# Patient Record
Sex: MALE | Race: WHITE | NOT HISPANIC OR LATINO | Employment: OTHER | URBAN - NONMETROPOLITAN AREA
[De-identification: names, ages, dates, MRNs, and addresses within clinical notes are randomized per-mention and may not be internally consistent; named-entity substitution may affect disease eponyms.]

---

## 2024-09-12 ENCOUNTER — APPOINTMENT (OUTPATIENT)
Dept: RADIOLOGY | Facility: HOSPITAL | Age: 74
DRG: 638 | End: 2024-09-12
Payer: MEDICARE

## 2024-09-12 ENCOUNTER — HOSPITAL ENCOUNTER (INPATIENT)
Facility: HOSPITAL | Age: 74
LOS: 1 days | Discharge: ANOTHER HOSPITAL | DRG: 638 | End: 2024-09-14
Attending: EMERGENCY MEDICINE | Admitting: INTERNAL MEDICINE
Payer: MEDICARE

## 2024-09-12 DIAGNOSIS — M86.9 OSTEOMYELITIS OF LEFT FOOT, UNSPECIFIED TYPE (MULTI): Primary | ICD-10-CM

## 2024-09-12 DIAGNOSIS — R60.0 PERIPHERAL EDEMA: ICD-10-CM

## 2024-09-12 DIAGNOSIS — I82.4Y2 ACUTE DEEP VEIN THROMBOSIS (DVT) OF PROXIMAL VEIN OF LEFT LOWER EXTREMITY (MULTI): ICD-10-CM

## 2024-09-12 DIAGNOSIS — L08.9 WOUND INFECTION: ICD-10-CM

## 2024-09-12 DIAGNOSIS — T14.8XXA WOUND INFECTION: ICD-10-CM

## 2024-09-12 DIAGNOSIS — R94.31 ABNORMAL ELECTROCARDIOGRAM (ECG) (EKG): ICD-10-CM

## 2024-09-12 DIAGNOSIS — R60.9 PERIPHERAL EDEMA: ICD-10-CM

## 2024-09-12 DIAGNOSIS — E11.51 TYPE 2 DIABETES MELLITUS WITH DIABETIC PERIPHERAL ANGIOPATHY WITHOUT GANGRENE, WITHOUT LONG-TERM CURRENT USE OF INSULIN (MULTI): ICD-10-CM

## 2024-09-12 LAB
ALBUMIN SERPL BCP-MCNC: 3.3 G/DL (ref 3.4–5)
ALP SERPL-CCNC: 127 U/L (ref 33–136)
ALT SERPL W P-5'-P-CCNC: 18 U/L (ref 10–52)
ANION GAP SERPL CALC-SCNC: 17 MMOL/L (ref 10–20)
APPEARANCE UR: CLEAR
APTT PPP: 27 SECONDS (ref 27–38)
AST SERPL W P-5'-P-CCNC: 26 U/L (ref 9–39)
BASOPHILS # BLD AUTO: 0.04 X10*3/UL (ref 0–0.1)
BASOPHILS NFR BLD AUTO: 0.4 %
BILIRUB SERPL-MCNC: 0.6 MG/DL (ref 0–1.2)
BILIRUB UR STRIP.AUTO-MCNC: NEGATIVE MG/DL
BNP SERPL-MCNC: 25 PG/ML (ref 0–99)
BUN SERPL-MCNC: 22 MG/DL (ref 6–23)
CALCIUM SERPL-MCNC: 8.9 MG/DL (ref 8.6–10.3)
CHLORIDE SERPL-SCNC: 92 MMOL/L (ref 98–107)
CO2 SERPL-SCNC: 25 MMOL/L (ref 21–32)
COLOR UR: YELLOW
CREAT SERPL-MCNC: 0.81 MG/DL (ref 0.5–1.3)
EGFRCR SERPLBLD CKD-EPI 2021: >90 ML/MIN/1.73M*2
EOSINOPHIL # BLD AUTO: 0.12 X10*3/UL (ref 0–0.4)
EOSINOPHIL NFR BLD AUTO: 1.1 %
ERYTHROCYTE [DISTWIDTH] IN BLOOD BY AUTOMATED COUNT: 12.6 % (ref 11.5–14.5)
GLUCOSE SERPL-MCNC: 310 MG/DL (ref 74–99)
GLUCOSE UR STRIP.AUTO-MCNC: ABNORMAL MG/DL
HCT VFR BLD AUTO: 36.9 % (ref 41–52)
HGB BLD-MCNC: 12.2 G/DL (ref 13.5–17.5)
IMM GRANULOCYTES # BLD AUTO: 0.09 X10*3/UL (ref 0–0.5)
IMM GRANULOCYTES NFR BLD AUTO: 0.8 % (ref 0–0.9)
KETONES UR STRIP.AUTO-MCNC: NEGATIVE MG/DL
LACTATE SERPL-SCNC: 1.4 MMOL/L (ref 0.4–2)
LEUKOCYTE ESTERASE UR QL STRIP.AUTO: NEGATIVE
LYMPHOCYTES # BLD AUTO: 1.74 X10*3/UL (ref 0.8–3)
LYMPHOCYTES NFR BLD AUTO: 16.4 %
MCH RBC QN AUTO: 28.5 PG (ref 26–34)
MCHC RBC AUTO-ENTMCNC: 33.1 G/DL (ref 32–36)
MCV RBC AUTO: 86 FL (ref 80–100)
MONOCYTES # BLD AUTO: 1.26 X10*3/UL (ref 0.05–0.8)
MONOCYTES NFR BLD AUTO: 11.9 %
NEUTROPHILS # BLD AUTO: 7.35 X10*3/UL (ref 1.6–5.5)
NEUTROPHILS NFR BLD AUTO: 69.4 %
NITRITE UR QL STRIP.AUTO: NEGATIVE
NRBC BLD-RTO: 0 /100 WBCS (ref 0–0)
PH UR STRIP.AUTO: 6.5 [PH]
PLATELET # BLD AUTO: 231 X10*3/UL (ref 150–450)
POTASSIUM SERPL-SCNC: 4.6 MMOL/L (ref 3.5–5.3)
PROT SERPL-MCNC: 7.6 G/DL (ref 6.4–8.2)
PROT UR STRIP.AUTO-MCNC: NEGATIVE MG/DL
RBC # BLD AUTO: 4.28 X10*6/UL (ref 4.5–5.9)
RBC # UR STRIP.AUTO: NEGATIVE /UL
SODIUM SERPL-SCNC: 129 MMOL/L (ref 136–145)
SP GR UR STRIP.AUTO: 1.02
UFH PPP CHRO-ACNC: 0.8 IU/ML
UROBILINOGEN UR STRIP.AUTO-MCNC: ABNORMAL MG/DL
WBC # BLD AUTO: 10.6 X10*3/UL (ref 4.4–11.3)

## 2024-09-12 PROCEDURE — 96367 TX/PROPH/DG ADDL SEQ IV INF: CPT

## 2024-09-12 PROCEDURE — 99291 CRITICAL CARE FIRST HOUR: CPT | Performed by: EMERGENCY MEDICINE

## 2024-09-12 PROCEDURE — 94760 N-INVAS EAR/PLS OXIMETRY 1: CPT

## 2024-09-12 PROCEDURE — 2500000004 HC RX 250 GENERAL PHARMACY W/ HCPCS (ALT 636 FOR OP/ED): Performed by: EMERGENCY MEDICINE

## 2024-09-12 PROCEDURE — 96375 TX/PRO/DX INJ NEW DRUG ADDON: CPT

## 2024-09-12 PROCEDURE — 85025 COMPLETE CBC W/AUTO DIFF WBC: CPT | Performed by: EMERGENCY MEDICINE

## 2024-09-12 PROCEDURE — 73630 X-RAY EXAM OF FOOT: CPT | Mod: LEFT SIDE | Performed by: RADIOLOGY

## 2024-09-12 PROCEDURE — 36415 COLL VENOUS BLD VENIPUNCTURE: CPT | Performed by: EMERGENCY MEDICINE

## 2024-09-12 PROCEDURE — 83880 ASSAY OF NATRIURETIC PEPTIDE: CPT | Performed by: EMERGENCY MEDICINE

## 2024-09-12 PROCEDURE — 80053 COMPREHEN METABOLIC PANEL: CPT | Performed by: EMERGENCY MEDICINE

## 2024-09-12 PROCEDURE — 87040 BLOOD CULTURE FOR BACTERIA: CPT | Mod: GENLAB | Performed by: EMERGENCY MEDICINE

## 2024-09-12 PROCEDURE — 96366 THER/PROPH/DIAG IV INF ADDON: CPT

## 2024-09-12 PROCEDURE — 2500000002 HC RX 250 W HCPCS SELF ADMINISTERED DRUGS (ALT 637 FOR MEDICARE OP, ALT 636 FOR OP/ED): Performed by: EMERGENCY MEDICINE

## 2024-09-12 PROCEDURE — 96365 THER/PROPH/DIAG IV INF INIT: CPT

## 2024-09-12 PROCEDURE — G0378 HOSPITAL OBSERVATION PER HR: HCPCS

## 2024-09-12 PROCEDURE — 85520 HEPARIN ASSAY: CPT | Performed by: NURSE PRACTITIONER

## 2024-09-12 PROCEDURE — 93971 EXTREMITY STUDY: CPT

## 2024-09-12 PROCEDURE — 85730 THROMBOPLASTIN TIME PARTIAL: CPT | Performed by: EMERGENCY MEDICINE

## 2024-09-12 PROCEDURE — 73630 X-RAY EXAM OF FOOT: CPT | Mod: LT

## 2024-09-12 PROCEDURE — 96374 THER/PROPH/DIAG INJ IV PUSH: CPT | Mod: 59

## 2024-09-12 PROCEDURE — 83605 ASSAY OF LACTIC ACID: CPT | Performed by: EMERGENCY MEDICINE

## 2024-09-12 PROCEDURE — 81003 URINALYSIS AUTO W/O SCOPE: CPT | Performed by: EMERGENCY MEDICINE

## 2024-09-12 PROCEDURE — 93971 EXTREMITY STUDY: CPT | Mod: FOREIGN READ | Performed by: RADIOLOGY

## 2024-09-12 PROCEDURE — 2500000004 HC RX 250 GENERAL PHARMACY W/ HCPCS (ALT 636 FOR OP/ED): Performed by: NURSE PRACTITIONER

## 2024-09-12 RX ORDER — ACETAMINOPHEN 160 MG/5ML
650 SOLUTION ORAL EVERY 4 HOURS PRN
Status: DISCONTINUED | OUTPATIENT
Start: 2024-09-12 | End: 2024-09-13

## 2024-09-12 RX ORDER — GUAIFENESIN/DEXTROMETHORPHAN 100-10MG/5
5 SYRUP ORAL EVERY 4 HOURS PRN
Status: DISCONTINUED | OUTPATIENT
Start: 2024-09-12 | End: 2024-09-14 | Stop reason: HOSPADM

## 2024-09-12 RX ORDER — ACETAMINOPHEN 325 MG/1
650 TABLET ORAL EVERY 4 HOURS PRN
Status: DISCONTINUED | OUTPATIENT
Start: 2024-09-12 | End: 2024-09-14 | Stop reason: HOSPADM

## 2024-09-12 RX ORDER — AMOXICILLIN 250 MG
1 CAPSULE ORAL 2 TIMES DAILY
Status: DISCONTINUED | OUTPATIENT
Start: 2024-09-12 | End: 2024-09-14 | Stop reason: HOSPADM

## 2024-09-12 RX ORDER — CALCIUM CARBONATE 200(500)MG
500 TABLET,CHEWABLE ORAL 4 TIMES DAILY PRN
Status: DISCONTINUED | OUTPATIENT
Start: 2024-09-12 | End: 2024-09-14 | Stop reason: HOSPADM

## 2024-09-12 RX ORDER — ONDANSETRON HYDROCHLORIDE 2 MG/ML
4 INJECTION, SOLUTION INTRAVENOUS EVERY 8 HOURS PRN
Status: DISCONTINUED | OUTPATIENT
Start: 2024-09-12 | End: 2024-09-14 | Stop reason: HOSPADM

## 2024-09-12 RX ORDER — KETOROLAC TROMETHAMINE 15 MG/ML
15 INJECTION, SOLUTION INTRAMUSCULAR; INTRAVENOUS ONCE
Status: COMPLETED | OUTPATIENT
Start: 2024-09-12 | End: 2024-09-12

## 2024-09-12 RX ORDER — HEPARIN SODIUM 10000 [USP'U]/100ML
0-4500 INJECTION, SOLUTION INTRAVENOUS CONTINUOUS
Status: DISCONTINUED | OUTPATIENT
Start: 2024-09-12 | End: 2024-09-12

## 2024-09-12 RX ORDER — PANTOPRAZOLE SODIUM 40 MG/1
40 TABLET, DELAYED RELEASE ORAL
Status: DISCONTINUED | OUTPATIENT
Start: 2024-09-13 | End: 2024-09-14 | Stop reason: HOSPADM

## 2024-09-12 RX ORDER — ACETAMINOPHEN 500 MG
5 TABLET ORAL NIGHTLY PRN
Status: DISCONTINUED | OUTPATIENT
Start: 2024-09-12 | End: 2024-09-14 | Stop reason: HOSPADM

## 2024-09-12 RX ORDER — ONDANSETRON 4 MG/1
4 TABLET, FILM COATED ORAL EVERY 8 HOURS PRN
Status: DISCONTINUED | OUTPATIENT
Start: 2024-09-12 | End: 2024-09-14 | Stop reason: HOSPADM

## 2024-09-12 RX ORDER — ACETAMINOPHEN 650 MG/1
650 SUPPOSITORY RECTAL EVERY 4 HOURS PRN
Status: DISCONTINUED | OUTPATIENT
Start: 2024-09-12 | End: 2024-09-13

## 2024-09-12 RX ORDER — VANCOMYCIN HYDROCHLORIDE 1 G/200ML
1 INJECTION, SOLUTION INTRAVENOUS
Status: DISCONTINUED | OUTPATIENT
Start: 2024-09-12 | End: 2024-09-12 | Stop reason: WASHOUT

## 2024-09-12 RX ORDER — VANCOMYCIN HYDROCHLORIDE 1 G/20ML
INJECTION, POWDER, LYOPHILIZED, FOR SOLUTION INTRAVENOUS DAILY PRN
Status: DISCONTINUED | OUTPATIENT
Start: 2024-09-12 | End: 2024-09-14 | Stop reason: HOSPADM

## 2024-09-12 RX ORDER — PANTOPRAZOLE SODIUM 40 MG/10ML
40 INJECTION, POWDER, LYOPHILIZED, FOR SOLUTION INTRAVENOUS
Status: DISCONTINUED | OUTPATIENT
Start: 2024-09-13 | End: 2024-09-13

## 2024-09-12 RX ORDER — GUAIFENESIN 600 MG/1
600 TABLET, EXTENDED RELEASE ORAL EVERY 12 HOURS PRN
Status: DISCONTINUED | OUTPATIENT
Start: 2024-09-12 | End: 2024-09-14 | Stop reason: HOSPADM

## 2024-09-12 RX ORDER — ENOXAPARIN SODIUM 100 MG/ML
40 INJECTION SUBCUTANEOUS EVERY 24 HOURS
Status: CANCELLED | OUTPATIENT
Start: 2024-09-12

## 2024-09-12 RX ORDER — VANCOMYCIN HYDROCHLORIDE 1 G/200ML
1000 INJECTION, SOLUTION INTRAVENOUS ONCE
Status: DISCONTINUED | OUTPATIENT
Start: 2024-09-12 | End: 2024-09-13

## 2024-09-12 RX ORDER — HEPARIN SODIUM 10000 [USP'U]/100ML
0-4500 INJECTION, SOLUTION INTRAVENOUS CONTINUOUS
Status: DISCONTINUED | OUTPATIENT
Start: 2024-09-12 | End: 2024-09-14 | Stop reason: HOSPADM

## 2024-09-12 RX ADMIN — HEPARIN SODIUM 2000 UNITS/HR: 10000 INJECTION, SOLUTION INTRAVENOUS at 19:33

## 2024-09-12 RX ADMIN — VANCOMYCIN HYDROCHLORIDE 1 G: 1 INJECTION, SOLUTION INTRAVENOUS at 18:20

## 2024-09-12 RX ADMIN — INSULIN HUMAN 5 UNITS: 100 INJECTION, SOLUTION PARENTERAL at 19:31

## 2024-09-12 RX ADMIN — PIPERACILLIN SODIUM AND TAZOBACTAM SODIUM 4.5 G: 4; .5 INJECTION, SOLUTION INTRAVENOUS at 17:16

## 2024-09-12 RX ADMIN — KETOROLAC TROMETHAMINE 15 MG: 15 INJECTION INTRAMUSCULAR; INTRAVENOUS at 19:31

## 2024-09-12 RX ADMIN — SODIUM CHLORIDE, POTASSIUM CHLORIDE, SODIUM LACTATE AND CALCIUM CHLORIDE 1000 ML: 600; 310; 30; 20 INJECTION, SOLUTION INTRAVENOUS at 18:20

## 2024-09-12 SDOH — SOCIAL STABILITY: SOCIAL INSECURITY: DO YOU FEEL UNSAFE GOING BACK TO THE PLACE WHERE YOU ARE LIVING?: NO

## 2024-09-12 SDOH — SOCIAL STABILITY: SOCIAL INSECURITY: HAS ANYONE EVER THREATENED TO HURT YOUR FAMILY OR YOUR PETS?: NO

## 2024-09-12 SDOH — SOCIAL STABILITY: SOCIAL INSECURITY: ABUSE: ADULT

## 2024-09-12 SDOH — SOCIAL STABILITY: SOCIAL INSECURITY: ARE THERE ANY APPARENT SIGNS OF INJURIES/BEHAVIORS THAT COULD BE RELATED TO ABUSE/NEGLECT?: NO

## 2024-09-12 SDOH — SOCIAL STABILITY: SOCIAL INSECURITY: WERE YOU ABLE TO COMPLETE ALL THE BEHAVIORAL HEALTH SCREENINGS?: YES

## 2024-09-12 SDOH — SOCIAL STABILITY: SOCIAL INSECURITY: DO YOU FEEL ANYONE HAS EXPLOITED OR TAKEN ADVANTAGE OF YOU FINANCIALLY OR OF YOUR PERSONAL PROPERTY?: NO

## 2024-09-12 SDOH — SOCIAL STABILITY: SOCIAL INSECURITY: HAVE YOU HAD THOUGHTS OF HARMING ANYONE ELSE?: NO

## 2024-09-12 SDOH — SOCIAL STABILITY: SOCIAL INSECURITY: ARE YOU OR HAVE YOU BEEN THREATENED OR ABUSED PHYSICALLY, EMOTIONALLY, OR SEXUALLY BY ANYONE?: NO

## 2024-09-12 SDOH — SOCIAL STABILITY: SOCIAL INSECURITY: DOES ANYONE TRY TO KEEP YOU FROM HAVING/CONTACTING OTHER FRIENDS OR DOING THINGS OUTSIDE YOUR HOME?: NO

## 2024-09-12 ASSESSMENT — ACTIVITIES OF DAILY LIVING (ADL)
GROOMING: NEEDS ASSISTANCE
ASSISTIVE_DEVICE: CANE;WALKER
FEEDING YOURSELF: INDEPENDENT
PATIENT'S MEMORY ADEQUATE TO SAFELY COMPLETE DAILY ACTIVITIES?: YES
HEARING - LEFT EAR: FUNCTIONAL
DRESSING YOURSELF: INDEPENDENT
LACK_OF_TRANSPORTATION: NO
HEARING - RIGHT EAR: FUNCTIONAL
BATHING: NEEDS ASSISTANCE
ADEQUATE_TO_COMPLETE_ADL: YES
JUDGMENT_ADEQUATE_SAFELY_COMPLETE_DAILY_ACTIVITIES: YES
TOILETING: NEEDS ASSISTANCE
WALKS IN HOME: NEEDS ASSISTANCE

## 2024-09-12 ASSESSMENT — COGNITIVE AND FUNCTIONAL STATUS - GENERAL
STANDING UP FROM CHAIR USING ARMS: A LOT
MOBILITY SCORE: 14
DAILY ACTIVITIY SCORE: 20
TOILETING: A LITTLE
MOVING TO AND FROM BED TO CHAIR: A LOT
MOVING FROM LYING ON BACK TO SITTING ON SIDE OF FLAT BED WITH BEDRAILS: A LITTLE
PATIENT BASELINE BEDBOUND: NO
DRESSING REGULAR UPPER BODY CLOTHING: A LITTLE
DRESSING REGULAR LOWER BODY CLOTHING: A LITTLE
HELP NEEDED FOR BATHING: A LITTLE
WALKING IN HOSPITAL ROOM: A LOT
CLIMB 3 TO 5 STEPS WITH RAILING: A LOT
TURNING FROM BACK TO SIDE WHILE IN FLAT BAD: A LITTLE

## 2024-09-12 ASSESSMENT — LIFESTYLE VARIABLES
AUDIT-C TOTAL SCORE: 0
HOW OFTEN DO YOU HAVE A DRINK CONTAINING ALCOHOL: NEVER
HOW OFTEN DO YOU HAVE 6 OR MORE DRINKS ON ONE OCCASION: NEVER
HOW MANY STANDARD DRINKS CONTAINING ALCOHOL DO YOU HAVE ON A TYPICAL DAY: PATIENT DOES NOT DRINK
AUDIT-C TOTAL SCORE: 0
SKIP TO QUESTIONS 9-10: 1

## 2024-09-12 ASSESSMENT — PAIN SCALES - GENERAL: PAINLEVEL_OUTOF10: 10 - WORST POSSIBLE PAIN

## 2024-09-12 ASSESSMENT — PATIENT HEALTH QUESTIONNAIRE - PHQ9
SUM OF ALL RESPONSES TO PHQ9 QUESTIONS 1 & 2: 0
2. FEELING DOWN, DEPRESSED OR HOPELESS: NOT AT ALL
1. LITTLE INTEREST OR PLEASURE IN DOING THINGS: NOT AT ALL

## 2024-09-12 ASSESSMENT — PAIN DESCRIPTION - LOCATION: LOCATION: FOOT

## 2024-09-12 ASSESSMENT — COLUMBIA-SUICIDE SEVERITY RATING SCALE - C-SSRS
2. HAVE YOU ACTUALLY HAD ANY THOUGHTS OF KILLING YOURSELF?: NO
6. HAVE YOU EVER DONE ANYTHING, STARTED TO DO ANYTHING, OR PREPARED TO DO ANYTHING TO END YOUR LIFE?: NO
1. IN THE PAST MONTH, HAVE YOU WISHED YOU WERE DEAD OR WISHED YOU COULD GO TO SLEEP AND NOT WAKE UP?: NO

## 2024-09-12 ASSESSMENT — PAIN - FUNCTIONAL ASSESSMENT: PAIN_FUNCTIONAL_ASSESSMENT: 0-10

## 2024-09-12 ASSESSMENT — PAIN DESCRIPTION - PAIN TYPE: TYPE: ACUTE PAIN

## 2024-09-12 ASSESSMENT — PAIN DESCRIPTION - ORIENTATION: ORIENTATION: LEFT

## 2024-09-12 ASSESSMENT — PAIN DESCRIPTION - DESCRIPTORS: DESCRIPTORS: SHARP;SHOOTING

## 2024-09-12 ASSESSMENT — PAIN DESCRIPTION - FREQUENCY: FREQUENCY: INTERMITTENT

## 2024-09-12 NOTE — ED TRIAGE NOTES
Left foot wound infection, states it has been like this for several months. Foot is very swollen, red , foul odor and foul smelling discharge

## 2024-09-12 NOTE — ED PROVIDER NOTES
HPI   Chief Complaint   Patient presents with    Wound Infection     Left foot wound infection, states it has been like this for several months. Foot is very swollen, red , foul odor and foul smelling discharge.        HPI        Patient History   Past Medical History:   Diagnosis Date    Personal history of other diseases of the musculoskeletal system and connective tissue 10/10/2014    Personal history of arthritis     Past Surgical History:   Procedure Laterality Date    CARPAL TUNNEL RELEASE  10/10/2014    Neuroplasty Decompression Median Nerve At Carpal Tunnel     No family history on file.  Social History     Tobacco Use    Smoking status: Unknown    Smokeless tobacco: Not on file   Substance Use Topics    Alcohol use: Not on file    Drug use: Not on file       Physical Exam   ED Triage Vitals [09/12/24 1521]   Temperature Heart Rate Respirations BP   36.9 °C (98.5 °F) 96 16 145/76      Pulse Ox Temp Source Heart Rate Source Patient Position   97 % Oral Monitor Lying      BP Location FiO2 (%)     Left arm --       Physical Exam  Vitals and nursing note reviewed.   Constitutional:       General: He is not in acute distress.     Appearance: He is well-developed.   HENT:      Head: Normocephalic and atraumatic.   Eyes:      Conjunctiva/sclera: Conjunctivae normal.   Cardiovascular:      Rate and Rhythm: Normal rate and regular rhythm.      Heart sounds: No murmur heard.  Pulmonary:      Effort: Pulmonary effort is normal. No respiratory distress.      Breath sounds: Normal breath sounds.   Abdominal:      Palpations: Abdomen is soft.      Tenderness: There is no abdominal tenderness.   Musculoskeletal:         General: No swelling.      Cervical back: Neck supple.      Left lower leg: Edema present.   Skin:     General: Skin is warm and dry.      Capillary Refill: Capillary refill takes less than 2 seconds.      Findings: Erythema and lesion present.   Neurological:      Mental Status: He is alert.   Psychiatric:          Mood and Affect: Mood normal.           ED Course & MDM   Diagnoses as of 09/12/24 1916   Osteomyelitis of left foot, unspecified type (Multi)   Acute deep vein thrombosis (DVT) of proximal vein of left lower extremity (Multi)                 No data recorded                                 Medical Decision Making  Very pleasant 74-year-old gentleman presents to the ER with chief complaint of left foot swelling and pain.  Patient is diabetic.  Patient reports that he uses a walker to get around secondary to a stroke.  Patient does not utilize that leg is much as he should.  But has been getting worse and the swelling over the matter of weeks.  Concern for possible clot did perform an ultrasound which was positive.  From the standpoint to start patient on heparin drip and bolus.  In addition to that did order x-ray to rule out any air producing bacteria.  That did come back not showing any air but suggestive of osteomyelitis.  But despite that already started the patient on vancomycin and Zosyn.  Did contact inpatient team they were able to see the patient down here in the ED agreed that the patient can be admitted here to [facility].  Patient again will start on antibiotics started on heparin do believe that his sodium is slightly abnormal secondary to his glucose.  Will give the patient some insulin down here in the ED.  Patient will be admitted to the hospital for further evaluation treatment.        Procedure  Critical Care    Performed by: Ryder Chappell DO  Authorized by: Ryder Chappell DO    Critical care provider statement:     Critical care time (minutes):  30    Critical care was time spent personally by me on the following activities:  Ordering and performing treatments and interventions, re-evaluation of patient's condition, ordering and review of radiographic studies, ordering and review of laboratory studies and evaluation of patient's response to treatment    Care discussed with: admitting  provider         Ryder STYLES [facility], DO  09/12/24 1916

## 2024-09-13 ENCOUNTER — APPOINTMENT (OUTPATIENT)
Dept: RADIOLOGY | Facility: HOSPITAL | Age: 74
DRG: 638 | End: 2024-09-13
Payer: MEDICARE

## 2024-09-13 ENCOUNTER — APPOINTMENT (OUTPATIENT)
Dept: CARDIOLOGY | Facility: HOSPITAL | Age: 74
DRG: 638 | End: 2024-09-13
Payer: MEDICARE

## 2024-09-13 PROBLEM — E11.51 TYPE 2 DIABETES MELLITUS WITH DIABETIC PERIPHERAL ANGIOPATHY WITHOUT GANGRENE, WITHOUT LONG-TERM CURRENT USE OF INSULIN (MULTI): Status: ACTIVE | Noted: 2024-09-13

## 2024-09-13 PROBLEM — M79.2 NEURITIS: Status: ACTIVE | Noted: 2024-09-13

## 2024-09-13 PROBLEM — I15.9 SECONDARY HYPERTENSION: Status: ACTIVE | Noted: 2024-09-13

## 2024-09-13 PROBLEM — R26.2 AMBULATORY DYSFUNCTION: Status: ACTIVE | Noted: 2024-09-13

## 2024-09-13 PROBLEM — M54.42 CHRONIC BILATERAL LOW BACK PAIN WITH LEFT-SIDED SCIATICA: Status: ACTIVE | Noted: 2024-09-13

## 2024-09-13 PROBLEM — I10 HYPERTENSION: Status: ACTIVE | Noted: 2024-09-13

## 2024-09-13 PROBLEM — I73.9 PERIPHERAL ARTERIAL DISEASE (CMS-HCC): Status: ACTIVE | Noted: 2024-09-13

## 2024-09-13 PROBLEM — I82.432 ACUTE DEEP VEIN THROMBOSIS (DVT) OF POPLITEAL VEIN OF LEFT LOWER EXTREMITY (MULTI): Status: ACTIVE | Noted: 2024-09-13

## 2024-09-13 PROBLEM — M48.07 NEURAL FORAMINAL STENOSIS OF LUMBOSACRAL SPINE: Status: ACTIVE | Noted: 2024-09-13

## 2024-09-13 PROBLEM — I25.10 CAD (CORONARY ARTERY DISEASE): Status: ACTIVE | Noted: 2024-09-13

## 2024-09-13 PROBLEM — E11.42 DIABETIC PERIPHERAL NEUROPATHY (MULTI): Status: ACTIVE | Noted: 2023-04-07

## 2024-09-13 PROBLEM — E78.5 HYPERLIPIDEMIA: Status: ACTIVE | Noted: 2024-09-13

## 2024-09-13 PROBLEM — R29.898 LEFT LEG WEAKNESS: Status: ACTIVE | Noted: 2024-09-13

## 2024-09-13 PROBLEM — M86.9 OSTEOMYELITIS OF LEFT FOOT, UNSPECIFIED TYPE (MULTI): Status: ACTIVE | Noted: 2024-09-13

## 2024-09-13 PROBLEM — R60.0 BILATERAL LOWER EXTREMITY EDEMA: Status: ACTIVE | Noted: 2024-09-13

## 2024-09-13 PROBLEM — I21.9 MI (MYOCARDIAL INFARCTION) (MULTI): Status: ACTIVE | Noted: 2024-09-13

## 2024-09-13 PROBLEM — G62.9 NEUROPATHY: Status: ACTIVE | Noted: 2024-09-13

## 2024-09-13 PROBLEM — Z86.73 HISTORY OF STROKE: Status: ACTIVE | Noted: 2024-09-13

## 2024-09-13 PROBLEM — G89.29 CHRONIC BILATERAL LOW BACK PAIN WITH LEFT-SIDED SCIATICA: Status: ACTIVE | Noted: 2024-09-13

## 2024-09-13 LAB
ANION GAP SERPL CALC-SCNC: 12 MMOL/L (ref 10–20)
AORTIC VALVE PEAK VELOCITY: 2.56 M/S
AV PEAK GRADIENT: 26.2 MMHG
AVA (PEAK VEL): 1.86 CM2
BUN SERPL-MCNC: 21 MG/DL (ref 6–23)
CALCIUM SERPL-MCNC: 8.3 MG/DL (ref 8.6–10.3)
CHLORIDE SERPL-SCNC: 96 MMOL/L (ref 98–107)
CO2 SERPL-SCNC: 26 MMOL/L (ref 21–32)
CREAT SERPL-MCNC: 0.77 MG/DL (ref 0.5–1.3)
CRP SERPL-MCNC: 9.51 MG/DL
EGFRCR SERPLBLD CKD-EPI 2021: >90 ML/MIN/1.73M*2
EJECTION FRACTION: 63 %
ERYTHROCYTE [DISTWIDTH] IN BLOOD BY AUTOMATED COUNT: 12.3 % (ref 11.5–14.5)
ERYTHROCYTE [SEDIMENTATION RATE] IN BLOOD BY WESTERGREN METHOD: 24 MM/H (ref 0–20)
EST. AVERAGE GLUCOSE BLD GHB EST-MCNC: 235 MG/DL
GLUCOSE BLD MANUAL STRIP-MCNC: 157 MG/DL (ref 74–99)
GLUCOSE BLD MANUAL STRIP-MCNC: 182 MG/DL (ref 74–99)
GLUCOSE BLD MANUAL STRIP-MCNC: 189 MG/DL (ref 74–99)
GLUCOSE BLD MANUAL STRIP-MCNC: 198 MG/DL (ref 74–99)
GLUCOSE SERPL-MCNC: 200 MG/DL (ref 74–99)
HBA1C MFR BLD: 9.8 %
HCT VFR BLD AUTO: 33.2 % (ref 41–52)
HGB BLD-MCNC: 11 G/DL (ref 13.5–17.5)
HOLD SPECIMEN: NORMAL
LEFT VENTRICULAR OUTFLOW TRACT DIAMETER: 2.2 CM
MCH RBC QN AUTO: 28.2 PG (ref 26–34)
MCHC RBC AUTO-ENTMCNC: 33.1 G/DL (ref 32–36)
MCV RBC AUTO: 85 FL (ref 80–100)
MITRAL VALVE E/A RATIO: 0.7
NRBC BLD-RTO: 0 /100 WBCS (ref 0–0)
PLATELET # BLD AUTO: 235 X10*3/UL (ref 150–450)
POTASSIUM SERPL-SCNC: 3.8 MMOL/L (ref 3.5–5.3)
RBC # BLD AUTO: 3.9 X10*6/UL (ref 4.5–5.9)
RIGHT VENTRICLE FREE WALL PEAK S': 12.5 CM/S
SODIUM SERPL-SCNC: 130 MMOL/L (ref 136–145)
TRICUSPID ANNULAR PLANE SYSTOLIC EXCURSION: 2.1 CM
UFH PPP CHRO-ACNC: 0.5 IU/ML
UFH PPP CHRO-ACNC: 0.7 IU/ML
WBC # BLD AUTO: 11.6 X10*3/UL (ref 4.4–11.3)

## 2024-09-13 PROCEDURE — 75635 CT ANGIO ABDOMINAL ARTERIES: CPT | Mod: IPSPLIT

## 2024-09-13 PROCEDURE — 86140 C-REACTIVE PROTEIN: CPT | Performed by: NURSE PRACTITIONER

## 2024-09-13 PROCEDURE — 36415 COLL VENOUS BLD VENIPUNCTURE: CPT | Performed by: NURSE PRACTITIONER

## 2024-09-13 PROCEDURE — 96365 THER/PROPH/DIAG IV INF INIT: CPT | Mod: IPSPLIT

## 2024-09-13 PROCEDURE — 99223 1ST HOSP IP/OBS HIGH 75: CPT | Performed by: NURSE PRACTITIONER

## 2024-09-13 PROCEDURE — 94760 N-INVAS EAR/PLS OXIMETRY 1: CPT

## 2024-09-13 PROCEDURE — 2500000001 HC RX 250 WO HCPCS SELF ADMINISTERED DRUGS (ALT 637 FOR MEDICARE OP): Mod: IPSPLIT | Performed by: PHYSICIAN ASSISTANT

## 2024-09-13 PROCEDURE — 2500000001 HC RX 250 WO HCPCS SELF ADMINISTERED DRUGS (ALT 637 FOR MEDICARE OP): Performed by: NURSE PRACTITIONER

## 2024-09-13 PROCEDURE — 82947 ASSAY GLUCOSE BLOOD QUANT: CPT

## 2024-09-13 PROCEDURE — 75635 CT ANGIO ABDOMINAL ARTERIES: CPT | Performed by: RADIOLOGY

## 2024-09-13 PROCEDURE — 2500000004 HC RX 250 GENERAL PHARMACY W/ HCPCS (ALT 636 FOR OP/ED): Mod: JZ,IPSPLIT | Performed by: NURSE PRACTITIONER

## 2024-09-13 PROCEDURE — 85652 RBC SED RATE AUTOMATED: CPT | Performed by: NURSE PRACTITIONER

## 2024-09-13 PROCEDURE — 2500000005 HC RX 250 GENERAL PHARMACY W/O HCPCS: Mod: IPSPLIT

## 2024-09-13 PROCEDURE — 1100000001 HC PRIVATE ROOM DAILY: Mod: IPSPLIT

## 2024-09-13 PROCEDURE — 2500000005 HC RX 250 GENERAL PHARMACY W/O HCPCS: Mod: IPSPLIT | Performed by: NURSE PRACTITIONER

## 2024-09-13 PROCEDURE — 87070 CULTURE OTHR SPECIMN AEROBIC: CPT | Mod: GENLAB | Performed by: NURSE PRACTITIONER

## 2024-09-13 PROCEDURE — 2550000001 HC RX 255 CONTRASTS: Mod: IPSPLIT | Performed by: INTERNAL MEDICINE

## 2024-09-13 PROCEDURE — 85027 COMPLETE CBC AUTOMATED: CPT | Performed by: NURSE PRACTITIONER

## 2024-09-13 PROCEDURE — 80048 BASIC METABOLIC PNL TOTAL CA: CPT | Performed by: NURSE PRACTITIONER

## 2024-09-13 PROCEDURE — 85520 HEPARIN ASSAY: CPT | Performed by: NURSE PRACTITIONER

## 2024-09-13 PROCEDURE — 93306 TTE W/DOPPLER COMPLETE: CPT | Mod: IPSPLIT

## 2024-09-13 PROCEDURE — 83036 HEMOGLOBIN GLYCOSYLATED A1C: CPT | Mod: GENLAB | Performed by: NURSE PRACTITIONER

## 2024-09-13 PROCEDURE — 96366 THER/PROPH/DIAG IV INF ADDON: CPT | Mod: IPSPLIT

## 2024-09-13 PROCEDURE — 2500000002 HC RX 250 W HCPCS SELF ADMINISTERED DRUGS (ALT 637 FOR MEDICARE OP, ALT 636 FOR OP/ED): Performed by: NURSE PRACTITIONER

## 2024-09-13 RX ORDER — OXYCODONE HYDROCHLORIDE 5 MG/1
5 TABLET ORAL EVERY 4 HOURS PRN
Status: DISCONTINUED | OUTPATIENT
Start: 2024-09-13 | End: 2024-09-14 | Stop reason: HOSPADM

## 2024-09-13 RX ORDER — SODIUM CHLORIDE 9 MG/ML
10 INJECTION, SOLUTION INTRAVENOUS CONTINUOUS PRN
Status: DISCONTINUED | OUTPATIENT
Start: 2024-09-13 | End: 2024-09-14 | Stop reason: HOSPADM

## 2024-09-13 RX ORDER — SODIUM CHLORIDE 9 MG/ML
INJECTION, SOLUTION INTRAVENOUS
Status: DISPENSED
Start: 2024-09-13 | End: 2024-09-13

## 2024-09-13 RX ORDER — INSULIN LISPRO 100 [IU]/ML
0-15 INJECTION, SOLUTION INTRAVENOUS; SUBCUTANEOUS
Status: DISCONTINUED | OUTPATIENT
Start: 2024-09-13 | End: 2024-09-14 | Stop reason: HOSPADM

## 2024-09-13 RX ORDER — VANCOMYCIN 1.25 G/250ML
1750 INJECTION, SOLUTION INTRAVENOUS EVERY 12 HOURS
Status: DISCONTINUED | OUTPATIENT
Start: 2024-09-13 | End: 2024-09-14 | Stop reason: HOSPADM

## 2024-09-13 RX ADMIN — PIPERACILLIN SODIUM AND TAZOBACTAM SODIUM 3.38 G: 3; .375 INJECTION, SOLUTION INTRAVENOUS at 12:18

## 2024-09-13 RX ADMIN — HYDROMORPHONE HYDROCHLORIDE 0.5 MG: 1 INJECTION, SOLUTION INTRAMUSCULAR; INTRAVENOUS; SUBCUTANEOUS at 14:33

## 2024-09-13 RX ADMIN — IOHEXOL 125 ML: 350 INJECTION, SOLUTION INTRAVENOUS at 12:07

## 2024-09-13 RX ADMIN — Medication 2 L/MIN: at 22:19

## 2024-09-13 RX ADMIN — HEPARIN SODIUM 1800 UNITS/HR: 10000 INJECTION, SOLUTION INTRAVENOUS at 05:15

## 2024-09-13 RX ADMIN — Medication 5 MG: at 01:15

## 2024-09-13 RX ADMIN — OXYCODONE HYDROCHLORIDE 5 MG: 5 TABLET ORAL at 20:16

## 2024-09-13 RX ADMIN — SENNOSIDES AND DOCUSATE SODIUM 1 TABLET: 50; 8.6 TABLET ORAL at 20:16

## 2024-09-13 RX ADMIN — ONDANSETRON HYDROCHLORIDE 4 MG: 4 TABLET, FILM COATED ORAL at 12:26

## 2024-09-13 RX ADMIN — INSULIN LISPRO 3 UNITS: 100 INJECTION, SOLUTION INTRAVENOUS; SUBCUTANEOUS at 08:33

## 2024-09-13 RX ADMIN — PIPERACILLIN SODIUM AND TAZOBACTAM SODIUM 3.38 G: 3; .375 INJECTION, SOLUTION INTRAVENOUS at 06:30

## 2024-09-13 RX ADMIN — ACETAMINOPHEN 650 MG: 325 TABLET ORAL at 12:26

## 2024-09-13 RX ADMIN — VANCOMYCIN 1750 MG: 1.25 INJECTION, SOLUTION INTRAVENOUS at 08:31

## 2024-09-13 RX ADMIN — SENNOSIDES AND DOCUSATE SODIUM 1 TABLET: 50; 8.6 TABLET ORAL at 08:36

## 2024-09-13 RX ADMIN — HEPARIN SODIUM 1800 UNITS/HR: 10000 INJECTION, SOLUTION INTRAVENOUS at 17:52

## 2024-09-13 RX ADMIN — VANCOMYCIN 1750 MG: 1.25 INJECTION, SOLUTION INTRAVENOUS at 20:08

## 2024-09-13 RX ADMIN — PIPERACILLIN SODIUM AND TAZOBACTAM SODIUM 3.38 G: 3; .375 INJECTION, SOLUTION INTRAVENOUS at 17:53

## 2024-09-13 RX ADMIN — PIPERACILLIN SODIUM AND TAZOBACTAM SODIUM 3.38 G: 3; .375 INJECTION, SOLUTION INTRAVENOUS at 00:17

## 2024-09-13 RX ADMIN — SODIUM HYPOCHLORITE: 2.5 SOLUTION TOPICAL at 14:35

## 2024-09-13 RX ADMIN — INSULIN LISPRO 3 UNITS: 100 INJECTION, SOLUTION INTRAVENOUS; SUBCUTANEOUS at 12:17

## 2024-09-13 RX ADMIN — PANTOPRAZOLE SODIUM 40 MG: 40 TABLET, DELAYED RELEASE ORAL at 06:30

## 2024-09-13 RX ADMIN — PERFLUTREN 10 ML OF DILUTION: 6.52 INJECTION, SUSPENSION INTRAVENOUS at 14:08

## 2024-09-13 ASSESSMENT — COGNITIVE AND FUNCTIONAL STATUS - GENERAL
DAILY ACTIVITIY SCORE: 18
DRESSING REGULAR UPPER BODY CLOTHING: A LOT
HELP NEEDED FOR BATHING: A LITTLE
MOVING FROM LYING ON BACK TO SITTING ON SIDE OF FLAT BED WITH BEDRAILS: A LITTLE
DRESSING REGULAR LOWER BODY CLOTHING: A LITTLE
CLIMB 3 TO 5 STEPS WITH RAILING: A LOT
TOILETING: A LITTLE
PERSONAL GROOMING: A LITTLE
WALKING IN HOSPITAL ROOM: A LOT
MOVING FROM LYING ON BACK TO SITTING ON SIDE OF FLAT BED WITH BEDRAILS: A LITTLE
TURNING FROM BACK TO SIDE WHILE IN FLAT BAD: A LOT
TURNING FROM BACK TO SIDE WHILE IN FLAT BAD: A LITTLE
DAILY ACTIVITIY SCORE: 20
HELP NEEDED FOR BATHING: A LITTLE
MOVING TO AND FROM BED TO CHAIR: A LOT
DRESSING REGULAR LOWER BODY CLOTHING: A LITTLE
MOBILITY SCORE: 13
STANDING UP FROM CHAIR USING ARMS: A LOT
MOVING TO AND FROM BED TO CHAIR: A LOT
CLIMB 3 TO 5 STEPS WITH RAILING: A LOT
STANDING UP FROM CHAIR USING ARMS: A LOT
TOILETING: A LITTLE
WALKING IN HOSPITAL ROOM: A LOT
MOBILITY SCORE: 14
DRESSING REGULAR UPPER BODY CLOTHING: A LITTLE

## 2024-09-13 ASSESSMENT — PAIN SCALES - GENERAL
PAINLEVEL_OUTOF10: 7
PAINLEVEL_OUTOF10: 0 - NO PAIN
PAINLEVEL_OUTOF10: 0 - NO PAIN
PAINLEVEL_OUTOF10: 9
PAINLEVEL_OUTOF10: 0 - NO PAIN
PAINLEVEL_OUTOF10: 0 - NO PAIN
PAINLEVEL_OUTOF10: 3
PAINLEVEL_OUTOF10: 0 - NO PAIN
PAINLEVEL_OUTOF10: 0 - NO PAIN

## 2024-09-13 ASSESSMENT — ENCOUNTER SYMPTOMS
BACK PAIN: 1
GASTROINTESTINAL NEGATIVE: 1
RESPIRATORY NEGATIVE: 1
ARTHRALGIAS: 1
SHORTNESS OF BREATH: 0
ALLERGIC/IMMUNOLOGIC NEGATIVE: 1
FATIGUE: 1
PSYCHIATRIC NEGATIVE: 1
EYES NEGATIVE: 1
ENDOCRINE NEGATIVE: 1
HEMATOLOGIC/LYMPHATIC NEGATIVE: 1
CHILLS: 0
ACTIVITY CHANGE: 1
FEVER: 0
COLOR CHANGE: 1
WOUND: 1
MYALGIAS: 1

## 2024-09-13 ASSESSMENT — PAIN - FUNCTIONAL ASSESSMENT
PAIN_FUNCTIONAL_ASSESSMENT: 0-10

## 2024-09-13 ASSESSMENT — PAIN SCALES - PAIN ASSESSMENT IN ADVANCED DEMENTIA (PAINAD): TOTALSCORE: MEDICATION (SEE MAR)

## 2024-09-13 ASSESSMENT — PAIN DESCRIPTION - DESCRIPTORS: DESCRIPTORS: SHARP

## 2024-09-13 ASSESSMENT — PAIN DESCRIPTION - LOCATION
LOCATION: BACK
LOCATION: FOOT

## 2024-09-13 ASSESSMENT — PAIN DESCRIPTION - ORIENTATION: ORIENTATION: LEFT

## 2024-09-13 ASSESSMENT — ACTIVITIES OF DAILY LIVING (ADL): LACK_OF_TRANSPORTATION: NO

## 2024-09-13 NOTE — PROGRESS NOTES
An hour and a half I was able to speak to BERNABE Schumacher and SARAH Pearson-CRP. she a nurse patient came to the hospital. He was admitted his swollen in his leg. He is a noncompliant diabetic with comorbidities. Is not been seen for his foot ulcer. And a patient has Gess necrotic changes near his fifth metatarsal head and x-ray shows high suspicion of osteomyelitis. In addition to patient has severe venous stasis, dermatitis and insufficiency. Patient also has a DVT. at this time, we will order nine of studies and CT Jemal to assess the circulation. He may need to be transferred to a hospital to have vascular surgery to look at him. But at this point, we will treat him conservatively with IV antibiotics and dressing changes and minimal weight-bearing. Have the medical team continue to treat him for his comorbidities. And if this patient is a surgical candidate, it’s quite reasonable due to the destruction of the soft tissue, necrosis and gangrenous changes. He may need a trans metatarsal amputation. I don’t believe an isolated ray amp  would work due to the soft tissue damage. Patient at risk of BKA or AKAor even death from complications of his history. Patient will definitely need placement. Patient has other issues as well.  Patience, go to really jan dermatitis changes on top cirrhosis on top of the foot and ichthyosis.  Patient may need just localized one care, debridement, and suppression of the wound and osteomyelitis..    Once we get the testing will then decide what the next treatment options will be however this patient is in trouble, and I don’t know if conservative. as possible, b options will help him

## 2024-09-13 NOTE — PROGRESS NOTES
Physical Therapy                 Therapy Communication Note    Patient Name: Elliot Partida  MRN: [id number]  Department: [facility]  Room: [address] [place] [address]A  Today's Date: 9/13/2024     Discipline: Physical Therapy    Missed Visit Reason: Missed Visit Reason: Patient placed on medical hold (Hold PT today per VITO)    Missed Time: Attempt 850

## 2024-09-13 NOTE — CONSULTS
"Nutrition Initial Assessment:   Nutrition Assessment    Reason for Assessment: Admission nursing screening (Stage 3 or 4 Pressure Injury or Multiple Non-Healing Wounds)    Patient is a 74 y.o. male presented to ED yesterday with left leg pain that has been ongoing for \"months\". Per H&P, his leg is edematous, erythematous, weeping with a large necrotic ulcer under his 5th toe on the plantar surface. Admitted for wound infection.     PMH of R frontal extradural mass since 2016, arthritis, CAD, diabetes type 2, MI, HTN, thoracic radiculopathy, neuropathy     Nutrition History:  Food and Nutrient History: Attempted to visit the patient several times today, but the patient was sleeping. Per IDT rounds, the patient is likely to refuse nutrition education or supplements. Plan to try Gelatein Plus tomorrow to help with protein intake. Assessment was completed using chart information, IDT rounds, and communication with nursing. Nursing reported that the patient refused dinner this evening. It is unclear what his intake has been at home or for breakfast and lunch today.    Food Allergies/Intolerances:   NKFA  GI Symptoms:  unable to assess  Oral Problems:  unable to assess    Wound Type: multiple Pressure injury perineum, and diabetic ulcer left foot, tibial distal, dorsal left ankle,  (nursing/wound notes provide further details)    Anthropometrics:  Height: 175.3 cm (5' 9\")   Weight: 107 kg (236 lb 1.8 oz)   BMI (Calculated): 34.85  IBW/kg (Dietitian Calculated): 72.7 kg  Percent of IBW: 147 %       Weight History:   Wt Readings from Last 10 Encounters:   09/12/24 107 kg (236 lb 1.8 oz)   01/11/23 109 kg (239 lb 15.9 oz)   11/30/22 109 kg (239 lb 15.9 oz)   07/20/21 103 kg (228 lb)   06/22/21 98.6 kg (217 lb 6 oz)   06/15/21 104 kg (229 lb 15 oz)   05/21/21 98.6 kg (217 lb 6 oz)   04/27/21 105 kg (231 lb 7.7 oz)   03/23/21 106 kg (233 lb 4 oz)   03/02/21 98.6 kg (217 lb 6 oz)     Weight Change %:  Weight History / % Weight " Change: not enough information to assess. Weight from chart: 1/11/23 was 109kg. No signifcant change in that time. Pt does have edmea which could be masking weight loss.    Nutrition Focused Physical Exam Findings:  defer: patient sleeping  Edema:  Edema: +2 mild, +4 severe  Edema Location: +2 RLE, +4 LLE per nursing flowsheet  Physical Findings:  Skin: Positive (see wound note)    Nutrition Significant Labs:  CBC Trend:   Results from last 7 days   Lab Units 09/13/24  0338 09/12/24  1700   WBC AUTO x10*3/uL 11.6* 10.6   RBC AUTO x10*6/uL 3.90* 4.28*   HEMOGLOBIN g/dL 11.0* 12.2*   HEMATOCRIT % 33.2* 36.9*   MCV fL 85 86   PLATELETS AUTO x10*3/uL 235 231    , BMP Trend:   Results from last 7 days   Lab Units 09/13/24  0337 09/12/24  1808   GLUCOSE mg/dL 200* 310*   CALCIUM mg/dL 8.3* 8.9   SODIUM mmol/L 130* 129*   POTASSIUM mmol/L 3.8 4.6   CO2 mmol/L 26 25   CHLORIDE mmol/L 96* 92*   BUN mg/dL 21 22   CREATININE mg/dL 0.77 0.81    , A1C:  Lab Results   Component Value Date    HGBA1C 9.8 (H) 09/13/2024   , BG POCT trend:   Results from last 7 days   Lab Units 09/13/24  1659 09/13/24  1120 09/13/24  0807   POCT GLUCOSE mg/dL 182* 189* 198*     Nutrition Specific Medications:  insulin lispro, 0-15 Units, subcutaneous, TID  pantoprazole, 40 mg, oral, Daily before breakfast  piperacillin-tazobactam, 3.375 g, intravenous, q6h  sennosides-docusate sodium, 1 tablet, oral, BID  vancomycin, 1,750 mg, intravenous, q12h    Continuous medications  heparin, 0-4,500 Units/hr, Last Rate: 1,800 Units/hr (09/13/24 1752)  sodium chloride 0.9%, 10 mL/hr    I/O:   Last BM Date: 09/13/24;      Dietary Orders (From admission, onward)       Start     Ordered    09/13/24 1656  Oral nutritional supplements  Until discontinued        Question Answer Comment   Deliver with Breakfast    Deliver with Dinner    Select supplement: Gelatein Plus        09/13/24 1655    09/12/24 2034  Adult diet Regular  Diet effective now        Question:  Diet  type  Answer:  Regular    09/12/24 2033 09/12/24 2033  May Participate in Room Service  Once        Question:  .  Answer:  Yes    09/12/24 2032                Estimated Needs:   Total Energy Estimated Needs (kCal): 2545 kCal  Method for Estimating Needs: 2181-2545kcal (30-35kcal/kg of IBW (72.7kg))  Total Protein Estimated Needs (g): 145 g  Method for Estimating Needs: 109-145g (1.5-2g/kg of IBW)  Total Fluid Estimated Needs (mL): 2181 mL  Method for Estimating Needs: 1mL/kcal        Nutrition Diagnosis   Malnutrition Diagnosis  Patient has Malnutrition Diagnosis:  (unable to fully assess)    Nutrition Diagnosis  Patient has Nutrition Diagnosis: Yes  Diagnosis Status (1): New  Nutrition Diagnosis 1: Increased nutrient needs  Related to (1): increased metabloic demand  As Evidenced by (1): would infection, PI stage three or greater on foot and perineum       Nutrition Interventions/Recommendations         Nutrition Prescription:  Individualized Nutrition Prescription Provided for : diet, fluids, ONS        Nutrition Interventions:   Interventions: Medical food supplement, Meals and snacks  Meals and Snacks: General healthful diet  Goal: contiune with general healthful diet, encourage high protein foods  Medical Food Supplement: Commercial food  Goal: Gelatein Plus BID (160kcal and 20g protein per 4oz)  Additional Interventions: Will try the Gelatein Plus, if he does not like, will try and offer other ONS    Collaboration and Referral of Nutrition Care: Collaboration by nutrition professional with other providers  Goal: IDT rounds; Nandini MORRIS    Nutrition Education:   N/A at this time    Nutrition Monitoring and Evaluation   Food/Nutrient Related History Monitoring  Monitoring and Evaluation Plan: Amount of food  Amount of Food: Estimated amout of food, Medical food intake  Criteria: Pt will consume >75% of est needs with meals and ONS    Body Composition/Growth/Weight History  Monitoring and Evaluation Plan:  Weight  Weight: Measured weight  Criteria: maintain stable weight, or reduce weight from edema    Biochemical Data, Medical Tests and Procedures  Monitoring and Evaluation Plan: Glucose/endocrine profile  Glucose/Endocrine Profile: Glucose, casual  Criteria: 80-180mg/dL    Nutrition Focused Physical Findings  Monitoring and Evaluation Plan: Skin  Skin: Impaired wound healing  Criteria: wound healing/ skin wnl  Other: Evaluate nutrition intervention as compared to nutrition goal(s) and estimated nutrient need criteria.       Follow Up:     Time Spent (min): 75 minutes  Last Date of Nutrition Visit: 09/13/24  Nutrition Follow-Up Needed?: Dietitian to reassess per policy  Follow up Comment: ONS likes, intake, NFPE

## 2024-09-13 NOTE — PROGRESS NOTES
09/13/24 1005   Discharge Planning   Living Arrangements Family members  (his sister lives with him.)   Support Systems Family members  (his sister and niece help him as needed)   Assistance Needed Independent in ADL's. His sister or niece do the shopping, cooking, and cleaning for him. His sister provides transporation for him. He does not drive. DME: walker, cane, wheelchair, shower chair, grab bars. He states that he has had several falls in the past 6 months. He does not have a PCP, offered list, patient is uninterested. [facility] in [place] is his preferred pharmacy, patient states that he does not take any home medications. He plans to return home with no additional needs on discharge. TCC to follow for any further needs. Patient admitted for wound infection, waiting on ID consult to determine needs.   Type of Residence Private residence  (ranch style single family home with basement.)   Number of Stairs to Enter Residence   (patient enters the home through the basement. He is not sure how many steps there are to navigate, but he denies any issues performing stairs.)   Do you have animals or pets at home? Yes   Type of Animals or Pets 2 cats   Who is requesting discharge planning? Provider   Home or Post Acute Services None   Does the patient need discharge transport arranged? No   Financial Resource Strain   How hard is it for you to pay for the very basics like food, housing, medical care, and heating? Not hard   Housing Stability   In the last 12 months, was there a time when you were not able to pay the mortgage or rent on time? N   In the past 12 months, how many times have you moved where you were living? 0   At any time in the past 12 months, were you homeless or living in a shelter (including now)? N   Transportation Needs   In the past 12 months, has lack of transportation kept you from medical appointments or from getting medications? no   In the past 12 months, has lack of transportation kept  you from meetings, work, or from getting things needed for daily living? No

## 2024-09-13 NOTE — NURSING NOTE
1518: Assumed care of patient. Patient is resting in bed. Denies pain at this time and is refusing MRI.     1525: Agree with previous nurse's assessment     1700: Patient resting comfortably in bed, blood glucose obtained and at 182. Patient states he does not want to eat dinner therefore nursing will hold insulin dose at this time.     1746: The patient's goals for the shift include no pain      The clinical goals for the shift include remain free from pain.    Over the shift, the patient did  make progress toward the following goals. Given IV dilaudid with good effect. Dressing completed to left foot. IV Heparin drip infusing per MAR. Next assay due in morning. Patient refused MRI.

## 2024-09-13 NOTE — NURSING NOTE
Pt states he would like to get up to the bathroom, encouraged patient to use urinal. Pt declined and stated he was going to use the bathroom. Pt walked to bathroom with two assist. On way back from bathroom patient's left foot began to become purple on top. Appearing to be a hematoma. Delia NP at bedside to assess. Pulses doppled, weak. Heparin to remain running per Delia. Emeterio kaye PA aware of situation as well. To proceed with dressing to left foot, patient medicated for pain.

## 2024-09-13 NOTE — CARE PLAN
The patient's goals for the shift include  rest    The clinical goals for the shift include to obtain therapeutic level of heparin assay    Over the shift, the patient did make progress toward the following goals.     Pt admitted to unit at 1947. At 2333, pt's heparin assay returned 0.8 and heparin drip adjusted per order. At 0357, pt's heparin assay returned 0.7 and therefore therapeutic at this time. Pt's wound to left lower leg assessed and wound culture sent. Cleansed with soap and water per order and pictures obtained. Pt reported pain with pressure and numbness to extremity. Declined pain medication at that time. Extremity elevated and pedal pulses assessed bilaterally via doppler. Pt in bed at this time with call light in reach.

## 2024-09-13 NOTE — CONSULTS
Vancomycin Dosing by Pharmacy- INITIAL    Elliot Partida is a 74 y.o. year old male who Pharmacy has been consulted for vancomycin dosing for cellulitis, skin and soft tissue. Based on the patient's indication and renal status this patient will be dosed based on a goal AUC of 400-600.     Renal function is currently stable.    Visit Vitals  /76 (BP Location: Right arm, Patient Position: Lying)   Pulse 89   Temp 36.2 °C (97.2 °F) (Temporal)   Resp 18      Lab Results   Component Value Date    CREATININE 0.77 2024    CREATININE 0.81 2024    CREATININE 0.83 2022    CREATININE 0.89 2020    CREATININE 0.95 2019    CREATININE 0.73 12/10/2019      Patient weight is as follows:   Vitals:    24   Weight: 107 kg (236 lb 1.8 oz)     I/O last 3 completed shifts:  In: 325 (3 mL/kg) [I.V.:75 (0.7 mL/kg); IV Piggyback:250]  Out: 500 (4.7 mL/kg) [Urine:500 (0.1 mL/kg/hr)]  Weight: 107.1 kg   I/O during current shift:  I/O this shift:  In: 50 [IV Piggyback:50]  Out: -     Temp (24hrs), Av.6 °C (97.8 °F), Min:36.1 °C (97 °F), Max:36.9 °C (98.5 °F)     Assessment/Plan  Patient received 1000 mg while in ED.  Will initiate vancomycin maintenance,  1750 mg every 12 hours.    This dosing regimen is predicted by [employer] to result in the following pharmacokinetic parameters:  Loading dose: N/A  Regimen: 1750 mg IV every 12 hours.  Start time: 07:03 on 2024  Exposure target: AUC24 (range)400-600 mg/L.hr   [id number]: 496 mg/L.hr  AUC24,ss: 560 mg/L.hr  Probability of AUC24 > 400: 85 %  Ctrough,ss: 15 mg/L  Probability of Ctrough,ss > 20: 26 %    Follow-up level will be ordered on 9/15/24 at 05:00 unless clinically indicated sooner.  Will continue to monitor renal function daily while on vancomycin and order serum creatinine at least every 48 hours if not already ordered.  Follow for continued vancomycin needs, clinical response, and signs/symptoms of toxicity.     Adwoa Rowe,  PharmD, BCPS  Clinical Pharmacy Coordinator   [facility] (on-site: Mon-Tues)  [facility] (on-site: Wed-Fri)  Available via EPIC Secure Chat

## 2024-09-13 NOTE — H&P
"History Of Present Illness  Elliot Partida is a 74 y.o. male with PMH of R frontal extradural mass since 2016, arthritis, CAD, diabetes type 2, MI, HTN, thoracic radiculopathy, neuropathy who presented to ED yesterday with left leg pain that has been ongoing for \"months\". His leg is edematous, erythematous, weeping with a large necrotic ulcer under his 5th toe on the plantar surface. The skin on his leg is very dry and flaking and the odor is foul. His right leg has dry skin as well and is slightly edematous and pink. He has not seen a provider or taken any medications for about \"2 years\". He lives with his sister who helps take care of him. In the ED his workup showed lab abnormals including glucose 310, Na 129, albumin 3.3. Xray of the foot showed osteomyelitis of the 5th metatarsal head. Ultrasound of the leg showed acute DVT in the left mid-distal femoral and popliteal veins. He was started on IV heparin, IV piptaz and IV vancomycin for the DVT and infection. He then was admitted to Medicine for further evaluation and treatment of his foot infection, DVT and comorbidities.      Past Medical History  Past Medical History:   Diagnosis Date    Arthritis     CAD (coronary artery disease)     Diabetes mellitus (Multi)     Hypertension     Peripheral neuropathy     Stroke (Multi)     Thoracic radiculopathy      Surgical History  Past Surgical History:   Procedure Laterality Date    CARDIAC CATHETERIZATION      CARPAL TUNNEL RELEASE  10/10/2014    EYE SURGERY      FL  ARTHROCENTESIS ASP INJ JOINT.      IRIDOTOMY / IRIDECTOMY Bilateral      Social History  He reports that he has never smoked. He has never been exposed to tobacco smoke. He has never used smokeless tobacco. No history on file for alcohol use and drug use.    Family History  Family History   Problem Relation Name Age of Onset    Heart disease Father      Colon cancer Other      Heart attack Other       Allergies  Patient has no known allergies.    Review " of Systems   Constitutional:  Positive for activity change and fatigue. Negative for chills and fever.   HENT: Negative.     Eyes: Negative.    Respiratory: Negative.     Cardiovascular:  Positive for leg swelling.   Gastrointestinal: Negative.    Endocrine: Negative.    Genitourinary: Negative.    Musculoskeletal:  Positive for back pain and gait problem.   Skin:  Positive for color change, rash and wound.   Allergic/Immunologic: Negative.    Hematological: Negative.    Psychiatric/Behavioral: Negative.          Physical Exam  Constitutional:       Appearance: He is obese. He is ill-appearing.   HENT:      Mouth/Throat:      Mouth: Mucous membranes are moist.   Eyes:      Extraocular Movements: Extraocular movements intact.      Pupils: Pupils are equal, round, and reactive to light.   Cardiovascular:      Rate and Rhythm: Normal rate and regular rhythm.      Pulses: Normal pulses.      Heart sounds: Murmur heard.      No friction rub. No gallop.   Pulmonary:      Effort: Pulmonary effort is normal. No respiratory distress.      Breath sounds: Normal breath sounds. No wheezing or rales.   Abdominal:      General: There is distension.      Tenderness: There is no abdominal tenderness. There is no guarding.   Musculoskeletal:         General: Tenderness present.      Cervical back: Normal range of motion.      Right lower leg: Edema (1+) present.      Left lower leg: Edema (3+) present.   Skin:     General: Skin is warm.      Findings: Erythema present.      Comments: Large excoriated area, weeping, on dorsum of left foot  Quarter size necrotic wound under left 5th toe, plantar surface  Dry flaking skin bilateral legs and feet  Erythema from toes to patellar base on LLE  Mild erythema on right foot, base of leg   Neurological:      General: No focal deficit present.      Mental Status: He is alert and oriented to person, place, and time.   Psychiatric:         Mood and Affect: Mood normal.         Behavior: Behavior  "normal.       Last Recorded Vitals  Blood pressure 137/66, pulse 75, temperature 37.4 °C (99.4 °F), temperature source Temporal, resp. rate 17, height 1.753 m (5' 9\"), weight 107 kg (236 lb 1.8 oz), SpO2 94%.    Scheduled medications  insulin lispro, 0-15 Units, subcutaneous, TID  pantoprazole, 40 mg, oral, Daily before breakfast  piperacillin-tazobactam, 3.375 g, intravenous, q6h  sennosides-docusate sodium, 1 tablet, oral, BID  sodium chloride 0.9%, , ,   vancomycin, 1,750 mg, intravenous, q12h    Continuous medications  heparin, 0-4,500 Units/hr, Last Rate: 1,800 Units/hr (09/13/24 0915)  sodium chloride 0.9%, 10 mL/hr    PRN medications  PRN medications: acetaminophen **OR** [DISCONTINUED] acetaminophen **OR** [DISCONTINUED] acetaminophen, acetaminophen **OR** [DISCONTINUED] acetaminophen **OR** [DISCONTINUED] acetaminophen, benzocaine-menthol, calcium carbonate, dextromethorphan-guaifenesin, guaiFENesin, heparin, melatonin, ondansetron **OR** ondansetron, sodium chloride 0.9%, sodium chloride 0.9%, vancomycin    Relevant Results  Lower extremity venous duplex left  Result Date: 9/12/2024  Evidence for acute occlusive DVT within the left mid-distal femoral and popliteal veins. Compared to prior ultrasound, this represents a new finding. The positive findings on this exam were discussed with and acknowledged by Dr. Ryder Chappell abdomen interpretation, 5:00 PM September 12, 2024 Signed by Jason Tobar MD    XR foot left 3+ views  Result Date: 9/12/2024  Osteomyelitis of the fifth metatarsal head and proximal phalanx of the fifth toe. Signed by Td Francisco MD      Latest Reference Range & Units 09/12/24 18:08 09/13/24 03:37   GLUCOSE 74 - 99 mg/dL 310 (H) 200 (H)   SODIUM 136 - 145 mmol/L 129 (L) 130 (L)   POTASSIUM 3.5 - 5.3 mmol/L 4.6 3.8   CHLORIDE 98 - 107 mmol/L 92 (L) 96 (L)   Bicarbonate 21 - 32 mmol/L 25 26   Anion Gap 10 - 20 mmol/L 17 12   Blood Urea Nitrogen 6 - 23 mg/dL 22 21   Creatinine 0.50 - " 1.30 mg/dL 0.81 0.77   EGFR >60 mL/min/1.73m*2 >90 >90   Calcium 8.6 - 10.3 mg/dL 8.9 8.3 (L)      Latest Reference Range & Units 09/13/24 06:19   C-Reactive Protein <1.00 mg/dL 9.51 (H)      Latest Reference Range & Units 09/12/24 17:00 09/13/24 03:38   WBC 4.4 - 11.3 x10*3/uL 10.6 11.6 (H)   nRBC 0.0 - 0.0 /100 WBCs 0.0 0.0   RBC 4.50 - 5.90 x10*6/uL 4.28 (L) 3.90 (L)   HEMOGLOBIN 13.5 - 17.5 g/dL 12.2 (L) 11.0 (L)   HEMATOCRIT 41.0 - 52.0 % 36.9 (L) 33.2 (L)   MCV 80 - 100 fL 86 85   MCH 26.0 - 34.0 pg 28.5 28.2   MCHC 32.0 - 36.0 g/dL 33.1 33.1   RED CELL DISTRIBUTION WIDTH 11.5 - 14.5 % 12.6 12.3   Platelets 150 - 450 x10*3/uL 231 235      Latest Reference Range & Units 09/13/24 06:19   Sed Rate 0 - 20 mm/h 24 (H)     Assessment/Plan   Assessment & Plan  Osteomyelitis of left foot, unspecified type (Multi)    Acute deep vein thrombosis (DVT) of popliteal vein of left lower extremity (Multi)    Bilateral lower extremity edema    Type 2 diabetes mellitus with diabetic peripheral angiopathy without gangrene, without long-term current use of insulin (Multi)    ASCVD (arteriosclerotic cardiovascular disease)    Hypertension    MI (myocardial infarction) (Multi)    Neuritis    History of stroke    Weakness of extremity    Ambulatory dysfunction    Chronic bilateral low back pain with left-sided sciatica    Diabetic peripheral neuropathy (Multi)    Hyperlipidemia    Peripheral arterial disease (CMS-HCC)    Osteomyelitis of left foot, unspecified type (Multi)  Acute deep vein thrombosis (DVT) of popliteal vein of left lower extremity (Multi)  Bilateral lower extremity edema  Peripheral arterial disease (CMS-HCC)  - WBC 10.6 > 11.6  - lactate 1.4  - CRP 9.51  - Sed rate 24  - Xray foot  - US leg  - blood cultures in process  - PVR pending  - IV zosyn, vancomycin continued from ED  - IV heparin, follow nomogram, transition to OAC when appropriate  - Dr Velazco consult, appreciate recs  - Dr Torres consult,  "appreciate recs  - follow CBC, blood cultures  - elevate LLE  - wound culture pending  - cleanse foot daily  - treat for pain, fever as needed    ASCVD (arteriosclerotic cardiovascular disease)  Hypertension  MI (myocardial infarction) (Multi)  Hyperlipidemia  - last lipid panel 2019: HDL 31, , VLDL 45, trig 224, chol 180  - telemetry  - no meds taken for \"2 years\"  - monitor BP, HR    Type 2 diabetes mellitus with diabetic peripheral angiopathy without gangrene, without long-term current use of insulin (Multi)  Diabetic peripheral neuropathy (Multi)  - last A1c 2021: 6.9  - no meds takes for \"2 years\"  - glucose elevation on serum  - accuchecks with sliding scale  - A1c pending  - diabetes education  - nutrition consult    History of stroke  Weakness of extremity  Ambulatory dysfunction  - old stroke in L cerebellum, right basal ganglia (?when)  - LLE weakness since stroke   - no other residual defects  - benign mass in brain currently,  \"right frontal extradural mass since 2016\"  - PT eval when able    Chronic bilateral low back pain with left-sided sciatica  -treat for pain as needed    GI ppx: PPI  DVT ppx: heparin gtt  Fluids: as needed  Electrolytes: replace as needed  Nutrition: cardiac, diabetic  Adjuncts: PIV  Code Status: full  Pt requires inpatient stay at this time.    Delia Medrano, APRN-CNP    "

## 2024-09-13 NOTE — DISCHARGE INSTR - OTHER ORDERS
Thank you for choosing [facility] for your Health Care needs. As you transition from the hospital back to home, we hope we took your preferences into account on how you manage your health needs so you can manage your health at home.     You may receive a survey in the mail within the next couple weeks. Please take the time to complete it and return it. Your input is ALWAYS important to us. Thank you!  Your Care Transition Team - [facility] - [phone]    For questions about your medications listed on your discharge instructions, please call the Nurses Station at [phone].

## 2024-09-13 NOTE — PROGRESS NOTES
Occupational Therapy  Therapy Communication Note    Patient Name: Elliot Partida  MRN: [id number]  Department: [facility]  Room: [address] [place], [place] [address]  Today's Date: 9/13/2024     Discipline: Occupational Therapy    Missed Visit Reason: Missed Visit Reason: Patient placed on medical hold (VITO deferred OT evaluation today as pt. was admitted with LLE wound with found DVT and started heparin last night. Evaluation will be completed when medically ready)    Missed Time: Attempt 1792

## 2024-09-13 NOTE — CONSULTS
Inpatient consult to Infectious Diseases  Consult performed by: Baljit Velazco MD  Consult ordered by: Delia Medrano, APRN-CNP            Primary MD: No Assigned PCP Generic Provider, MD    Reason For Consult  Infected left foot ulcer    History Of Present Illness  Elliot Partida is a 74 y.o. male presenting with left leg pain.  Onset was a couple of months ago.  He had associated swelling and redness.  He also developed left plantar ulcer under his fifth toe.  He came to the hospital for further evaluation and management.  Workup was markable for DVT.  He is on heparin  He denies any fever or chills.  He is on IV vancomycin and Zosyn       Past Medical History  He has a past medical history of Personal history of other diseases of the musculoskeletal system and connective tissue (10/10/2014).    Surgical History  He has a past surgical history that includes Carpal tunnel release (10/10/2014).     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  Allergies  Patient has no known allergies.       There is no immunization history on file for this patient.  Medications  Home medications:  No medications prior to admission.     Current medications:  Scheduled medications  insulin lispro, 0-15 Units, subcutaneous, TID  pantoprazole, 40 mg, oral, Daily before breakfast  piperacillin-tazobactam, 3.375 g, intravenous, q6h  sennosides-docusate sodium, 1 tablet, oral, BID  sodium chloride 0.9%, , ,   vancomycin, 1,750 mg, intravenous, q12h      Continuous medications  heparin, 0-4,500 Units/hr, Last  "Rate: 1,800 Units/hr (09/13/24 0515)  sodium chloride 0.9%, 10 mL/hr      PRN medications  PRN medications: acetaminophen **OR** [DISCONTINUED] acetaminophen **OR** [DISCONTINUED] acetaminophen, acetaminophen **OR** [DISCONTINUED] acetaminophen **OR** [DISCONTINUED] acetaminophen, benzocaine-menthol, calcium carbonate, dextromethorphan-guaifenesin, guaiFENesin, heparin, melatonin, ondansetron **OR** ondansetron, sodium chloride 0.9%, sodium chloride 0.9%, vancomycin    Review of Systems   Cardiovascular:  Positive for leg swelling.   Skin:  Positive for rash.   All other systems reviewed and are negative.       Objective  Range of Vitals (last 24 hours)  Heart Rate:  []   Temp:  [36.1 °C (97 °F)-37.4 °C (99.4 °F)]   Resp:  [16-18]   BP: (137-146)/(66-79)   Height:  [175.3 cm (5' 9\")]   Weight:  [107 kg (236 lb 1.8 oz)-111 kg (245 lb)]   SpO2:  [93 %-97 %]   Daily Weight  09/12/24 : 107 kg (236 lb 1.8 oz)    Body mass index is 34.87 kg/m².     Physical Exam  Constitutional:       Appearance: Normal appearance.   HENT:      Head: Normocephalic and atraumatic.      Nose: Nose normal.   Eyes:      General: No scleral icterus.     Extraocular Movements: Extraocular movements intact.      Conjunctiva/sclera: Conjunctivae normal.   Cardiovascular:      Rate and Rhythm: Tachycardia present.      Heart sounds: Normal heart sounds.   Pulmonary:      Breath sounds: Decreased breath sounds present.   Abdominal:      General: Bowel sounds are normal.      Palpations: Abdomen is soft.      Tenderness: There is no abdominal tenderness.   Musculoskeletal:      Right lower leg: Edema present.      Left lower leg: Edema present.   Feet:      Comments: Left fifth metatarsal head necrotic ulcer with malodor  Skin:     General: Skin is warm and dry.   Neurological:      Mental Status: He is alert.   Psychiatric:         Behavior: Behavior normal. Behavior is cooperative.          Relevant Results  Outside Hospital " "Results    Labs  Results from last 72 hours   Lab Units 09/13/24  0338 09/12/24  1700   WBC AUTO x10*3/uL 11.6* 10.6   HEMOGLOBIN g/dL 11.0* 12.2*   HEMATOCRIT % 33.2* 36.9*   PLATELETS AUTO x10*3/uL 235 231   NEUTROS PCT AUTO %  --  69.4   LYMPHS PCT AUTO %  --  16.4   MONOS PCT AUTO %  --  11.9   EOS PCT AUTO %  --  1.1     Results from last 72 hours   Lab Units 09/13/24  0337 09/12/24  1808   SODIUM mmol/L 130* 129*   POTASSIUM mmol/L 3.8 4.6   CHLORIDE mmol/L 96* 92*   CO2 mmol/L 26 25   BUN mg/dL 21 22   CREATININE mg/dL 0.77 0.81   GLUCOSE mg/dL 200* 310*   CALCIUM mg/dL 8.3* 8.9   ANION GAP mmol/L 12 17   EGFR mL/min/1.73m*2 >90 >90     Results from last 72 hours   Lab Units 09/12/24  1808   ALK PHOS U/L 127   BILIRUBIN TOTAL mg/dL 0.6   PROTEIN TOTAL g/dL 7.6   ALT U/L 18   AST U/L 26   ALBUMIN g/dL 3.3*     Estimated Creatinine Clearance: 103 mL/min (by C-G formula based on SCr of 0.77 mg/dL).  C-Reactive Protein   Date Value Ref Range Status   09/13/2024 9.51 (H) <1.00 mg/dL Final     CRP   Date Value Ref Range Status   03/21/2022 1.22 (A) mg/dL Final     Comment:     REF VALUE  < 1.00       Sedimentation Rate   Date Value Ref Range Status   09/13/2024 24 (H) 0 - 20 mm/h Final   03/21/2022 21 (H) 0 - 20 mm/h Final     No results found for: \"HIV1X2\", \"HIVCONF\", \"GSLHGT0GL\"  No results found for: \"HEPCABINIT\", \"HEPCAB\", \"HCVPCRQUANT\"  Microbiology  Reviewed-blood and wound cultures.  Imaging  CT angio aorta and bilateral iliofemoral runoff w and or wo IV contrast    Result Date: 9/13/2024  Interpreted By:  Osorio Wong, STUDY: CT ANGIO AORTA AND BILATERAL ILIOFEMORAL RUNOFF W AND OR WO IV CONTRAST;  9/13/2024 12:07 pm   INDICATION: Signs/Symptoms:evaluation for circulation and wound.   COMPARISON: None.   ACCESSION NUMBER(S): [id number]   ORDERING CLINICIAN: ABIEL KENT   TECHNIQUE: CTA of the abdomen and pelvis, with runoff was performed.  Contiguous axial images were obtained at 3 mm slice " thickness through the abdomen and pelvis, with runoff. 3D Coronal and sagittal reconstructions at 3 mm slice thickness were performed. 75 ml of contrast material  Omnipaque 350 were administered intravenously without immediate complication. FINDINGS:   CTA ABDOMEN VESSELS   Celiac axis: No significant stenosis.   SMA: Partially calcified disease at its origin results in moderate to severe stenosis.   RAUL: No significant stenosis.   Right renal vessels: Calcified disease of its proximal segment results in moderate stenosis.   Left renal vessels: No significant stenosis.   Infrarenal aorta: Contains partially calcified disease without significant stenosis or aneurysmal dilation.   CTA  PELVIC VESSELS R. Common iliac artery: No significant stenosis.   R. External iliac artery: No significant stenosis.   R. Internal iliac artery: No significant stenosis.   L. Common iliac artery: No significant stenosis.   L. External iliac artery: No significant stenosis.   L. Internal iliac artery: Partially calcified disease results in mild to moderate stenosis.   CTA RIGHT LOWER EXTREMITY R. Common femoral artery: No significant stenosis.   R. Profunda femoris artery: No significant stenosis.   R. SFA: Partially calcified disease results in mild stenosis.   R. Popliteal artery: Partially calcified disease results in moderate stenosis of the above the knee segment. Predominantly calcified disease of the below-the-knee segment results in mild-to-moderate stenosis.   R. Anterior tibial artery: Occluded at its origin.   R. Tibioperoneal trunk: Partially calcified disease results in mild-to-moderate stenosis.   R. Posterior tibial artery: Occluded at its origin.   R. Peroneal artery: Contains scattered calcific disease of its mid to distal segment resulting in mild-to-moderate stenosis.   R. Dorsalis pedis artery: No significant stenosis.   R. Plantar artery: Not visualized.   CTA LEFT LOWER EXTREMITY L. Common femoral artery: No  significant stenosis.   L. Profunda femoris artery: No significant stenosis.   L. SFA: Contains partially calcified disease predominantly in its mid to distal segment. This results in focal severe stenosis across Gaetano's canal but no significant stenosis upstream.   L. Popliteal artery: Contains partially calcified atherosclerotic disease without significant stenosis.   L. Anterior tibial artery: Occluded shortly beyond its origin.   L. Tibioperoneal trunk: Contains partially calcified disease resulting in moderate to severe stenosis.   L. Posterior tibial artery: Occluded at its origin, there is distal reconstitution across its middle 3rd as a diseased vessel containing scattered calcific disease resulting in mild stenosis. This vessel is seen to cross the ankle joint and supply the plantar artery.   L. Peroneal artery: Occluded at its origin, there is distal reconstitution at its middle 3rd as a small caliber vessel, supplying distal collaterals to the foot.   L. Dorsalis pedis artery: Not visualized   L. Plantar artery: No significant stenosis.   NON-VASCULAR FINDINGS   Visualized portions of the heart: Main pulmonary artery is enlarged, which can be seen in the setting of pulmonary arterial hypertension. The heart is not enlarged. There is coronary artery calcifications present in the distribution of all 3 vessels. There is no pericardial effusion.   Lungs: There is bilateral dependent atelectasis.   Hepatobiliary: Unremarkable liver without biliary dilation evident   Pancreas: Unremarkable   Spleen: Unremarkable   Adrenal Glands: Unremarkable   Kidneys, ureters, and bladder: There are bilateral parapelvic renal cysts and bilateral parenchymal cysts. There are bilateral extrarenal pelvises. There is no urolithiasis or hydroureteronephrosis. The urinary bladder demonstrates circumferential wall thickening which may be related to incomplete distention or cystitis.   GI tract: There is a 2.6 cm diverticulum off  the gastric fundus. No evidence of obstruction. There is colonic diverticulosis without diverticulitis. The appendix is within normal limits.   Peritoneum and retroperitoneum: No free fluid or free air is noted.   Lymph Nodes: There are mildly prominent pelvic lymph nodes, left-greater-than-right. These are likely reactive.   Reproductive Organs: The prostate gland is enlarged.   Visualized musculoskeletal structures: There are degenerative changes of the spine. Spurring of the bilateral sacroiliac joints is noted. There is osteoarthritic changes of the bilateral hips. There is diffuse edema and skin thickening involving the left leg. There are prominent venous varicosities of the left leg. There is osteopenia involving the left foot. Bony destruction at the left 5th metatarsophalangeal joint is noted with soft tissue gas surrounding this region. There appears to be an open wound in the plantar aspect of the foot centered at the 5th MTP joint. Curvilinear hyperdensity within the soft tissues may reflect small bony fragments or foreign bodies. No acute fracture or destructive osseous lesion is identified       1. Diffuse partially calcified atherosclerotic disease, as detailed above. There is a single vessel runoff in the right leg. In the left leg, there is high-grade stenosis of the distal SFA as it crosses Gaetano's canal. Additionally, there is occlusive disease of the proximal trifurcation with reconstitution of the left posterior tibial and peroneal arteries distally.   2. Significant soft tissue swelling of the left leg with prominent venous varicosities. There is also soft tissue swelling of the left foot with an open wound along the plantar aspect of the foot centered at the 5th MTP joint, which demonstrates adjacent bony destruction. Curvilinear hyperdensity in this region may reflect bony fragments or foreign bodies.   3. Coronary artery calcifications.   Signed by: Osorio Wong 9/13/2024 2:09 PM  Dictation workstation:   [id number]    Lower extremity venous duplex left    Result Date: 9/12/2024  STUDY: Left Lower Extremity Venous Doppler Ultrasound; 9/12/2024 4:43 PM INDICATION: Swelling. COMPARISON: US LE Venous 3/21/2022. ACCESSION NUMBER(S): [id number] ORDERING CLINICIAN: KATIE HILTON TECHNIQUE:  Real-time grayscale imaging, color Doppler flow imaging, and spectral Doppler imaging of the left lower extremity veins was performed. FINDINGS: There is acute occlusive DVT demonstrated within the left mid-distal femoral and popliteal veins. The left common femoral and profunda femoral veins demonstrated normal compressibility, normal phasic venous flow and normal response to augmentation.  There is no evidence for echogenic thrombi.  The deep calf veins are not well visualized due to subcutaneous edema. The contralateral common femoral vein is free of thrombosis.    Evidence for acute occlusive DVT within the left mid-distal femoral and popliteal veins. Compared to prior ultrasound, this represents a new finding. The positive findings on this exam were discussed with and acknowledged by Dr. Katie Hilton abdomen interpretation, 5:00 PM September 12, 2024 Signed by Jason Tobar MD    XR foot left 3+ views    Result Date: 9/12/2024  STUDY: Foot Radiographs; 9/12/2024 4:19 PM INDICATION: Left foot pain. COMPARISON: None Available. ACCESSION NUMBER(S): [id number] ORDERING CLINICIAN: KATIE HILTON TECHNIQUE:  Three view(s) of the left foot (four images). FINDINGS:  There is no displaced fracture.  The alignment is anatomic.  Soft tissue swelling and ulceration noted lateral to the fifth MTP joint associated with demineralization of the fifth proximal phalanx and the distal head of the fifth metatarsal.  Bony changes consistent with osteomyelitis.    Osteomyelitis of the fifth metatarsal head and proximal phalanx of the fifth toe. 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

## 2024-09-14 ENCOUNTER — APPOINTMENT (OUTPATIENT)
Dept: RADIOLOGY | Facility: HOSPITAL | Age: 74
DRG: 638 | End: 2024-09-14
Payer: MEDICARE

## 2024-09-14 ENCOUNTER — HOSPITAL ENCOUNTER (INPATIENT)
Facility: HOSPITAL | Age: 74
End: 2024-09-14
Attending: INTERNAL MEDICINE | Admitting: INTERNAL MEDICINE
Payer: MEDICARE

## 2024-09-14 VITALS
HEIGHT: 69 IN | BODY MASS INDEX: 34.97 KG/M2 | HEART RATE: 68 BPM | OXYGEN SATURATION: 95 % | DIASTOLIC BLOOD PRESSURE: 62 MMHG | TEMPERATURE: 99.3 F | WEIGHT: 236.11 LBS | SYSTOLIC BLOOD PRESSURE: 133 MMHG | RESPIRATION RATE: 16 BRPM

## 2024-09-14 DIAGNOSIS — R60.0 BILATERAL LOWER EXTREMITY EDEMA: ICD-10-CM

## 2024-09-14 DIAGNOSIS — M86.9 OSTEOMYELITIS OF LEFT FOOT, UNSPECIFIED TYPE (MULTI): ICD-10-CM

## 2024-09-14 DIAGNOSIS — T81.89XA: ICD-10-CM

## 2024-09-14 DIAGNOSIS — E11.65 TYPE 2 DIABETES MELLITUS WITH HYPERGLYCEMIA, WITH LONG-TERM CURRENT USE OF INSULIN: ICD-10-CM

## 2024-09-14 DIAGNOSIS — Z98.62 STATUS POST PERIPHERAL ARTERY ANGIOPLASTY: ICD-10-CM

## 2024-09-14 DIAGNOSIS — I73.9 PERIPHERAL VASCULAR DISEASE, UNSPECIFIED (CMS-HCC): ICD-10-CM

## 2024-09-14 DIAGNOSIS — I73.9 PERIPHERAL ARTERIAL DISEASE (CMS-HCC): ICD-10-CM

## 2024-09-14 DIAGNOSIS — Z79.4 TYPE 2 DIABETES MELLITUS WITH HYPERGLYCEMIA, WITH LONG-TERM CURRENT USE OF INSULIN: ICD-10-CM

## 2024-09-14 DIAGNOSIS — Z74.09 IMPAIRED MOBILITY AND ADLS: ICD-10-CM

## 2024-09-14 DIAGNOSIS — M86.172 OTHER ACUTE OSTEOMYELITIS OF LEFT FOOT: ICD-10-CM

## 2024-09-14 DIAGNOSIS — I15.9 SECONDARY HYPERTENSION: ICD-10-CM

## 2024-09-14 DIAGNOSIS — T87.89: ICD-10-CM

## 2024-09-14 DIAGNOSIS — M86.9 OSTEOMYELITIS: Primary | ICD-10-CM

## 2024-09-14 DIAGNOSIS — L08.9 WOUND INFECTION: ICD-10-CM

## 2024-09-14 DIAGNOSIS — I70.245 ATHEROSCLEROSIS OF NATIVE ARTERIES OF LEFT LEG WITH ULCERATION OF OTHER PART OF FOOT (MULTI): ICD-10-CM

## 2024-09-14 DIAGNOSIS — I82.432 ACUTE DEEP VEIN THROMBOSIS (DVT) OF POPLITEAL VEIN OF LEFT LOWER EXTREMITY (MULTI): ICD-10-CM

## 2024-09-14 DIAGNOSIS — Z78.9 IMPAIRED MOBILITY AND ADLS: ICD-10-CM

## 2024-09-14 DIAGNOSIS — T14.8XXA WOUND INFECTION: ICD-10-CM

## 2024-09-14 LAB
ANION GAP SERPL CALC-SCNC: 11 MMOL/L (ref 10–20)
BUN SERPL-MCNC: 15 MG/DL (ref 6–23)
CALCIUM SERPL-MCNC: 8.1 MG/DL (ref 8.6–10.3)
CHLORIDE SERPL-SCNC: 96 MMOL/L (ref 98–107)
CO2 SERPL-SCNC: 27 MMOL/L (ref 21–32)
CREAT SERPL-MCNC: 1.1 MG/DL (ref 0.5–1.3)
EGFRCR SERPLBLD CKD-EPI 2021: 71 ML/MIN/1.73M*2
ERYTHROCYTE [DISTWIDTH] IN BLOOD BY AUTOMATED COUNT: 12.5 % (ref 11.5–14.5)
GLUCOSE BLD MANUAL STRIP-MCNC: 128 MG/DL (ref 74–99)
GLUCOSE BLD MANUAL STRIP-MCNC: 135 MG/DL (ref 74–99)
GLUCOSE BLD MANUAL STRIP-MCNC: 202 MG/DL (ref 74–99)
GLUCOSE SERPL-MCNC: 152 MG/DL (ref 74–99)
HCT VFR BLD AUTO: 33.1 % (ref 41–52)
HGB BLD-MCNC: 11.1 G/DL (ref 13.5–17.5)
HOLD SPECIMEN: NORMAL
MCH RBC QN AUTO: 28.8 PG (ref 26–34)
MCHC RBC AUTO-ENTMCNC: 33.5 G/DL (ref 32–36)
MCV RBC AUTO: 86 FL (ref 80–100)
NRBC BLD-RTO: 0 /100 WBCS (ref 0–0)
PLATELET # BLD AUTO: 230 X10*3/UL (ref 150–450)
POTASSIUM SERPL-SCNC: 3.8 MMOL/L (ref 3.5–5.3)
RBC # BLD AUTO: 3.86 X10*6/UL (ref 4.5–5.9)
SODIUM SERPL-SCNC: 130 MMOL/L (ref 136–145)
UFH PPP CHRO-ACNC: 0.4 IU/ML
VANCOMYCIN SERPL-MCNC: 15.8 UG/ML (ref 5–20)
WBC # BLD AUTO: 13.1 X10*3/UL (ref 4.4–11.3)

## 2024-09-14 PROCEDURE — 80048 BASIC METABOLIC PNL TOTAL CA: CPT | Mod: IPSPLIT | Performed by: NURSE PRACTITIONER

## 2024-09-14 PROCEDURE — 85027 COMPLETE CBC AUTOMATED: CPT | Mod: IPSPLIT | Performed by: EMERGENCY MEDICINE

## 2024-09-14 PROCEDURE — 82947 ASSAY GLUCOSE BLOOD QUANT: CPT

## 2024-09-14 PROCEDURE — 85520 HEPARIN ASSAY: CPT | Mod: IPSPLIT | Performed by: INTERNAL MEDICINE

## 2024-09-14 PROCEDURE — 82947 ASSAY GLUCOSE BLOOD QUANT: CPT | Mod: IPSPLIT

## 2024-09-14 PROCEDURE — 2500000001 HC RX 250 WO HCPCS SELF ADMINISTERED DRUGS (ALT 637 FOR MEDICARE OP): Mod: IPSPLIT | Performed by: NURSE PRACTITIONER

## 2024-09-14 PROCEDURE — 2500000002 HC RX 250 W HCPCS SELF ADMINISTERED DRUGS (ALT 637 FOR MEDICARE OP, ALT 636 FOR OP/ED): Mod: IPSPLIT | Performed by: NURSE PRACTITIONER

## 2024-09-14 PROCEDURE — 99239 HOSP IP/OBS DSCHRG MGMT >30: CPT | Performed by: NURSE PRACTITIONER

## 2024-09-14 PROCEDURE — 36415 COLL VENOUS BLD VENIPUNCTURE: CPT | Performed by: INTERNAL MEDICINE

## 2024-09-14 PROCEDURE — 97166 OT EVAL MOD COMPLEX 45 MIN: CPT | Mod: GO,IPSPLIT

## 2024-09-14 PROCEDURE — 2500000004 HC RX 250 GENERAL PHARMACY W/ HCPCS (ALT 636 FOR OP/ED): Mod: IPSPLIT | Performed by: INTERNAL MEDICINE

## 2024-09-14 PROCEDURE — 71275 CT ANGIOGRAPHY CHEST: CPT | Mod: IPSPLIT

## 2024-09-14 PROCEDURE — 80202 ASSAY OF VANCOMYCIN: CPT | Performed by: INTERNAL MEDICINE

## 2024-09-14 PROCEDURE — 2500000004 HC RX 250 GENERAL PHARMACY W/ HCPCS (ALT 636 FOR OP/ED): Performed by: INTERNAL MEDICINE

## 2024-09-14 PROCEDURE — 36415 COLL VENOUS BLD VENIPUNCTURE: CPT | Mod: IPSPLIT | Performed by: EMERGENCY MEDICINE

## 2024-09-14 PROCEDURE — 1200000002 HC GENERAL ROOM WITH TELEMETRY DAILY

## 2024-09-14 PROCEDURE — 2550000001 HC RX 255 CONTRASTS: Mod: IPSPLIT | Performed by: NURSE PRACTITIONER

## 2024-09-14 PROCEDURE — 2500000004 HC RX 250 GENERAL PHARMACY W/ HCPCS (ALT 636 FOR OP/ED): Mod: IPSPLIT | Performed by: NURSE PRACTITIONER

## 2024-09-14 PROCEDURE — 97535 SELF CARE MNGMENT TRAINING: CPT | Mod: GO,IPSPLIT

## 2024-09-14 RX ORDER — VANCOMYCIN HYDROCHLORIDE 1 G/200ML
1000 INJECTION, SOLUTION INTRAVENOUS EVERY 12 HOURS
Status: DISCONTINUED | OUTPATIENT
Start: 2024-09-14 | End: 2024-09-15

## 2024-09-14 RX ORDER — ACETAMINOPHEN 650 MG/1
650 SUPPOSITORY RECTAL EVERY 4 HOURS PRN
Status: ACTIVE | OUTPATIENT
Start: 2024-09-14

## 2024-09-14 RX ORDER — ACETAMINOPHEN 160 MG/5ML
650 SOLUTION ORAL EVERY 4 HOURS PRN
Status: ACTIVE | OUTPATIENT
Start: 2024-09-14

## 2024-09-14 RX ORDER — ENOXAPARIN SODIUM 100 MG/ML
40 INJECTION SUBCUTANEOUS EVERY 24 HOURS
Status: DISCONTINUED | OUTPATIENT
Start: 2024-09-14 | End: 2024-09-15

## 2024-09-14 RX ORDER — LIDOCAINE 40 MG/G
CREAM TOPICAL 4 TIMES DAILY PRN
Status: DISPENSED | OUTPATIENT
Start: 2024-09-14

## 2024-09-14 RX ORDER — VANCOMYCIN HYDROCHLORIDE 1 G/20ML
INJECTION, POWDER, LYOPHILIZED, FOR SOLUTION INTRAVENOUS DAILY PRN
Status: DISCONTINUED | OUTPATIENT
Start: 2024-09-14 | End: 2024-09-16

## 2024-09-14 RX ORDER — ACETAMINOPHEN 325 MG/1
650 TABLET ORAL EVERY 4 HOURS PRN
Status: DISPENSED | OUTPATIENT
Start: 2024-09-14

## 2024-09-14 RX ORDER — SODIUM CHLORIDE 9 MG/ML
75 INJECTION, SOLUTION INTRAVENOUS CONTINUOUS
Status: DISCONTINUED | OUTPATIENT
Start: 2024-09-14 | End: 2024-09-14 | Stop reason: HOSPADM

## 2024-09-14 RX ORDER — POLYETHYLENE GLYCOL 3350 17 G/17G
17 POWDER, FOR SOLUTION ORAL DAILY
Status: DISPENSED | OUTPATIENT
Start: 2024-09-14

## 2024-09-14 RX ADMIN — SENNOSIDES AND DOCUSATE SODIUM 1 TABLET: 50; 8.6 TABLET ORAL at 10:48

## 2024-09-14 RX ADMIN — HYDROMORPHONE HYDROCHLORIDE 0.5 MG: 1 INJECTION, SOLUTION INTRAMUSCULAR; INTRAVENOUS; SUBCUTANEOUS at 02:59

## 2024-09-14 RX ADMIN — SODIUM CHLORIDE 75 ML/HR: 9 INJECTION, SOLUTION INTRAVENOUS at 11:20

## 2024-09-14 RX ADMIN — VANCOMYCIN 1750 MG: 1.25 INJECTION, SOLUTION INTRAVENOUS at 10:48

## 2024-09-14 RX ADMIN — PANTOPRAZOLE SODIUM 40 MG: 40 TABLET, DELAYED RELEASE ORAL at 06:00

## 2024-09-14 RX ADMIN — HEPARIN SODIUM 18 UNITS/HR: 10000 INJECTION, SOLUTION INTRAVENOUS at 10:46

## 2024-09-14 RX ADMIN — PIPERACILLIN SODIUM AND TAZOBACTAM SODIUM 3.38 G: 3; .375 INJECTION, SOLUTION INTRAVENOUS at 00:24

## 2024-09-14 RX ADMIN — ACETAMINOPHEN 650 MG: 325 TABLET ORAL at 00:18

## 2024-09-14 RX ADMIN — PIPERACILLIN SODIUM AND TAZOBACTAM SODIUM 3.38 G: 3; .375 INJECTION, SOLUTION INTRAVENOUS at 12:18

## 2024-09-14 RX ADMIN — INSULIN LISPRO 6 UNITS: 100 INJECTION, SOLUTION INTRAVENOUS; SUBCUTANEOUS at 12:20

## 2024-09-14 RX ADMIN — SODIUM CHLORIDE 75 ML/HR: 9 INJECTION, SOLUTION INTRAVENOUS at 10:47

## 2024-09-14 RX ADMIN — SODIUM CHLORIDE 10 ML/HR: 9 INJECTION, SOLUTION INTRAVENOUS at 00:28

## 2024-09-14 RX ADMIN — PIPERACILLIN SODIUM AND TAZOBACTAM SODIUM 3.38 G: 3; .375 INJECTION, SOLUTION INTRAVENOUS at 06:00

## 2024-09-14 RX ADMIN — IOHEXOL 75 ML: 350 INJECTION, SOLUTION INTRAVENOUS at 09:20

## 2024-09-14 RX ADMIN — OXYCODONE HYDROCHLORIDE 5 MG: 5 TABLET ORAL at 11:06

## 2024-09-14 RX ADMIN — OXYCODONE HYDROCHLORIDE 5 MG: 5 TABLET ORAL at 00:19

## 2024-09-14 ASSESSMENT — COGNITIVE AND FUNCTIONAL STATUS - GENERAL
MOBILITY SCORE: 12
HELP NEEDED FOR BATHING: A LOT
STANDING UP FROM CHAIR USING ARMS: A LOT
DRESSING REGULAR UPPER BODY CLOTHING: A LITTLE
WALKING IN HOSPITAL ROOM: A LOT
DAILY ACTIVITIY SCORE: 17
TOILETING: A LOT
DRESSING REGULAR LOWER BODY CLOTHING: A LOT
PERSONAL GROOMING: A LITTLE
DAILY ACTIVITIY SCORE: 14
CLIMB 3 TO 5 STEPS WITH RAILING: TOTAL
TURNING FROM BACK TO SIDE WHILE IN FLAT BAD: A LOT
HELP NEEDED FOR BATHING: A LOT
TOILETING: TOTAL
MOVING FROM LYING ON BACK TO SITTING ON SIDE OF FLAT BED WITH BEDRAILS: A LITTLE
MOVING TO AND FROM BED TO CHAIR: A LOT
DRESSING REGULAR LOWER BODY CLOTHING: TOTAL
DRESSING REGULAR UPPER BODY CLOTHING: A LITTLE

## 2024-09-14 ASSESSMENT — PAIN DESCRIPTION - LOCATION
LOCATION: FOOT
LOCATION: FOOT
LOCATION: BUTTOCKS
LOCATION: BACK

## 2024-09-14 ASSESSMENT — PAIN SCALES - GENERAL
PAINLEVEL_OUTOF10: 5 - MODERATE PAIN
PAINLEVEL_OUTOF10: 8
PAINLEVEL_OUTOF10: 3
PAINLEVEL_OUTOF10: 10 - WORST POSSIBLE PAIN
PAINLEVEL_OUTOF10: 8
PAINLEVEL_OUTOF10: 3
PAINLEVEL_OUTOF10: 0 - NO PAIN
PAINLEVEL_OUTOF10: 6

## 2024-09-14 ASSESSMENT — PAIN - FUNCTIONAL ASSESSMENT
PAIN_FUNCTIONAL_ASSESSMENT: 0-10

## 2024-09-14 ASSESSMENT — PAIN DESCRIPTION - DESCRIPTORS
DESCRIPTORS: SORE
DESCRIPTORS: SORE

## 2024-09-14 ASSESSMENT — ACTIVITIES OF DAILY LIVING (ADL)
BATHING_ASSISTANCE: MAXIMAL
ADL_ASSISTANCE: INDEPENDENT
HOME_MANAGEMENT_TIME_ENTRY: 29

## 2024-09-14 ASSESSMENT — PAIN DESCRIPTION - ORIENTATION: ORIENTATION: LEFT

## 2024-09-14 NOTE — Clinical Note
Inflation 1: Pressure = 8 chavo; Duration = 180 sec. Inflation 2: Pressure = 8 chavo; Duration = 100 sec. DP/PT

## 2024-09-14 NOTE — DISCHARGE SUMMARY
"Discharge Diagnosis  Osteomyelitis of left foot, unspecified type (Multi)    Discharge Meds     Medication List      You have not been prescribed any medications.       Test Results Pending At Discharge  Pending Labs       Order Current Status    Extra Tubes Collected (09/14/24 0620)    Blood Culture Preliminary result    Blood Culture Preliminary result    Tissue/Wound Culture/Smear Preliminary result            Hospital Course   HPI: Elliot Partida is a 74 y.o. male with PMH of R frontal extradural mass since 2016, arthritis, CAD, diabetes type 2, MI, HTN, thoracic radiculopathy, neuropathy who presented to ED yesterday with left leg pain that has been ongoing for \"months\". His leg is edematous, erythematous, weeping with a large necrotic ulcer under his 5th toe on the plantar surface. The skin on his leg is very dry and flaking and the odor is foul. His right leg has dry skin as well and is slightly edematous and pink. He has not seen a provider or taken any medications for about \"2 years\". He lives with his sister who helps take care of him. In the ED his workup showed lab abnormals including glucose 310, Na 129, albumin 3.3. Xray of the foot showed osteomyelitis of the 5th metatarsal head. Ultrasound of the leg showed acute DVT in the left mid-distal femoral and popliteal veins. He was started on IV heparin, IV piptaz and IV vancomycin for the DVT and infection. He then was admitted to Medicine for further evaluation and treatment of his foot infection, DVT and comorbidities.  Problem List:  Osteomyelitis of left foot, unspecified type (Multi)  Acute deep vein thrombosis (DVT) of popliteal vein of left lower extremity (Multi)  Bilateral lower extremity edema  Peripheral arterial disease (CMS-[facility])  - WBC 10.6 > 11.6 > 13.1  - lactate 1.4  - CRP 9.51  - Sed rate 24  - Xray foot: Osteomyelitis of the fifth metatarsal head and proximal phalanx of the fifth toe.  - US leg: Evidence for acute occlusive DVT within " "the left mid-distal femoral and popliteal veins. Compared to prior ultrasound, this represents a new finding  - blood cultures in process  - IV zosyn, vancomycin continued from, day 3  - IV heparin, follow nomogram, transition to OAC when appropriate  - Dr Velazco consult, appreciate recs  - Dr Torres consult, appreciate recs  - follow CBC, blood cultures NGTD  - elevate LLE  - wound culture in process  - cleanse foot daily  - treat for pain, fever as needed  - CT angio chest to rule out PE: No central PE however peripheral PE can not be excluded based on this exam.    Scattered interstitial opacities including possible mosaic attenuation in the lung bases which could reflect small airways disease and air trapping or atypical infection. Trace bilateral pleural effusions/thickening. Moderate atherosclerotic disease. Possible fatty infiltration of the liver incidentally noted  - attempting transfer for surgical intervention     ASCVD (arteriosclerotic cardiovascular disease)  Hypertension  MI (myocardial infarction) (Multi)  Hyperlipidemia  - last lipid panel 2019: HDL 31, , VLDL 45, trig 224, chol 180  - telemetry  - no meds taken for \"2 years\"  - monitor BP, HR  - Echo: 1. The left ventricular systolic function is normal, with a visually estimated ejection fraction of 60-65%.   2. Spectral Doppler shows an impaired relaxation pattern of left ventricular diastolic filling. 3. There is normal right ventricular global systolic function. 4. Mild to moderate aortic valve stenosis. 5. There is moderate aortic valve cusp calcification. 6. The inferior vena cava appears mildly dilated.     Type 2 diabetes mellitus with diabetic peripheral angiopathy without gangrene, without long-term current use of insulin (Multi)  Diabetic peripheral neuropathy (Multi)  - last A1c 2021: 6.9 > now 9.8  - no meds takes for \"2 years\"  - glucose elevation on serum  - accuchecks with sliding scale  - diabetes education  - nutrition " "consult     History of stroke  Weakness of extremity  Ambulatory dysfunction  - old stroke in L cerebellum, right basal ganglia (?when)  - LLE weakness since stroke   - no other residual defects  - benign mass in brain currently,  \"right frontal extradural mass since 2016\"  - PT eval when able     Chronic bilateral low back pain with left-sided sciatica  -treat for pain as needed  - OOB to chair    Stable for transfer to [facility] for surgical assessment.   Total cumulative time spent in preparation of this discharge including documentation review, coordination of care with the medical team including PT/SW/care coordinators and treating consultants, discussion with patient and pertinent family members and finalization of prescriptions, follow-up appointments, and this discharge summary was approximately 45 minutes.    Outpatient Follow-Up  No future appointments.      SARAH Mishra-CNP  "

## 2024-09-14 NOTE — CARE PLAN
"The patient's goals for the shift include  resting    The clinical goals for the shift include Tolerate IV antibiotics this shift    Over the shift, the patient did not make progress toward the following goals.    Problem: Pain  Goal: Turns in bed with improved pain control throughout the shift  Outcome: Not Progressing  Goal: Walks with improved pain control throughout the shift  Outcome: Not Progressing  Goal: Performs ADL's with improved pain control throughout shift  Outcome: Not Progressing       1900: Assumed care of pt. Pt resting in bed at this time with call light within reach.     2016: Pt reporting \"7\"/10 chronic back pain. Repositioning offered, ineffective. Oxycodone administered per order. Pt requesting to use bathroom. Declined urinal or bedpan. Pt agreeable and assisted to BSC and back to bed. Declined change of clothes at this time. Bed in lowest position with fall precautions in place. Call light within reach.     2210: Pt SPO2 staying consistently at 88% RA while asleep, dropping twice to 83%-85% for brief moments.     2217: 2L O2 NC applied to pt.     2228: SPO2 remaining at 97%-98% thus far.     0000: Pt began to call out in pain and oral temperature of 102.6 taken. Tylenol and oxycodone administered. Cool washcloth applied to forehead.     0143: Pt calling out in pain. Both IV sites loss of access. X3 unsuccessful attempts made for new site.     0251: Supervisor able to successfully insert two new Ivs into RUE. Temperature decreased to 99.3. Dilaudid administered per order and Heparin infusion restarted. Pt agreeable to bed bath at this time.        0630: Pt VSS. Resting in bed at this time with call light within reach and denies any further needs.  "

## 2024-09-14 NOTE — CARE PLAN
The patient's goals for the shift include      The clinical goals for the shift include pain to be controled throughout the day    Goal partially met. Vital signs stable. Heparin drip continues to run on 18 mL/hr. Patient transferring to [facility]. Called to give nurse to nurse report to Johnna. Patient will be transferring by way of [facility]. Patients assigned room at [place] [address]. Patients dressing to left lower extremity is clean, dry and intact.

## 2024-09-14 NOTE — CARE PLAN
The patient's goals for the shift include      The clinical goals for the shift include      Over the shift, the patient did not make progress toward the following goals. Barriers to progression include . Recommendations to address these barriers include   Problem: Pain  Goal: Takes deep breaths with improved pain control throughout the shift  Outcome: Progressing  Goal: Turns in bed with improved pain control throughout the shift  Outcome: Progressing  Goal: Walks with improved pain control throughout the shift  Outcome: Progressing  Goal: Performs ADL's with improved pain control throughout shift  Outcome: Progressing  Goal: Participates in PT with improved pain control throughout the shift  Outcome: Progressing  Goal: Free from opioid side effects throughout the shift  Outcome: Progressing  Goal: Free from acute confusion related to pain meds throughout the shift  Outcome: Progressing   .

## 2024-09-14 NOTE — Clinical Note
Inflation 1: Pressure = 8 chavo; Duration = 120 sec. Inflation 2: Pressure = 8 chavo; Duration = 60 sec. Inflation 3: Pressure = 8 chavo; Duration = 60 sec. Inflation 4: Pressure = 8 chavo; Duration = 60 sec. Posterial tibial

## 2024-09-14 NOTE — PROGRESS NOTES
Occupational Therapy    Evaluation/Treatment    Patient Name: Elliot Partida  MRN: [id number]  Department: [facility]  Room: [address] [place], [place] [address]  Today's Date: 09/14/24  Time Calculation  Start Time: 0742  Stop Time: 0831  Time Calculation (min): 49 min     Assessment:  OT Assessment: Pt. displays deconditioning with a significant decline from his reported prior level of functoning for self care skills & functional transfers secondary to his hospitalization for L foot wound infection,osteomyelitis of 5th metatarsal head & proximal 5th toe, acute DVT in L mid-distal femoral popliteal veins with TTWB to PWB on his L LE.  OT intervention is indicated to address the pt.'s deficit areas with strength, balance, safety, self care skills & functional transfers to restore the pt. to his optimal level of functioning & to reduce his risk for falls.  Prognosis: Good with compliance  Barriers to Discharge: Unknown at this time.  Evaluation/Treatment Tolerance: Patient limited by pain  Medical Staff Made Aware: Yes  End of Session Communication: Bedside nurse  End of Session Patient Position: Up in chair, Alarm on  OT Assessment Results: Decreased ADL status, Decreased safe judgment during ADL, Decreased upper extremity strength, Decreased functional mobility  Prognosis: Good with compliance  Barriers to Discharge: Unknown at this time.  Evaluation/Treatment Tolerance: Patient limited by pain  Medical Staff Made Aware: Yes  Strengths: Ability to acquire knowledge  Barriers to Participation: Comorbidities  Plan:  Treatment Interventions: ADL retraining, Functional transfer training, UE strengthening/ROM, Patient/family training, Equipment evaluation/education, Neuromuscular reeducation, Compensatory technique education  OT Frequency: 3 times per week  OT Discharge Recommendations:Moderate intensity level of continued care.  OT Recommended Transfer Status: Assist of 2  OT - OK to Discharge: Yes Based on completed evaluation and care plan  recommendations, no barriers to discharge to next site of care    Treatment Interventions: ADL retraining, Functional transfer training, UE strengthening/ROM, Patient/family training, Equipment evaluation/education, Neuromuscular reeducation, Compensatory technique education    Subjective   Current Problem:  1. Osteomyelitis of left foot, unspecified type (Multi)  CANCELED: Vascular US PVR without exercise    CANCELED: Vascular US PVR without exercise    CANCELED: Vascular US ankle brachial index (BEN) without exercise    CANCELED: Vascular US ankle brachial index (BEN) without exercise      2. Acute deep vein thrombosis (DVT) of proximal vein of left lower extremity (Multi)        3. Wound infection  CANCELED: Vascular US PVR without exercise    CANCELED: Vascular US PVR without exercise    CANCELED: Vascular US ankle brachial index (BEN) without exercise    CANCELED: Vascular US ankle brachial index (BEN) without exercise      4. Type 2 diabetes mellitus with diabetic peripheral angiopathy without gangrene, without long-term current use of insulin (Multi)  Transthoracic Echo (TTE) Complete    Transthoracic Echo (TTE) Complete    CANCELED: Vascular US PVR without exercise    CANCELED: Vascular US PVR without exercise    CANCELED: Vascular US ankle brachial index (BEN) without exercise    CANCELED: Vascular US ankle brachial index (BEN) without exercise      5. Peripheral edema  Transthoracic Echo (TTE) Complete    Transthoracic Echo (TTE) Complete      6. Abnormal electrocardiogram (ECG) (EKG)  Transthoracic Echo (TTE) Complete    Transthoracic Echo (TTE) Complete        General:   OT Received On: 09/14/24  General  Reason for Referral: Impaired self care skills & functioanl transfers due to hospitalization for L foot wound infection, osteomyelitis of 5th metatarsal head & proximal 5th toe, acute DVT in L mid-distal femoral popliteal veins  Referred By: Delia Medrano, APRN-CNP  Past Medical History Relevant to  Rehab: L LE weakness, arthritis, neuroplasty decompression median nerve at carpal tunnel, venous statis, CAD, DM, MI, neuropathy, thoracic radiculopathy, R frontal extrdural mass since 2016, iridectomy, ASCVD, neuritis, peripheral arterial disease, B LE edema, chronic low back pain with L sided sciatica, CVA, cardiac cath  Family/Caregiver Present: No  Prior to Session Communication: Bedside nurse  Patient Position Received: Bed, 2 rail up, Alarm off, not on at start of session  Preferred Learning Style: verbal, visual  General Comment: Dressing in place L distal LE  Precautions:  LE Weight Bearing Status: Per CNP:TTW to PWB on L LE  Medical Precautions: Cardiac precautions, Fall precautions, Oxygen therapy device and L/min  Precautions Comment: safety precautions, O2 IV            Pain:  Pain Assessment  Pain Assessment: 0/10 while at rset in bed.  While standing & performing stand pivot transfers with a standard walker, the pt. reported L foot pain, however did not specify his pain severity. Pt. reported buttocks pain post session while seated in the bedside chair 5/10.  Pain Interventions: Repositioning.  Elevated the pt.'s L LE with a pillow for increased comfort & for pressure relief.  Also placed a pillow in the pt.'s bedside chair for the pt. to sit on for increased comfort.  Response to Interventions: Pt. reported 5/10 pain in his buttocks post session.  No further c/o L foot pain post session.    Objective   Cognition:  Overall Cognitive Status:  (Appears WFL)  Orientation Level: Oriented X4   Home Living:  Type of Home: House  Lives With: Pt.'s sister lives with him.  Home Adaptive Equipment: cane, rollator, reacher, shower chair, grab bars in the shower  Home Layout: One level  Home Access: Stairs to enter with rails  Entrance Stairs-Number of Steps: Pt. reported that he has approximately 8 steps to eneter his home.  Bathroom Shower/Tub: Tub/shower unit  Bathroom Toilet: Standard  Bathroom Equipment:  shower chair, grab bars in the tub  Home Living Comments: Pt. reported that he had frequent falls.  Prior Function:  Level of [place]: Independent with ADLs and functional transfers  Receives Help From: Pt.'s sister prepares the meals & his niece does the laundry.  ADL Assistance: Independent  Homemaking Assistance: Needs assistance  Driving/Transportation: Pt.'s family provides him with transportation.  Ambulatory Assistance: Modified independent primarily with use of a straight cane, however pt. reported that he also uses a rollator at times.  Vocational: Retired  Leisure: Pt. reported that he watches tv in his free time.  Hand Dominance: Right  Prior Function Comments: Pt. has a history of a CVA with residual L LE weakness.  IADL History:  Current License: No  ADL:  Eating Assistance: Independent  Grooming Assistance: Minimal. Pt. is presently unkempt with matted hair.  Bathing Assistance: Maximal  Bathing Deficit: Verbal cueing, Steadying, Setup, Buttocks, Perineal area, Right lower leg including foot, Left lower leg including foot  UE Dressing Assistance: Minimal  LE Dressing Assistance: Total  Toileting Assistance with Device: Total  Activities of Daily Living:    LE Dressing  LE Dressing: Instructed & demonstrated to the pt. use of a reacher for increased ease with LE dressing recommending pt. don underclothing & pants over his L LE first for increased ease.  Pt. declined to don a pull up brief at this time.    Toileting  Toileting Level of Assistance: Dependent  Where Assessed: Raised BSC  Toileting Comments: OT adjusted the pt.'s BSC for pt.'s increased ease with sit to stand transfers from the BSC.  Positioned the BSC & the bedside chair so the pt. could transfer towards his stronger side(R side) for increased ease, safety & compliance with L LE weight bearing status.  Pt. presently required B UE support with Zainab & cues to maintain static standing balance with TTWB to PWB on his L LE.  Verbal cues  for use of one UE for support to facilitate increased function(ie.to perform perineal hygiene care), however pt. Unable to do so at this time.  Activity Tolerance:  Endurance: Endurance does not limit participation in activity however the pt.'s c/o pain limits his particpation in activity at times.     Bed Mobility/Transfers: Bed Mobility  Bed Mobility: Yes  Bed Mobility 1  Bed Mobility 1: Supine to sitting  Level of Assistance 1: Zainab/modA & cues with bed rail.    Transfers  Transfer: Yes  Transfer 1  Technique 1: Sit to stand, Stand to sit, Stand pivot  Transfer Device 1: gait belt & standard walker  Transfer Level of Assistance 1: modAx1 & cues & Zainab of a second person(elevated surface height)  Trials/Comments 1: Several sit to stand transfers with modAx1 & cues & Zainab of a second person for safety from an elevated surface height.  Mod/max cues for safety with B UE placement as well as B LE positioning with cues for L LE TTWB to PWB status.  Pt. transferred towards his R side for increased ease,safety & compliance with L LE weight bearing status.    Sitting Balance:  Static Sitting Balance  Static Sitting-Level of Assistance: Close S static sitting balance on the side of the bed  Standing Balance:  Static Standing Balance  Static Standing-Level of Assistance: Static standing balance:Zainab & cues with a standard walker, B UE support & cues for TTWB to PWB on his L LE       Vision:Vision - Basic Assessment  Current Vision: Wears glasses only for reading  Sensation:Pt. reported neuropathy in B LE's.     Strength:  Strength Comments: R UE strength is grossly G-/G & L UE strength is grossly G- with the exception of L elbow ext F+  Other Activity:  Other Activity Performed: Provided the pt. with a pillow to sit on in his bedside chair for increased comfort.(Pt. c/o buttocks pain when sitting in the bedside chair)  Postioned the pt.'s L LE on a pillow for increased comfort & for pressure relief.  Education provided on  frequent repositioning for pressure redistribution.    Hand Function:  Hand Function  Gross Grasp: Functional  Coordination: R UE coordination:WFL, L UE coordination:fair-/fair  Extremities:   RUE : Within Functional Limits   LUE: L UE WFL with the exception of should flex to grossly 80    Outcome Measures: [facility] Daily Activity  Putting on and taking off regular lower body clothing: Total  Bathing (including washing, rinsing, drying): A lot  Putting on and taking off regular upper body clothing: A little  Toileting, which includes using toilet, bedpan or urinal: Total  Taking care of personal grooming such as brushing teeth: A little  Eating Meals: None  Daily Activity - Total Score: 14        Education Documentation  Precautions, taught by Hilary Judd OT at 9/14/2024  9:36 AM.  Learner: Patient  Readiness: Acceptance  Method: Explanation, Demonstration  Response: Verbalizes Understanding, Needs Reinforcement, Demonstrated Understanding  Comment: OT POC.  Education on safety with transfers, safety with balance, L LE weight bearing status, compensatory techniques for self care & use of adaptive equipment.    ADL Training, taught by Hilary Judd OT at 9/14/2024  9:36 AM.  Learner: Patient  Readiness: Acceptance  Method: Explanation, Demonstration  Response: Verbalizes Understanding, Needs Reinforcement, Demonstrated Understanding  Comment: OT POC.  Education on safety with transfers, safety with balance, L LE weight bearing status, compensatory techniques for self care & use of adaptive equipment.    Education Comments  No comments found.        OP EDUCATION:       Goals:  Encounter Problems       Encounter Problems (Active)       ADLs       Patient with complete upper body dressing with S set up       Start:  09/14/24    Expected End:  09/28/24            Patient with complete lower body dressing with Zainab with use of adaptive equipment PRN       Start:  09/14/24    Expected End:  09/28/24             Patient will complete daily grooming tasks S set up       Start:  09/14/24    Expected End:  09/28/24            Patient will complete toileting including hygiene clothing management/hygiene with CGA with A/safety devices       Start:  09/14/24    Expected End:  09/28/24            [place] LE bathing with A/safety devices       Start:  09/14/24    Expected End:  09/28/24               BALANCE       CGA standing balance with A device as needed for ADL task performance with compliance with L LE weight bearing status.       Start:  09/14/24    Expected End:  09/28/24               MOBILITY       Patient will perform bed mobility with modified independent as needed to engage in out of bed activity & to perform pressure redistribution.       Start:  09/14/24    Expected End:  09/28/24               TRANSFERS       CGA transfers to the bed, chair & toilet with A/safety devices       Start:  09/14/24    Expected End:  09/28/24

## 2024-09-14 NOTE — Clinical Note
Inflation 1: Pressure = 8 chavo; Duration = 45 sec. Inflation 2: Pressure = 8 chavo; Duration = 20 sec. Inflation 3: Pressure = 8 chavo; Duration = 5 sec. SFA/popliteal

## 2024-09-14 NOTE — PROGRESS NOTES
Elliot Partida is a 74 y.o. male on day 1 of admission presenting with Osteomyelitis of left foot, unspecified type (Multi).    Subjective   He is sitting up in a chair this morning, alert and pleasant. We discussed transfer for surgical intervention and he adamantly refuses to transfer to [facility] due to a bad experience with his brother. I informed transfer center and Dr Torres of this change. His pain is under control, edema decreased. Left foot wrapped with dressing and elevated. All questions answered.      Objective     Physical Exam  Constitutional:       Appearance: He is obese. He is ill-appearing.   HENT:      Mouth/Throat:      Mouth: Mucous membranes are moist.   Eyes:      Extraocular Movements: Extraocular movements intact.      Pupils: Pupils are equal, round, and reactive to light.   Cardiovascular:      Rate and Rhythm: Normal rate and regular rhythm.      Pulses: Normal pulses.      Heart sounds: Murmur heard.      No friction rub. No gallop.   Pulmonary:      Effort: Pulmonary effort is normal. No respiratory distress.      Breath sounds: Normal breath sounds. No wheezing or rales.   Abdominal:      General: There is distension.      Tenderness: There is no abdominal tenderness. There is no guarding.   Musculoskeletal:         General: Tenderness present.      Cervical back: Normal range of motion.      Right lower leg: Edema (1+) present.      Left lower leg: Edema (3+) present.   Skin:     General: Skin is warm.      Findings: Erythema present.      Comments: Large excoriated area, weeping, on dorsum of left foot  Quarter size necrotic wound under left 5th toe, plantar surface  Dry flaking skin bilateral legs and feet  Erythema from toes to patellar base on LLE  Mild erythema on right foot, base of leg   Neurological:      General: No focal deficit present.      Mental Status: He is alert and oriented to person, place, and time.   Psychiatric:         Mood and Affect: Mood normal.    "      Behavior: Behavior normal.     Last Recorded Vitals  Blood pressure 121/57, pulse 70, temperature 36.4 °C (97.5 °F), temperature source Temporal, resp. rate 16, height 1.753 m (5' 9\"), weight 107 kg (236 lb 1.8 oz), SpO2 97%.  Intake/Output last 3 Shifts:  I/O last 3 completed shifts:  In: 1460.1 (13.6 mL/kg) [I.V.:302.1 (2.8 mL/kg); IV Piggyback:1158]  Out: 1300 (12.1 mL/kg) [Urine:1300 (0.3 mL/kg/hr)]  Weight: 107.1 kg     Scheduled medications  insulin lispro, 0-15 Units, subcutaneous, TID  pantoprazole, 40 mg, oral, Daily before breakfast  perflutren protein A microsphere, 0.5 mL, intravenous, Once in imaging  piperacillin-tazobactam, 3.375 g, intravenous, q6h  sennosides-docusate sodium, 1 tablet, oral, BID  sulfur hexafluoride microsphr, 2 mL, intravenous, Once in imaging  vancomycin, 1,750 mg, intravenous, q12h    Continuous medications  heparin, 0-4,500 Units/hr, Last Rate: 1,800 Units/hr (09/14/24 0316)  sodium chloride 0.9%, 10 mL/hr, Last Rate: 10 mL/hr (09/14/24 0028)    PRN medications  PRN medications: acetaminophen **OR** [DISCONTINUED] acetaminophen **OR** [DISCONTINUED] acetaminophen, acetaminophen **OR** [DISCONTINUED] acetaminophen **OR** [DISCONTINUED] acetaminophen, benzocaine-menthol, calcium carbonate, dextromethorphan-guaifenesin, guaiFENesin, heparin, HYDROmorphone, melatonin, ondansetron **OR** ondansetron, oxyCODONE, oxygen, sodium chloride 0.9%, vancomycin    Relevant Results  Transthoracic Echo (TTE) Complete  Result Date: 9/13/2024  CONCLUSIONS:  1. The left ventricular systolic function is normal, with a visually estimated ejection fraction of 60-65%.  2. Spectral Doppler shows an impaired relaxation pattern of left ventricular diastolic filling.  3. There is normal right ventricular global systolic function.  4. Mild to moderate aortic valve stenosis.  5. There is moderate aortic valve cusp calcification.  6. The inferior vena cava appears mildly dilated.     CT angio aorta and " bilateral iliofemoral runoff w and or wo IV contrast  Result Date: 9/13/2024  1. Diffuse partially calcified atherosclerotic disease, as detailed above. There is a single vessel runoff in the right leg. In the left leg, there is high-grade stenosis of the distal SFA as it crosses Gaetano's canal. Additionally, there is occlusive disease of the proximal trifurcation with reconstitution of the left posterior tibial and peroneal arteries distally.   2. Significant soft tissue swelling of the left leg with prominent venous varicosities. There is also soft tissue swelling of the left foot with an open wound along the plantar aspect of the foot centered at the 5th MTP joint, which demonstrates adjacent bony destruction. Curvilinear hyperdensity in this region may reflect bony fragments or foreign bodies.   3. Coronary artery calcifications.   Signed by: Osorio Wong 9/13/2024 2:09 PM Dictation workstation:   [id number]    Lower extremity venous duplex left  Result Date: 9/12/2024  Evidence for acute occlusive DVT within the left mid-distal femoral and popliteal veins. Compared to prior ultrasound, this represents a new finding. The positive findings on this exam were discussed with and acknowledged by Dr. Ryder Chappell abdomen interpretation, 5:00 PM September 12, 2024 Signed by Jason Tobar MD    XR foot left 3+ views  Result Date: 9/12/2024  Osteomyelitis of the fifth metatarsal head and proximal phalanx of the fifth toe. Signed by Td Francisco MD      Latest Reference Range & Units 09/12/24 18:08 09/13/24 03:37 09/14/24 06:20   GLUCOSE 74 - 99 mg/dL 310 (H) 200 (H) 152 (H)   SODIUM 136 - 145 mmol/L 129 (L) 130 (L) 130 (L)   POTASSIUM 3.5 - 5.3 mmol/L 4.6 3.8 3.8   CHLORIDE 98 - 107 mmol/L 92 (L) 96 (L) 96 (L)   Bicarbonate 21 - 32 mmol/L 25 26 27   Anion Gap 10 - 20 mmol/L 17 12 11   Blood Urea Nitrogen 6 - 23 mg/dL 22 21 15   Creatinine 0.50 - 1.30 mg/dL 0.81 0.77 1.10   EGFR >60 mL/min/1.73m*2 >90 >90 71    Calcium 8.6 - 10.3 mg/dL 8.9 8.3 (L) 8.1 (L)      Latest Reference Range & Units 09/13/24 06:19   C-Reactive Protein <1.00 mg/dL 9.51 (H)      Latest Reference Range & Units 09/12/24 17:00 09/13/24 03:38 09/14/24 06:20   WBC 4.4 - 11.3 x10*3/uL 10.6 11.6 (H) 13.1 (H)   nRBC 0.0 - 0.0 /100 WBCs 0.0 0.0 0.0   RBC 4.50 - 5.90 x10*6/uL 4.28 (L) 3.90 (L) 3.86 (L)   HEMOGLOBIN 13.5 - 17.5 g/dL 12.2 (L) 11.0 (L) 11.1 (L)   HEMATOCRIT 41.0 - 52.0 % 36.9 (L) 33.2 (L) 33.1 (L)   MCV 80 - 100 fL 86 85 86   MCH 26.0 - 34.0 pg 28.5 28.2 28.8   MCHC 32.0 - 36.0 g/dL 33.1 33.1 33.5   RED CELL DISTRIBUTION WIDTH 11.5 - 14.5 % 12.6 12.3 12.5   Platelets 150 - 450 x10*3/uL 231 235 230     Assessment/Plan   Assessment & Plan  Osteomyelitis of left foot, unspecified type (Multi)    Acute deep vein thrombosis (DVT) of popliteal vein of left lower extremity (Multi)    Bilateral lower extremity edema    Type 2 diabetes mellitus with diabetic peripheral angiopathy without gangrene, without long-term current use of insulin (Multi)    ASCVD (arteriosclerotic cardiovascular disease)    Hypertension    MI (myocardial infarction) (Multi)    Neuritis    History of stroke    Weakness of extremity    Ambulatory dysfunction    Chronic bilateral low back pain with left-sided sciatica    Diabetic peripheral neuropathy (Multi)    Hyperlipidemia    Peripheral arterial disease (CMS-HCC)    Osteomyelitis of left foot, unspecified type (Multi)  Acute deep vein thrombosis (DVT) of popliteal vein of left lower extremity (Multi)  Bilateral lower extremity edema  Peripheral arterial disease (CMS-HCC)  - WBC 10.6 > 11.6 > 13.1  - lactate 1.4  - CRP 9.51  - Sed rate 24  - Xray foot: Osteomyelitis of the fifth metatarsal head and proximal phalanx of the fifth toe.  - US leg: Evidence for acute occlusive DVT within the left mid-distal femoral and popliteal veins. Compared to prior ultrasound, this represents a new finding  - blood cultures in process  - PVR  "pending  - IV zosyn, vancomycin continued from, day 3  - IV heparin, follow nomogram, transition to OAC when appropriate  - Dr Velazco consult, appreciate recs  - Dr Torres consult, appreciate recs  - follow CBC, blood cultures NGTD  - elevate LLE  - wound culture in process  - cleanse foot daily  - treat for pain, fever as needed  - CT angio to rule out PE: No central PE however peripheral PE can not be excluded based on this exam.    Scattered interstitial opacities including possible mosaic attenuation in the lung bases which could reflect small airways disease and air trapping or atypical infection. Trace bilateral pleural effusions/thickening. Moderate atherosclerotic disease. Possible fatty infiltration of the liver incidentally noted  - attempting transfer for surgical intervention     ASCVD (arteriosclerotic cardiovascular disease)  Hypertension  MI (myocardial infarction) (Multi)  Hyperlipidemia  - last lipid panel 2019: HDL 31, , VLDL 45, trig 224, chol 180  - telemetry  - no meds taken for \"2 years\"  - monitor BP, HR  - Echo: 1. The left ventricular systolic function is normal, with a visually estimated ejection fraction of 60-65%.   2. Spectral Doppler shows an impaired relaxation pattern of left ventricular diastolic filling. 3. There is normal right ventricular global systolic function. 4. Mild to moderate aortic valve stenosis. 5. There is moderate aortic valve cusp calcification. 6. The inferior vena cava appears mildly dilated.     Type 2 diabetes mellitus with diabetic peripheral angiopathy without gangrene, without long-term current use of insulin (Multi)  Diabetic peripheral neuropathy (Multi)  - last A1c 2021: 6.9 > now 9.8  - no meds takes for \"2 years\"  - glucose elevation on serum  - accuchecks with sliding scale  - diabetes education  - nutrition consult     History of stroke  Weakness of extremity  Ambulatory dysfunction  - old stroke in L cerebellum, right basal ganglia " "(?when)  - LLE weakness since stroke   - no other residual defects  - benign mass in brain currently,  \"right frontal extradural mass since 2016\"  - PT eval when able     Chronic bilateral low back pain with left-sided sciatica  -treat for pain as needed  - OOB to chair     GI ppx: PPI  DVT ppx: heparin gtt  Fluids: as needed  Electrolytes: replace as needed  Nutrition: cardiac, diabetic  Adjuncts: PIV  Code Status: full  Pt requires inpatient stay at this time.    Delia Medrano, APRN-CNP      "

## 2024-09-14 NOTE — PROGRESS NOTES
Been trying to make arrangements for the patient to be transferred to [facility] and Dr.Clint Hennessy, was willing to accept the patient and perform interventional vascular procedure to assess his poor circulation. I was left leg. I was advised by the medical team that the patient does not want to go to [facility] for personal reasons. Therefore, it’s my recommendation at the patient be transferred to a hospital that has Jen surgery as I believe he’ll need intervention before anything can be done to his foot. I am on staff at [facility] if that’s a possibility. I would recommend continue dressing changes, IV antibiotics and other medical treatment for his comorbidities. Will await where the patient will be transferred.

## 2024-09-15 LAB
ANION GAP SERPL CALC-SCNC: 12 MMOL/L (ref 10–20)
BACTERIA BLD CULT: NORMAL
BACTERIA BLD CULT: NORMAL
BACTERIA SPEC CULT: ABNORMAL
BUN SERPL-MCNC: 12 MG/DL (ref 6–23)
CALCIUM SERPL-MCNC: 7.8 MG/DL (ref 8.6–10.3)
CHLORIDE SERPL-SCNC: 99 MMOL/L (ref 98–107)
CO2 SERPL-SCNC: 27 MMOL/L (ref 21–32)
CREAT SERPL-MCNC: 0.76 MG/DL (ref 0.5–1.3)
EGFRCR SERPLBLD CKD-EPI 2021: >90 ML/MIN/1.73M*2
ERYTHROCYTE [DISTWIDTH] IN BLOOD BY AUTOMATED COUNT: 12.4 % (ref 11.5–14.5)
GLUCOSE BLD MANUAL STRIP-MCNC: 117 MG/DL (ref 74–99)
GLUCOSE BLD MANUAL STRIP-MCNC: 125 MG/DL (ref 74–99)
GLUCOSE SERPL-MCNC: 124 MG/DL (ref 74–99)
GRAM STN SPEC: ABNORMAL
GRAM STN SPEC: ABNORMAL
HCT VFR BLD AUTO: 31.1 % (ref 41–52)
HGB BLD-MCNC: 10.3 G/DL (ref 13.5–17.5)
MCH RBC QN AUTO: 28.1 PG (ref 26–34)
MCHC RBC AUTO-ENTMCNC: 33.1 G/DL (ref 32–36)
MCV RBC AUTO: 85 FL (ref 80–100)
NRBC BLD-RTO: 0 /100 WBCS (ref 0–0)
PLATELET # BLD AUTO: 234 X10*3/UL (ref 150–450)
POTASSIUM SERPL-SCNC: 3.5 MMOL/L (ref 3.5–5.3)
RBC # BLD AUTO: 3.66 X10*6/UL (ref 4.5–5.9)
SODIUM SERPL-SCNC: 134 MMOL/L (ref 136–145)
VANCOMYCIN SERPL-MCNC: 22.8 UG/ML (ref 5–20)
WBC # BLD AUTO: 8.2 X10*3/UL (ref 4.4–11.3)

## 2024-09-15 PROCEDURE — 85027 COMPLETE CBC AUTOMATED: CPT | Performed by: INTERNAL MEDICINE

## 2024-09-15 PROCEDURE — 80048 BASIC METABOLIC PNL TOTAL CA: CPT | Performed by: INTERNAL MEDICINE

## 2024-09-15 PROCEDURE — 2500000001 HC RX 250 WO HCPCS SELF ADMINISTERED DRUGS (ALT 637 FOR MEDICARE OP): Performed by: INTERNAL MEDICINE

## 2024-09-15 PROCEDURE — 36415 COLL VENOUS BLD VENIPUNCTURE: CPT | Performed by: INTERNAL MEDICINE

## 2024-09-15 PROCEDURE — 2500000004 HC RX 250 GENERAL PHARMACY W/ HCPCS (ALT 636 FOR OP/ED): Performed by: STUDENT IN AN ORGANIZED HEALTH CARE EDUCATION/TRAINING PROGRAM

## 2024-09-15 PROCEDURE — 1200000002 HC GENERAL ROOM WITH TELEMETRY DAILY

## 2024-09-15 PROCEDURE — 80202 ASSAY OF VANCOMYCIN: CPT | Performed by: INTERNAL MEDICINE

## 2024-09-15 PROCEDURE — 82947 ASSAY GLUCOSE BLOOD QUANT: CPT

## 2024-09-15 PROCEDURE — 2500000004 HC RX 250 GENERAL PHARMACY W/ HCPCS (ALT 636 FOR OP/ED): Performed by: INTERNAL MEDICINE

## 2024-09-15 RX ORDER — ENOXAPARIN SODIUM 150 MG/ML
1 INJECTION SUBCUTANEOUS EVERY 12 HOURS
Status: DISCONTINUED | OUTPATIENT
Start: 2024-09-15 | End: 2024-09-18

## 2024-09-15 RX ORDER — DEXTROSE 50 % IN WATER (D50W) INTRAVENOUS SYRINGE
25
Status: ACTIVE | OUTPATIENT
Start: 2024-09-15

## 2024-09-15 RX ORDER — DEXTROSE 50 % IN WATER (D50W) INTRAVENOUS SYRINGE
12.5
Status: ACTIVE | OUTPATIENT
Start: 2024-09-15

## 2024-09-15 RX ORDER — INSULIN LISPRO 100 [IU]/ML
0-10 INJECTION, SOLUTION INTRAVENOUS; SUBCUTANEOUS
Status: DISPENSED | OUTPATIENT
Start: 2024-09-15

## 2024-09-15 RX ORDER — ENOXAPARIN SODIUM 100 MG/ML
80 INJECTION SUBCUTANEOUS 2 TIMES DAILY
Status: DISCONTINUED | OUTPATIENT
Start: 2024-09-15 | End: 2024-09-15

## 2024-09-15 RX ORDER — HEPARIN SODIUM 10000 [USP'U]/100ML
INJECTION, SOLUTION INTRAVENOUS
Status: DISPENSED
Start: 2024-09-15 | End: 2024-09-15

## 2024-09-15 ASSESSMENT — ENCOUNTER SYMPTOMS
FEVER: 0
COLOR CHANGE: 1
CARDIOVASCULAR NEGATIVE: 1
CHILLS: 0
WOUND: 1
CONSTITUTIONAL NEGATIVE: 1
EYES NEGATIVE: 1
NEUROLOGICAL NEGATIVE: 1
MYALGIAS: 1
MUSCULOSKELETAL NEGATIVE: 1
SHORTNESS OF BREATH: 0
GASTROINTESTINAL NEGATIVE: 1
RESPIRATORY NEGATIVE: 1
ARTHRALGIAS: 1

## 2024-09-15 ASSESSMENT — COGNITIVE AND FUNCTIONAL STATUS - GENERAL
CLIMB 3 TO 5 STEPS WITH RAILING: TOTAL
TOILETING: A LOT
MOVING FROM LYING ON BACK TO SITTING ON SIDE OF FLAT BED WITH BEDRAILS: A LITTLE
STANDING UP FROM CHAIR USING ARMS: A LOT
DRESSING REGULAR UPPER BODY CLOTHING: A LITTLE
MOVING TO AND FROM BED TO CHAIR: A LOT
DRESSING REGULAR LOWER BODY CLOTHING: A LOT
MOBILITY SCORE: 12
HELP NEEDED FOR BATHING: A LOT
DAILY ACTIVITIY SCORE: 17
WALKING IN HOSPITAL ROOM: A LOT
TURNING FROM BACK TO SIDE WHILE IN FLAT BAD: A LOT

## 2024-09-15 ASSESSMENT — PAIN - FUNCTIONAL ASSESSMENT: PAIN_FUNCTIONAL_ASSESSMENT: 0-10

## 2024-09-15 ASSESSMENT — PAIN SCALES - GENERAL: PAINLEVEL_OUTOF10: 0 - NO PAIN

## 2024-09-15 NOTE — PROGRESS NOTES
Vancomycin Dosing by Pharmacy- FOLLOW UP    Elliot Partida is a 74 y.o. year old male who Pharmacy has been consulted for vancomycin dosing for osteomyelitis/septic arthritis. Based on the patient's indication and renal status this patient is being dosed based on a goal AUC of 400-600.     Renal function is currently stable.    Current vancomycin dose: 1000 mg given every 12 hours    Estimated vancomycin AUC on current dose: 380 mg/L.hr     Visit Vitals  /73 (BP Location: Left arm, Patient Position: Lying)   Pulse 73   Temp 36.8 °C (98.2 °F) (Temporal)   Resp 19        Lab Results   Component Value Date    CREATININE 0.76 09/15/2024    CREATININE 1.10 2024    CREATININE 0.77 2024    CREATININE 0.81 2024        Patient weight is as follows: There were no vitals filed for this visit.    Cultures:  No results found for the encounter in last 14 days.       I/O last 3 completed shifts:  In: 120 [P.O.:120]  Out: 350 [Urine:350]  I/O during current shift:  I/O this shift:  In: -   Out: 400 [Urine:400]    Temp (24hrs), Av.9 °C (98.5 °F), Min:36.5 °C (97.7 °F), Max:37.4 °C (99.3 °F)      Assessment/Plan    Below goal AUC. Orders placed for new vancomcyin regimen of 1250 mg every 12 hours to begin at 2200.     This dosing regimen is predicted by [employer] to result in the following pharmacokinetic parameters:  SXC97-40: 454 mg/L.hr  AUC24,ss: 460 mg/L.hr  Probability of AUC24 > 400: 77 %  Ctrough,ss: 12.7 mg/L  Probability of Ctrough,ss > 20: 4 %    The next level will be obtained on  at 0500. May be obtained sooner if clinically indicated.   Will continue to monitor renal function daily while on vancomycin and order serum creatinine at least every 48 hours if not already ordered.  Follow for continued vancomycin needs, clinical response, and signs/symptoms of toxicity.       Evelia Grijalva, PharmD

## 2024-09-15 NOTE — CARE PLAN
The patient's goals for the shift include      The clinical goals for the shift include Patient will get intermittent relief from anal pain with turning and repositioning at least every 2 hours throughout shift ending 9/15/24 @ 0700 hrs.    Goal progressing. Patient refused assistance with turning and repositioning in order to relieve rectal pain. Lidocaine cream ordered and administered per patient request. No acute events on this shift.

## 2024-09-15 NOTE — CARE PLAN
The patient's goals for the shift include      The clinical goals for the shift include Pt will have controlled pain throughout shift until 1900    Problem: Pain  Goal: Takes deep breaths with improved pain control throughout the shift  Outcome: Progressing  Goal: Turns in bed with improved pain control throughout the shift  Outcome: Progressing  Goal: Walks with improved pain control throughout the shift  Outcome: Progressing  Goal: Performs ADL's with improved pain control throughout shift  Outcome: Progressing  Goal: Participates in PT with improved pain control throughout the shift  Outcome: Progressing  Goal: Free from opioid side effects throughout the shift  Outcome: Progressing  Goal: Free from acute confusion related to pain meds throughout the shift  Outcome: Progressing

## 2024-09-15 NOTE — CONSULTS
Vancomycin Dosing by Pharmacy- FOLLOW UP    Elliot Partida is a 74 y.o. year old male who Pharmacy has been consulted for vancomycin dosing for osteomyelitis/septic arthritis. Based on the patient's indication and renal status this patient is being dosed based on a goal AUC of 400-600.     Renal function is currently stable.    Current vancomycin dose: 1750 mg given every 12 hours    Most recent random level: 15.8 mcg/mL on  @210    Visit Vitals  /73 (BP Location: Left arm, Patient Position: Lying)   Pulse 82   Temp 37.2 °C (98.9 °F) (Temporal)   Resp 18        Lab Results   Component Value Date    CREATININE 1.10 2024    CREATININE 0.77 2024    CREATININE 0.81 2024    CREATININE 0.83 2022    CREATININE 0.89 2020    CREATININE 0.95 2019    CREATININE 0.73 12/10/2019        Patient weight is as follows: There were no vitals filed for this visit.    Cultures:  No results found for the encounter in last 14 days.       No intake/output data recorded.  I/O during current shift:  I/O this shift:  In: -   Out: 350 [Urine:350]    Temp (24hrs), Av.4 °C (99.4 °F), Min:36.4 °C (97.5 °F), Max:39.2 °C (102.6 °F)      Assessment/Plan    Above goal AUC. Orders placed for new vancomcyin regimen of 1000 every 12 hours to begin at 2200 tonight.    This dosing regimen is predicted by [employer] to result in the following pharmacokinetic parameters:  Loading dose: N/A  Regimen: 1000 mg IV every 12 hours.  Start time: 22:48 on 2024  Exposure target: AUC24 (range)400-600 mg/L.hr   FOS03-55: 409 mg/L.hr  AUC24,ss: 426 mg/L.hr  Probability of AUC24 > 400: 65 %  Ctrough,ss: 12.5 mg/L  Probability of Ctrough,ss > 20: 1 %      The next level will be obtained on 9/15 at 1400. May be obtained sooner if clinically indicated.   Will continue to monitor renal function daily while on vancomycin and order serum creatinine at least every 48 hours if not already ordered.  Follow for continued  vancomycin needs, clinical response, and signs/symptoms of toxicity.       Denise Garza, PharmD

## 2024-09-15 NOTE — CARE PLAN
The patient's goals for the shift include      The clinical goals for the shift include Pt will have controlled pain throughout shift until 1900        Problem: Pain  Goal: Takes deep breaths with improved pain control throughout the shift  9/15/2024 1944 by Kraig Hernandez RN  Outcome: Progressing  9/15/2024 1541 by Kraig Hernandez RN  Outcome: Progressing  Goal: Turns in bed with improved pain control throughout the shift  9/15/2024 1944 by Kraig Hernandez RN  Outcome: Progressing  9/15/2024 1541 by Kraig Hernandez RN  Outcome: Progressing  Goal: Walks with improved pain control throughout the shift  9/15/2024 1944 by Kraig Hernandez RN  Outcome: Progressing  9/15/2024 1541 by Kraig Hernandez RN  Outcome: Progressing  Goal: Performs ADL's with improved pain control throughout shift  9/15/2024 1944 by Kraig Hernandez RN  Outcome: Progressing  9/15/2024 1541 by Kraig Hernandez RN  Outcome: Progressing  Goal: Participates in PT with improved pain control throughout the shift  9/15/2024 1944 by Kraig Hernandez RN  Outcome: Progressing  9/15/2024 1541 by Kraig Hernandez RN  Outcome: Progressing  Goal: Free from opioid side effects throughout the shift  9/15/2024 1944 by Kraig Hernandez RN  Outcome: Progressing  9/15/2024 1541 by Kraig Hernandez RN  Outcome: Progressing  Goal: Free from acute confusion related to pain meds throughout the shift  9/15/2024 1944 by Kraig Hernandez RN  Outcome: Progressing  9/15/2024 1541 by Kraig Hernandez RN  Outcome: Progressing

## 2024-09-15 NOTE — CONSULTS
Consults    Reason For Consult  Left foot ulcer    History Of Present Illness  Elliot Partida is a 74 y.o. male presenting with PMH of R frontal extradural mass since 2016, arthritis, CAD, diabetes type 2, MI, HTN, thoracic radiculopathy, neuropathy who presents to [facility] for left leg pain that has been ongoing for a couple months. Patient reports increased swelling and redness to his left leg that he believes is from the wound on his 5th toe. Patient has been seeing Dr. Torres who believe the patient is need of a vascular consult for possible vascular intervention or evaluation to help induce healing to his left leg wound. Patient's left leg appears edematous, erythematous, weeping with a large necrotic ulcer under his 5th toe on the plantar surface. The skin on his leg is very dry and flaking and the odor is foul.  Patient reports that he has not seen a provider for his diabetes or taken any medications for a couple years.  Patient endorses that he still has good feeling in his lower extremities. He was found to have DVT which is being treated with heparin. Imaging revealed osteomyelitis of the fifth metatarsal head. He is afebrile.  No other pedal complaints.      Past Medical History  He has a past medical history of Arthritis, CAD (coronary artery disease), Diabetes mellitus (Multi), Hypertension, Peripheral neuropathy, Stroke (Multi), and Thoracic radiculopathy.    Surgical History  He has a past surgical history that includes Carpal tunnel release (10/10/2014); Eye surgery; FL arthrocentesis ASP INJ joint.; Cardiac catheterization; and Iridotomy / iridectomy (Bilateral).     Social History  He reports that he has never smoked. He has never been exposed to tobacco smoke. He has never used smokeless tobacco. No history on file for alcohol use and drug use.    Family History  Family History   Problem Relation Name Age of Onset    Heart disease Father      Colon cancer Other      Heart attack Other           Allergies  Patient has no known allergies.    Review of Systems   Constitutional:  Negative for chills and fever.   Respiratory:  Negative for shortness of breath.    Cardiovascular:  Negative for chest pain.   Musculoskeletal:  Positive for arthralgias and myalgias.   Skin:  Positive for rash and wound.         Objective:     Physical Exam    Vasc: DP and PT pulses are nonpalpable bilateral.  CFT is >5 seconds bilateral.  Skin Temperature to from cool to warm distally to proximally on the left lower extremity    Neuro:  Light touch is intact to the foot bilateral.  There is no clonus noted.      Derm: Nails 1-5 bilateral are intact.  Skin is dry, flakey to the bilateral calves. Left calf and shin is erythematous with weeping. Necrotic ulcer plantar surface 5th MTP joint.   Location: Left lateral/plantar  5th MTP joint  Measurements: 2cm x 2cm x  Probes to bone.  Mixed wound base of fibrotic tissue  Mild purulent drainage with fibrotic slough.   Moderate jennyfer-wound maceration.   Moderate jennyfer-wound erythema.   Moderate leg edema.   Mild evidence of ascending cellulitis or lymphangitis.  Minimal palpable fluctuance. Significant malodor. No increased warmth.   Significant probe to bone or deep structures within the wound base.   + tunneling or tracking.   No undermining. Skin edges irregular but intact.     MSK:  Ankle joint, subtalar joint, 1st MPJ and lesser MPJ ROM is limited and painful.  Tenderness to palpation of left foot and calf throughout, extremely tender over plantar surface of foot.           Last Recorded Vitals  Blood pressure 164/70, pulse 91, temperature 36.5 °C (97.7 °F), temperature source Temporal, resp. rate 19, SpO2 93%.    Relevant Results  Results for orders placed or performed during the hospital encounter of 09/14/24 (from the past 24 hour(s))   POCT GLUCOSE   Result Value Ref Range    POCT Glucose 128 (H) 74 - 99 mg/dL   Vancomycin   Result Value Ref Range    Vancomycin 15.8 5.0 -  20.0 ug/mL   CBC   Result Value Ref Range    WBC 8.2 4.4 - 11.3 x10*3/uL    nRBC 0.0 0.0 - 0.0 /100 WBCs    RBC 3.66 (L) 4.50 - 5.90 x10*6/uL    Hemoglobin 10.3 (L) 13.5 - 17.5 g/dL    Hematocrit 31.1 (L) 41.0 - 52.0 %    MCV 85 80 - 100 fL    MCH 28.1 26.0 - 34.0 pg    MCHC 33.1 32.0 - 36.0 g/dL    RDW 12.4 11.5 - 14.5 %    Platelets 234 150 - 450 x10*3/uL   Basic metabolic panel   Result Value Ref Range    Glucose 124 (H) 74 - 99 mg/dL    Sodium 134 (L) 136 - 145 mmol/L    Potassium 3.5 3.5 - 5.3 mmol/L    Chloride 99 98 - 107 mmol/L    Bicarbonate 27 21 - 32 mmol/L    Anion Gap 12 10 - 20 mmol/L    Urea Nitrogen 12 6 - 23 mg/dL    Creatinine 0.76 0.50 - 1.30 mg/dL    eGFR >90 >60 mL/min/1.73m*2    Calcium 7.8 (L) 8.6 - 10.3 mg/dL   POCT GLUCOSE   Result Value Ref Range    POCT Glucose 117 (H) 74 - 99 mg/dL        Lower extremity venous duplex left    Result Date: 9/12/2024  STUDY: Left Lower Extremity Venous Doppler Ultrasound; 9/12/2024 4:43 PM INDICATION: Swelling. COMPARISON: US LE Venous 3/21/2022. ACCESSION NUMBER(S): [id number] ORDERING CLINICIAN: KATIE HILTON TECHNIQUE:  Real-time grayscale imaging, color Doppler flow imaging, and spectral Doppler imaging of the left lower extremity veins was performed. FINDINGS: There is acute occlusive DVT demonstrated within the left mid-distal femoral and popliteal veins. The left common femoral and profunda femoral veins demonstrated normal compressibility, normal phasic venous flow and normal response to augmentation.  There is no evidence for echogenic thrombi.  The deep calf veins are not well visualized due to subcutaneous edema. The contralateral common femoral vein is free of thrombosis.    Evidence for acute occlusive DVT within the left mid-distal femoral and popliteal veins. Compared to prior ultrasound, this represents a new finding. The positive findings on this exam were discussed with and acknowledged by Dr. Katie Hilton abdomen interpretation, 5:00  PM September 12, 2024 Signed by Jason Tobar MD    XR foot left 3+ views    Result Date: 9/12/2024  STUDY: Foot Radiographs; 9/12/2024 4:19 PM INDICATION: Left foot pain. COMPARISON: None Available. ACCESSION NUMBER(S): [id number] ORDERING CLINICIAN: KATIE HILTON TECHNIQUE:  Three view(s) of the left foot (four images). FINDINGS:  There is no displaced fracture.  The alignment is anatomic.  Soft tissue swelling and ulceration noted lateral to the fifth MTP joint associated with demineralization of the fifth proximal phalanx and the distal head of the fifth metatarsal.  Bony changes consistent with osteomyelitis.    Osteomyelitis of the fifth metatarsal head and proximal phalanx of the fifth toe. Signed by Td Francisco MD          Assessment/Plan   ASSESSMENT & PLAN:    #Left foot necrotic ulcer in setting of lower extremity cellulitis  #Osteomyelitis  #DM2      - Patient was seen and evaluated; all findings were discussed and all questions were answered to patient's satisfaction.  - Charts, labs, vitals and imaging all reviewed.   - Imaging: Osteomyelitis of the fifth metatarsal head and proximal phalanx of the  fifth toe. MRI Left foot ordered.  - Labs: WBC 8.2, CRP: 9.51, ESR:24  - PVRs: Ordered / Pending  - Blood culture: Pending    Plan:  - Abx: Per Primary  - Pain Regimen: Per Primary  - Bowel Regimen: Per Primary  - Likely to require surgical intervention with debridement of soft tissue and possibly bone this hospital visit. Discussed this in detail with patient as well as risks and benefits   - Consulted Vascular for input will discuss surgical intervention after seen by the vascular team  - Dressings: Betadine soaked 4x4's, 4x4's, Krelix, ACE wrap.  - Nursing staff is able to change/reinforce dressing if & as necessary until next day’s dressing change. Thank you.  - Podiatry will continue to follow while in house, Discussed with Dr. Savage    DVT ppx: Per Primary  Weightbearing: WBAT B/L  LE      Case to be discussed with attending, A&P above reflects a tentative plan. Please await for the final signature from the attending physician on service.    Frank Connor  PGY-3  Podiatric Medicine & Surgery  Please Vikashku message me with any questions or concerns.            SIGNATURE: Frank Connor DPM PATIENT NAME: Elliot Partida   DATE: September 15, 2024 MRN: [id number]   TIME: 11:56 AM CONTACT: Haiku message

## 2024-09-15 NOTE — H&P
INTERNAL MEDICINE     HISTORY AND PHYSICAL      Chief Complaint:  Left leg osteomyelitis    History Of Present Illness  Elliot Partida is a 74 y.o. male presenting with a several month history of left leg pain and ulceration around his fifth toe.  He states that he has not seen a provider for this.  The pain progressively worsened and he presented to the emergency room at an outside hospital 2 days ago.  Imaging revealed osteomyelitis of the fifth metatarsal head.  Ultrasound of the leg also showed acute DVT in the mid distal femoral and popliteal veins on the left side.  He was started on IV antibiotics, heparin drip, and admitted to this hospital due to concern about some vascular pathology which was felt to require evaluation to optimize healing.  Patient states that he has a history of diabetes, CAD, hypertension but stopped taking all medications many months ago because he did not wish to continue with them.  He was transferred here for further care.     Past Medical History  He has a past medical history of Arthritis, CAD (coronary artery disease), Diabetes mellitus (Multi), Hypertension, Peripheral neuropathy, Stroke (Multi), and Thoracic radiculopathy.    Surgical History  He has a past surgical history that includes Carpal tunnel release (10/10/2014); Eye surgery; FL arthrocentesis ASP INJ joint.; Cardiac catheterization; and Iridotomy / iridectomy (Bilateral).     Social History  He reports that he has never smoked. He has never been exposed to tobacco smoke. He has never used smokeless tobacco. No history on file for alcohol use and drug use.    Family History  Family History   Problem Relation Name Age of Onset    Heart disease Father      Colon cancer Other      Heart attack Other          Allergies  Patient has no known allergies.    Home Medications:  Prior to Admission medications    Not on File        Review of Systems   Constitutional: Negative.    HENT: Negative.     Eyes: Negative.     Respiratory: Negative.     Cardiovascular: Negative.    Gastrointestinal: Negative.    Musculoskeletal: Negative.    Skin:  Positive for color change and wound.   Neurological: Negative.        Last Recorded Vitals  Blood pressure 164/70, pulse 91, temperature 36.5 °C (97.7 °F), temperature source Temporal, resp. rate 19, SpO2 93%.    Physical Exam      Constitutional:       Appearance: Elderly, overweight, in no distress  HENT:      Head: Normocephalic and atraumatic.   Eyes:      Extraocular Movements: Extraocular movements intact.      Pupils: Pupils are equal, round, and reactive to light.   Cardiovascular:      Rate and Rhythm: Normal rate and regular rhythm.      Pulses: Normal pulses.      Heart sounds: Normal heart sounds.   Pulmonary:      Effort: Pulmonary effort is normal.      Breath sounds: Normal breath sounds.   Abdominal:      General: Abdomen is flat. Bowel sounds are normal.      Palpations: Abdomen is soft.   Musculoskeletal:         General: Normal range of motion.      Cervical back: Normal range of motion and neck supple.   Skin:     General: Skin is warm and dry.  Frequent erythema extending to the calf region, ulceration around the fifth toe with some drainage.  Neurological:      General: No focal deficit present.      Mental Status: He is alert and oriented to person, place, and time. Mental status is at baseline.       Relevant Results    Results for orders placed or performed during the hospital encounter of 09/14/24 (from the past 24 hour(s))   POCT GLUCOSE   Result Value Ref Range    POCT Glucose 128 (H) 74 - 99 mg/dL   Vancomycin   Result Value Ref Range    Vancomycin 15.8 5.0 - 20.0 ug/mL   CBC   Result Value Ref Range    WBC 8.2 4.4 - 11.3 x10*3/uL    nRBC 0.0 0.0 - 0.0 /100 WBCs    RBC 3.66 (L) 4.50 - 5.90 x10*6/uL    Hemoglobin 10.3 (L) 13.5 - 17.5 g/dL    Hematocrit 31.1 (L) 41.0 - 52.0 %    MCV 85 80 - 100 fL    MCH 28.1 26.0 - 34.0 pg    MCHC 33.1 32.0 - 36.0 g/dL    RDW 12.4  11.5 - 14.5 %    Platelets 234 150 - 450 x10*3/uL   Basic metabolic panel   Result Value Ref Range    Glucose 124 (H) 74 - 99 mg/dL    Sodium 134 (L) 136 - 145 mmol/L    Potassium 3.5 3.5 - 5.3 mmol/L    Chloride 99 98 - 107 mmol/L    Bicarbonate 27 21 - 32 mmol/L    Anion Gap 12 10 - 20 mmol/L    Urea Nitrogen 12 6 - 23 mg/dL    Creatinine 0.76 0.50 - 1.30 mg/dL    eGFR >90 >60 mL/min/1.73m*2    Calcium 7.8 (L) 8.6 - 10.3 mg/dL   POCT GLUCOSE   Result Value Ref Range    POCT Glucose 117 (H) 74 - 99 mg/dL       Assessment & Plan  Osteomyelitis (Multi)          Problem list:  Principal Problem:    Osteomyelitis (Multi)    ASSESSMENT AND PLAN:    Acute osteomyelitis -continue IV antibiotics, podiatry and ID consulted.  Peripheral vascular disease -vascular consulted.  DVT - Lovenox prescribed  Diabetes type 2 -Accu-Cheks, optimize control.  Noncompliance -counseled on adherence to treatment regimen.  Risk of morbidity and mortality explained in detail.      Jemal Guadarrama MD  09/15/2410:46 AM

## 2024-09-15 NOTE — CONSULTS
''Infectious Disease Consult Note''        Referred by Dr Guadarrama  Reason For Consult: L foot wound with OM       History Of Present Illness:  Patient 74-year-old male with history of diabetes, CAD, hypertension, neuropathy, right frontal extradural mass, transferred from [facility] on 9/14 with left foot wound with osteomyelitis and DVT.  He notes he has been having this wound for couple months and his wife was taking care of it.  He also complains of back pain.  On admission his WBC was 8K and creatinine 0.76.  Blood and wound cultures pending.  ID is consulted antibiotic management.    Current Antibiotic:  Vancomycin  Zosyn    Medications:  Current Facility-Administered Medications on File Prior to Encounter   Medication Dose Route Frequency Provider Last Rate Last Admin    [DISCONTINUED] acetaminophen (Tylenol) tablet 650 mg  650 mg oral q4h PRN Delia L Marcela, APRN-CNP   650 mg at 09/14/24 0018    [DISCONTINUED] acetaminophen (Tylenol) tablet 650 mg  650 mg oral q4h PRN Delia L Marcela, APRN-CNP   650 mg at 09/13/24 1226    [DISCONTINUED] benzocaine-menthol (Cepastat Sore Throat) lozenge 1 lozenge  1 lozenge Mouth/Throat q2h PRN Delia L Marcela, APRN-CNP        [DISCONTINUED] calcium carbonate (Tums) chewable tablet 500 mg  500 mg oral 4x daily PRN Delia L Marcela, APRN-CNP        [DISCONTINUED] dextromethorphan-guaifenesin (Robitussin DM)  mg/5 mL oral liquid 5 mL  5 mL oral q4h PRN Delia L Marcela, APRN-CNP        [DISCONTINUED] guaiFENesin (Mucinex) 12 hr tablet 600 mg  600 mg oral q12h PRN Delia L Marcela, APRN-CNP        [DISCONTINUED] heparin 25,000 Units in dextrose 5% 250 mL (100 Units/mL) infusion (premix)  0-4,500 Units/hr intravenous Continuous Delia L Marcela, APRN-CNP 0.2 mL/hr at 09/14/24 1046 18 Units/hr at 09/14/24 1046    [DISCONTINUED] heparin bolus from  bag 3,000-6,000 Units  3,000-6,000 Units intravenous q4h PRN Delia L Marcela, APRN-CNP        [DISCONTINUED] HYDROmorphone (Dilaudid) injection 0.5 mg  0.5 mg intravenous q4h PRN Delia L Marcela, APRN-CNP   0.5 mg at 09/14/24 0259    [DISCONTINUED] insulin lispro (HumaLOG) injection 0-15 Units  0-15 Units subcutaneous TID Delia L Marcela, APRN-CNP   6 Units at 09/14/24 1220    [DISCONTINUED] melatonin tablet 5 mg  5 mg oral Nightly PRN Delia L Marcela, APRN-CNP   5 mg at 09/13/24 0115    [DISCONTINUED] ondansetron (Zofran) injection 4 mg  4 mg intravenous q8h PRN Delia L Marcela, APRN-CNP        [DISCONTINUED] ondansetron (Zofran) tablet 4 mg  4 mg oral q8h PRN Delia L Marcela, APRN-CNP   4 mg at 09/13/24 1226    [DISCONTINUED] oxyCODONE (Roxicodone) immediate release tablet 5 mg  5 mg oral q4h PRN Delia L Marcela, APRN-CNP   5 mg at 09/14/24 1106    [DISCONTINUED] oxygen (O2) therapy   inhalation Continuous PRN - O2/gases Kirsten Patton, APRN-CNP   2 L/min at 09/13/24 2219    [DISCONTINUED] pantoprazole (ProtoNix) EC tablet 40 mg  40 mg oral Daily before breakfast Delia L Marcela, APRN-CNP   40 mg at 09/14/24 0600    [DISCONTINUED] perflutren protein A microsphere (Optison) injection 0.5 mL  0.5 mL intravenous Once in imaging Delia L Marcela, APRN-CNP        [DISCONTINUED] piperacillin-tazobactam (Zosyn) 3.375 g in dextrose (iso) IV 50 mL  3.375 g intravenous q6h Delia L Marcela, APRN-CNP   Stopped at 09/14/24 1334    [DISCONTINUED] sennosides-docusate sodium (Ann-Colace) 8.6-50 mg per tablet 1 tablet  1 tablet oral BID Delia L Marcela, APRN-CNP   1 tablet at 09/14/24 1048    [DISCONTINUED] sodium chloride 0.9% infusion  10 mL/hr intravenous Continuous PRN Dickson Thakkar MD 10 mL/hr at 09/14/24 1445 10 mL/hr at 09/14/24 1445    [DISCONTINUED] sodium chloride 0.9% infusion  75 mL/hr intravenous Continuous SARAH Mishra-CNP 75 mL/hr at 09/14/24 1445 75 mL/hr at 09/14/24  1445    [DISCONTINUED] sulfur hexafluoride microsphr (Lumason) injection 24.28 mg  2 mL intravenous Once in imaging Delia L Marcela, APRN-CNP        [DISCONTINUED] vancomycin (Vancocin) pharmacy to dose - pharmacy monitoring   miscellaneous Daily PRN Delia L Marcela, APRN-CNP        [DISCONTINUED] vancomycin (Xellia) 1,750 mg in diluent combination  mL  1,750 mg intravenous q12h Delia L Marcela, APRN-CNP   Stopped at 09/14/24 1217     No current outpatient medications on file prior to encounter.     Past Medical History:   Diagnosis Date    Arthritis     CAD (coronary artery disease)     Diabetes mellitus (Multi)     Hypertension     Peripheral neuropathy     Stroke (Multi)     Thoracic radiculopathy      Past Surgical History:   Procedure Laterality Date    CARDIAC CATHETERIZATION      CARPAL TUNNEL RELEASE  10/10/2014    EYE SURGERY      FL  ARTHROCENTESIS ASP INJ JOINT.      IRIDOTOMY / IRIDECTOMY Bilateral      Social History     Socioeconomic History    Marital status: Single     Spouse name: Not on file    Number of children: Not on file    Years of education: Not on file    Highest education level: Not on file   Occupational History    Not on file   Tobacco Use    Smoking status: Never     Passive exposure: Never    Smokeless tobacco: Never   Substance and Sexual Activity    Alcohol use: Not on file    Drug use: Not on file    Sexual activity: Not on file   Other Topics Concern    Not on file   Social History Narrative    Not on file     Social Determinants of Health     Financial Resource Strain: Low Risk  (9/13/2024)    Overall Financial Resource Strain (CARDIA)     Difficulty of Paying Living Expenses: Not hard at all   Food Insecurity: Not on file   Transportation Needs: No Transportation Needs (9/13/2024)    PRAPARE - Transportation     Lack of Transportation (Medical): No     Lack of Transportation (Non-Medical): No   Physical Activity: Not on file   Stress: Not on file   Social  Connections: Not on file   Intimate Partner Violence: Not on file   Housing Stability: Low Risk  (9/13/2024)    Housing Stability Vital Sign     Unable to Pay for Housing in the Last Year: No     Number of Times Moved in the Last Year: 0     Homeless in the Last Year: No     Family History   Problem Relation Name Age of Onset    Heart disease Father      Colon cancer Other      Heart attack Other       No Known Allergies      Review of Systems:   General: no fevers, chills  Skin: L foot wounds  Head: no headache  ENT: no sore throat   Chest: no chest pain   Resp: no cough, or sob  GI: no nausea, vomiting, or diarrhea   : no dysuria, frequency or hematuria   Ext: + edema     Physical Exam:  /70 (BP Location: Left arm, Patient Position: Lying)   Pulse 91   Temp 36.5 °C (97.7 °F) (Temporal)   Resp 19   SpO2 93%   General: NAD, nontoxic appearing  Skin: Left fifth toe necrotic ulcer   eyes: no scleral icterus  ENT: no oral thrush or lesions  Resp: lungs CTA b/l  CV:  normal S1/S2, no murmur   Abd: soft, non-tender  Back: no CVA tenderness   Ext: + b/l LE stasis dermatitis.  Left leg is erythematous with weeping and foul smell      Lab:  Lab Results   Component Value Date    WBC 8.2 09/15/2024    HGB 10.3 (L) 09/15/2024    HCT 31.1 (L) 09/15/2024    MCV 85 09/15/2024     09/15/2024      Results from last 72 hours   Lab Units 09/15/24  0615   SODIUM mmol/L 134*   POTASSIUM mmol/L 3.5   CHLORIDE mmol/L 99   CO2 mmol/L 27   BUN mg/dL 12   CREATININE mg/dL 0.76   GLUCOSE mg/dL 124*   CALCIUM mg/dL 7.8*   ANION GAP mmol/L 12   EGFR mL/min/1.73m*2 >90     Results from last 72 hours   Lab Units 09/12/24  1808   ALK PHOS U/L 127   BILIRUBIN TOTAL mg/dL 0.6   PROTEIN TOTAL g/dL 7.6   ALT U/L 18   AST U/L 26   ALBUMIN g/dL 3.3*     Estimated Creatinine Clearance: 104.3 mL/min (by C-G formula based on SCr of 0.76 mg/dL).  C-Reactive Protein   Date/Time Value Ref Range Status   09/13/2024 06:19 AM 9.51 (H) <1.00  "mg/dL Final     CRP   Date/Time Value Ref Range Status   03/21/2022 05:00 PM 1.22 (A) mg/dL Final     Comment:     REF VALUE  < 1.00       Sedimentation Rate   Date/Time Value Ref Range Status   09/13/2024 06:19 AM 24 (H) 0 - 20 mm/h Final   03/21/2022 04:18 PM 21 (H) 0 - 20 mm/h Final     No results found for: \"HIV1X2\", \"HIVCONF\", \"ANLRDK4JB\"  No results found for: \"HCVPCRQUANT\"      Cultures/Micro:  Susceptibility data from last 14 days.  Collected Specimen Info Organism   09/13/24 Tissue/Biopsy from Wound/Tissue Mixed Gram-Positive and Gram-Negative Bacteria          Imaging: reviewed       Assessment:  Patient 74-year-old male with history of diabetes, CAD, hypertension, neuropathy, right frontal extradural mass, transferred from [facility] on 9/14 with left foot wound.    -LLE cellulitis  -L 5th toe active necrotic wound with osteomyelitis  -LLE DVT  -Diabetes with neuropathy      Plan/Recommendations:  -Continue vancomycin and Zosyn  -Follow-up blood and wound culture  -Follow-up vascular/podiatry plan  -Wound care  -Monitoring for adverse effects of antibiotics including nephrotoxicity, diarrhea or rash      Please call ID with any concerns or questions.   D/w Pt.    Humphrey Deluca MD  [facility] [place]  #[phone]      "

## 2024-09-16 ENCOUNTER — APPOINTMENT (OUTPATIENT)
Dept: VASCULAR MEDICINE | Facility: HOSPITAL | Age: 74
End: 2024-09-16
Payer: MEDICARE

## 2024-09-16 LAB
ANION GAP SERPL CALC-SCNC: 10 MMOL/L (ref 10–20)
B-LACTAMASE ORGANISM ISLT: NEGATIVE
BACTERIA SPEC CULT: ABNORMAL
BACTERIA SPEC CULT: ABNORMAL
BASOPHILS # BLD AUTO: 0.03 X10*3/UL (ref 0–0.1)
BASOPHILS NFR BLD AUTO: 0.4 %
BUN SERPL-MCNC: 10 MG/DL (ref 6–23)
CALCIUM SERPL-MCNC: 7.8 MG/DL (ref 8.6–10.3)
CHLORIDE SERPL-SCNC: 101 MMOL/L (ref 98–107)
CO2 SERPL-SCNC: 28 MMOL/L (ref 21–32)
CREAT SERPL-MCNC: 0.63 MG/DL (ref 0.5–1.3)
EGFRCR SERPLBLD CKD-EPI 2021: >90 ML/MIN/1.73M*2
EOSINOPHIL # BLD AUTO: 0.22 X10*3/UL (ref 0–0.4)
EOSINOPHIL NFR BLD AUTO: 2.8 %
ERYTHROCYTE [DISTWIDTH] IN BLOOD BY AUTOMATED COUNT: 12.4 % (ref 11.5–14.5)
GLUCOSE BLD MANUAL STRIP-MCNC: 116 MG/DL (ref 74–99)
GLUCOSE BLD MANUAL STRIP-MCNC: 146 MG/DL (ref 74–99)
GLUCOSE BLD MANUAL STRIP-MCNC: 164 MG/DL (ref 74–99)
GLUCOSE BLD MANUAL STRIP-MCNC: 226 MG/DL (ref 74–99)
GLUCOSE SERPL-MCNC: 112 MG/DL (ref 74–99)
GRAM STN SPEC: ABNORMAL
GRAM STN SPEC: ABNORMAL
HCT VFR BLD AUTO: 33.6 % (ref 41–52)
HGB BLD-MCNC: 10.7 G/DL (ref 13.5–17.5)
IMM GRANULOCYTES # BLD AUTO: 0.08 X10*3/UL (ref 0–0.5)
IMM GRANULOCYTES NFR BLD AUTO: 1 % (ref 0–0.9)
LYMPHOCYTES # BLD AUTO: 1.85 X10*3/UL (ref 0.8–3)
LYMPHOCYTES NFR BLD AUTO: 23.9 %
MCH RBC QN AUTO: 27.4 PG (ref 26–34)
MCHC RBC AUTO-ENTMCNC: 31.8 G/DL (ref 32–36)
MCV RBC AUTO: 86 FL (ref 80–100)
MONOCYTES # BLD AUTO: 0.84 X10*3/UL (ref 0.05–0.8)
MONOCYTES NFR BLD AUTO: 10.9 %
NEUTROPHILS # BLD AUTO: 4.71 X10*3/UL (ref 1.6–5.5)
NEUTROPHILS NFR BLD AUTO: 61 %
NRBC BLD-RTO: 0 /100 WBCS (ref 0–0)
PLATELET # BLD AUTO: 263 X10*3/UL (ref 150–450)
POTASSIUM SERPL-SCNC: 3.2 MMOL/L (ref 3.5–5.3)
RBC # BLD AUTO: 3.91 X10*6/UL (ref 4.5–5.9)
SODIUM SERPL-SCNC: 136 MMOL/L (ref 136–145)
VANCOMYCIN SERPL-MCNC: 17.8 UG/ML (ref 5–20)
WBC # BLD AUTO: 7.7 X10*3/UL (ref 4.4–11.3)

## 2024-09-16 PROCEDURE — 85025 COMPLETE CBC W/AUTO DIFF WBC: CPT | Performed by: INTERNAL MEDICINE

## 2024-09-16 PROCEDURE — 2500000004 HC RX 250 GENERAL PHARMACY W/ HCPCS (ALT 636 FOR OP/ED): Performed by: INTERNAL MEDICINE

## 2024-09-16 PROCEDURE — 1200000002 HC GENERAL ROOM WITH TELEMETRY DAILY

## 2024-09-16 PROCEDURE — 36415 COLL VENOUS BLD VENIPUNCTURE: CPT | Performed by: INTERNAL MEDICINE

## 2024-09-16 PROCEDURE — 82947 ASSAY GLUCOSE BLOOD QUANT: CPT

## 2024-09-16 PROCEDURE — 2500000002 HC RX 250 W HCPCS SELF ADMINISTERED DRUGS (ALT 637 FOR MEDICARE OP, ALT 636 FOR OP/ED): Performed by: INTERNAL MEDICINE

## 2024-09-16 PROCEDURE — 80202 ASSAY OF VANCOMYCIN: CPT | Performed by: STUDENT IN AN ORGANIZED HEALTH CARE EDUCATION/TRAINING PROGRAM

## 2024-09-16 PROCEDURE — 2500000004 HC RX 250 GENERAL PHARMACY W/ HCPCS (ALT 636 FOR OP/ED): Mod: JZ | Performed by: STUDENT IN AN ORGANIZED HEALTH CARE EDUCATION/TRAINING PROGRAM

## 2024-09-16 PROCEDURE — 93922 UPR/L XTREMITY ART 2 LEVELS: CPT

## 2024-09-16 PROCEDURE — 93922 UPR/L XTREMITY ART 2 LEVELS: CPT | Performed by: SURGERY

## 2024-09-16 PROCEDURE — 2500000001 HC RX 250 WO HCPCS SELF ADMINISTERED DRUGS (ALT 637 FOR MEDICARE OP): Performed by: INTERNAL MEDICINE

## 2024-09-16 PROCEDURE — 80048 BASIC METABOLIC PNL TOTAL CA: CPT | Performed by: INTERNAL MEDICINE

## 2024-09-16 RX ORDER — POTASSIUM CHLORIDE 20 MEQ/1
40 TABLET, EXTENDED RELEASE ORAL ONCE
Status: COMPLETED | OUTPATIENT
Start: 2024-09-16 | End: 2024-09-16

## 2024-09-16 RX ORDER — POTASSIUM CHLORIDE 1.5 G/1.58G
40 POWDER, FOR SOLUTION ORAL ONCE
Status: DISCONTINUED | OUTPATIENT
Start: 2024-09-16 | End: 2024-09-17

## 2024-09-16 ASSESSMENT — COGNITIVE AND FUNCTIONAL STATUS - GENERAL
MOVING TO AND FROM BED TO CHAIR: A LOT
WALKING IN HOSPITAL ROOM: A LOT
DRESSING REGULAR LOWER BODY CLOTHING: A LOT
TOILETING: A LOT
MOVING FROM LYING ON BACK TO SITTING ON SIDE OF FLAT BED WITH BEDRAILS: A LITTLE
DRESSING REGULAR UPPER BODY CLOTHING: A LITTLE
TURNING FROM BACK TO SIDE WHILE IN FLAT BAD: A LOT
DAILY ACTIVITIY SCORE: 17
CLIMB 3 TO 5 STEPS WITH RAILING: TOTAL
HELP NEEDED FOR BATHING: A LOT
STANDING UP FROM CHAIR USING ARMS: A LOT
MOBILITY SCORE: 12

## 2024-09-16 ASSESSMENT — PAIN SCALES - GENERAL
PAINLEVEL_OUTOF10: 2
PAINLEVEL_OUTOF10: 8

## 2024-09-16 ASSESSMENT — PAIN - FUNCTIONAL ASSESSMENT: PAIN_FUNCTIONAL_ASSESSMENT: 0-10

## 2024-09-16 NOTE — PROGRESS NOTES
INFECTIOUS DISEASE DAILY PROGRESS NOTE    SUBJECTIVE:    No overnight events. No new complaints. Fever has resolved. No rash/itching/diarrhea. Leukocytosis remains resolved now as well. Blood cx x2 NGTD.    OBJECTIVE:  VITALS (Last 24 Hours)  /77 (BP Location: Left arm, Patient Position: Lying)   Pulse 72   Temp 37 °C (98.6 °F) (Temporal)   Resp 18   SpO2 93%     PHYSICAL EXAM:  Gen - NAD, laying in bed  Heart - RRR  Lungs - no wheezing  Abd - soft, no ttp  Left Foot - left 5th toe wound noted with necrosis and foul smell, erythema going into the lower shin seems to be improving a little as is swelling in the LLE    ABX: IV Vanc/Zosyn    LABS:  Lab Results   Component Value Date    WBC 7.7 09/16/2024    HGB 10.7 (L) 09/16/2024    HCT 33.6 (L) 09/16/2024    MCV 86 09/16/2024     09/16/2024     Lab Results   Component Value Date    GLUCOSE 124 (H) 09/15/2024    CALCIUM 7.8 (L) 09/15/2024     (L) 09/15/2024    K 3.5 09/15/2024    CO2 27 09/15/2024    CL 99 09/15/2024    BUN 12 09/15/2024    CREATININE 0.76 09/15/2024         Estimated Creatinine Clearance: 104.3 mL/min (by C-G formula based on SCr of 0.76 mg/dL).      ASSESSMENT/PLAN:    Left DM Foot Infection/Cellulitis  Left 5th Toe Necrosis  PAD  DM II with Peripheral Neuropathy - poorly controlled A1C 9.8% 9/2024, increased risk/severity of infections    Vascular workup underway and MRI foot pending.    Culture with mixed bacteria, no MRSA or resistant gram negatives.    Will change abx to IV Unasyn 3g Q6H.    Monitoring for adverse effects of abx such as rash/itching/diarrhea.    Will follow. Thanks!    Pantera Boston MD  [facility]  Office #[phone]

## 2024-09-16 NOTE — PROGRESS NOTES
PODIATRY SERVICE CONSULT PROGRESS NOTE    SERVICE DATE: 9/16/2024   SERVICE TIME:  1245    Subjective   INTERVAL HPI: Elliot Partida is a 74 y.o. male presenting with PMH of R frontal extradural mass since 2016, arthritis, CAD, diabetes type 2, MI, HTN, thoracic radiculopathy, neuropathy who presents to [facility] for left leg pain that has been ongoing for a couple months. Patient reports increased swelling and redness to his left leg that he believes is from the wound on his 5th toe. Patient has been seeing Dr. Torres who believe the patient is need of a vascular consult for possible vascular intervention or evaluation to help induce healing to his left leg wound.   Pt was seen at bedside.  Pain well controlled.  Patient denies any constitutional symptoms.   No other pedal complaints.       Medications:  Scheduled Meds: ampicillin-sulbactam, 3 g, intravenous, q6h  enoxaparin, 1 mg/kg, subcutaneous, q12h  insulin lispro, 0-10 Units, subcutaneous, TID  polyethylene glycol, 17 g, oral, Daily      Continuous Infusions:    PRN Meds: PRN medications: acetaminophen **OR** acetaminophen **OR** acetaminophen, dextrose, dextrose, glucagon, glucagon, lidocaine         Objective   PHYSICAL EXAM:  Physical Exam Performed:  Vitals:    09/16/24 1215   BP: 134/75   Pulse:    Resp: 22   Temp: 36.9 °C (98.5 °F)   SpO2: 94%     There is no height or weight on file to calculate BMI.    Patient is AOx3 and in no acute distress. Patient is alert and cooperative. Sitting comfortably in bed with dressing clean, dry and intact.     Vasc: DP and PT pulses are nonpalpable bilateral.  CFT is >5 seconds bilateral.  Skin Temperature to from cool to warm distally to proximally on the left lower extremity     Neuro:  Light touch is intact to the foot bilateral.  There is no clonus noted.       Derm: Nails 1-5 bilateral are intact.  Skin is dry, flakey to the bilateral calves. Left calf and shin is erythematous with weeping. Necrotic  ulcer plantar surface 5th MTP joint.     MSK:  Ankle joint, subtalar joint, 1st MPJ and lesser MPJ ROM is limited and painful.  Tenderness to palpation of left foot and calf throughout, extremely tender over plantar surface of foot.        Wound  Location: Left lateral/plantar  5th MTP joint  Measurements: 2cm x 2cm x  Probes to bone.  Mixed wound base of fibrotic tissue  Mild purulent drainage with fibrotic slough.   Moderate jennyfer-wound maceration.   Moderate jennyfer-wound erythema.   Moderate leg edema.   Mild evidence of ascending cellulitis or lymphangitis.  Minimal palpable fluctuance. Significant malodor. No increased warmth.   Significant probe to bone or deep structures within the wound base.   + tunneling or tracking.   No undermining. Skin edges irregular but intact.            LABS:   Results for orders placed or performed during the hospital encounter of 09/14/24 (from the past 24 hour(s))   POCT GLUCOSE   Result Value Ref Range    POCT Glucose 125 (H) 74 - 99 mg/dL   CBC and Auto Differential   Result Value Ref Range    WBC 7.7 4.4 - 11.3 x10*3/uL    nRBC 0.0 0.0 - 0.0 /100 WBCs    RBC 3.91 (L) 4.50 - 5.90 x10*6/uL    Hemoglobin 10.7 (L) 13.5 - 17.5 g/dL    Hematocrit 33.6 (L) 41.0 - 52.0 %    MCV 86 80 - 100 fL    MCH 27.4 26.0 - 34.0 pg    MCHC 31.8 (L) 32.0 - 36.0 g/dL    RDW 12.4 11.5 - 14.5 %    Platelets 263 150 - 450 x10*3/uL    Neutrophils % 61.0 40.0 - 80.0 %    Immature Granulocytes %, Automated 1.0 (H) 0.0 - 0.9 %    Lymphocytes % 23.9 13.0 - 44.0 %    Monocytes % 10.9 2.0 - 10.0 %    Eosinophils % 2.8 0.0 - 6.0 %    Basophils % 0.4 0.0 - 2.0 %    Neutrophils Absolute 4.71 1.60 - 5.50 x10*3/uL    Immature Granulocytes Absolute, Automated 0.08 0.00 - 0.50 x10*3/uL    Lymphocytes Absolute 1.85 0.80 - 3.00 x10*3/uL    Monocytes Absolute 0.84 (H) 0.05 - 0.80 x10*3/uL    Eosinophils Absolute 0.22 0.00 - 0.40 x10*3/uL    Basophils Absolute 0.03 0.00 - 0.10 x10*3/uL   Basic Metabolic Panel   Result  Value Ref Range    Glucose 112 (H) 74 - 99 mg/dL    Sodium 136 136 - 145 mmol/L    Potassium 3.2 (L) 3.5 - 5.3 mmol/L    Chloride 101 98 - 107 mmol/L    Bicarbonate 28 21 - 32 mmol/L    Anion Gap 10 10 - 20 mmol/L    Urea Nitrogen 10 6 - 23 mg/dL    Creatinine 0.63 0.50 - 1.30 mg/dL    eGFR >90 >60 mL/min/1.73m*2    Calcium 7.8 (L) 8.6 - 10.3 mg/dL   Vancomycin   Result Value Ref Range    Vancomycin 17.8 5.0 - 20.0 ug/mL   POCT GLUCOSE   Result Value Ref Range    POCT Glucose 116 (H) 74 - 99 mg/dL   POCT GLUCOSE   Result Value Ref Range    POCT Glucose 226 (H) 74 - 99 mg/dL   POCT GLUCOSE   Result Value Ref Range    POCT Glucose 146 (H) 74 - 99 mg/dL      Lab Results   Component Value Date    HGBA1C 9.8 (H) 09/13/2024      Lab Results   Component Value Date    CRP 9.51 (H) 09/13/2024      Lab Results   Component Value Date    SEDRATE 24 (H) 09/13/2024        Results from last 7 days   Lab Units 09/16/24  0503   WBC AUTO x10*3/uL 7.7   RBC AUTO x10*6/uL 3.91*   HEMOGLOBIN g/dL 10.7*   HEMATOCRIT % 33.6*     Results from last 7 days   Lab Units 09/16/24  0503 09/13/24  0337 09/12/24  1808   SODIUM mmol/L 136   < > 129*   POTASSIUM mmol/L 3.2*   < > 4.6   CHLORIDE mmol/L 101   < > 92*   CO2 mmol/L 28   < > 25   BUN mg/dL 10   < > 22   CREATININE mg/dL 0.63   < > 0.81   CALCIUM mg/dL 7.8*   < > 8.9   BILIRUBIN TOTAL mg/dL  --   --  0.6   ALT U/L  --   --  18   AST U/L  --   --  26    < > = values in this interval not displayed.     Results from last 7 days   Lab Units 09/12/24  1749   COLOR U  Yellow   APPEARANCE U  Clear   PH U  6.5   SPEC GRAV UR  1.025   PROTEIN U mg/dL NEGATIVE   BLOOD UR  NEGATIVE   NITRITE U  NEGATIVE       IMAGING REVIEW:  Vascular US Ankle Brachial Index (BEN) Without Exercise    Result Date: 9/16/2024  Preliminary Cardiology Report            [place] [address]   Tel [phone] and Fax [phone]  Preliminary Vascular Lab Report   [facility] PVR  WITHOUT EXERCISE  Patient Name:      SUBHASH SEGAL Ant Physician: [id number] Sven Caicedo MD Study Date:        9/16/2024        Ordering           [id number] ELAINA DE LOS SANTOS                                     Physician: MRN/PID:           [id number]         Technologist:      Tamra Walls T Accession#:        [id number]     Technologist 2:    Gracielaelinor Tobar RVT Date of Birth/Age: 1950         Encounter#:        [id number] Gender:            M Admission Status:  Inpatient        Location           [facility]                                     Performed:  Diagnosis/ICD: Peripheral vascular disease, unspecified-I73.9 Procedure/CPT: 12589 Peripheral artery BEN Only  PRELIMINARY CONCLUSIONS: Right Lower PVR: No evidence of arterial occlusive disease in the right lower extremity at rest. Decreased digital perfusion noted. Multiphasic flow is noted in the right common femoral artery, right posterior tibial artery and right dorsalis pedis artery. Left Lower PVR: No evidence of arterial occlusive disease in the left lower extremity at rest. Decreased digital perfusion noted. Monophasic flow is noted in the left posterior tibial artery and left dorsalis pedis artery. Multiphasic flow is noted in the left common femoral artery. Waveform may be dmapened due to edema.  Additional Findings: Known DVT in the left femoral and popliteal veins.  Imaging & Doppler Findings:  RIGHT Lower PVR                Pressures Ratios Right Posterior Tibial (Ankle) 155 mmHg  0.99 Right Dorsalis Pedis (Ankle)   155 mmHg  0.99 Right Digit (Great Toe)        66 mmHg   0.42   LEFT Lower PVR                Pressures Ratios Left Posterior Tibial (Ankle) 142 mmHg  0.91 Left Dorsalis Pedis (Ankle)   128 mmHg  0.82 Left Digit (Great Toe)        52 mmHg   0.33                      Right     Left Brachial Pressure 156 mmHg 150 mmHg   VASCULAR PRELIMINARY REPORT completed by  Graciela Tobar RVT on 9/16/2024 at 8:15:44 AM  ** Final **     CT angio chest for pulmonary embolism    Result Date: 9/14/2024  Interpreted By:  Enedina Umana, STUDY: CT ANGIO CHEST FOR PULMONARY EMBOLISM;  9/14/2024 9:28 am   INDICATION: Signs/Symptoms:hypoxia, dvt.     COMPARISON: None.   ACCESSION NUMBER(S): [id number]   ORDERING CLINICIAN: ABIEL KENT   TECHNIQUE: CT of the chest was performed. Sagittal and coronal reconstructions were generated. 75 ML Omnipaque 350 intravenous contrast given for the examination.  Multiplanar reconstructions of the pulmonary vessels were created on an independent workstation and provided for review.   FINDINGS: Artifact related to motion and overlying upper extremities limits evaluation.   CHEST WALL AND LOWER NECK: No significant axillary lymphadenopathy.   MEDIASTINUM AND ROLO:  No significant lymphadenopathy.   HEART AND VESSELS:  No central PE however limited assessment for peripheral PE due to technical factors including suboptimal timing of IV contrast bolus and artifact. The heart is normal in size. No significant pericardial effusion. Multifocal atherosclerotic calcifications including the coronary arteries and aortic and mitral valve regions. No thoracic aortic aneurysm.   LUNGS, PLEURA, LARGE AIRWAYS:  Respiratory motion artifact limits parenchymal evaluation. Mild pulmonary heterogeneity. Faint linear densities in both lung fields. Possible mosaic attenuation in both lung bases. Trace bilateral pleural effusions/thickening. The central airways are patent.   UPPER ABDOMEN:  Limited due to artifact related to overlying upper extremities. The included liver is likely mildly hypodense/fatty infiltrated. No definite focal mass lesion in the included upper abdominal viscera.   BONES:  Flowing osteophytes along the thoracic spine consistent with dish.       No central PE however peripheral PE can not be excluded based on this exam.   Scattered interstitial opacities  including possible mosaic attenuation in the lung bases which could reflect small airways disease and air trapping or atypical infection. Trace bilateral pleural effusions/thickening.   Moderate atherosclerotic disease.   Possible fatty infiltration of the liver incidentally noted.   MACRO: None.   Signed by: Enedina Umana 9/14/2024 10:56 AM Dictation workstation:   [id number]    Transthoracic Echo (TTE) Complete    Result Date: 9/13/2024   [facility], [address] [place] [address]              Tel [phone] and Fax [phone] TRANSTHORACIC ECHOCARDIOGRAM REPORT  Patient Name:      SUBHASH SCHMIDT LUZMA     Reading Physician:    [id number] Carson Freeman MD Study Date:        9/13/2024            Ordering Provider:    [id number] ABIEL KENT MRN/PID:           [id number]             Fellow: Accession#:        [id number]         Nurse: Date of Birth/Age: 1950 / 74 years  Sonographer:          Cailin Julian RDCS Gender:            M                    Additional Staff: Height:            175.26 cm            Admit Date:           9/12/2024 Weight:            107.05 kg            Admission Status:     Inpatient -                                                               Routine BSA / BMI:         2.22 m2 / 34.85      Encounter#:           [id number]                    kg/m2 Blood Pressure:    137/66 mmHg          Department Location:  [facility] Study Type:    TRANSTHORACIC ECHO (TTE) COMPLETE Diagnosis/ICD: Abnormal electrocardiogram [ECG] [EKG]-R94.31 Indication:    Abnormal EKG CPT Code:      Echo Complete w Full Doppler-[id number] Patient History: Pertinent History: Edema, DM, abnomal EKG, wound infection, WMA. Study Detail: The following Echo studies were performed: 2D, M-Mode, Doppler and                color flow. Technically challenging study due to body habitus,               patient lying in supine position, prominent lung artifact and poor               acoustic windows. Definity used as a contrast agent for               endocardial border definition. Total contrast used for this               procedure was 3.0 mL via IV push.  PHYSICIAN INTERPRETATION: Left Ventricle: The left ventricular systolic function is normal, with a visually estimated ejection fraction of 60-65%. There are no regional left ventricular wall motion abnormalities. The left ventricular cavity size is normal. Spectral Doppler shows an impaired relaxation pattern of left ventricular diastolic filling. Left Atrium: The left atrium is normal in size. Right Ventricle: The right ventricle is normal in size. There is normal right ventricular global systolic function. Right Atrium: The right atrium is normal in size. Aortic Valve: The aortic valve is trileaflet. There is moderate aortic valve cusp calcification. There is evidence of mild to moderate aortic valve stenosis. There is no evidence of aortic valve regurgitation. The peak instantaneous gradient of the aortic valve is 26.2 mmHg. Mitral Valve: The mitral valve is normal in structure. There is mild mitral valve regurgitation. Tricuspid Valve: The tricuspid valve is structurally normal. There is mild tricuspid regurgitation. Pulmonic Valve: The pulmonic valve is structurally normal. There is physiologic pulmonic valve regurgitation. Pericardium: There is no pericardial effusion noted. Aorta: The aortic root is normal. Pulmonary Artery: The estimated pulmonary artery pressure is normal. Systemic Veins: The inferior vena cava appears mildly dilated.  CONCLUSIONS:  1. The left ventricular systolic function is normal, with a visually estimated ejection fraction of 60-65%.  2. Spectral Doppler shows an impaired relaxation pattern of left ventricular diastolic filling.  3. There is  normal right ventricular global systolic function.  4. Mild to moderate aortic valve stenosis.  5. There is moderate aortic valve cusp calcification.  6. The inferior vena cava appears mildly dilated. QUANTITATIVE DATA SUMMARY:  2D MEASUREMENTS:         Normal Ranges: Ao Root d:       3.70 cm (2.0-3.7cm)  AORTA MEASUREMENTS:         Normal Ranges: Asc Ao, d:          3.90 cm (2.1-3.4cm)  LV SYSTOLIC FUNCTION BY 2D PLANIMETRY (MOD):                      Normal Ranges: EF-Visual:      63 % LV EF Reported: 63 %  LV DIASTOLIC FUNCTION:           Normal Ranges: MV Peak E:             1.05 m/s  (0.7-1.2 m/s) MV Peak A:             1.50 m/s  (0.42-0.7 m/s) E/A Ratio:             0.70      (1.0-2.2) MV e'                  0.073 m/s (>8.0) MV lateral e'          0.08 m/s MV medial e'           0.07 m/s E/e' Ratio:            14.40     (<8.0)  AORTIC VALVE:            Normal Ranges: AoV Vmax:      2.56 m/s  (<=1.7m/s) AoV Peak P.2 mmHg (<20mmHg) LVOT Max Jak:  1.25 m/s  (<=1.1m/s) LVOT VTI:      23.90 cm LVOT Diameter: 2.20 cm   (1.8-2.4cm) AoV Area,Vmax: 1.86 cm2  (2.5-4.5cm2)  RIGHT VENTRICLE: TAPSE: 21.1 mm RV s'  0.12 m/s  TRICUSPID VALVE/RVSP:         Normal Ranges: IVC Diam:             2.33 cm  PULMONIC VALVE:          Normal Ranges: PV Max Jak:     1.2 m/s  (0.6-0.9m/s) PV Max P.2 mmHg  [id number] Carson Freeman MD Electronically signed on 2024 at 7:18:35 PM  ** Final **     CT angio aorta and bilateral iliofemoral runoff w and or wo IV contrast    Result Date: 2024  Interpreted By:  Osorio Wong, STUDY: CT ANGIO AORTA AND BILATERAL ILIOFEMORAL RUNOFF W AND OR WO IV CONTRAST;  2024 12:07 pm   INDICATION: Signs/Symptoms:evaluation for circulation and wound.   COMPARISON: None.   ACCESSION NUMBER(S): [id number]   ORDERING CLINICIAN: ABIEL KENT   TECHNIQUE: CTA of the abdomen and pelvis, with runoff was performed.  Contiguous axial images were obtained at 3 mm slice thickness  through the abdomen and pelvis, with runoff. 3D Coronal and sagittal reconstructions at 3 mm slice thickness were performed. 75 ml of contrast material  Omnipaque 350 were administered intravenously without immediate complication. FINDINGS:   CTA ABDOMEN VESSELS   Celiac axis: No significant stenosis.   SMA: Partially calcified disease at its origin results in moderate to severe stenosis.   RAUL: No significant stenosis.   Right renal vessels: Calcified disease of its proximal segment results in moderate stenosis.   Left renal vessels: No significant stenosis.   Infrarenal aorta: Contains partially calcified disease without significant stenosis or aneurysmal dilation.   CTA  PELVIC VESSELS R. Common iliac artery: No significant stenosis.   R. External iliac artery: No significant stenosis.   R. Internal iliac artery: No significant stenosis.   L. Common iliac artery: No significant stenosis.   L. External iliac artery: No significant stenosis.   L. Internal iliac artery: Partially calcified disease results in mild to moderate stenosis.   CTA RIGHT LOWER EXTREMITY R. Common femoral artery: No significant stenosis.   R. Profunda femoris artery: No significant stenosis.   R. SFA: Partially calcified disease results in mild stenosis.   R. Popliteal artery: Partially calcified disease results in moderate stenosis of the above the knee segment. Predominantly calcified disease of the below-the-knee segment results in mild-to-moderate stenosis.   R. Anterior tibial artery: Occluded at its origin.   R. Tibioperoneal trunk: Partially calcified disease results in mild-to-moderate stenosis.   R. Posterior tibial artery: Occluded at its origin.   R. Peroneal artery: Contains scattered calcific disease of its mid to distal segment resulting in mild-to-moderate stenosis.   R. Dorsalis pedis artery: No significant stenosis.   R. Plantar artery: Not visualized.   CTA LEFT LOWER EXTREMITY L. Common femoral artery: No significant  stenosis.   L. Profunda femoris artery: No significant stenosis.   L. SFA: Contains partially calcified disease predominantly in its mid to distal segment. This results in focal severe stenosis across Gaetano's canal but no significant stenosis upstream.   L. Popliteal artery: Contains partially calcified atherosclerotic disease without significant stenosis.   L. Anterior tibial artery: Occluded shortly beyond its origin.   L. Tibioperoneal trunk: Contains partially calcified disease resulting in moderate to severe stenosis.   L. Posterior tibial artery: Occluded at its origin, there is distal reconstitution across its middle 3rd as a diseased vessel containing scattered calcific disease resulting in mild stenosis. This vessel is seen to cross the ankle joint and supply the plantar artery.   L. Peroneal artery: Occluded at its origin, there is distal reconstitution at its middle 3rd as a small caliber vessel, supplying distal collaterals to the foot.   L. Dorsalis pedis artery: Not visualized   L. Plantar artery: No significant stenosis.   NON-VASCULAR FINDINGS   Visualized portions of the heart: Main pulmonary artery is enlarged, which can be seen in the setting of pulmonary arterial hypertension. The heart is not enlarged. There is coronary artery calcifications present in the distribution of all 3 vessels. There is no pericardial effusion.   Lungs: There is bilateral dependent atelectasis.   Hepatobiliary: Unremarkable liver without biliary dilation evident   Pancreas: Unremarkable   Spleen: Unremarkable   Adrenal Glands: Unremarkable   Kidneys, ureters, and bladder: There are bilateral parapelvic renal cysts and bilateral parenchymal cysts. There are bilateral extrarenal pelvises. There is no urolithiasis or hydroureteronephrosis. The urinary bladder demonstrates circumferential wall thickening which may be related to incomplete distention or cystitis.   GI tract: There is a 2.6 cm diverticulum off the gastric  fundus. No evidence of obstruction. There is colonic diverticulosis without diverticulitis. The appendix is within normal limits.   Peritoneum and retroperitoneum: No free fluid or free air is noted.   Lymph Nodes: There are mildly prominent pelvic lymph nodes, left-greater-than-right. These are likely reactive.   Reproductive Organs: The prostate gland is enlarged.   Visualized musculoskeletal structures: There are degenerative changes of the spine. Spurring of the bilateral sacroiliac joints is noted. There is osteoarthritic changes of the bilateral hips. There is diffuse edema and skin thickening involving the left leg. There are prominent venous varicosities of the left leg. There is osteopenia involving the left foot. Bony destruction at the left 5th metatarsophalangeal joint is noted with soft tissue gas surrounding this region. There appears to be an open wound in the plantar aspect of the foot centered at the 5th MTP joint. Curvilinear hyperdensity within the soft tissues may reflect small bony fragments or foreign bodies. No acute fracture or destructive osseous lesion is identified       1. Diffuse partially calcified atherosclerotic disease, as detailed above. There is a single vessel runoff in the right leg. In the left leg, there is high-grade stenosis of the distal SFA as it crosses Gaetano's canal. Additionally, there is occlusive disease of the proximal trifurcation with reconstitution of the left posterior tibial and peroneal arteries distally.   2. Significant soft tissue swelling of the left leg with prominent venous varicosities. There is also soft tissue swelling of the left foot with an open wound along the plantar aspect of the foot centered at the 5th MTP joint, which demonstrates adjacent bony destruction. Curvilinear hyperdensity in this region may reflect bony fragments or foreign bodies.   3. Coronary artery calcifications.   Signed by: Osorio Wong 9/13/2024 2:09 PM Dictation  workstation:   [id number]    Lower extremity venous duplex left    Result Date: 9/12/2024  STUDY: Left Lower Extremity Venous Doppler Ultrasound; 9/12/2024 4:43 PM INDICATION: Swelling. COMPARISON: US LE Venous 3/21/2022. ACCESSION NUMBER(S): [id number] ORDERING CLINICIAN: KATIE HILTON TECHNIQUE:  Real-time grayscale imaging, color Doppler flow imaging, and spectral Doppler imaging of the left lower extremity veins was performed. FINDINGS: There is acute occlusive DVT demonstrated within the left mid-distal femoral and popliteal veins. The left common femoral and profunda femoral veins demonstrated normal compressibility, normal phasic venous flow and normal response to augmentation.  There is no evidence for echogenic thrombi.  The deep calf veins are not well visualized due to subcutaneous edema. The contralateral common femoral vein is free of thrombosis.    Evidence for acute occlusive DVT within the left mid-distal femoral and popliteal veins. Compared to prior ultrasound, this represents a new finding. The positive findings on this exam were discussed with and acknowledged by Dr. Katie Hilton abdomen interpretation, 5:00 PM September 12, 2024 Signed by Jason Tobar MD    XR foot left 3+ views    Result Date: 9/12/2024  STUDY: Foot Radiographs; 9/12/2024 4:19 PM INDICATION: Left foot pain. COMPARISON: None Available. ACCESSION NUMBER(S): [id number] ORDERING CLINICIAN: KATIE HILTON TECHNIQUE:  Three view(s) of the left foot (four images). FINDINGS:  There is no displaced fracture.  The alignment is anatomic.  Soft tissue swelling and ulceration noted lateral to the fifth MTP joint associated with demineralization of the fifth proximal phalanx and the distal head of the fifth metatarsal.  Bony changes consistent with osteomyelitis.    Osteomyelitis of the fifth metatarsal head and proximal phalanx of the fifth toe. Signed by Td Francisco MD            Assessment/Plan   ASSESSMENT &  PLAN:  Osteomyelitis (Multi)     #Left foot necrotic ulcer in setting of lower extremity cellulitis  #Osteomyelitis  #DM2     - Patient was seen and evaluated; all findings were discussed and all questions were answered to patient's satisfaction.  - Charts, labs, vitals and imaging all reviewed.   - Imaging: Osteomyelitis of the fifth metatarsal head and proximal phalanx of the  fifth toe. MRI Left foot ordered.  - Labs: WBC 7.7, CRP: 9.51, ESR:24  - PVRs: Pending   - Blood culture: No growth 3 days     Plan:  - Abx: Per Primary  - Consulted Vascular for input will discuss surgical intervention after seen by the vascular team  - Dressings: Betadine soaked 4x4's, 4x4's, Krelix,  - Nursing staff is able to change/reinforce dressing if & as necessary until next day’s dressing change. Thank you.  - Podiatry will continue to follow while in house, Discussed with Dr. Obinna Spears     DVT ppx: Per Primary  Weightbearing: WBAT B/L LE    Patient was evaluated with attending Dr. Obinna Spears DPM    Case to be discussed with attending, A&P above reflects a tentative plan. Please await for the final signature from the attending physician on service.    Renny Nicole DPM PGY-1  Podiatric Medicine & Surgery    I saw and evaluated the patient. I personally obtained the key and critical portions of the history and physical exam or was physically present for key and critical portions performed by the resident/fellow. I reviewed the resident/fellow's documentation and discussed the patient with the resident/fellow. I agree with the resident/fellow's medical decision making as documented in the note.    I spent 45 minutes in the professional and overall care of this patient.    Obinna Spears DPM

## 2024-09-16 NOTE — CARE PLAN
Problem: Pain  Goal: Takes deep breaths with improved pain control throughout the shift  Outcome: Progressing  Goal: Turns in bed with improved pain control throughout the shift  Outcome: Progressing  Goal: Walks with improved pain control throughout the shift  Outcome: Progressing  Goal: Performs ADL's with improved pain control throughout shift  Outcome: Progressing  Goal: Participates in PT with improved pain control throughout the shift  Outcome: Progressing  Goal: Free from opioid side effects throughout the shift  Outcome: Progressing  Goal: Free from acute confusion related to pain meds throughout the shift  Outcome: Progressing     Problem: Pain - Adult  Goal: Verbalizes/displays adequate comfort level or baseline comfort level  Outcome: Progressing   The patient's goals for the shift include      The clinical goals for the shift include Maintain pt safety/comfort; monitor labs/vitals; antibiotic therapy

## 2024-09-16 NOTE — PROGRESS NOTES
Vancomycin Dosing by Pharmacy- FOLLOW UP    Elliot Partida is a 74 y.o. year old male who Pharmacy has been consulted for vancomycin dosing for cellulitis, skin and soft tissue. Based on the patient's indication and renal status this patient is being dosed based on a goal AUC of 400-600.     Renal function is currently stable.    Current vancomycin dose: 1250 mg given every 12 hours    Estimated vancomycin AUC on current dose: 412 mg/L.hr     Visit Vitals  BP (!) 186/77 (BP Location: Left arm, Patient Position: Lying)   Pulse 73   Temp 36.8 °C (98.3 °F) (Temporal)   Resp 20        Lab Results   Component Value Date    CREATININE 0.63 2024    CREATININE 0.76 09/15/2024    CREATININE 1.10 2024    CREATININE 0.77 2024        Patient weight is as follows: There were no vitals filed for this visit.    Cultures:  No results found for the encounter in last 14 days.       I/O last 3 completed shifts:  In: 370 [P.O.:120; IV Piggyback:250]  Out: 1200 [Urine:1200]  I/O during current shift:  I/O this shift:  In: 240 [P.O.:240]  Out: 200 [Urine:200]    Temp (24hrs), Av.9 °C (98.5 °F), Min:36.6 °C (97.9 °F), Max:37.4 °C (99.3 °F)      Assessment/Plan    Within goal AUC range. Continue current vancomycin regimen.    This dosing regimen is predicted by [employer] to result in the following pharmacokinetic parameters:  Exposure target: AUC24 (range)400-600 mg/L.hr   [id number]: 416 mg/L.hr  AUC24,ss: 412 mg/L.hr  Probability of AUC24 > 400: 56 %  Ctrough,ss: 10.8 mg/L  Probability of Ctrough,ss > 20: 1 %  The next level will be obtained on  at 0500. May be obtained sooner if clinically indicated.   Will continue to monitor renal function daily while on vancomycin and order serum creatinine at least every 48 hours if not already ordered.  Follow for continued vancomycin needs, clinical response, and signs/symptoms of toxicity.       Tiffany Campo, PharmD

## 2024-09-16 NOTE — PROGRESS NOTES
09/16/24 0714   Discharge Planning   Home or Post Acute Services None     PLAN/BARRIER:  MRI foot, waiting on cultures, vascular and podiatry following  DISP: Home  ADOD; 3-4 days pending vascular plan  Susie Sims RN

## 2024-09-17 ENCOUNTER — APPOINTMENT (OUTPATIENT)
Dept: RADIOLOGY | Facility: HOSPITAL | Age: 74
End: 2024-09-17
Payer: MEDICARE

## 2024-09-17 LAB
ANION GAP SERPL CALC-SCNC: 9 MMOL/L (ref 10–20)
BACTERIA BLD CULT: NORMAL
BACTERIA BLD CULT: NORMAL
BASOPHILS # BLD AUTO: 0.04 X10*3/UL (ref 0–0.1)
BASOPHILS NFR BLD AUTO: 0.6 %
BUN SERPL-MCNC: 6 MG/DL (ref 6–23)
CALCIUM SERPL-MCNC: 7.6 MG/DL (ref 8.6–10.3)
CHLORIDE SERPL-SCNC: 103 MMOL/L (ref 98–107)
CO2 SERPL-SCNC: 28 MMOL/L (ref 21–32)
CREAT SERPL-MCNC: 0.56 MG/DL (ref 0.5–1.3)
EGFRCR SERPLBLD CKD-EPI 2021: >90 ML/MIN/1.73M*2
EOSINOPHIL # BLD AUTO: 0.17 X10*3/UL (ref 0–0.4)
EOSINOPHIL NFR BLD AUTO: 2.5 %
ERYTHROCYTE [DISTWIDTH] IN BLOOD BY AUTOMATED COUNT: 12.6 % (ref 11.5–14.5)
GLUCOSE BLD MANUAL STRIP-MCNC: 149 MG/DL (ref 74–99)
GLUCOSE BLD MANUAL STRIP-MCNC: 151 MG/DL (ref 74–99)
GLUCOSE BLD MANUAL STRIP-MCNC: 155 MG/DL (ref 74–99)
GLUCOSE BLD MANUAL STRIP-MCNC: 159 MG/DL (ref 74–99)
GLUCOSE SERPL-MCNC: 180 MG/DL (ref 74–99)
HCT VFR BLD AUTO: 32.6 % (ref 41–52)
HGB BLD-MCNC: 10.9 G/DL (ref 13.5–17.5)
IMM GRANULOCYTES # BLD AUTO: 0.08 X10*3/UL (ref 0–0.5)
IMM GRANULOCYTES NFR BLD AUTO: 1.2 % (ref 0–0.9)
LYMPHOCYTES # BLD AUTO: 1.84 X10*3/UL (ref 0.8–3)
LYMPHOCYTES NFR BLD AUTO: 26.9 %
MCH RBC QN AUTO: 28.8 PG (ref 26–34)
MCHC RBC AUTO-ENTMCNC: 33.4 G/DL (ref 32–36)
MCV RBC AUTO: 86 FL (ref 80–100)
MONOCYTES # BLD AUTO: 0.71 X10*3/UL (ref 0.05–0.8)
MONOCYTES NFR BLD AUTO: 10.4 %
NEUTROPHILS # BLD AUTO: 4.01 X10*3/UL (ref 1.6–5.5)
NEUTROPHILS NFR BLD AUTO: 58.4 %
NRBC BLD-RTO: 0 /100 WBCS (ref 0–0)
PLATELET # BLD AUTO: 272 X10*3/UL (ref 150–450)
POTASSIUM SERPL-SCNC: 3.3 MMOL/L (ref 3.5–5.3)
RBC # BLD AUTO: 3.79 X10*6/UL (ref 4.5–5.9)
SODIUM SERPL-SCNC: 137 MMOL/L (ref 136–145)
WBC # BLD AUTO: 6.9 X10*3/UL (ref 4.4–11.3)

## 2024-09-17 PROCEDURE — 2500000002 HC RX 250 W HCPCS SELF ADMINISTERED DRUGS (ALT 637 FOR MEDICARE OP, ALT 636 FOR OP/ED): Performed by: INTERNAL MEDICINE

## 2024-09-17 PROCEDURE — 1200000002 HC GENERAL ROOM WITH TELEMETRY DAILY

## 2024-09-17 PROCEDURE — 80048 BASIC METABOLIC PNL TOTAL CA: CPT | Performed by: INTERNAL MEDICINE

## 2024-09-17 PROCEDURE — 99222 1ST HOSP IP/OBS MODERATE 55: CPT

## 2024-09-17 PROCEDURE — 82947 ASSAY GLUCOSE BLOOD QUANT: CPT

## 2024-09-17 PROCEDURE — 36415 COLL VENOUS BLD VENIPUNCTURE: CPT | Performed by: INTERNAL MEDICINE

## 2024-09-17 PROCEDURE — 2500000001 HC RX 250 WO HCPCS SELF ADMINISTERED DRUGS (ALT 637 FOR MEDICARE OP): Performed by: INTERNAL MEDICINE

## 2024-09-17 PROCEDURE — 2500000004 HC RX 250 GENERAL PHARMACY W/ HCPCS (ALT 636 FOR OP/ED): Performed by: INTERNAL MEDICINE

## 2024-09-17 PROCEDURE — 85025 COMPLETE CBC W/AUTO DIFF WBC: CPT | Performed by: INTERNAL MEDICINE

## 2024-09-17 RX ORDER — NAPROXEN SODIUM 220 MG/1
324 TABLET, FILM COATED ORAL ONCE
Status: CANCELLED | OUTPATIENT
Start: 2024-09-17 | End: 2024-09-17

## 2024-09-17 RX ORDER — POTASSIUM CHLORIDE 20 MEQ/1
20 TABLET, EXTENDED RELEASE ORAL DAILY
Status: DISCONTINUED | OUTPATIENT
Start: 2024-09-17 | End: 2024-09-20

## 2024-09-17 RX ORDER — AMMONIUM LACTATE 12 G/100G
LOTION TOPICAL 2 TIMES DAILY
Status: DISPENSED | OUTPATIENT
Start: 2024-09-17

## 2024-09-17 ASSESSMENT — COGNITIVE AND FUNCTIONAL STATUS - GENERAL
TOILETING: A LITTLE
MOVING TO AND FROM BED TO CHAIR: A LOT
TURNING FROM BACK TO SIDE WHILE IN FLAT BAD: A LOT
STANDING UP FROM CHAIR USING ARMS: A LOT
STANDING UP FROM CHAIR USING ARMS: A LOT
MOBILITY SCORE: 14
MOBILITY SCORE: 14
DRESSING REGULAR LOWER BODY CLOTHING: A LOT
DAILY ACTIVITIY SCORE: 18
DRESSING REGULAR UPPER BODY CLOTHING: A LITTLE
CLIMB 3 TO 5 STEPS WITH RAILING: A LOT
CLIMB 3 TO 5 STEPS WITH RAILING: TOTAL
WALKING IN HOSPITAL ROOM: A LITTLE
DAILY ACTIVITIY SCORE: 18
DRESSING REGULAR UPPER BODY CLOTHING: A LITTLE
MOBILITY SCORE: 13
HELP NEEDED FOR BATHING: A LOT
DAILY ACTIVITIY SCORE: 17
HELP NEEDED FOR BATHING: A LOT
MOVING FROM LYING ON BACK TO SITTING ON SIDE OF FLAT BED WITH BEDRAILS: A LITTLE
TOILETING: A LITTLE
STANDING UP FROM CHAIR USING ARMS: A LOT
MOBILITY SCORE: 14
MOVING TO AND FROM BED TO CHAIR: A LOT
HELP NEEDED FOR BATHING: A LOT
MOVING TO AND FROM BED TO CHAIR: A LOT
MOVING TO AND FROM BED TO CHAIR: A LOT
DRESSING REGULAR LOWER BODY CLOTHING: A LOT
TOILETING: A LOT
MOVING FROM LYING ON BACK TO SITTING ON SIDE OF FLAT BED WITH BEDRAILS: A LITTLE
DRESSING REGULAR LOWER BODY CLOTHING: A LOT
CLIMB 3 TO 5 STEPS WITH RAILING: A LOT
TOILETING: A LITTLE
DRESSING REGULAR UPPER BODY CLOTHING: A LITTLE
CLIMB 3 TO 5 STEPS WITH RAILING: A LOT
TURNING FROM BACK TO SIDE WHILE IN FLAT BAD: A LOT
WALKING IN HOSPITAL ROOM: A LITTLE
STANDING UP FROM CHAIR USING ARMS: A LOT
DRESSING REGULAR LOWER BODY CLOTHING: A LOT
WALKING IN HOSPITAL ROOM: A LITTLE
DAILY ACTIVITIY SCORE: 18
TURNING FROM BACK TO SIDE WHILE IN FLAT BAD: A LOT
TURNING FROM BACK TO SIDE WHILE IN FLAT BAD: A LOT
MOVING FROM LYING ON BACK TO SITTING ON SIDE OF FLAT BED WITH BEDRAILS: A LITTLE
WALKING IN HOSPITAL ROOM: A LITTLE
DRESSING REGULAR UPPER BODY CLOTHING: A LITTLE
HELP NEEDED FOR BATHING: A LOT
MOVING FROM LYING ON BACK TO SITTING ON SIDE OF FLAT BED WITH BEDRAILS: A LITTLE

## 2024-09-17 ASSESSMENT — PAIN SCALES - GENERAL
PAINLEVEL_OUTOF10: 2
PAINLEVEL_OUTOF10: 4
PAINLEVEL_OUTOF10: 0 - NO PAIN
PAINLEVEL_OUTOF10: 3
PAINLEVEL_OUTOF10: 1

## 2024-09-17 ASSESSMENT — PAIN - FUNCTIONAL ASSESSMENT
PAIN_FUNCTIONAL_ASSESSMENT: 0-10

## 2024-09-17 NOTE — CARE PLAN
Problem: Pain  Goal: Takes deep breaths with improved pain control throughout the shift  Outcome: Progressing  Goal: Turns in bed with improved pain control throughout the shift  Outcome: Progressing  Goal: Walks with improved pain control throughout the shift  Outcome: Progressing  Goal: Performs ADL's with improved pain control throughout shift  Outcome: Progressing  Goal: Participates in PT with improved pain control throughout the shift  Outcome: Progressing  Goal: Free from opioid side effects throughout the shift  Outcome: Progressing  Goal: Free from acute confusion related to pain meds throughout the shift  Outcome: Progressing     Problem: Pain - Adult  Goal: Verbalizes/displays adequate comfort level or baseline comfort level  Outcome: Progressing     Problem: Safety - Adult  Goal: Free from fall injury  Outcome: Progressing     Problem: Discharge Planning  Goal: Discharge to home or other facility with appropriate resources  Outcome: Progressing     Problem: Chronic Conditions and Co-morbidities  Goal: Patient's chronic conditions and co-morbidity symptoms are monitored and maintained or improved  Outcome: Progressing     Problem: Diabetes  Goal: Achieve decreasing blood glucose levels by end of shift  Outcome: Progressing  Goal: Increase stability of blood glucose readings by end of shift  Outcome: Progressing  Goal: Decrease in ketones present in urine by end of shift  Outcome: Progressing  Goal: Maintain electrolyte levels within acceptable range throughout shift  Outcome: Progressing  Goal: Maintain glucose levels >70mg/dl to <250mg/dl throughout shift  Outcome: Progressing  Goal: No changes in neurological exam by end of shift  Outcome: Progressing  Goal: Learn about and adhere to nutrition recommendations by end of shift  Outcome: Progressing  Goal: Vital signs within normal range for age by end of shift  Outcome: Progressing  Goal: Increase self care and/or family involovement by end of  shift  Outcome: Progressing  Goal: Receive DSME education by end of shift  Outcome: Progressing     Problem: Pain - Adult  Goal: Verbalizes/displays adequate comfort level or baseline comfort level  Outcome: Progressing     Problem: Safety - Adult  Goal: Free from fall injury  Outcome: Progressing     Problem: Chronic Conditions and Co-morbidities  Goal: Patient's chronic conditions and co-morbidity symptoms are monitored and maintained or improved  Outcome: Progressing     Problem: Diabetes  Goal: Achieve decreasing blood glucose levels by end of shift  Outcome: Progressing   The patient's goals for the shift include      The clinical goals for the shift include remain HDS    Over the shift, the patient did not make progress toward the following goals. Barriers to progression include ***. Recommendations to address these barriers include ***.

## 2024-09-17 NOTE — PROGRESS NOTES
INTERNAL MEDICINE PROGRESS NOTE      HPI:    Patient reports no new complaints.  He has had no fever or chills.    Vital signs in last 24 hours:  Temp:  [36.6 °C (97.9 °F)-37.1 °C (98.8 °F)] 36.6 °C (97.9 °F)  Heart Rate:  [63-71] 63  Resp:  [18-20] 18  BP: (149-183)/(63-79) 149/63    Physical Examination:  Physical Exam      Constitutional:       Appearance: Elderly, overweight, in no distress  HENT:      Head: Normocephalic and atraumatic.   Eyes:      Extraocular Movements: Extraocular movements intact.      Pupils: Pupils are equal, round, and reactive to light.   Cardiovascular:      Rate and Rhythm: Normal rate and regular rhythm.      Pulses: Normal pulses.      Heart sounds: Normal heart sounds.   Pulmonary:      Effort: Pulmonary effort is normal.      Breath sounds: Normal breath sounds.   Abdominal:      General: Abdomen is flat. Bowel sounds are normal.      Palpations: Abdomen is soft.   Musculoskeletal:         General: Normal range of motion.      Cervical back: Normal range of motion and neck supple.   Skin:     General: Skin is warm and dry.  Frequent erythema extending to the calf region, ulceration around the fifth toe with some drainage.  Neurological:      General: No focal deficit present.      Mental Status: He is alert and oriented to person, place, and time. Mental status is at baseline.        Medications:    Current Facility-Administered Medications:     acetaminophen (Tylenol) tablet 650 mg, 650 mg, oral, q4h PRN, 650 mg at 09/17/24 0833 **OR** acetaminophen (Tylenol) oral liquid 650 mg, 650 mg, oral, q4h PRN **OR** acetaminophen (Tylenol) suppository 650 mg, 650 mg, rectal, q4h PRN, Jemal Guadarrama MD    ampicillin-sulbactam (Unasyn) in sodium chloride 0.9 % 100 mL 3 g, 3 g, intravenous, q6h, Pantera Boston MD, Stopped at 09/17/24 1220    dextrose 50 % injection 12.5 g, 12.5 g, intravenous, q15 min PRN, Jemal Guadarrama MD    dextrose 50 % injection 25 g, 25 g, intravenous,  q15 min PRN, Jemal Guadarrama MD    enoxaparin (Lovenox) syringe 105 mg, 1 mg/kg, subcutaneous, q12h, Jemal Guadarrama MD, 105 mg at 09/17/24 1224    glucagon (Glucagen) injection 1 mg, 1 mg, intramuscular, q15 min PRN, Jemal Guadarrama MD    glucagon (Glucagen) injection 1 mg, 1 mg, intramuscular, q15 min PRN, Jemal Guadarrama MD    insulin lispro (HumaLOG) injection 0-10 Units, 0-10 Units, subcutaneous, TID, Jemal Guadarrama MD, 2 Units at 09/17/24 1232    lidocaine (LMX) 4 % cream, , Topical, 4x daily PRN, Jemal Guadarrama MD, Given at 09/15/24 0227    polyethylene glycol (Glycolax, Miralax) packet 17 g, 17 g, oral, Daily, Jemal Guadarrama MD, 17 g at 09/15/24 0912    potassium chloride (Klor-Con) packet 40 mEq, 40 mEq, oral, Once, Jemal Guadarrama MD    Laboratory Findings:  Lab Results   Component Value Date    WBC 6.9 09/17/2024    HGB 10.9 (L) 09/17/2024    HCT 32.6 (L) 09/17/2024    MCV 86 09/17/2024     09/17/2024     Lab Results   Component Value Date    INR 1.0 12/09/2019    PROTIME 11.4 12/09/2019     Lab Results   Component Value Date    GLUCOSE 180 (H) 09/17/2024    CALCIUM 7.6 (L) 09/17/2024     09/17/2024    K 3.3 (L) 09/17/2024    CO2 28 09/17/2024     09/17/2024    BUN 6 09/17/2024    CREATININE 0.56 09/17/2024       Assessment and Plan:     Acute osteomyelitis - continue with IV antibiotics, podiatry and ID following.  Peripheral vascular disease -vascular evaluation ongoing, angiogram planned for tomorrow.  DVT -continue with Lovenox as prescribed  Hypokalemia -replacement as ordered.       Jemal Guadarrama MD  09/17/24  1:26 PM

## 2024-09-17 NOTE — CONSULTS
Wound Care Consult     Visit Date: 9/17/2024      Patient Name: Elliot Partida         MRN: [id number]           YOB: 1950     Reason for Consult: patient presents with a left foot necrotic ulcer in setting of lower extremity cellulitis and osteomyelitis.  MASD to buttock with chronic discoloration.      Wound History: Patient is a DM2.  Stated he does not receive podiatric care outpatient and did not know about this ulcer until his leg began to swell and he came to the hospital.         Pertinent Labs:   Albumin   Date Value Ref Range Status   09/12/2024 3.3 (L) 3.4 - 5.0 g/dL Final       Wound Assessment:  Wound 09/13/24 Diabetic Ulcer Foot Dorsal foot;Left (Active)   Wound Image   09/17/24 1205   Site Assessment Necrotic 09/17/24 1205   Ann-Wound Assessment Blanchable erythema;Dry 09/17/24 1205   Non-staged Wound Description Full thickness 09/17/24 1205   Shape circular 09/17/24 1205   Wound Length (cm) 2 cm 09/17/24 1205   Wound Width (cm) 2 cm 09/17/24 1205   Wound Surface Area (cm^2) 4 cm^2 09/17/24 1205   Wound Depth (cm) 0.3 cm 09/17/24 1205   Wound Volume (cm^3) 1.2 cm^3 09/17/24 1205   State of Healing Non-healing 09/17/24 1205   Margins Poorly defined 09/17/24 1205   Drainage Description Yellow;Serosanguineous 09/17/24 1205   Drainage Amount Small 09/17/24 1205   Dressing ABD;Gauze;Kerlix/rolled gauze 09/17/24 1205   Dressing Changed Changed 09/17/24 1205   Dressing Status Clean;Dry 09/17/24 1205       Wound 09/13/24 Tibial Distal;Dorsal;Left (Active)   Wound Image   09/13/24 0044   Wound Length (cm) 8 cm 09/13/24 0044   Wound Width (cm) 7 cm 09/13/24 0044   Wound Surface Area (cm^2) 56 cm^2 09/13/24 0044   State of Healing Hyperkeratosis 09/13/24 0044   Margins Unable to assess 09/16/24 0844   Drainage Description Unable to assess 09/16/24 0844   Drainage Amount Unable to assess 09/16/24 0844   Dressing Kerlix/rolled gauze 09/16/24 2111   Dressing Status Clean;Dry 09/16/24 2111        Wound 09/13/24 Foot Left (Active)   Wound Image   09/17/24 1205   Shape linear 09/17/24 1205   Wound Length (cm) 4 cm 09/17/24 1205   Wound Width (cm) 5 cm 09/17/24 1205   Wound Surface Area (cm^2) 20 cm^2 09/17/24 1205   Wound Depth (cm) 0.3 cm 09/17/24 1205   Wound Volume (cm^3) 6 cm^3 09/17/24 1205   State of Healing Non-healing 09/17/24 1205   Margins Poorly defined 09/17/24 1205   Drainage Description Serosanguineous 09/17/24 1205   Drainage Amount Scant 09/17/24 1205   Dressing ABD;Kerlix/rolled gauze;Gauze 09/17/24 1205   Dressing Changed Changed 09/17/24 1205   Dressing Status Clean;Dry 09/17/24 1205       Wound 09/13/24 Moisture Associated Skin Damage Buttock (Active)   Wound Image   09/13/24 0110   Site Assessment Blanchable erythema;Maceration 09/17/24 1205   Ann-Wound Assessment Blanched;Dry 09/17/24 1205   Non-staged Wound Description Partial thickness 09/17/24 1205   Pressure Injury Stage 2 09/15/24 0745   Wound Length (cm) 10 cm 09/17/24 1205   Wound Width (cm) 8 cm 09/17/24 1205   Wound Surface Area (cm^2) 80 cm^2 09/17/24 1205   Margins Poorly defined 09/17/24 1205   Drainage Description None 09/17/24 1205   Drainage Amount None 09/17/24 1205   Dressing Other (Comment) 09/17/24 1205   Dressing Status Clean;Dry 09/17/24 0800     Wound Team Summary Assessment: assessment complete and recommendations below.  Please defer to Podiatry team orders for left plantar foot necrotic ulceration. 2 sets of dressings and Triad cream dressing at the bedside.      Left lower leg: cellulitis  2x Daily: Bedside RN/LPN to complete wound care.  Cleanse with warm purple bath wipes and pat dry.  Apply LacHydrin cream to dry and flaking skin bilaterally.  Elevate legs with pillows and offload heels.    Buttock: MASD with discoloration  Daily: Bedside RN/LPN to complete wound care.  Cleanse with purple bath wipes and pat dry.  Apply Coloplast Triad Wound Dressing (orange tube) in dime thickness to region.  Strict q2  turns to offload sacrum and buttock.     While in bed patient should only be on one fitted sheet, and one chux. Please, do not use brief while patient is resting in bed. Elevate heels off the bed surface at all times. Turn and reposition at least every 2 hours.    Wound Team Plan: Thank you for this consult. Assessment has been completed and orders have been placed. Wound care to be completed by nursing per orders. Please contact [facility] nurse with any questions and place new consult if there is a change in wound status.     Fifi Guerrero RN, BSN, [facility]  Phone: [phone]  9/17/2024  5:41 PM

## 2024-09-17 NOTE — PROGRESS NOTES
09/17/24 1226   Discharge Planning   Home or Post Acute Services None   Expected Discharge Disposition Home     PLAN/BARRIER: waiting on MRI of foot and surgical plan for possible 5th toe amputation  DISP: Patient want to go home with no homecare  ADOD:  3-5 days  TCC/SW to follow for discharge needs.  Susie Sims RN

## 2024-09-17 NOTE — CARE PLAN
The patient's goals for the shift include      The clinical goals for the shift include remain HDS    Problem: Pain  Goal: Takes deep breaths with improved pain control throughout the shift  Outcome: Progressing  Goal: Turns in bed with improved pain control throughout the shift  Outcome: Progressing  Goal: Walks with improved pain control throughout the shift  Outcome: Progressing  Goal: Performs ADL's with improved pain control throughout shift  Outcome: Progressing  Goal: Participates in PT with improved pain control throughout the shift  Outcome: Progressing  Goal: Free from opioid side effects throughout the shift  Outcome: Progressing  Goal: Free from acute confusion related to pain meds throughout the shift  Outcome: Progressing

## 2024-09-17 NOTE — PROGRESS NOTES
"  INFECTIOUS DISEASE DAILY PROGRESS NOTE    SUBJECTIVE:    No new issues. PVR are OK. No fevers. No rash/itching/diarrhea. Awaiting MRI.    OBJECTIVE:  VITALS (Last 24 Hours)  /79 (BP Location: Left arm, Patient Position: Sitting)   Pulse 71   Temp 36.9 °C (98.5 °F) (Oral) Comment: pt states \"im burning up\" removed blankets from pt. turned thermostat down to 70, opened  pts door  Resp 20   SpO2 94%     PHYSICAL EXAM:  Gen - NAD, laying in bed  Lungs - no wheezing  Abd - soft, no ttp  Left Foot - left 5th toe wound noted with necrosis and foul smell, erythema going into the lower shin is better still    ABX: IV Unasyn    LABS:  Lab Results   Component Value Date    WBC 7.7 09/16/2024    HGB 10.7 (L) 09/16/2024    HCT 33.6 (L) 09/16/2024    MCV 86 09/16/2024     09/16/2024     Lab Results   Component Value Date    GLUCOSE 112 (H) 09/16/2024    CALCIUM 7.8 (L) 09/16/2024     09/16/2024    K 3.2 (L) 09/16/2024    CO2 28 09/16/2024     09/16/2024    BUN 10 09/16/2024    CREATININE 0.63 09/16/2024         Estimated Creatinine Clearance: 125 mL/min (by C-G formula based on SCr of 0.63 mg/dL).      ASSESSMENT/PLAN:    Left DM Foot Infection/Cellulitis due to mixed bacteria  Left 5th Toe Necrosis  PAD - PVR are without major vascular disease  DM II with Peripheral Neuropathy - poorly controlled A1C 9.8% 9/2024, increased risk/severity of infections    Awaiting MRI foot and then surgical planning, at the least needs 5th toe amputation on the left.    IV Unasyn 3g Q6H.    Monitoring for adverse effects of abx such as rash/itching/diarrhea - none.    Will follow. Thanks!    Pantera Boston MD  [facility]  Office #[phone]      "

## 2024-09-17 NOTE — PROGRESS NOTES
INTERNAL MEDICINE PROGRESS NOTE      HPI:    Patient lying in bed in no acute distress.  He has had no fever or chills.    Vital signs in last 24 hours:  Temp:  [36.6 °C (97.9 °F)-37 °C (98.6 °F)] 36.9 °C (98.5 °F)  Heart Rate:  [72-73] 73  Resp:  [18-22] 18  BP: (134-186)/(70-77) 179/74    Physical Examination:  Physical Exam      Constitutional:       Appearance: Elderly, overweight, in no distress  HENT:      Head: Normocephalic and atraumatic.   Eyes:      Extraocular Movements: Extraocular movements intact.      Pupils: Pupils are equal, round, and reactive to light.   Cardiovascular:      Rate and Rhythm: Normal rate and regular rhythm.      Pulses: Normal pulses.      Heart sounds: Normal heart sounds.   Pulmonary:      Effort: Pulmonary effort is normal.      Breath sounds: Normal breath sounds.   Abdominal:      General: Abdomen is flat. Bowel sounds are normal.      Palpations: Abdomen is soft.   Musculoskeletal:         General: Normal range of motion.      Cervical back: Normal range of motion and neck supple.   Skin:     General: Skin is warm and dry.  Frequent erythema extending to the calf region, ulceration around the fifth toe with some drainage.  Neurological:      General: No focal deficit present.      Mental Status: He is alert and oriented to person, place, and time. Mental status is at baseline.        Medications:    Current Facility-Administered Medications:     acetaminophen (Tylenol) tablet 650 mg, 650 mg, oral, q4h PRN, 650 mg at 09/16/24 0930 **OR** acetaminophen (Tylenol) oral liquid 650 mg, 650 mg, oral, q4h PRN **OR** acetaminophen (Tylenol) suppository 650 mg, 650 mg, rectal, q4h PRN, Jemal Guadarrama MD    ampicillin-sulbactam (Unasyn) in sodium chloride 0.9 % 100 mL 3 g, 3 g, intravenous, q6h, Pantera Boston MD, Last Rate: 200 mL/hr at 09/16/24 1818, 3 g at 09/16/24 1818    dextrose 50 % injection 12.5 g, 12.5 g, intravenous, q15 min PRN, Jemal Guadararma MD    dextrose  50 % injection 25 g, 25 g, intravenous, q15 min PRN, Jemal Guadarrama MD    enoxaparin (Lovenox) syringe 105 mg, 1 mg/kg, subcutaneous, q12h, Jemal Guadarrama MD, 105 mg at 09/16/24 1148    glucagon (Glucagen) injection 1 mg, 1 mg, intramuscular, q15 min PRN, Jemal Guadarrama MD    glucagon (Glucagen) injection 1 mg, 1 mg, intramuscular, q15 min PRN, Jemal Guadarrama MD    insulin lispro (HumaLOG) injection 0-10 Units, 0-10 Units, subcutaneous, TID, Jemal Guadarrama MD, 4 Units at 09/16/24 1313    lidocaine (LMX) 4 % cream, , Topical, 4x daily PRN, Jemal Guadarrama MD, Given at 09/15/24 0227    polyethylene glycol (Glycolax, Miralax) packet 17 g, 17 g, oral, Daily, Jemal Guadarrama MD, 17 g at 09/15/24 0912    Laboratory Findings:  Lab Results   Component Value Date    WBC 7.7 09/16/2024    HGB 10.7 (L) 09/16/2024    HCT 33.6 (L) 09/16/2024    MCV 86 09/16/2024     09/16/2024     Lab Results   Component Value Date    INR 1.0 12/09/2019    PROTIME 11.4 12/09/2019     Lab Results   Component Value Date    GLUCOSE 112 (H) 09/16/2024    CALCIUM 7.8 (L) 09/16/2024     09/16/2024    K 3.2 (L) 09/16/2024    CO2 28 09/16/2024     09/16/2024    BUN 10 09/16/2024    CREATININE 0.63 09/16/2024       Assessment and Plan:     Acute osteomyelitis -continue with IV antibiotics as ordered.  Peripheral vascular disease -vascular evaluation ongoing, continue with anticoagulation.  DVT - Lovenox as prescribed  Diabetes type 2 -continue to monitor blood sugars closely       Jemal Guadarrama MD  09/16/24  8:22 PM

## 2024-09-17 NOTE — CONSULTS
"Reason For Consult  PAD, leg wound    History Of Present Illness  Elliot Partida is a 74 y.o. male presenting with left 5th digit wound he said he has been experiencing for \"months\" now. He said he experience occasional pain and paresthesia of both his feet. He said he does not follow vascular surgery service as outpatient. He ambulates with a walker or cane. Vascular surgery consulted for further management.      Past Medical History  He has a past medical history of Arthritis, CAD (coronary artery disease), Diabetes mellitus (Multi), Hypertension, Peripheral neuropathy, Stroke (Multi), and Thoracic radiculopathy.    Surgical History  He has a past surgical history that includes Carpal tunnel release (10/10/2014); Eye surgery; FL arthrocentesis ASP INJ joint.; Cardiac catheterization; and Iridotomy / iridectomy (Bilateral).     Social History  He reports that he has never smoked. He has never been exposed to tobacco smoke. He has never used smokeless tobacco. No history on file for alcohol use and drug use.    Family History  Family History   Problem Relation Name Age of Onset    Heart disease Father      Colon cancer Other      Heart attack Other          Allergies  Patient has no known allergies.    Review of Systems  A full 10 point ROS was completed. Nothing positive other than what was mentioned in the HPI.     Physical Exam  PE:  Constitutional: A&Ox3, calm and cooperative, NAD  Eyes: PERRL, clear sclera   Head/Neck: Neck supple  Cardiovascular: Normal rate and regular rhythm.  Respiratory/Thorax: Good symmetric chest expansion. No labored breathing.  Gastrointestinal: Abdomen slightly distended, soft, appropriately tender  Extremities: BLE: There is a left 5th digit wound, no other wounds appreciated. +1 edema. Scaly appearing skin beginning at his ankle distally. DP/PT signals heard. Brisk cap refill. Motor and sensory function in tact.  Neurological: A&Ox3, No focal deficits  Psychological: Appropriate " mood and behavior  Skin: Warm and dry    Last Recorded Vitals  Blood pressure 149/63, pulse 63, temperature 36.6 °C (97.9 °F), temperature source Temporal, resp. rate 18, SpO2 96%.    Relevant Results  Scheduled medications  ampicillin-sulbactam, 3 g, intravenous, q6h  enoxaparin, 1 mg/kg, subcutaneous, q12h  insulin lispro, 0-10 Units, subcutaneous, TID  polyethylene glycol, 17 g, oral, Daily  potassium chloride, 40 mEq, oral, Once      Continuous medications     PRN medications  PRN medications: acetaminophen **OR** acetaminophen **OR** acetaminophen, dextrose, dextrose, glucagon, glucagon, lidocaine    Results for orders placed or performed during the hospital encounter of 09/14/24 (from the past 24 hour(s))   POCT GLUCOSE   Result Value Ref Range    POCT Glucose 146 (H) 74 - 99 mg/dL   POCT GLUCOSE   Result Value Ref Range    POCT Glucose 164 (H) 74 - 99 mg/dL   POCT GLUCOSE   Result Value Ref Range    POCT Glucose 159 (H) 74 - 99 mg/dL   CBC and Auto Differential   Result Value Ref Range    WBC 6.9 4.4 - 11.3 x10*3/uL    nRBC 0.0 0.0 - 0.0 /100 WBCs    RBC 3.79 (L) 4.50 - 5.90 x10*6/uL    Hemoglobin 10.9 (L) 13.5 - 17.5 g/dL    Hematocrit 32.6 (L) 41.0 - 52.0 %    MCV 86 80 - 100 fL    MCH 28.8 26.0 - 34.0 pg    MCHC 33.4 32.0 - 36.0 g/dL    RDW 12.6 11.5 - 14.5 %    Platelets 272 150 - 450 x10*3/uL    Neutrophils % 58.4 40.0 - 80.0 %    Immature Granulocytes %, Automated 1.2 (H) 0.0 - 0.9 %    Lymphocytes % 26.9 13.0 - 44.0 %    Monocytes % 10.4 2.0 - 10.0 %    Eosinophils % 2.5 0.0 - 6.0 %    Basophils % 0.6 0.0 - 2.0 %    Neutrophils Absolute 4.01 1.60 - 5.50 x10*3/uL    Immature Granulocytes Absolute, Automated 0.08 0.00 - 0.50 x10*3/uL    Lymphocytes Absolute 1.84 0.80 - 3.00 x10*3/uL    Monocytes Absolute 0.71 0.05 - 0.80 x10*3/uL    Eosinophils Absolute 0.17 0.00 - 0.40 x10*3/uL    Basophils Absolute 0.04 0.00 - 0.10 x10*3/uL   Basic Metabolic Panel   Result Value Ref Range    Glucose 180 (H) 74 - 99  mg/dL    Sodium 137 136 - 145 mmol/L    Potassium 3.3 (L) 3.5 - 5.3 mmol/L    Chloride 103 98 - 107 mmol/L    Bicarbonate 28 21 - 32 mmol/L    Anion Gap 9 (L) 10 - 20 mmol/L    Urea Nitrogen 6 6 - 23 mg/dL    Creatinine 0.56 0.50 - 1.30 mg/dL    eGFR >90 >60 mL/min/1.73m*2    Calcium 7.6 (L) 8.6 - 10.3 mg/dL   POCT GLUCOSE   Result Value Ref Range    POCT Glucose 155 (H) 74 - 99 mg/dL       Assessment/Plan     CT angio with runoff and PVRs were performed revealing likely chronic atherosclerotic disease. Labs reviewed.    Plan:  - Added for left lower angiogram with Dr Stoddard tomorrow  - NPO MN, may have small breakfast  - Continue heparin drip  - Dress foot per podiatry    Discussed with Dr Stoddard.    I spent 60 minutes in the professional and overall care of this patient.    Julien Gan PA-C

## 2024-09-17 NOTE — DISCHARGE INSTRUCTIONS
Recommended wound care:    Wound Vac applied to left amp surgical site: Change 3x per week  (2)  Mepitel     (1)  black Granufoam     (2) Adapt barrier ring  (1) Aquacel to outside edge near dorsal foot wound    Left dorsal foot wound: full thickness skin loss  Daily: Bedside RN/LPN to complete wound care.  Cleanse with normal saline and pat dry.  Apply no-sting barrier film to periwound skin.  Apply Xeroform dressing to the wound base.  Top with ABD pads.  Secure with Kerlix, paper tape and ace wrap.    Left lower leg: dry flaking skin  2x Daily: Cleanse with warm purple bath wipes or soap and water and pat dry.  Apply LacHydrin cream to dry and flaking skin bilaterally.  Elevate legs with pillows and offload heels.    Buttock: MASD with discoloration  Daily: Cleanse with purple bath wipes or soap and water and pat dry.  Apply Coloplast Triad Wound Dressing (orange tube) in dime thickness to region (or other barrier cream)  Strict turns to offload sacrum and buttock.   Change pads frequently if soiled.  Use hairdryer on cool setting for difficult to dry areas.               PERIPHERAL ANGIOGRAPHY DISCHARGE INSTRUCTIONS    FOR SUDDEN AND SEVERE CHEST PAIN, SHORTNESS OF BREATH, EXCESSIVE BLEEDING, SIGNS OF STROKE, OR CHANGES IN MENTAL STATUS YOU SHOULD CALL 911 IMMEDIATELY.     If your provider has prescribed aspirin and/or clopidogrel (Plavix), or prasugrel (Effient), or ticagrelor (Brilinta), DO NOT STOP THESE MEDICATIONS for any reason without talking to your cardiologist first. If any of these were prescribed, you must take them every day without missing a single dose. If you are getting low on these medications, contact your provider immediately for a refill.     FOR NEXT 24 HOURS  - Upon discharge, you should return home and rest for the remainder of the day and evening. You do not have to stay on bed rest but should not be very active.  It is recommended a responsible adult be with you for the first 24 hours  after the procedure.    - No driving for 24 hours after procedure. Please arrange for someone to drive you home from the hospital today.     - Do not drive, operate machinery, or use power tools for 24 hours after your procedure.     - Do not make any legal decisions for 24 hours after your procedure.     - Do not drink alcoholic beverages for 24 hours after your procedure.    WOUND CARE   *FOR FEMORAL (LEG) ACCESS*  ·      Avoid heavy lifting (over 10 pounds) for 7 days, squatting or excessive bending for 2 days, and strenuous exercise for 7 days.  ·      No submerged bathing, swimming, or hot tubs for the next 7 days, or until fully healed.  ·      Avoid sexual activity for 3-4 days until any groin discomfort has ceased.     *FOR RADIAL (WRIST) ACCESS*  ·      No lifting more than 5 pounds or excessive use of the wrist for 24 hours - for example, treat your wrist as if it is sprained.  ·      Do not engage in vigorous activities (tennis, golf, bowling, weights) for at least 48 hours after the procedure.  ·      Do not submerge the wrist for 7 days after the procedure.  ·      You should expect mild tingling in your hand and tenderness at the puncture site for up to 3 days.    - The transparent dressing should be removed from the site 24 hours after the procedure.  Wash the site gently with soap and water. Rinse well and pat dry. Keep the area clean and dry. You may apply a Band-Aid to the site. Avoid lotions, ointments, or powders until fully healed.     - You may shower the day after your procedure.      - It is normal to notice a small bruise around the puncture site and/or a small grape sized or smaller lump. Any large bruising or large lump warrants a call to the office.     - If bleeding should occur, lay down and apply pressure to the affected area for 10 minutes.  If the bleeding stops notify your physician.  If there is a large amount of bleeding or spurting of blood CALL 911 immediately.  DO NOT drive  yourself to the hospital.    - You may experience some tenderness, bruising or minimal inflammation.  If you have any concerns, you may contact the Cath Lab or if any of these symptoms become excessive, contact your cardiologist or go to the emergency room.     OTHER INSTRUCTIONS  - You may take acetaminophen (Tylenol) as directed for discomfort.  If pain is not relieved with acetaminophen (Tylenol), contact your doctor.    - It can be normal to have slight swelling in the leg the procedure was performed on (not the puncture site leg) post-procedure. Elevate the leg as much as possible above the level of your heart. Any excessive swelling, warmth or redness to the leg warrants a call to the office.    - If you notice or experience any of the following, you should notify your doctor or seek medical attention  Chest pain or discomfort  Change in mental status or weakness in extremities.  Dizziness, light headedness, or feeling faint.  Change in the site where the procedure was performed, such as bleeding or an increased area of bruising or swelling.  Tingling, numbness, pain, or coolness in the leg/arm beyond the site where the procedure was performed.  Signs of infection (i.e. shaking chills, temperature > 100 degrees Fahrenheit, warmth, redness) in the leg/arm area where the procedure was performed.  Changes in urination   Bloody or black stools  Vomiting blood  Severe nose bleeds

## 2024-09-18 LAB
ACT BLD: 240 SEC (ref 83–199)
ACT BLD: 260 SEC (ref 83–199)
ACT BLD: 273 SEC (ref 83–199)
ACT BLD: 295 SEC (ref 83–199)
ACT BLD: 311 SEC (ref 83–199)
ACT BLD: 353 SEC (ref 83–199)
ANION GAP SERPL CALC-SCNC: 10 MMOL/L (ref 10–20)
BASOPHILS # BLD MANUAL: 0 X10*3/UL (ref 0–0.1)
BASOPHILS NFR BLD MANUAL: 0 %
BUN SERPL-MCNC: 6 MG/DL (ref 6–23)
CALCIUM SERPL-MCNC: 8.2 MG/DL (ref 8.6–10.3)
CHLORIDE SERPL-SCNC: 103 MMOL/L (ref 98–107)
CO2 SERPL-SCNC: 28 MMOL/L (ref 21–32)
CREAT SERPL-MCNC: 0.54 MG/DL (ref 0.5–1.3)
EGFRCR SERPLBLD CKD-EPI 2021: >90 ML/MIN/1.73M*2
EOSINOPHIL # BLD MANUAL: 0.16 X10*3/UL (ref 0–0.4)
EOSINOPHIL NFR BLD MANUAL: 2 %
ERYTHROCYTE [DISTWIDTH] IN BLOOD BY AUTOMATED COUNT: 12.9 % (ref 11.5–14.5)
GLUCOSE BLD MANUAL STRIP-MCNC: 121 MG/DL (ref 74–99)
GLUCOSE BLD MANUAL STRIP-MCNC: 138 MG/DL (ref 74–99)
GLUCOSE BLD MANUAL STRIP-MCNC: 141 MG/DL (ref 74–99)
GLUCOSE BLD MANUAL STRIP-MCNC: 143 MG/DL (ref 74–99)
GLUCOSE SERPL-MCNC: 118 MG/DL (ref 74–99)
HCT VFR BLD AUTO: 35.3 % (ref 41–52)
HGB BLD-MCNC: 11.5 G/DL (ref 13.5–17.5)
IMM GRANULOCYTES # BLD AUTO: 0.11 X10*3/UL (ref 0–0.5)
IMM GRANULOCYTES NFR BLD AUTO: 1.3 % (ref 0–0.9)
LYMPHOCYTES # BLD MANUAL: 1.48 X10*3/UL (ref 0.8–3)
LYMPHOCYTES NFR BLD MANUAL: 18 %
MCH RBC QN AUTO: 28.4 PG (ref 26–34)
MCHC RBC AUTO-ENTMCNC: 32.6 G/DL (ref 32–36)
MCV RBC AUTO: 87 FL (ref 80–100)
MONOCYTES # BLD MANUAL: 0.41 X10*3/UL (ref 0.05–0.8)
MONOCYTES NFR BLD MANUAL: 5 %
NEUTS SEG # BLD MANUAL: 5.9 X10*3/UL (ref 1.6–5)
NEUTS SEG NFR BLD MANUAL: 72 %
NRBC BLD-RTO: 0 /100 WBCS (ref 0–0)
PLATELET # BLD AUTO: 302 X10*3/UL (ref 150–450)
POTASSIUM SERPL-SCNC: 3.4 MMOL/L (ref 3.5–5.3)
RBC # BLD AUTO: 4.05 X10*6/UL (ref 4.5–5.9)
RBC MORPH BLD: ABNORMAL
SODIUM SERPL-SCNC: 138 MMOL/L (ref 136–145)
TOTAL CELLS COUNTED BLD: 97
UFH PPP CHRO-ACNC: 0.3 IU/ML
VARIANT LYMPHS # BLD MANUAL: 0.25 X10*3/UL (ref 0–0.3)
VARIANT LYMPHS NFR BLD: 3 %
WBC # BLD AUTO: 8.2 X10*3/UL (ref 4.4–11.3)

## 2024-09-18 PROCEDURE — 99153 MOD SED SAME PHYS/QHP EA: CPT | Performed by: HOSPITALIST

## 2024-09-18 PROCEDURE — 75774 ARTERY X-RAY EACH VESSEL: CPT | Performed by: HOSPITALIST

## 2024-09-18 PROCEDURE — 2550000001 HC RX 255 CONTRASTS: Performed by: HOSPITALIST

## 2024-09-18 PROCEDURE — 82947 ASSAY GLUCOSE BLOOD QUANT: CPT

## 2024-09-18 PROCEDURE — 2500000004 HC RX 250 GENERAL PHARMACY W/ HCPCS (ALT 636 FOR OP/ED): Performed by: HOSPITALIST

## 2024-09-18 PROCEDURE — C1887 CATHETER, GUIDING: HCPCS | Performed by: HOSPITALIST

## 2024-09-18 PROCEDURE — 2500000004 HC RX 250 GENERAL PHARMACY W/ HCPCS (ALT 636 FOR OP/ED): Performed by: NURSE PRACTITIONER

## 2024-09-18 PROCEDURE — 2500000001 HC RX 250 WO HCPCS SELF ADMINISTERED DRUGS (ALT 637 FOR MEDICARE OP): Performed by: NURSE PRACTITIONER

## 2024-09-18 PROCEDURE — 37228 HC REVASCULARIZE TIBIAL/PERON ARTERY,ANGIOPLASTY INITIAL: CPT | Performed by: HOSPITALIST

## 2024-09-18 PROCEDURE — 37224 PR REVSC OPN/PRG FEM/POP W/ANGIOPLASTY UNI: CPT | Performed by: HOSPITALIST

## 2024-09-18 PROCEDURE — C1894 INTRO/SHEATH, NON-LASER: HCPCS | Performed by: HOSPITALIST

## 2024-09-18 PROCEDURE — 2780000003 HC OR 278 NO HCPCS: Performed by: HOSPITALIST

## 2024-09-18 PROCEDURE — 75710 ARTERY X-RAYS ARM/LEG: CPT | Performed by: HOSPITALIST

## 2024-09-18 PROCEDURE — 2500000002 HC RX 250 W HCPCS SELF ADMINISTERED DRUGS (ALT 637 FOR MEDICARE OP, ALT 636 FOR OP/ED): Performed by: INTERNAL MEDICINE

## 2024-09-18 PROCEDURE — 2500000004 HC RX 250 GENERAL PHARMACY W/ HCPCS (ALT 636 FOR OP/ED): Performed by: INTERNAL MEDICINE

## 2024-09-18 PROCEDURE — 36415 COLL VENOUS BLD VENIPUNCTURE: CPT | Performed by: INTERNAL MEDICINE

## 2024-09-18 PROCEDURE — C1760 CLOSURE DEV, VASC: HCPCS | Performed by: HOSPITALIST

## 2024-09-18 PROCEDURE — 99152 MOD SED SAME PHYS/QHP 5/>YRS: CPT | Performed by: HOSPITALIST

## 2024-09-18 PROCEDURE — C1769 GUIDE WIRE: HCPCS | Performed by: HOSPITALIST

## 2024-09-18 PROCEDURE — 37224 HC REVASCULARIZE FEM/POP ARTERY,ANGIOPLASTY: CPT | Mod: 22 | Performed by: HOSPITALIST

## 2024-09-18 PROCEDURE — 37228 PR REVSC OPN/PRQ TIB/PERO W/ANGIOPLASTY UNI: CPT | Performed by: HOSPITALIST

## 2024-09-18 PROCEDURE — 2500000002 HC RX 250 W HCPCS SELF ADMINISTERED DRUGS (ALT 637 FOR MEDICARE OP, ALT 636 FOR OP/ED): Performed by: NURSE PRACTITIONER

## 2024-09-18 PROCEDURE — C1725 CATH, TRANSLUMIN NON-LASER: HCPCS | Performed by: HOSPITALIST

## 2024-09-18 PROCEDURE — 85520 HEPARIN ASSAY: CPT | Performed by: INTERNAL MEDICINE

## 2024-09-18 PROCEDURE — 85347 COAGULATION TIME ACTIVATED: CPT

## 2024-09-18 PROCEDURE — 85027 COMPLETE CBC AUTOMATED: CPT | Performed by: INTERNAL MEDICINE

## 2024-09-18 PROCEDURE — 80048 BASIC METABOLIC PNL TOTAL CA: CPT | Performed by: INTERNAL MEDICINE

## 2024-09-18 PROCEDURE — 85007 BL SMEAR W/DIFF WBC COUNT: CPT | Performed by: INTERNAL MEDICINE

## 2024-09-18 PROCEDURE — 2720000007 HC OR 272 NO HCPCS: Performed by: HOSPITALIST

## 2024-09-18 PROCEDURE — 7100000001 HC RECOVERY ROOM TIME - INITIAL BASE CHARGE: Performed by: HOSPITALIST

## 2024-09-18 PROCEDURE — 76937 US GUIDE VASCULAR ACCESS: CPT | Performed by: HOSPITALIST

## 2024-09-18 PROCEDURE — 2500000005 HC RX 250 GENERAL PHARMACY W/O HCPCS: Performed by: HOSPITALIST

## 2024-09-18 PROCEDURE — 99223 1ST HOSP IP/OBS HIGH 75: CPT | Performed by: NURSE PRACTITIONER

## 2024-09-18 PROCEDURE — 2500000001 HC RX 250 WO HCPCS SELF ADMINISTERED DRUGS (ALT 637 FOR MEDICARE OP): Performed by: HOSPITALIST

## 2024-09-18 PROCEDURE — 1200000002 HC GENERAL ROOM WITH TELEMETRY DAILY

## 2024-09-18 PROCEDURE — 7100000002 HC RECOVERY ROOM TIME - EACH INCREMENTAL 1 MINUTE: Performed by: HOSPITALIST

## 2024-09-18 PROCEDURE — C2623 CATH, TRANSLUMIN, DRUG-COAT: HCPCS | Performed by: HOSPITALIST

## 2024-09-18 RX ORDER — HYDRALAZINE HYDROCHLORIDE 20 MG/ML
INJECTION INTRAMUSCULAR; INTRAVENOUS AS NEEDED
Status: DISCONTINUED | OUTPATIENT
Start: 2024-09-18 | End: 2024-09-18 | Stop reason: HOSPADM

## 2024-09-18 RX ORDER — CLOPIDOGREL BISULFATE 75 MG/1
75 TABLET ORAL DAILY
Qty: 90 TABLET | Refills: 0 | Status: SHIPPED | OUTPATIENT
Start: 2024-09-19

## 2024-09-18 RX ORDER — NAPROXEN SODIUM 220 MG/1
81 TABLET, FILM COATED ORAL DAILY
Qty: 90 TABLET | Refills: 0 | Status: SHIPPED | OUTPATIENT
Start: 2024-09-19

## 2024-09-18 RX ORDER — CLOPIDOGREL BISULFATE 75 MG/1
600 TABLET ORAL ONCE
Status: DISPENSED | OUTPATIENT
Start: 2024-09-18

## 2024-09-18 RX ORDER — NAPROXEN SODIUM 220 MG/1
81 TABLET, FILM COATED ORAL DAILY
Status: DISPENSED | OUTPATIENT
Start: 2024-09-19

## 2024-09-18 RX ORDER — HEPARIN SODIUM 10000 [USP'U]/100ML
0-4500 INJECTION, SOLUTION INTRAVENOUS CONTINUOUS
Status: DISPENSED | OUTPATIENT
Start: 2024-09-18

## 2024-09-18 RX ORDER — SODIUM CHLORIDE 9 MG/ML
150 INJECTION, SOLUTION INTRAVENOUS CONTINUOUS
Status: ACTIVE | OUTPATIENT
Start: 2024-09-18 | End: 2024-09-18

## 2024-09-18 RX ORDER — FENTANYL CITRATE 50 UG/ML
INJECTION, SOLUTION INTRAMUSCULAR; INTRAVENOUS AS NEEDED
Status: DISCONTINUED | OUTPATIENT
Start: 2024-09-18 | End: 2024-09-18 | Stop reason: HOSPADM

## 2024-09-18 RX ORDER — POTASSIUM CHLORIDE 20 MEQ/1
20 TABLET, EXTENDED RELEASE ORAL ONCE
Status: COMPLETED | OUTPATIENT
Start: 2024-09-18 | End: 2024-09-18

## 2024-09-18 RX ORDER — PROTAMINE SULFATE 10 MG/ML
INJECTION, SOLUTION INTRAVENOUS CONTINUOUS PRN
Status: COMPLETED | OUTPATIENT
Start: 2024-09-18 | End: 2024-09-18

## 2024-09-18 RX ORDER — NAPROXEN SODIUM 220 MG/1
324 TABLET, FILM COATED ORAL ONCE
Status: COMPLETED | OUTPATIENT
Start: 2024-09-18 | End: 2024-09-18

## 2024-09-18 RX ORDER — CLOPIDOGREL BISULFATE 75 MG/1
75 TABLET ORAL DAILY
Status: DISPENSED | OUTPATIENT
Start: 2024-09-19

## 2024-09-18 RX ORDER — CLOPIDOGREL BISULFATE 300 MG/1
TABLET, FILM COATED ORAL AS NEEDED
Status: DISCONTINUED | OUTPATIENT
Start: 2024-09-18 | End: 2024-09-18 | Stop reason: HOSPADM

## 2024-09-18 RX ORDER — LIDOCAINE HYDROCHLORIDE 20 MG/ML
INJECTION, SOLUTION INFILTRATION; PERINEURAL AS NEEDED
Status: DISCONTINUED | OUTPATIENT
Start: 2024-09-18 | End: 2024-09-18 | Stop reason: HOSPADM

## 2024-09-18 RX ORDER — HEPARIN SODIUM 1000 [USP'U]/ML
INJECTION, SOLUTION INTRAVENOUS; SUBCUTANEOUS AS NEEDED
Status: DISCONTINUED | OUTPATIENT
Start: 2024-09-18 | End: 2024-09-18 | Stop reason: HOSPADM

## 2024-09-18 RX ORDER — MIDAZOLAM HYDROCHLORIDE 1 MG/ML
INJECTION, SOLUTION INTRAMUSCULAR; INTRAVENOUS AS NEEDED
Status: DISCONTINUED | OUTPATIENT
Start: 2024-09-18 | End: 2024-09-18 | Stop reason: HOSPADM

## 2024-09-18 RX ORDER — SODIUM CHLORIDE 9 MG/ML
100 INJECTION, SOLUTION INTRAVENOUS CONTINUOUS
Status: DISCONTINUED | OUTPATIENT
Start: 2024-09-18 | End: 2024-09-18

## 2024-09-18 ASSESSMENT — COGNITIVE AND FUNCTIONAL STATUS - GENERAL
STANDING UP FROM CHAIR USING ARMS: A LOT
TURNING FROM BACK TO SIDE WHILE IN FLAT BAD: A LITTLE
STANDING UP FROM CHAIR USING ARMS: A LITTLE
MOVING TO AND FROM BED TO CHAIR: A LITTLE
MOVING TO AND FROM BED TO CHAIR: A LOT
STANDING UP FROM CHAIR USING ARMS: A LITTLE
TOILETING: A LITTLE
DRESSING REGULAR LOWER BODY CLOTHING: A LOT
MOVING FROM LYING ON BACK TO SITTING ON SIDE OF FLAT BED WITH BEDRAILS: A LITTLE
MOBILITY SCORE: 14
DRESSING REGULAR LOWER BODY CLOTHING: A LOT
MOVING FROM LYING ON BACK TO SITTING ON SIDE OF FLAT BED WITH BEDRAILS: A LITTLE
DRESSING REGULAR UPPER BODY CLOTHING: A LITTLE
CLIMB 3 TO 5 STEPS WITH RAILING: A LOT
HELP NEEDED FOR BATHING: A LOT
TURNING FROM BACK TO SIDE WHILE IN FLAT BAD: A LOT
WALKING IN HOSPITAL ROOM: A LITTLE
DRESSING REGULAR LOWER BODY CLOTHING: A LOT
MOVING TO AND FROM BED TO CHAIR: A LOT
MOBILITY SCORE: 17
DAILY ACTIVITIY SCORE: 18
CLIMB 3 TO 5 STEPS WITH RAILING: A LOT
STANDING UP FROM CHAIR USING ARMS: A LOT
DRESSING REGULAR UPPER BODY CLOTHING: A LITTLE
HELP NEEDED FOR BATHING: A LOT
MOVING FROM LYING ON BACK TO SITTING ON SIDE OF FLAT BED WITH BEDRAILS: A LITTLE
DRESSING REGULAR UPPER BODY CLOTHING: A LITTLE
MOBILITY SCORE: 14
TOILETING: A LITTLE
MOVING TO AND FROM BED TO CHAIR: A LOT
CLIMB 3 TO 5 STEPS WITH RAILING: A LOT
CLIMB 3 TO 5 STEPS WITH RAILING: A LOT
HELP NEEDED FOR BATHING: A LITTLE
MOVING FROM LYING ON BACK TO SITTING ON SIDE OF FLAT BED WITH BEDRAILS: A LITTLE
TOILETING: A LITTLE
HELP NEEDED FOR BATHING: A LOT
DAILY ACTIVITIY SCORE: 18
DRESSING REGULAR LOWER BODY CLOTHING: A LITTLE
TURNING FROM BACK TO SIDE WHILE IN FLAT BAD: A LOT
DAILY ACTIVITIY SCORE: 19
MOBILITY SCORE: 15
WALKING IN HOSPITAL ROOM: A LITTLE
TURNING FROM BACK TO SIDE WHILE IN FLAT BAD: A LOT
PERSONAL GROOMING: A LITTLE
WALKING IN HOSPITAL ROOM: A LITTLE
DAILY ACTIVITIY SCORE: 18
TOILETING: A LITTLE
DRESSING REGULAR UPPER BODY CLOTHING: A LITTLE
WALKING IN HOSPITAL ROOM: A LITTLE

## 2024-09-18 ASSESSMENT — PAIN - FUNCTIONAL ASSESSMENT
PAIN_FUNCTIONAL_ASSESSMENT: 0-10

## 2024-09-18 ASSESSMENT — ENCOUNTER SYMPTOMS
CARDIOVASCULAR NEGATIVE: 1
CONSTITUTIONAL NEGATIVE: 1
HEMATOLOGIC/LYMPHATIC NEGATIVE: 1
ALLERGIC/IMMUNOLOGIC NEGATIVE: 1
ENDOCRINE NEGATIVE: 1
PSYCHIATRIC NEGATIVE: 1
EYES NEGATIVE: 1
NEUROLOGICAL NEGATIVE: 1
RESPIRATORY NEGATIVE: 1
GASTROINTESTINAL NEGATIVE: 1

## 2024-09-18 ASSESSMENT — PAIN SCALES - GENERAL
PAINLEVEL_OUTOF10: 0 - NO PAIN
PAINLEVEL_OUTOF10: 4
PAINLEVEL_OUTOF10: 0 - NO PAIN
PAINLEVEL_OUTOF10: 5 - MODERATE PAIN
PAINLEVEL_OUTOF10: 0 - NO PAIN
PAINLEVEL_OUTOF10: 2
PAINLEVEL_OUTOF10: 0 - NO PAIN
PAINLEVEL_OUTOF10: 0 - NO PAIN

## 2024-09-18 ASSESSMENT — PAIN DESCRIPTION - LOCATION: LOCATION: FOOT

## 2024-09-18 ASSESSMENT — PAIN DESCRIPTION - ORIENTATION: ORIENTATION: LEFT

## 2024-09-18 NOTE — POST-PROCEDURE NOTE
Physician Transition of Care Summary  Invasive Cardiovascular Lab    Procedure Date: 9/18/2024  Attending:    Agus Stoddard - Primary  Resident/Fellow/Other Assistant: Surgeons and Role:  * No surgeons found with a matching role *    Indications:   Pre-op Diagnosis      * Peripheral arterial disease (CMS-HCC) [I73.9]    Post-procedure diagnosis:   Post-op Diagnosis     * Peripheral arterial disease (CMS-HCC) [I73.9]    Procedure(s):   Lower Extremity Angiogram  63243 - [facility] ANGIOGRAPHY EXTREMITY UNILATERAL RS&I    Procedure Findings:   LLE CLI status post successful revascularization using PTA-DCB angioplasty to distal left SFA and mid-distal popliteal, and PTA to left TPT-PT with good results.  Left PT with non flow-limiting dissection.    Access of the Procedure:   6 [place]ese right CFA, closed with Vascade and manual pressure.  Sheathless left PT access, closed with manual pressure.    Complications:   None.    Stents/Implants:   None.    Anticoagulation/Antiplatelet Plan:   Heparin boluses to maintain therapeutic ACT throughout the procedure.    Estimated Blood Loss:   10 mL    Anesthesia: Moderate Sedation Anesthesia Staff: No anesthesia staff entered.    Any Specimen(s) Removed:   No specimens collected during this procedure.    Disposition:   -Bedrest for 4 hours.  -High intensity heparin drip for recent DVT, to be started 5 hours postprocedure.  No more Lovenox.  -IV hydration using 150 mL/h for next 4 hours.  -Patient needs aggressive treatment of his feet edema in order to heal his wound.  -Podiatrist, Dr. Spears was updated.  -Will follow-up patient in the clinic in 4 weeks with repeat BEN-TBI.      Electronically signed by: Teofilo Stoddard MD, 9/18/2024 6:52 PM

## 2024-09-18 NOTE — H&P
History Of Present Illness  Elliot Partida is a 74 y.o. male presenting with chronic atherosclerotic disease bilaterally, LLE CLI, left foot necrotic ulcer with cellulitis and osteomyelitis, here for peripheral angiogram +/- intervention. PMH includes CAD, DMII, MI, HTN, R frontal extradural mass since 2016, arthritis, thoracic radiculopathy, neuropathy.    Past Medical History:  Past Medical History:   Diagnosis Date    Arthritis     CAD (coronary artery disease)     Diabetes mellitus (Multi)     Hypertension     Peripheral neuropathy     Stroke (Multi)     Thoracic radiculopathy         Past Surgical History:  Past Surgical History:   Procedure Laterality Date    CARDIAC CATHETERIZATION      CARPAL TUNNEL RELEASE  10/10/2014    EYE SURGERY      FL  ARTHROCENTESIS ASP INJ JOINT.      IRIDOTOMY / IRIDECTOMY Bilateral           Social History:   reports that he has never smoked. He has never been exposed to tobacco smoke. He has never used smokeless tobacco.     Family History:  Family History   Problem Relation Name Age of Onset    Heart disease Father      Colon cancer Other      Heart attack Other          Allergies:  No Known Allergies     Home Medications:  No current outpatient medications    Inpatient Medications:  Scheduled medications   Medication Dose Route Frequency    ammonium lactate   Topical BID    ampicillin-sulbactam  3 g intravenous q6h    enoxaparin  1 mg/kg subcutaneous q12h    insulin lispro  0-10 Units subcutaneous TID    polyethylene glycol  17 g oral Daily    potassium chloride CR  20 mEq oral Daily     PRN medications   Medication    acetaminophen    Or    acetaminophen    Or    acetaminophen    dextrose    dextrose    glucagon    glucagon    lidocaine     Continuous Medications   Medication Dose Last Rate    sodium chloride 0.9%  100 mL/hr 100 mL/hr (09/18/24 1312)         Review of Systems   Constitutional: Negative.    HENT: Negative.     Eyes: Negative.    Respiratory: Negative.    "  Cardiovascular: Negative.    Gastrointestinal: Negative.    Endocrine: Negative.    Genitourinary: Negative.    Musculoskeletal:         +left foot pain   Skin: Negative.    Allergic/Immunologic: Negative.    Neurological: Negative.    Hematological: Negative.    Psychiatric/Behavioral: Negative.            Physical Exam  Constitutional:       General: He is awake. He is not in acute distress.     Appearance: He is not ill-appearing.   Cardiovascular:      Rate and Rhythm: Normal rate and regular rhythm.      Pulses:           Radial pulses are 2+ on the right side and 2+ on the left side.        Dorsalis pedis pulses are detected w/ Doppler on the right side.        Posterior tibial pulses are 0 on the left side.      Heart sounds: Murmur heard.   Pulmonary:      Effort: Pulmonary effort is normal.      Breath sounds: Normal breath sounds and air entry.   Abdominal:      General: Bowel sounds are normal.      Palpations: Abdomen is soft.      Tenderness: There is no abdominal tenderness.   Musculoskeletal:      Right lower le+ Pitting Edema present.      Left lower leg: 3+ Pitting Edema present.   Skin:     General: Skin is warm and dry.   Neurological:      General: No focal deficit present.      Mental Status: He is alert and oriented to person, place, and time.      GCS: GCS eye subscore is 4. GCS verbal subscore is 5. GCS motor subscore is 6.   Psychiatric:         Mood and Affect: Mood normal.         Behavior: Behavior is cooperative.        Sedation Plan    ASA 3     Mallampati class: III.           NPO since 0000 this morning    Last Recorded Vitals  Blood pressure (!) 193/88, pulse 69, temperature 36.9 °C (98.4 °F), temperature source Temporal, resp. rate 16, height 1.753 m (5' 9\"), weight 111 kg (245 lb), SpO2 96%.         Vitals from the Past 24 Hours  Heart Rate:  [69-78]   Temp:  [35.7 °C (96.3 °F)-37 °C (98.6 °F)]   Resp:  [16-23]   BP: (146-193)/(59-88)   Height:  [175.3 cm (5' 9\")]   Weight:  " "[111 kg (245 lb)]   SpO2:  [95 %-97 %]          Relevant Results    Labs    CBC:   Recent Labs     09/18/24  0507 09/17/24  0955 09/16/24  0503 09/15/24  0615 09/14/24  0620 09/13/24  0338   WBC 8.2 6.9 7.7 8.2 13.1* 11.6*   HGB 11.5* 10.9* 10.7* 10.3* 11.1* 11.0*   HCT 35.3* 32.6* 33.6* 31.1* 33.1* 33.2*    272 263 234 230 235   MCV 87 86 86 85 86 85     BMP/CMP:   Recent Labs     09/18/24  0507 09/17/24  0955 09/16/24  0503 09/15/24  0615 09/14/24  0620 09/13/24  0337 09/12/24  1808 09/12/24  1808 03/21/22  1700 12/10/19  0605 12/09/19  1625    137 136 134* 130* 130*   < > 129* 136   < > 132*   K 3.4* 3.3* 3.2* 3.5 3.8 3.8   < > 4.6 4.1   < > 3.7    103 101 99 96* 96*   < > 92* 101   < > 96*   BUN 6 6 10 12 15 21   < > 22 20   < > 21   CREATININE 0.54 0.56 0.63 0.76 1.10 0.77   < > 0.81 0.83   < > 1.05   CO2 28 28 28 27 27 26   < > 25 25   < > 18*   CALCIUM 8.2* 7.6* 7.8* 7.8* 8.1* 8.3*   < > 8.9 9.4   < > 10.1   PROT  --   --   --   --   --   --   --  7.6 7.5  --  7.4   BILITOT  --   --   --   --   --   --   --  0.6 0.6  --  0.9   ALKPHOS  --   --   --   --   --   --   --  127 78  --  98   ALT  --   --   --   --   --   --   --  18 32  --  50   AST  --   --   --   --   --   --   --  26 43*  --  67*   GLUCOSE 118* 180* 112* 124* 152* 200*   < > 310* 205*   < > 443*    < > = values in this interval not displayed.      Lipid Panel:   Recent Labs     12/10/19  0605   CHOL 180   HDL 31.0*   CHHDL 5.8*   VLDL 45*   TRIG 224*   NHDL 149     Cardiac   Results from last 7 days   Lab Units 09/12/24  1700   BNP pg/mL 25      Hemoglobin A1C:   Recent Labs     09/13/24  0619 05/03/22  1408 07/19/21  1330 09/18/20  1408 12/10/19  0605   HGBA1C 9.8* 9.6* 6.9* 7.0* 10.2     TSH/ Free T4:   Recent Labs     12/10/19  0605   TSH 0.79     Iron:   Recent Labs     09/12/24  1700   BNP 25     Coag:     ABO: No results found for: \"ABO\"    Past Cardiology Tests (Last 3 Years):    EKG:  Recent Labs     12/09/19  2321 " 12/09/19  1602 10/10/14  1057   ATRRATE 71 110 77   VENTRATE 71 110 77   PRINT 238 216 224   QRSDUR 112 108 102   QTCFRED 402 428 421   QTCCALCB 412 414 439   No results found for this or any previous visit (from the past 4464 hour(s)).  Echo:  Echocardiogram:   Transthoracic Echo (TTE) Complete With Contrast 09/13/2024    PHYSICIAN INTERPRETATION:  Left Ventricle: The left ventricular systolic function is normal, with a visually estimated ejection fraction of 60-65%. There are no regional left ventricular wall motion abnormalities. The left ventricular cavity size is normal. Spectral Doppler shows an impaired relaxation pattern of left ventricular diastolic filling.  Left Atrium: The left atrium is normal in size.  Right Ventricle: The right ventricle is normal in size. There is normal right ventricular global systolic function.  Right Atrium: The right atrium is normal in size.  Aortic Valve: The aortic valve is trileaflet. There is moderate aortic valve cusp calcification. There is evidence of mild to moderate aortic valve stenosis. There is no evidence of aortic valve regurgitation. The peak instantaneous gradient of the aortic valve is 26.2 mmHg.  Mitral Valve: The mitral valve is normal in structure. There is mild mitral valve regurgitation.  Tricuspid Valve: The tricuspid valve is structurally normal. There is mild tricuspid regurgitation.  Pulmonic Valve: The pulmonic valve is structurally normal. There is physiologic pulmonic valve regurgitation.  Pericardium: There is no pericardial effusion noted.  Aorta: The aortic root is normal.  Pulmonary Artery: The estimated pulmonary artery pressure is normal.  Systemic Veins: The inferior vena cava appears mildly dilated.      CONCLUSIONS:  1. The left ventricular systolic function is normal, with a visually estimated ejection fraction of 60-65%.  2. Spectral Doppler shows an impaired relaxation pattern of left ventricular diastolic filling.  3. There is normal  right ventricular global systolic function.  4. Mild to moderate aortic valve stenosis.  5. There is moderate aortic valve cusp calcification.  6. The inferior vena cava appears mildly dilated.      Ejection Fractions:  LV EF   Date/Time Value Ref Range Status   09/13/2024 02:10 PM 63 %      Cath:  Coronary Angiography:   Adult Cath   Study Date:       1/8/2020       Study: Left Heart Catheterization      Coronary Angiography:  The coronary circulation is right dominant.    Left Main Coronary Artery:  The left main coronary artery is a large caliber vessel. The left main coronary artery showed a normal vessel.    Left Anterior Descending Coronary Artery Distribution:  The left anterior descending coronary artery is a large caliber vessel. The LAD arises normally from the left main coronary artery. The LAD demonstrated no significant disease or stenosis greater than 30% and mild proximal atherosclerotic disease.    Circumflex Coronary Artery Distribution:  The circumflex coronary artery is a large caliber vessel. The circumflex revealed a normal vessel.    Right Coronary Artery Distribution:    The right coronary artery Anterior takeoff required AL1 for cannulation. The right coronary artery is a very large caliber vessel. The RCA arises normally from the right sinus of Valsalva. The RCA showed a normal vessel.    Aorta:  The aortic segments that were visualized all appeared normal in size and structure, without any evidence of dilatation or obstruction.    Coronary Interventions:  Pre-intervention LADAN flow was 3. Post-intervention LADAN flow was 3.      Complications:  Ventricular arrhythmia requiring therapy Developed VF after RCA injection, defibrillated with a single 360 J shock.    Cardiac Cath Transition of Care Summary:  Post Procedure Diagnosis: Mild LAD disease.  Blood Loss:               Estimated blood loss during the procedure was 0 mls.  Specimens Removed:        Number of specimen(s) removed:  none.      Recommendations:  Maximize medical therapy.  Agressive risk factor modification efforts.  Follow-up with cardiology clinic.  Follow-up with primary care physician.  Lipid lowering agent or Statin therapy.  Medical management of coronary artery disease.  Aspirin therapy.  Beta blocker therapy.    ____________________________________________________________________________________  CONCLUSIONS:  1. Mild non-obstructive coronary artery disease.  2. Culprit vessel(s): left anterior descending.  3. Ventricular arrhythmia requiring therapy.      Right Heart Cath: No results found for this or any previous visit from the past 1800 days.    Stress Test:  Nuclear:No results found for this or any previous visit from the past 1800 days.    Metabolic Stress: No results found for this or any previous visit from the past 1800 days.    Cardiac Imaging:  Cardiac Scoring: No results found for this or any previous visit from the past 1800 days.    Cardiac MRI: No results found for this or any previous visit from the past 1800 days.      [facility] US PVR WITHOUT EXERCISE  Study Date:       9/16/2024    CONCLUSIONS:  Right Lower PVR: No evidence of arterial occlusive disease in the right lower extremity at rest. Decreased digital perfusion noted. Multiphasic flow is noted in the right common femoral artery, right posterior tibial artery and right dorsalis pedis artery.  Left Lower PVR: No evidence of arterial occlusive disease in the left lower extremity at rest. Decreased digital perfusion noted. Monophasic flow is noted in the left posterior tibial artery and left dorsalis pedis artery. Multiphasic flow is noted in the left common femoral artery. Waveform may be dmapened due to edema.     Additional Findings:  Known DVT in the left femoral and popliteal veins.        Imaging & Doppler Findings:     RIGHT Lower PVR                Pressures Ratios  Right Posterior Tibial (Ankle) 155 mmHg  0.99  Right Dorsalis Pedis (Ankle)   155  mmHg  0.99  Right Digit (Great Toe)        66 mmHg   0.42           LEFT Lower PVR                Pressures Ratios  Left Posterior Tibial (Ankle) 142 mmHg  0.91  Left Dorsalis Pedis (Ankle)   128 mmHg  0.82  Left Digit (Great Toe)        52 mmHg   0.33                           Right     Left  Brachial Pressure 156 mmHg 150 mmHg      CT ANGIO CHEST FOR PULMONARY EMBOLISM;  9/14/2024 9:28 am      FINDINGS:  Artifact related to motion and overlying upper extremities limits  evaluation.      CHEST WALL AND LOWER NECK: No significant axillary lymphadenopathy.      MEDIASTINUM AND ROLO:  No significant lymphadenopathy.      HEART AND VESSELS:  No central PE however limited assessment for  peripheral PE due to technical factors including suboptimal timing of  IV contrast bolus and artifact. The heart is normal in size. No  significant pericardial effusion. Multifocal atherosclerotic  calcifications including the coronary arteries and aortic and mitral  valve regions. No thoracic aortic aneurysm.      LUNGS, PLEURA, LARGE AIRWAYS:  Respiratory motion artifact limits  parenchymal evaluation. Mild pulmonary heterogeneity. Faint linear  densities in both lung fields. Possible mosaic attenuation in both  lung bases. Trace bilateral pleural effusions/thickening. The central  airways are patent.      UPPER ABDOMEN:  Limited due to artifact related to overlying upper  extremities. The included liver is likely mildly hypodense/fatty  infiltrated. No definite focal mass lesion in the included upper  abdominal viscera.      BONES:  Flowing osteophytes along the thoracic spine consistent with  dish.      IMPRESSION:  No central PE however peripheral PE can not be excluded based on this  exam.      Scattered interstitial opacities including possible mosaic  attenuation in the lung bases which could reflect small airways  disease and air trapping or atypical infection. Trace bilateral  pleural effusions/thickening.      Moderate  atherosclerotic disease.      Possible fatty infiltration of the liver incidentally noted.      CT ANGIO AORTA AND BILATERAL ILIOFEMORAL RUNOFF W AND OR WO IV  CONTRAST;  9/13/2024 12:07 pm      CTA ABDOMEN VESSELS      Celiac axis: No significant stenosis.      SMA: Partially calcified disease at its origin results in moderate to  severe stenosis.      RAUL: No significant stenosis.      Right renal vessels: Calcified disease of its proximal segment  results in moderate stenosis.      Left renal vessels: No significant stenosis.      Infrarenal aorta: Contains partially calcified disease without  significant stenosis or aneurysmal dilation.      CTA  PELVIC VESSELS  R. Common iliac artery: No significant stenosis.      R. External iliac artery: No significant stenosis.      R. Internal iliac artery: No significant stenosis.      L. Common iliac artery: No significant stenosis.      L. External iliac artery: No significant stenosis.      L. Internal iliac artery: Partially calcified disease results in mild  to moderate stenosis.      CTA RIGHT LOWER EXTREMITY  R. Common femoral artery: No significant stenosis.      R. Profunda femoris artery: No significant stenosis.      R. SFA: Partially calcified disease results in mild stenosis.      R. Popliteal artery: Partially calcified disease results in moderate  stenosis of the above the knee segment. Predominantly calcified  disease of the below-the-knee segment results in mild-to-moderate  stenosis.      R. Anterior tibial artery: Occluded at its origin.      R. Tibioperoneal trunk: Partially calcified disease results in  mild-to-moderate stenosis.      R. Posterior tibial artery: Occluded at its origin.      R. Peroneal artery: Contains scattered calcific disease of its mid to  distal segment resulting in mild-to-moderate stenosis.      R. Dorsalis pedis artery: No significant stenosis.      R. Plantar artery: Not visualized.      CTA LEFT LOWER EXTREMITY  L. Common  femoral artery: No significant stenosis.      L. Profunda femoris artery: No significant stenosis.      L. SFA: Contains partially calcified disease predominantly in its mid  to distal segment. This results in focal severe stenosis across  Gaetano's canal but no significant stenosis upstream.      L. Popliteal artery: Contains partially calcified atherosclerotic  disease without significant stenosis.      L. Anterior tibial artery: Occluded shortly beyond its origin.      L. Tibioperoneal trunk: Contains partially calcified disease  resulting in moderate to severe stenosis.      L. Posterior tibial artery: Occluded at its origin, there is distal  reconstitution across its middle 3rd as a diseased vessel containing  scattered calcific disease resulting in mild stenosis. This vessel is  seen to cross the ankle joint and supply the plantar artery.      L. Peroneal artery: Occluded at its origin, there is distal  reconstitution at its middle 3rd as a small caliber vessel, supplying  distal collaterals to the foot.      L. Dorsalis pedis artery: Not visualized      L. Plantar artery: No significant stenosis.      NON-VASCULAR FINDINGS      Visualized portions of the heart: Main pulmonary artery is enlarged,  which can be seen in the setting of pulmonary arterial hypertension.  The heart is not enlarged. There is coronary artery calcifications  present in the distribution of all 3 vessels. There is no pericardial  effusion.      Lungs: There is bilateral dependent atelectasis.      Hepatobiliary: Unremarkable liver without biliary dilation evident      Pancreas: Unremarkable      Spleen: Unremarkable      Adrenal Glands: Unremarkable      Kidneys, ureters, and bladder: There are bilateral parapelvic renal  cysts and bilateral parenchymal cysts. There are bilateral extrarenal  pelvises. There is no urolithiasis or hydroureteronephrosis. The  urinary bladder demonstrates circumferential wall thickening which  may be related  to incomplete distention or cystitis.      GI tract: There is a 2.6 cm diverticulum off the gastric fundus. No  evidence of obstruction. There is colonic diverticulosis without  diverticulitis. The appendix is within normal limits.      Peritoneum and retroperitoneum: No free fluid or free air is noted.      Lymph Nodes: There are mildly prominent pelvic lymph nodes,  left-greater-than-right. These are likely reactive.      Reproductive Organs: The prostate gland is enlarged.      Visualized musculoskeletal structures: There are degenerative changes  of the spine. Spurring of the bilateral sacroiliac joints is noted.  There is osteoarthritic changes of the bilateral hips. There is  diffuse edema and skin thickening involving the left leg. There are  prominent venous varicosities of the left leg. There is osteopenia  involving the left foot. Bony destruction at the left 5th  metatarsophalangeal joint is noted with soft tissue gas surrounding  this region. There appears to be an open wound in the plantar aspect  of the foot centered at the 5th MTP joint. Curvilinear hyperdensity  within the soft tissues may reflect small bony fragments or foreign  bodies. No acute fracture or destructive osseous lesion is identified      IMPRESSION:  1. Diffuse partially calcified atherosclerotic disease, as detailed  above. There is a single vessel runoff in the right leg. In the left  leg, there is high-grade stenosis of the distal SFA as it crosses  Gaetano's canal. Additionally, there is occlusive disease of the  proximal trifurcation with reconstitution of the left posterior  tibial and peroneal arteries distally.      2. Significant soft tissue swelling of the left leg with prominent  venous varicosities. There is also soft tissue swelling of the left  foot with an open wound along the plantar aspect of the foot centered  at the 5th MTP joint, which demonstrates adjacent bony destruction.  Curvilinear hyperdensity in this region  may reflect bony fragments or  foreign bodies.      3. Coronary artery calcifications.          Left Lower Extremity Venous Doppler Ultrasound; 9/12/2024 4:43 PM    FINDINGS:   There is acute occlusive DVT demonstrated within the left mid-distal  femoral and popliteal veins.  The left common femoral and profunda femoral veins demonstrated normal  compressibility, normal phasic venous flow and normal response to  augmentation.  There is no evidence for echogenic thrombi.  The deep  calf veins are not well visualized due to subcutaneous edema.  The contralateral common femoral vein is free of thrombosis.  IMPRESSION:  Evidence for acute occlusive DVT within the left mid-distal femoral  and popliteal veins.      Assessment/Plan  Assessment/Plan   Principal Problem:    Osteomyelitis (Multi)  Active Problems:    Peripheral arterial disease (CMS-HCC)  chronic atherosclerotic disease bilaterally, LLE CLI, left foot necrotic ulcer with cellulitis and osteomyelitis  -peripheral angiogram +/- intervention with Dr. Stoddard on 9/18/24  -asa prior to procedure    Hypokalemia  -potassium 20meq once in addition to potassium 20meq given today 9/18/24       NP discussed with Dr. Stoddard regarding plan of care/ discharge plan      I spent 30 minutes in the professional and overall care of this patient.      Jagdish Loera, APRN-CNP, DNP

## 2024-09-18 NOTE — PROGRESS NOTES
INTERNAL MEDICINE PROGRESS NOTE      HPI:    No significant change.  Awaiting angiogram today.    Vital signs in last 24 hours:  Temp:  [-17.4 °C (0.7 °F)-37 °C (98.6 °F)] -17.4 °C (0.7 °F)  Heart Rate:  [63-78] 77  Resp:  [18-23] 23  BP: (146-177)/(59-82) 169/81    Physical Examination:  Physical Exam    Constitutional:       Appearance: Elderly, overweight, in no distress  HENT:      Head: Normocephalic and atraumatic.   Eyes:      Extraocular Movements: Extraocular movements intact.      Pupils: Pupils are equal, round, and reactive to light.   Cardiovascular:      Rate and Rhythm: Normal rate and regular rhythm.      Pulses: Normal pulses.      Heart sounds: Normal heart sounds.   Pulmonary:      Effort: Pulmonary effort is normal.      Breath sounds: Normal breath sounds.   Abdominal:      General: Abdomen is flat. Bowel sounds are normal.      Palpations: Abdomen is soft.   Musculoskeletal:         General: Normal range of motion.      Cervical back: Normal range of motion and neck supple.   Skin:     General: Skin is warm and dry.  Frequent erythema extending to the calf region, ulceration around the fifth toe with some drainage.  Neurological:      General: No focal deficit present.      Mental Status: He is alert and oriented to person, place, and time. Mental status is at baseline.        Medications:    Current Facility-Administered Medications:     acetaminophen (Tylenol) tablet 650 mg, 650 mg, oral, q4h PRN, 650 mg at 09/18/24 0940 **OR** acetaminophen (Tylenol) oral liquid 650 mg, 650 mg, oral, q4h PRN **OR** acetaminophen (Tylenol) suppository 650 mg, 650 mg, rectal, q4h PRN, Jemal Guadarrama MD    ammonium lactate (Lac-Hydrin) 12 % lotion, , Topical, BID, Jemal Guadarrama MD, Given at 09/18/24 1118    ampicillin-sulbactam (Unasyn) in sodium chloride 0.9 % 100 mL 3 g, 3 g, intravenous, q6h, Pantera Bostno MD, Stopped at 09/18/24 0610    dextrose 50 % injection 12.5 g, 12.5 g, intravenous,  q15 min PRN, Jemal Guadarrama MD    dextrose 50 % injection 25 g, 25 g, intravenous, q15 min PRN, Jemal Guadarrama MD    enoxaparin (Lovenox) syringe 105 mg, 1 mg/kg, subcutaneous, q12h, Jemal Guadarrama MD, 105 mg at 09/17/24 2316    glucagon (Glucagen) injection 1 mg, 1 mg, intramuscular, q15 min PRN, Jemal Guadarrama MD    glucagon (Glucagen) injection 1 mg, 1 mg, intramuscular, q15 min PRN, Jemal Guadarrama MD    insulin lispro (HumaLOG) injection 0-10 Units, 0-10 Units, subcutaneous, TID, Jemal Guadarrama MD, 2 Units at 09/17/24 1819    lidocaine (LMX) 4 % cream, , Topical, 4x daily PRN, Jemal Guadarrama MD, Given at 09/15/24 0227    polyethylene glycol (Glycolax, Miralax) packet 17 g, 17 g, oral, Daily, Jemal Guadarrama MD, 17 g at 09/15/24 0912    potassium chloride CR (Klor-Con M20) ER tablet 20 mEq, 20 mEq, oral, Daily, Jemal Guadarrama MD, 20 mEq at 09/18/24 0940    Laboratory Findings:  Lab Results   Component Value Date    WBC 8.2 09/18/2024    HGB 11.5 (L) 09/18/2024    HCT 35.3 (L) 09/18/2024    MCV 87 09/18/2024     09/18/2024     Lab Results   Component Value Date    INR 1.0 12/09/2019    PROTIME 11.4 12/09/2019     Lab Results   Component Value Date    GLUCOSE 118 (H) 09/18/2024    CALCIUM 8.2 (L) 09/18/2024     09/18/2024    K 3.4 (L) 09/18/2024    CO2 28 09/18/2024     09/18/2024    BUN 6 09/18/2024    CREATININE 0.54 09/18/2024       Assessment and Plan:     Acute osteomyelitis - continue with IV antibiotics, podiatry and ID following, awaiting vascular studies.  Peripheral vascular disease -angiogram today  DVT -continue with Lovenox as prescribed  Hypokalemia -replacement as ordered.       Jemal Guadarrama MD  09/18/24  11:25 AM

## 2024-09-18 NOTE — PROGRESS NOTES
INFECTIOUS DISEASE DAILY PROGRESS NOTE    SUBJECTIVE:    No new complaints. Plans for angiogram today. MRI done and pending read. No fevers.    OBJECTIVE:  VITALS (Last 24 Hours)  /82   Pulse 78   Temp 36.7 °C (98.1 °F)   Resp 18   SpO2 95%     PHYSICAL EXAM:  Gen - NAD, laying in bed  Lungs - no wheezing  Abd - soft, no ttp  Left Foot - dressing present, no signs of ascending infection    ABX: IV Unasyn    LABS:  Lab Results   Component Value Date    WBC 6.9 09/17/2024    HGB 10.9 (L) 09/17/2024    HCT 32.6 (L) 09/17/2024    MCV 86 09/17/2024     09/17/2024     Lab Results   Component Value Date    GLUCOSE 180 (H) 09/17/2024    CALCIUM 7.6 (L) 09/17/2024     09/17/2024    K 3.3 (L) 09/17/2024    CO2 28 09/17/2024     09/17/2024    BUN 6 09/17/2024    CREATININE 0.56 09/17/2024         Estimated Creatinine Clearance: 125 mL/min (by C-G formula based on SCr of 0.56 mg/dL).      ASSESSMENT/PLAN:    Left DM Foot Infection/Cellulitis due to mixed bacteria  Left 5th Toe Necrosis  PAD  DM II with Peripheral Neuropathy - poorly controlled A1C 9.8% 9/2024, increased risk/severity of infections    MRI foot read pending. Angiogram today. Any surgical plans for foot TBD after these.    IV Unasyn 3g Q6H.    Monitoring for adverse effects of abx such as rash/itching/diarrhea - none.    Will follow. Thanks!    Pantera Boston MD  [facility]  Office #[phone]

## 2024-09-18 NOTE — PROGRESS NOTES
PODIATRY SERVICE CONSULT PROGRESS NOTE    SERVICE DATE: 9/17/2024   SERVICE TIME:  1245    Subjective   INTERVAL HPI:  Elliot Partida is a 74 y.o. male presenting with PMH of R frontal extradural mass since 2016, arthritis, CAD, diabetes type 2, MI, HTN, thoracic radiculopathy, neuropathy who presents to [facility] for left leg pain that has been ongoing for a couple months. Patient reports increased swelling and redness to his left leg that he believes is from the wound on his 5th toe. Patient has been seeing Dr. Torres who believe the patient is need of a vascular consult for possible vascular intervention or evaluation to help induce healing to his left leg wound.   Pt was seen at bedside.  Pain well controlled.  Patient denies any constitutional symptoms.   No other pedal complaints.       Medications:  Scheduled Meds: ammonium lactate, , Topical, BID  ampicillin-sulbactam, 3 g, intravenous, q6h  enoxaparin, 1 mg/kg, subcutaneous, q12h  insulin lispro, 0-10 Units, subcutaneous, TID  polyethylene glycol, 17 g, oral, Daily  potassium chloride CR, 20 mEq, oral, Daily      Continuous Infusions:    PRN Meds: PRN medications: acetaminophen **OR** acetaminophen **OR** acetaminophen, dextrose, dextrose, glucagon, glucagon, lidocaine         Objective   PHYSICAL EXAM:  Physical Exam Performed:  Vitals:    09/17/24 1600   BP: 146/59   Pulse:    Resp:    Temp: 36.1 °C (97 °F)   SpO2: 97%     There is no height or weight on file to calculate BMI.    Patient is AOx3 and in no acute distress. Patient is alert and cooperative. Sitting comfortably in bed with dressing clean, dry and intact.   Right foot exam as patient declined left foot exam as dressing was just changed.      Vasc: DP and PT pulses are nonpalpable to right foot.  CFT is >5 seconds bilateral.  Skin Temperature to from cool to warm distally to proximally on the left lower extremity     Neuro:  Light touch is intact to the right foot  There is no clonus  noted.       Derm: Nails 1-5 bilateral are intact.  Skin is dry, flakey to the bilateral calves. Left calf and shin is erythematous with weeping.      MSK:  Ankle joint, subtalar joint, 1st MPJ and lesser MPJ ROM is limited and painful.  Tenderness to palpation of left foot and calf throughout, extremely tender over plantar surface of foot.     Wound:Declined today please refer to past exam      LABS:   Results for orders placed or performed during the hospital encounter of 09/14/24 (from the past 24 hour(s))   POCT GLUCOSE   Result Value Ref Range    POCT Glucose 159 (H) 74 - 99 mg/dL   CBC and Auto Differential   Result Value Ref Range    WBC 6.9 4.4 - 11.3 x10*3/uL    nRBC 0.0 0.0 - 0.0 /100 WBCs    RBC 3.79 (L) 4.50 - 5.90 x10*6/uL    Hemoglobin 10.9 (L) 13.5 - 17.5 g/dL    Hematocrit 32.6 (L) 41.0 - 52.0 %    MCV 86 80 - 100 fL    MCH 28.8 26.0 - 34.0 pg    MCHC 33.4 32.0 - 36.0 g/dL    RDW 12.6 11.5 - 14.5 %    Platelets 272 150 - 450 x10*3/uL    Neutrophils % 58.4 40.0 - 80.0 %    Immature Granulocytes %, Automated 1.2 (H) 0.0 - 0.9 %    Lymphocytes % 26.9 13.0 - 44.0 %    Monocytes % 10.4 2.0 - 10.0 %    Eosinophils % 2.5 0.0 - 6.0 %    Basophils % 0.6 0.0 - 2.0 %    Neutrophils Absolute 4.01 1.60 - 5.50 x10*3/uL    Immature Granulocytes Absolute, Automated 0.08 0.00 - 0.50 x10*3/uL    Lymphocytes Absolute 1.84 0.80 - 3.00 x10*3/uL    Monocytes Absolute 0.71 0.05 - 0.80 x10*3/uL    Eosinophils Absolute 0.17 0.00 - 0.40 x10*3/uL    Basophils Absolute 0.04 0.00 - 0.10 x10*3/uL   Basic Metabolic Panel   Result Value Ref Range    Glucose 180 (H) 74 - 99 mg/dL    Sodium 137 136 - 145 mmol/L    Potassium 3.3 (L) 3.5 - 5.3 mmol/L    Chloride 103 98 - 107 mmol/L    Bicarbonate 28 21 - 32 mmol/L    Anion Gap 9 (L) 10 - 20 mmol/L    Urea Nitrogen 6 6 - 23 mg/dL    Creatinine 0.56 0.50 - 1.30 mg/dL    eGFR >90 >60 mL/min/1.73m*2    Calcium 7.6 (L) 8.6 - 10.3 mg/dL   POCT GLUCOSE   Result Value Ref Range    POCT Glucose  155 (H) 74 - 99 mg/dL   POCT GLUCOSE   Result Value Ref Range    POCT Glucose 151 (H) 74 - 99 mg/dL   POCT GLUCOSE   Result Value Ref Range    POCT Glucose 149 (H) 74 - 99 mg/dL      Lab Results   Component Value Date    HGBA1C 9.8 (H) 09/13/2024      Lab Results   Component Value Date    CRP 9.51 (H) 09/13/2024      Lab Results   Component Value Date    SEDRATE 24 (H) 09/13/2024        Results from last 7 days   Lab Units 09/17/24  0955   WBC AUTO x10*3/uL 6.9   RBC AUTO x10*6/uL 3.79*   HEMOGLOBIN g/dL 10.9*   HEMATOCRIT % 32.6*     Results from last 7 days   Lab Units 09/17/24  0955 09/13/24  0337 09/12/24  1808   SODIUM mmol/L 137   < > 129*   POTASSIUM mmol/L 3.3*   < > 4.6   CHLORIDE mmol/L 103   < > 92*   CO2 mmol/L 28   < > 25   BUN mg/dL 6   < > 22   CREATININE mg/dL 0.56   < > 0.81   CALCIUM mg/dL 7.6*   < > 8.9   BILIRUBIN TOTAL mg/dL  --   --  0.6   ALT U/L  --   --  18   AST U/L  --   --  26    < > = values in this interval not displayed.     Results from last 7 days   Lab Units 09/12/24  1749   COLOR U  Yellow   APPEARANCE U  Clear   PH U  6.5   SPEC GRAV UR  1.025   PROTEIN U mg/dL NEGATIVE   BLOOD UR  NEGATIVE   NITRITE U  NEGATIVE       IMAGING REVIEW:  Vascular US Ankle Brachial Index (BEN) Without Exercise    Result Date: 9/16/2024             [place] [address]   Tel [phone] and Fax [phone]  Vascular Lab Report VASC US PVR WITHOUT EXERCISE  Patient Name:     SUBHASH Cain Physician: [id number] Sven Caicedo MD Study Date:       9/16/2024          Ordering           [id number] ELAINA DE LOS SANTOS                                      Physician: MRN/PID:          [id number]           Technologist:      Tamra Walls RVT Accession#:       [id number]       Technologist 2:    Graciela Tobar RVT Date of           1950 / 74      Encounter#:        [id number] Birth/Age:        years  Gender:           M Admission Status: Inpatient          Location           [facility]                                      Performed:  Diagnosis/ICD: Peripheral vascular disease, unspecified-I73.9 CPT Codes:     41814 Peripheral artery BEN Only  CONCLUSIONS: Right Lower PVR: No evidence of arterial occlusive disease in the right lower extremity at rest. Decreased digital perfusion noted. Multiphasic flow is noted in the right common femoral artery, right posterior tibial artery and right dorsalis pedis artery. Left Lower PVR: No evidence of arterial occlusive disease in the left lower extremity at rest. Decreased digital perfusion noted. Monophasic flow is noted in the left posterior tibial artery and left dorsalis pedis artery. Multiphasic flow is noted in the left common femoral artery. Waveform may be dmapened due to edema.  Additional Findings: Known DVT in the left femoral and popliteal veins.  Imaging & Doppler Findings:  RIGHT Lower PVR                Pressures Ratios Right Posterior Tibial (Ankle) 155 mmHg  0.99 Right Dorsalis Pedis (Ankle)   155 mmHg  0.99 Right Digit (Great Toe)        66 mmHg   0.42   LEFT Lower PVR                Pressures Ratios Left Posterior Tibial (Ankle) 142 mmHg  0.91 Left Dorsalis Pedis (Ankle)   128 mmHg  0.82 Left Digit (Great Toe)        52 mmHg   0.33                     Right     Left Brachial Pressure 156 mmHg 150 mmHg   [id number] Sven Caicedo MD Electronically signed by [id number] Sven Caicedo MD on 9/16/2024 at 7:50:39 PM  ** Final **     CT angio chest for pulmonary embolism    Result Date: 9/14/2024  Interpreted By:  Enedina Umana, STUDY: CT ANGIO CHEST FOR PULMONARY EMBOLISM;  9/14/2024 9:28 am   INDICATION: Signs/Symptoms:hypoxia, dvt.     COMPARISON: None.   ACCESSION NUMBER(S): [id number]   ORDERING CLINICIAN: ABIEL KENT   TECHNIQUE: CT of the chest was performed. Sagittal and coronal reconstructions were generated. 75 ML Omnipaque 350 intravenous  contrast given for the examination.  Multiplanar reconstructions of the pulmonary vessels were created on an independent workstation and provided for review.   FINDINGS: Artifact related to motion and overlying upper extremities limits evaluation.   CHEST WALL AND LOWER NECK: No significant axillary lymphadenopathy.   MEDIASTINUM AND ROLO:  No significant lymphadenopathy.   HEART AND VESSELS:  No central PE however limited assessment for peripheral PE due to technical factors including suboptimal timing of IV contrast bolus and artifact. The heart is normal in size. No significant pericardial effusion. Multifocal atherosclerotic calcifications including the coronary arteries and aortic and mitral valve regions. No thoracic aortic aneurysm.   LUNGS, PLEURA, LARGE AIRWAYS:  Respiratory motion artifact limits parenchymal evaluation. Mild pulmonary heterogeneity. Faint linear densities in both lung fields. Possible mosaic attenuation in both lung bases. Trace bilateral pleural effusions/thickening. The central airways are patent.   UPPER ABDOMEN:  Limited due to artifact related to overlying upper extremities. The included liver is likely mildly hypodense/fatty infiltrated. No definite focal mass lesion in the included upper abdominal viscera.   BONES:  Flowing osteophytes along the thoracic spine consistent with dish.       No central PE however peripheral PE can not be excluded based on this exam.   Scattered interstitial opacities including possible mosaic attenuation in the lung bases which could reflect small airways disease and air trapping or atypical infection. Trace bilateral pleural effusions/thickening.   Moderate atherosclerotic disease.   Possible fatty infiltration of the liver incidentally noted.   MACRO: None.   Signed by: Enedina Umana 9/14/2024 10:56 AM Dictation workstation:   [id number]    Transthoracic Echo (TTE) Complete    Result Date: 9/13/2024   [facility], [address] [place] [address],  [place] [address]              Tel [phone] and Fax [phone] TRANSTHORACIC ECHOCARDIOGRAM REPORT  Patient Name:      SUBHASH SCHMIDT LUZMA     Reading Physician:    [id number] Carson Freeman MD Study Date:        9/13/2024            Ordering Provider:    [id number] ABIEL KENT MRN/PID:           [id number]             Fellow: Accession#:        [id number]         Nurse: Date of Birth/Age: 1950 / 74 years  Sonographer:          Cailin Julian DILIP Gender:            M                    Additional Staff: Height:            175.26 cm            Admit Date:           9/12/2024 Weight:            107.05 kg            Admission Status:     Inpatient -                                                               Routine BSA / BMI:         2.22 m2 / 34.85      Encounter#:           [id number]                    kg/m2 Blood Pressure:    137/66 mmHg          Department Location:  [facility] Study Type:    TRANSTHORACIC ECHO (TTE) COMPLETE Diagnosis/ICD: Abnormal electrocardiogram [ECG] [EKG]-R94.31 Indication:    Abnormal EKG CPT Code:      Echo Complete w Full Doppler-25194 Patient History: Pertinent History: Edema, DM, abnomal EKG, wound infection, WMA. Study Detail: The following Echo studies were performed: 2D, M-Mode, Doppler and               color flow. Technically challenging study due to body habitus,               patient lying in supine position, prominent lung artifact and poor               acoustic windows. Definity used as a contrast agent for               endocardial border definition. Total contrast used for this               procedure was 3.0 mL via IV push.  PHYSICIAN INTERPRETATION: Left Ventricle: The left ventricular systolic function is normal, with a visually estimated ejection fraction of  60-65%. There are no regional left ventricular wall motion abnormalities. The left ventricular cavity size is normal. Spectral Doppler shows an impaired relaxation pattern of left ventricular diastolic filling. Left Atrium: The left atrium is normal in size. Right Ventricle: The right ventricle is normal in size. There is normal right ventricular global systolic function. Right Atrium: The right atrium is normal in size. Aortic Valve: The aortic valve is trileaflet. There is moderate aortic valve cusp calcification. There is evidence of mild to moderate aortic valve stenosis. There is no evidence of aortic valve regurgitation. The peak instantaneous gradient of the aortic valve is 26.2 mmHg. Mitral Valve: The mitral valve is normal in structure. There is mild mitral valve regurgitation. Tricuspid Valve: The tricuspid valve is structurally normal. There is mild tricuspid regurgitation. Pulmonic Valve: The pulmonic valve is structurally normal. There is physiologic pulmonic valve regurgitation. Pericardium: There is no pericardial effusion noted. Aorta: The aortic root is normal. Pulmonary Artery: The estimated pulmonary artery pressure is normal. Systemic Veins: The inferior vena cava appears mildly dilated.  CONCLUSIONS:  1. The left ventricular systolic function is normal, with a visually estimated ejection fraction of 60-65%.  2. Spectral Doppler shows an impaired relaxation pattern of left ventricular diastolic filling.  3. There is normal right ventricular global systolic function.  4. Mild to moderate aortic valve stenosis.  5. There is moderate aortic valve cusp calcification.  6. The inferior vena cava appears mildly dilated. QUANTITATIVE DATA SUMMARY:  2D MEASUREMENTS:         Normal Ranges: Ao Root d:       3.70 cm (2.0-3.7cm)  AORTA MEASUREMENTS:         Normal Ranges: Asc Ao, d:          3.90 cm (2.1-3.4cm)  LV SYSTOLIC FUNCTION BY 2D PLANIMETRY (MOD):                      Normal Ranges: EF-Visual:       63 % LV EF Reported: 63 %  LV DIASTOLIC FUNCTION:           Normal Ranges: MV Peak E:             1.05 m/s  (0.7-1.2 m/s) MV Peak A:             1.50 m/s  (0.42-0.7 m/s) E/A Ratio:             0.70      (1.0-2.2) MV e'                  0.073 m/s (>8.0) MV lateral e'          0.08 m/s MV medial e'           0.07 m/s E/e' Ratio:            14.40     (<8.0)  AORTIC VALVE:            Normal Ranges: AoV Vmax:      2.56 m/s  (<=1.7m/s) AoV Peak P.2 mmHg (<20mmHg) LVOT Max Jak:  1.25 m/s  (<=1.1m/s) LVOT VTI:      23.90 cm LVOT Diameter: 2.20 cm   (1.8-2.4cm) AoV Area,Vmax: 1.86 cm2  (2.5-4.5cm2)  RIGHT VENTRICLE: TAPSE: 21.1 mm RV s'  0.12 m/s  TRICUSPID VALVE/RVSP:         Normal Ranges: IVC Diam:             2.33 cm  PULMONIC VALVE:          Normal Ranges: PV Max Jak:     1.2 m/s  (0.6-0.9m/s) PV Max P.2 mmHg  [id number] Carson Freeman MD Electronically signed on 2024 at 7:18:35 PM  ** Final **     CT angio aorta and bilateral iliofemoral runoff w and or wo IV contrast    Result Date: 2024  Interpreted By:  Osorio Wong, STUDY: CT ANGIO AORTA AND BILATERAL ILIOFEMORAL RUNOFF W AND OR WO IV CONTRAST;  2024 12:07 pm   INDICATION: Signs/Symptoms:evaluation for circulation and wound.   COMPARISON: None.   ACCESSION NUMBER(S): [id number]   ORDERING CLINICIAN: ABIEL KENT   TECHNIQUE: CTA of the abdomen and pelvis, with runoff was performed.  Contiguous axial images were obtained at 3 mm slice thickness through the abdomen and pelvis, with runoff. 3D Coronal and sagittal reconstructions at 3 mm slice thickness were performed. 75 ml of contrast material  Omnipaque 350 were administered intravenously without immediate complication. FINDINGS:   CTA ABDOMEN VESSELS   Celiac axis: No significant stenosis.   SMA: Partially calcified disease at its origin results in moderate to severe stenosis.   RAUL: No significant stenosis.   Right renal vessels: Calcified disease of its proximal segment  results in moderate stenosis.   Left renal vessels: No significant stenosis.   Infrarenal aorta: Contains partially calcified disease without significant stenosis or aneurysmal dilation.   CTA  PELVIC VESSELS R. Common iliac artery: No significant stenosis.   R. External iliac artery: No significant stenosis.   R. Internal iliac artery: No significant stenosis.   L. Common iliac artery: No significant stenosis.   L. External iliac artery: No significant stenosis.   L. Internal iliac artery: Partially calcified disease results in mild to moderate stenosis.   CTA RIGHT LOWER EXTREMITY R. Common femoral artery: No significant stenosis.   R. Profunda femoris artery: No significant stenosis.   R. SFA: Partially calcified disease results in mild stenosis.   R. Popliteal artery: Partially calcified disease results in moderate stenosis of the above the knee segment. Predominantly calcified disease of the below-the-knee segment results in mild-to-moderate stenosis.   R. Anterior tibial artery: Occluded at its origin.   R. Tibioperoneal trunk: Partially calcified disease results in mild-to-moderate stenosis.   R. Posterior tibial artery: Occluded at its origin.   R. Peroneal artery: Contains scattered calcific disease of its mid to distal segment resulting in mild-to-moderate stenosis.   R. Dorsalis pedis artery: No significant stenosis.   R. Plantar artery: Not visualized.   CTA LEFT LOWER EXTREMITY L. Common femoral artery: No significant stenosis.   L. Profunda femoris artery: No significant stenosis.   L. SFA: Contains partially calcified disease predominantly in its mid to distal segment. This results in focal severe stenosis across Agetano's canal but no significant stenosis upstream.   L. Popliteal artery: Contains partially calcified atherosclerotic disease without significant stenosis.   L. Anterior tibial artery: Occluded shortly beyond its origin.   L. Tibioperoneal trunk: Contains partially calcified disease  resulting in moderate to severe stenosis.   L. Posterior tibial artery: Occluded at its origin, there is distal reconstitution across its middle 3rd as a diseased vessel containing scattered calcific disease resulting in mild stenosis. This vessel is seen to cross the ankle joint and supply the plantar artery.   L. Peroneal artery: Occluded at its origin, there is distal reconstitution at its middle 3rd as a small caliber vessel, supplying distal collaterals to the foot.   L. Dorsalis pedis artery: Not visualized   L. Plantar artery: No significant stenosis.   NON-VASCULAR FINDINGS   Visualized portions of the heart: Main pulmonary artery is enlarged, which can be seen in the setting of pulmonary arterial hypertension. The heart is not enlarged. There is coronary artery calcifications present in the distribution of all 3 vessels. There is no pericardial effusion.   Lungs: There is bilateral dependent atelectasis.   Hepatobiliary: Unremarkable liver without biliary dilation evident   Pancreas: Unremarkable   Spleen: Unremarkable   Adrenal Glands: Unremarkable   Kidneys, ureters, and bladder: There are bilateral parapelvic renal cysts and bilateral parenchymal cysts. There are bilateral extrarenal pelvises. There is no urolithiasis or hydroureteronephrosis. The urinary bladder demonstrates circumferential wall thickening which may be related to incomplete distention or cystitis.   GI tract: There is a 2.6 cm diverticulum off the gastric fundus. No evidence of obstruction. There is colonic diverticulosis without diverticulitis. The appendix is within normal limits.   Peritoneum and retroperitoneum: No free fluid or free air is noted.   Lymph Nodes: There are mildly prominent pelvic lymph nodes, left-greater-than-right. These are likely reactive.   Reproductive Organs: The prostate gland is enlarged.   Visualized musculoskeletal structures: There are degenerative changes of the spine. Spurring of the bilateral  sacroiliac joints is noted. There is osteoarthritic changes of the bilateral hips. There is diffuse edema and skin thickening involving the left leg. There are prominent venous varicosities of the left leg. There is osteopenia involving the left foot. Bony destruction at the left 5th metatarsophalangeal joint is noted with soft tissue gas surrounding this region. There appears to be an open wound in the plantar aspect of the foot centered at the 5th MTP joint. Curvilinear hyperdensity within the soft tissues may reflect small bony fragments or foreign bodies. No acute fracture or destructive osseous lesion is identified       1. Diffuse partially calcified atherosclerotic disease, as detailed above. There is a single vessel runoff in the right leg. In the left leg, there is high-grade stenosis of the distal SFA as it crosses Gaetano's canal. Additionally, there is occlusive disease of the proximal trifurcation with reconstitution of the left posterior tibial and peroneal arteries distally.   2. Significant soft tissue swelling of the left leg with prominent venous varicosities. There is also soft tissue swelling of the left foot with an open wound along the plantar aspect of the foot centered at the 5th MTP joint, which demonstrates adjacent bony destruction. Curvilinear hyperdensity in this region may reflect bony fragments or foreign bodies.   3. Coronary artery calcifications.   Signed by: Osorio Wong 9/13/2024 2:09 PM Dictation workstation:   [id number]    Lower extremity venous duplex left    Result Date: 9/12/2024  STUDY: Left Lower Extremity Venous Doppler Ultrasound; 9/12/2024 4:43 PM INDICATION: Swelling. COMPARISON: US LE Venous 3/21/2022. ACCESSION NUMBER(S): [id number] ORDERING CLINICIAN: KATIE HILTON TECHNIQUE:  Real-time grayscale imaging, color Doppler flow imaging, and spectral Doppler imaging of the left lower extremity veins was performed. FINDINGS: There is acute occlusive DVT demonstrated  within the left mid-distal femoral and popliteal veins. The left common femoral and profunda femoral veins demonstrated normal compressibility, normal phasic venous flow and normal response to augmentation.  There is no evidence for echogenic thrombi.  The deep calf veins are not well visualized due to subcutaneous edema. The contralateral common femoral vein is free of thrombosis.    Evidence for acute occlusive DVT within the left mid-distal femoral and popliteal veins. Compared to prior ultrasound, this represents a new finding. The positive findings on this exam were discussed with and acknowledged by Dr. Katie Hilton abdomen interpretation, 5:00 PM September 12, 2024 Signed by Jason Tobar MD    XR foot left 3+ views    Result Date: 9/12/2024  STUDY: Foot Radiographs; 9/12/2024 4:19 PM INDICATION: Left foot pain. COMPARISON: None Available. ACCESSION NUMBER(S): [id number] ORDERING CLINICIAN: KATIE HILTON TECHNIQUE:  Three view(s) of the left foot (four images). FINDINGS:  There is no displaced fracture.  The alignment is anatomic.  Soft tissue swelling and ulceration noted lateral to the fifth MTP joint associated with demineralization of the fifth proximal phalanx and the distal head of the fifth metatarsal.  Bony changes consistent with osteomyelitis.    Osteomyelitis of the fifth metatarsal head and proximal phalanx of the fifth toe. Signed by Td Francisco MD            Assessment/Plan   ASSESSMENT & PLAN:  Osteomyelitis (Multi)     #Left foot necrotic ulcer in setting of lower extremity cellulitis  #Osteomyelitis  #DM2     - Patient was seen and evaluated; all findings were discussed and all questions were answered to patient's satisfaction.  - Charts, labs, vitals and imaging all reviewed.   - Imaging: Osteomyelitis of the fifth metatarsal head and proximal phalanx of the  fifth toe. MRI Left foot ordered.  - Labs: WBC 7.7, CRP: 9.51, ESR:24  - PVRs: Decreased digital perfusion noted. Monophasic  flow is noted in the left posterior tibial artery and left dorsalis pedis artery.  - Lower extremity Duplex- Evidence for acute occlusive DVT within the left mid-distal femoral and popliteal veins.   - Blood culture: No growth 4 days     Plan:  - Abx: Per Primary  - Consulted Vascular for input will discuss surgical intervention after seen by the vascular team  - Dressings: Betadine soaked 4x4's, 4x4's, Krelix,  - Nursing staff is able to change/reinforce dressing if & as necessary until next day’s dressing change. Thank you.  - Podiatry will continue to follow while in house, Discussed with Dr. Obinna Spears     DVT ppx: Per Primary  Weightbearing: WBAT B/L LE     Patient was evaluated with attending Dr. Obinna Spears DPM     Case to be discussed with attending, A&P above reflects a tentative plan. Please await for the final signature from the attending physician on service.     Renny Nicole DPM PGY-1  Podiatric Medicine & Surgery    I saw and evaluated the patient. I personally obtained the key and critical portions of the history and physical exam or was physically present for key and critical portions performed by the resident/fellow. I reviewed the resident/fellow's documentation and discussed the patient with the resident/fellow. I agree with the resident/fellow's medical decision making as documented in the note.    I spent 30 minutes in the professional and overall care of this patient.    Obinna Spears DPM

## 2024-09-18 NOTE — PROGRESS NOTES
09/18/24 0642   Discharge Planning   Expected Discharge Disposition Home     PLAN/BARRIER: Continue Heparin drip, left angiogram today, podiatry plan, waiting on MRI read  DISP:  patient plans to return home  ADOD: 3-5 days  Susie Sims RN

## 2024-09-18 NOTE — CARE PLAN
Problem: Pain  Goal: Takes deep breaths with improved pain control throughout the shift  Outcome: Progressing  Goal: Turns in bed with improved pain control throughout the shift  Outcome: Progressing  Goal: Walks with improved pain control throughout the shift  Outcome: Progressing  Goal: Performs ADL's with improved pain control throughout shift  Outcome: Progressing  Goal: Participates in PT with improved pain control throughout the shift  Outcome: Progressing  Goal: Free from opioid side effects throughout the shift  Outcome: Progressing  Goal: Free from acute confusion related to pain meds throughout the shift  Outcome: Progressing   The patient's goals for the shift include      The clinical goals for the shift include pt safetyb to be maintaned

## 2024-09-19 ENCOUNTER — APPOINTMENT (OUTPATIENT)
Dept: CARDIOLOGY | Facility: HOSPITAL | Age: 74
End: 2024-09-19
Payer: MEDICARE

## 2024-09-19 LAB
ANION GAP SERPL CALC-SCNC: 9 MMOL/L (ref 10–20)
BASOPHILS # BLD MANUAL: 0 X10*3/UL (ref 0–0.1)
BASOPHILS NFR BLD MANUAL: 0 %
BUN SERPL-MCNC: 6 MG/DL (ref 6–23)
CALCIUM SERPL-MCNC: 7.7 MG/DL (ref 8.6–10.3)
CHLORIDE SERPL-SCNC: 103 MMOL/L (ref 98–107)
CO2 SERPL-SCNC: 27 MMOL/L (ref 21–32)
CREAT SERPL-MCNC: 0.5 MG/DL (ref 0.5–1.3)
EGFRCR SERPLBLD CKD-EPI 2021: >90 ML/MIN/1.73M*2
EOSINOPHIL # BLD MANUAL: 0.37 X10*3/UL (ref 0–0.4)
EOSINOPHIL NFR BLD MANUAL: 4 %
ERYTHROCYTE [DISTWIDTH] IN BLOOD BY AUTOMATED COUNT: 12.9 % (ref 11.5–14.5)
ERYTHROCYTE [DISTWIDTH] IN BLOOD BY AUTOMATED COUNT: 13.3 % (ref 11.5–14.5)
GLUCOSE BLD MANUAL STRIP-MCNC: 114 MG/DL (ref 74–99)
GLUCOSE BLD MANUAL STRIP-MCNC: 124 MG/DL (ref 74–99)
GLUCOSE BLD MANUAL STRIP-MCNC: 130 MG/DL (ref 74–99)
GLUCOSE BLD MANUAL STRIP-MCNC: 155 MG/DL (ref 74–99)
GLUCOSE SERPL-MCNC: 136 MG/DL (ref 74–99)
HCT VFR BLD AUTO: 33.7 % (ref 41–52)
HCT VFR BLD AUTO: 34.1 % (ref 41–52)
HGB BLD-MCNC: 10.9 G/DL (ref 13.5–17.5)
HGB BLD-MCNC: 11 G/DL (ref 13.5–17.5)
HOLD SPECIMEN: NORMAL
HOLD SPECIMEN: NORMAL
IMM GRANULOCYTES # BLD AUTO: 0.12 X10*3/UL (ref 0–0.5)
IMM GRANULOCYTES NFR BLD AUTO: 1.3 % (ref 0–0.9)
LYMPHOCYTES # BLD MANUAL: 2.23 X10*3/UL (ref 0.8–3)
LYMPHOCYTES NFR BLD MANUAL: 24 %
MCH RBC QN AUTO: 27.8 PG (ref 26–34)
MCH RBC QN AUTO: 27.9 PG (ref 26–34)
MCHC RBC AUTO-ENTMCNC: 32 G/DL (ref 32–36)
MCHC RBC AUTO-ENTMCNC: 32.6 G/DL (ref 32–36)
MCV RBC AUTO: 85 FL (ref 80–100)
MCV RBC AUTO: 87 FL (ref 80–100)
MONOCYTES # BLD MANUAL: 0.19 X10*3/UL (ref 0.05–0.8)
MONOCYTES NFR BLD MANUAL: 2 %
NEUTS SEG # BLD MANUAL: 6.05 X10*3/UL (ref 1.6–5)
NEUTS SEG NFR BLD MANUAL: 65 %
NRBC BLD-RTO: 0 /100 WBCS (ref 0–0)
NRBC BLD-RTO: 0 /100 WBCS (ref 0–0)
PLATELET # BLD AUTO: 281 X10*3/UL (ref 150–450)
PLATELET # BLD AUTO: 284 X10*3/UL (ref 150–450)
POTASSIUM SERPL-SCNC: 3.4 MMOL/L (ref 3.5–5.3)
RBC # BLD AUTO: 3.9 X10*6/UL (ref 4.5–5.9)
RBC # BLD AUTO: 3.95 X10*6/UL (ref 4.5–5.9)
RBC MORPH BLD: ABNORMAL
SODIUM SERPL-SCNC: 136 MMOL/L (ref 136–145)
TOTAL CELLS COUNTED BLD: 100
UFH PPP CHRO-ACNC: 0.6 IU/ML
UFH PPP CHRO-ACNC: 0.7 IU/ML
UFH PPP CHRO-ACNC: 0.8 IU/ML
UFH PPP CHRO-ACNC: 0.9 IU/ML
VARIANT LYMPHS # BLD MANUAL: 0.47 X10*3/UL (ref 0–0.3)
VARIANT LYMPHS NFR BLD: 5 %
WBC # BLD AUTO: 8.8 X10*3/UL (ref 4.4–11.3)
WBC # BLD AUTO: 9.3 X10*3/UL (ref 4.4–11.3)

## 2024-09-19 PROCEDURE — 85027 COMPLETE CBC AUTOMATED: CPT | Performed by: NURSE PRACTITIONER

## 2024-09-19 PROCEDURE — 36415 COLL VENOUS BLD VENIPUNCTURE: CPT | Performed by: INTERNAL MEDICINE

## 2024-09-19 PROCEDURE — 2500000002 HC RX 250 W HCPCS SELF ADMINISTERED DRUGS (ALT 637 FOR MEDICARE OP, ALT 636 FOR OP/ED): Performed by: INTERNAL MEDICINE

## 2024-09-19 PROCEDURE — 82947 ASSAY GLUCOSE BLOOD QUANT: CPT

## 2024-09-19 PROCEDURE — 93005 ELECTROCARDIOGRAM TRACING: CPT

## 2024-09-19 PROCEDURE — 85520 HEPARIN ASSAY: CPT | Performed by: NURSE PRACTITIONER

## 2024-09-19 PROCEDURE — 2500000001 HC RX 250 WO HCPCS SELF ADMINISTERED DRUGS (ALT 637 FOR MEDICARE OP): Performed by: NURSE PRACTITIONER

## 2024-09-19 PROCEDURE — 85007 BL SMEAR W/DIFF WBC COUNT: CPT | Performed by: NURSE PRACTITIONER

## 2024-09-19 PROCEDURE — 85520 HEPARIN ASSAY: CPT | Performed by: INTERNAL MEDICINE

## 2024-09-19 PROCEDURE — 2500000004 HC RX 250 GENERAL PHARMACY W/ HCPCS (ALT 636 FOR OP/ED): Performed by: NURSE PRACTITIONER

## 2024-09-19 PROCEDURE — 2500000002 HC RX 250 W HCPCS SELF ADMINISTERED DRUGS (ALT 637 FOR MEDICARE OP, ALT 636 FOR OP/ED): Performed by: NURSE PRACTITIONER

## 2024-09-19 PROCEDURE — 80048 BASIC METABOLIC PNL TOTAL CA: CPT | Performed by: NURSE PRACTITIONER

## 2024-09-19 PROCEDURE — 36415 COLL VENOUS BLD VENIPUNCTURE: CPT | Performed by: NURSE PRACTITIONER

## 2024-09-19 PROCEDURE — 1200000002 HC GENERAL ROOM WITH TELEMETRY DAILY

## 2024-09-19 RX ORDER — POTASSIUM CHLORIDE 20 MEQ/1
20 TABLET, EXTENDED RELEASE ORAL ONCE
Status: COMPLETED | OUTPATIENT
Start: 2024-09-19 | End: 2024-09-19

## 2024-09-19 ASSESSMENT — COGNITIVE AND FUNCTIONAL STATUS - GENERAL
MOVING FROM LYING ON BACK TO SITTING ON SIDE OF FLAT BED WITH BEDRAILS: A LITTLE
CLIMB 3 TO 5 STEPS WITH RAILING: A LOT
DAILY ACTIVITIY SCORE: 19
HELP NEEDED FOR BATHING: A LITTLE
MOBILITY SCORE: 18
DAILY ACTIVITIY SCORE: 19
TOILETING: A LITTLE
WALKING IN HOSPITAL ROOM: A LITTLE
TURNING FROM BACK TO SIDE WHILE IN FLAT BAD: A LITTLE
PERSONAL GROOMING: A LITTLE
STANDING UP FROM CHAIR USING ARMS: A LITTLE
HELP NEEDED FOR BATHING: A LITTLE
DRESSING REGULAR LOWER BODY CLOTHING: A LITTLE
MOVING TO AND FROM BED TO CHAIR: A LITTLE
PERSONAL GROOMING: A LITTLE
DAILY ACTIVITIY SCORE: 20
TOILETING: A LITTLE
WALKING IN HOSPITAL ROOM: A LITTLE
DRESSING REGULAR LOWER BODY CLOTHING: A LITTLE
STANDING UP FROM CHAIR USING ARMS: A LITTLE
HELP NEEDED FOR BATHING: A LITTLE
WALKING IN HOSPITAL ROOM: A LITTLE
PERSONAL GROOMING: A LITTLE
CLIMB 3 TO 5 STEPS WITH RAILING: A LOT
MOVING TO AND FROM BED TO CHAIR: A LITTLE
MOBILITY SCORE: 17
DAILY ACTIVITIY SCORE: 19
MOVING FROM LYING ON BACK TO SITTING ON SIDE OF FLAT BED WITH BEDRAILS: A LITTLE
CLIMB 3 TO 5 STEPS WITH RAILING: A LOT
TOILETING: A LITTLE
TURNING FROM BACK TO SIDE WHILE IN FLAT BAD: A LITTLE
CLIMB 3 TO 5 STEPS WITH RAILING: A LOT
MOVING TO AND FROM BED TO CHAIR: A LITTLE
DRESSING REGULAR LOWER BODY CLOTHING: A LITTLE
MOVING TO AND FROM BED TO CHAIR: A LITTLE
MOBILITY SCORE: 17
DRESSING REGULAR UPPER BODY CLOTHING: A LITTLE
STANDING UP FROM CHAIR USING ARMS: A LITTLE
DRESSING REGULAR UPPER BODY CLOTHING: A LITTLE
DRESSING REGULAR UPPER BODY CLOTHING: A LITTLE
TURNING FROM BACK TO SIDE WHILE IN FLAT BAD: A LITTLE
TURNING FROM BACK TO SIDE WHILE IN FLAT BAD: A LITTLE
MOBILITY SCORE: 17
DRESSING REGULAR UPPER BODY CLOTHING: A LITTLE
WALKING IN HOSPITAL ROOM: A LITTLE
DRESSING REGULAR LOWER BODY CLOTHING: A LITTLE
STANDING UP FROM CHAIR USING ARMS: A LITTLE
HELP NEEDED FOR BATHING: A LITTLE
TOILETING: A LITTLE
MOVING FROM LYING ON BACK TO SITTING ON SIDE OF FLAT BED WITH BEDRAILS: A LITTLE

## 2024-09-19 ASSESSMENT — PAIN SCALES - GENERAL
PAINLEVEL_OUTOF10: 0 - NO PAIN
PAINLEVEL_OUTOF10: 0 - NO PAIN

## 2024-09-19 ASSESSMENT — PAIN - FUNCTIONAL ASSESSMENT
PAIN_FUNCTIONAL_ASSESSMENT: 0-10
PAIN_FUNCTIONAL_ASSESSMENT: 0-10

## 2024-09-19 NOTE — NURSING NOTE
Heparin assay result 0.3 at this time. Heparin gtt initiated @ 2000unit/hr or 20cc/hr. Rate verified with second RN.

## 2024-09-19 NOTE — PROGRESS NOTES
"  INFECTIOUS DISEASE DAILY PROGRESS NOTE    SUBJECTIVE:    MRI foot done and pending read. No new issues/symptoms. Denies diarrhea.    OBJECTIVE:  VITALS (Last 24 Hours)  /73 (BP Location: Left arm, Patient Position: Lying)   Pulse 68   Temp 36.7 °C (98.1 °F) (Oral)   Resp 20   Ht 1.753 m (5' 9\")   Wt 102 kg (225 lb 8.5 oz)   SpO2 96%   BMI 33.31 kg/m²     PHYSICAL EXAM:  Gen - NAD, laying in bed  Heart - RRR  Abd - soft, no ttp, BS present  Left Foot - dressing present, no signs of ascending infection    ABX: IV Unasyn    LABS:  Lab Results   Component Value Date    WBC 9.3 09/19/2024    HGB 11.0 (L) 09/19/2024    HCT 33.7 (L) 09/19/2024    MCV 85 09/19/2024     09/19/2024     Lab Results   Component Value Date    GLUCOSE 136 (H) 09/19/2024    CALCIUM 7.7 (L) 09/19/2024     09/19/2024    K 3.4 (L) 09/19/2024    CO2 27 09/19/2024     09/19/2024    BUN 6 09/19/2024    CREATININE 0.50 09/19/2024         Estimated Creatinine Clearance: 125 mL/min (by C-G formula based on SCr of 0.5 mg/dL).      ASSESSMENT/PLAN:    Left DM Foot Infection/Cellulitis due to mixed bacteria  Left 5th Toe Necrosis  PAD - s/p angiogram 9/18, LLE CLI status post successful revascularization using PTA-DCB angioplasty to distal left SFA and mid-distal popliteal, and PTA to left TPT-PT with good results.  Left PT with non flow-limiting dissection.   DM II with Peripheral Neuropathy - poorly controlled A1C 9.8% 9/2024, increased risk/severity of infections    MRI foot read pending. IV Unasyn 3g Q6H.    Monitoring for adverse effects of abx such as rash/itching/diarrhea - none present.    Will follow. Thanks!    Pantera Boston MD  [facility]  Office #[phone]      "

## 2024-09-19 NOTE — CARE PLAN
Problem: Pain  Goal: Takes deep breaths with improved pain control throughout the shift  Outcome: Progressing  Goal: Turns in bed with improved pain control throughout the shift  Outcome: Progressing  Goal: Walks with improved pain control throughout the shift  Outcome: Progressing  Goal: Performs ADL's with improved pain control throughout shift  Outcome: Progressing  Goal: Participates in PT with improved pain control throughout the shift  Outcome: Progressing  Goal: Free from opioid side effects throughout the shift  Outcome: Progressing  Goal: Free from acute confusion related to pain meds throughout the shift  Outcome: Progressing   The patient's goals for the shift include      The clinical goals for the shift include safety will be maintained

## 2024-09-19 NOTE — NURSING NOTE
Received pt back into room 720 at this time. Pt oriented to suroundings. Cardiac monitor applied. VSS, Saline lock x2 in place to right wrist. Right groin drsg intact and dry. Pt denies numbness/tingling to lower extremities. Groin area soft to touch. Right pedal pulse doppled. Left pedal pulse doppled. Drsg to left foot intact.

## 2024-09-19 NOTE — PROGRESS NOTES
INTERNAL MEDICINE PROGRESS NOTE      HPI:    Doing okay, bleeding noted around wound.     Vital signs in last 24 hours:  Temp:  [36 °C (96.8 °F)-36.8 °C (98.2 °F)] 36.8 °C (98.2 °F)  Heart Rate:  [60-74] 68  Resp:  [12-20] 20  BP: (139-187)/(57-96) 183/74    Physical Examination:  Physical Exam    Constitutional:       Appearance: Elderly, overweight, in no distress  HENT:      Head: Normocephalic and atraumatic.   Eyes:      Extraocular Movements: Extraocular movements intact.      Pupils: Pupils are equal, round, and reactive to light.   Cardiovascular:      Rate and Rhythm: Normal rate and regular rhythm.      Pulses: Normal pulses.      Heart sounds: Normal heart sounds.   Pulmonary:      Effort: Pulmonary effort is normal.      Breath sounds: Normal breath sounds.   Abdominal:      General: Abdomen is flat. Bowel sounds are normal.      Palpations: Abdomen is soft.   Musculoskeletal:         General: Normal range of motion.      Cervical back: Normal range of motion and neck supple.   Skin:     General: Skin is warm and dry.  Frequent erythema extending to the calf region, ulceration around the fifth toe with some bloody  drainage.  Neurological:      General: No focal deficit present.      Mental Status: He is alert and oriented to person, place, and time. Mental status is at baseline.        Medications:    Current Facility-Administered Medications:     acetaminophen (Tylenol) tablet 650 mg, 650 mg, oral, q4h PRN, 650 mg at 09/18/24 0940 **OR** acetaminophen (Tylenol) oral liquid 650 mg, 650 mg, oral, q4h PRN **OR** acetaminophen (Tylenol) suppository 650 mg, 650 mg, rectal, q4h PRN, JUDITH Shay DNP    ammonium lactate (Lac-Hydrin) 12 % lotion, , Topical, BID, JUDITH Shay DNP, Given at 09/19/24 0951    ampicillin-sulbactam (Unasyn) in sodium chloride 0.9 % 100 mL 3 g, 3 g, intravenous, q6h, JUDITH Shay DNP, Stopped at 09/19/24 0955    aspirin  chewable tablet 81 mg, 81 mg, oral, Daily, JUDITH Shay DNP, 81 mg at 09/19/24 0950    clopidogrel (Plavix) tablet 600 mg, 600 mg, oral, Once, JUDITH Shay DNP    clopidogrel (Plavix) tablet 75 mg, 75 mg, oral, Daily, JUDITH Shay DNP, 75 mg at 09/19/24 0950    dextrose 50 % injection 12.5 g, 12.5 g, intravenous, q15 min PRN, JUDITH Shay DNP    dextrose 50 % injection 25 g, 25 g, intravenous, q15 min PRN, JUDITH Shay DNP    glucagon (Glucagen) injection 1 mg, 1 mg, intramuscular, q15 min PRN, JUDITH Shay DNP    glucagon (Glucagen) injection 1 mg, 1 mg, intramuscular, q15 min PRN, JUDITH Shay DNP    heparin 25,000 Units in dextrose 5% 250 mL (100 Units/mL) infusion (premix), 0-4,500 Units/hr, intravenous, Continuous, JUDITH Shay DNP, Last Rate: 18 mL/hr at 09/19/24 1203, 1,800 Units/hr at 09/19/24 1203    heparin bolus from bag 3,000-6,000 Units, 3,000-6,000 Units, intravenous, q4h PRN, JUDITH Shay DNP    insulin lispro (HumaLOG) injection 0-10 Units, 0-10 Units, subcutaneous, TID, JUDITH Shay DNP, 2 Units at 09/17/24 1819    lidocaine (LMX) 4 % cream, , Topical, 4x daily PRN, JUDITH Shay DNP, Given at 09/15/24 0227    oxygen (O2) therapy, , inhalation, Continuous - 02/gases, Jagdish A Miktarian, APRN-CNP, DNP    polyethylene glycol (Glycolax, Miralax) packet 17 g, 17 g, oral, Daily, Jagdish A Miktarian, APRN-CNP, DNP, 17 g at 09/15/24 0912    potassium chloride CR (Klor-Con M20) ER tablet 20 mEq, 20 mEq, oral, Daily, Jagdish A Miktarian, APRN-CNP, DNP, 20 mEq at 09/19/24 0950    Laboratory Findings:  Lab Results   Component Value Date    WBC 9.3 09/19/2024    HGB 11.0 (L) 09/19/2024    HCT 33.7 (L) 09/19/2024    MCV 85 09/19/2024     09/19/2024     Lab Results   Component Value Date    INR 1.0 12/09/2019    PROTIME 11.4  12/09/2019     Lab Results   Component Value Date    GLUCOSE 136 (H) 09/19/2024    CALCIUM 7.7 (L) 09/19/2024     09/19/2024    K 3.4 (L) 09/19/2024    CO2 27 09/19/2024     09/19/2024    BUN 6 09/19/2024    CREATININE 0.50 09/19/2024       Assessment and Plan:     Acute osteomyelitis - continue with IV antibiotics, podiatry and ID following, will need amputation per podiatry.   Peripheral vascular disease -S/p angiogram.   DVT -continue with Lovenox as prescribed  Hypokalemia -Continue to monitor lab work.        Jemal Guadarrama MD  09/19/24  2:52 PM

## 2024-09-19 NOTE — CARE PLAN
The patient's goals for the shift include      The clinical goals for the shift include safety will be maintained    Over the shift, the patient did not make progress toward the following goals. Barriers to progression include . Recommendations to address these barriers include .

## 2024-09-20 ENCOUNTER — APPOINTMENT (OUTPATIENT)
Dept: CARDIOLOGY | Facility: HOSPITAL | Age: 74
End: 2024-09-20
Payer: MEDICARE

## 2024-09-20 LAB
ANION GAP SERPL CALC-SCNC: 10 MMOL/L (ref 10–20)
BUN SERPL-MCNC: 7 MG/DL (ref 6–23)
CALCIUM SERPL-MCNC: 8.1 MG/DL (ref 8.6–10.3)
CHLORIDE SERPL-SCNC: 104 MMOL/L (ref 98–107)
CO2 SERPL-SCNC: 27 MMOL/L (ref 21–32)
CREAT SERPL-MCNC: 0.55 MG/DL (ref 0.5–1.3)
EGFRCR SERPLBLD CKD-EPI 2021: >90 ML/MIN/1.73M*2
GLUCOSE BLD MANUAL STRIP-MCNC: 131 MG/DL (ref 74–99)
GLUCOSE BLD MANUAL STRIP-MCNC: 146 MG/DL (ref 74–99)
GLUCOSE BLD MANUAL STRIP-MCNC: 146 MG/DL (ref 74–99)
GLUCOSE SERPL-MCNC: 117 MG/DL (ref 74–99)
POTASSIUM SERPL-SCNC: 3.4 MMOL/L (ref 3.5–5.3)
SODIUM SERPL-SCNC: 138 MMOL/L (ref 136–145)
UFH PPP CHRO-ACNC: 0.7 IU/ML

## 2024-09-20 PROCEDURE — 36415 COLL VENOUS BLD VENIPUNCTURE: CPT | Performed by: INTERNAL MEDICINE

## 2024-09-20 PROCEDURE — 1200000002 HC GENERAL ROOM WITH TELEMETRY DAILY

## 2024-09-20 PROCEDURE — 85520 HEPARIN ASSAY: CPT | Performed by: NURSE PRACTITIONER

## 2024-09-20 PROCEDURE — 80048 BASIC METABOLIC PNL TOTAL CA: CPT | Performed by: INTERNAL MEDICINE

## 2024-09-20 PROCEDURE — 82947 ASSAY GLUCOSE BLOOD QUANT: CPT

## 2024-09-20 PROCEDURE — 2500000004 HC RX 250 GENERAL PHARMACY W/ HCPCS (ALT 636 FOR OP/ED): Performed by: NURSE PRACTITIONER

## 2024-09-20 PROCEDURE — 2500000001 HC RX 250 WO HCPCS SELF ADMINISTERED DRUGS (ALT 637 FOR MEDICARE OP): Performed by: NURSE PRACTITIONER

## 2024-09-20 PROCEDURE — 2500000002 HC RX 250 W HCPCS SELF ADMINISTERED DRUGS (ALT 637 FOR MEDICARE OP, ALT 636 FOR OP/ED): Performed by: NURSE PRACTITIONER

## 2024-09-20 PROCEDURE — 93005 ELECTROCARDIOGRAM TRACING: CPT

## 2024-09-20 RX ORDER — POTASSIUM CHLORIDE 20 MEQ/1
40 TABLET, EXTENDED RELEASE ORAL DAILY
Status: DISPENSED | OUTPATIENT
Start: 2024-09-21

## 2024-09-20 ASSESSMENT — PAIN - FUNCTIONAL ASSESSMENT
PAIN_FUNCTIONAL_ASSESSMENT: 0-10
PAIN_FUNCTIONAL_ASSESSMENT: 0-10

## 2024-09-20 ASSESSMENT — PAIN SCALES - GENERAL
PAINLEVEL_OUTOF10: 0 - NO PAIN
PAINLEVEL_OUTOF10: 0 - NO PAIN

## 2024-09-20 ASSESSMENT — COGNITIVE AND FUNCTIONAL STATUS - GENERAL
MOBILITY SCORE: 17
DAILY ACTIVITIY SCORE: 20
DRESSING REGULAR UPPER BODY CLOTHING: A LITTLE
HELP NEEDED FOR BATHING: A LITTLE
DRESSING REGULAR LOWER BODY CLOTHING: A LITTLE
MOVING FROM LYING ON BACK TO SITTING ON SIDE OF FLAT BED WITH BEDRAILS: A LITTLE
CLIMB 3 TO 5 STEPS WITH RAILING: A LOT
WALKING IN HOSPITAL ROOM: A LITTLE
TOILETING: A LITTLE
TURNING FROM BACK TO SIDE WHILE IN FLAT BAD: A LITTLE
STANDING UP FROM CHAIR USING ARMS: A LITTLE
MOVING TO AND FROM BED TO CHAIR: A LITTLE

## 2024-09-20 NOTE — PROGRESS NOTES
"  INFECTIOUS DISEASE DAILY PROGRESS NOTE    SUBJECTIVE:    I had thought MRI was done but may have been cancelled? He is not sure what happened. Denies any new issues. No very talkative and mostly wants to stay asleep.    OBJECTIVE:  VITALS (Last 24 Hours)  /61 (BP Location: Left arm, Patient Position: Lying)   Pulse 81   Temp 36.9 °C (98.5 °F) (Temporal)   Resp 18   Ht 1.753 m (5' 9\")   Wt 102 kg (225 lb 8.5 oz)   SpO2 95%   BMI 33.31 kg/m²     PHYSICAL EXAM:  Gen - NAD, doesn't want to talk  Heart - RRR  Abd - soft, no ttp, BS present  Left Foot - dressing present, no signs of ascending infection. Overall stable.    ABX: IV Unasyn    LABS:  Lab Results   Component Value Date    WBC 8.8 09/19/2024    HGB 10.9 (L) 09/19/2024    HCT 34.1 (L) 09/19/2024    MCV 87 09/19/2024     09/19/2024     Lab Results   Component Value Date    GLUCOSE 117 (H) 09/20/2024    CALCIUM 8.1 (L) 09/20/2024     09/20/2024    K 3.4 (L) 09/20/2024    CO2 27 09/20/2024     09/20/2024    BUN 7 09/20/2024    CREATININE 0.55 09/20/2024         Estimated Creatinine Clearance: 125 mL/min (by C-G formula based on SCr of 0.55 mg/dL).      ASSESSMENT/PLAN:    Left DM Foot Infection/Cellulitis due to mixed bacteria  Left 5th Toe Necrosis  PAD - s/p angiogram 9/18, LLE CLI status post successful revascularization using PTA-DCB angioplasty to distal left SFA and mid-distal popliteal, and PTA to left TPT-PT with good results.  Left PT with non flow-limiting dissection.   DM II with Peripheral Neuropathy - poorly controlled A1C 9.8% 9/2024, increased risk/severity of infections    IV Unasyn 3g Q6H. Surgical intervention pending.    Monitoring for adverse effects of abx such as rash/itching/diarrhea - none apparent.    Will follow peripherally over the weekend. Please call Dr. Kessler with questions. Thanks!    Pantera Boston MD  [facility]  Office #[phone]      "

## 2024-09-20 NOTE — PROGRESS NOTES
INTERNAL MEDICINE PROGRESS NOTE      HPI:    Lying in bed, c/o overall fatigue. No other complaints noted.     Vital signs in last 24 hours:  Temp:  [36.6 °C (97.9 °F)-37.1 °C (98.7 °F)] 37.1 °C (98.7 °F)  Heart Rate:  [67-81] 81  Resp:  [18] 18  BP: (153-179)/(61-81) 159/68    Physical Examination:  Physical Exam    Constitutional:       Appearance: Elderly, overweight, in no distress  HENT:      Head: Normocephalic and atraumatic.   Eyes:      Extraocular Movements: Extraocular movements intact.      Pupils: Pupils are equal, round, and reactive to light.   Cardiovascular:      Rate and Rhythm: Normal rate and regular rhythm.      Pulses: Normal pulses.      Heart sounds: Normal heart sounds.   Pulmonary:      Effort: Pulmonary effort is normal.      Breath sounds: Normal breath sounds.   Abdominal:      General: Abdomen is flat. Bowel sounds are normal.      Palpations: Abdomen is soft.   Musculoskeletal:         General: Normal range of motion.      Cervical back: Normal range of motion and neck supple.   Skin:     General: Skin is warm and dry.  Frequent erythema extending to the calf region, ulceration around the fifth toe with some bloody  drainage.  Neurological:      General: No focal deficit present.      Mental Status: He is alert and oriented to person, place, and time. Mental status is at baseline.        Medications:    Current Facility-Administered Medications:     acetaminophen (Tylenol) tablet 650 mg, 650 mg, oral, q4h PRN, 650 mg at 09/18/24 0940 **OR** acetaminophen (Tylenol) oral liquid 650 mg, 650 mg, oral, q4h PRN **OR** acetaminophen (Tylenol) suppository 650 mg, 650 mg, rectal, q4h PRN, JUDITH Shay DNP    ammonium lactate (Lac-Hydrin) 12 % lotion, , Topical, BID, JUDITH Shay DNP, Given at 09/20/24 0900    ampicillin-sulbactam (Unasyn) in sodium chloride 0.9 % 100 mL 3 g, 3 g, intravenous, q6h, JUDITH Shay DNP, Last Rate: 200  mL/hr at 09/20/24 1119, 3 g at 09/20/24 1119    aspirin chewable tablet 81 mg, 81 mg, oral, Daily, JUDITH Shay, ANTONIA, 81 mg at 09/20/24 0850    clopidogrel (Plavix) tablet 600 mg, 600 mg, oral, Once, JUDITH Shay, DNP    clopidogrel (Plavix) tablet 75 mg, 75 mg, oral, Daily, JUDITH Shay, DNP, 75 mg at 09/20/24 0850    dextrose 50 % injection 12.5 g, 12.5 g, intravenous, q15 min PRN, JUDITH Shay, ANTONIA    dextrose 50 % injection 25 g, 25 g, intravenous, q15 min PRN, JUDITH Shay, ANTONIA    glucagon (Glucagen) injection 1 mg, 1 mg, intramuscular, q15 min PRN, JUDITH Shay, ANTONIA    glucagon (Glucagen) injection 1 mg, 1 mg, intramuscular, q15 min PRN, JUDITH Shay, DNP    heparin 25,000 Units in dextrose 5% 250 mL (100 Units/mL) infusion (premix), 0-4,500 Units/hr, intravenous, Continuous, JUDITH Shay DNP, Last Rate: 16 mL/hr at 09/20/24 0403, 1,600 Units/hr at 09/20/24 0403    heparin bolus from bag 3,000-6,000 Units, 3,000-6,000 Units, intravenous, q4h PRN, JUDITH Shay, ANTONIA    insulin lispro (HumaLOG) injection 0-10 Units, 0-10 Units, subcutaneous, TID, JUDITH Shay DNP, 2 Units at 09/17/24 1819    lidocaine (LMX) 4 % cream, , Topical, 4x daily PRN, JUDITH Shay DNP, Given at 09/15/24 0227    oxygen (O2) therapy, , inhalation, Continuous - 02/gases, Jagdish Loera, APRN-CNP, DNP    polyethylene glycol (Glycolax, Miralax) packet 17 g, 17 g, oral, Daily, Jagdish Loera, APRN-CNP, DNP, 17 g at 09/15/24 0912    potassium chloride CR (Klor-Con M20) ER tablet 20 mEq, 20 mEq, oral, Daily, Jagdish Loera, APRN-CNP, DNP, 20 mEq at 09/20/24 0850    Laboratory Findings:  Lab Results   Component Value Date    WBC 8.8 09/19/2024    HGB 10.9 (L) 09/19/2024    HCT 34.1 (L) 09/19/2024    MCV 87 09/19/2024     09/19/2024     Lab Results    Component Value Date    INR 1.0 12/09/2019    PROTIME 11.4 12/09/2019     Lab Results   Component Value Date    GLUCOSE 117 (H) 09/20/2024    CALCIUM 8.1 (L) 09/20/2024     09/20/2024    K 3.4 (L) 09/20/2024    CO2 27 09/20/2024     09/20/2024    BUN 7 09/20/2024    CREATININE 0.55 09/20/2024       Assessment and Plan:     Acute osteomyelitis - continue with IV antibiotics, podiatry and ID following, will need amputation per podiatry. Pending sx date, continue with local wound care. No c/o pain.  Podiatry recs state they are awaiting vascular clearance prior to intervention.   Peripheral vascular disease -S/p angiogram.   Acute DVT -continue with Lovenox as prescribed  Hypokalemia -Continue with replacement therapy.   Type 2 DM-Monitor BS closely with the above.        SARAH Darling-CNP  09/20/24  11:31 AM

## 2024-09-20 NOTE — PROGRESS NOTES
.PODIATRY SERVICE CONSULT PROGRESS NOTE    SERVICE DATE: 9/19/2024   SERVICE TIME:  1530    Subjective   INTERVAL HPI:  Elliot Partida is a 74 y.o. male presenting with PMH of R frontal extradural mass since 2016, arthritis, CAD, diabetes type 2, MI, HTN, thoracic radiculopathy, neuropathy who presents to [facility] for left leg pain that has been ongoing for a couple months. Patient reports increased swelling and redness to his left leg that he believes is from the wound on his 5th toe. Patient has been seeing Dr. Torres who believe the patient is need of a vascular consult for possible vascular intervention or evaluation to help induce healing to his left leg wound.   Pt was seen at bedside.  Pain well controlled.  Patient denies any constitutional symptoms.   No other pedal complaints.       Medications:  Scheduled Meds: ammonium lactate, , Topical, BID  ampicillin-sulbactam, 3 g, intravenous, q6h  aspirin, 81 mg, oral, Daily  clopidogrel, 600 mg, oral, Once  clopidogrel, 75 mg, oral, Daily  insulin lispro, 0-10 Units, subcutaneous, TID  oxygen, , inhalation, Continuous - 02/gases  polyethylene glycol, 17 g, oral, Daily  potassium chloride CR, 20 mEq, oral, Daily      Continuous Infusions: heparin, 0-4,500 Units/hr, Last Rate: 1,600 Units/hr (09/19/24 1900)      PRN Meds: PRN medications: acetaminophen **OR** acetaminophen **OR** acetaminophen, dextrose, dextrose, glucagon, glucagon, heparin, lidocaine         Objective   PHYSICAL EXAM:  Physical Exam Performed:  Vitals:    09/19/24 1921   BP: 179/78   Pulse: 67   Resp: 18   Temp: 36.9 °C (98.4 °F)   SpO2: 96%     Body mass index is 33.31 kg/m².    Patient is AOx3 and in no acute distress. Patient is alert and cooperative. Sitting comfortably in bed with dressing clean, dry and intact.   Right foot exam as patient declined left foot exam as dressing was just changed.      Vasc: DP and PT pulses are nonpalpable to right foot.  CFT is >5 seconds  bilateral.  Skin Temperature to from cool to warm distally to proximally on the left lower extremity     Neuro:  Light touch is intact to the right foot  There is no clonus noted.       Derm: Nails 1-5 bilateral are intact.  Skin is dry, flakey to the bilateral calves. Left calf and shin is erythematous with weeping. Blood draining from medial ankle no obvious opening noted.      MSK:  Ankle joint, subtalar joint, 1st MPJ and lesser MPJ ROM is limited and painful.  Tenderness to palpation of left foot and calf throughout, extremely tender over plantar surface of foot.     Wound:   Left lateral/plantar  5th MTP joint   Measurements: 2.0 x 2.0 x probes to bone.   Mixed wound base of necrotic and fibrotic tissue.   Mild purulent drainage with fibrotic slough.   Mild jennyfer-wound maceration.   Moderate jennyfer-wound erythema.   Minimal evidence of ascending cellulitis or lymphangitis.  Minimal palpable fluctuance. Significant malodor. No increased warmth.   Significant probe to bone or deep structures within the wound base.   Moderate tunneling or tracking.   No undermining. Skin edges irregular but intact.       LABS:   Results for orders placed or performed during the hospital encounter of 09/14/24 (from the past 24 hour(s))   POCT GLUCOSE   Result Value Ref Range    POCT Glucose 143 (H) 74 - 99 mg/dL   Heparin Assay   Result Value Ref Range    Heparin Unfractionated 0.3 See Comment Below for Therapeutic Ranges IU/mL   CBC and Auto Differential   Result Value Ref Range    WBC 9.3 4.4 - 11.3 x10*3/uL    nRBC 0.0 0.0 - 0.0 /100 WBCs    RBC 3.95 (L) 4.50 - 5.90 x10*6/uL    Hemoglobin 11.0 (L) 13.5 - 17.5 g/dL    Hematocrit 33.7 (L) 41.0 - 52.0 %    MCV 85 80 - 100 fL    MCH 27.8 26.0 - 34.0 pg    MCHC 32.6 32.0 - 36.0 g/dL    RDW 12.9 11.5 - 14.5 %    Platelets 281 150 - 450 x10*3/uL    Immature Granulocytes %, Automated 1.3 (H) 0.0 - 0.9 %    Immature Granulocytes Absolute, Automated 0.12 0.00 - 0.50 x10*3/uL   Basic  Metabolic Panel   Result Value Ref Range    Glucose 136 (H) 74 - 99 mg/dL    Sodium 136 136 - 145 mmol/L    Potassium 3.4 (L) 3.5 - 5.3 mmol/L    Chloride 103 98 - 107 mmol/L    Bicarbonate 27 21 - 32 mmol/L    Anion Gap 9 (L) 10 - 20 mmol/L    Urea Nitrogen 6 6 - 23 mg/dL    Creatinine 0.50 0.50 - 1.30 mg/dL    eGFR >90 >60 mL/min/1.73m*2    Calcium 7.7 (L) 8.6 - 10.3 mg/dL   Heparin Assay   Result Value Ref Range    Heparin Unfractionated 0.7 See Comment Below for Therapeutic Ranges IU/mL   Manual Differential   Result Value Ref Range    Neutrophils %, Manual 65.0 40.0 - 80.0 %    Lymphocytes %, Manual 24.0 13.0 - 44.0 %    Monocytes %, Manual 2.0 2.0 - 10.0 %    Eosinophils %, Manual 4.0 0.0 - 6.0 %    Basophils %, Manual 0.0 0.0 - 2.0 %    Atypical Lymphocytes %, Manual 5.0 0.0 - 2.0 %    Seg Neutrophils Absolute, Manual 6.05 (H) 1.60 - 5.00 x10*3/uL    Lymphocytes Absolute, Manual 2.23 0.80 - 3.00 x10*3/uL    Monocytes Absolute, Manual 0.19 0.05 - 0.80 x10*3/uL    Eosinophils Absolute, Manual 0.37 0.00 - 0.40 x10*3/uL    Basophils Absolute, Manual 0.00 0.00 - 0.10 x10*3/uL    Atypical Lymphs Absolute, Manual 0.47 (H) 0.00 - 0.30 x10*3/uL    Total Cells Counted 100     RBC Morphology No significant RBC morphology present    Green Top   Result Value Ref Range    Extra Tube Hold for add-ons.    Lavender Top   Result Value Ref Range    Extra Tube Hold for add-ons.    POCT GLUCOSE   Result Value Ref Range    POCT Glucose 114 (H) 74 - 99 mg/dL   Heparin Assay   Result Value Ref Range    Heparin Unfractionated 0.9 See Comment Below for Therapeutic Ranges IU/mL   POCT GLUCOSE   Result Value Ref Range    POCT Glucose 124 (H) 74 - 99 mg/dL   POCT GLUCOSE   Result Value Ref Range    POCT Glucose 155 (H) 74 - 99 mg/dL   Heparin Assay, UFH   Result Value Ref Range    Heparin Unfractionated 0.8 See Comment Below for Therapeutic Ranges IU/mL   POCT GLUCOSE   Result Value Ref Range    POCT Glucose 130 (H) 74 - 99 mg/dL       Lab Results   Component Value Date    HGBA1C 9.8 (H) 09/13/2024      Lab Results   Component Value Date    CRP 9.51 (H) 09/13/2024      Lab Results   Component Value Date    SEDRATE 24 (H) 09/13/2024        Results from last 7 days   Lab Units 09/19/24  0325   WBC AUTO x10*3/uL 9.3   RBC AUTO x10*6/uL 3.95*   HEMOGLOBIN g/dL 11.0*   HEMATOCRIT % 33.7*     Results from last 7 days   Lab Units 09/19/24  0325   SODIUM mmol/L 136   POTASSIUM mmol/L 3.4*   CHLORIDE mmol/L 103   CO2 mmol/L 27   BUN mg/dL 6   CREATININE mg/dL 0.50   CALCIUM mg/dL 7.7*             IMAGING REVIEW:  Vascular US Ankle Brachial Index (BEN) Without Exercise    Result Date: 9/16/2024             [place] [address]   Tel [phone] and Fax [phone]  Vascular Lab Report VASC US PVR WITHOUT EXERCISE  Patient Name:     SUBHASH Cain Physician: [id number] Sven Caicedo MD Study Date:       9/16/2024          Ordering           [id number] ELAINA DE LOS SANTOS                                      Physician: MRN/PID:          [id number]           Technologist:      Tamra Walls RVT Accession#:       [id number]       Technologist 2:    Graciela Tobar RVT Date of           1950 / 74      Encounter#:        [id number] Birth/Age:        years Gender:           M Admission Status: Inpatient          Location           [facility]                                      Performed:  Diagnosis/ICD: Peripheral vascular disease, unspecified-I73.9 CPT Codes:     81442 Peripheral artery BEN Only  CONCLUSIONS: Right Lower PVR: No evidence of arterial occlusive disease in the right lower extremity at rest. Decreased digital perfusion noted. Multiphasic flow is noted in the right common femoral artery, right posterior tibial artery and right dorsalis pedis artery. Left Lower PVR: No evidence of arterial occlusive disease in the left lower extremity  at rest. Decreased digital perfusion noted. Monophasic flow is noted in the left posterior tibial artery and left dorsalis pedis artery. Multiphasic flow is noted in the left common femoral artery. Waveform may be dmapened due to edema.  Additional Findings: Known DVT in the left femoral and popliteal veins.  Imaging & Doppler Findings:  RIGHT Lower PVR                Pressures Ratios Right Posterior Tibial (Ankle) 155 mmHg  0.99 Right Dorsalis Pedis (Ankle)   155 mmHg  0.99 Right Digit (Great Toe)        66 mmHg   0.42   LEFT Lower PVR                Pressures Ratios Left Posterior Tibial (Ankle) 142 mmHg  0.91 Left Dorsalis Pedis (Ankle)   128 mmHg  0.82 Left Digit (Great Toe)        52 mmHg   0.33                     Right     Left Brachial Pressure 156 mmHg 150 mmHg   [id number] Sven Caicedo MD Electronically signed by [id number] Sven Caicedo MD on 9/16/2024 at 7:50:39 PM  ** Final **     CT angio chest for pulmonary embolism    Result Date: 9/14/2024  Interpreted By:  Enedina Umana, STUDY: CT ANGIO CHEST FOR PULMONARY EMBOLISM;  9/14/2024 9:28 am   INDICATION: Signs/Symptoms:hypoxia, dvt.     COMPARISON: None.   ACCESSION NUMBER(S): [id number]   ORDERING CLINICIAN: ABIEL KENT   TECHNIQUE: CT of the chest was performed. Sagittal and coronal reconstructions were generated. 75 ML Omnipaque 350 intravenous contrast given for the examination.  Multiplanar reconstructions of the pulmonary vessels were created on an independent workstation and provided for review.   FINDINGS: Artifact related to motion and overlying upper extremities limits evaluation.   CHEST WALL AND LOWER NECK: No significant axillary lymphadenopathy.   MEDIASTINUM AND ROLO:  No significant lymphadenopathy.   HEART AND VESSELS:  No central PE however limited assessment for peripheral PE due to technical factors including suboptimal timing of IV contrast bolus and artifact. The heart is normal in size. No significant pericardial effusion.  Multifocal atherosclerotic calcifications including the coronary arteries and aortic and mitral valve regions. No thoracic aortic aneurysm.   LUNGS, PLEURA, LARGE AIRWAYS:  Respiratory motion artifact limits parenchymal evaluation. Mild pulmonary heterogeneity. Faint linear densities in both lung fields. Possible mosaic attenuation in both lung bases. Trace bilateral pleural effusions/thickening. The central airways are patent.   UPPER ABDOMEN:  Limited due to artifact related to overlying upper extremities. The included liver is likely mildly hypodense/fatty infiltrated. No definite focal mass lesion in the included upper abdominal viscera.   BONES:  Flowing osteophytes along the thoracic spine consistent with dish.       No central PE however peripheral PE can not be excluded based on this exam.   Scattered interstitial opacities including possible mosaic attenuation in the lung bases which could reflect small airways disease and air trapping or atypical infection. Trace bilateral pleural effusions/thickening.   Moderate atherosclerotic disease.   Possible fatty infiltration of the liver incidentally noted.   MACRO: None.   Signed by: Enedina Umana 9/14/2024 10:56 AM Dictation workstation:   [id number]    Transthoracic Echo (TTE) Complete    Result Date: 9/13/2024   [facility], [address] [place] [address]              Tel [phone] and Fax [phone] TRANSTHORACIC ECHOCARDIOGRAM REPORT  Patient Name:      SUBHASH SEGAL     Reading Physician:    [id number] Carson Freeman MD Study Date:        9/13/2024            Ordering Provider:    [id number] ABIEL KENT MRN/PID:           [id number]             Fellow: Accession#:        [id number]         Nurse: Date of Birth/Age: 1950 / 74 years  Sonographer:          Cailin Julian RDCS Gender:            M                     Additional Staff: Height:            175.26 cm            Admit Date:           9/12/2024 Weight:            107.05 kg            Admission Status:     Inpatient -                                                               Routine BSA / BMI:         2.22 m2 / 34.85      Encounter#:           [id number]                    kg/m2 Blood Pressure:    137/66 mmHg          Department Location:  [facility] Study Type:    TRANSTHORACIC ECHO (TTE) COMPLETE Diagnosis/ICD: Abnormal electrocardiogram [ECG] [EKG]-R94.31 Indication:    Abnormal EKG CPT Code:      Echo Complete w Full Doppler-21393 Patient History: Pertinent History: Edema, DM, abnomal EKG, wound infection, WMA. Study Detail: The following Echo studies were performed: 2D, M-Mode, Doppler and               color flow. Technically challenging study due to body habitus,               patient lying in supine position, prominent lung artifact and poor               acoustic windows. Definity used as a contrast agent for               endocardial border definition. Total contrast used for this               procedure was 3.0 mL via IV push.  PHYSICIAN INTERPRETATION: Left Ventricle: The left ventricular systolic function is normal, with a visually estimated ejection fraction of 60-65%. There are no regional left ventricular wall motion abnormalities. The left ventricular cavity size is normal. Spectral Doppler shows an impaired relaxation pattern of left ventricular diastolic filling. Left Atrium: The left atrium is normal in size. Right Ventricle: The right ventricle is normal in size. There is normal right ventricular global systolic function. Right Atrium: The right atrium is normal in size. Aortic Valve: The aortic valve is trileaflet. There is moderate aortic valve cusp calcification. There is evidence of mild to moderate aortic valve stenosis. There is no evidence of aortic valve  regurgitation. The peak instantaneous gradient of the aortic valve is 26.2 mmHg. Mitral Valve: The mitral valve is normal in structure. There is mild mitral valve regurgitation. Tricuspid Valve: The tricuspid valve is structurally normal. There is mild tricuspid regurgitation. Pulmonic Valve: The pulmonic valve is structurally normal. There is physiologic pulmonic valve regurgitation. Pericardium: There is no pericardial effusion noted. Aorta: The aortic root is normal. Pulmonary Artery: The estimated pulmonary artery pressure is normal. Systemic Veins: The inferior vena cava appears mildly dilated.  CONCLUSIONS:  1. The left ventricular systolic function is normal, with a visually estimated ejection fraction of 60-65%.  2. Spectral Doppler shows an impaired relaxation pattern of left ventricular diastolic filling.  3. There is normal right ventricular global systolic function.  4. Mild to moderate aortic valve stenosis.  5. There is moderate aortic valve cusp calcification.  6. The inferior vena cava appears mildly dilated. QUANTITATIVE DATA SUMMARY:  2D MEASUREMENTS:         Normal Ranges: Ao Root d:       3.70 cm (2.0-3.7cm)  AORTA MEASUREMENTS:         Normal Ranges: Asc Ao, d:          3.90 cm (2.1-3.4cm)  LV SYSTOLIC FUNCTION BY 2D PLANIMETRY (MOD):                      Normal Ranges: EF-Visual:      63 % LV EF Reported: 63 %  LV DIASTOLIC FUNCTION:           Normal Ranges: MV Peak E:             1.05 m/s  (0.7-1.2 m/s) MV Peak A:             1.50 m/s  (0.42-0.7 m/s) E/A Ratio:             0.70      (1.0-2.2) MV e'                  0.073 m/s (>8.0) MV lateral e'          0.08 m/s MV medial e'           0.07 m/s E/e' Ratio:            14.40     (<8.0)  AORTIC VALVE:            Normal Ranges: AoV Vmax:      2.56 m/s  (<=1.7m/s) AoV Peak P.2 mmHg (<20mmHg) LVOT Max Jak:  1.25 m/s  (<=1.1m/s) LVOT VTI:      23.90 cm LVOT Diameter: 2.20 cm   (1.8-2.4cm) AoV Area,Vmax: 1.86 cm2  (2.5-4.5cm2)  RIGHT VENTRICLE:  TAPSE: 21.1 mm RV s'  0.12 m/s  TRICUSPID VALVE/RVSP:         Normal Ranges: IVC Diam:             2.33 cm  PULMONIC VALVE:          Normal Ranges: PV Max Jak:     1.2 m/s  (0.6-0.9m/s) PV Max P.2 mmHg  [id number] Carson Freeman MD Electronically signed on 2024 at 7:18:35 PM  ** Final **     CT angio aorta and bilateral iliofemoral runoff w and or wo IV contrast    Result Date: 2024  Interpreted By:  Osorio Wong, STUDY: CT ANGIO AORTA AND BILATERAL ILIOFEMORAL RUNOFF W AND OR WO IV CONTRAST;  2024 12:07 pm   INDICATION: Signs/Symptoms:evaluation for circulation and wound.   COMPARISON: None.   ACCESSION NUMBER(S): [id number]   ORDERING CLINICIAN: ABIEL KENT   TECHNIQUE: CTA of the abdomen and pelvis, with runoff was performed.  Contiguous axial images were obtained at 3 mm slice thickness through the abdomen and pelvis, with runoff. 3D Coronal and sagittal reconstructions at 3 mm slice thickness were performed. 75 ml of contrast material  Omnipaque 350 were administered intravenously without immediate complication. FINDINGS:   CTA ABDOMEN VESSELS   Celiac axis: No significant stenosis.   SMA: Partially calcified disease at its origin results in moderate to severe stenosis.   RAUL: No significant stenosis.   Right renal vessels: Calcified disease of its proximal segment results in moderate stenosis.   Left renal vessels: No significant stenosis.   Infrarenal aorta: Contains partially calcified disease without significant stenosis or aneurysmal dilation.   CTA  PELVIC VESSELS R. Common iliac artery: No significant stenosis.   R. External iliac artery: No significant stenosis.   R. Internal iliac artery: No significant stenosis.   L. Common iliac artery: No significant stenosis.   L. External iliac artery: No significant stenosis.   L. Internal iliac artery: Partially calcified disease results in mild to moderate stenosis.   CTA RIGHT LOWER EXTREMITY R. Common femoral artery: No  significant stenosis.   R. Profunda femoris artery: No significant stenosis.   R. SFA: Partially calcified disease results in mild stenosis.   R. Popliteal artery: Partially calcified disease results in moderate stenosis of the above the knee segment. Predominantly calcified disease of the below-the-knee segment results in mild-to-moderate stenosis.   R. Anterior tibial artery: Occluded at its origin.   R. Tibioperoneal trunk: Partially calcified disease results in mild-to-moderate stenosis.   R. Posterior tibial artery: Occluded at its origin.   R. Peroneal artery: Contains scattered calcific disease of its mid to distal segment resulting in mild-to-moderate stenosis.   R. Dorsalis pedis artery: No significant stenosis.   R. Plantar artery: Not visualized.   CTA LEFT LOWER EXTREMITY L. Common femoral artery: No significant stenosis.   L. Profunda femoris artery: No significant stenosis.   L. SFA: Contains partially calcified disease predominantly in its mid to distal segment. This results in focal severe stenosis across Gaetano's canal but no significant stenosis upstream.   L. Popliteal artery: Contains partially calcified atherosclerotic disease without significant stenosis.   L. Anterior tibial artery: Occluded shortly beyond its origin.   L. Tibioperoneal trunk: Contains partially calcified disease resulting in moderate to severe stenosis.   L. Posterior tibial artery: Occluded at its origin, there is distal reconstitution across its middle 3rd as a diseased vessel containing scattered calcific disease resulting in mild stenosis. This vessel is seen to cross the ankle joint and supply the plantar artery.   L. Peroneal artery: Occluded at its origin, there is distal reconstitution at its middle 3rd as a small caliber vessel, supplying distal collaterals to the foot.   L. Dorsalis pedis artery: Not visualized   L. Plantar artery: No significant stenosis.   NON-VASCULAR FINDINGS   Visualized portions of the heart:  Main pulmonary artery is enlarged, which can be seen in the setting of pulmonary arterial hypertension. The heart is not enlarged. There is coronary artery calcifications present in the distribution of all 3 vessels. There is no pericardial effusion.   Lungs: There is bilateral dependent atelectasis.   Hepatobiliary: Unremarkable liver without biliary dilation evident   Pancreas: Unremarkable   Spleen: Unremarkable   Adrenal Glands: Unremarkable   Kidneys, ureters, and bladder: There are bilateral parapelvic renal cysts and bilateral parenchymal cysts. There are bilateral extrarenal pelvises. There is no urolithiasis or hydroureteronephrosis. The urinary bladder demonstrates circumferential wall thickening which may be related to incomplete distention or cystitis.   GI tract: There is a 2.6 cm diverticulum off the gastric fundus. No evidence of obstruction. There is colonic diverticulosis without diverticulitis. The appendix is within normal limits.   Peritoneum and retroperitoneum: No free fluid or free air is noted.   Lymph Nodes: There are mildly prominent pelvic lymph nodes, left-greater-than-right. These are likely reactive.   Reproductive Organs: The prostate gland is enlarged.   Visualized musculoskeletal structures: There are degenerative changes of the spine. Spurring of the bilateral sacroiliac joints is noted. There is osteoarthritic changes of the bilateral hips. There is diffuse edema and skin thickening involving the left leg. There are prominent venous varicosities of the left leg. There is osteopenia involving the left foot. Bony destruction at the left 5th metatarsophalangeal joint is noted with soft tissue gas surrounding this region. There appears to be an open wound in the plantar aspect of the foot centered at the 5th MTP joint. Curvilinear hyperdensity within the soft tissues may reflect small bony fragments or foreign bodies. No acute fracture or destructive osseous lesion is identified        1. Diffuse partially calcified atherosclerotic disease, as detailed above. There is a single vessel runoff in the right leg. In the left leg, there is high-grade stenosis of the distal SFA as it crosses Gaetano's canal. Additionally, there is occlusive disease of the proximal trifurcation with reconstitution of the left posterior tibial and peroneal arteries distally.   2. Significant soft tissue swelling of the left leg with prominent venous varicosities. There is also soft tissue swelling of the left foot with an open wound along the plantar aspect of the foot centered at the 5th MTP joint, which demonstrates adjacent bony destruction. Curvilinear hyperdensity in this region may reflect bony fragments or foreign bodies.   3. Coronary artery calcifications.   Signed by: Osorio Wong 9/13/2024 2:09 PM Dictation workstation:   [id number]    Lower extremity venous duplex left    Result Date: 9/12/2024  STUDY: Left Lower Extremity Venous Doppler Ultrasound; 9/12/2024 4:43 PM INDICATION: Swelling. COMPARISON: US LE Venous 3/21/2022. ACCESSION NUMBER(S): [id number] ORDERING CLINICIAN: KATIE HILTON TECHNIQUE:  Real-time grayscale imaging, color Doppler flow imaging, and spectral Doppler imaging of the left lower extremity veins was performed. FINDINGS: There is acute occlusive DVT demonstrated within the left mid-distal femoral and popliteal veins. The left common femoral and profunda femoral veins demonstrated normal compressibility, normal phasic venous flow and normal response to augmentation.  There is no evidence for echogenic thrombi.  The deep calf veins are not well visualized due to subcutaneous edema. The contralateral common femoral vein is free of thrombosis.    Evidence for acute occlusive DVT within the left mid-distal femoral and popliteal veins. Compared to prior ultrasound, this represents a new finding. The positive findings on this exam were discussed with and acknowledged by Dr. Katie Hilton  abdomen interpretation, 5:00 PM September 12, 2024 Signed by Jason Tobar MD    XR foot left 3+ views    Result Date: 9/12/2024  STUDY: Foot Radiographs; 9/12/2024 4:19 PM INDICATION: Left foot pain. COMPARISON: None Available. ACCESSION NUMBER(S): [id number] ORDERING CLINICIAN: KATIE HILTON TECHNIQUE:  Three view(s) of the left foot (four images). FINDINGS:  There is no displaced fracture.  The alignment is anatomic.  Soft tissue swelling and ulceration noted lateral to the fifth MTP joint associated with demineralization of the fifth proximal phalanx and the distal head of the fifth metatarsal.  Bony changes consistent with osteomyelitis.    Osteomyelitis of the fifth metatarsal head and proximal phalanx of the fifth toe. Signed by Td Francisco MD            Assessment/Plan   ASSESSMENT & PLAN:  Osteomyelitis (Multi)     #Left foot necrotic ulcer in setting of lower extremity cellulitis  #Osteomyelitis  #DM2     - Patient was seen and evaluated; all findings were discussed and all questions were answered to patient's satisfaction.  - Charts, labs, vitals and imaging all reviewed.   - Imaging: Osteomyelitis of the fifth metatarsal head and proximal phalanx of the  fifth toe.   - Labs: WBC 9.3, CRP: 9.51, ESR:24  - PVRs: Decreased digital perfusion noted. Monophasic flow is noted in the left posterior tibial artery and left dorsalis pedis artery.  - Lower extremity Duplex- Evidence for acute occlusive DVT within the left mid-distal femoral and popliteal veins.   - Blood culture: No growth 4 days     Plan:  - Abx: Per Primary  - Consulted Vascular for input will discuss surgical intervention vascular team yayo  - Dressings: Betadine soaked 4x4's, 4x4's, Krelix,  - Excess bleeding coming from medial ankle, no obvious wound noted but dressing placed over area as moderate bleeding was noted.   - Nursing staff is able to change/reinforce dressing if & as necessary until next day’s dressing change. Thank  you.  - Podiatry will continue to follow while in house, Discussed with Dr. Obinna Spears     DVT ppx: Per Primary  Weightbearing: WBAT B/L LE     Patient was evaluated with attending Dr. Obinna Spears DPM     Case to be discussed with attending, A&P above reflects a tentative plan. Please await for the final signature from the attending physician on service.     Renny Nicole DPM PGY-1  Podiatric Medicine & Surgery    I saw and evaluated the patient. I personally obtained the key and critical portions of the history and physical exam or was physically present for key and critical portions performed by the resident/fellow. I reviewed the resident/fellow's documentation and discussed the patient with the resident/fellow. I agree with the resident/fellow's medical decision making as documented in the note.    I spent 30 minutes in the professional and overall care of this patient.    Obinna Spears DPM

## 2024-09-20 NOTE — CARE PLAN
The patient's goals for the shift include      The clinical goals for the shift include Pt will remain hemodynamically stable by end of shift    Over the shift, the patient did not make progress toward the following goals. Barriers to progression include . Recommendations to address these barriers include .

## 2024-09-20 NOTE — PROGRESS NOTES
.PODIATRY SERVICE CONSULT PROGRESS NOTE    SERVICE DATE: 9/20/2024   SERVICE TIME:  1630    Subjective   INTERVAL HPI:  Elliot Partida is a 74 y.o. male presenting with PMH of R frontal extradural mass since 2016, arthritis, CAD, diabetes type 2, MI, HTN, thoracic radiculopathy, neuropathy who presents to [facility] for left leg pain that has been ongoing for a couple months. Patient reports increased swelling and redness to his left leg that he believes is from the wound on his 5th toe. Patient has been seeing Dr. Torres who believe the patient is need of a vascular consult for possible vascular intervention or evaluation to help induce healing to his left leg wound.   Pt was seen at bedside.  Pain well controlled.  Patient denies any constitutional symptoms.   No other pedal complaints.       Medications:  Scheduled Meds: ammonium lactate, , Topical, BID  ampicillin-sulbactam, 3 g, intravenous, q6h  aspirin, 81 mg, oral, Daily  clopidogrel, 600 mg, oral, Once  clopidogrel, 75 mg, oral, Daily  insulin lispro, 0-10 Units, subcutaneous, TID  oxygen, , inhalation, Continuous - 02/gases  polyethylene glycol, 17 g, oral, Daily  [START ON 9/21/2024] potassium chloride CR, 40 mEq, oral, Daily      Continuous Infusions: heparin, 0-4,500 Units/hr, Last Rate: 1,600 Units/hr (09/20/24 0403)      PRN Meds: PRN medications: acetaminophen **OR** acetaminophen **OR** acetaminophen, dextrose, dextrose, glucagon, glucagon, heparin, lidocaine         Objective   PHYSICAL EXAM:  Physical Exam Performed:  Vitals:    09/20/24 1543   BP: 166/66   Pulse:    Resp:    Temp: 36.6 °C (97.9 °F)   SpO2: 98%     Body mass index is 33.31 kg/m².    Patient is AOx3 and in no acute distress. Patient is alert and cooperative. Sitting comfortably in bed with dressing clean, dry and intact.   Right foot exam as patient declined left foot exam as dressing was just changed.      Vasc: DP and PT pulses are nonpalpable to right foot.  CFT is >5  seconds bilateral.  Skin Temperature to from cool to warm distally to proximally on the left lower extremity     Neuro:  Light touch is intact to the right foot  There is no clonus noted.       Derm: Nails 1-5 bilateral are intact.  Skin is dry, flakey to the bilateral calves. Left calf and shin is erythematous with weeping. Blood draining from medial ankle no obvious opening noted. No bleeding today.     MSK:  Ankle joint, subtalar joint, 1st MPJ and lesser MPJ ROM is limited and painful.  Tenderness to palpation of left foot and calf throughout, extremely tender over plantar surface of foot.     Wound:   Left lateral/plantar  5th MTP joint   Measurements: 2.0 x 2.0 x probes to bone.   Mixed wound base of necrotic and fibrotic tissue.   Mild purulent drainage with fibrotic slough.   Mild jennyfer-wound maceration.   Moderate jennyfer-wound erythema.   Minimal evidence of ascending cellulitis or lymphangitis.  Minimal palpable fluctuance. Significant malodor. No increased warmth.   Significant probe to bone or deep structures within the wound base.   Moderate tunneling or tracking.   No undermining. Skin edges irregular but intact.       LABS:   Results for orders placed or performed during the hospital encounter of 09/14/24 (from the past 24 hour(s))   POCT GLUCOSE   Result Value Ref Range    POCT Glucose 130 (H) 74 - 99 mg/dL   Heparin Assay, UFH   Result Value Ref Range    Heparin Unfractionated 0.6 See Comment Below for Therapeutic Ranges IU/mL   CBC   Result Value Ref Range    WBC 8.8 4.4 - 11.3 x10*3/uL    nRBC 0.0 0.0 - 0.0 /100 WBCs    RBC 3.90 (L) 4.50 - 5.90 x10*6/uL    Hemoglobin 10.9 (L) 13.5 - 17.5 g/dL    Hematocrit 34.1 (L) 41.0 - 52.0 %    MCV 87 80 - 100 fL    MCH 27.9 26.0 - 34.0 pg    MCHC 32.0 32.0 - 36.0 g/dL    RDW 13.3 11.5 - 14.5 %    Platelets 284 150 - 450 x10*3/uL   Basic Metabolic Panel   Result Value Ref Range    Glucose 117 (H) 74 - 99 mg/dL    Sodium 138 136 - 145 mmol/L    Potassium 3.4 (L)  3.5 - 5.3 mmol/L    Chloride 104 98 - 107 mmol/L    Bicarbonate 27 21 - 32 mmol/L    Anion Gap 10 10 - 20 mmol/L    Urea Nitrogen 7 6 - 23 mg/dL    Creatinine 0.55 0.50 - 1.30 mg/dL    eGFR >90 >60 mL/min/1.73m*2    Calcium 8.1 (L) 8.6 - 10.3 mg/dL   Heparin Assay, UFH   Result Value Ref Range    Heparin Unfractionated 0.7 See Comment Below for Therapeutic Ranges IU/mL   POCT GLUCOSE   Result Value Ref Range    POCT Glucose 146 (H) 74 - 99 mg/dL   POCT GLUCOSE   Result Value Ref Range    POCT Glucose 146 (H) 74 - 99 mg/dL   POCT GLUCOSE   Result Value Ref Range    POCT Glucose 131 (H) 74 - 99 mg/dL      Lab Results   Component Value Date    HGBA1C 9.8 (H) 09/13/2024      Lab Results   Component Value Date    CRP 9.51 (H) 09/13/2024      Lab Results   Component Value Date    SEDRATE 24 (H) 09/13/2024        Results from last 7 days   Lab Units 09/19/24  2319   WBC AUTO x10*3/uL 8.8   RBC AUTO x10*6/uL 3.90*   HEMOGLOBIN g/dL 10.9*   HEMATOCRIT % 34.1*     Results from last 7 days   Lab Units 09/20/24  0356   SODIUM mmol/L 138   POTASSIUM mmol/L 3.4*   CHLORIDE mmol/L 104   CO2 mmol/L 27   BUN mg/dL 7   CREATININE mg/dL 0.55   CALCIUM mg/dL 8.1*             IMAGING REVIEW:  Vascular US Ankle Brachial Index (BEN) Without Exercise    Result Date: 9/16/2024             [place] [address]   Tel [phone] and Fax [phone]  Vascular Lab Report VASC US PVR WITHOUT EXERCISE  Patient Name:     SUBHASH Cain Physician: [id number] Sven Caicedo MD Study Date:       9/16/2024          Ordering           [id number] ELAINA DE LOS SANTOS                                      Physician: MRN/PID:          [id number]           Technologist:      Tamra Walls RVT Accession#:       [id number]       Technologist 2:    Graciela Tobar RVT Date of           1950 / 74      Encounter#:        [id number] Birth/Age:        years  Gender:           M Admission Status: Inpatient          Location           [facility]                                      Performed:  Diagnosis/ICD: Peripheral vascular disease, unspecified-I73.9 CPT Codes:     72809 Peripheral artery BEN Only  CONCLUSIONS: Right Lower PVR: No evidence of arterial occlusive disease in the right lower extremity at rest. Decreased digital perfusion noted. Multiphasic flow is noted in the right common femoral artery, right posterior tibial artery and right dorsalis pedis artery. Left Lower PVR: No evidence of arterial occlusive disease in the left lower extremity at rest. Decreased digital perfusion noted. Monophasic flow is noted in the left posterior tibial artery and left dorsalis pedis artery. Multiphasic flow is noted in the left common femoral artery. Waveform may be dmapened due to edema.  Additional Findings: Known DVT in the left femoral and popliteal veins.  Imaging & Doppler Findings:  RIGHT Lower PVR                Pressures Ratios Right Posterior Tibial (Ankle) 155 mmHg  0.99 Right Dorsalis Pedis (Ankle)   155 mmHg  0.99 Right Digit (Great Toe)        66 mmHg   0.42   LEFT Lower PVR                Pressures Ratios Left Posterior Tibial (Ankle) 142 mmHg  0.91 Left Dorsalis Pedis (Ankle)   128 mmHg  0.82 Left Digit (Great Toe)        52 mmHg   0.33                     Right     Left Brachial Pressure 156 mmHg 150 mmHg   [id number] Sven Caicedo MD Electronically signed by [id number] Sven Caicedo MD on 9/16/2024 at 7:50:39 PM  ** Final **     CT angio chest for pulmonary embolism    Result Date: 9/14/2024  Interpreted By:  Enedina Umana, STUDY: CT ANGIO CHEST FOR PULMONARY EMBOLISM;  9/14/2024 9:28 am   INDICATION: Signs/Symptoms:hypoxia, dvt.     COMPARISON: None.   ACCESSION NUMBER(S): [id number]   ORDERING CLINICIAN: ABIEL KENT   TECHNIQUE: CT of the chest was performed. Sagittal and coronal reconstructions were generated. 75 ML Omnipaque 350 intravenous  contrast given for the examination.  Multiplanar reconstructions of the pulmonary vessels were created on an independent workstation and provided for review.   FINDINGS: Artifact related to motion and overlying upper extremities limits evaluation.   CHEST WALL AND LOWER NECK: No significant axillary lymphadenopathy.   MEDIASTINUM AND ROLO:  No significant lymphadenopathy.   HEART AND VESSELS:  No central PE however limited assessment for peripheral PE due to technical factors including suboptimal timing of IV contrast bolus and artifact. The heart is normal in size. No significant pericardial effusion. Multifocal atherosclerotic calcifications including the coronary arteries and aortic and mitral valve regions. No thoracic aortic aneurysm.   LUNGS, PLEURA, LARGE AIRWAYS:  Respiratory motion artifact limits parenchymal evaluation. Mild pulmonary heterogeneity. Faint linear densities in both lung fields. Possible mosaic attenuation in both lung bases. Trace bilateral pleural effusions/thickening. The central airways are patent.   UPPER ABDOMEN:  Limited due to artifact related to overlying upper extremities. The included liver is likely mildly hypodense/fatty infiltrated. No definite focal mass lesion in the included upper abdominal viscera.   BONES:  Flowing osteophytes along the thoracic spine consistent with dish.       No central PE however peripheral PE can not be excluded based on this exam.   Scattered interstitial opacities including possible mosaic attenuation in the lung bases which could reflect small airways disease and air trapping or atypical infection. Trace bilateral pleural effusions/thickening.   Moderate atherosclerotic disease.   Possible fatty infiltration of the liver incidentally noted.   MACRO: None.   Signed by: Enedina Umana 9/14/2024 10:56 AM Dictation workstation:   [id number]    Transthoracic Echo (TTE) Complete    Result Date: 9/13/2024   [facility], [address] [place] [address],  [place] [address]              Tel [phone] and Fax [phone] TRANSTHORACIC ECHOCARDIOGRAM REPORT  Patient Name:      SUBHASH SCHMIDT LUZMA     Reading Physician:    [id number] Carson Freeman MD Study Date:        9/13/2024            Ordering Provider:    [id number] ABIEL KENT MRN/PID:           [id number]             Fellow: Accession#:        [id number]         Nurse: Date of Birth/Age: 1950 / 74 years  Sonographer:          Cailin Julian DILIP Gender:            M                    Additional Staff: Height:            175.26 cm            Admit Date:           9/12/2024 Weight:            107.05 kg            Admission Status:     Inpatient -                                                               Routine BSA / BMI:         2.22 m2 / 34.85      Encounter#:           [id number]                    kg/m2 Blood Pressure:    137/66 mmHg          Department Location:  [facility] Study Type:    TRANSTHORACIC ECHO (TTE) COMPLETE Diagnosis/ICD: Abnormal electrocardiogram [ECG] [EKG]-R94.31 Indication:    Abnormal EKG CPT Code:      Echo Complete w Full Doppler-23731 Patient History: Pertinent History: Edema, DM, abnomal EKG, wound infection, WMA. Study Detail: The following Echo studies were performed: 2D, M-Mode, Doppler and               color flow. Technically challenging study due to body habitus,               patient lying in supine position, prominent lung artifact and poor               acoustic windows. Definity used as a contrast agent for               endocardial border definition. Total contrast used for this               procedure was 3.0 mL via IV push.  PHYSICIAN INTERPRETATION: Left Ventricle: The left ventricular systolic function is normal, with a visually estimated ejection fraction of  60-65%. There are no regional left ventricular wall motion abnormalities. The left ventricular cavity size is normal. Spectral Doppler shows an impaired relaxation pattern of left ventricular diastolic filling. Left Atrium: The left atrium is normal in size. Right Ventricle: The right ventricle is normal in size. There is normal right ventricular global systolic function. Right Atrium: The right atrium is normal in size. Aortic Valve: The aortic valve is trileaflet. There is moderate aortic valve cusp calcification. There is evidence of mild to moderate aortic valve stenosis. There is no evidence of aortic valve regurgitation. The peak instantaneous gradient of the aortic valve is 26.2 mmHg. Mitral Valve: The mitral valve is normal in structure. There is mild mitral valve regurgitation. Tricuspid Valve: The tricuspid valve is structurally normal. There is mild tricuspid regurgitation. Pulmonic Valve: The pulmonic valve is structurally normal. There is physiologic pulmonic valve regurgitation. Pericardium: There is no pericardial effusion noted. Aorta: The aortic root is normal. Pulmonary Artery: The estimated pulmonary artery pressure is normal. Systemic Veins: The inferior vena cava appears mildly dilated.  CONCLUSIONS:  1. The left ventricular systolic function is normal, with a visually estimated ejection fraction of 60-65%.  2. Spectral Doppler shows an impaired relaxation pattern of left ventricular diastolic filling.  3. There is normal right ventricular global systolic function.  4. Mild to moderate aortic valve stenosis.  5. There is moderate aortic valve cusp calcification.  6. The inferior vena cava appears mildly dilated. QUANTITATIVE DATA SUMMARY:  2D MEASUREMENTS:         Normal Ranges: Ao Root d:       3.70 cm (2.0-3.7cm)  AORTA MEASUREMENTS:         Normal Ranges: Asc Ao, d:          3.90 cm (2.1-3.4cm)  LV SYSTOLIC FUNCTION BY 2D PLANIMETRY (MOD):                      Normal Ranges: EF-Visual:       63 % LV EF Reported: 63 %  LV DIASTOLIC FUNCTION:           Normal Ranges: MV Peak E:             1.05 m/s  (0.7-1.2 m/s) MV Peak A:             1.50 m/s  (0.42-0.7 m/s) E/A Ratio:             0.70      (1.0-2.2) MV e'                  0.073 m/s (>8.0) MV lateral e'          0.08 m/s MV medial e'           0.07 m/s E/e' Ratio:            14.40     (<8.0)  AORTIC VALVE:            Normal Ranges: AoV Vmax:      2.56 m/s  (<=1.7m/s) AoV Peak P.2 mmHg (<20mmHg) LVOT Max Jak:  1.25 m/s  (<=1.1m/s) LVOT VTI:      23.90 cm LVOT Diameter: 2.20 cm   (1.8-2.4cm) AoV Area,Vmax: 1.86 cm2  (2.5-4.5cm2)  RIGHT VENTRICLE: TAPSE: 21.1 mm RV s'  0.12 m/s  TRICUSPID VALVE/RVSP:         Normal Ranges: IVC Diam:             2.33 cm  PULMONIC VALVE:          Normal Ranges: PV Max Jak:     1.2 m/s  (0.6-0.9m/s) PV Max P.2 mmHg  [id number] Carson Freeman MD Electronically signed on 2024 at 7:18:35 PM  ** Final **     CT angio aorta and bilateral iliofemoral runoff w and or wo IV contrast    Result Date: 2024  Interpreted By:  Osorio Wong, STUDY: CT ANGIO AORTA AND BILATERAL ILIOFEMORAL RUNOFF W AND OR WO IV CONTRAST;  2024 12:07 pm   INDICATION: Signs/Symptoms:evaluation for circulation and wound.   COMPARISON: None.   ACCESSION NUMBER(S): [id number]   ORDERING CLINICIAN: ABIEL KENT   TECHNIQUE: CTA of the abdomen and pelvis, with runoff was performed.  Contiguous axial images were obtained at 3 mm slice thickness through the abdomen and pelvis, with runoff. 3D Coronal and sagittal reconstructions at 3 mm slice thickness were performed. 75 ml of contrast material  Omnipaque 350 were administered intravenously without immediate complication. FINDINGS:   CTA ABDOMEN VESSELS   Celiac axis: No significant stenosis.   SMA: Partially calcified disease at its origin results in moderate to severe stenosis.   RAUL: No significant stenosis.   Right renal vessels: Calcified disease of its proximal segment  results in moderate stenosis.   Left renal vessels: No significant stenosis.   Infrarenal aorta: Contains partially calcified disease without significant stenosis or aneurysmal dilation.   CTA  PELVIC VESSELS R. Common iliac artery: No significant stenosis.   R. External iliac artery: No significant stenosis.   R. Internal iliac artery: No significant stenosis.   L. Common iliac artery: No significant stenosis.   L. External iliac artery: No significant stenosis.   L. Internal iliac artery: Partially calcified disease results in mild to moderate stenosis.   CTA RIGHT LOWER EXTREMITY R. Common femoral artery: No significant stenosis.   R. Profunda femoris artery: No significant stenosis.   R. SFA: Partially calcified disease results in mild stenosis.   R. Popliteal artery: Partially calcified disease results in moderate stenosis of the above the knee segment. Predominantly calcified disease of the below-the-knee segment results in mild-to-moderate stenosis.   R. Anterior tibial artery: Occluded at its origin.   R. Tibioperoneal trunk: Partially calcified disease results in mild-to-moderate stenosis.   R. Posterior tibial artery: Occluded at its origin.   R. Peroneal artery: Contains scattered calcific disease of its mid to distal segment resulting in mild-to-moderate stenosis.   R. Dorsalis pedis artery: No significant stenosis.   R. Plantar artery: Not visualized.   CTA LEFT LOWER EXTREMITY L. Common femoral artery: No significant stenosis.   L. Profunda femoris artery: No significant stenosis.   L. SFA: Contains partially calcified disease predominantly in its mid to distal segment. This results in focal severe stenosis across Gaetano's canal but no significant stenosis upstream.   L. Popliteal artery: Contains partially calcified atherosclerotic disease without significant stenosis.   L. Anterior tibial artery: Occluded shortly beyond its origin.   L. Tibioperoneal trunk: Contains partially calcified disease  resulting in moderate to severe stenosis.   L. Posterior tibial artery: Occluded at its origin, there is distal reconstitution across its middle 3rd as a diseased vessel containing scattered calcific disease resulting in mild stenosis. This vessel is seen to cross the ankle joint and supply the plantar artery.   L. Peroneal artery: Occluded at its origin, there is distal reconstitution at its middle 3rd as a small caliber vessel, supplying distal collaterals to the foot.   L. Dorsalis pedis artery: Not visualized   L. Plantar artery: No significant stenosis.   NON-VASCULAR FINDINGS   Visualized portions of the heart: Main pulmonary artery is enlarged, which can be seen in the setting of pulmonary arterial hypertension. The heart is not enlarged. There is coronary artery calcifications present in the distribution of all 3 vessels. There is no pericardial effusion.   Lungs: There is bilateral dependent atelectasis.   Hepatobiliary: Unremarkable liver without biliary dilation evident   Pancreas: Unremarkable   Spleen: Unremarkable   Adrenal Glands: Unremarkable   Kidneys, ureters, and bladder: There are bilateral parapelvic renal cysts and bilateral parenchymal cysts. There are bilateral extrarenal pelvises. There is no urolithiasis or hydroureteronephrosis. The urinary bladder demonstrates circumferential wall thickening which may be related to incomplete distention or cystitis.   GI tract: There is a 2.6 cm diverticulum off the gastric fundus. No evidence of obstruction. There is colonic diverticulosis without diverticulitis. The appendix is within normal limits.   Peritoneum and retroperitoneum: No free fluid or free air is noted.   Lymph Nodes: There are mildly prominent pelvic lymph nodes, left-greater-than-right. These are likely reactive.   Reproductive Organs: The prostate gland is enlarged.   Visualized musculoskeletal structures: There are degenerative changes of the spine. Spurring of the bilateral  sacroiliac joints is noted. There is osteoarthritic changes of the bilateral hips. There is diffuse edema and skin thickening involving the left leg. There are prominent venous varicosities of the left leg. There is osteopenia involving the left foot. Bony destruction at the left 5th metatarsophalangeal joint is noted with soft tissue gas surrounding this region. There appears to be an open wound in the plantar aspect of the foot centered at the 5th MTP joint. Curvilinear hyperdensity within the soft tissues may reflect small bony fragments or foreign bodies. No acute fracture or destructive osseous lesion is identified       1. Diffuse partially calcified atherosclerotic disease, as detailed above. There is a single vessel runoff in the right leg. In the left leg, there is high-grade stenosis of the distal SFA as it crosses Gaetano's canal. Additionally, there is occlusive disease of the proximal trifurcation with reconstitution of the left posterior tibial and peroneal arteries distally.   2. Significant soft tissue swelling of the left leg with prominent venous varicosities. There is also soft tissue swelling of the left foot with an open wound along the plantar aspect of the foot centered at the 5th MTP joint, which demonstrates adjacent bony destruction. Curvilinear hyperdensity in this region may reflect bony fragments or foreign bodies.   3. Coronary artery calcifications.   Signed by: Osorio Wong 9/13/2024 2:09 PM Dictation workstation:   [id number]    Lower extremity venous duplex left    Result Date: 9/12/2024  STUDY: Left Lower Extremity Venous Doppler Ultrasound; 9/12/2024 4:43 PM INDICATION: Swelling. COMPARISON: US LE Venous 3/21/2022. ACCESSION NUMBER(S): [id number] ORDERING CLINICIAN: KATIE HILTON TECHNIQUE:  Real-time grayscale imaging, color Doppler flow imaging, and spectral Doppler imaging of the left lower extremity veins was performed. FINDINGS: There is acute occlusive DVT demonstrated  within the left mid-distal femoral and popliteal veins. The left common femoral and profunda femoral veins demonstrated normal compressibility, normal phasic venous flow and normal response to augmentation.  There is no evidence for echogenic thrombi.  The deep calf veins are not well visualized due to subcutaneous edema. The contralateral common femoral vein is free of thrombosis.    Evidence for acute occlusive DVT within the left mid-distal femoral and popliteal veins. Compared to prior ultrasound, this represents a new finding. The positive findings on this exam were discussed with and acknowledged by Dr. Katie Hilton abdomen interpretation, 5:00 PM September 12, 2024 Signed by Jason Tobar MD    XR foot left 3+ views    Result Date: 9/12/2024  STUDY: Foot Radiographs; 9/12/2024 4:19 PM INDICATION: Left foot pain. COMPARISON: None Available. ACCESSION NUMBER(S): [id number] ORDERING CLINICIAN: KATIE HILTON TECHNIQUE:  Three view(s) of the left foot (four images). FINDINGS:  There is no displaced fracture.  The alignment is anatomic.  Soft tissue swelling and ulceration noted lateral to the fifth MTP joint associated with demineralization of the fifth proximal phalanx and the distal head of the fifth metatarsal.  Bony changes consistent with osteomyelitis.    Osteomyelitis of the fifth metatarsal head and proximal phalanx of the fifth toe. Signed by Td Francisco MD            Assessment/Plan   ASSESSMENT & PLAN:  Osteomyelitis (Multi)     #Left foot necrotic ulcer in setting of lower extremity cellulitis  #Osteomyelitis  #DM2     - Patient was seen and evaluated; all findings were discussed and all questions were answered to patient's satisfaction.  - Charts, labs, vitals and imaging all reviewed.   - Imaging: xray- Osteomyelitis of the fifth metatarsal head and proximal phalanx of the  fifth toe.  - MRI canceled  - Labs: WBC 9.3, CRP: 9.51, ESR:24  - PVRs: Decreased digital perfusion noted. Monophasic  flow is noted in the left posterior tibial artery and left dorsalis pedis artery.  - Lower extremity Duplex- Evidence for acute occlusive DVT within the left mid-distal femoral and popliteal veins.   - Blood culture: No growth 4 days     Plan:  - Abx: Per Primary  - Patient cleared by vascular for podiatric intervention   - Dressings: Betadine soaked 4x4's, 4x4's, Krelix,  - Excess bleeding coming from medial ankle has resolved. Additional dressing placed as precaution.   - Nursing staff is able to change/reinforce dressing if & as necessary until next day’s dressing change. Thank you.  - Waiting on finalized surgical planning from Dr. Obinna Spears DPM  - Podiatry will continue to follow while in house     DVT ppx: Per Primary  Weightbearing: WBAT B/L LE     Patient was evaluated with attending Dr. Obinna Spears DPM     Case to be discussed with attending, A&P above reflects a tentative plan. Please await for the final signature from the attending physician on service.     Renny Nicole DPM PGY-1  Podiatric Medicine & Surgery    I saw and evaluated the patient. I personally obtained the key and critical portions of the history and physical exam or was physically present for key and critical portions performed by the resident/fellow. I reviewed the resident/fellow's documentation and discussed the patient with the resident/fellow. I agree with the resident/fellow's medical decision making as documented in the note.    I spent 30 minutes in the professional and overall care of this patient.    Obinna Spears DPM

## 2024-09-20 NOTE — PROGRESS NOTES
09/20/24 1046   Discharge Planning   Type of Residence Private residence   Who is requesting discharge planning? Provider   Home or Post Acute Services None   Expected Discharge Disposition Home   Does the patient need discharge transport arranged? Yes     Met with patient to discuss discharge plan.  He plans on going home, lives with his sister, with no homecare.  His niece will assist with dressing changes if need be.  He does not have a PCP and not interested in one at this time.  Possible candidate for [facility].  May need PT after surgery prior to discharge.  PLAN/BARRIER: amputation of little toe on left foot(date/time to be determined)  DISP: home  ADOD: 3-4 days  His niece will transport him home when medically cleared.  Susie Sims RN

## 2024-09-21 LAB
ANION GAP SERPL CALC-SCNC: 12 MMOL/L (ref 10–20)
BUN SERPL-MCNC: 10 MG/DL (ref 6–23)
CALCIUM SERPL-MCNC: 8.5 MG/DL (ref 8.6–10.3)
CHLORIDE SERPL-SCNC: 103 MMOL/L (ref 98–107)
CO2 SERPL-SCNC: 27 MMOL/L (ref 21–32)
CREAT SERPL-MCNC: 0.63 MG/DL (ref 0.5–1.3)
EGFRCR SERPLBLD CKD-EPI 2021: >90 ML/MIN/1.73M*2
GLUCOSE BLD MANUAL STRIP-MCNC: 127 MG/DL (ref 74–99)
GLUCOSE BLD MANUAL STRIP-MCNC: 144 MG/DL (ref 74–99)
GLUCOSE BLD MANUAL STRIP-MCNC: 150 MG/DL (ref 74–99)
GLUCOSE BLD MANUAL STRIP-MCNC: 177 MG/DL (ref 74–99)
GLUCOSE SERPL-MCNC: 118 MG/DL (ref 74–99)
POTASSIUM SERPL-SCNC: 3.6 MMOL/L (ref 3.5–5.3)
SODIUM SERPL-SCNC: 138 MMOL/L (ref 136–145)
UFH PPP CHRO-ACNC: 0.3 IU/ML

## 2024-09-21 PROCEDURE — 85520 HEPARIN ASSAY: CPT | Performed by: NURSE PRACTITIONER

## 2024-09-21 PROCEDURE — 2500000002 HC RX 250 W HCPCS SELF ADMINISTERED DRUGS (ALT 637 FOR MEDICARE OP, ALT 636 FOR OP/ED): Performed by: NURSE PRACTITIONER

## 2024-09-21 PROCEDURE — 2500000004 HC RX 250 GENERAL PHARMACY W/ HCPCS (ALT 636 FOR OP/ED): Performed by: NURSE PRACTITIONER

## 2024-09-21 PROCEDURE — 2500000001 HC RX 250 WO HCPCS SELF ADMINISTERED DRUGS (ALT 637 FOR MEDICARE OP): Performed by: NURSE PRACTITIONER

## 2024-09-21 PROCEDURE — 82947 ASSAY GLUCOSE BLOOD QUANT: CPT

## 2024-09-21 PROCEDURE — 1200000002 HC GENERAL ROOM WITH TELEMETRY DAILY

## 2024-09-21 PROCEDURE — 80048 BASIC METABOLIC PNL TOTAL CA: CPT | Performed by: NURSE PRACTITIONER

## 2024-09-21 PROCEDURE — 36415 COLL VENOUS BLD VENIPUNCTURE: CPT | Performed by: NURSE PRACTITIONER

## 2024-09-21 ASSESSMENT — COGNITIVE AND FUNCTIONAL STATUS - GENERAL
DRESSING REGULAR UPPER BODY CLOTHING: A LITTLE
MOBILITY SCORE: 17
TOILETING: A LITTLE
WALKING IN HOSPITAL ROOM: A LITTLE
CLIMB 3 TO 5 STEPS WITH RAILING: A LOT
MOVING FROM LYING ON BACK TO SITTING ON SIDE OF FLAT BED WITH BEDRAILS: A LITTLE
CLIMB 3 TO 5 STEPS WITH RAILING: A LOT
DRESSING REGULAR LOWER BODY CLOTHING: A LITTLE
STANDING UP FROM CHAIR USING ARMS: A LITTLE
MOVING FROM LYING ON BACK TO SITTING ON SIDE OF FLAT BED WITH BEDRAILS: A LITTLE
DAILY ACTIVITIY SCORE: 20
PATIENT BASELINE BEDBOUND: NO
TURNING FROM BACK TO SIDE WHILE IN FLAT BAD: A LITTLE
DRESSING REGULAR UPPER BODY CLOTHING: A LITTLE
DRESSING REGULAR LOWER BODY CLOTHING: A LITTLE
WALKING IN HOSPITAL ROOM: A LITTLE
TOILETING: A LITTLE
MOVING FROM LYING ON BACK TO SITTING ON SIDE OF FLAT BED WITH BEDRAILS: A LITTLE
STANDING UP FROM CHAIR USING ARMS: A LITTLE
TURNING FROM BACK TO SIDE WHILE IN FLAT BAD: A LITTLE
MOBILITY SCORE: 17
WALKING IN HOSPITAL ROOM: A LITTLE
TOILETING: A LITTLE
HELP NEEDED FOR BATHING: A LITTLE
HELP NEEDED FOR BATHING: A LITTLE
CLIMB 3 TO 5 STEPS WITH RAILING: A LOT
MOVING TO AND FROM BED TO CHAIR: A LITTLE
STANDING UP FROM CHAIR USING ARMS: A LITTLE
MOVING TO AND FROM BED TO CHAIR: A LITTLE
DRESSING REGULAR LOWER BODY CLOTHING: A LITTLE
MOVING TO AND FROM BED TO CHAIR: A LITTLE
TURNING FROM BACK TO SIDE WHILE IN FLAT BAD: A LITTLE
DAILY ACTIVITIY SCORE: 20
HELP NEEDED FOR BATHING: A LITTLE
MOBILITY SCORE: 17
DRESSING REGULAR UPPER BODY CLOTHING: A LITTLE
DAILY ACTIVITIY SCORE: 20

## 2024-09-21 ASSESSMENT — PAIN SCALES - GENERAL
PAINLEVEL_OUTOF10: 0 - NO PAIN

## 2024-09-21 ASSESSMENT — PAIN - FUNCTIONAL ASSESSMENT: PAIN_FUNCTIONAL_ASSESSMENT: 0-10

## 2024-09-21 NOTE — CARE PLAN
The patient's goals for the shift include   maintain bleeding precautions     The clinical goals for the shift include Pt will remain hemodynamically stable by end of shift

## 2024-09-21 NOTE — CARE PLAN
The patient's goals for the shift include      The clinical goals for the shift include Pt will remain free from falls    Problem: Pain  Goal: Takes deep breaths with improved pain control throughout the shift  Outcome: Progressing  Goal: Turns in bed with improved pain control throughout the shift  Outcome: Progressing  Goal: Walks with improved pain control throughout the shift  Outcome: Progressing  Goal: Performs ADL's with improved pain control throughout shift  Outcome: Progressing  Goal: Participates in PT with improved pain control throughout the shift  Outcome: Progressing  Goal: Free from opioid side effects throughout the shift  Outcome: Progressing  Goal: Free from acute confusion related to pain meds throughout the shift  Outcome: Progressing

## 2024-09-21 NOTE — PROGRESS NOTES
INTERNAL MEDICINE PROGRESS NOTE      HPI:    Patient reports no pain.  He has had no fever or chills.    Vital signs in last 24 hours:  Temp:  [36.5 °C (97.7 °F)-37 °C (98.6 °F)] 36.5 °C (97.7 °F)  Heart Rate:  [54-71] 66  Resp:  [17-22] 18  BP: (150-168)/(59-76) 150/59    Physical Examination:  Physical Exam    Constitutional:       Appearance: Elderly, overweight, in no distress  HENT:      Head: Normocephalic and atraumatic.   Eyes:      Extraocular Movements: Extraocular movements intact.      Pupils: Pupils are equal, round, and reactive to light.   Cardiovascular:      Rate and Rhythm: Normal rate and regular rhythm.      Pulses: Normal pulses.      Heart sounds: Normal heart sounds.   Pulmonary:      Effort: Pulmonary effort is normal.      Breath sounds: Normal breath sounds.   Abdominal:      General: Abdomen is flat. Bowel sounds are normal.      Palpations: Abdomen is soft.   Musculoskeletal:         General: Normal range of motion.      Cervical back: Normal range of motion and neck supple.   Skin:     General: Erythema decreasing, dressing over toes.  Neurological:      General: No focal deficit present.      Mental Status: He is alert and oriented to person, place, and time. Mental status is at baseline.        Medications:    Current Facility-Administered Medications:     acetaminophen (Tylenol) tablet 650 mg, 650 mg, oral, q4h PRN, 650 mg at 09/18/24 0940 **OR** acetaminophen (Tylenol) oral liquid 650 mg, 650 mg, oral, q4h PRN **OR** acetaminophen (Tylenol) suppository 650 mg, 650 mg, rectal, q4h PRN, JUDITH Shay DNP    ammonium lactate (Lac-Hydrin) 12 % lotion, , Topical, BID, JUDITH Shay DNP, Given at 09/21/24 0953    ampicillin-sulbactam (Unasyn) in sodium chloride 0.9 % 100 mL 3 g, 3 g, intravenous, q6h, JUDITH Shay DNP, Stopped at 09/21/24 1128    aspirin chewable tablet 81 mg, 81 mg, oral, Daily, JUDITH Shay DNP,  81 mg at 09/21/24 0953    clopidogrel (Plavix) tablet 600 mg, 600 mg, oral, Once, JUDITH Shay DNP    clopidogrel (Plavix) tablet 75 mg, 75 mg, oral, Daily, JUDITH Shay DNP, 75 mg at 09/21/24 0953    dextrose 50 % injection 12.5 g, 12.5 g, intravenous, q15 min PRN, JUDITH Shay DNP    dextrose 50 % injection 25 g, 25 g, intravenous, q15 min PRN, JUDITH Shay DNP    glucagon (Glucagen) injection 1 mg, 1 mg, intramuscular, q15 min PRN, JUDITH Shay DNP    glucagon (Glucagen) injection 1 mg, 1 mg, intramuscular, q15 min PRN, JUDITH Shay DNP    heparin 25,000 Units in dextrose 5% 250 mL (100 Units/mL) infusion (premix), 0-4,500 Units/hr, intravenous, Continuous, JUDITH Shay DNP, Last Rate: 16 mL/hr at 09/21/24 1104, 1,600 Units/hr at 09/21/24 1104    heparin bolus from bag 3,000-6,000 Units, 3,000-6,000 Units, intravenous, q4h PRN, JUDITH Shay DNP    insulin lispro (HumaLOG) injection 0-10 Units, 0-10 Units, subcutaneous, TID, JUDITH Shay DNP, 2 Units at 09/17/24 1819    lidocaine (LMX) 4 % cream, , Topical, 4x daily PRN, JUDITH Shay DNP, Given at 09/15/24 0227    oxygen (O2) therapy, , inhalation, Continuous - 02/gases, JUDITH Shay DNP    polyethylene glycol (Glycolax, Miralax) packet 17 g, 17 g, oral, Daily, JUDITH Shay DNP, 17 g at 09/15/24 0912    potassium chloride CR (Klor-Con M20) ER tablet 40 mEq, 40 mEq, oral, Daily, JUDITH Darling, 40 mEq at 09/21/24 0953    Laboratory Findings:  Lab Results   Component Value Date    WBC 8.8 09/19/2024    HGB 10.9 (L) 09/19/2024    HCT 34.1 (L) 09/19/2024    MCV 87 09/19/2024     09/19/2024     Lab Results   Component Value Date    INR 1.0 12/09/2019    PROTIME 11.4 12/09/2019     Lab Results   Component Value Date    GLUCOSE 118 (H) 09/21/2024     CALCIUM 8.5 (L) 09/21/2024     09/21/2024    K 3.6 09/21/2024    CO2 27 09/21/2024     09/21/2024    BUN 10 09/21/2024    CREATININE 0.63 09/21/2024       Assessment and Plan:     Acute osteomyelitis -continue IV antibiotics.  Vascular input appreciated.  Surgery planned for next week.  Peripheral vascular disease -as above, outpatient follow-up.  Acute DVT -continue with Lovenox per orders.  Hypokalemia -Continue with replacement therapy.   Type 2 DM-fair control, same medications.       Jemal Guadarrama MD  09/21/24  11:36 AM

## 2024-09-21 NOTE — PROGRESS NOTES
.PODIATRY SERVICE CONSULT PROGRESS NOTE    SERVICE DATE: 9/21/2024   SERVICE TIME:  1015    Subjective   INTERVAL HPI:  Elliot Partida is a 74 y.o. male presenting with PMH of R frontal extradural mass since 2016, arthritis, CAD, diabetes type 2, MI, HTN, thoracic radiculopathy, neuropathy who presents to [facility] for left leg pain that has been ongoing for a couple months. Patient reports increased swelling and redness to his left leg that he believes is from the wound on his 5th toe. Patient has been seeing Dr. Torres who believe the patient is need of a vascular consult for possible vascular intervention or evaluation to help induce healing to his left leg wound. Patient is wondering when he will be taken to surgery and is asking if he will be sent home right after or if he will stay a day or two after the procedure.   Pt was seen at bedside.  Pain well controlled.  Patient denies any constitutional symptoms.   No other pedal complaints.       Medications:  Scheduled Meds: ammonium lactate, , Topical, BID  ampicillin-sulbactam, 3 g, intravenous, q6h  aspirin, 81 mg, oral, Daily  clopidogrel, 600 mg, oral, Once  clopidogrel, 75 mg, oral, Daily  insulin lispro, 0-10 Units, subcutaneous, TID  oxygen, , inhalation, Continuous - 02/gases  polyethylene glycol, 17 g, oral, Daily  potassium chloride CR, 40 mEq, oral, Daily      Continuous Infusions: heparin, 0-4,500 Units/hr, Last Rate: 1,600 Units/hr (09/20/24 1950)      PRN Meds: PRN medications: acetaminophen **OR** acetaminophen **OR** acetaminophen, dextrose, dextrose, glucagon, glucagon, heparin, lidocaine         Objective   PHYSICAL EXAM:  Physical Exam Performed:  Vitals:    09/21/24 0803   BP: 150/59   Pulse:    Resp: 18   Temp: 36.5 °C (97.7 °F)   SpO2: 94%     Body mass index is 33.31 kg/m².    Patient is AOx3 and in no acute distress. Patient is alert and cooperative. Sitting comfortably in bed with dressing clean, dry and intact.   Right foot  exam as patient declined left foot exam as dressing was just changed.      Vasc: DP and PT pulses are nonpalpable to right foot.  CFT is >5 seconds bilateral.  Skin Temperature to from cool to warm distally to proximally on the left lower extremity     Neuro:  Light touch is intact to the right foot  There is no clonus noted.       Derm: Nails 1-5 bilateral are intact.  Skin is dry, flakey to the bilateral calves. Left calf and shin is erythematous with weeping. No bleeding today.     MSK:  Ankle joint, subtalar joint, 1st MPJ and lesser MPJ ROM is limited and painful.  Tenderness to palpation of left foot and calf throughout, extremely tender over plantar surface of foot.     Wound:   Left lateral/plantar  5th MTP joint   Measurements: 2.0 x 2.0 x probes to bone.   Mixed wound base of necrotic and fibrotic tissue.   Mild purulent drainage with fibrotic slough.   Mild jennyfer-wound maceration.   Moderate jennyfer-wound erythema.   Minimal evidence of ascending cellulitis or lymphangitis.  Minimal palpable fluctuance. Significant malodor. No increased warmth.   Significant probe to bone or deep structures within the wound base.   Moderate tunneling or tracking.   No undermining. Skin edges irregular but intact.       LABS:   Results for orders placed or performed during the hospital encounter of 09/14/24 (from the past 24 hour(s))   POCT GLUCOSE   Result Value Ref Range    POCT Glucose 146 (H) 74 - 99 mg/dL   POCT GLUCOSE   Result Value Ref Range    POCT Glucose 131 (H) 74 - 99 mg/dL   Basic Metabolic Panel   Result Value Ref Range    Glucose 118 (H) 74 - 99 mg/dL    Sodium 138 136 - 145 mmol/L    Potassium 3.6 3.5 - 5.3 mmol/L    Chloride 103 98 - 107 mmol/L    Bicarbonate 27 21 - 32 mmol/L    Anion Gap 12 10 - 20 mmol/L    Urea Nitrogen 10 6 - 23 mg/dL    Creatinine 0.63 0.50 - 1.30 mg/dL    eGFR >90 >60 mL/min/1.73m*2    Calcium 8.5 (L) 8.6 - 10.3 mg/dL   Heparin Assay, UFH   Result Value Ref Range    Heparin  Unfractionated 0.3 See Comment Below for Therapeutic Ranges IU/mL   POCT GLUCOSE   Result Value Ref Range    POCT Glucose 127 (H) 74 - 99 mg/dL      Lab Results   Component Value Date    HGBA1C 9.8 (H) 09/13/2024      Lab Results   Component Value Date    CRP 9.51 (H) 09/13/2024      Lab Results   Component Value Date    SEDRATE 24 (H) 09/13/2024        Results from last 7 days   Lab Units 09/19/24  2319   WBC AUTO x10*3/uL 8.8   RBC AUTO x10*6/uL 3.90*   HEMOGLOBIN g/dL 10.9*   HEMATOCRIT % 34.1*     Results from last 7 days   Lab Units 09/21/24  0651   SODIUM mmol/L 138   POTASSIUM mmol/L 3.6   CHLORIDE mmol/L 103   CO2 mmol/L 27   BUN mg/dL 10   CREATININE mg/dL 0.63   CALCIUM mg/dL 8.5*             IMAGING REVIEW:  Vascular US Ankle Brachial Index (BEN) Without Exercise    Result Date: 9/16/2024             [place] [address]   Tel [phone] and Fax [phone]  Vascular Lab Report VASC US PVR WITHOUT EXERCISE  Patient Name:     SUBHASH Cain Physician: [id number] Sven Caicedo MD Study Date:       9/16/2024          Ordering           [id number] [address]OS                                      Physician: MRN/PID:          [id number]           Technologist:      Tamra Walls RVT Accession#:       [id number]       Technologist 2:    Graciela Tobar RVT Date of           1950 / 74      Encounter#:        [id number] Birth/Age:        years Gender:           M Admission Status: Inpatient          Location           [facility]                                      Performed:  Diagnosis/ICD: Peripheral vascular disease, unspecified-I73.9 CPT Codes:     40452 Peripheral artery BEN Only  CONCLUSIONS: Right Lower PVR: No evidence of arterial occlusive disease in the right lower extremity at rest. Decreased digital perfusion noted. Multiphasic flow is noted in the right common femoral artery,  right posterior tibial artery and right dorsalis pedis artery. Left Lower PVR: No evidence of arterial occlusive disease in the left lower extremity at rest. Decreased digital perfusion noted. Monophasic flow is noted in the left posterior tibial artery and left dorsalis pedis artery. Multiphasic flow is noted in the left common femoral artery. Waveform may be dmapened due to edema.  Additional Findings: Known DVT in the left femoral and popliteal veins.  Imaging & Doppler Findings:  RIGHT Lower PVR                Pressures Ratios Right Posterior Tibial (Ankle) 155 mmHg  0.99 Right Dorsalis Pedis (Ankle)   155 mmHg  0.99 Right Digit (Great Toe)        66 mmHg   0.42   LEFT Lower PVR                Pressures Ratios Left Posterior Tibial (Ankle) 142 mmHg  0.91 Left Dorsalis Pedis (Ankle)   128 mmHg  0.82 Left Digit (Great Toe)        52 mmHg   0.33                     Right     Left Brachial Pressure 156 mmHg 150 mmHg   [id number] Sven Caicedo MD Electronically signed by [id number] Sven Caicedo MD on 9/16/2024 at 7:50:39 PM  ** Final **     CT angio chest for pulmonary embolism    Result Date: 9/14/2024  Interpreted By:  Enedina Umana, STUDY: CT ANGIO CHEST FOR PULMONARY EMBOLISM;  9/14/2024 9:28 am   INDICATION: Signs/Symptoms:hypoxia, dvt.     COMPARISON: None.   ACCESSION NUMBER(S): [id number]   ORDERING CLINICIAN: ABIEL KENT   TECHNIQUE: CT of the chest was performed. Sagittal and coronal reconstructions were generated. 75 ML Omnipaque 350 intravenous contrast given for the examination.  Multiplanar reconstructions of the pulmonary vessels were created on an independent workstation and provided for review.   FINDINGS: Artifact related to motion and overlying upper extremities limits evaluation.   CHEST WALL AND LOWER NECK: No significant axillary lymphadenopathy.   MEDIASTINUM AND ROLO:  No significant lymphadenopathy.   HEART AND VESSELS:  No central PE however limited assessment for peripheral PE due to  technical factors including suboptimal timing of IV contrast bolus and artifact. The heart is normal in size. No significant pericardial effusion. Multifocal atherosclerotic calcifications including the coronary arteries and aortic and mitral valve regions. No thoracic aortic aneurysm.   LUNGS, PLEURA, LARGE AIRWAYS:  Respiratory motion artifact limits parenchymal evaluation. Mild pulmonary heterogeneity. Faint linear densities in both lung fields. Possible mosaic attenuation in both lung bases. Trace bilateral pleural effusions/thickening. The central airways are patent.   UPPER ABDOMEN:  Limited due to artifact related to overlying upper extremities. The included liver is likely mildly hypodense/fatty infiltrated. No definite focal mass lesion in the included upper abdominal viscera.   BONES:  Flowing osteophytes along the thoracic spine consistent with dish.       No central PE however peripheral PE can not be excluded based on this exam.   Scattered interstitial opacities including possible mosaic attenuation in the lung bases which could reflect small airways disease and air trapping or atypical infection. Trace bilateral pleural effusions/thickening.   Moderate atherosclerotic disease.   Possible fatty infiltration of the liver incidentally noted.   MACRO: None.   Signed by: Enedina Umana 9/14/2024 10:56 AM Dictation workstation:   [id number]    Transthoracic Echo (TTE) Complete    Result Date: 9/13/2024   [facility], [address] [place] [address]              Tel [phone] and Fax [phone] TRANSTHORACIC ECHOCARDIOGRAM REPORT  Patient Name:      SUBHASH SEGAL     Reading Physician:    [id number] Carson Freeman MD Study Date:        9/13/2024            Ordering Provider:    [id number] ABIEL KENT MRN/PID:           [id number]             Fellow:  Accession#:        [id number]         Nurse: Date of Birth/Age: 1950 / 74 years  Sonographer:          Cailin Julian RDCS Gender:            M                    Additional Staff: Height:            175.26 cm            Admit Date:           9/12/2024 Weight:            107.05 kg            Admission Status:     Inpatient -                                                               Routine BSA / BMI:         2.22 m2 / 34.85      Encounter#:           [id number]                    kg/m2 Blood Pressure:    137/66 mmHg          Department Location:  [facility] Study Type:    TRANSTHORACIC ECHO (TTE) COMPLETE Diagnosis/ICD: Abnormal electrocardiogram [ECG] [EKG]-R94.31 Indication:    Abnormal EKG CPT Code:      Echo Complete w Full Doppler-22400 Patient History: Pertinent History: Edema, DM, abnomal EKG, wound infection, WMA. Study Detail: The following Echo studies were performed: 2D, M-Mode, Doppler and               color flow. Technically challenging study due to body habitus,               patient lying in supine position, prominent lung artifact and poor               acoustic windows. Definity used as a contrast agent for               endocardial border definition. Total contrast used for this               procedure was 3.0 mL via IV push.  PHYSICIAN INTERPRETATION: Left Ventricle: The left ventricular systolic function is normal, with a visually estimated ejection fraction of 60-65%. There are no regional left ventricular wall motion abnormalities. The left ventricular cavity size is normal. Spectral Doppler shows an impaired relaxation pattern of left ventricular diastolic filling. Left Atrium: The left atrium is normal in size. Right Ventricle: The right ventricle is normal in size. There is normal right ventricular global systolic function. Right Atrium: The right atrium is normal in size. Aortic Valve: The aortic valve is  trileaflet. There is moderate aortic valve cusp calcification. There is evidence of mild to moderate aortic valve stenosis. There is no evidence of aortic valve regurgitation. The peak instantaneous gradient of the aortic valve is 26.2 mmHg. Mitral Valve: The mitral valve is normal in structure. There is mild mitral valve regurgitation. Tricuspid Valve: The tricuspid valve is structurally normal. There is mild tricuspid regurgitation. Pulmonic Valve: The pulmonic valve is structurally normal. There is physiologic pulmonic valve regurgitation. Pericardium: There is no pericardial effusion noted. Aorta: The aortic root is normal. Pulmonary Artery: The estimated pulmonary artery pressure is normal. Systemic Veins: The inferior vena cava appears mildly dilated.  CONCLUSIONS:  1. The left ventricular systolic function is normal, with a visually estimated ejection fraction of 60-65%.  2. Spectral Doppler shows an impaired relaxation pattern of left ventricular diastolic filling.  3. There is normal right ventricular global systolic function.  4. Mild to moderate aortic valve stenosis.  5. There is moderate aortic valve cusp calcification.  6. The inferior vena cava appears mildly dilated. QUANTITATIVE DATA SUMMARY:  2D MEASUREMENTS:         Normal Ranges: Ao Root d:       3.70 cm (2.0-3.7cm)  AORTA MEASUREMENTS:         Normal Ranges: Asc Ao, d:          3.90 cm (2.1-3.4cm)  LV SYSTOLIC FUNCTION BY 2D PLANIMETRY (MOD):                      Normal Ranges: EF-Visual:      63 % LV EF Reported: 63 %  LV DIASTOLIC FUNCTION:           Normal Ranges: MV Peak E:             1.05 m/s  (0.7-1.2 m/s) MV Peak A:             1.50 m/s  (0.42-0.7 m/s) E/A Ratio:             0.70      (1.0-2.2) MV e'                  0.073 m/s (>8.0) MV lateral e'          0.08 m/s MV medial e'           0.07 m/s E/e' Ratio:            14.40     (<8.0)  AORTIC VALVE:            Normal Ranges: AoV Vmax:      2.56 m/s  (<=1.7m/s) AoV Peak P.2 mmHg  (<20mmHg) LVOT Max Jak:  1.25 m/s  (<=1.1m/s) LVOT VTI:      23.90 cm LVOT Diameter: 2.20 cm   (1.8-2.4cm) AoV Area,Vmax: 1.86 cm2  (2.5-4.5cm2)  RIGHT VENTRICLE: TAPSE: 21.1 mm RV s'  0.12 m/s  TRICUSPID VALVE/RVSP:         Normal Ranges: IVC Diam:             2.33 cm  PULMONIC VALVE:          Normal Ranges: PV Max Jak:     1.2 m/s  (0.6-0.9m/s) PV Max P.2 mmHg  [id number] Carson Freeman MD Electronically signed on 2024 at 7:18:35 PM  ** Final **     CT angio aorta and bilateral iliofemoral runoff w and or wo IV contrast    Result Date: 2024  Interpreted By:  Osorio Wong, STUDY: CT ANGIO AORTA AND BILATERAL ILIOFEMORAL RUNOFF W AND OR WO IV CONTRAST;  2024 12:07 pm   INDICATION: Signs/Symptoms:evaluation for circulation and wound.   COMPARISON: None.   ACCESSION NUMBER(S): [id number]   ORDERING CLINICIAN: ABIEL KENT   TECHNIQUE: CTA of the abdomen and pelvis, with runoff was performed.  Contiguous axial images were obtained at 3 mm slice thickness through the abdomen and pelvis, with runoff. 3D Coronal and sagittal reconstructions at 3 mm slice thickness were performed. 75 ml of contrast material  Omnipaque 350 were administered intravenously without immediate complication. FINDINGS:   CTA ABDOMEN VESSELS   Celiac axis: No significant stenosis.   SMA: Partially calcified disease at its origin results in moderate to severe stenosis.   RAUL: No significant stenosis.   Right renal vessels: Calcified disease of its proximal segment results in moderate stenosis.   Left renal vessels: No significant stenosis.   Infrarenal aorta: Contains partially calcified disease without significant stenosis or aneurysmal dilation.   CTA  PELVIC VESSELS R. Common iliac artery: No significant stenosis.   R. External iliac artery: No significant stenosis.   R. Internal iliac artery: No significant stenosis.   L. Common iliac artery: No significant stenosis.   L. External iliac artery: No significant  stenosis.   L. Internal iliac artery: Partially calcified disease results in mild to moderate stenosis.   CTA RIGHT LOWER EXTREMITY R. Common femoral artery: No significant stenosis.   R. Profunda femoris artery: No significant stenosis.   R. SFA: Partially calcified disease results in mild stenosis.   R. Popliteal artery: Partially calcified disease results in moderate stenosis of the above the knee segment. Predominantly calcified disease of the below-the-knee segment results in mild-to-moderate stenosis.   R. Anterior tibial artery: Occluded at its origin.   R. Tibioperoneal trunk: Partially calcified disease results in mild-to-moderate stenosis.   R. Posterior tibial artery: Occluded at its origin.   R. Peroneal artery: Contains scattered calcific disease of its mid to distal segment resulting in mild-to-moderate stenosis.   R. Dorsalis pedis artery: No significant stenosis.   R. Plantar artery: Not visualized.   CTA LEFT LOWER EXTREMITY L. Common femoral artery: No significant stenosis.   L. Profunda femoris artery: No significant stenosis.   L. SFA: Contains partially calcified disease predominantly in its mid to distal segment. This results in focal severe stenosis across Gaetano's canal but no significant stenosis upstream.   L. Popliteal artery: Contains partially calcified atherosclerotic disease without significant stenosis.   L. Anterior tibial artery: Occluded shortly beyond its origin.   L. Tibioperoneal trunk: Contains partially calcified disease resulting in moderate to severe stenosis.   L. Posterior tibial artery: Occluded at its origin, there is distal reconstitution across its middle 3rd as a diseased vessel containing scattered calcific disease resulting in mild stenosis. This vessel is seen to cross the ankle joint and supply the plantar artery.   L. Peroneal artery: Occluded at its origin, there is distal reconstitution at its middle 3rd as a small caliber vessel, supplying distal collaterals  to the foot.   L. Dorsalis pedis artery: Not visualized   L. Plantar artery: No significant stenosis.   NON-VASCULAR FINDINGS   Visualized portions of the heart: Main pulmonary artery is enlarged, which can be seen in the setting of pulmonary arterial hypertension. The heart is not enlarged. There is coronary artery calcifications present in the distribution of all 3 vessels. There is no pericardial effusion.   Lungs: There is bilateral dependent atelectasis.   Hepatobiliary: Unremarkable liver without biliary dilation evident   Pancreas: Unremarkable   Spleen: Unremarkable   Adrenal Glands: Unremarkable   Kidneys, ureters, and bladder: There are bilateral parapelvic renal cysts and bilateral parenchymal cysts. There are bilateral extrarenal pelvises. There is no urolithiasis or hydroureteronephrosis. The urinary bladder demonstrates circumferential wall thickening which may be related to incomplete distention or cystitis.   GI tract: There is a 2.6 cm diverticulum off the gastric fundus. No evidence of obstruction. There is colonic diverticulosis without diverticulitis. The appendix is within normal limits.   Peritoneum and retroperitoneum: No free fluid or free air is noted.   Lymph Nodes: There are mildly prominent pelvic lymph nodes, left-greater-than-right. These are likely reactive.   Reproductive Organs: The prostate gland is enlarged.   Visualized musculoskeletal structures: There are degenerative changes of the spine. Spurring of the bilateral sacroiliac joints is noted. There is osteoarthritic changes of the bilateral hips. There is diffuse edema and skin thickening involving the left leg. There are prominent venous varicosities of the left leg. There is osteopenia involving the left foot. Bony destruction at the left 5th metatarsophalangeal joint is noted with soft tissue gas surrounding this region. There appears to be an open wound in the plantar aspect of the foot centered at the 5th MTP joint.  Curvilinear hyperdensity within the soft tissues may reflect small bony fragments or foreign bodies. No acute fracture or destructive osseous lesion is identified       1. Diffuse partially calcified atherosclerotic disease, as detailed above. There is a single vessel runoff in the right leg. In the left leg, there is high-grade stenosis of the distal SFA as it crosses Gaetano's canal. Additionally, there is occlusive disease of the proximal trifurcation with reconstitution of the left posterior tibial and peroneal arteries distally.   2. Significant soft tissue swelling of the left leg with prominent venous varicosities. There is also soft tissue swelling of the left foot with an open wound along the plantar aspect of the foot centered at the 5th MTP joint, which demonstrates adjacent bony destruction. Curvilinear hyperdensity in this region may reflect bony fragments or foreign bodies.   3. Coronary artery calcifications.   Signed by: Osorio Wong 9/13/2024 2:09 PM Dictation workstation:   [id number]    Lower extremity venous duplex left    Result Date: 9/12/2024  STUDY: Left Lower Extremity Venous Doppler Ultrasound; 9/12/2024 4:43 PM INDICATION: Swelling. COMPARISON: US LE Venous 3/21/2022. ACCESSION NUMBER(S): [id number] ORDERING CLINICIAN: KATIE HITLON TECHNIQUE:  Real-time grayscale imaging, color Doppler flow imaging, and spectral Doppler imaging of the left lower extremity veins was performed. FINDINGS: There is acute occlusive DVT demonstrated within the left mid-distal femoral and popliteal veins. The left common femoral and profunda femoral veins demonstrated normal compressibility, normal phasic venous flow and normal response to augmentation.  There is no evidence for echogenic thrombi.  The deep calf veins are not well visualized due to subcutaneous edema. The contralateral common femoral vein is free of thrombosis.    Evidence for acute occlusive DVT within the left mid-distal femoral and  popliteal veins. Compared to prior ultrasound, this represents a new finding. The positive findings on this exam were discussed with and acknowledged by Dr. Katie Hilton abdomen interpretation, 5:00 PM September 12, 2024 Signed by Jason Tobar MD    XR foot left 3+ views    Result Date: 9/12/2024  STUDY: Foot Radiographs; 9/12/2024 4:19 PM INDICATION: Left foot pain. COMPARISON: None Available. ACCESSION NUMBER(S): [id number] ORDERING CLINICIAN: KATIE HILTON TECHNIQUE:  Three view(s) of the left foot (four images). FINDINGS:  There is no displaced fracture.  The alignment is anatomic.  Soft tissue swelling and ulceration noted lateral to the fifth MTP joint associated with demineralization of the fifth proximal phalanx and the distal head of the fifth metatarsal.  Bony changes consistent with osteomyelitis.    Osteomyelitis of the fifth metatarsal head and proximal phalanx of the fifth toe. Signed by Td Francisco MD            Assessment/Plan   ASSESSMENT & PLAN:  Osteomyelitis (Multi)     #Left foot necrotic ulcer in setting of lower extremity cellulitis  #Osteomyelitis  #DM2     - Patient was seen and evaluated; all findings were discussed and all questions were answered to patient's satisfaction.  - Charts, labs, vitals and imaging all reviewed.   - Imaging: xray- Osteomyelitis of the fifth metatarsal head and proximal phalanx of the  fifth toe.  - MRI canceled  - Labs: WBC 8.8, CRP: 9.51, ESR:24  - PVRs: Decreased digital perfusion noted. Monophasic flow is noted in the left posterior tibial artery and left dorsalis pedis artery.  - Lower extremity Duplex- Evidence for acute occlusive DVT within the left mid-distal femoral and popliteal veins.   - Blood culture: No growth 4 days     Plan:  - Abx: Per Primary  - Patient cleared by vascular for podiatric intervention   - Surgery for left foot planned for Monday 09/23/24. Discussed surgical plan with patient patient was agreeable to surgical plan.  - NPO on  9/23/24 on 0001 for procedure.   - Dressings: Betadine soaked 4x4's, 4x4's, Krelix,  - Excess bleeding coming from medial ankle has resolved. Additional dressing placed as precaution.   - Nursing staff is able to change/reinforce dressing if & as necessary until next day’s dressing change. Thank you.  - Podiatry will continue to follow while in house     DVT ppx: Per Primary  Weightbearing: WBAT B/L LE     Patient was evaluated/discussed with attending Dr. Obinna Spears DPM     Case to be discussed with attending, A&P above reflects a tentative plan. Please await for the final signature from the attending physician on service.     Renny Nicole DPM PGY-1  Podiatric Medicine & Surgery    I saw and evaluated the patient. I personally obtained the key and critical portions of the history and physical exam or was physically present for key and critical portions performed by the resident/fellow. I reviewed the resident/fellow's documentation and discussed the patient with the resident/fellow. I agree with the resident/fellow's medical decision making as documented in the note.    I spent 30 minutes in the professional and overall care of this patient.    Obinna Spears DPM

## 2024-09-22 VITALS
WEIGHT: 225.53 LBS | RESPIRATION RATE: 17 BRPM | DIASTOLIC BLOOD PRESSURE: 72 MMHG | SYSTOLIC BLOOD PRESSURE: 154 MMHG | OXYGEN SATURATION: 96 % | HEIGHT: 69 IN | TEMPERATURE: 97.7 F | BODY MASS INDEX: 33.4 KG/M2 | HEART RATE: 60 BPM

## 2024-09-22 LAB
ANION GAP SERPL CALC-SCNC: 10 MMOL/L (ref 10–20)
BASOPHILS # BLD AUTO: 0.03 X10*3/UL (ref 0–0.1)
BASOPHILS NFR BLD AUTO: 0.4 %
BUN SERPL-MCNC: 9 MG/DL (ref 6–23)
CALCIUM SERPL-MCNC: 8.3 MG/DL (ref 8.6–10.3)
CHLORIDE SERPL-SCNC: 104 MMOL/L (ref 98–107)
CO2 SERPL-SCNC: 28 MMOL/L (ref 21–32)
CREAT SERPL-MCNC: 0.64 MG/DL (ref 0.5–1.3)
EGFRCR SERPLBLD CKD-EPI 2021: >90 ML/MIN/1.73M*2
EOSINOPHIL # BLD AUTO: 0.24 X10*3/UL (ref 0–0.4)
EOSINOPHIL NFR BLD AUTO: 2.9 %
ERYTHROCYTE [DISTWIDTH] IN BLOOD BY AUTOMATED COUNT: 13.6 % (ref 11.5–14.5)
GLUCOSE BLD MANUAL STRIP-MCNC: 137 MG/DL (ref 74–99)
GLUCOSE BLD MANUAL STRIP-MCNC: 137 MG/DL (ref 74–99)
GLUCOSE BLD MANUAL STRIP-MCNC: 140 MG/DL (ref 74–99)
GLUCOSE BLD MANUAL STRIP-MCNC: 179 MG/DL (ref 74–99)
GLUCOSE SERPL-MCNC: 140 MG/DL (ref 74–99)
HCT VFR BLD AUTO: 34.1 % (ref 41–52)
HGB BLD-MCNC: 10.7 G/DL (ref 13.5–17.5)
IMM GRANULOCYTES # BLD AUTO: 0.12 X10*3/UL (ref 0–0.5)
IMM GRANULOCYTES NFR BLD AUTO: 1.5 % (ref 0–0.9)
LYMPHOCYTES # BLD AUTO: 2.62 X10*3/UL (ref 0.8–3)
LYMPHOCYTES NFR BLD AUTO: 31.8 %
MCH RBC QN AUTO: 27.9 PG (ref 26–34)
MCHC RBC AUTO-ENTMCNC: 31.4 G/DL (ref 32–36)
MCV RBC AUTO: 89 FL (ref 80–100)
MONOCYTES # BLD AUTO: 0.65 X10*3/UL (ref 0.05–0.8)
MONOCYTES NFR BLD AUTO: 7.9 %
NEUTROPHILS # BLD AUTO: 4.58 X10*3/UL (ref 1.6–5.5)
NEUTROPHILS NFR BLD AUTO: 55.5 %
NRBC BLD-RTO: 0 /100 WBCS (ref 0–0)
PLATELET # BLD AUTO: 258 X10*3/UL (ref 150–450)
POTASSIUM SERPL-SCNC: 3.7 MMOL/L (ref 3.5–5.3)
RBC # BLD AUTO: 3.83 X10*6/UL (ref 4.5–5.9)
SODIUM SERPL-SCNC: 138 MMOL/L (ref 136–145)
UFH PPP CHRO-ACNC: 0.6 IU/ML
WBC # BLD AUTO: 8.2 X10*3/UL (ref 4.4–11.3)

## 2024-09-22 PROCEDURE — 2500000004 HC RX 250 GENERAL PHARMACY W/ HCPCS (ALT 636 FOR OP/ED): Performed by: NURSE PRACTITIONER

## 2024-09-22 PROCEDURE — 1200000002 HC GENERAL ROOM WITH TELEMETRY DAILY

## 2024-09-22 PROCEDURE — 82947 ASSAY GLUCOSE BLOOD QUANT: CPT

## 2024-09-22 PROCEDURE — 2500000002 HC RX 250 W HCPCS SELF ADMINISTERED DRUGS (ALT 637 FOR MEDICARE OP, ALT 636 FOR OP/ED): Performed by: NURSE PRACTITIONER

## 2024-09-22 PROCEDURE — 2500000001 HC RX 250 WO HCPCS SELF ADMINISTERED DRUGS (ALT 637 FOR MEDICARE OP): Performed by: NURSE PRACTITIONER

## 2024-09-22 PROCEDURE — 85520 HEPARIN ASSAY: CPT | Performed by: INTERNAL MEDICINE

## 2024-09-22 PROCEDURE — 80048 BASIC METABOLIC PNL TOTAL CA: CPT | Performed by: INTERNAL MEDICINE

## 2024-09-22 PROCEDURE — 85025 COMPLETE CBC W/AUTO DIFF WBC: CPT | Performed by: INTERNAL MEDICINE

## 2024-09-22 PROCEDURE — 36415 COLL VENOUS BLD VENIPUNCTURE: CPT | Performed by: INTERNAL MEDICINE

## 2024-09-22 ASSESSMENT — COGNITIVE AND FUNCTIONAL STATUS - GENERAL
CLIMB 3 TO 5 STEPS WITH RAILING: A LOT
STANDING UP FROM CHAIR USING ARMS: A LITTLE
DAILY ACTIVITIY SCORE: 19
MOBILITY SCORE: 17
WALKING IN HOSPITAL ROOM: A LITTLE
HELP NEEDED FOR BATHING: A LITTLE
TURNING FROM BACK TO SIDE WHILE IN FLAT BAD: A LITTLE
PERSONAL GROOMING: A LITTLE
MOVING TO AND FROM BED TO CHAIR: A LITTLE
PERSONAL GROOMING: A LITTLE
MOVING TO AND FROM BED TO CHAIR: A LITTLE
TOILETING: A LITTLE
CLIMB 3 TO 5 STEPS WITH RAILING: A LOT
MOBILITY SCORE: 17
MOVING FROM LYING ON BACK TO SITTING ON SIDE OF FLAT BED WITH BEDRAILS: A LITTLE
TURNING FROM BACK TO SIDE WHILE IN FLAT BAD: A LITTLE
MOVING TO AND FROM BED TO CHAIR: A LITTLE
WALKING IN HOSPITAL ROOM: A LITTLE
TURNING FROM BACK TO SIDE WHILE IN FLAT BAD: A LITTLE
DRESSING REGULAR LOWER BODY CLOTHING: A LITTLE
HELP NEEDED FOR BATHING: A LITTLE
DRESSING REGULAR UPPER BODY CLOTHING: A LITTLE
HELP NEEDED FOR BATHING: A LITTLE
STANDING UP FROM CHAIR USING ARMS: A LITTLE
TOILETING: A LITTLE
MOBILITY SCORE: 17
DAILY ACTIVITIY SCORE: 19
DRESSING REGULAR LOWER BODY CLOTHING: A LITTLE
CLIMB 3 TO 5 STEPS WITH RAILING: A LOT
MOVING FROM LYING ON BACK TO SITTING ON SIDE OF FLAT BED WITH BEDRAILS: A LITTLE
DRESSING REGULAR LOWER BODY CLOTHING: A LITTLE
DAILY ACTIVITIY SCORE: 20
MOVING FROM LYING ON BACK TO SITTING ON SIDE OF FLAT BED WITH BEDRAILS: A LITTLE
WALKING IN HOSPITAL ROOM: A LITTLE
DRESSING REGULAR UPPER BODY CLOTHING: A LITTLE
STANDING UP FROM CHAIR USING ARMS: A LITTLE
DRESSING REGULAR UPPER BODY CLOTHING: A LITTLE
TOILETING: A LITTLE

## 2024-09-22 ASSESSMENT — PAIN SCALES - GENERAL
PAINLEVEL_OUTOF10: 2
PAINLEVEL_OUTOF10: 0 - NO PAIN
PAINLEVEL_OUTOF10: 5 - MODERATE PAIN
PAINLEVEL_OUTOF10: 0 - NO PAIN

## 2024-09-22 ASSESSMENT — PAIN - FUNCTIONAL ASSESSMENT
PAIN_FUNCTIONAL_ASSESSMENT: 0-10

## 2024-09-22 ASSESSMENT — PAIN DESCRIPTION - LOCATION: LOCATION: BACK

## 2024-09-22 NOTE — SIGNIFICANT EVENT
-Patient was not evaluated by Podiatry today. At the time of exam Patient's floor was out of Kerlix needed for dressing changes. Patient also experiences discomfort with changes and lack of supplies made today's dress change not worth level of discomfort for patient.   -Patient was examined yesterday and dressing was changes yesterday. Patient will be have surgical intervention tomorrow with Dr. Spears.     - Patient was seen and all findings were discussed and all questions were answered to patient's satisfaction.  - Charts, labs, vitals and imaging all reviewed.   - Imaging: xray- Osteomyelitis of the fifth metatarsal head and proximal phalanx of the  fifth toe.  - MRI canceled  - Labs: WBC 8.2, CRP: 9.51, ESR:24  - PVRs: Decreased digital perfusion noted. Monophasic flow is noted in the left posterior tibial artery and left dorsalis pedis artery.  - Lower extremity Duplex- Evidence for acute occlusive DVT within the left mid-distal femoral and popliteal veins.   - Blood culture: No growth 4 days     Plan:  - Abx: Per Primary  - Patient cleared by vascular for podiatric intervention   - Surgical intervention is planned for left foot on 09/23/24 with Dr. Spears.   - NPO 09/23/24 at 0001.  - No dressing change at this time as floor was out of dressing supplies during visit and patient will have procedure tomorrow. Current dressing in place down below.  - Dressings: Betadine soaked 4x4's, 4x4's, Kerlix.  - Nursing staff is able to change/reinforce dressing if & as necessary until next day’s dressing change. Thank you.  - Podiatry will continue to follow while in house     DVT ppx: Per Primary  Weightbearing: WBAT B/L LE     Patient was Discussed with attending Dr. Obinna Nicole DPM PGY-1  Podiatric Medicine & Surgery

## 2024-09-22 NOTE — PROGRESS NOTES
INTERNAL MEDICINE PROGRESS NOTE      HPI:    Patient has had no fever or chills.  He reports no foot pain.    Vital signs in last 24 hours:  Temp:  [36.4 °C (97.5 °F)-37.1 °C (98.7 °F)] 36.6 °C (97.9 °F)  Heart Rate:  [61-68] 61  Resp:  [16-20] 16  BP: (154-165)/(57-77) 165/77    Physical Examination:  Physical Exam    Constitutional:       Appearance: Elderly, overweight, in no distress  HENT:      Head: Normocephalic and atraumatic.   Eyes:      Extraocular Movements: Extraocular movements intact.      Pupils: Pupils are equal, round, and reactive to light.   Cardiovascular:      Rate and Rhythm: Normal rate and regular rhythm.      Pulses: Normal pulses.      Heart sounds: Normal heart sounds.   Pulmonary:      Effort: Pulmonary effort is normal.      Breath sounds: Normal breath sounds.   Abdominal:      General: Abdomen is flat. Bowel sounds are normal.      Palpations: Abdomen is soft.   Musculoskeletal:         General: Normal range of motion.      Cervical back: Normal range of motion and neck supple.   Skin:     General: Erythema decreasing, dressing over toes.  Neurological:      General: No focal deficit present.      Mental Status: He is alert and oriented to person, place, and time. Mental status is at baseline.        Medications:    Current Facility-Administered Medications:     acetaminophen (Tylenol) tablet 650 mg, 650 mg, oral, q4h PRN, 650 mg at 09/18/24 0940 **OR** acetaminophen (Tylenol) oral liquid 650 mg, 650 mg, oral, q4h PRN **OR** acetaminophen (Tylenol) suppository 650 mg, 650 mg, rectal, q4h PRN, JUDITH Shay DNP    ammonium lactate (Lac-Hydrin) 12 % lotion, , Topical, BID, JUDITH Shay DNP, Given at 09/22/24 1027    ampicillin-sulbactam (Unasyn) in sodium chloride 0.9 % 100 mL 3 g, 3 g, intravenous, q6h, JUDITH Shay DNP, Stopped at 09/22/24 1118    aspirin chewable tablet 81 mg, 81 mg, oral, Daily, Jagdish Loera  APRN-CNP, ANTONIA, 81 mg at 09/22/24 1027    clopidogrel (Plavix) tablet 600 mg, 600 mg, oral, Once, JUDITH Shay DNP    clopidogrel (Plavix) tablet 75 mg, 75 mg, oral, Daily, JUDITH Shay DNP, 75 mg at 09/22/24 1027    dextrose 50 % injection 12.5 g, 12.5 g, intravenous, q15 min PRN, JUDITH Shay DNP    dextrose 50 % injection 25 g, 25 g, intravenous, q15 min PRN, JUDITH Shay DNP    glucagon (Glucagen) injection 1 mg, 1 mg, intramuscular, q15 min PRN, JUDITH Shay DNP    glucagon (Glucagen) injection 1 mg, 1 mg, intramuscular, q15 min PRN, JUDITH Shay DNP    heparin 25,000 Units in dextrose 5% 250 mL (100 Units/mL) infusion (premix), 0-4,500 Units/hr, intravenous, Continuous, JUDITH Shay DNP, Last Rate: 16 mL/hr at 09/22/24 0700, 1,600 Units/hr at 09/22/24 0700    heparin bolus from bag 3,000-6,000 Units, 3,000-6,000 Units, intravenous, q4h PRN, JUDITH Shay DNP    insulin lispro (HumaLOG) injection 0-10 Units, 0-10 Units, subcutaneous, TID, JUDITH Shay DNP, 2 Units at 09/17/24 1819    lidocaine (LMX) 4 % cream, , Topical, 4x daily PRN, JUDITH Shay DNP, Given at 09/21/24 2107    oxygen (O2) therapy, , inhalation, Continuous - 02/gases, JUDITH Shay DNP    polyethylene glycol (Glycolax, Miralax) packet 17 g, 17 g, oral, Daily, Jagdish Loera APRN-CNP, DNP, 17 g at 09/22/24 1027    potassium chloride CR (Klor-Con M20) ER tablet 40 mEq, 40 mEq, oral, Daily, Sharon Jacques, SARAH-CNP, 40 mEq at 09/22/24 1027    Laboratory Findings:  Lab Results   Component Value Date    WBC 8.2 09/22/2024    HGB 10.7 (L) 09/22/2024    HCT 34.1 (L) 09/22/2024    MCV 89 09/22/2024     09/22/2024     Lab Results   Component Value Date    INR 1.0 12/09/2019    PROTIME 11.4 12/09/2019     Lab Results   Component Value Date    GLUCOSE 140 (H)  09/22/2024    CALCIUM 8.3 (L) 09/22/2024     09/22/2024    K 3.7 09/22/2024    CO2 28 09/22/2024     09/22/2024    BUN 9 09/22/2024    CREATININE 0.64 09/22/2024       Assessment and Plan:     Acute osteomyelitis -continue with IV antibiotics.  Surgery tomorrow.  Peripheral vascular disease -vascular surgery input appreciated.  Acute DVT -continue with Lovenox as per orders.  Diabetes type 2 -good control.       Jemal Guadarrama MD  09/22/24  1:37 PM

## 2024-09-23 ENCOUNTER — APPOINTMENT (OUTPATIENT)
Dept: CARDIOLOGY | Facility: HOSPITAL | Age: 74
End: 2024-09-23
Payer: MEDICARE

## 2024-09-23 ENCOUNTER — ANESTHESIA EVENT (OUTPATIENT)
Dept: OPERATING ROOM | Facility: HOSPITAL | Age: 74
End: 2024-09-23
Payer: MEDICARE

## 2024-09-23 ENCOUNTER — ANESTHESIA (OUTPATIENT)
Dept: OPERATING ROOM | Facility: HOSPITAL | Age: 74
End: 2024-09-23
Payer: MEDICARE

## 2024-09-23 PROBLEM — E66.9 OBESITY: Status: ACTIVE | Noted: 2024-09-23

## 2024-09-23 PROBLEM — I63.9 CVA (CEREBRAL VASCULAR ACCIDENT) (MULTI): Status: ACTIVE | Noted: 2024-09-23

## 2024-09-23 PROBLEM — J44.9 CHRONIC OBSTRUCTIVE PULMONARY DISEASE (MULTI): Status: ACTIVE | Noted: 2024-09-23

## 2024-09-23 PROBLEM — D64.9 ANEMIA: Status: ACTIVE | Noted: 2024-09-23

## 2024-09-23 LAB
ANION GAP SERPL CALC-SCNC: 12 MMOL/L (ref 10–20)
BUN SERPL-MCNC: 9 MG/DL (ref 6–23)
CALCIUM SERPL-MCNC: 8.4 MG/DL (ref 8.6–10.3)
CHLORIDE SERPL-SCNC: 103 MMOL/L (ref 98–107)
CO2 SERPL-SCNC: 27 MMOL/L (ref 21–32)
CREAT SERPL-MCNC: 0.66 MG/DL (ref 0.5–1.3)
EGFRCR SERPLBLD CKD-EPI 2021: >90 ML/MIN/1.73M*2
GLUCOSE BLD MANUAL STRIP-MCNC: 104 MG/DL (ref 74–99)
GLUCOSE BLD MANUAL STRIP-MCNC: 119 MG/DL (ref 74–99)
GLUCOSE BLD MANUAL STRIP-MCNC: 129 MG/DL (ref 74–99)
GLUCOSE BLD MANUAL STRIP-MCNC: 129 MG/DL (ref 74–99)
GLUCOSE BLD MANUAL STRIP-MCNC: 135 MG/DL (ref 74–99)
GLUCOSE BLD MANUAL STRIP-MCNC: 141 MG/DL (ref 74–99)
GLUCOSE BLD MANUAL STRIP-MCNC: 150 MG/DL (ref 74–99)
GLUCOSE SERPL-MCNC: 113 MG/DL (ref 74–99)
HOLD SPECIMEN: NORMAL
HOLD SPECIMEN: NORMAL
POTASSIUM SERPL-SCNC: 3.6 MMOL/L (ref 3.5–5.3)
SODIUM SERPL-SCNC: 138 MMOL/L (ref 136–145)
UFH PPP CHRO-ACNC: 0.7 IU/ML

## 2024-09-23 PROCEDURE — 2500000005 HC RX 250 GENERAL PHARMACY W/O HCPCS: Performed by: ANESTHESIOLOGY

## 2024-09-23 PROCEDURE — 87205 SMEAR GRAM STAIN: CPT | Mod: AHULAB | Performed by: INTERNAL MEDICINE

## 2024-09-23 PROCEDURE — 93005 ELECTROCARDIOGRAM TRACING: CPT

## 2024-09-23 PROCEDURE — A28820 PR AMPUTATION TOE,MT-P JT: Performed by: NURSE ANESTHETIST, CERTIFIED REGISTERED

## 2024-09-23 PROCEDURE — 7100000001 HC RECOVERY ROOM TIME - INITIAL BASE CHARGE: Performed by: PODIATRIST

## 2024-09-23 PROCEDURE — 2500000002 HC RX 250 W HCPCS SELF ADMINISTERED DRUGS (ALT 637 FOR MEDICARE OP, ALT 636 FOR OP/ED)

## 2024-09-23 PROCEDURE — 2720000007 HC OR 272 NO HCPCS: Performed by: PODIATRIST

## 2024-09-23 PROCEDURE — 0753T DGTZ GLS MCRSCP SLD LEVEL IV: CPT | Mod: TC,AHULAB | Performed by: INTERNAL MEDICINE

## 2024-09-23 PROCEDURE — 3600000003 HC OR TIME - INITIAL BASE CHARGE - PROCEDURE LEVEL THREE: Performed by: PODIATRIST

## 2024-09-23 PROCEDURE — 85520 HEPARIN ASSAY: CPT | Performed by: INTERNAL MEDICINE

## 2024-09-23 PROCEDURE — 2500000004 HC RX 250 GENERAL PHARMACY W/ HCPCS (ALT 636 FOR OP/ED): Performed by: ANESTHESIOLOGY

## 2024-09-23 PROCEDURE — 88311 DECALCIFY TISSUE: CPT | Performed by: PATHOLOGY

## 2024-09-23 PROCEDURE — 2500000004 HC RX 250 GENERAL PHARMACY W/ HCPCS (ALT 636 FOR OP/ED): Performed by: NURSE PRACTITIONER

## 2024-09-23 PROCEDURE — 82947 ASSAY GLUCOSE BLOOD QUANT: CPT

## 2024-09-23 PROCEDURE — 88305 TISSUE EXAM BY PATHOLOGIST: CPT | Performed by: PATHOLOGY

## 2024-09-23 PROCEDURE — 3700000002 HC GENERAL ANESTHESIA TIME - EACH INCREMENTAL 1 MINUTE: Performed by: PODIATRIST

## 2024-09-23 PROCEDURE — 36415 COLL VENOUS BLD VENIPUNCTURE: CPT

## 2024-09-23 PROCEDURE — 36415 COLL VENOUS BLD VENIPUNCTURE: CPT | Performed by: INTERNAL MEDICINE

## 2024-09-23 PROCEDURE — 2500000004 HC RX 250 GENERAL PHARMACY W/ HCPCS (ALT 636 FOR OP/ED)

## 2024-09-23 PROCEDURE — 0Y6Y0Z2 DETACHMENT AT LEFT 5TH TOE, MID, OPEN APPROACH: ICD-10-PCS

## 2024-09-23 PROCEDURE — 2500000005 HC RX 250 GENERAL PHARMACY W/O HCPCS: Performed by: NURSE ANESTHETIST, CERTIFIED REGISTERED

## 2024-09-23 PROCEDURE — 2500000004 HC RX 250 GENERAL PHARMACY W/ HCPCS (ALT 636 FOR OP/ED): Performed by: PODIATRIST

## 2024-09-23 PROCEDURE — 80048 BASIC METABOLIC PNL TOTAL CA: CPT | Performed by: INTERNAL MEDICINE

## 2024-09-23 PROCEDURE — 3600000008 HC OR TIME - EACH INCREMENTAL 1 MINUTE - PROCEDURE LEVEL THREE: Performed by: PODIATRIST

## 2024-09-23 PROCEDURE — 1200000002 HC GENERAL ROOM WITH TELEMETRY DAILY

## 2024-09-23 PROCEDURE — 99100 ANES PT EXTEME AGE<1 YR&>70: CPT | Performed by: ANESTHESIOLOGY

## 2024-09-23 PROCEDURE — 7100000002 HC RECOVERY ROOM TIME - EACH INCREMENTAL 1 MINUTE: Performed by: PODIATRIST

## 2024-09-23 PROCEDURE — 2500000004 HC RX 250 GENERAL PHARMACY W/ HCPCS (ALT 636 FOR OP/ED): Performed by: NURSE ANESTHETIST, CERTIFIED REGISTERED

## 2024-09-23 PROCEDURE — 3700000001 HC GENERAL ANESTHESIA TIME - INITIAL BASE CHARGE: Performed by: PODIATRIST

## 2024-09-23 PROCEDURE — A28820 PR AMPUTATION TOE,MT-P JT: Performed by: ANESTHESIOLOGY

## 2024-09-23 PROCEDURE — 87070 CULTURE OTHR SPECIMN AEROBIC: CPT | Mod: AHULAB | Performed by: INTERNAL MEDICINE

## 2024-09-23 PROCEDURE — 2500000005 HC RX 250 GENERAL PHARMACY W/O HCPCS: Performed by: PODIATRIST

## 2024-09-23 RX ORDER — ONDANSETRON HYDROCHLORIDE 2 MG/ML
4 INJECTION, SOLUTION INTRAVENOUS ONCE AS NEEDED
Status: DISCONTINUED | OUTPATIENT
Start: 2024-09-23 | End: 2024-09-23 | Stop reason: HOSPADM

## 2024-09-23 RX ORDER — PROPOFOL 10 MG/ML
INJECTION, EMULSION INTRAVENOUS CONTINUOUS PRN
Status: DISCONTINUED | OUTPATIENT
Start: 2024-09-23 | End: 2024-09-23

## 2024-09-23 RX ORDER — SODIUM CHLORIDE 0.9 G/100ML
IRRIGANT IRRIGATION AS NEEDED
Status: DISCONTINUED | OUTPATIENT
Start: 2024-09-23 | End: 2024-09-23 | Stop reason: HOSPADM

## 2024-09-23 RX ORDER — FENTANYL CITRATE 50 UG/ML
INJECTION, SOLUTION INTRAMUSCULAR; INTRAVENOUS AS NEEDED
Status: DISCONTINUED | OUTPATIENT
Start: 2024-09-23 | End: 2024-09-23

## 2024-09-23 RX ORDER — OXYCODONE HYDROCHLORIDE 5 MG/1
5 TABLET ORAL EVERY 4 HOURS PRN
Status: DISCONTINUED | OUTPATIENT
Start: 2024-09-23 | End: 2024-09-23 | Stop reason: HOSPADM

## 2024-09-23 RX ORDER — MEPERIDINE HYDROCHLORIDE 25 MG/ML
12.5 INJECTION INTRAMUSCULAR; INTRAVENOUS; SUBCUTANEOUS EVERY 10 MIN PRN
Status: DISCONTINUED | OUTPATIENT
Start: 2024-09-23 | End: 2024-09-23 | Stop reason: HOSPADM

## 2024-09-23 RX ORDER — SODIUM CHLORIDE, SODIUM LACTATE, POTASSIUM CHLORIDE, CALCIUM CHLORIDE 600; 310; 30; 20 MG/100ML; MG/100ML; MG/100ML; MG/100ML
INJECTION, SOLUTION INTRAVENOUS CONTINUOUS PRN
Status: DISCONTINUED | OUTPATIENT
Start: 2024-09-23 | End: 2024-09-23

## 2024-09-23 RX ORDER — PHENYLEPHRINE HCL IN 0.9% NACL 1 MG/10 ML
SYRINGE (ML) INTRAVENOUS AS NEEDED
Status: DISCONTINUED | OUTPATIENT
Start: 2024-09-23 | End: 2024-09-23

## 2024-09-23 RX ORDER — LIDOCAINE HYDROCHLORIDE 20 MG/ML
INJECTION, SOLUTION EPIDURAL; INFILTRATION; INTRACAUDAL; PERINEURAL AS NEEDED
Status: DISCONTINUED | OUTPATIENT
Start: 2024-09-23 | End: 2024-09-23

## 2024-09-23 RX ORDER — SODIUM CHLORIDE, SODIUM LACTATE, POTASSIUM CHLORIDE, CALCIUM CHLORIDE 600; 310; 30; 20 MG/100ML; MG/100ML; MG/100ML; MG/100ML
100 INJECTION, SOLUTION INTRAVENOUS CONTINUOUS
Status: DISCONTINUED | OUTPATIENT
Start: 2024-09-23 | End: 2024-09-23 | Stop reason: HOSPADM

## 2024-09-23 RX ORDER — LIDOCAINE HYDROCHLORIDE 10 MG/ML
0.1 INJECTION, SOLUTION EPIDURAL; INFILTRATION; INTRACAUDAL; PERINEURAL ONCE
Status: DISCONTINUED | OUTPATIENT
Start: 2024-09-23 | End: 2024-09-23 | Stop reason: HOSPADM

## 2024-09-23 ASSESSMENT — PAIN DESCRIPTION - ORIENTATION: ORIENTATION: LEFT

## 2024-09-23 ASSESSMENT — COGNITIVE AND FUNCTIONAL STATUS - GENERAL
MOBILITY SCORE: 17
STANDING UP FROM CHAIR USING ARMS: A LITTLE
MOVING TO AND FROM BED TO CHAIR: A LITTLE
TOILETING: A LITTLE
TURNING FROM BACK TO SIDE WHILE IN FLAT BAD: A LITTLE
CLIMB 3 TO 5 STEPS WITH RAILING: A LOT
DAILY ACTIVITIY SCORE: 21
TOILETING: A LITTLE
DRESSING REGULAR LOWER BODY CLOTHING: A LITTLE
WALKING IN HOSPITAL ROOM: A LITTLE
DRESSING REGULAR UPPER BODY CLOTHING: A LITTLE
CLIMB 3 TO 5 STEPS WITH RAILING: A LOT
DRESSING REGULAR UPPER BODY CLOTHING: A LITTLE
WALKING IN HOSPITAL ROOM: A LITTLE
TURNING FROM BACK TO SIDE WHILE IN FLAT BAD: A LITTLE
MOVING TO AND FROM BED TO CHAIR: A LITTLE
HELP NEEDED FOR BATHING: A LITTLE
HELP NEEDED FOR BATHING: A LITTLE
DAILY ACTIVITIY SCORE: 19
DRESSING REGULAR UPPER BODY CLOTHING: A LITTLE
MOVING TO AND FROM BED TO CHAIR: A LITTLE
DRESSING REGULAR LOWER BODY CLOTHING: A LITTLE
CLIMB 3 TO 5 STEPS WITH RAILING: A LOT
DRESSING REGULAR LOWER BODY CLOTHING: A LITTLE
WALKING IN HOSPITAL ROOM: A LITTLE
DAILY ACTIVITIY SCORE: 19
DRESSING REGULAR UPPER BODY CLOTHING: A LITTLE
WALKING IN HOSPITAL ROOM: A LITTLE
HELP NEEDED FOR BATHING: A LITTLE
MOBILITY SCORE: 17
CLIMB 3 TO 5 STEPS WITH RAILING: A LOT
MOBILITY SCORE: 17
DAILY ACTIVITIY SCORE: 19
PERSONAL GROOMING: A LITTLE
STANDING UP FROM CHAIR USING ARMS: A LITTLE
DAILY ACTIVITIY SCORE: 20
MOVING FROM LYING ON BACK TO SITTING ON SIDE OF FLAT BED WITH BEDRAILS: A LITTLE
MOBILITY SCORE: 17
MOVING FROM LYING ON BACK TO SITTING ON SIDE OF FLAT BED WITH BEDRAILS: A LITTLE
PERSONAL GROOMING: A LITTLE
MOVING TO AND FROM BED TO CHAIR: A LITTLE
MOVING TO AND FROM BED TO CHAIR: A LITTLE
PERSONAL GROOMING: A LITTLE
MOVING FROM LYING ON BACK TO SITTING ON SIDE OF FLAT BED WITH BEDRAILS: A LITTLE
WALKING IN HOSPITAL ROOM: A LITTLE
STANDING UP FROM CHAIR USING ARMS: A LITTLE
TURNING FROM BACK TO SIDE WHILE IN FLAT BAD: A LITTLE
STANDING UP FROM CHAIR USING ARMS: A LITTLE
MOBILITY SCORE: 17
HELP NEEDED FOR BATHING: A LITTLE
TURNING FROM BACK TO SIDE WHILE IN FLAT BAD: A LITTLE
HELP NEEDED FOR BATHING: A LITTLE
MOVING FROM LYING ON BACK TO SITTING ON SIDE OF FLAT BED WITH BEDRAILS: A LITTLE
DRESSING REGULAR LOWER BODY CLOTHING: A LITTLE
STANDING UP FROM CHAIR USING ARMS: A LITTLE
MOVING FROM LYING ON BACK TO SITTING ON SIDE OF FLAT BED WITH BEDRAILS: A LITTLE
DRESSING REGULAR LOWER BODY CLOTHING: A LITTLE
TURNING FROM BACK TO SIDE WHILE IN FLAT BAD: A LITTLE
TOILETING: A LITTLE
CLIMB 3 TO 5 STEPS WITH RAILING: A LOT

## 2024-09-23 ASSESSMENT — PAIN - FUNCTIONAL ASSESSMENT
PAIN_FUNCTIONAL_ASSESSMENT: 0-10

## 2024-09-23 ASSESSMENT — PAIN SCALES - GENERAL
PAINLEVEL_OUTOF10: 5 - MODERATE PAIN
PAINLEVEL_OUTOF10: 4
PAINLEVEL_OUTOF10: 0 - NO PAIN
PAIN_LEVEL: 0
PAINLEVEL_OUTOF10: 3
PAINLEVEL_OUTOF10: 1
PAINLEVEL_OUTOF10: 0 - NO PAIN
PAINLEVEL_OUTOF10: 0 - NO PAIN
PAINLEVEL_OUTOF10: 7

## 2024-09-23 ASSESSMENT — PAIN DESCRIPTION - LOCATION: LOCATION: FOOT

## 2024-09-23 NOTE — OP NOTE
PODIATRIC OPERATIVE REPORT    LOG ID: [id number]  SURGERY/PROCEDURE DATE: 9/23/2024  OR LOCATION: [facility] A OR    SURGEON(S)/PROCEDURALIST(S) AND ASSISTANT(S):  Primary: Obinna Spears DPM  Resident: Marcus Kwok DPM, PGY-3    OTHER OR STAFF:  Circulator: Megan Franklin RN; Yasemin Saucedo RN  Scrub Person: Javier Nowak RN    SURGERY/PROCEDURE(S):  Left Amputation Digit Foot  35534 - [place] AMPUTATION TOE METATARSOPHALANGEAL JOINT    Left Debridement Lower Extremity  23908 - [place] DEBRIDEMENT BONE 1ST 20 SQ CM/<    Left Debridement Lower Extremity  93223 - [place] DEBRIDEMENT SUBCUTANEOUS TISSUE 1ST 20 SQ CM/<    Left Debridement Lower Extremity  39203 - [place] DEBRIDEMENT MUSCLE &/FASCIA 1ST 20 SQ CM/<      PRE-OP/PRE-PROCEDURE DIAGNOSIS:   Pre-op Diagnosis      * Peripheral arterial disease (CMS-HCC) [I73.9]     * Wound infection [T14.8XXA, L08.9]     * Osteomyelitis of left foot, unspecified type (Multi) [M86.9]    POST-OP/POST-PROCEDURE DIAGNOSIS: Same as Pre-Op    ANESTHESIA:   Anesthesia: Consult  ASA: III  Anesthesia Staff: Anesthesiologist: Td Hooper MD  CRNA: ASTRID MartinezCRNA; UMER Davis  Intra-op Medications:   Administrations occurring from 1310 to 1515 on 09/23/24:   Medication Name Total Dose   sodium chloride 0.9 % irrigation solution 1,000 mL   BUPivacaine HCl (Marcaine) 0.5 % (5 mg/mL) 20 mL, lidocaine PF (Xylocaine) 10 mg/mL (1 %) 20 mL syringe 10 mL   HYDROmorphone (Dilaudid) injection 0.5 mg 0.5 mg   oxygen (O2) therapy 108 L       ESTIMATED BLOOD LOSS: less than 50 mL    SPECIMENS:   Order Name Source Comment Collection Info Order Time   TISSUE/WOUND CULTURE/SMEAR DIGIT FIFTH, LEFT FOOT Pre-op diagnosis:  Peripheral arterial disease (CMS-HCC) [I73.9]  Wound infection [T14.8XXA, L08.9]  Osteomyelitis of left foot, unspecified type (Multi) [M86.9] Collected By: Obinna Spears DPM 9/23/2024  2:35 PM     Release result to [facility]   Immediate        TISSUE/WOUND CULTURE/SMEAR  DIGIT FIFTH, LEFT FOOT Pre-op diagnosis:  Peripheral arterial disease (CMS-HCC) [I73.9]  Wound infection [T14.8XXA, L08.9]  Osteomyelitis of left foot, unspecified type (Multi) [M86.9] Collected By: Obinna Spears DPM 2024  2:35 PM     Release result to [facility]   Immediate        SURGICAL PATHOLOGY EXAM DIGIT FIFTH, LEFT FOOT Pre-op diagnosis:  Peripheral arterial disease (CMS-HCC) [I73.9]  Wound infection [T14.8XXA, L08.9]  Osteomyelitis of left foot, unspecified type (Multi) [M86.9] Collected By: Obinna Spears DPM 2024  2:35 PM       IMPLANTABLE DEVICES:  Nothing was implanted during the procedure    FINDINGS: Intraoperative findings consistent with clinical and radiographic findings.    DRAINS: None    TOURNIQUET TIMES: * Missing tourniquet times found for documented tourniquets in lo *     COMPLICATIONS: None; patient tolerated the procedure well.       SURGERY/PROCEDURE DETAILS:  INDICATIONS FOR PROCEDURE:   The patient with diagnosis as outlined above presents for podiatric surgical intervention today. The patient has attempted and failed conservative treatment as outlined in preoperative clinic notes and wishes to proceed with surgical intervention at this time. The nature of the deformity, problems anticipated procedures, recovery/convalescence, risks/complications including but not limited to numbness, CRPS, over/under correction, problems healing of soft tissue or bone, postoperative wound infection, wound dehiscence, DVT and/or persistent pain/disability have been explained to the patient in detail. An updated H&P and consent have been completed prior to today’s surgical intervention. The patient states that they have been NPO since midnight. No guarantees were given or implied, but it is expected that the patient will have a favorable outcome.  It is with this understanding that we proceed.     PRE-PROCEDURE INFORMATION:  In pre-op holding area, the correct extremity to be operated on  was clearly marked and the patient verified correct laterality of the marking.  The patient was brought to the operating room and placed on the operating table in the supine position.  A timeout was performed in which identification of the correct patient, procedure, location, and materials was done. Following MAC sedation, local anesthesia was obtained utilizing 10cc of 1 to 1 mixture of 1% Lidocaine Plain and 0.5% Marcaine Plain. The left foot was then scrubbed, prepped and draped in the usual aseptic manner.     DESCRIPTION OF PROCEDURE:   At this time attention was then directed to the left lower extremity where the necrotic ulceration was noted plantarly sub 5th metatarsal head. An ellipse incision was made around the MPJ of the left 5th digit that then circumscribed the entire plantar wound. This incision was deepened to the level of the bone and utilizing sharp dissection, the digit was removed at the 5th metatarsophalangeal joint level. Debridement was performed utilizing excisional debridement and Misonix to remove necrotic and fibrotic tissue at the site of the ulceration down to and including to the level of bone. Bone necrosis was noted of the 5th metatarsal head and 5th MTPJ and mild purulent drainage was expressed. Decision was made to excise the necrotic bone. Using a sagittal saw, the 5th metatarsal was cut at the level of the 5th metatarsal neck. This was passed from the operative field to be prepared as a pathology specimen. The area was then further excisionally debrided with Misonix to remove any further necrotic or fibrotic tissue. The area was inspected and any areas of tracking, especially along the course of tendons, were also drained and irrigated appropriately. After all areas of concern were adequately drained and irrigated and cultures/specimen were sent, antibiotics were administered intravenously to the patient and the wound was irrigated. Wound was packed open with Dakins-soaked  packing strips.    A dressing was placed on the surgical extremity consisting of fluffs, ABD, webril, ACE. To remain clean, dry, and intact unless changed by Podiatry.     POSTOPERATIVE INFORMATION: The patient tolerated the above noted procedure and anesthesia well and was transferred to the PACU with vital signs stable, and vascular status intact with capillary refill intact to the most distal aspect of the operative extremity. Postoperative instructions reviewed in detail with the patient and family with written instructions provided. Patient will return to floor. Should any problems, questions, or concerns arise, primary team to [facility] or [facility] podiatry team.    This is Marcus Kwok DPM dictating the operative note for Dr. Obinna Spears DPM.    Marcus Kwok DPM PGY-3  Podiatric Medicine & Surgery  Please [facility] message with any questions or concerns.    SIGNATURE: Marcus Kwok DPM PATIENT NAME: Elliot Partida   DATE: September 23, 2024 MRN: [id number]   TIME: 8:21 PM         I was present for the entirety of the procedure(s).     Obinna Spears DPM

## 2024-09-23 NOTE — CARE PLAN
The patient's goals for the shift include      The clinical goals for the shift include patient to remain comfortable this shift    Problem: Pain  Goal: Takes deep breaths with improved pain control throughout the shift  Outcome: Progressing     Problem: Safety - Adult  Goal: Free from fall injury  Outcome: Progressing     Problem: Discharge Planning  Goal: Discharge to home or other facility with appropriate resources  Outcome: Progressing     Problem: Chronic Conditions and Co-morbidities  Goal: Patient's chronic conditions and co-morbidity symptoms are monitored and maintained or improved  Outcome: Progressing

## 2024-09-23 NOTE — PROGRESS NOTES
INTERNAL MEDICINE PROGRESS NOTE      HPI:    Scheduled for surgery today.  Reports no pain.    Vital signs in last 24 hours:  Temp:  [36.3 °C (97.4 °F)-36.7 °C (98 °F)] 36.3 °C (97.4 °F)  Heart Rate:  [60-66] 61  Resp:  [16-20] 20  BP: (149-171)/(72-82) 168/79    Physical Examination:  Physical Exam    Constitutional:       Appearance: Elderly, overweight, in no distress  HENT:      Head: Normocephalic and atraumatic.   Eyes:      Extraocular Movements: Extraocular movements intact.      Pupils: Pupils are equal, round, and reactive to light.   Cardiovascular:      Rate and Rhythm: Normal rate and regular rhythm.      Pulses: Normal pulses.      Heart sounds: Normal heart sounds.   Pulmonary:      Effort: Pulmonary effort is normal.      Breath sounds: Normal breath sounds.   Abdominal:      General: Abdomen is flat. Bowel sounds are normal.      Palpations: Abdomen is soft.   Musculoskeletal:         General: Normal range of motion.      Cervical back: Normal range of motion and neck supple.   Skin:     General: Erythema decreasing, dressing over toes.  Neurological:      General: No focal deficit present.      Mental Status: He is alert and oriented to person, place, and time. Mental status is at baseline.        Medications:    Current Facility-Administered Medications:     acetaminophen (Tylenol) tablet 650 mg, 650 mg, oral, q4h PRN, 650 mg at 09/22/24 2220 **OR** acetaminophen (Tylenol) oral liquid 650 mg, 650 mg, oral, q4h PRN **OR** acetaminophen (Tylenol) suppository 650 mg, 650 mg, rectal, q4h PRN, JUDITH Shay DNP    ammonium lactate (Lac-Hydrin) 12 % lotion, , Topical, BID, JUDITH Shay DNP, Given at 09/22/24 2047    ampicillin-sulbactam (Unasyn) in sodium chloride 0.9 % 100 mL 3 g, 3 g, intravenous, q6h, JUDITH Shay DNP, Stopped at 09/23/24 1045    aspirin chewable tablet 81 mg, 81 mg, oral, Daily, JUDITH Shay DNP, 81 mg at  09/22/24 1027    clopidogrel (Plavix) tablet 600 mg, 600 mg, oral, Once, JUDITH Shay DNP    clopidogrel (Plavix) tablet 75 mg, 75 mg, oral, Daily, JUDITH Shay DNP, 75 mg at 09/22/24 1027    dextrose 50 % injection 12.5 g, 12.5 g, intravenous, q15 min PRN, JUDITH Shay DNP    dextrose 50 % injection 25 g, 25 g, intravenous, q15 min PRN, JUDITH Shay DNP    glucagon (Glucagen) injection 1 mg, 1 mg, intramuscular, q15 min PRN, JUDITH Shay DNP    glucagon (Glucagen) injection 1 mg, 1 mg, intramuscular, q15 min PRN, JUDITH Shay DNP    heparin 25,000 Units in dextrose 5% 250 mL (100 Units/mL) infusion (premix), 0-4,500 Units/hr, intravenous, Continuous, JUDITH Shay DNP, Last Rate: 16 mL/hr at 09/22/24 2021, 1,600 Units/hr at 09/22/24 2021    heparin bolus from bag 3,000-6,000 Units, 3,000-6,000 Units, intravenous, q4h PRN, JUDITH Shay DNP    insulin lispro (HumaLOG) injection 0-10 Units, 0-10 Units, subcutaneous, TID, JUDITH Shay DNP, 2 Units at 09/22/24 1652    lidocaine (LMX) 4 % cream, , Topical, 4x daily PRN, JUDITH Shay DNP, Given at 09/21/24 2107    oxygen (O2) therapy, , inhalation, Continuous - 02/gases, JUDITH Shay DNP    polyethylene glycol (Glycolax, Miralax) packet 17 g, 17 g, oral, Daily, JUDITH Shay DNP, 17 g at 09/22/24 1027    potassium chloride CR (Klor-Con M20) ER tablet 40 mEq, 40 mEq, oral, Daily, SARAH Darling-CNP, 40 mEq at 09/22/24 1027    Laboratory Findings:  Lab Results   Component Value Date    WBC 8.2 09/22/2024    HGB 10.7 (L) 09/22/2024    HCT 34.1 (L) 09/22/2024    MCV 89 09/22/2024     09/22/2024     Lab Results   Component Value Date    INR 1.0 12/09/2019    PROTIME 11.4 12/09/2019     Lab Results   Component Value Date    GLUCOSE 113 (H) 09/23/2024    CALCIUM 8.4  (L) 09/23/2024     09/23/2024    K 3.6 09/23/2024    CO2 27 09/23/2024     09/23/2024    BUN 9 09/23/2024    CREATININE 0.66 09/23/2024       Assessment and Plan:     Acute osteomyelitis -continue IV antibiotics, surgery today.  Peripheral vascular disease -outpatient vascular surgery follow-up.  Acute DVT -continue with Lovenox as per orders.  Diabetes type 2 -good control with no medications       Jemal Guadarrama MD  09/23/24  11:23 AM

## 2024-09-23 NOTE — CARE PLAN
Problem: Pain  Goal: Takes deep breaths with improved pain control throughout the shift  Outcome: Progressing  Goal: Turns in bed with improved pain control throughout the shift  Outcome: Progressing  Goal: Walks with improved pain control throughout the shift  Outcome: Progressing  Goal: Performs ADL's with improved pain control throughout shift  Outcome: Progressing  Goal: Participates in PT with improved pain control throughout the shift  Outcome: Progressing  Goal: Free from opioid side effects throughout the shift  Outcome: Progressing  Goal: Free from acute confusion related to pain meds throughout the shift  Outcome: Progressing   The patient's goals for the shift include      The clinical goals for the shift include Pt will remain safe throughout shift

## 2024-09-23 NOTE — PROGRESS NOTES
09/23/24 0708   Discharge Planning   Home or Post Acute Services None   Expected Discharge Disposition Home     PLAN/BARRIER: Patient to have a left 5th toe amputation today, ID and podiatry followin  DISP:Patient plans to return home and his niece will assist with dressing changes if needed  ADOD: 1-2 days  Susie Sims RN

## 2024-09-23 NOTE — ANESTHESIA PREPROCEDURE EVALUATION
Patient: Elliot Partida    Procedure Information       Date/Time: 09/23/24 1310    Procedures:       Left Amputation Digit Foot (Left) - Available at 1:00pm or later on Monday      Left Debridement Lower Extremity (Left)    [facility]: [facility] / [facility]    Surgeons: Obinna Spears DPM            Relevant Problems   Anesthesia (within normal limits)      Cardiac   (+) CAD (coronary artery disease)   (+) Hyperlipidemia   (+) Hypertension   (+) MI (myocardial infarction) (Multi)   (+) Peripheral arterial disease (CMS-HCC)   (+) Type 2 diabetes mellitus with diabetic peripheral angiopathy without gangrene, without long-term current use of insulin (Multi)      Pulmonary   (+) Chronic obstructive pulmonary disease (Multi)      Neuro  R frontal extradural mass   (+) CVA (cerebral vascular accident) (Multi) (L leg weakness)   (+) Diabetic peripheral neuropathy (Multi)   (+) Lumbar back pain with radiculopathy affecting left lower extremity      GI (within normal limits)      /Renal (within normal limits)      Liver (within normal limits)      Endocrine   (+) Diabetic peripheral neuropathy (Multi)   (+) Obesity   (+) Type 2 diabetes mellitus   (+) Type 2 diabetes mellitus with diabetic peripheral angiopathy without gangrene, without long-term current use of insulin (Multi)      Hematology   (+) Acute deep vein thrombosis (DVT) of popliteal vein of left lower extremity (Multi)   (+) Anemia      Musculoskeletal  Amb w cane   (+) Chronic bilateral low back pain with left-sided sciatica      ID   (+) Osteomyelitis   (+) Osteomyelitis of left foot, unspecified type (Multi)   (+) Wound infection       Clinical information reviewed:    Allergies                NPO Detail:  No data recorded     Physical Exam    Airway  Mallampati: IV     Cardiovascular    Dental    Pulmonary    Abdominal            Anesthesia Plan    History of general anesthesia?: yes  History of complications of general anesthesia?: no    ASA 3      MAC     intravenous induction   Anesthetic plan and risks discussed with patient.

## 2024-09-23 NOTE — BRIEF OP NOTE
Date: 2024  OR Location: [facility] OR    Name: Elliot Partida, : 1950, Age: 74 y.o., MRN: [id number], Sex: male    Diagnosis  Pre-op Diagnosis      * Peripheral arterial disease (CMS-HCC) [I73.9]     * Wound infection [T14.8XXA, L08.9]     * Osteomyelitis of left foot, unspecified type (Multi) [M86.9] Post-op Diagnosis     * Peripheral arterial disease (CMS-HCC) [I73.9]     * Wound infection [T14.8XXA, L08.9]     * Osteomyelitis of left foot, unspecified type (Multi) [M86.9]     Procedures  Left Amputation Digit Foot  78387 - TN AMPUTATION TOE METATARSOPHALANGEAL JOINT    Left Debridement Lower Extremity  69250 - TN DEBRIDEMENT BONE 1ST 20 SQ CM/<    Left Debridement Lower Extremity  41122 - TN DEBRIDEMENT SUBCUTANEOUS TISSUE 1ST 20 SQ CM/<    Left Debridement Lower Extremity  51201 - TN DEBRIDEMENT MUSCLE &/FASCIA 1ST 20 SQ CM/<      Surgeons      * Obinna Spears - Primary    Resident/Fellow/Other Assistant:  Marcus Kwok DPM, PGY-3    Procedure Summary  Anesthesia: Monitor Anesthesia Care  ASA: III  Anesthesia Staff: Anesthesiologist: Td Hooper MD  CRNA: ASTRID MartinezCRNA; UMER Davis  Estimated Blood Loss: 20mL  Intra-op Medications:   Administrations occurring from 1310 to 1515 on 24:   Medication Name Total Dose   sodium chloride 0.9 % irrigation solution 1,000 mL   BUPivacaine HCl (Marcaine) 0.5 % (5 mg/mL) 20 mL, lidocaine PF (Xylocaine) 10 mg/mL (1 %) 20 mL syringe 10 mL   oxygen (O2) therapy 168 L              Anesthesia Record               Intraprocedure I/O Totals          Intake    LR infusion 250.00 mL    Total Intake 250 mL       Output    Est. Blood Loss 20 mL    Total Output 20 mL       Net    Net Volume 230 mL          Specimen:   ID Type Source Tests Collected by Time   1 : CLEAR MARGIN BONE LEFT FIFTH TOE Tissue DIGIT FIFTH, LEFT FOOT SURGICAL PATHOLOGY EXAM Obinna Spears DPM 2024 1422   2 : CLEAR MARGIN 5TH METATARSAL Tissue DIGIT  FIFTH, LEFT FOOT SURGICAL PATHOLOGY EXAM Obinna Spears, SREEDHAR 9/23/2024 1431   A : CLEAR MARGIN 5TH METATARSAL Tissue DIGIT FIFTH, LEFT FOOT TISSUE/WOUND CULTURE/SMEAR Obinna Spears, SREEDHAR 9/23/2024 1433   B : DEEP SOFT TISSUE, LEFT FOOT 5TH TOE Swab DIGIT FIFTH, LEFT FOOT TISSUE/WOUND CULTURE/SMEAR Obinna Spears, SREEDHAR 9/23/2024 1435        Staff:   Arlinulator: Yasemin  Circulator: Megan He Person: Javier          Findings: Intraoperative findings consistent with clinical and radiographic findings.     Complications:  None; patient tolerated the procedure well.     Disposition: PACU - hemodynamically stable.  Condition: stable  Specimens Collected:   ID Type Source Tests Collected by Time   1 : CLEAR MARGIN BONE LEFT FIFTH TOE Tissue DIGIT FIFTH, LEFT FOOT SURGICAL PATHOLOGY EXAM Obinna Spears, SREEDHAR 9/23/2024 1422   2 : CLEAR MARGIN 5TH METATARSAL Tissue DIGIT FIFTH, LEFT FOOT SURGICAL PATHOLOGY EXAM Obinna Spears DPM 9/23/2024 1431   A : CLEAR MARGIN 5TH METATARSAL Tissue DIGIT FIFTH, LEFT FOOT TISSUE/WOUND CULTURE/SMEAR Obinna Spears, SREEDHAR 9/23/2024 1433   B : DEEP SOFT TISSUE, LEFT FOOT 5TH TOE Swab DIGIT FIFTH, LEFT FOOT TISSUE/WOUND CULTURE/SMEAR Obinna Spears, SREEDHAR 9/23/2024 1435     Marcus Kwok DPM PGY-3  Podiatric Medicine & Surgery  Please message me with any questions or concerns.

## 2024-09-23 NOTE — PROGRESS NOTES
"  INFECTIOUS DISEASE DAILY PROGRESS NOTE    SUBJECTIVE:    No new events. Surgery planned today.    OBJECTIVE:  VITALS (Last 24 Hours)  /74 (Patient Position: Lying)   Pulse 62   Temp 36.4 °C (97.5 °F) (Temporal)   Resp 16   Ht 1.753 m (5' 9\")   Wt 102 kg (225 lb 8.5 oz)   SpO2 96%   BMI 33.31 kg/m²     PHYSICAL EXAM:  Gen - NAD  Abd - soft, no ttp, BS present  Left Foot - dressing present    ABX: IV Unasyn    LABS:  Lab Results   Component Value Date    WBC 8.2 09/22/2024    HGB 10.7 (L) 09/22/2024    HCT 34.1 (L) 09/22/2024    MCV 89 09/22/2024     09/22/2024     Lab Results   Component Value Date    GLUCOSE 140 (H) 09/22/2024    CALCIUM 8.3 (L) 09/22/2024     09/22/2024    K 3.7 09/22/2024    CO2 28 09/22/2024     09/22/2024    BUN 9 09/22/2024    CREATININE 0.64 09/22/2024         Estimated Creatinine Clearance: 121 mL/min (by C-G formula based on SCr of 0.64 mg/dL).      ASSESSMENT/PLAN:    Left DM Foot Infection/Cellulitis due to mixed bacteria  Left 5th Toe Necrosis  PAD - s/p angiogram 9/18, LLE CLI status post successful revascularization using PTA-DCB angioplasty to distal left SFA and mid-distal popliteal, and PTA to left TPT-PT with good results.  Left PT with non flow-limiting dissection.   DM II with Peripheral Neuropathy - poorly controlled A1C 9.8% 9/2024, increased risk/severity of infections    IV Unasyn 3g Q6H. Surgical intervention pending today.    Monitoring for adverse effects of abx such as rash/itching/diarrhea - none apparent.    Will follow.    Pantera Boston MD  [facility]  Office #[phone]      "

## 2024-09-23 NOTE — ANESTHESIA POSTPROCEDURE EVALUATION
Patient: Elliot Partida    Procedure Summary       Date: 09/23/24 Room / Location: [facility] OR 07 / [facility] OR    Anesthesia Start: 1339 Anesthesia Stop: 1454    Procedures:       Left Amputation Digit Foot (Left)      Left Debridement Lower Extremity (Left) Diagnosis:       Peripheral arterial disease (CMS-HCC)      Wound infection      Osteomyelitis of left foot, unspecified type (Multi)      (Peripheral arterial disease (CMS-HCC) [I73.9])      (Wound infection [T14.8XXA, L08.9])      (Osteomyelitis of left foot, unspecified type (Multi) [M86.9])    Surgeons: Obinna Spears DPM Responsible Provider: Td Hooper MD    Anesthesia Type: MAC ASA Status: 3            Anesthesia Type: MAC    Vitals Value Taken Time   /57 09/23/24 1501   Temp 36.3 °C (97.3 °F) 09/23/24 1447   Pulse 62 09/23/24 1510   Resp 20 09/23/24 1500   SpO2 98 % 09/23/24 1510   Vitals shown include unfiled device data.    Anesthesia Post Evaluation    Patient location during evaluation: bedside  Patient participation: complete - patient cannot participate  Level of consciousness: awake  Pain score: 0  Pain management: adequate  Airway patency: patent  Cardiovascular status: acceptable and hemodynamically stable  Respiratory status: acceptable and nonlabored ventilation  Hydration status: acceptable  Postoperative Nausea and Vomiting: none        No notable events documented.

## 2024-09-24 ENCOUNTER — ANESTHESIA (OUTPATIENT)
Dept: OPERATING ROOM | Facility: HOSPITAL | Age: 74
End: 2024-09-24
Payer: MEDICARE

## 2024-09-24 ENCOUNTER — ANESTHESIA EVENT (OUTPATIENT)
Dept: OPERATING ROOM | Facility: HOSPITAL | Age: 74
End: 2024-09-24
Payer: MEDICARE

## 2024-09-24 PROBLEM — T87.89: Status: ACTIVE | Noted: 2024-09-14

## 2024-09-24 PROBLEM — T81.89XA: Status: ACTIVE | Noted: 2024-09-14

## 2024-09-24 LAB
ABO GROUP (TYPE) IN BLOOD: NORMAL
ANION GAP SERPL CALC-SCNC: 12 MMOL/L (ref 10–20)
ANTIBODY SCREEN: NORMAL
BASOPHILS # BLD AUTO: 0.03 X10*3/UL (ref 0–0.1)
BASOPHILS NFR BLD AUTO: 0.3 %
BUN SERPL-MCNC: 11 MG/DL (ref 6–23)
CALCIUM SERPL-MCNC: 8.3 MG/DL (ref 8.6–10.3)
CHLORIDE SERPL-SCNC: 102 MMOL/L (ref 98–107)
CO2 SERPL-SCNC: 26 MMOL/L (ref 21–32)
CREAT SERPL-MCNC: 0.6 MG/DL (ref 0.5–1.3)
EGFRCR SERPLBLD CKD-EPI 2021: >90 ML/MIN/1.73M*2
EOSINOPHIL # BLD AUTO: 0.18 X10*3/UL (ref 0–0.4)
EOSINOPHIL NFR BLD AUTO: 2 %
ERYTHROCYTE [DISTWIDTH] IN BLOOD BY AUTOMATED COUNT: 13.6 % (ref 11.5–14.5)
GLUCOSE BLD MANUAL STRIP-MCNC: 102 MG/DL (ref 74–99)
GLUCOSE BLD MANUAL STRIP-MCNC: 106 MG/DL (ref 74–99)
GLUCOSE BLD MANUAL STRIP-MCNC: 112 MG/DL (ref 74–99)
GLUCOSE BLD MANUAL STRIP-MCNC: 129 MG/DL (ref 74–99)
GLUCOSE SERPL-MCNC: 93 MG/DL (ref 74–99)
HCT VFR BLD AUTO: 32.6 % (ref 41–52)
HGB BLD-MCNC: 10.6 G/DL (ref 13.5–17.5)
HOLD SPECIMEN: NORMAL
IMM GRANULOCYTES # BLD AUTO: 0.06 X10*3/UL (ref 0–0.5)
IMM GRANULOCYTES NFR BLD AUTO: 0.7 % (ref 0–0.9)
LYMPHOCYTES # BLD AUTO: 2.16 X10*3/UL (ref 0.8–3)
LYMPHOCYTES NFR BLD AUTO: 23.9 %
MCH RBC QN AUTO: 28.7 PG (ref 26–34)
MCHC RBC AUTO-ENTMCNC: 32.5 G/DL (ref 32–36)
MCV RBC AUTO: 88 FL (ref 80–100)
MONOCYTES # BLD AUTO: 0.76 X10*3/UL (ref 0.05–0.8)
MONOCYTES NFR BLD AUTO: 8.4 %
NEUTROPHILS # BLD AUTO: 5.83 X10*3/UL (ref 1.6–5.5)
NEUTROPHILS NFR BLD AUTO: 64.7 %
NRBC BLD-RTO: 0 /100 WBCS (ref 0–0)
PLATELET # BLD AUTO: 265 X10*3/UL (ref 150–450)
POTASSIUM SERPL-SCNC: 3.8 MMOL/L (ref 3.5–5.3)
RBC # BLD AUTO: 3.69 X10*6/UL (ref 4.5–5.9)
RH FACTOR (ANTIGEN D): NORMAL
SODIUM SERPL-SCNC: 136 MMOL/L (ref 136–145)
UFH PPP CHRO-ACNC: 0.2 IU/ML
UFH PPP CHRO-ACNC: 0.6 IU/ML
UFH PPP CHRO-ACNC: 0.8 IU/ML
UFH PPP CHRO-ACNC: <0.1 IU/ML
WBC # BLD AUTO: 9 X10*3/UL (ref 4.4–11.3)

## 2024-09-24 PROCEDURE — 2500000004 HC RX 250 GENERAL PHARMACY W/ HCPCS (ALT 636 FOR OP/ED): Performed by: INTERNAL MEDICINE

## 2024-09-24 PROCEDURE — 36415 COLL VENOUS BLD VENIPUNCTURE: CPT

## 2024-09-24 PROCEDURE — 2500000004 HC RX 250 GENERAL PHARMACY W/ HCPCS (ALT 636 FOR OP/ED)

## 2024-09-24 PROCEDURE — 80048 BASIC METABOLIC PNL TOTAL CA: CPT

## 2024-09-24 PROCEDURE — 2500000001 HC RX 250 WO HCPCS SELF ADMINISTERED DRUGS (ALT 637 FOR MEDICARE OP)

## 2024-09-24 PROCEDURE — 85520 HEPARIN ASSAY: CPT | Performed by: INTERNAL MEDICINE

## 2024-09-24 PROCEDURE — 86901 BLOOD TYPING SEROLOGIC RH(D): CPT | Performed by: INTERNAL MEDICINE

## 2024-09-24 PROCEDURE — 85025 COMPLETE CBC W/AUTO DIFF WBC: CPT

## 2024-09-24 PROCEDURE — 85520 HEPARIN ASSAY: CPT

## 2024-09-24 PROCEDURE — 2500000002 HC RX 250 W HCPCS SELF ADMINISTERED DRUGS (ALT 637 FOR MEDICARE OP, ALT 636 FOR OP/ED)

## 2024-09-24 PROCEDURE — 82947 ASSAY GLUCOSE BLOOD QUANT: CPT

## 2024-09-24 PROCEDURE — 2500000005 HC RX 250 GENERAL PHARMACY W/O HCPCS

## 2024-09-24 PROCEDURE — 1200000002 HC GENERAL ROOM WITH TELEMETRY DAILY

## 2024-09-24 RX ORDER — PROTAMINE SULFATE 10 MG/ML
40 INJECTION, SOLUTION INTRAVENOUS ONCE
Status: COMPLETED | OUTPATIENT
Start: 2024-09-24 | End: 2024-09-24

## 2024-09-24 RX ORDER — LIDOCAINE HYDROCHLORIDE AND EPINEPHRINE 10; 10 MG/ML; UG/ML
20 INJECTION, SOLUTION INFILTRATION; PERINEURAL ONCE
Status: COMPLETED | OUTPATIENT
Start: 2024-09-24 | End: 2024-09-24

## 2024-09-24 RX ORDER — LIDOCAINE HYDROCHLORIDE AND EPINEPHRINE 10; 10 MG/ML; UG/ML
20 INJECTION, SOLUTION INFILTRATION; PERINEURAL ONCE
Status: DISCONTINUED | OUTPATIENT
Start: 2024-09-24 | End: 2024-10-02 | Stop reason: HOSPADM

## 2024-09-24 RX ORDER — HYDROMORPHONE HYDROCHLORIDE 1 MG/ML
1 INJECTION, SOLUTION INTRAMUSCULAR; INTRAVENOUS; SUBCUTANEOUS ONCE
Status: COMPLETED | OUTPATIENT
Start: 2024-09-24 | End: 2024-09-24

## 2024-09-24 RX ORDER — HYDROMORPHONE HYDROCHLORIDE 1 MG/ML
1 INJECTION, SOLUTION INTRAMUSCULAR; INTRAVENOUS; SUBCUTANEOUS EVERY 4 HOURS PRN
Status: DISCONTINUED | OUTPATIENT
Start: 2024-09-24 | End: 2024-10-02 | Stop reason: HOSPADM

## 2024-09-24 RX ORDER — LIDOCAINE HYDROCHLORIDE AND EPINEPHRINE 5; 5 MG/ML; UG/ML
20 INJECTION, SOLUTION INFILTRATION; PERINEURAL ONCE
Status: DISCONTINUED | OUTPATIENT
Start: 2024-09-24 | End: 2024-10-02 | Stop reason: HOSPADM

## 2024-09-24 RX ORDER — MORPHINE SULFATE 2 MG/ML
2 INJECTION, SOLUTION INTRAMUSCULAR; INTRAVENOUS EVERY 4 HOURS PRN
Status: DISCONTINUED | OUTPATIENT
Start: 2024-09-24 | End: 2024-10-02 | Stop reason: HOSPADM

## 2024-09-24 ASSESSMENT — PAIN DESCRIPTION - ORIENTATION: ORIENTATION: LEFT

## 2024-09-24 ASSESSMENT — COGNITIVE AND FUNCTIONAL STATUS - GENERAL
TOILETING: A LITTLE
DRESSING REGULAR LOWER BODY CLOTHING: A LITTLE
MOBILITY SCORE: 16
WALKING IN HOSPITAL ROOM: A LITTLE
DAILY ACTIVITIY SCORE: 19
MOVING FROM LYING ON BACK TO SITTING ON SIDE OF FLAT BED WITH BEDRAILS: A LITTLE
MOVING TO AND FROM BED TO CHAIR: A LITTLE
STANDING UP FROM CHAIR USING ARMS: A LOT
CLIMB 3 TO 5 STEPS WITH RAILING: A LOT
MOBILITY SCORE: 15
HELP NEEDED FOR BATHING: A LITTLE
MOVING FROM LYING ON BACK TO SITTING ON SIDE OF FLAT BED WITH BEDRAILS: A LITTLE
DRESSING REGULAR UPPER BODY CLOTHING: A LITTLE
MOVING TO AND FROM BED TO CHAIR: A LITTLE
DRESSING REGULAR UPPER BODY CLOTHING: A LITTLE
STANDING UP FROM CHAIR USING ARMS: A LOT
DAILY ACTIVITIY SCORE: 20
DRESSING REGULAR LOWER BODY CLOTHING: A LOT
WALKING IN HOSPITAL ROOM: A LOT
TURNING FROM BACK TO SIDE WHILE IN FLAT BAD: A LITTLE
TURNING FROM BACK TO SIDE WHILE IN FLAT BAD: A LITTLE
CLIMB 3 TO 5 STEPS WITH RAILING: A LOT
HELP NEEDED FOR BATHING: A LITTLE
TOILETING: A LITTLE

## 2024-09-24 ASSESSMENT — PAIN - FUNCTIONAL ASSESSMENT
PAIN_FUNCTIONAL_ASSESSMENT: 0-10

## 2024-09-24 ASSESSMENT — PAIN DESCRIPTION - DESCRIPTORS
DESCRIPTORS: BURNING;SHARP
DESCRIPTORS: BURNING;SHARP
DESCRIPTORS: BURNING

## 2024-09-24 ASSESSMENT — PAIN SCALES - WONG BAKER
WONGBAKER_NUMERICALRESPONSE: HURTS WHOLE LOT
WONGBAKER_NUMERICALRESPONSE: HURTS LITTLE BIT
WONGBAKER_NUMERICALRESPONSE: HURTS EVEN MORE

## 2024-09-24 ASSESSMENT — PAIN SCALES - GENERAL
PAINLEVEL_OUTOF10: 10 - WORST POSSIBLE PAIN
PAINLEVEL_OUTOF10: 7
PAINLEVEL_OUTOF10: 7
PAINLEVEL_OUTOF10: 2
PAINLEVEL_OUTOF10: 0 - NO PAIN
PAINLEVEL_OUTOF10: 4
PAINLEVEL_OUTOF10: 7
PAINLEVEL_OUTOF10: 5 - MODERATE PAIN
PAINLEVEL_OUTOF10: 0 - NO PAIN
PAINLEVEL_OUTOF10: 10 - WORST POSSIBLE PAIN
PAINLEVEL_OUTOF10: 7

## 2024-09-24 ASSESSMENT — PAIN DESCRIPTION - LOCATION: LOCATION: FOOT

## 2024-09-24 NOTE — PROGRESS NOTES
INTERNAL MEDICINE PROGRESS NOTE      HPI:    C/o mild foot pain, controlled with pain medications.     Vital signs in last 24 hours:  Temp:  [36.2 °C (97.2 °F)-37.1 °C (98.8 °F)] 37.1 °C (98.8 °F)  Heart Rate:  [58-78] 61  Resp:  [12-20] 18  BP: ()/(49-80) 172/69    Physical Examination:  Physical Exam    Constitutional:       Appearance: Elderly, overweight, in no distress  HENT:      Head: Normocephalic and atraumatic.   Eyes:      Extraocular Movements: Extraocular movements intact.      Pupils: Pupils are equal, round, and reactive to light.   Cardiovascular:      Rate and Rhythm: Normal rate and regular rhythm.      Pulses: Normal pulses.      Heart sounds: Normal heart sounds.   Pulmonary:      Effort: Pulmonary effort is normal.      Breath sounds: Normal breath sounds.   Abdominal:      General: Abdomen is flat. Bowel sounds are normal.      Palpations: Abdomen is soft.   Musculoskeletal:         General: Normal range of motion.      Cervical back: Normal range of motion and neck supple.   Skin:     General: Dressing D/I to foot.   Neurological:      General: No focal deficit present.      Mental Status: He is alert and oriented to person, place, and time. Mental status is at baseline.        Medications:    Current Facility-Administered Medications:     acetaminophen (Tylenol) tablet 650 mg, 650 mg, oral, q4h PRN, 650 mg at 09/23/24 2301 **OR** acetaminophen (Tylenol) oral liquid 650 mg, 650 mg, oral, q4h PRN **OR** acetaminophen (Tylenol) suppository 650 mg, 650 mg, rectal, q4h PRN, Marcus Kwok, DPM    ammonium lactate (Lac-Hydrin) 12 % lotion, , Topical, BID, Marcus Kwok DPM, Given at 09/24/24 0937    ampicillin-sulbactam (Unasyn) in sodium chloride 0.9 % 100 mL 3 g, 3 g, intravenous, q6h, Marcus Kwok DPM, Stopped at 09/24/24 1217    aspirin chewable tablet 81 mg, 81 mg, oral, Daily, Marcus Kwok DPM, 81 mg at 09/24/24 0937    clopidogrel (Plavix)  tablet 600 mg, 600 mg, oral, Once, Marcus Fazal-Issac, DPM    clopidogrel (Plavix) tablet 75 mg, 75 mg, oral, Daily, Marcus Fazal-Issac, DPM, 75 mg at 09/24/24 0936    dextrose 50 % injection 12.5 g, 12.5 g, intravenous, q15 min PRN, Marcus Fazal-Issac, DPM    dextrose 50 % injection 25 g, 25 g, intravenous, q15 min PRN, Marcus Fazal-Issac, DPM    glucagon (Glucagen) injection 1 mg, 1 mg, intramuscular, q15 min PRN, Marcus Fazal-Issac, DPM    glucagon (Glucagen) injection 1 mg, 1 mg, intramuscular, q15 min PRN, Marcus Fazal-Issac, DPM    heparin 25,000 Units in dextrose 5% 250 mL (100 Units/mL) infusion (premix), 0-4,500 Units/hr, intravenous, Continuous, Marcus Diehl-Issac, DPBRAYAN, Last Rate: 0.2 mL/hr at 09/24/24 0904, 16 Units/hr at 09/24/24 0904    heparin bolus from bag 3,000-6,000 Units, 3,000-6,000 Units, intravenous, q4h PRN, Marcus Fazal-Issac, DPM    insulin lispro (HumaLOG) injection 0-10 Units, 0-10 Units, subcutaneous, TID, Marcus Diehl-Issac, DPM, 2 Units at 09/22/24 1652    lidocaine (LMX) 4 % cream, , Topical, 4x daily PRN, Marcus Diehl-Issac, DPM, Given at 09/21/24 2107    morphine injection 2 mg, 2 mg, intravenous, q4h PRN, Jemal Guadarrama MD    oxygen (O2) therapy, , inhalation, Continuous - 02/gases, Marcus Diehl-Issac, DPM    polyethylene glycol (Glycolax, Miralax) packet 17 g, 17 g, oral, Daily, Marcus Fazal-Issac, DPM, 17 g at 09/24/24 0936    potassium chloride CR (Klor-Con M20) ER tablet 40 mEq, 40 mEq, oral, Daily, Marcus Fazal-Issac, DPM, 40 mEq at 09/24/24 0937    Laboratory Findings:  Lab Results   Component Value Date    WBC 9.0 09/24/2024    HGB 10.6 (L) 09/24/2024    HCT 32.6 (L) 09/24/2024    MCV 88 09/24/2024     09/24/2024     Lab Results   Component Value Date    INR 1.0 12/09/2019    PROTIME 11.4 12/09/2019     Lab Results   Component Value Date    GLUCOSE 93 09/24/2024    CALCIUM 8.3 (L) 09/24/2024     09/24/2024    K 3.8 09/24/2024    CO2  26 09/24/2024     09/24/2024    BUN 11 09/24/2024    CREATININE 0.60 09/24/2024       Assessment and Plan:     Acute osteomyelitis -continue IV antibiotics, s/p sx intervention. ID following, atbs per ID recs.   Peripheral vascular disease -outpatient vascular surgery follow-up.  Acute DVT -continue with Lovenox as per orders.  Diabetes type 2 -good control with no medications, monitor.     DC planning for possibly tomorrow when cleared by ID.        Jemal Guadarrama MD  09/24/24  1230pm

## 2024-09-24 NOTE — SIGNIFICANT EVENT
74-year-old male with multiple medical comorbid conditions.  Amputation of the left fifth toe yesterday.  Happened to be on the floor and called to the bedside for some bleeding at his wound site.  Appeared to have active pumper.  The wound was irrigated and then infiltrated with solution 1% lidocaine with epinephrine.  Hemostasis achieved using 2-0 silk stitches placed in interrupted figure-of-eight stitch pattern to achieve hemostasis.  Wound was redressed with Surgicel.  Podiatry team present at the bedside

## 2024-09-24 NOTE — PROGRESS NOTES
09/24/24 1046   Discharge Planning   Expected Discharge Disposition Home     PLAN/BARRIER: IV antibiotics and hoping to switch to oral on discharge, needs wound closure, OR culture results, podiatry clearance  DISP: home  ADOD: 3 days  Susie Sims RN

## 2024-09-24 NOTE — CARE PLAN
The patient's goals for the shift include      The clinical goals for the shift include patient to remain comfortable      Problem: Pain  Goal: Takes deep breaths with improved pain control throughout the shift  Outcome: Progressing     Problem: Pain  Goal: Turns in bed with improved pain control throughout the shift  Outcome: Progressing     Problem: Pain  Goal: Free from opioid side effects throughout the shift  Outcome: Progressing     Problem: Pain  Goal: Free from acute confusion related to pain meds throughout the shift  Outcome: Progressing     Problem: Safety - Adult  Goal: Free from fall injury  Outcome: Progressing     Problem: Chronic Conditions and Co-morbidities  Goal: Patient's chronic conditions and co-morbidity symptoms are monitored and maintained or improved  Outcome: Progressing

## 2024-09-24 NOTE — PROGRESS NOTES
Occupational Therapy                 Therapy Communication Note    Patient Name: Elliot Partida  MRN: [id number]  Department: [place] [address]  Room: [address] [place] [address]  Today's Date: 9/24/2024     Discipline: Occupational Therapy    Missed Visit Reason: Missed Visit Reason: Patient placed on medical hold (Patient in the process of rapid response 2/2 bleeding from surgical L foot in an attempt to ambulate to the bathroom. medical team at bedside. OT to defer at this time.)    Missed Time: Attempt

## 2024-09-24 NOTE — PROGRESS NOTES
"  INFECTIOUS DISEASE DAILY PROGRESS NOTE    SUBJECTIVE:    Surgery yesterday - 5th metatarsal resection, necrotic bone noted. Open and needs secondary closure still. No fevers.     OBJECTIVE:  VITALS (Last 24 Hours)  /71 (Patient Position: Lying)   Pulse 61   Temp 36.8 °C (98.3 °F) (Temporal)   Resp 18   Ht 1.753 m (5' 9\")   Wt 102 kg (225 lb 8.5 oz)   SpO2 96%   BMI 33.31 kg/m²     PHYSICAL EXAM:  Gen - NAD  Abd - soft, no ttp, BS present  Left Foot - surgical dressings    ABX: IV Unasyn    LABS:  Lab Results   Component Value Date    WBC 9.0 09/24/2024    HGB 10.6 (L) 09/24/2024    HCT 32.6 (L) 09/24/2024    MCV 88 09/24/2024     09/24/2024     Lab Results   Component Value Date    GLUCOSE 113 (H) 09/23/2024    CALCIUM 8.4 (L) 09/23/2024     09/23/2024    K 3.6 09/23/2024    CO2 27 09/23/2024     09/23/2024    BUN 9 09/23/2024    CREATININE 0.66 09/23/2024         Estimated Creatinine Clearance: 117.3 mL/min (by C-G formula based on SCr of 0.66 mg/dL).      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  PAD - s/p angiogram 9/18 with re-vasc  DM II with Peripheral Neuropathy - poorly controlled A1C 9.8% 9/2024, increased risk/severity of infections    IV Unasyn 3g Q6H. Will need additional closure. Hoping for PO abx if all of OM has been resected and no concerns for residual bone infection on closure.    Monitoring for adverse effects of abx such as rash/itching/diarrhea - none thus far.    Will follow. Thanks!    Pantera Boston MD  [facility]  Office #[phone]      "

## 2024-09-24 NOTE — PROGRESS NOTES
PODIATRY SERVICE CONSULT PROGRESS NOTE    SERVICE DATE: 9/24/2024   SERVICE TIME:  1200    Subjective   Pt was seen at bedside, POD 1 Left 5th Toe Amputation and Partial 5th Ray Resection.  Post-operative dressing is clean, dry and intact at this time.  Pain not well controlled at this time, Primary has given Dilaudid and Morphine.  Patient denies any constitutional symptoms.   No other pedal complaints.       Medications:  Scheduled Meds: ammonium lactate, , Topical, BID  ampicillin-sulbactam, 3 g, intravenous, q6h  aspirin, 81 mg, oral, Daily  clopidogrel, 600 mg, oral, Once  clopidogrel, 75 mg, oral, Daily  insulin lispro, 0-10 Units, subcutaneous, TID  lidocaine-epinephrine, 20 mL, subcutaneous, Once  lidocaine-epinephrine, 20 mL, injection, Once  oxygen, , inhalation, Continuous - 02/gases  polyethylene glycol, 17 g, oral, Daily  potassium chloride CR, 40 mEq, oral, Daily      Continuous Infusions: heparin, 0-4,500 Units/hr, Last Rate: Stopped (09/24/24 1433)      PRN Meds: PRN medications: acetaminophen **OR** acetaminophen **OR** acetaminophen, dextrose, dextrose, glucagon, glucagon, heparin, HYDROmorphone, lidocaine, morphine         Objective   PHYSICAL EXAM:  Physical Exam Performed:  Vitals:    09/24/24 1531   BP: (!) 125/48   Pulse:    Resp:    Temp:    SpO2:      Body mass index is 33.31 kg/m².    Patient is AOx3 and in mild acute distress secondary to pain. Patient is alert and cooperative. Lying in bed with post-operative dressing clean, dry and intact.     LLE Focused Exam:     Vasc: DP and PT pulses are faintly palpable.  CFT is >5 seconds.  Skin Temperature to warm to warm distally to proximally on the left lower extremity     Neuro:  Light touch is intact.     Derm: Left 5th Digit and Partial 5th Ray Amputation surgical wound noted, with Dakin's soaked packing still intact, minimal to mild bleeding at the time of initial examination at 1200.     MSK:  Ankle joint, subtalar joint, 1st MPJ and  lesser MPJ ROM is limited and painful.  Tenderness to palpation of left foot and calf throughout, extremely tender around surgical site.      LABS:   Results for orders placed or performed during the hospital encounter of 09/14/24 (from the past 24 hour(s))   POCT GLUCOSE   Result Value Ref Range    POCT Glucose 104 (H) 74 - 99 mg/dL   Heparin Assay   Result Value Ref Range    Heparin Unfractionated 0.2 See Comment Below for Therapeutic Ranges IU/mL   CBC and Auto Differential   Result Value Ref Range    WBC 9.0 4.4 - 11.3 x10*3/uL    nRBC 0.0 0.0 - 0.0 /100 WBCs    RBC 3.69 (L) 4.50 - 5.90 x10*6/uL    Hemoglobin 10.6 (L) 13.5 - 17.5 g/dL    Hematocrit 32.6 (L) 41.0 - 52.0 %    MCV 88 80 - 100 fL    MCH 28.7 26.0 - 34.0 pg    MCHC 32.5 32.0 - 36.0 g/dL    RDW 13.6 11.5 - 14.5 %    Platelets 265 150 - 450 x10*3/uL    Neutrophils % 64.7 40.0 - 80.0 %    Immature Granulocytes %, Automated 0.7 0.0 - 0.9 %    Lymphocytes % 23.9 13.0 - 44.0 %    Monocytes % 8.4 2.0 - 10.0 %    Eosinophils % 2.0 0.0 - 6.0 %    Basophils % 0.3 0.0 - 2.0 %    Neutrophils Absolute 5.83 (H) 1.60 - 5.50 x10*3/uL    Immature Granulocytes Absolute, Automated 0.06 0.00 - 0.50 x10*3/uL    Lymphocytes Absolute 2.16 0.80 - 3.00 x10*3/uL    Monocytes Absolute 0.76 0.05 - 0.80 x10*3/uL    Eosinophils Absolute 0.18 0.00 - 0.40 x10*3/uL    Basophils Absolute 0.03 0.00 - 0.10 x10*3/uL   Basic Metabolic Panel   Result Value Ref Range    Glucose 93 74 - 99 mg/dL    Sodium 136 136 - 145 mmol/L    Potassium 3.8 3.5 - 5.3 mmol/L    Chloride 102 98 - 107 mmol/L    Bicarbonate 26 21 - 32 mmol/L    Anion Gap 12 10 - 20 mmol/L    Urea Nitrogen 11 6 - 23 mg/dL    Creatinine 0.60 0.50 - 1.30 mg/dL    eGFR >90 >60 mL/min/1.73m*2    Calcium 8.3 (L) 8.6 - 10.3 mg/dL   POCT GLUCOSE   Result Value Ref Range    POCT Glucose 102 (H) 74 - 99 mg/dL   SST TOP   Result Value Ref Range    Extra Tube Hold for add-ons.    Heparin Assay   Result Value Ref Range    Heparin  Unfractionated 0.8 See Comment Below for Therapeutic Ranges IU/mL   POCT GLUCOSE   Result Value Ref Range    POCT Glucose 106 (H) 74 - 99 mg/dL   POCT GLUCOSE   Result Value Ref Range    POCT Glucose 112 (H) 74 - 99 mg/dL   Lavender Top   Result Value Ref Range    Extra Tube Hold for add-ons.    SST TOP   Result Value Ref Range    Extra Tube Hold for add-ons.    Type And Screen   Result Value Ref Range    ABO TYPE O     Rh TYPE POS     ANTIBODY SCREEN NEG    Heparin Assay   Result Value Ref Range    Heparin Unfractionated 0.6 See Comment Below for Therapeutic Ranges IU/mL   Heparin Assay   Result Value Ref Range    Heparin Unfractionated <0.1 See Comment Below for Therapeutic Ranges IU/mL      Lab Results   Component Value Date    HGBA1C 9.8 (H) 09/13/2024      Lab Results   Component Value Date    CRP 9.51 (H) 09/13/2024      Lab Results   Component Value Date    SEDRATE 24 (H) 09/13/2024        Results from last 7 days   Lab Units 09/24/24  0505   WBC AUTO x10*3/uL 9.0   RBC AUTO x10*6/uL 3.69*   HEMOGLOBIN g/dL 10.6*   HEMATOCRIT % 32.6*     Results from last 7 days   Lab Units 09/24/24  0505   SODIUM mmol/L 136   POTASSIUM mmol/L 3.8   CHLORIDE mmol/L 102   CO2 mmol/L 26   BUN mg/dL 11   CREATININE mg/dL 0.60   CALCIUM mg/dL 8.3*             IMAGING REVIEW:  Vascular US Ankle Brachial Index (BEN) Without Exercise    Result Date: 9/16/2024             [place] [address]   Tel [phone] and Fax [phone]  Vascular Lab Report VASC US PVR WITHOUT EXERCISE  Patient Name:     SUBHASH Cain Physician: [id number] Sven Caicedo MD Study Date:       9/16/2024          Ordering           [id number] ELAINA DE LOS SANTOS                                      Physician: MRN/PID:          [id number]           Technologist:      Tamra VALADEZT Accession#:       [id number]       Technologist 2:    Graciela Tobar RVT  Date of           1950 / 74      Encounter#:        [id number] Birth/Age:        years Gender:           M Admission Status: Inpatient          Location           [facility]                                      Performed:  Diagnosis/ICD: Peripheral vascular disease, unspecified-I73.9 CPT Codes:     56712 Peripheral artery BEN Only  CONCLUSIONS: Right Lower PVR: No evidence of arterial occlusive disease in the right lower extremity at rest. Decreased digital perfusion noted. Multiphasic flow is noted in the right common femoral artery, right posterior tibial artery and right dorsalis pedis artery. Left Lower PVR: No evidence of arterial occlusive disease in the left lower extremity at rest. Decreased digital perfusion noted. Monophasic flow is noted in the left posterior tibial artery and left dorsalis pedis artery. Multiphasic flow is noted in the left common femoral artery. Waveform may be dmapened due to edema.  Additional Findings: Known DVT in the left femoral and popliteal veins.  Imaging & Doppler Findings:  RIGHT Lower PVR                Pressures Ratios Right Posterior Tibial (Ankle) 155 mmHg  0.99 Right Dorsalis Pedis (Ankle)   155 mmHg  0.99 Right Digit (Great Toe)        66 mmHg   0.42   LEFT Lower PVR                Pressures Ratios Left Posterior Tibial (Ankle) 142 mmHg  0.91 Left Dorsalis Pedis (Ankle)   128 mmHg  0.82 Left Digit (Great Toe)        52 mmHg   0.33                     Right     Left Brachial Pressure 156 mmHg 150 mmHg   [id number] Sven Caicedo MD Electronically signed by [id number] Sven Caicedo MD on 9/16/2024 at 7:50:39 PM  ** Final **     CT angio chest for pulmonary embolism    Result Date: 9/14/2024  Interpreted By:  Enedina Umana, STUDY: CT ANGIO CHEST FOR PULMONARY EMBOLISM;  9/14/2024 9:28 am   INDICATION: Signs/Symptoms:hypoxia, dvt.     COMPARISON: None.   ACCESSION NUMBER(S): [id number]   ORDERING CLINICIAN: ABIEL KENT   TECHNIQUE: CT of the chest was  performed. Sagittal and coronal reconstructions were generated. 75 ML Omnipaque 350 intravenous contrast given for the examination.  Multiplanar reconstructions of the pulmonary vessels were created on an independent workstation and provided for review.   FINDINGS: Artifact related to motion and overlying upper extremities limits evaluation.   CHEST WALL AND LOWER NECK: No significant axillary lymphadenopathy.   MEDIASTINUM AND ROLO:  No significant lymphadenopathy.   HEART AND VESSELS:  No central PE however limited assessment for peripheral PE due to technical factors including suboptimal timing of IV contrast bolus and artifact. The heart is normal in size. No significant pericardial effusion. Multifocal atherosclerotic calcifications including the coronary arteries and aortic and mitral valve regions. No thoracic aortic aneurysm.   LUNGS, PLEURA, LARGE AIRWAYS:  Respiratory motion artifact limits parenchymal evaluation. Mild pulmonary heterogeneity. Faint linear densities in both lung fields. Possible mosaic attenuation in both lung bases. Trace bilateral pleural effusions/thickening. The central airways are patent.   UPPER ABDOMEN:  Limited due to artifact related to overlying upper extremities. The included liver is likely mildly hypodense/fatty infiltrated. No definite focal mass lesion in the included upper abdominal viscera.   BONES:  Flowing osteophytes along the thoracic spine consistent with dish.       No central PE however peripheral PE can not be excluded based on this exam.   Scattered interstitial opacities including possible mosaic attenuation in the lung bases which could reflect small airways disease and air trapping or atypical infection. Trace bilateral pleural effusions/thickening.   Moderate atherosclerotic disease.   Possible fatty infiltration of the liver incidentally noted.   MACRO: None.   Signed by: Enedina Umana 9/14/2024 10:56 AM Dictation workstation:   [id number]    Transthoracic  Echo (TTE) Complete    Result Date: 9/13/2024   [facility], [address] [place] [address]              Tel [phone] and Fax [phone] TRANSTHORACIC ECHOCARDIOGRAM REPORT  Patient Name:      SUBHASH Cain Physician:    [id number] Carson Freeman MD Study Date:        9/13/2024            Ordering Provider:    [id number] ABIEL KENT MRN/PID:           [id number]             Fellow: Accession#:        [id number]         Nurse: Date of Birth/Age: 1950 / 74 years  Sonographer:          Cailin Julian RDCS Gender:            M                    Additional Staff: Height:            175.26 cm            Admit Date:           9/12/2024 Weight:            107.05 kg            Admission Status:     Inpatient -                                                               Routine BSA / BMI:         2.22 m2 / 34.85      Encounter#:           [id number]                    kg/m2 Blood Pressure:    137/66 mmHg          Department Location:  [facility] Study Type:    TRANSTHORACIC ECHO (TTE) COMPLETE Diagnosis/ICD: Abnormal electrocardiogram [ECG] [EKG]-R94.31 Indication:    Abnormal EKG CPT Code:      Echo Complete w Full Doppler-37250 Patient History: Pertinent History: Edema, DM, abnomal EKG, wound infection, WMA. Study Detail: The following Echo studies were performed: 2D, M-Mode, Doppler and               color flow. Technically challenging study due to body habitus,               patient lying in supine position, prominent lung artifact and poor               acoustic windows. Definity used as a contrast agent for               endocardial border definition. Total contrast used for this               procedure was 3.0 mL via IV push.  PHYSICIAN INTERPRETATION: Left Ventricle: The left  ventricular systolic function is normal, with a visually estimated ejection fraction of 60-65%. There are no regional left ventricular wall motion abnormalities. The left ventricular cavity size is normal. Spectral Doppler shows an impaired relaxation pattern of left ventricular diastolic filling. Left Atrium: The left atrium is normal in size. Right Ventricle: The right ventricle is normal in size. There is normal right ventricular global systolic function. Right Atrium: The right atrium is normal in size. Aortic Valve: The aortic valve is trileaflet. There is moderate aortic valve cusp calcification. There is evidence of mild to moderate aortic valve stenosis. There is no evidence of aortic valve regurgitation. The peak instantaneous gradient of the aortic valve is 26.2 mmHg. Mitral Valve: The mitral valve is normal in structure. There is mild mitral valve regurgitation. Tricuspid Valve: The tricuspid valve is structurally normal. There is mild tricuspid regurgitation. Pulmonic Valve: The pulmonic valve is structurally normal. There is physiologic pulmonic valve regurgitation. Pericardium: There is no pericardial effusion noted. Aorta: The aortic root is normal. Pulmonary Artery: The estimated pulmonary artery pressure is normal. Systemic Veins: The inferior vena cava appears mildly dilated.  CONCLUSIONS:  1. The left ventricular systolic function is normal, with a visually estimated ejection fraction of 60-65%.  2. Spectral Doppler shows an impaired relaxation pattern of left ventricular diastolic filling.  3. There is normal right ventricular global systolic function.  4. Mild to moderate aortic valve stenosis.  5. There is moderate aortic valve cusp calcification.  6. The inferior vena cava appears mildly dilated. QUANTITATIVE DATA SUMMARY:  2D MEASUREMENTS:         Normal Ranges: Ao Root d:       3.70 cm (2.0-3.7cm)  AORTA MEASUREMENTS:         Normal Ranges: Asc Ao, d:          3.90 cm (2.1-3.4cm)  LV  SYSTOLIC FUNCTION BY 2D PLANIMETRY (MOD):                      Normal Ranges: EF-Visual:      63 % LV EF Reported: 63 %  LV DIASTOLIC FUNCTION:           Normal Ranges: MV Peak E:             1.05 m/s  (0.7-1.2 m/s) MV Peak A:             1.50 m/s  (0.42-0.7 m/s) E/A Ratio:             0.70      (1.0-2.2) MV e'                  0.073 m/s (>8.0) MV lateral e'          0.08 m/s MV medial e'           0.07 m/s E/e' Ratio:            14.40     (<8.0)  AORTIC VALVE:            Normal Ranges: AoV Vmax:      2.56 m/s  (<=1.7m/s) AoV Peak P.2 mmHg (<20mmHg) LVOT Max Jak:  1.25 m/s  (<=1.1m/s) LVOT VTI:      23.90 cm LVOT Diameter: 2.20 cm   (1.8-2.4cm) AoV Area,Vmax: 1.86 cm2  (2.5-4.5cm2)  RIGHT VENTRICLE: TAPSE: 21.1 mm RV s'  0.12 m/s  TRICUSPID VALVE/RVSP:         Normal Ranges: IVC Diam:             2.33 cm  PULMONIC VALVE:          Normal Ranges: PV Max Jak:     1.2 m/s  (0.6-0.9m/s) PV Max P.2 mmHg  [id number] Carson Freeman MD Electronically signed on 2024 at 7:18:35 PM  ** Final **     CT angio aorta and bilateral iliofemoral runoff w and or wo IV contrast    Result Date: 2024  Interpreted By:  Osorio Wong, STUDY: CT ANGIO AORTA AND BILATERAL ILIOFEMORAL RUNOFF W AND OR WO IV CONTRAST;  2024 12:07 pm   INDICATION: Signs/Symptoms:evaluation for circulation and wound.   COMPARISON: None.   ACCESSION NUMBER(S): [id number]   ORDERING CLINICIAN: ABIEL KENT   TECHNIQUE: CTA of the abdomen and pelvis, with runoff was performed.  Contiguous axial images were obtained at 3 mm slice thickness through the abdomen and pelvis, with runoff. 3D Coronal and sagittal reconstructions at 3 mm slice thickness were performed. 75 ml of contrast material  Omnipaque 350 were administered intravenously without immediate complication. FINDINGS:   CTA ABDOMEN VESSELS   Celiac axis: No significant stenosis.   SMA: Partially calcified disease at its origin results in moderate to severe stenosis.    RAUL: No significant stenosis.   Right renal vessels: Calcified disease of its proximal segment results in moderate stenosis.   Left renal vessels: No significant stenosis.   Infrarenal aorta: Contains partially calcified disease without significant stenosis or aneurysmal dilation.   CTA  PELVIC VESSELS R. Common iliac artery: No significant stenosis.   R. External iliac artery: No significant stenosis.   R. Internal iliac artery: No significant stenosis.   L. Common iliac artery: No significant stenosis.   L. External iliac artery: No significant stenosis.   L. Internal iliac artery: Partially calcified disease results in mild to moderate stenosis.   CTA RIGHT LOWER EXTREMITY R. Common femoral artery: No significant stenosis.   R. Profunda femoris artery: No significant stenosis.   R. SFA: Partially calcified disease results in mild stenosis.   R. Popliteal artery: Partially calcified disease results in moderate stenosis of the above the knee segment. Predominantly calcified disease of the below-the-knee segment results in mild-to-moderate stenosis.   R. Anterior tibial artery: Occluded at its origin.   R. Tibioperoneal trunk: Partially calcified disease results in mild-to-moderate stenosis.   R. Posterior tibial artery: Occluded at its origin.   R. Peroneal artery: Contains scattered calcific disease of its mid to distal segment resulting in mild-to-moderate stenosis.   R. Dorsalis pedis artery: No significant stenosis.   R. Plantar artery: Not visualized.   CTA LEFT LOWER EXTREMITY L. Common femoral artery: No significant stenosis.   L. Profunda femoris artery: No significant stenosis.   L. SFA: Contains partially calcified disease predominantly in its mid to distal segment. This results in focal severe stenosis across Gaetano's canal but no significant stenosis upstream.   L. Popliteal artery: Contains partially calcified atherosclerotic disease without significant stenosis.   L. Anterior tibial artery: Occluded  shortly beyond its origin.   L. Tibioperoneal trunk: Contains partially calcified disease resulting in moderate to severe stenosis.   L. Posterior tibial artery: Occluded at its origin, there is distal reconstitution across its middle 3rd as a diseased vessel containing scattered calcific disease resulting in mild stenosis. This vessel is seen to cross the ankle joint and supply the plantar artery.   L. Peroneal artery: Occluded at its origin, there is distal reconstitution at its middle 3rd as a small caliber vessel, supplying distal collaterals to the foot.   L. Dorsalis pedis artery: Not visualized   L. Plantar artery: No significant stenosis.   NON-VASCULAR FINDINGS   Visualized portions of the heart: Main pulmonary artery is enlarged, which can be seen in the setting of pulmonary arterial hypertension. The heart is not enlarged. There is coronary artery calcifications present in the distribution of all 3 vessels. There is no pericardial effusion.   Lungs: There is bilateral dependent atelectasis.   Hepatobiliary: Unremarkable liver without biliary dilation evident   Pancreas: Unremarkable   Spleen: Unremarkable   Adrenal Glands: Unremarkable   Kidneys, ureters, and bladder: There are bilateral parapelvic renal cysts and bilateral parenchymal cysts. There are bilateral extrarenal pelvises. There is no urolithiasis or hydroureteronephrosis. The urinary bladder demonstrates circumferential wall thickening which may be related to incomplete distention or cystitis.   GI tract: There is a 2.6 cm diverticulum off the gastric fundus. No evidence of obstruction. There is colonic diverticulosis without diverticulitis. The appendix is within normal limits.   Peritoneum and retroperitoneum: No free fluid or free air is noted.   Lymph Nodes: There are mildly prominent pelvic lymph nodes, left-greater-than-right. These are likely reactive.   Reproductive Organs: The prostate gland is enlarged.   Visualized musculoskeletal  structures: There are degenerative changes of the spine. Spurring of the bilateral sacroiliac joints is noted. There is osteoarthritic changes of the bilateral hips. There is diffuse edema and skin thickening involving the left leg. There are prominent venous varicosities of the left leg. There is osteopenia involving the left foot. Bony destruction at the left 5th metatarsophalangeal joint is noted with soft tissue gas surrounding this region. There appears to be an open wound in the plantar aspect of the foot centered at the 5th MTP joint. Curvilinear hyperdensity within the soft tissues may reflect small bony fragments or foreign bodies. No acute fracture or destructive osseous lesion is identified       1. Diffuse partially calcified atherosclerotic disease, as detailed above. There is a single vessel runoff in the right leg. In the left leg, there is high-grade stenosis of the distal SFA as it crosses Gaetano's canal. Additionally, there is occlusive disease of the proximal trifurcation with reconstitution of the left posterior tibial and peroneal arteries distally.   2. Significant soft tissue swelling of the left leg with prominent venous varicosities. There is also soft tissue swelling of the left foot with an open wound along the plantar aspect of the foot centered at the 5th MTP joint, which demonstrates adjacent bony destruction. Curvilinear hyperdensity in this region may reflect bony fragments or foreign bodies.   3. Coronary artery calcifications.   Signed by: Osorio Wong 9/13/2024 2:09 PM Dictation workstation:   [id number]    Lower extremity venous duplex left    Result Date: 9/12/2024  STUDY: Left Lower Extremity Venous Doppler Ultrasound; 9/12/2024 4:43 PM INDICATION: Swelling. COMPARISON: US LE Venous 3/21/2022. ACCESSION NUMBER(S): [id number] ORDERING CLINICIAN: KATIE HILTON TECHNIQUE:  Real-time grayscale imaging, color Doppler flow imaging, and spectral Doppler imaging of the left lower  extremity veins was performed. FINDINGS: There is acute occlusive DVT demonstrated within the left mid-distal femoral and popliteal veins. The left common femoral and profunda femoral veins demonstrated normal compressibility, normal phasic venous flow and normal response to augmentation.  There is no evidence for echogenic thrombi.  The deep calf veins are not well visualized due to subcutaneous edema. The contralateral common femoral vein is free of thrombosis.    Evidence for acute occlusive DVT within the left mid-distal femoral and popliteal veins. Compared to prior ultrasound, this represents a new finding. The positive findings on this exam were discussed with and acknowledged by Dr. Katie Hilton abdomen interpretation, 5:00 PM September 12, 2024 Signed by Jason Tobar MD    XR foot left 3+ views    Result Date: 9/12/2024  STUDY: Foot Radiographs; 9/12/2024 4:19 PM INDICATION: Left foot pain. COMPARISON: None Available. ACCESSION NUMBER(S): [id number] ORDERING CLINICIAN: KATIE HILTON TECHNIQUE:  Three view(s) of the left foot (four images). FINDINGS:  There is no displaced fracture.  The alignment is anatomic.  Soft tissue swelling and ulceration noted lateral to the fifth MTP joint associated with demineralization of the fifth proximal phalanx and the distal head of the fifth metatarsal.  Bony changes consistent with osteomyelitis.    Osteomyelitis of the fifth metatarsal head and proximal phalanx of the fifth toe. Signed by Td Francisco MD            Assessment/Plan   ASSESSMENT & PLAN:    #S/P Left 5th Toe Amputation and Partial 5th Ray Resection POD 1   #Left foot necrotic ulcer in setting of lower extremity cellulitis  #Osteomyelitis  #DM2     - Patient was seen and evaluated; all findings were discussed and all questions were answered to patient's satisfaction.  - Charts, labs, vitals and imaging all reviewed.   - Imaging: xray- Osteomyelitis of the fifth metatarsal head and proximal phalanx of  the fifth toe.  - MRI canceled  - Labs: WBC WNL  - PVRs: Decreased digital perfusion noted. Monophasic flow is noted in the left posterior tibial artery and left dorsalis pedis artery.  - Lower extremity Duplex- Evidence for acute occlusive DVT within the left mid-distal femoral and popliteal veins.   - Wound culture: NGTD, Preliminary     Plan:  - Abx: Per ID, IV Unasyn  - Patient had post-operative dressing applied at bedside today which was re-packed with Dakin's soaked packing, covered with Dakin's soaked 4x4's, ABD's, Kerlix and Compressive ACE at 1200.  - At 1500, Podiatry was called back to patient's room for a Rapid Response due to hemorrhagic bleeding from the surgical site. This was believed to be due to initial post-operative dressing change, which could have led to loosening of clot and fluid pressure differences. With the assistance of general surgery team, pressure was applied to the area and was also packed with Surgicel. After initial Surgicel packing, the wound was irrigated and then infiltrated with solution of 1% lidocaine with epinephrine.  Hemostasis was then achieved using 2-0 silk stitches placed in interrupted figure-of-eight stitch pattern. Silver Nitrate was also utilized. The surgical site was then re-packed with Surgicel and re-dressed in similar fashion as above, with the exception of utilizing 2 compressive ACE bandages. Patient was conscious the entire time with vitals stable.   - Will be taking patient back to OR tomorrow as an add on case for a delayed primary closure. NPO after midnight.  - Nursing staff is able to change/reinforce dressing if & as necessary until next day’s dressing change. Thank you.  - Podiatry will continue to follow while in house.     Case to be discussed with attending, A&P above reflects a tentative plan. Please await for the final signature from the attending physician on service.     Marcus Kwok DPM PGY-3  Podiatric Medicine & Surgery

## 2024-09-24 NOTE — PROGRESS NOTES
Physical Therapy                 Therapy Communication Note    Patient Name: Elliot Partida  MRN: [id number]  Department: [facility] [id number]  Room: [address] [place] [address]  Today's Date: 9/24/2024     Discipline: Physical Therapy    Missed Visit Reason: Missed Visit Reason: Patient placed on medical hold (patient in the process of rapid response 2/2 bleeding from surgical L foot in an attempt to ambulate to the bathroom. medical team at bedside. PT to defer. antonina/w JOSEPH GREGORY)    Missed Time: Attempt    Comment:

## 2024-09-24 NOTE — SIGNIFICANT EVENT
Rapid response was called on this patient for bleeding at the wound site.  Patient had amputation of left fifth toe and has profuse bleeding today.  He was on heparin drip, heparin drip was stopped.  Surgical team and podiatry team came at bedside.  Patient was given protamine sulfate to reverse the heparin drip.  Primary team was informed. Per surgical team, hemostasis achieved using 2-0 silk stitches placed in interrupted figure-of-eight stitch pattern to achieve hemostasis.  Wound was redressed with Surgicel.

## 2024-09-24 NOTE — NURSING NOTE
1433- Patient had a rapid response due to bleeding from surgical wound at this time. Patients heparin drip was stopped per MD Amor rapid response MD. Patient was given interventions to reverse heparin drip. Podiatry resident and other providers at bedside to stop bleeding wound. See code documentation for RR.

## 2024-09-24 NOTE — CARE PLAN
Problem: Pain  Goal: Takes deep breaths with improved pain control throughout the shift  Outcome: Progressing  Goal: Turns in bed with improved pain control throughout the shift  Outcome: Progressing  Goal: Walks with improved pain control throughout the shift  Outcome: Progressing  Goal: Performs ADL's with improved pain control throughout shift  Outcome: Progressing  Goal: Participates in PT with improved pain control throughout the shift  Outcome: Progressing  Goal: Free from opioid side effects throughout the shift  Outcome: Progressing  Goal: Free from acute confusion related to pain meds throughout the shift  Outcome: Progressing     Problem: Pain - Adult  Goal: Verbalizes/displays adequate comfort level or baseline comfort level  Outcome: Progressing     Problem: Safety - Adult  Goal: Free from fall injury  Outcome: Progressing   The patient's goals for the shift include      The clinical goals for the shift include Maintain pt safety/comfort; monitor labs/vitals; pain managent, psot-op care

## 2024-09-25 LAB
ERYTHROCYTE [DISTWIDTH] IN BLOOD BY AUTOMATED COUNT: 14.1 % (ref 11.5–14.5)
GLUCOSE BLD MANUAL STRIP-MCNC: 107 MG/DL (ref 74–99)
GLUCOSE BLD MANUAL STRIP-MCNC: 109 MG/DL (ref 74–99)
GLUCOSE BLD MANUAL STRIP-MCNC: 135 MG/DL (ref 74–99)
GLUCOSE BLD MANUAL STRIP-MCNC: 152 MG/DL (ref 74–99)
HCT VFR BLD AUTO: 29.1 % (ref 41–52)
HGB BLD-MCNC: 9.6 G/DL (ref 13.5–17.5)
HOLD SPECIMEN: NORMAL
MCH RBC QN AUTO: 29.3 PG (ref 26–34)
MCHC RBC AUTO-ENTMCNC: 33 G/DL (ref 32–36)
MCV RBC AUTO: 89 FL (ref 80–100)
NRBC BLD-RTO: 0 /100 WBCS (ref 0–0)
PLATELET # BLD AUTO: 281 X10*3/UL (ref 150–450)
RBC # BLD AUTO: 3.28 X10*6/UL (ref 4.5–5.9)
WBC # BLD AUTO: 9.3 X10*3/UL (ref 4.4–11.3)

## 2024-09-25 PROCEDURE — 82947 ASSAY GLUCOSE BLOOD QUANT: CPT

## 2024-09-25 PROCEDURE — 2500000001 HC RX 250 WO HCPCS SELF ADMINISTERED DRUGS (ALT 637 FOR MEDICARE OP)

## 2024-09-25 PROCEDURE — 2500000004 HC RX 250 GENERAL PHARMACY W/ HCPCS (ALT 636 FOR OP/ED)

## 2024-09-25 PROCEDURE — 0JBR0ZZ EXCISION OF LEFT FOOT SUBCUTANEOUS TISSUE AND FASCIA, OPEN APPROACH: ICD-10-PCS | Performed by: PODIATRIST

## 2024-09-25 PROCEDURE — 36415 COLL VENOUS BLD VENIPUNCTURE: CPT | Performed by: INTERNAL MEDICINE

## 2024-09-25 PROCEDURE — 2500000004 HC RX 250 GENERAL PHARMACY W/ HCPCS (ALT 636 FOR OP/ED): Performed by: INTERNAL MEDICINE

## 2024-09-25 PROCEDURE — 1200000002 HC GENERAL ROOM WITH TELEMETRY DAILY

## 2024-09-25 PROCEDURE — 85027 COMPLETE CBC AUTOMATED: CPT | Performed by: INTERNAL MEDICINE

## 2024-09-25 PROCEDURE — 0QUP0KZ SUPPLEMENT LEFT METATARSAL WITH NONAUTOLOGOUS TISSUE SUBSTITUTE, OPEN APPROACH: ICD-10-PCS | Performed by: PODIATRIST

## 2024-09-25 PROCEDURE — 2500000002 HC RX 250 W HCPCS SELF ADMINISTERED DRUGS (ALT 637 FOR MEDICARE OP, ALT 636 FOR OP/ED)

## 2024-09-25 ASSESSMENT — COGNITIVE AND FUNCTIONAL STATUS - GENERAL
CLIMB 3 TO 5 STEPS WITH RAILING: TOTAL
MOVING FROM LYING ON BACK TO SITTING ON SIDE OF FLAT BED WITH BEDRAILS: A LITTLE
DAILY ACTIVITIY SCORE: 20
PERSONAL GROOMING: A LITTLE
MOBILITY SCORE: 14
HELP NEEDED FOR BATHING: A LITTLE
WALKING IN HOSPITAL ROOM: A LOT
STANDING UP FROM CHAIR USING ARMS: A LOT
MOVING TO AND FROM BED TO CHAIR: A LITTLE
DRESSING REGULAR LOWER BODY CLOTHING: A LITTLE
TURNING FROM BACK TO SIDE WHILE IN FLAT BAD: A LITTLE
DRESSING REGULAR UPPER BODY CLOTHING: A LITTLE

## 2024-09-25 ASSESSMENT — PAIN SCALES - GENERAL
PAINLEVEL_OUTOF10: 2
PAINLEVEL_OUTOF10: 0 - NO PAIN
PAINLEVEL_OUTOF10: 7
PAINLEVEL_OUTOF10: 4
PAINLEVEL_OUTOF10: 7
PAINLEVEL_OUTOF10: 4

## 2024-09-25 ASSESSMENT — PAIN - FUNCTIONAL ASSESSMENT
PAIN_FUNCTIONAL_ASSESSMENT: 0-10

## 2024-09-25 ASSESSMENT — PAIN SCALES - WONG BAKER
WONGBAKER_NUMERICALRESPONSE: HURTS LITTLE BIT
WONGBAKER_NUMERICALRESPONSE: HURTS LITTLE BIT

## 2024-09-25 ASSESSMENT — PAIN DESCRIPTION - ORIENTATION: ORIENTATION: RIGHT;LEFT

## 2024-09-25 ASSESSMENT — PAIN DESCRIPTION - LOCATION
LOCATION: FOOT
LOCATION: LEG

## 2024-09-25 NOTE — PROGRESS NOTES
PODIATRY SERVICE CONSULT PROGRESS NOTE    SERVICE DATE: 9/25/2024   SERVICE TIME:  1200    Subjective   Pt was seen at bedside, POD 2 Left 5th Toe Amputation and Partial 5th Ray Resection.  Dressing from yesterday after rapid was called is clean, dry and intact at this time.  Pain not well controlled at this time, Primary has given Dilaudid and Morphine.  Patient denies any constitutional symptoms.   No other pedal complaints.       Medications:  Scheduled Meds: ammonium lactate, , Topical, BID  ampicillin-sulbactam, 3 g, intravenous, q6h  aspirin, 81 mg, oral, Daily  clopidogrel, 600 mg, oral, Once  clopidogrel, 75 mg, oral, Daily  insulin lispro, 0-10 Units, subcutaneous, TID  lidocaine-epinephrine, 20 mL, subcutaneous, Once  lidocaine-epinephrine, 20 mL, injection, Once  oxygen, , inhalation, Continuous - 02/gases  polyethylene glycol, 17 g, oral, Daily  potassium chloride CR, 40 mEq, oral, Daily      Continuous Infusions: heparin, 0-4,500 Units/hr, Last Rate: Stopped (09/24/24 1433)      PRN Meds: PRN medications: acetaminophen **OR** acetaminophen **OR** acetaminophen, dextrose, dextrose, glucagon, glucagon, heparin, HYDROmorphone, lidocaine, morphine         Objective   PHYSICAL EXAM:  Physical Exam Performed:  Vitals:    09/25/24 0812   BP: (!) 128/47   Pulse:    Resp:    Temp: 36.6 °C (97.9 °F)   SpO2: 96%     Body mass index is 33.31 kg/m².    Patient is AOx3 and in mild acute distress secondary to pain. Patient is alert and cooperative. Lying in bed with  dressing clean, dry and intact.     LLE Focused Exam:     Deferred due to surgical dressing left intact. CFT to Left Hallux intact.      LABS:   Results for orders placed or performed during the hospital encounter of 09/14/24 (from the past 24 hour(s))   POCT GLUCOSE   Result Value Ref Range    POCT Glucose 112 (H) 74 - 99 mg/dL   Lavender Top   Result Value Ref Range    Extra Tube Hold for add-ons.    SST TOP   Result Value Ref Range    Extra Tube Hold  for add-ons.    Type And Screen   Result Value Ref Range    ABO TYPE O     Rh TYPE POS     ANTIBODY SCREEN NEG    Heparin Assay   Result Value Ref Range    Heparin Unfractionated 0.6 See Comment Below for Therapeutic Ranges IU/mL   Heparin Assay   Result Value Ref Range    Heparin Unfractionated <0.1 See Comment Below for Therapeutic Ranges IU/mL   POCT GLUCOSE   Result Value Ref Range    POCT Glucose 129 (H) 74 - 99 mg/dL   CBC   Result Value Ref Range    WBC 9.3 4.4 - 11.3 x10*3/uL    nRBC 0.0 0.0 - 0.0 /100 WBCs    RBC 3.28 (L) 4.50 - 5.90 x10*6/uL    Hemoglobin 9.6 (L) 13.5 - 17.5 g/dL    Hematocrit 29.1 (L) 41.0 - 52.0 %    MCV 89 80 - 100 fL    MCH 29.3 26.0 - 34.0 pg    MCHC 33.0 32.0 - 36.0 g/dL    RDW 14.1 11.5 - 14.5 %    Platelets 281 150 - 450 x10*3/uL   SST TOP   Result Value Ref Range    Extra Tube Hold for add-ons.    POCT GLUCOSE   Result Value Ref Range    POCT Glucose 107 (H) 74 - 99 mg/dL      Lab Results   Component Value Date    HGBA1C 9.8 (H) 09/13/2024      Lab Results   Component Value Date    CRP 9.51 (H) 09/13/2024      Lab Results   Component Value Date    SEDRATE 24 (H) 09/13/2024        Results from last 7 days   Lab Units 09/25/24  0502   WBC AUTO x10*3/uL 9.3   RBC AUTO x10*6/uL 3.28*   HEMOGLOBIN g/dL 9.6*   HEMATOCRIT % 29.1*     Results from last 7 days   Lab Units 09/24/24  0505   SODIUM mmol/L 136   POTASSIUM mmol/L 3.8   CHLORIDE mmol/L 102   CO2 mmol/L 26   BUN mg/dL 11   CREATININE mg/dL 0.60   CALCIUM mg/dL 8.3*             IMAGING REVIEW:  Vascular US Ankle Brachial Index (BEN) Without Exercise    Result Date: 9/16/2024             [place] [address]   Tel [phone] and Fax [phone]  Vascular Lab Report VASC US PVR WITHOUT EXERCISE  Patient Name:     SUBHASH Cain Physician: [id number] Sven Caicedo MD Study Date:       9/16/2024          Ordering            [id number] [address]ANTOS                                      Physician: MRN/PID:          [id number]           Technologist:      Tamra Walls RVT Accession#:       [id number]       Technologist 2:    Graciela Ibarraer RVT Date of           1950 / 74      Encounter#:        [id number] Birth/Age:        years Gender:           M Admission Status: Inpatient          Location           [facility]                                      Performed:  Diagnosis/ICD: Peripheral vascular disease, unspecified-I73.9 CPT Codes:     38757 Peripheral artery BEN Only  CONCLUSIONS: Right Lower PVR: No evidence of arterial occlusive disease in the right lower extremity at rest. Decreased digital perfusion noted. Multiphasic flow is noted in the right common femoral artery, right posterior tibial artery and right dorsalis pedis artery. Left Lower PVR: No evidence of arterial occlusive disease in the left lower extremity at rest. Decreased digital perfusion noted. Monophasic flow is noted in the left posterior tibial artery and left dorsalis pedis artery. Multiphasic flow is noted in the left common femoral artery. Waveform may be dmapened due to edema.  Additional Findings: Known DVT in the left femoral and popliteal veins.  Imaging & Doppler Findings:  RIGHT Lower PVR                Pressures Ratios Right Posterior Tibial (Ankle) 155 mmHg  0.99 Right Dorsalis Pedis (Ankle)   155 mmHg  0.99 Right Digit (Great Toe)        66 mmHg   0.42   LEFT Lower PVR                Pressures Ratios Left Posterior Tibial (Ankle) 142 mmHg  0.91 Left Dorsalis Pedis (Ankle)   128 mmHg  0.82 Left Digit (Great Toe)        52 mmHg   0.33                     Right     Left Brachial Pressure 156 mmHg 150 mmHg   [id number] Sven Caicedo MD Electronically signed by [id number] Sven Caicedo MD on 9/16/2024 at 7:50:39 PM  ** Final **     CT angio chest for pulmonary embolism    Result Date: 9/14/2024  Interpreted By:  Enedina Umana, STUDY: CT ANGIO CHEST  FOR PULMONARY EMBOLISM;  9/14/2024 9:28 am   INDICATION: Signs/Symptoms:hypoxia, dvt.     COMPARISON: None.   ACCESSION NUMBER(S): [id number]   ORDERING CLINICIAN: ABIEL KENT   TECHNIQUE: CT of the chest was performed. Sagittal and coronal reconstructions were generated. 75 ML Omnipaque 350 intravenous contrast given for the examination.  Multiplanar reconstructions of the pulmonary vessels were created on an independent workstation and provided for review.   FINDINGS: Artifact related to motion and overlying upper extremities limits evaluation.   CHEST WALL AND LOWER NECK: No significant axillary lymphadenopathy.   MEDIASTINUM AND ROLO:  No significant lymphadenopathy.   HEART AND VESSELS:  No central PE however limited assessment for peripheral PE due to technical factors including suboptimal timing of IV contrast bolus and artifact. The heart is normal in size. No significant pericardial effusion. Multifocal atherosclerotic calcifications including the coronary arteries and aortic and mitral valve regions. No thoracic aortic aneurysm.   LUNGS, PLEURA, LARGE AIRWAYS:  Respiratory motion artifact limits parenchymal evaluation. Mild pulmonary heterogeneity. Faint linear densities in both lung fields. Possible mosaic attenuation in both lung bases. Trace bilateral pleural effusions/thickening. The central airways are patent.   UPPER ABDOMEN:  Limited due to artifact related to overlying upper extremities. The included liver is likely mildly hypodense/fatty infiltrated. No definite focal mass lesion in the included upper abdominal viscera.   BONES:  Flowing osteophytes along the thoracic spine consistent with dish.       No central PE however peripheral PE can not be excluded based on this exam.   Scattered interstitial opacities including possible mosaic attenuation in the lung bases which could reflect small airways disease and air trapping or atypical infection. Trace bilateral pleural effusions/thickening.    Moderate atherosclerotic disease.   Possible fatty infiltration of the liver incidentally noted.   MACRO: None.   Signed by: Enedina Umana 9/14/2024 10:56 AM Dictation workstation:   [id number]    Transthoracic Echo (TTE) Complete    Result Date: 9/13/2024   [facility], [address] [place] [address]              Tel [phone] and Fax [phone] TRANSTHORACIC ECHOCARDIOGRAM REPORT  Patient Name:      SUBHASH SEGAL     Reading Physician:    [id number] Carson Freeman MD Study Date:        9/13/2024            Ordering Provider:    [id number] ABIEL KENT MRN/PID:           [id number]             Fellow: Accession#:        [id number]         Nurse: Date of Birth/Age: 1950 / 74 years  Sonographer:          Cailin Julian RDCS Gender:            M                    Additional Staff: Height:            175.26 cm            Admit Date:           9/12/2024 Weight:            107.05 kg            Admission Status:     Inpatient -                                                               Routine BSA / BMI:         2.22 m2 / 34.85      Encounter#:           [id number]                    kg/m2 Blood Pressure:    137/66 mmHg          Department Location:  [facility] Study Type:    TRANSTHORACIC ECHO (TTE) COMPLETE Diagnosis/ICD: Abnormal electrocardiogram [ECG] [EKG]-R94.31 Indication:    Abnormal EKG CPT Code:      Echo Complete w Full Doppler-[id number] Patient History: Pertinent History: Edema, DM, abnomal EKG, wound infection, WMA. Study Detail: The following Echo studies were performed: 2D, M-Mode, Doppler and               color flow. Technically challenging study due to body habitus,               patient lying in supine position, prominent lung artifact and poor               acoustic  windows. Definity used as a contrast agent for               endocardial border definition. Total contrast used for this               procedure was 3.0 mL via IV push.  PHYSICIAN INTERPRETATION: Left Ventricle: The left ventricular systolic function is normal, with a visually estimated ejection fraction of 60-65%. There are no regional left ventricular wall motion abnormalities. The left ventricular cavity size is normal. Spectral Doppler shows an impaired relaxation pattern of left ventricular diastolic filling. Left Atrium: The left atrium is normal in size. Right Ventricle: The right ventricle is normal in size. There is normal right ventricular global systolic function. Right Atrium: The right atrium is normal in size. Aortic Valve: The aortic valve is trileaflet. There is moderate aortic valve cusp calcification. There is evidence of mild to moderate aortic valve stenosis. There is no evidence of aortic valve regurgitation. The peak instantaneous gradient of the aortic valve is 26.2 mmHg. Mitral Valve: The mitral valve is normal in structure. There is mild mitral valve regurgitation. Tricuspid Valve: The tricuspid valve is structurally normal. There is mild tricuspid regurgitation. Pulmonic Valve: The pulmonic valve is structurally normal. There is physiologic pulmonic valve regurgitation. Pericardium: There is no pericardial effusion noted. Aorta: The aortic root is normal. Pulmonary Artery: The estimated pulmonary artery pressure is normal. Systemic Veins: The inferior vena cava appears mildly dilated.  CONCLUSIONS:  1. The left ventricular systolic function is normal, with a visually estimated ejection fraction of 60-65%.  2. Spectral Doppler shows an impaired relaxation pattern of left ventricular diastolic filling.  3. There is normal right ventricular global systolic function.  4. Mild to moderate aortic valve stenosis.  5. There is moderate aortic valve cusp calcification.  6. The inferior vena cava  appears mildly dilated. QUANTITATIVE DATA SUMMARY:  2D MEASUREMENTS:         Normal Ranges: Ao Root d:       3.70 cm (2.0-3.7cm)  AORTA MEASUREMENTS:         Normal Ranges: Asc Ao, d:          3.90 cm (2.1-3.4cm)  LV SYSTOLIC FUNCTION BY 2D PLANIMETRY (MOD):                      Normal Ranges: EF-Visual:      63 % LV EF Reported: 63 %  LV DIASTOLIC FUNCTION:           Normal Ranges: MV Peak E:             1.05 m/s  (0.7-1.2 m/s) MV Peak A:             1.50 m/s  (0.42-0.7 m/s) E/A Ratio:             0.70      (1.0-2.2) MV e'                  0.073 m/s (>8.0) MV lateral e'          0.08 m/s MV medial e'           0.07 m/s E/e' Ratio:            14.40     (<8.0)  AORTIC VALVE:            Normal Ranges: AoV Vmax:      2.56 m/s  (<=1.7m/s) AoV Peak P.2 mmHg (<20mmHg) LVOT Max Jak:  1.25 m/s  (<=1.1m/s) LVOT VTI:      23.90 cm LVOT Diameter: 2.20 cm   (1.8-2.4cm) AoV Area,Vmax: 1.86 cm2  (2.5-4.5cm2)  RIGHT VENTRICLE: TAPSE: 21.1 mm RV s'  0.12 m/s  TRICUSPID VALVE/RVSP:         Normal Ranges: IVC Diam:             2.33 cm  PULMONIC VALVE:          Normal Ranges: PV Max Jak:     1.2 m/s  (0.6-0.9m/s) PV Max P.2 mmHg  [id number] Carson Freeman MD Electronically signed on 2024 at 7:18:35 PM  ** Final **     CT angio aorta and bilateral iliofemoral runoff w and or wo IV contrast    Result Date: 2024  Interpreted By:  Osorio Wong, STUDY: CT ANGIO AORTA AND BILATERAL ILIOFEMORAL RUNOFF W AND OR WO IV CONTRAST;  2024 12:07 pm   INDICATION: Signs/Symptoms:evaluation for circulation and wound.   COMPARISON: None.   ACCESSION NUMBER(S): [id number]   ORDERING CLINICIAN: ABIEL KENT   TECHNIQUE: CTA of the abdomen and pelvis, with runoff was performed.  Contiguous axial images were obtained at 3 mm slice thickness through the abdomen and pelvis, with runoff. 3D Coronal and sagittal reconstructions at 3 mm slice thickness were performed. 75 ml of contrast material  Omnipaque 350 were  administered intravenously without immediate complication. FINDINGS:   CTA ABDOMEN VESSELS   Celiac axis: No significant stenosis.   SMA: Partially calcified disease at its origin results in moderate to severe stenosis.   RAUL: No significant stenosis.   Right renal vessels: Calcified disease of its proximal segment results in moderate stenosis.   Left renal vessels: No significant stenosis.   Infrarenal aorta: Contains partially calcified disease without significant stenosis or aneurysmal dilation.   CTA  PELVIC VESSELS R. Common iliac artery: No significant stenosis.   R. External iliac artery: No significant stenosis.   R. Internal iliac artery: No significant stenosis.   L. Common iliac artery: No significant stenosis.   L. External iliac artery: No significant stenosis.   L. Internal iliac artery: Partially calcified disease results in mild to moderate stenosis.   CTA RIGHT LOWER EXTREMITY R. Common femoral artery: No significant stenosis.   R. Profunda femoris artery: No significant stenosis.   R. SFA: Partially calcified disease results in mild stenosis.   R. Popliteal artery: Partially calcified disease results in moderate stenosis of the above the knee segment. Predominantly calcified disease of the below-the-knee segment results in mild-to-moderate stenosis.   R. Anterior tibial artery: Occluded at its origin.   R. Tibioperoneal trunk: Partially calcified disease results in mild-to-moderate stenosis.   R. Posterior tibial artery: Occluded at its origin.   R. Peroneal artery: Contains scattered calcific disease of its mid to distal segment resulting in mild-to-moderate stenosis.   R. Dorsalis pedis artery: No significant stenosis.   R. Plantar artery: Not visualized.   CTA LEFT LOWER EXTREMITY L. Common femoral artery: No significant stenosis.   L. Profunda femoris artery: No significant stenosis.   L. SFA: Contains partially calcified disease predominantly in its mid to distal segment. This results in focal  severe stenosis across Gaetano's canal but no significant stenosis upstream.   L. Popliteal artery: Contains partially calcified atherosclerotic disease without significant stenosis.   L. Anterior tibial artery: Occluded shortly beyond its origin.   L. Tibioperoneal trunk: Contains partially calcified disease resulting in moderate to severe stenosis.   L. Posterior tibial artery: Occluded at its origin, there is distal reconstitution across its middle 3rd as a diseased vessel containing scattered calcific disease resulting in mild stenosis. This vessel is seen to cross the ankle joint and supply the plantar artery.   L. Peroneal artery: Occluded at its origin, there is distal reconstitution at its middle 3rd as a small caliber vessel, supplying distal collaterals to the foot.   L. Dorsalis pedis artery: Not visualized   L. Plantar artery: No significant stenosis.   NON-VASCULAR FINDINGS   Visualized portions of the heart: Main pulmonary artery is enlarged, which can be seen in the setting of pulmonary arterial hypertension. The heart is not enlarged. There is coronary artery calcifications present in the distribution of all 3 vessels. There is no pericardial effusion.   Lungs: There is bilateral dependent atelectasis.   Hepatobiliary: Unremarkable liver without biliary dilation evident   Pancreas: Unremarkable   Spleen: Unremarkable   Adrenal Glands: Unremarkable   Kidneys, ureters, and bladder: There are bilateral parapelvic renal cysts and bilateral parenchymal cysts. There are bilateral extrarenal pelvises. There is no urolithiasis or hydroureteronephrosis. The urinary bladder demonstrates circumferential wall thickening which may be related to incomplete distention or cystitis.   GI tract: There is a 2.6 cm diverticulum off the gastric fundus. No evidence of obstruction. There is colonic diverticulosis without diverticulitis. The appendix is within normal limits.   Peritoneum and retroperitoneum: No free fluid or  free air is noted.   Lymph Nodes: There are mildly prominent pelvic lymph nodes, left-greater-than-right. These are likely reactive.   Reproductive Organs: The prostate gland is enlarged.   Visualized musculoskeletal structures: There are degenerative changes of the spine. Spurring of the bilateral sacroiliac joints is noted. There is osteoarthritic changes of the bilateral hips. There is diffuse edema and skin thickening involving the left leg. There are prominent venous varicosities of the left leg. There is osteopenia involving the left foot. Bony destruction at the left 5th metatarsophalangeal joint is noted with soft tissue gas surrounding this region. There appears to be an open wound in the plantar aspect of the foot centered at the 5th MTP joint. Curvilinear hyperdensity within the soft tissues may reflect small bony fragments or foreign bodies. No acute fracture or destructive osseous lesion is identified       1. Diffuse partially calcified atherosclerotic disease, as detailed above. There is a single vessel runoff in the right leg. In the left leg, there is high-grade stenosis of the distal SFA as it crosses Gaetano's canal. Additionally, there is occlusive disease of the proximal trifurcation with reconstitution of the left posterior tibial and peroneal arteries distally.   2. Significant soft tissue swelling of the left leg with prominent venous varicosities. There is also soft tissue swelling of the left foot with an open wound along the plantar aspect of the foot centered at the 5th MTP joint, which demonstrates adjacent bony destruction. Curvilinear hyperdensity in this region may reflect bony fragments or foreign bodies.   3. Coronary artery calcifications.   Signed by: Osorio Wong 9/13/2024 2:09 PM Dictation workstation:   [id number]    Lower extremity venous duplex left    Result Date: 9/12/2024  STUDY: Left Lower Extremity Venous Doppler Ultrasound; 9/12/2024 4:43 PM INDICATION: Swelling.  COMPARISON: US LE Venous 3/21/2022. ACCESSION NUMBER(S): [id number] ORDERING CLINICIAN: AKTIE HILTON TECHNIQUE:  Real-time grayscale imaging, color Doppler flow imaging, and spectral Doppler imaging of the left lower extremity veins was performed. FINDINGS: There is acute occlusive DVT demonstrated within the left mid-distal femoral and popliteal veins. The left common femoral and profunda femoral veins demonstrated normal compressibility, normal phasic venous flow and normal response to augmentation.  There is no evidence for echogenic thrombi.  The deep calf veins are not well visualized due to subcutaneous edema. The contralateral common femoral vein is free of thrombosis.    Evidence for acute occlusive DVT within the left mid-distal femoral and popliteal veins. Compared to prior ultrasound, this represents a new finding. The positive findings on this exam were discussed with and acknowledged by Dr. Katie Hilton abdomen interpretation, 5:00 PM September 12, 2024 Signed by Jason Tobar MD    XR foot left 3+ views    Result Date: 9/12/2024  STUDY: Foot Radiographs; 9/12/2024 4:19 PM INDICATION: Left foot pain. COMPARISON: None Available. ACCESSION NUMBER(S): [id number] ORDERING CLINICIAN: KATIE HILTON TECHNIQUE:  Three view(s) of the left foot (four images). FINDINGS:  There is no displaced fracture.  The alignment is anatomic.  Soft tissue swelling and ulceration noted lateral to the fifth MTP joint associated with demineralization of the fifth proximal phalanx and the distal head of the fifth metatarsal.  Bony changes consistent with osteomyelitis.    Osteomyelitis of the fifth metatarsal head and proximal phalanx of the fifth toe. Signed by Td Francisco MD            Assessment/Plan   ASSESSMENT & PLAN:    #S/P Left 5th Toe Amputation and Partial 5th Ray Resection POD 2   #Left foot necrotic ulcer in setting of lower extremity cellulitis  #Osteomyelitis  #DM2     - Patient was seen and evaluated;  all findings were discussed and all questions were answered to patient's satisfaction.  - Charts, labs, vitals and imaging all reviewed.   - Imaging: xray- Osteomyelitis of the fifth metatarsal head and proximal phalanx of the fifth toe.  - MRI canceled  - Labs: WBC WNL  - PVRs: Decreased digital perfusion noted. Monophasic flow is noted in the left posterior tibial artery and left dorsalis pedis artery.  - Lower extremity Duplex- Evidence for acute occlusive DVT within the left mid-distal femoral and popliteal veins.   - Wound culture: NGTD, Preliminary     Plan:  - Abx: Per ID, IV Unasyn  - Dressing left intact at this time.   - Patient scheduled for surgery today as an add on, however due to OR availability, patient may need to be added on tomorrow. Awaiting confirmation from OR at this time, will wait until 1500, if no availability, will perform Delayed Primary Closure tomorrow.  - Nursing staff is able to change/reinforce dressing if & as necessary until next day’s dressing change. Thank you.  - Podiatry will continue to follow while in house.     Case to be discussed with attending, A&P above reflects a tentative plan. Please await for the final signature from the attending physician on service.     Marcus Kwok DPM PGY-3  Podiatric Medicine & Surgery

## 2024-09-25 NOTE — PROGRESS NOTES
Physical Therapy                 Therapy Communication Note    Patient Name: Elliot Partida  MRN: [id number]  Department: [place] [address]  Room: [address] [place] [address]  Today's Date: 9/25/2024     Discipline: Physical Therapy    Missed Visit Reason: Missed Visit Reason:  (Pt has plans to go to OR for wound closure today. Will defer PT eval at this time. Nurse aware.)    Missed Time: Hold

## 2024-09-25 NOTE — CARE PLAN
Problem: Pain - Adult  Goal: Verbalizes/displays adequate comfort level or baseline comfort level  Outcome: Progressing     Problem: Safety - Adult  Goal: Free from fall injury  Outcome: Progressing     Problem: Chronic Conditions and Co-morbidities  Goal: Patient's chronic conditions and co-morbidity symptoms are monitored and maintained or improved  Outcome: Progressing     Problem: Discharge Planning  Goal: Discharge to home or other facility with appropriate resources  Outcome: Progressing       The clinical goals for the shift include Maintain pt safety/comfort; monitor labs/vitals; monitor for bleeding/post-op care

## 2024-09-25 NOTE — PROGRESS NOTES
"  INFECTIOUS DISEASE DAILY PROGRESS NOTE    SUBJECTIVE:    Had bleeding from surgical site - podiatry achieved hemostasis at bedside. Plans for OR today for relayed closure.    OBJECTIVE:  VITALS (Last 24 Hours)  /78   Pulse 74   Temp 36.9 °C (98.5 °F)   Resp 18   Ht 1.753 m (5' 9\")   Wt 102 kg (225 lb 8.5 oz)   SpO2 95%   BMI 33.31 kg/m²     PHYSICAL EXAM:  Gen - NAD  Abd - soft, no ttp, BS present  Left Foot - surgical dressings    ABX: IV Unasyn    LABS:  Lab Results   Component Value Date    WBC 9.3 09/25/2024    HGB 9.6 (L) 09/25/2024    HCT 29.1 (L) 09/25/2024    MCV 89 09/25/2024     09/25/2024     Lab Results   Component Value Date    GLUCOSE 93 09/24/2024    CALCIUM 8.3 (L) 09/24/2024     09/24/2024    K 3.8 09/24/2024    CO2 26 09/24/2024     09/24/2024    BUN 11 09/24/2024    CREATININE 0.60 09/24/2024         Estimated Creatinine Clearance: 125 mL/min (by C-G formula based on SCr of 0.6 mg/dL).      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  PAD - s/p angiogram 9/18 with re-vasc  DM II with Peripheral Neuropathy - poorly controlled A1C 9.8% 9/2024, increased risk/severity of infections    IV Unasyn 3g Q6H. Closure today. Anticipating PO abx unless there is concern for residual OM still. Will follow up on podiatry findings.    Monitoring for adverse effects of abx such as rash/itching/diarrhea - none thus far.    Will follow. Thanks!    Pantera Boston MD  [facility]  Office #[phone]      "

## 2024-09-25 NOTE — CONSULTS
"Nutrition Assessment Note  Nutrition Assessment      Reason for Assessment  Reason for Assessment: Admission nursing screening  Admitted with osteomyelitis L foot.  MST score of 0, but referred for wounds.  L foot wounds noted diabetic vs. PI.  Visited patient during lunch, he was NPO for surgery.  Noted he has not been hungry or interested in eating.  Is unsure if he will take ONS, but willing to try.  Is at high risk for additional malnutrition if continues with inadequate oral intake.    History:  Food and Nutrient History  Energy Intake: Poor < 50 %  Food and Nutrient History: Patient reports decreased appetite for >3 months. Reports not interest in eating at this time.  9/13 was noted to refuse meals at outside facility.  Not sure he likes ONS.    Diagnosis   Wound infection   Osteomyelitis of left foot, unspecified type (Multi)   Acute deep vein thrombosis (DVT) of popliteal vein of left lower extremity (Multi)   Bilateral lower extremity edema   Type 2 diabetes mellitus with diabetic peripheral angiopathy without gangrene, without long-term current use of insulin (Multi)   CAD (coronary artery disease)   Hypertension   MI (myocardial infarction) (Multi)   Neuritis   History of stroke   Weakness of extremity   Ambulatory dysfunction   Chronic bilateral low back pain with left-sided sciatica   Diabetic peripheral neuropathy (Multi)   Neural foraminal stenosis of lumbosacral spine   Hyperlipidemia   Lumbar back pain with radiculopathy affecting left lower extremity   Peripheral arterial disease (CMS-HCC)   Sprain of right knee   Type 2 diabetes mellitus (Multi)   Osteomyelitis (Multi)   Anemia   CVA (cerebral vascular accident) (Multi)   Obesity   Chronic obstructive pulmonary disease (Multi)   Delayed surgical wound healing of foot amputation stump (Multi)     Anthropometrics:  Height: 175.3 cm (5' 9.02\")  Weight: 102 kg (225 lb 8.5 oz)  BMI (Calculated): 33.29  Weight Change  Weight History / % Weight Change: " No weight history available in chart. Unable to determine if any significant weight loss present.  IBW/kg (Dietitian Calculated): 72.7 kg   Amputation Calculations:  BMI Amputation Adjustment: No    Energy Needs:  Estimated Energy Needs  Method for Estimating Needs: 4943-1317 @ 23-25 kcal/kg    Estimated Protein Needs  Method for Estimating Needs: 102-116 @ 1.4-1.6 gr/kg IBW    Estimated Fluid Needs  Total Fluid Estimated Needs (mL): 1820 mL  Total Fluid Estimated Needs (mL/kg): 25 mL/kg     Dietary Orders (From admission, onward)  Adult diet Regular   Oral nutritional supplements    Select supplement: Gelatein Plus with dinner  Select supplement: Nico BID  Select supplement: Glucerna Shake with lunch    Nutrition Focused Physical Findings:  Subcutaneous Fat Loss  Orbital Fat Pads: Mild-Moderate (slight dark circles and slight hollowing)  Buccal Fat Pads: Defer    Muscle Wasting  Temporalis: Mild-Moderate (slight depression)  Interosseous: Mild-Moderate (slightly depressed area between thumb and forefinger)    Edema  Edema Location: BLE-general charted     Results for 09/14/24 (from the past 24 hour(s))  POCT GLUCOSE  Result Value Ref Range   POCT Glucose 129 (H) 74 - 99 mg/dL  CBC  Result Value Ref Range   WBC 9.3 4.4 - 11.3 x10*3/uL   nRBC 0.0 0.0 - 0.0 /100 WBCs   RBC 3.28 (L) 4.50 - 5.90 x10*6/uL   Hemoglobin 9.6 (L) 13.5 - 17.5 g/dL   Hematocrit 29.1 (L) 41.0 - 52.0 %   MCV 89 80 - 100 fL   MCH 29.3 26.0 - 34.0 pg   MCHC 33.0 32.0 - 36.0 g/dL   RDW 14.1 11.5 - 14.5 %   Platelets 281 150 - 450 x10*3/uL  POCT GLUCOSE  Result Value Ref Range   POCT Glucose 107 (H) 74 - 99 mg/dL  POCT GLUCOSE  Result Value Ref Range   POCT Glucose 109 (H) 74 - 99 mg/dL     Intake/Output Summary (Last 24 hours) at 9/25/2024 1611  Last data filed at 9/25/2024 0649  Gross per 24 hour  Intake 100 ml  Output 300 ml  Net -200 ml      Nutrition Diagnosis   Malnutrition Diagnosis  Diagnosis Status: New  Malnutrition Diagnosis: Moderate  malnutrition related to acute disease or injury  As Evidenced by: intake <75% of estimated needs for > 7days, mild muscle loss    Patient has Nutrition Diagnosis: Yes  Nutrition Diagnosis 1: Increased nutrient needs  Diagnosis Status (1): New  Related to (1): increased metabloic demand  As Evidenced by (1): surgical wound       Nutrition Diagnosis 2: Inadequate protein energy intake  Diagnosis Status (2): New  Related to (2): anorexia  As Evidenced by (2): intake <50% meals for > 7 days.     Nutrition Interventions/Recommendations   Nutrition Prescription  Individualized Nutrition Prescription Provided for : Recommend a consistant CHO, 2-3 gr sodium diet.  Trial Gelatein daily. Glucerna shake daily, if intake remains <50% and like Glucerna, increase to TID.  Nico BID. MVI daily.  MD to manage IVF as needed.    Food and/or Nutrient Delivery Interventions  Meals and Snacks: Carbohydrate-modified diet, Mineral-modified diet  Goal: intake meets >75% estimated nutrient needs.   Medical Food Supplement: Commercial beverage, Commercial food, Purpose of medical food  Goal: meet >75% increased nutrient needs.     Nutrition Monitoring and Evaluation   Food and Nutrient Related History   Amount of Food: Estimated amout of food  Criteria: intake >75% of meals    Anthropometrics: Body Composition/Growth/Weight History  Weight: Measured weight  Criteria: daily  Weight Change: Weight change percentage  Criteria: weekly    Biochemical Data, Medical Tests and Procedures  Electrolyte and Renal Panel: BUN, Calcium, serum, Chloride, Creatinine, Magnesium, Phosphorus, Potassium, Sodium  Criteria: as indicated  Glucose/Endocrine Profile: Glucose, casual, Hemoglobin A1c (HgbA1c)  Criteria: as indicated  Nutritional Anemia Profile: Folate, serum, Hematocrit, Hemoglobin, Iron, serum  Criteria: as indicated  Vitamin Profile: Vitamin C, plasma or serum, Vitamin D, 25 hydroxy, Other (Comment)  Criteria: as indicated    Nutrition Focused  Physical Findings   Digestive System: Constipation, Diarrhea, Early satiety, Nausea, Decrease in appetite, Increased appetite  Criteria: daily  Skin: Impaired wound healing  Other: Evaluate nutrition intervention as compared to nutrition goal(s)    Follow Up  Last Date of Nutrition Visit: 09/25/24  Nutrition Follow-Up Needed?: Dietitian to reassess per policy  Follow up Comment: mod mal-TR

## 2024-09-25 NOTE — PROGRESS NOTES
Occupational Therapy                 Therapy Communication Note    Patient Name: Elliot Partida  MRN: [id number]  Department: [place] [address]  Room: [address] [place] [address]  Today's Date: 9/25/2024     Discipline: Occupational Therapy    Missed Visit Reason: Missed Visit Reason:  (Plans for pt to go to OR for wound closure today. Will hold OT at this time. Nursing aware)    Missed Time: Attempt    Comment: Hold

## 2024-09-25 NOTE — PROGRESS NOTES
INTERNAL MEDICINE PROGRESS NOTE      HPI:    Resting comfortably in bed, no further complaints of bleeding.  Pain well-controlled.    Vital signs in last 24 hours:  Temp:  [36.8 °C (98.2 °F)-37.3 °C (99.1 °F)] 36.9 °C (98.5 °F)  Heart Rate:  [63-88] 74  Resp:  [9-28] 18  BP: (122-178)/(42-78) 148/78    Physical Examination:  Physical Exam    Constitutional:       Appearance: Elderly, overweight, in no distress  HENT:      Head: Normocephalic and atraumatic.   Eyes:      Extraocular Movements: Extraocular movements intact.      Pupils: Pupils are equal, round, and reactive to light.   Cardiovascular:      Rate and Rhythm: Normal rate and regular rhythm.      Pulses: Normal pulses.      Heart sounds: Normal heart sounds.   Pulmonary:      Effort: Pulmonary effort is normal.      Breath sounds: Normal breath sounds.   Abdominal:      General: Abdomen is flat. Bowel sounds are normal.      Palpations: Abdomen is soft.   Musculoskeletal:         General: Normal range of motion.      Cervical back: Normal range of motion and neck supple.   Skin:     General: Dressing D/I to foot.   Neurological:      General: No focal deficit present.      Mental Status: He is alert and oriented to person, place, and time. Mental status is at baseline.        Medications:    Current Facility-Administered Medications:     acetaminophen (Tylenol) tablet 650 mg, 650 mg, oral, q4h PRN, 650 mg at 09/24/24 8845 **OR** acetaminophen (Tylenol) oral liquid 650 mg, 650 mg, oral, q4h PRN **OR** acetaminophen (Tylenol) suppository 650 mg, 650 mg, rectal, q4h PRN, Marcus Kwok, DPM    ammonium lactate (Lac-Hydrin) 12 % lotion, , Topical, BID, Marcus Kwok, DPM, Given at 09/24/24 0937    ampicillin-sulbactam (Unasyn) in sodium chloride 0.9 % 100 mL 3 g, 3 g, intravenous, q6h, Marcus Kwok DPM, Stopped at 09/25/24 0059    aspirin chewable tablet 81 mg, 81 mg, oral, Daily, Marcus Kwok DPBRAYAN, 81 mg at 09/24/24  0937    clopidogrel (Plavix) tablet 600 mg, 600 mg, oral, Once, Marcus Fazal-Issac, DPM    clopidogrel (Plavix) tablet 75 mg, 75 mg, oral, Daily, Marcus Fazal-Issac, DPM, 75 mg at 09/24/24 0936    dextrose 50 % injection 12.5 g, 12.5 g, intravenous, q15 min PRN, Marcus Fazal-Issac, DPM    dextrose 50 % injection 25 g, 25 g, intravenous, q15 min PRN, Marcus Fazal-Issac, DPM    glucagon (Glucagen) injection 1 mg, 1 mg, intramuscular, q15 min PRN, Marcus Fazal-Issac, DPM    glucagon (Glucagen) injection 1 mg, 1 mg, intramuscular, q15 min PRN, Marcus Fazal-Issac, DPM    heparin 25,000 Units in dextrose 5% 250 mL (100 Units/mL) infusion (premix), 0-4,500 Units/hr, intravenous, Continuous, Marcus Fazal-Issac, DPM, Stopped at 09/24/24 1433    heparin bolus from bag 3,000-6,000 Units, 3,000-6,000 Units, intravenous, q4h PRN, Marcus Fazal-Issac, DPM    HYDROmorphone (Dilaudid) injection 1 mg, 1 mg, intravenous, q4h PRN, Jemal Guadarrama MD, 1 mg at 09/24/24 2214    insulin lispro (HumaLOG) injection 0-10 Units, 0-10 Units, subcutaneous, TID, Marcus Fazal-Issac, DPM, 2 Units at 09/22/24 1652    lidocaine (LMX) 4 % cream, , Topical, 4x daily PRN, Marcus Fazal-Issac, DPM, Given at 09/21/24 2107    lidocaine-epinephrine (Xylocaine W/EPI) 0.5 %-1:200,000 injection 20 mL, 20 mL, subcutaneous, Once, Elliot Phelan MD    lidocaine-epinephrine (Xylocaine W/EPI) 1 %-1:100,000 injection 20 mL, 20 mL, injection, Once, Julien Gan PA-C    morphine injection 2 mg, 2 mg, intravenous, q4h PRN, Jemal Guadarrama MD, 2 mg at 09/24/24 1658    oxygen (O2) therapy, , inhalation, Continuous - 02/gases, Marcus Fazal-Issac, DPM    polyethylene glycol (Glycolax, Miralax) packet 17 g, 17 g, oral, Daily, Marcus Fazal-Issac, DPM, 17 g at 09/24/24 0936    potassium chloride CR (Klor-Con M20) ER tablet 40 mEq, 40 mEq, oral, Daily, Marcus Fazal-Issac, DPM, 40 mEq at 09/24/24 0937    Laboratory Findings:  Lab Results    Component Value Date    WBC 9.0 09/24/2024    HGB 10.6 (L) 09/24/2024    HCT 32.6 (L) 09/24/2024    MCV 88 09/24/2024     09/24/2024     Lab Results   Component Value Date    INR 1.0 12/09/2019    PROTIME 11.4 12/09/2019     Lab Results   Component Value Date    GLUCOSE 93 09/24/2024    CALCIUM 8.3 (L) 09/24/2024     09/24/2024    K 3.8 09/24/2024    CO2 26 09/24/2024     09/24/2024    BUN 11 09/24/2024    CREATININE 0.60 09/24/2024       Assessment and Plan:     Acute osteomyelitis -continue IV antibiotics, s/p sx intervention. ID following, atbs per ID recs.   Peripheral vascular disease -now with noted bleeding in the last 24 hours, plan is for back to OR for closure today.  Monitor CBC closely.    Acute DVT -blood thinners on hold due to the above.  Diabetes type 2 -good control with no medications, monitor.            SARAH Darling-CNP  09/25/24  1230pm      Patient seen and examined with Sharon Jacques CNP. Labs were reviewed. Agree with above.     Jemal Guadarrama MD

## 2024-09-25 NOTE — PROGRESS NOTES
09/25/24 1504   Discharge Planning   Assistance Needed Per [facility] request, SW met with pt at bedside to ask if pt would like Care Transitions to find him a PCP. Pt declined stating that his niece would probably do this for him.

## 2024-09-25 NOTE — PROGRESS NOTES
09/25/24 1040   Discharge Planning   Home or Post Acute Services None   Expected Discharge Disposition Home     PLAN/BARRIER: delayed surgical clearance today  DISP: home  ADOD: 1-3 days  Susie Sims RN     9/25/24 @ 1409  Patient's surgery has been postponed until Friday, September 27th due to OR availability.  Susie Sims RN

## 2024-09-25 NOTE — SIGNIFICANT EVENT
Podiatry updated plan:    Plan was for OR today for delayed closure of left foot wound. Unfortunately, was informed at 1:30 today there was no OR time today or tomorrow but that there should be time available for Friday. NPO for today canceled, patient can have diet today, Case request and NPO orders placed for Friday 9/27.     Will see tomorrow.    Courtney Huynh DPM

## 2024-09-25 NOTE — CARE PLAN
The patient's goals for the shift include      The clinical goals for the shift include patient to go to OR today    Over this shift patient did not go to OR, will plan for OR on Friday. Patients pain was well managed and he remained comfortable this shift.

## 2024-09-26 LAB
ANION GAP SERPL CALC-SCNC: 11 MMOL/L (ref 10–20)
BACTERIA SPEC CULT: NORMAL
BACTERIA SPEC CULT: NORMAL
BUN SERPL-MCNC: 19 MG/DL (ref 6–23)
CALCIUM SERPL-MCNC: 8.3 MG/DL (ref 8.6–10.3)
CHLORIDE SERPL-SCNC: 100 MMOL/L (ref 98–107)
CO2 SERPL-SCNC: 28 MMOL/L (ref 21–32)
CREAT SERPL-MCNC: 0.63 MG/DL (ref 0.5–1.3)
EGFRCR SERPLBLD CKD-EPI 2021: >90 ML/MIN/1.73M*2
GLUCOSE BLD MANUAL STRIP-MCNC: 108 MG/DL (ref 74–99)
GLUCOSE BLD MANUAL STRIP-MCNC: 113 MG/DL (ref 74–99)
GLUCOSE BLD MANUAL STRIP-MCNC: 117 MG/DL (ref 74–99)
GLUCOSE BLD MANUAL STRIP-MCNC: 120 MG/DL (ref 74–99)
GLUCOSE SERPL-MCNC: 134 MG/DL (ref 74–99)
GRAM STN SPEC: NORMAL
HOLD SPECIMEN: NORMAL
POTASSIUM SERPL-SCNC: 4.2 MMOL/L (ref 3.5–5.3)
SODIUM SERPL-SCNC: 135 MMOL/L (ref 136–145)

## 2024-09-26 PROCEDURE — 2500000004 HC RX 250 GENERAL PHARMACY W/ HCPCS (ALT 636 FOR OP/ED)

## 2024-09-26 PROCEDURE — 2500000002 HC RX 250 W HCPCS SELF ADMINISTERED DRUGS (ALT 637 FOR MEDICARE OP, ALT 636 FOR OP/ED)

## 2024-09-26 PROCEDURE — 2500000004 HC RX 250 GENERAL PHARMACY W/ HCPCS (ALT 636 FOR OP/ED): Performed by: INTERNAL MEDICINE

## 2024-09-26 PROCEDURE — 2500000001 HC RX 250 WO HCPCS SELF ADMINISTERED DRUGS (ALT 637 FOR MEDICARE OP)

## 2024-09-26 PROCEDURE — 1200000002 HC GENERAL ROOM WITH TELEMETRY DAILY

## 2024-09-26 PROCEDURE — 36415 COLL VENOUS BLD VENIPUNCTURE: CPT | Performed by: NURSE PRACTITIONER

## 2024-09-26 PROCEDURE — 80048 BASIC METABOLIC PNL TOTAL CA: CPT | Performed by: NURSE PRACTITIONER

## 2024-09-26 PROCEDURE — 82947 ASSAY GLUCOSE BLOOD QUANT: CPT

## 2024-09-26 RX ORDER — ONDANSETRON HYDROCHLORIDE 2 MG/ML
4 INJECTION, SOLUTION INTRAVENOUS EVERY 4 HOURS PRN
Status: DISCONTINUED | OUTPATIENT
Start: 2024-09-26 | End: 2024-10-02 | Stop reason: HOSPADM

## 2024-09-26 ASSESSMENT — PAIN SCALES - GENERAL
PAINLEVEL_OUTOF10: 7
PAINLEVEL_OUTOF10: 4
PAINLEVEL_OUTOF10: 7
PAINLEVEL_OUTOF10: 7
PAINLEVEL_OUTOF10: 5 - MODERATE PAIN
PAINLEVEL_OUTOF10: 0 - NO PAIN
PAINLEVEL_OUTOF10: 6
PAINLEVEL_OUTOF10: 8

## 2024-09-26 ASSESSMENT — COGNITIVE AND FUNCTIONAL STATUS - GENERAL
DRESSING REGULAR LOWER BODY CLOTHING: A LOT
STANDING UP FROM CHAIR USING ARMS: A LOT
DRESSING REGULAR UPPER BODY CLOTHING: A LITTLE
DAILY ACTIVITIY SCORE: 19
MOVING FROM LYING ON BACK TO SITTING ON SIDE OF FLAT BED WITH BEDRAILS: A LITTLE
TOILETING: A LITTLE
STANDING UP FROM CHAIR USING ARMS: A LOT
TURNING FROM BACK TO SIDE WHILE IN FLAT BAD: A LOT
TURNING FROM BACK TO SIDE WHILE IN FLAT BAD: A LOT
HELP NEEDED FOR BATHING: A LITTLE
DRESSING REGULAR LOWER BODY CLOTHING: A LOT
TOILETING: A LITTLE
DRESSING REGULAR LOWER BODY CLOTHING: A LOT
DRESSING REGULAR UPPER BODY CLOTHING: A LITTLE
CLIMB 3 TO 5 STEPS WITH RAILING: A LOT
HELP NEEDED FOR BATHING: A LITTLE
WALKING IN HOSPITAL ROOM: A LOT
WALKING IN HOSPITAL ROOM: A LOT
STANDING UP FROM CHAIR USING ARMS: A LOT
MOVING FROM LYING ON BACK TO SITTING ON SIDE OF FLAT BED WITH BEDRAILS: A LITTLE
DAILY ACTIVITIY SCORE: 19
MOVING TO AND FROM BED TO CHAIR: A LOT
WALKING IN HOSPITAL ROOM: A LOT
HELP NEEDED FOR BATHING: A LITTLE
DRESSING REGULAR UPPER BODY CLOTHING: A LITTLE
MOVING FROM LYING ON BACK TO SITTING ON SIDE OF FLAT BED WITH BEDRAILS: A LITTLE
CLIMB 3 TO 5 STEPS WITH RAILING: A LOT
MOBILITY SCORE: 13
MOBILITY SCORE: 13
CLIMB 3 TO 5 STEPS WITH RAILING: A LOT
MOVING TO AND FROM BED TO CHAIR: A LOT
MOVING TO AND FROM BED TO CHAIR: A LOT
MOBILITY SCORE: 13
TURNING FROM BACK TO SIDE WHILE IN FLAT BAD: A LOT
DAILY ACTIVITIY SCORE: 19
TOILETING: A LITTLE

## 2024-09-26 ASSESSMENT — PAIN DESCRIPTION - LOCATION
LOCATION: LEG

## 2024-09-26 ASSESSMENT — PAIN - FUNCTIONAL ASSESSMENT
PAIN_FUNCTIONAL_ASSESSMENT: 0-10

## 2024-09-26 ASSESSMENT — PAIN DESCRIPTION - ORIENTATION
ORIENTATION: LEFT

## 2024-09-26 NOTE — PROGRESS NOTES
INTERNAL MEDICINE PROGRESS NOTE      HPI:    Lying in bed, feels okay.     Vital signs in last 24 hours:  Temp:  [36.1 °C (97 °F)-37.4 °C (99.3 °F)] 36.9 °C (98.5 °F)  Heart Rate:  [75-77] 76  Resp:  [17-18] 18  BP: (137-166)/(48-73) 150/63    Physical Examination:  Physical Exam    Constitutional:       Appearance: Elderly, overweight, in no distress  HENT:      Head: Normocephalic and atraumatic.   Eyes:      Extraocular Movements: Extraocular movements intact.      Pupils: Pupils are equal, round, and reactive to light.   Cardiovascular:      Rate and Rhythm: Normal rate and regular rhythm.      Pulses: Normal pulses.      Heart sounds: Normal heart sounds.   Pulmonary:      Effort: Pulmonary effort is normal.      Breath sounds: Normal breath sounds.   Abdominal:      General: Abdomen is flat. Bowel sounds are normal.      Palpations: Abdomen is soft.   Musculoskeletal:         General: Normal range of motion.      Cervical back: Normal range of motion and neck supple.   Skin:     General: Dressing D/I to foot.   Neurological:      General: No focal deficit present.      Mental Status: He is alert and oriented to person, place, and time. Mental status is at baseline.        Medications:    Current Facility-Administered Medications:     acetaminophen (Tylenol) tablet 650 mg, 650 mg, oral, q4h PRN, 650 mg at 09/24/24 1755 **OR** acetaminophen (Tylenol) oral liquid 650 mg, 650 mg, oral, q4h PRN **OR** acetaminophen (Tylenol) suppository 650 mg, 650 mg, rectal, q4h PRN, Marcus Fazal-Issac, DPM    ammonium lactate (Lac-Hydrin) 12 % lotion, , Topical, BID, Marcus Fazal-Issac, DPM, Given at 09/26/24 1004    ampicillin-sulbactam (Unasyn) in sodium chloride 0.9 % 100 mL 3 g, 3 g, intravenous, q6h, Marcus Fazal-Issac, DPM, Last Rate: 200 mL/hr at 09/26/24 1317, 3 g at 09/26/24 1317    aspirin chewable tablet 81 mg, 81 mg, oral, Daily, Marcus Diehl-Issac, DPM, 81 mg at 09/26/24 0844    clopidogrel  (Plavix) tablet 600 mg, 600 mg, oral, Once, Marcus Fazal-Issac, DPM    clopidogrel (Plavix) tablet 75 mg, 75 mg, oral, Daily, Marcus Fazal-Issac, DPM, 75 mg at 09/26/24 0844    dextrose 50 % injection 12.5 g, 12.5 g, intravenous, q15 min PRN, Marcus Fazal-Issac, DPM    dextrose 50 % injection 25 g, 25 g, intravenous, q15 min PRN, Marcus Fazal-Issac, DPM    glucagon (Glucagen) injection 1 mg, 1 mg, intramuscular, q15 min PRN, Marcus Fazal-Issac, DPM    glucagon (Glucagen) injection 1 mg, 1 mg, intramuscular, q15 min PRN, Marcus Fazal-Issac, DPM    heparin 25,000 Units in dextrose 5% 250 mL (100 Units/mL) infusion (premix), 0-4,500 Units/hr, intravenous, Continuous, Marcus Fazal-Issac, DPM, Stopped at 09/24/24 1433    heparin bolus from bag 3,000-6,000 Units, 3,000-6,000 Units, intravenous, q4h PRN, Marcus Fazal-Issac, DPM    HYDROmorphone (Dilaudid) injection 1 mg, 1 mg, intravenous, q4h PRN, Jemal Guadarrama MD, 1 mg at 09/26/24 1010    insulin lispro (HumaLOG) injection 0-10 Units, 0-10 Units, subcutaneous, TID, Marcus Fazal-Issac, DPM, 2 Units at 09/22/24 1652    lidocaine (LMX) 4 % cream, , Topical, 4x daily PRN, Marcus Fazal-Issac, DPM, Given at 09/21/24 2107    lidocaine-epinephrine (Xylocaine W/EPI) 0.5 %-1:200,000 injection 20 mL, 20 mL, subcutaneous, Once, Elliot Phelan MD    lidocaine-epinephrine (Xylocaine W/EPI) 1 %-1:100,000 injection 20 mL, 20 mL, injection, Once, Julien Gan PA-C    morphine injection 2 mg, 2 mg, intravenous, q4h PRN, Jemal Guadarrama MD, 2 mg at 09/26/24 0844    oxygen (O2) therapy, , inhalation, Continuous - 02/gases, Marcus Diehl-Issac, DPM    polyethylene glycol (Glycolax, Miralax) packet 17 g, 17 g, oral, Daily, Marcus Fazal-Issac, DPM, 17 g at 09/26/24 0844    potassium chloride CR (Klor-Con M20) ER tablet 40 mEq, 40 mEq, oral, Daily, Marcus Fazal-Issac, DPM, 40 mEq at 09/26/24 0844    Laboratory Findings:  Lab Results   Component Value Date     WBC 9.3 09/25/2024    HGB 9.6 (L) 09/25/2024    HCT 29.1 (L) 09/25/2024    MCV 89 09/25/2024     09/25/2024     Lab Results   Component Value Date    INR 1.0 12/09/2019    PROTIME 11.4 12/09/2019     Lab Results   Component Value Date    GLUCOSE 134 (H) 09/26/2024    CALCIUM 8.3 (L) 09/26/2024     (L) 09/26/2024    K 4.2 09/26/2024    CO2 28 09/26/2024     09/26/2024    BUN 19 09/26/2024    CREATININE 0.63 09/26/2024       Assessment and Plan:     Acute osteomyelitis -continue IV antibiotics, s/p sx intervention. ID following, atbs per ID recs. OR cultures pending.   Peripheral vascular disease -Monitor for any further bleeding, podiatry following closely. Encourage participation with therapy.   Acute DVT -Will need to reassess daily on when to resume blood thinners.   Diabetes type 2 -good control with no medications, monitor.            Jemal Guadarrama MD  09/26/24  1230pm

## 2024-09-26 NOTE — CARE PLAN
The patient's goals for the shift include      The clinical goals for the shift include patient to go to OR today    Problem: Pain  Goal: Takes deep breaths with improved pain control throughout the shift  Outcome: Progressing  Goal: Turns in bed with improved pain control throughout the shift  Outcome: Progressing  Goal: Walks with improved pain control throughout the shift  Outcome: Progressing  Goal: Performs ADL's with improved pain control throughout shift  Outcome: Progressing  Goal: Participates in PT with improved pain control throughout the shift  Outcome: Progressing  Goal: Free from opioid side effects throughout the shift  Outcome: Progressing  Goal: Free from acute confusion related to pain meds throughout the shift  Outcome: Progressing     Problem: Pain - Adult  Goal: Verbalizes/displays adequate comfort level or baseline comfort level  Outcome: Progressing     Problem: Safety - Adult  Goal: Free from fall injury  Outcome: Progressing     Problem: Discharge Planning  Goal: Discharge to home or other facility with appropriate resources  Outcome: Progressing     Problem: Chronic Conditions and Co-morbidities  Goal: Patient's chronic conditions and co-morbidity symptoms are monitored and maintained or improved  Outcome: Progressing     Problem: Diabetes  Goal: Achieve decreasing blood glucose levels by end of shift  Outcome: Progressing  Goal: Increase stability of blood glucose readings by end of shift  Outcome: Progressing  Goal: Decrease in ketones present in urine by end of shift  Outcome: Progressing  Goal: Maintain electrolyte levels within acceptable range throughout shift  Outcome: Progressing  Goal: Maintain glucose levels >70mg/dl to <250mg/dl throughout shift  Outcome: Progressing  Goal: No changes in neurological exam by end of shift  Outcome: Progressing  Goal: Learn about and adhere to nutrition recommendations by end of shift  Outcome: Progressing  Goal: Vital signs within normal range for  age by end of shift  Outcome: Progressing  Goal: Increase self care and/or family involovement by end of shift  Outcome: Progressing  Goal: Receive DSME education by end of shift  Outcome: Progressing     Problem: Fall/Injury  Goal: Not fall by end of shift  Outcome: Progressing  Goal: Be free from injury by end of the shift  Outcome: Progressing     Problem: Nutrition  Goal: Promote healing  Outcome: Progressing     Problem: Skin  Goal: Decreased wound size/increased tissue granulation at next dressing change  Outcome: Progressing  Flowsheets (Taken 9/25/2024 1852 by Hilary Schmidt RN)  Decreased wound size/increased tissue granulation at next dressing change:   Utilize specialty bed per algorithm   Protective dressings over bony prominences  Goal: Participates in plan/prevention/treatment measures  Outcome: Progressing  Flowsheets (Taken 9/26/2024 0136)  Participates in plan/prevention/treatment measures:   Discuss with provider PT/OT consult   Elevate heels  Goal: Prevent/manage excess moisture  Outcome: Progressing  Flowsheets (Taken 9/25/2024 1852 by Hilary Schmidt RN)  Prevent/manage excess moisture:   Moisturize dry skin   Cleanse incontinence/protect with barrier cream  Goal: Prevent/minimize sheer/friction injuries  Outcome: Progressing  Flowsheets (Taken 9/25/2024 1852 by Hilary Schmidt RN)  Prevent/minimize sheer/friction injuries:   HOB 30 degrees or less   Turn/reposition every 2 hours/use positioning/transfer devices  Goal: Promote/optimize nutrition  Outcome: Progressing  Flowsheets (Taken 9/25/2024 1852 by Hilary Schmidt, RN)  Promote/optimize nutrition:   Monitor/record intake including meals   Assist with feeding  Goal: Promote skin healing  Outcome: Progressing  Flowsheets (Taken 9/25/2024 1852 by Hilary Schmidt RN)  Promote skin healing:   Protective dressings over bony prominences   Turn/reposition every 2 hours/use positioning/transfer devices

## 2024-09-26 NOTE — PROGRESS NOTES
Physical Therapy                 Therapy Communication Note    Patient Name: Elliot Partida  MRN: [id number]  Department: [facility] [address]  Room: [address] [place] [address]  Today's Date: 9/26/2024     Discipline: Physical Therapy    Missed Visit Reason: Missed Visit Reason: Patient refused (Pt continues to c/o high (7/10) pain levels despite receiving morphine. He is refusing to participate in PT at this time. Nurse in room and aware.)    Missed Time: Attempt

## 2024-09-26 NOTE — PROGRESS NOTES
"  INFECTIOUS DISEASE DAILY PROGRESS NOTE    SUBJECTIVE:    OR moved to Friday. No new issues.    OBJECTIVE:  VITALS (Last 24 Hours)  /72   Pulse 76   Temp 37 °C (98.6 °F)   Resp 18   Ht 1.753 m (5' 9.02\")   Wt 102 kg (225 lb 8.5 oz)   SpO2 95%   BMI 33.29 kg/m²     PHYSICAL EXAM:  Gen - NAD  Abd - soft, no ttp, BS present  Left Foot - surgical dressings    ABX: IV Unasyn    LABS:  Lab Results   Component Value Date    WBC 9.3 09/25/2024    HGB 9.6 (L) 09/25/2024    HCT 29.1 (L) 09/25/2024    MCV 89 09/25/2024     09/25/2024     Lab Results   Component Value Date    GLUCOSE 93 09/24/2024    CALCIUM 8.3 (L) 09/24/2024     09/24/2024    K 3.8 09/24/2024    CO2 26 09/24/2024     09/24/2024    BUN 11 09/24/2024    CREATININE 0.60 09/24/2024         Estimated Creatinine Clearance: 125 mL/min (by C-G formula based on SCr of 0.6 mg/dL).      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  PAD - s/p angiogram 9/18 with re-vasc  DM II with Peripheral Neuropathy - poorly controlled A1C 9.8% 9/2024, increased risk/severity of infections    IV Unasyn 3g Q6H. Closure planned Friday now. Anticipating PO abx unless there is concern for residual OM still. Will follow up on podiatry findings.    Monitoring for adverse effects of abx such as rash/itching/diarrhea - none thus far.    Will follow. Thanks!    Pantera Boston MD  [facility]  Office #[phone]      "

## 2024-09-26 NOTE — PROGRESS NOTES
Occupational Therapy                 Therapy Communication Note    Patient Name: Elliot Partida  MRN: [id number]  Department: [place] [id number]  Room: [address] [place] [address]  Today's Date: 9/26/2024     Discipline: Occupational Therapy    Missed Visit Reason: Missed Visit Reason: Patient refused (Cont to complain of high pain level 7/10. pt refusing OT at this time, nursing aware)    Missed Time: Attempt

## 2024-09-26 NOTE — PROGRESS NOTES
09/26/24 1203   Discharge Planning   Expected Discharge Disposition Home     Patient's chart updated with nieces name and contact information.  Patient stated staff may provide her with information when/if she requests it.  NIECE: Rubén Ruth  [phone]  Susie Sims RN

## 2024-09-26 NOTE — PROGRESS NOTES
PODIATRY SERVICE CONSULT PROGRESS NOTE    SERVICE DATE: 9/26/2024   SERVICE TIME:  1130    Subjective   Pt was seen at bedside, POD 3 Left 5th Toe Amputation and Partial 5th Ray Resection.  Dressing is clean, dry and intact at this time.  Pain well controlled at this time.  Patient denies any constitutional symptoms.   No other pedal complaints.       Medications:  Scheduled Meds: ammonium lactate, , Topical, BID  ampicillin-sulbactam, 3 g, intravenous, q6h  aspirin, 81 mg, oral, Daily  clopidogrel, 600 mg, oral, Once  clopidogrel, 75 mg, oral, Daily  insulin lispro, 0-10 Units, subcutaneous, TID  lidocaine-epinephrine, 20 mL, subcutaneous, Once  lidocaine-epinephrine, 20 mL, injection, Once  oxygen, , inhalation, Continuous - 02/gases  polyethylene glycol, 17 g, oral, Daily  potassium chloride CR, 40 mEq, oral, Daily      Continuous Infusions: heparin, 0-4,500 Units/hr, Last Rate: Stopped (09/24/24 1433)      PRN Meds: PRN medications: acetaminophen **OR** acetaminophen **OR** acetaminophen, dextrose, dextrose, glucagon, glucagon, heparin, HYDROmorphone, lidocaine, morphine         Objective   PHYSICAL EXAM:  Physical Exam Performed:  Vitals:    09/26/24 0700   BP: 150/63   Pulse:    Resp:    Temp: 36.9 °C (98.5 °F)   SpO2: 94%     Body mass index is 33.29 kg/m².    Patient is AOx3 and in mild acute distress secondary to pain. Patient is alert and cooperative. Lying in bed with  dressing clean, dry and intact.     LLE Focused Exam:     Deferred due to surgical dressing left intact. CFT to Left Hallux intact.      LABS:   Results for orders placed or performed during the hospital encounter of 09/14/24 (from the past 24 hour(s))   POCT GLUCOSE   Result Value Ref Range    POCT Glucose 109 (H) 74 - 99 mg/dL   POCT GLUCOSE   Result Value Ref Range    POCT Glucose 135 (H) 74 - 99 mg/dL   POCT GLUCOSE   Result Value Ref Range    POCT Glucose 152 (H) 74 - 99 mg/dL   Basic Metabolic Panel   Result Value Ref Range    Glucose  134 (H) 74 - 99 mg/dL    Sodium 135 (L) 136 - 145 mmol/L    Potassium 4.2 3.5 - 5.3 mmol/L    Chloride 100 98 - 107 mmol/L    Bicarbonate 28 21 - 32 mmol/L    Anion Gap 11 10 - 20 mmol/L    Urea Nitrogen 19 6 - 23 mg/dL    Creatinine 0.63 0.50 - 1.30 mg/dL    eGFR >90 >60 mL/min/1.73m*2    Calcium 8.3 (L) 8.6 - 10.3 mg/dL   Lavender Top   Result Value Ref Range    Extra Tube Hold for add-ons.    POCT GLUCOSE   Result Value Ref Range    POCT Glucose 120 (H) 74 - 99 mg/dL      Lab Results   Component Value Date    HGBA1C 9.8 (H) 09/13/2024      Lab Results   Component Value Date    CRP 9.51 (H) 09/13/2024      Lab Results   Component Value Date    SEDRATE 24 (H) 09/13/2024        Results from last 7 days   Lab Units 09/25/24  0502   WBC AUTO x10*3/uL 9.3   RBC AUTO x10*6/uL 3.28*   HEMOGLOBIN g/dL 9.6*   HEMATOCRIT % 29.1*     Results from last 7 days   Lab Units 09/26/24  0630   SODIUM mmol/L 135*   POTASSIUM mmol/L 4.2   CHLORIDE mmol/L 100   CO2 mmol/L 28   BUN mg/dL 19   CREATININE mg/dL 0.63   CALCIUM mg/dL 8.3*             IMAGING REVIEW:  Vascular US Ankle Brachial Index (BEN) Without Exercise    Result Date: 9/16/2024             [place] [address]   Tel [phone] and Fax [phone]  Vascular Lab Report VAS US PVR WITHOUT EXERCISE  Patient Name:     SUBHASH Cain Physician: [id number] Sven Caicedo MD Study Date:       9/16/2024          Ordering           [id number] ELAINA DE LOS SANTOS                                      Physician: MRN/PID:          [id number]           Technologist:      Tamra Walls RVT Accession#:       [id number]       Technologist 2:    Graciela Tobar RVT Date of           1950 / 74      Encounter#:        [id number] Birth/Age:        years Gender:           M Admission Status: Inpatient          Location           [facility]                                       Performed:  Diagnosis/ICD: Peripheral vascular disease, unspecified-I73.9 CPT Codes:     09058 Peripheral artery BEN Only  CONCLUSIONS: Right Lower PVR: No evidence of arterial occlusive disease in the right lower extremity at rest. Decreased digital perfusion noted. Multiphasic flow is noted in the right common femoral artery, right posterior tibial artery and right dorsalis pedis artery. Left Lower PVR: No evidence of arterial occlusive disease in the left lower extremity at rest. Decreased digital perfusion noted. Monophasic flow is noted in the left posterior tibial artery and left dorsalis pedis artery. Multiphasic flow is noted in the left common femoral artery. Waveform may be dmapened due to edema.  Additional Findings: Known DVT in the left femoral and popliteal veins.  Imaging & Doppler Findings:  RIGHT Lower PVR                Pressures Ratios Right Posterior Tibial (Ankle) 155 mmHg  0.99 Right Dorsalis Pedis (Ankle)   155 mmHg  0.99 Right Digit (Great Toe)        66 mmHg   0.42   LEFT Lower PVR                Pressures Ratios Left Posterior Tibial (Ankle) 142 mmHg  0.91 Left Dorsalis Pedis (Ankle)   128 mmHg  0.82 Left Digit (Great Toe)        52 mmHg   0.33                     Right     Left Brachial Pressure 156 mmHg 150 mmHg   [id number] Sven Caicedo MD Electronically signed by [id number] Sven Caicedo MD on 9/16/2024 at 7:50:39 PM  ** Final **     CT angio chest for pulmonary embolism    Result Date: 9/14/2024  Interpreted By:  Enedina Umana, STUDY: CT ANGIO CHEST FOR PULMONARY EMBOLISM;  9/14/2024 9:28 am   INDICATION: Signs/Symptoms:hypoxia, dvt.     COMPARISON: None.   ACCESSION NUMBER(S): [id number]   ORDERING CLINICIAN: ABIEL KENT   TECHNIQUE: CT of the chest was performed. Sagittal and coronal reconstructions were generated. 75 ML Omnipaque 350 intravenous contrast given for the examination.  Multiplanar reconstructions of the pulmonary vessels were created on an independent workstation  and provided for review.   FINDINGS: Artifact related to motion and overlying upper extremities limits evaluation.   CHEST WALL AND LOWER NECK: No significant axillary lymphadenopathy.   MEDIASTINUM AND ROLO:  No significant lymphadenopathy.   HEART AND VESSELS:  No central PE however limited assessment for peripheral PE due to technical factors including suboptimal timing of IV contrast bolus and artifact. The heart is normal in size. No significant pericardial effusion. Multifocal atherosclerotic calcifications including the coronary arteries and aortic and mitral valve regions. No thoracic aortic aneurysm.   LUNGS, PLEURA, LARGE AIRWAYS:  Respiratory motion artifact limits parenchymal evaluation. Mild pulmonary heterogeneity. Faint linear densities in both lung fields. Possible mosaic attenuation in both lung bases. Trace bilateral pleural effusions/thickening. The central airways are patent.   UPPER ABDOMEN:  Limited due to artifact related to overlying upper extremities. The included liver is likely mildly hypodense/fatty infiltrated. No definite focal mass lesion in the included upper abdominal viscera.   BONES:  Flowing osteophytes along the thoracic spine consistent with dish.       No central PE however peripheral PE can not be excluded based on this exam.   Scattered interstitial opacities including possible mosaic attenuation in the lung bases which could reflect small airways disease and air trapping or atypical infection. Trace bilateral pleural effusions/thickening.   Moderate atherosclerotic disease.   Possible fatty infiltration of the liver incidentally noted.   MACRO: None.   Signed by: Enedina Umana 9/14/2024 10:56 AM Dictation workstation:   [id number]    Transthoracic Echo (TTE) Complete    Result Date: 9/13/2024   [facility], [address] [place] [id number]              Tel [phone] and Fax [phone] TRANSTHORACIC ECHOCARDIOGRAM REPORT  Patient Name:      SUBHASH SCHMIDT  LUZMA Cain Physician:    [id number] Carson Freeman MD Study Date:        9/13/2024            Ordering Provider:    [id number] ABIEL KENT MRN/PID:           [id number]             Fellow: Accession#:        [id number]         Nurse: Date of Birth/Age: 1950 / 74 years  Sonographer:          Cailin Julian RDCS Gender:            M                    Additional Staff: Height:            175.26 cm            Admit Date:           9/12/2024 Weight:            107.05 kg            Admission Status:     Inpatient -                                                               Routine BSA / BMI:         2.22 m2 / 34.85      Encounter#:           [id number]                    kg/m2 Blood Pressure:    137/66 mmHg          Department Location:  [facility] Study Type:    TRANSTHORACIC ECHO (TTE) COMPLETE Diagnosis/ICD: Abnormal electrocardiogram [ECG] [EKG]-R94.31 Indication:    Abnormal EKG CPT Code:      Echo Complete w Full Doppler-88278 Patient History: Pertinent History: Edema, DM, abnomal EKG, wound infection, WMA. Study Detail: The following Echo studies were performed: 2D, M-Mode, Doppler and               color flow. Technically challenging study due to body habitus,               patient lying in supine position, prominent lung artifact and poor               acoustic windows. Definity used as a contrast agent for               endocardial border definition. Total contrast used for this               procedure was 3.0 mL via IV push.  PHYSICIAN INTERPRETATION: Left Ventricle: The left ventricular systolic function is normal, with a visually estimated ejection fraction of 60-65%. There are no regional left ventricular wall motion abnormalities. The left ventricular cavity size is normal. Spectral Doppler  shows an impaired relaxation pattern of left ventricular diastolic filling. Left Atrium: The left atrium is normal in size. Right Ventricle: The right ventricle is normal in size. There is normal right ventricular global systolic function. Right Atrium: The right atrium is normal in size. Aortic Valve: The aortic valve is trileaflet. There is moderate aortic valve cusp calcification. There is evidence of mild to moderate aortic valve stenosis. There is no evidence of aortic valve regurgitation. The peak instantaneous gradient of the aortic valve is 26.2 mmHg. Mitral Valve: The mitral valve is normal in structure. There is mild mitral valve regurgitation. Tricuspid Valve: The tricuspid valve is structurally normal. There is mild tricuspid regurgitation. Pulmonic Valve: The pulmonic valve is structurally normal. There is physiologic pulmonic valve regurgitation. Pericardium: There is no pericardial effusion noted. Aorta: The aortic root is normal. Pulmonary Artery: The estimated pulmonary artery pressure is normal. Systemic Veins: The inferior vena cava appears mildly dilated.  CONCLUSIONS:  1. The left ventricular systolic function is normal, with a visually estimated ejection fraction of 60-65%.  2. Spectral Doppler shows an impaired relaxation pattern of left ventricular diastolic filling.  3. There is normal right ventricular global systolic function.  4. Mild to moderate aortic valve stenosis.  5. There is moderate aortic valve cusp calcification.  6. The inferior vena cava appears mildly dilated. QUANTITATIVE DATA SUMMARY:  2D MEASUREMENTS:         Normal Ranges: Ao Root d:       3.70 cm (2.0-3.7cm)  AORTA MEASUREMENTS:         Normal Ranges: Asc Ao, d:          3.90 cm (2.1-3.4cm)  LV SYSTOLIC FUNCTION BY 2D PLANIMETRY (MOD):                      Normal Ranges: EF-Visual:      63 % LV EF Reported: 63 %  LV DIASTOLIC FUNCTION:           Normal Ranges: MV Peak E:             1.05 m/s  (0.7-1.2 m/s) MV Peak A:              1.50 m/s  (0.42-0.7 m/s) E/A Ratio:             0.70      (1.0-2.2) MV e'                  0.073 m/s (>8.0) MV lateral e'          0.08 m/s MV medial e'           0.07 m/s E/e' Ratio:            14.40     (<8.0)  AORTIC VALVE:            Normal Ranges: AoV Vmax:      2.56 m/s  (<=1.7m/s) AoV Peak P.2 mmHg (<20mmHg) LVOT Max Jak:  1.25 m/s  (<=1.1m/s) LVOT VTI:      23.90 cm LVOT Diameter: 2.20 cm   (1.8-2.4cm) AoV Area,Vmax: 1.86 cm2  (2.5-4.5cm2)  RIGHT VENTRICLE: TAPSE: 21.1 mm RV s'  0.12 m/s  TRICUSPID VALVE/RVSP:         Normal Ranges: IVC Diam:             2.33 cm  PULMONIC VALVE:          Normal Ranges: PV Max Jak:     1.2 m/s  (0.6-0.9m/s) PV Max P.2 mmHg  [id number] Carson Freeman MD Electronically signed on 2024 at 7:18:35 PM  ** Final **     CT angio aorta and bilateral iliofemoral runoff w and or wo IV contrast    Result Date: 2024  Interpreted By:  Osorio Wong, STUDY: CT ANGIO AORTA AND BILATERAL ILIOFEMORAL RUNOFF W AND OR WO IV CONTRAST;  2024 12:07 pm   INDICATION: Signs/Symptoms:evaluation for circulation and wound.   COMPARISON: None.   ACCESSION NUMBER(S): [id number]   ORDERING CLINICIAN: ABIEL KENT   TECHNIQUE: CTA of the abdomen and pelvis, with runoff was performed.  Contiguous axial images were obtained at 3 mm slice thickness through the abdomen and pelvis, with runoff. 3D Coronal and sagittal reconstructions at 3 mm slice thickness were performed. 75 ml of contrast material  Omnipaque 350 were administered intravenously without immediate complication. FINDINGS:   CTA ABDOMEN VESSELS   Celiac axis: No significant stenosis.   SMA: Partially calcified disease at its origin results in moderate to severe stenosis.   RAUL: No significant stenosis.   Right renal vessels: Calcified disease of its proximal segment results in moderate stenosis.   Left renal vessels: No significant stenosis.   Infrarenal aorta: Contains partially calcified disease  without significant stenosis or aneurysmal dilation.   CTA  PELVIC VESSELS R. Common iliac artery: No significant stenosis.   R. External iliac artery: No significant stenosis.   R. Internal iliac artery: No significant stenosis.   L. Common iliac artery: No significant stenosis.   L. External iliac artery: No significant stenosis.   L. Internal iliac artery: Partially calcified disease results in mild to moderate stenosis.   CTA RIGHT LOWER EXTREMITY R. Common femoral artery: No significant stenosis.   R. Profunda femoris artery: No significant stenosis.   R. SFA: Partially calcified disease results in mild stenosis.   R. Popliteal artery: Partially calcified disease results in moderate stenosis of the above the knee segment. Predominantly calcified disease of the below-the-knee segment results in mild-to-moderate stenosis.   R. Anterior tibial artery: Occluded at its origin.   R. Tibioperoneal trunk: Partially calcified disease results in mild-to-moderate stenosis.   R. Posterior tibial artery: Occluded at its origin.   R. Peroneal artery: Contains scattered calcific disease of its mid to distal segment resulting in mild-to-moderate stenosis.   R. Dorsalis pedis artery: No significant stenosis.   R. Plantar artery: Not visualized.   CTA LEFT LOWER EXTREMITY L. Common femoral artery: No significant stenosis.   L. Profunda femoris artery: No significant stenosis.   L. SFA: Contains partially calcified disease predominantly in its mid to distal segment. This results in focal severe stenosis across Gaetano's canal but no significant stenosis upstream.   L. Popliteal artery: Contains partially calcified atherosclerotic disease without significant stenosis.   L. Anterior tibial artery: Occluded shortly beyond its origin.   L. Tibioperoneal trunk: Contains partially calcified disease resulting in moderate to severe stenosis.   L. Posterior tibial artery: Occluded at its origin, there is distal reconstitution across its  middle 3rd as a diseased vessel containing scattered calcific disease resulting in mild stenosis. This vessel is seen to cross the ankle joint and supply the plantar artery.   L. Peroneal artery: Occluded at its origin, there is distal reconstitution at its middle 3rd as a small caliber vessel, supplying distal collaterals to the foot.   L. Dorsalis pedis artery: Not visualized   L. Plantar artery: No significant stenosis.   NON-VASCULAR FINDINGS   Visualized portions of the heart: Main pulmonary artery is enlarged, which can be seen in the setting of pulmonary arterial hypertension. The heart is not enlarged. There is coronary artery calcifications present in the distribution of all 3 vessels. There is no pericardial effusion.   Lungs: There is bilateral dependent atelectasis.   Hepatobiliary: Unremarkable liver without biliary dilation evident   Pancreas: Unremarkable   Spleen: Unremarkable   Adrenal Glands: Unremarkable   Kidneys, ureters, and bladder: There are bilateral parapelvic renal cysts and bilateral parenchymal cysts. There are bilateral extrarenal pelvises. There is no urolithiasis or hydroureteronephrosis. The urinary bladder demonstrates circumferential wall thickening which may be related to incomplete distention or cystitis.   GI tract: There is a 2.6 cm diverticulum off the gastric fundus. No evidence of obstruction. There is colonic diverticulosis without diverticulitis. The appendix is within normal limits.   Peritoneum and retroperitoneum: No free fluid or free air is noted.   Lymph Nodes: There are mildly prominent pelvic lymph nodes, left-greater-than-right. These are likely reactive.   Reproductive Organs: The prostate gland is enlarged.   Visualized musculoskeletal structures: There are degenerative changes of the spine. Spurring of the bilateral sacroiliac joints is noted. There is osteoarthritic changes of the bilateral hips. There is diffuse edema and skin thickening involving the left  leg. There are prominent venous varicosities of the left leg. There is osteopenia involving the left foot. Bony destruction at the left 5th metatarsophalangeal joint is noted with soft tissue gas surrounding this region. There appears to be an open wound in the plantar aspect of the foot centered at the 5th MTP joint. Curvilinear hyperdensity within the soft tissues may reflect small bony fragments or foreign bodies. No acute fracture or destructive osseous lesion is identified       1. Diffuse partially calcified atherosclerotic disease, as detailed above. There is a single vessel runoff in the right leg. In the left leg, there is high-grade stenosis of the distal SFA as it crosses Gaetano's canal. Additionally, there is occlusive disease of the proximal trifurcation with reconstitution of the left posterior tibial and peroneal arteries distally.   2. Significant soft tissue swelling of the left leg with prominent venous varicosities. There is also soft tissue swelling of the left foot with an open wound along the plantar aspect of the foot centered at the 5th MTP joint, which demonstrates adjacent bony destruction. Curvilinear hyperdensity in this region may reflect bony fragments or foreign bodies.   3. Coronary artery calcifications.   Signed by: Osorio Wong 9/13/2024 2:09 PM Dictation workstation:   [id number]    Lower extremity venous duplex left    Result Date: 9/12/2024  STUDY: Left Lower Extremity Venous Doppler Ultrasound; 9/12/2024 4:43 PM INDICATION: Swelling. COMPARISON: US LE Venous 3/21/2022. ACCESSION NUMBER(S): [id number] ORDERING CLINICIAN: KATIE HILTON TECHNIQUE:  Real-time grayscale imaging, color Doppler flow imaging, and spectral Doppler imaging of the left lower extremity veins was performed. FINDINGS: There is acute occlusive DVT demonstrated within the left mid-distal femoral and popliteal veins. The left common femoral and profunda femoral veins demonstrated normal compressibility,  normal phasic venous flow and normal response to augmentation.  There is no evidence for echogenic thrombi.  The deep calf veins are not well visualized due to subcutaneous edema. The contralateral common femoral vein is free of thrombosis.    Evidence for acute occlusive DVT within the left mid-distal femoral and popliteal veins. Compared to prior ultrasound, this represents a new finding. The positive findings on this exam were discussed with and acknowledged by Dr. Katie Hilton abdomen interpretation, 5:00 PM September 12, 2024 Signed by Jason Tobar MD    XR foot left 3+ views    Result Date: 9/12/2024  STUDY: Foot Radiographs; 9/12/2024 4:19 PM INDICATION: Left foot pain. COMPARISON: None Available. ACCESSION NUMBER(S): [id number] ORDERING CLINICIAN: KATIE HILTON TECHNIQUE:  Three view(s) of the left foot (four images). FINDINGS:  There is no displaced fracture.  The alignment is anatomic.  Soft tissue swelling and ulceration noted lateral to the fifth MTP joint associated with demineralization of the fifth proximal phalanx and the distal head of the fifth metatarsal.  Bony changes consistent with osteomyelitis.    Osteomyelitis of the fifth metatarsal head and proximal phalanx of the fifth toe. Signed by Td Francisco MD            Assessment/Plan   ASSESSMENT & PLAN:    #S/P Left 5th Toe Amputation and Partial 5th Ray Resection POD 2   #Left foot necrotic ulcer in setting of lower extremity cellulitis  #Osteomyelitis  #DM2     - Patient was seen and evaluated; all findings were discussed and all questions were answered to patient's satisfaction.  - Charts, labs, vitals and imaging all reviewed.   - Imaging: xray- Osteomyelitis of the fifth metatarsal head and proximal phalanx of the fifth toe.  - MRI canceled  - Labs: WBC WNL  - PVRs: Decreased digital perfusion noted. Monophasic flow is noted in the left posterior tibial artery and left dorsalis pedis artery.  - Lower extremity Duplex- Evidence for  acute occlusive DVT within the left mid-distal femoral and popliteal veins.   - Wound culture: (1+) Rare Mixed Skin Microorganisms , Final.     Plan:  - Abx: Per ID, IV Unasyn  - Dressing left intact at this time.   - Patient scheduled for Delayed primary Closure, tomorrow at 1100. NPO After Midnight.  - Nursing staff is able to change/reinforce dressing if & as necessary until next day’s dressing change. Thank you.  - Podiatry will continue to follow while in house.     Case to be discussed with attending, A&P above reflects a tentative plan. Please await for the final signature from the attending physician on service.     Marcus Kwok DPM PGY-3  Podiatric Medicine & Surgery

## 2024-09-27 ENCOUNTER — APPOINTMENT (OUTPATIENT)
Dept: CARDIOLOGY | Facility: HOSPITAL | Age: 74
End: 2024-09-27
Payer: MEDICARE

## 2024-09-27 LAB
GLUCOSE BLD MANUAL STRIP-MCNC: 102 MG/DL (ref 74–99)
GLUCOSE BLD MANUAL STRIP-MCNC: 116 MG/DL (ref 74–99)
GLUCOSE BLD MANUAL STRIP-MCNC: 89 MG/DL (ref 74–99)
GLUCOSE BLD MANUAL STRIP-MCNC: 90 MG/DL (ref 74–99)

## 2024-09-27 PROCEDURE — 2500000004 HC RX 250 GENERAL PHARMACY W/ HCPCS (ALT 636 FOR OP/ED)

## 2024-09-27 PROCEDURE — 2500000001 HC RX 250 WO HCPCS SELF ADMINISTERED DRUGS (ALT 637 FOR MEDICARE OP)

## 2024-09-27 PROCEDURE — 3700000001 HC GENERAL ANESTHESIA TIME - INITIAL BASE CHARGE: Performed by: PODIATRIST

## 2024-09-27 PROCEDURE — 87205 SMEAR GRAM STAIN: CPT | Mod: AHULAB | Performed by: INTERNAL MEDICINE

## 2024-09-27 PROCEDURE — 7100000002 HC RECOVERY ROOM TIME - EACH INCREMENTAL 1 MINUTE: Performed by: PODIATRIST

## 2024-09-27 PROCEDURE — 3700000002 HC GENERAL ANESTHESIA TIME - EACH INCREMENTAL 1 MINUTE: Performed by: PODIATRIST

## 2024-09-27 PROCEDURE — 2500000004 HC RX 250 GENERAL PHARMACY W/ HCPCS (ALT 636 FOR OP/ED): Performed by: PODIATRIST

## 2024-09-27 PROCEDURE — 2500000005 HC RX 250 GENERAL PHARMACY W/O HCPCS: Performed by: ANESTHESIOLOGY

## 2024-09-27 PROCEDURE — 2500000002 HC RX 250 W HCPCS SELF ADMINISTERED DRUGS (ALT 637 FOR MEDICARE OP, ALT 636 FOR OP/ED)

## 2024-09-27 PROCEDURE — 2500000004 HC RX 250 GENERAL PHARMACY W/ HCPCS (ALT 636 FOR OP/ED): Performed by: ANESTHESIOLOGY

## 2024-09-27 PROCEDURE — 2720000007 HC OR 272 NO HCPCS: Performed by: PODIATRIST

## 2024-09-27 PROCEDURE — 2780000003 HC OR 278 NO HCPCS: Performed by: PODIATRIST

## 2024-09-27 PROCEDURE — 2500000004 HC RX 250 GENERAL PHARMACY W/ HCPCS (ALT 636 FOR OP/ED): Performed by: INTERNAL MEDICINE

## 2024-09-27 PROCEDURE — 7100000001 HC RECOVERY ROOM TIME - INITIAL BASE CHARGE: Performed by: PODIATRIST

## 2024-09-27 PROCEDURE — 93005 ELECTROCARDIOGRAM TRACING: CPT

## 2024-09-27 PROCEDURE — 82947 ASSAY GLUCOSE BLOOD QUANT: CPT

## 2024-09-27 PROCEDURE — 3600000007 HC OR TIME - EACH INCREMENTAL 1 MINUTE - PROCEDURE LEVEL TWO: Performed by: PODIATRIST

## 2024-09-27 PROCEDURE — 87070 CULTURE OTHR SPECIMN AEROBIC: CPT | Mod: AHULAB | Performed by: INTERNAL MEDICINE

## 2024-09-27 PROCEDURE — 2500000005 HC RX 250 GENERAL PHARMACY W/O HCPCS: Performed by: PODIATRIST

## 2024-09-27 PROCEDURE — C1762 CONN TISS, HUMAN(INC FASCIA): HCPCS | Performed by: PODIATRIST

## 2024-09-27 PROCEDURE — 1200000002 HC GENERAL ROOM WITH TELEMETRY DAILY

## 2024-09-27 PROCEDURE — 3600000002 HC OR TIME - INITIAL BASE CHARGE - PROCEDURE LEVEL TWO: Performed by: PODIATRIST

## 2024-09-27 PROCEDURE — 2500000005 HC RX 250 GENERAL PHARMACY W/O HCPCS

## 2024-09-27 DEVICE — IMPLANTABLE DEVICE: Type: IMPLANTABLE DEVICE | Site: FOOT | Status: FUNCTIONAL

## 2024-09-27 RX ORDER — ONDANSETRON HYDROCHLORIDE 2 MG/ML
4 INJECTION, SOLUTION INTRAVENOUS ONCE AS NEEDED
Status: DISCONTINUED | OUTPATIENT
Start: 2024-09-27 | End: 2024-09-27 | Stop reason: HOSPADM

## 2024-09-27 RX ORDER — SODIUM CHLORIDE, SODIUM LACTATE, POTASSIUM CHLORIDE, CALCIUM CHLORIDE 600; 310; 30; 20 MG/100ML; MG/100ML; MG/100ML; MG/100ML
100 INJECTION, SOLUTION INTRAVENOUS CONTINUOUS
Status: DISCONTINUED | OUTPATIENT
Start: 2024-09-27 | End: 2024-09-27 | Stop reason: HOSPADM

## 2024-09-27 RX ORDER — LIDOCAINE HYDROCHLORIDE 20 MG/ML
INJECTION, SOLUTION INFILTRATION; PERINEURAL AS NEEDED
Status: DISCONTINUED | OUTPATIENT
Start: 2024-09-27 | End: 2024-09-27

## 2024-09-27 RX ORDER — LIDOCAINE HYDROCHLORIDE 10 MG/ML
0.1 INJECTION, SOLUTION EPIDURAL; INFILTRATION; INTRACAUDAL; PERINEURAL ONCE
Status: DISCONTINUED | OUTPATIENT
Start: 2024-09-27 | End: 2024-09-27 | Stop reason: HOSPADM

## 2024-09-27 RX ORDER — FENTANYL CITRATE 50 UG/ML
INJECTION, SOLUTION INTRAMUSCULAR; INTRAVENOUS AS NEEDED
Status: DISCONTINUED | OUTPATIENT
Start: 2024-09-27 | End: 2024-09-27

## 2024-09-27 RX ORDER — BUPIVACAINE HYDROCHLORIDE 5 MG/ML
INJECTION, SOLUTION PERINEURAL AS NEEDED
Status: DISCONTINUED | OUTPATIENT
Start: 2024-09-27 | End: 2024-09-27 | Stop reason: HOSPADM

## 2024-09-27 RX ORDER — PHENYLEPHRINE HCL IN 0.9% NACL 1 MG/10 ML
SYRINGE (ML) INTRAVENOUS AS NEEDED
Status: DISCONTINUED | OUTPATIENT
Start: 2024-09-27 | End: 2024-09-27

## 2024-09-27 RX ORDER — MIDAZOLAM HYDROCHLORIDE 1 MG/ML
INJECTION INTRAMUSCULAR; INTRAVENOUS AS NEEDED
Status: DISCONTINUED | OUTPATIENT
Start: 2024-09-27 | End: 2024-09-27

## 2024-09-27 RX ORDER — OXYCODONE HYDROCHLORIDE 5 MG/1
5 TABLET ORAL EVERY 4 HOURS PRN
Status: DISCONTINUED | OUTPATIENT
Start: 2024-09-27 | End: 2024-09-27 | Stop reason: HOSPADM

## 2024-09-27 RX ORDER — PROPOFOL 10 MG/ML
INJECTION, EMULSION INTRAVENOUS CONTINUOUS PRN
Status: DISCONTINUED | OUTPATIENT
Start: 2024-09-27 | End: 2024-09-27

## 2024-09-27 SDOH — HEALTH STABILITY: MENTAL HEALTH: CURRENT SMOKER: 0

## 2024-09-27 ASSESSMENT — PAIN - FUNCTIONAL ASSESSMENT
PAIN_FUNCTIONAL_ASSESSMENT: 0-10
PAIN_FUNCTIONAL_ASSESSMENT: UNABLE TO SELF-REPORT
PAIN_FUNCTIONAL_ASSESSMENT: 0-10
PAIN_FUNCTIONAL_ASSESSMENT: UNABLE TO SELF-REPORT
PAIN_FUNCTIONAL_ASSESSMENT: 0-10

## 2024-09-27 ASSESSMENT — COGNITIVE AND FUNCTIONAL STATUS - GENERAL
DRESSING REGULAR LOWER BODY CLOTHING: A LOT
MOBILITY SCORE: 13
CLIMB 3 TO 5 STEPS WITH RAILING: A LOT
DAILY ACTIVITIY SCORE: 19
DRESSING REGULAR UPPER BODY CLOTHING: A LITTLE
TURNING FROM BACK TO SIDE WHILE IN FLAT BAD: A LOT
STANDING UP FROM CHAIR USING ARMS: A LOT
DAILY ACTIVITIY SCORE: 19
MOVING FROM LYING ON BACK TO SITTING ON SIDE OF FLAT BED WITH BEDRAILS: A LITTLE
MOVING FROM LYING ON BACK TO SITTING ON SIDE OF FLAT BED WITH BEDRAILS: A LITTLE
HELP NEEDED FOR BATHING: A LITTLE
TOILETING: A LITTLE
STANDING UP FROM CHAIR USING ARMS: A LOT
STANDING UP FROM CHAIR USING ARMS: A LOT
MOVING TO AND FROM BED TO CHAIR: A LOT
MOVING TO AND FROM BED TO CHAIR: A LOT
MOVING FROM LYING ON BACK TO SITTING ON SIDE OF FLAT BED WITH BEDRAILS: A LITTLE
DRESSING REGULAR UPPER BODY CLOTHING: A LITTLE
WALKING IN HOSPITAL ROOM: A LOT
STANDING UP FROM CHAIR USING ARMS: A LOT
DRESSING REGULAR LOWER BODY CLOTHING: A LOT
MOVING TO AND FROM BED TO CHAIR: A LOT
TOILETING: A LITTLE
DRESSING REGULAR LOWER BODY CLOTHING: A LOT
DRESSING REGULAR UPPER BODY CLOTHING: A LITTLE
DAILY ACTIVITIY SCORE: 19
WALKING IN HOSPITAL ROOM: A LOT
TOILETING: A LITTLE
DRESSING REGULAR LOWER BODY CLOTHING: A LOT
WALKING IN HOSPITAL ROOM: A LOT
WALKING IN HOSPITAL ROOM: A LOT
TOILETING: A LITTLE
MOVING FROM LYING ON BACK TO SITTING ON SIDE OF FLAT BED WITH BEDRAILS: A LITTLE
MOVING TO AND FROM BED TO CHAIR: A LOT
CLIMB 3 TO 5 STEPS WITH RAILING: A LOT
CLIMB 3 TO 5 STEPS WITH RAILING: A LOT
MOBILITY SCORE: 13
TURNING FROM BACK TO SIDE WHILE IN FLAT BAD: A LOT
MOBILITY SCORE: 13
HELP NEEDED FOR BATHING: A LITTLE
DAILY ACTIVITIY SCORE: 19
HELP NEEDED FOR BATHING: A LITTLE
HELP NEEDED FOR BATHING: A LITTLE
CLIMB 3 TO 5 STEPS WITH RAILING: A LOT
TURNING FROM BACK TO SIDE WHILE IN FLAT BAD: A LOT
DRESSING REGULAR UPPER BODY CLOTHING: A LITTLE
TURNING FROM BACK TO SIDE WHILE IN FLAT BAD: A LOT
MOBILITY SCORE: 13

## 2024-09-27 ASSESSMENT — PAIN SCALES - GENERAL
PAINLEVEL_OUTOF10: 8
PAINLEVEL_OUTOF10: 0 - NO PAIN
PAIN_LEVEL: 4
PAINLEVEL_OUTOF10: 0 - NO PAIN
PAINLEVEL_OUTOF10: 9
PAINLEVEL_OUTOF10: 0 - NO PAIN
PAINLEVEL_OUTOF10: 6
PAINLEVEL_OUTOF10: 0 - NO PAIN
PAINLEVEL_OUTOF10: 7
PAINLEVEL_OUTOF10: 6
PAINLEVEL_OUTOF10: 4
PAINLEVEL_OUTOF10: 4

## 2024-09-27 ASSESSMENT — PAIN DESCRIPTION - DESCRIPTORS: DESCRIPTORS: SORE

## 2024-09-27 ASSESSMENT — PAIN DESCRIPTION - LOCATION
LOCATION: ANKLE
LOCATION: OTHER (COMMENT)
LOCATION: ANKLE

## 2024-09-27 ASSESSMENT — PAIN DESCRIPTION - ORIENTATION
ORIENTATION: LEFT

## 2024-09-27 NOTE — PROGRESS NOTES
Occupational Therapy                 Therapy Communication Note    Patient Name: Elliot Partida  MRN: [id number]  Department: [place] [address]  Room: [address] [place] [address]  Today's Date: 9/27/2024     Discipline: Occupational Therapy    Missed Visit Reason: Missed Visit Reason: Other (Comment) (Pt going to OR today for wound closure. Plese reorder OT post op when appropriate.)    Missed Time: Attempt

## 2024-09-27 NOTE — CARE PLAN
The patient's goals for the shift include get the wound on left leg closed     The clinical goals for the shift include pain control throughout the shift

## 2024-09-27 NOTE — PROGRESS NOTES
"  INFECTIOUS DISEASE DAILY PROGRESS NOTE    SUBJECTIVE:    Planned for OR closure today. No new complaints for me this morning. Some left foot pain like before and asking for some pain meds - I relayed to RN.    OBJECTIVE:  VITALS (Last 24 Hours)  /74   Pulse 80   Temp 37.3 °C (99.2 °F)   Resp 18   Ht 1.753 m (5' 9.02\")   Wt 102 kg (225 lb 8.5 oz)   SpO2 97%   BMI 33.29 kg/m²     PHYSICAL EXAM:  Gen - NAD  Abd - soft, no ttp, BS present  Left Foot - surgical dressings    ABX: IV Unasyn    LABS:  Lab Results   Component Value Date    WBC 9.3 09/25/2024    HGB 9.6 (L) 09/25/2024    HCT 29.1 (L) 09/25/2024    MCV 89 09/25/2024     09/25/2024     Lab Results   Component Value Date    GLUCOSE 134 (H) 09/26/2024    CALCIUM 8.3 (L) 09/26/2024     (L) 09/26/2024    K 4.2 09/26/2024    CO2 28 09/26/2024     09/26/2024    BUN 19 09/26/2024    CREATININE 0.63 09/26/2024         Estimated Creatinine Clearance: 122.9 mL/min (by C-G formula based on SCr of 0.63 mg/dL).      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  PAD - s/p angiogram 9/18 with re-vasc  DM II with Peripheral Neuropathy - poorly controlled A1C 9.8% 9/2024, increased risk/severity of infections    IV Unasyn 3g Q6H. Closure planned today. If there is no concern for residual OM I would suggest PO Augmentin 875mg BID for another 1 week from today.    Monitoring for adverse effects of abx such as rash/itching/diarrhea - none thus far.    Will follow peripherally over the weekend. Please call me with questions. Thanks!    Pantera Boston MD  [facility]  Office #[phone]      "

## 2024-09-27 NOTE — DOCUMENTATION CLARIFICATION NOTE
"    PATIENT:               SUBHASH SEGAL  ACCT #:                  [id number]  MRN:                       [id number]  :                       1950  ADMIT DATE:       2024 4:34 PM  DISCH DATE:  RESPONDING PROVIDER #:        [id number]          PROVIDER RESPONSE TEXT:    I agree with dietician diagnosis of \"Moderate malnutrition related to acute disease or injury\" on 24.    CDI QUERY TEXT:    Clarification    Instruction:    Based on your assessment of the patient and the clinical information, please provide the requested documentation by clicking on the appropriate radio button and enter any additional information if prompted.    Question: Please further clarify this patient nutritional status as    When answering this query, please exercise your independent professional judgment. The fact that a question is being asked, does not imply that any particular answer is desired or expected.    The patient's clinical indicators include:  Clinical Information: 74 year old male, BMI 34.87, presenting with a left foot wound with osteomyelitis and a DVT.    Clinical Indicators:  Per  Nutrition Note: \"Moderate malnutrition related to acute disease or injury  As Evidenced by: intake less than 75 percent of estimated needs for greater than 7days, mild muscle loss...    Subcutaneous Fat Loss  Orbital Fat Pads: Mild-Moderate (slight dark circles and slight hollowing)  Buccal Fat Pads: Defer...    Muscle Wasting  Temporalis: Mild-Moderate (slight depression)  Interosseous: Mild-Moderate (slightly depressed area between thumb and forefinger)...    Weight History / percent Weight Change: No weight history available in chart. Unable to determine if any significant weight loss present...    Food and Nutrient History: Patient reports decreased appetite for greater than 3 months. Reports not interest in eating at this time.   was noted to refuse meals at outside facility.\"    Per Latest Diet Order: \"Adult diet " "Regular\"    Treatment:  Nutrition Consult    Per 9/25 Nutrition Note: \"Individualized Nutrition Prescription Provided for : Recommend a consistant CHO, 2-3 gr sodium diet.  Trial Gelatein daily. Glucerna shake daily, if intake remains less than 50 percent and like Glucerna, increase to TID.  Nico BID. MVI daily.  MD to manage IVF as needed...    Meals and Snacks: Carbohydrate-modified diet, Mineral-modified diet  Goal: intake meets greater than 75 percent estimated nutrient needs.  Medical Food Supplement: Commercial beverage, Commercial food, Purpose of medical food  Goal: meet greater than 75 percent increased nutrient needs.\"    Risk Factors: 74 year old male with a history of DM and medication noncompliance being treated for skin breakdown.  Options provided:  -- I agree with dietician diagnosis of \"Moderate malnutrition related to acute disease or injury\" on 9/25/24.  -- Other - I will add my own diagnosis  -- Refer to Clinical Documentation Reviewer    Query created by: Nandini Naranjo on 9/26/2024 2:02 PM      Electronically signed by:  RICHI SMITH-CNP 9/27/2024 10:52 AM          "

## 2024-09-27 NOTE — OP NOTE
Left Foot Surgical Wound Closure (L) Operative Note     Date: 2024  OR Location: [facility] A OR    Name: Elliot Partida, : 1950, Age: 74 y.o., MRN: [id number], Sex: male    Diagnosis  Pre-op Diagnosis      * Osteomyelitis of left foot, unspecified type (Multi) [M86.9]     * Delayed surgical wound healing of foot amputation stump (Multi) [T87.89, T81.89XA] Post-op Diagnosis     * Osteomyelitis of left foot, unspecified type (Multi) [M86.9]     * Delayed surgical wound healing of foot amputation stump (Multi) [T87.89, T81.89XA]     Procedures  28122 x 1, L  28005 x 1, L  11043 x 1, L  11042 x 1, L  11045 x 9, L      Surgeons      * Courtney Huynh - Primary    Resident/Fellow/Other Assistant:    Adithya Hale PGY-1     Procedure Summary  Anesthesia: Monitor Anesthesia Care  ASA: III  Anesthesia Staff: Anesthesiologist: Júnior Holden MD  C-AA: LISA Espinoza; LISA Christianson  Estimated Blood Loss: 25 mL  Intra-op Medications:   Administrations occurring from 1100 to 1230 on 24:   Medication Name Total Dose   BUPivacaine HCl (Marcaine) 0.5 % (5 mg/mL) 5 mL, lidocaine PF (Xylocaine) 10 mg/mL (1 %) 5 mL syringe 10 mL   ampicillin-sulbactam (Unasyn) in sodium chloride 0.9 % 100 mL 3 g 100 mL   insulin lispro (HumaLOG) injection 0-10 Units Cannot be calculated              Anesthesia Record               Intraprocedure I/O Totals          Intake    LR bolus 500.00 mL    Total Intake 500 mL       Output    Est. Blood Loss 25 mL    Total Output 25 mL       Net    Net Volume 475 mL          Specimen:   ID Type Source Tests Collected by Time   A : LEFT FOOT BONE Swab DIGIT, ACCESSORY LEFT FOOT TISSUE/WOUND CULTURE/SMEAR Courtney Huynh DPM 2024 1217        Staff:   Arielle: Julia He Person: Ellen Montoya Scrub: Claudia Montoya Circulator: Olivia         Drains and/or Catheters: * None in log *    Tourniquet Times: n/a        Implants:  Implants       Type Name Action Serial No.      Graft  HAILEY, 2000MG - [id number] - [id number] Implanted -[id number]              Findings: intra-op findings consistent with clinical and radiographic findings    Indications: Elliot Partida is an 74 y.o. male who is having surgery for Osteomyelitis of left foot, unspecified type (Multi) [M86.9]  Delayed surgical wound healing of foot amputation stump (Multi) [T87.89, T81.89XA].     The patient was seen in the preoperative area. The risks, benefits, complications, treatment options, non-operative alternatives, expected recovery and outcomes were discussed with the patient. The possibilities of reaction to medication, pulmonary aspiration, injury to surrounding structures, bleeding, recurrent infection, the need for additional procedures, failure to diagnose a condition, and creating a complication requiring transfusion or operation were discussed with the patient. The patient concurred with the proposed plan, giving informed consent.  The site of surgery was properly noted/marked if necessary per policy. The patient has been actively warmed in preoperative area. Preoperative antibiotics have been ordered and given within 1 hours of incision. Venous thrombosis prophylaxis have been ordered including unilateral sequential compression device    Procedure Details:     PRE-PROCEDURE INFORMATION:  In pre-op holding area, the left extremity to be operated on was clearly marked and the patient verified correct laterality of the marking.  The patient was brought to the operating room and placed on the operating table in the supine position.  A timeout was performed in which identification of the correct patient, procedure, location, and materials was done. Following MAC sedation, local anesthesia was obtained utilizing 10 cc of 1:1 mixture of 2 % lidocaine plain and 0.5% marcaine plain . The Left foot was then scrubbed, prepped and draped in the usual aseptic manner.     DESCRIPTION OF PROCEDURE:   63596,  70430: Attention was drawn to the anterior aspect of the left leg. A large serosanguinous fluid filled bullae along this area was lysed. After draining the fluid, the bullae was de-roofed. The wound was then excisionally debrided down to the level of subcutaneus tissue using Misonix ultrasound. Post-debridement measurements were obtained: 24.5 cm x 8 cm x 0.1 cm.    76887, 94939, 64004: Attention was then drawn to the lateral aspect of the left foot, to the area of previous partial 5th ray amputation. Necrotic nonviable tissue was noted within the wound. A #15 blade and  were used to removed all non-viable soft tissue down to and including muscle. Final post debridement measurements: 4.2 x 2.8 x 0.8cm.   The 5th metatarsal shaft was then further resected down with the sagittal saw. Misonix was then used to flush the amputation site. Using a sterile bone cutter, a sample of the remaining 5th metatarsal was taken and was sent for bone culture. Prolene was used along the proximal incision to reapproximate the incision but distally the area could not be closed due to large defect from first surgery with Dr Spears      All surgical wounds were irrigated copiously with saline. The left lateral foot wound was then filled with Axiofill graft . Dressing was placed on the surgical extremity consisting of xeroform on anterior leg and dorsal foot, betadine soaked adaptic on the lateral incision, gauze, ABD, webril, kerlix, and ACE without compression.     POSTOPERATIVE INFORMATION: The patient tolerated the above noted procedure and anesthesia well and was transferred to the PACU with vital signs stable, and vascular status intact with capillary refill intact to the most distal aspect of the operative extremity. Postoperative instructions reviewed in detail with the patient and family with written instructions provided. Patient will return to floor. Should any problems, questions, or concerns arise, primary team to page or  [facility] podiatry team.      Complications:  None; patient tolerated the procedure well.    Disposition: PACU - hemodynamically stable.  Condition: stable       Additional Details: The patient will be returned to the medical floor following postoperative monitoring. The patient is to remain nonweightbearing to the surgical extremity. Podiatry will change his dressing this weekend.     Attending Attestation: I was present and scrubbed for the entire procedure.    Courtney Huynh  Phone Number: [phone]        This is Adithya Hale DPM dictating the operative note for Dr. Courtney Huynh DPM.

## 2024-09-27 NOTE — ANESTHESIA PREPROCEDURE EVALUATION
Patient: Elliot Partida    Procedure Information       Anesthesia Start Date/Time: 09/27/24 1127    Procedure: Left Foot Surgical Wound Closure (Left: Foot)    Location: [facility] A OR 05 / [facility] A OR    Surgeons: Courtney Huynh DPM            Relevant Problems   Cardiac   (+) CAD (coronary artery disease)   (+) Hyperlipidemia   (+) Hypertension   (+) MI (myocardial infarction) (Multi)   (+) Peripheral arterial disease (CMS-HCC)   (+) Type 2 diabetes mellitus with diabetic peripheral angiopathy without gangrene, without long-term current use of insulin (Multi)      Pulmonary   (+) Chronic obstructive pulmonary disease (Multi)      Neuro   (+) CVA (cerebral vascular accident) (Multi)   (+) Diabetic peripheral neuropathy (Multi)   (+) Lumbar back pain with radiculopathy affecting left lower extremity      Endocrine   (+) Diabetic peripheral neuropathy (Multi)   (+) Obesity   (+) Type 2 diabetes mellitus   (+) Type 2 diabetes mellitus with diabetic peripheral angiopathy without gangrene, without long-term current use of insulin (Multi)      Hematology   (+) Acute deep vein thrombosis (DVT) of popliteal vein of left lower extremity (Multi)   (+) Anemia      Musculoskeletal   (+) Chronic bilateral low back pain with left-sided sciatica      ID   (+) Osteomyelitis   (+) Osteomyelitis of left foot, unspecified type (Multi)   (+) Wound infection       Clinical information reviewed:    Allergies                NPO Detail:  NPO/Void Status  Date of Last Liquid: 09/27/24  Time of Last Liquid: 0920  Date of Last Solid: 09/27/24  Time of Last Solid: 0000         Physical Exam    Airway  Mallampati: I  TM distance: >3 FB  Neck ROM: full     Cardiovascular - normal exam     Dental - normal exam     Pulmonary - normal exam     Abdominal - normal exam         Anesthesia Plan    History of general anesthesia?: yes  History of complications of general anesthesia?: no    ASA 3     general     The patient is not a current  smoker.  Patient was not previously instructed to abstain from smoking on day of procedure.  Patient did not smoke on day of procedure.    intravenous induction   Postoperative administration of opioids is intended.  Anesthetic plan and risks discussed with patient.  Use of blood products discussed with patient who.    Plan discussed with CRNA.

## 2024-09-27 NOTE — SIGNIFICANT EVENT
PODIATRIC MEDICINE & SURGERY - SIGNIFICANT EVENT NOTE  S/P Left foot wound culture (DOS 09/27/24, Dr. Huynh)    No intra-op complications.  Please resume all medications including antibiotics, anticoagulants, and/or pain medication as per Medicine/ID.  Resume prior appropriate diet.  NWB to surgical extremity.  May elevate surgical extremity.  Please keep dressing clean, dry and intact.  May reinforce dressings with 4x4s, ABDs, Kerlix, and light ACE bandage for strikethrough bleeding.  Podiatry to change dressing daily.    Adithya Hale DPM PGY-1

## 2024-09-27 NOTE — NURSING NOTE
Pt made NPO for OR in am. All fluids removed from bedside at this time. Pt verbally acknowledges nothing to eat/drink.

## 2024-09-27 NOTE — ANESTHESIA POSTPROCEDURE EVALUATION
Patient: Elliot Partida    Procedure Summary       Date: 09/27/24 Room / Location: [facility] OR 05 / [facility] OR    Anesthesia Start: 1127 Anesthesia Stop: 1317    Procedure: Left Foot Surgical Wound Closure (Left: Foot) Diagnosis:       Osteomyelitis of left foot, unspecified type (Multi)      Delayed surgical wound healing of foot amputation stump (Multi)      (Osteomyelitis of left foot, unspecified type (Multi) [M86.9])      (Delayed surgical wound healing of foot amputation stump (Multi) [T87.89, T81.89XA])    Surgeons: Courtney Huynh DPM Responsible Provider: Júnior Holden MD    Anesthesia Type: MAC ASA Status: 3            Anesthesia Type: MAC    Vitals Value Taken Time   /53 09/27/24 1313   Temp 36 °C (96.8 °F) 09/27/24 1313   Pulse 68 09/27/24 1313   Resp 16 09/27/24 1313   SpO2  09/27/24 1324       Anesthesia Post Evaluation    Patient location during evaluation: PACU  Patient participation: complete - patient participated  Level of consciousness: awake and alert  Pain score: 4  Pain management: adequate  Airway patency: patent  Cardiovascular status: acceptable  Respiratory status: acceptable  Hydration status: acceptable  Postoperative Nausea and Vomiting: none      There were no known notable events for this encounter.

## 2024-09-27 NOTE — PROGRESS NOTES
Physical Therapy                 Therapy Communication Note    Patient Name: Elliot Partida  MRN: [id number]  Department: [facility] OR  Room: [facility] OR  Today's Date: 9/27/2024     Completion of this session and documentation performed under the supervision of Glenroy Luo PT, DPT     Discipline: Physical Therapy    Missed Visit Reason: Missed Visit Reason: Patient in a medical procedure (Pt going to OR today for wound closure. Unable to attempt PT eval at this time.)    Missed Time: Attempt

## 2024-09-28 PROBLEM — Z78.9 IMPAIRED MOBILITY AND ADLS: Status: ACTIVE | Noted: 2024-09-28

## 2024-09-28 PROBLEM — Z74.09 IMPAIRED MOBILITY AND ADLS: Status: ACTIVE | Noted: 2024-09-28

## 2024-09-28 LAB
ANION GAP SERPL CALC-SCNC: 16 MMOL/L (ref 10–20)
BASOPHILS # BLD AUTO: 0.04 X10*3/UL (ref 0–0.1)
BASOPHILS NFR BLD AUTO: 0.3 %
BUN SERPL-MCNC: 24 MG/DL (ref 6–23)
CALCIUM SERPL-MCNC: 8.9 MG/DL (ref 8.6–10.3)
CHLORIDE SERPL-SCNC: 100 MMOL/L (ref 98–107)
CO2 SERPL-SCNC: 24 MMOL/L (ref 21–32)
CREAT SERPL-MCNC: 0.64 MG/DL (ref 0.5–1.3)
EGFRCR SERPLBLD CKD-EPI 2021: >90 ML/MIN/1.73M*2
EOSINOPHIL # BLD AUTO: 0.13 X10*3/UL (ref 0–0.4)
EOSINOPHIL NFR BLD AUTO: 0.9 %
ERYTHROCYTE [DISTWIDTH] IN BLOOD BY AUTOMATED COUNT: 14.6 % (ref 11.5–14.5)
GLUCOSE BLD MANUAL STRIP-MCNC: 138 MG/DL (ref 74–99)
GLUCOSE BLD MANUAL STRIP-MCNC: 144 MG/DL (ref 74–99)
GLUCOSE BLD MANUAL STRIP-MCNC: 145 MG/DL (ref 74–99)
GLUCOSE BLD MANUAL STRIP-MCNC: 147 MG/DL (ref 74–99)
GLUCOSE BLD MANUAL STRIP-MCNC: 190 MG/DL (ref 74–99)
GLUCOSE SERPL-MCNC: 143 MG/DL (ref 74–99)
HCT VFR BLD AUTO: 29.7 % (ref 41–52)
HGB BLD-MCNC: 9.5 G/DL (ref 13.5–17.5)
IMM GRANULOCYTES # BLD AUTO: 0.12 X10*3/UL (ref 0–0.5)
IMM GRANULOCYTES NFR BLD AUTO: 0.8 % (ref 0–0.9)
LYMPHOCYTES # BLD AUTO: 3.28 X10*3/UL (ref 0.8–3)
LYMPHOCYTES NFR BLD AUTO: 22.8 %
MCH RBC QN AUTO: 28.5 PG (ref 26–34)
MCHC RBC AUTO-ENTMCNC: 32 G/DL (ref 32–36)
MCV RBC AUTO: 89 FL (ref 80–100)
MONOCYTES # BLD AUTO: 1.38 X10*3/UL (ref 0.05–0.8)
MONOCYTES NFR BLD AUTO: 9.6 %
NEUTROPHILS # BLD AUTO: 9.44 X10*3/UL (ref 1.6–5.5)
NEUTROPHILS NFR BLD AUTO: 65.6 %
NRBC BLD-RTO: 0 /100 WBCS (ref 0–0)
PLATELET # BLD AUTO: 380 X10*3/UL (ref 150–450)
POTASSIUM SERPL-SCNC: 4.4 MMOL/L (ref 3.5–5.3)
RBC # BLD AUTO: 3.33 X10*6/UL (ref 4.5–5.9)
SODIUM SERPL-SCNC: 136 MMOL/L (ref 136–145)
WBC # BLD AUTO: 14.4 X10*3/UL (ref 4.4–11.3)

## 2024-09-28 PROCEDURE — 2500000001 HC RX 250 WO HCPCS SELF ADMINISTERED DRUGS (ALT 637 FOR MEDICARE OP): Performed by: INTERNAL MEDICINE

## 2024-09-28 PROCEDURE — 85025 COMPLETE CBC W/AUTO DIFF WBC: CPT | Performed by: INTERNAL MEDICINE

## 2024-09-28 PROCEDURE — 97163 PT EVAL HIGH COMPLEX 45 MIN: CPT | Mod: GP

## 2024-09-28 PROCEDURE — 2500000001 HC RX 250 WO HCPCS SELF ADMINISTERED DRUGS (ALT 637 FOR MEDICARE OP)

## 2024-09-28 PROCEDURE — 2500000004 HC RX 250 GENERAL PHARMACY W/ HCPCS (ALT 636 FOR OP/ED)

## 2024-09-28 PROCEDURE — 82947 ASSAY GLUCOSE BLOOD QUANT: CPT

## 2024-09-28 PROCEDURE — 97535 SELF CARE MNGMENT TRAINING: CPT | Mod: GO

## 2024-09-28 PROCEDURE — 82374 ASSAY BLOOD CARBON DIOXIDE: CPT | Performed by: INTERNAL MEDICINE

## 2024-09-28 PROCEDURE — 36415 COLL VENOUS BLD VENIPUNCTURE: CPT | Performed by: INTERNAL MEDICINE

## 2024-09-28 PROCEDURE — 2500000002 HC RX 250 W HCPCS SELF ADMINISTERED DRUGS (ALT 637 FOR MEDICARE OP, ALT 636 FOR OP/ED)

## 2024-09-28 PROCEDURE — 1200000002 HC GENERAL ROOM WITH TELEMETRY DAILY

## 2024-09-28 PROCEDURE — 2500000005 HC RX 250 GENERAL PHARMACY W/O HCPCS

## 2024-09-28 PROCEDURE — 97166 OT EVAL MOD COMPLEX 45 MIN: CPT | Mod: GO

## 2024-09-28 ASSESSMENT — ACTIVITIES OF DAILY LIVING (ADL)
ADL_ASSISTANCE: INDEPENDENT
BATHING_ASSISTANCE: MAXIMAL
ADL_ASSISTANCE: INDEPENDENT

## 2024-09-28 ASSESSMENT — PAIN DESCRIPTION - LOCATION
LOCATION: FOOT

## 2024-09-28 ASSESSMENT — PAIN - FUNCTIONAL ASSESSMENT
PAIN_FUNCTIONAL_ASSESSMENT: 0-10
PAIN_FUNCTIONAL_ASSESSMENT: WONG-BAKER FACES

## 2024-09-28 ASSESSMENT — PAIN SCALES - GENERAL
PAINLEVEL_OUTOF10: 0 - NO PAIN
PAINLEVEL_OUTOF10: 10 - WORST POSSIBLE PAIN
PAINLEVEL_OUTOF10: 4
PAINLEVEL_OUTOF10: 10 - WORST POSSIBLE PAIN
PAINLEVEL_OUTOF10: 7
PAINLEVEL_OUTOF10: 0 - NO PAIN
PAINLEVEL_OUTOF10: 7
PAINLEVEL_OUTOF10: 7
PAINLEVEL_OUTOF10: 8
PAINLEVEL_OUTOF10: 3

## 2024-09-28 ASSESSMENT — COGNITIVE AND FUNCTIONAL STATUS - GENERAL
TOILETING: A LOT
CLIMB 3 TO 5 STEPS WITH RAILING: TOTAL
DRESSING REGULAR UPPER BODY CLOTHING: A LITTLE
MOVING FROM LYING ON BACK TO SITTING ON SIDE OF FLAT BED WITH BEDRAILS: A LOT
PERSONAL GROOMING: A LITTLE
MOVING TO AND FROM BED TO CHAIR: A LOT
TURNING FROM BACK TO SIDE WHILE IN FLAT BAD: A LOT
DRESSING REGULAR LOWER BODY CLOTHING: A LOT
STANDING UP FROM CHAIR USING ARMS: A LOT
HELP NEEDED FOR BATHING: A LOT
DAILY ACTIVITIY SCORE: 16
WALKING IN HOSPITAL ROOM: TOTAL
MOBILITY SCORE: 10

## 2024-09-28 ASSESSMENT — PAIN DESCRIPTION - ORIENTATION
ORIENTATION: LEFT

## 2024-09-28 ASSESSMENT — PAIN DESCRIPTION - DESCRIPTORS: DESCRIPTORS: BURNING

## 2024-09-28 NOTE — PROGRESS NOTES
INTERNAL MEDICINE PROGRESS NOTE      HPI:    Patient has not been compliant with nonweightbearing.  He complains of some pain.  He declines skilled nursing facility placement.    Vital signs in last 24 hours:  Temp:  [36 °C (96.8 °F)-36.8 °C (98.3 °F)] 36.5 °C (97.7 °F)  Heart Rate:  [62-68] 64  Resp:  [14-19] 18  BP: (121-170)/(52-76) 128/65    Physical Examination:  Physical Exam    Constitutional:       Appearance: Elderly, overweight, in no distress  HENT:      Head: Normocephalic and atraumatic.   Eyes:      Extraocular Movements: Extraocular movements intact.      Pupils: Pupils are equal, round, and reactive to light.   Cardiovascular:      Rate and Rhythm: Normal rate and regular rhythm.      Pulses: Normal pulses.      Heart sounds: Normal heart sounds.   Pulmonary:      Effort: Pulmonary effort is normal.      Breath sounds: Normal breath sounds.   Abdominal:      General: Abdomen is flat. Bowel sounds are normal.      Palpations: Abdomen is soft.   Musculoskeletal:         General: Normal range of motion.      Cervical back: Normal range of motion and neck supple.   Skin:     General: Dressing D/I to foot.   Neurological:      General: No focal deficit present.      Mental Status: He is alert and oriented to person, place, and time. Mental status is at baseline.        Medications:    Current Facility-Administered Medications:     acetaminophen (Tylenol) tablet 650 mg, 650 mg, oral, q4h PRN, 650 mg at 09/24/24 1755 **OR** acetaminophen (Tylenol) oral liquid 650 mg, 650 mg, oral, q4h PRN **OR** acetaminophen (Tylenol) suppository 650 mg, 650 mg, rectal, q4h PRN, Diaeddine O Geoffrey, DPM    ammonium lactate (Lac-Hydrin) 12 % lotion, , Topical, BID, Diaeddine O Geoffrey, DPM, Given at 09/27/24 2036    ampicillin-sulbactam (Unasyn) in sodium chloride 0.9 % 100 mL 3 g, 3 g, intravenous, q6h, Diaeddine O Geoffrey, DPM, Stopped at 09/28/24 1118    aspirin chewable tablet 81 mg, 81 mg, oral, Daily, Diaeddine O  Geoffrey, DPM, 81 mg at 09/28/24 0840    clopidogrel (Plavix) tablet 600 mg, 600 mg, oral, Once, Diaeddine O Geoffrey, DPM    clopidogrel (Plavix) tablet 75 mg, 75 mg, oral, Daily, Diaeddine O Geoffrey, DPM, 75 mg at 09/28/24 0840    dextrose 50 % injection 12.5 g, 12.5 g, intravenous, q15 min PRN, Diaeddine O Geoffrey, DPM    dextrose 50 % injection 25 g, 25 g, intravenous, q15 min PRN, Diaeddine O Geoffrey, DPM    glucagon (Glucagen) injection 1 mg, 1 mg, intramuscular, q15 min PRN, Diaeddine O Geoffrey, DPM    glucagon (Glucagen) injection 1 mg, 1 mg, intramuscular, q15 min PRN, Diaeddine O Geoffrey, DPM    heparin 25,000 Units in dextrose 5% 250 mL (100 Units/mL) infusion (premix), 0-4,500 Units/hr, intravenous, Continuous, Diaeddine O Geoffrey, DPM, Stopped at 09/24/24 1433    heparin bolus from bag 3,000-6,000 Units, 3,000-6,000 Units, intravenous, q4h PRN, Diaeddine O Geoffrey, DPM    HYDROmorphone (Dilaudid) injection 1 mg, 1 mg, intravenous, q4h PRN, Diaeddine O Geoffrey, DPM, 1 mg at 09/28/24 0315    insulin lispro (HumaLOG) injection 0-10 Units, 0-10 Units, subcutaneous, TID, Diaeddine O Geoffrey, DPM, 2 Units at 09/22/24 1652    lidocaine (LMX) 4 % cream, , Topical, 4x daily PRN, Diaeddine O Geoffrey, DPM, Given at 09/21/24 2107    lidocaine-epinephrine (Xylocaine W/EPI) 0.5 %-1:200,000 injection 20 mL, 20 mL, subcutaneous, Once, Diaeddine O Geoffrey, DPM    lidocaine-epinephrine (Xylocaine W/EPI) 1 %-1:100,000 injection 20 mL, 20 mL, injection, Once, Diaeddine O Geoffrey, DPM    morphine injection 2 mg, 2 mg, intravenous, q4h PRN, Diaeddine O Geoffrey, DPM, 2 mg at 09/28/24 0852    ondansetron (Zofran) injection 4 mg, 4 mg, intravenous, q4h PRN, Diaeddine O Geoffrey, DPM    polyethylene glycol (Glycolax, Miralax) packet 17 g, 17 g, oral, Daily, Diaeddine O Geoffrey, DPM, 17 g at 09/27/24 0913    potassium chloride CR (Klor-Con M20) ER tablet 40 mEq, 40 mEq, oral, Daily, Diaeddine O Geoffrey, DPM, 40 mEq at 09/28/24 0840    Laboratory Findings:  Lab Results   Component Value  Date    WBC 9.3 09/25/2024    HGB 9.6 (L) 09/25/2024    HCT 29.1 (L) 09/25/2024    MCV 89 09/25/2024     09/25/2024     Lab Results   Component Value Date    INR 1.0 12/09/2019    PROTIME 11.4 12/09/2019     Lab Results   Component Value Date    GLUCOSE 134 (H) 09/26/2024    CALCIUM 8.3 (L) 09/26/2024     (L) 09/26/2024    K 4.2 09/26/2024    CO2 28 09/26/2024     09/26/2024    BUN 19 09/26/2024    CREATININE 0.63 09/26/2024       Assessment and Plan:     Acute osteomyelitis -continue IV antibiotics while here, transition to p.o. per ID recommendations upon discharge.  Foot surgery -analgesics as ordered, encouraged to comply with nonweightbearing status.  Wound VAC to be applied before discharge.  Acute DVT -will start on Eliquis, clearance given by surgery.  Diabetes type 2 -good control with no medications, monitor.     Plan to discharge in 48 hours with application of wound VAC.       Jemal Guadarrama MD  09/28/24  1230pm

## 2024-09-28 NOTE — PROGRESS NOTES
Physical Therapy    Physical Therapy Evaluation    Patient Name: Elliot Partida  MRN: [id number]  Department: [facility] [address]  Room: [address] [place] [address]  Today's Date: 9/28/2024   Time Calculation  Start Time: 1014  Stop Time: 1031  Time Calculation (min): 17 min    Assessment/Plan   PT Assessment  PT Assessment Results: Decreased strength, Decreased endurance, Impaired balance, Decreased mobility, Decreased safety awareness, Pain  Rehab Prognosis: Good  Evaluation/Treatment Tolerance: Treatment limited secondary to agitation, Patient limited by pain  Medical Staff Made Aware: Yes  End of Session Communication: Bedside nurse  Assessment Comment: Patient presents with decreased strength, endurance, functional mobility. Patient requires cues for safety and not to put weight on LLE. However, patient gets agitated, impulsive in between the PT session. Patient is non compliant with NWB LLE. Pt requires Moderate assistance for transfers. Pt will benefit from on going PT services to return to [facility].  End of Session Patient Position: Bed, 3 rail up, Alarm on  IP OR SWING BED PT PLAN  Inpatient or Swing Bed: Inpatient  PT Plan  Treatment/Interventions: Bed mobility, Transfer training, Gait training, Stair training, Balance training, Neuromuscular re-education, Strengthening, Therapeutic exercise, Therapeutic activity, Positioning  PT Plan: Ongoing PT  PT Frequency: 4 times per week  PT Discharge Recommendations: Moderate intensity level of continued care  PT Recommended Transfer Status: Assist x1  PT - OK to Discharge: Yes (To establish POC.)      Subjective   General Visit Information:  General  Reason for Referral: Osteomyelitis of L foot, L foot wound infection, peripheral arterial disease.  Referred By: Adithya Hale DPM  Past Medical History Relevant to Rehab:   Past Medical History:   Diagnosis Date    Arthritis     CAD (coronary artery disease)     Diabetes mellitus (Multi)     Hypertension     Peripheral neuropathy     Stroke  (Multi)     Thoracic radiculopathy       Family/Caregiver Present: No  Prior to Session Communication: Bedside nurse  Patient Position Received: Bed, 3 rail up, Alarm on  General Comment: Patient was impulsive but was aggreable to participate in PT session.  Home Living:  Home Living  Type of Home: House  Lives With: Other (Comment) (sister)  Home Adaptive Equipment: Walker rolling or standard, Cane, Wheelchair-manual, Crutches  Home Layout: One level  Home Access: Stairs to enter with rails  Entrance Stairs-Rails: None  Entrance Stairs-Number of Steps:  (Pt unsure about how many stairs.)  Bathroom Shower/Tub: Tub/shower unit  Bathroom Toilet: Standard  Bathroom Equipment: Grab bars in shower, Grab bars around toilet  Prior Level of Function:  Prior Function Per Pt/Caregiver Report  Level of [place]: Independent with ADLs and functional transfers  Receives Help From: Other (Comment) (sister and niece)  ADL Assistance: Independent  Homemaking Assistance: Needs assistance (sister helps)  Ambulatory Assistance: Independent  Precautions:  Precautions  LE Weight Bearing Status: Left Non-Weight Bearing  Medical Precautions: Fall precautions, Cardiac precautions       Objective   Pain:  Pain Assessment  Pain Assessment: 0-10  0-10 (Numeric) Pain Score: 7  Pain Type: Surgical pain  Pain Location: Foot  Pain Orientation: Left  Pain Interventions: Repositioned, Relaxation technique  Cognition:  Cognition  Overall Cognitive Status: Within Functional Limits  Orientation Level: Oriented X4  Impulsive: Moderately    General Assessments:  Activity Tolerance  Endurance: Tolerates 10 - 20 min exercise with multiple rests    Postural Control  Postural Control: Within Functional Limits    Static Sitting Balance  Static Sitting-Balance Support: Bilateral upper extremity supported  Static Sitting-Level of Assistance: Minimum assistance  Dynamic Sitting Balance  Dynamic Sitting-Balance Support: Bilateral upper extremity  supported  Dynamic Sitting-Level of Assistance: Minimum assistance    Static Standing Balance  Static Standing-Balance Support: Bilateral upper extremity supported  Static Standing-Level of Assistance: Moderate assistance  Static Standing-Comment/Number of Minutes:  (Patient is non compliant with non weight bearing on LLE.)  Functional Assessments:  Bed Mobility  Bed Mobility: Yes  Bed Mobility 1  Bed Mobility 1: Supine to sitting, Sitting to supine  Level of Assistance 1: Minimum assistance    Transfers  Transfer: Yes  Transfer 1  Technique 1: Sit to stand, Stand to sit  Transfer Device 1: Gait belt, Walker  Transfer Level of Assistance 1: Moderate assistance  Trials/Comments 1: Patient requires cues for safety, is impulsive and non compliant with Non weight bearing LLE.    Ambulation/Gait Training  Ambulation/Gait Training Performed: No    Stairs  Stairs: No  Extremity/Trunk Assessments:  RLE   RLE : Within Functional Limits  LLE   LLE : Within Functional Limits (L ankle ROM not tested.)  Outcome Measures:  [facility] Basic Mobility  Turning from your back to your side while in a flat bed without using bedrails: A lot  Moving from lying on your back to sitting on the side of a flat bed without using bedrails: A lot  Moving to and from bed to chair (including a wheelchair): A lot  Standing up from a chair using your arms (e.g. wheelchair or bedside chair): A lot  To walk in hospital room: Total  Climbing 3-5 steps with railing: Total  Basic Mobility - Total Score: 10    Encounter Problems       Encounter Problems (Active)       Mobility       STG - Patient will perform hopping 25 feet with RW with Minimum assistance while maintaining NWB LLE.       Start:  09/28/24    Expected End:  10/12/24               PT Transfers       LTG - Patient will transfer from one surface to another with contact guard assistance while maintaining NWB LLE.       Start:  09/28/24    Expected End:  10/12/24            STG - Patient will  perform bed mobility with close supervision.       Start:  09/28/24    Expected End:  10/12/24            STG - Patient will transfer sit to and from stand with contact guard assistance with RW while maintaining NWB LLE.       Start:  09/28/24    Expected End:  10/12/24               Pain - Adult          Safety       LTG - Patient will adhere to weight bearing precautions during ADL's and transfers       Start:  09/28/24    Expected End:  10/12/24                   Education Documentation  Precautions, taught by Jodi Sheffield PT at 9/28/2024 12:11 PM.  Learner: Patient  Readiness: Acceptance  Method: Explanation, Demonstration  Response: Verbalizes Understanding, Needs Reinforcement    ADL Training, taught by Jodi Sheffield PT at 9/28/2024 12:11 PM.  Learner: Patient  Readiness: Acceptance  Method: Explanation, Demonstration  Response: Verbalizes Understanding, Needs Reinforcement    Education Comments  No comments found.

## 2024-09-28 NOTE — PROGRESS NOTES
9/28/2024 3:14 PM I spoke with patient's sister. Patient lives with his sister. I discussed that patient refused SNF per doctor's note.. Sister wants to know if patient can get a hospital bed for the house. Roxanne RUSSELL

## 2024-09-28 NOTE — PROGRESS NOTES
PROGRESS NOTE - PODIATRIC MEDICINE & SURGERY    HPI    Patient is a 74 y.o. male who is admitted since 9/13 for left foot pain/wound.   Underwent left partial 5th ray resection on 9/23/24 with Dr. Regan DPM.   Taken back to OR yesterday, 9/27, for delayed closure with graft application with Dr. Amina DPM.   Today, he reports feeling over all well.  He has no new pedal concerns.     ROS: Negative except as above.      Past Medical History:   Diagnosis Date    Arthritis     CAD (coronary artery disease)     Diabetes mellitus (Multi)     Hypertension     Peripheral neuropathy     Stroke (Multi)     Thoracic radiculopathy          PHYSICAL EXAM    Vitals:    09/28/24 0820   BP: 133/67   Pulse: 62   Resp: 19   Temp: 36.7 °C (98 °F)   SpO2: 95%       General:   NAD. Cooperative. Pleasant. Siting up in bed comfortably.    Derm:  Left lower extremity clean, dry, intact. No strikethough.   Left digits are exposed and warm to touch.   Left toenails are dystrophic, thickened, discolored.     Vascular:   Left digits are warm to touch.    MSK:  Moving all extremities spontaneously.     Neuro:   Alert. Conversing and answering questions.      RESULTS    CBC  Lab Results   Component Value Date    WBC 9.3 09/25/2024    HGB 9.6 (L) 09/25/2024    HCT 29.1 (L) 09/25/2024    MCV 89 09/25/2024     09/25/2024       ESR  Lab Results   Component Value Date    SEDRATE 24 (H) 09/13/2024       CRP  Lab Results   Component Value Date    CRP 9.51 (H) 09/13/2024       HgbA1c  Lab Results   Component Value Date    HGBA1C 9.8 (H) 09/13/2024       Cultures  Susceptibility data from last 90 days.  Collected Specimen Info Organism   09/23/24 Swab from DIGIT FIFTH, LEFT FOOT Mixed Skin Microorganisms    09/13/24 Tissue/Biopsy from Wound/Tissue Mixed Anaerobic Bacteria     Mixed Gram-Positive and Gram-Negative Bacteria       9/27/24 Intra-op wound culture - no growth to date (preliminary)        XRAY LEFT FOOT 9/12/24    FINDINGS:    There is  no displaced fracture.  The alignment is anatomic.  Soft  tissue swelling and ulceration noted lateral to the fifth MTP joint  associated with demineralization of the fifth proximal phalanx and the  distal head of the fifth metatarsal.  Bony changes consistent with osteomyelitis.    IMPRESSION:  Osteomyelitis of the fifth metatarsal head and proximal phalanx of the fifth toe.      VENOUS DUPLEX LEFT LOWER EXTREMITY 9/12/24    IMPRESSION:    Evidence for acute occlusive DVT within the left mid-distal femoral  and popliteal veins.    Compared to prior ultrasound, this represents a new finding.        CTA AORTA AND ILIOFEMORAL RUNOFF BL W/ W/O CONTRAST 9/13/24    IMPRESSION:    1. Diffuse partially calcified atherosclerotic disease, as detailed  above. There is a single vessel runoff in the right leg. In the left  leg, there is high-grade stenosis of the distal SFA as it crosses  Gaetano's canal. Additionally, there is occlusive disease of the  proximal trifurcation with reconstitution of the left posterior  tibial and peroneal arteries distally.      2. Significant soft tissue swelling of the left leg with prominent  venous varicosities. There is also soft tissue swelling of the left  foot with an open wound along the plantar aspect of the foot centered  at the 5th MTP joint, which demonstrates adjacent bony destruction.  Curvilinear hyperdensity in this region may reflect bony fragments or  foreign bodies.      3. Coronary artery calcifications.        VASCULAR US BEN W/O EXERCISE 9/16/24    CONCLUSIONS:    Right Lower PVR: No evidence of arterial occlusive disease in the right lower extremity at rest. Decreased digital perfusion noted. Multiphasic flow is noted in the right common femoral artery, right posterior tibial artery and right dorsalis pedis artery.    Left Lower PVR: No evidence of arterial occlusive disease in the left lower extremity at rest. Decreased digital perfusion noted. Monophasic flow is noted in  the left posterior tibial artery and left dorsalis pedis artery. Multiphasic flow is noted in the left common femoral artery. Waveform may be dmapened due to edema.     Additional Findings: Known DVT in the left femoral and popliteal veins.        Imaging & Doppler Findings:     RIGHT Lower PVR                   Pressures Ratios  Right Posterior Tibial (Ankle)  155 mmHg  0.99  Right Dorsalis Pedis (Ankle)    155 mmHg  0.99  Right Digit (Great Toe)             66 mmHg   0.42         LEFT Lower PVR                     Pressures Ratios  Left Posterior Tibial (Ankle)   142 mmHg  0.91  Left Dorsalis Pedis (Ankle)     128 mmHg  0.82  Left Digit (Great Toe)              52 mmHg   0.33                                             Right            Left  Brachial Pressure         156 mmHg   150 mmHg        ASSESSMENT AND PLAN  S/P DPC and graft application, left foot (9/27, Dr. Huynh)  S/P 5th partial ray resection, left foot (9/23/24, Dr. Spears)  Cellulitis, left foot  Osteomyelitis, left foot  Diabetic ulcer of left foot  DMT2, poorly controlled  Diabetic peripheral neuropathy  Peripheral vascular disease  Acute occlusive DVT, LLE  Venous insufficiency    - Interval notes and results reviewed. Podiatric exam done.   - Dressing to LLE left intact to to allow incorporation of graft that was applied yesterday.   - Consulted wound care nurse for wound VAC application.   - Per ID, continuing IV Unasyn inpatient. Switch to Augmentin 875mg BID for 1 week at discharge. ID has signed off.  - NWB left foot.  - Findings and plan discussed with patient. All questions answered.   - Podiatry will continue following.      This patient was evaluated with the attending physician, Dr. Trent Oden DPM.    Yane Branch DPM PGY2  Podiatric Medicine and Surgery

## 2024-09-28 NOTE — PROGRESS NOTES
"  INFECTIOUS DISEASE DAILY PROGRESS NOTE    SUBJECTIVE:    OR went well yesterday - some additional bone was removed. No fevers overnight. No new complaints.    OBJECTIVE:  VITALS (Last 24 Hours)  /58 (BP Location: Right arm, Patient Position: Lying)   Pulse 65   Temp 36.7 °C (98 °F) (Temporal)   Resp 16   Ht 1.753 m (5' 9.02\")   Wt 102 kg (225 lb 8.5 oz)   SpO2 95%   BMI 33.29 kg/m²     PHYSICAL EXAM:  Gen - NAD  Abd - soft, no ttp, BS present  Left Foot - surgical dressings present    ABX: IV Unasyn    LABS:  Lab Results   Component Value Date    WBC 9.3 09/25/2024    HGB 9.6 (L) 09/25/2024    HCT 29.1 (L) 09/25/2024    MCV 89 09/25/2024     09/25/2024     Lab Results   Component Value Date    GLUCOSE 134 (H) 09/26/2024    CALCIUM 8.3 (L) 09/26/2024     (L) 09/26/2024    K 4.2 09/26/2024    CO2 28 09/26/2024     09/26/2024    BUN 19 09/26/2024    CREATININE 0.63 09/26/2024         Estimated Creatinine Clearance: 122.9 mL/min (by C-G formula based on SCr of 0.63 mg/dL).      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  PAD - s/p angiogram 9/18 with re-vasc  DM II with Peripheral Neuropathy - poorly controlled A1C 9.8% 9/2024, increased risk/severity of infections    IV Unasyn 3g Q6H.    OK to go home from ID perspective. PO Augmentin 875mg BID for 1 week.    Monitoring for adverse effects of abx such as rash/itching/diarrhea - none thus far.    Will sign off. Please call back with questions. Thanks!    Pantera Boston MD  [facility]  Office #[phone]      "

## 2024-09-28 NOTE — PROGRESS NOTES
Occupational Therapy    Evaluation/Treatment    Patient Name: Elliot Partida  MRN: [id number]  Department: [place] [address]  Room: [address] [place] [address]  Today's Date: 09/28/24  Time Calculation  Start Time: 1610  Stop Time: 1642  Time Calculation (min): 32 min       Assessment:  OT Assessment: Patient is deconditioned and requires assistance to safely perform ADL and functional mobility.  Patient would benefit from skilled OT services to maximize functional potential.  Prognosis: Fair  Barriers to Discharge:  (none known)  Evaluation/Treatment Tolerance: Patient limited by pain  Medical Staff Made Aware: Yes  End of Session Communication: Bedside nurse  End of Session Patient Position: Bed, 3 rail up, Alarm on (All needs within reach)  OT Assessment Results: Decreased ADL status, Decreased functional mobility  Strengths: Rehab experience  Barriers to Participation: Insight into problems, Attitude of self, Comorbidities    Plan:  Treatment Interventions: ADL retraining, Functional transfer training, UE strengthening/ROM, Patient/family training  OT Frequency: 3 times per week  OT Discharge Recommendations: Moderate intensity level of continued care  OT Recommended Transfer Status: Maximum assist  OT - OK to Discharge: Yes (OT POC established this date)  Treatment Interventions: ADL retraining, Functional transfer training, UE strengthening/ROM, Patient/family training    Subjective   General:   OT Received On: 09/28/24  General  Reason for Referral: Left foot surgical wound delayed closure 9/27  Past Medical History Relevant to Rehab: Presented to ED due to c/o left foot pain and wound; s/p partial left 5th ray resection and LLE debridement 9/23; PMH: CAD, DM, MI, HTN, R frontal extradural mass since 2016, arthritis, thoracic radiculopathy, neuropathy  Family/Caregiver Present: No  Prior to Session Communication: Bedside nurse  Patient Position Received: Bed, 3 rail up, Alarm on  General Comment: Agreeable to eval; generally not  receptive to education & instructions    Precautions:  LE Weight Bearing Status: Left Non-Weight Bearing  Medical Precautions: Fall precautions    Pain:  Pain Assessment  Pain Assessment: 0-10  0-10 (Numeric) Pain Score: 8  Pain Type: Surgical pain  Pain Location:  (left foot)  Pain Interventions: Repositioned  Multiple Pain Sites: Two  Pain 2  Pain Score 2: 4  Pain Location 2:  (right heel)  Pain Interventions 2: Repositioned (applied protective dressing and elevated heel from bed to relieve pressure; communicated with RN)    Objective   Cognition:  Overall Cognitive Status: Within Functional Limits  Safety/Judgement: Exceptions to WFL  Insight: Mild  Flexibility of Thought: Reduced flexibility        Home Living:  Type of Home: House  Lives With:  (Sister & niece)  Home Adaptive Equipment: Cane, Walker rolling or standard, Wheelchair-manual, Crutches  Home Layout: One level  Home Access:  (reports multiple stairs to enter)  Bathroom Shower/Tub: Tub/shower unit  Bathroom Toilet: Standard  Bathroom Equipment: Shower chair with back (Uses outdoor chair in tub)    Prior Function:  Level of [place]: Independent with ADLs and functional transfers, Needs assistance with homemaking  Receives Help From: Family  ADL Assistance: Independent  Homemaking Assistance:  (sister & niece complete IADL)  Ambulatory Assistance: Independent (Mod I with walker)  Hand Dominance: Right    ADL:  Eating Assistance: Independent  Grooming Assistance: Stand by  Grooming Deficit: Verbal cueing, Supervision/safety, Setup  Bathing Assistance: Maximal  Bathing Deficit: Verbal cueing, Supervision/safety, Right lower leg including foot, Left lower leg including foot, Perineal area  LE Dressing Assistance: Maximal  LE Dressing Deficit: Setup, Verbal cueing, Supervision/safety, Increased time to complete, Don/doff L sock, Thread LLE into pants, Thread LLE into underwear, Pull up over hips, Don/doff L shoe  Toileting Assistance with Device:   (able to use urinal; anticipate mod-max assist for BM)    Activities of Daily Living:    LE Dressing  LE Dressing: Yes  Sock Level of Assistance: Moderate assistance, Moderate verbal cues  LE Dressing Where Assessed: Chair  LE Dressing Comments: With increased time and effort, and cuing for encouragement to complete task, patient able to doff right sock.  Assist req'd to don.  Left foot with bandages in place.       Activity Tolerance:  Endurance: Endurance does not limit participation in activity  Activity Tolerance Comments: Limited by LLE pain     Bed Mobility/Transfers: Bed Mobility  Bed Mobility: Yes  Bed Mobility 1  Bed Mobility 1: Supine to sitting  Level of Assistance 1: Close supervision, Minimal verbal cues, Set up  Bed Mobility Comments 1: hob slightly elevated  Bed Mobility 2  Bed Mobility  2: Sitting to supine  Level of Assistance 2: Minimum assistance, Minimal verbal cues  Bed Mobility Comments 2: assist req'd to lift LLE onto bed    Transfers  Transfer: No  Transfer 1  Trials/Comments 1: Patient declined attempting to stand due to c/o LLE pain.  Attempted education on safe technique to maintain LLE NWB during transfers, but patient not receptive.      Functional Mobility:  Functional Mobility  Functional Mobility Performed: No    Sitting Balance:  Static Sitting Balance  Static Sitting-Level of Assistance: Independent  Dynamic Sitting Balance  Dynamic Sitting-Level of Assistance: Close supervision       Therapy/Activity: Therapeutic Activity  Therapeutic Activity Performed: Yes  Therapeutic Activity 1: Instructed on safe technique to perform bed mobility.  Attempted education on safe transfer technique, but patient declined to attempt.  Therapeutic Activity 2: Encouraged prolonged sitting eob and facilitated safe participation in ADL.  Therapeutic Activity 3: Cleansed and applied protective covering to right heel.  Educated on importance of repositioning for skin protection and repositioned patient's  BLE to minimize pressure and discomfort upon return to bed.     Vision:Vision - Basic Assessment  Current Vision: No visual deficits       Strength:  Strength Comments: BUE WFL; LUE slightly weaker, but patient denies any strength deficits    Coordination:  Movements are Fluid and Coordinated: Yes     Hand Function:  Hand Function  Gross Grasp: Functional  Coordination: Functional    Extremities: RUE   RUE : Within Functional Limits and LUE   LUE: Within Functional Limits (cuing req'd to actively attempt left shoulder AROM)    Outcome Measures: [facility] Daily Activity  Putting on and taking off regular lower body clothing: A lot  Bathing (including washing, rinsing, drying): A lot  Putting on and taking off regular upper body clothing: A little  Toileting, which includes using toilet, bedpan or urinal: A lot  Taking care of personal grooming such as brushing teeth: A little  Eating Meals: None  Daily Activity - Total Score: 16    Education Documentation  Precautions, taught by Jill Cook OT at 9/28/2024  6:28 PM.  Learner: Patient  Readiness: Acceptance  Method: Explanation  Response: Verbalizes Understanding, Needs Reinforcement    ADL Training, taught by Jill Cook OT at 9/28/2024  6:28 PM.  Learner: Patient  Readiness: Acceptance  Method: Explanation  Response: Verbalizes Understanding, Needs Reinforcement    Education Comments  No comments found.      Goals:  Encounter Problems       Encounter Problems (Active)       Bathing       STG - Patient will bathe body Mod I       Start:  09/28/24    Expected End:  10/12/24               Dressings Lower Extremities       STG - Patient will complete lower body dressing Mod I       Start:  09/28/24    Expected End:  10/12/24               OT Transfers       STG - Patient will perform toilet transfer Mod I       Start:  09/28/24    Expected End:  10/12/24               Safety       STG - Patient will adhere to LLE WB restrictions during ADL and transfers        Start:  09/28/24    Expected End:  10/12/24               Toileting       STG - Patient will complete toileting tasks with Mod I       Start:  09/28/24    Expected End:  10/12/24

## 2024-09-28 NOTE — CARE PLAN
The patient's goals for the shift include      The clinical goals for the shift include pt to report a decrease in pain      Problem: Pain  Goal: Takes deep breaths with improved pain control throughout the shift  Outcome: Progressing  Goal: Turns in bed with improved pain control throughout the shift  Outcome: Progressing  Goal: Walks with improved pain control throughout the shift  Outcome: Progressing  Goal: Performs ADL's with improved pain control throughout shift  Outcome: Progressing  Goal: Participates in PT with improved pain control throughout the shift  Outcome: Progressing  Goal: Free from opioid side effects throughout the shift  Outcome: Progressing  Goal: Free from acute confusion related to pain meds throughout the shift  Outcome: Progressing     Problem: Pain - Adult  Goal: Verbalizes/displays adequate comfort level or baseline comfort level  Outcome: Progressing     Problem: Safety - Adult  Goal: Free from fall injury  Outcome: Progressing     Problem: Discharge Planning  Goal: Discharge to home or other facility with appropriate resources  Outcome: Progressing     Problem: Chronic Conditions and Co-morbidities  Goal: Patient's chronic conditions and co-morbidity symptoms are monitored and maintained or improved  Outcome: Progressing     Problem: Diabetes  Goal: Achieve decreasing blood glucose levels by end of shift  Outcome: Progressing  Goal: Increase stability of blood glucose readings by end of shift  Outcome: Progressing  Goal: Decrease in ketones present in urine by end of shift  Outcome: Progressing  Goal: Maintain electrolyte levels within acceptable range throughout shift  Outcome: Progressing  Goal: Maintain glucose levels >70mg/dl to <250mg/dl throughout shift  Outcome: Progressing  Goal: No changes in neurological exam by end of shift  Outcome: Progressing  Goal: Learn about and adhere to nutrition recommendations by end of shift  Outcome: Progressing  Goal: Vital signs within normal  range for age by end of shift  Outcome: Progressing  Goal: Increase self care and/or family involovement by end of shift  Outcome: Progressing  Goal: Receive DSME education by end of shift  Outcome: Progressing     Problem: Fall/Injury  Goal: Not fall by end of shift  Outcome: Progressing  Goal: Be free from injury by end of the shift  Outcome: Progressing     Problem: Nutrition  Goal: Promote healing  Outcome: Progressing     Problem: Skin  Goal: Decreased wound size/increased tissue granulation at next dressing change  Outcome: Progressing  Flowsheets (Taken 9/27/2024 2131)  Decreased wound size/increased tissue granulation at next dressing change: Promote sleep for wound healing  Goal: Participates in plan/prevention/treatment measures  Outcome: Progressing  Flowsheets (Taken 9/27/2024 2131)  Participates in plan/prevention/treatment measures:   Discuss with provider PT/OT consult   Elevate heels  Goal: Prevent/manage excess moisture  Outcome: Progressing  Flowsheets (Taken 9/27/2024 2131)  Prevent/manage excess moisture:   Monitor for/manage infection if present   Cleanse incontinence/protect with barrier cream  Goal: Prevent/minimize sheer/friction injuries  Outcome: Progressing  Flowsheets (Taken 9/27/2024 2131)  Prevent/minimize sheer/friction injuries:   HOB 30 degrees or less   Increase activity/out of bed for meals  Goal: Promote/optimize nutrition  Outcome: Progressing  Flowsheets (Taken 9/27/2024 2131)  Promote/optimize nutrition: Offer water/supplements/favorite foods  Goal: Promote skin healing  Outcome: Progressing  Flowsheets (Taken 9/27/2024 2131)  Promote skin healing: Assess skin/pad under line(s)/device(s)

## 2024-09-29 LAB
ANION GAP SERPL CALC-SCNC: 14 MMOL/L (ref 10–20)
BACTERIA SPEC CULT: NORMAL
BASOPHILS # BLD AUTO: 0.04 X10*3/UL (ref 0–0.1)
BASOPHILS NFR BLD AUTO: 0.5 %
BUN SERPL-MCNC: 17 MG/DL (ref 6–23)
CALCIUM SERPL-MCNC: 7.9 MG/DL (ref 8.6–10.3)
CHLORIDE SERPL-SCNC: 102 MMOL/L (ref 98–107)
CO2 SERPL-SCNC: 25 MMOL/L (ref 21–32)
CREAT SERPL-MCNC: 0.52 MG/DL (ref 0.5–1.3)
EGFRCR SERPLBLD CKD-EPI 2021: >90 ML/MIN/1.73M*2
EOSINOPHIL # BLD AUTO: 0.16 X10*3/UL (ref 0–0.4)
EOSINOPHIL NFR BLD AUTO: 1.9 %
ERYTHROCYTE [DISTWIDTH] IN BLOOD BY AUTOMATED COUNT: 14.6 % (ref 11.5–14.5)
GLUCOSE BLD MANUAL STRIP-MCNC: 106 MG/DL (ref 74–99)
GLUCOSE BLD MANUAL STRIP-MCNC: 111 MG/DL (ref 74–99)
GLUCOSE BLD MANUAL STRIP-MCNC: 115 MG/DL (ref 74–99)
GLUCOSE BLD MANUAL STRIP-MCNC: 135 MG/DL (ref 74–99)
GLUCOSE SERPL-MCNC: 112 MG/DL (ref 74–99)
GRAM STN SPEC: NORMAL
GRAM STN SPEC: NORMAL
HCT VFR BLD AUTO: 26.5 % (ref 41–52)
HGB BLD-MCNC: 8.4 G/DL (ref 13.5–17.5)
IMM GRANULOCYTES # BLD AUTO: 0.07 X10*3/UL (ref 0–0.5)
IMM GRANULOCYTES NFR BLD AUTO: 0.8 % (ref 0–0.9)
LYMPHOCYTES # BLD AUTO: 1.68 X10*3/UL (ref 0.8–3)
LYMPHOCYTES NFR BLD AUTO: 19.6 %
MCH RBC QN AUTO: 28.1 PG (ref 26–34)
MCHC RBC AUTO-ENTMCNC: 31.7 G/DL (ref 32–36)
MCV RBC AUTO: 89 FL (ref 80–100)
MONOCYTES # BLD AUTO: 0.73 X10*3/UL (ref 0.05–0.8)
MONOCYTES NFR BLD AUTO: 8.5 %
NEUTROPHILS # BLD AUTO: 5.87 X10*3/UL (ref 1.6–5.5)
NEUTROPHILS NFR BLD AUTO: 68.7 %
NRBC BLD-RTO: 0 /100 WBCS (ref 0–0)
PLATELET # BLD AUTO: 281 X10*3/UL (ref 150–450)
POTASSIUM SERPL-SCNC: 4 MMOL/L (ref 3.5–5.3)
RBC # BLD AUTO: 2.99 X10*6/UL (ref 4.5–5.9)
SODIUM SERPL-SCNC: 137 MMOL/L (ref 136–145)
WBC # BLD AUTO: 8.6 X10*3/UL (ref 4.4–11.3)

## 2024-09-29 PROCEDURE — 2500000001 HC RX 250 WO HCPCS SELF ADMINISTERED DRUGS (ALT 637 FOR MEDICARE OP)

## 2024-09-29 PROCEDURE — 2500000001 HC RX 250 WO HCPCS SELF ADMINISTERED DRUGS (ALT 637 FOR MEDICARE OP): Performed by: INTERNAL MEDICINE

## 2024-09-29 PROCEDURE — 2500000002 HC RX 250 W HCPCS SELF ADMINISTERED DRUGS (ALT 637 FOR MEDICARE OP, ALT 636 FOR OP/ED)

## 2024-09-29 PROCEDURE — 1200000002 HC GENERAL ROOM WITH TELEMETRY DAILY

## 2024-09-29 PROCEDURE — 2500000004 HC RX 250 GENERAL PHARMACY W/ HCPCS (ALT 636 FOR OP/ED)

## 2024-09-29 PROCEDURE — 85025 COMPLETE CBC W/AUTO DIFF WBC: CPT | Performed by: INTERNAL MEDICINE

## 2024-09-29 PROCEDURE — 80048 BASIC METABOLIC PNL TOTAL CA: CPT | Performed by: INTERNAL MEDICINE

## 2024-09-29 PROCEDURE — 82947 ASSAY GLUCOSE BLOOD QUANT: CPT

## 2024-09-29 PROCEDURE — 36415 COLL VENOUS BLD VENIPUNCTURE: CPT | Performed by: INTERNAL MEDICINE

## 2024-09-29 ASSESSMENT — PAIN SCALES - GENERAL
PAINLEVEL_OUTOF10: 3
PAINLEVEL_OUTOF10: 7
PAINLEVEL_OUTOF10: 0 - NO PAIN
PAINLEVEL_OUTOF10: 1
PAINLEVEL_OUTOF10: 0 - NO PAIN
PAINLEVEL_OUTOF10: 0 - NO PAIN

## 2024-09-29 ASSESSMENT — PAIN - FUNCTIONAL ASSESSMENT
PAIN_FUNCTIONAL_ASSESSMENT: 0-10

## 2024-09-29 ASSESSMENT — PAIN DESCRIPTION - ORIENTATION
ORIENTATION: LEFT

## 2024-09-29 ASSESSMENT — PAIN DESCRIPTION - LOCATION
LOCATION: FOOT

## 2024-09-29 NOTE — PROGRESS NOTES
INTERNAL MEDICINE PROGRESS NOTE      HPI:    Reports fair pain control.  Continues to decline skilled nursing facility placement.    Vital signs in last 24 hours:  Temp:  [36.4 °C (97.6 °F)-36.9 °C (98.5 °F)] 36.9 °C (98.4 °F)  Heart Rate:  [60-63] 60  Resp:  [15-18] 17  BP: (127-161)/(54-71) 156/69    Physical Examination:  Physical Exam    Constitutional:       Appearance: Elderly, overweight, in no distress  HENT:      Head: Normocephalic and atraumatic.   Eyes:      Extraocular Movements: Extraocular movements intact.      Pupils: Pupils are equal, round, and reactive to light.   Cardiovascular:      Rate and Rhythm: Normal rate and regular rhythm.      Pulses: Normal pulses.      Heart sounds: Normal heart sounds.   Pulmonary:      Effort: Pulmonary effort is normal.      Breath sounds: Normal breath sounds.   Abdominal:      General: Abdomen is flat. Bowel sounds are normal.      Palpations: Abdomen is soft.   Musculoskeletal:         General: Normal range of motion.      Cervical back: Normal range of motion and neck supple.   Skin:     General: Dressing in place over foot  Neurological:      General: No focal deficit present.      Mental Status: He is alert and oriented to person, place, and time. Mental status is at baseline.        Medications:    Current Facility-Administered Medications:     acetaminophen (Tylenol) tablet 650 mg, 650 mg, oral, q4h PRN, 650 mg at 09/28/24 2244 **OR** acetaminophen (Tylenol) oral liquid 650 mg, 650 mg, oral, q4h PRN **OR** acetaminophen (Tylenol) suppository 650 mg, 650 mg, rectal, q4h PRN, Diaeddine O Geoffrey, DPM    ammonium lactate (Lac-Hydrin) 12 % lotion, , Topical, BID, Diaeddine O Geoffrey, DPM, Given at 09/28/24 2155    ampicillin-sulbactam (Unasyn) in sodium chloride 0.9 % 100 mL 3 g, 3 g, intravenous, q6h, Diaeddine O Geoffrey, DPM, Stopped at 09/29/24 1114    apixaban (Eliquis) tablet 10 mg, 10 mg, oral, BID, 10 mg at 09/29/24 0844 **FOLLOWED BY** [START ON  10/5/2024] apixaban (Eliquis) tablet 5 mg, 5 mg, oral, BID, Jemal Guadarrama MD    aspirin chewable tablet 81 mg, 81 mg, oral, Daily, Diaeddine O Geoffrey, DPM, 81 mg at 09/29/24 0844    clopidogrel (Plavix) tablet 600 mg, 600 mg, oral, Once, Diaeddine O Geoffrey, DPM    clopidogrel (Plavix) tablet 75 mg, 75 mg, oral, Daily, Diaeddine O Geoffrey, DPM, 75 mg at 09/29/24 0844    dextrose 50 % injection 12.5 g, 12.5 g, intravenous, q15 min PRN, Diaeddine O Geoffrey, DPM    dextrose 50 % injection 25 g, 25 g, intravenous, q15 min PRN, Diaeddine O Geoffrey, DPM    glucagon (Glucagen) injection 1 mg, 1 mg, intramuscular, q15 min PRN, Diaeddine O Geoffrey, DPM    glucagon (Glucagen) injection 1 mg, 1 mg, intramuscular, q15 min PRN, Diaeddine O Geoffrey, DPM    HYDROmorphone (Dilaudid) injection 1 mg, 1 mg, intravenous, q4h PRN, Diaeddine O Geoffrey, DPM, 1 mg at 09/28/24 1651    insulin lispro (HumaLOG) injection 0-10 Units, 0-10 Units, subcutaneous, TID, Diaeddine O Geoffrey, DPM, 2 Units at 09/22/24 1652    lidocaine (LMX) 4 % cream, , Topical, 4x daily PRN, Diaeddine O Geoffrey, DPM, Given at 09/21/24 2107    lidocaine-epinephrine (Xylocaine W/EPI) 0.5 %-1:200,000 injection 20 mL, 20 mL, subcutaneous, Once, Diaeddine O Geoffrey, DPM    lidocaine-epinephrine (Xylocaine W/EPI) 1 %-1:100,000 injection 20 mL, 20 mL, injection, Once, Diaeddine O Geoffrey, DPM    morphine injection 2 mg, 2 mg, intravenous, q4h PRN, Diaeddine O Geoffrey, DPM, 2 mg at 09/29/24 1103    ondansetron (Zofran) injection 4 mg, 4 mg, intravenous, q4h PRN, Diaeddine O Geoffrey, DPM    polyethylene glycol (Glycolax, Miralax) packet 17 g, 17 g, oral, Daily, Diaeddine O Geoffrey, DPM, 17 g at 09/27/24 0913    potassium chloride CR (Klor-Con M20) ER tablet 40 mEq, 40 mEq, oral, Daily, Diaeddine O Geoffrey, DPM, 40 mEq at 09/29/24 0844    Laboratory Findings:  Lab Results   Component Value Date    WBC 8.6 09/29/2024    HGB 8.4 (L) 09/29/2024    HCT 26.5 (L) 09/29/2024    MCV 89 09/29/2024     09/29/2024     Lab  Results   Component Value Date    INR 1.0 12/09/2019    PROTIME 11.4 12/09/2019     Lab Results   Component Value Date    GLUCOSE 112 (H) 09/29/2024    CALCIUM 7.9 (L) 09/29/2024     09/29/2024    K 4.0 09/29/2024    CO2 25 09/29/2024     09/29/2024    BUN 17 09/29/2024    CREATININE 0.52 09/29/2024       Assessment and Plan:     Acute osteomyelitis -underwent surgery, wound VAC to be applied.  Home with oral antibiotics thereafter.  Foot pain -continue with analgesics as ordered.  Acute DVT -started on Eliquis yesterday, monitor for bleeding  Ambulatory difficulty -continue to work with therapy.    Discharge possibly tomorrow after wound VAC application.       Jemal Guadarrama MD  09/29/24  1230pm

## 2024-09-29 NOTE — PROGRESS NOTES
Transitional Care Coordination Progress Note:  Plan per Medical/Surgical team: treatment of POD #6 of toe amputation & POD #2 of closure with graft with IV ATB, ID & podiatry consults  Status: Inpatient day 15  Payor source: medicare A/B  Discharge disposition: Home with sister, refuses SNF  Potential Barriers: awaiting podiatry clearance for DC, wants hospital bed ordered for home  ID recs PO Augmentin 875mg BID for 1 week @ DC  ADOD: 9/30/2024   SHIKHA Michael RN, BSN Transitional Care Coordinator ED# [phone]      09/29/24 1001   Discharge Planning   Assistance Needed sister wants hospital bed @ home, podiatry surgery following

## 2024-09-30 LAB
ANION GAP SERPL CALC-SCNC: 14 MMOL/L (ref 10–20)
BACTERIA SPEC CULT: NORMAL
BASOPHILS # BLD AUTO: 0.04 X10*3/UL (ref 0–0.1)
BASOPHILS NFR BLD AUTO: 0.5 %
BUN SERPL-MCNC: 16 MG/DL (ref 6–23)
CALCIUM SERPL-MCNC: 8.3 MG/DL (ref 8.6–10.3)
CHLORIDE SERPL-SCNC: 101 MMOL/L (ref 98–107)
CO2 SERPL-SCNC: 26 MMOL/L (ref 21–32)
CREAT SERPL-MCNC: 0.55 MG/DL (ref 0.5–1.3)
EGFRCR SERPLBLD CKD-EPI 2021: >90 ML/MIN/1.73M*2
EOSINOPHIL # BLD AUTO: 0.13 X10*3/UL (ref 0–0.4)
EOSINOPHIL NFR BLD AUTO: 1.7 %
ERYTHROCYTE [DISTWIDTH] IN BLOOD BY AUTOMATED COUNT: 14.6 % (ref 11.5–14.5)
GLUCOSE BLD MANUAL STRIP-MCNC: 101 MG/DL (ref 74–99)
GLUCOSE BLD MANUAL STRIP-MCNC: 112 MG/DL (ref 74–99)
GLUCOSE BLD MANUAL STRIP-MCNC: 164 MG/DL (ref 74–99)
GLUCOSE BLD MANUAL STRIP-MCNC: 188 MG/DL (ref 74–99)
GLUCOSE SERPL-MCNC: 110 MG/DL (ref 74–99)
GRAM STN SPEC: NORMAL
GRAM STN SPEC: NORMAL
HCT VFR BLD AUTO: 27.5 % (ref 41–52)
HGB BLD-MCNC: 8.7 G/DL (ref 13.5–17.5)
IMM GRANULOCYTES # BLD AUTO: 0.06 X10*3/UL (ref 0–0.5)
IMM GRANULOCYTES NFR BLD AUTO: 0.8 % (ref 0–0.9)
LYMPHOCYTES # BLD AUTO: 2.2 X10*3/UL (ref 0.8–3)
LYMPHOCYTES NFR BLD AUTO: 28.7 %
MCH RBC QN AUTO: 28 PG (ref 26–34)
MCHC RBC AUTO-ENTMCNC: 31.6 G/DL (ref 32–36)
MCV RBC AUTO: 88 FL (ref 80–100)
MONOCYTES # BLD AUTO: 0.72 X10*3/UL (ref 0.05–0.8)
MONOCYTES NFR BLD AUTO: 9.4 %
NEUTROPHILS # BLD AUTO: 4.52 X10*3/UL (ref 1.6–5.5)
NEUTROPHILS NFR BLD AUTO: 58.9 %
NRBC BLD-RTO: 0 /100 WBCS (ref 0–0)
PLATELET # BLD AUTO: 320 X10*3/UL (ref 150–450)
POTASSIUM SERPL-SCNC: 3.7 MMOL/L (ref 3.5–5.3)
RBC # BLD AUTO: 3.11 X10*6/UL (ref 4.5–5.9)
SODIUM SERPL-SCNC: 137 MMOL/L (ref 136–145)
WBC # BLD AUTO: 7.7 X10*3/UL (ref 4.4–11.3)

## 2024-09-30 PROCEDURE — 80048 BASIC METABOLIC PNL TOTAL CA: CPT | Performed by: INTERNAL MEDICINE

## 2024-09-30 PROCEDURE — 2500000004 HC RX 250 GENERAL PHARMACY W/ HCPCS (ALT 636 FOR OP/ED)

## 2024-09-30 PROCEDURE — 2500000001 HC RX 250 WO HCPCS SELF ADMINISTERED DRUGS (ALT 637 FOR MEDICARE OP)

## 2024-09-30 PROCEDURE — 82947 ASSAY GLUCOSE BLOOD QUANT: CPT

## 2024-09-30 PROCEDURE — 97530 THERAPEUTIC ACTIVITIES: CPT | Mod: GP,CQ

## 2024-09-30 PROCEDURE — 97116 GAIT TRAINING THERAPY: CPT | Mod: GP,CQ

## 2024-09-30 PROCEDURE — 1200000002 HC GENERAL ROOM WITH TELEMETRY DAILY

## 2024-09-30 PROCEDURE — 2500000001 HC RX 250 WO HCPCS SELF ADMINISTERED DRUGS (ALT 637 FOR MEDICARE OP): Performed by: INTERNAL MEDICINE

## 2024-09-30 PROCEDURE — 2500000002 HC RX 250 W HCPCS SELF ADMINISTERED DRUGS (ALT 637 FOR MEDICARE OP, ALT 636 FOR OP/ED)

## 2024-09-30 PROCEDURE — 36415 COLL VENOUS BLD VENIPUNCTURE: CPT | Performed by: INTERNAL MEDICINE

## 2024-09-30 PROCEDURE — 85025 COMPLETE CBC W/AUTO DIFF WBC: CPT | Performed by: INTERNAL MEDICINE

## 2024-09-30 RX ORDER — HYDRALAZINE HYDROCHLORIDE 25 MG/1
25 TABLET, FILM COATED ORAL 3 TIMES DAILY
Status: DISCONTINUED | OUTPATIENT
Start: 2024-09-30 | End: 2024-10-02 | Stop reason: HOSPADM

## 2024-09-30 RX ORDER — SIMETHICONE 80 MG
80 TABLET,CHEWABLE ORAL EVERY 4 HOURS PRN
Status: DISCONTINUED | OUTPATIENT
Start: 2024-09-30 | End: 2024-10-02 | Stop reason: HOSPADM

## 2024-09-30 ASSESSMENT — PAIN SCALES - PAIN ASSESSMENT IN ADVANCED DEMENTIA (PAINAD): TOTALSCORE: MEDICATION (SEE MAR)

## 2024-09-30 ASSESSMENT — COGNITIVE AND FUNCTIONAL STATUS - GENERAL
CLIMB 3 TO 5 STEPS WITH RAILING: TOTAL
STANDING UP FROM CHAIR USING ARMS: A LOT
WALKING IN HOSPITAL ROOM: A LITTLE
TURNING FROM BACK TO SIDE WHILE IN FLAT BAD: A LITTLE
MOVING FROM LYING ON BACK TO SITTING ON SIDE OF FLAT BED WITH BEDRAILS: A LITTLE
MOVING TO AND FROM BED TO CHAIR: A LITTLE
MOBILITY SCORE: 15

## 2024-09-30 ASSESSMENT — PAIN DESCRIPTION - ORIENTATION
ORIENTATION: LEFT

## 2024-09-30 ASSESSMENT — PAIN SCALES - GENERAL
PAINLEVEL_OUTOF10: 2
PAINLEVEL_OUTOF10: 7
PAINLEVEL_OUTOF10: 7
PAINLEVEL_OUTOF10: 2
PAINLEVEL_OUTOF10: 7
PAINLEVEL_OUTOF10: 2

## 2024-09-30 ASSESSMENT — PAIN SCALES - WONG BAKER: WONGBAKER_NUMERICALRESPONSE: HURTS LITTLE MORE

## 2024-09-30 ASSESSMENT — PAIN DESCRIPTION - LOCATION
LOCATION: FOOT

## 2024-09-30 ASSESSMENT — PAIN - FUNCTIONAL ASSESSMENT
PAIN_FUNCTIONAL_ASSESSMENT: 0-10

## 2024-09-30 NOTE — PROGRESS NOTES
PROGRESS NOTE - PODIATRIC MEDICINE & SURGERY    HPI    Patient is a 74 y.o. male who is admitted since 9/13 for left foot pain/wound.   Underwent left partial 5th ray resection on 9/23/24 by Dr. Regan DPM.   Taken back to OR 9/27 for delayed closure with graft application by Dr. Amina DPM.   Today, he reports feeling overall well. He has no new pedal concerns.  He plans on returning home after discharge.    ROS: Negative except as above.      Past Medical History:   Diagnosis Date    Arthritis     CAD (coronary artery disease)     Diabetes mellitus (Multi)     Hypertension     Peripheral neuropathy     Stroke (Multi)     Thoracic radiculopathy          PHYSICAL EXAM    Vitals:    09/30/24 1541   BP: (!) 181/79   Pulse: 78   Resp: 22   Temp: 36.9 °C (98.4 °F)   SpO2: 100%       General:   NAD. Cooperative. Pleasant. Siting up in bed comfortably.    Derm:  Left lower extremity clean, dry, intact. No strikethough.   Left digits are exposed and warm to touch.   Left toenails are dystrophic, thickened, discolored.     Vascular:   Left digits are warm to touch.    MSK:  Moving all extremities spontaneously.     Neuro:   Alert. Conversing and answering questions.      RESULTS    CBC  Lab Results   Component Value Date    WBC 7.7 09/30/2024    HGB 8.7 (L) 09/30/2024    HCT 27.5 (L) 09/30/2024    MCV 88 09/30/2024     09/30/2024       ESR  Lab Results   Component Value Date    SEDRATE 24 (H) 09/13/2024       CRP  Lab Results   Component Value Date    CRP 9.51 (H) 09/13/2024       HgbA1c  Lab Results   Component Value Date    HGBA1C 9.8 (H) 09/13/2024       Cultures  Susceptibility data from last 90 days.  Collected Specimen Info Organism   09/27/24 Swab from DIGIT, ACCESSORY LEFT FOOT Mixed Skin Microorganisms    09/23/24 Swab from DIGIT FIFTH, LEFT FOOT Mixed Skin Microorganisms    09/13/24 Tissue/Biopsy from Wound/Tissue Mixed Anaerobic Bacteria     Mixed Gram-Positive and Gram-Negative Bacteria       9/27/24  Intra-op wound culture - no growth to date (preliminary)        XRAY LEFT FOOT 9/12/24    FINDINGS:    There is no displaced fracture.  The alignment is anatomic.  Soft  tissue swelling and ulceration noted lateral to the fifth MTP joint  associated with demineralization of the fifth proximal phalanx and the  distal head of the fifth metatarsal.  Bony changes consistent with osteomyelitis.    IMPRESSION:  Osteomyelitis of the fifth metatarsal head and proximal phalanx of the fifth toe.      VENOUS DUPLEX LEFT LOWER EXTREMITY 9/12/24    IMPRESSION:    Evidence for acute occlusive DVT within the left mid-distal femoral  and popliteal veins.    Compared to prior ultrasound, this represents a new finding.        CTA AORTA AND ILIOFEMORAL RUNOFF BL W/ W/O CONTRAST 9/13/24    IMPRESSION:    1. Diffuse partially calcified atherosclerotic disease, as detailed  above. There is a single vessel runoff in the right leg. In the left  leg, there is high-grade stenosis of the distal SFA as it crosses  Gaetano's canal. Additionally, there is occlusive disease of the  proximal trifurcation with reconstitution of the left posterior  tibial and peroneal arteries distally.      2. Significant soft tissue swelling of the left leg with prominent  venous varicosities. There is also soft tissue swelling of the left  foot with an open wound along the plantar aspect of the foot centered  at the 5th MTP joint, which demonstrates adjacent bony destruction.  Curvilinear hyperdensity in this region may reflect bony fragments or  foreign bodies.      3. Coronary artery calcifications.        VASCULAR US BEN W/O EXERCISE 9/16/24    CONCLUSIONS:    Right Lower PVR: No evidence of arterial occlusive disease in the right lower extremity at rest. Decreased digital perfusion noted. Multiphasic flow is noted in the right common femoral artery, right posterior tibial artery and right dorsalis pedis artery.    Left Lower PVR: No evidence of arterial  occlusive disease in the left lower extremity at rest. Decreased digital perfusion noted. Monophasic flow is noted in the left posterior tibial artery and left dorsalis pedis artery. Multiphasic flow is noted in the left common femoral artery. Waveform may be dmapened due to edema.     Additional Findings: Known DVT in the left femoral and popliteal veins.        Imaging & Doppler Findings:     RIGHT Lower PVR                   Pressures Ratios  Right Posterior Tibial (Ankle)  155 mmHg  0.99  Right Dorsalis Pedis (Ankle)    155 mmHg  0.99  Right Digit (Great Toe)             66 mmHg   0.42         LEFT Lower PVR                     Pressures Ratios  Left Posterior Tibial (Ankle)   142 mmHg  0.91  Left Dorsalis Pedis (Ankle)     128 mmHg  0.82  Left Digit (Great Toe)              52 mmHg   0.33                                             Right            Left  Brachial Pressure         156 mmHg   150 mmHg        ASSESSMENT AND PLAN  S/P DPC and graft application, left foot (9/27, Dr. Huynh)  S/P 5th partial ray resection, left foot (9/23/24, Dr. Spears)  Cellulitis, left foot  Osteomyelitis, left foot  Diabetic ulcer of left foot  DMT2, poorly controlled  Diabetic peripheral neuropathy  Peripheral vascular disease  Acute occlusive DVT, LLE  Venous insufficiency    - Interval notes and results reviewed.   - Wound care nurse consulted for wound VAC application.   - Per ID, continuing IV Unasyn inpatient. Switch to Augmentin 875mg BID for 1 week at discharge. ID has signed off.  - NWB left foot.  - Findings and plan discussed with patient. All questions answered.   - Okay for discharge from Podiatry standpoint. Will need HHC vs SNF for wound VAC changes 3x a week.   - Podiatry will continue following. Discussed follow up in 2 weeks at [facility]. Info in Discharge tab.      This patient was evaluated with the attending physician, Dr. Courtney Huynh DPM.    Yane Branch DPM PGY2  Podiatric Medicine and  Surgery

## 2024-09-30 NOTE — CONSULTS
Wound Care Consult     Visit Date: 9/30/2024      Patient Name: Elliot Partida         MRN: [id number]           YOB: 1950     Reason for Consult: applied NPWT dressing to left amputation site.         Pertinent Labs:   Albumin   Date Value Ref Range Status   09/12/2024 3.3 (L) 3.4 - 5.0 g/dL Final       Wound Assessment:  Wound 09/13/24 Diabetic Ulcer Foot Dorsal foot;Left (Active)   Wound Image   09/17/24 1205   Site Assessment Unable to assess 09/23/24 1945   Ann-Wound Assessment Unable to assess 09/23/24 1945   Non-staged Wound Description Full thickness 09/17/24 1205   Shape circular 09/17/24 1205   Wound Length (cm) 2 cm 09/17/24 1205   Wound Width (cm) 2 cm 09/17/24 1205   Wound Surface Area (cm^2) 4 cm^2 09/17/24 1205   Wound Depth (cm) 0.3 cm 09/17/24 1205   Wound Volume (cm^3) 1.2 cm^3 09/17/24 1205   State of Healing Non-healing 09/17/24 1205   Margins Poorly defined 09/17/24 1205   Drainage Description None 09/25/24 0900   Drainage Amount None 09/25/24 0900   Dressing Dry dressing;Pressure dressing 09/25/24 0900   Dressing Changed New 09/25/24 0900   Dressing Status Clean;Dry 09/25/24 0900       Wound 09/13/24 Tibial Distal;Dorsal;Left (Active)   Wound Image   09/13/24 0044   Wound Length (cm) 8 cm 09/13/24 0044   Wound Width (cm) 7 cm 09/13/24 0044   Wound Surface Area (cm^2) 56 cm^2 09/13/24 0044   State of Healing Hyperkeratosis 09/13/24 0044   Margins Unable to assess 09/25/24 2126   Drainage Description None 09/25/24 2126   Drainage Amount None 09/25/24 2126   Dressing Dry dressing 09/27/24 2100   Dressing Changed New 09/25/24 2126   Dressing Status Clean;Dry 09/25/24 2126       Wound 09/13/24 Foot Left (Active)   Wound Image   09/30/24 1410   Shape linear 09/17/24 1205   Wound Length (cm) 23 cm 09/30/24 1410   Wound Width (cm) 8 cm 09/30/24 1410   Wound Surface Area (cm^2) 184 cm^2 09/30/24 1410   Wound Depth (cm) 0.2 cm 09/30/24 1410   Wound Volume (cm^3) 36.8 cm^3 09/30/24 1410    Wound Healing % -513 09/30/24 1410   State of Healing Non-healing 09/17/24 1205   Margins Poorly defined 09/17/24 1205   Drainage Description None 09/24/24 0900   Drainage Amount None 09/25/24 0900   Dressing Dry dressing 09/25/24 0900   Dressing Changed New 09/25/24 0900   Dressing Status Clean;Dry 09/25/24 0900       Wound 09/13/24 Moisture Associated Skin Damage Buttock (Active)   Wound Image   09/13/24 0110   Site Assessment Blanchable erythema 09/25/24 1659   Ann-Wound Assessment Clean;Pink 09/25/24 1659   Non-staged Wound Description Partial thickness 09/25/24 1659   Pressure Injury Stage 2 09/25/24 1659   Wound Length (cm) 10 cm 09/18/24 1200   Wound Width (cm) 8 cm 09/18/24 1200   Wound Surface Area (cm^2) 80 cm^2 09/18/24 1200   State of Healing Healing ridge 09/25/24 1659   Margins Poorly defined 09/18/24 1200   Drainage Description None 09/25/24 1659   Drainage Amount None 09/25/24 1659   Dressing Protective barrier 09/25/24 1659   Dressing Changed New 09/25/24 1659   Dressing Status Clean;Dry 09/25/24 0900       Wound 09/23/24 Incision Dorsal foot;Left (Active)   Site Assessment Unable to assess 09/24/24 2100   Ann-Wound Assessment Unable to assess 09/24/24 2100   Drainage Description None 09/25/24 2126   Drainage Amount None 09/25/24 2126   Dressing Dry dressing 09/25/24 2126   Dressing Changed New 09/25/24 2126   Dressing Status Clean;Dry 09/25/24 2126       Wound 09/25/24 Pressure Injury Heel Right (Active)       Wound 09/27/24 Incision Foot Dorsal foot;Left;Lateral (Active)   Wound Image   09/30/24 1358   Site Assessment Red;Maceration 09/30/24 1358   Ann-Wound Assessment Macerated 09/30/24 1358   Shape surgical site 09/30/24 1358   Wound Length (cm) 10 cm 09/30/24 1358   Wound Width (cm) 2.8 cm 09/30/24 1358   Wound Surface Area (cm^2) 28 cm^2 09/30/24 1358   Wound Depth (cm) 0.6 cm 09/30/24 1358   Wound Volume (cm^3) 16.8 cm^3 09/30/24 1358   Margins Poorly defined 09/30/24 1358   Closure  Sutures 09/30/24 1358   Treatments Other (Comment) 09/30/24 1358   Sutures/Staple Line Approximated 09/30/24 1358   Drainage Description Serosanguineous 09/30/24 1358   Drainage Amount Moderate 09/30/24 1358   Dressing Vacuum dressing 09/30/24 1358   Dressing Changed New 09/30/24 1358   Dressing Status Clean;Dry 09/30/24 1358     Left dorsal foot wound: full thickness skin loss  Daily: Bedside RN/LPN to complete wound care.  Cleanse with normal saline and pat dry.  Apply no-sting barrier film to periwound skin.  Apply Xeroform dressing to the wound base.  Top with ABD pads.  Secure with Kerlix, paper tape and ace wrap.    Wound Vac applied to left amp surgical site:    (2)  Mepitel     (1)  black Granufoam     (2) Adapt barrier ring  (1) Aquacel to outside edge near dorsal foot wound    Pressure: 125 mmHg    Plan:  Change NPWT dressing on Thursday if patient is still admitted.    **discharge dressing at the bedside    Consider medicating the patient 30 - 60 minutes prior to dressing change. If there is leighton blood in the tubing (active bleeding), stop the suction and call physician. If the suction is off for greater than 2 hours, remove foam dressing and replace with moist saline dressing. Change canister every week or when full. Remove foam dressing and replace with saline dressing for transfer or discharge.    If patient is discharged prior to next dressing change, please disconnect VAC machine, remove foam dressing apply moist saline dressing. Then place machine in the front soiled utility room on the unit.    Wound Team Plan: will see patient Thursday for NPWT dressing change if still admitted.      Fifi Guerrero, RN, BSN, [facility]  Phone: [phone]  9/30/2024  3:05 PM

## 2024-09-30 NOTE — PROGRESS NOTES
Physical Therapy    Physical Therapy Treatment    Patient Name: Elliot Partida  MRN: [id number]  Department: [facility] [address]  Room: [address] [place] [address]  Today's Date: 9/30/2024  Time Calculation  Start Time: 0914  Stop Time: 0937  Time Calculation (min): 23 min         Assessment/Plan   PT Assessment  End of Session Communication: Bedside nurse  Assessment Comment: Pt completed ambulation, transfer training, and gait training at today's visit.  End of Session Patient Position: Bed, 3 rail up, Alarm on  PT Plan  Inpatient/Swing Bed or Outpatient: Inpatient  PT Plan  Treatment/Interventions: Bed mobility, Transfer training, Gait training  PT Plan: Ongoing PT  PT Frequency: 4 times per week  PT Discharge Recommendations: Moderate intensity level of continued care  PT Recommended Transfer Status: Assist x1  PT - OK to Discharge: Yes (per POC)      General Visit Information:   PT  Visit  PT Received On: 09/30/24  General  Reason for Referral: Left foot surgical wound delayed closure 9/27  Referred By: Adithya Hale DPM  Past Medical History Relevant to Rehab: Presented to ED due to c/o left foot pain and wound; s/p partial left 5th ray resection and LLE debridement 9/23; PMH: CAD, DM, MI, HTN, R frontal extradural mass since 2016, arthritis, thoracic radiculopathy, neuropathy  Family/Caregiver Present: Yes  Prior to Session Communication: Bedside nurse  Patient Position Received: Bed, 3 rail up, Alarm on  Preferred Learning Style: verbal, visual  General Comment: Pt in bed upon arrival, required encouragement. Performed all mobility slowly, During tx pt stated that he could not maintain his precautions due to being too weak on the other leg.    Subjective   Precautions:  Precautions  LE Weight Bearing Status: Left Non-Weight Bearing  Medical Precautions: Fall precautions            Objective   Pain:  Pain Assessment  Pain Assessment: 0-10  0-10 (Numeric) Pain Score: 2  Pain Type: Surgical pain  Pain Location: Foot  Pain Orientation:  Left  Cognition:  Cognition  Overall Cognitive Status: Within Functional Limits  Orientation Level: Oriented X4  Coordination:     Postural Control:  Static Sitting Balance  Static Sitting-Balance Support: Bilateral upper extremity supported  Static Sitting-Level of Assistance: Contact guard  Static Sitting-Comment/Number of Minutes: 5  Extremity/Trunk Assessments:  Activity Tolerance:     Treatments:       Bed Mobility 1  Bed Mobility 1: Supine to sitting, Sitting to supine  Level of Assistance 1: Minimum assistance  Bed Mobility Comments 1: Performed with HOB elevated and min A to guide BLEs in and out of bed.    Ambulation/Gait Training  Ambulation/Gait Training Performed: Yes  Ambulation/Gait Training 1  Surface 1: Level tile  Device 1: Rolling walker  Gait Support Devices: Gait belt  Assistance 1: Contact guard, Minimum assistance  Quality of Gait 1: Decreased step length, Forward flexed posture  Comments/Distance (ft) 1: Pt ambulated 6 ft with cues given for upright posture and forward gaze. Limited 2/2 pt reported dizziness. Pt not adhering to LLE NWB despite max vc  Transfers  Transfer: Yes  Transfer 1  Technique 1: Sit to stand, Stand to sit  Transfer Device 1: Gait belt, Walker  Transfer Level of Assistance 1: Moderate assistance  Trials/Comments 1: Pt performed with cues given for hand placement before standing, and mod A given for successful stand.    Outcome Measures:  [facility] Basic Mobility  Turning from your back to your side while in a flat bed without using bedrails: A little  Moving from lying on your back to sitting on the side of a flat bed without using bedrails: A little  Moving to and from bed to chair (including a wheelchair): A little  Standing up from a chair using your arms (e.g. wheelchair or bedside chair): A lot  To walk in hospital room: A little  Climbing 3-5 steps with railing: Total  Basic Mobility - Total Score: 15    Education Documentation  No documentation found.  Education  Comments  No comments found.        OP EDUCATION:  Outpatient Education  Education Comment: educated on importance OOB activity    Encounter Problems       Encounter Problems (Active)       Mobility       STG - Patient will perform hopping 25 feet with RW with Minimum assistance while maintaining NWB LLE. (Progressing)       Start:  09/28/24    Expected End:  10/12/24               PT Transfers       LTG - Patient will transfer from one surface to another with contact guard assistance while maintaining NWB LLE. (Progressing)       Start:  09/28/24    Expected End:  10/12/24            STG - Patient will perform bed mobility with close supervision. (Progressing)       Start:  09/28/24    Expected End:  10/12/24            STG - Patient will transfer sit to and from stand with contact guard assistance with RW while maintaining NWB LLE. (Progressing)       Start:  09/28/24    Expected End:  10/12/24               Pain - Adult          Safety       LTG - Patient will adhere to weight bearing precautions during ADL's and transfers (Progressing)       Start:  09/28/24    Expected End:  10/12/24

## 2024-09-30 NOTE — PROGRESS NOTES
09/30/24 0917   Discharge Planning   Home or Post Acute Services In home services   Type of Home Care Services Home OT;Home PT;Home nursing visits   Expected Discharge Disposition Home H  ([facility], wound vac)     Met with patient to discuss discharge plan.  He plans to return home and if possible, would like a hospital bed.  Referral placed into careport for DME through [facility].  Form in bedside chart for MD to sign and request of medical necessity note.  Wound team following and will get vac ordered for home.  Therapy recommending moderate intensity.  Patient is refusing SNF and wants to go home.  PLAN/BARRIER: wound vac and podiatry clearance  DISP: Home with [facility] as he does not have a PCP, but states his niece is working on that.  ADOD: tomorrow  Susie Sims RN     9/30/24 @ 1050  Received message from patient's sister.  Returned call but no answer.  Message left on her voicemail.  Susie Sims RN     9/30/24 @ 1313  [facility] confirmed via careport that they received the necessary  documentation for a hospital bed.  They will be reaching out to the family.  Susie Sims RN     9/30/24 @ 1417  I spoke with patient's sister, Eryn.  The plan was for this patient to go home with [facility] as he does not have a PCP. Eryn informed me that the niece that normally takes care of him had a stroke today. The sister is going to call him at 245pm to let him know. He cannot return home safely as the sister cannot help him as she has a hard enough time taking care of herself (per her). She asked if I could be in the room when she calls as he may not take the news of his niece well.   SW or a TCC will need to talk to patient about SNF as there is no one at home that can help him right now. His great-niece, Fernando, should be in [facility] [employer] as she is driving in from [place]   Susie Sims RN     9/30/24 @ 7445  Patient would like a referral placed to [facility].  Requested [facility] send  referral.  If [facility] denies, he will need choiced for SNF.  Susie Sims RN

## 2024-09-30 NOTE — PROGRESS NOTES
Occupational Therapy                 Therapy Communication Note    Patient Name: Elliot Partida  MRN: [id number]  Department: [facility] [address]  Room: [address] [place] [address]  Today's Date: 9/30/2024     Discipline: Occupational Therapy    Missed Visit Reason: Missed Visit Reason: Patient in a medical procedure (Patient currently with wound nurse for wound vac. Spoke with TCC who reported pts primary caregiver had a medical event and cannot care for him and they need to tell him today for placement. Will reschedule OT as able.)    Missed Time: Attempt

## 2024-09-30 NOTE — PROGRESS NOTES
INTERNAL MEDICINE PROGRESS NOTE      HPI:    Patient states he is dizzy with analgesics.  He is awaiting wound VAC.    Vital signs in last 24 hours:  Temp:  [35.8 °C (96.4 °F)-36.9 °C (98.5 °F)] 36.5 °C (97.7 °F)  Heart Rate:  [60-73] 73  Resp:  [16-19] 16  BP: (128-173)/(57-70) 150/63    Physical Examination:  Physical Exam    Constitutional:       Appearance: Elderly, overweight, in no distress  HENT:      Head: Normocephalic and atraumatic.   Eyes:      Extraocular Movements: Extraocular movements intact.      Pupils: Pupils are equal, round, and reactive to light.   Cardiovascular:      Rate and Rhythm: Normal rate and regular rhythm.      Pulses: Normal pulses.      Heart sounds: Normal heart sounds.   Pulmonary:      Effort: Pulmonary effort is normal.      Breath sounds: Normal breath sounds.   Abdominal:      General: Abdomen is flat. Bowel sounds are normal.      Palpations: Abdomen is soft.   Musculoskeletal:         General: Normal range of motion.      Cervical back: Normal range of motion and neck supple.   Skin:     General: Dressing in place over foot  Neurological:      General: No focal deficit present.      Mental Status: He is alert and oriented to person, place, and time. Mental status is at baseline.        Medications:    Current Facility-Administered Medications:     acetaminophen (Tylenol) tablet 650 mg, 650 mg, oral, q4h PRN, 650 mg at 09/29/24 2138 **OR** acetaminophen (Tylenol) oral liquid 650 mg, 650 mg, oral, q4h PRN **OR** acetaminophen (Tylenol) suppository 650 mg, 650 mg, rectal, q4h PRN, Diaeddine O Geoffrey, DPM    ammonium lactate (Lac-Hydrin) 12 % lotion, , Topical, BID, Diaeddine O Geoffrey, DPM, Given at 09/30/24 0921    ampicillin-sulbactam (Unasyn) in sodium chloride 0.9 % 100 mL 3 g, 3 g, intravenous, q6h, Diaeddine O Geoffrey, DPM, Stopped at 09/30/24 1053    apixaban (Eliquis) tablet 10 mg, 10 mg, oral, BID, 10 mg at 09/30/24 0918 **FOLLOWED BY** [START ON 10/5/2024] apixaban  (Eliquis) tablet 5 mg, 5 mg, oral, BID, Jemal Guadarrama MD    aspirin chewable tablet 81 mg, 81 mg, oral, Daily, Diaeddine O Geoffrey, DPM, 81 mg at 09/30/24 0917    clopidogrel (Plavix) tablet 600 mg, 600 mg, oral, Once, Diaeddine O Geoffrey, DPM    clopidogrel (Plavix) tablet 75 mg, 75 mg, oral, Daily, Diaeddine O Geoffrey, DPM, 75 mg at 09/30/24 0917    dextrose 50 % injection 12.5 g, 12.5 g, intravenous, q15 min PRN, Diaeddine O Geoffrey, DPM    dextrose 50 % injection 25 g, 25 g, intravenous, q15 min PRN, Diaeddine O Geoffrey, DPM    glucagon (Glucagen) injection 1 mg, 1 mg, intramuscular, q15 min PRN, Diaeddine O Geoffrey, DPM    glucagon (Glucagen) injection 1 mg, 1 mg, intramuscular, q15 min PRN, Diaeddine O Geoffrey, DPM    HYDROmorphone (Dilaudid) injection 1 mg, 1 mg, intravenous, q4h PRN, Diaeddine O Geoffrey, DPM, 1 mg at 09/29/24 1510    insulin lispro (HumaLOG) injection 0-10 Units, 0-10 Units, subcutaneous, TID, Diaeddine O Geoffrey, DPM, 2 Units at 09/22/24 1652    lidocaine (LMX) 4 % cream, , Topical, 4x daily PRN, Diaeddine O Geoffrey, DPM, Given at 09/30/24 0029    lidocaine-epinephrine (Xylocaine W/EPI) 0.5 %-1:200,000 injection 20 mL, 20 mL, subcutaneous, Once, Diaeddine O Geoffrey, DPM    lidocaine-epinephrine (Xylocaine W/EPI) 1 %-1:100,000 injection 20 mL, 20 mL, injection, Once, Diaeddine O Geoffrey, DPM    morphine injection 2 mg, 2 mg, intravenous, q4h PRN, Diaeddine O Geoffrey, DPM, 2 mg at 09/30/24 0920    ondansetron (Zofran) injection 4 mg, 4 mg, intravenous, q4h PRN, Diaeddine O Goeffrey, DPM    polyethylene glycol (Glycolax, Miralax) packet 17 g, 17 g, oral, Daily, Diaeddine O Geoffrey, DPM, 17 g at 09/30/24 0918    potassium chloride CR (Klor-Con M20) ER tablet 40 mEq, 40 mEq, oral, Daily, Diaeddine O Geoffrey, DPM, 40 mEq at 09/30/24 0918    Laboratory Findings:  Lab Results   Component Value Date    WBC 7.7 09/30/2024    HGB 8.7 (L) 09/30/2024    HCT 27.5 (L) 09/30/2024    MCV 88 09/30/2024     09/30/2024     Lab Results   Component  Value Date    INR 1.0 12/09/2019    PROTIME 11.4 12/09/2019     Lab Results   Component Value Date    GLUCOSE 110 (H) 09/30/2024    CALCIUM 8.3 (L) 09/30/2024     09/30/2024    K 3.7 09/30/2024    CO2 26 09/30/2024     09/30/2024    BUN 16 09/30/2024    CREATININE 0.55 09/30/2024       Assessment and Plan:     Acute osteomyelitis -underwent surgery, continue with antibiotics.  Foot pain -changed to oral analgesics  Acute DVT -continue with Eliquis  Ambulatory difficulty -continue to work with therapy.    Discharge possibly today when arrangements are in place.    Patient is nonweightbearing due to extreme weakness and recent surgery for osteomyelitis.  He has osteomyelitis of the left foot, and is a noncompliant diabetic with peripheral artery disease and a BMI of 33.  He is not able to reposition himself therefore is a safety risk.  He needs frequent repositioning and modification of height of his bed which can only be accomplished with a hospital bed.       Jemal Guadarrama MD  09/30/24  10 AM

## 2024-10-01 LAB
GLUCOSE BLD MANUAL STRIP-MCNC: 120 MG/DL (ref 74–99)
GLUCOSE BLD MANUAL STRIP-MCNC: 126 MG/DL (ref 74–99)
GLUCOSE BLD MANUAL STRIP-MCNC: 132 MG/DL (ref 74–99)
GLUCOSE BLD MANUAL STRIP-MCNC: 166 MG/DL (ref 74–99)

## 2024-10-01 PROCEDURE — 2500000002 HC RX 250 W HCPCS SELF ADMINISTERED DRUGS (ALT 637 FOR MEDICARE OP, ALT 636 FOR OP/ED)

## 2024-10-01 PROCEDURE — 2500000001 HC RX 250 WO HCPCS SELF ADMINISTERED DRUGS (ALT 637 FOR MEDICARE OP)

## 2024-10-01 PROCEDURE — 2500000001 HC RX 250 WO HCPCS SELF ADMINISTERED DRUGS (ALT 637 FOR MEDICARE OP): Performed by: INTERNAL MEDICINE

## 2024-10-01 PROCEDURE — 1200000002 HC GENERAL ROOM WITH TELEMETRY DAILY

## 2024-10-01 PROCEDURE — 82947 ASSAY GLUCOSE BLOOD QUANT: CPT

## 2024-10-01 PROCEDURE — 2500000004 HC RX 250 GENERAL PHARMACY W/ HCPCS (ALT 636 FOR OP/ED)

## 2024-10-01 RX ORDER — AMOXICILLIN AND CLAVULANATE POTASSIUM 875; 125 MG/1; MG/1
1 TABLET, FILM COATED ORAL 2 TIMES DAILY
Start: 2024-10-01 | End: 2024-10-08

## 2024-10-01 RX ORDER — HYDRALAZINE HYDROCHLORIDE 25 MG/1
25 TABLET, FILM COATED ORAL 3 TIMES DAILY
Start: 2024-10-01

## 2024-10-01 RX ORDER — POTASSIUM CHLORIDE 20 MEQ/1
40 TABLET, EXTENDED RELEASE ORAL DAILY
Start: 2024-10-02

## 2024-10-01 RX ORDER — OXYCODONE HYDROCHLORIDE 5 MG/1
5 TABLET ORAL EVERY 6 HOURS PRN
Start: 2024-10-01 | End: 2024-10-02

## 2024-10-01 RX ORDER — POLYETHYLENE GLYCOL 3350 17 G/17G
17 POWDER, FOR SOLUTION ORAL DAILY
Start: 2024-10-02

## 2024-10-01 RX ORDER — INSULIN LISPRO 100 [IU]/ML
0-10 INJECTION, SOLUTION INTRAVENOUS; SUBCUTANEOUS
Start: 2024-10-01

## 2024-10-01 ASSESSMENT — COGNITIVE AND FUNCTIONAL STATUS - GENERAL
MOVING TO AND FROM BED TO CHAIR: A LOT
DRESSING REGULAR LOWER BODY CLOTHING: A LOT
DAILY ACTIVITIY SCORE: 18
PERSONAL GROOMING: A LITTLE
HELP NEEDED FOR BATHING: A LITTLE
MOVING TO AND FROM BED TO CHAIR: A LOT
MOBILITY SCORE: 13
HELP NEEDED FOR BATHING: A LITTLE
TURNING FROM BACK TO SIDE WHILE IN FLAT BAD: A LOT
TOILETING: A LITTLE
DAILY ACTIVITIY SCORE: 18
STANDING UP FROM CHAIR USING ARMS: A LOT
DRESSING REGULAR UPPER BODY CLOTHING: A LITTLE
WALKING IN HOSPITAL ROOM: A LOT
STANDING UP FROM CHAIR USING ARMS: A LOT
PERSONAL GROOMING: A LITTLE
DRESSING REGULAR LOWER BODY CLOTHING: A LOT
CLIMB 3 TO 5 STEPS WITH RAILING: A LOT
WALKING IN HOSPITAL ROOM: A LOT
CLIMB 3 TO 5 STEPS WITH RAILING: A LOT
DRESSING REGULAR UPPER BODY CLOTHING: A LITTLE
TOILETING: A LITTLE
MOVING FROM LYING ON BACK TO SITTING ON SIDE OF FLAT BED WITH BEDRAILS: A LITTLE
TURNING FROM BACK TO SIDE WHILE IN FLAT BAD: A LOT
MOVING FROM LYING ON BACK TO SITTING ON SIDE OF FLAT BED WITH BEDRAILS: A LITTLE
MOBILITY SCORE: 13

## 2024-10-01 ASSESSMENT — PAIN SCALES - GENERAL
PAINLEVEL_OUTOF10: 4
PAINLEVEL_OUTOF10: 3
PAINLEVEL_OUTOF10: 8
PAINLEVEL_OUTOF10: 2
PAINLEVEL_OUTOF10: 3
PAINLEVEL_OUTOF10: 1
PAINLEVEL_OUTOF10: 8

## 2024-10-01 ASSESSMENT — PAIN - FUNCTIONAL ASSESSMENT
PAIN_FUNCTIONAL_ASSESSMENT: 0-10

## 2024-10-01 ASSESSMENT — PAIN DESCRIPTION - LOCATION
LOCATION: FOOT
LOCATION: FOOT

## 2024-10-01 ASSESSMENT — PAIN DESCRIPTION - DESCRIPTORS
DESCRIPTORS: ACHING

## 2024-10-01 ASSESSMENT — PAIN DESCRIPTION - ORIENTATION
ORIENTATION: LEFT
ORIENTATION: LEFT

## 2024-10-01 NOTE — PROGRESS NOTES
Occupational Therapy                 Therapy Communication Note    Patient Name: Elliot Partida  MRN: [id number]  Department: [facility] [address]  Room: [address] [place] [address]  Today's Date: 10/1/2024     Discipline: Occupational Therapy    Missed Visit Reason: Missed Visit Reason: Patient refused (Pt refused OT tx this date despite education and encouragement to participate in OOB/EOB activities.)    Missed Time: Attempt

## 2024-10-01 NOTE — NURSING NOTE
Four eye skin check completed with [facility], podiatry cares for left leg wound, protective mepilex placed on coccyx

## 2024-10-01 NOTE — NURSING NOTE
Pt with elevated BP reading; pt's BP's reviewed for the day; consistent HTN noted today. Secure chat sent to MD including all of the BP's recorded today. New order received for Hydralazine 25mg PO TID. First dose given this evening. Pt informed of both the hypertension and the new orders obtained. Pt verbalized understanding; pt had no questions at this time.

## 2024-10-01 NOTE — CARE PLAN
Problem: Safety - Adult  Goal: Free from fall injury  Outcome: Progressing     Problem: Discharge Planning  Goal: Discharge to home or other facility with appropriate resources  Outcome: Progressing     Problem: Chronic Conditions and Co-morbidities  Goal: Patient's chronic conditions and co-morbidity symptoms are monitored and maintained or improved  Outcome: Progressing   The patient's goals for the shift include      The clinical goals for the shift include Maintain pt safety/comfort; monitor labs/vitals, pain management, woud care

## 2024-10-01 NOTE — PROGRESS NOTES
10/01/24 1241   Discharge Planning   Home or Post Acute Services Post acute facilities (Rehab/SNF/etc)   Type of Post Acute Facility Services Skilled nursing   Expected Discharge Disposition SNF  ([facility])   Does the patient need discharge transport arranged? Yes   RoundTrip coordination needed? Yes   What day is the transport expected? 10/02/24     [facility] has accepted patient.  They will need to order a wound vac.  Secure chat to attending to see if med ready.  Susie Sims RN     10/1/24 @ 1302  Patient is medically ready for discharge to SNF.  SNF is ordering the wound vac, once the date is confirmed of them receiving it, patient will be discharged.  Susie Sims RN     10/1/24 @ 1334  Patient to be discharged to [facility]  Report to be called to [phone]  Transport to be arranged once provider certification is completed.  Susie Sims RN

## 2024-10-01 NOTE — CARE PLAN
The patient's goals for the shift include      The clinical goals for the shift include Maintain pt safety/comfort; monitor labs/vitals, pain management, woud care    Over the shift, the patient did not make progress toward the following goals. Barriers to progression include . Recommendations to address these barriers include   Problem: Pain  Goal: Takes deep breaths with improved pain control throughout the shift  Outcome: Progressing  Goal: Turns in bed with improved pain control throughout the shift  Outcome: Progressing  Goal: Walks with improved pain control throughout the shift  Outcome: Progressing  Goal: Performs ADL's with improved pain control throughout shift  Outcome: Progressing  Goal: Participates in PT with improved pain control throughout the shift  Outcome: Progressing  Goal: Free from opioid side effects throughout the shift  Outcome: Progressing  Goal: Free from acute confusion related to pain meds throughout the shift  Outcome: Progressing     Problem: Pain - Adult  Goal: Verbalizes/displays adequate comfort level or baseline comfort level  Outcome: Progressing     Problem: Safety - Adult  Goal: Free from fall injury  Outcome: Progressing     Problem: Discharge Planning  Goal: Discharge to home or other facility with appropriate resources  Outcome: Progressing     Problem: Chronic Conditions and Co-morbidities  Goal: Patient's chronic conditions and co-morbidity symptoms are monitored and maintained or improved  Outcome: Progressing     Problem: Diabetes  Goal: Achieve decreasing blood glucose levels by end of shift  Outcome: Progressing  Goal: Increase stability of blood glucose readings by end of shift  Outcome: Progressing  Goal: Decrease in ketones present in urine by end of shift  Outcome: Progressing  Goal: Maintain electrolyte levels within acceptable range throughout shift  Outcome: Progressing  Goal: Maintain glucose levels >70mg/dl to <250mg/dl throughout shift  Outcome:  Progressing  Goal: No changes in neurological exam by end of shift  Outcome: Progressing  Goal: Learn about and adhere to nutrition recommendations by end of shift  Outcome: Progressing  Goal: Vital signs within normal range for age by end of shift  Outcome: Progressing  Goal: Increase self care and/or family involovement by end of shift  Outcome: Progressing  Goal: Receive DSME education by end of shift  Outcome: Progressing     Problem: Fall/Injury  Goal: Not fall by end of shift  Outcome: Progressing  Goal: Be free from injury by end of the shift  Outcome: Progressing     Problem: Nutrition  Goal: Promote healing  Outcome: Progressing     Problem: Skin  Goal: Decreased wound size/increased tissue granulation at next dressing change  Outcome: Progressing  Goal: Participates in plan/prevention/treatment measures  Outcome: Progressing  Goal: Prevent/manage excess moisture  Outcome: Progressing  Goal: Prevent/minimize sheer/friction injuries  Outcome: Progressing  Goal: Promote/optimize nutrition  Outcome: Progressing  Goal: Promote skin healing  Outcome: Progressing     Problem: Bathing  Goal: STG - Patient will bathe body Mod I  Outcome: Progressing     Problem: Dressings Lower Extremities  Goal: STG - Patient will complete lower body dressing Mod I  Outcome: Progressing     Problem: Toileting  Goal: STG - Patient will complete toileting tasks with Mod I  Outcome: Progressing   .

## 2024-10-01 NOTE — PROGRESS NOTES
INTERNAL MEDICINE PROGRESS NOTE      HPI:    Wound VAC applied.  Family member with whom he was going to stay and get assistance with care had a stroke yesterday.  Trying to make alternative arrangements versus skilled nursing facility placement.  Reports good pain control.    Vital signs in last 24 hours:  Temp:  [36.6 °C (97.8 °F)-37.1 °C (98.8 °F)] 36.6 °C (97.8 °F)  Heart Rate:  [69-78] 74  Resp:  [18-22] 18  BP: (137-181)/(60-79) 137/60    Physical Examination:  Physical Exam    Constitutional:       Appearance: Elderly, overweight, in no distress  HENT:      Head: Normocephalic and atraumatic.   Eyes:      Extraocular Movements: Extraocular movements intact.      Pupils: Pupils are equal, round, and reactive to light.   Cardiovascular:      Rate and Rhythm: Normal rate and regular rhythm.      Pulses: Normal pulses.      Heart sounds: Normal heart sounds.   Pulmonary:      Effort: Pulmonary effort is normal.      Breath sounds: Normal breath sounds.   Abdominal:      General: Abdomen is flat. Bowel sounds are normal.      Palpations: Abdomen is soft.   Musculoskeletal:         General: Normal range of motion.      Cervical back: Normal range of motion and neck supple.   Skin:     General: Dressing in place over foot  Neurological:      General: No focal deficit present.      Mental Status: He is alert and oriented to person, place, and time. Mental status is at baseline.        Medications:    Current Facility-Administered Medications:     acetaminophen (Tylenol) tablet 650 mg, 650 mg, oral, q4h PRN, 650 mg at 09/29/24 2138 **OR** acetaminophen (Tylenol) oral liquid 650 mg, 650 mg, oral, q4h PRN **OR** acetaminophen (Tylenol) suppository 650 mg, 650 mg, rectal, q4h PRN, Diaeddine O Geoffrey, DPM    ammonium lactate (Lac-Hydrin) 12 % lotion, , Topical, BID, Diaeddine O Geoffrey, DPM, Given at 09/30/24 2320    ampicillin-sulbactam (Unasyn) in sodium chloride 0.9 % 100 mL 3 g, 3 g, intravenous, q6h, Diaeddine O  Geoffrey, DPM, Last Rate: 200 mL/hr at 10/01/24 1138, 3 g at 10/01/24 1138    apixaban (Eliquis) tablet 10 mg, 10 mg, oral, BID, 10 mg at 10/01/24 0904 **FOLLOWED BY** [START ON 10/5/2024] apixaban (Eliquis) tablet 5 mg, 5 mg, oral, BID, Jemal Guadarrama MD    aspirin chewable tablet 81 mg, 81 mg, oral, Daily, Diaeddine O Geoffrey, DPM, 81 mg at 10/01/24 0904    clopidogrel (Plavix) tablet 600 mg, 600 mg, oral, Once, Diaeddine O Geoffrey, DPM    clopidogrel (Plavix) tablet 75 mg, 75 mg, oral, Daily, Diaeddine O Geoffrey, DPM, 75 mg at 10/01/24 0904    dextrose 50 % injection 12.5 g, 12.5 g, intravenous, q15 min PRN, Diaeddine O Geoffrey, DPM    dextrose 50 % injection 25 g, 25 g, intravenous, q15 min PRN, Diaeddine O Geoffrey, DPM    glucagon (Glucagen) injection 1 mg, 1 mg, intramuscular, q15 min PRN, Diaeddine O Geoffrey, DPM    glucagon (Glucagen) injection 1 mg, 1 mg, intramuscular, q15 min PRN, Diaeddine O Geoffrey, DPM    hydrALAZINE (Apresoline) tablet 25 mg, 25 mg, oral, TID, Jemal Guadarrama MD, 25 mg at 10/01/24 0904    HYDROmorphone (Dilaudid) injection 1 mg, 1 mg, intravenous, q4h PRN, Diaeddine O Geoffrey, DPM, 1 mg at 09/30/24 2126    insulin lispro (HumaLOG) injection 0-10 Units, 0-10 Units, subcutaneous, TID, Diaeddine O Geoffrey, DPM, 2 Units at 09/30/24 1206    lidocaine (LMX) 4 % cream, , Topical, 4x daily PRN, Diaeddine O Geoffrey, DPM, Given at 09/30/24 2129    lidocaine-epinephrine (Xylocaine W/EPI) 0.5 %-1:200,000 injection 20 mL, 20 mL, subcutaneous, Once, Diaeddine O Geoffrey, DPM    lidocaine-epinephrine (Xylocaine W/EPI) 1 %-1:100,000 injection 20 mL, 20 mL, injection, Once, Diaeddine O Geoffrey, DPM    morphine injection 2 mg, 2 mg, intravenous, q4h PRN, Diaeddine O Geoffrey, DPM, 2 mg at 10/01/24 1142    ondansetron (Zofran) injection 4 mg, 4 mg, intravenous, q4h PRN, Diaeddine O Geoffrey, DPM    polyethylene glycol (Glycolax, Miralax) packet 17 g, 17 g, oral, Daily, Diaeddine O Geoffrey, DPM, 17 g at 09/30/24 0918    potassium chloride CR  (Klor-Con M20) ER tablet 40 mEq, 40 mEq, oral, Daily, Diaeddine O Geoffrey, DPM, 40 mEq at 10/01/24 0904    simethicone (Mylicon) chewable tablet 80 mg, 80 mg, oral, q4h PRN, Jemal Guadarrama MD, 80 mg at 09/30/24 2127    Laboratory Findings:  Lab Results   Component Value Date    WBC 7.7 09/30/2024    HGB 8.7 (L) 09/30/2024    HCT 27.5 (L) 09/30/2024    MCV 88 09/30/2024     09/30/2024     Lab Results   Component Value Date    INR 1.0 12/09/2019    PROTIME 11.4 12/09/2019     Lab Results   Component Value Date    GLUCOSE 110 (H) 09/30/2024    CALCIUM 8.3 (L) 09/30/2024     09/30/2024    K 3.7 09/30/2024    CO2 26 09/30/2024     09/30/2024    BUN 16 09/30/2024    CREATININE 0.55 09/30/2024       Assessment and Plan:     Acute osteomyelitis -continue with antibiotics as per orders.  Foot pain -good response to oral analgesics  Acute DVT -continue with Eliquis, no bleeding.  Ambulatory difficulty -continue with therapy.    Discharge arrangements ongoing.      Jemal Guadarrama MD  10/01/24  10 AM

## 2024-10-01 NOTE — PROGRESS NOTES
10/01/24 0721   Discharge Planning   Home or Post Acute Services Post acute facilities (Rehab/SNF/etc)   Type of Post Acute Facility Services Skilled nursing   Expected Discharge Disposition SNF   Does the patient need discharge transport arranged? Yes   RoundTrip coordination needed? Yes     PLAN/BARRIER: waiting to hear if [facility] can accept patient; otherwise, he will need SNF choices  DISP: Swing bed or SNF  ADOD: today or tomorrow  Susie Sims RN     10/1/24 @ 1012  Met with patient at the bedside to discuss discharge plan.  Patient provided TCC with SNF choices and requested referrals be sent to:  1. [facility] ([facility])  2. Matias Sims RN

## 2024-10-01 NOTE — PROGRESS NOTES
10/1/2024 4:06 PM Se[facility] will get wound vac tomorrow. Patient and sister notified of discharge for tomorrow. Roxanne NANCE

## 2024-10-01 NOTE — PROGRESS NOTES
Physical Therapy                 Therapy Communication Note    Patient Name: Elliot Partida  MRN: [id number]  Department: [place] [address]  Room: [address] [place] [address]  Today's Date: 10/1/2024     Discipline: Physical Therapy    Missed Visit Reason: Missed Visit Reason:  (per OT- Pt refused OT tx this date despite education and encouragement to participate in OOB/EOB activities.)    Missed Time: Attempt    Comment:

## 2024-10-02 VITALS
HEART RATE: 67 BPM | TEMPERATURE: 98 F | SYSTOLIC BLOOD PRESSURE: 145 MMHG | BODY MASS INDEX: 33.4 KG/M2 | HEIGHT: 69 IN | RESPIRATION RATE: 18 BRPM | DIASTOLIC BLOOD PRESSURE: 75 MMHG | WEIGHT: 225.53 LBS | OXYGEN SATURATION: 96 %

## 2024-10-02 LAB
ANION GAP SERPL CALC-SCNC: 12 MMOL/L (ref 10–20)
BASOPHILS # BLD AUTO: 0.04 X10*3/UL (ref 0–0.1)
BASOPHILS NFR BLD AUTO: 0.5 %
BUN SERPL-MCNC: 11 MG/DL (ref 6–23)
CALCIUM SERPL-MCNC: 8.4 MG/DL (ref 8.6–10.3)
CHLORIDE SERPL-SCNC: 105 MMOL/L (ref 98–107)
CO2 SERPL-SCNC: 27 MMOL/L (ref 21–32)
CREAT SERPL-MCNC: 0.58 MG/DL (ref 0.5–1.3)
EGFRCR SERPLBLD CKD-EPI 2021: >90 ML/MIN/1.73M*2
EOSINOPHIL # BLD AUTO: 0.19 X10*3/UL (ref 0–0.4)
EOSINOPHIL NFR BLD AUTO: 2.6 %
ERYTHROCYTE [DISTWIDTH] IN BLOOD BY AUTOMATED COUNT: 14.8 % (ref 11.5–14.5)
GLUCOSE BLD MANUAL STRIP-MCNC: 132 MG/DL (ref 74–99)
GLUCOSE SERPL-MCNC: 109 MG/DL (ref 74–99)
HCT VFR BLD AUTO: 29.2 % (ref 41–52)
HGB BLD-MCNC: 9.2 G/DL (ref 13.5–17.5)
IMM GRANULOCYTES # BLD AUTO: 0.07 X10*3/UL (ref 0–0.5)
IMM GRANULOCYTES NFR BLD AUTO: 1 % (ref 0–0.9)
LYMPHOCYTES # BLD AUTO: 2.33 X10*3/UL (ref 0.8–3)
LYMPHOCYTES NFR BLD AUTO: 31.9 %
MCH RBC QN AUTO: 28.5 PG (ref 26–34)
MCHC RBC AUTO-ENTMCNC: 31.5 G/DL (ref 32–36)
MCV RBC AUTO: 90 FL (ref 80–100)
MONOCYTES # BLD AUTO: 0.59 X10*3/UL (ref 0.05–0.8)
MONOCYTES NFR BLD AUTO: 8.1 %
NEUTROPHILS # BLD AUTO: 4.09 X10*3/UL (ref 1.6–5.5)
NEUTROPHILS NFR BLD AUTO: 55.9 %
NRBC BLD-RTO: 0 /100 WBCS (ref 0–0)
PLATELET # BLD AUTO: 330 X10*3/UL (ref 150–450)
POTASSIUM SERPL-SCNC: 4.1 MMOL/L (ref 3.5–5.3)
RBC # BLD AUTO: 3.23 X10*6/UL (ref 4.5–5.9)
SODIUM SERPL-SCNC: 140 MMOL/L (ref 136–145)
WBC # BLD AUTO: 7.3 X10*3/UL (ref 4.4–11.3)

## 2024-10-02 PROCEDURE — 2500000002 HC RX 250 W HCPCS SELF ADMINISTERED DRUGS (ALT 637 FOR MEDICARE OP, ALT 636 FOR OP/ED)

## 2024-10-02 PROCEDURE — 85025 COMPLETE CBC W/AUTO DIFF WBC: CPT | Performed by: INTERNAL MEDICINE

## 2024-10-02 PROCEDURE — 36415 COLL VENOUS BLD VENIPUNCTURE: CPT | Performed by: INTERNAL MEDICINE

## 2024-10-02 PROCEDURE — 2500000004 HC RX 250 GENERAL PHARMACY W/ HCPCS (ALT 636 FOR OP/ED)

## 2024-10-02 PROCEDURE — 82947 ASSAY GLUCOSE BLOOD QUANT: CPT

## 2024-10-02 PROCEDURE — 2500000001 HC RX 250 WO HCPCS SELF ADMINISTERED DRUGS (ALT 637 FOR MEDICARE OP)

## 2024-10-02 PROCEDURE — 80048 BASIC METABOLIC PNL TOTAL CA: CPT | Performed by: INTERNAL MEDICINE

## 2024-10-02 PROCEDURE — 2500000001 HC RX 250 WO HCPCS SELF ADMINISTERED DRUGS (ALT 637 FOR MEDICARE OP): Performed by: INTERNAL MEDICINE

## 2024-10-02 RX ORDER — OXYCODONE HYDROCHLORIDE 5 MG/1
5 TABLET ORAL EVERY 6 HOURS PRN
Qty: 15 TABLET | Refills: 0 | Status: SHIPPED | OUTPATIENT
Start: 2024-10-02 | End: 2024-10-03 | Stop reason: SDUPTHER

## 2024-10-02 ASSESSMENT — COGNITIVE AND FUNCTIONAL STATUS - GENERAL
DAILY ACTIVITIY SCORE: 18
MOVING FROM LYING ON BACK TO SITTING ON SIDE OF FLAT BED WITH BEDRAILS: A LITTLE
CLIMB 3 TO 5 STEPS WITH RAILING: A LOT
TURNING FROM BACK TO SIDE WHILE IN FLAT BAD: A LOT
WALKING IN HOSPITAL ROOM: A LOT
HELP NEEDED FOR BATHING: A LITTLE
STANDING UP FROM CHAIR USING ARMS: A LOT
DRESSING REGULAR UPPER BODY CLOTHING: A LITTLE
MOBILITY SCORE: 13
DRESSING REGULAR LOWER BODY CLOTHING: A LOT
PERSONAL GROOMING: A LITTLE
MOVING TO AND FROM BED TO CHAIR: A LOT
TOILETING: A LITTLE

## 2024-10-02 ASSESSMENT — PAIN SCALES - GENERAL
PAINLEVEL_OUTOF10: 7
PAINLEVEL_OUTOF10: 0 - NO PAIN

## 2024-10-02 ASSESSMENT — PAIN DESCRIPTION - ORIENTATION: ORIENTATION: LEFT

## 2024-10-02 ASSESSMENT — PAIN DESCRIPTION - LOCATION: LOCATION: FOOT

## 2024-10-02 ASSESSMENT — PAIN - FUNCTIONAL ASSESSMENT: PAIN_FUNCTIONAL_ASSESSMENT: 0-10

## 2024-10-02 NOTE — CARE PLAN
The patient's goals for the shift include pain control    The clinical goals for the shift include maintain safety    Over the shift, the patient did not make progress toward the following goals. Barriers to progression include post surgical. Recommendations to address these barriers include maintain safety/pain management.

## 2024-10-02 NOTE — PROGRESS NOTES
10/02/24 0723   Discharge Planning   Home or Post Acute Services Post acute facilities (Rehab/SNF/etc)   Type of Post Acute Facility Services Skilled nursing   Expected Discharge Disposition SNF  ([facility])   Does the patient need discharge transport arranged? Yes   RoundTrip coordination needed? Yes   Has discharge transport been arranged? Yes   What day is the transport expected? 10/02/24   What time is the transport expected? 1030     Patient is medically cleared for discharge.  Report to be called to [phone].  Susie Sims RN     10/2/24 @9890  Secure chat to attending to request a written script for oxycodone for the SNF.  If transport arrives prior to this being completed, the script will be faxed to [phone]  Susie Sims RN

## 2024-10-02 NOTE — CARE PLAN
Problem: Pain - Adult  Goal: Verbalizes/displays adequate comfort level or baseline comfort level  Outcome: Progressing     Problem: Safety - Adult  Goal: Free from fall injury  Outcome: Progressing     Problem: Chronic Conditions and Co-morbidities  Goal: Patient's chronic conditions and co-morbidity symptoms are monitored and maintained or improved  Outcome: Progressing     Problem: Discharge Planning  Goal: Discharge to home or other facility with appropriate resources  Outcome: Progressing   The patient's goals for the shift include      The clinical goals for the shift include Safety, comfort, pain management, wound care

## 2024-10-03 ENCOUNTER — NURSING HOME VISIT (OUTPATIENT)
Dept: POST ACUTE CARE | Facility: EXTERNAL LOCATION | Age: 74
End: 2024-10-03
Payer: MEDICARE

## 2024-10-03 ENCOUNTER — NURSING HOME VISIT (OUTPATIENT)
Dept: POST ACUTE CARE | Facility: EXTERNAL LOCATION | Age: 74
End: 2024-10-03

## 2024-10-03 DIAGNOSIS — I25.10 CORONARY ARTERY DISEASE, UNSPECIFIED VESSEL OR LESION TYPE, UNSPECIFIED WHETHER ANGINA PRESENT, UNSPECIFIED WHETHER NATIVE OR TRANSPLANTED HEART: ICD-10-CM

## 2024-10-03 DIAGNOSIS — M19.90 OSTEOARTHRITIS, UNSPECIFIED OSTEOARTHRITIS TYPE, UNSPECIFIED SITE: ICD-10-CM

## 2024-10-03 DIAGNOSIS — L03.116 CELLULITIS OF FOOT, LEFT: ICD-10-CM

## 2024-10-03 DIAGNOSIS — E11.621 DIABETIC ULCER OF LEFT FOOT ASSOCIATED WITH TYPE 2 DIABETES MELLITUS, LIMITED TO BREAKDOWN OF SKIN, UNSPECIFIED PART OF FOOT: ICD-10-CM

## 2024-10-03 DIAGNOSIS — M86.9 OSTEOMYELITIS OF LEFT FOOT, UNSPECIFIED TYPE (MULTI): Primary | ICD-10-CM

## 2024-10-03 DIAGNOSIS — M54.16 LUMBAR BACK PAIN WITH RADICULOPATHY AFFECTING LEFT LOWER EXTREMITY: ICD-10-CM

## 2024-10-03 DIAGNOSIS — R53.1 WEAKNESS: ICD-10-CM

## 2024-10-03 DIAGNOSIS — I10 HYPERTENSION, UNSPECIFIED TYPE: ICD-10-CM

## 2024-10-03 DIAGNOSIS — R29.898 WEAKNESS OF LEFT LOWER EXTREMITY: ICD-10-CM

## 2024-10-03 DIAGNOSIS — D32.9 MENINGIOMA (MULTI): ICD-10-CM

## 2024-10-03 DIAGNOSIS — I82.432 ACUTE DEEP VEIN THROMBOSIS (DVT) OF POPLITEAL VEIN OF LEFT LOWER EXTREMITY (MULTI): ICD-10-CM

## 2024-10-03 DIAGNOSIS — M86.9 OSTEOMYELITIS, UNSPECIFIED SITE, UNSPECIFIED TYPE (MULTI): Primary | ICD-10-CM

## 2024-10-03 DIAGNOSIS — G61.9 INFLAMMATORY NEUROPATHY (MULTI): ICD-10-CM

## 2024-10-03 DIAGNOSIS — I73.9 PAD (PERIPHERAL ARTERY DISEASE) (CMS-HCC): ICD-10-CM

## 2024-10-03 DIAGNOSIS — M86.9 OSTEOMYELITIS: ICD-10-CM

## 2024-10-03 DIAGNOSIS — I82.412 ACUTE DEEP VEIN THROMBOSIS (DVT) OF FEMORAL VEIN OF LEFT LOWER EXTREMITY (MULTI): ICD-10-CM

## 2024-10-03 DIAGNOSIS — Z91.81 AT RISK FOR FALLING: ICD-10-CM

## 2024-10-03 DIAGNOSIS — G62.9 NEUROPATHY: ICD-10-CM

## 2024-10-03 DIAGNOSIS — E11.9 TYPE 2 DIABETES MELLITUS WITHOUT COMPLICATION, UNSPECIFIED WHETHER LONG TERM INSULIN USE (MULTI): ICD-10-CM

## 2024-10-03 DIAGNOSIS — D32.9 BENIGN NEOPLASM OF MENINGES: ICD-10-CM

## 2024-10-03 DIAGNOSIS — L97.521 DIABETIC ULCER OF LEFT FOOT ASSOCIATED WITH TYPE 2 DIABETES MELLITUS, LIMITED TO BREAKDOWN OF SKIN, UNSPECIFIED PART OF FOOT: ICD-10-CM

## 2024-10-03 DIAGNOSIS — I51.89 DIASTOLIC DYSFUNCTION: ICD-10-CM

## 2024-10-03 DIAGNOSIS — M54.10 RADICULOPATHY, UNSPECIFIED SPINAL REGION: ICD-10-CM

## 2024-10-03 DIAGNOSIS — D64.9 ANEMIA, UNSPECIFIED TYPE: ICD-10-CM

## 2024-10-03 PROCEDURE — 99310 SBSQ NF CARE HIGH MDM 45: CPT | Performed by: NURSE PRACTITIONER

## 2024-10-03 PROCEDURE — 99306 1ST NF CARE HIGH MDM 50: CPT | Performed by: INTERNAL MEDICINE

## 2024-10-03 RX ORDER — OXYCODONE HYDROCHLORIDE 5 MG/1
5 TABLET ORAL EVERY 6 HOURS PRN
Qty: 28 TABLET | Refills: 0 | Status: SHIPPED | OUTPATIENT
Start: 2024-10-06 | End: 2024-10-13

## 2024-10-03 NOTE — LETTER
Patient: Elliot Partida  : 1950    Encounter Date: 10/03/2024    PLACE OF SERVICE:  [facility] [place]    This is new/initial history and physical.    Subjective  Patient ID: Elliot Partida is a 74 y.o. male who presents for new/initial history  and Annual Exam.    Mr. Elliot Partida is a 74-year-old male with history of cellulitis and chronic osteomyelitis to his left foot.  He suffers from arterial disease.  He is a diabetic and unable to care for himself.  He requires  supportive care.    Review of Systems   Constitutional:  Negative for chills and fever.   Cardiovascular:  Negative for chest pain.   All other systems reviewed and are negative.    Objective  /84   Pulse 78   Temp 36.7 °C (98 °F)   Resp 16     Physical Exam  Vitals reviewed.   Constitutional:       Comments: This is a well-developed, well-nourished male, lying in bed, appearing weak.   HENT:      Right Ear: Tympanic membrane, ear canal and external ear normal.      Left Ear: Tympanic membrane, ear canal and external ear normal.   Eyes:      General: No scleral icterus.     Pupils: Pupils are equal, round, and reactive to light.   Neck:      Vascular: No carotid bruit.   Cardiovascular:      Heart sounds: Normal heart sounds, S1 normal and S2 normal. No murmur heard.     No friction rub.   Pulmonary:      Effort: Pulmonary effort is normal.      Breath sounds: Normal breath sounds and air entry.   Abdominal:      Palpations: There is no hepatomegaly, splenomegaly or mass.   Musculoskeletal:         General: No swelling or deformity. Normal range of motion.      Cervical back: Neck supple.      Right lower leg: No edema.      Left lower leg: No edema.      Comments: The patient's left foot and ankle are currently dressed.   Lymphadenopathy:      Cervical: No cervical adenopathy.      Upper Body:      Right upper body: No axillary adenopathy.      Left upper body: No axillary adenopathy.       Lower Body: No right inguinal adenopathy. No left inguinal adenopathy.   Neurological:      Mental Status: He is oriented to person, place, and time. He is lethargic.      Cranial Nerves: Cranial nerves 2-12 are intact. No cranial nerve deficit.      Sensory: No sensory deficit.      Motor: Motor function is intact. No weakness.      Gait: Gait is intact.      Deep Tendon Reflexes: Reflexes normal.   Psychiatric:         Mood and Affect: Mood normal. Mood is not anxious or depressed. Affect is not angry.         Behavior: Behavior is not agitated.         Thought Content: Thought content normal.         Judgment: Judgment normal.     LAB WORK:  Laboratory studies were reviewed.    Assessment/Plan  Problem List Items Addressed This Visit             ICD-10-CM       Cardiac and Vasculature    CAD (coronary artery disease) I25.10    Hypertension I10       Endocrine/Metabolic    Type 2 diabetes mellitus E11.9       Infectious Diseases    Osteomyelitis - Primary M86.9     Other Visit Diagnoses         Codes    PAD (peripheral artery disease) (CMS-HCC)     I73.9    Weakness     R53.1    At risk for falling     Z91.81    Osteoarthritis, unspecified osteoarthritis type, unspecified site     M19.90    Radiculopathy, unspecified spinal region     M54.10    Neuropathy     G62.9          1. Osteomyelitis with cellulitis to the left foot.  Continue wound care, on antibiotic.  Follow with Podiatry.  2. PAD, on medication.  3. Diabetes, on insulin.  4. Hypertension, medically controlled.  5. Coronary artery disease, on aspirin.  6. Weakness, on PT/OT.  7. Fall risk, on fall precautions.  8. Osteoarthritis, on Tylenol.  9. Radiculopathy, on pain control.  10. Neuropathy, on gabapentin.    Scribe Attestation  By signing my name below, IHanna Scribe attest that this documentation has been prepared under the direction and in the presence of Marium Singh MD.     All medical record entries made by the scribe were personally  dictated by me I have reviewed the chart and agree the record accurately reflects my personal performance of his history physical examination and management      Electronically Signed By: Marium Singh MD   10/8/24 12:20 AM

## 2024-10-03 NOTE — LETTER
"Patient: Elliot Partida  : 1950    Encounter Date: 10/03/2024    Chief Complaint:   SNF F/U  -Left foot osteomyelitis  -Left foot cellulitis  -Left foot diabetic ulcer  -PVD  -Venous insufficiency  -DVT of LLE  -Physical deconditioning/weakness    HPI:   74 year-old male presenting to [facility] ER on 24 with \"months\" of LLE pain with edema, redness, and warmth. He was noted to have a large necrotic ulcer under his 5th toe. He reported that he had not seen a provider or taken any medications for about \"2 years\". Work-up in ER: Glu 310, Na+ 129, Alb 3.3, XR of L foot showed osteomyelitis of the 5th metatarsal head, US of LLE showed acute DVT in the left mid-distal femoral and popliteal veins. He was started on IV Heparin, IV Zosyn, and IV Vanco. He was admitted to the hospital for further evaluation and treatment. Hospital course:    Left foot osteomyelitis/cellulitis/DVT of LLE/PAD-IV ATB, IV heparin (transitioned to Eliquis), ID consult, podiatry consult, elevate LLE, local wound care, analgesics prn, patient transferred to [facility] for vascular consult on , underwent LLE revascularization with Dr. Stoddard on , recommending repeat BEN in 4 weeks with OP F/U, underwent L partial 5th ray resection on  with Dr. Spears, taken back to OR on  for delayed closure with graft application by Dr. 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 " "was HDS and discharged to [facility] on 10/2/24. Today, patient denies dizziness, HA, SOB, cough, chest pain or palpitations, abdominal pain, N/V/D/C, or dysuria. He reports appetite at baseline. He reports that his LLE pain is \"okay\". He is c/o insomnia, which is chronic. He denies fevers or chills. Staff report no clinical concerns.     ROS:    As above in HPI. Otherwise, all other systems have been reviewed and are negative for complaint.    Medications reviewed and verified in NH chart.     Patient Active Problem List   Diagnosis   • Wound infection   • Osteomyelitis of left foot, unspecified type (Multi)   • Acute deep vein thrombosis (DVT) of popliteal vein of left lower extremity (Multi)   • Bilateral lower extremity edema   • CAD (coronary artery disease)   • Hypertension   • Inflammatory neuropathy (Multi)   • History of stroke   • Weakness of left lower extremity   • Ambulatory dysfunction   • Chronic bilateral low back pain with left-sided sciatica   • Diabetic peripheral neuropathy (Multi)   • Neural foraminal stenosis of lumbosacral spine   • Hyperlipidemia   • Lumbar back pain with radiculopathy affecting left lower extremity   • Peripheral arterial disease (CMS-[facility])   • Type 2 diabetes mellitus, without long-term current use of insulin (Multi)   • Anemia   • Obesity   • Delayed surgical wound healing of foot amputation stump (Multi)   • Impaired mobility and ADLs   • Weakness   • Thoracic radiculopathy   • Osteoarthritis   • Cellulitis of foot, left   • Meningioma (Multi)   • Tremor of left hand   • Diabetic ulcer of left foot associated with type 2 diabetes mellitus, limited to breakdown of skin   • Acute deep vein thrombosis (DVT) of femoral vein of left lower extremity (Multi)   • Nonrheumatic aortic valve stenosis   • Diastolic dysfunction        Past Medical History:   Diagnosis Date   • Myocardial infarction (Multi)    • Neuritis    • Sprain of right knee 06/25/2015   • Stroke (Multi)    • " 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[facility]HS-HCC)    • TIA (transient ischemic attack)    • Tinea pedis of both feet        Past Surgical History:   Procedure Laterality Date   • CARDIAC CATHETERIZATION     • CARPAL TUNNEL RELEASE  10/10/2014   • EYE SURGERY     • FL  ARTHROCENTESIS ASP INJ JOINT.     • FOOT RAY RESECTION Left 09/23/2024    L partial 5th ray resection (Dr. Spears, [facility]BRAYAN)   • FOOT SURGERY Left 09/27/2024    Delayed closure w/ graft application (Dr. Huynh, [facility])   • INVASIVE VASCULAR PROCEDURE Bilateral 09/18/2024    Procedure: Lower Extremity Angiogram;  Surgeon: Teofilo Stoddard MD;  Location: [facility];  Service: Cardiovascular;  Laterality: Bilateral;   • IRIDOTOMY / IRIDECTOMY Bilateral        Family History   Problem Relation Name Age of Onset   • Heart disease Father     • Colon cancer Other     • Heart attack Other     • Diabetes Other     • Hypertension Other         Social History     Tobacco Use   Smoking Status Former   • Types: Cigarettes   • Passive exposure: Never   Smokeless Tobacco Never       Social History     Substance and Sexual Activity   Alcohol Use Never       Social History     Substance and Sexual Activity   Drug Use Never       No Known Allergies     Vital Signs:   154/87-84-18-97.9-98% on RA    Physical Exam:  General: Sitting up in WC in NAD, alert   Head/Face: NCAT, symmetrical  Eyes: PERRLA, no injection, no discharge  ENT: Hearing not impaired, ears without scars or lesions, nasal mucosa and turbinates pink, septum midline, lips pink and moist  Neck: Supple, symmetrical  Respiratory: CTA but diminished without adventitious sounds, respirations even and nonlabored without use of accessory muscles, good air exchange  Cardio: RRR without murmur or gallops, normal S1S2, NP edema to BLE (L > R), pedal pulses 2+/4 bilaterally  Chest/Breast: Symmetrical  GI: BS x 4, normoactive, non-distended, abd round and soft, no masses or tenderness  : No suprapubic tenderness or distention  MSK: Gait not  assessed, joints with full ROM without pain or contractures, + generalized weakness  Skin: Skin warm and dry, no induration, surgical wounds to L 5th digit and L lateral foot, wound vac intact, vascular ulcer of L dorsal foot, SDTI to L heel, unstageable pressure ulcer of R heel, MASD of L buttock, skin tags to R buttocks (chronic per patient)   Neurologic: Cranial nerves II through XII intact, decreased sensation to BLE  Psychiatric: Alert to person, place, and time, calm and cooperative     Results/Data:   10/3/24: Glu 111, Cr 0.6, HgbA1C 7.0, Hgb 9.0, Hct 28.8    Assessment/Plan:  Left foot osteomyelitis/cellulitis/PAD/venous insufficiency-s/p LLE revascularization with Dr. Stoddard on 9/18, s/p L partial 5th ray resection on 9/23 with Dr. Spears, s/p delayed closure with graft application by Dr. Huynh on 9/27, c/w local wound care/wound vac placement, c/w Vit C, MVI, and LPS Critical Care BID to promote wound healing, c/w Augmentin (end date 10/9), c/w ASA and plavix, Tylenol prn for mild pain, Oxycodone prn for mod-severe pain, wound care team following, F/U with podiatry (10/14) and vascular   DVT of LLE-c/w eliquis, elevate LLE, monitor H&H  Physical deconditioning/weakness-c/w PT/OT  Insomnia-increase melatonin to 6 mg Q PM  Anemia-c/w MVI, monitor CBC  CAD/HTN/Hx MI/HLD/diastolic dysfunction/AVS-FAVIAN diet, c/w ASA and hydralazine, add lisinopril 2.5 mg daily for BP, monitor BP, monitor lipid panel, needs established with cardiology  DM2 with peripheral neuropathy-LCS diet, accuchecks, c/w lispro ISS with meals, BP and lipid control, yearly eye exam, diabetic foot care, diabetic diet education (RD to follow), monitor HgbA1C  Hypokalemia-c/w KCl, monitor K+ level  Right frontal lobe meningioma-followed by Dr. Sin ([facility]), seen on 8/4/23 and advised to F/U with neuro-oncology [facility] in 2 years with MRI [facility] for surveillance  Hx stroke/LLE weakness/L hand tremor-followed by neurology   Constipation-c/w miralax,  monitor BMs  OA/lumbar radiculopathy/thoracic radiculopathy-Tylenol prn, F/U with specialists after discharge    Orders:  Increase melatonin to 6 mg PO Q PM   Lisinopril 2.5 mg PO daily     Code Status:   Full Code    Time spent reviewing patient's chart on the unit (PMH, PSH, FH, Social Hx AND progress and/or consult notes, labs, and radiology results): 35 minutes  Time spent interviewing and/or examining the patient: 10 minutes  Time spent writing note on the unit: 5 minutes  Time spent reviewing POC w/ patient, family, and/or staff: 5 minutes  Total visit time: 55 minutes. Greater than 50% of time was spent on counseling and/or coordination of care of the patient. Start time: 11:21 PM, End time: 12:16 PM.          Electronically Signed By: JUDITH Jerome   11/7/24 10:20 AM

## 2024-10-04 NOTE — DISCHARGE SUMMARY
Discharge Diagnosis  Osteomyelitis    Issues Requiring Follow-Up      Discharge Meds     Medication List      START taking these medications     amoxicillin-pot clavulanate 875-125 mg tablet; Commonly known as:   Augmentin; Take 1 tablet by mouth 2 times a day for 7 days.   apixaban 5 mg tablet; Commonly known as: Eliquis; Take 2 tablets (10 mg)   by mouth 2 times a day for 4 days, THEN 1 tablet (5 mg) 2 times a day.;   Start taking on: October 1, 2024   aspirin 81 mg chewable tablet; Chew 1 tablet (81 mg) once daily. Do not   fill before September 19, 2024.   clopidogrel 75 mg tablet; Commonly known as: Plavix; Take 1 tablet (75   mg) by mouth once daily. Do not fill before September 19, 2024.   hydrALAZINE 25 mg tablet; Commonly known as: Apresoline; Take 1 tablet   (25 mg) by mouth 3 times a day.   insulin lispro 100 unit/mL injection; Commonly known as: HumaLOG; Inject   0-10 Units under the skin 3 times daily (morning, midday, late afternoon).   Take as directed per insulin instructions.   polyethylene glycol 17 gram packet; Commonly known as: Glycolax,   Miralax; Take 17 g by mouth once daily.   potassium chloride CR 20 mEq ER tablet; Commonly known as: Klor-Con M20;   Take 2 tablets (40 mEq) by mouth once daily. Do not crush or chew.       Test Results Pending At Discharge  Pending Labs       Order Current Status    Surgical Pathology Exam In process            Hospital Course   Elliot Partida is a 74 y.o. male presenting with a several month history of left leg pain and ulceration around his fifth toe.  He states that he has not seen a provider for this.  The pain progressively worsened and he presented to the emergency room at an outside hospital 2 days ago.  Imaging revealed osteomyelitis of the fifth metatarsal head.  Ultrasound of the leg also showed acute DVT in the mid distal femoral and popliteal veins on the left side.  He was started on IV antibiotics, heparin drip, and admitted to this hospital  due to concern about some vascular pathology which was felt to require evaluation to optimize healing.  Patient states that he has a history of diabetes, CAD, hypertension but stopped taking all medications many months ago because he did not wish to continue with them.  He was transferred here for further care.     The patient was treated for acute OM, given atbs, given eliquis for an acute DVT. He was seen by PT/OT, stable for dc to SNF for further care.     Pertinent Physical Exam At Time of Discharge      Outpatient Follow-Up  No future appointments.    Time and care for discharge management > 30 minutes.     Sharon Jacques, APRN-CNP

## 2024-10-07 ENCOUNTER — NURSING HOME VISIT (OUTPATIENT)
Dept: POST ACUTE CARE | Facility: EXTERNAL LOCATION | Age: 74
End: 2024-10-07
Payer: MEDICARE

## 2024-10-07 VITALS
SYSTOLIC BLOOD PRESSURE: 130 MMHG | HEART RATE: 78 BPM | RESPIRATION RATE: 16 BRPM | DIASTOLIC BLOOD PRESSURE: 84 MMHG | TEMPERATURE: 98 F

## 2024-10-07 DIAGNOSIS — I73.9 PAD (PERIPHERAL ARTERY DISEASE) (CMS-HCC): ICD-10-CM

## 2024-10-07 DIAGNOSIS — G62.9 NEUROPATHY: ICD-10-CM

## 2024-10-07 DIAGNOSIS — M19.90 OSTEOARTHRITIS, UNSPECIFIED OSTEOARTHRITIS TYPE, UNSPECIFIED SITE: ICD-10-CM

## 2024-10-07 DIAGNOSIS — I10 HYPERTENSION, UNSPECIFIED TYPE: ICD-10-CM

## 2024-10-07 DIAGNOSIS — M86.9 OSTEOMYELITIS, UNSPECIFIED SITE, UNSPECIFIED TYPE (MULTI): ICD-10-CM

## 2024-10-07 DIAGNOSIS — R53.1 WEAKNESS: ICD-10-CM

## 2024-10-07 DIAGNOSIS — M54.10 RADICULOPATHY, UNSPECIFIED SPINAL REGION: ICD-10-CM

## 2024-10-07 DIAGNOSIS — E11.9 TYPE 2 DIABETES MELLITUS WITHOUT COMPLICATION, UNSPECIFIED WHETHER LONG TERM INSULIN USE (MULTI): Primary | ICD-10-CM

## 2024-10-07 DIAGNOSIS — Z91.81 AT RISK FOR FALLING: ICD-10-CM

## 2024-10-07 DIAGNOSIS — I25.10 CORONARY ARTERY DISEASE, UNSPECIFIED VESSEL OR LESION TYPE, UNSPECIFIED WHETHER ANGINA PRESENT, UNSPECIFIED WHETHER NATIVE OR TRANSPLANTED HEART: ICD-10-CM

## 2024-10-07 PROCEDURE — 99309 SBSQ NF CARE MODERATE MDM 30: CPT | Performed by: INTERNAL MEDICINE

## 2024-10-07 ASSESSMENT — ENCOUNTER SYMPTOMS
CHILLS: 0
FEVER: 0

## 2024-10-07 NOTE — PROGRESS NOTES
PLACE OF SERVICE:  [facility] [place]    This is new/initial history and physical.    Subjective   Patient ID: Elliot Partida is a 74 y.o. male who presents for new/initial history  and Annual Exam.    Mr. Elliot Partida is a 74-year-old male with history of cellulitis and chronic osteomyelitis to his left foot.  He suffers from arterial disease.  He is a diabetic and unable to care for himself.  He requires  supportive care.    Review of Systems   Constitutional:  Negative for chills and fever.   Cardiovascular:  Negative for chest pain.   All other systems reviewed and are negative.    Objective   /84   Pulse 78   Temp 36.7 °C (98 °F)   Resp 16     Physical Exam  Vitals reviewed.   Constitutional:       Comments: This is a well-developed, well-nourished male, lying in bed, appearing weak.   HENT:      Right Ear: Tympanic membrane, ear canal and external ear normal.      Left Ear: Tympanic membrane, ear canal and external ear normal.   Eyes:      General: No scleral icterus.     Pupils: Pupils are equal, round, and reactive to light.   Neck:      Vascular: No carotid bruit.   Cardiovascular:      Heart sounds: Normal heart sounds, S1 normal and S2 normal. No murmur heard.     No friction rub.   Pulmonary:      Effort: Pulmonary effort is normal.      Breath sounds: Normal breath sounds and air entry.   Abdominal:      Palpations: There is no hepatomegaly, splenomegaly or mass.   Musculoskeletal:         General: No swelling or deformity. Normal range of motion.      Cervical back: Neck supple.      Right lower leg: No edema.      Left lower leg: No edema.      Comments: The patient's left foot and ankle are currently dressed.   Lymphadenopathy:      Cervical: No cervical adenopathy.      Upper Body:      Right upper body: No axillary adenopathy.      Left upper body: No axillary adenopathy.      Lower Body: No right inguinal adenopathy. No left inguinal adenopathy.    Neurological:      Mental Status: He is oriented to person, place, and time. He is lethargic.      Cranial Nerves: Cranial nerves 2-12 are intact. No cranial nerve deficit.      Sensory: No sensory deficit.      Motor: Motor function is intact. No weakness.      Gait: Gait is intact.      Deep Tendon Reflexes: Reflexes normal.   Psychiatric:         Mood and Affect: Mood normal. Mood is not anxious or depressed. Affect is not angry.         Behavior: Behavior is not agitated.         Thought Content: Thought content normal.         Judgment: Judgment normal.     LAB WORK:  Laboratory studies were reviewed.    Assessment/Plan   Problem List Items Addressed This Visit             ICD-10-CM       Cardiac and Vasculature    CAD (coronary artery disease) I25.10    Hypertension I10       Endocrine/Metabolic    Type 2 diabetes mellitus E11.9       Infectious Diseases    Osteomyelitis - Primary M86.9     Other Visit Diagnoses         Codes    PAD (peripheral artery disease) (CMS-[facility])     I73.9    Weakness     R53.1    At risk for falling     Z91.81    Osteoarthritis, unspecified osteoarthritis type, unspecified site     M19.90    Radiculopathy, unspecified spinal region     M54.10    Neuropathy     G62.9          1. Osteomyelitis with cellulitis to the left foot.  Continue wound care, on antibiotic.  Follow with Podiatry.  2. PAD, on medication.  3. Diabetes, on insulin.  4. Hypertension, medically controlled.  5. Coronary artery disease, on aspirin.  6. Weakness, on PT/OT.  7. Fall risk, on fall precautions.  8. Osteoarthritis, on Tylenol.  9. Radiculopathy, on pain control.  10. Neuropathy, on gabapentin.    Scribe Attestation  By signing my name below, IHanna Scribe attest that this documentation has been prepared under the direction and in the presence of Marium Singh MD.     All medical record entries made by the scribe were personally dictated by me I have reviewed the chart and agree the record  accurately reflects my personal performance of his history physical examination and management

## 2024-10-07 NOTE — LETTER
Patient: Elliot Partida  : 1950    Encounter Date: 10/07/2024    PLACE OF SERVICE:  [facility] [place].    This is a subsequent visit.    Subjective  Patient ID: Elloit Partida is a 74 y.o. male who presents for Follow-up.    Mr. Elliot Partida is a 74-year-old diabetic male with history of osteomyelitis and cellulitis to his left foot and ankle.  He does undergo wound care and is followed by Podiatry.    Review of Systems   Constitutional:  Negative for chills and fever.   Cardiovascular:  Negative for chest pain.   All other systems reviewed and are negative.    Objective  /80   Pulse 82   Temp 36.7 °C (98.1 °F)   Resp 16     Physical Exam  Vitals reviewed.   Constitutional:       General: He is not in acute distress.     Comments: This is a well-developed, well-nourished male, sitting in a chair.   HENT:      Right Ear: Tympanic membrane, ear canal and external ear normal.      Left Ear: Tympanic membrane, ear canal and external ear normal.   Eyes:      General: No scleral icterus.     Pupils: Pupils are equal, round, and reactive to light.   Neck:      Vascular: No carotid bruit.   Cardiovascular:      Heart sounds: Normal heart sounds, S1 normal and S2 normal. No murmur heard.     No friction rub.   Pulmonary:      Effort: Pulmonary effort is normal.      Breath sounds: Normal breath sounds and air entry.   Abdominal:      Palpations: There is no hepatomegaly, splenomegaly or mass.   Musculoskeletal:         General: No swelling or deformity. Normal range of motion.      Cervical back: Neck supple.      Right lower leg: No edema.      Left lower leg: No edema.   Lymphadenopathy:      Cervical: No cervical adenopathy.      Upper Body:      Right upper body: No axillary adenopathy.      Left upper body: No axillary adenopathy.      Lower Body: No right inguinal adenopathy. No left inguinal adenopathy.   Skin:     Comments: The patient's left foot and ankle are  currently dressed.  There is no foul odor.  There is no color drainage.   Neurological:      Mental Status: He is oriented to person, place, and time.      Cranial Nerves: Cranial nerves 2-12 are intact. No cranial nerve deficit.      Sensory: No sensory deficit.      Motor: Motor function is intact. No weakness.      Gait: Gait is intact.      Deep Tendon Reflexes: Reflexes normal.   Psychiatric:         Mood and Affect: Mood normal. Mood is not anxious or depressed. Affect is not angry.         Behavior: Behavior is not agitated.         Thought Content: Thought content normal.         Judgment: Judgment normal.     LAB WORK: Laboratory studies reviewed.    Assessment/Plan  Problem List Items Addressed This Visit             ICD-10-CM       Cardiac and Vasculature    CAD (coronary artery disease) I25.10    Hypertension I10       Endocrine/Metabolic    Type 2 diabetes mellitus - Primary E11.9       Infectious Diseases    Osteomyelitis M86.9     Other Visit Diagnoses         Codes    PAD (peripheral artery disease) (CMS-[facility])     I73.9    Radiculopathy, unspecified spinal region     M54.10    Neuropathy     G62.9    Osteoarthritis, unspecified osteoarthritis type, unspecified site     M19.90    Weakness     R53.1    At risk for falling     Z91.81        1. Diabetes, on insulin.  2. Osteomyelitis with cellulitis to the left foot and ankle.  Continue wound care.  Follow with Podiatry.  3. Hypertension, med controlled.  4. Coronary artery disease, on aspirin.  5. PAD, on medication.  6. Radiculopathy, on pain control.  7. Neuropathy, on gabapentin.  8. Osteoarthritis, on Tylenol.  9. Weakness, on PT/OT.  10. Fall risk, fall precautions.    Scribe Attestation  By signing my name below, IJohnna Scribe attest that this documentation has been prepared under the direction and in the presence of Marium Singh MD.     All medical record entries made by the scribcecilia were personally dictated by me I have reviewed the  chart and agree the record accurately reflects my personal performance of his history physical examination and management      Electronically Signed By: Marium Singh MD   10/15/24 12:15 AM

## 2024-10-08 ENCOUNTER — NURSING HOME VISIT (OUTPATIENT)
Dept: POST ACUTE CARE | Facility: EXTERNAL LOCATION | Age: 74
End: 2024-10-08
Payer: MEDICARE

## 2024-10-08 DIAGNOSIS — I25.10 CORONARY ARTERY DISEASE INVOLVING NATIVE CORONARY ARTERY OF NATIVE HEART WITHOUT ANGINA PECTORIS: ICD-10-CM

## 2024-10-08 DIAGNOSIS — E11.42 DIABETIC PERIPHERAL NEUROPATHY (MULTI): ICD-10-CM

## 2024-10-08 DIAGNOSIS — R53.81 PHYSICAL DECONDITIONING: ICD-10-CM

## 2024-10-08 DIAGNOSIS — E87.6 HYPOKALEMIA: ICD-10-CM

## 2024-10-08 DIAGNOSIS — F32.A DEPRESSION, UNSPECIFIED DEPRESSION TYPE: ICD-10-CM

## 2024-10-08 DIAGNOSIS — M79.671 PAIN OF RIGHT HEEL: ICD-10-CM

## 2024-10-08 DIAGNOSIS — I82.432 ACUTE DEEP VEIN THROMBOSIS (DVT) OF POPLITEAL VEIN OF LEFT LOWER EXTREMITY (MULTI): ICD-10-CM

## 2024-10-08 DIAGNOSIS — M86.9 OSTEOMYELITIS OF LEFT FOOT, UNSPECIFIED TYPE (MULTI): Primary | ICD-10-CM

## 2024-10-08 DIAGNOSIS — E11.9 TYPE 2 DIABETES MELLITUS WITHOUT COMPLICATION, UNSPECIFIED WHETHER LONG TERM INSULIN USE (MULTI): ICD-10-CM

## 2024-10-08 DIAGNOSIS — I82.412 ACUTE DEEP VEIN THROMBOSIS (DVT) OF FEMORAL VEIN OF LEFT LOWER EXTREMITY (MULTI): ICD-10-CM

## 2024-10-08 DIAGNOSIS — R53.1 WEAKNESS: ICD-10-CM

## 2024-10-08 DIAGNOSIS — G47.00 INSOMNIA, UNSPECIFIED TYPE: ICD-10-CM

## 2024-10-08 PROBLEM — D32.9 BENIGN NEOPLASM OF MENINGES: Status: ACTIVE | Noted: 2024-10-08

## 2024-10-08 PROBLEM — G61.9 INFLAMMATORY NEUROPATHY (MULTI): Status: ACTIVE | Noted: 2024-09-13

## 2024-10-08 PROCEDURE — 99309 SBSQ NF CARE MODERATE MDM 30: CPT | Performed by: NURSE PRACTITIONER

## 2024-10-08 NOTE — LETTER
"Patient: Elliot Partida  : 1950    Encounter Date: 10/08/2024    Chief Complaint:   SNF F/U  -Left foot osteomyelitis  -Left foot cellulitis  -Left foot diabetic ulcer  -PVD  -Venous insufficiency  -DVT of LLE  -Physical deconditioning/weakness    HPI:   74 year-old male presenting to [facility] ER on 24 with \"months\" of LLE pain with edema, redness, and warmth. He was noted to have a large necrotic ulcer under his 5th toe. He reported that he had not seen a provider or taken any medications for about \"2 years\". Work-up in ER: Glu 310, Na+ 129, Alb 3.3, XR of L foot showed osteomyelitis of the 5th metatarsal head, US of LLE showed acute DVT in the left mid-distal femoral and popliteal veins. He was started on IV Heparin, IV Zosyn, and IV Vanco. He was admitted to the hospital for further evaluation and treatment. Hospital course:    Left foot osteomyelitis/cellulitis/DVT of LLE/PAD-IV ATB, IV heparin (transitioned to Eliquis), ID consult, podiatry consult, elevate LLE, local wound care, analgesics prn, patient transferred to [facility] for vascular consult on , underwent LLE revascularization with Dr. Stoddard on , recommending repeat BEN in 4 weeks with OP F/U, underwent L partial 5th ray resection on  with Dr. Spears, taken back to OR on  for delayed closure with graft application by Dr. 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 " was HDS and discharged to [facility] on 10/2/24. Today, patient denies dizziness, HA, SOB, cough, chest pain or palpitations, abdominal pain, N/V/D/C, or dysuria. He reports appetite at baseline. He continues to c/o insomnia, states that melatonin has been ineffective. He is now c/o R heel pain, states that Oxycodone has been ineffective for pain. He reports shooting/burning pain to his R heel. Staff report c/f depression, stating that patient does not want to get out of bed the last several days. No other clinical concerns noted at this time.     ROS:    As above in HPI. Otherwise, all other systems have been reviewed and are negative for complaint.    Medications reviewed and verified in NH chart.     Patient Active Problem List   Diagnosis   • Wound infection   • Osteomyelitis of left foot, unspecified type (Multi)   • Acute deep vein thrombosis (DVT) of popliteal vein of left lower extremity (Multi)   • Bilateral lower extremity edema   • CAD (coronary artery disease)   • Hypertension   • Inflammatory neuropathy (Multi)   • History of stroke   • Weakness of left lower extremity   • Ambulatory dysfunction   • Chronic bilateral low back pain with left-sided sciatica   • Diabetic peripheral neuropathy (Multi)   • Neural foraminal stenosis of lumbosacral spine   • Hyperlipidemia   • Lumbar back pain with radiculopathy affecting left lower extremity   • Peripheral arterial disease (CMS-HCC)   • Type 2 diabetes mellitus, without long-term current use of insulin (Multi)   • Anemia   • Obesity   • Delayed surgical wound healing of foot amputation stump (Multi)   • Impaired mobility and ADLs   • Weakness   • Thoracic radiculopathy   • Osteoarthritis   • Cellulitis of foot, left   • Meningioma (Multi)   • Tremor of left hand   • Diabetic ulcer of left foot associated with type 2 diabetes mellitus, limited to breakdown of skin   • Acute deep vein thrombosis (DVT) of femoral vein of left lower extremity (Multi)   •  Nonrheumatic aortic valve stenosis   • Diastolic dysfunction        Past Medical History:   Diagnosis Date   • Myocardial infarction (Multi)    • Neuritis    • Sprain of right knee 06/25/2015   • Stroke (Multi)    • 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[facility]-HCC)    • TIA (transient ischemic attack)    • Tinea pedis of both feet        Past Surgical History:   Procedure Laterality Date   • CARDIAC CATHETERIZATION     • CARPAL TUNNEL RELEASE  10/10/2014   • EYE SURGERY     • FL  ARTHROCENTESIS ASP INJ JOINT.     • FOOT RAY RESECTION Left 09/23/2024    L partial 5th ray resection (Dr. Spears, DPM)   • FOOT SURGERY Left 09/27/2024    Delayed closure w/ graft application (Dr. Huynh, DPM)   • INVASIVE VASCULAR PROCEDURE Bilateral 09/18/2024    Procedure: Lower Extremity Angiogram;  Surgeon: Teofilo Stoddard MD;  Location: [facility] Cardiac Cath Lab;  Service: Cardiovascular;  Laterality: Bilateral;   • IRIDOTOMY / IRIDECTOMY Bilateral        Family History   Problem Relation Name Age of Onset   • Heart disease Father     • Colon cancer Other     • Heart attack Other     • Diabetes Other     • Hypertension Other         Social History     Tobacco Use   Smoking Status Former   • Types: Cigarettes   • Passive exposure: Never   Smokeless Tobacco Never       Social History     Substance and Sexual Activity   Alcohol Use Never       Social History     Substance and Sexual Activity   Drug Use Never       No Known Allergies     Vital Signs:   140/66-76-16-97.9-97% on RA    Physical Exam:  General: Sitting up in bed in NAD, alert   Head/Face: NCAT, symmetrical  Eyes: PERRLA, no injection, no discharge  ENT: Hearing not impaired, ears without scars or lesions, nasal mucosa and turbinates pink, septum midline, lips pink and moist  Neck: Supple, symmetrical  Respiratory: CTA but diminished without adventitious sounds, respirations even and nonlabored without use of accessory muscles, good air exchange  Cardio: RRR without murmur or gallops, normal S1S2, NP edema  to BLE (L > R), pedal pulses 2+/4 bilaterally  Chest/Breast: Symmetrical  GI: BS x 4, normoactive, non-distended, abd round and soft, no masses or tenderness  : No suprapubic tenderness or distention  MSK: Gait not assessed, joints with full ROM without pain or contractures, + generalized weakness  Skin: Skin warm and dry, no induration, surgical wounds to L 5th digit and L lateral foot, wound vac intact, vascular ulcer of L dorsal foot, SDTI to L heel, unstageable pressure ulcer of R heel, MASD of L buttock, skin tags to R buttocks (chronic per patient)   Neurologic: Cranial nerves II through XII intact, decreased sensation to BLE  Psychiatric: Alert to person, place, and time, calm and cooperative, expresses depression with current situation     Results/Data:   10/3/24: Glu 111, Cr 0.6, HgbA1C 7.0, Hgb 9.0, Hct 28.8    Assessment/Plan:  Left foot osteomyelitis/cellulitis/PAD/venous insufficiency-s/p LLE revascularization with Dr. Stoddard on 9/18, s/p L partial 5th ray resection on 9/23 with Dr. pSears, s/p delayed closure with graft application by Dr. Huynh on 9/27, c/w local wound care/wound vac placement, c/w Vit C, MVI, and LPS Critical Care BID to promote wound healing, c/w Augmentin (end date 10/9), c/w ASA and plavix, Tylenol prn for mild pain, Oxycodone prn for mod-severe pain, add gabapentin 100 mg BID for neuropathy, wound care team following, F/U with podiatry (10/14) and vascular   DVT of LLE-c/w eliquis, elevate LLE, monitor H&H  Physical deconditioning/weakness-c/w PT/OT  Insomnia/depression-D/C melatonin, start trazodone 25 mg Q HS, supportive care, monitor behaviors, consult Psych   Anemia-c/w MVI, monitor CBC  CAD/HTN/Hx MI/HLD/diastolic dysfunction/AVS-FAVIAN diet, c/w ASA, hydralazine, and lisinopril, monitor BP, monitor lipid panel, needs established with cardiology  DM2 with peripheral neuropathy-LCS diet, accuchecks, c/w lispro ISS with meals, BP and lipid control, yearly eye exam, diabetic foot  care, diabetic diet education (RD to follow), monitor HgbA1C  Hypokalemia-c/w KCl, monitor K+ level  Right frontal lobe meningioma-followed by Dr. Sin ([facility]), seen on 8/4/23 and advised to F/U with neuro-oncology VITO in 2 years with MRI WWO for surveillance  Hx stroke/LLE weakness/L hand tremor-followed by neurology   Constipation-c/w miralax, monitor BMs  OA/lumbar radiculopathy/thoracic radiculopathy-Tylenol prn, F/U with specialists after discharge    Orders:  D/C melatonin   Trazodone 25 mg PO Q HS for insomnia   Gabapentin 100 mg PO BID for neuropathy     Code Status:   Full Code        Electronically Signed By: SARAH Jerome-CNP   11/7/24 10:26 AM

## 2024-10-09 LAB
LABORATORY COMMENT REPORT: NORMAL
PATH REPORT.FINAL DX SPEC: NORMAL
PATH REPORT.GROSS SPEC: NORMAL
PATH REPORT.RELEVANT HX SPEC: NORMAL
PATH REPORT.TOTAL CANCER: NORMAL

## 2024-10-10 VITALS
RESPIRATION RATE: 16 BRPM | HEART RATE: 82 BPM | DIASTOLIC BLOOD PRESSURE: 80 MMHG | TEMPERATURE: 98.1 F | SYSTOLIC BLOOD PRESSURE: 130 MMHG

## 2024-10-10 ASSESSMENT — ENCOUNTER SYMPTOMS
CHILLS: 0
FEVER: 0

## 2024-10-10 NOTE — PROGRESS NOTES
PLACE OF SERVICE:  \[facility] [place].    This is a subsequent visit.    Subjective   Patient ID: Elliot Partida is a 74 y.o. male who presents for Follow-up.    Mr. Elliot Partida is a 74-year-old diabetic male with history of osteomyelitis and cellulitis to his left foot and ankle.  He does undergo wound care and is followed by Podiatry.    Review of Systems   Constitutional:  Negative for chills and fever.   Cardiovascular:  Negative for chest pain.   All other systems reviewed and are negative.    Objective   /80   Pulse 82   Temp 36.7 °C (98.1 °F)   Resp 16     Physical Exam  Vitals reviewed.   Constitutional:       General: He is not in acute distress.     Comments: This is a well-developed, well-nourished male, sitting in a chair.   HENT:      Right Ear: Tympanic membrane, ear canal and external ear normal.      Left Ear: Tympanic membrane, ear canal and external ear normal.   Eyes:      General: No scleral icterus.     Pupils: Pupils are equal, round, and reactive to light.   Neck:      Vascular: No carotid bruit.   Cardiovascular:      Heart sounds: Normal heart sounds, S1 normal and S2 normal. No murmur heard.     No friction rub.   Pulmonary:      Effort: Pulmonary effort is normal.      Breath sounds: Normal breath sounds and air entry.   Abdominal:      Palpations: There is no hepatomegaly, splenomegaly or mass.   Musculoskeletal:         General: No swelling or deformity. Normal range of motion.      Cervical back: Neck supple.      Right lower leg: No edema.      Left lower leg: No edema.   Lymphadenopathy:      Cervical: No cervical adenopathy.      Upper Body:      Right upper body: No axillary adenopathy.      Left upper body: No axillary adenopathy.      Lower Body: No right inguinal adenopathy. No left inguinal adenopathy.   Skin:     Comments: The patient's left foot and ankle are currently dressed.  There is no foul odor.  There is no color  drainage.   Neurological:      Mental Status: He is oriented to person, place, and time.      Cranial Nerves: Cranial nerves 2-12 are intact. No cranial nerve deficit.      Sensory: No sensory deficit.      Motor: Motor function is intact. No weakness.      Gait: Gait is intact.      Deep Tendon Reflexes: Reflexes normal.   Psychiatric:         Mood and Affect: Mood normal. Mood is not anxious or depressed. Affect is not angry.         Behavior: Behavior is not agitated.         Thought Content: Thought content normal.         Judgment: Judgment normal.     LAB WORK: Laboratory studies reviewed.    Assessment/Plan   Problem List Items Addressed This Visit             ICD-10-CM       Cardiac and Vasculature    CAD (coronary artery disease) I25.10    Hypertension I10       Endocrine/Metabolic    Type 2 diabetes mellitus - Primary E11.9       Infectious Diseases    Osteomyelitis M86.9     Other Visit Diagnoses         Codes    PAD (peripheral artery disease) (CMS-[facility])     I73.9    Radiculopathy, unspecified spinal region     M54.10    Neuropathy     G62.9    Osteoarthritis, unspecified osteoarthritis type, unspecified site     M19.90    Weakness     R53.1    At risk for falling     Z91.81        1. Diabetes, on insulin.  2. Osteomyelitis with cellulitis to the left foot and ankle.  Continue wound care.  Follow with Podiatry.  3. Hypertension, med controlled.  4. Coronary artery disease, on aspirin.  5. PAD, on medication.  6. Radiculopathy, on pain control.  7. Neuropathy, on gabapentin.  8. Osteoarthritis, on Tylenol.  9. Weakness, on PT/OT.  10. Fall risk, fall precautions.    Scribe Attestation  By signing my name below, I, Etienne West attest that this documentation has been prepared under the direction and in the presence of Marium Singh MD.     All medical record entries made by the scribcecilia were personally dictated by me I have reviewed the chart and agree the record accurately reflects my personal  performance of his history physical examination and management

## 2024-10-13 ENCOUNTER — NURSING HOME VISIT (OUTPATIENT)
Dept: POST ACUTE CARE | Facility: EXTERNAL LOCATION | Age: 74
End: 2024-10-13
Payer: MEDICARE

## 2024-10-13 DIAGNOSIS — I10 HYPERTENSION, UNSPECIFIED TYPE: ICD-10-CM

## 2024-10-13 DIAGNOSIS — I21.9 ACUTE MYOCARDIAL INFARCTION, UNSPECIFIED MI TYPE, UNSPECIFIED ARTERY (MULTI): ICD-10-CM

## 2024-10-13 DIAGNOSIS — G62.9 NEUROPATHY: ICD-10-CM

## 2024-10-13 DIAGNOSIS — M86.9 OSTEOMYELITIS, UNSPECIFIED SITE, UNSPECIFIED TYPE (MULTI): ICD-10-CM

## 2024-10-13 DIAGNOSIS — Z91.81 AT RISK FOR FALLING: ICD-10-CM

## 2024-10-13 DIAGNOSIS — R53.1 WEAKNESS: ICD-10-CM

## 2024-10-13 DIAGNOSIS — M19.90 OSTEOARTHRITIS, UNSPECIFIED OSTEOARTHRITIS TYPE, UNSPECIFIED SITE: ICD-10-CM

## 2024-10-13 DIAGNOSIS — E11.9 TYPE 2 DIABETES MELLITUS WITHOUT COMPLICATION, UNSPECIFIED WHETHER LONG TERM INSULIN USE (MULTI): Primary | ICD-10-CM

## 2024-10-13 DIAGNOSIS — I73.9 PVD (PERIPHERAL VASCULAR DISEASE) (CMS-HCC): ICD-10-CM

## 2024-10-13 DIAGNOSIS — G45.9 TIA (TRANSIENT ISCHEMIC ATTACK): ICD-10-CM

## 2024-10-13 DIAGNOSIS — O22.30 DVT (DEEP VEIN THROMBOSIS) IN PREGNANCY (HHS-HCC): ICD-10-CM

## 2024-10-13 NOTE — LETTER
Patient: Elliot Partida  : 1950    Encounter Date: 10/13/2024    PLACE OF SERVICE:  [facility] [place]    This is a subsequent visit.    Subjective  Patient ID: Elliot Partida is a 74 y.o. male who presents for Follow-up.    Mr. Elliot Partida is a 74-year-old male with history of diabetes with osteomyelitis to his left foot and ankle.  He if followed by Podiatry and unable to care for himself.  He requires supportive care.    Review of Systems   Constitutional:  Negative for chills and fever.   Cardiovascular:  Negative for chest pain.   All other systems reviewed and are negative.      Objective  /84   Pulse 82   Temp 36.6 °C (97.8 °F)   Resp 18     Physical Exam  Vitals reviewed.   Constitutional:       General: He is not in acute distress.     Comments: This is a well-developed, well-nourished male, sitting in a chair   HENT:      Right Ear: Tympanic membrane, ear canal and external ear normal.      Left Ear: Tympanic membrane, ear canal and external ear normal.   Eyes:      General: No scleral icterus.     Pupils: Pupils are equal, round, and reactive to light.   Neck:      Vascular: No carotid bruit.   Cardiovascular:      Heart sounds: Normal heart sounds, S1 normal and S2 normal. No murmur heard.     No friction rub.   Pulmonary:      Effort: Pulmonary effort is normal.      Breath sounds: Normal breath sounds and air entry.   Abdominal:      Palpations: There is no hepatomegaly, splenomegaly or mass.   Musculoskeletal:         General: No swelling or deformity. Normal range of motion.      Cervical back: Neck supple.      Right lower leg: No edema.      Left lower leg: No edema.      Comments: The patient's left foot is currently dressed.  There is no foul odor.  There is no color drainage.   Lymphadenopathy:      Cervical: No cervical adenopathy.      Upper Body:      Right upper body: No axillary adenopathy.      Left upper body: No axillary  adenopathy.      Lower Body: No right inguinal adenopathy. No left inguinal adenopathy.   Neurological:      Mental Status: He is oriented to person, place, and time.      Cranial Nerves: Cranial nerves 2-12 are intact. No cranial nerve deficit.      Sensory: No sensory deficit.      Motor: Motor function is intact. No weakness.      Gait: Gait is intact.      Deep Tendon Reflexes: Reflexes normal.   Psychiatric:         Mood and Affect: Mood normal. Mood is not anxious or depressed. Affect is not angry.         Behavior: Behavior is not agitated.         Thought Content: Thought content normal.         Judgment: Judgment normal.     LAB WORK: Laboratory studies reviewed.    Assessment/Plan  Problem List Items Addressed This Visit             ICD-10-CM       Cardiac and Vasculature    Hypertension I10       Endocrine/Metabolic    Type 2 diabetes mellitus - Primary E11.9       Infectious Diseases    Osteomyelitis M86.9     Other Visit Diagnoses         Codes    PVD (peripheral vascular disease) (CMS-HCC)     I73.9    DVT (deep vein thrombosis) in pregnancy ([facility])     O22.30    Neuropathy     G62.9    TIA (transient ischemic attack)     G45.9    Osteoarthritis, unspecified osteoarthritis type, unspecified site     M19.90    Acute myocardial infarction, unspecified MI type, unspecified artery (Multi)     I21.9    Weakness     R53.1    At risk for falling     Z91.81        1. Diabetes, on insulin.  2. Osteomyelitis to left foot and ankle, continue wound care.  Follow with Podiatry.  3. PVD, on medication.  4. DVT, anticoagulated.  5. Hypertension, med controlled.  6. Neuropathy, on gabapentin.  7. TIA, on aspirin.  8. Osteoarthritis, on Tylenol.  9. History of AMI, on aspirin.  10. Weakness, on PT/OT.  11. Fall risk, fall precautions.    Scribe Attestation  By signing my name below, IHanna Scribe attest that this documentation has been prepared under the direction and in the presence of Marium Singh MD.      All medical record entries made by the scribe were personally dictated by me I have reviewed the chart and agree the record accurately reflects my personal performance of his history physical examination and management      Electronically Signed By: Marium Singh MD   10/22/24  8:56 PM

## 2024-10-15 ENCOUNTER — NURSING HOME VISIT (OUTPATIENT)
Dept: POST ACUTE CARE | Facility: EXTERNAL LOCATION | Age: 74
End: 2024-10-15
Payer: MEDICARE

## 2024-10-15 DIAGNOSIS — I73.9 PAD (PERIPHERAL ARTERY DISEASE) (CMS-HCC): ICD-10-CM

## 2024-10-15 DIAGNOSIS — F32.A DEPRESSION, UNSPECIFIED DEPRESSION TYPE: ICD-10-CM

## 2024-10-15 DIAGNOSIS — D64.9 ANEMIA, UNSPECIFIED TYPE: ICD-10-CM

## 2024-10-15 DIAGNOSIS — D32.9 MENINGIOMA (MULTI): ICD-10-CM

## 2024-10-15 DIAGNOSIS — R53.81 PHYSICAL DECONDITIONING: ICD-10-CM

## 2024-10-15 DIAGNOSIS — I73.9 PVD (PERIPHERAL VASCULAR DISEASE) (CMS-HCC): ICD-10-CM

## 2024-10-15 DIAGNOSIS — G47.00 INSOMNIA, UNSPECIFIED TYPE: ICD-10-CM

## 2024-10-15 DIAGNOSIS — I82.412 ACUTE DEEP VEIN THROMBOSIS (DVT) OF FEMORAL VEIN OF LEFT LOWER EXTREMITY (MULTI): ICD-10-CM

## 2024-10-15 DIAGNOSIS — E11.9 TYPE 2 DIABETES MELLITUS WITHOUT COMPLICATION, UNSPECIFIED WHETHER LONG TERM INSULIN USE (MULTI): ICD-10-CM

## 2024-10-15 DIAGNOSIS — M86.9 OSTEOMYELITIS OF LEFT FOOT, UNSPECIFIED TYPE (MULTI): Primary | ICD-10-CM

## 2024-10-15 DIAGNOSIS — I82.432 ACUTE DEEP VEIN THROMBOSIS (DVT) OF POPLITEAL VEIN OF LEFT LOWER EXTREMITY (MULTI): ICD-10-CM

## 2024-10-15 DIAGNOSIS — G62.9 NEUROPATHY: ICD-10-CM

## 2024-10-15 PROCEDURE — 99309 SBSQ NF CARE MODERATE MDM 30: CPT | Performed by: NURSE PRACTITIONER

## 2024-10-15 NOTE — LETTER
"Patient: Elliot Partida  : 1950    Encounter Date: 10/15/2024    Chief Complaint:   SNF F/U  -Left foot osteomyelitis  -Left foot cellulitis  -Left foot diabetic ulcer  -PVD  -Venous insufficiency  -DVT of LLE  -Physical deconditioning/weakness    HPI:   74 year-old male presenting to [facility] ER on 24 with \"months\" of LLE pain with edema, redness, and warmth. He was noted to have a large necrotic ulcer under his 5th toe. He reported that he had not seen a provider or taken any medications for about \"2 years\". Work-up in ER: Glu 310, Na+ 129, Alb 3.3, XR of L foot showed osteomyelitis of the 5th metatarsal head, US of LLE showed acute DVT in the left mid-distal femoral and popliteal veins. He was started on IV Heparin, IV Zosyn, and IV Vanco. He was admitted to the hospital for further evaluation and treatment. Hospital course:    Left foot osteomyelitis/cellulitis/DVT of LLE/PAD-IV ATB, IV heparin (transitioned to Eliquis), ID consult, podiatry consult, elevate LLE, local wound care, analgesics prn, patient transferred to [facility] for vascular consult on , underwent LLE revascularization with Dr. Stoddard on , recommending repeat BEN in 4 weeks with OP F/U, underwent L partial 5th ray resection on  with Dr. Spears, taken back to OR on  for delayed closure with graft application by Dr. 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 " was HDS and discharged to [facility] on 10/2/24. Today, patient denies dizziness, HA, SOB, cough, chest pain or palpitations, abdominal pain, N/V/D/C, or dysuria. He reports appetite at baseline. He continues to c/o insomnia and foot pain. He was seen by DPM on 10/14 and wound vac was removed. Pt. is now WBAT to LLE in surgical shoe. Staff report no other clinical concerns at this time.     ROS:    As above in HPI. Otherwise, all other systems have been reviewed and are negative for complaint.    Medications reviewed and verified in NH chart.     Patient Active Problem List   Diagnosis   • Wound infection   • Osteomyelitis of left foot, unspecified type (Multi)   • Acute deep vein thrombosis (DVT) of popliteal vein of left lower extremity (Multi)   • Bilateral lower extremity edema   • CAD (coronary artery disease)   • Hypertension   • Inflammatory neuropathy (Multi)   • History of stroke   • Weakness of left lower extremity   • Ambulatory dysfunction   • Chronic bilateral low back pain with left-sided sciatica   • Diabetic peripheral neuropathy (Multi)   • Neural foraminal stenosis of lumbosacral spine   • Hyperlipidemia   • Lumbar back pain with radiculopathy affecting left lower extremity   • Peripheral arterial disease (CMS-HCC)   • Type 2 diabetes mellitus, without long-term current use of insulin (Multi)   • Anemia   • Obesity   • Delayed surgical wound healing of foot amputation stump (Multi)   • Impaired mobility and ADLs   • Weakness   • Thoracic radiculopathy   • Osteoarthritis   • Cellulitis of foot, left   • Meningioma (Multi)   • Tremor of left hand   • Diabetic ulcer of left foot associated with type 2 diabetes mellitus, limited to breakdown of skin   • Acute deep vein thrombosis (DVT) of femoral vein of left lower extremity (Multi)   • Nonrheumatic aortic valve stenosis   • Diastolic dysfunction        Past Medical History:   Diagnosis Date   • Myocardial infarction (Multi)    • Neuritis    •  Sprain of right knee 06/25/2015   • Stroke (Multi)    • 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[facility]HS-HCC)    • TIA (transient ischemic attack)    • Tinea pedis of both feet        Past Surgical History:   Procedure Laterality Date   • CARDIAC CATHETERIZATION     • CARPAL TUNNEL RELEASE  10/10/2014   • EYE SURGERY     • FL  ARTHROCENTESIS ASP INJ JOINT.     • FOOT RAY RESECTION Left 09/23/2024    L partial 5th ray resection (Dr. Spears, DPBRAYAN)   • FOOT SURGERY Left 09/27/2024    Delayed closure w/ graft application (Dr. Huynh, DPBRAYAN)   • INVASIVE VASCULAR PROCEDURE Bilateral 09/18/2024    Procedure: Lower Extremity Angiogram;  Surgeon: Teofilo Stoddard MD;  Location: [facility] Cardiac Cath Lab;  Service: Cardiovascular;  Laterality: Bilateral;   • IRIDOTOMY / IRIDECTOMY Bilateral        Family History   Problem Relation Name Age of Onset   • Heart disease Father     • Colon cancer Other     • Heart attack Other     • Diabetes Other     • Hypertension Other         Social History     Tobacco Use   Smoking Status Former   • Types: Cigarettes   • Passive exposure: Never   Smokeless Tobacco Never       Social History     Substance and Sexual Activity   Alcohol Use Never       Social History     Substance and Sexual Activity   Drug Use Never       No Known Allergies     Vital Signs:   142/76-72-18-96.9-97% on RA    Physical Exam:  General: Sitting up in WC in NAD, alert   Head/Face: NCAT, symmetrical  Eyes: PERRLA, no injection, no discharge  ENT: Hearing not impaired, ears without scars or lesions, nasal mucosa and turbinates pink, septum midline, lips pink and moist  Neck: Supple, symmetrical  Respiratory: CTA but diminished without adventitious sounds, respirations even and nonlabored without use of accessory muscles, good air exchange  Cardio: RRR without murmur or gallops, normal S1S2, NP edema to BLE (L > R), pedal pulses 2+/4 bilaterally  Chest/Breast: Symmetrical  GI: BS x 4, normoactive, non-distended, abd round and soft, no masses or tenderness  :  No suprapubic tenderness or distention  MSK: Gait not assessed, joints with full ROM without pain or contractures, + generalized weakness  Skin: Skin warm and dry, no induration, surgical wounds to L 5th digit and L lateral foot, DSG D&I, vascular ulcer of L dorsal foot, SDTI to L heel, unstageable pressure ulcer of R heel, skin tags to R buttocks (chronic per patient)   Neurologic: Cranial nerves II through XII intact, decreased sensation to BLE  Psychiatric: Alert to person, place, and time, calm and cooperative, expresses depression with current situation     Results/Data:   10/3/24: Glu 111, Cr 0.6, HgbA1C 7.0, Hgb 9.0, Hct 28.8    Assessment/Plan:  Left foot osteomyelitis/cellulitis/PAD/venous insufficiency-s/p LLE revascularization with Dr. Stoddard on 9/18, s/p L partial 5th ray resection on 9/23 with Dr. Spears, s/p delayed closure with graft application by Dr. Huynh on 9/27, c/w local wound care, c/w Vit C, MVI, and LPS Critical Care BID to promote wound healing, s/p Augmentin (end date 10/9), c/w ASA and plavix, Tylenol prn for mild pain, Oxycodone prn for mod-severe pain, increase gabapentin to 200 mg BID for neuropathy, wound care team following, seen by vascular on 10/14, WBAT to LLE in surgical shoe, F/U with podiatry as needed, F/U with vascular   DVT of LLE-c/w eliquis, elevate LLE, monitor H&H  Physical deconditioning/weakness-c/w PT/OT  Insomnia/depression-increase trazodone to 50 mg Q PM, supportive care, monitor behaviors, consult Psych   Anemia-c/w MVI, monitor CBC  CAD/HTN/Hx MI/HLD/diastolic dysfunction/AVS-FAVIAN diet, c/w ASA, hydralazine, and lisinopril, monitor BP, monitor lipid panel, needs established with cardiology  DM2 with peripheral neuropathy-LCS diet, accuchecks, c/w lispro ISS with meals, BP and lipid control, yearly eye exam, diabetic foot care, diabetic diet education (RD to follow), monitor HgbA1C  Hypokalemia-c/w KCl, monitor K+ level  Right frontal lobe meningioma-followed by   [facility] ([facility]), seen on 8/4/23 and advised to F/U with neuro-oncology [facility] in 2 years with MRI WWO for surveillance  Hx stroke/LLE weakness/L hand tremor-followed by neurology   Constipation-c/w miralax, monitor BMs  OA/lumbar radiculopathy/thoracic radiculopathy-Tylenol prn, F/U with specialists after discharge    Orders:  Increase trazodone to 50 mg PO Q PM   Increase gabapentin to 200 mg PO BID   CBC w/ diff and BMP on 10/16     Code Status:   Full Code        Electronically Signed By: SARAH Jerome-CNP   11/7/24 10:53 AM

## 2024-10-16 VITALS
DIASTOLIC BLOOD PRESSURE: 84 MMHG | HEART RATE: 82 BPM | TEMPERATURE: 97.8 F | RESPIRATION RATE: 18 BRPM | SYSTOLIC BLOOD PRESSURE: 130 MMHG

## 2024-10-16 ASSESSMENT — ENCOUNTER SYMPTOMS
FEVER: 0
CHILLS: 0

## 2024-10-17 NOTE — PROGRESS NOTES
PLACE OF SERVICE:  [facility] [place]    This is a subsequent visit.    Subjective   Patient ID: Elliot Partida is a 74 y.o. male who presents for Follow-up.    Mr. Elliot Partida is a 74-year-old male with history of diabetes with osteomyelitis to his left foot and ankle.  He if followed by Podiatry and unable to care for himself.  He requires supportive care.    Review of Systems   Constitutional:  Negative for chills and fever.   Cardiovascular:  Negative for chest pain.   All other systems reviewed and are negative.      Objective   /84   Pulse 82   Temp 36.6 °C (97.8 °F)   Resp 18     Physical Exam  Vitals reviewed.   Constitutional:       General: He is not in acute distress.     Comments: This is a well-developed, well-nourished male, sitting in a chair   HENT:      Right Ear: Tympanic membrane, ear canal and external ear normal.      Left Ear: Tympanic membrane, ear canal and external ear normal.   Eyes:      General: No scleral icterus.     Pupils: Pupils are equal, round, and reactive to light.   Neck:      Vascular: No carotid bruit.   Cardiovascular:      Heart sounds: Normal heart sounds, S1 normal and S2 normal. No murmur heard.     No friction rub.   Pulmonary:      Effort: Pulmonary effort is normal.      Breath sounds: Normal breath sounds and air entry.   Abdominal:      Palpations: There is no hepatomegaly, splenomegaly or mass.   Musculoskeletal:         General: No swelling or deformity. Normal range of motion.      Cervical back: Neck supple.      Right lower leg: No edema.      Left lower leg: No edema.      Comments: The patient's left foot is currently dressed.  There is no foul odor.  There is no color drainage.   Lymphadenopathy:      Cervical: No cervical adenopathy.      Upper Body:      Right upper body: No axillary adenopathy.      Left upper body: No axillary adenopathy.      Lower Body: No right inguinal adenopathy. No left inguinal  adenopathy.   Neurological:      Mental Status: He is oriented to person, place, and time.      Cranial Nerves: Cranial nerves 2-12 are intact. No cranial nerve deficit.      Sensory: No sensory deficit.      Motor: Motor function is intact. No weakness.      Gait: Gait is intact.      Deep Tendon Reflexes: Reflexes normal.   Psychiatric:         Mood and Affect: Mood normal. Mood is not anxious or depressed. Affect is not angry.         Behavior: Behavior is not agitated.         Thought Content: Thought content normal.         Judgment: Judgment normal.     LAB WORK: Laboratory studies reviewed.    Assessment/Plan   Problem List Items Addressed This Visit             ICD-10-CM       Cardiac and Vasculature    Hypertension I10       Endocrine/Metabolic    Type 2 diabetes mellitus - Primary E11.9       Infectious Diseases    Osteomyelitis M86.9     Other Visit Diagnoses         Codes    PVD (peripheral vascular disease) (CMS-HCC)     I73.9    DVT (deep vein thrombosis) in pregnancy ([facility])     O22.30    Neuropathy     G62.9    TIA (transient ischemic attack)     G45.9    Osteoarthritis, unspecified osteoarthritis type, unspecified site     M19.90    Acute myocardial infarction, unspecified MI type, unspecified artery (Multi)     I21.9    Weakness     R53.1    At risk for falling     Z91.81        1. Diabetes, on insulin.  2. Osteomyelitis to left foot and ankle, continue wound care.  Follow with Podiatry.  3. PVD, on medication.  4. DVT, anticoagulated.  5. Hypertension, med controlled.  6. Neuropathy, on gabapentin.  7. TIA, on aspirin.  8. Osteoarthritis, on Tylenol.  9. History of AMI, on aspirin.  10. Weakness, on PT/OT.  11. Fall risk, fall precautions.    Scribe Attestation  By signing my name below, IHanna Scribe attest that this documentation has been prepared under the direction and in the presence of Marium Singh MD.     All medical record entries made by the scribe were personally dictated by  me I have reviewed the chart and agree the record accurately reflects my personal performance of his history physical examination and management

## 2024-10-20 ENCOUNTER — NURSING HOME VISIT (OUTPATIENT)
Dept: POST ACUTE CARE | Facility: EXTERNAL LOCATION | Age: 74
End: 2024-10-20
Payer: MEDICARE

## 2024-10-20 DIAGNOSIS — R53.1 WEAKNESS: ICD-10-CM

## 2024-10-20 DIAGNOSIS — G45.9 TIA (TRANSIENT ISCHEMIC ATTACK): ICD-10-CM

## 2024-10-20 DIAGNOSIS — M19.90 OSTEOARTHRITIS, UNSPECIFIED OSTEOARTHRITIS TYPE, UNSPECIFIED SITE: ICD-10-CM

## 2024-10-20 DIAGNOSIS — I10 HYPERTENSION, UNSPECIFIED TYPE: ICD-10-CM

## 2024-10-20 DIAGNOSIS — I21.9 ACUTE MYOCARDIAL INFARCTION, UNSPECIFIED MI TYPE, UNSPECIFIED ARTERY (MULTI): ICD-10-CM

## 2024-10-20 DIAGNOSIS — M86.9 OSTEOMYELITIS, UNSPECIFIED SITE, UNSPECIFIED TYPE (MULTI): Primary | ICD-10-CM

## 2024-10-20 DIAGNOSIS — G62.9 NEUROPATHY: ICD-10-CM

## 2024-10-20 DIAGNOSIS — I82.499 DEEP VEIN THROMBOSIS (DVT) OF OTHER VEIN OF LOWER EXTREMITY, UNSPECIFIED CHRONICITY, UNSPECIFIED LATERALITY (MULTI): ICD-10-CM

## 2024-10-20 DIAGNOSIS — Z91.81 AT RISK FOR FALLING: ICD-10-CM

## 2024-10-20 DIAGNOSIS — I73.9 PVD (PERIPHERAL VASCULAR DISEASE) (CMS-HCC): ICD-10-CM

## 2024-10-20 DIAGNOSIS — E11.9 TYPE 2 DIABETES MELLITUS WITHOUT COMPLICATION, UNSPECIFIED WHETHER LONG TERM INSULIN USE (MULTI): ICD-10-CM

## 2024-10-20 NOTE — LETTER
Patient: Elliot Partida  : 1950    Encounter Date: 10/20/2024    PLACE OF SERVICE:  [facility] [place]    This is a subsequent visit.    Subjective  Patient ID: Elliot Partida is a 74 y.o. male who presents for Follow-up.    Mr. Elliot Partida is a 74-year-old male with history of diabetes with PVD with osteomyelitis to his left foot and ankle.  He is unable to care for himself and requires supportive care.    Review of Systems   Constitutional:  Negative for chills and fever.   Cardiovascular:  Negative for chest pain.   All other systems reviewed and are negative.    Objective  /80   Pulse 78   Temp 36.6 °C (97.8 °F)   Resp 16     Physical Exam  Vitals reviewed.   Constitutional:       General: He is not in acute distress.     Comments: This is a well-developed and well-nourished male, sitting in a chair   HENT:      Right Ear: Tympanic membrane, ear canal and external ear normal.      Left Ear: Tympanic membrane, ear canal and external ear normal.   Eyes:      General: No scleral icterus.     Pupils: Pupils are equal, round, and reactive to light.   Neck:      Vascular: No carotid bruit.   Cardiovascular:      Heart sounds: Normal heart sounds, S1 normal and S2 normal. No murmur heard.     No friction rub.   Pulmonary:      Effort: Pulmonary effort is normal.      Breath sounds: Normal breath sounds and air entry.   Abdominal:      Palpations: There is no hepatomegaly, splenomegaly or mass.   Musculoskeletal:         General: No swelling or deformity. Normal range of motion.      Cervical back: Neck supple.      Right lower leg: No edema.      Left lower leg: No edema.      Comments: The patient's left foot and ankle is currently dressed.   Lymphadenopathy:      Cervical: No cervical adenopathy.      Upper Body:      Right upper body: No axillary adenopathy.      Left upper body: No axillary adenopathy.      Lower Body: No right inguinal adenopathy. No left  inguinal adenopathy.   Neurological:      Mental Status: He is oriented to person, place, and time.      Cranial Nerves: Cranial nerves 2-12 are intact. No cranial nerve deficit.      Sensory: No sensory deficit.      Motor: Motor function is intact. No weakness.      Gait: Gait is intact.      Deep Tendon Reflexes: Reflexes normal.   Psychiatric:         Mood and Affect: Mood normal. Mood is not anxious or depressed. Affect is not angry.         Behavior: Behavior is not agitated.         Thought Content: Thought content normal.         Judgment: Judgment normal.     LAB WORK: Laboratory studies were reviewed.    Assessment/Plan  Problem List Items Addressed This Visit             ICD-10-CM       Cardiac and Vasculature    Hypertension I10       Endocrine/Metabolic    Type 2 diabetes mellitus E11.9       Infectious Diseases    Osteomyelitis - Primary M86.9     Other Visit Diagnoses         Codes    DVT (deep vein thrombosis) in pregnancy ([facility])     O22.30    PVD (peripheral vascular disease) (CMS-HCC)     I73.9    Acute myocardial infarction, unspecified MI type, unspecified artery (Multi)     I21.9    Osteoarthritis, unspecified osteoarthritis type, unspecified site     M19.90    TIA (transient ischemic attack)     G45.9    Neuropathy     G62.9    Weakness     R53.1    At risk for falling     Z91.81        1. Osteomyelitis to left foot and ankle, continue wound care.  Follow with Podiatry.  2. Diabetes, on insulin.  3. DVT, anticoagulated.  4. PVD, on medication.  5. Hypertension, med controlled.  6. History of AMI, on aspirin.  7. Osteoarthritis, on Tylenol.  8. TIA, on aspirin.  9. Neuropathy, on gabapentin.  10. Weakness, on PT/OT.  11. Fall risk, fall precautions.    Scribe Attestation  By signing my name below, IHanna Scribe attest that this documentation has been prepared under the direction and in the presence of Marium Singh MD.     All medical record entries made by the scribe were  personally dictated by me I have reviewed the chart and agree the record accurately reflects my personal performance of his history physical examination and management      Electronically Signed By: Marium Singh MD   10/24/24 11:02 PM

## 2024-10-21 VITALS
SYSTOLIC BLOOD PRESSURE: 126 MMHG | DIASTOLIC BLOOD PRESSURE: 80 MMHG | RESPIRATION RATE: 16 BRPM | TEMPERATURE: 97.8 F | HEART RATE: 78 BPM

## 2024-10-21 ASSESSMENT — ENCOUNTER SYMPTOMS
CHILLS: 0
FEVER: 0

## 2024-10-21 NOTE — PROGRESS NOTES
PLACE OF SERVICE:  [facility] [place]    This is a subsequent visit.    Subjective   Patient ID: Elliot Partida is a 74 y.o. male who presents for Follow-up.    Mr. Elliot Partida is a 74-year-old male with history of diabetes with PVD with osteomyelitis to his left foot and ankle.  He is unable to care for himself and requires supportive care.    Review of Systems   Constitutional:  Negative for chills and fever.   Cardiovascular:  Negative for chest pain.   All other systems reviewed and are negative.    Objective   /80   Pulse 78   Temp 36.6 °C (97.8 °F)   Resp 16     Physical Exam  Vitals reviewed.   Constitutional:       General: He is not in acute distress.     Comments: This is a well-developed and well-nourished male, sitting in a chair   HENT:      Right Ear: Tympanic membrane, ear canal and external ear normal.      Left Ear: Tympanic membrane, ear canal and external ear normal.   Eyes:      General: No scleral icterus.     Pupils: Pupils are equal, round, and reactive to light.   Neck:      Vascular: No carotid bruit.   Cardiovascular:      Heart sounds: Normal heart sounds, S1 normal and S2 normal. No murmur heard.     No friction rub.   Pulmonary:      Effort: Pulmonary effort is normal.      Breath sounds: Normal breath sounds and air entry.   Abdominal:      Palpations: There is no hepatomegaly, splenomegaly or mass.   Musculoskeletal:         General: No swelling or deformity. Normal range of motion.      Cervical back: Neck supple.      Right lower leg: No edema.      Left lower leg: No edema.      Comments: The patient's left foot and ankle is currently dressed.   Lymphadenopathy:      Cervical: No cervical adenopathy.      Upper Body:      Right upper body: No axillary adenopathy.      Left upper body: No axillary adenopathy.      Lower Body: No right inguinal adenopathy. No left inguinal adenopathy.   Neurological:      Mental Status: He is oriented  to person, place, and time.      Cranial Nerves: Cranial nerves 2-12 are intact. No cranial nerve deficit.      Sensory: No sensory deficit.      Motor: Motor function is intact. No weakness.      Gait: Gait is intact.      Deep Tendon Reflexes: Reflexes normal.   Psychiatric:         Mood and Affect: Mood normal. Mood is not anxious or depressed. Affect is not angry.         Behavior: Behavior is not agitated.         Thought Content: Thought content normal.         Judgment: Judgment normal.     LAB WORK: Laboratory studies were reviewed.    Assessment/Plan   Problem List Items Addressed This Visit             ICD-10-CM       Cardiac and Vasculature    Hypertension I10       Endocrine/Metabolic    Type 2 diabetes mellitus E11.9       Infectious Diseases    Osteomyelitis - Primary M86.9     Other Visit Diagnoses         Codes    DVT (deep vein thrombosis) in pregnancy ([facility])     O22.30    PVD (peripheral vascular disease) (CMS-HCC)     I73.9    Acute myocardial infarction, unspecified MI type, unspecified artery (Multi)     I21.9    Osteoarthritis, unspecified osteoarthritis type, unspecified site     M19.90    TIA (transient ischemic attack)     G45.9    Neuropathy     G62.9    Weakness     R53.1    At risk for falling     Z91.81        1. Osteomyelitis to left foot and ankle, continue wound care.  Follow with Podiatry.  2. Diabetes, on insulin.  3. DVT, anticoagulated.  4. PVD, on medication.  5. Hypertension, med controlled.  6. History of AMI, on aspirin.  7. Osteoarthritis, on Tylenol.  8. TIA, on aspirin.  9. Neuropathy, on gabapentin.  10. Weakness, on PT/OT.  11. Fall risk, fall precautions.    Scribe Attestation  By signing my name below, IHanna Scribe attest that this documentation has been prepared under the direction and in the presence of Marium Singh MD.     All medical record entries made by the scribcecilia were personally dictated by me I have reviewed the chart and agree the record  accurately reflects my personal performance of his history physical examination and management

## 2024-10-22 ENCOUNTER — NURSING HOME VISIT (OUTPATIENT)
Dept: POST ACUTE CARE | Facility: EXTERNAL LOCATION | Age: 74
End: 2024-10-22
Payer: MEDICARE

## 2024-10-22 DIAGNOSIS — D64.9 ANEMIA, UNSPECIFIED TYPE: ICD-10-CM

## 2024-10-22 DIAGNOSIS — I82.412 ACUTE DEEP VEIN THROMBOSIS (DVT) OF FEMORAL VEIN OF LEFT LOWER EXTREMITY (MULTI): ICD-10-CM

## 2024-10-22 DIAGNOSIS — I82.432 ACUTE DEEP VEIN THROMBOSIS (DVT) OF POPLITEAL VEIN OF LEFT LOWER EXTREMITY (MULTI): ICD-10-CM

## 2024-10-22 DIAGNOSIS — R29.898 WEAKNESS OF LEFT LOWER EXTREMITY: ICD-10-CM

## 2024-10-22 DIAGNOSIS — F32.A DEPRESSION, UNSPECIFIED DEPRESSION TYPE: ICD-10-CM

## 2024-10-22 DIAGNOSIS — M86.9 OSTEOMYELITIS OF LEFT FOOT, UNSPECIFIED TYPE (MULTI): Primary | ICD-10-CM

## 2024-10-22 DIAGNOSIS — E11.9 TYPE 2 DIABETES MELLITUS WITHOUT COMPLICATION, UNSPECIFIED WHETHER LONG TERM INSULIN USE (MULTI): ICD-10-CM

## 2024-10-22 DIAGNOSIS — L03.116 CELLULITIS OF FOOT, LEFT: ICD-10-CM

## 2024-10-22 DIAGNOSIS — G47.00 INSOMNIA, UNSPECIFIED TYPE: ICD-10-CM

## 2024-10-22 DIAGNOSIS — R53.81 PHYSICAL DECONDITIONING: ICD-10-CM

## 2024-10-22 PROCEDURE — 99309 SBSQ NF CARE MODERATE MDM 30: CPT | Performed by: NURSE PRACTITIONER

## 2024-10-22 NOTE — LETTER
"Patient: Elliot Partida  : 1950    Encounter Date: 10/22/2024    Chief Complaint:   SNF F/U  -Left foot osteomyelitis  -Left foot cellulitis  -Left foot diabetic ulcer  -PVD  -Venous insufficiency  -DVT of LLE  -Physical deconditioning/weakness    HPI:   74 year-old male presenting to [facility] ER on 24 with \"months\" of LLE pain with edema, redness, and warmth. He was noted to have a large necrotic ulcer under his 5th toe. He reported that he had not seen a provider or taken any medications for about \"2 years\". Work-up in ER: Glu 310, Na+ 129, Alb 3.3, XR of L foot showed osteomyelitis of the 5th metatarsal head, US of LLE showed acute DVT in the left mid-distal femoral and popliteal veins. He was started on IV Heparin, IV Zosyn, and IV Vanco. He was admitted to the hospital for further evaluation and treatment. Hospital course:    Left foot osteomyelitis/cellulitis/DVT of LLE/PAD-IV ATB, IV heparin (transitioned to Eliquis), ID consult, podiatry consult, elevate LLE, local wound care, analgesics prn, patient transferred to [facility] for vascular consult on , underwent LLE revascularization with Dr. Stoddard on , recommending repeat BEN in 4 weeks with OP F/U, underwent L partial 5th ray resection on  with Dr. Spears, taken back to OR on  for delayed closure with graft application by Dr. 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 " was HDS and discharged to [facility] on 10/2/24. Today, patient denies dizziness, HA, SOB, cough, chest pain or palpitations, abdominal pain, N/V/D/C, or dysuria. He reports appetite at baseline. He continues to c/o insomnia, despite medication adjustments. He was seen by DPM on 10/14 and wound vac was removed. Pt. is now WBAT to LLE in surgical shoe. Pt. is c/o increased LLE weakness, stating that he is unable to lift it. He denies any worsening pain or edema. Staff report no other clinical concerns.     ROS:    As above in HPI. Otherwise, all other systems have been reviewed and are negative for complaint.    Medications reviewed and verified in NH chart.     Patient Active Problem List   Diagnosis   • Wound infection   • Osteomyelitis of left foot, unspecified type (Multi)   • Acute deep vein thrombosis (DVT) of popliteal vein of left lower extremity (Multi)   • Bilateral lower extremity edema   • CAD (coronary artery disease)   • Hypertension   • Inflammatory neuropathy (Multi)   • History of stroke   • Weakness of left lower extremity   • Ambulatory dysfunction   • Chronic bilateral low back pain with left-sided sciatica   • Diabetic peripheral neuropathy (Multi)   • Neural foraminal stenosis of lumbosacral spine   • Hyperlipidemia   • Lumbar back pain with radiculopathy affecting left lower extremity   • Peripheral arterial disease (CMS-HCC)   • Type 2 diabetes mellitus, without long-term current use of insulin (Multi)   • Anemia   • Obesity   • Delayed surgical wound healing of foot amputation stump (Multi)   • Impaired mobility and ADLs   • Weakness   • Thoracic radiculopathy   • Osteoarthritis   • Cellulitis of foot, left   • Meningioma (Multi)   • Tremor of left hand   • Diabetic ulcer of left foot associated with type 2 diabetes mellitus, limited to breakdown of skin   • Acute deep vein thrombosis (DVT) of femoral vein of left lower extremity (Multi)   • Nonrheumatic aortic valve stenosis   •  Diastolic dysfunction        Past Medical History:   Diagnosis Date   • Myocardial infarction (Multi)    • Neuritis    • Sprain of right knee 06/25/2015   • Stroke (Multi)    • 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HHS-HCC)    • TIA (transient ischemic attack)    • Tinea pedis of both feet        Past Surgical History:   Procedure Laterality Date   • CARDIAC CATHETERIZATION     • CARPAL TUNNEL RELEASE  10/10/2014   • EYE SURGERY     • FL  ARTHROCENTESIS ASP INJ JOINT.     • FOOT RAY RESECTION Left 09/23/2024    L partial 5th ray resection (Dr. Spears, DPM)   • FOOT SURGERY Left 09/27/2024    Delayed closure w/ graft application (Dr. Huynh, DPM)   • INVASIVE VASCULAR PROCEDURE Bilateral 09/18/2024    Procedure: Lower Extremity Angiogram;  Surgeon: Teofilo Stoddard MD;  Location: [facility] Cardiac Cath Lab;  Service: Cardiovascular;  Laterality: Bilateral;   • IRIDOTOMY / IRIDECTOMY Bilateral        Family History   Problem Relation Name Age of Onset   • Heart disease Father     • Colon cancer Other     • Heart attack Other     • Diabetes Other     • Hypertension Other         Social History     Tobacco Use   Smoking Status Former   • Types: Cigarettes   • Passive exposure: Never   Smokeless Tobacco Never       Social History     Substance and Sexual Activity   Alcohol Use Never       Social History     Substance and Sexual Activity   Drug Use Never       No Known Allergies     Vital Signs:   125/74-74-18-97.2-99% on RA    Physical Exam:  General: Sitting up in bed in NAD, alert   Head/Face: NCAT, symmetrical  Eyes: PERRLA, no injection, no discharge  ENT: Hearing not impaired, ears without scars or lesions, nasal mucosa and turbinates pink, septum midline, lips pink and moist  Neck: Supple, symmetrical  Respiratory: CTA but diminished without adventitious sounds, respirations even and nonlabored without use of accessory muscles, good air exchange  Cardio: RRR without murmur or gallops, normal S1S2, NP edema to BLE (L > R), pedal pulses 2+/4  bilaterally  Chest/Breast: Symmetrical  GI: BS x 4, normoactive, non-distended, abd round and soft, no masses or tenderness  : No suprapubic tenderness or distention  MSK: Gait not assessed, joints with full ROM without pain or contractures, + generalized weakness  Skin: Skin warm and dry, no induration, surgical wounds to L 5th digit and L lateral foot, DSG D&I, vascular ulcer of L dorsal foot, SDTI to L heel, unstageable pressure ulcer of R heel, skin tags to R buttocks (chronic per patient)   Neurologic: Cranial nerves II through XII intact, decreased sensation to BLE  Psychiatric: Alert to person, place, and time, calm and cooperative, expresses depression with current situation     Results/Data:   10/16/24: Glu 135, Cr 0.6, Hgb 8.5, Hct 26.7  10/3/24: Glu 111, Cr 0.6, HgbA1C 7.0, Hgb 9.0, Hct 28.8    Assessment/Plan:  Left foot osteomyelitis/cellulitis/PAD/venous insufficiency-s/p LLE revascularization with Dr. Stoddard on 9/18, s/p L partial 5th ray resection on 9/23 with Dr. Spears, s/p delayed closure with graft application by Dr. Huynh on 9/27, c/w local wound care, c/w Vit C, MVI, and LPS Critical Care BID to promote wound healing, s/p Augmentin (end date 10/9), c/w ASA and plavix, increase Tylenol to 975 mg TID, c/w Oxycodone prn for mod-severe pain, increase gabapentin to 200 mg TID for neuropathy, wound care team following, seen by vascular on 10/14, WBAT to LLE in surgical shoe, F/U with podiatry as needed, F/U with vascular   DVT of LLE-c/w eliquis, elevate LLE, monitor H&H  Physical deconditioning/weakness-c/w PT/OT  Insomnia/depression-c/w trazodone 50 mg Q HS, add melatonin 6 mg Q HS, supportive care, monitor behaviors, Psych consulted  Anemia-c/w MVI, monitor CBC, check iron panel, folate, and Vit B12 level on 10/23  CAD/HTN/Hx MI/HLD/diastolic dysfunction/AVS-FAVIAN diet, c/w ASA, hydralazine, and lisinopril, monitor BP, monitor lipid panel, needs established with cardiology  DM2 with peripheral  neuropathy-LCS diet, accuchecks, c/w lispro ISS with meals, BP and lipid control, yearly eye exam, diabetic foot care, diabetic diet education (RD to follow), monitor HgbA1C  Hypokalemia-c/w KCl, monitor K+ level  Right frontal lobe meningioma-followed by Dr. Sin ([facility]), seen on 8/4/23 and advised to F/U with neuro-oncology [facility]O in 2 years with MRI WWO for surveillance  Hx stroke/LLE weakness/L hand tremor-followed by neurology   Constipation-c/w miralax, monitor BMs  OA/lumbar radiculopathy/thoracic radiculopathy-Tylenol prn, F/U with specialists after discharge    Orders:  Melatonin 6 mg PO Q HS for insomnia   Increase Tylenol to 975 mg PO TID   Increase gabapentin to 200 mg PO TID     Code Status:   Full Code        Electronically Signed By: SARAH Jerome-CNP   11/7/24 10:48 PM

## 2024-10-29 ENCOUNTER — NURSING HOME VISIT (OUTPATIENT)
Dept: POST ACUTE CARE | Facility: EXTERNAL LOCATION | Age: 74
End: 2024-10-29
Payer: MEDICARE

## 2024-10-29 DIAGNOSIS — F32.A DEPRESSION, UNSPECIFIED DEPRESSION TYPE: ICD-10-CM

## 2024-10-29 DIAGNOSIS — E11.9 TYPE 2 DIABETES MELLITUS WITHOUT COMPLICATION, UNSPECIFIED WHETHER LONG TERM INSULIN USE (MULTI): ICD-10-CM

## 2024-10-29 DIAGNOSIS — G62.9 NEUROPATHY: ICD-10-CM

## 2024-10-29 DIAGNOSIS — R53.81 PHYSICAL DECONDITIONING: ICD-10-CM

## 2024-10-29 DIAGNOSIS — L03.116 CELLULITIS OF FOOT, LEFT: ICD-10-CM

## 2024-10-29 DIAGNOSIS — G47.00 INSOMNIA, UNSPECIFIED TYPE: ICD-10-CM

## 2024-10-29 DIAGNOSIS — R29.898 WEAKNESS OF LEFT LOWER EXTREMITY: ICD-10-CM

## 2024-10-29 DIAGNOSIS — I82.432 ACUTE DEEP VEIN THROMBOSIS (DVT) OF POPLITEAL VEIN OF LEFT LOWER EXTREMITY (MULTI): ICD-10-CM

## 2024-10-29 DIAGNOSIS — I73.9 PVD (PERIPHERAL VASCULAR DISEASE) (CMS-HCC): ICD-10-CM

## 2024-10-29 DIAGNOSIS — I82.412 ACUTE DEEP VEIN THROMBOSIS (DVT) OF FEMORAL VEIN OF LEFT LOWER EXTREMITY (MULTI): ICD-10-CM

## 2024-10-29 DIAGNOSIS — M86.9 OSTEOMYELITIS OF LEFT FOOT, UNSPECIFIED TYPE (MULTI): Primary | ICD-10-CM

## 2024-10-29 DIAGNOSIS — D64.9 ANEMIA, UNSPECIFIED TYPE: ICD-10-CM

## 2024-10-29 PROCEDURE — 99309 SBSQ NF CARE MODERATE MDM 30: CPT | Performed by: NURSE PRACTITIONER

## 2024-10-29 NOTE — LETTER
"Patient: Elliot Partida  : 1950    Encounter Date: 10/29/2024    Chief Complaint:   SNF F/U  -Left foot osteomyelitis  -Left foot cellulitis  -Left foot diabetic ulcer  -PVD  -Venous insufficiency  -DVT of LLE  -Physical deconditioning/weakness    HPI:   74 year-old male presenting to [facility] ER on 24 with \"months\" of LLE pain with edema, redness, and warmth. He was noted to have a large necrotic ulcer under his 5th toe. He reported that he had not seen a provider or taken any medications for about \"2 years\". Work-up in ER: Glu 310, Na+ 129, Alb 3.3, XR of L foot showed osteomyelitis of the 5th metatarsal head, US of LLE showed acute DVT in the left mid-distal femoral and popliteal veins. He was started on IV Heparin, IV Zosyn, and IV Vanco. He was admitted to the hospital for further evaluation and treatment. Hospital course:    Left foot osteomyelitis/cellulitis/DVT of LLE/PAD-IV ATB, IV heparin (transitioned to Eliquis), ID consult, podiatry consult, elevate LLE, local wound care, analgesics prn, patient transferred to [facility] for vascular consult on , underwent LLE revascularization with Dr. Stoddard on , recommending repeat BEN in 4 weeks with OP F/U, underwent L partial 5th ray resection on  with Dr. Spears, taken back to OR on  for delayed closure with graft application by Dr. 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 " was HDS and discharged to [facility] on 10/2/24. Today, patient denies dizziness, HA, SOB, cough, chest pain or palpitations, abdominal pain, N/V/D/C, or dysuria. He reports appetite at baseline. Pt. was started on Tizanidine prn for pain, which patient reports is helping, but makes him drowsy. XR of b/l calcaneus was ordered by wound care team on 10/28 and was negative for acute findings. Staff report concerns that wounds are looking worse/not improving. Pt. denies any worsening pain. He denies fevers or chills. Staff report no clinical concerns.     ROS:    As above in HPI. Otherwise, all other systems have been reviewed and are negative for complaint.    Medications reviewed and verified in NH chart.     Patient Active Problem List   Diagnosis   • Wound infection   • Osteomyelitis of left foot, unspecified type (Multi)   • Acute deep vein thrombosis (DVT) of popliteal vein of left lower extremity (Multi)   • Bilateral lower extremity edema   • CAD (coronary artery disease)   • Hypertension   • Inflammatory neuropathy (Multi)   • History of stroke   • Weakness of left lower extremity   • Ambulatory dysfunction   • Chronic bilateral low back pain with left-sided sciatica   • Diabetic peripheral neuropathy (Multi)   • Neural foraminal stenosis of lumbosacral spine   • Hyperlipidemia   • Lumbar back pain with radiculopathy affecting left lower extremity   • Peripheral arterial disease (CMS-HCC)   • Type 2 diabetes mellitus, without long-term current use of insulin (Multi)   • Anemia   • Obesity   • Delayed surgical wound healing of foot amputation stump (Multi)   • Impaired mobility and ADLs   • Weakness   • Thoracic radiculopathy   • Osteoarthritis   • Cellulitis of foot, left   • Meningioma (Multi)   • Tremor of left hand   • Diabetic ulcer of left foot associated with type 2 diabetes mellitus, limited to breakdown of skin   • Acute deep vein thrombosis (DVT) of femoral vein of left lower extremity (Multi)   •  Nonrheumatic aortic valve stenosis   • Diastolic dysfunction        Past Medical History:   Diagnosis Date   • Myocardial infarction (Multi)    • Neuritis    • Sprain of right knee 06/25/2015   • Stroke (Multi)    • 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HHS-HCC)    • TIA (transient ischemic attack)    • Tinea pedis of both feet        Past Surgical History:   Procedure Laterality Date   • CARDIAC CATHETERIZATION     • CARPAL TUNNEL RELEASE  10/10/2014   • EYE SURGERY     • FL  ARTHROCENTESIS ASP INJ JOINT.     • FOOT RAY RESECTION Left 09/23/2024    L partial 5th ray resection (Dr. Spears, DPM)   • FOOT SURGERY Left 09/27/2024    Delayed closure w/ graft application (Dr. Huynh, DPM)   • INVASIVE VASCULAR PROCEDURE Bilateral 09/18/2024    Procedure: Lower Extremity Angiogram;  Surgeon: Teofilo Stoddard MD;  Location: [facility] Cardiac Cath Lab;  Service: Cardiovascular;  Laterality: Bilateral;   • IRIDOTOMY / IRIDECTOMY Bilateral        Family History   Problem Relation Name Age of Onset   • Heart disease Father     • Colon cancer Other     • Heart attack Other     • Diabetes Other     • Hypertension Other         Social History     Tobacco Use   Smoking Status Former   • Types: Cigarettes   • Passive exposure: Never   Smokeless Tobacco Never       Social History     Substance and Sexual Activity   Alcohol Use Never       Social History     Substance and Sexual Activity   Drug Use Never       No Known Allergies     Vital Signs:   117/66-74-18-97.4-98% on RA    Physical Exam:  General: Sitting up in bed in NAD, alert   Head/Face: NCAT, symmetrical  Eyes: PERRLA, no injection, no discharge  ENT: Hearing not impaired, ears without scars or lesions, nasal mucosa and turbinates pink, septum midline, lips pink and moist  Neck: Supple, symmetrical  Respiratory: CTA but diminished without adventitious sounds, respirations even and nonlabored without use of accessory muscles, good air exchange  Cardio: RRR without murmur or gallops, normal S1S2, NP edema  to BLE (L > R), pedal pulses 2+/4 bilaterally  Chest/Breast: Symmetrical  GI: BS x 4, normoactive, non-distended, abd round and soft, no masses or tenderness  : No suprapubic tenderness or distention  MSK: Gait not assessed, joints with full ROM without pain or contractures, + generalized weakness  Skin: Skin warm and dry, no induration, DTI of R heel, stage III pressure ulcer of L heel, vascular ulcer of L anterior lower leg, surgical wound of L lateral foot, skin tags to R buttocks (chronic per patient)   Neurologic: Cranial nerves II through XII intact, decreased sensation to BLE  Psychiatric: Alert to person, place, and time, calm and cooperative, expresses depression with current situation     Results/Data:   10/23/24: [id number], Cr 0.6, Hgb 9.0, Hct 29.1, Iron 44, TIBC 261, Ferritin 21.1, Transferrin 193, Folate 4.4, Vit B12 358  10/16/24: Glu 135, Cr 0.6, Hgb 8.5, Hct 26.7  10/3/24: Glu 111, Cr 0.6, HgbA1C 7.0, Hgb 9.0, Hct 28.8    Assessment/Plan:  Left foot osteomyelitis/cellulitis/PAD/venous insufficiency-s/p LLE revascularization with Dr. Stoddard on 9/18, s/p L partial 5th ray resection on 9/23 with Dr. Spears, s/p delayed closure with graft application by Dr. Huynh on 9/27, c/w local wound care, c/w Vit C, MVI, and LPS Critical Care BID to promote wound healing, s/p Augmentin (end date 10/9), c/w ASA and plavix, c/w Tylenol 975 mg TID and gabapentin 200 mg TID, c/w Oxycodone prn for mod-severe pain, tizanidine prn for muscle spasms, wound care team following, seen by vascular on 10/14, WBAT to LLE in surgical shoe, F/U with podiatry as needed, F/U with vascular, c/f worsening wound appearance, XR negative for acute findings, check CRP and Sed Rate on 10/30, consult Dr. Velazco (ID) for evaluation   DVT of LLE-c/w eliquis, elevate LLE, monitor H&H  Physical deconditioning/weakness-c/w PT/OT  Insomnia/depression-c/w trazodone 50 mg Q HS and melatonin 6 mg Q HS, supportive care, monitor behaviors, Psych  consulted  Anemia-c/w MVI, iron, and Vit B12, monitor CBC, iron panel, and Vit B12 level   CAD/HTN/Hx MI/HLD/diastolic dysfunction/AVS-FAVIAN diet, c/w ASA, hydralazine, and lisinopril, monitor BP, monitor lipid panel, needs established with cardiology after discharge  DM2 with peripheral neuropathy-LCS diet, accuchecks, c/w lispro ISS with meals, BP and lipid control, yearly eye exam, diabetic foot care, diabetic diet education (RD following), monitor HgbA1C  Hypokalemia-c/w KCl, monitor K+ level  Right frontal lobe meningioma-followed by Dr. Sin ([facility]), seen on 8/4/23 and advised to F/U with neuro-oncology [facility] in 2 years with MRI WWO for surveillance  Hx stroke/LLE weakness/L hand tremor-followed by neurology   Constipation-c/w miralax, monitor BMs  OA/lumbar radiculopathy/thoracic radiculopathy-Tylenol prn, F/U with specialists after discharge    Orders:  Consult Dr. Velazco for foot wounds   CRP and Sed Rate on 10/30     Code Status:   Full Code        Electronically Signed By: SARAH Jerome-CNP   11/8/24 11:25 PM

## 2024-11-03 ENCOUNTER — NURSING HOME VISIT (OUTPATIENT)
Dept: POST ACUTE CARE | Facility: EXTERNAL LOCATION | Age: 74
End: 2024-11-03
Payer: MEDICARE

## 2024-11-03 DIAGNOSIS — Z91.81 AT RISK FOR FALLING: ICD-10-CM

## 2024-11-03 DIAGNOSIS — I21.9 ACUTE MYOCARDIAL INFARCTION, UNSPECIFIED MI TYPE, UNSPECIFIED ARTERY (MULTI): ICD-10-CM

## 2024-11-03 DIAGNOSIS — E11.9 TYPE 2 DIABETES MELLITUS WITHOUT COMPLICATION, UNSPECIFIED WHETHER LONG TERM INSULIN USE (MULTI): Primary | ICD-10-CM

## 2024-11-03 DIAGNOSIS — R53.1 WEAKNESS: ICD-10-CM

## 2024-11-03 DIAGNOSIS — I82.499 DEEP VEIN THROMBOSIS (DVT) OF OTHER VEIN OF LOWER EXTREMITY, UNSPECIFIED CHRONICITY, UNSPECIFIED LATERALITY (MULTI): ICD-10-CM

## 2024-11-03 DIAGNOSIS — M19.90 OSTEOARTHRITIS, UNSPECIFIED OSTEOARTHRITIS TYPE, UNSPECIFIED SITE: ICD-10-CM

## 2024-11-03 DIAGNOSIS — G62.9 NEUROPATHY: ICD-10-CM

## 2024-11-03 DIAGNOSIS — I73.9 PVD (PERIPHERAL VASCULAR DISEASE) (CMS-HCC): ICD-10-CM

## 2024-11-03 DIAGNOSIS — M86.9 OSTEOMYELITIS, UNSPECIFIED SITE, UNSPECIFIED TYPE (MULTI): ICD-10-CM

## 2024-11-03 DIAGNOSIS — I10 HYPERTENSION, UNSPECIFIED TYPE: ICD-10-CM

## 2024-11-03 PROCEDURE — 99309 SBSQ NF CARE MODERATE MDM 30: CPT | Performed by: INTERNAL MEDICINE

## 2024-11-03 NOTE — LETTER
Patient: Elliot Partida  : 1950    Encounter Date: 2024    PLACE OF SERVICE:  [facility] [place]    This is a subsequent visit.    Subjective  Patient ID: Elliot Partida is a 74 y.o. male who presents for Follow-up.    Mr. Elliot Partida is a 74-year-old male with history of diabetes with osteomyelitis to his left foot and ankle.  He suffers from DVT as well as PVD.  He is unable to care for himself and requires supportive care.    Review of Systems   Constitutional:  Negative for chills and fever.   Cardiovascular:  Negative for chest pain.   All other systems reviewed and are negative.    Objective  /80   Pulse 80   Temp 36.6 °C (97.9 °F)   Resp 16     Physical Exam  Vitals reviewed.   Constitutional:       General: He is not in acute distress.     Comments: This is a well-developed and well-nourished male, sitting in a chair.   HENT:      Right Ear: Tympanic membrane, ear canal and external ear normal.      Left Ear: Tympanic membrane, ear canal and external ear normal.   Eyes:      General: No scleral icterus.     Pupils: Pupils are equal, round, and reactive to light.   Neck:      Vascular: No carotid bruit.   Cardiovascular:      Heart sounds: Normal heart sounds, S1 normal and S2 normal. No murmur heard.     No friction rub.   Pulmonary:      Effort: Pulmonary effort is normal.      Breath sounds: Normal breath sounds and air entry.   Abdominal:      Palpations: There is no hepatomegaly, splenomegaly or mass.   Musculoskeletal:         General: No swelling or deformity. Normal range of motion.      Cervical back: Neck supple.      Right lower leg: No edema.      Left lower leg: No edema.      Comments: There is dressing applied to his left foot and ankle.   Lymphadenopathy:      Cervical: No cervical adenopathy.      Upper Body:      Right upper body: No axillary adenopathy.      Left upper body: No axillary adenopathy.      Lower Body: No right  inguinal adenopathy. No left inguinal adenopathy.   Neurological:      Mental Status: He is oriented to person, place, and time.      Cranial Nerves: Cranial nerves 2-12 are intact. No cranial nerve deficit.      Sensory: No sensory deficit.      Motor: Motor function is intact. No weakness.      Gait: Gait is intact.      Deep Tendon Reflexes: Reflexes normal.   Psychiatric:         Mood and Affect: Mood normal. Mood is not anxious or depressed. Affect is not angry.         Behavior: Behavior is not agitated.         Thought Content: Thought content normal.         Judgment: Judgment normal.     LAB WORK: Laboratory studies were reviewed.    Assessment/Plan  Problem List Items Addressed This Visit             ICD-10-CM       Cardiac and Vasculature    Hypertension I10       Musculoskeletal and Injuries    Osteoarthritis M19.90       Symptoms and Signs    Weakness R53.1     Other Visit Diagnoses         Codes    Type 2 diabetes mellitus without complication, unspecified whether long term insulin use (Multi)    -  Primary E11.9    Deep vein thrombosis (DVT) of other vein of lower extremity, unspecified chronicity, unspecified laterality (Multi)     I82.499    Osteomyelitis, unspecified site, unspecified type (Multi)     M86.9    PVD (peripheral vascular disease) (CMS-[facility])     I73.9    Acute myocardial infarction, unspecified MI type, unspecified artery (Multi)     I21.9    Neuropathy     G62.9    At risk for falling     Z91.81        1. Diabetes, on insulin.   2. DVT, anticoagulated.  3. Osteomyelitis, continue wound care, follow with Podiatry.  4. Hypertension, med controlled.  5. PVD, on medication.  6. Osteoarthritis, on Tylenol.  7. History of AMI, on aspirin.  8. Neuropathy, on gabapentin.  9. Weakness, on PT/OT.  10. Fall risk, on fall precaution.    Scribe Attestation  By signing my name below, IHanna, Etienne attest that this documentation has been prepared under the direction and in the presence of  Marium Singh MD.     All medical record entries made by the scribe were personally dictated by me I have reviewed the chart and agree the record accurately reflects my personal performance of his history physical examination and management      Electronically Signed By: Marium Singh MD   11/13/24 11:34 PM

## 2024-11-05 ENCOUNTER — HOSPITAL ENCOUNTER (EMERGENCY)
Facility: HOSPITAL | Age: 74
Discharge: HOME | End: 2024-11-06
Attending: EMERGENCY MEDICINE
Payer: MEDICARE

## 2024-11-05 ENCOUNTER — NURSING HOME VISIT (OUTPATIENT)
Dept: POST ACUTE CARE | Facility: EXTERNAL LOCATION | Age: 74
End: 2024-11-05

## 2024-11-05 ENCOUNTER — APPOINTMENT (OUTPATIENT)
Dept: RADIOLOGY | Facility: HOSPITAL | Age: 74
End: 2024-11-05
Payer: MEDICARE

## 2024-11-05 VITALS
HEART RATE: 82 BPM | OXYGEN SATURATION: 97 % | WEIGHT: 206 LBS | DIASTOLIC BLOOD PRESSURE: 78 MMHG | BODY MASS INDEX: 30.51 KG/M2 | RESPIRATION RATE: 16 BRPM | TEMPERATURE: 98.3 F | HEIGHT: 69 IN | SYSTOLIC BLOOD PRESSURE: 155 MMHG

## 2024-11-05 DIAGNOSIS — G50.0 TRIGEMINAL NEURALGIA: Primary | ICD-10-CM

## 2024-11-05 DIAGNOSIS — G47.00 INSOMNIA, UNSPECIFIED TYPE: ICD-10-CM

## 2024-11-05 DIAGNOSIS — H53.8 BLURRED VISION: ICD-10-CM

## 2024-11-05 DIAGNOSIS — I10 HYPERTENSION, UNSPECIFIED TYPE: ICD-10-CM

## 2024-11-05 DIAGNOSIS — I73.9 PVD (PERIPHERAL VASCULAR DISEASE) (CMS-HCC): ICD-10-CM

## 2024-11-05 DIAGNOSIS — M86.9 OSTEOMYELITIS OF LEFT FOOT, UNSPECIFIED TYPE (MULTI): ICD-10-CM

## 2024-11-05 DIAGNOSIS — D32.9 MENINGIOMA (MULTI): ICD-10-CM

## 2024-11-05 DIAGNOSIS — H57.11 ACUTE RIGHT EYE PAIN: Primary | ICD-10-CM

## 2024-11-05 DIAGNOSIS — M19.90 OSTEOARTHRITIS, UNSPECIFIED OSTEOARTHRITIS TYPE, UNSPECIFIED SITE: ICD-10-CM

## 2024-11-05 DIAGNOSIS — E11.9 TYPE 2 DIABETES MELLITUS WITHOUT COMPLICATION, UNSPECIFIED WHETHER LONG TERM INSULIN USE (MULTI): ICD-10-CM

## 2024-11-05 DIAGNOSIS — L03.116 CELLULITIS OF FOOT, LEFT: ICD-10-CM

## 2024-11-05 LAB
ALBUMIN SERPL BCP-MCNC: 3.5 G/DL (ref 3.4–5)
ALP SERPL-CCNC: 77 U/L (ref 33–136)
ALT SERPL W P-5'-P-CCNC: 10 U/L (ref 10–52)
ANION GAP SERPL CALC-SCNC: 11 MMOL/L (ref 10–20)
AST SERPL W P-5'-P-CCNC: 16 U/L (ref 9–39)
BASOPHILS # BLD AUTO: 0.03 X10*3/UL (ref 0–0.1)
BASOPHILS NFR BLD AUTO: 0.3 %
BILIRUB SERPL-MCNC: 0.3 MG/DL (ref 0–1.2)
BUN SERPL-MCNC: 15 MG/DL (ref 6–23)
CALCIUM SERPL-MCNC: 9 MG/DL (ref 8.6–10.3)
CHLORIDE SERPL-SCNC: 106 MMOL/L (ref 98–107)
CO2 SERPL-SCNC: 27 MMOL/L (ref 21–32)
CREAT SERPL-MCNC: 0.7 MG/DL (ref 0.5–1.3)
CRP SERPL-MCNC: 1.34 MG/DL
EGFRCR SERPLBLD CKD-EPI 2021: >90 ML/MIN/1.73M*2
EOSINOPHIL # BLD AUTO: 0.16 X10*3/UL (ref 0–0.4)
EOSINOPHIL NFR BLD AUTO: 1.6 %
ERYTHROCYTE [DISTWIDTH] IN BLOOD BY AUTOMATED COUNT: 14.6 % (ref 11.5–14.5)
ERYTHROCYTE [SEDIMENTATION RATE] IN BLOOD BY WESTERGREN METHOD: 38 MM/H (ref 0–20)
GLUCOSE SERPL-MCNC: 115 MG/DL (ref 74–99)
HCT VFR BLD AUTO: 32.7 % (ref 41–52)
HGB BLD-MCNC: 10.3 G/DL (ref 13.5–17.5)
IMM GRANULOCYTES # BLD AUTO: 0.05 X10*3/UL (ref 0–0.5)
IMM GRANULOCYTES NFR BLD AUTO: 0.5 % (ref 0–0.9)
INR PPP: 1.5 (ref 0.9–1.1)
LYMPHOCYTES # BLD AUTO: 2.41 X10*3/UL (ref 0.8–3)
LYMPHOCYTES NFR BLD AUTO: 24.3 %
MAGNESIUM SERPL-MCNC: 2.03 MG/DL (ref 1.6–2.4)
MCH RBC QN AUTO: 27.6 PG (ref 26–34)
MCHC RBC AUTO-ENTMCNC: 31.5 G/DL (ref 32–36)
MCV RBC AUTO: 88 FL (ref 80–100)
MONOCYTES # BLD AUTO: 0.83 X10*3/UL (ref 0.05–0.8)
MONOCYTES NFR BLD AUTO: 8.4 %
NEUTROPHILS # BLD AUTO: 6.42 X10*3/UL (ref 1.6–5.5)
NEUTROPHILS NFR BLD AUTO: 64.9 %
NRBC BLD-RTO: 0 /100 WBCS (ref 0–0)
PLATELET # BLD AUTO: 298 X10*3/UL (ref 150–450)
POTASSIUM SERPL-SCNC: 3.8 MMOL/L (ref 3.5–5.3)
PROT SERPL-MCNC: 6.7 G/DL (ref 6.4–8.2)
PROTHROMBIN TIME: 17.3 SECONDS (ref 9.8–12.8)
RBC # BLD AUTO: 3.73 X10*6/UL (ref 4.5–5.9)
SODIUM SERPL-SCNC: 140 MMOL/L (ref 136–145)
WBC # BLD AUTO: 9.9 X10*3/UL (ref 4.4–11.3)

## 2024-11-05 PROCEDURE — 70498 CT ANGIOGRAPHY NECK: CPT

## 2024-11-05 PROCEDURE — 80053 COMPREHEN METABOLIC PANEL: CPT | Performed by: HEALTH CARE PROVIDER

## 2024-11-05 PROCEDURE — 99284 EMERGENCY DEPT VISIT MOD MDM: CPT | Mod: 25

## 2024-11-05 PROCEDURE — 99309 SBSQ NF CARE MODERATE MDM 30: CPT | Performed by: NURSE PRACTITIONER

## 2024-11-05 PROCEDURE — 70496 CT ANGIOGRAPHY HEAD: CPT | Performed by: STUDENT IN AN ORGANIZED HEALTH CARE EDUCATION/TRAINING PROGRAM

## 2024-11-05 PROCEDURE — 85610 PROTHROMBIN TIME: CPT | Performed by: HEALTH CARE PROVIDER

## 2024-11-05 PROCEDURE — 36415 COLL VENOUS BLD VENIPUNCTURE: CPT | Performed by: HEALTH CARE PROVIDER

## 2024-11-05 PROCEDURE — 2550000001 HC RX 255 CONTRASTS: Performed by: EMERGENCY MEDICINE

## 2024-11-05 PROCEDURE — 70498 CT ANGIOGRAPHY NECK: CPT | Performed by: STUDENT IN AN ORGANIZED HEALTH CARE EDUCATION/TRAINING PROGRAM

## 2024-11-05 PROCEDURE — 86140 C-REACTIVE PROTEIN: CPT | Performed by: HEALTH CARE PROVIDER

## 2024-11-05 PROCEDURE — 85025 COMPLETE CBC W/AUTO DIFF WBC: CPT | Performed by: HEALTH CARE PROVIDER

## 2024-11-05 PROCEDURE — 83735 ASSAY OF MAGNESIUM: CPT | Performed by: HEALTH CARE PROVIDER

## 2024-11-05 PROCEDURE — 2500000005 HC RX 250 GENERAL PHARMACY W/O HCPCS: Performed by: EMERGENCY MEDICINE

## 2024-11-05 PROCEDURE — 85652 RBC SED RATE AUTOMATED: CPT | Performed by: HEALTH CARE PROVIDER

## 2024-11-05 RX ORDER — TETRACAINE HYDROCHLORIDE 5 MG/ML
1 SOLUTION OPHTHALMIC ONCE
Status: COMPLETED | OUTPATIENT
Start: 2024-11-05 | End: 2024-11-05

## 2024-11-05 RX ORDER — TETRACAINE HYDROCHLORIDE 5 MG/ML
SOLUTION OPHTHALMIC
Status: COMPLETED
Start: 2024-11-05 | End: 2024-11-05

## 2024-11-05 RX ADMIN — TETRACAINE HYDROCHLORIDE: 5 SOLUTION OPHTHALMIC at 21:00

## 2024-11-05 RX ADMIN — IOHEXOL 75 ML: 350 INJECTION, SOLUTION INTRAVENOUS at 22:51

## 2024-11-05 ASSESSMENT — COLUMBIA-SUICIDE SEVERITY RATING SCALE - C-SSRS
2. HAVE YOU ACTUALLY HAD ANY THOUGHTS OF KILLING YOURSELF?: NO
1. IN THE PAST MONTH, HAVE YOU WISHED YOU WERE DEAD OR WISHED YOU COULD GO TO SLEEP AND NOT WAKE UP?: NO
6. HAVE YOU EVER DONE ANYTHING, STARTED TO DO ANYTHING, OR PREPARED TO DO ANYTHING TO END YOUR LIFE?: NO

## 2024-11-05 ASSESSMENT — TONOMETRY
OD_IOP_MMHG: 15
IOP_HANDHELD: 1

## 2024-11-05 ASSESSMENT — PAIN SCALES - GENERAL: PAINLEVEL_OUTOF10: 9

## 2024-11-05 ASSESSMENT — PAIN DESCRIPTION - ORIENTATION: ORIENTATION: RIGHT

## 2024-11-05 ASSESSMENT — PAIN DESCRIPTION - DESCRIPTORS: DESCRIPTORS: ACHING;NAGGING

## 2024-11-05 ASSESSMENT — PAIN DESCRIPTION - LOCATION: LOCATION: EYE

## 2024-11-05 ASSESSMENT — PAIN - FUNCTIONAL ASSESSMENT: PAIN_FUNCTIONAL_ASSESSMENT: 0-10

## 2024-11-05 ASSESSMENT — PAIN DESCRIPTION - PAIN TYPE: TYPE: ACUTE PAIN

## 2024-11-05 NOTE — LETTER
"Patient: Elliot Partida  : 1950    Encounter Date: 2024    Chief Complaint:   SNF F/U  -Left foot osteomyelitis  -Left foot cellulitis  -Left foot diabetic ulcer  -PVD  -Venous insufficiency  -DVT of LLE  -Physical deconditioning/weakness    HPI:   74 year-old male presenting to [facility] ER on 24 with \"months\" of LLE pain with edema, redness, and warmth. He was noted to have a large necrotic ulcer under his 5th toe. He reported that he had not seen a provider or taken any medications for about \"2 years\". Work-up in ER: Glu 310, Na+ 129, Alb 3.3, XR of L foot showed osteomyelitis of the 5th metatarsal head, US of LLE showed acute DVT in the left mid-distal femoral and popliteal veins. He was started on IV Heparin, IV Zosyn, and IV Vanco. He was admitted to the hospital for further evaluation and treatment. Hospital course:    Left foot osteomyelitis/cellulitis/DVT of LLE/PAD-IV ATB, IV heparin (transitioned to Eliquis), ID consult, podiatry consult, elevate LLE, local wound care, analgesics prn, patient transferred to [facility] for vascular consult on , underwent LLE revascularization with Dr. Stoddard on , recommending repeat BEN in 4 weeks with OP F/U, underwent L partial 5th ray resection on  with Dr. Spears, taken back to OR on  for delayed closure with graft application by Dr. 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 " was HDS and discharged to [facility] on 10/2/24. Today, pt. is c/o R eye pain, blurred vision, and R ear pain. He denies any numbness or tingling. He reports pain is worse with bright lights. He denies Hx of migraines. He denies any dizziness or HA. He denies any fevers, chills, SOB, cough, or chest pain. Staff report no clinical concerns.     ROS:    As above in HPI. Otherwise, all other systems have been reviewed and are negative for complaint.    Medications reviewed and verified in NH chart.     Patient Active Problem List   Diagnosis   • Wound infection   • Osteomyelitis of left foot, unspecified type (Multi)   • Acute deep vein thrombosis (DVT) of popliteal vein of left lower extremity (Multi)   • Bilateral lower extremity edema   • CAD (coronary artery disease)   • Hypertension   • Inflammatory neuropathy (Multi)   • History of stroke   • Weakness of left lower extremity   • Ambulatory dysfunction   • Chronic bilateral low back pain with left-sided sciatica   • Diabetic peripheral neuropathy (Multi)   • Neural foraminal stenosis of lumbosacral spine   • Hyperlipidemia   • Lumbar back pain with radiculopathy affecting left lower extremity   • Peripheral arterial disease (CMS-[facility])   • Type 2 diabetes mellitus, without long-term current use of insulin (Multi)   • Anemia   • Obesity   • Delayed surgical wound healing of foot amputation stump (Multi)   • Impaired mobility and ADLs   • Weakness   • Thoracic radiculopathy   • Osteoarthritis   • Cellulitis of foot, left   • Meningioma (Multi)   • Tremor of left hand   • Diabetic ulcer of left foot associated with type 2 diabetes mellitus, limited to breakdown of skin   • Acute deep vein thrombosis (DVT) of femoral vein of left lower extremity (Multi)   • Nonrheumatic aortic valve stenosis   • Diastolic dysfunction        Past Medical History:   Diagnosis Date   • Acute osteomyelitis of ankle and foot, left (Multi)    • AMI (acute myocardial infarction) (Multi)     • ASHD (arteriosclerotic heart disease)    • At risk for falls    • Cellulitis     left foot and ankle   • Diabetes mellitus (Multi)    • DVT (deep vein thrombosis) in pregnancy ([facility])    • Fall risk care plan declined    • Hypertension    • Myocardial infarction (Multi)    • Neuritis    • Neuropathy    • Osteoarthritis    • Osteomyelitis    • PVD (peripheral vascular disease) (CMS-HCC)    • Sprain of right knee 06/25/2015   • Stroke (Multi)    • 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     • FOOT RAY RESECTION Left 09/23/2024    L partial 5th ray resection (Dr. Spears, DPBRAYAN)   • FOOT SURGERY Left 09/27/2024    Delayed closure w/ graft application (Dr. Huynh, SREEDHAR)   • INVASIVE VASCULAR PROCEDURE Bilateral 09/18/2024    Procedure: Lower Extremity Angiogram;  Surgeon: Teofilo Stoddard MD;  Location: [facility] Cardiac Cath Lab;  Service: Cardiovascular;  Laterality: Bilateral;   • IRIDOTOMY / IRIDECTOMY Bilateral        Family History   Problem Relation Name Age of Onset   • Heart disease Father     • Colon cancer Other     • Heart attack Other     • Diabetes Other     • Hypertension Other         Social History     Tobacco Use   Smoking Status Never   • Passive exposure: Never   Smokeless Tobacco Never       Social History     Substance and Sexual Activity   Alcohol Use Never       Social History     Substance and Sexual Activity   Drug Use Never       No Known Allergies     Vital Signs:   115/71-72-18-97.4-99% on RA    Physical Exam:  General: Sitting up in bed in NAD, alert   Head/Face: NCAT, symmetrical  Eyes: PERRLA, no injection, no discharge  ENT: Hearing not impaired, ears without scars or lesions, nasal mucosa and turbinates pink, septum midline,  lips pink and moist  Neck: Supple, symmetrical  Respiratory: CTA but diminished without adventitious sounds, respirations even and nonlabored without use of accessory muscles, good air exchange  Cardio: RRR without murmur or gallops, normal S1S2, NP edema to BLE (L > R), pedal pulses 2+/4 bilaterally  Chest/Breast: Symmetrical  GI: BS x 4, normoactive, non-distended, abd round and soft, no masses or tenderness  : No suprapubic tenderness or distention  MSK: Gait not assessed, joints with full ROM without pain or contractures, + generalized weakness  Skin: Skin warm and dry, no induration, DTI of R heel, stage III pressure ulcer of L heel, vascular ulcer of L anterior lower leg, surgical wound of L lateral foot, skin tags to R buttocks (chronic per patient)   Neurologic: Cranial nerves II through XII intact, decreased sensation to BLE  Psychiatric: Alert to person, place, and time, calm and cooperative, expresses depression with current situation     Results/Data:   10/30/24: Glu 139, Cr 0.6, Hgb 9.1, Hct 29.4, Sed Rate 46, CRP 2.3  10/23/24: Tke029, Cr 0.6, Hgb 9.0, Hct 29.1, Iron 44, TIBC 261, Ferritin 21.1, Transferrin 193, Folate 4.4, Vit B12 358  10/16/24: Glu 135, Cr 0.6, Hgb 8.5, Hct 26.7  10/3/24: Glu 111, Cr 0.6, HgbA1C 7.0, Hgb 9.0, Hct 28.8    Assessment/Plan:  Left foot osteomyelitis/cellulitis/PAD/venous insufficiency-s/p LLE revascularization with Dr. Stoddard on 9/18, s/p L partial 5th ray resection on 9/23 with Dr. Spears, s/p delayed closure with graft application by Dr. Huynh on 9/27, c/w local wound care, c/w Vit C, MVI, and LPS Critical Care BID to promote wound healing, s/p Augmentin (end date 10/9), c/w ASA and plavix, c/w Tylenol and Gabapentin, c/w Oxycodone prn for mod-severe pain, tizanidine prn for muscle spasms, wound care team following, seen by vascular on 10/14, NWB of LLE, F/U with podiatry as needed, F/U with vascular  DVT of LLE-c/w eliquis, elevate LLE, monitor H&H  Physical  deconditioning/weakness-c/w PT/OT  Insomnia/depression-c/w trazodone and melatonin, supportive care, monitor behaviors, Psych consulted  Anemia-c/w MVI, iron, and Vit B12, monitor CBC, iron panel, and Vit B12 level   CAD/HTN/Hx MI/HLD/diastolic dysfunction/AVS-FAVIAN diet, c/w ASA, hydralazine, and lisinopril, monitor BP, monitor lipid panel, needs established with cardiology after discharge  DM2 with peripheral neuropathy-LCS diet, accuchecks, c/w lispro ISS with meals, BP and lipid control, yearly eye exam, diabetic foot care, diabetic diet education (RD following), monitor HgbA1C  Hypokalemia-c/w KCl, monitor K+ level  Right frontal lobe meningioma-followed by Dr. Sin ([facility]), seen on 8/4/23 and advised to F/U with neuro-oncology [facility] in 2 years with MRI WWO for surveillance, office called this afternoon to see if patient's symptoms could be related to meningioma, neuro-onc does not feel that these symptoms are related to the meningioma   Hx stroke/LLE weakness/L hand tremor-followed by neurology   Constipation-c/w miralax, monitor BMs  OA/lumbar radiculopathy/thoracic radiculopathy-Tylenol prn, F/U with specialists after discharge  R ear pain/R eye pain/vision changes-symptoms possibly 2/2 migraine headache, give Excedrin 2 tabs PO x 1 now and monitor for improvement, discussed ER evaluation due to sudden onset of symptoms and no prior history of such, patient wants to see if Excedrin will help prior to being evaluated in the ER     Orders:  Excedrin 2 tabs PO x 1 now     Code Status:   Full Code        Electronically Signed By: SARAH Jerome-CNP   12/27/24  8:03 PM

## 2024-11-06 PROCEDURE — 2500000001 HC RX 250 WO HCPCS SELF ADMINISTERED DRUGS (ALT 637 FOR MEDICARE OP): Performed by: EMERGENCY MEDICINE

## 2024-11-06 RX ORDER — CARBAMAZEPINE 200 MG/1
100 TABLET ORAL ONCE
Status: COMPLETED | OUTPATIENT
Start: 2024-11-06 | End: 2024-11-06

## 2024-11-06 RX ORDER — CARBAMAZEPINE 100 MG/1
100 CAPSULE, EXTENDED RELEASE ORAL 2 TIMES DAILY
Qty: 14 CAPSULE | Refills: 0 | Status: SHIPPED | OUTPATIENT
Start: 2024-11-06 | End: 2024-11-13

## 2024-11-06 RX ADMIN — CARBAMAZEPINE 100 MG: 200 TABLET ORAL at 00:42

## 2024-11-06 NOTE — PROGRESS NOTES
Emergency Medicine Transition of Care Note.    I received Elliot Partida in signout from BERNABE Georges.  Please see the previous ED provider note for all HPI, PE and MDM up to the time of signout at 2200. This is in addition to the primary record.    In brief Elliot Partida is an 74 y.o. male presenting for intermittent, sharp stabbing episodes of pain behind his right eye extending to his face  Chief Complaint   Patient presents with    Eye Pain     Pt here with complaints of right eye pain and ringing in his right ear x3 hours. Took Excedrin with no relief. Bright lights make the pain worse.     Tinnitus     At the time of signout we were awaiting: imaging results, and further disposition    Diagnoses as of 11/06/24 0028   Trigeminal neuralgia       Medical Decision Making  Patient was seen and evaluated for intermittent's of sharp, stabbing pain extending from behind his right eye to his right preauricular/submandibular regions.  Patient states that the episodes are intermittent, lasting several seconds before self resolving.  Eye pressures were equal and normal bilaterally.  Workup was initiated by PA and patient was signed out to myself pending results of his imaging.    Given not significantly elevated CRP and ESR, giant cell arteritis is unlikely.  CT angio head and neck w and wo IV contrast   Final Result   1. Moderate to severe stenosis is present in the ophthalmic/clinoid   segment of the left internal carotid artery, with preserved   opacification of the left carotid terminal and its distal vessels. No   large vessel occlusion is present in the proximal intracranial   circulation.   2. Absence of the expected opacification in the region of the   proximal extracranial left vertebral artery, with reconstitution of   the vessel in the more distal V3 segments, likely representing time   indeterminate proximal occlusion with collateralized supply to the   vessel more distally. Additional areas of  mild-to-moderate stenosis   are present in the distal most V3 segments of the left vertebral   artery, although there is opacification of the left intracranial   vertebral artery which continues its course distally predominantly as   the left posteroinferior cerebellar artery. The vertebral artery is   supplied by the dominant right vertebral artery.   3. A 2 mm inferiorly oriented saccular outpouching arises from the   vertebrobasilar junction, possibly representing a small vessel   infundibulum versus saccular aneurysms, incompletely characterized on   current exam. Neurosurgical follow-up is recommended for further   management.   4. Focus of diminished attenuation in the right putamen represents a   time indeterminate lacunar infarct. Patchy attenuation changes in the   periventricular and subcortical white matter of bilateral cerebral   hemispheres likely represent sequela of microvascular disease.   5. A 1.5 cm dural-based extra-axial mass with mild associated   postcontrast enhancement overlies the anterior right frontal lobe,   likely representing a meningioma. There is mild mass effect on the   adjacent brain parenchyma, without evidence of associated low-density   edema.   6. No significant stenosis or occlusion is present in the   extracranial carotid arteries or right vertebral artery in the neck.   7. Several large dental cavities are present in the maxillary and   mandibular teeth. Correlate with dental exam.        MACRO:   None        Signed by: Delphine Felton 11/5/2024 11:54 PM   Dictation workstation:   [id number]        I discussed patient's chronic CT angio findings, of which he is aware.  He notes that his meningioma is being followed and he is scheduled for repeat imaging this summer.  Patient's presentation is consistent with a trigeminal neuralgia.  He will therefore be started on carbamazepine at this time.  Patient was informed of their lab and imaging results, and all questions and  concerns were answered. Discharge planning with close outpatient follow-up was discussed at this time, to which the patient was agreeable.  Patient is prescribed carbamazepine for continued management of his symptoms.  Strict return precautions were given, and patient was discharged back to his facility in stable condition.      Final diagnoses:   [G50.0] Trigeminal neuralgia       Procedure  Procedures    Radha Smith MD

## 2024-11-06 NOTE — ED TRIAGE NOTES
Pt here with complaints of right eye pain and ringing in his right ear x3 hours. Took Excedrin with no relief. Bright lights make the pain worse.

## 2024-11-06 NOTE — ED PROVIDER NOTES
HPI   Chief Complaint   Patient presents with    Eye Pain     Pt here with complaints of right eye pain and ringing in his right ear x3 hours. Took Excedrin with no relief. Bright lights make the pain worse.     Tinnitus       CC: Acute right eye pain, blurriness  HPI:   74-year-old male with history of insulin-dependent type 2 diabetes, hypertension hyperlipidemia, neuropathy, coronary artery disease, osteomyelitis presents to ED from skilled nursing facility for acute onset of right thigh pain while he was watching TV.  Patient states pain comes and goes only last for a few seconds and then dissipates, patient states the pain is sharp, shocklike, and radiates around to the right preauricular region and then down to the right submandibular region and right cervical.  Patient reports history of cataract surgery in 2016 denies any foreign body sensation, he denies any floaters, he does report also acute onset of ringing sensation in the ears bilaterally.  Denies any recent or remote trauma, he denies noticing any recent illnesses, patient denies any other focal deficits.    Additional Limitations to History:   External Records Reviewed: I reviewed recent and relevant outside records including   History Obtained From:     Past Medical History: Per HPI  Medications: Reviewed in EMR and with patient  Allergies:  Reviewed in EMR  Past Surgical History:   Social History:     ------------------------------------------------------------------------------------------------------  Physical Exam:  --Vital signs reviewed in nursing triage note, EMR flow sheets, and at patient's bedside  GEN:  A&Ox3, no acute distress, appears comfortable.  Conversational and appropriate.  No confusion or gross mental status changes.  EYES: EOMI, non-injected sclera.  ENT: Moist mucous membranes, no apparent injuries or lesions.   CARDIO: Normal rate and regular rhythm. No murmurs, rubs, or gallops.  2+ equal pulses of the distal extremities.    PULM: Clear to auscultation bilaterally. No rales, rhonchi, or wheezes. Good symmetric chest expansion.  GI: Soft, non-tender, non-distended. No rebound tenderness or guarding.  SKIN: Warm and dry, no rashes or lesions.  MSK: ROM intact the extremities without contractures.   EXT: No peripheral edema, contusions, or wounds.   NEURO: Cranial nerves II-XII grossly intact. Sensation to light touch intact and equal bilaterally in upper and lower extremities.  Symmetric 5/5 strength in upper and lower extremities.  PSYCH: Appropriate mood and behavior, converses and responds appropriately during exam.  -------------------------------------------------------------------------------------------------------        Differential Diagnoses Considered:   Chronic Medical Conditions Significantly Affecting Care:   Diagnostic testing considered: [PERC, D-Dimer, PECARN, etc.]      - I independently interpreted: [CXR, CT, POCUS, etc. including your interpretation]  - Labs notable for     Escalation of Care: Appropriate for   Social Determinants of Health Significantly Affecting Care: [Homelessness, lacking transportation, uninsured, unable to afford medications]  Prescription Drug Consideration: [Antibiotics, antivirals, pain medications, etc.]  Discussion of Management with Other Providers:  I discussed the patient/results with: [admitting team, consultant, radiologist, social work, EPAT, case management, PT/OT, RT, PCP, etc.]      Mitchel Meyer PA-C              Patient History   Past Medical History:   Diagnosis Date    Arthritis     ASHD (arteriosclerotic heart disease)     At risk for falling     CAD (coronary artery disease)     Cellulitis     Diabetes mellitus (Multi)     DVT (deep vein thrombosis) in pregnancy ([facility])     Foot ulcer (Multi)     Hypertension     Myocardial infarction (Multi)     Neuropathy     Osteoarthritis     Osteomyelitis     PAD (peripheral artery disease) (CMS-HCC)     Peripheral neuropathy     PVD  (peripheral vascular disease) (CMS-HCC)     Radiculopathy     Stroke (Multi)     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      INVASIVE VASCULAR PROCEDURE Bilateral 9/18/2024    Procedure: Lower Extremity Angiogram;  Surgeon: Teofilo Stoddard MD;  Location: [facility] Cardiac Cath Lab;  Service: Cardiovascular;  Laterality: Bilateral;    IRIDOTOMY / IRIDECTOMY Bilateral      Family History   Problem Relation Name Age of Onset    Heart disease Father      Colon cancer Other      Heart attack Other      Diabetes Other      Hypertension Other       Social History     Tobacco Use    Smoking status: Former     Types: Cigarettes     Passive exposure: Never    Smokeless tobacco: Never   Substance Use Topics    Alcohol use: Never    Drug use: Not on file       Physical Exam   ED Triage Vitals [11/05/24 1806]   Temperature Heart Rate Respirations BP   36.8 °C (98.3 °F) 82 16 155/78      Pulse Ox Temp Source Heart Rate Source Patient Position   97 % Oral Monitor Sitting      BP Location FiO2 (%)     Left arm --       Physical Exam  HENT:      Head:      Jaw: No trismus or pain on movement.      Salivary Glands: Right salivary gland is not diffusely enlarged.      Right Ear: Tympanic membrane and external ear normal. Tenderness present. No mastoid tenderness.   Eyes:      General: Lids are normal.         Right eye: No foreign body, discharge or hordeolum.      Intraocular pressure: Right eye pressure is 15 mmHg. Measurements were taken using a handheld tonometer.     Extraocular Movements: Extraocular movements intact.      Right eye: Normal extraocular motion and no nystagmus.      Conjunctiva/sclera:      Right eye: Right conjunctiva is not injected. No chemosis, exudate or hemorrhage.     Pupils:  Pupils are equal, round, and reactive to light.      Slit lamp exam:     Right eye: Photophobia present.      Visual Fields: Right eye visual fields normal.           ED Course & MDM   Diagnoses as of 11/06/24 1330   Trigeminal neuralgia                 No data recorded     [place] Coma Scale Score: 15 (11/05/24 1908 : Jennie Espitia LPN)       NIH Stroke Scale: 0 (11/05/24 1908 : Jennie Espitia LPN)                   Medical Decision Making  74-year-old male with sudden onset of bilateral tinnitus, intermittent transient right eye pain radiating to the right temporal and preauricular region high suspicion for trigeminal neuralgia, there is low suspicion for acute angle closure glaucoma, iritis, optic neuritis, keratitis, orbital cellulitis, discussed findings with the oncoming ED attending and turned over for final disposition pending CT angio of the head and neck.        Procedure  Procedures     Mitchel Meyer PA-C  11/05/24 2140       Mitchel Meyer PA-C  11/05/24 2141       Mitchel Meyer PA-C  11/06/24 1338

## 2024-11-07 PROBLEM — J44.9 CHRONIC OBSTRUCTIVE PULMONARY DISEASE (MULTI): Status: RESOLVED | Noted: 2024-09-23 | Resolved: 2024-11-07

## 2024-11-07 PROBLEM — I82.412 ACUTE DEEP VEIN THROMBOSIS (DVT) OF FEMORAL VEIN OF LEFT LOWER EXTREMITY (MULTI): Status: ACTIVE | Noted: 2024-11-07

## 2024-11-07 PROBLEM — D32.9 MENINGIOMA (MULTI): Status: ACTIVE | Noted: 2024-11-07

## 2024-11-07 PROBLEM — I35.0 NONRHEUMATIC AORTIC VALVE STENOSIS: Status: ACTIVE | Noted: 2024-11-07

## 2024-11-07 PROBLEM — R25.1 TREMOR OF LEFT HAND: Status: ACTIVE | Noted: 2024-11-07

## 2024-11-07 PROBLEM — L97.521 DIABETIC ULCER OF LEFT FOOT ASSOCIATED WITH TYPE 2 DIABETES MELLITUS, LIMITED TO BREAKDOWN OF SKIN: Status: ACTIVE | Noted: 2024-11-07

## 2024-11-07 PROBLEM — I51.89 DIASTOLIC DYSFUNCTION: Status: ACTIVE | Noted: 2024-11-07

## 2024-11-07 PROBLEM — E11.621 DIABETIC ULCER OF LEFT FOOT ASSOCIATED WITH TYPE 2 DIABETES MELLITUS, LIMITED TO BREAKDOWN OF SKIN: Status: ACTIVE | Noted: 2024-11-07

## 2024-11-07 NOTE — PROGRESS NOTES
"Chief Complaint:   SNF F/U  -Left foot osteomyelitis  -Left foot cellulitis  -Left foot diabetic ulcer  -PVD  -Venous insufficiency  -DVT of LLE  -Physical deconditioning/weakness    HPI:   74 year-old male presenting to [facility] ER on 9/12/24 with \"months\" of LLE pain with edema, redness, and warmth. He was noted to have a large necrotic ulcer under his 5th toe. He reported that he had not seen a provider or taken any medications for about \"2 years\". Work-up in ER: Glu 310, Na+ 129, Alb 3.3, XR of L foot showed osteomyelitis of the 5th metatarsal head, US of LLE showed acute DVT in the left mid-distal femoral and popliteal veins. He was started on IV Heparin, IV Zosyn, and IV Vanco. He was admitted to the hospital for further evaluation and treatment. Hospital course:    Left foot osteomyelitis/cellulitis/DVT of LLE/PAD-IV ATB, IV heparin (transitioned to Eliquis), ID consult, podiatry consult, elevate LLE, local wound care, analgesics prn, patient transferred to [facility] for vascular consult on 9/14, underwent LLE revascularization with Dr. Stoddard on 9/18, recommending repeat BEN in 4 weeks with OP F/U, underwent L partial 5th ray resection on 9/23 with Dr. Spears, taken back to OR on 9/27 for delayed closure with graft application by Dr. 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 was HDS and discharged to [facility] on 10/2/24. Today, patient " denies dizziness, HA, SOB, cough, chest pain or palpitations, abdominal pain, N/V/D/C, or dysuria. He reports appetite at baseline. He continues to c/o insomnia and foot pain. He was seen by DPM on 10/14 and wound vac was removed. Pt. is now WBAT to LLE in surgical shoe. Staff report no other clinical concerns at this time.     ROS:    As above in HPI. Otherwise, all other systems have been reviewed and are negative for complaint.    Medications reviewed and verified in NH chart.     Patient Active Problem List   Diagnosis    Wound infection    Osteomyelitis of left foot, unspecified type (Multi)    Acute deep vein thrombosis (DVT) of popliteal vein of left lower extremity (Multi)    Bilateral lower extremity edema    CAD (coronary artery disease)    Hypertension    Inflammatory neuropathy (Multi)    History of stroke    Weakness of left lower extremity    Ambulatory dysfunction    Chronic bilateral low back pain with left-sided sciatica    Diabetic peripheral neuropathy (Multi)    Neural foraminal stenosis of lumbosacral spine    Hyperlipidemia    Lumbar back pain with radiculopathy affecting left lower extremity    Peripheral arterial disease ([facility])    Type 2 diabetes mellitus, without long-term current use of insulin (Multi)    Anemia    Obesity    Delayed surgical wound healing of foot amputation stump (Multi)    Impaired mobility and ADLs    Weakness    Thoracic radiculopathy    Osteoarthritis    Cellulitis of foot, left    Meningioma (Multi)    Tremor of left hand    Diabetic ulcer of left foot associated with type 2 diabetes mellitus, limited to breakdown of skin    Acute deep vein thrombosis (DVT) of femoral vein of left lower extremity (Multi)    Nonrheumatic aortic valve stenosis    Diastolic dysfunction        Past Medical History:   Diagnosis Date    Myocardial infarction (Multi)     Neuritis     Sprain of right knee 06/25/2015    Stroke (Multi)     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      FOOT RAY RESECTION Left 09/23/2024    L partial 5th ray resection (Dr. Spears, DPM)    FOOT SURGERY Left 09/27/2024    Delayed closure w/ graft application (Dr. Huynh, [facility])    INVASIVE VASCULAR PROCEDURE Bilateral 09/18/2024    Procedure: Lower Extremity Angiogram;  Surgeon: Teofilo Stoddard MD;  Location: [facility] Cardiac Cath Lab;  Service: Cardiovascular;  Laterality: Bilateral;    IRIDOTOMY / IRIDECTOMY Bilateral        Family History   Problem Relation Name Age of Onset    Heart disease Father      Colon cancer Other      Heart attack Other      Diabetes Other      Hypertension Other         Social History     Tobacco Use   Smoking Status Former    Types: Cigarettes    Passive exposure: Never   Smokeless Tobacco Never       Social History     Substance and Sexual Activity   Alcohol Use Never       Social History     Substance and Sexual Activity   Drug Use Never       No Known Allergies     Vital Signs:   142/76-72-18-96.9-97% on RA    Physical Exam:  General: Sitting up in WC in NAD, alert   Head/Face: NCAT, symmetrical  Eyes: PERRLA, no injection, no discharge  ENT: Hearing not impaired, ears without scars or lesions, nasal mucosa and turbinates pink, septum midline, lips pink and moist  Neck: Supple, symmetrical  Respiratory: CTA but diminished without adventitious sounds, respirations even and nonlabored without use of accessory muscles, good air exchange  Cardio: RRR without murmur or gallops, normal S1S2, NP edema to BLE (L > R), pedal pulses 2+/4 bilaterally  Chest/Breast: Symmetrical  GI: BS x 4, normoactive, non-distended, abd round and soft, no masses or tenderness  : No suprapubic tenderness or distention  MSK: Gait not assessed, joints with full ROM without pain or contractures, +  generalized weakness  Skin: Skin warm and dry, no induration, surgical wounds to L 5th digit and L lateral foot, DSG D&I, vascular ulcer of L dorsal foot, SDTI to L heel, unstageable pressure ulcer of R heel, skin tags to R buttocks (chronic per patient)   Neurologic: Cranial nerves II through XII intact, decreased sensation to BLE  Psychiatric: Alert to person, place, and time, calm and cooperative, expresses depression with current situation     Results/Data:   10/3/24: Glu 111, Cr 0.6, HgbA1C 7.0, Hgb 9.0, Hct 28.8    Assessment/Plan:  Left foot osteomyelitis/cellulitis/PAD/venous insufficiency-s/p LLE revascularization with Dr. Stoddard on 9/18, s/p L partial 5th ray resection on 9/23 with Dr. Spears, s/p delayed closure with graft application by Dr. Huynh on 9/27, c/w local wound care, c/w Vit C, MVI, and LPS Critical Care BID to promote wound healing, s/p Augmentin (end date 10/9), c/w ASA and plavix, Tylenol prn for mild pain, Oxycodone prn for mod-severe pain, increase gabapentin to 200 mg BID for neuropathy, wound care team following, seen by vascular on 10/14, WBAT to LLE in surgical shoe, F/U with podiatry as needed, F/U with vascular   DVT of LLE-c/w eliquis, elevate LLE, monitor H&H  Physical deconditioning/weakness-c/w PT/OT  Insomnia/depression-increase trazodone to 50 mg Q PM, supportive care, monitor behaviors, consult Psych   Anemia-c/w MVI, monitor CBC  CAD/HTN/Hx MI/HLD/diastolic dysfunction/AVS-FAVIAN diet, c/w ASA, hydralazine, and lisinopril, monitor BP, monitor lipid panel, needs established with cardiology  DM2 with peripheral neuropathy-LCS diet, accuchecks, c/w lispro ISS with meals, BP and lipid control, yearly eye exam, diabetic foot care, diabetic diet education (RD to follow), monitor HgbA1C  Hypokalemia-c/w KCl, monitor K+ level  Right frontal lobe meningioma-followed by Dr. Sin ([facility]), seen on 8/4/23 and advised to F/U with neuro-oncology [facility] in 2 years with MRI [facility] for  surveillance  Hx stroke/LLE weakness/L hand tremor-followed by neurology   Constipation-c/w miralax, monitor BMs  OA/lumbar radiculopathy/thoracic radiculopathy-Tylenol prn, F/U with specialists after discharge    Orders:  Increase trazodone to 50 mg PO Q PM   Increase gabapentin to 200 mg PO BID   CBC w/ diff and BMP on 10/16     Code Status:   Full Code

## 2024-11-07 NOTE — PROGRESS NOTES
"Chief Complaint:   SNF F/U  -Left foot osteomyelitis  -Left foot cellulitis  -Left foot diabetic ulcer  -PVD  -Venous insufficiency  -DVT of LLE  -Physical deconditioning/weakness    HPI:   74 year-old male presenting to [facility] ER on 9/12/24 with \"months\" of LLE pain with edema, redness, and warmth. He was noted to have a large necrotic ulcer under his 5th toe. He reported that he had not seen a provider or taken any medications for about \"2 years\". Work-up in ER: Glu 310, Na+ 129, Alb 3.3, XR of L foot showed osteomyelitis of the 5th metatarsal head, US of LLE showed acute DVT in the left mid-distal femoral and popliteal veins. He was started on IV Heparin, IV Zosyn, and IV Vanco. He was admitted to the hospital for further evaluation and treatment. Hospital course:    Left foot osteomyelitis/cellulitis/DVT of LLE/PAD-IV ATB, IV heparin (transitioned to Eliquis), ID consult, podiatry consult, elevate LLE, local wound care, analgesics prn, patient transferred to [facility] for vascular consult on 9/14, underwent LLE revascularization with Dr. Stoddard on 9/18, recommending repeat BEN in 4 weeks with OP F/U, underwent L partial 5th ray resection on 9/23 with Dr. Spears, taken back to OR on 9/27 for delayed closure with graft application by Dr. 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 was HDS and discharged to [facility] on 10/2/24. Today, patient " denies dizziness, HA, SOB, cough, chest pain or palpitations, abdominal pain, N/V/D/C, or dysuria. He reports appetite at baseline. He continues to c/o insomnia, states that melatonin has been ineffective. He is now c/o R heel pain, states that Oxycodone has been ineffective for pain. He reports shooting/burning pain to his R heel. Staff report c/f depression, stating that patient does not want to get out of bed the last several days. No other clinical concerns noted at this time.     ROS:    As above in HPI. Otherwise, all other systems have been reviewed and are negative for complaint.    Medications reviewed and verified in NH chart.     Patient Active Problem List   Diagnosis    Wound infection    Osteomyelitis of left foot, unspecified type (Multi)    Acute deep vein thrombosis (DVT) of popliteal vein of left lower extremity (Multi)    Bilateral lower extremity edema    CAD (coronary artery disease)    Hypertension    Inflammatory neuropathy (Multi)    History of stroke    Weakness of left lower extremity    Ambulatory dysfunction    Chronic bilateral low back pain with left-sided sciatica    Diabetic peripheral neuropathy (Multi)    Neural foraminal stenosis of lumbosacral spine    Hyperlipidemia    Lumbar back pain with radiculopathy affecting left lower extremity    Peripheral arterial disease (CMS[facility])    Type 2 diabetes mellitus, without long-term current use of insulin (Multi)    Anemia    Obesity    Delayed surgical wound healing of foot amputation stump (Multi)    Impaired mobility and ADLs    Weakness    Thoracic radiculopathy    Osteoarthritis    Cellulitis of foot, left    Meningioma (Multi)    Tremor of left hand    Diabetic ulcer of left foot associated with type 2 diabetes mellitus, limited to breakdown of skin    Acute deep vein thrombosis (DVT) of femoral vein of left lower extremity (Multi)    Nonrheumatic aortic valve stenosis    Diastolic dysfunction        Past Medical History:   Diagnosis  Date    Myocardial infarction (Multi)     Neuritis     Sprain of right knee 06/25/2015    Stroke (Multi)     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      FOOT RAY RESECTION Left 09/23/2024    L partial 5th ray resection (Dr. Spears DPBRAYAN)    FOOT SURGERY Left 09/27/2024    Delayed closure w/ graft application (Dr. Huynh DPBRAYAN)    INVASIVE VASCULAR PROCEDURE Bilateral 09/18/2024    Procedure: Lower Extremity Angiogram;  Surgeon: Teofilo Stoddard MD;  Location: [facility] Cardiac Cath Lab;  Service: Cardiovascular;  Laterality: Bilateral;    IRIDOTOMY / IRIDECTOMY Bilateral        Family History   Problem Relation Name Age of Onset    Heart disease Father      Colon cancer Other      Heart attack Other      Diabetes Other      Hypertension Other         Social History     Tobacco Use   Smoking Status Former    Types: Cigarettes    Passive exposure: Never   Smokeless Tobacco Never       Social History     Substance and Sexual Activity   Alcohol Use Never       Social History     Substance and Sexual Activity   Drug Use Never       No Known Allergies     Vital Signs:   140/66-76-16-97.9-97% on RA    Physical Exam:  General: Sitting up in bed in NAD, alert   Head/Face: NCAT, symmetrical  Eyes: PERRLA, no injection, no discharge  ENT: Hearing not impaired, ears without scars or lesions, nasal mucosa and turbinates pink, septum midline, lips pink and moist  Neck: Supple, symmetrical  Respiratory: CTA but diminished without adventitious sounds, respirations even and nonlabored without use of accessory muscles, good air exchange  Cardio: RRR without murmur or gallops, normal S1S2, NP edema to BLE (L > R), pedal pulses 2+/4 bilaterally  Chest/Breast: Symmetrical  GI: BS x 4, normoactive, non-distended, abd round  and soft, no masses or tenderness  : No suprapubic tenderness or distention  MSK: Gait not assessed, joints with full ROM without pain or contractures, + generalized weakness  Skin: Skin warm and dry, no induration, surgical wounds to L 5th digit and L lateral foot, wound vac intact, vascular ulcer of L dorsal foot, SDTI to L heel, unstageable pressure ulcer of R heel, MASD of L buttock, skin tags to R buttocks (chronic per patient)   Neurologic: Cranial nerves II through XII intact, decreased sensation to BLE  Psychiatric: Alert to person, place, and time, calm and cooperative, expresses depression with current situation     Results/Data:   10/3/24: Glu 111, Cr 0.6, HgbA1C 7.0, Hgb 9.0, Hct 28.8    Assessment/Plan:  Left foot osteomyelitis/cellulitis/PAD/venous insufficiency-s/p LLE revascularization with Dr. Stoddard on 9/18, s/p L partial 5th ray resection on 9/23 with Dr. Spears, s/p delayed closure with graft application by Dr. Huynh on 9/27, c/w local wound care/wound vac placement, c/w Vit C, MVI, and LPS Critical Care BID to promote wound healing, c/w Augmentin (end date 10/9), c/w ASA and plavix, Tylenol prn for mild pain, Oxycodone prn for mod-severe pain, add gabapentin 100 mg BID for neuropathy, wound care team following, F/U with podiatry (10/14) and vascular   DVT of LLE-c/w eliquis, elevate LLE, monitor H&H  Physical deconditioning/weakness-c/w PT/OT  Insomnia/depression-D/C melatonin, start trazodone 25 mg Q HS, supportive care, monitor behaviors, consult Psych   Anemia-c/w MVI, monitor CBC  CAD/HTN/Hx MI/HLD/diastolic dysfunction/AVS-FAVIAN diet, c/w ASA, hydralazine, and lisinopril, monitor BP, monitor lipid panel, needs established with cardiology  DM2 with peripheral neuropathy-LCS diet, accuchecks, c/w lispro ISS with meals, BP and lipid control, yearly eye exam, diabetic foot care, diabetic diet education (RD to follow), monitor HgbA1C  Hypokalemia-c/w KCl, monitor K+ level  Right frontal lobe  meningioma-followed by Dr. Sin ([facility]), seen on 8/4/23 and advised to F/U with neuro-oncology [facility] in 2 years with MRI [facility] for surveillance  Hx stroke/LLE weakness/L hand tremor-followed by neurology   Constipation-c/w miralax, monitor BMs  OA/lumbar radiculopathy/thoracic radiculopathy-Tylenol prn, F/U with specialists after discharge    Orders:  D/C melatonin   Trazodone 25 mg PO Q HS for insomnia   Gabapentin 100 mg PO BID for neuropathy     Code Status:   Full Code

## 2024-11-07 NOTE — PROGRESS NOTES
"Chief Complaint:   SNF F/U  -Left foot osteomyelitis  -Left foot cellulitis  -Left foot diabetic ulcer  -PVD  -Venous insufficiency  -DVT of LLE  -Physical deconditioning/weakness    HPI:   74 year-old male presenting to [facility] ER on 9/12/24 with \"months\" of LLE pain with edema, redness, and warmth. He was noted to have a large necrotic ulcer under his 5th toe. He reported that he had not seen a provider or taken any medications for about \"2 years\". Work-up in ER: Glu 310, Na+ 129, Alb 3.3, XR of L foot showed osteomyelitis of the 5th metatarsal head, US of LLE showed acute DVT in the left mid-distal femoral and popliteal veins. He was started on IV Heparin, IV Zosyn, and IV Vanco. He was admitted to the hospital for further evaluation and treatment. Hospital course:    Left foot osteomyelitis/cellulitis/DVT of LLE/PAD-IV ATB, IV heparin (transitioned to Eliquis), ID consult, podiatry consult, elevate LLE, local wound care, analgesics prn, patient transferred to [facility] for vascular consult on 9/14, underwent LLE revascularization with Dr. Stoddard on 9/18, recommending repeat BEN in 4 weeks with OP F/U, underwent L partial 5th ray resection on 9/23 with Dr. Spears, taken back to OR on 9/27 for delayed closure with graft application by Dr. 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 was HDS and discharged to [facility] on 10/2/24. Today, patient " "denies dizziness, HA, SOB, cough, chest pain or palpitations, abdominal pain, N/V/D/C, or dysuria. He reports appetite at baseline. He reports that his LLE pain is \"okay\". He is c/o insomnia, which is chronic. He denies fevers or chills. Staff report no clinical concerns.     ROS:    As above in HPI. Otherwise, all other systems have been reviewed and are negative for complaint.    Medications reviewed and verified in NH chart.     Patient Active Problem List   Diagnosis    Wound infection    Osteomyelitis of left foot, unspecified type (Multi)    Acute deep vein thrombosis (DVT) of popliteal vein of left lower extremity (Multi)    Bilateral lower extremity edema    CAD (coronary artery disease)    Hypertension    Inflammatory neuropathy (Multi)    History of stroke    Weakness of left lower extremity    Ambulatory dysfunction    Chronic bilateral low back pain with left-sided sciatica    Diabetic peripheral neuropathy (Multi)    Neural foraminal stenosis of lumbosacral spine    Hyperlipidemia    Lumbar back pain with radiculopathy affecting left lower extremity    Peripheral arterial disease (CMS-[facility])    Type 2 diabetes mellitus, without long-term current use of insulin (Multi)    Anemia    Obesity    Delayed surgical wound healing of foot amputation stump (Multi)    Impaired mobility and ADLs    Weakness    Thoracic radiculopathy    Osteoarthritis    Cellulitis of foot, left    Meningioma (Multi)    Tremor of left hand    Diabetic ulcer of left foot associated with type 2 diabetes mellitus, limited to breakdown of skin    Acute deep vein thrombosis (DVT) of femoral vein of left lower extremity (Multi)    Nonrheumatic aortic valve stenosis    Diastolic dysfunction        Past Medical History:   Diagnosis Date    Myocardial infarction (Multi)     Neuritis     Sprain of right knee 06/25/2015    Stroke (Multi)     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)     TIA (transient ischemic attack)     Tinea pedis of both feet        Past " Surgical History:   Procedure Laterality Date    CARDIAC CATHETERIZATION      CARPAL TUNNEL RELEASE  10/10/2014    EYE SURGERY      [place]  ARTHROCENTESIS ASP INJ JOINT.      FOOT RAY RESECTION Left 09/23/2024    L partial 5th ray resection (Dr. Regan DPM)    FOOT SURGERY Left 09/27/2024    Delayed closure w/ graft application (Dr. Amina DPM)    INVASIVE VASCULAR PROCEDURE Bilateral 09/18/2024    Procedure: Lower Extremity Angiogram;  Surgeon: Teofilo Stoddard MD;  Location: [facility] Cardiac Cath Lab;  Service: Cardiovascular;  Laterality: Bilateral;    IRIDOTOMY / IRIDECTOMY Bilateral        Family History   Problem Relation Name Age of Onset    Heart disease Father      Colon cancer Other      Heart attack Other      Diabetes Other      Hypertension Other         Social History     Tobacco Use   Smoking Status Former    Types: Cigarettes    Passive exposure: Never   Smokeless Tobacco Never       Social History     Substance and Sexual Activity   Alcohol Use Never       Social History     Substance and Sexual Activity   Drug Use Never       No Known Allergies     Vital Signs:   154/87-84-18-97.9-98% on RA    Physical Exam:  General: Sitting up in WC in NAD, alert   Head/Face: NCAT, symmetrical  Eyes: PERRLA, no injection, no discharge  ENT: Hearing not impaired, ears without scars or lesions, nasal mucosa and turbinates pink, septum midline, lips pink and moist  Neck: Supple, symmetrical  Respiratory: CTA but diminished without adventitious sounds, respirations even and nonlabored without use of accessory muscles, good air exchange  Cardio: RRR without murmur or gallops, normal S1S2, NP edema to BLE (L > R), pedal pulses 2+/4 bilaterally  Chest/Breast: Symmetrical  GI: BS x 4, normoactive, non-distended, abd round and soft, no masses or tenderness  : No suprapubic tenderness or distention  MSK: Gait not assessed, joints with full ROM without pain or contractures, + generalized weakness  Skin: Skin warm and dry, no  induration, surgical wounds to L 5th digit and L lateral foot, wound vac intact, vascular ulcer of L dorsal foot, SDTI to L heel, unstageable pressure ulcer of R heel, MASD of L buttock, skin tags to R buttocks (chronic per patient)   Neurologic: Cranial nerves II through XII intact, decreased sensation to BLE  Psychiatric: Alert to person, place, and time, calm and cooperative     Results/Data:   10/3/24: Glu 111, Cr 0.6, HgbA1C 7.0, Hgb 9.0, Hct 28.8    Assessment/Plan:  Left foot osteomyelitis/cellulitis/PAD/venous insufficiency-s/p LLE revascularization with Dr. Stoddard on 9/18, s/p L partial 5th ray resection on 9/23 with Dr. Spears, s/p delayed closure with graft application by Dr. Huynh on 9/27, c/w local wound care/wound vac placement, c/w Vit C, MVI, and LPS Critical Care BID to promote wound healing, c/w Augmentin (end date 10/9), c/w ASA and plavix, Tylenol prn for mild pain, Oxycodone prn for mod-severe pain, wound care team following, F/U with podiatry (10/14) and vascular   DVT of LLE-c/w eliquis, elevate LLE, monitor H&H  Physical deconditioning/weakness-c/w PT/OT  Insomnia-increase melatonin to 6 mg Q PM  Anemia-c/w MVI, monitor CBC  CAD/HTN/Hx MI/HLD/diastolic dysfunction/AVS-FAVIAN diet, c/w ASA and hydralazine, add lisinopril 2.5 mg daily for BP, monitor BP, monitor lipid panel, needs established with cardiology  DM2 with peripheral neuropathy-LCS diet, accuchecks, c/w lispro ISS with meals, BP and lipid control, yearly eye exam, diabetic foot care, diabetic diet education (RD to follow), monitor HgbA1C  Hypokalemia-c/w KCl, monitor K+ level  Right frontal lobe meningioma-followed by Dr. Sin ([facility]), seen on 8/4/23 and advised to F/U with neuro-oncology [facility] in 2 years with MRI WWO for surveillance  Hx stroke/LLE weakness/L hand tremor-followed by neurology   Constipation-c/w miralax, monitor BMs  OA/lumbar radiculopathy/thoracic radiculopathy-Tylenol prn, F/U with specialists after  discharge    Orders:  Increase melatonin to 6 mg PO Q PM   Lisinopril 2.5 mg PO daily     Code Status:   Full Code    Time spent reviewing patient's chart on the unit (PMH, PSH, FH, Social Hx AND progress and/or consult notes, labs, and radiology results): 35 minutes  Time spent interviewing and/or examining the patient: 10 minutes  Time spent writing note on the unit: 5 minutes  Time spent reviewing POC w/ patient, family, and/or staff: 5 minutes  Total visit time: 55 minutes. Greater than 50% of time was spent on counseling and/or coordination of care of the patient. Start time: 11:21 PM, End time: 12:16 PM.

## 2024-11-08 DIAGNOSIS — L97.521 DIABETIC ULCER OF LEFT FOOT ASSOCIATED WITH TYPE 2 DIABETES MELLITUS, LIMITED TO BREAKDOWN OF SKIN, UNSPECIFIED PART OF FOOT: Primary | ICD-10-CM

## 2024-11-08 DIAGNOSIS — E11.621 DIABETIC ULCER OF LEFT FOOT ASSOCIATED WITH TYPE 2 DIABETES MELLITUS, LIMITED TO BREAKDOWN OF SKIN, UNSPECIFIED PART OF FOOT: Primary | ICD-10-CM

## 2024-11-08 RX ORDER — OXYCODONE HYDROCHLORIDE 5 MG/1
5 TABLET ORAL EVERY 6 HOURS PRN
Qty: 15 TABLET | Refills: 0 | Status: SHIPPED | OUTPATIENT
Start: 2024-11-08 | End: 2024-11-15

## 2024-11-08 NOTE — PROGRESS NOTES
"Chief Complaint:   SNF F/U  -Left foot osteomyelitis  -Left foot cellulitis  -Left foot diabetic ulcer  -PVD  -Venous insufficiency  -DVT of LLE  -Physical deconditioning/weakness    HPI:   74 year-old male presenting to [facility] ER on 9/12/24 with \"months\" of LLE pain with edema, redness, and warmth. He was noted to have a large necrotic ulcer under his 5th toe. He reported that he had not seen a provider or taken any medications for about \"2 years\". Work-up in ER: Glu 310, Na+ 129, Alb 3.3, XR of L foot showed osteomyelitis of the 5th metatarsal head, US of LLE showed acute DVT in the left mid-distal femoral and popliteal veins. He was started on IV Heparin, IV Zosyn, and IV Vanco. He was admitted to the hospital for further evaluation and treatment. Hospital course:    Left foot osteomyelitis/cellulitis/DVT of LLE/PAD-IV ATB, IV heparin (transitioned to Eliquis), ID consult, podiatry consult, elevate LLE, local wound care, analgesics prn, patient transferred to [facility] for vascular consult on 9/14, underwent LLE revascularization with Dr. Stoddard on 9/18, recommending repeat BEN in 4 weeks with OP F/U, underwent L partial 5th ray resection on 9/23 with Dr. Spears, taken back to OR on 9/27 for delayed closure with graft application by Dr. 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 was HDS and discharged to [facility] on 10/2/24. Today, patient " denies dizziness, HA, SOB, cough, chest pain or palpitations, abdominal pain, N/V/D/C, or dysuria. He reports appetite at baseline. He continues to c/o insomnia, despite medication adjustments. He was seen by DPM on 10/14 and wound vac was removed. Pt. is now WBAT to LLE in surgical shoe. Pt. is c/o increased LLE weakness, stating that he is unable to lift it. He denies any worsening pain or edema. Staff report no other clinical concerns.     ROS:    As above in HPI. Otherwise, all other systems have been reviewed and are negative for complaint.    Medications reviewed and verified in NH chart.     Patient Active Problem List   Diagnosis    Wound infection    Osteomyelitis of left foot, unspecified type (Multi)    Acute deep vein thrombosis (DVT) of popliteal vein of left lower extremity (Multi)    Bilateral lower extremity edema    CAD (coronary artery disease)    Hypertension    Inflammatory neuropathy (Multi)    History of stroke    Weakness of left lower extremity    Ambulatory dysfunction    Chronic bilateral low back pain with left-sided sciatica    Diabetic peripheral neuropathy (Multi)    Neural foraminal stenosis of lumbosacral spine    Hyperlipidemia    Lumbar back pain with radiculopathy affecting left lower extremity    Peripheral arterial disease (CMS-[facility])    Type 2 diabetes mellitus, without long-term current use of insulin (Multi)    Anemia    Obesity    Delayed surgical wound healing of foot amputation stump (Multi)    Impaired mobility and ADLs    Weakness    Thoracic radiculopathy    Osteoarthritis    Cellulitis of foot, left    Meningioma (Multi)    Tremor of left hand    Diabetic ulcer of left foot associated with type 2 diabetes mellitus, limited to breakdown of skin    Acute deep vein thrombosis (DVT) of femoral vein of left lower extremity (Multi)    Nonrheumatic aortic valve stenosis    Diastolic dysfunction        Past Medical History:   Diagnosis Date    Myocardial infarction (Multi)      Neuritis     Sprain of right knee 06/25/2015    Stroke (Multi)     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      FOOT RAY RESECTION Left 09/23/2024    L partial 5th ray resection (Dr. Spears, DPM)    FOOT SURGERY Left 09/27/2024    Delayed closure w/ graft application (Dr. Huynh, DPM)    INVASIVE VASCULAR PROCEDURE Bilateral 09/18/2024    Procedure: Lower Extremity Angiogram;  Surgeon: Teofilo Stoddard MD;  Location: [facility] Cardiac Cath Lab;  Service: Cardiovascular;  Laterality: Bilateral;    IRIDOTOMY / IRIDECTOMY Bilateral        Family History   Problem Relation Name Age of Onset    Heart disease Father      Colon cancer Other      Heart attack Other      Diabetes Other      Hypertension Other         Social History     Tobacco Use   Smoking Status Former    Types: Cigarettes    Passive exposure: Never   Smokeless Tobacco Never       Social History     Substance and Sexual Activity   Alcohol Use Never       Social History     Substance and Sexual Activity   Drug Use Never       No Known Allergies     Vital Signs:   125/74-74-18-97.2-99% on RA    Physical Exam:  General: Sitting up in bed in NAD, alert   Head/Face: NCAT, symmetrical  Eyes: PERRLA, no injection, no discharge  ENT: Hearing not impaired, ears without scars or lesions, nasal mucosa and turbinates pink, septum midline, lips pink and moist  Neck: Supple, symmetrical  Respiratory: CTA but diminished without adventitious sounds, respirations even and nonlabored without use of accessory muscles, good air exchange  Cardio: RRR without murmur or gallops, normal S1S2, NP edema to BLE (L > R), pedal pulses 2+/4 bilaterally  Chest/Breast: Symmetrical  GI: BS x 4, normoactive, non-distended, abd round and soft, no masses or tenderness  : No  suprapubic tenderness or distention  MSK: Gait not assessed, joints with full ROM without pain or contractures, + generalized weakness  Skin: Skin warm and dry, no induration, surgical wounds to L 5th digit and L lateral foot, DSG D&I, vascular ulcer of L dorsal foot, SDTI to L heel, unstageable pressure ulcer of R heel, skin tags to R buttocks (chronic per patient)   Neurologic: Cranial nerves II through XII intact, decreased sensation to BLE  Psychiatric: Alert to person, place, and time, calm and cooperative, expresses depression with current situation     Results/Data:   10/16/24: Glu 135, Cr 0.6, Hgb 8.5, Hct 26.7  10/3/24: Glu 111, Cr 0.6, HgbA1C 7.0, Hgb 9.0, Hct 28.8    Assessment/Plan:  Left foot osteomyelitis/cellulitis/PAD/venous insufficiency-s/p LLE revascularization with Dr. Stoddard on 9/18, s/p L partial 5th ray resection on 9/23 with Dr. Spears, s/p delayed closure with graft application by Dr. Huynh on 9/27, c/w local wound care, c/w Vit C, MVI, and LPS Critical Care BID to promote wound healing, s/p Augmentin (end date 10/9), c/w ASA and plavix, increase Tylenol to 975 mg TID, c/w Oxycodone prn for mod-severe pain, increase gabapentin to 200 mg TID for neuropathy, wound care team following, seen by vascular on 10/14, WBAT to LLE in surgical shoe, F/U with podiatry as needed, F/U with vascular   DVT of LLE-c/w eliquis, elevate LLE, monitor H&H  Physical deconditioning/weakness-c/w PT/OT  Insomnia/depression-c/w trazodone 50 mg Q HS, add melatonin 6 mg Q HS, supportive care, monitor behaviors, Psych consulted  Anemia-c/w MVI, monitor CBC, check iron panel, folate, and Vit B12 level on 10/23  CAD/HTN/Hx MI/HLD/diastolic dysfunction/AVS-FAVIAN diet, c/w ASA, hydralazine, and lisinopril, monitor BP, monitor lipid panel, needs established with cardiology  DM2 with peripheral neuropathy-LCS diet, accuchecks, c/w lispro ISS with meals, BP and lipid control, yearly eye exam, diabetic foot care, diabetic diet  education (RD to follow), monitor HgbA1C  Hypokalemia-c/w KCl, monitor K+ level  Right frontal lobe meningioma-followed by Dr. Sin ([facility]), seen on 8/4/23 and advised to F/U with neuro-oncology [facility] in 2 years with MRI WWO for surveillance  Hx stroke/LLE weakness/L hand tremor-followed by neurology   Constipation-c/w miralax, monitor BMs  OA/lumbar radiculopathy/thoracic radiculopathy-Tylenol prn, F/U with specialists after discharge    Orders:  Melatonin 6 mg PO Q HS for insomnia   Increase Tylenol to 975 mg PO TID   Increase gabapentin to 200 mg PO TID     Code Status:   Full Code

## 2024-11-09 NOTE — PROGRESS NOTES
"Chief Complaint:   SNF F/U  -Left foot osteomyelitis  -Left foot cellulitis  -Left foot diabetic ulcer  -PVD  -Venous insufficiency  -DVT of LLE  -Physical deconditioning/weakness    HPI:   74 year-old male presenting to [facility] ER on 9/12/24 with \"months\" of LLE pain with edema, redness, and warmth. He was noted to have a large necrotic ulcer under his 5th toe. He reported that he had not seen a provider or taken any medications for about \"2 years\". Work-up in ER: Glu 310, Na+ 129, Alb 3.3, XR of L foot showed osteomyelitis of the 5th metatarsal head, US of LLE showed acute DVT in the left mid-distal femoral and popliteal veins. He was started on IV Heparin, IV Zosyn, and IV Vanco. He was admitted to the hospital for further evaluation and treatment. Hospital course:    Left foot osteomyelitis/cellulitis/DVT of LLE/PAD-IV ATB, IV heparin (transitioned to Eliquis), ID consult, podiatry consult, elevate LLE, local wound care, analgesics prn, patient transferred to [facility] for vascular consult on 9/14, underwent LLE revascularization with Dr. Stoddard on 9/18, recommending repeat BEN in 4 weeks with OP F/U, underwent L partial 5th ray resection on 9/23 with Dr. Spears, taken back to OR on 9/27 for delayed closure with graft application by Dr. 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 was HDS and discharged to [facility] on 10/2/24. Today, patient " denies dizziness, HA, SOB, cough, chest pain or palpitations, abdominal pain, N/V/D/C, or dysuria. He reports appetite at baseline. Pt. was started on Tizanidine prn for pain, which patient reports is helping, but makes him drowsy. XR of b/l calcaneus was ordered by wound care team on 10/28 and was negative for acute findings. Staff report concerns that wounds are looking worse/not improving. Pt. denies any worsening pain. He denies fevers or chills. Staff report no clinical concerns.     ROS:    As above in HPI. Otherwise, all other systems have been reviewed and are negative for complaint.    Medications reviewed and verified in NH chart.     Patient Active Problem List   Diagnosis    Wound infection    Osteomyelitis of left foot, unspecified type (Multi)    Acute deep vein thrombosis (DVT) of popliteal vein of left lower extremity (Multi)    Bilateral lower extremity edema    CAD (coronary artery disease)    Hypertension    Inflammatory neuropathy (Multi)    History of stroke    Weakness of left lower extremity    Ambulatory dysfunction    Chronic bilateral low back pain with left-sided sciatica    Diabetic peripheral neuropathy (Multi)    Neural foraminal stenosis of lumbosacral spine    Hyperlipidemia    Lumbar back pain with radiculopathy affecting left lower extremity    Peripheral arterial disease (CMS-HCC)    Type 2 diabetes mellitus, without long-term current use of insulin (Multi)    Anemia    Obesity    Delayed surgical wound healing of foot amputation stump (Multi)    Impaired mobility and ADLs    Weakness    Thoracic radiculopathy    Osteoarthritis    Cellulitis of foot, left    Meningioma (Multi)    Tremor of left hand    Diabetic ulcer of left foot associated with type 2 diabetes mellitus, limited to breakdown of skin    Acute deep vein thrombosis (DVT) of femoral vein of left lower extremity (Multi)    Nonrheumatic aortic valve stenosis    Diastolic dysfunction        Past Medical History:    Diagnosis Date    Myocardial infarction (Multi)     Neuritis     Sprain of right knee 06/25/2015    Stroke (Multi)     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      FOOT RAY RESECTION Left 09/23/2024    L partial 5th ray resection (Dr. Spears DPM)    FOOT SURGERY Left 09/27/2024    Delayed closure w/ graft application (Dr. Huynh, DPBRAYAN)    INVASIVE VASCULAR PROCEDURE Bilateral 09/18/2024    Procedure: Lower Extremity Angiogram;  Surgeon: Teofilo Stoddard MD;  Location: [facility] Cardiac Cath Lab;  Service: Cardiovascular;  Laterality: Bilateral;    IRIDOTOMY / IRIDECTOMY Bilateral        Family History   Problem Relation Name Age of Onset    Heart disease Father      Colon cancer Other      Heart attack Other      Diabetes Other      Hypertension Other         Social History     Tobacco Use   Smoking Status Former    Types: Cigarettes    Passive exposure: Never   Smokeless Tobacco Never       Social History     Substance and Sexual Activity   Alcohol Use Never       Social History     Substance and Sexual Activity   Drug Use Never       No Known Allergies     Vital Signs:   117/66-74-18-97.4-98% on RA    Physical Exam:  General: Sitting up in bed in NAD, alert   Head/Face: NCAT, symmetrical  Eyes: PERRLA, no injection, no discharge  ENT: Hearing not impaired, ears without scars or lesions, nasal mucosa and turbinates pink, septum midline, lips pink and moist  Neck: Supple, symmetrical  Respiratory: CTA but diminished without adventitious sounds, respirations even and nonlabored without use of accessory muscles, good air exchange  Cardio: RRR without murmur or gallops, normal S1S2, NP edema to BLE (L > R), pedal pulses 2+/4 bilaterally  Chest/Breast: Symmetrical  GI: BS x 4, normoactive, non-distended,  abd round and soft, no masses or tenderness  : No suprapubic tenderness or distention  MSK: Gait not assessed, joints with full ROM without pain or contractures, + generalized weakness  Skin: Skin warm and dry, no induration, DTI of R heel, stage III pressure ulcer of L heel, vascular ulcer of L anterior lower leg, surgical wound of L lateral foot, skin tags to R buttocks (chronic per patient)   Neurologic: Cranial nerves II through XII intact, decreased sensation to BLE  Psychiatric: Alert to person, place, and time, calm and cooperative, expresses depression with current situation     Results/Data:   10/23/24: Yle291, Cr 0.6, Hgb 9.0, Hct 29.1, Iron 44, TIBC 261, Ferritin 21.1, Transferrin 193, Folate 4.4, Vit B12 358  10/16/24: Glu 135, Cr 0.6, Hgb 8.5, Hct 26.7  10/3/24: Glu 111, Cr 0.6, HgbA1C 7.0, Hgb 9.0, Hct 28.8    Assessment/Plan:  Left foot osteomyelitis/cellulitis/PAD/venous insufficiency-s/p LLE revascularization with Dr. Stoddard on 9/18, s/p L partial 5th ray resection on 9/23 with Dr. Spears, s/p delayed closure with graft application by Dr. Huynh on 9/27, c/w local wound care, c/w Vit C, MVI, and LPS Critical Care BID to promote wound healing, s/p Augmentin (end date 10/9), c/w ASA and plavix, c/w Tylenol 975 mg TID and gabapentin 200 mg TID, c/w Oxycodone prn for mod-severe pain, tizanidine prn for muscle spasms, wound care team following, seen by vascular on 10/14, WBAT to LLE in surgical shoe, F/U with podiatry as needed, F/U with vascular, c/f worsening wound appearance, XR negative for acute findings, check CRP and Sed Rate on 10/30, consult Dr. Velazco (ID) for evaluation   DVT of LLE-c/w eliquis, elevate LLE, monitor H&H  Physical deconditioning/weakness-c/w PT/OT  Insomnia/depression-c/w trazodone 50 mg Q HS and melatonin 6 mg Q HS, supportive care, monitor behaviors, Psych consulted  Anemia-c/w MVI, iron, and Vit B12, monitor CBC, iron panel, and Vit B12 level   CAD/HTN/Hx MI/HLD/diastolic  dysfunction/AVS-FAVIAN diet, c/w ASA, hydralazine, and lisinopril, monitor BP, monitor lipid panel, needs established with cardiology after discharge  DM2 with peripheral neuropathy-LCS diet, accuchecks, c/w lispro ISS with meals, BP and lipid control, yearly eye exam, diabetic foot care, diabetic diet education (RD following), monitor HgbA1C  Hypokalemia-c/w KCl, monitor K+ level  Right frontal lobe meningioma-followed by Dr. Sin (CC), seen on 8/4/23 and advised to F/U with neuro-oncology VITO in 2 years with MRI WWO for surveillance  Hx stroke/LLE weakness/L hand tremor-followed by neurology   Constipation-c/w miralax, monitor BMs  OA/lumbar radiculopathy/thoracic radiculopathy-Tylenol prn, F/U with specialists after discharge    Orders:  Consult Dr. Velazco for foot wounds   CRP and Sed Rate on 10/30     Code Status:   Full Code

## 2024-11-10 ENCOUNTER — NURSING HOME VISIT (OUTPATIENT)
Dept: POST ACUTE CARE | Facility: EXTERNAL LOCATION | Age: 74
End: 2024-11-10
Payer: MEDICARE

## 2024-11-10 DIAGNOSIS — R53.1 WEAKNESS: ICD-10-CM

## 2024-11-10 DIAGNOSIS — I21.9 ACUTE MYOCARDIAL INFARCTION, UNSPECIFIED MI TYPE, UNSPECIFIED ARTERY (MULTI): ICD-10-CM

## 2024-11-10 DIAGNOSIS — I73.9 PVD (PERIPHERAL VASCULAR DISEASE) (CMS-HCC): ICD-10-CM

## 2024-11-10 DIAGNOSIS — I10 HYPERTENSION, UNSPECIFIED TYPE: ICD-10-CM

## 2024-11-10 DIAGNOSIS — M86.9 OSTEOMYELITIS, UNSPECIFIED SITE, UNSPECIFIED TYPE (MULTI): ICD-10-CM

## 2024-11-10 DIAGNOSIS — I82.499 DEEP VEIN THROMBOSIS (DVT) OF OTHER VEIN OF LOWER EXTREMITY, UNSPECIFIED CHRONICITY, UNSPECIFIED LATERALITY (MULTI): ICD-10-CM

## 2024-11-10 DIAGNOSIS — E11.9 TYPE 2 DIABETES MELLITUS WITHOUT COMPLICATION, UNSPECIFIED WHETHER LONG TERM INSULIN USE (MULTI): Primary | ICD-10-CM

## 2024-11-10 DIAGNOSIS — M19.90 OSTEOARTHRITIS, UNSPECIFIED OSTEOARTHRITIS TYPE, UNSPECIFIED SITE: ICD-10-CM

## 2024-11-10 DIAGNOSIS — G62.9 NEUROPATHY: ICD-10-CM

## 2024-11-10 DIAGNOSIS — Z91.81 AT RISK FOR FALLING: ICD-10-CM

## 2024-11-10 DIAGNOSIS — G50.0 TRIGEMINAL NEURALGIA: ICD-10-CM

## 2024-11-10 PROCEDURE — 99309 SBSQ NF CARE MODERATE MDM 30: CPT | Performed by: INTERNAL MEDICINE

## 2024-11-10 NOTE — LETTER
Patient: Elliot Partida  : 1950    Encounter Date: 11/10/2024    PLACE OF SERVICE: [facility] [place]    This is a subsequent visit.    Subjective  Patient ID: Elliot Partida is a 74 y.o. male who presents for Follow-up.    Mr. Elliot Partida is a 74-year-old male with history of diabetes with osteomyelitis to his left foot and ankle.  He was recently diagnosed with trigeminal neuralgia.  He is unable to care for himself and requires supportive care.    Review of Systems   Constitutional:  Negative for chills and fever.   Cardiovascular:  Negative for chest pain.   All other systems reviewed and are negative.    Objective  /80   Pulse 76   Temp 36.6 °C (97.9 °F)   Resp 16     Physical Exam  Vitals reviewed.   Constitutional:       General: He is not in acute distress.     Comments: This is a well-developed and well-nourished male, sitting in a chair   HENT:      Right Ear: Tympanic membrane, ear canal and external ear normal.      Left Ear: Tympanic membrane, ear canal and external ear normal.   Eyes:      General: No scleral icterus.     Pupils: Pupils are equal, round, and reactive to light.   Neck:      Vascular: No carotid bruit.   Cardiovascular:      Heart sounds: Normal heart sounds, S1 normal and S2 normal. No murmur heard.     No friction rub.   Pulmonary:      Effort: Pulmonary effort is normal.      Breath sounds: Normal breath sounds and air entry.   Abdominal:      Palpations: There is no hepatomegaly, splenomegaly or mass.   Musculoskeletal:         General: No swelling or deformity. Normal range of motion.      Cervical back: Neck supple.      Right lower leg: No edema.      Left lower leg: No edema.   Lymphadenopathy:      Cervical: No cervical adenopathy.      Upper Body:      Right upper body: No axillary adenopathy.      Left upper body: No axillary adenopathy.      Lower Body: No right inguinal adenopathy. No left inguinal adenopathy.    Neurological:      Mental Status: He is oriented to person, place, and time.      Cranial Nerves: Cranial nerves 2-12 are intact. No cranial nerve deficit.      Sensory: No sensory deficit.      Motor: Motor function is intact. No weakness.      Gait: Gait is intact.      Deep Tendon Reflexes: Reflexes normal.   Psychiatric:         Mood and Affect: Mood normal. Mood is not anxious or depressed. Affect is not angry.         Behavior: Behavior is not agitated.         Thought Content: Thought content normal.         Judgment: Judgment normal.     LAB WORK: Laboratory studies were reviewed.    Assessment/Plan  Problem List Items Addressed This Visit             ICD-10-CM       Cardiac and Vasculature    Hypertension I10       Musculoskeletal and Injuries    Osteoarthritis M19.90       Symptoms and Signs    Weakness R53.1     Other Visit Diagnoses         Codes    Type 2 diabetes mellitus without complication, unspecified whether long term insulin use (Multi)    -  Primary E11.9    Osteomyelitis, unspecified site, unspecified type (Multi)     M86.9    Trigeminal neuralgia     G50.0    Deep vein thrombosis (DVT) of other vein of lower extremity, unspecified chronicity, unspecified laterality (Multi)     I82.499    Acute myocardial infarction, unspecified MI type, unspecified artery (Multi)     I21.9    Neuropathy     G62.9    PVD (peripheral vascular disease) (CMS-[facility])     I73.9    At risk for falling     Z91.81        1. Diabetes, on insulin.  2. Osteomyelitis, on wound care.  Follow up with Podiatry.  3. Trigeminal neuralgia, on medication.  4. Hypertension, med controlled.  5. DVT, anticoagulated.  6. AMI, on aspirin.  7. Neuropathy, on gabapentin.  8. PVD, on medication.  9. Weakness, on PT/OT.  10. Fall risk, fall precaution.  11. Osteoarthritis, on Tylenol.    Scribe Attestation  By signing my name below, IHanna, Scribcecilia attest that this documentation has been prepared under the direction and in the  presence of Marium Singh MD.     All medical record entries made by the scribe were personally dictated by me I have reviewed the chart and agree the record accurately reflects my personal performance of his history physical examination and management      Electronically Signed By: Marium Singh MD   11/14/24  4:22 PM

## 2024-11-11 VITALS
HEART RATE: 76 BPM | RESPIRATION RATE: 16 BRPM | TEMPERATURE: 97.9 F | DIASTOLIC BLOOD PRESSURE: 80 MMHG | SYSTOLIC BLOOD PRESSURE: 124 MMHG

## 2024-11-11 ASSESSMENT — ENCOUNTER SYMPTOMS
CHILLS: 0
FEVER: 0

## 2024-11-11 NOTE — PROGRESS NOTES
PLACE OF SERVICE: [facility] [place]    This is a subsequent visit.    Subjective   Patient ID: Elliot Partida is a 74 y.o. male who presents for Follow-up.    Mr. Elliot Partida is a 74-year-old male with history of diabetes with osteomyelitis to his left foot and ankle.  He was recently diagnosed with trigeminal neuralgia.  He is unable to care for himself and requires supportive care.    Review of Systems   Constitutional:  Negative for chills and fever.   Cardiovascular:  Negative for chest pain.   All other systems reviewed and are negative.    Objective   /80   Pulse 76   Temp 36.6 °C (97.9 °F)   Resp 16     Physical Exam  Vitals reviewed.   Constitutional:       General: He is not in acute distress.     Comments: This is a well-developed and well-nourished male, sitting in a chair   HENT:      Right Ear: Tympanic membrane, ear canal and external ear normal.      Left Ear: Tympanic membrane, ear canal and external ear normal.   Eyes:      General: No scleral icterus.     Pupils: Pupils are equal, round, and reactive to light.   Neck:      Vascular: No carotid bruit.   Cardiovascular:      Heart sounds: Normal heart sounds, S1 normal and S2 normal. No murmur heard.     No friction rub.   Pulmonary:      Effort: Pulmonary effort is normal.      Breath sounds: Normal breath sounds and air entry.   Abdominal:      Palpations: There is no hepatomegaly, splenomegaly or mass.   Musculoskeletal:         General: No swelling or deformity. Normal range of motion.      Cervical back: Neck supple.      Right lower leg: No edema.      Left lower leg: No edema.   Lymphadenopathy:      Cervical: No cervical adenopathy.      Upper Body:      Right upper body: No axillary adenopathy.      Left upper body: No axillary adenopathy.      Lower Body: No right inguinal adenopathy. No left inguinal adenopathy.   Neurological:      Mental Status: He is oriented to person, place, and time.       Cranial Nerves: Cranial nerves 2-12 are intact. No cranial nerve deficit.      Sensory: No sensory deficit.      Motor: Motor function is intact. No weakness.      Gait: Gait is intact.      Deep Tendon Reflexes: Reflexes normal.   Psychiatric:         Mood and Affect: Mood normal. Mood is not anxious or depressed. Affect is not angry.         Behavior: Behavior is not agitated.         Thought Content: Thought content normal.         Judgment: Judgment normal.     LAB WORK: Laboratory studies were reviewed.    Assessment/Plan   Problem List Items Addressed This Visit             ICD-10-CM       Cardiac and Vasculature    Hypertension I10       Musculoskeletal and Injuries    Osteoarthritis M19.90       Symptoms and Signs    Weakness R53.1     Other Visit Diagnoses         Codes    Type 2 diabetes mellitus without complication, unspecified whether long term insulin use (Multi)    -  Primary E11.9    Osteomyelitis, unspecified site, unspecified type (Multi)     M86.9    Trigeminal neuralgia     G50.0    Deep vein thrombosis (DVT) of other vein of lower extremity, unspecified chronicity, unspecified laterality (Multi)     I82.499    Acute myocardial infarction, unspecified MI type, unspecified artery (Multi)     I21.9    Neuropathy     G62.9    PVD (peripheral vascular disease) (CMS-[facility])     I73.9    At risk for falling     Z91.81        1. Diabetes, on insulin.  2. Osteomyelitis, on wound care.  Follow up with Podiatry.  3. Trigeminal neuralgia, on medication.  4. Hypertension, med controlled.  5. DVT, anticoagulated.  6. AMI, on aspirin.  7. Neuropathy, on gabapentin.  8. PVD, on medication.  9. Weakness, on PT/OT.  10. Fall risk, fall precaution.  11. Osteoarthritis, on Tylenol.    Scribe Attestation  By signing my name below, IHanna Scribe attest that this documentation has been prepared under the direction and in the presence of Marium Singh MD.     All medical record entries made by the ricarda  were personally dictated by me I have reviewed the chart and agree the record accurately reflects my personal performance of his history physical examination and management

## 2024-11-12 ENCOUNTER — NURSING HOME VISIT (OUTPATIENT)
Dept: POST ACUTE CARE | Facility: EXTERNAL LOCATION | Age: 74
End: 2024-11-12
Payer: MEDICARE

## 2024-11-12 VITALS
TEMPERATURE: 97.9 F | RESPIRATION RATE: 16 BRPM | DIASTOLIC BLOOD PRESSURE: 80 MMHG | SYSTOLIC BLOOD PRESSURE: 124 MMHG | HEART RATE: 80 BPM

## 2024-11-12 DIAGNOSIS — R53.1 WEAKNESS: ICD-10-CM

## 2024-11-12 DIAGNOSIS — E11.9 TYPE 2 DIABETES MELLITUS WITHOUT COMPLICATION, UNSPECIFIED WHETHER LONG TERM INSULIN USE (MULTI): ICD-10-CM

## 2024-11-12 DIAGNOSIS — I25.10 CORONARY ARTERY DISEASE INVOLVING NATIVE CORONARY ARTERY OF NATIVE HEART WITHOUT ANGINA PECTORIS: ICD-10-CM

## 2024-11-12 DIAGNOSIS — R53.81 PHYSICAL DECONDITIONING: ICD-10-CM

## 2024-11-12 DIAGNOSIS — I73.9 PVD (PERIPHERAL VASCULAR DISEASE) (CMS-HCC): ICD-10-CM

## 2024-11-12 DIAGNOSIS — K59.00 CONSTIPATION, UNSPECIFIED CONSTIPATION TYPE: ICD-10-CM

## 2024-11-12 DIAGNOSIS — G50.0 TRIGEMINAL NEURALGIA: Primary | ICD-10-CM

## 2024-11-12 DIAGNOSIS — L03.116 CELLULITIS OF FOOT, LEFT: ICD-10-CM

## 2024-11-12 DIAGNOSIS — I73.9 PERIPHERAL ARTERIAL DISEASE (CMS-HCC): Primary | ICD-10-CM

## 2024-11-12 DIAGNOSIS — M86.9 OSTEOMYELITIS OF LEFT FOOT, UNSPECIFIED TYPE (MULTI): ICD-10-CM

## 2024-11-12 DIAGNOSIS — G47.00 INSOMNIA, UNSPECIFIED TYPE: ICD-10-CM

## 2024-11-12 PROCEDURE — 99309 SBSQ NF CARE MODERATE MDM 30: CPT | Performed by: NURSE PRACTITIONER

## 2024-11-12 ASSESSMENT — ENCOUNTER SYMPTOMS
FEVER: 0
CHILLS: 0

## 2024-11-12 NOTE — PROGRESS NOTES
PLACE OF SERVICE:  [facility] [place]    This is a subsequent visit.    Subjective   Patient ID: Elliot Partida is a 74 y.o. male who presents for Follow-up.    Mr. Elliot Partida is a 74-year-old male with history of diabetes with osteomyelitis to his left foot and ankle.  He suffers from DVT as well as PVD.  He is unable to care for himself and requires supportive care.    Review of Systems   Constitutional:  Negative for chills and fever.   Cardiovascular:  Negative for chest pain.   All other systems reviewed and are negative.    Objective   /80   Pulse 80   Temp 36.6 °C (97.9 °F)   Resp 16     Physical Exam  Vitals reviewed.   Constitutional:       General: He is not in acute distress.     Comments: This is a well-developed and well-nourished male, sitting in a chair.   HENT:      Right Ear: Tympanic membrane, ear canal and external ear normal.      Left Ear: Tympanic membrane, ear canal and external ear normal.   Eyes:      General: No scleral icterus.     Pupils: Pupils are equal, round, and reactive to light.   Neck:      Vascular: No carotid bruit.   Cardiovascular:      Heart sounds: Normal heart sounds, S1 normal and S2 normal. No murmur heard.     No friction rub.   Pulmonary:      Effort: Pulmonary effort is normal.      Breath sounds: Normal breath sounds and air entry.   Abdominal:      Palpations: There is no hepatomegaly, splenomegaly or mass.   Musculoskeletal:         General: No swelling or deformity. Normal range of motion.      Cervical back: Neck supple.      Right lower leg: No edema.      Left lower leg: No edema.      Comments: There is dressing applied to his left foot and ankle.   Lymphadenopathy:      Cervical: No cervical adenopathy.      Upper Body:      Right upper body: No axillary adenopathy.      Left upper body: No axillary adenopathy.      Lower Body: No right inguinal adenopathy. No left inguinal adenopathy.   Neurological:       Mental Status: He is oriented to person, place, and time.      Cranial Nerves: Cranial nerves 2-12 are intact. No cranial nerve deficit.      Sensory: No sensory deficit.      Motor: Motor function is intact. No weakness.      Gait: Gait is intact.      Deep Tendon Reflexes: Reflexes normal.   Psychiatric:         Mood and Affect: Mood normal. Mood is not anxious or depressed. Affect is not angry.         Behavior: Behavior is not agitated.         Thought Content: Thought content normal.         Judgment: Judgment normal.     LAB WORK: Laboratory studies were reviewed.    Assessment/Plan   Problem List Items Addressed This Visit             ICD-10-CM       Cardiac and Vasculature    Hypertension I10       Musculoskeletal and Injuries    Osteoarthritis M19.90       Symptoms and Signs    Weakness R53.1     Other Visit Diagnoses         Codes    Type 2 diabetes mellitus without complication, unspecified whether long term insulin use (Multi)    -  Primary E11.9    Deep vein thrombosis (DVT) of other vein of lower extremity, unspecified chronicity, unspecified laterality (Multi)     I82.499    Osteomyelitis, unspecified site, unspecified type (Multi)     M86.9    PVD (peripheral vascular disease) (CMS-[facility])     I73.9    Acute myocardial infarction, unspecified MI type, unspecified artery (Multi)     I21.9    Neuropathy     G62.9    At risk for falling     Z91.81        1. Diabetes, on insulin.   2. DVT, anticoagulated.  3. Osteomyelitis, continue wound care, follow with Podiatry.  4. Hypertension, med controlled.  5. PVD, on medication.  6. Osteoarthritis, on Tylenol.  7. History of AMI, on aspirin.  8. Neuropathy, on gabapentin.  9. Weakness, on PT/OT.  10. Fall risk, on fall precaution.    Scribe Attestation  By signing my name below, IHanna Scribe attest that this documentation has been prepared under the direction and in the presence of Marium Singh MD.     All medical record entries made by the ricarda  were personally dictated by me I have reviewed the chart and agree the record accurately reflects my personal performance of his history physical examination and management

## 2024-11-16 ENCOUNTER — NURSING HOME VISIT (OUTPATIENT)
Dept: POST ACUTE CARE | Facility: EXTERNAL LOCATION | Age: 74
End: 2024-11-16
Payer: MEDICARE

## 2024-11-16 DIAGNOSIS — Z91.81 AT RISK FOR FALLING: ICD-10-CM

## 2024-11-16 DIAGNOSIS — I21.9 ACUTE MYOCARDIAL INFARCTION, UNSPECIFIED MI TYPE, UNSPECIFIED ARTERY (MULTI): ICD-10-CM

## 2024-11-16 DIAGNOSIS — G62.9 NEUROPATHY: ICD-10-CM

## 2024-11-16 DIAGNOSIS — M19.90 OSTEOARTHRITIS, UNSPECIFIED OSTEOARTHRITIS TYPE, UNSPECIFIED SITE: ICD-10-CM

## 2024-11-16 DIAGNOSIS — M86.9 OSTEOMYELITIS, UNSPECIFIED SITE, UNSPECIFIED TYPE (MULTI): Primary | ICD-10-CM

## 2024-11-16 DIAGNOSIS — E11.9 TYPE 2 DIABETES MELLITUS WITHOUT COMPLICATION, UNSPECIFIED WHETHER LONG TERM INSULIN USE (MULTI): ICD-10-CM

## 2024-11-16 DIAGNOSIS — G50.0 TRIGEMINAL NEURALGIA: ICD-10-CM

## 2024-11-16 DIAGNOSIS — I10 HYPERTENSION, UNSPECIFIED TYPE: ICD-10-CM

## 2024-11-16 DIAGNOSIS — R53.1 WEAKNESS: ICD-10-CM

## 2024-11-16 DIAGNOSIS — I73.9 PVD (PERIPHERAL VASCULAR DISEASE) (CMS-HCC): ICD-10-CM

## 2024-11-16 DIAGNOSIS — I82.499 DEEP VEIN THROMBOSIS (DVT) OF OTHER VEIN OF LOWER EXTREMITY, UNSPECIFIED CHRONICITY, UNSPECIFIED LATERALITY (MULTI): ICD-10-CM

## 2024-11-16 PROCEDURE — 99309 SBSQ NF CARE MODERATE MDM 30: CPT | Performed by: INTERNAL MEDICINE

## 2024-11-16 NOTE — LETTER
Patient: Elliot Partida  : 1950    Encounter Date: 2024    PLACE OF SERVICE:  [facility] [place]    This is a subsequent visit.    Subjective  Patient ID: Elliot Partida is a 74 y.o. male who presents for Follow-up.    Mr. Elliot Partida is a 74-year-old male with history of diabetes with osteomyelitis to his left foot and ankle.  He continues to undergo wound care and is followed by a Podiatry.    Review of Systems   Constitutional:  Negative for chills and fever.   Cardiovascular:  Negative for chest pain.   All other systems reviewed and are negative.    Objective  /76   Pulse 76   Temp 36.7 °C (98 °F)   Resp 16     Physical Exam  Vitals reviewed.   Constitutional:       General: He is not in acute distress.     Comments: This is a well-developed and well-nourished male, sitting in a chair   HENT:      Right Ear: Tympanic membrane, ear canal and external ear normal.      Left Ear: Tympanic membrane, ear canal and external ear normal.   Eyes:      General: No scleral icterus.     Pupils: Pupils are equal, round, and reactive to light.   Neck:      Vascular: No carotid bruit.   Cardiovascular:      Heart sounds: Normal heart sounds, S1 normal and S2 normal. No murmur heard.     No friction rub.   Pulmonary:      Effort: Pulmonary effort is normal.      Breath sounds: Normal breath sounds and air entry.   Abdominal:      Palpations: There is no hepatomegaly, splenomegaly or mass.   Musculoskeletal:         General: No swelling or deformity. Normal range of motion.      Cervical back: Neck supple.      Right lower leg: No edema.      Left lower leg: No edema.      Comments: There is dressing over the patient's left foot and ankle.   Lymphadenopathy:      Cervical: No cervical adenopathy.      Upper Body:      Right upper body: No axillary adenopathy.      Left upper body: No axillary adenopathy.      Lower Body: No right inguinal adenopathy. No left inguinal  adenopathy.   Neurological:      Mental Status: He is oriented to person, place, and time.      Cranial Nerves: Cranial nerves 2-12 are intact. No cranial nerve deficit.      Sensory: No sensory deficit.      Motor: Motor function is intact. No weakness.      Gait: Gait is intact.      Deep Tendon Reflexes: Reflexes normal.   Psychiatric:         Mood and Affect: Mood normal. Mood is not anxious or depressed. Affect is not angry.         Behavior: Behavior is not agitated.         Thought Content: Thought content normal.         Judgment: Judgment normal.     LAB WORK:  Laboratory studies were reviewed.    Assessment/Plan  Problem List Items Addressed This Visit             ICD-10-CM       Cardiac and Vasculature    Hypertension I10       Musculoskeletal and Injuries    Osteoarthritis M19.90       Symptoms and Signs    Weakness R53.1     Other Visit Diagnoses         Codes    Osteomyelitis, unspecified site, unspecified type (Multi)    -  Primary M86.9    Type 2 diabetes mellitus without complication, unspecified whether long term insulin use (Multi)     E11.9    Deep vein thrombosis (DVT) of other vein of lower extremity, unspecified chronicity, unspecified laterality (Multi)     I82.499    Neuropathy     G62.9    Trigeminal neuralgia     G50.0    Acute myocardial infarction, unspecified MI type, unspecified artery (Multi)     I21.9    At risk for falling     Z91.81    PVD (peripheral vascular disease) (CMS-[facility])     I73.9        1. Osteomyelitis.  Continue wound care.  Follow with Podiatry.  2. Diabetes, on insulin.  3. DVT, anticoagulated.  4. Neuropathy, on gabapentin.  5. Trigeminal neuralgia, on pain control.  6. Hypertension, medically controlled.  7. PVD, on medication.  8. AMI, on aspirin.  9. Osteoarthritis, on Tylenol.  10. Weakness, on PT/OT.  11. Fall risk, on fall precautions.    Scribe Attestation  By signing my name below, Hanna OLIVER, Scribe attest that this documentation has been prepared under the  direction and in the presence of Marium Singh MD.       All medical record entries made by the scribe were personally dictated by me I have reviewed the chart and agree the record accurately reflects my personal performance of his history physical examination and management      Electronically Signed By: Marium Singh MD   11/19/24  8:48 PM

## 2024-11-18 VITALS
DIASTOLIC BLOOD PRESSURE: 76 MMHG | TEMPERATURE: 98 F | RESPIRATION RATE: 16 BRPM | HEART RATE: 76 BPM | SYSTOLIC BLOOD PRESSURE: 124 MMHG

## 2024-11-18 ASSESSMENT — ENCOUNTER SYMPTOMS
CHILLS: 0
FEVER: 0

## 2024-11-18 NOTE — PROGRESS NOTES
PLACE OF SERVICE:  [facility] [place]    This is a subsequent visit.    Subjective   Patient ID: Elliot Partida is a 74 y.o. male who presents for Follow-up.    Mr. Elliot Partida is a 74-year-old male with history of diabetes with osteomyelitis to his left foot and ankle.  He continues to undergo wound care and is followed by a Podiatry.    Review of Systems   Constitutional:  Negative for chills and fever.   Cardiovascular:  Negative for chest pain.   All other systems reviewed and are negative.    Objective   /76   Pulse 76   Temp 36.7 °C (98 °F)   Resp 16     Physical Exam  Vitals reviewed.   Constitutional:       General: He is not in acute distress.     Comments: This is a well-developed and well-nourished male, sitting in a chair   HENT:      Right Ear: Tympanic membrane, ear canal and external ear normal.      Left Ear: Tympanic membrane, ear canal and external ear normal.   Eyes:      General: No scleral icterus.     Pupils: Pupils are equal, round, and reactive to light.   Neck:      Vascular: No carotid bruit.   Cardiovascular:      Heart sounds: Normal heart sounds, S1 normal and S2 normal. No murmur heard.     No friction rub.   Pulmonary:      Effort: Pulmonary effort is normal.      Breath sounds: Normal breath sounds and air entry.   Abdominal:      Palpations: There is no hepatomegaly, splenomegaly or mass.   Musculoskeletal:         General: No swelling or deformity. Normal range of motion.      Cervical back: Neck supple.      Right lower leg: No edema.      Left lower leg: No edema.      Comments: There is dressing over the patient's left foot and ankle.   Lymphadenopathy:      Cervical: No cervical adenopathy.      Upper Body:      Right upper body: No axillary adenopathy.      Left upper body: No axillary adenopathy.      Lower Body: No right inguinal adenopathy. No left inguinal adenopathy.   Neurological:      Mental Status: He is oriented to  person, place, and time.      Cranial Nerves: Cranial nerves 2-12 are intact. No cranial nerve deficit.      Sensory: No sensory deficit.      Motor: Motor function is intact. No weakness.      Gait: Gait is intact.      Deep Tendon Reflexes: Reflexes normal.   Psychiatric:         Mood and Affect: Mood normal. Mood is not anxious or depressed. Affect is not angry.         Behavior: Behavior is not agitated.         Thought Content: Thought content normal.         Judgment: Judgment normal.     LAB WORK:  Laboratory studies were reviewed.    Assessment/Plan   Problem List Items Addressed This Visit             ICD-10-CM       Cardiac and Vasculature    Hypertension I10       Musculoskeletal and Injuries    Osteoarthritis M19.90       Symptoms and Signs    Weakness R53.1     Other Visit Diagnoses         Codes    Osteomyelitis, unspecified site, unspecified type (Multi)    -  Primary M86.9    Type 2 diabetes mellitus without complication, unspecified whether long term insulin use (Multi)     E11.9    Deep vein thrombosis (DVT) of other vein of lower extremity, unspecified chronicity, unspecified laterality (Multi)     I82.499    Neuropathy     G62.9    Trigeminal neuralgia     G50.0    Acute myocardial infarction, unspecified MI type, unspecified artery (Multi)     I21.9    At risk for falling     Z91.81    PVD (peripheral vascular disease) (CMS-[facility])     I73.9        1. Osteomyelitis.  Continue wound care.  Follow with Podiatry.  2. Diabetes, on insulin.  3. DVT, anticoagulated.  4. Neuropathy, on gabapentin.  5. Trigeminal neuralgia, on pain control.  6. Hypertension, medically controlled.  7. PVD, on medication.  8. AMI, on aspirin.  9. Osteoarthritis, on Tylenol.  10. Weakness, on PT/OT.  11. Fall risk, on fall precautions.    Scribe Attestation  By signing my name below, Hanna OLIVER Scribe attest that this documentation has been prepared under the direction and in the presence of Marium Singh MD.       All  medical record entries made by the scribe were personally dictated by me I have reviewed the chart and agree the record accurately reflects my personal performance of his history physical examination and management

## 2024-11-19 ENCOUNTER — NURSING HOME VISIT (OUTPATIENT)
Dept: POST ACUTE CARE | Facility: EXTERNAL LOCATION | Age: 74
End: 2024-11-19
Payer: MEDICARE

## 2024-11-19 DIAGNOSIS — G50.0 TRIGEMINAL NEURALGIA: ICD-10-CM

## 2024-11-19 DIAGNOSIS — I82.412 ACUTE DEEP VEIN THROMBOSIS (DVT) OF FEMORAL VEIN OF LEFT LOWER EXTREMITY (MULTI): ICD-10-CM

## 2024-11-19 DIAGNOSIS — M86.9 OSTEOMYELITIS OF LEFT FOOT, UNSPECIFIED TYPE (MULTI): ICD-10-CM

## 2024-11-19 DIAGNOSIS — M86.9 OSTEOMYELITIS, UNSPECIFIED SITE, UNSPECIFIED TYPE (MULTI): ICD-10-CM

## 2024-11-19 DIAGNOSIS — G62.9 NEUROPATHY: ICD-10-CM

## 2024-11-19 DIAGNOSIS — E11.9 TYPE 2 DIABETES MELLITUS WITHOUT COMPLICATION, UNSPECIFIED WHETHER LONG TERM INSULIN USE (MULTI): ICD-10-CM

## 2024-11-19 DIAGNOSIS — G47.00 INSOMNIA, UNSPECIFIED TYPE: ICD-10-CM

## 2024-11-19 DIAGNOSIS — I82.432 ACUTE DEEP VEIN THROMBOSIS (DVT) OF POPLITEAL VEIN OF LEFT LOWER EXTREMITY (MULTI): Primary | ICD-10-CM

## 2024-11-19 DIAGNOSIS — K59.00 CONSTIPATION, UNSPECIFIED CONSTIPATION TYPE: ICD-10-CM

## 2024-11-19 DIAGNOSIS — R53.81 PHYSICAL DECONDITIONING: ICD-10-CM

## 2024-11-19 PROCEDURE — 99309 SBSQ NF CARE MODERATE MDM 30: CPT | Performed by: NURSE PRACTITIONER

## 2024-11-19 NOTE — LETTER
"Patient: Elliot Partida  : 1950    Encounter Date: 2024    Chief Complaint:   SNF F/U  -Left foot osteomyelitis  -Left foot cellulitis  -Left foot diabetic ulcer  -PVD  -Venous insufficiency  -DVT of LLE  -Physical deconditioning/weakness  -Trigeminal neuralgia     HPI:   74 year-old male presenting to [facility] ER on 24 with \"months\" of LLE pain with edema, redness, and warmth. He was noted to have a large necrotic ulcer under his 5th toe. He reported that he had not seen a provider or taken any medications for about \"2 years\". Work-up in ER: Glu 310, Na+ 129, Alb 3.3, XR of L foot showed osteomyelitis of the 5th metatarsal head, US of LLE showed acute DVT in the left mid-distal femoral and popliteal veins. He was started on IV Heparin, IV Zosyn, and IV Vanco. He was admitted to the hospital for further evaluation and treatment. Hospital course:    Left foot osteomyelitis/cellulitis/DVT of LLE/PAD-IV ATB, IV heparin (transitioned to Eliquis), ID consult, podiatry consult, elevate LLE, local wound care, analgesics prn, patient transferred to [facility] for vascular consult on , underwent LLE revascularization with Dr. Stoddard on , recommending repeat BEN in 4 weeks with OP F/U, underwent L partial 5th ray resection on  with Dr. Spears, taken back to OR on  for delayed closure with graft application by Dr. 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 was HDS and discharged to [facility] on 10/2/24. On 11/5, patient was sent to [facility] ER for evaluation of R eye pain and ringing in R ear. He was treated with Excedrin in-house, which was not effective. In the ER, he was Dx with trigeminal neuralgia and started on tegretol. Today, patient reports resolution of eye pain and ringing in ear. He denies dizziness, HA, SOB, cough, or chest pain. He continues to c/o constipation, denies abdominal pain, nausea, or appetite changes. He had a small BM this AM. He continues to c/o intermittent L foot pain. Staff report no clinical concerns.    ROS:    As above in HPI. Otherwise, all other systems have been reviewed and are negative for complaint.    Medications reviewed and verified in NH chart.     Patient Active Problem List   Diagnosis   • Wound infection   • Osteomyelitis of left foot, unspecified type (Multi)   • Acute deep vein thrombosis (DVT) of popliteal vein of left lower extremity (Multi)   • Bilateral lower extremity edema   • CAD (coronary artery disease)   • Hypertension   • Inflammatory neuropathy (Multi)   • History of stroke   • Weakness of left lower extremity   • Ambulatory dysfunction   • Chronic bilateral low back pain with left-sided sciatica   • Diabetic peripheral neuropathy (Multi)   • Neural foraminal stenosis of lumbosacral spine   • Hyperlipidemia   • Lumbar back pain with radiculopathy affecting left lower extremity   • Peripheral arterial disease (CMS-HCC)   • Type 2 diabetes mellitus, without long-term current use of insulin (Multi)   • Anemia   • Obesity   • Delayed surgical wound healing of foot amputation stump (Multi)   • Impaired mobility and ADLs   • Weakness   • Thoracic radiculopathy   • Osteoarthritis   • Cellulitis of foot, left   • Meningioma (Multi)   • Tremor of left hand   • Diabetic ulcer of left foot associated with type 2 diabetes mellitus, limited to breakdown of skin   • Acute deep vein  thrombosis (DVT) of femoral vein of left lower extremity (Multi)   • Nonrheumatic aortic valve stenosis   • Diastolic dysfunction        Past Medical History:   Diagnosis Date   • Acute osteomyelitis of ankle and foot, left (Multi)    • AMI (acute myocardial infarction) (Multi)    • ASHD (arteriosclerotic heart disease)    • At risk for falls    • Cellulitis     left foot and ankle   • Diabetes mellitus (Multi)    • DVT (deep vein thrombosis) in pregnancy ([facility])    • Fall risk care plan declined    • Hypertension    • Myocardial infarction (Multi)    • Neuritis    • Neuropathy    • Osteoarthritis    • Osteomyelitis    • PVD (peripheral vascular disease) (CMS-HCC)    • Sprain of right knee 06/25/2015   • Stroke (Multi)    • 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     • FOOT RAY RESECTION Left 09/23/2024    L partial 5th ray resection (Dr. Spears, DPM)   • FOOT SURGERY Left 09/27/2024    Delayed closure w/ graft application (Dr. Huynh, DPM)   • INVASIVE VASCULAR PROCEDURE Bilateral 09/18/2024    Procedure: Lower Extremity Angiogram;  Surgeon: Teofilo Stoddard MD;  Location: [facility] Cardiac Cath Lab;  Service: Cardiovascular;  Laterality: Bilateral;   • IRIDOTOMY / IRIDECTOMY Bilateral        Family History   Problem Relation Name Age of Onset   • Heart disease Father     • Colon cancer Other     • Heart attack Other     • Diabetes Other     • Hypertension Other         Social History     Tobacco Use   Smoking Status Never   • Passive exposure: Never   Smokeless Tobacco Never       Social History     Substance and Sexual Activity   Alcohol Use Never       Social History     Substance and Sexual Activity   Drug Use Never       No Known Allergies     Vital  Signs:   125/74-88-18-98.5-96% on RA    Physical Exam:  General: Sitting up in bed in NAD, alert   Head/Face: NCAT, symmetrical  Eyes: PERRLA, no injection, no discharge  ENT: Hearing not impaired, ears without scars or lesions, nasal mucosa and turbinates pink, septum midline, lips pink and moist  Neck: Supple, symmetrical  Respiratory: CTA but diminished without adventitious sounds, respirations even and nonlabored without use of accessory muscles, good air exchange  Cardio: RRR without murmur or gallops, normal S1S2, NP edema to BLE (L > R), pedal pulses 2+/4 bilaterally  Chest/Breast: Symmetrical  GI: BS x 4, normoactive, non-distended, abd round and soft, no masses or tenderness  : No suprapubic tenderness or distention  MSK: Gait not assessed, joints with full ROM without pain or contractures  Skin: Skin warm and dry, no induration, DTI of R heel, stage III pressure ulcer of L heel, vascular ulcer of L anterior lower leg, surgical wound of L lateral foot, skin tags to R buttocks (chronic per patient)   Neurologic: Cranial nerves II through XII intact, decreased sensation to BLE  Psychiatric: Alert to person, place, and time, calm and cooperative, expresses depression with current situation     Results/Data:   11/5/24: CMP WNL, Mag 2.03, CRP 1.34, Sed Rate 38, Hgb 10.3, Hct 32.7  10/30/24: Glu 139, Cr 0.6, Hgb 9.1, Hct 29.4, Sed Rate 46, CRP 2.3  10/23/24: Llk119, Cr 0.6, Hgb 9.0, Hct 29.1, Iron 44, TIBC 261, Ferritin 21.1, Transferrin 193, Folate 4.4, Vit B12 358  10/16/24: Glu 135, Cr 0.6, Hgb 8.5, Hct 26.7  10/3/24: Glu 111, Cr 0.6, HgbA1C 7.0, Hgb 9.0, Hct 28.8    Assessment/Plan:  Left foot osteomyelitis/cellulitis/PAD/venous insufficiency-s/p LLE revascularization with Dr. Stoddard on 9/18, s/p L partial 5th ray resection on 9/23 with Dr. Spears, s/p delayed closure with graft application by Dr. Huynh on 9/27, c/w local wound care, c/w Vit C, MVI, and LPS Critical Care BID to promote wound healing, s/p  Augmentin (end date 10/9), c/w ASA and plavix, c/w Tylenol and Gabapentin, c/w Oxycodone prn for mod-severe pain, tizanidine prn for muscle spasms, wound care team following, seen by podiatry on 10/14, NWB of LLE, F/U with podiatry as needed, F/U with vascular  DVT of LLE-c/w eliquis, elevate LLE, monitor H&H  Physical deconditioning/weakness-c/w PT/OT  Insomnia/depression-c/w trazodone and melatonin, supportive care, monitor behaviors, patient declines Psych services at this time   Anemia-c/w MVI, iron, and Vit B12, monitor CBC, iron panel, and Vit B12 level   CAD/HTN/Hx MI/HLD/diastolic dysfunction/AVS-FAVIAN diet, c/w ASA, hydralazine, and lisinopril, monitor BP, monitor lipid panel, needs established with cardiology after discharge  DM2 with peripheral neuropathy-LCS diet, accuchecks, c/w lispro ISS with meals, BP and lipid control, yearly eye exam, diabetic foot care, diabetic diet education (RD following), monitor HgbA1C  Hypokalemia-c/w KCl, monitor K+ level  Right frontal lobe meningioma-followed by Dr. Sin ([facility]), seen on 8/4/23 and advised to F/U with neuro-oncology [facility]O in 2 years with MRI WWO for surveillance  Hx stroke/LLE weakness/L hand tremor-followed by neurology   Constipation-c/w miralax, monitor BMs  OA/lumbar radiculopathy/thoracic radiculopathy-Tylenol prn, F/U with specialists after discharge  Trigeminal neuralgia-c/w tegretol  Constipation-encourage fluids and fiber, c/w senna plus, increase miralax to BID, monitor closely     Orders:  Increase miralax to BID    Code Status:   Full Code        Electronically Signed By: SARAH Jerome-CNP   1/6/25 12:02 PM

## 2024-11-23 ENCOUNTER — NURSING HOME VISIT (OUTPATIENT)
Dept: POST ACUTE CARE | Facility: EXTERNAL LOCATION | Age: 74
End: 2024-11-23
Payer: MEDICARE

## 2024-11-23 DIAGNOSIS — I73.9 PVD (PERIPHERAL VASCULAR DISEASE) (CMS-HCC): ICD-10-CM

## 2024-11-23 DIAGNOSIS — R53.1 WEAKNESS: ICD-10-CM

## 2024-11-23 DIAGNOSIS — M19.90 OSTEOARTHRITIS, UNSPECIFIED OSTEOARTHRITIS TYPE, UNSPECIFIED SITE: ICD-10-CM

## 2024-11-23 DIAGNOSIS — G50.0 TRIGEMINAL NEURALGIA: ICD-10-CM

## 2024-11-23 DIAGNOSIS — G62.9 NEUROPATHY: ICD-10-CM

## 2024-11-23 DIAGNOSIS — Z91.81 AT RISK FOR FALLING: ICD-10-CM

## 2024-11-23 DIAGNOSIS — I10 HYPERTENSION, UNSPECIFIED TYPE: ICD-10-CM

## 2024-11-23 DIAGNOSIS — M86.9 OSTEOMYELITIS, UNSPECIFIED SITE, UNSPECIFIED TYPE (MULTI): Primary | ICD-10-CM

## 2024-11-23 DIAGNOSIS — I82.499 DEEP VEIN THROMBOSIS (DVT) OF OTHER VEIN OF LOWER EXTREMITY, UNSPECIFIED CHRONICITY, UNSPECIFIED LATERALITY (MULTI): ICD-10-CM

## 2024-11-23 DIAGNOSIS — I21.9 ACUTE MYOCARDIAL INFARCTION, UNSPECIFIED MI TYPE, UNSPECIFIED ARTERY (MULTI): ICD-10-CM

## 2024-11-23 DIAGNOSIS — E11.9 TYPE 2 DIABETES MELLITUS WITHOUT COMPLICATION, UNSPECIFIED WHETHER LONG TERM INSULIN USE (MULTI): ICD-10-CM

## 2024-11-23 PROCEDURE — 99309 SBSQ NF CARE MODERATE MDM 30: CPT | Performed by: INTERNAL MEDICINE

## 2024-11-23 NOTE — LETTER
Patient: Elliot Partida  : 1950    Encounter Date: 2024    PLACE OF SERVICE:  [facility] [place]    This is a subsequent visit.    Subjective  Patient ID: Elliot Partida is a 74 y.o. male who presents for Follow-up.    Mr. Elliot Partida is a 74-year-old diabetic male with diabetic foot.  He is followed by Podiatry and unable to care for himself.  He requires supportive care.    Review of Systems   Constitutional:  Negative for chills and fever.   Cardiovascular:  Negative for chest pain.   All other systems reviewed and are negative.    Objective  /78   Pulse 76   Temp 36.7 °C (98 °F)   Resp 16     Physical Exam  Vitals reviewed.   Constitutional:       General: He is not in acute distress.     Comments: This is a well-developed and well-nourished male, sitting in a chair.   HENT:      Right Ear: Tympanic membrane, ear canal and external ear normal.      Left Ear: Tympanic membrane, ear canal and external ear normal.   Eyes:      General: No scleral icterus.     Pupils: Pupils are equal, round, and reactive to light.   Neck:      Vascular: No carotid bruit.   Cardiovascular:      Heart sounds: Normal heart sounds, S1 normal and S2 normal. No murmur heard.     No friction rub.   Pulmonary:      Effort: Pulmonary effort is normal.      Breath sounds: Normal breath sounds and air entry.   Abdominal:      Palpations: There is no hepatomegaly, splenomegaly or mass.   Musculoskeletal:         General: No swelling or deformity. Normal range of motion.      Cervical back: Neck supple.      Right lower leg: No edema.      Left lower leg: No edema.      Comments: The patient's left foot is currently dressed.   Lymphadenopathy:      Cervical: No cervical adenopathy.      Upper Body:      Right upper body: No axillary adenopathy.      Left upper body: No axillary adenopathy.      Lower Body: No right inguinal adenopathy. No left inguinal adenopathy.   Neurological:       Mental Status: He is oriented to person, place, and time.      Cranial Nerves: Cranial nerves 2-12 are intact. No cranial nerve deficit.      Sensory: No sensory deficit.      Motor: Motor function is intact. No weakness.      Gait: Gait is intact.      Deep Tendon Reflexes: Reflexes normal.   Psychiatric:         Mood and Affect: Mood normal. Mood is not anxious or depressed. Affect is not angry.         Behavior: Behavior is not agitated.         Thought Content: Thought content normal.         Judgment: Judgment normal.     LAB WORK: Laboratory studies reviewed.    Assessment/Plan  Problem List Items Addressed This Visit             ICD-10-CM       Cardiac and Vasculature    Hypertension I10       Musculoskeletal and Injuries    Osteoarthritis M19.90       Symptoms and Signs    Weakness R53.1     Other Visit Diagnoses         Codes    Osteomyelitis, unspecified site, unspecified type (Multi)    -  Primary M86.9    left foot and ankle     Deep vein thrombosis (DVT) of other vein of lower extremity, unspecified chronicity, unspecified laterality (Multi)     I82.499    Neuropathy     G62.9    Trigeminal neuralgia     G50.0    Type 2 diabetes mellitus without complication, unspecified whether long term insulin use (Multi)     E11.9    Acute myocardial infarction, unspecified MI type, unspecified artery (Multi)     I21.9    PVD (peripheral vascular disease) (CMS-[facility])     I73.9    At risk for falling     Z91.81        1. Osteomyelitis, left foot and ankle.  Continue wound care, follow with Podiatry.  2. DVT, anticoagulated.  3. Neuropathy, on gabapentin.  4. Trigeminal neuralgia, on pain control.  5. Diabetes, on insulin.  6. AMI, on aspirin.  7. PVD, on medication.  8. Hypertension, med controlled.  9. Osteoarthritis, on Tylenol.  10. Weakness, on PT/OT.  11. Fall risk, on fall precautions.    Scribe Attestation  By signing my name below, IHanna, Scribcecilia attest that this documentation has been prepared under  the direction and in the presence of Marium Singh MD.     All medical record entries made by the scribe were personally dictated by me I have reviewed the chart and agree the record accurately reflects my personal performance of his history physical examination and management      Electronically Signed By: Marium Singh MD   11/25/24 10:02 PM

## 2024-11-25 VITALS
SYSTOLIC BLOOD PRESSURE: 128 MMHG | TEMPERATURE: 98 F | DIASTOLIC BLOOD PRESSURE: 78 MMHG | HEART RATE: 76 BPM | RESPIRATION RATE: 16 BRPM

## 2024-11-25 ASSESSMENT — ENCOUNTER SYMPTOMS
CHILLS: 0
FEVER: 0

## 2024-11-25 NOTE — PROGRESS NOTES
PLACE OF SERVICE:  [facility] [place]    This is a subsequent visit.    Subjective   Patient ID: Elliot Partida is a 74 y.o. male who presents for Follow-up.    Mr. Elliot Partida is a 74-year-old diabetic male with diabetic foot.  He is followed by Podiatry and unable to care for himself.  He requires supportive care.    Review of Systems   Constitutional:  Negative for chills and fever.   Cardiovascular:  Negative for chest pain.   All other systems reviewed and are negative.    Objective   /78   Pulse 76   Temp 36.7 °C (98 °F)   Resp 16     Physical Exam  Vitals reviewed.   Constitutional:       General: He is not in acute distress.     Comments: This is a well-developed and well-nourished male, sitting in a chair.   HENT:      Right Ear: Tympanic membrane, ear canal and external ear normal.      Left Ear: Tympanic membrane, ear canal and external ear normal.   Eyes:      General: No scleral icterus.     Pupils: Pupils are equal, round, and reactive to light.   Neck:      Vascular: No carotid bruit.   Cardiovascular:      Heart sounds: Normal heart sounds, S1 normal and S2 normal. No murmur heard.     No friction rub.   Pulmonary:      Effort: Pulmonary effort is normal.      Breath sounds: Normal breath sounds and air entry.   Abdominal:      Palpations: There is no hepatomegaly, splenomegaly or mass.   Musculoskeletal:         General: No swelling or deformity. Normal range of motion.      Cervical back: Neck supple.      Right lower leg: No edema.      Left lower leg: No edema.      Comments: The patient's left foot is currently dressed.   Lymphadenopathy:      Cervical: No cervical adenopathy.      Upper Body:      Right upper body: No axillary adenopathy.      Left upper body: No axillary adenopathy.      Lower Body: No right inguinal adenopathy. No left inguinal adenopathy.   Neurological:      Mental Status: He is oriented to person, place, and time.       Cranial Nerves: Cranial nerves 2-12 are intact. No cranial nerve deficit.      Sensory: No sensory deficit.      Motor: Motor function is intact. No weakness.      Gait: Gait is intact.      Deep Tendon Reflexes: Reflexes normal.   Psychiatric:         Mood and Affect: Mood normal. Mood is not anxious or depressed. Affect is not angry.         Behavior: Behavior is not agitated.         Thought Content: Thought content normal.         Judgment: Judgment normal.     LAB WORK: Laboratory studies reviewed.    Assessment/Plan   Problem List Items Addressed This Visit             ICD-10-CM       Cardiac and Vasculature    Hypertension I10       Musculoskeletal and Injuries    Osteoarthritis M19.90       Symptoms and Signs    Weakness R53.1     Other Visit Diagnoses         Codes    Osteomyelitis, unspecified site, unspecified type (Multi)    -  Primary M86.9    left foot and ankle     Deep vein thrombosis (DVT) of other vein of lower extremity, unspecified chronicity, unspecified laterality (Multi)     I82.499    Neuropathy     G62.9    Trigeminal neuralgia     G50.0    Type 2 diabetes mellitus without complication, unspecified whether long term insulin use (Multi)     E11.9    Acute myocardial infarction, unspecified MI type, unspecified artery (Multi)     I21.9    PVD (peripheral vascular disease) (CMS-[facility])     I73.9    At risk for falling     Z91.81        1. Osteomyelitis, left foot and ankle.  Continue wound care, follow with Podiatry.  2. DVT, anticoagulated.  3. Neuropathy, on gabapentin.  4. Trigeminal neuralgia, on pain control.  5. Diabetes, on insulin.  6. AMI, on aspirin.  7. PVD, on medication.  8. Hypertension, med controlled.  9. Osteoarthritis, on Tylenol.  10. Weakness, on PT/OT.  11. Fall risk, on fall precautions.    Scribe Attestation  By signing my name below, Hanna OLIVER Scribe attest that this documentation has been prepared under the direction and in the presence of Marium Singh MD.     All  medical record entries made by the scribe were personally dictated by me I have reviewed the chart and agree the record accurately reflects my personal performance of his history physical examination and management

## 2024-11-26 ENCOUNTER — NURSING HOME VISIT (OUTPATIENT)
Dept: POST ACUTE CARE | Facility: EXTERNAL LOCATION | Age: 74
End: 2024-11-26
Payer: MEDICARE

## 2024-11-26 DIAGNOSIS — D64.9 ANEMIA, UNSPECIFIED TYPE: ICD-10-CM

## 2024-11-26 DIAGNOSIS — I25.10 CORONARY ARTERY DISEASE INVOLVING NATIVE CORONARY ARTERY OF NATIVE HEART WITHOUT ANGINA PECTORIS: ICD-10-CM

## 2024-11-26 DIAGNOSIS — G50.0 TRIGEMINAL NEURALGIA: ICD-10-CM

## 2024-11-26 DIAGNOSIS — R53.81 PHYSICAL DECONDITIONING: ICD-10-CM

## 2024-11-26 DIAGNOSIS — L03.116 CELLULITIS OF FOOT, LEFT: ICD-10-CM

## 2024-11-26 DIAGNOSIS — M86.9 OSTEOMYELITIS OF LEFT FOOT, UNSPECIFIED TYPE (MULTI): ICD-10-CM

## 2024-11-26 DIAGNOSIS — F32.A DEPRESSION, UNSPECIFIED DEPRESSION TYPE: ICD-10-CM

## 2024-11-26 DIAGNOSIS — D32.9 MENINGIOMA (MULTI): ICD-10-CM

## 2024-11-26 DIAGNOSIS — K59.00 CONSTIPATION, UNSPECIFIED CONSTIPATION TYPE: ICD-10-CM

## 2024-11-26 DIAGNOSIS — I82.412 ACUTE DEEP VEIN THROMBOSIS (DVT) OF FEMORAL VEIN OF LEFT LOWER EXTREMITY (MULTI): Primary | ICD-10-CM

## 2024-11-26 PROCEDURE — 99309 SBSQ NF CARE MODERATE MDM 30: CPT | Performed by: NURSE PRACTITIONER

## 2024-11-26 NOTE — LETTER
"Patient: Elliot Partida  : 1950    Encounter Date: 2024    Chief Complaint:   SNF F/U  -Left foot osteomyelitis  -Left foot cellulitis  -Left foot diabetic ulcer  -PVD  -Venous insufficiency  -DVT of LLE  -Physical deconditioning/weakness  -Trigeminal neuralgia     HPI:   74 year-old male presenting to [facility] ER on 24 with \"months\" of LLE pain with edema, redness, and warmth. He was noted to have a large necrotic ulcer under his 5th toe. He reported that he had not seen a provider or taken any medications for about \"2 years\". Work-up in ER: Glu 310, Na+ 129, Alb 3.3, XR of L foot showed osteomyelitis of the 5th metatarsal head, US of LLE showed acute DVT in the left mid-distal femoral and popliteal veins. He was started on IV Heparin, IV Zosyn, and IV Vanco. He was admitted to the hospital for further evaluation and treatment. Hospital course:    Left foot osteomyelitis/cellulitis/DVT of LLE/PAD-IV ATB, IV heparin (transitioned to Eliquis), ID consult, podiatry consult, elevate LLE, local wound care, analgesics prn, patient transferred to [facility] for vascular consult on , underwent LLE revascularization with Dr. Stoddard on , recommending repeat BEN in 4 weeks with OP F/U, underwent L partial 5th ray resection on  with Dr. Spears, taken back to OR on  for delayed closure with graft application by Dr. 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 was HDS and discharged to [facility] on 10/2/24. On 11/5, patient was sent to [facility] ER for evaluation of R eye pain and ringing in R ear. He was treated with Excedrin in-house, which was not effective. In the ER, he was Dx with trigeminal neuralgia and started on tegretol. Today, patient reports resolution of eye pain and ringing in ear. He denies dizziness, HA, SOB, cough, or chest pain. He reports resolution of constipation. He continues to c/o intermittent L foot pain. He is wanting WB status. Staff report wounds are improving. No other clinical concerns noted at this time.     ROS:    As above in HPI. Otherwise, all other systems have been reviewed and are negative for complaint.    Medications reviewed and verified in NH chart.     Patient Active Problem List   Diagnosis   • Wound infection   • Osteomyelitis of left foot, unspecified type (Multi)   • Acute deep vein thrombosis (DVT) of popliteal vein of left lower extremity (Multi)   • Bilateral lower extremity edema   • CAD (coronary artery disease)   • Hypertension   • Inflammatory neuropathy (Multi)   • History of stroke   • Weakness of left lower extremity   • Ambulatory dysfunction   • Chronic bilateral low back pain with left-sided sciatica   • Diabetic peripheral neuropathy (Multi)   • Neural foraminal stenosis of lumbosacral spine   • Hyperlipidemia   • Lumbar back pain with radiculopathy affecting left lower extremity   • Peripheral arterial disease (CMS-[facility])   • Type 2 diabetes mellitus, without long-term current use of insulin (Multi)   • Anemia   • Obesity   • Delayed surgical wound healing of foot amputation stump (Multi)   • Impaired mobility and ADLs   • Weakness   • Thoracic radiculopathy   • Osteoarthritis   • Cellulitis of foot, left   • Meningioma (Multi)   • Tremor of left hand   • Diabetic ulcer of left foot associated with type 2 diabetes mellitus, limited to breakdown of skin   • Acute deep vein thrombosis  (DVT) of femoral vein of left lower extremity (Multi)   • Nonrheumatic aortic valve stenosis   • Diastolic dysfunction        Past Medical History:   Diagnosis Date   • Acute osteomyelitis of ankle and foot, left (Multi)    • AMI (acute myocardial infarction) (Multi)    • ASHD (arteriosclerotic heart disease)    • At risk for falls    • Cellulitis     left foot and ankle   • Diabetes mellitus (Multi)    • DVT (deep vein thrombosis) in pregnancy ([facility])    • Fall risk care plan declined    • Hypertension    • Myocardial infarction (Multi)    • Neuritis    • Neuropathy    • Osteoarthritis    • Osteomyelitis    • PVD (peripheral vascular disease) (CMS-H[facility])    • Sprain of right knee 06/25/2015   • Stroke (Multi)    • 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     • FOOT RAY RESECTION Left 09/23/2024    L partial 5th ray resection (Dr. Spears, DPM)   • FOOT SURGERY Left 09/27/2024    Delayed closure w/ graft application (Dr. Huynh, DPM)   • INVASIVE VASCULAR PROCEDURE Bilateral 09/18/2024    Procedure: Lower Extremity Angiogram;  Surgeon: Teofilo Stoddard MD;  Location: [facility] Cardiac Cath Lab;  Service: Cardiovascular;  Laterality: Bilateral;   • IRIDOTOMY / IRIDECTOMY Bilateral        Family History   Problem Relation Name Age of Onset   • Heart disease Father     • Colon cancer Other     • Heart attack Other     • Diabetes Other     • Hypertension Other         Social History     Tobacco Use   Smoking Status Never   • Passive exposure: Never   Smokeless Tobacco Never       Social History     Substance and Sexual Activity   Alcohol Use Never       Social History     Substance and Sexual Activity   Drug Use Never       No Known Allergies     Vital Signs:    155/75-72-18-98.6-96% on RA    Physical Exam:  General: Sitting up in bed in NAD, alert   Head/Face: NCAT, symmetrical  Eyes: PERRLA, no injection, no discharge  ENT: Hearing not impaired, ears without scars or lesions, nasal mucosa and turbinates pink, septum midline, lips pink and moist  Neck: Supple, symmetrical  Respiratory: CTA but diminished without adventitious sounds, respirations even and nonlabored without use of accessory muscles, good air exchange  Cardio: RRR without murmur or gallops, normal S1S2, NP edema to BLE (L > R), pedal pulses 2+/4 bilaterally  Chest/Breast: Symmetrical  GI: BS x 4, normoactive, non-distended, abd round and soft, no masses or tenderness  : No suprapubic tenderness or distention  MSK: Gait not assessed, joints with full ROM without pain or contractures  Skin: Skin warm and dry, no induration, DTI of R heel, stage III pressure ulcer of L heel, vascular ulcer of L anterior lower leg, surgical wound of L lateral foot, skin tags to R buttocks (chronic per patient)   Neurologic: Cranial nerves II through XII intact, decreased sensation to BLE  Psychiatric: Alert to person, place, and time, calm and cooperative, expresses depression with current situation     Results/Data:   11/5/24: CMP WNL, Mag 2.03, CRP 1.34, Sed Rate 38, Hgb 10.3, Hct 32.7  10/30/24: Glu 139, Cr 0.6, Hgb 9.1, Hct 29.4, Sed Rate 46, CRP 2.3  10/23/24: Kco500, Cr 0.6, Hgb 9.0, Hct 29.1, Iron 44, TIBC 261, Ferritin 21.1, Transferrin 193, Folate 4.4, Vit B12 358  10/16/24: Glu 135, Cr 0.6, Hgb 8.5, Hct 26.7  10/3/24: Glu 111, Cr 0.6, HgbA1C 7.0, Hgb 9.0, Hct 28.8    Assessment/Plan:  Left foot osteomyelitis/cellulitis/PAD/venous insufficiency-s/p LLE revascularization with Dr. Stoddard on 9/18, s/p L partial 5th ray resection on 9/23 with Dr. Spears, s/p delayed closure with graft application by Dr. Huynh on 9/27, c/w local wound care, c/w Vit C, MVI, and LPS Critical Care BID to promote wound healing, s/p Augmentin  (end date 10/9), c/w ASA and plavix, c/w Tylenol and Gabapentin, c/w Oxycodone prn for mod-severe pain, tizanidine prn for muscle spasms, wound care team following, seen by podiatry on 10/14, NWB of LLE (if wounds continue to improve will discuss WB status with wound care NP), F/U with podiatry as needed, F/U with vascular  DVT of LLE-c/w eliquis, elevate LLE, monitor H&H  Physical deconditioning/weakness-c/w PT/OT  Insomnia/depression-c/w trazodone and melatonin, supportive care, monitor behaviors, patient declines Psych services at this time   Anemia-c/w MVI, iron, and Vit B12, monitor CBC, iron panel, and Vit B12 level   CAD/HTN/Hx MI/HLD/diastolic dysfunction/AVS-FAVIAN diet, c/w ASA, hydralazine, and lisinopril, monitor BP, monitor lipid panel, needs established with cardiology after discharge  DM2 with peripheral neuropathy-LCS diet, accuchecks, c/w lispro ISS with meals, BP and lipid control, yearly eye exam, diabetic foot care, diabetic diet education (RD following), monitor HgbA1C  Hypokalemia-c/w KCl, monitor K+ level  Right frontal lobe meningioma-followed by Dr. Sin ([facility]), seen on 8/4/23 and advised to F/U with neuro-oncology [facility]O in 2 years with MRI WWO for surveillance  Hx stroke/LLE weakness/L hand tremor-followed by neurology   Constipation-c/w miralax, monitor BMs  OA/lumbar radiculopathy/thoracic radiculopathy-Tylenol prn, F/U with specialists after discharge  Trigeminal neuralgia-c/w tegretol  Constipation-encourage fluids and fiber, c/w senna plus and miralax, monitor for BMs    Orders:  NNO    Code Status:   Full Code        Electronically Signed By: SARAH Jerome-CNP   1/6/25 12:06 PM

## 2024-11-30 ENCOUNTER — NURSING HOME VISIT (OUTPATIENT)
Dept: POST ACUTE CARE | Facility: EXTERNAL LOCATION | Age: 74
End: 2024-11-30
Payer: MEDICARE

## 2024-11-30 DIAGNOSIS — Z91.81 AT RISK FOR FALLING: ICD-10-CM

## 2024-11-30 DIAGNOSIS — R53.1 WEAKNESS: ICD-10-CM

## 2024-11-30 DIAGNOSIS — M19.90 OSTEOARTHRITIS, UNSPECIFIED OSTEOARTHRITIS TYPE, UNSPECIFIED SITE: ICD-10-CM

## 2024-11-30 DIAGNOSIS — I82.499 DEEP VEIN THROMBOSIS (DVT) OF OTHER VEIN OF LOWER EXTREMITY, UNSPECIFIED CHRONICITY, UNSPECIFIED LATERALITY (MULTI): ICD-10-CM

## 2024-11-30 DIAGNOSIS — M86.9 OSTEOMYELITIS, UNSPECIFIED SITE, UNSPECIFIED TYPE (MULTI): ICD-10-CM

## 2024-11-30 DIAGNOSIS — G50.0 TRIGEMINAL NEURALGIA: ICD-10-CM

## 2024-11-30 DIAGNOSIS — I10 HYPERTENSION, UNSPECIFIED TYPE: ICD-10-CM

## 2024-11-30 DIAGNOSIS — I73.9 PVD (PERIPHERAL VASCULAR DISEASE) (CMS-HCC): ICD-10-CM

## 2024-11-30 DIAGNOSIS — I21.9 ACUTE MYOCARDIAL INFARCTION, UNSPECIFIED MI TYPE, UNSPECIFIED ARTERY (MULTI): ICD-10-CM

## 2024-11-30 DIAGNOSIS — Z09 NEUROLOGY FOLLOW-UP ENCOUNTER: ICD-10-CM

## 2024-11-30 DIAGNOSIS — E11.9 TYPE 2 DIABETES MELLITUS WITHOUT COMPLICATION, UNSPECIFIED WHETHER LONG TERM INSULIN USE (MULTI): Primary | ICD-10-CM

## 2024-11-30 PROCEDURE — 99309 SBSQ NF CARE MODERATE MDM 30: CPT | Performed by: INTERNAL MEDICINE

## 2024-11-30 NOTE — LETTER
Patient: Elliot Partida  : 1950    Encounter Date: 2024    PLACE OF SERVICE:  [facility] [place].    This is a subsequent visit.    Subjective  Patient ID: Elliot Partida is a 74 y.o. male who presents for Follow-up.    Mr. Elliot Partida is a 74-year-old diabetic male with history of osteomyelitis to his left foot and ankle.  He has had recent DVT.  He is unable to care for himself.  He requires supportive care.    Review of Systems   Constitutional:  Negative for chills and fever.   Cardiovascular:  Negative for chest pain.   All other systems reviewed and are negative.    Objective  /82   Pulse 80   Temp 36.6 °C (97.9 °F)   Resp 16     Physical Exam  Vitals reviewed.   Constitutional:       General: He is not in acute distress.     Comments: This is a well-developed and well-nourished male, sitting in a chair.   HENT:      Right Ear: Tympanic membrane, ear canal and external ear normal.      Left Ear: Tympanic membrane, ear canal and external ear normal.   Eyes:      General: No scleral icterus.     Pupils: Pupils are equal, round, and reactive to light.   Neck:      Vascular: No carotid bruit.   Cardiovascular:      Heart sounds: Normal heart sounds, S1 normal and S2 normal. No murmur heard.     No friction rub.   Pulmonary:      Effort: Pulmonary effort is normal.      Breath sounds: Normal breath sounds and air entry.   Abdominal:      Palpations: There is no hepatomegaly, splenomegaly or mass.   Musculoskeletal:         General: No swelling or deformity. Normal range of motion.      Cervical back: Neck supple.      Right lower leg: No edema.      Left lower leg: No edema.   Lymphadenopathy:      Cervical: No cervical adenopathy.      Upper Body:      Right upper body: No axillary adenopathy.      Left upper body: No axillary adenopathy.      Lower Body: No right inguinal adenopathy. No left inguinal adenopathy.   Skin:     Comments: The patient's  left foot and ankle is currently dressed.  There is no foul odor.  There is no color drainage.   Neurological:      Mental Status: He is oriented to person, place, and time.      Cranial Nerves: Cranial nerves 2-12 are intact. No cranial nerve deficit.      Sensory: No sensory deficit.      Motor: Motor function is intact. No weakness.      Gait: Gait is intact.      Deep Tendon Reflexes: Reflexes normal.   Psychiatric:         Mood and Affect: Mood normal. Mood is not anxious or depressed. Affect is not angry.         Behavior: Behavior is not agitated.         Thought Content: Thought content normal.         Judgment: Judgment normal.     LAB WORK:  Laboratory studies were reviewed.    Assessment/Plan  Problem List Items Addressed This Visit             ICD-10-CM       Cardiac and Vasculature    Hypertension I10       Musculoskeletal and Injuries    Osteoarthritis M19.90       Symptoms and Signs    Weakness R53.1     Other Visit Diagnoses         Codes    Type 2 diabetes mellitus without complication, unspecified whether long term insulin use (Multi)    -  Primary E11.9    Deep vein thrombosis (DVT) of other vein of lower extremity, unspecified chronicity, unspecified laterality (Multi)     I82.499    Osteomyelitis, unspecified site, unspecified type (Multi)     M86.9    Trigeminal neuralgia     G50.0    Neurology follow-up encounter     Z09    PVD (peripheral vascular disease) (CMS-[facility])     I73.9    Acute myocardial infarction, unspecified MI type, unspecified artery (Multi)     I21.9    At risk for falling     Z91.81        1. Diabetes, on insulin.  2. DVT, anticoagulated.  3. Osteomyelitis.  Continue wound care.  Follow with Podiatry.  4. Trigeminal neuralgia, on pain control.  5. Neurology, on gabapentin.  6. Hypertension, medically controlled.  7. PVD, on medication.  8. AMI, on aspirin.  9. Osteoarthritis, on Tylenol.  10. Weakness, on PT/OT.  11. Fall risk, on fall precautions.    Scribe Attestation  By  signing my name below, I, Etienne West attest that this documentation has been prepared under the direction and in the presence of Marium Singh MD.     All medical record entries made by the alyshaibe were personally dictated by me I have reviewed the chart and agree the record accurately reflects my personal performance of his history physical examination and management      Electronically Signed By: Marium Singh MD   12/11/24 11:24 PM

## 2024-12-02 VITALS
SYSTOLIC BLOOD PRESSURE: 128 MMHG | RESPIRATION RATE: 16 BRPM | DIASTOLIC BLOOD PRESSURE: 82 MMHG | HEART RATE: 80 BPM | TEMPERATURE: 97.9 F

## 2024-12-02 ASSESSMENT — ENCOUNTER SYMPTOMS
CHILLS: 0
FEVER: 0

## 2024-12-02 NOTE — PROGRESS NOTES
PLACE OF SERVICE:  \[facility]"" [facility] [place].    This is a subsequent visit.    Subjective   Patient ID: Elliot Partida is a 74 y.o. male who presents for Follow-up.    Mr. Elliot Partida is a 74-year-old diabetic male with history of osteomyelitis to his left foot and ankle.  He has had recent DVT.  He is unable to care for himself.  He requires supportive care.    Review of Systems   Constitutional:  Negative for chills and fever.   Cardiovascular:  Negative for chest pain.   All other systems reviewed and are negative.    Objective   /82   Pulse 80   Temp 36.6 °C (97.9 °F)   Resp 16     Physical Exam  Vitals reviewed.   Constitutional:       General: He is not in acute distress.     Comments: This is a well-developed and well-nourished male, sitting in a chair.   HENT:      Right Ear: Tympanic membrane, ear canal and external ear normal.      Left Ear: Tympanic membrane, ear canal and external ear normal.   Eyes:      General: No scleral icterus.     Pupils: Pupils are equal, round, and reactive to light.   Neck:      Vascular: No carotid bruit.   Cardiovascular:      Heart sounds: Normal heart sounds, S1 normal and S2 normal. No murmur heard.     No friction rub.   Pulmonary:      Effort: Pulmonary effort is normal.      Breath sounds: Normal breath sounds and air entry.   Abdominal:      Palpations: There is no hepatomegaly, splenomegaly or mass.   Musculoskeletal:         General: No swelling or deformity. Normal range of motion.      Cervical back: Neck supple.      Right lower leg: No edema.      Left lower leg: No edema.   Lymphadenopathy:      Cervical: No cervical adenopathy.      Upper Body:      Right upper body: No axillary adenopathy.      Left upper body: No axillary adenopathy.      Lower Body: No right inguinal adenopathy. No left inguinal adenopathy.   Skin:     Comments: The patient's left foot and ankle is currently dressed.  There is no foul odor.  There  is no color drainage.   Neurological:      Mental Status: He is oriented to person, place, and time.      Cranial Nerves: Cranial nerves 2-12 are intact. No cranial nerve deficit.      Sensory: No sensory deficit.      Motor: Motor function is intact. No weakness.      Gait: Gait is intact.      Deep Tendon Reflexes: Reflexes normal.   Psychiatric:         Mood and Affect: Mood normal. Mood is not anxious or depressed. Affect is not angry.         Behavior: Behavior is not agitated.         Thought Content: Thought content normal.         Judgment: Judgment normal.     LAB WORK:  Laboratory studies were reviewed.    Assessment/Plan   Problem List Items Addressed This Visit             ICD-10-CM       Cardiac and Vasculature    Hypertension I10       Musculoskeletal and Injuries    Osteoarthritis M19.90       Symptoms and Signs    Weakness R53.1     Other Visit Diagnoses         Codes    Type 2 diabetes mellitus without complication, unspecified whether long term insulin use (Multi)    -  Primary E11.9    Deep vein thrombosis (DVT) of other vein of lower extremity, unspecified chronicity, unspecified laterality (Multi)     I82.499    Osteomyelitis, unspecified site, unspecified type (Multi)     M86.9    Trigeminal neuralgia     G50.0    Neurology follow-up encounter     Z09    PVD (peripheral vascular disease) (CMS-[facility])     I73.9    Acute myocardial infarction, unspecified MI type, unspecified artery (Multi)     I21.9    At risk for falling     Z91.81        1. Diabetes, on insulin.  2. DVT, anticoagulated.  3. Osteomyelitis.  Continue wound care.  Follow with Podiatry.  4. Trigeminal neuralgia, on pain control.  5. Neurology, on gabapentin.  6. Hypertension, medically controlled.  7. PVD, on medication.  8. AMI, on aspirin.  9. Osteoarthritis, on Tylenol.  10. Weakness, on PT/OT.  11. Fall risk, on fall precautions.    Scribe Attestation  By signing my name below, I, Johnna Frederick, Scribe attest that this documentation  has been prepared under the direction and in the presence of Marium Singh MD.     All medical record entries made by the scribe were personally dictated by me I have reviewed the chart and agree the record accurately reflects my personal performance of his history physical examination and management

## 2024-12-04 ENCOUNTER — TELEPHONE (OUTPATIENT)
Dept: CARDIOLOGY | Facility: CLINIC | Age: 74
End: 2024-12-04
Payer: MEDICARE

## 2024-12-05 ENCOUNTER — NURSING HOME VISIT (OUTPATIENT)
Dept: POST ACUTE CARE | Facility: EXTERNAL LOCATION | Age: 74
End: 2024-12-05
Payer: MEDICARE

## 2024-12-05 ENCOUNTER — APPOINTMENT (OUTPATIENT)
Dept: CARDIOLOGY | Facility: HOSPITAL | Age: 74
End: 2024-12-05
Payer: MEDICARE

## 2024-12-05 ENCOUNTER — APPOINTMENT (OUTPATIENT)
Dept: VASCULAR MEDICINE | Facility: HOSPITAL | Age: 74
End: 2024-12-05
Payer: MEDICARE

## 2024-12-05 DIAGNOSIS — M86.9 OSTEOMYELITIS OF LEFT FOOT, UNSPECIFIED TYPE (MULTI): Primary | ICD-10-CM

## 2024-12-05 DIAGNOSIS — R53.81 PHYSICAL DECONDITIONING: ICD-10-CM

## 2024-12-05 DIAGNOSIS — D32.9 MENINGIOMA (MULTI): ICD-10-CM

## 2024-12-05 DIAGNOSIS — R53.1 WEAKNESS: ICD-10-CM

## 2024-12-05 DIAGNOSIS — G50.0 TRIGEMINAL NEURALGIA: ICD-10-CM

## 2024-12-05 DIAGNOSIS — I82.432 ACUTE DEEP VEIN THROMBOSIS (DVT) OF POPLITEAL VEIN OF LEFT LOWER EXTREMITY (MULTI): ICD-10-CM

## 2024-12-05 DIAGNOSIS — G62.9 NEUROPATHY: ICD-10-CM

## 2024-12-05 DIAGNOSIS — I82.412 ACUTE DEEP VEIN THROMBOSIS (DVT) OF FEMORAL VEIN OF LEFT LOWER EXTREMITY (MULTI): ICD-10-CM

## 2024-12-05 DIAGNOSIS — K59.00 CONSTIPATION, UNSPECIFIED CONSTIPATION TYPE: ICD-10-CM

## 2024-12-05 DIAGNOSIS — I73.9 PVD (PERIPHERAL VASCULAR DISEASE) (CMS-HCC): ICD-10-CM

## 2024-12-05 PROCEDURE — 99309 SBSQ NF CARE MODERATE MDM 30: CPT | Performed by: NURSE PRACTITIONER

## 2024-12-05 NOTE — LETTER
"Patient: Elliot Partida  : 1950    Encounter Date: 2024    Chief Complaint:   SNF F/U  -Left foot osteomyelitis  -Left foot cellulitis  -Left foot diabetic ulcer  -PVD  -Venous insufficiency  -DVT of LLE  -Physical deconditioning/weakness  -Trigeminal neuralgia     HPI:   74 year-old male presenting to [facility] ER on 24 with \"months\" of LLE pain with edema, redness, and warmth. He was noted to have a large necrotic ulcer under his 5th toe. He reported that he had not seen a provider or taken any medications for about \"2 years\". Work-up in ER: Glu 310, Na+ 129, Alb 3.3, XR of L foot showed osteomyelitis of the 5th metatarsal head, US of LLE showed acute DVT in the left mid-distal femoral and popliteal veins. He was started on IV Heparin, IV Zosyn, and IV Vanco. He was admitted to the hospital for further evaluation and treatment. Hospital course:    Left foot osteomyelitis/cellulitis/DVT of LLE/PAD-IV ATB, IV heparin (transitioned to Eliquis), ID consult, podiatry consult, elevate LLE, local wound care, analgesics prn, patient transferred to [facility] for vascular consult on , underwent LLE revascularization with Dr. Stoddard on , recommending repeat BEN in 4 weeks with OP F/U, underwent L partial 5th ray resection on  with Dr. Spears, taken back to OR on  for delayed closure with graft application by Dr. 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 was HDS and discharged to [facility] on 10/2/24. On 11/5, patient was sent to [facility] ER for evaluation of R eye pain and ringing in R ear. He was treated with Excedrin in-house, which was not effective. In the ER, he was Dx with trigeminal neuralgia and started on tegretol. Today, patient reports resolution of eye pain and ringing in ear. He denies dizziness, HA, SOB, cough, or chest pain. He reports resolution of constipation. He continues to c/o intermittent L foot pain. He is wanting WB status. Staff report wounds are improving. No other clinical concerns noted at this time.     ROS:    As above in HPI. Otherwise, all other systems have been reviewed and are negative for complaint.    Medications reviewed and verified in NH chart.     Patient Active Problem List   Diagnosis   • Wound infection   • Osteomyelitis of left foot, unspecified type (Multi)   • Acute deep vein thrombosis (DVT) of popliteal vein of left lower extremity (Multi)   • Bilateral lower extremity edema   • CAD (coronary artery disease)   • Hypertension   • Inflammatory neuropathy (Multi)   • History of stroke   • Weakness of left lower extremity   • Ambulatory dysfunction   • Chronic bilateral low back pain with left-sided sciatica   • Diabetic peripheral neuropathy (Multi)   • Neural foraminal stenosis of lumbosacral spine   • Hyperlipidemia   • Lumbar back pain with radiculopathy affecting left lower extremity   • Peripheral arterial disease (CMS-[facility])   • Type 2 diabetes mellitus, without long-term current use of insulin (Multi)   • Anemia   • Obesity   • Delayed surgical wound healing of foot amputation stump (Multi)   • Impaired mobility and ADLs   • Weakness   • Thoracic radiculopathy   • Osteoarthritis   • Cellulitis of foot, left   • Meningioma (Multi)   • Tremor of left hand   • Diabetic ulcer of left foot associated with type 2 diabetes mellitus, limited to breakdown of skin   • Acute deep vein thrombosis  (DVT) of femoral vein of left lower extremity (Multi)   • Nonrheumatic aortic valve stenosis   • Diastolic dysfunction        Past Medical History:   Diagnosis Date   • Acute osteomyelitis of ankle and foot, left (Multi)    • AMI (acute myocardial infarction) (Multi)    • ASHD (arteriosclerotic heart disease)    • At risk for falls    • Cellulitis     left foot and ankle   • Diabetes mellitus (Multi)    • DVT (deep vein thrombosis) in pregnancy ([facility])    • Fall risk care plan declined    • Hypertension    • Myocardial infarction (Multi)    • Neuritis    • Neuropathy    • Osteoarthritis    • Osteomyelitis    • PVD (peripheral vascular disease) (CMS-HCC)    • Sprain of right knee 06/25/2015   • Stroke (Multi)    • 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     • FOOT RAY RESECTION Left 09/23/2024    L partial 5th ray resection (Dr. Spears, DPM)   • FOOT SURGERY Left 09/27/2024    Delayed closure w/ graft application (Dr. Huynh, DPM)   • INVASIVE VASCULAR PROCEDURE Bilateral 09/18/2024    Procedure: Lower Extremity Angiogram;  Surgeon: Teofilo Stoddard MD;  Location: [facility] Cardiac Cath Lab;  Service: Cardiovascular;  Laterality: Bilateral;   • IRIDOTOMY / IRIDECTOMY Bilateral        Family History   Problem Relation Name Age of Onset   • Heart disease Father     • Colon cancer Other     • Heart attack Other     • Diabetes Other     • Hypertension Other         Social History     Tobacco Use   Smoking Status Never   • Passive exposure: Never   Smokeless Tobacco Never       Social History     Substance and Sexual Activity   Alcohol Use Never       Social History     Substance and Sexual Activity   Drug Use Never       No Known Allergies     Vital Signs:    142/63-66-18-97.7-93% on RA    Physical Exam:  General: Sitting up in bed in NAD, alert   Head/Face: NCAT, symmetrical  Eyes: PERRLA, no injection, no discharge  ENT: Hearing not impaired, ears without scars or lesions, nasal mucosa and turbinates pink, septum midline, lips pink and moist  Neck: Supple, symmetrical  Respiratory: CTA but diminished without adventitious sounds, respirations even and nonlabored without use of accessory muscles, good air exchange  Cardio: RRR without murmur or gallops, normal S1S2, NP edema to BLE (L > R), pedal pulses 2+/4 bilaterally  Chest/Breast: Symmetrical  GI: BS x 4, normoactive, non-distended, abd round and soft, no masses or tenderness  : No suprapubic tenderness or distention  MSK: Gait not assessed, joints with full ROM without pain or contractures  Skin: Skin warm and dry, no induration, DTI of R heel, stage III pressure ulcer of L heel, vascular ulcer of L anterior lower leg, surgical wound of L lateral foot, skin tags to R buttocks (chronic per patient)   Neurologic: Cranial nerves II through XII intact, decreased sensation to BLE  Psychiatric: Alert to person, place, and time, calm and cooperative, expresses depression with current situation     Results/Data:   11/5/24: CMP WNL, Mag 2.03, CRP 1.34, Sed Rate 38, Hgb 10.3, Hct 32.7  10/30/24: Glu 139, Cr 0.6, Hgb 9.1, Hct 29.4, Sed Rate 46, CRP 2.3  10/23/24: [id number], Cr 0.6, Hgb 9.0, Hct 29.1, Iron 44, TIBC 261, Ferritin 21.1, Transferrin 193, Folate 4.4, Vit B12 358  10/16/24: Glu 135, Cr 0.6, Hgb 8.5, Hct 26.7  10/3/24: Glu 111, Cr 0.6, HgbA1C 7.0, Hgb 9.0, Hct 28.8    Assessment/Plan:  Left foot osteomyelitis/cellulitis/PAD/venous insufficiency-s/p LLE revascularization with Dr. Stoddard on 9/18, s/p L partial 5th ray resection on 9/23 with Dr. Spears, s/p delayed closure with graft application by Dr. Huynh on 9/27, c/w local wound care, c/w Vit C, MVI, and LPS Critical Care BID to promote wound healing, s/p Augmentin  (end date 10/9), c/w ASA and plavix, c/w Tylenol and Gabapentin, c/w Oxycodone prn for mod-severe pain, tizanidine prn for muscle spasms, wound care team following, seen by podiatry on 10/14, NWB of LLE (if wounds continue to improve will discuss WB status with wound care NP), F/U with podiatry as needed, F/U with vascular  DVT of LLE-c/w eliquis, elevate LLE, monitor H&H  Physical deconditioning/weakness-c/w PT/OT  Insomnia/depression-c/w trazodone and melatonin, supportive care, monitor behaviors, patient declines Psych services at this time   Anemia-c/w MVI, iron, and Vit B12, monitor CBC, iron panel, and Vit B12 level   CAD/HTN/Hx MI/HLD/diastolic dysfunction/AVS-FAVIAN diet, c/w ASA, hydralazine, and lisinopril, monitor BP, monitor lipid panel, needs established with cardiology after discharge  DM2 with peripheral neuropathy-LCS diet, accuchecks, c/w lispro ISS with meals, BP and lipid control, yearly eye exam, diabetic foot care, diabetic diet education (RD following), monitor HgbA1C  Hypokalemia-c/w KCl, monitor K+ level  Right frontal lobe meningioma-followed by Dr. Sin ([facility]), seen on 8/4/23 and advised to F/U with neuro-oncology [facility] in 2 years with MRI WWO for surveillance  Hx stroke/LLE weakness/L hand tremor-followed by neurology   Constipation-c/w miralax, monitor BMs  OA/lumbar radiculopathy/thoracic radiculopathy-Tylenol prn, F/U with specialists after discharge  Trigeminal neuralgia-c/w tegretol  Constipation-encourage fluids and fiber, c/w senna plus and miralax, monitor for BMs    Orders:  NNO    Code Status:   Full Code        Electronically Signed By: SARAH Jerome-CNP   1/6/25 12:22 PM

## 2024-12-09 ENCOUNTER — NURSING HOME VISIT (OUTPATIENT)
Dept: POST ACUTE CARE | Facility: EXTERNAL LOCATION | Age: 74
End: 2024-12-09
Payer: MEDICARE

## 2024-12-09 DIAGNOSIS — I73.9 PVD (PERIPHERAL VASCULAR DISEASE) (CMS-HCC): ICD-10-CM

## 2024-12-09 DIAGNOSIS — G62.9 NEUROPATHY: ICD-10-CM

## 2024-12-09 DIAGNOSIS — E11.9 TYPE 2 DIABETES MELLITUS WITHOUT COMPLICATION, UNSPECIFIED WHETHER LONG TERM INSULIN USE (MULTI): ICD-10-CM

## 2024-12-09 DIAGNOSIS — M86.9 OSTEOMYELITIS, UNSPECIFIED SITE, UNSPECIFIED TYPE (MULTI): Primary | ICD-10-CM

## 2024-12-09 DIAGNOSIS — M19.90 OSTEOARTHRITIS, UNSPECIFIED OSTEOARTHRITIS TYPE, UNSPECIFIED SITE: ICD-10-CM

## 2024-12-09 DIAGNOSIS — Z91.81 AT RISK FOR FALLING: ICD-10-CM

## 2024-12-09 DIAGNOSIS — I10 HYPERTENSION, UNSPECIFIED TYPE: ICD-10-CM

## 2024-12-09 DIAGNOSIS — G50.0 TRIGEMINAL NEURALGIA: ICD-10-CM

## 2024-12-09 DIAGNOSIS — I21.9 ACUTE MYOCARDIAL INFARCTION, UNSPECIFIED MI TYPE, UNSPECIFIED ARTERY (MULTI): ICD-10-CM

## 2024-12-09 DIAGNOSIS — I82.499 DEEP VEIN THROMBOSIS (DVT) OF OTHER VEIN OF LOWER EXTREMITY, UNSPECIFIED CHRONICITY, UNSPECIFIED LATERALITY (MULTI): ICD-10-CM

## 2024-12-09 DIAGNOSIS — R53.1 WEAKNESS: ICD-10-CM

## 2024-12-09 PROCEDURE — 99309 SBSQ NF CARE MODERATE MDM 30: CPT | Performed by: INTERNAL MEDICINE

## 2024-12-09 NOTE — LETTER
Patient: Elliot Partida  : 1950    Encounter Date: 2024    PLACE OF SERVICE:  [facility]    This is a subsequent visit.    Subjective  Patient ID: Elliot Partida is a 74 y.o. male who presents for Follow-up.    Mr. Elliot Partida is a 74-year-old diabetic male with history of chronic osteomyelitis to his left foot and ankle.  He is followed by Podiatry.  He requires wound care.    Review of Systems   Constitutional:  Negative for chills and fever.   Cardiovascular:  Negative for chest pain.   All other systems reviewed and are negative.    Objective  /82   Pulse 82   Temp 36.7 °C (98 °F)   Resp 16     Physical Exam  Vitals reviewed.   Constitutional:       General: He is not in acute distress.     Comments: This is a well-developed and well-nourished male, sitting in a chair.   HENT:      Right Ear: Tympanic membrane, ear canal and external ear normal.      Left Ear: Tympanic membrane, ear canal and external ear normal.   Eyes:      General: No scleral icterus.     Pupils: Pupils are equal, round, and reactive to light.   Neck:      Vascular: No carotid bruit.   Cardiovascular:      Heart sounds: Normal heart sounds, S1 normal and S2 normal. No murmur heard.     No friction rub.   Pulmonary:      Effort: Pulmonary effort is normal.      Breath sounds: Normal breath sounds and air entry.   Abdominal:      Palpations: There is no hepatomegaly, splenomegaly or mass.   Musculoskeletal:         General: No swelling or deformity. Normal range of motion.      Cervical back: Neck supple.      Right lower leg: No edema.      Left lower leg: No edema.      Comments: The patient's left foot and ankle is currently dressed.  There is no foul odor.  There is no color drainage.   Lymphadenopathy:      Cervical: No cervical adenopathy.      Upper Body:      Right upper body: No axillary adenopathy.      Left upper body: No axillary adenopathy.      Lower Body: No  right inguinal adenopathy. No left inguinal adenopathy.   Neurological:      Mental Status: He is oriented to person, place, and time.      Cranial Nerves: Cranial nerves 2-12 are intact. No cranial nerve deficit.      Sensory: No sensory deficit.      Motor: Motor function is intact. No weakness.      Gait: Gait is intact.      Deep Tendon Reflexes: Reflexes normal.   Psychiatric:         Mood and Affect: Mood normal. Mood is not anxious or depressed. Affect is not angry.         Behavior: Behavior is not agitated.         Thought Content: Thought content normal.         Judgment: Judgment normal.     LAB WORK: Laboratory studies reviewed.    Assessment/Plan  Problem List Items Addressed This Visit             ICD-10-CM       Cardiac and Vasculature    Hypertension I10       Musculoskeletal and Injuries    Osteoarthritis M19.90       Symptoms and Signs    Weakness R53.1     Other Visit Diagnoses         Codes    Osteomyelitis, unspecified site, unspecified type (Multi)    -  Primary M86.9    Deep vein thrombosis (DVT) of other vein of lower extremity, unspecified chronicity, unspecified laterality (Multi)     I82.499    Type 2 diabetes mellitus without complication, unspecified whether long term insulin use (Multi)     E11.9    Neuropathy     G62.9    Trigeminal neuralgia     G50.0    Acute myocardial infarction, unspecified MI type, unspecified artery (Multi)     I21.9    PVD (peripheral vascular disease) (CMS-[facility])     I73.9    At risk for falling     Z91.81        1. Osteomyelitis to left foot and ankle, continue wound care.  Follow with Podiatry.  2. DVT, anticoagulated.  3. Diabetes, on insulin.  4. Neuropathy, on gabapentin.  6. Hypertension, medically controlled.  6. Trigeminal neuralgia, on pain control.  7. AMI, on aspirin.  8. Osteoarthritis, on Tylenol.  9. PVD, on medication.  10. Weakness, on PT/OT.  11. Fall risk, on fall precautions.    Scribe Attestation  By signing my name below, IHanna,  Scribe attest that this documentation has been prepared under the direction and in the presence of Marium Singh MD.     All medical record entries made by the scribe were personally dictated by me I have reviewed the chart and agree the record accurately reflects my personal performance of his history physical examination and management      Electronically Signed By: Marium Singh MD   12/13/24 12:07 AM

## 2024-12-10 ENCOUNTER — NURSING HOME VISIT (OUTPATIENT)
Dept: POST ACUTE CARE | Facility: EXTERNAL LOCATION | Age: 74
End: 2024-12-10
Payer: MEDICARE

## 2024-12-10 DIAGNOSIS — I82.432 ACUTE DEEP VEIN THROMBOSIS (DVT) OF POPLITEAL VEIN OF LEFT LOWER EXTREMITY (MULTI): ICD-10-CM

## 2024-12-10 DIAGNOSIS — G50.0 TRIGEMINAL NEURALGIA: ICD-10-CM

## 2024-12-10 DIAGNOSIS — I73.9 PVD (PERIPHERAL VASCULAR DISEASE) (CMS-HCC): ICD-10-CM

## 2024-12-10 DIAGNOSIS — G62.9 NEUROPATHY: ICD-10-CM

## 2024-12-10 DIAGNOSIS — E11.9 TYPE 2 DIABETES MELLITUS WITHOUT COMPLICATION, UNSPECIFIED WHETHER LONG TERM INSULIN USE (MULTI): ICD-10-CM

## 2024-12-10 DIAGNOSIS — I10 HYPERTENSION, UNSPECIFIED TYPE: ICD-10-CM

## 2024-12-10 DIAGNOSIS — F32.A DEPRESSION, UNSPECIFIED DEPRESSION TYPE: ICD-10-CM

## 2024-12-10 DIAGNOSIS — M86.9 OSTEOMYELITIS OF LEFT FOOT, UNSPECIFIED TYPE (MULTI): Primary | ICD-10-CM

## 2024-12-10 DIAGNOSIS — I82.412 ACUTE DEEP VEIN THROMBOSIS (DVT) OF FEMORAL VEIN OF LEFT LOWER EXTREMITY (MULTI): ICD-10-CM

## 2024-12-10 DIAGNOSIS — I25.10 CORONARY ARTERY DISEASE INVOLVING NATIVE CORONARY ARTERY OF NATIVE HEART WITHOUT ANGINA PECTORIS: ICD-10-CM

## 2024-12-10 PROCEDURE — 99309 SBSQ NF CARE MODERATE MDM 30: CPT | Performed by: NURSE PRACTITIONER

## 2024-12-10 NOTE — LETTER
"Patient: Elliot Partida  : 1950    Encounter Date: 12/10/2024    Chief Complaint:   SNF F/U  -Left foot osteomyelitis  -Left foot cellulitis  -Left foot diabetic ulcer  -PVD  -Venous insufficiency  -DVT of LLE  -Physical deconditioning/weakness  -Trigeminal neuralgia     HPI:   74 year-old male presenting to [facility] ER on 24 with \"months\" of LLE pain with edema, redness, and warmth. He was noted to have a large necrotic ulcer under his 5th toe. He reported that he had not seen a provider or taken any medications for about \"2 years\". Work-up in ER: Glu 310, Na+ 129, Alb 3.3, XR of L foot showed osteomyelitis of the 5th metatarsal head, US of LLE showed acute DVT in the left mid-distal femoral and popliteal veins. He was started on IV Heparin, IV Zosyn, and IV Vanco. He was admitted to the hospital for further evaluation and treatment. Hospital course:    Left foot osteomyelitis/cellulitis/DVT of LLE/PAD-IV ATB, IV heparin (transitioned to Eliquis), ID consult, podiatry consult, elevate LLE, local wound care, analgesics prn, patient transferred to [facility] for vascular consult on , underwent LLE revascularization with Dr. Stoddard on , recommending repeat BEN in 4 weeks with OP F/U, underwent L partial 5th ray resection on  with Dr. Spears, taken back to OR on  for delayed closure with graft application by Dr. 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 was HDS and discharged to [facility] on 10/2/24. On 11/5, patient was sent to [facility] ER for evaluation of R eye pain and ringing in R ear. He was treated with Excedrin in-house, which was not effective. In the ER, he was Dx with trigeminal neuralgia and started on tegretol. Today, patient reports resolution of eye pain and ringing in ear. He denies dizziness, HA, SOB, cough, or chest pain. He reports resolution of constipation. He continues to c/o intermittent L foot pain. Staff report wounds are improving. No other clinical concerns noted at this time.     ROS:    As above in HPI. Otherwise, all other systems have been reviewed and are negative for complaint.    Medications reviewed and verified in NH chart.     Patient Active Problem List   Diagnosis   • Wound infection   • Osteomyelitis of left foot, unspecified type (Multi)   • Acute deep vein thrombosis (DVT) of popliteal vein of left lower extremity (Multi)   • Bilateral lower extremity edema   • CAD (coronary artery disease)   • Hypertension   • Inflammatory neuropathy (Multi)   • History of stroke   • Weakness of left lower extremity   • Ambulatory dysfunction   • Chronic bilateral low back pain with left-sided sciatica   • Diabetic peripheral neuropathy (Multi)   • Neural foraminal stenosis of lumbosacral spine   • Hyperlipidemia   • Lumbar back pain with radiculopathy affecting left lower extremity   • Peripheral arterial disease (CMS-HCC)   • Type 2 diabetes mellitus, without long-term current use of insulin (Multi)   • Anemia   • Obesity   • Delayed surgical wound healing of foot amputation stump (Multi)   • Impaired mobility and ADLs   • Weakness   • Thoracic radiculopathy   • Osteoarthritis   • Cellulitis of foot, left   • Meningioma (Multi)   • Tremor of left hand   • Diabetic ulcer of left foot associated with type 2 diabetes mellitus, limited to breakdown of skin   • Acute deep vein thrombosis (DVT) of femoral vein of  left lower extremity (Multi)   • Nonrheumatic aortic valve stenosis   • Diastolic dysfunction        Past Medical History:   Diagnosis Date   • Acute osteomyelitis of ankle and foot, left (Multi)    • AMI (acute myocardial infarction) (Multi)    • ASHD (arteriosclerotic heart disease)    • At risk for falls    • Cellulitis     left foot and ankle   • Diabetes mellitus (Multi)    • DVT (deep vein thrombosis) in pregnancy ([facility])    • Fall risk care plan declined    • Hypertension    • Myocardial infarction (Multi)    • Neuritis    • Neuropathy    • Osteoarthritis    • Osteomyelitis    • PVD (peripheral vascular disease) (CMS-HCC)    • Sprain of right knee 06/25/2015   • Stroke (Multi)    • 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     • FOOT RAY RESECTION Left 09/23/2024    L partial 5th ray resection (Dr. Spears, DPM)   • FOOT SURGERY Left 09/27/2024    Delayed closure w/ graft application (Dr. Huynh, DPM)   • INVASIVE VASCULAR PROCEDURE Bilateral 09/18/2024    Procedure: Lower Extremity Angiogram;  Surgeon: Teofilo Stoddard MD;  Location: [facility] Cardiac Cath Lab;  Service: Cardiovascular;  Laterality: Bilateral;   • IRIDOTOMY / IRIDECTOMY Bilateral        Family History   Problem Relation Name Age of Onset   • Heart disease Father     • Colon cancer Other     • Heart attack Other     • Diabetes Other     • Hypertension Other         Social History     Tobacco Use   Smoking Status Never   • Passive exposure: Never   Smokeless Tobacco Never       Social History     Substance and Sexual Activity   Alcohol Use Never       Social History     Substance and Sexual Activity   Drug Use Never       No Known Allergies     Vital Signs:   142/63-66-18-97.7-93% on  RA    Physical Exam:  General: Sitting up in WC in NAD, alert   Head/Face: NCAT, symmetrical  Eyes: PERRLA, no injection, no discharge  ENT: Hearing not impaired, ears without scars or lesions, nasal mucosa and turbinates pink, septum midline, lips pink and moist  Neck: Supple, symmetrical  Respiratory: CTA but diminished without adventitious sounds, respirations even and nonlabored without use of accessory muscles, good air exchange  Cardio: RRR without murmur or gallops, normal S1S2, NP edema to BLE (L > R), pedal pulses 2+/4 bilaterally  Chest/Breast: Symmetrical  GI: BS x 4, normoactive, non-distended, abd round and soft, no masses or tenderness  : No suprapubic tenderness or distention  MSK: Gait not assessed, joints with full ROM without pain or contractures  Skin: Skin warm and dry, no induration, DTI of R heel, vascular ulcer of L anterior lower leg, surgical wound of L lateral foot, skin tags to R buttocks (chronic per patient)   Neurologic: Cranial nerves II through XII intact, decreased sensation to BLE  Psychiatric: Alert to person, place, and time, calm and cooperative, expresses depression with current situation     Results/Data:   11/5/24: CMP WNL, Mag 2.03, CRP 1.34, Sed Rate 38, Hgb 10.3, Hct 32.7  10/30/24: Glu 139, Cr 0.6, Hgb 9.1, Hct 29.4, Sed Rate 46, CRP 2.3  10/23/24: [id number], Cr 0.6, Hgb 9.0, Hct 29.1, Iron 44, TIBC 261, Ferritin 21.1, Transferrin 193, Folate 4.4, Vit B12 358  10/16/24: Glu 135, Cr 0.6, Hgb 8.5, Hct 26.7  10/3/24: Glu 111, Cr 0.6, HgbA1C 7.0, Hgb 9.0, Hct 28.8    Assessment/Plan:  Left foot osteomyelitis/cellulitis/PAD/venous insufficiency-s/p LLE revascularization with Dr. Stoddard on 9/18, s/p L partial 5th ray resection on 9/23 with Dr. Spears, s/p delayed closure with graft application by Dr. Huynh on 9/27, c/w local wound care, c/w Vit C, MVI, and LPS Critical Care BID to promote wound healing, s/p Augmentin (end date 10/9), c/w ASA and plavix, c/w Tylenol and Gabapentin,  c/w Oxycodone prn for mod-severe pain, tizanidine prn for muscle spasms, wound care team following, seen by podiatry on 10/14, WBAT to LLE, F/U with podiatry as needed, F/U with vascular  DVT of LLE-c/w eliquis, elevate LLE, monitor H&H  Physical deconditioning/weakness-c/w PT/OT  Insomnia/depression-c/w trazodone and melatonin, supportive care, monitor behaviors, patient declines Psych services at this time   Anemia-c/w MVI, iron, and Vit B12, monitor CBC, iron panel, and Vit B12 level   CAD/HTN/Hx MI/HLD/diastolic dysfunction/AVS-FAVIAN diet, c/w ASA, hydralazine, and lisinopril, monitor BP, monitor lipid panel, needs established with cardiology after discharge  DM2 with peripheral neuropathy-LCS diet, accuchecks, c/w lispro ISS with meals, BP and lipid control, yearly eye exam, diabetic foot care, diabetic diet education (RD following), monitor HgbA1C  Hypokalemia-c/w KCl, monitor K+ level  Right frontal lobe meningioma-followed by Dr. Sin ([facility]), seen on 8/4/23 and advised to F/U with neuro-oncology [facility] in 2 years with MRI [facility] for surveillance  Hx stroke/LLE weakness/L hand tremor-followed by neurology   Constipation-c/w miralax, monitor BMs  OA/lumbar radiculopathy/thoracic radiculopathy-Tylenol prn, F/U with specialists after discharge  Trigeminal neuralgia-c/w tegretol  Constipation-encourage fluids and fiber, c/w senna plus and miralax, monitor for BMs    Orders:  WBAT to LLE    Code Status:   Full Code        Electronically Signed By: SARAH Jerome-CNP   1/6/25  1:06 PM

## 2024-12-11 VITALS
TEMPERATURE: 98 F | RESPIRATION RATE: 16 BRPM | HEART RATE: 82 BPM | DIASTOLIC BLOOD PRESSURE: 82 MMHG | SYSTOLIC BLOOD PRESSURE: 126 MMHG

## 2024-12-11 ASSESSMENT — ENCOUNTER SYMPTOMS
CHILLS: 0
FEVER: 0

## 2024-12-11 NOTE — PROGRESS NOTES
PLACE OF SERVICE:  [facility] [place]    This is a subsequent visit.    Subjective   Patient ID: Elliot Partida is a 74 y.o. male who presents for Follow-up.    Mr. Elliot Partida is a 74-year-old diabetic male with history of chronic osteomyelitis to his left foot and ankle.  He is followed by Podiatry.  He requires wound care.    Review of Systems   Constitutional:  Negative for chills and fever.   Cardiovascular:  Negative for chest pain.   All other systems reviewed and are negative.    Objective   /82   Pulse 82   Temp 36.7 °C (98 °F)   Resp 16     Physical Exam  Vitals reviewed.   Constitutional:       General: He is not in acute distress.     Comments: This is a well-developed and well-nourished male, sitting in a chair.   HENT:      Right Ear: Tympanic membrane, ear canal and external ear normal.      Left Ear: Tympanic membrane, ear canal and external ear normal.   Eyes:      General: No scleral icterus.     Pupils: Pupils are equal, round, and reactive to light.   Neck:      Vascular: No carotid bruit.   Cardiovascular:      Heart sounds: Normal heart sounds, S1 normal and S2 normal. No murmur heard.     No friction rub.   Pulmonary:      Effort: Pulmonary effort is normal.      Breath sounds: Normal breath sounds and air entry.   Abdominal:      Palpations: There is no hepatomegaly, splenomegaly or mass.   Musculoskeletal:         General: No swelling or deformity. Normal range of motion.      Cervical back: Neck supple.      Right lower leg: No edema.      Left lower leg: No edema.      Comments: The patient's left foot and ankle is currently dressed.  There is no foul odor.  There is no color drainage.   Lymphadenopathy:      Cervical: No cervical adenopathy.      Upper Body:      Right upper body: No axillary adenopathy.      Left upper body: No axillary adenopathy.      Lower Body: No right inguinal adenopathy. No left inguinal adenopathy.   Neurological:       Mental Status: He is oriented to person, place, and time.      Cranial Nerves: Cranial nerves 2-12 are intact. No cranial nerve deficit.      Sensory: No sensory deficit.      Motor: Motor function is intact. No weakness.      Gait: Gait is intact.      Deep Tendon Reflexes: Reflexes normal.   Psychiatric:         Mood and Affect: Mood normal. Mood is not anxious or depressed. Affect is not angry.         Behavior: Behavior is not agitated.         Thought Content: Thought content normal.         Judgment: Judgment normal.     LAB WORK: Laboratory studies reviewed.    Assessment/Plan   Problem List Items Addressed This Visit             ICD-10-CM       Cardiac and Vasculature    Hypertension I10       Musculoskeletal and Injuries    Osteoarthritis M19.90       Symptoms and Signs    Weakness R53.1     Other Visit Diagnoses         Codes    Osteomyelitis, unspecified site, unspecified type (Multi)    -  Primary M86.9    Deep vein thrombosis (DVT) of other vein of lower extremity, unspecified chronicity, unspecified laterality (Multi)     I82.499    Type 2 diabetes mellitus without complication, unspecified whether long term insulin use (Multi)     E11.9    Neuropathy     G62.9    Trigeminal neuralgia     G50.0    Acute myocardial infarction, unspecified MI type, unspecified artery (Multi)     I21.9    PVD (peripheral vascular disease) (CMS-[facility])     I73.9    At risk for falling     Z91.81        1. Osteomyelitis to left foot and ankle, continue wound care.  Follow with Podiatry.  2. DVT, anticoagulated.  3. Diabetes, on insulin.  4. Neuropathy, on gabapentin.  6. Hypertension, medically controlled.  6. Trigeminal neuralgia, on pain control.  7. AMI, on aspirin.  8. Osteoarthritis, on Tylenol.  9. PVD, on medication.  10. Weakness, on PT/OT.  11. Fall risk, on fall precautions.    Scribe Attestation  By signing my name below, Hanna OLIVER, Etienne attest that this documentation has been prepared under the direction  and in the presence of Marium Singh MD.     All medical record entries made by the scribe were personally dictated by me I have reviewed the chart and agree the record accurately reflects my personal performance of his history physical examination and management

## 2024-12-15 ENCOUNTER — NURSING HOME VISIT (OUTPATIENT)
Dept: POST ACUTE CARE | Facility: EXTERNAL LOCATION | Age: 74
End: 2024-12-15
Payer: MEDICARE

## 2024-12-15 DIAGNOSIS — E11.9 TYPE 2 DIABETES MELLITUS WITHOUT COMPLICATION, UNSPECIFIED WHETHER LONG TERM INSULIN USE (MULTI): Primary | ICD-10-CM

## 2024-12-15 DIAGNOSIS — R53.1 WEAKNESS: ICD-10-CM

## 2024-12-15 DIAGNOSIS — I73.9 PVD (PERIPHERAL VASCULAR DISEASE) (CMS-HCC): ICD-10-CM

## 2024-12-15 DIAGNOSIS — L03.116 CELLULITIS OF FOOT, LEFT: ICD-10-CM

## 2024-12-15 DIAGNOSIS — I10 HYPERTENSION, UNSPECIFIED TYPE: ICD-10-CM

## 2024-12-15 DIAGNOSIS — I82.499 DEEP VEIN THROMBOSIS (DVT) OF OTHER VEIN OF LOWER EXTREMITY, UNSPECIFIED CHRONICITY, UNSPECIFIED LATERALITY (MULTI): ICD-10-CM

## 2024-12-15 DIAGNOSIS — M19.90 OSTEOARTHRITIS, UNSPECIFIED OSTEOARTHRITIS TYPE, UNSPECIFIED SITE: ICD-10-CM

## 2024-12-15 DIAGNOSIS — I21.9 ACUTE MYOCARDIAL INFARCTION, UNSPECIFIED MI TYPE, UNSPECIFIED ARTERY (MULTI): ICD-10-CM

## 2024-12-15 DIAGNOSIS — Z91.81 AT RISK FOR FALLING: ICD-10-CM

## 2024-12-15 DIAGNOSIS — M86.9 OSTEOMYELITIS, UNSPECIFIED SITE, UNSPECIFIED TYPE (MULTI): ICD-10-CM

## 2024-12-15 PROCEDURE — 99308 SBSQ NF CARE LOW MDM 20: CPT | Performed by: INTERNAL MEDICINE

## 2024-12-15 NOTE — LETTER
Patient: Elliot Partida  : 1950    Encounter Date: 12/15/2024    PLACE OF SERVICE:  [facility] [place]    This is a subsequent visit.    Subjective  Patient ID: Elliot Partida is a 74 y.o. male who presents for Follow-up.    Mr. Elliot Partida is a 74-year-old male with history of diabetes.  He has history of osteomyelitis to his left foot and ankle with surrounding cellulitis.  He is unable to care for himself and requires supportive care.    Review of Systems   Constitutional:  Negative for chills and fever.   Cardiovascular:  Negative for chest pain.   All other systems reviewed and are negative.    Objective  /78   Pulse 78   Temp 36.6 °C (97.9 °F)   Resp 16     Physical Exam  Vitals reviewed.   Constitutional:       General: He is not in acute distress.     Comments: This is a well-developed and well-nourished male, sitting in a chair.   HENT:      Right Ear: Tympanic membrane, ear canal and external ear normal.      Left Ear: Tympanic membrane, ear canal and external ear normal.   Eyes:      General: No scleral icterus.     Pupils: Pupils are equal, round, and reactive to light.   Neck:      Vascular: No carotid bruit.   Cardiovascular:      Heart sounds: Normal heart sounds, S1 normal and S2 normal. No murmur heard.     No friction rub.   Pulmonary:      Effort: Pulmonary effort is normal.      Breath sounds: Normal breath sounds and air entry.   Abdominal:      Palpations: There is no hepatomegaly, splenomegaly or mass.   Musculoskeletal:         General: No swelling or deformity. Normal range of motion.      Cervical back: Neck supple.      Right lower leg: No edema.      Left lower leg: No edema.      Comments: There is dressing to the patient's left foot and ankle.  There is no foul odor.  There is no color drainage.   Lymphadenopathy:      Cervical: No cervical adenopathy.      Upper Body:      Right upper body: No axillary adenopathy.      Left upper  body: No axillary adenopathy.      Lower Body: No right inguinal adenopathy. No left inguinal adenopathy.   Neurological:      Mental Status: He is oriented to person, place, and time.      Cranial Nerves: Cranial nerves 2-12 are intact. No cranial nerve deficit.      Sensory: No sensory deficit.      Motor: Motor function is intact. No weakness.      Gait: Gait is intact.      Deep Tendon Reflexes: Reflexes normal.   Psychiatric:         Mood and Affect: Mood normal. Mood is not anxious or depressed. Affect is not angry.         Behavior: Behavior is not agitated.         Thought Content: Thought content normal.         Judgment: Judgment normal.     LAB WORK: Laboratory studies reviewed.    Assessment/Plan  Problem List Items Addressed This Visit             ICD-10-CM       Cardiac and Vasculature    Hypertension I10       Infectious Diseases    Cellulitis of foot, left L03.116       Musculoskeletal and Injuries    Osteoarthritis M19.90       Symptoms and Signs    Weakness R53.1     Other Visit Diagnoses         Codes    Type 2 diabetes mellitus without complication, unspecified whether long term insulin use (Multi)    -  Primary E11.9    Osteomyelitis, unspecified site, unspecified type (Multi)     M86.9    Deep vein thrombosis (DVT) of other vein of lower extremity, unspecified chronicity, unspecified laterality (Multi)     I82.499    PVD (peripheral vascular disease) (CMS-[facility])     I73.9    Acute myocardial infarction, unspecified MI type, unspecified artery (Multi)     I21.9    At risk for falling     Z91.81        1. Diabetes, on insulin.  2. Osteomyelitis, continue wound care.  Follow with Podiatry.  3. Cellulitis, improving.  4. DVT, anticoagulated.  5. PVD, on medication.  6. History of AMI, on aspirin.  7. Osteoarthritis, on Tylenol.  8. Hypertension, med controlled.  9. Weakness, on PT/OT.  10. Fall risk, on fall precautions.    Scribe Attestation  By signing my name below, Hanna OLIVER, Jaibcecilia attest  that this documentation has been prepared under the direction and in the presence of Marium Singh MD.     All medical record entries made by the scribe were personally dictated by me I have reviewed the chart and agree the record accurately reflects my personal performance of his history physical examination and management      Electronically Signed By: Marium Singh MD   12/19/24 11:42 PM

## 2024-12-17 ENCOUNTER — NURSING HOME VISIT (OUTPATIENT)
Dept: POST ACUTE CARE | Facility: EXTERNAL LOCATION | Age: 74
End: 2024-12-17
Payer: MEDICARE

## 2024-12-17 DIAGNOSIS — R53.81 PHYSICAL DECONDITIONING: ICD-10-CM

## 2024-12-17 DIAGNOSIS — G50.0 TRIGEMINAL NEURALGIA: ICD-10-CM

## 2024-12-17 DIAGNOSIS — K59.00 CONSTIPATION, UNSPECIFIED CONSTIPATION TYPE: ICD-10-CM

## 2024-12-17 DIAGNOSIS — E11.9 TYPE 2 DIABETES MELLITUS WITHOUT COMPLICATION, UNSPECIFIED WHETHER LONG TERM INSULIN USE (MULTI): ICD-10-CM

## 2024-12-17 DIAGNOSIS — F32.A DEPRESSION, UNSPECIFIED DEPRESSION TYPE: ICD-10-CM

## 2024-12-17 DIAGNOSIS — G62.9 NEUROPATHY: Primary | ICD-10-CM

## 2024-12-17 DIAGNOSIS — I82.412 ACUTE DEEP VEIN THROMBOSIS (DVT) OF FEMORAL VEIN OF LEFT LOWER EXTREMITY (MULTI): ICD-10-CM

## 2024-12-17 DIAGNOSIS — I82.432 ACUTE DEEP VEIN THROMBOSIS (DVT) OF POPLITEAL VEIN OF LEFT LOWER EXTREMITY (MULTI): ICD-10-CM

## 2024-12-17 PROCEDURE — 99309 SBSQ NF CARE MODERATE MDM 30: CPT | Performed by: NURSE PRACTITIONER

## 2024-12-17 NOTE — LETTER
"Patient: Elliot Partida  : 1950    Encounter Date: 2024    Chief Complaint:   SNF F/U  -Left foot osteomyelitis  -Left foot cellulitis  -Left foot diabetic ulcer  -PVD  -Venous insufficiency  -DVT of LLE  -Physical deconditioning/weakness  -Trigeminal neuralgia     HPI:   74 year-old male presenting to [facility] ER on 24 with \"months\" of LLE pain with edema, redness, and warmth. He was noted to have a large necrotic ulcer under his 5th toe. He reported that he had not seen a provider or taken any medications for about \"2 years\". Work-up in ER: Glu 310, Na+ 129, Alb 3.3, XR of L foot showed osteomyelitis of the 5th metatarsal head, US of LLE showed acute DVT in the left mid-distal femoral and popliteal veins. He was started on IV Heparin, IV Zosyn, and IV Vanco. He was admitted to the hospital for further evaluation and treatment. Hospital course:    Left foot osteomyelitis/cellulitis/DVT of LLE/PAD-IV ATB, IV heparin (transitioned to Eliquis), ID consult, podiatry consult, elevate LLE, local wound care, analgesics prn, patient transferred to [facility] for vascular consult on , underwent LLE revascularization with Dr. Stoddard on , recommending repeat BEN in 4 weeks with OP F/U, underwent L partial 5th ray resection on  with Dr. Spears, taken back to OR on  for delayed closure with graft application by Dr. 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 was HDS and discharged to [facility] on 10/2/24. On 11/5, patient was sent to [facility] ER for evaluation of R eye pain and ringing in R ear. He was treated with Excedrin in-house, which was not effective. In the ER, he was Dx with trigeminal neuralgia and started on tegretol. Today, patient reports resolution of eye pain and ringing in ear. He denies dizziness, HA, SOB, cough, or chest pain. He reports resolution of constipation. He continues to c/o intermittent L foot pain. Gabapentin dosage was increased on 12/16. No other clinical concerns noted at this time.     ROS:    As above in HPI. Otherwise, all other systems have been reviewed and are negative for complaint.    Medications reviewed and verified in NH chart.     Patient Active Problem List   Diagnosis   • Wound infection   • Osteomyelitis of left foot, unspecified type (Multi)   • Acute deep vein thrombosis (DVT) of popliteal vein of left lower extremity (Multi)   • Bilateral lower extremity edema   • CAD (coronary artery disease)   • Hypertension   • Inflammatory neuropathy (Multi)   • History of stroke   • Weakness of left lower extremity   • Ambulatory dysfunction   • Chronic bilateral low back pain with left-sided sciatica   • Diabetic peripheral neuropathy (Multi)   • Neural foraminal stenosis of lumbosacral spine   • Hyperlipidemia   • Lumbar back pain with radiculopathy affecting left lower extremity   • Peripheral arterial disease (CMS-HCC)   • Type 2 diabetes mellitus, without long-term current use of insulin (Multi)   • Anemia   • Obesity   • Delayed surgical wound healing of foot amputation stump (Multi)   • Impaired mobility and ADLs   • Weakness   • Thoracic radiculopathy   • Osteoarthritis   • Cellulitis of foot, left   • Meningioma (Multi)   • Tremor of left hand   • Diabetic ulcer of left foot associated with type 2 diabetes mellitus, limited to breakdown of skin   • Acute deep vein thrombosis (DVT) of femoral  vein of left lower extremity (Multi)   • Nonrheumatic aortic valve stenosis   • Diastolic dysfunction        Past Medical History:   Diagnosis Date   • Acute osteomyelitis of ankle and foot, left (Multi)    • AMI (acute myocardial infarction) (Multi)    • ASHD (arteriosclerotic heart disease)    • At risk for falls    • Cellulitis     left foot and ankle   • Diabetes mellitus (Multi)    • DVT (deep vein thrombosis) in pregnancy ([facility])    • Fall risk care plan declined    • Hypertension    • Myocardial infarction (Multi)    • Neuritis    • Neuropathy    • Osteoarthritis    • Osteomyelitis    • PVD (peripheral vascular disease) (CMS-HCC)    • Sprain of right knee 06/25/2015   • Stroke (Multi)    • 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     • FOOT RAY RESECTION Left 09/23/2024    L partial 5th ray resection (Dr. Spears, DPM)   • FOOT SURGERY Left 09/27/2024    Delayed closure w/ graft application (Dr. Huynh, DPM)   • INVASIVE VASCULAR PROCEDURE Bilateral 09/18/2024    Procedure: Lower Extremity Angiogram;  Surgeon: Teofilo Stoddard MD;  Location: [facility];  Service: Cardiovascular;  Laterality: Bilateral;   • IRIDOTOMY / IRIDECTOMY Bilateral        Family History   Problem Relation Name Age of Onset   • Heart disease Father     • Colon cancer Other     • Heart attack Other     • Diabetes Other     • Hypertension Other         Social History     Tobacco Use   Smoking Status Never   • Passive exposure: Never   Smokeless Tobacco Never       Social History     Substance and Sexual Activity   Alcohol Use Never       Social History     Substance and Sexual Activity   Drug Use Never       No Known Allergies     Vital Signs:    170/80-71-18-97.8-95% on RA    Physical Exam:  General: Sitting up in WC in NAD, alert   Head/Face: NCAT, symmetrical  Eyes: PERRLA, no injection, no discharge  ENT: Hearing not impaired, ears without scars or lesions, nasal mucosa and turbinates pink, septum midline, lips pink and moist  Neck: Supple, symmetrical  Respiratory: CTA but diminished without adventitious sounds, respirations even and nonlabored without use of accessory muscles, good air exchange  Cardio: RRR without murmur or gallops, normal S1S2, NP edema to BLE (L > R), pedal pulses 2+/4 bilaterally  Chest/Breast: Symmetrical  GI: BS x 4, normoactive, non-distended, abd round and soft, no masses or tenderness  : No suprapubic tenderness or distention  MSK: Gait not assessed, joints with full ROM without pain or contractures  Skin: Skin warm and dry, no induration, DTI of R heel, vascular ulcer of L anterior lower leg, surgical wound of L lateral foot, skin tags to R buttocks (chronic per patient)   Neurologic: Cranial nerves II through XII intact, decreased sensation to BLE  Psychiatric: Alert to person, place, and time, calm and cooperative, expresses depression with current situation     Results/Data:   11/5/24: CMP WNL, Mag 2.03, CRP 1.34, Sed Rate 38, Hgb 10.3, Hct 32.7  10/30/24: Glu 139, Cr 0.6, Hgb 9.1, Hct 29.4, Sed Rate 46, CRP 2.3  10/23/24: [id number], Cr 0.6, Hgb 9.0, Hct 29.1, Iron 44, TIBC 261, Ferritin 21.1, Transferrin 193, Folate 4.4, Vit B12 358  10/16/24: Glu 135, Cr 0.6, Hgb 8.5, Hct 26.7  10/3/24: Glu 111, Cr 0.6, HgbA1C 7.0, Hgb 9.0, Hct 28.8    Assessment/Plan:  Left foot osteomyelitis/cellulitis/PAD/venous insufficiency-s/p LLE revascularization with Dr. Stoddard on 9/18, s/p L partial 5th ray resection on 9/23 with Dr. Spears, s/p delayed closure with graft application by Dr. Huynh on 9/27, c/w local wound care, c/w Vit C, MVI, and LPS Critical Care BID to promote wound healing, s/p Augmentin (end date 10/9), c/w ASA and plavix,  c/w Tylenol and Gabapentin, c/w Oxycodone prn for mod-severe pain, tizanidine prn for muscle spasms, wound care team following, seen by podiatry on 10/14, WBAT to LLE, F/U with podiatry as needed, F/U with vascular  DVT of LLE-c/w eliquis, elevate LLE, monitor H&H  Physical deconditioning/weakness-c/w PT/OT  Insomnia/depression-c/w trazodone and melatonin, supportive care, monitor behaviors, patient declines Psych services at this time   Anemia-c/w MVI, iron, and Vit B12, monitor CBC, iron panel, and Vit B12 level   CAD/HTN/Hx MI/HLD/diastolic dysfunction/AVS-FAVIAN diet, c/w ASA, hydralazine, and lisinopril, monitor BP, monitor lipid panel, needs established with cardiology after discharge  DM2 with peripheral neuropathy-LCS diet, accuchecks, c/w lispro ISS with meals, BP and lipid control, yearly eye exam, diabetic foot care, diabetic diet education (RD following), monitor HgbA1C  Hypokalemia-c/w KCl, monitor K+ level  Right frontal lobe meningioma-followed by Dr. Sin ([facility]), seen on 8/4/23 and advised to F/U with neuro-oncology [facility] in 2 years with MRI for surveillance  Hx stroke/LLE weakness/L hand tremor-followed by neurology   Constipation-c/w miralax, monitor BMs  OA/lumbar radiculopathy/thoracic radiculopathy-Tylenol prn, F/U with specialists after discharge  Trigeminal neuralgia-c/w tegretol  Constipation-encourage fluids and fiber, c/w senna plus and miralax, monitor for BMs    Orders:  CBC w/ diff and BMP on 12/18    Code Status:   Full Code        Electronically Signed By: SARAH Jerome-CNP   1/6/25  1:16 PM

## 2024-12-18 VITALS
TEMPERATURE: 97.9 F | SYSTOLIC BLOOD PRESSURE: 126 MMHG | RESPIRATION RATE: 16 BRPM | HEART RATE: 78 BPM | DIASTOLIC BLOOD PRESSURE: 78 MMHG

## 2024-12-18 ASSESSMENT — ENCOUNTER SYMPTOMS
CHILLS: 0
FEVER: 0

## 2024-12-18 NOTE — PROGRESS NOTES
PLACE OF SERVICE:  \[facility] [place]    This is a subsequent visit.    Subjective   Patient ID: Elliot Partida is a 74 y.o. male who presents for Follow-up.    Mr. Elliot Partida is a 74-year-old male with history of diabetes.  He has history of osteomyelitis to his left foot and ankle with surrounding cellulitis.  He is unable to care for himself and requires supportive care.    Review of Systems   Constitutional:  Negative for chills and fever.   Cardiovascular:  Negative for chest pain.   All other systems reviewed and are negative.    Objective   /78   Pulse 78   Temp 36.6 °C (97.9 °F)   Resp 16     Physical Exam  Vitals reviewed.   Constitutional:       General: He is not in acute distress.     Comments: This is a well-developed and well-nourished male, sitting in a chair.   HENT:      Right Ear: Tympanic membrane, ear canal and external ear normal.      Left Ear: Tympanic membrane, ear canal and external ear normal.   Eyes:      General: No scleral icterus.     Pupils: Pupils are equal, round, and reactive to light.   Neck:      Vascular: No carotid bruit.   Cardiovascular:      Heart sounds: Normal heart sounds, S1 normal and S2 normal. No murmur heard.     No friction rub.   Pulmonary:      Effort: Pulmonary effort is normal.      Breath sounds: Normal breath sounds and air entry.   Abdominal:      Palpations: There is no hepatomegaly, splenomegaly or mass.   Musculoskeletal:         General: No swelling or deformity. Normal range of motion.      Cervical back: Neck supple.      Right lower leg: No edema.      Left lower leg: No edema.      Comments: There is dressing to the patient's left foot and ankle.  There is no foul odor.  There is no color drainage.   Lymphadenopathy:      Cervical: No cervical adenopathy.      Upper Body:      Right upper body: No axillary adenopathy.      Left upper body: No axillary adenopathy.      Lower Body: No right inguinal  adenopathy. No left inguinal adenopathy.   Neurological:      Mental Status: He is oriented to person, place, and time.      Cranial Nerves: Cranial nerves 2-12 are intact. No cranial nerve deficit.      Sensory: No sensory deficit.      Motor: Motor function is intact. No weakness.      Gait: Gait is intact.      Deep Tendon Reflexes: Reflexes normal.   Psychiatric:         Mood and Affect: Mood normal. Mood is not anxious or depressed. Affect is not angry.         Behavior: Behavior is not agitated.         Thought Content: Thought content normal.         Judgment: Judgment normal.     LAB WORK: Laboratory studies reviewed.    Assessment/Plan   Problem List Items Addressed This Visit             ICD-10-CM       Cardiac and Vasculature    Hypertension I10       Infectious Diseases    Cellulitis of foot, left L03.116       Musculoskeletal and Injuries    Osteoarthritis M19.90       Symptoms and Signs    Weakness R53.1     Other Visit Diagnoses         Codes    Type 2 diabetes mellitus without complication, unspecified whether long term insulin use (Multi)    -  Primary E11.9    Osteomyelitis, unspecified site, unspecified type (Multi)     M86.9    Deep vein thrombosis (DVT) of other vein of lower extremity, unspecified chronicity, unspecified laterality (Multi)     I82.499    PVD (peripheral vascular disease) (CMS-HCC)     I73.9    Acute myocardial infarction, unspecified MI type, unspecified artery (Multi)     I21.9    At risk for falling     Z91.81        1. Diabetes, on insulin.  2. Osteomyelitis, continue wound care.  Follow with Podiatry.  3. Cellulitis, improving.  4. DVT, anticoagulated.  5. PVD, on medication.  6. History of AMI, on aspirin.  7. Osteoarthritis, on Tylenol.  8. Hypertension, med controlled.  9. Weakness, on PT/OT.  10. Fall risk, on fall precautions.    Scribe Attestation  By signing my name below, Hanna OLIVER, Scralfred attest that this documentation has been prepared under the direction and  in the presence of Marium Singh MD.     All medical record entries made by the scribe were personally dictated by me I have reviewed the chart and agree the record accurately reflects my personal performance of his history physical examination and management

## 2024-12-21 ENCOUNTER — NURSING HOME VISIT (OUTPATIENT)
Dept: POST ACUTE CARE | Facility: EXTERNAL LOCATION | Age: 74
End: 2024-12-21
Payer: MEDICARE

## 2024-12-21 VITALS
DIASTOLIC BLOOD PRESSURE: 76 MMHG | HEART RATE: 76 BPM | TEMPERATURE: 97.7 F | RESPIRATION RATE: 16 BRPM | SYSTOLIC BLOOD PRESSURE: 124 MMHG

## 2024-12-21 DIAGNOSIS — Z91.81 AT RISK FOR FALLING: ICD-10-CM

## 2024-12-21 DIAGNOSIS — G50.0 TRIGEMINAL NEURALGIA: ICD-10-CM

## 2024-12-21 DIAGNOSIS — I10 HYPERTENSION, UNSPECIFIED TYPE: ICD-10-CM

## 2024-12-21 DIAGNOSIS — E11.9 TYPE 2 DIABETES MELLITUS WITHOUT COMPLICATION, UNSPECIFIED WHETHER LONG TERM INSULIN USE (MULTI): ICD-10-CM

## 2024-12-21 DIAGNOSIS — M86.9 OSTEOMYELITIS, UNSPECIFIED SITE, UNSPECIFIED TYPE (MULTI): Primary | ICD-10-CM

## 2024-12-21 DIAGNOSIS — I82.499 DEEP VEIN THROMBOSIS (DVT) OF OTHER VEIN OF LOWER EXTREMITY, UNSPECIFIED CHRONICITY, UNSPECIFIED LATERALITY (MULTI): ICD-10-CM

## 2024-12-21 DIAGNOSIS — R53.1 WEAKNESS: ICD-10-CM

## 2024-12-21 DIAGNOSIS — G06.2: ICD-10-CM

## 2024-12-21 DIAGNOSIS — G62.9 NEUROPATHY: ICD-10-CM

## 2024-12-21 DIAGNOSIS — M19.90 OSTEOARTHRITIS, UNSPECIFIED OSTEOARTHRITIS TYPE, UNSPECIFIED SITE: ICD-10-CM

## 2024-12-21 DIAGNOSIS — I21.9 ACUTE MYOCARDIAL INFARCTION, UNSPECIFIED MI TYPE, UNSPECIFIED ARTERY (MULTI): ICD-10-CM

## 2024-12-21 PROCEDURE — 99309 SBSQ NF CARE MODERATE MDM 30: CPT | Performed by: INTERNAL MEDICINE

## 2024-12-21 ASSESSMENT — ENCOUNTER SYMPTOMS
CHILLS: 0
FEVER: 0

## 2024-12-21 NOTE — PROGRESS NOTES
PLACE OF SERVICE:  [facility] [place]    This is a subsequent visit.    Subjective   Patient ID: Elliot Partida is a 74 y.o. male who presents for Follow-up.    Mr. Elliot Partida is a 74-year-old diabetic male with history of osteomyelitis to his left foot and ankle with surrounding cellulitis.  He is unable to care for himself and requires supportive care.    Review of Systems   Constitutional:  Negative for chills and fever.   Cardiovascular:  Negative for chest pain.   All other systems reviewed and are negative.    Objective   /76   Pulse 76   Temp 36.5 °C (97.7 °F)   Resp 16     Physical Exam  Vitals reviewed.   Constitutional:       General: He is not in acute distress.     Comments: This is a well-developed and well-nourished male, sitting in a chair   HENT:      Right Ear: Tympanic membrane, ear canal and external ear normal.      Left Ear: Tympanic membrane, ear canal and external ear normal.   Eyes:      General: No scleral icterus.     Pupils: Pupils are equal, round, and reactive to light.   Neck:      Vascular: No carotid bruit.   Cardiovascular:      Heart sounds: Normal heart sounds, S1 normal and S2 normal. No murmur heard.     No friction rub.   Pulmonary:      Effort: Pulmonary effort is normal.      Breath sounds: Normal breath sounds and air entry.   Abdominal:      Palpations: There is no hepatomegaly, splenomegaly or mass.   Musculoskeletal:         General: No swelling or deformity. Normal range of motion.      Cervical back: Neck supple.      Right lower leg: No edema.      Left lower leg: No edema.      Comments: The patient's left foot and ankle is currently dressed.  There is no foul odor.  There is no color drainage.   Lymphadenopathy:      Cervical: No cervical adenopathy.      Upper Body:      Right upper body: No axillary adenopathy.      Left upper body: No axillary adenopathy.      Lower Body: No right inguinal adenopathy. No left  inguinal adenopathy.   Neurological:      Mental Status: He is oriented to person, place, and time.      Cranial Nerves: Cranial nerves 2-12 are intact. No cranial nerve deficit.      Sensory: No sensory deficit.      Motor: Motor function is intact. No weakness.      Gait: Gait is intact.      Deep Tendon Reflexes: Reflexes normal.   Psychiatric:         Mood and Affect: Mood normal. Mood is not anxious or depressed. Affect is not angry.         Behavior: Behavior is not agitated.         Thought Content: Thought content normal.         Judgment: Judgment normal.     LAB WORK:  Laboratory studies were reviewed.    Assessment/Plan   Problem List Items Addressed This Visit             ICD-10-CM       Cardiac and Vasculature    Hypertension I10       Musculoskeletal and Injuries    Osteoarthritis M19.90       Symptoms and Signs    Weakness R53.1     Other Visit Diagnoses         Codes    Osteomyelitis, unspecified site, unspecified type (Multi)    -  Primary M86.9    Type 2 diabetes mellitus without complication, unspecified whether long term insulin use (Multi)     E11.9    Neuropathy     G62.9    Deep vein thrombosis (DVT) of other vein of lower extremity, unspecified chronicity, unspecified laterality (Multi)     I82.499    Trigeminal neuralgia     G50.0    Acute myocardial infarction, unspecified MI type, unspecified artery (Multi)     I21.9    At risk for falling     Z91.81    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[facility])     G06.2        1. Osteomyelitis to the left foot and ankle.  Continue wound care.  Follow with Podiatry.  2. Diabetes, on insulin.  3. Neuropathy, on gabapentin.  4. DVT, anticoagulated.  5. Trigeminal neuralgia, on pain control.  6. ASHD, on aspirin.  7. Hypertension, medically controlled.  8. Osteoarthritis, on Tylenol.  9. History of subdural abscess.  Continue to monitor.  10. Weakness, on PT/OT.  11. Fall risk, on fall precautions.    Scribe Attestation  By signing my name below, Hanna OLIVER, Scribe attest  that this documentation has been prepared under the direction and in the presence of Marium Singh MD.     All medical record entries made by the scribe were personally dictated by me I have reviewed the chart and agree the record accurately reflects my personal performance of his history physical examination and management

## 2024-12-21 NOTE — LETTER
Patient: Elliot Partida  : 1950    Encounter Date: 2024    PLACE OF SERVICE:  [facility] [place]    This is a subsequent visit.    Subjective  Patient ID: Elliot Partida is a 74 y.o. male who presents for Follow-up.    Mr. Elliot Partida is a 74-year-old diabetic male with history of osteomyelitis to his left foot and ankle with surrounding cellulitis.  He is unable to care for himself and requires supportive care.    Review of Systems   Constitutional:  Negative for chills and fever.   Cardiovascular:  Negative for chest pain.   All other systems reviewed and are negative.    Objective  /76   Pulse 76   Temp 36.5 °C (97.7 °F)   Resp 16     Physical Exam  Vitals reviewed.   Constitutional:       General: He is not in acute distress.     Comments: This is a well-developed and well-nourished male, sitting in a chair   HENT:      Right Ear: Tympanic membrane, ear canal and external ear normal.      Left Ear: Tympanic membrane, ear canal and external ear normal.   Eyes:      General: No scleral icterus.     Pupils: Pupils are equal, round, and reactive to light.   Neck:      Vascular: No carotid bruit.   Cardiovascular:      Heart sounds: Normal heart sounds, S1 normal and S2 normal. No murmur heard.     No friction rub.   Pulmonary:      Effort: Pulmonary effort is normal.      Breath sounds: Normal breath sounds and air entry.   Abdominal:      Palpations: There is no hepatomegaly, splenomegaly or mass.   Musculoskeletal:         General: No swelling or deformity. Normal range of motion.      Cervical back: Neck supple.      Right lower leg: No edema.      Left lower leg: No edema.      Comments: The patient's left foot and ankle is currently dressed.  There is no foul odor.  There is no color drainage.   Lymphadenopathy:      Cervical: No cervical adenopathy.      Upper Body:      Right upper body: No axillary adenopathy.      Left upper body: No axillary  adenopathy.      Lower Body: No right inguinal adenopathy. No left inguinal adenopathy.   Neurological:      Mental Status: He is oriented to person, place, and time.      Cranial Nerves: Cranial nerves 2-12 are intact. No cranial nerve deficit.      Sensory: No sensory deficit.      Motor: Motor function is intact. No weakness.      Gait: Gait is intact.      Deep Tendon Reflexes: Reflexes normal.   Psychiatric:         Mood and Affect: Mood normal. Mood is not anxious or depressed. Affect is not angry.         Behavior: Behavior is not agitated.         Thought Content: Thought content normal.         Judgment: Judgment normal.     LAB WORK:  Laboratory studies were reviewed.    Assessment/Plan  Problem List Items Addressed This Visit             ICD-10-CM       Cardiac and Vasculature    Hypertension I10       Musculoskeletal and Injuries    Osteoarthritis M19.90       Symptoms and Signs    Weakness R53.1     Other Visit Diagnoses         Codes    Osteomyelitis, unspecified site, unspecified type (Multi)    -  Primary M86.9    Type 2 diabetes mellitus without complication, unspecified whether long term insulin use (Multi)     E11.9    Neuropathy     G62.9    Deep vein thrombosis (DVT) of other vein of lower extremity, unspecified chronicity, unspecified laterality (Multi)     I82.499    Trigeminal neuralgia     G50.0    Acute myocardial infarction, unspecified MI type, unspecified artery (Multi)     I21.9    At risk for falling     Z91.81    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[facility])     G06.2        1. Osteomyelitis to the left foot and ankle.  Continue wound care.  Follow with Podiatry.  2. Diabetes, on insulin.  3. Neuropathy, on gabapentin.  4. DVT, anticoagulated.  5. Trigeminal neuralgia, on pain control.  6. ASHD, on aspirin.  7. Hypertension, medically controlled.  8. Osteoarthritis, on Tylenol.  9. History of subdural abscess.  Continue to monitor.  10. Weakness, on PT/OT.  11. Fall risk, on fall precautions.    Scribe  Attestation  By signing my name below, I, Etienne Cortes attest that this documentation has been prepared under the direction and in the presence of Marium Singh MD.     All medical record entries made by the scribe were personally dictated by me I have reviewed the chart and agree the record accurately reflects my personal performance of his history physical examination and management      Electronically Signed By: Marium Singh MD   12/27/24  9:36 AM

## 2024-12-23 ENCOUNTER — APPOINTMENT (OUTPATIENT)
Dept: CARDIOLOGY | Facility: HOSPITAL | Age: 74
End: 2024-12-23
Payer: MEDICARE

## 2024-12-23 ENCOUNTER — APPOINTMENT (OUTPATIENT)
Dept: RADIOLOGY | Facility: HOSPITAL | Age: 74
End: 2024-12-23
Payer: MEDICARE

## 2024-12-23 ENCOUNTER — HOSPITAL ENCOUNTER (EMERGENCY)
Facility: HOSPITAL | Age: 74
Discharge: HOME | End: 2024-12-23
Attending: EMERGENCY MEDICINE
Payer: MEDICARE

## 2024-12-23 ENCOUNTER — NURSING HOME VISIT (OUTPATIENT)
Dept: POST ACUTE CARE | Facility: EXTERNAL LOCATION | Age: 74
End: 2024-12-23

## 2024-12-23 VITALS
HEART RATE: 76 BPM | SYSTOLIC BLOOD PRESSURE: 159 MMHG | DIASTOLIC BLOOD PRESSURE: 77 MMHG | WEIGHT: 201.06 LBS | TEMPERATURE: 97.3 F | BODY MASS INDEX: 29.78 KG/M2 | OXYGEN SATURATION: 98 % | RESPIRATION RATE: 16 BRPM | HEIGHT: 69 IN

## 2024-12-23 DIAGNOSIS — R53.81 PHYSICAL DECONDITIONING: ICD-10-CM

## 2024-12-23 DIAGNOSIS — G62.9 NEUROPATHY: ICD-10-CM

## 2024-12-23 DIAGNOSIS — H57.13 PAIN OF BOTH EYES: ICD-10-CM

## 2024-12-23 DIAGNOSIS — R51.9 LEFT FACIAL PAIN: Primary | ICD-10-CM

## 2024-12-23 DIAGNOSIS — R51.9 TEMPORAL PAIN: ICD-10-CM

## 2024-12-23 DIAGNOSIS — E11.9 TYPE 2 DIABETES MELLITUS WITHOUT COMPLICATION, UNSPECIFIED WHETHER LONG TERM INSULIN USE (MULTI): ICD-10-CM

## 2024-12-23 DIAGNOSIS — H53.8 BLURRED VISION: Primary | ICD-10-CM

## 2024-12-23 DIAGNOSIS — R51.9 NONINTRACTABLE HEADACHE, UNSPECIFIED CHRONICITY PATTERN, UNSPECIFIED HEADACHE TYPE: ICD-10-CM

## 2024-12-23 DIAGNOSIS — R53.1 WEAKNESS: ICD-10-CM

## 2024-12-23 DIAGNOSIS — G50.0 TRIGEMINAL NEURALGIA: ICD-10-CM

## 2024-12-23 DIAGNOSIS — D32.9 MENINGIOMA (MULTI): ICD-10-CM

## 2024-12-23 DIAGNOSIS — I73.9 PVD (PERIPHERAL VASCULAR DISEASE) (CMS-HCC): ICD-10-CM

## 2024-12-23 LAB
ALBUMIN SERPL BCP-MCNC: 3.9 G/DL (ref 3.4–5)
ALP SERPL-CCNC: 102 U/L (ref 33–136)
ALT SERPL W P-5'-P-CCNC: 11 U/L (ref 10–52)
ANION GAP SERPL CALC-SCNC: 11 MMOL/L (ref 10–20)
APTT PPP: 37 SECONDS (ref 27–38)
AST SERPL W P-5'-P-CCNC: 28 U/L (ref 9–39)
BASOPHILS # BLD AUTO: 0.03 X10*3/UL (ref 0–0.1)
BASOPHILS NFR BLD AUTO: 0.3 %
BILIRUB SERPL-MCNC: 0.4 MG/DL (ref 0–1.2)
BUN SERPL-MCNC: 20 MG/DL (ref 6–23)
CALCIUM SERPL-MCNC: 10 MG/DL (ref 8.6–10.3)
CARDIAC TROPONIN I PNL SERPL HS: 4 NG/L (ref 0–20)
CHLORIDE SERPL-SCNC: 105 MMOL/L (ref 98–107)
CO2 SERPL-SCNC: 30 MMOL/L (ref 21–32)
CREAT SERPL-MCNC: 0.78 MG/DL (ref 0.5–1.3)
EGFRCR SERPLBLD CKD-EPI 2021: >90 ML/MIN/1.73M*2
EOSINOPHIL # BLD AUTO: 0.08 X10*3/UL (ref 0–0.4)
EOSINOPHIL NFR BLD AUTO: 0.7 %
ERYTHROCYTE [DISTWIDTH] IN BLOOD BY AUTOMATED COUNT: 14.7 % (ref 11.5–14.5)
ERYTHROCYTE [SEDIMENTATION RATE] IN BLOOD BY WESTERGREN METHOD: 14 MM/H (ref 0–20)
GLUCOSE SERPL-MCNC: 116 MG/DL (ref 74–99)
HCT VFR BLD AUTO: 34.6 % (ref 41–52)
HGB BLD-MCNC: 10.9 G/DL (ref 13.5–17.5)
IMM GRANULOCYTES # BLD AUTO: 0.04 X10*3/UL (ref 0–0.5)
IMM GRANULOCYTES NFR BLD AUTO: 0.3 % (ref 0–0.9)
INR PPP: 1.3 (ref 0.9–1.1)
LYMPHOCYTES # BLD AUTO: 0.91 X10*3/UL (ref 0.8–3)
LYMPHOCYTES NFR BLD AUTO: 7.7 %
MCH RBC QN AUTO: 28.2 PG (ref 26–34)
MCHC RBC AUTO-ENTMCNC: 31.5 G/DL (ref 32–36)
MCV RBC AUTO: 89 FL (ref 80–100)
MONOCYTES # BLD AUTO: 0.4 X10*3/UL (ref 0.05–0.8)
MONOCYTES NFR BLD AUTO: 3.4 %
NEUTROPHILS # BLD AUTO: 10.33 X10*3/UL (ref 1.6–5.5)
NEUTROPHILS NFR BLD AUTO: 87.6 %
NRBC BLD-RTO: 0 /100 WBCS (ref 0–0)
PLATELET # BLD AUTO: 292 X10*3/UL (ref 150–450)
POTASSIUM SERPL-SCNC: 5.4 MMOL/L (ref 3.5–5.3)
PROT SERPL-MCNC: 7.7 G/DL (ref 6.4–8.2)
PROTHROMBIN TIME: 15.2 SECONDS (ref 9.8–12.8)
RBC # BLD AUTO: 3.87 X10*6/UL (ref 4.5–5.9)
SODIUM SERPL-SCNC: 141 MMOL/L (ref 136–145)
WBC # BLD AUTO: 11.8 X10*3/UL (ref 4.4–11.3)

## 2024-12-23 PROCEDURE — 80053 COMPREHEN METABOLIC PANEL: CPT | Performed by: EMERGENCY MEDICINE

## 2024-12-23 PROCEDURE — 36415 COLL VENOUS BLD VENIPUNCTURE: CPT | Performed by: EMERGENCY MEDICINE

## 2024-12-23 PROCEDURE — 85730 THROMBOPLASTIN TIME PARTIAL: CPT | Performed by: EMERGENCY MEDICINE

## 2024-12-23 PROCEDURE — 99309 SBSQ NF CARE MODERATE MDM 30: CPT | Performed by: NURSE PRACTITIONER

## 2024-12-23 PROCEDURE — 70450 CT HEAD/BRAIN W/O DYE: CPT | Performed by: RADIOLOGY

## 2024-12-23 PROCEDURE — 85652 RBC SED RATE AUTOMATED: CPT | Performed by: EMERGENCY MEDICINE

## 2024-12-23 PROCEDURE — 84484 ASSAY OF TROPONIN QUANT: CPT | Performed by: EMERGENCY MEDICINE

## 2024-12-23 PROCEDURE — 85025 COMPLETE CBC W/AUTO DIFF WBC: CPT | Performed by: EMERGENCY MEDICINE

## 2024-12-23 PROCEDURE — 99285 EMERGENCY DEPT VISIT HI MDM: CPT | Mod: 25 | Performed by: EMERGENCY MEDICINE

## 2024-12-23 PROCEDURE — 70450 CT HEAD/BRAIN W/O DYE: CPT

## 2024-12-23 PROCEDURE — 85610 PROTHROMBIN TIME: CPT | Performed by: EMERGENCY MEDICINE

## 2024-12-23 PROCEDURE — 93005 ELECTROCARDIOGRAM TRACING: CPT

## 2024-12-23 RX ORDER — METOCLOPRAMIDE HYDROCHLORIDE 5 MG/ML
10 INJECTION INTRAMUSCULAR; INTRAVENOUS ONCE
Status: DISCONTINUED | OUTPATIENT
Start: 2024-12-23 | End: 2024-12-23

## 2024-12-23 RX ORDER — DEXAMETHASONE SODIUM PHOSPHATE 10 MG/ML
10 INJECTION INTRAMUSCULAR; INTRAVENOUS ONCE
Status: DISCONTINUED | OUTPATIENT
Start: 2024-12-23 | End: 2024-12-23

## 2024-12-23 ASSESSMENT — PAIN DESCRIPTION - PAIN TYPE: TYPE: ACUTE PAIN

## 2024-12-23 ASSESSMENT — PAIN SCALES - GENERAL: PAINLEVEL_OUTOF10: 0 - NO PAIN

## 2024-12-23 ASSESSMENT — PAIN - FUNCTIONAL ASSESSMENT: PAIN_FUNCTIONAL_ASSESSMENT: 0-10

## 2024-12-23 ASSESSMENT — PAIN DESCRIPTION - LOCATION: LOCATION: FACE

## 2024-12-23 NOTE — LETTER
"Patient: Elliot Partida  : 1950    Encounter Date: 2024    Chief Complaint:   SNF F/U  -Left foot osteomyelitis  -Left foot cellulitis  -Left foot diabetic ulcer  -PVD  -Venous insufficiency  -DVT of LLE  -Physical deconditioning/weakness  -Trigeminal neuralgia     HPI:   74 year-old male presenting to [facility] ER on 24 with \"months\" of LLE pain with edema, redness, and warmth. He was noted to have a large necrotic ulcer under his 5th toe. He reported that he had not seen a provider or taken any medications for about \"2 years\". Work-up in ER: Glu 310, Na+ 129, Alb 3.3, XR of L foot showed osteomyelitis of the 5th metatarsal head, US of LLE showed acute DVT in the left mid-distal femoral and popliteal veins. He was started on IV Heparin, IV Zosyn, and IV Vanco. He was admitted to the hospital for further evaluation and treatment. Hospital course:    Left foot osteomyelitis/cellulitis/DVT of LLE/PAD-IV ATB, IV heparin (transitioned to Eliquis), ID consult, podiatry consult, elevate LLE, local wound care, analgesics prn, patient transferred to [facility] for vascular consult on , underwent LLE revascularization with Dr. Stoddard on , recommending repeat BEN in 4 weeks with OP F/U, underwent L partial 5th ray resection on  with Dr. Spears, taken back to OR on  for delayed closure with graft application by Dr. 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 was HDS and discharged to [facility] on 10/2/24. On 11/5, patient was sent to [facility] ER for evaluation of R eye pain and ringing in R ear. He was treated with Excedrin in-house, which was not effective. In the ER, he was Dx with trigeminal neuralgia and started on tegretol. He reported resolution of symptoms with tegretol. Today, patient is c/o a sharp pain to his L temporal area, blurred vision, and watering of his L eye. He reported increased \"head and eye pressure\" when in a sitting position. He was given Prednisone 40 mg PO x 1 and Ibuprofen 600 mg PO x 1 with no relief in symptoms. He subsequently c/o worsening dizziness, blurred vision, and bilateral temporal pain. He requested to go to [facility] ER to be evaluated.     ROS:    As above in HPI. Otherwise, all other systems have been reviewed and are negative for complaint.    Medications reviewed and verified in NH chart.     Patient Active Problem List   Diagnosis   • Wound infection   • Osteomyelitis of left foot, unspecified type (Multi)   • Acute deep vein thrombosis (DVT) of popliteal vein of left lower extremity (Multi)   • Bilateral lower extremity edema   • CAD (coronary artery disease)   • Hypertension   • Inflammatory neuropathy (Multi)   • History of stroke   • Weakness of left lower extremity   • Ambulatory dysfunction   • Chronic bilateral low back pain with left-sided sciatica   • Diabetic peripheral neuropathy (Multi)   • Neural foraminal stenosis of lumbosacral spine   • Hyperlipidemia   • Lumbar back pain with radiculopathy affecting left lower extremity   • Peripheral arterial disease (CMS-[facility])   • Type 2 diabetes mellitus, without long-term current use of insulin (Multi)   • Anemia   • Obesity   • Delayed surgical wound healing of foot amputation stump (Multi)   • Impaired mobility and ADLs   • Weakness   • Thoracic radiculopathy   • Osteoarthritis   • Cellulitis of foot, left   • Meningioma (Multi)   • Tremor " of left hand   • Diabetic ulcer of left foot associated with type 2 diabetes mellitus, limited to breakdown of skin   • Acute deep vein thrombosis (DVT) of femoral vein of left lower extremity (Multi)   • Nonrheumatic aortic valve stenosis   • Diastolic dysfunction   • Non-pressure chronic ulcer of other part of left foot with necrosis of bone   • Hemiplegia and hemiparesis following cerebral infarction affecting left non-dominant side (Multi)        Past Medical History:   Diagnosis Date   • Acute osteomyelitis of ankle and foot, left (Multi)    • AMI (acute myocardial infarction) (Multi)    • ASHD (arteriosclerotic heart disease)    • At risk for falls    • Cellulitis     left foot and ankle   • Diabetes mellitus (Multi)    • DVT (deep vein thrombosis) in pregnancy ([facility])    • Fall risk care plan declined    • Hypertension    • Meningioma (Multi)    • Myocardial infarction (Multi)    • Neuritis    • Neuropathy    • Osteoarthritis    • Osteomyelitis    • PVD (peripheral vascular disease) (CMS-HCC)    • Sprain of right knee 06/25/2015   • Stroke (Multi)    • 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[facility])    • TIA (transient ischemic attack)    • Tinea pedis of both feet    • Trigeminal neuralgia    • Weakness        Past Surgical History:   Procedure Laterality Date   • CARDIAC CATHETERIZATION     • CARPAL TUNNEL RELEASE  10/10/2014   • EYE SURGERY     • FL  ARTHROCENTESIS ASP INJ JOINT.     • FOOT RAY RESECTION Left 09/23/2024    L partial 5th ray resection (Dr. Spears, DPM)   • FOOT SURGERY Left 09/27/2024    Delayed closure w/ graft application (Dr. Huynh, DPM)   • INVASIVE VASCULAR PROCEDURE Bilateral 09/18/2024    Procedure: Lower Extremity Angiogram;  Surgeon: Teofilo Stoddard MD;  Location: [facility];  Service: Cardiovascular;  Laterality: Bilateral;   • IRIDOTOMY / IRIDECTOMY Bilateral        Family History   Problem Relation Name Age of Onset   • Heart disease Father     • Colon cancer Other     • Heart attack Other      • Diabetes Other     • Hypertension Other         Social History     Tobacco Use   Smoking Status Never   • Passive exposure: Never   Smokeless Tobacco Never       Social History     Substance and Sexual Activity   Alcohol Use Never       Social History     Substance and Sexual Activity   Drug Use Never       No Known Allergies     Vital Signs:   165/88-77-20-98.1-97% on RA    Physical Exam:  General: Sitting up in bed, alert   Head/Face: NCAT, symmetrical  Eyes: PERRLA, no injection, clear discharge to L eye  ENT: Hearing not impaired, ears without scars or lesions, nasal mucosa and turbinates pink, septum midline, lips pink and moist  Neck: Supple, symmetrical  Respiratory: CTA but diminished without adventitious sounds, respirations even and nonlabored without use of accessory muscles, good air exchange  Cardio: RRR without murmur or gallops, normal S1S2, NP edema to BLE (L > R), pedal pulses 2+/4 bilaterally  Chest/Breast: Symmetrical  GI: BS x 4, normoactive, non-distended, abd round and soft, no masses or tenderness  : No suprapubic tenderness or distention  MSK: Gait not assessed, joints with full ROM without pain or contractures  Skin: Skin warm and dry, no induration, DTI of R heel, vascular ulcer of L anterior lower leg, surgical wound of L lateral foot, skin tags to R buttocks (chronic per patient)   Neurologic: Cranial nerves II through XII intact, decreased sensation to BLE  Psychiatric: Alert to person, place, and time, calm and cooperative, expresses depression with current situation     Results/Data:   12/18/24: Glu 116, BUN 27, Hgb 9.8, Hct 30.1  11/5/24: CMP WNL, Mag 2.03, CRP 1.34, Sed Rate 38, Hgb 10.3, Hct 32.7  10/30/24: Glu 139, Cr 0.6, Hgb 9.1, Hct 29.4, Sed Rate 46, CRP 2.3  10/23/24: Kbn219, Cr 0.6, Hgb 9.0, Hct 29.1, Iron 44, TIBC 261, Ferritin 21.1, Transferrin 193, Folate 4.4, Vit B12 358  10/16/24: Glu 135, Cr 0.6, Hgb 8.5, Hct 26.7  10/3/24: Glu 111, Cr 0.6, HgbA1C 7.0, Hgb 9.0,  Hct 28.8    Assessment/Plan:  Bilateral temporal pain/blurred vision/head and eye pressure-s/p Prednisone 40 mg PO x 1 and Ibuprofen 600 mg PO x 1 without relief of symptoms, send to [facility] ER for evaluation, per patient's request   Left foot osteomyelitis/cellulitis/PAD/venous insufficiency-s/p LLE revascularization with Dr. Stoddard on 9/18, s/p L partial 5th ray resection on 9/23 with Dr. Spears, s/p delayed closure with graft application by Dr. Huynh on 9/27, c/w local wound care, c/w Vit C, MVI, and LPS Critical Care BID to promote wound healing, s/p Augmentin (end date 10/9), c/w ASA and plavix, c/w Tylenol and Gabapentin, c/w Oxycodone prn for mod-severe pain, tizanidine prn for muscle spasms, wound care team following, seen by podiatry on 10/14, WBAT to LLE, F/U with podiatry as needed, F/U with vascular  DVT of LLE-c/w eliquis, elevate LLE, monitor H&H  Physical deconditioning/weakness-c/w PT/OT  Insomnia/depression-c/w trazodone and melatonin, supportive care, monitor behaviors, patient declines Psych services at this time   Anemia-c/w MVI, iron, and Vit B12, monitor CBC, iron panel, and Vit B12 level   CAD/HTN/Hx MI/HLD/diastolic dysfunction/AVS-FAVIAN diet, c/w ASA, hydralazine, and lisinopril, monitor BP, monitor lipid panel, needs established with cardiology after discharge  DM2 with peripheral neuropathy-LCS diet, accuchecks, c/w lispro ISS with meals, BP and lipid control, yearly eye exam, diabetic foot care, diabetic diet education (RD following), monitor HgbA1C  Hypokalemia-c/w KCl, monitor K+ level  Right frontal lobe meningioma-followed by Dr. Sin ([facility]), seen on 8/4/23 and advised to F/U with neuro-oncology [facility] in 2 years with MRI for surveillance  Hx stroke/LLE weakness/L hand tremor-followed by neurology   Constipation-c/w miralax, monitor BMs  OA/lumbar radiculopathy/thoracic radiculopathy-Tylenol prn, F/U with specialists after discharge  Trigeminal neuralgia-c/w  tegretol  Constipation-encourage fluids and fiber, c/w senna plus and miralax, monitor for BMs    Orders:  Prednisone 40 mg PO x 1   Ibuprofen 600 mg PO x 1   Send to [facility] ER for evaluation     Code Status:   Full Code        Electronically Signed By: JUDITH Jerome   2/5/25  9:22 AM

## 2024-12-23 NOTE — ED PROVIDER NOTES
HPI   Chief Complaint   Patient presents with    Facial Pain     Left  sided facial pain causing his eyes to tear       74-year-old male presents for evaluation of headache and left facial pain.  Patient states 2 hours prior to presentation around noon he developed sharp stabbing left-sided headache in the left temporal area.  Subsequently, he developed pain from his left ear to his left nose.  He has had watering of the left eye as well.  No unilateral weakness or sensory changes.  No difficulty with speech.  Chronic left leg weakness unchanged from baseline.      History provided by:  Patient and medical records          Patient History   Past Medical History:   Diagnosis Date    Acute osteomyelitis of ankle and foot, left (Multi)     AMI (acute myocardial infarction) (Multi)     ASHD (arteriosclerotic heart disease)     At risk for falls     Cellulitis     left foot and ankle    Diabetes mellitus (Multi)     DVT (deep vein thrombosis) in pregnancy ([facility])     Fall risk care plan declined     Hypertension     Myocardial infarction (Multi)     Neuritis     Neuropathy     Osteoarthritis     Osteomyelitis     PVD (peripheral vascular disease) (CMS-HCC)     Sprain of right knee 06/25/2015    Stroke (Multi)     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      FOOT RAY RESECTION Left 09/23/2024    L partial 5th ray resection (Dr. Regan DPM)    FOOT SURGERY Left 09/27/2024    Delayed closure w/ graft application (Dr. Huynh, DPBRAYAN)    INVASIVE VASCULAR PROCEDURE Bilateral 09/18/2024    Procedure: Lower Extremity Angiogram;  Surgeon: Teofilo Stoddard MD;  Location: [facility] Cardiac Cath Lab;  Service: Cardiovascular;  Laterality: Bilateral;    IRIDOTOMY / IRIDECTOMY  Orders being requested: symptomatic covid PCR  Reason service is needed/diagnosis: per spouse, when she spoke to Dr jaime she wastold by pcp that he would also put in an order for patient to get tested for covid-19. There isno order placed. Didpcp want patient tested as well?  Please advise.   When are orders needed by: as soon as possible. Spouse insisted on scheduling patients covid appointment with hers for tomorrow at 1:30pm and 1:40 pm at [facility].  Where to send Orders: epic  Okay to leave detailed message?  Yes       Bilateral      Family History   Problem Relation Name Age of Onset    Heart disease Father      Colon cancer Other      Heart attack Other      Diabetes Other      Hypertension Other       Social History     Tobacco Use    Smoking status: Never     Passive exposure: Never    Smokeless tobacco: Never   Substance Use Topics    Alcohol use: Never    Drug use: Never       Physical Exam   ED Triage Vitals [12/23/24 1420]   Temperature Heart Rate Respirations BP   36.3 °C (97.3 °F) 80 18 164/76      Pulse Ox Temp Source Heart Rate Source Patient Position   99 % Temporal -- Lying      BP Location FiO2 (%)     Left arm --       Physical Exam  Vitals and nursing note reviewed.   Constitutional:       General: He is not in acute distress.     Appearance: Normal appearance. He is well-developed.   HENT:      Head: Normocephalic and atraumatic.      Comments: No temporal artery tenderness bilaterally  Eyes:      Conjunctiva/sclera: Conjunctivae normal.   Cardiovascular:      Rate and Rhythm: Normal rate and regular rhythm.      Heart sounds: No murmur heard.  Pulmonary:      Effort: Pulmonary effort is normal. No respiratory distress.      Breath sounds: Normal breath sounds.   Abdominal:      Palpations: Abdomen is soft.      Tenderness: There is no abdominal tenderness.   Musculoskeletal:         General: No swelling, tenderness or deformity.      Cervical back: Normal range of motion and neck supple. No rigidity.   Skin:     General: Skin is warm and dry.      Capillary Refill: Capillary refill takes less than 2 seconds.   Neurological:      Mental Status: He is alert.      Motor: Weakness (Chronic unchanged baseline weakness of right leg) present.      Coordination: Coordination normal. Finger-Nose-Finger Test normal.      Comments: Normal clear speech.  Equal .  Moves both arms without difficulty.  Normal finger-nose.  Chronic left leg weakness unchanged.  Otherwise, no weakness to either leg or both arms.    Psychiatric:         Mood and Affect: Mood normal.           ED Course & MDM   ED Course as of 12/23/24 1508   Mon Dec 23, 2024   1445 74-year-old male presents with left facial pain and headache.  On arrival there was concern for left facial droop.  Stroke alert was called initially.  I evaluated the patient after he returned from noncontrast head CT.  NIH stroke scale = 0.  Suspect migraine headache.  Will discuss the case with neurology.  Will treat with Reglan and Decadron. [BT]   1448 Spoke with stroke neurology Dr. Lily Freeman.  Low clinical concern for stroke.  Continue treatment for headache.  Can follow up with neurology in the office if needed. [BT]   1449 Care turned over to oncoming physician at shift change.  Laboratory studies and response to treatment are pending. [BT]   1507 ECG 12 lead  EKG interpreted by me shows sinus rhythm first-degree AV block.  Rate = 78.  Left anterior fascicular block.  No acute injury pattern. [BT]      ED Course User Index  [BT] Sven Jalloh, DO         Diagnoses as of 12/23/24 1508   Left facial pain   Nonintractable headache, unspecified chronicity pattern, unspecified headache type   Meningioma (Multi)     CT brain attack head wo IV contrast   Final Result   Atrophy and chronic microvascular ischemic disease with an old   lacunar infarct in the right basal ganglia noted.        The probable meningioma overlying the right frontal lobe seen on   prior study is difficult to appreciate due to lack of contrast   administration but has not increased in size. This measures 1.2 cm in   transverse dimension.        Secure chat was acknowledged by the ordering provider at 2:31 p.m..        MACRO:   Debbie Joseph discussed the significance and urgency of this critical   finding by secure chat with  SVEN JALLOH on 12/23/2024 at 2:29   pm.  (**-RCF-**) Findings:  See findings.                  Signed by: Debbie Joseph 12/23/2024 2:32 PM   Dictation workstation:    [id number]                      No data recorded     [place] Coma Scale Score: 15 (12/23/24 1427 : Renae Vazquez RN)                           Medical Decision Making      Procedure  Procedures     Sven Rodriguez, DO  12/23/24 1450       Sven Rodriguez, DO  12/23/24 1505

## 2024-12-24 ENCOUNTER — APPOINTMENT (OUTPATIENT)
Dept: VASCULAR MEDICINE | Facility: HOSPITAL | Age: 74
End: 2024-12-24
Payer: MEDICARE

## 2024-12-24 ENCOUNTER — APPOINTMENT (OUTPATIENT)
Dept: CARDIOLOGY | Facility: HOSPITAL | Age: 74
End: 2024-12-24
Payer: MEDICARE

## 2024-12-27 LAB
ATRIAL RATE: 78 BPM
P OFFSET: 188 MS
P ONSET: 102 MS
PR INTERVAL: 242 MS
Q ONSET: 223 MS
QRS COUNT: 13 BEATS
QRS DURATION: 120 MS
QT INTERVAL: 386 MS
QTC CALCULATION(BAZETT): 440 MS
QTC FREDERICIA: 421 MS
R AXIS: -51 DEGREES
T AXIS: 61 DEGREES
T OFFSET: 416 MS
VENTRICULAR RATE: 78 BPM

## 2024-12-28 NOTE — PROGRESS NOTES
"Chief Complaint:   SNF F/U  -Left foot osteomyelitis  -Left foot cellulitis  -Left foot diabetic ulcer  -PVD  -Venous insufficiency  -DVT of LLE  -Physical deconditioning/weakness    HPI:   74 year-old male presenting to [facility] ER on 9/12/24 with \"months\" of LLE pain with edema, redness, and warmth. He was noted to have a large necrotic ulcer under his 5th toe. He reported that he had not seen a provider or taken any medications for about \"2 years\". Work-up in ER: Glu 310, Na+ 129, Alb 3.3, XR of L foot showed osteomyelitis of the 5th metatarsal head, US of LLE showed acute DVT in the left mid-distal femoral and popliteal veins. He was started on IV Heparin, IV Zosyn, and IV Vanco. He was admitted to the hospital for further evaluation and treatment. Hospital course:    Left foot osteomyelitis/cellulitis/DVT of LLE/PAD-IV ATB, IV heparin (transitioned to Eliquis), ID consult, podiatry consult, elevate LLE, local wound care, analgesics prn, patient transferred to [facility] for vascular consult on 9/14, underwent LLE revascularization with Dr. Stoddard on 9/18, recommending repeat BEN in 4 weeks with OP F/U, underwent L partial 5th ray resection on 9/23 with Dr. Spears, taken back to OR on 9/27 for delayed closure with graft application by Dr. 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 was HDS and discharged to [facility] on 10/2/24. Today, pt. is c/o " R eye pain, blurred vision, and R ear pain. He denies any numbness or tingling. He reports pain is worse with bright lights. He denies Hx of migraines. He denies any dizziness or HA. He denies any fevers, chills, SOB, cough, or chest pain. Staff report no clinical concerns.     ROS:    As above in HPI. Otherwise, all other systems have been reviewed and are negative for complaint.    Medications reviewed and verified in NH chart.     Patient Active Problem List   Diagnosis    Wound infection    Osteomyelitis of left foot, unspecified type (Multi)    Acute deep vein thrombosis (DVT) of popliteal vein of left lower extremity (Multi)    Bilateral lower extremity edema    CAD (coronary artery disease)    Hypertension    Inflammatory neuropathy (Multi)    History of stroke    Weakness of left lower extremity    Ambulatory dysfunction    Chronic bilateral low back pain with left-sided sciatica    Diabetic peripheral neuropathy (Multi)    Neural foraminal stenosis of lumbosacral spine    Hyperlipidemia    Lumbar back pain with radiculopathy affecting left lower extremity    Peripheral arterial disease (CMS-formerly [facility])    Type 2 diabetes mellitus, without long-term current use of insulin (Multi)    Anemia    Obesity    Delayed surgical wound healing of foot amputation stump (Multi)    Impaired mobility and ADLs    Weakness    Thoracic radiculopathy    Osteoarthritis    Cellulitis of foot, left    Meningioma (Multi)    Tremor of left hand    Diabetic ulcer of left foot associated with type 2 diabetes mellitus, limited to breakdown of skin    Acute deep vein thrombosis (DVT) of femoral vein of left lower extremity (Multi)    Nonrheumatic aortic valve stenosis    Diastolic dysfunction        Past Medical History:   Diagnosis Date    Acute osteomyelitis of ankle and foot, left (Multi)     AMI (acute myocardial infarction) (Multi)     ASHD (arteriosclerotic heart disease)     At risk for falls     Cellulitis     left foot and ankle     Diabetes mellitus (Multi)     DVT (deep vein thrombosis) in pregnancy ([facility])     Fall risk care plan declined     Hypertension     Myocardial infarction (Multi)     Neuritis     Neuropathy     Osteoarthritis     Osteomyelitis     PVD (peripheral vascular disease) (CMS-HCC)     Sprain of right knee 06/25/2015    Stroke (Multi)     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      FOOT RAY RESECTION Left 09/23/2024    L partial 5th ray resection (Dr. Spears, DPM)    FOOT SURGERY Left 09/27/2024    Delayed closure w/ graft application (Dr. Huynh, DPM)    INVASIVE VASCULAR PROCEDURE Bilateral 09/18/2024    Procedure: Lower Extremity Angiogram;  Surgeon: Teofilo Stoddard MD;  Location: [facility] Cardiac Cath Lab;  Service: Cardiovascular;  Laterality: Bilateral;    IRIDOTOMY / IRIDECTOMY Bilateral        Family History   Problem Relation Name Age of Onset    Heart disease Father      Colon cancer Other      Heart attack Other      Diabetes Other      Hypertension Other         Social History     Tobacco Use   Smoking Status Never    Passive exposure: Never   Smokeless Tobacco Never       Social History     Substance and Sexual Activity   Alcohol Use Never       Social History     Substance and Sexual Activity   Drug Use Never       No Known Allergies     Vital Signs:   115/71-72-18-97.4-99% on RA    Physical Exam:  General: Sitting up in bed in NAD, alert   Head/Face: NCAT, symmetrical  Eyes: PERRLA, no injection, no discharge  ENT: Hearing not impaired, ears without scars or lesions, nasal mucosa and turbinates pink, septum midline, lips pink and moist  Neck: Supple, symmetrical  Respiratory: CTA but diminished without adventitious sounds, respirations even and  nonlabored without use of accessory muscles, good air exchange  Cardio: RRR without murmur or gallops, normal S1S2, NP edema to BLE (L > R), pedal pulses 2+/4 bilaterally  Chest/Breast: Symmetrical  GI: BS x 4, normoactive, non-distended, abd round and soft, no masses or tenderness  : No suprapubic tenderness or distention  MSK: Gait not assessed, joints with full ROM without pain or contractures, + generalized weakness  Skin: Skin warm and dry, no induration, DTI of R heel, stage III pressure ulcer of L heel, vascular ulcer of L anterior lower leg, surgical wound of L lateral foot, skin tags to R buttocks (chronic per patient)   Neurologic: Cranial nerves II through XII intact, decreased sensation to BLE  Psychiatric: Alert to person, place, and time, calm and cooperative, expresses depression with current situation     Results/Data:   10/30/24: Glu 139, Cr 0.6, Hgb 9.1, Hct 29.4, Sed Rate 46, CRP 2.3  10/23/24: [id number], Cr 0.6, Hgb 9.0, Hct 29.1, Iron 44, TIBC 261, Ferritin 21.1, Transferrin 193, Folate 4.4, Vit B12 358  10/16/24: Glu 135, Cr 0.6, Hgb 8.5, Hct 26.7  10/3/24: Glu 111, Cr 0.6, HgbA1C 7.0, Hgb 9.0, Hct 28.8    Assessment/Plan:  Left foot osteomyelitis/cellulitis/PAD/venous insufficiency-s/p LLE revascularization with Dr. Stoddard on 9/18, s/p L partial 5th ray resection on 9/23 with Dr. Spears, s/p delayed closure with graft application by Dr. Huynh on 9/27, c/w local wound care, c/w Vit C, MVI, and LPS Critical Care BID to promote wound healing, s/p Augmentin (end date 10/9), c/w ASA and plavix, c/w Tylenol and Gabapentin, c/w Oxycodone prn for mod-severe pain, tizanidine prn for muscle spasms, wound care team following, seen by vascular on 10/14, NWB of LLE, F/U with podiatry as needed, F/U with vascular  DVT of LLE-c/w eliquis, elevate LLE, monitor H&H  Physical deconditioning/weakness-c/w PT/OT  Insomnia/depression-c/w trazodone and melatonin, supportive care, monitor behaviors, Psych  consulted  Anemia-c/w MVI, iron, and Vit B12, monitor CBC, iron panel, and Vit B12 level   CAD/HTN/Hx MI/HLD/diastolic dysfunction/AVS-FAVIAN diet, c/w ASA, hydralazine, and lisinopril, monitor BP, monitor lipid panel, needs established with cardiology after discharge  DM2 with peripheral neuropathy-LCS diet, accuchecks, c/w lispro ISS with meals, BP and lipid control, yearly eye exam, diabetic foot care, diabetic diet education (RD following), monitor HgbA1C  Hypokalemia-c/w KCl, monitor K+ level  Right frontal lobe meningioma-followed by Dr. Sin ([facility]), seen on 8/4/23 and advised to F/U with neuro-oncology [facility] in 2 years with MRI WWO for surveillance, office called this afternoon to see if patient's symptoms could be related to meningioma, neuro-onc does not feel that these symptoms are related to the meningioma   Hx stroke/LLE weakness/L hand tremor-followed by neurology   Constipation-c/w miralax, monitor BMs  OA/lumbar radiculopathy/thoracic radiculopathy-Tylenol prn, F/U with specialists after discharge  R ear pain/R eye pain/vision changes-symptoms possibly 2/2 migraine headache, give Excedrin 2 tabs PO x 1 now and monitor for improvement, discussed ER evaluation due to sudden onset of symptoms and no prior history of such, patient wants to see if Excedrin will help prior to being evaluated in the ER     Orders:  Excedrin 2 tabs PO x 1 now     Code Status:   Full Code

## 2025-01-02 ENCOUNTER — NURSING HOME VISIT (OUTPATIENT)
Dept: POST ACUTE CARE | Facility: EXTERNAL LOCATION | Age: 75
End: 2025-01-02
Payer: MEDICARE

## 2025-01-02 DIAGNOSIS — I73.9 PVD (PERIPHERAL VASCULAR DISEASE) (CMS-HCC): ICD-10-CM

## 2025-01-02 DIAGNOSIS — I82.412 ACUTE DEEP VEIN THROMBOSIS (DVT) OF FEMORAL VEIN OF LEFT LOWER EXTREMITY (MULTI): ICD-10-CM

## 2025-01-02 DIAGNOSIS — R53.1 WEAKNESS: ICD-10-CM

## 2025-01-02 DIAGNOSIS — L08.9 WOUND INFECTION: ICD-10-CM

## 2025-01-02 DIAGNOSIS — I82.432 ACUTE DEEP VEIN THROMBOSIS (DVT) OF POPLITEAL VEIN OF LEFT LOWER EXTREMITY (MULTI): ICD-10-CM

## 2025-01-02 DIAGNOSIS — T14.8XXA WOUND INFECTION: ICD-10-CM

## 2025-01-02 DIAGNOSIS — D32.9 MENINGIOMA (MULTI): ICD-10-CM

## 2025-01-02 DIAGNOSIS — K59.00 CONSTIPATION, UNSPECIFIED CONSTIPATION TYPE: ICD-10-CM

## 2025-01-02 DIAGNOSIS — G50.0 TRIGEMINAL NEURALGIA: Primary | ICD-10-CM

## 2025-01-02 DIAGNOSIS — R53.81 PHYSICAL DECONDITIONING: ICD-10-CM

## 2025-01-02 DIAGNOSIS — G43.809 OTHER MIGRAINE WITHOUT STATUS MIGRAINOSUS, NOT INTRACTABLE: ICD-10-CM

## 2025-01-02 PROCEDURE — 99309 SBSQ NF CARE MODERATE MDM 30: CPT | Performed by: NURSE PRACTITIONER

## 2025-01-02 NOTE — LETTER
"Patient: Elliot Partida  : 1950    Encounter Date: 2025    Chief Complaint:   SNF F/U  -Left foot osteomyelitis  -Left foot cellulitis  -Left foot diabetic ulcer  -PVD  -Venous insufficiency  -DVT of LLE  -Physical deconditioning/weakness  -Trigeminal neuralgia     HPI:   74 year-old male presenting to [facility] ER on 24 with \"months\" of LLE pain with edema, redness, and warmth. He was noted to have a large necrotic ulcer under his 5th toe. He reported that he had not seen a provider or taken any medications for about \"2 years\". Work-up in ER: Glu 310, Na+ 129, Alb 3.3, XR of L foot showed osteomyelitis of the 5th metatarsal head, US of LLE showed acute DVT in the left mid-distal femoral and popliteal veins. He was started on IV Heparin, IV Zosyn, and IV Vanco. He was admitted to the hospital for further evaluation and treatment. Hospital course:    Left foot osteomyelitis/cellulitis/DVT of LLE/PAD-IV ATB, IV heparin (transitioned to Eliquis), ID consult, podiatry consult, elevate LLE, local wound care, analgesics prn, patient transferred to [facility] for vascular consult on , underwent LLE revascularization with Dr. Stoddard on , recommending repeat BEN in 4 weeks with OP F/U, underwent L partial 5th ray resection on  with Dr. Spears, taken back to OR on  for delayed closure with graft application by Dr. 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 was HDS and discharged to [facility] on 10/2/24. On 11/5, patient was sent to [facility] ER for evaluation of R eye pain and ringing in R ear. He was treated with Excedrin in-house, which was not effective. In the ER, he was Dx with trigeminal neuralgia and started on tegretol. Pt. was sent to the ER on 12/23 with c/o HA, left-sided facial pain, dizziness, and watering of his left eye. He was given Prednisone and Ibuprofen in-house with concern for acute flare of trigeminal neuralgia vs migraine HA. Pt. had no improvement in symptoms and requested to go to the ER for evaluation. Work-up in ER: CT of head negative for acute findings, showed atrophy and chronic microvascular ischemic disease with an old lacunar infarct in the R basal ganglia noted, stable meningioma. Neuro was consulted and had low concern for stroke. Migraine HA was suspected and patient was treated with Reglan, Decadron, and D/C'ed back to NH.     Pt. noted to have increased edema to LLE on 12/31 and was ordered arterial and venous US. Today, patient continues to report watery of eyes and facial pain. He reports, \"It changes sides\". He reports that it has not improvement since ER visit. He is agreeable to adjustment of tegretol. He denies dizziness, HA, SOB, cough, or chest pain. He denies any new pain of his LLE. He denies fevers or chills. Staff report no clinical concerns.     ROS:    As above in HPI. Otherwise, all other systems have been reviewed and are negative for complaint.    Medications reviewed and verified in NH chart.     Patient Active Problem List   Diagnosis   • Wound infection   • Osteomyelitis of left foot, unspecified type (Multi)   • Acute deep vein thrombosis (DVT) of popliteal vein of left lower extremity (Multi)   • Bilateral lower extremity edema   • CAD (coronary artery disease)   • Hypertension   • Inflammatory neuropathy (Multi)   • History of stroke   • Weakness of left lower extremity   • " Ambulatory dysfunction   • Chronic bilateral low back pain with left-sided sciatica   • Diabetic peripheral neuropathy (Multi)   • Neural foraminal stenosis of lumbosacral spine   • Hyperlipidemia   • Lumbar back pain with radiculopathy affecting left lower extremity   • Peripheral arterial disease (CMS-HCC)   • Type 2 diabetes mellitus, without long-term current use of insulin (Multi)   • Anemia   • Obesity   • Delayed surgical wound healing of foot amputation stump (Multi)   • Impaired mobility and ADLs   • Weakness   • Thoracic radiculopathy   • Osteoarthritis   • Cellulitis of foot, left   • Meningioma (Multi)   • Tremor of left hand   • Diabetic ulcer of left foot associated with type 2 diabetes mellitus, limited to breakdown of skin   • Acute deep vein thrombosis (DVT) of femoral vein of left lower extremity (Multi)   • Nonrheumatic aortic valve stenosis   • Diastolic dysfunction        Past Medical History:   Diagnosis Date   • Acute osteomyelitis of ankle and foot, left (Multi)    • AMI (acute myocardial infarction) (Multi)    • ASHD (arteriosclerotic heart disease)    • At risk for falls    • Cellulitis     left foot and ankle   • Diabetes mellitus (Multi)    • DVT (deep vein thrombosis) in pregnancy ([facility])    • Fall risk care plan declined    • Hypertension    • Myocardial infarction (Multi)    • Neuritis    • Neuropathy    • Osteoarthritis    • Osteomyelitis    • PVD (peripheral vascular disease) (CMS-HCC)    • Sprain of right knee 06/25/2015   • Stroke (Multi)    • 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     • FOOT RAY RESECTION Left 09/23/2024    L partial 5th ray resection (Dr. Spears, DPM)   • FOOT SURGERY Left 09/27/2024     Delayed closure w/ graft application (Dr. Huynh, DPM)   • INVASIVE VASCULAR PROCEDURE Bilateral 09/18/2024    Procedure: Lower Extremity Angiogram;  Surgeon: Teofilo Stoddard MD;  Location: [facility] Cardiac Cath Lab;  Service: Cardiovascular;  Laterality: Bilateral;   • IRIDOTOMY / IRIDECTOMY Bilateral        Family History   Problem Relation Name Age of Onset   • Heart disease Father     • Colon cancer Other     • Heart attack Other     • Diabetes Other     • Hypertension Other         Social History     Tobacco Use   Smoking Status Never   • Passive exposure: Never   Smokeless Tobacco Never       Social History     Substance and Sexual Activity   Alcohol Use Never       Social History     Substance and Sexual Activity   Drug Use Never       No Known Allergies     Vital Signs:   128/68-78-18-98.0-98% on RA    Physical Exam:  General: Sitting up in WC in NAD, alert   Head/Face: NCAT, symmetrical  Eyes: PERRLA, no injection, no discharge  ENT: Hearing not impaired, ears without scars or lesions, nasal mucosa and turbinates pink, septum midline, lips pink and moist  Neck: Supple, symmetrical  Respiratory: CTA but diminished without adventitious sounds, respirations even and nonlabored without use of accessory muscles, good air exchange  Cardio: RRR without murmur or gallops, normal S1S2, NP edema to RLE, 2-3+ P edema to LLE, pedal pulses 1+/4 bilaterally  Chest/Breast: Symmetrical  GI: BS x 4, normoactive, non-distended, abd round and soft, no masses or tenderness  : No suprapubic tenderness or distention  MSK: Gait not assessed, joints with full ROM without pain or contractures  Skin: Skin warm and dry, no induration, DTI of R heel, vascular ulcer of L anterior lower leg, surgical wound of L lateral foot, skin tags to R buttocks (chronic per patient)   Neurologic: Cranial nerves II through XII intact, decreased sensation to BLE  Psychiatric: Alert to person, place, and time, calm and cooperative, angry and agitated that US  tech has not been in yet to complete US     Results/Data:   12/23/24: Glu 116, K+ 5.4, LFTs WNL, WBC 11.8, Hgb 10.9, Hct 34.6  11/5/24: CMP WNL, Mag 2.03, CRP 1.34, Sed Rate 38, Hgb 10.3, Hct 32.7  10/30/24: Glu 139, Cr 0.6, Hgb 9.1, Hct 29.4, Sed Rate 46, CRP 2.3  10/23/24: Vcv760, Cr 0.6, Hgb 9.0, Hct 29.1, Iron 44, TIBC 261, Ferritin 21.1, Transferrin 193, Folate 4.4, Vit B12 358  10/16/24: Glu 135, Cr 0.6, Hgb 8.5, Hct 26.7  10/3/24: Glu 111, Cr 0.6, HgbA1C 7.0, Hgb 9.0, Hct 28.8    Assessment/Plan:  Left foot osteomyelitis/cellulitis/PAD/venous insufficiency-s/p LLE revascularization with Dr. Stoddard on 9/18, s/p L partial 5th ray resection on 9/23 with Dr. Spears, s/p delayed closure with graft application by Dr. Huynh on 9/27, c/w local wound care, c/w Vit C, MVI, and LPS Critical Care BID to promote wound healing, s/p Augmentin (end date 10/9), c/w ASA and plavix, c/w Tylenol and Gabapentin, c/w Oxycodone prn for mod-severe pain, tizanidine prn for muscle spasms, wound care team following, seen by podiatry on 10/14, WBAT to LLE, F/U with podiatry as needed, F/U with vascular, arterial and venous US of LLE pending, start on doxycycline 100 mg PO BID x 10 days for possible wound infection (wound is odorous)  DVT of LLE-c/w eliquis, elevate LLE, monitor H&H  Physical deconditioning/weakness-c/w PT/OT  Insomnia/depression-c/w trazodone and melatonin, supportive care, monitor behaviors, patient declines Psych services at this time   Anemia-c/w MVI, iron, and Vit B12, monitor CBC, iron panel, and Vit B12 level   CAD/HTN/Hx MI/HLD/diastolic dysfunction/AVS-FAVIAN diet, c/w ASA, hydralazine, and lisinopril, monitor BP, monitor lipid panel, needs established with cardiology after discharge  DM2 with peripheral neuropathy-LCS diet, accuchecks, c/w lispro ISS with meals, BP and lipid control, yearly eye exam, diabetic foot care, diabetic diet education (RD following), monitor HgbA1C  Hypokalemia-c/w KCl, monitor K+  level  Right frontal lobe meningioma-followed by Dr. Sin ([facility]), seen on 8/4/23 and advised to F/U with neuro-oncology VITO in 2 years with MRI for surveillance  Hx stroke/LLE weakness/L hand tremor-followed by neurology   Constipation-c/w miralax, monitor BMs  OA/lumbar radiculopathy/thoracic radiculopathy-Tylenol prn, F/U with specialists after discharge  Trigeminal neuralgia-c/w tegretol (increase to 200 mg BID)  Constipation-encourage fluids and fiber, c/w senna plus and miralax, monitor for BMs  Migraine HA-s/p reglan and decadron, continue to monitor     Orders:  Increase carbamazepine to 200 mg PO BID for trigeminal neuralgia   Doxycycline 100 mg PO BID x 10 days for L foot wound infection     Code Status:   Full Code    DME:  Patient will require a hospital bed for discharge home. Pt. requires the HOB to be elevated more than 30 degrees due to his Dx of diastolic heart failure. He requires frequent changes in body position.    Patient will require a walker for discharge home 2/2 generalized weakness and chronic wound of LLE.    Patient will require a bedside commode for discharge home. A bedside commode will provide safe toileting.         Electronically Signed By: SARAH Jerome-CNP   1/6/25  1:33 PM

## 2025-01-02 NOTE — PROGRESS NOTES
"Chief Complaint:   SNF F/U  -Left foot osteomyelitis  -Left foot cellulitis  -Left foot diabetic ulcer  -PVD  -Venous insufficiency  -DVT of LLE  -Physical deconditioning/weakness  -Trigeminal neuralgia     HPI:   74 year-old male presenting to [facility] ER on 9/12/24 with \"months\" of LLE pain with edema, redness, and warmth. He was noted to have a large necrotic ulcer under his 5th toe. He reported that he had not seen a provider or taken any medications for about \"2 years\". Work-up in ER: Glu 310, Na+ 129, Alb 3.3, XR of L foot showed osteomyelitis of the 5th metatarsal head, US of LLE showed acute DVT in the left mid-distal femoral and popliteal veins. He was started on IV Heparin, IV Zosyn, and IV Vanco. He was admitted to the hospital for further evaluation and treatment. Hospital course:    Left foot osteomyelitis/cellulitis/DVT of LLE/PAD-IV ATB, IV heparin (transitioned to Eliquis), ID consult, podiatry consult, elevate LLE, local wound care, analgesics prn, patient transferred to [facility] for vascular consult on 9/14, underwent LLE revascularization with Dr. Stoddard on 9/18, recommending repeat BEN in 4 weeks with OP F/U, underwent L partial 5th ray resection on 9/23 with Dr. Spears, taken back to OR on 9/27 for delayed closure with graft application by Dr. 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 was HDS and discharged to [facility] on " 10/2/24. On 11/5, patient was sent to [facility] ER for evaluation of R eye pain and ringing in R ear. He was treated with Excedrin in-house, which was not effective. In the ER, he was Dx with trigeminal neuralgia and started on tegretol. Today, patient reports resolution of eye pain and ringing in ear. He denies dizziness, HA, SOB, cough, or chest pain. He is c/o constipation, denies abdominal pain, nausea, or appetite changes. He continues to c/o intermittent L foot pain. Staff report no clinical concerns.    ROS:    As above in HPI. Otherwise, all other systems have been reviewed and are negative for complaint.    Medications reviewed and verified in NH chart.     Patient Active Problem List   Diagnosis    Wound infection    Osteomyelitis of left foot, unspecified type (Multi)    Acute deep vein thrombosis (DVT) of popliteal vein of left lower extremity (Multi)    Bilateral lower extremity edema    CAD (coronary artery disease)    Hypertension    Inflammatory neuropathy (Multi)    History of stroke    Weakness of left lower extremity    Ambulatory dysfunction    Chronic bilateral low back pain with left-sided sciatica    Diabetic peripheral neuropathy (Multi)    Neural foraminal stenosis of lumbosacral spine    Hyperlipidemia    Lumbar back pain with radiculopathy affecting left lower extremity    Peripheral arterial disease (CMS-HCC)    Type 2 diabetes mellitus, without long-term current use of insulin (Multi)    Anemia    Obesity    Delayed surgical wound healing of foot amputation stump (Multi)    Impaired mobility and ADLs    Weakness    Thoracic radiculopathy    Osteoarthritis    Cellulitis of foot, left    Meningioma (Multi)    Tremor of left hand    Diabetic ulcer of left foot associated with type 2 diabetes mellitus, limited to breakdown of skin    Acute deep vein thrombosis (DVT) of femoral vein of left lower extremity (Multi)    Nonrheumatic aortic valve stenosis    Diastolic dysfunction        Past  Medical History:   Diagnosis Date    Acute osteomyelitis of ankle and foot, left (Multi)     AMI (acute myocardial infarction) (Multi)     ASHD (arteriosclerotic heart disease)     At risk for falls     Cellulitis     left foot and ankle    Diabetes mellitus (Multi)     DVT (deep vein thrombosis) in pregnancy ([facility])     Fall risk care plan declined     Hypertension     Myocardial infarction (Multi)     Neuritis     Neuropathy     Osteoarthritis     Osteomyelitis     PVD (peripheral vascular disease) (CMS-HCC)     Sprain of right knee 06/25/2015    Stroke (Multi)     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      FOOT RAY RESECTION Left 09/23/2024    L partial 5th ray resection (Dr. Spears, DPBRAYAN)    FOOT SURGERY Left 09/27/2024    Delayed closure w/ graft application (Dr. Huynh, DPM)    INVASIVE VASCULAR PROCEDURE Bilateral 09/18/2024    Procedure: Lower Extremity Angiogram;  Surgeon: Teofilo Stoddard MD;  Location: [facility] Cardiac Cath Lab;  Service: Cardiovascular;  Laterality: Bilateral;    IRIDOTOMY / IRIDECTOMY Bilateral        Family History   Problem Relation Name Age of Onset    Heart disease Father      Colon cancer Other      Heart attack Other      Diabetes Other      Hypertension Other         Social History     Tobacco Use   Smoking Status Never    Passive exposure: Never   Smokeless Tobacco Never       Social History     Substance and Sexual Activity   Alcohol Use Never       Social History     Substance and Sexual Activity   Drug Use Never       No Known Allergies     Vital Signs:   141/80-74-18-98.0-98% on RA    Physical Exam:  General: Sitting up in bed in NAD, alert   Head/Face: NCAT, symmetrical  Eyes: PERRLA, no injection, no discharge  ENT:  Hearing not impaired, ears without scars or lesions, nasal mucosa and turbinates pink, septum midline, lips pink and moist  Neck: Supple, symmetrical  Respiratory: CTA but diminished without adventitious sounds, respirations even and nonlabored without use of accessory muscles, good air exchange  Cardio: RRR without murmur or gallops, normal S1S2, NP edema to BLE (L > R), pedal pulses 2+/4 bilaterally  Chest/Breast: Symmetrical  GI: BS x 4, normoactive, non-distended, abd round and soft, no masses or tenderness  : No suprapubic tenderness or distention  MSK: Gait not assessed, joints with full ROM without pain or contractures, + generalized weakness  Skin: Skin warm and dry, no induration, DTI of R heel, stage III pressure ulcer of L heel, vascular ulcer of L anterior lower leg, surgical wound of L lateral foot, skin tags to R buttocks (chronic per patient)   Neurologic: Cranial nerves II through XII intact, decreased sensation to BLE  Psychiatric: Alert to person, place, and time, calm and cooperative, expresses depression with current situation     Results/Data:   11/5/24: CMP WNL, Mag 2.03, CRP 1.34, Sed Rate 38, Hgb 10.3, Hct 32.7  10/30/24: Glu 139, Cr 0.6, Hgb 9.1, Hct 29.4, Sed Rate 46, CRP 2.3  10/23/24: Gct715, Cr 0.6, Hgb 9.0, Hct 29.1, Iron 44, TIBC 261, Ferritin 21.1, Transferrin 193, Folate 4.4, Vit B12 358  10/16/24: Glu 135, Cr 0.6, Hgb 8.5, Hct 26.7  10/3/24: Glu 111, Cr 0.6, HgbA1C 7.0, Hgb 9.0, Hct 28.8    Assessment/Plan:  Left foot osteomyelitis/cellulitis/PAD/venous insufficiency-s/p LLE revascularization with Dr. Stoddard on 9/18, s/p L partial 5th ray resection on 9/23 with Dr. Spears, s/p delayed closure with graft application by Dr. Huynh on 9/27, c/w local wound care, c/w Vit C, MVI, and LPS Critical Care BID to promote wound healing, s/p Augmentin (end date 10/9), c/w ASA and plavix, c/w Tylenol and Gabapentin, c/w Oxycodone prn for mod-severe pain, tizanidine prn for muscle spasms, wound  care team following, seen by podiatry on 10/14, NWB of LLE, F/U with podiatry as needed, F/U with vascular  DVT of LLE-c/w eliquis, elevate LLE, monitor H&H  Physical deconditioning/weakness-c/w PT/OT  Insomnia/depression-c/w trazodone and melatonin, supportive care, monitor behaviors, patient declines Psych services at this time   Anemia-c/w MVI, iron, and Vit B12, monitor CBC, iron panel, and Vit B12 level   CAD/HTN/Hx MI/HLD/diastolic dysfunction/AVS-FAVIAN diet, c/w ASA, hydralazine, and lisinopril, monitor BP, monitor lipid panel, needs established with cardiology after discharge  DM2 with peripheral neuropathy-LCS diet, accuchecks, c/w lispro ISS with meals, BP and lipid control, yearly eye exam, diabetic foot care, diabetic diet education (RD following), monitor HgbA1C  Hypokalemia-c/w KCl, monitor K+ level  Right frontal lobe meningioma-followed by Dr. Sin ([facility]), seen on 8/4/23 and advised to F/U with neuro-oncology [facility] in 2 years with MRI [facility] for surveillance  Hx stroke/LLE weakness/L hand tremor-followed by neurology   Constipation-c/w miralax, monitor BMs  OA/lumbar radiculopathy/thoracic radiculopathy-Tylenol prn, F/U with specialists after discharge  Trigeminal neuralgia-c/w tegretol  Constipation-encourage fluids and fiber, start senna plus 2 tabs daily, monitor closely     Orders:  Senna plus 2 tabs PO daily    Code Status:   Full Code

## 2025-01-05 ENCOUNTER — NURSING HOME VISIT (OUTPATIENT)
Dept: POST ACUTE CARE | Facility: EXTERNAL LOCATION | Age: 75
End: 2025-01-05
Payer: MEDICARE

## 2025-01-05 DIAGNOSIS — M86.9 OSTEOMYELITIS, UNSPECIFIED SITE, UNSPECIFIED TYPE (MULTI): ICD-10-CM

## 2025-01-05 DIAGNOSIS — I69.354 HEMIPLEGIA AND HEMIPARESIS FOLLOWING CEREBRAL INFARCTION AFFECTING LEFT NON-DOMINANT SIDE (MULTI): ICD-10-CM

## 2025-01-05 DIAGNOSIS — D32.9 MENINGIOMA (MULTI): ICD-10-CM

## 2025-01-05 DIAGNOSIS — G50.0 TRIGEMINAL NEURALGIA: ICD-10-CM

## 2025-01-05 DIAGNOSIS — Z79.4 TYPE 2 DIABETES MELLITUS WITH OTHER SPECIFIED COMPLICATION, WITH LONG-TERM CURRENT USE OF INSULIN: ICD-10-CM

## 2025-01-05 DIAGNOSIS — T81.89XA: ICD-10-CM

## 2025-01-05 DIAGNOSIS — T87.89: ICD-10-CM

## 2025-01-05 DIAGNOSIS — I25.10 ASHD (ARTERIOSCLEROTIC HEART DISEASE): ICD-10-CM

## 2025-01-05 DIAGNOSIS — M19.90 OSTEOARTHRITIS, UNSPECIFIED OSTEOARTHRITIS TYPE, UNSPECIFIED SITE: ICD-10-CM

## 2025-01-05 DIAGNOSIS — L97.524 NON-PRESSURE CHRONIC ULCER OF OTHER PART OF LEFT FOOT WITH NECROSIS OF BONE: ICD-10-CM

## 2025-01-05 DIAGNOSIS — I10 HYPERTENSION, UNSPECIFIED TYPE: ICD-10-CM

## 2025-01-05 DIAGNOSIS — I82.499 DEEP VEIN THROMBOSIS (DVT) OF OTHER VEIN OF LOWER EXTREMITY, UNSPECIFIED CHRONICITY, UNSPECIFIED LATERALITY (MULTI): ICD-10-CM

## 2025-01-05 DIAGNOSIS — R53.1 WEAKNESS: ICD-10-CM

## 2025-01-05 DIAGNOSIS — E11.69 TYPE 2 DIABETES MELLITUS WITH OTHER SPECIFIED COMPLICATION, WITH LONG-TERM CURRENT USE OF INSULIN: ICD-10-CM

## 2025-01-05 DIAGNOSIS — E11.9 TYPE 2 DIABETES MELLITUS WITHOUT COMPLICATION, UNSPECIFIED WHETHER LONG TERM INSULIN USE (MULTI): Primary | ICD-10-CM

## 2025-01-05 DIAGNOSIS — G62.9 NEUROPATHY: ICD-10-CM

## 2025-01-05 DIAGNOSIS — Z91.81 AT RISK FOR FALLING: ICD-10-CM

## 2025-01-05 PROCEDURE — 99309 SBSQ NF CARE MODERATE MDM 30: CPT | Performed by: INTERNAL MEDICINE

## 2025-01-05 NOTE — Clinical Note
Patient: lEliot Partida  : 1950    Encounter Date: 2025    PLACE OF SERVICE:  [facility]    This is a subsequent visit.    Subjective  Patient ID: Elliot Partida is a 74 y.o. male who presents for Follow-up.    Mr. Elliot Partida is a 74-year-old male with history of diabetes with osteomyelitis to his left foot and ankle.  He has had recent headache and does have a history of trigeminal neuralgia.  He was found to have meningioma.  He is unable to care for himself and requires supportive care.    Review of Systems   Constitutional:  Negative for chills and fever.   Cardiovascular:  Negative for chest pain.   All other systems reviewed and are negative.    Objective  /78   Pulse 78   Temp 36.6 °C (97.8 °F)   Resp 16     Physical Exam  Vitals reviewed.   Constitutional:       General: He is not in acute distress.     Comments: This is a well-developed and well-nourished male, sitting in a chair   HENT:      Right Ear: Tympanic membrane, ear canal and external ear normal.      Left Ear: Tympanic membrane, ear canal and external ear normal.   Eyes:      General: No scleral icterus.     Pupils: Pupils are equal, round, and reactive to light.   Neck:      Vascular: No carotid bruit.   Cardiovascular:      Heart sounds: Normal heart sounds, S1 normal and S2 normal. No murmur heard.     No friction rub.   Pulmonary:      Effort: Pulmonary effort is normal.      Breath sounds: Normal breath sounds and air entry.   Abdominal:      Palpations: There is no hepatomegaly, splenomegaly or mass.   Musculoskeletal:         General: No swelling or deformity. Normal range of motion.      Cervical back: Neck supple.      Right lower leg: No edema.      Left lower leg: No edema.      Comments: There is dressing applied to the patient's left foot and ankle.   Lymphadenopathy:      Cervical: No cervical adenopathy.      Upper Body:      Right upper body: No axillary  adenopathy.      Left upper body: No axillary adenopathy.      Lower Body: No right inguinal adenopathy. No left inguinal adenopathy.   Neurological:      Mental Status: He is oriented to person, place, and time.      Cranial Nerves: Cranial nerves 2-12 are intact. No cranial nerve deficit.      Sensory: No sensory deficit.      Motor: Motor function is intact. No weakness.      Gait: Gait is intact.      Deep Tendon Reflexes: Reflexes normal.   Psychiatric:         Mood and Affect: Mood normal. Mood is not anxious or depressed. Affect is not angry.         Behavior: Behavior is not agitated.         Thought Content: Thought content normal.         Judgment: Judgment normal.     LAB WORK:  Laboratory studies were reviewed.    Assessment/Plan  Problem List Items Addressed This Visit             ICD-10-CM       Cardiac and Vasculature    Hypertension I10       Hematology and Neoplasia    Meningioma (Multi) D32.9       Musculoskeletal and Injuries    Osteoarthritis M19.90       Symptoms and Signs    Weakness R53.1     Other Visit Diagnoses         Codes    Type 2 diabetes mellitus without complication, unspecified whether long term insulin use (Multi)    -  Primary E11.9    Osteomyelitis, unspecified site, unspecified type (Multi)     M86.9    Trigeminal neuralgia     G50.0    Deep vein thrombosis (DVT) of other vein of lower extremity, unspecified chronicity, unspecified laterality (Multi)     I82.499    Neuropathy     G62.9    ASHD (arteriosclerotic heart disease)     I25.10    At risk for falling     Z91.81        1. Diabetes, on insulin.  2. Osteomyelitis.  Continue wound care.  Follow with Podiatry.  3. Meningioma.  Follow with Neurosurgery.  4. Trigeminal neuralgia, on pain control.  5. DVT, anticoagulated.  6. Neuropathy, on gabapentin.  7. Hypertension, medically controlled.  8. Osteoarthritis, on Tylenol.  9. ASHD, on aspirin.  10. Weakness, on PT/OT.  11. Fall risk, on fall precautions.    Scribe  Attestation  By signing my name below, I, Etienne Cortes attest that this documentation has been prepared under the direction and in the presence of Marium Singh MD.       Electronically Signed By: Marium Singh MD   1/10/25  4:59 PM

## 2025-01-06 NOTE — PROGRESS NOTES
"Chief Complaint:   SNF F/U  -Left foot osteomyelitis  -Left foot cellulitis  -Left foot diabetic ulcer  -PVD  -Venous insufficiency  -DVT of LLE  -Physical deconditioning/weakness  -Trigeminal neuralgia     HPI:   74 year-old male presenting to [facility] ER on 9/12/24 with \"months\" of LLE pain with edema, redness, and warmth. He was noted to have a large necrotic ulcer under his 5th toe. He reported that he had not seen a provider or taken any medications for about \"2 years\". Work-up in ER: Glu 310, Na+ 129, Alb 3.3, XR of L foot showed osteomyelitis of the 5th metatarsal head, US of LLE showed acute DVT in the left mid-distal femoral and popliteal veins. He was started on IV Heparin, IV Zosyn, and IV Vanco. He was admitted to the hospital for further evaluation and treatment. Hospital course:    Left foot osteomyelitis/cellulitis/DVT of LLE/PAD-IV ATB, IV heparin (transitioned to Eliquis), ID consult, podiatry consult, elevate LLE, local wound care, analgesics prn, patient transferred to [facility] for vascular consult on 9/14, underwent LLE revascularization with Dr. Stoddard on 9/18, recommending repeat BEN in 4 weeks with OP F/U, underwent L partial 5th ray resection on 9/23 with Dr. Spears, taken back to OR on 9/27 for delayed closure with graft application by Dr. 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 was HDS and discharged to [facility] on " 10/2/24. On 11/5, patient was sent to [facility] ER for evaluation of R eye pain and ringing in R ear. He was treated with Excedrin in-house, which was not effective. In the ER, he was Dx with trigeminal neuralgia and started on tegretol. Today, patient reports resolution of eye pain and ringing in ear. He denies dizziness, HA, SOB, cough, or chest pain. He continues to c/o constipation, denies abdominal pain, nausea, or appetite changes. He had a small BM this AM. He continues to c/o intermittent L foot pain. Staff report no clinical concerns.    ROS:    As above in HPI. Otherwise, all other systems have been reviewed and are negative for complaint.    Medications reviewed and verified in NH chart.     Patient Active Problem List   Diagnosis    Wound infection    Osteomyelitis of left foot, unspecified type (Multi)    Acute deep vein thrombosis (DVT) of popliteal vein of left lower extremity (Multi)    Bilateral lower extremity edema    CAD (coronary artery disease)    Hypertension    Inflammatory neuropathy (Multi)    History of stroke    Weakness of left lower extremity    Ambulatory dysfunction    Chronic bilateral low back pain with left-sided sciatica    Diabetic peripheral neuropathy (Multi)    Neural foraminal stenosis of lumbosacral spine    Hyperlipidemia    Lumbar back pain with radiculopathy affecting left lower extremity    Peripheral arterial disease (CMS-HCC)    Type 2 diabetes mellitus, without long-term current use of insulin (Multi)    Anemia    Obesity    Delayed surgical wound healing of foot amputation stump (Multi)    Impaired mobility and ADLs    Weakness    Thoracic radiculopathy    Osteoarthritis    Cellulitis of foot, left    Meningioma (Multi)    Tremor of left hand    Diabetic ulcer of left foot associated with type 2 diabetes mellitus, limited to breakdown of skin    Acute deep vein thrombosis (DVT) of femoral vein of left lower extremity (Multi)    Nonrheumatic aortic valve stenosis     Diastolic dysfunction        Past Medical History:   Diagnosis Date    Acute osteomyelitis of ankle and foot, left (Multi)     AMI (acute myocardial infarction) (Multi)     ASHD (arteriosclerotic heart disease)     At risk for falls     Cellulitis     left foot and ankle    Diabetes mellitus (Multi)     DVT (deep vein thrombosis) in pregnancy ([facility])     Fall risk care plan declined     Hypertension     Myocardial infarction (Multi)     Neuritis     Neuropathy     Osteoarthritis     Osteomyelitis     PVD (peripheral vascular disease) (CMS-HCC)     Sprain of right knee 06/25/2015    Stroke (Multi)     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      FOOT RAY RESECTION Left 09/23/2024    L partial 5th ray resection (Dr. Spears, DPM)    FOOT SURGERY Left 09/27/2024    Delayed closure w/ graft application (Dr. Huynh, DPM)    INVASIVE VASCULAR PROCEDURE Bilateral 09/18/2024    Procedure: Lower Extremity Angiogram;  Surgeon: Teofilo Stoddard MD;  Location: [facility] Cardiac Cath Lab;  Service: Cardiovascular;  Laterality: Bilateral;    IRIDOTOMY / IRIDECTOMY Bilateral        Family History   Problem Relation Name Age of Onset    Heart disease Father      Colon cancer Other      Heart attack Other      Diabetes Other      Hypertension Other         Social History     Tobacco Use   Smoking Status Never    Passive exposure: Never   Smokeless Tobacco Never       Social History     Substance and Sexual Activity   Alcohol Use Never       Social History     Substance and Sexual Activity   Drug Use Never       No Known Allergies     Vital Signs:   125/74-88-18-98.5-96% on RA    Physical Exam:  General: Sitting up in bed in NAD, alert   Head/Face: NCAT, symmetrical  Eyes: PERRLA,  no injection, no discharge  ENT: Hearing not impaired, ears without scars or lesions, nasal mucosa and turbinates pink, septum midline, lips pink and moist  Neck: Supple, symmetrical  Respiratory: CTA but diminished without adventitious sounds, respirations even and nonlabored without use of accessory muscles, good air exchange  Cardio: RRR without murmur or gallops, normal S1S2, NP edema to BLE (L > R), pedal pulses 2+/4 bilaterally  Chest/Breast: Symmetrical  GI: BS x 4, normoactive, non-distended, abd round and soft, no masses or tenderness  : No suprapubic tenderness or distention  MSK: Gait not assessed, joints with full ROM without pain or contractures  Skin: Skin warm and dry, no induration, DTI of R heel, stage III pressure ulcer of L heel, vascular ulcer of L anterior lower leg, surgical wound of L lateral foot, skin tags to R buttocks (chronic per patient)   Neurologic: Cranial nerves II through XII intact, decreased sensation to BLE  Psychiatric: Alert to person, place, and time, calm and cooperative, expresses depression with current situation     Results/Data:   11/5/24: CMP WNL, Mag 2.03, CRP 1.34, Sed Rate 38, Hgb 10.3, Hct 32.7  10/30/24: Glu 139, Cr 0.6, Hgb 9.1, Hct 29.4, Sed Rate 46, CRP 2.3  10/23/24: Ldg107, Cr 0.6, Hgb 9.0, Hct 29.1, Iron 44, TIBC 261, Ferritin 21.1, Transferrin 193, Folate 4.4, Vit B12 358  10/16/24: Glu 135, Cr 0.6, Hgb 8.5, Hct 26.7  10/3/24: Glu 111, Cr 0.6, HgbA1C 7.0, Hgb 9.0, Hct 28.8    Assessment/Plan:  Left foot osteomyelitis/cellulitis/PAD/venous insufficiency-s/p LLE revascularization with Dr. Stoddard on 9/18, s/p L partial 5th ray resection on 9/23 with Dr. Spears, s/p delayed closure with graft application by Dr. Huynh on 9/27, c/w local wound care, c/w Vit C, MVI, and LPS Critical Care BID to promote wound healing, s/p Augmentin (end date 10/9), c/w ASA and plavix, c/w Tylenol and Gabapentin, c/w Oxycodone prn for mod-severe pain, tizanidine prn for muscle spasms,  wound care team following, seen by podiatry on 10/14, NWB of LLE, F/U with podiatry as needed, F/U with vascular  DVT of LLE-c/w eliquis, elevate LLE, monitor H&H  Physical deconditioning/weakness-c/w PT/OT  Insomnia/depression-c/w trazodone and melatonin, supportive care, monitor behaviors, patient declines Psych services at this time   Anemia-c/w MVI, iron, and Vit B12, monitor CBC, iron panel, and Vit B12 level   CAD/HTN/Hx MI/HLD/diastolic dysfunction/AVS-FAVIAN diet, c/w ASA, hydralazine, and lisinopril, monitor BP, monitor lipid panel, needs established with cardiology after discharge  DM2 with peripheral neuropathy-LCS diet, accuchecks, c/w lispro ISS with meals, BP and lipid control, yearly eye exam, diabetic foot care, diabetic diet education (RD following), monitor HgbA1C  Hypokalemia-c/w KCl, monitor K+ level  Right frontal lobe meningioma-followed by Dr. Sin ([facility]), seen on 8/4/23 and advised to F/U with neuro-oncology [facility] in 2 years with MRI [facility] for surveillance  Hx stroke/LLE weakness/L hand tremor-followed by neurology   Constipation-c/w miralax, monitor BMs  OA/lumbar radiculopathy/thoracic radiculopathy-Tylenol prn, F/U with specialists after discharge  Trigeminal neuralgia-c/w tegretol  Constipation-encourage fluids and fiber, c/w senna plus, increase miralax to BID, monitor closely     Orders:  Increase miralax to BID    Code Status:   Full Code

## 2025-01-06 NOTE — PROGRESS NOTES
"Chief Complaint:   SNF F/U  -Left foot osteomyelitis  -Left foot cellulitis  -Left foot diabetic ulcer  -PVD  -Venous insufficiency  -DVT of LLE  -Physical deconditioning/weakness  -Trigeminal neuralgia     HPI:   74 year-old male presenting to [facility] ER on 9/12/24 with \"months\" of LLE pain with edema, redness, and warmth. He was noted to have a large necrotic ulcer under his 5th toe. He reported that he had not seen a provider or taken any medications for about \"2 years\". Work-up in ER: Glu 310, Na+ 129, Alb 3.3, XR of L foot showed osteomyelitis of the 5th metatarsal head, US of LLE showed acute DVT in the left mid-distal femoral and popliteal veins. He was started on IV Heparin, IV Zosyn, and IV Vanco. He was admitted to the hospital for further evaluation and treatment. Hospital course:    Left foot osteomyelitis/cellulitis/DVT of LLE/PAD-IV ATB, IV heparin (transitioned to Eliquis), ID consult, podiatry consult, elevate LLE, local wound care, analgesics prn, patient transferred to [facility] for vascular consult on 9/14, underwent LLE revascularization with Dr. Stoddard on 9/18, recommending repeat BEN in 4 weeks with OP F/U, underwent L partial 5th ray resection on 9/23 with Dr. Spears, taken back to OR on 9/27 for delayed closure with graft application by Dr. 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 was HDS and discharged to [facility] on " 10/2/24. On 11/5, patient was sent to [facility] ER for evaluation of R eye pain and ringing in R ear. He was treated with Excedrin in-house, which was not effective. In the ER, he was Dx with trigeminal neuralgia and started on tegretol. Today, patient reports resolution of eye pain and ringing in ear. He denies dizziness, HA, SOB, cough, or chest pain. He reports resolution of constipation. He continues to c/o intermittent L foot pain. He is wanting WB status. Staff report wounds are improving. No other clinical concerns noted at this time.     ROS:    As above in HPI. Otherwise, all other systems have been reviewed and are negative for complaint.    Medications reviewed and verified in NH chart.     Patient Active Problem List   Diagnosis    Wound infection    Osteomyelitis of left foot, unspecified type (Multi)    Acute deep vein thrombosis (DVT) of popliteal vein of left lower extremity (Multi)    Bilateral lower extremity edema    CAD (coronary artery disease)    Hypertension    Inflammatory neuropathy (Multi)    History of stroke    Weakness of left lower extremity    Ambulatory dysfunction    Chronic bilateral low back pain with left-sided sciatica    Diabetic peripheral neuropathy (Multi)    Neural foraminal stenosis of lumbosacral spine    Hyperlipidemia    Lumbar back pain with radiculopathy affecting left lower extremity    Peripheral arterial disease (CMS-HCC)    Type 2 diabetes mellitus, without long-term current use of insulin (Multi)    Anemia    Obesity    Delayed surgical wound healing of foot amputation stump (Multi)    Impaired mobility and ADLs    Weakness    Thoracic radiculopathy    Osteoarthritis    Cellulitis of foot, left    Meningioma (Multi)    Tremor of left hand    Diabetic ulcer of left foot associated with type 2 diabetes mellitus, limited to breakdown of skin    Acute deep vein thrombosis (DVT) of femoral vein of left lower extremity (Multi)    Nonrheumatic aortic valve stenosis     Diastolic dysfunction        Past Medical History:   Diagnosis Date    Acute osteomyelitis of ankle and foot, left (Multi)     AMI (acute myocardial infarction) (Multi)     ASHD (arteriosclerotic heart disease)     At risk for falls     Cellulitis     left foot and ankle    Diabetes mellitus (Multi)     DVT (deep vein thrombosis) in pregnancy ([facility])     Fall risk care plan declined     Hypertension     Myocardial infarction (Multi)     Neuritis     Neuropathy     Osteoarthritis     Osteomyelitis     PVD (peripheral vascular disease) (CMS-HCC)     Sprain of right knee 06/25/2015    Stroke (Multi)     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      FOOT RAY RESECTION Left 09/23/2024    L partial 5th ray resection (Dr. Spears, DPM)    FOOT SURGERY Left 09/27/2024    Delayed closure w/ graft application (Dr. Huynh, DPM)    INVASIVE VASCULAR PROCEDURE Bilateral 09/18/2024    Procedure: Lower Extremity Angiogram;  Surgeon: Teofilo Stoddard MD;  Location: [facility] Cardiac Cath Lab;  Service: Cardiovascular;  Laterality: Bilateral;    IRIDOTOMY / IRIDECTOMY Bilateral        Family History   Problem Relation Name Age of Onset    Heart disease Father      Colon cancer Other      Heart attack Other      Diabetes Other      Hypertension Other         Social History     Tobacco Use   Smoking Status Never    Passive exposure: Never   Smokeless Tobacco Never       Social History     Substance and Sexual Activity   Alcohol Use Never       Social History     Substance and Sexual Activity   Drug Use Never       No Known Allergies     Vital Signs:   142/63-66-18-97.7-93% on RA    Physical Exam:  General: Sitting up in bed in NAD, alert   Head/Face: NCAT, symmetrical  Eyes: PERRLA,  no injection, no discharge  ENT: Hearing not impaired, ears without scars or lesions, nasal mucosa and turbinates pink, septum midline, lips pink and moist  Neck: Supple, symmetrical  Respiratory: CTA but diminished without adventitious sounds, respirations even and nonlabored without use of accessory muscles, good air exchange  Cardio: RRR without murmur or gallops, normal S1S2, NP edema to BLE (L > R), pedal pulses 2+/4 bilaterally  Chest/Breast: Symmetrical  GI: BS x 4, normoactive, non-distended, abd round and soft, no masses or tenderness  : No suprapubic tenderness or distention  MSK: Gait not assessed, joints with full ROM without pain or contractures  Skin: Skin warm and dry, no induration, DTI of R heel, stage III pressure ulcer of L heel, vascular ulcer of L anterior lower leg, surgical wound of L lateral foot, skin tags to R buttocks (chronic per patient)   Neurologic: Cranial nerves II through XII intact, decreased sensation to BLE  Psychiatric: Alert to person, place, and time, calm and cooperative, expresses depression with current situation     Results/Data:   11/5/24: CMP WNL, Mag 2.03, CRP 1.34, Sed Rate 38, Hgb 10.3, Hct 32.7  10/30/24: Glu 139, Cr 0.6, Hgb 9.1, Hct 29.4, Sed Rate 46, CRP 2.3  10/23/24: Ykr630, Cr 0.6, Hgb 9.0, Hct 29.1, Iron 44, TIBC 261, Ferritin 21.1, Transferrin 193, Folate 4.4, Vit B12 358  10/16/24: Glu 135, Cr 0.6, Hgb 8.5, Hct 26.7  10/3/24: Glu 111, Cr 0.6, HgbA1C 7.0, Hgb 9.0, Hct 28.8    Assessment/Plan:  Left foot osteomyelitis/cellulitis/PAD/venous insufficiency-s/p LLE revascularization with Dr. Stoddard on 9/18, s/p L partial 5th ray resection on 9/23 with Dr. Spears, s/p delayed closure with graft application by Dr. Huynh on 9/27, c/w local wound care, c/w Vit C, MVI, and LPS Critical Care BID to promote wound healing, s/p Augmentin (end date 10/9), c/w ASA and plavix, c/w Tylenol and Gabapentin, c/w Oxycodone prn for mod-severe pain, tizanidine prn for muscle spasms,  wound care team following, seen by podiatry on 10/14, NWB of LLE (if wounds continue to improve will discuss WB status with wound care NP), F/U with podiatry as needed, F/U with vascular  DVT of LLE-c/w eliquis, elevate LLE, monitor H&H  Physical deconditioning/weakness-c/w PT/OT  Insomnia/depression-c/w trazodone and melatonin, supportive care, monitor behaviors, patient declines Psych services at this time   Anemia-c/w MVI, iron, and Vit B12, monitor CBC, iron panel, and Vit B12 level   CAD/HTN/Hx MI/HLD/diastolic dysfunction/AVS-FAVIAN diet, c/w ASA, hydralazine, and lisinopril, monitor BP, monitor lipid panel, needs established with cardiology after discharge  DM2 with peripheral neuropathy-LCS diet, accuchecks, c/w lispro ISS with meals, BP and lipid control, yearly eye exam, diabetic foot care, diabetic diet education (RD following), monitor HgbA1C  Hypokalemia-c/w KCl, monitor K+ level  Right frontal lobe meningioma-followed by Dr. Sin ([facility]), seen on 8/4/23 and advised to F/U with neuro-oncology [facility] in 2 years with MRI [facility] for surveillance  Hx stroke/LLE weakness/L hand tremor-followed by neurology   Constipation-c/w miralax, monitor BMs  OA/lumbar radiculopathy/thoracic radiculopathy-Tylenol prn, F/U with specialists after discharge  Trigeminal neuralgia-c/w tegretol  Constipation-encourage fluids and fiber, c/w senna plus and miralax, monitor for BMs    Orders:  NNO    Code Status:   Full Code

## 2025-01-06 NOTE — PROGRESS NOTES
"Chief Complaint:   SNF F/U  -Left foot osteomyelitis  -Left foot cellulitis  -Left foot diabetic ulcer  -PVD  -Venous insufficiency  -DVT of LLE  -Physical deconditioning/weakness  -Trigeminal neuralgia     HPI:   74 year-old male presenting to [facility] ER on 9/12/24 with \"months\" of LLE pain with edema, redness, and warmth. He was noted to have a large necrotic ulcer under his 5th toe. He reported that he had not seen a provider or taken any medications for about \"2 years\". Work-up in ER: Glu 310, Na+ 129, Alb 3.3, XR of L foot showed osteomyelitis of the 5th metatarsal head, US of LLE showed acute DVT in the left mid-distal femoral and popliteal veins. He was started on IV Heparin, IV Zosyn, and IV Vanco. He was admitted to the hospital for further evaluation and treatment. Hospital course:    Left foot osteomyelitis/cellulitis/DVT of LLE/PAD-IV ATB, IV heparin (transitioned to Eliquis), ID consult, podiatry consult, elevate LLE, local wound care, analgesics prn, patient transferred to [facility] for vascular consult on 9/14, underwent LLE revascularization with Dr. Stoddard on 9/18, recommending repeat BEN in 4 weeks with OP F/U, underwent L partial 5th ray resection on 9/23 with Dr. Spears, taken back to OR on 9/27 for delayed closure with graft application by Dr. 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 was HDS and discharged to [facility] on " 10/2/24. On 11/5, patient was sent to [facility] ER for evaluation of R eye pain and ringing in R ear. He was treated with Excedrin in-house, which was not effective. In the ER, he was Dx with trigeminal neuralgia and started on tegretol. Today, patient reports resolution of eye pain and ringing in ear. He denies dizziness, HA, SOB, cough, or chest pain. He reports resolution of constipation. He continues to c/o intermittent L foot pain. Gabapentin dosage was increased on 12/16. No other clinical concerns noted at this time.     ROS:    As above in HPI. Otherwise, all other systems have been reviewed and are negative for complaint.    Medications reviewed and verified in NH chart.     Patient Active Problem List   Diagnosis    Wound infection    Osteomyelitis of left foot, unspecified type (Multi)    Acute deep vein thrombosis (DVT) of popliteal vein of left lower extremity (Multi)    Bilateral lower extremity edema    CAD (coronary artery disease)    Hypertension    Inflammatory neuropathy (Multi)    History of stroke    Weakness of left lower extremity    Ambulatory dysfunction    Chronic bilateral low back pain with left-sided sciatica    Diabetic peripheral neuropathy (Multi)    Neural foraminal stenosis of lumbosacral spine    Hyperlipidemia    Lumbar back pain with radiculopathy affecting left lower extremity    Peripheral arterial disease (CMS-HCC)    Type 2 diabetes mellitus, without long-term current use of insulin (Multi)    Anemia    Obesity    Delayed surgical wound healing of foot amputation stump (Multi)    Impaired mobility and ADLs    Weakness    Thoracic radiculopathy    Osteoarthritis    Cellulitis of foot, left    Meningioma (Multi)    Tremor of left hand    Diabetic ulcer of left foot associated with type 2 diabetes mellitus, limited to breakdown of skin    Acute deep vein thrombosis (DVT) of femoral vein of left lower extremity (Multi)    Nonrheumatic aortic valve stenosis    Diastolic  dysfunction        Past Medical History:   Diagnosis Date    Acute osteomyelitis of ankle and foot, left (Multi)     AMI (acute myocardial infarction) (Multi)     ASHD (arteriosclerotic heart disease)     At risk for falls     Cellulitis     left foot and ankle    Diabetes mellitus (Multi)     DVT (deep vein thrombosis) in pregnancy ([facility])     Fall risk care plan declined     Hypertension     Myocardial infarction (Multi)     Neuritis     Neuropathy     Osteoarthritis     Osteomyelitis     PVD (peripheral vascular disease) (CMS-HCC)     Sprain of right knee 06/25/2015    Stroke (Multi)     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      FOOT RAY RESECTION Left 09/23/2024    L partial 5th ray resection (Dr. Spears, DPM)    FOOT SURGERY Left 09/27/2024    Delayed closure w/ graft application (Dr. Huynh, DPM)    INVASIVE VASCULAR PROCEDURE Bilateral 09/18/2024    Procedure: Lower Extremity Angiogram;  Surgeon: Teofilo Stoddard MD;  Location: [facility];  Service: Cardiovascular;  Laterality: Bilateral;    IRIDOTOMY / IRIDECTOMY Bilateral        Family History   Problem Relation Name Age of Onset    Heart disease Father      Colon cancer Other      Heart attack Other      Diabetes Other      Hypertension Other         Social History     Tobacco Use   Smoking Status Never    Passive exposure: Never   Smokeless Tobacco Never       Social History     Substance and Sexual Activity   Alcohol Use Never       Social History     Substance and Sexual Activity   Drug Use Never       No Known Allergies     Vital Signs:   170/80-71-18-97.8-95% on RA    Physical Exam:  General: Sitting up in WC in NAD, alert   Head/Face: NCAT, symmetrical  Eyes: PERRLA, no  injection, no discharge  ENT: Hearing not impaired, ears without scars or lesions, nasal mucosa and turbinates pink, septum midline, lips pink and moist  Neck: Supple, symmetrical  Respiratory: CTA but diminished without adventitious sounds, respirations even and nonlabored without use of accessory muscles, good air exchange  Cardio: RRR without murmur or gallops, normal S1S2, NP edema to BLE (L > R), pedal pulses 2+/4 bilaterally  Chest/Breast: Symmetrical  GI: BS x 4, normoactive, non-distended, abd round and soft, no masses or tenderness  : No suprapubic tenderness or distention  MSK: Gait not assessed, joints with full ROM without pain or contractures  Skin: Skin warm and dry, no induration, DTI of R heel, vascular ulcer of L anterior lower leg, surgical wound of L lateral foot, skin tags to R buttocks (chronic per patient)   Neurologic: Cranial nerves II through XII intact, decreased sensation to BLE  Psychiatric: Alert to person, place, and time, calm and cooperative, expresses depression with current situation     Results/Data:   11/5/24: CMP WNL, Mag 2.03, CRP 1.34, Sed Rate 38, Hgb 10.3, Hct 32.7  10/30/24: Glu 139, Cr 0.6, Hgb 9.1, Hct 29.4, Sed Rate 46, CRP 2.3  10/23/24: [id number], Cr 0.6, Hgb 9.0, Hct 29.1, Iron 44, TIBC 261, Ferritin 21.1, Transferrin 193, Folate 4.4, Vit B12 358  10/16/24: Glu 135, Cr 0.6, Hgb 8.5, Hct 26.7  10/3/24: Glu 111, Cr 0.6, HgbA1C 7.0, Hgb 9.0, Hct 28.8    Assessment/Plan:  Left foot osteomyelitis/cellulitis/PAD/venous insufficiency-s/p LLE revascularization with Dr. Stoddard on 9/18, s/p L partial 5th ray resection on 9/23 with Dr. Spears, s/p delayed closure with graft application by Dr. Huynh on 9/27, c/w local wound care, c/w Vit C, MVI, and LPS Critical Care BID to promote wound healing, s/p Augmentin (end date 10/9), c/w ASA and plavix, c/w Tylenol and Gabapentin, c/w Oxycodone prn for mod-severe pain, tizanidine prn for muscle spasms, wound care team following, seen by  podiatry on 10/14, WBAT to LLE, F/U with podiatry as needed, F/U with vascular  DVT of LLE-c/w eliquis, elevate LLE, monitor H&H  Physical deconditioning/weakness-c/w PT/OT  Insomnia/depression-c/w trazodone and melatonin, supportive care, monitor behaviors, patient declines Psych services at this time   Anemia-c/w MVI, iron, and Vit B12, monitor CBC, iron panel, and Vit B12 level   CAD/HTN/Hx MI/HLD/diastolic dysfunction/AVS-FAVIAN diet, c/w ASA, hydralazine, and lisinopril, monitor BP, monitor lipid panel, needs established with cardiology after discharge  DM2 with peripheral neuropathy-LCS diet, accuchecks, c/w lispro ISS with meals, BP and lipid control, yearly eye exam, diabetic foot care, diabetic diet education (RD following), monitor HgbA1C  Hypokalemia-c/w KCl, monitor K+ level  Right frontal lobe meningioma-followed by Dr. Sin ([facility]), seen on 8/4/23 and advised to F/U with neuro-oncology [facility] in 2 years with MRI for surveillance  Hx stroke/LLE weakness/L hand tremor-followed by neurology   Constipation-c/w miralax, monitor BMs  OA/lumbar radiculopathy/thoracic radiculopathy-Tylenol prn, F/U with specialists after discharge  Trigeminal neuralgia-c/w tegretol  Constipation-encourage fluids and fiber, c/w senna plus and miralax, monitor for BMs    Orders:  CBC w/ diff and BMP on 12/18    Code Status:   Full Code

## 2025-01-06 NOTE — PROGRESS NOTES
"Chief Complaint:   SNF F/U  -Left foot osteomyelitis  -Left foot cellulitis  -Left foot diabetic ulcer  -PVD  -Venous insufficiency  -DVT of LLE  -Physical deconditioning/weakness  -Trigeminal neuralgia     HPI:   74 year-old male presenting to [facility] ER on 9/12/24 with \"months\" of LLE pain with edema, redness, and warmth. He was noted to have a large necrotic ulcer under his 5th toe. He reported that he had not seen a provider or taken any medications for about \"2 years\". Work-up in ER: Glu 310, Na+ 129, Alb 3.3, XR of L foot showed osteomyelitis of the 5th metatarsal head, US of LLE showed acute DVT in the left mid-distal femoral and popliteal veins. He was started on IV Heparin, IV Zosyn, and IV Vanco. He was admitted to the hospital for further evaluation and treatment. Hospital course:    Left foot osteomyelitis/cellulitis/DVT of LLE/PAD-IV ATB, IV heparin (transitioned to Eliquis), ID consult, podiatry consult, elevate LLE, local wound care, analgesics prn, patient transferred to [facility] for vascular consult on 9/14, underwent LLE revascularization with Dr. Stoddard on 9/18, recommending repeat BEN in 4 weeks with OP F/U, underwent L partial 5th ray resection on 9/23 with Dr. Spears, taken back to OR on 9/27 for delayed closure with graft application by Dr. uHynh, wound vac placed with changes 3x/weak, NWB L foot, IV Unasyn changed to Augmentin x 1 week at discharge per ID, F/U w/ podiatry after discharge  CAD/HTN/Hx MI/HLD-telemetry monitoring, BP and HR monitored, ECHO showed EF 60-65%, impaired relaxation pattern of L ventricular diastolic filling, mild to mod AVS  DM2 with peripheral neuropathy-HgbA1C 9.8, accuchecks, ISS, diabetic education, RD consult  Hx Stroke/LLE weakness/ambulatory dysfunction-old stroke in L cerebellum, R basal ganglia, LLE since stroke, no other residual defects, followed by neurology as OP, PT/OT evaluations, recommending SNF     Pt. was HDS and discharged to [facility] on " "10/2/24. On 11/5, patient was sent to [facility] ER for evaluation of R eye pain and ringing in R ear. He was treated with Excedrin in-house, which was not effective. In the ER, he was Dx with trigeminal neuralgia and started on tegretol. Pt. was sent to the ER on 12/23 with c/o HA, left-sided facial pain, dizziness, and watering of his left eye. He was given Prednisone and Ibuprofen in-house with concern for acute flare of trigeminal neuralgia vs migraine HA. Pt. had no improvement in symptoms and requested to go to the ER for evaluation. Work-up in ER: CT of head negative for acute findings, showed atrophy and chronic microvascular ischemic disease with an old lacunar infarct in the R basal ganglia noted, stable meningioma. Neuro was consulted and had low concern for stroke. Migraine HA was suspected and patient was treated with Reglan, Decadron, and D/C'ed back to NH.     Pt. noted to have increased edema to LLE on 12/31 and was ordered arterial and venous US. Today, patient continues to report watery of eyes and facial pain. He reports, \"It changes sides\". He reports that it has not improvement since ER visit. He is agreeable to adjustment of tegretol. He denies dizziness, HA, SOB, cough, or chest pain. He denies any new pain of his LLE. He denies fevers or chills. Staff report no clinical concerns.     ROS:    As above in HPI. Otherwise, all other systems have been reviewed and are negative for complaint.    Medications reviewed and verified in NH chart.     Patient Active Problem List   Diagnosis    Wound infection    Osteomyelitis of left foot, unspecified type (Multi)    Acute deep vein thrombosis (DVT) of popliteal vein of left lower extremity (Multi)    Bilateral lower extremity edema    CAD (coronary artery disease)    Hypertension    Inflammatory neuropathy (Multi)    History of stroke    Weakness of left lower extremity    Ambulatory dysfunction    Chronic bilateral low back pain with left-sided sciatica "    Diabetic peripheral neuropathy (Multi)    Neural foraminal stenosis of lumbosacral spine    Hyperlipidemia    Lumbar back pain with radiculopathy affecting left lower extremity    Peripheral arterial disease (CMS-HCC)    Type 2 diabetes mellitus, without long-term current use of insulin (Multi)    Anemia    Obesity    Delayed surgical wound healing of foot amputation stump (Multi)    Impaired mobility and ADLs    Weakness    Thoracic radiculopathy    Osteoarthritis    Cellulitis of foot, left    Meningioma (Multi)    Tremor of left hand    Diabetic ulcer of left foot associated with type 2 diabetes mellitus, limited to breakdown of skin    Acute deep vein thrombosis (DVT) of femoral vein of left lower extremity (Multi)    Nonrheumatic aortic valve stenosis    Diastolic dysfunction        Past Medical History:   Diagnosis Date    Acute osteomyelitis of ankle and foot, left (Multi)     AMI (acute myocardial infarction) (Multi)     ASHD (arteriosclerotic heart disease)     At risk for falls     Cellulitis     left foot and ankle    Diabetes mellitus (Multi)     DVT (deep vein thrombosis) in pregnancy ([facility])     Fall risk care plan declined     Hypertension     Myocardial infarction (Multi)     Neuritis     Neuropathy     Osteoarthritis     Osteomyelitis     PVD (peripheral vascular disease) (CMS-HCC)     Sprain of right knee 06/25/2015    Stroke (Multi)     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      FOOT RAY RESECTION Left 09/23/2024    L partial 5th ray resection (Dr. Spears, DPM)    FOOT SURGERY Left 09/27/2024    Delayed closure w/ graft application (Dr. Huynh, DPBRAYAN)    INVASIVE VASCULAR PROCEDURE Bilateral 09/18/2024    Procedure: Lower Extremity  Angiogram;  Surgeon: Teofilo Stoddard MD;  Location: [facility] Cardiac Cath Lab;  Service: Cardiovascular;  Laterality: Bilateral;    IRIDOTOMY / IRIDECTOMY Bilateral        Family History   Problem Relation Name Age of Onset    Heart disease Father      Colon cancer Other      Heart attack Other      Diabetes Other      Hypertension Other         Social History     Tobacco Use   Smoking Status Never    Passive exposure: Never   Smokeless Tobacco Never       Social History     Substance and Sexual Activity   Alcohol Use Never       Social History     Substance and Sexual Activity   Drug Use Never       No Known Allergies     Vital Signs:   128/68-78-18-98.0-98% on RA    Physical Exam:  General: Sitting up in WC in NAD, alert   Head/Face: NCAT, symmetrical  Eyes: PERRLA, no injection, no discharge  ENT: Hearing not impaired, ears without scars or lesions, nasal mucosa and turbinates pink, septum midline, lips pink and moist  Neck: Supple, symmetrical  Respiratory: CTA but diminished without adventitious sounds, respirations even and nonlabored without use of accessory muscles, good air exchange  Cardio: RRR without murmur or gallops, normal S1S2, NP edema to RLE, 2-3+ P edema to LLE, pedal pulses 1+/4 bilaterally  Chest/Breast: Symmetrical  GI: BS x 4, normoactive, non-distended, abd round and soft, no masses or tenderness  : No suprapubic tenderness or distention  MSK: Gait not assessed, joints with full ROM without pain or contractures  Skin: Skin warm and dry, no induration, DTI of R heel, vascular ulcer of L anterior lower leg, surgical wound of L lateral foot, skin tags to R buttocks (chronic per patient)   Neurologic: Cranial nerves II through XII intact, decreased sensation to BLE  Psychiatric: Alert to person, place, and time, calm and cooperative, angry and agitated that [employer] has not been in yet to complete US     Results/Data:   12/23/24: Glu 116, K+ 5.4, LFTs WNL, WBC 11.8, Hgb 10.9, Hct 34.6  11/5/24: CMP  WNL, Mag 2.03, CRP 1.34, Sed Rate 38, Hgb 10.3, Hct 32.7  10/30/24: Glu 139, Cr 0.6, Hgb 9.1, Hct 29.4, Sed Rate 46, CRP 2.3  10/23/24: [id number], Cr 0.6, Hgb 9.0, Hct 29.1, Iron 44, TIBC 261, Ferritin 21.1, Transferrin 193, Folate 4.4, Vit B12 358  10/16/24: Glu 135, Cr 0.6, Hgb 8.5, Hct 26.7  10/3/24: Glu 111, Cr 0.6, HgbA1C 7.0, Hgb 9.0, Hct 28.8    Assessment/Plan:  Left foot osteomyelitis/cellulitis/PAD/venous insufficiency-s/p LLE revascularization with Dr. Stoddard on 9/18, s/p L partial 5th ray resection on 9/23 with Dr. Spears, s/p delayed closure with graft application by Dr. Huynh on 9/27, c/w local wound care, c/w Vit C, MVI, and LPS Critical Care BID to promote wound healing, s/p Augmentin (end date 10/9), c/w ASA and plavix, c/w Tylenol and Gabapentin, c/w Oxycodone prn for mod-severe pain, tizanidine prn for muscle spasms, wound care team following, seen by podiatry on 10/14, WBAT to LLE, F/U with podiatry as needed, F/U with vascular, arterial and venous US of LLE pending, start on doxycycline 100 mg PO BID x 10 days for possible wound infection (wound is odorous)  DVT of LLE-c/w eliquis, elevate LLE, monitor H&H  Physical deconditioning/weakness-c/w PT/OT  Insomnia/depression-c/w trazodone and melatonin, supportive care, monitor behaviors, patient declines Psych services at this time   Anemia-c/w MVI, iron, and Vit B12, monitor CBC, iron panel, and Vit B12 level   CAD/HTN/Hx MI/HLD/diastolic dysfunction/AVS-FAVIAN diet, c/w ASA, hydralazine, and lisinopril, monitor BP, monitor lipid panel, needs established with cardiology after discharge  DM2 with peripheral neuropathy-LCS diet, accuchecks, c/w lispro ISS with meals, BP and lipid control, yearly eye exam, diabetic foot care, diabetic diet education (RD following), monitor HgbA1C  Hypokalemia-c/w KCl, monitor K+ level  Right frontal lobe meningioma-followed by Dr. Sin ([facility]), seen on 8/4/23 and advised to F/U with neuro-oncology [facility] in 2 years with MRI  for surveillance  Hx stroke/LLE weakness/L hand tremor-followed by neurology   Constipation-c/w miralax, monitor BMs  OA/lumbar radiculopathy/thoracic radiculopathy-Tylenol prn, F/U with specialists after discharge  Trigeminal neuralgia-c/w tegretol (increase to 200 mg BID)  Constipation-encourage fluids and fiber, c/w senna plus and miralax, monitor for BMs  Migraine HA-s/p reglan and decadron, continue to monitor     Orders:  Increase carbamazepine to 200 mg PO BID for trigeminal neuralgia   Doxycycline 100 mg PO BID x 10 days for L foot wound infection     Code Status:   Full Code    DME:  Patient will require a hospital bed for discharge home. Pt. requires the HOB to be elevated more than 30 degrees due to his Dx of diastolic heart failure. He requires frequent changes in body position.    Patient will require a walker for discharge home 2/2 generalized weakness and chronic wound of LLE.    Patient will require a bedside commode for discharge home. A bedside commode will provide safe toileting.

## 2025-01-06 NOTE — PROGRESS NOTES
"Chief Complaint:   SNF F/U  -Left foot osteomyelitis  -Left foot cellulitis  -Left foot diabetic ulcer  -PVD  -Venous insufficiency  -DVT of LLE  -Physical deconditioning/weakness  -Trigeminal neuralgia     HPI:   74 year-old male presenting to [facility] ER on 9/12/24 with \"months\" of LLE pain with edema, redness, and warmth. He was noted to have a large necrotic ulcer under his 5th toe. He reported that he had not seen a provider or taken any medications for about \"2 years\". Work-up in ER: Glu 310, Na+ 129, Alb 3.3, XR of L foot showed osteomyelitis of the 5th metatarsal head, US of LLE showed acute DVT in the left mid-distal femoral and popliteal veins. He was started on IV Heparin, IV Zosyn, and IV Vanco. He was admitted to the hospital for further evaluation and treatment. Hospital course:    Left foot osteomyelitis/cellulitis/DVT of LLE/PAD-IV ATB, IV heparin (transitioned to Eliquis), ID consult, podiatry consult, elevate LLE, local wound care, analgesics prn, patient transferred to [facility] for vascular consult on 9/14, underwent LLE revascularization with Dr. Stoddard on 9/18, recommending repeat BEN in 4 weeks with OP F/U, underwent L partial 5th ray resection on 9/23 with Dr. Spears, taken back to OR on 9/27 for delayed closure with graft application by Dr. 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 was HDS and discharged to [facility] on " 10/2/24. On 11/5, patient was sent to [facility] ER for evaluation of R eye pain and ringing in R ear. He was treated with Excedrin in-house, which was not effective. In the ER, he was Dx with trigeminal neuralgia and started on tegretol. Today, patient reports resolution of eye pain and ringing in ear. He denies dizziness, HA, SOB, cough, or chest pain. He reports resolution of constipation. He continues to c/o intermittent L foot pain. Staff report wounds are improving. No other clinical concerns noted at this time.     ROS:    As above in HPI. Otherwise, all other systems have been reviewed and are negative for complaint.    Medications reviewed and verified in NH chart.     Patient Active Problem List   Diagnosis    Wound infection    Osteomyelitis of left foot, unspecified type (Multi)    Acute deep vein thrombosis (DVT) of popliteal vein of left lower extremity (Multi)    Bilateral lower extremity edema    CAD (coronary artery disease)    Hypertension    Inflammatory neuropathy (Multi)    History of stroke    Weakness of left lower extremity    Ambulatory dysfunction    Chronic bilateral low back pain with left-sided sciatica    Diabetic peripheral neuropathy (Multi)    Neural foraminal stenosis of lumbosacral spine    Hyperlipidemia    Lumbar back pain with radiculopathy affecting left lower extremity    Peripheral arterial disease (CMS-HCC)    Type 2 diabetes mellitus, without long-term current use of insulin (Multi)    Anemia    Obesity    Delayed surgical wound healing of foot amputation stump (Multi)    Impaired mobility and ADLs    Weakness    Thoracic radiculopathy    Osteoarthritis    Cellulitis of foot, left    Meningioma (Multi)    Tremor of left hand    Diabetic ulcer of left foot associated with type 2 diabetes mellitus, limited to breakdown of skin    Acute deep vein thrombosis (DVT) of femoral vein of left lower extremity (Multi)    Nonrheumatic aortic valve stenosis    Diastolic dysfunction         Past Medical History:   Diagnosis Date    Acute osteomyelitis of ankle and foot, left (Multi)     AMI (acute myocardial infarction) (Multi)     ASHD (arteriosclerotic heart disease)     At risk for falls     Cellulitis     left foot and ankle    Diabetes mellitus (Multi)     DVT (deep vein thrombosis) in pregnancy ([facility])     Fall risk care plan declined     Hypertension     Myocardial infarction (Multi)     Neuritis     Neuropathy     Osteoarthritis     Osteomyelitis     PVD (peripheral vascular disease) (CMS-HCC)     Sprain of right knee 06/25/2015    Stroke (Multi)     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      FOOT RAY RESECTION Left 09/23/2024    L partial 5th ray resection (Dr. Spears, DPM)    FOOT SURGERY Left 09/27/2024    Delayed closure w/ graft application (Dr. Huynh, DPM)    INVASIVE VASCULAR PROCEDURE Bilateral 09/18/2024    Procedure: Lower Extremity Angiogram;  Surgeon: Teofilo Stoddard MD;  Location: [facility] Cardiac Cath Lab;  Service: Cardiovascular;  Laterality: Bilateral;    IRIDOTOMY / IRIDECTOMY Bilateral        Family History   Problem Relation Name Age of Onset    Heart disease Father      Colon cancer Other      Heart attack Other      Diabetes Other      Hypertension Other         Social History     Tobacco Use   Smoking Status Never    Passive exposure: Never   Smokeless Tobacco Never       Social History     Substance and Sexual Activity   Alcohol Use Never       Social History     Substance and Sexual Activity   Drug Use Never       No Known Allergies     Vital Signs:   142/63-66-18-97.7-93% on RA    Physical Exam:  General: Sitting up in WC in NAD, alert   Head/Face: NCAT, symmetrical  Eyes: PERRLA, no injection, no  discharge  ENT: Hearing not impaired, ears without scars or lesions, nasal mucosa and turbinates pink, septum midline, lips pink and moist  Neck: Supple, symmetrical  Respiratory: CTA but diminished without adventitious sounds, respirations even and nonlabored without use of accessory muscles, good air exchange  Cardio: RRR without murmur or gallops, normal S1S2, NP edema to BLE (L > R), pedal pulses 2+/4 bilaterally  Chest/Breast: Symmetrical  GI: BS x 4, normoactive, non-distended, abd round and soft, no masses or tenderness  : No suprapubic tenderness or distention  MSK: Gait not assessed, joints with full ROM without pain or contractures  Skin: Skin warm and dry, no induration, DTI of R heel, vascular ulcer of L anterior lower leg, surgical wound of L lateral foot, skin tags to R buttocks (chronic per patient)   Neurologic: Cranial nerves II through XII intact, decreased sensation to BLE  Psychiatric: Alert to person, place, and time, calm and cooperative, expresses depression with current situation     Results/Data:   11/5/24: CMP WNL, Mag 2.03, CRP 1.34, Sed Rate 38, Hgb 10.3, Hct 32.7  10/30/24: Glu 139, Cr 0.6, Hgb 9.1, Hct 29.4, Sed Rate 46, CRP 2.3  10/23/24: Lhv128, Cr 0.6, Hgb 9.0, Hct 29.1, Iron 44, TIBC 261, Ferritin 21.1, Transferrin 193, Folate 4.4, Vit B12 358  10/16/24: Glu 135, Cr 0.6, Hgb 8.5, Hct 26.7  10/3/24: Glu 111, Cr 0.6, HgbA1C 7.0, Hgb 9.0, Hct 28.8    Assessment/Plan:  Left foot osteomyelitis/cellulitis/PAD/venous insufficiency-s/p LLE revascularization with Dr. Stoddard on 9/18, s/p L partial 5th ray resection on 9/23 with Dr. Spears, s/p delayed closure with graft application by Dr. Huynh on 9/27, c/w local wound care, c/w Vit C, MVI, and LPS Critical Care BID to promote wound healing, s/p Augmentin (end date 10/9), c/w ASA and plavix, c/w Tylenol and Gabapentin, c/w Oxycodone prn for mod-severe pain, tizanidine prn for muscle spasms, wound care team following, seen by podiatry on  10/14, WBAT to LLE, F/U with podiatry as needed, F/U with vascular  DVT of LLE-c/w eliquis, elevate LLE, monitor H&H  Physical deconditioning/weakness-c/w PT/OT  Insomnia/depression-c/w trazodone and melatonin, supportive care, monitor behaviors, patient declines Psych services at this time   Anemia-c/w MVI, iron, and Vit B12, monitor CBC, iron panel, and Vit B12 level   CAD/HTN/Hx MI/HLD/diastolic dysfunction/AVS-FAVIAN diet, c/w ASA, hydralazine, and lisinopril, monitor BP, monitor lipid panel, needs established with cardiology after discharge  DM2 with peripheral neuropathy-LCS diet, accuchecks, c/w lispro ISS with meals, BP and lipid control, yearly eye exam, diabetic foot care, diabetic diet education (RD following), monitor HgbA1C  Hypokalemia-c/w KCl, monitor K+ level  Right frontal lobe meningioma-followed by Dr. Sin ([facility]), seen on 8/4/23 and advised to F/U with neuro-oncology [facility] in 2 years with MRI [facility] for surveillance  Hx stroke/LLE weakness/L hand tremor-followed by neurology   Constipation-c/w miralax, monitor BMs  OA/lumbar radiculopathy/thoracic radiculopathy-Tylenol prn, F/U with specialists after discharge  Trigeminal neuralgia-c/w tegretol  Constipation-encourage fluids and fiber, c/w senna plus and miralax, monitor for BMs    Orders:  WBAT to LLE    Code Status:   Full Code

## 2025-01-06 NOTE — PROGRESS NOTES
"Chief Complaint:   SNF F/U  -Left foot osteomyelitis  -Left foot cellulitis  -Left foot diabetic ulcer  -PVD  -Venous insufficiency  -DVT of LLE  -Physical deconditioning/weakness  -Trigeminal neuralgia     HPI:   74 year-old male presenting to [facility] ER on 9/12/24 with \"months\" of LLE pain with edema, redness, and warmth. He was noted to have a large necrotic ulcer under his 5th toe. He reported that he had not seen a provider or taken any medications for about \"2 years\". Work-up in ER: Glu 310, Na+ 129, Alb 3.3, XR of L foot showed osteomyelitis of the 5th metatarsal head, US of LLE showed acute DVT in the left mid-distal femoral and popliteal veins. He was started on IV Heparin, IV Zosyn, and IV Vanco. He was admitted to the hospital for further evaluation and treatment. Hospital course:    Left foot osteomyelitis/cellulitis/DVT of LLE/PAD-IV ATB, IV heparin (transitioned to Eliquis), ID consult, podiatry consult, elevate LLE, local wound care, analgesics prn, patient transferred to [facility] for vascular consult on 9/14, underwent LLE revascularization with Dr. Stoddard on 9/18, recommending repeat BEN in 4 weeks with OP F/U, underwent L partial 5th ray resection on 9/23 with Dr. Spears, taken back to OR on 9/27 for delayed closure with graft application by Dr. 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 was HDS and discharged to [facility] on " 10/2/24. On 11/5, patient was sent to [facility] ER for evaluation of R eye pain and ringing in R ear. He was treated with Excedrin in-house, which was not effective. In the ER, he was Dx with trigeminal neuralgia and started on tegretol. Today, patient reports resolution of eye pain and ringing in ear. He denies dizziness, HA, SOB, cough, or chest pain. He reports resolution of constipation. He continues to c/o intermittent L foot pain. He is wanting WB status. Staff report wounds are improving. No other clinical concerns noted at this time.     ROS:    As above in HPI. Otherwise, all other systems have been reviewed and are negative for complaint.    Medications reviewed and verified in NH chart.     Patient Active Problem List   Diagnosis    Wound infection    Osteomyelitis of left foot, unspecified type (Multi)    Acute deep vein thrombosis (DVT) of popliteal vein of left lower extremity (Multi)    Bilateral lower extremity edema    CAD (coronary artery disease)    Hypertension    Inflammatory neuropathy (Multi)    History of stroke    Weakness of left lower extremity    Ambulatory dysfunction    Chronic bilateral low back pain with left-sided sciatica    Diabetic peripheral neuropathy (Multi)    Neural foraminal stenosis of lumbosacral spine    Hyperlipidemia    Lumbar back pain with radiculopathy affecting left lower extremity    Peripheral arterial disease (CMS-HCC)    Type 2 diabetes mellitus, without long-term current use of insulin (Multi)    Anemia    Obesity    Delayed surgical wound healing of foot amputation stump (Multi)    Impaired mobility and ADLs    Weakness    Thoracic radiculopathy    Osteoarthritis    Cellulitis of foot, left    Meningioma (Multi)    Tremor of left hand    Diabetic ulcer of left foot associated with type 2 diabetes mellitus, limited to breakdown of skin    Acute deep vein thrombosis (DVT) of femoral vein of left lower extremity (Multi)    Nonrheumatic aortic valve stenosis     Diastolic dysfunction        Past Medical History:   Diagnosis Date    Acute osteomyelitis of ankle and foot, left (Multi)     AMI (acute myocardial infarction) (Multi)     ASHD (arteriosclerotic heart disease)     At risk for falls     Cellulitis     left foot and ankle    Diabetes mellitus (Multi)     DVT (deep vein thrombosis) in pregnancy ([facility])     Fall risk care plan declined     Hypertension     Myocardial infarction (Multi)     Neuritis     Neuropathy     Osteoarthritis     Osteomyelitis     PVD (peripheral vascular disease) (CMS-HCC)     Sprain of right knee 06/25/2015    Stroke (Multi)     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      FOOT RAY RESECTION Left 09/23/2024    L partial 5th ray resection (Dr. Spears, DPM)    FOOT SURGERY Left 09/27/2024    Delayed closure w/ graft application (Dr. Huynh, DPM)    INVASIVE VASCULAR PROCEDURE Bilateral 09/18/2024    Procedure: Lower Extremity Angiogram;  Surgeon: Teofilo Stoddard MD;  Location: [facility] Cardiac Cath Lab;  Service: Cardiovascular;  Laterality: Bilateral;    IRIDOTOMY / IRIDECTOMY Bilateral        Family History   Problem Relation Name Age of Onset    Heart disease Father      Colon cancer Other      Heart attack Other      Diabetes Other      Hypertension Other         Social History     Tobacco Use   Smoking Status Never    Passive exposure: Never   Smokeless Tobacco Never       Social History     Substance and Sexual Activity   Alcohol Use Never       Social History     Substance and Sexual Activity   Drug Use Never       No Known Allergies     Vital Signs:   155/75-72-18-98.6-96% on RA    Physical Exam:  General: Sitting up in bed in NAD, alert   Head/Face: NCAT, symmetrical  Eyes: PERRLA,  no injection, no discharge  ENT: Hearing not impaired, ears without scars or lesions, nasal mucosa and turbinates pink, septum midline, lips pink and moist  Neck: Supple, symmetrical  Respiratory: CTA but diminished without adventitious sounds, respirations even and nonlabored without use of accessory muscles, good air exchange  Cardio: RRR without murmur or gallops, normal S1S2, NP edema to BLE (L > R), pedal pulses 2+/4 bilaterally  Chest/Breast: Symmetrical  GI: BS x 4, normoactive, non-distended, abd round and soft, no masses or tenderness  : No suprapubic tenderness or distention  MSK: Gait not assessed, joints with full ROM without pain or contractures  Skin: Skin warm and dry, no induration, DTI of R heel, stage III pressure ulcer of L heel, vascular ulcer of L anterior lower leg, surgical wound of L lateral foot, skin tags to R buttocks (chronic per patient)   Neurologic: Cranial nerves II through XII intact, decreased sensation to BLE  Psychiatric: Alert to person, place, and time, calm and cooperative, expresses depression with current situation     Results/Data:   11/5/24: CMP WNL, Mag 2.03, CRP 1.34, Sed Rate 38, Hgb 10.3, Hct 32.7  10/30/24: Glu 139, Cr 0.6, Hgb 9.1, Hct 29.4, Sed Rate 46, CRP 2.3  10/23/24: Itn930, Cr 0.6, Hgb 9.0, Hct 29.1, Iron 44, TIBC 261, Ferritin 21.1, Transferrin 193, Folate 4.4, Vit B12 358  10/16/24: Glu 135, Cr 0.6, Hgb 8.5, Hct 26.7  10/3/24: Glu 111, Cr 0.6, HgbA1C 7.0, Hgb 9.0, Hct 28.8    Assessment/Plan:  Left foot osteomyelitis/cellulitis/PAD/venous insufficiency-s/p LLE revascularization with Dr. Stoddard on 9/18, s/p L partial 5th ray resection on 9/23 with Dr. Spears, s/p delayed closure with graft application by Dr. Huynh on 9/27, c/w local wound care, c/w Vit C, MVI, and LPS Critical Care BID to promote wound healing, s/p Augmentin (end date 10/9), c/w ASA and plavix, c/w Tylenol and Gabapentin, c/w Oxycodone prn for mod-severe pain, tizanidine prn for muscle spasms,  wound care team following, seen by podiatry on 10/14, NWB of LLE (if wounds continue to improve will discuss WB status with wound care NP), F/U with podiatry as needed, F/U with vascular  DVT of LLE-c/w eliquis, elevate LLE, monitor H&H  Physical deconditioning/weakness-c/w PT/OT  Insomnia/depression-c/w trazodone and melatonin, supportive care, monitor behaviors, patient declines Psych services at this time   Anemia-c/w MVI, iron, and Vit B12, monitor CBC, iron panel, and Vit B12 level   CAD/HTN/Hx MI/HLD/diastolic dysfunction/AVS-FAVIAN diet, c/w ASA, hydralazine, and lisinopril, monitor BP, monitor lipid panel, needs established with cardiology after discharge  DM2 with peripheral neuropathy-LCS diet, accuchecks, c/w lispro ISS with meals, BP and lipid control, yearly eye exam, diabetic foot care, diabetic diet education (RD following), monitor HgbA1C  Hypokalemia-c/w KCl, monitor K+ level  Right frontal lobe meningioma-followed by Dr. Sin ([facility]), seen on 8/4/23 and advised to F/U with neuro-oncology [facility] in 2 years with MRI [facility] for surveillance  Hx stroke/LLE weakness/L hand tremor-followed by neurology   Constipation-c/w miralax, monitor BMs  OA/lumbar radiculopathy/thoracic radiculopathy-Tylenol prn, F/U with specialists after discharge  Trigeminal neuralgia-c/w tegretol  Constipation-encourage fluids and fiber, c/w senna plus and miralax, monitor for BMs    Orders:  NNO    Code Status:   Full Code

## 2025-01-07 ENCOUNTER — HOSPITAL ENCOUNTER (OUTPATIENT)
Dept: VASCULAR MEDICINE | Facility: HOSPITAL | Age: 75
Discharge: HOME | End: 2025-01-07
Payer: MEDICARE

## 2025-01-07 DIAGNOSIS — I73.9 PERIPHERAL ARTERIAL DISEASE (CMS-HCC): ICD-10-CM

## 2025-01-07 PROCEDURE — 93922 UPR/L XTREMITY ART 2 LEVELS: CPT | Performed by: SURGERY

## 2025-01-07 PROCEDURE — 93922 UPR/L XTREMITY ART 2 LEVELS: CPT

## 2025-01-08 VITALS
TEMPERATURE: 97.8 F | RESPIRATION RATE: 16 BRPM | DIASTOLIC BLOOD PRESSURE: 78 MMHG | SYSTOLIC BLOOD PRESSURE: 128 MMHG | HEART RATE: 78 BPM

## 2025-01-08 DIAGNOSIS — I70.245 ATHEROSCLEROSIS OF NATIVE ARTERY OF LEFT LOWER EXTREMITY WITH ULCERATION OF OTHER PART OF FOOT: Primary | ICD-10-CM

## 2025-01-08 ASSESSMENT — ENCOUNTER SYMPTOMS
CHILLS: 0
FEVER: 0

## 2025-01-08 NOTE — PROGRESS NOTES
Pt. scheduled to D/C home from SNF on 1/10/25. Referral placed to be followed at wound clinic at [facility] for LLE wounds.

## 2025-01-08 NOTE — PROGRESS NOTES
PLACE OF SERVICE:  [facility]    This is a subsequent visit.    Subjective   Patient ID: Elliot Partida is a 74 y.o. male who presents for Follow-up.    Mr. Elliot Partida is a 74-year-old male with history of diabetes with osteomyelitis to his left foot and ankle.  He has had recent headache and does have a history of trigeminal neuralgia.  He was found to have meningioma.  He is unable to care for himself and requires supportive care.    Review of Systems   Constitutional:  Negative for chills and fever.   Cardiovascular:  Negative for chest pain.   All other systems reviewed and are negative.    Objective   /78   Pulse 78   Temp 36.6 °C (97.8 °F)   Resp 16     Physical Exam  Vitals reviewed.   Constitutional:       General: He is not in acute distress.     Comments: This is a well-developed and well-nourished male, sitting in a chair   HENT:      Right Ear: Tympanic membrane, ear canal and external ear normal.      Left Ear: Tympanic membrane, ear canal and external ear normal.   Eyes:      General: No scleral icterus.     Pupils: Pupils are equal, round, and reactive to light.   Neck:      Vascular: No carotid bruit.   Cardiovascular:      Heart sounds: Normal heart sounds, S1 normal and S2 normal. No murmur heard.     No friction rub.   Pulmonary:      Effort: Pulmonary effort is normal.      Breath sounds: Normal breath sounds and air entry.   Abdominal:      Palpations: There is no hepatomegaly, splenomegaly or mass.   Musculoskeletal:         General: No swelling or deformity. Normal range of motion.      Cervical back: Neck supple.      Right lower leg: No edema.      Left lower leg: No edema.      Comments: There is dressing applied to the patient's left foot and ankle.   Lymphadenopathy:      Cervical: No cervical adenopathy.      Upper Body:      Right upper body: No axillary adenopathy.      Left upper body: No axillary adenopathy.      Lower Body:  No right inguinal adenopathy. No left inguinal adenopathy.   Neurological:      Mental Status: He is oriented to person, place, and time.      Cranial Nerves: Cranial nerves 2-12 are intact. No cranial nerve deficit.      Sensory: No sensory deficit.      Motor: Motor function is intact. No weakness.      Gait: Gait is intact.      Deep Tendon Reflexes: Reflexes normal.   Psychiatric:         Mood and Affect: Mood normal. Mood is not anxious or depressed. Affect is not angry.         Behavior: Behavior is not agitated.         Thought Content: Thought content normal.         Judgment: Judgment normal.     LAB WORK:  Laboratory studies were reviewed.    Assessment/Plan   Problem List Items Addressed This Visit             ICD-10-CM       Cardiac and Vasculature    Hypertension I10       Hematology and Neoplasia    Meningioma (Multi) D32.9       Musculoskeletal and Injuries    Osteoarthritis M19.90       Symptoms and Signs    Weakness R53.1     Other Visit Diagnoses         Codes    Type 2 diabetes mellitus without complication, unspecified whether long term insulin use (Multi)    -  Primary E11.9    Osteomyelitis, unspecified site, unspecified type (Multi)     M86.9    Trigeminal neuralgia     G50.0    Deep vein thrombosis (DVT) of other vein of lower extremity, unspecified chronicity, unspecified laterality (Multi)     I82.499    Neuropathy     G62.9    ASHD (arteriosclerotic heart disease)     I25.10    At risk for falling     Z91.81        1. Diabetes, on insulin.  2. Osteomyelitis.  Continue wound care.  Follow with Podiatry.  3. Meningioma.  Follow with Neurosurgery.  4. Trigeminal neuralgia, on pain control.  5. DVT, anticoagulated.  6. Neuropathy, on gabapentin.  7. Hypertension, medically controlled.  8. Osteoarthritis, on Tylenol.  9. ASHD, on aspirin.  10. Weakness, on PT/OT.  11. Fall risk, on fall precautions.    Scribe Attestation  By signing my name below, IHanna, Scribe attest that this  documentation has been prepared under the direction and in the presence of Marium Singh MD.     All medical record entries made by the scribe were personally dictated by me I have reviewed the chart and agree the record accurately reflects my personal performance of his history physical examination and management

## 2025-01-09 ENCOUNTER — NURSING HOME VISIT (OUTPATIENT)
Dept: POST ACUTE CARE | Facility: EXTERNAL LOCATION | Age: 75
End: 2025-01-09
Payer: MEDICARE

## 2025-01-09 DIAGNOSIS — R51.9 TEMPORAL PAIN: ICD-10-CM

## 2025-01-09 DIAGNOSIS — G62.9 NEUROPATHY: ICD-10-CM

## 2025-01-09 DIAGNOSIS — L03.116 CELLULITIS OF FOOT, LEFT: ICD-10-CM

## 2025-01-09 DIAGNOSIS — R11.2 NAUSEA AND VOMITING, UNSPECIFIED VOMITING TYPE: ICD-10-CM

## 2025-01-09 DIAGNOSIS — R19.7 DIARRHEA, UNSPECIFIED TYPE: ICD-10-CM

## 2025-01-09 DIAGNOSIS — M86.9 OSTEOMYELITIS OF LEFT FOOT, UNSPECIFIED TYPE (MULTI): Primary | ICD-10-CM

## 2025-01-09 DIAGNOSIS — E11.9 TYPE 2 DIABETES MELLITUS WITHOUT COMPLICATION, UNSPECIFIED WHETHER LONG TERM INSULIN USE (MULTI): ICD-10-CM

## 2025-01-09 DIAGNOSIS — I10 HYPERTENSION, UNSPECIFIED TYPE: ICD-10-CM

## 2025-01-09 DIAGNOSIS — G50.0 TRIGEMINAL NEURALGIA: ICD-10-CM

## 2025-01-09 DIAGNOSIS — D32.9 MENINGIOMA (MULTI): ICD-10-CM

## 2025-01-09 PROCEDURE — 99316 NF DSCHRG MGMT 30 MIN+: CPT | Performed by: NURSE PRACTITIONER

## 2025-01-09 NOTE — LETTER
"Patient: Elliot Partida  : 1950    Encounter Date: 2025    Chief Complaint:   SNF F/U  -Left foot osteomyelitis  -Left foot cellulitis  -Left foot diabetic ulcer  -PVD  -Venous insufficiency  -DVT of LLE  -Physical deconditioning/weakness  -Trigeminal neuralgia     HPI:   74 year-old male presenting to [facility] ER on 24 with \"months\" of LLE pain with edema, redness, and warmth. He was noted to have a large necrotic ulcer under his 5th toe. He reported that he had not seen a provider or taken any medications for about \"2 years\". Work-up in ER: Glu 310, Na+ 129, Alb 3.3, XR of L foot showed osteomyelitis of the 5th metatarsal head, US of LLE showed acute DVT in the left mid-distal femoral and popliteal veins. He was started on IV Heparin, IV Zosyn, and IV Vanco. He was admitted to the hospital for further evaluation and treatment. Hospital course:    Left foot osteomyelitis/cellulitis/DVT of LLE/PAD-IV ATB, IV heparin (transitioned to Eliquis), ID consult, podiatry consult, elevate LLE, local wound care, analgesics prn, patient transferred to [facility] for vascular consult on , underwent LLE revascularization with Dr. Stoddard on , recommending repeat BEN in 4 weeks with OP F/U, underwent L partial 5th ray resection on  with Dr. Spears, taken back to OR on  for delayed closure with graft application by Dr. 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 was HDS and discharged to [facility] on 10/2/24. On 11/5, patient was sent to [facility] ER for evaluation of R eye pain and ringing in R ear. He was treated with Excedrin in-house, which was not effective. In the ER, he was Dx with trigeminal neuralgia and started on tegretol. Pt. was sent to the ER on 12/23 with c/o HA, left-sided facial pain, dizziness, and watering of his left eye. He was given Prednisone and Ibuprofen in-house with concern for acute flare of trigeminal neuralgia vs migraine HA. Pt. had no improvement in symptoms and requested to go to the ER for evaluation. Work-up in ER: CT of head negative for acute findings, showed atrophy and chronic microvascular ischemic disease with an old lacunar infarct in the R basal ganglia noted, stable meningioma. Neuro was consulted and had low concern for stroke. Migraine HA was suspected and patient was treated with Reglan, Decadron, and D/C'ed back to NH.     Pt. noted to have increased edema to E on 12/31 and was ordered arterial and venous US, which were negative for acute findings. Today, patient is c/o N/V/D, abdominal discomfort, and headache. He denies dizziness, SOB, cough, chest pain, or dysuria. He reports poor PO intake. Staff report no clinical concerns.     ROS:    As above in HPI. Otherwise, all other systems have been reviewed and are negative for complaint.    Medications reviewed and verified in NH chart.     Patient Active Problem List   Diagnosis   • Wound infection   • Osteomyelitis of left foot, unspecified type (Multi)   • Acute deep vein thrombosis (DVT) of popliteal vein of left lower extremity (Multi)   • Bilateral lower extremity edema   • CAD (coronary artery disease)   • Benign essential HTN   • Inflammatory neuropathy (Multi)   • History of stroke   • Weakness of left lower extremity   • Ambulatory dysfunction   • Chronic bilateral low back pain with left-sided sciatica   • DM type 2 with diabetic  peripheral neuropathy   • Neural foraminal stenosis of lumbosacral spine   • Hyperlipidemia   • Lumbar back pain with radiculopathy affecting left lower extremity   • Peripheral arterial disease (CMS-HCC)   • Uncontrolled type 2 diabetes mellitus with hyperglycemia   • Anemia   • Obesity   • Delayed surgical wound healing of foot amputation stump (Multi)   • Impaired mobility and ADLs   • Weakness   • Thoracic radiculopathy   • Osteoarthritis   • Cellulitis of foot, left   • Meningioma (Multi)   • Tremor of left hand   • Diabetic ulcer of left foot associated with type 2 diabetes mellitus, limited to breakdown of skin   • Acute deep vein thrombosis (DVT) of femoral vein of left lower extremity (Multi)   • Nonrheumatic aortic valve stenosis   • Diastolic dysfunction   • Non-pressure chronic ulcer of other part of left foot with necrosis of bone   • Hemiplegia and hemiparesis following cerebral infarction affecting left non-dominant side (Multi)   • Wound eschar of foot   • 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 sinus (Multi)   • Trigeminal neuralgia   • History of DVT (deep vein thrombosis)   • Insomnia   • Generalized weakness   • Normocytic anemia        Past Medical History:   Diagnosis Date   • Acute osteomyelitis of ankle and foot, left (Multi)    • AMI (acute myocardial infarction) (Multi)    • ASHD (arteriosclerotic heart disease)    • At risk for falls    • Cellulitis     left foot and ankle   • Diabetes mellitus (Multi)    • DVT (deep vein thrombosis) in pregnancy ([facility])    • Fall risk care plan declined    • Hypertension    • Meningioma (Multi)    • Myocardial infarction (Multi)    • Neuritis    • Neuropathy    • Osteoarthritis    • Osteomyelitis    • PVD (peripheral vascular disease) (CMS-HCC)    • Sprain of right knee 06/25/2015   • Stroke (Multi)    • 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     • FOOT RAY RESECTION Left 09/23/2024    L partial 5th ray resection (Dr. Regan DPM)   • FOOT SURGERY Left 09/27/2024    Delayed closure w/ graft application (Dr. Amina DPM)   • INVASIVE VASCULAR PROCEDURE Bilateral 09/18/2024    Procedure: Lower Extremity Angiogram;  Surgeon: Teofilo Stoddard MD;  Location: [facility] Cardiac Cath Lab;  Service: Cardiovascular;  Laterality: Bilateral;   • IRIDOTOMY / IRIDECTOMY Bilateral        Family History   Problem Relation Name Age of Onset   • Heart disease Father     • Colon cancer Other     • Heart attack Other     • Diabetes Other     • Hypertension Other         Social History     Tobacco Use   Smoking Status Never   • Passive exposure: Never   Smokeless Tobacco Never       Social History     Substance and Sexual Activity   Alcohol Use Not Currently    Comment: former       Social History     Substance and Sexual Activity   Drug Use Never       No Known Allergies     Vital Signs:   115/68-74-16-98.0-98% on RA    Physical Exam:  General: Sitting up in bed in NAD, alert   Head/Face: NCAT, symmetrical  Eyes: PERRLA, no injection, no discharge  ENT: Hearing not impaired, ears without scars or lesions, nasal mucosa and turbinates pink, septum midline, lips pink and moist  Neck: Supple, symmetrical  Respiratory: CTA but diminished without adventitious sounds, respirations even and nonlabored without use of accessory muscles, good air exchange  Cardio: RRR without murmur or gallops, normal S1S2, NP edema to RLE, 2-3+ P edema to LLE, pedal pulses 1+/4 bilaterally  Chest/Breast: Symmetrical  GI: BS x 4, normoactive, non-distended, abd round and soft, no masses or tenderness  : No suprapubic tenderness or distention  MSK: Gait not assessed, joints with full ROM without pain or contractures  Skin: Skin warm  and dry, no induration, vascular ulcer of L anterior lower leg, surgical wound of L lateral foot, skin tags to R buttocks (chronic per patient)   Neurologic: Cranial nerves II through XII intact, decreased sensation to BLE  Psychiatric: Alert to person, place, and time, calm and cooperative    Results/Data:   1/6/25: HgbA1C 5.8  12/23/24: Glu 116, K+ 5.4, LFTs WNL, WBC 11.8, Hgb 10.9, Hct 34.6  11/5/24: CMP WNL, Mag 2.03, CRP 1.34, Sed Rate 38, Hgb 10.3, Hct 32.7  10/30/24: Glu 139, Cr 0.6, Hgb 9.1, Hct 29.4, Sed Rate 46, CRP 2.3  10/23/24: Xxl216, Cr 0.6, Hgb 9.0, Hct 29.1, Iron 44, TIBC 261, Ferritin 21.1, Transferrin 193, Folate 4.4, Vit B12 358  10/16/24: Glu 135, Cr 0.6, Hgb 8.5, Hct 26.7  10/3/24: Glu 111, Cr 0.6, HgbA1C 7.0, Hgb 9.0, Hct 28.8    Assessment/Plan:  Left foot osteomyelitis/cellulitis/PAD/venous insufficiency-s/p LLE revascularization with Dr. Stoddard on 9/18, s/p L partial 5th ray resection on 9/23 with Dr. Spears, s/p delayed closure with graft application by Dr. Huynh on 9/27, c/w local wound care, c/w Vit C, MVI, and LPS Critical Care BID to promote wound healing, s/p Augmentin (end date 10/9), c/w ASA and plavix, c/w Tylenol and Gabapentin, c/w Oxycodone prn for mod-severe pain, tizanidine prn for muscle spasms, c/w Doxycycline (end date 1/12), wound care team following, seen by podiatry on 10/14, WBAT to LLE, F/U with podiatry as needed, F/U with vascular  DVT of LLE-c/w eliquis, elevate LLE, monitor H&H  Physical deconditioning/weakness-c/w PT/OT  Insomnia/depression-c/w trazodone and melatonin, supportive care, monitor behaviors, patient declines Psych services at this time   Anemia-c/w MVI, iron, and Vit B12, monitor CBC, iron panel, and Vit B12 level   CAD/HTN/Hx MI/HLD/diastolic dysfunction/AVS-FAVIAN diet, c/w ASA, hydralazine, and lisinopril, monitor BP, monitor lipid panel, needs established with cardiology after discharge  DM2 with peripheral neuropathy-LCS diet, accuchecks, BP and lipid  control, yearly eye exam, diabetic foot care, diabetic diet education (RD following), monitor HgbA1C  Hypokalemia-c/w KCl, monitor K+ level  Right frontal lobe meningioma-followed by Dr. Sin ([facility]), seen on 8/4/23 and advised to F/U with neuro-oncology [facility] in 2 years with MRI for surveillance  Hx stroke/LLE weakness/L hand tremor-followed by neurology   Constipation-c/w miralax, monitor BMs  OA/lumbar radiculopathy/thoracic radiculopathy-Tylenol prn, F/U with specialists after discharge  Trigeminal neuralgia-c/w tegretol (increased to 200 mg BID)  Migraine HA-s/p reglan and decadron, continue to monitor   Abdominal discomfort/N/V/D-hold stool softeners and laxatives x 3 days, Imodium prn for diarrhea, Zofran prn for N/V, encourage fluid intake to prevent dehydration, monitor closely    Orders:  Imodium 2 mg PO Q 6 hours prn for diarrhea   Hold stool softeners/laxatives x 3 days   Zofran 4 mg PO Q 6 hours prn for N/V     Code Status:   Full Code    Pt. may discharge home with [facility]. Total cumulative time spent in preparation of this discharge, including documentation review, coordination of care with the medical team including PT/OT/SW/treating consultants, discussion with patient and/or pertinent family members, finalization of prescriptions, F/U appointments, and this discharge summary was approximately 40 minutes.      Electronically Signed By: SARAH Jerome-CNP   2/20/25  9:56 AM

## 2025-01-15 PROBLEM — L97.524 NON-PRESSURE CHRONIC ULCER OF OTHER PART OF LEFT FOOT WITH NECROSIS OF BONE: Status: ACTIVE | Noted: 2025-01-15

## 2025-01-15 PROBLEM — I69.354 HEMIPLEGIA AND HEMIPARESIS FOLLOWING CEREBRAL INFARCTION AFFECTING LEFT NON-DOMINANT SIDE (MULTI): Status: ACTIVE | Noted: 2025-01-15

## 2025-01-17 ENCOUNTER — HOSPITAL ENCOUNTER (EMERGENCY)
Facility: HOSPITAL | Age: 75
Discharge: HOME | End: 2025-01-17
Attending: STUDENT IN AN ORGANIZED HEALTH CARE EDUCATION/TRAINING PROGRAM
Payer: MEDICARE

## 2025-01-17 VITALS
HEIGHT: 69 IN | DIASTOLIC BLOOD PRESSURE: 61 MMHG | OXYGEN SATURATION: 98 % | WEIGHT: 198 LBS | HEART RATE: 69 BPM | BODY MASS INDEX: 29.33 KG/M2 | TEMPERATURE: 98 F | RESPIRATION RATE: 16 BRPM | SYSTOLIC BLOOD PRESSURE: 142 MMHG

## 2025-01-17 DIAGNOSIS — T14.8XXA BLEEDING FROM WOUND: Primary | ICD-10-CM

## 2025-01-17 LAB
ALBUMIN SERPL BCP-MCNC: 3.5 G/DL (ref 3.4–5)
ALP SERPL-CCNC: 115 U/L (ref 33–136)
ALT SERPL W P-5'-P-CCNC: 23 U/L (ref 10–52)
ANION GAP SERPL CALC-SCNC: 14 MMOL/L (ref 10–20)
AST SERPL W P-5'-P-CCNC: 28 U/L (ref 9–39)
BASOPHILS # BLD AUTO: 0.03 X10*3/UL (ref 0–0.1)
BASOPHILS NFR BLD AUTO: 0.2 %
BILIRUB SERPL-MCNC: 0.3 MG/DL (ref 0–1.2)
BUN SERPL-MCNC: 26 MG/DL (ref 6–23)
CALCIUM SERPL-MCNC: 9 MG/DL (ref 8.6–10.3)
CHLORIDE SERPL-SCNC: 105 MMOL/L (ref 98–107)
CO2 SERPL-SCNC: 21 MMOL/L (ref 21–32)
CREAT SERPL-MCNC: 0.63 MG/DL (ref 0.5–1.3)
EGFRCR SERPLBLD CKD-EPI 2021: >90 ML/MIN/1.73M*2
EOSINOPHIL # BLD AUTO: 0.27 X10*3/UL (ref 0–0.4)
EOSINOPHIL NFR BLD AUTO: 2.2 %
ERYTHROCYTE [DISTWIDTH] IN BLOOD BY AUTOMATED COUNT: 14 % (ref 11.5–14.5)
GLUCOSE SERPL-MCNC: 179 MG/DL (ref 74–99)
HCT VFR BLD AUTO: 33 % (ref 41–52)
HGB BLD-MCNC: 10.1 G/DL (ref 13.5–17.5)
IMM GRANULOCYTES # BLD AUTO: 0.2 X10*3/UL (ref 0–0.5)
IMM GRANULOCYTES NFR BLD AUTO: 1.6 % (ref 0–0.9)
INR PPP: 1.2 (ref 0.9–1.1)
LYMPHOCYTES # BLD AUTO: 1.63 X10*3/UL (ref 0.8–3)
LYMPHOCYTES NFR BLD AUTO: 13.3 %
MCH RBC QN AUTO: 28.2 PG (ref 26–34)
MCHC RBC AUTO-ENTMCNC: 30.6 G/DL (ref 32–36)
MCV RBC AUTO: 92 FL (ref 80–100)
MONOCYTES # BLD AUTO: 0.9 X10*3/UL (ref 0.05–0.8)
MONOCYTES NFR BLD AUTO: 7.4 %
NEUTROPHILS # BLD AUTO: 9.2 X10*3/UL (ref 1.6–5.5)
NEUTROPHILS NFR BLD AUTO: 75.3 %
NRBC BLD-RTO: 0 /100 WBCS (ref 0–0)
PLATELET # BLD AUTO: 396 X10*3/UL (ref 150–450)
POTASSIUM SERPL-SCNC: 4 MMOL/L (ref 3.5–5.3)
PROT SERPL-MCNC: 6.8 G/DL (ref 6.4–8.2)
PROTHROMBIN TIME: 13 SECONDS (ref 9.8–12.8)
RBC # BLD AUTO: 3.58 X10*6/UL (ref 4.5–5.9)
SODIUM SERPL-SCNC: 136 MMOL/L (ref 136–145)
WBC # BLD AUTO: 12.2 X10*3/UL (ref 4.4–11.3)

## 2025-01-17 PROCEDURE — 36415 COLL VENOUS BLD VENIPUNCTURE: CPT | Performed by: STUDENT IN AN ORGANIZED HEALTH CARE EDUCATION/TRAINING PROGRAM

## 2025-01-17 PROCEDURE — 80053 COMPREHEN METABOLIC PANEL: CPT | Performed by: STUDENT IN AN ORGANIZED HEALTH CARE EDUCATION/TRAINING PROGRAM

## 2025-01-17 PROCEDURE — 85025 COMPLETE CBC W/AUTO DIFF WBC: CPT | Performed by: STUDENT IN AN ORGANIZED HEALTH CARE EDUCATION/TRAINING PROGRAM

## 2025-01-17 PROCEDURE — 85610 PROTHROMBIN TIME: CPT | Performed by: STUDENT IN AN ORGANIZED HEALTH CARE EDUCATION/TRAINING PROGRAM

## 2025-01-17 PROCEDURE — 99283 EMERGENCY DEPT VISIT LOW MDM: CPT | Performed by: STUDENT IN AN ORGANIZED HEALTH CARE EDUCATION/TRAINING PROGRAM

## 2025-01-17 ASSESSMENT — PAIN SCALES - GENERAL
PAINLEVEL_OUTOF10: 0 - NO PAIN
PAINLEVEL_OUTOF10: 0 - NO PAIN

## 2025-01-17 ASSESSMENT — PAIN - FUNCTIONAL ASSESSMENT: PAIN_FUNCTIONAL_ASSESSMENT: 0-10

## 2025-01-17 NOTE — ED TRIAGE NOTES
BIBA with a c/o BLE ulcers. LLE wound bleeding. Hx of TIA, MI, and DVTs. On Eliquis. Denies any injuries. Wound began bleeding while walking to restroom and he was unable to control the bleeding. Pt a/o4. VSS.

## 2025-01-17 NOTE — ED PROVIDER NOTES
Chief Complaint: Bleeding from wound  HPI:  this is a 74-year-old male, presenting to the emergency department by EMS for evaluation of a bleeding wound.  Patient has a chronic wound to the top of the left foot, and when he went to the bathroom, the wound opened up, and he started bleeding.  He was unable to control the bleeding at home, and so presents to the emergency department.  He denies any direct injury or trauma to the foot.  He states he has chronic infections and wounds in the foot as well.    Past Medical History:   Diagnosis Date    Acute osteomyelitis of ankle and foot, left (Multi)     AMI (acute myocardial infarction) (Multi)     ASHD (arteriosclerotic heart disease)     At risk for falls     Cellulitis     left foot and ankle    Diabetes mellitus (Multi)     DVT (deep vein thrombosis) in pregnancy ([facility])     Fall risk care plan declined     Hypertension     Meningioma (Multi)     Myocardial infarction (Multi)     Neuritis     Neuropathy     Osteoarthritis     Osteomyelitis     PVD (peripheral vascular disease) (CMS-HCC)     Sprain of right knee 06/25/2015    Stroke (Multi)     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      FOOT RAY RESECTION Left 09/23/2024    L partial 5th ray resection (Dr. Regan DPM)    FOOT SURGERY Left 09/27/2024    Delayed closure w/ graft application (Dr. Amina DPM)    INVASIVE VASCULAR PROCEDURE Bilateral 09/18/2024    Procedure: Lower Extremity Angiogram;  Surgeon: Teofilo Stoddard MD;  Location: [facility] Cardiac Cath Lab;  Service: Cardiovascular;  Laterality: Bilateral;    IRIDOTOMY / IRIDECTOMY Bilateral        Physical Exam  Vitals and nursing note reviewed.   Constitutional:       Appearance: Normal appearance.    HENT:      Head: Normocephalic and atraumatic.      Mouth/Throat:      Mouth: Mucous membranes are moist.   Eyes:      Extraocular Movements: Extraocular movements intact.   Pulmonary:      Effort: Pulmonary effort is normal.   Abdominal:      General: Abdomen is flat.      Palpations: Abdomen is soft.   Musculoskeletal:      Comments: Status post amputation of the left pinky toe   Skin:     General: Skin is warm.      Comments: Chronic appearing wound to the top of the left foot with large area of eschar covering the top of the left foot.  There is some oozing blood around the wound that is now well-controlled.   Neurological:      General: No focal deficit present.      Mental Status: He is alert.   Psychiatric:         Mood and Affect: Mood normal.        ED Course/[facility]  Diagnoses as of 01/17/25 0854   Bleeding from wound       This is a 74 y.o. male presenting to the ED for evaluation of bleeding wound which occurred this morning.  Patient has a chronic wound to the top of his left foot which started bleeding this morning.  He was unable to control the bleeding at home and so called EMS.  On physical exam, the patient is no longer bleeding.  The wound was dressed and he was observed in the ED with no further  bleeding.  Lab work was grossly unremarkable.  I do feel that the patient is safe and stable for discharge home. He was discharged in stable condition.     Final Impression  1. Bleeding wound  Disposition/Plan: discharge home  Condition at disposition: Stable.     Jennie Alcala DO  Emergency Medicine Physician     Jennie Alcala,   01/17/25 0920

## 2025-01-23 ENCOUNTER — APPOINTMENT (OUTPATIENT)
Dept: WOUND CARE | Facility: HOSPITAL | Age: 75
End: 2025-01-23
Payer: MEDICARE

## 2025-01-30 ENCOUNTER — APPOINTMENT (OUTPATIENT)
Dept: WOUND CARE | Facility: HOSPITAL | Age: 75
End: 2025-01-30
Payer: MEDICARE

## 2025-02-04 ENCOUNTER — APPOINTMENT (OUTPATIENT)
Dept: CARDIOLOGY | Facility: CLINIC | Age: 75
End: 2025-02-04
Payer: MEDICARE

## 2025-02-05 NOTE — PROGRESS NOTES
"Chief Complaint:   SNF F/U  -Left foot osteomyelitis  -Left foot cellulitis  -Left foot diabetic ulcer  -PVD  -Venous insufficiency  -DVT of LLE  -Physical deconditioning/weakness  -Trigeminal neuralgia     HPI:   74 year-old male presenting to [facility] ER on 9/12/24 with \"months\" of LLE pain with edema, redness, and warmth. He was noted to have a large necrotic ulcer under his 5th toe. He reported that he had not seen a provider or taken any medications for about \"2 years\". Work-up in ER: Glu 310, Na+ 129, Alb 3.3, XR of L foot showed osteomyelitis of the 5th metatarsal head, US of LLE showed acute DVT in the left mid-distal femoral and popliteal veins. He was started on IV Heparin, IV Zosyn, and IV Vanco. He was admitted to the hospital for further evaluation and treatment. Hospital course:    Left foot osteomyelitis/cellulitis/DVT of LLE/PAD-IV ATB, IV heparin (transitioned to Eliquis), ID consult, podiatry consult, elevate LLE, local wound care, analgesics prn, patient transferred to [facility] for vascular consult on 9/14, underwent LLE revascularization with Dr. Stoddard on 9/18, recommending repeat BEN in 4 weeks with OP F/U, underwent L partial 5th ray resection on 9/23 with Dr. Spears, taken back to OR on 9/27 for delayed closure with graft application by Dr. 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 was HDS and discharged to [facility] on " "10/2/24. On 11/5, patient was sent to [facility] ER for evaluation of R eye pain and ringing in R ear. He was treated with Excedrin in-house, which was not effective. In the ER, he was Dx with trigeminal neuralgia and started on tegretol. He reported resolution of symptoms with tegretol. Today, patient is c/o a sharp pain to his L temporal area, blurred vision, and watering of his L eye. He reported increased \"head and eye pressure\" when in a sitting position. He was given Prednisone 40 mg PO x 1 and Ibuprofen 600 mg PO x 1 with no relief in symptoms. He subsequently c/o worsening dizziness, blurred vision, and bilateral temporal pain. He requested to go to [facility] ER to be evaluated.     ROS:    As above in HPI. Otherwise, all other systems have been reviewed and are negative for complaint.    Medications reviewed and verified in NH chart.     Patient Active Problem List   Diagnosis    Wound infection    Osteomyelitis of left foot, unspecified type (Multi)    Acute deep vein thrombosis (DVT) of popliteal vein of left lower extremity (Multi)    Bilateral lower extremity edema    CAD (coronary artery disease)    Hypertension    Inflammatory neuropathy (Multi)    History of stroke    Weakness of left lower extremity    Ambulatory dysfunction    Chronic bilateral low back pain with left-sided sciatica    Diabetic peripheral neuropathy (Multi)    Neural foraminal stenosis of lumbosacral spine    Hyperlipidemia    Lumbar back pain with radiculopathy affecting left lower extremity    Peripheral arterial disease (CMS-HCC)    Type 2 diabetes mellitus, without long-term current use of insulin (Multi)    Anemia    Obesity    Delayed surgical wound healing of foot amputation stump (Multi)    Impaired mobility and ADLs    Weakness    Thoracic radiculopathy    Osteoarthritis    Cellulitis of foot, left    Meningioma (Multi)    Tremor of left hand    Diabetic ulcer of left foot associated with type 2 diabetes mellitus, limited " to breakdown of skin    Acute deep vein thrombosis (DVT) of femoral vein of left lower extremity (Multi)    Nonrheumatic aortic valve stenosis    Diastolic dysfunction    Non-pressure chronic ulcer of other part of left foot with necrosis of bone    Hemiplegia and hemiparesis following cerebral infarction affecting left non-dominant side (Multi)        Past Medical History:   Diagnosis Date    Acute osteomyelitis of ankle and foot, left (Multi)     AMI (acute myocardial infarction) (Multi)     ASHD (arteriosclerotic heart disease)     At risk for falls     Cellulitis     left foot and ankle    Diabetes mellitus (Multi)     DVT (deep vein thrombosis) in pregnancy ([facility])     Fall risk care plan declined     Hypertension     Meningioma (Multi)     Myocardial infarction (Multi)     Neuritis     Neuropathy     Osteoarthritis     Osteomyelitis     PVD (peripheral vascular disease) (CMS-HCC)     Sprain of right knee 06/25/2015    Stroke (Multi)     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      FOOT RAY RESECTION Left 09/23/2024    L partial 5th ray resection (Dr. Spears, DPM)    FOOT SURGERY Left 09/27/2024    Delayed closure w/ graft application (Dr. Huynh, DPM)    INVASIVE VASCULAR PROCEDURE Bilateral 09/18/2024    Procedure: Lower Extremity Angiogram;  Surgeon: Teofilo Stoddard MD;  Location: [facility] Cardiac Cath Lab;  Service: Cardiovascular;  Laterality: Bilateral;    IRIDOTOMY / IRIDECTOMY Bilateral        Family History   Problem Relation Name Age of Onset    Heart disease Father      Colon cancer Other      Heart attack Other      Diabetes Other      Hypertension Other         Social History     Tobacco Use   Smoking Status Never    Passive exposure: Never    Smokeless Tobacco Never       Social History     Substance and Sexual Activity   Alcohol Use Never       Social History     Substance and Sexual Activity   Drug Use Never       No Known Allergies     Vital Signs:   165/88-77-20-98.1-97% on RA    Physical Exam:  General: Sitting up in bed, alert   Head/Face: NCAT, symmetrical  Eyes: PERRLA, no injection, clear discharge to L eye  ENT: Hearing not impaired, ears without scars or lesions, nasal mucosa and turbinates pink, septum midline, lips pink and moist  Neck: Supple, symmetrical  Respiratory: CTA but diminished without adventitious sounds, respirations even and nonlabored without use of accessory muscles, good air exchange  Cardio: RRR without murmur or gallops, normal S1S2, NP edema to BLE (L > R), pedal pulses 2+/4 bilaterally  Chest/Breast: Symmetrical  GI: BS x 4, normoactive, non-distended, abd round and soft, no masses or tenderness  : No suprapubic tenderness or distention  MSK: Gait not assessed, joints with full ROM without pain or contractures  Skin: Skin warm and dry, no induration, DTI of R heel, vascular ulcer of L anterior lower leg, surgical wound of L lateral foot, skin tags to R buttocks (chronic per patient)   Neurologic: Cranial nerves II through XII intact, decreased sensation to BLE  Psychiatric: Alert to person, place, and time, calm and cooperative, expresses depression with current situation     Results/Data:   12/18/24: Glu 116, BUN 27, Hgb 9.8, Hct 30.1  11/5/24: CMP WNL, Mag 2.03, CRP 1.34, Sed Rate 38, Hgb 10.3, Hct 32.7  10/30/24: Glu 139, Cr 0.6, Hgb 9.1, Hct 29.4, Sed Rate 46, CRP 2.3  10/23/24: Vur770, Cr 0.6, Hgb 9.0, Hct 29.1, Iron 44, TIBC 261, Ferritin 21.1, Transferrin 193, Folate 4.4, Vit B12 358  10/16/24: Glu 135, Cr 0.6, Hgb 8.5, Hct 26.7  10/3/24: Glu 111, Cr 0.6, HgbA1C 7.0, Hgb 9.0, Hct 28.8    Assessment/Plan:  Bilateral temporal pain/blurred vision/head and eye pressure-s/p Prednisone 40 mg PO x 1 and Ibuprofen 600  mg PO x 1 without relief of symptoms, send to [facility] ER for evaluation, per patient's request   Left foot osteomyelitis/cellulitis/PAD/venous insufficiency-s/p LLE revascularization with Dr. Stoddard on 9/18, s/p L partial 5th ray resection on 9/23 with Dr. Spears, s/p delayed closure with graft application by Dr. Huynh on 9/27, c/w local wound care, c/w Vit C, MVI, and LPS Critical Care BID to promote wound healing, s/p Augmentin (end date 10/9), c/w ASA and plavix, c/w Tylenol and Gabapentin, c/w Oxycodone prn for mod-severe pain, tizanidine prn for muscle spasms, wound care team following, seen by podiatry on 10/14, WBAT to LLE, F/U with podiatry as needed, F/U with vascular  DVT of LLE-c/w eliquis, elevate LLE, monitor H&H  Physical deconditioning/weakness-c/w PT/OT  Insomnia/depression-c/w trazodone and melatonin, supportive care, monitor behaviors, patient declines Psych services at this time   Anemia-c/w MVI, iron, and Vit B12, monitor CBC, iron panel, and Vit B12 level   CAD/HTN/Hx MI/HLD/diastolic dysfunction/AVS-FAVIAN diet, c/w ASA, hydralazine, and lisinopril, monitor BP, monitor lipid panel, needs established with cardiology after discharge  DM2 with peripheral neuropathy-LCS diet, accuchecks, c/w lispro ISS with meals, BP and lipid control, yearly eye exam, diabetic foot care, diabetic diet education (RD following), monitor HgbA1C  Hypokalemia-c/w KCl, monitor K+ level  Right frontal lobe meningioma-followed by Dr. Sin ([facility]), seen on 8/4/23 and advised to F/U with neuro-oncology [facility] in 2 years with MRI for surveillance  Hx stroke/LLE weakness/L hand tremor-followed by neurology   Constipation-c/w miralax, monitor BMs  OA/lumbar radiculopathy/thoracic radiculopathy-Tylenol prn, F/U with specialists after discharge  Trigeminal neuralgia-c/w tegretol  Constipation-encourage fluids and fiber, c/w senna plus and miralax, monitor for BMs    Orders:  Prednisone 40 mg PO x 1   Ibuprofen 600 mg PO x 1   Send  to  [facility] for evaluation     Code Status:   Full Code

## 2025-02-11 ENCOUNTER — APPOINTMENT (OUTPATIENT)
Dept: RADIOLOGY | Facility: HOSPITAL | Age: 75
End: 2025-02-11
Payer: MEDICARE

## 2025-02-11 ENCOUNTER — HOSPITAL ENCOUNTER (INPATIENT)
Facility: HOSPITAL | Age: 75
End: 2025-02-11
Attending: INTERNAL MEDICINE | Admitting: INTERNAL MEDICINE
Payer: MEDICARE

## 2025-02-11 DIAGNOSIS — I82.412 ACUTE DEEP VEIN THROMBOSIS (DVT) OF FEMORAL VEIN OF LEFT LOWER EXTREMITY (MULTI): Primary | ICD-10-CM

## 2025-02-11 DIAGNOSIS — Z78.9 IMPAIRED MOBILITY AND ADLS: ICD-10-CM

## 2025-02-11 DIAGNOSIS — M86.9 OSTEOMYELITIS OF LEFT FOOT, UNSPECIFIED TYPE (MULTI): ICD-10-CM

## 2025-02-11 DIAGNOSIS — R53.1 WEAKNESS: ICD-10-CM

## 2025-02-11 DIAGNOSIS — S91.309A WOUND OF FOOT: ICD-10-CM

## 2025-02-11 DIAGNOSIS — E11.42 DM TYPE 2 WITH DIABETIC PERIPHERAL NEUROPATHY: ICD-10-CM

## 2025-02-11 DIAGNOSIS — M79.89 SWELLING OF ARM: ICD-10-CM

## 2025-02-11 DIAGNOSIS — Z74.09 IMPAIRED MOBILITY AND ADLS: ICD-10-CM

## 2025-02-11 DIAGNOSIS — D50.9 IRON DEFICIENCY ANEMIA, UNSPECIFIED IRON DEFICIENCY ANEMIA TYPE: ICD-10-CM

## 2025-02-11 DIAGNOSIS — M86.472 CHRONIC OSTEOMYELITIS OF LEFT FOOT WITH DRAINING SINUS (MULTI): ICD-10-CM

## 2025-02-11 DIAGNOSIS — I82.432 ACUTE DEEP VEIN THROMBOSIS (DVT) OF POPLITEAL VEIN OF LEFT LOWER EXTREMITY (MULTI): ICD-10-CM

## 2025-02-11 DIAGNOSIS — M86.9 OSTEOMYELITIS OF LEFT TIBIA, UNSPECIFIED TYPE (MULTI): ICD-10-CM

## 2025-02-11 DIAGNOSIS — I35.0 NONRHEUMATIC AORTIC VALVE STENOSIS: ICD-10-CM

## 2025-02-11 LAB
ALBUMIN SERPL BCP-MCNC: 3.8 G/DL (ref 3.4–5)
ALP SERPL-CCNC: 141 U/L (ref 33–136)
ALT SERPL W P-5'-P-CCNC: 16 U/L (ref 10–52)
ANION GAP SERPL CALC-SCNC: 12 MMOL/L (ref 10–20)
APTT PPP: 30 SECONDS (ref 27–38)
AST SERPL W P-5'-P-CCNC: 45 U/L (ref 9–39)
BASOPHILS # BLD AUTO: 0.03 X10*3/UL (ref 0–0.1)
BASOPHILS NFR BLD AUTO: 0.3 %
BILIRUB SERPL-MCNC: 0.5 MG/DL (ref 0–1.2)
BUN SERPL-MCNC: 14 MG/DL (ref 6–23)
CALCIUM SERPL-MCNC: 9.3 MG/DL (ref 8.6–10.3)
CHLORIDE SERPL-SCNC: 101 MMOL/L (ref 98–107)
CO2 SERPL-SCNC: 28 MMOL/L (ref 21–32)
CREAT SERPL-MCNC: 0.68 MG/DL (ref 0.5–1.3)
CRP SERPL-MCNC: 2.68 MG/DL
EGFRCR SERPLBLD CKD-EPI 2021: >90 ML/MIN/1.73M*2
EOSINOPHIL # BLD AUTO: 0.22 X10*3/UL (ref 0–0.4)
EOSINOPHIL NFR BLD AUTO: 2.3 %
ERYTHROCYTE [DISTWIDTH] IN BLOOD BY AUTOMATED COUNT: 13.5 % (ref 11.5–14.5)
ERYTHROCYTE [SEDIMENTATION RATE] IN BLOOD BY WESTERGREN METHOD: 51 MM/H (ref 0–20)
GLUCOSE SERPL-MCNC: 121 MG/DL (ref 74–99)
HCT VFR BLD AUTO: 34.8 % (ref 41–52)
HGB BLD-MCNC: 10.9 G/DL (ref 13.5–17.5)
IMM GRANULOCYTES # BLD AUTO: 0.07 X10*3/UL (ref 0–0.5)
IMM GRANULOCYTES NFR BLD AUTO: 0.7 % (ref 0–0.9)
LACTATE SERPL-SCNC: 2 MMOL/L (ref 0.4–2)
LYMPHOCYTES # BLD AUTO: 1.74 X10*3/UL (ref 0.8–3)
LYMPHOCYTES NFR BLD AUTO: 18.1 %
MAGNESIUM SERPL-MCNC: 1.91 MG/DL (ref 1.6–2.4)
MAGNESIUM SERPL-MCNC: 2.26 MG/DL (ref 1.6–2.4)
MCH RBC QN AUTO: 26.2 PG (ref 26–34)
MCHC RBC AUTO-ENTMCNC: 31.3 G/DL (ref 32–36)
MCV RBC AUTO: 84 FL (ref 80–100)
MONOCYTES # BLD AUTO: 0.69 X10*3/UL (ref 0.05–0.8)
MONOCYTES NFR BLD AUTO: 7.2 %
NEUTROPHILS # BLD AUTO: 6.88 X10*3/UL (ref 1.6–5.5)
NEUTROPHILS NFR BLD AUTO: 71.4 %
NRBC BLD-RTO: 0 /100 WBCS (ref 0–0)
PLATELET # BLD AUTO: 213 X10*3/UL (ref 150–450)
POTASSIUM SERPL-SCNC: 3.6 MMOL/L (ref 3.5–5.3)
POTASSIUM SERPL-SCNC: 6.1 MMOL/L (ref 3.5–5.3)
PROT SERPL-MCNC: 7.5 G/DL (ref 6.4–8.2)
RBC # BLD AUTO: 4.16 X10*6/UL (ref 4.5–5.9)
SODIUM SERPL-SCNC: 135 MMOL/L (ref 136–145)
WBC # BLD AUTO: 9.6 X10*3/UL (ref 4.4–11.3)

## 2025-02-11 PROCEDURE — 96367 TX/PROPH/DG ADDL SEQ IV INF: CPT

## 2025-02-11 PROCEDURE — 93971 EXTREMITY STUDY: CPT

## 2025-02-11 PROCEDURE — 84132 ASSAY OF SERUM POTASSIUM: CPT | Mod: 59

## 2025-02-11 PROCEDURE — 73630 X-RAY EXAM OF FOOT: CPT | Mod: 50

## 2025-02-11 PROCEDURE — 36415 COLL VENOUS BLD VENIPUNCTURE: CPT

## 2025-02-11 PROCEDURE — 83605 ASSAY OF LACTIC ACID: CPT

## 2025-02-11 PROCEDURE — 96365 THER/PROPH/DIAG IV INF INIT: CPT

## 2025-02-11 PROCEDURE — 85652 RBC SED RATE AUTOMATED: CPT

## 2025-02-11 PROCEDURE — 80053 COMPREHEN METABOLIC PANEL: CPT

## 2025-02-11 PROCEDURE — 85025 COMPLETE CBC W/AUTO DIFF WBC: CPT

## 2025-02-11 PROCEDURE — 1200000002 HC GENERAL ROOM WITH TELEMETRY DAILY: Mod: IPSPLIT

## 2025-02-11 PROCEDURE — 96366 THER/PROPH/DIAG IV INF ADDON: CPT

## 2025-02-11 PROCEDURE — 87040 BLOOD CULTURE FOR BACTERIA: CPT | Mod: GENLAB

## 2025-02-11 PROCEDURE — 85520 HEPARIN ASSAY: CPT | Mod: IPSPLIT | Performed by: INTERNAL MEDICINE

## 2025-02-11 PROCEDURE — 83735 ASSAY OF MAGNESIUM: CPT

## 2025-02-11 PROCEDURE — 99285 EMERGENCY DEPT VISIT HI MDM: CPT | Mod: 25

## 2025-02-11 PROCEDURE — 2500000001 HC RX 250 WO HCPCS SELF ADMINISTERED DRUGS (ALT 637 FOR MEDICARE OP): Mod: IPSPLIT | Performed by: HOSPITALIST

## 2025-02-11 PROCEDURE — 93971 EXTREMITY STUDY: CPT | Performed by: RADIOLOGY

## 2025-02-11 PROCEDURE — 85730 THROMBOPLASTIN TIME PARTIAL: CPT

## 2025-02-11 PROCEDURE — 73630 X-RAY EXAM OF FOOT: CPT | Mod: BILATERAL PROCEDURE | Performed by: RADIOLOGY

## 2025-02-11 PROCEDURE — 2500000001 HC RX 250 WO HCPCS SELF ADMINISTERED DRUGS (ALT 637 FOR MEDICARE OP): Mod: IPSPLIT | Performed by: NURSE PRACTITIONER

## 2025-02-11 PROCEDURE — 87205 SMEAR GRAM STAIN: CPT | Mod: GENLAB

## 2025-02-11 PROCEDURE — 2500000004 HC RX 250 GENERAL PHARMACY W/ HCPCS (ALT 636 FOR OP/ED)

## 2025-02-11 PROCEDURE — 86140 C-REACTIVE PROTEIN: CPT

## 2025-02-11 PROCEDURE — 96374 THER/PROPH/DIAG INJ IV PUSH: CPT | Mod: 59

## 2025-02-11 RX ORDER — GABAPENTIN 300 MG/1
300 CAPSULE ORAL 3 TIMES DAILY
COMMUNITY

## 2025-02-11 RX ORDER — HYDRALAZINE HYDROCHLORIDE 25 MG/1
25 TABLET, FILM COATED ORAL 3 TIMES DAILY
Status: DISCONTINUED | OUTPATIENT
Start: 2025-02-11 | End: 2025-02-19 | Stop reason: HOSPADM

## 2025-02-11 RX ORDER — ACETAMINOPHEN 500 MG
5 TABLET ORAL NIGHTLY PRN
Status: DISCONTINUED | OUTPATIENT
Start: 2025-02-11 | End: 2025-02-19 | Stop reason: HOSPADM

## 2025-02-11 RX ORDER — PANTOPRAZOLE SODIUM 40 MG/10ML
40 INJECTION, POWDER, LYOPHILIZED, FOR SOLUTION INTRAVENOUS
Status: DISCONTINUED | OUTPATIENT
Start: 2025-02-12 | End: 2025-02-12

## 2025-02-11 RX ORDER — ACETAMINOPHEN 325 MG/1
650 TABLET ORAL EVERY 4 HOURS PRN
Status: DISCONTINUED | OUTPATIENT
Start: 2025-02-11 | End: 2025-02-19 | Stop reason: HOSPADM

## 2025-02-11 RX ORDER — GABAPENTIN 300 MG/1
300 CAPSULE ORAL 3 TIMES DAILY
Status: DISCONTINUED | OUTPATIENT
Start: 2025-02-11 | End: 2025-02-19 | Stop reason: HOSPADM

## 2025-02-11 RX ORDER — POLYETHYLENE GLYCOL 3350 17 G/17G
17 POWDER, FOR SOLUTION ORAL DAILY
Status: DISCONTINUED | OUTPATIENT
Start: 2025-02-12 | End: 2025-02-19 | Stop reason: HOSPADM

## 2025-02-11 RX ORDER — VANCOMYCIN HYDROCHLORIDE 1 G/20ML
INJECTION, POWDER, LYOPHILIZED, FOR SOLUTION INTRAVENOUS DAILY PRN
Status: DISCONTINUED | OUTPATIENT
Start: 2025-02-11 | End: 2025-02-17

## 2025-02-11 RX ORDER — TRAZODONE HYDROCHLORIDE 50 MG/1
50 TABLET ORAL NIGHTLY
Status: DISCONTINUED | OUTPATIENT
Start: 2025-02-11 | End: 2025-02-19 | Stop reason: HOSPADM

## 2025-02-11 RX ORDER — INSULIN LISPRO 100 [IU]/ML
0-10 INJECTION, SOLUTION INTRAVENOUS; SUBCUTANEOUS
Status: DISCONTINUED | OUTPATIENT
Start: 2025-02-12 | End: 2025-02-19 | Stop reason: HOSPADM

## 2025-02-11 RX ORDER — ONDANSETRON 4 MG/1
4 TABLET, FILM COATED ORAL EVERY 8 HOURS PRN
Status: DISCONTINUED | OUTPATIENT
Start: 2025-02-11 | End: 2025-02-19 | Stop reason: HOSPADM

## 2025-02-11 RX ORDER — TRAZODONE HYDROCHLORIDE 50 MG/1
50 TABLET ORAL NIGHTLY
COMMUNITY

## 2025-02-11 RX ORDER — CARBAMAZEPINE 100 MG/1
100 TABLET, EXTENDED RELEASE ORAL 2 TIMES DAILY
Status: DISCONTINUED | OUTPATIENT
Start: 2025-02-11 | End: 2025-02-19 | Stop reason: HOSPADM

## 2025-02-11 RX ORDER — HEPARIN SODIUM 10000 [USP'U]/100ML
0-4500 INJECTION, SOLUTION INTRAVENOUS CONTINUOUS
Status: DISCONTINUED | OUTPATIENT
Start: 2025-02-11 | End: 2025-02-15

## 2025-02-11 RX ORDER — AMOXICILLIN 250 MG
1 CAPSULE ORAL NIGHTLY PRN
Status: DISCONTINUED | OUTPATIENT
Start: 2025-02-11 | End: 2025-02-19 | Stop reason: HOSPADM

## 2025-02-11 RX ORDER — CALCIUM CARBONATE 200(500)MG
500 TABLET,CHEWABLE ORAL 4 TIMES DAILY PRN
Status: DISCONTINUED | OUTPATIENT
Start: 2025-02-11 | End: 2025-02-19 | Stop reason: HOSPADM

## 2025-02-11 RX ORDER — VANCOMYCIN HYDROCHLORIDE 1 G/20ML
INJECTION, POWDER, LYOPHILIZED, FOR SOLUTION INTRAVENOUS DAILY PRN
Status: DISCONTINUED | OUTPATIENT
Start: 2025-02-11 | End: 2025-02-12

## 2025-02-11 RX ORDER — PANTOPRAZOLE SODIUM 40 MG/1
40 TABLET, DELAYED RELEASE ORAL
Status: DISCONTINUED | OUTPATIENT
Start: 2025-02-12 | End: 2025-02-19 | Stop reason: HOSPADM

## 2025-02-11 RX ORDER — ONDANSETRON HYDROCHLORIDE 2 MG/ML
4 INJECTION, SOLUTION INTRAVENOUS EVERY 8 HOURS PRN
Status: DISCONTINUED | OUTPATIENT
Start: 2025-02-11 | End: 2025-02-19 | Stop reason: HOSPADM

## 2025-02-11 RX ADMIN — TRAZODONE HYDROCHLORIDE 50 MG: 50 TABLET ORAL at 22:54

## 2025-02-11 RX ADMIN — ACETAMINOPHEN 650 MG: 325 TABLET, FILM COATED ORAL at 23:10

## 2025-02-11 RX ADMIN — HYDRALAZINE HYDROCHLORIDE 25 MG: 25 TABLET ORAL at 22:54

## 2025-02-11 RX ADMIN — CARBAMAZEPINE 100 MG: 100 TABLET, EXTENDED RELEASE ORAL at 22:54

## 2025-02-11 RX ADMIN — PIPERACILLIN SODIUM AND TAZOBACTAM SODIUM 3.38 G: 3; .375 INJECTION, SOLUTION INTRAVENOUS at 17:50

## 2025-02-11 RX ADMIN — DEXTROSE MONOHYDRATE 1500 MG: 5 INJECTION, SOLUTION INTRAVENOUS at 18:39

## 2025-02-11 RX ADMIN — GABAPENTIN 300 MG: 300 CAPSULE ORAL at 22:54

## 2025-02-11 RX ADMIN — HEPARIN SODIUM 1600 UNITS/HR: 10000 INJECTION, SOLUTION INTRAVENOUS at 19:43

## 2025-02-11 SDOH — ECONOMIC STABILITY: FOOD INSECURITY: WITHIN THE PAST 12 MONTHS, YOU WORRIED THAT YOUR FOOD WOULD RUN OUT BEFORE YOU GOT THE MONEY TO BUY MORE.: NEVER TRUE

## 2025-02-11 SDOH — SOCIAL STABILITY: SOCIAL INSECURITY: WITHIN THE LAST YEAR, HAVE YOU BEEN HUMILIATED OR EMOTIONALLY ABUSED IN OTHER WAYS BY YOUR PARTNER OR EX-PARTNER?: NO

## 2025-02-11 SDOH — ECONOMIC STABILITY: FOOD INSECURITY: WITHIN THE PAST 12 MONTHS, THE FOOD YOU BOUGHT JUST DIDN'T LAST AND YOU DIDN'T HAVE MONEY TO GET MORE.: NEVER TRUE

## 2025-02-11 SDOH — SOCIAL STABILITY: SOCIAL INSECURITY: ARE THERE ANY APPARENT SIGNS OF INJURIES/BEHAVIORS THAT COULD BE RELATED TO ABUSE/NEGLECT?: NO

## 2025-02-11 SDOH — SOCIAL STABILITY: SOCIAL INSECURITY
WITHIN THE LAST YEAR, HAVE YOU BEEN RAPED OR FORCED TO HAVE ANY KIND OF SEXUAL ACTIVITY BY YOUR PARTNER OR EX-PARTNER?: NO

## 2025-02-11 SDOH — SOCIAL STABILITY: SOCIAL INSECURITY
WITHIN THE LAST YEAR, HAVE YOU BEEN KICKED, HIT, SLAPPED, OR OTHERWISE PHYSICALLY HURT BY YOUR PARTNER OR EX-PARTNER?: NO

## 2025-02-11 SDOH — SOCIAL STABILITY: SOCIAL INSECURITY: HAS ANYONE EVER THREATENED TO HURT YOUR FAMILY OR YOUR PETS?: NO

## 2025-02-11 SDOH — SOCIAL STABILITY: SOCIAL INSECURITY: WITHIN THE LAST YEAR, HAVE YOU BEEN AFRAID OF YOUR PARTNER OR EX-PARTNER?: NO

## 2025-02-11 SDOH — ECONOMIC STABILITY: INCOME INSECURITY: IN THE PAST 12 MONTHS HAS THE ELECTRIC, GAS, OIL, OR WATER COMPANY THREATENED TO SHUT OFF SERVICES IN YOUR HOME?: NO

## 2025-02-11 SDOH — SOCIAL STABILITY: SOCIAL INSECURITY: ABUSE: ADULT

## 2025-02-11 SDOH — SOCIAL STABILITY: SOCIAL INSECURITY: HAVE YOU HAD ANY THOUGHTS OF HARMING ANYONE ELSE?: NO

## 2025-02-11 SDOH — SOCIAL STABILITY: SOCIAL INSECURITY: HAVE YOU HAD THOUGHTS OF HARMING ANYONE ELSE?: NO

## 2025-02-11 SDOH — SOCIAL STABILITY: SOCIAL INSECURITY: DO YOU FEEL UNSAFE GOING BACK TO THE PLACE WHERE YOU ARE LIVING?: NO

## 2025-02-11 SDOH — SOCIAL STABILITY: SOCIAL INSECURITY: DO YOU FEEL ANYONE HAS EXPLOITED OR TAKEN ADVANTAGE OF YOU FINANCIALLY OR OF YOUR PERSONAL PROPERTY?: NO

## 2025-02-11 SDOH — SOCIAL STABILITY: SOCIAL INSECURITY: ARE YOU OR HAVE YOU BEEN THREATENED OR ABUSED PHYSICALLY, EMOTIONALLY, OR SEXUALLY BY ANYONE?: NO

## 2025-02-11 SDOH — SOCIAL STABILITY: SOCIAL INSECURITY: DOES ANYONE TRY TO KEEP YOU FROM HAVING/CONTACTING OTHER FRIENDS OR DOING THINGS OUTSIDE YOUR HOME?: NO

## 2025-02-11 ASSESSMENT — ACTIVITIES OF DAILY LIVING (ADL)
HEARING - RIGHT EAR: FUNCTIONAL
DRESSING YOURSELF: INDEPENDENT
WALKS IN HOME: INDEPENDENT
ADEQUATE_TO_COMPLETE_ADL: YES
ADEQUATE_TO_COMPLETE_ADL: YES
TOILETING: INDEPENDENT
TOILETING: INDEPENDENT
BATHING: INDEPENDENT
PATIENT'S MEMORY ADEQUATE TO SAFELY COMPLETE DAILY ACTIVITIES?: YES
GROOMING: INDEPENDENT
HEARING - RIGHT EAR: FUNCTIONAL
FEEDING YOURSELF: INDEPENDENT
HEARING - LEFT EAR: FUNCTIONAL
GROOMING: INDEPENDENT
DRESSING YOURSELF: INDEPENDENT
HEARING - LEFT EAR: FUNCTIONAL
FEEDING YOURSELF: INDEPENDENT
ASSISTIVE_DEVICE: WALKER
ASSISTIVE_DEVICE: WALKER
JUDGMENT_ADEQUATE_SAFELY_COMPLETE_DAILY_ACTIVITIES: YES
BATHING: INDEPENDENT
LACK_OF_TRANSPORTATION: NO
PATIENT'S MEMORY ADEQUATE TO SAFELY COMPLETE DAILY ACTIVITIES?: YES
JUDGMENT_ADEQUATE_SAFELY_COMPLETE_DAILY_ACTIVITIES: YES
WALKS IN HOME: INDEPENDENT
LACK_OF_TRANSPORTATION: NO

## 2025-02-11 ASSESSMENT — PAIN SCALES - GENERAL
PAINLEVEL_OUTOF10: 3
PAINLEVEL_OUTOF10: 4
PAINLEVEL_OUTOF10: 10 - WORST POSSIBLE PAIN

## 2025-02-11 ASSESSMENT — COGNITIVE AND FUNCTIONAL STATUS - GENERAL
MOBILITY SCORE: 21
TURNING FROM BACK TO SIDE WHILE IN FLAT BAD: A LITTLE
MOVING TO AND FROM BED TO CHAIR: A LITTLE
DAILY ACTIVITIY SCORE: 24
PATIENT BASELINE BEDBOUND: NO
MOVING FROM LYING ON BACK TO SITTING ON SIDE OF FLAT BED WITH BEDRAILS: A LITTLE

## 2025-02-11 ASSESSMENT — LIFESTYLE VARIABLES
HOW OFTEN DO YOU HAVE 6 OR MORE DRINKS ON ONE OCCASION: NEVER
AUDIT-C TOTAL SCORE: 0
HOW MANY STANDARD DRINKS CONTAINING ALCOHOL DO YOU HAVE ON A TYPICAL DAY: PATIENT DOES NOT DRINK
SKIP TO QUESTIONS 9-10: 1
AUDIT-C TOTAL SCORE: 0
HOW OFTEN DO YOU HAVE A DRINK CONTAINING ALCOHOL: NEVER

## 2025-02-11 ASSESSMENT — PAIN DESCRIPTION - LOCATION: LOCATION: HEAD

## 2025-02-11 ASSESSMENT — PAIN DESCRIPTION - DESCRIPTORS: DESCRIPTORS: ACHING

## 2025-02-11 ASSESSMENT — PATIENT HEALTH QUESTIONNAIRE - PHQ9
2. FEELING DOWN, DEPRESSED OR HOPELESS: NOT AT ALL
1. LITTLE INTEREST OR PLEASURE IN DOING THINGS: NOT AT ALL
SUM OF ALL RESPONSES TO PHQ9 QUESTIONS 1 & 2: 0

## 2025-02-11 ASSESSMENT — COLUMBIA-SUICIDE SEVERITY RATING SCALE - C-SSRS
6. HAVE YOU EVER DONE ANYTHING, STARTED TO DO ANYTHING, OR PREPARED TO DO ANYTHING TO END YOUR LIFE?: NO
1. IN THE PAST MONTH, HAVE YOU WISHED YOU WERE DEAD OR WISHED YOU COULD GO TO SLEEP AND NOT WAKE UP?: NO
2. HAVE YOU ACTUALLY HAD ANY THOUGHTS OF KILLING YOURSELF?: NO

## 2025-02-11 ASSESSMENT — PAIN - FUNCTIONAL ASSESSMENT
PAIN_FUNCTIONAL_ASSESSMENT: 0-10
PAIN_FUNCTIONAL_ASSESSMENT: 0-10

## 2025-02-11 NOTE — ED PROVIDER NOTES
HPI   Chief Complaint   Patient presents with    Wound Infection    Foot Pain       Patient is a 74-year-old male with significant PMH of osteomyelitis, MIs, diabetes, history of DVT hypertension peripheral vascular disease TIA presents to the ED for worsening wound on the dorsum of the left foot.  Patient denies any injuries or falls.  Patient states he uses a walker for ambulation.  Patient states he is had progressively worsening pain at the site.  States he needed his fifth digit amputated in the past, diabetic ulcer.  Patient reports increased edema in his left lower extremity.  Patient denies any worsening numbness or tingling.  Patient states couple nights ago he did have a fever states that has resolved.  Patient denies any recent antibiotics.  Patient denies any chest pain shortness of breath congestion cough nausea vomiting abdominal pain diarrhea.  Patient denies any tobacco alcohol or street drug abuse.              Patient History   Past Medical History:   Diagnosis Date    Acute osteomyelitis of ankle and foot, left (Multi)     AMI (acute myocardial infarction) (Multi)     ASHD (arteriosclerotic heart disease)     At risk for falls     Cellulitis     left foot and ankle    Diabetes mellitus (Multi)     DVT (deep vein thrombosis) in pregnancy ([facility])     Fall risk care plan declined     Hypertension     Meningioma (Multi)     Myocardial infarction (Multi)     Neuritis     Neuropathy     Osteoarthritis     Osteomyelitis     PVD (peripheral vascular disease) (CMS-HCC)     Sprain of right knee 06/25/2015    Stroke (Multi)     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      FOOT RAY RESECTION Left 09/23/2024    L partial 5th ray resection (  SREEDHAR Spears)    FOOT SURGERY Left 09/27/2024    Delayed closure w/ graft application (Dr. Amina DPM)    INVASIVE VASCULAR PROCEDURE Bilateral 09/18/2024    Procedure: Lower Extremity Angiogram;  Surgeon: Teofilo Stoddard MD;  Location: [facility] Cardiac Cath Lab;  Service: Cardiovascular;  Laterality: Bilateral;    IRIDOTOMY / IRIDECTOMY Bilateral      Family History   Problem Relation Name Age of Onset    Heart disease Father      Colon cancer Other      Heart attack Other      Diabetes Other      Hypertension Other       Social History     Tobacco Use    Smoking status: Never     Passive exposure: Never    Smokeless tobacco: Never   Substance Use Topics    Alcohol use: Not Currently     Comment: former    Drug use: Never       Physical Exam   ED Triage Vitals [02/11/25 1507]   Temperature Heart Rate Respirations BP   36.9 °C (98.5 °F) 75 16 127/61      Pulse Ox Temp src Heart Rate Source Patient Position   100 % -- -- --      BP Location FiO2 (%)     -- --       Physical Exam  HENT:      Head: Normocephalic.   Cardiovascular:      Pulses: Normal pulses.   Pulmonary:      Effort: Pulmonary effort is normal.   Musculoskeletal:         General: Tenderness present.   Skin:     Findings: Erythema present.   Neurological:      General: No focal deficit present.      Mental Status: He is alert and oriented to person, place, and time. Mental status is at baseline.           ED Course & MDM   Diagnoses as of 02/11/25 1805   Acute deep vein thrombosis (DVT) of femoral vein of left lower extremity (Multi)   Wound of foot                 No data recorded     [place] Coma Scale Score: 15 (02/11/25 1517 : Eunice Mancilla, RN)                           Medical Decision Making  Medical Decision Making:  Patient presented as described in HPI. Patient case including ROS, PE, and treatment and plan discussed with ED attending if attached as cosigner. Due to patients presentation orders completed include as documented.  Patient presents to the  "ED for worsening movement on the left foot.  Wound culture was taken of the wound.  Patient started on IV antibiotics.  Reached out to podiatry Dr. Christianson   \" Yeah I can see him. I'll monitor the labs and everything and either see him tonight or tomorrow\".  Pending labs and imaging.  Patient will need admitted.  Recommended MRI MRI was ordered.  Potassium 6.1 hemolyzed will repeat.  CRP and sed rate are elevated.  Ultrasound duplex shows DVT of the left superficial femoral vein.  X-ray of the foot shows some swelling. \"Ok sounds good. His labs aren't showing anything crazy infection wise. The ESR could very well just be from the DVT. I'll see him tomorrow around noon and try to do a bedside clean up on it.\"  Hospitalist accepts patient.  Patient remained stable was given IV heparin here in the ER.  Pending admission.    Patient care discussed with: N/A  Social Determinants affecting care: N/A    Final diagnosis and disposition as below.  See CI    Hospitalize. I discussed the differential; results and admit plan with the patient and/or family/friend/caregiver if present.  I emphasized the importance of hospitalization need for re-evaluation/continued monitoring/interventions.. They agreed that if they feel their condition is worsening or if they have any other concern they should alert staff immediately for further assistance. I gave the patient an opportunity to ask all questions they had and answered all of them accordingly. The patient and/or family/friend/caregiver expressed understanding verbally and that they would comply.        Disposition:  admit    Hospitalize. Discussed findings and treatment done here in ED with admitting physician. It would be a risk to discharge the patient in their condition due to possibility of deterioration in their condition and the need for urgent interventions.    This note has been transcribed using voice recognition and may contain grammatical errors, misplaced words, incorrect " words, incorrect phrases or other errors.        Labs Reviewed   CBC WITH AUTO DIFFERENTIAL - Abnormal       Result Value    WBC 9.6      nRBC 0.0      RBC 4.16 (*)     Hemoglobin 10.9 (*)     Hematocrit 34.8 (*)     MCV 84      MCH 26.2      MCHC 31.3 (*)     RDW 13.5      Platelets 213      Neutrophils % 71.4      Immature Granulocytes %, Automated 0.7      Lymphocytes % 18.1      Monocytes % 7.2      Eosinophils % 2.3      Basophils % 0.3      Neutrophils Absolute 6.88 (*)     Immature Granulocytes Absolute, Automated 0.07      Lymphocytes Absolute 1.74      Monocytes Absolute 0.69      Eosinophils Absolute 0.22      Basophils Absolute 0.03     COMPREHENSIVE METABOLIC PANEL - Abnormal    Glucose 121 (*)     Sodium 135 (*)     Potassium 6.1 (*)     Chloride 101      Bicarbonate 28      Anion Gap 12      Urea Nitrogen 14      Creatinine 0.68      eGFR >90      Calcium 9.3      Albumin 3.8      Alkaline Phosphatase 141 (*)     Total Protein 7.5      AST 45 (*)     Bilirubin, Total 0.5      ALT 16     SEDIMENTATION RATE, AUTOMATED - Abnormal    Sedimentation Rate 51 (*)    C-REACTIVE PROTEIN - Abnormal    C-Reactive Protein 2.68 (*)    MAGNESIUM - Normal    Magnesium 2.26     LACTATE - Normal    Lactate 2.0      Narrative:     Venipuncture immediately after or during the administration of Metamizole may lead to falsely low results. Testing should be performed immediately prior to Metamizole dosing.   TISSUE/WOUND CULTURE/SMEAR   BLOOD CULTURE   BLOOD CULTURE   POTASSIUM   MAGNESIUM      Lower extremity venous duplex left   Final Result   Deep venous thrombosis of the left superficial femoral vein.        MACRO:   None        Signed by: Maynor Kitchen 2/11/2025 5:35 PM   Dictation workstation:   [id number]      XR foot 3+ views bilateral   Final Result        Interval amputation of the 5th digit to the level of the 5th   metatarsal shaft. Surgical margins are sharp.        Marked dorsal soft tissue swelling with  likely soft tissue wound at   the level of the tarsometatarsal articulations on the left.        Mild soft tissue swelling right foot.        Signed by: Júnior Sena 2/11/2025 4:19 PM   Dictation workstation:   [id number]      MR foot left wo IV contrast    (Results Pending)        Procedure  Procedures     Wendy Jennings PA-C  02/11/25 180

## 2025-02-12 ENCOUNTER — APPOINTMENT (OUTPATIENT)
Dept: RADIOLOGY | Facility: HOSPITAL | Age: 75
End: 2025-02-12
Payer: MEDICARE

## 2025-02-12 ENCOUNTER — ANESTHESIA EVENT (OUTPATIENT)
Dept: OPERATING ROOM | Facility: HOSPITAL | Age: 75
End: 2025-02-12
Payer: MEDICARE

## 2025-02-12 PROBLEM — G50.0 TRIGEMINAL NEURALGIA: Status: ACTIVE | Noted: 2025-02-12

## 2025-02-12 PROBLEM — Z86.718 HISTORY OF DVT (DEEP VEIN THROMBOSIS): Status: ACTIVE | Noted: 2025-02-12

## 2025-02-12 PROBLEM — R23.4 WOUND ESCHAR OF FOOT: Status: ACTIVE | Noted: 2025-02-12

## 2025-02-12 PROBLEM — M86.472 CHRONIC OSTEOMYELITIS OF LEFT FOOT WITH DRAINING SINUS (MULTI): Status: ACTIVE | Noted: 2025-02-12

## 2025-02-12 LAB
ANION GAP SERPL CALC-SCNC: 10 MMOL/L (ref 10–20)
BUN SERPL-MCNC: 13 MG/DL (ref 6–23)
CALCIUM SERPL-MCNC: 8.3 MG/DL (ref 8.6–10.3)
CHLORIDE SERPL-SCNC: 106 MMOL/L (ref 98–107)
CO2 SERPL-SCNC: 28 MMOL/L (ref 21–32)
CREAT SERPL-MCNC: 0.81 MG/DL (ref 0.5–1.3)
EGFRCR SERPLBLD CKD-EPI 2021: >90 ML/MIN/1.73M*2
ERYTHROCYTE [DISTWIDTH] IN BLOOD BY AUTOMATED COUNT: 13.4 % (ref 11.5–14.5)
GLUCOSE BLD MANUAL STRIP-MCNC: 123 MG/DL (ref 74–99)
GLUCOSE BLD MANUAL STRIP-MCNC: 131 MG/DL (ref 74–99)
GLUCOSE BLD MANUAL STRIP-MCNC: 135 MG/DL (ref 74–99)
GLUCOSE BLD MANUAL STRIP-MCNC: 162 MG/DL (ref 74–99)
GLUCOSE SERPL-MCNC: 133 MG/DL (ref 74–99)
HCT VFR BLD AUTO: 24.7 % (ref 41–52)
HGB BLD-MCNC: 7.6 G/DL (ref 13.5–17.5)
HOLD SPECIMEN: NORMAL
MCH RBC QN AUTO: 25.9 PG (ref 26–34)
MCHC RBC AUTO-ENTMCNC: 30.8 G/DL (ref 32–36)
MCV RBC AUTO: 84 FL (ref 80–100)
NRBC BLD-RTO: 0 /100 WBCS (ref 0–0)
PLATELET # BLD AUTO: 326 X10*3/UL (ref 150–450)
POTASSIUM SERPL-SCNC: 3.7 MMOL/L (ref 3.5–5.3)
RBC # BLD AUTO: 2.94 X10*6/UL (ref 4.5–5.9)
SODIUM SERPL-SCNC: 140 MMOL/L (ref 136–145)
UFH PPP CHRO-ACNC: 0.7 IU/ML
UFH PPP CHRO-ACNC: 0.7 IU/ML
WBC # BLD AUTO: 10 X10*3/UL (ref 4.4–11.3)

## 2025-02-12 PROCEDURE — 2500000004 HC RX 250 GENERAL PHARMACY W/ HCPCS (ALT 636 FOR OP/ED): Mod: IPSPLIT | Performed by: NURSE PRACTITIONER

## 2025-02-12 PROCEDURE — 87070 CULTURE OTHR SPECIMN AEROBIC: CPT | Mod: GENLAB

## 2025-02-12 PROCEDURE — 73718 MRI LOWER EXTREMITY W/O DYE: CPT | Mod: LT,IPSPLIT

## 2025-02-12 PROCEDURE — 0QBH0ZZ EXCISION OF LEFT TIBIA, OPEN APPROACH: ICD-10-PCS

## 2025-02-12 PROCEDURE — 99223 1ST HOSP IP/OBS HIGH 75: CPT | Performed by: NURSE PRACTITIONER

## 2025-02-12 PROCEDURE — 2500000004 HC RX 250 GENERAL PHARMACY W/ HCPCS (ALT 636 FOR OP/ED): Mod: IPSPLIT

## 2025-02-12 PROCEDURE — 94760 N-INVAS EAR/PLS OXIMETRY 1: CPT | Mod: IPSPLIT

## 2025-02-12 PROCEDURE — 85027 COMPLETE CBC AUTOMATED: CPT | Mod: IPSPLIT | Performed by: NURSE PRACTITIONER

## 2025-02-12 PROCEDURE — 2500000001 HC RX 250 WO HCPCS SELF ADMINISTERED DRUGS (ALT 637 FOR MEDICARE OP): Mod: IPSPLIT | Performed by: HOSPITALIST

## 2025-02-12 PROCEDURE — 85520 HEPARIN ASSAY: CPT | Mod: IPSPLIT | Performed by: INTERNAL MEDICINE

## 2025-02-12 PROCEDURE — 73718 MRI LOWER EXTREMITY W/O DYE: CPT | Mod: LEFT SIDE | Performed by: RADIOLOGY

## 2025-02-12 PROCEDURE — 2500000001 HC RX 250 WO HCPCS SELF ADMINISTERED DRUGS (ALT 637 FOR MEDICARE OP): Mod: IPSPLIT | Performed by: NURSE PRACTITIONER

## 2025-02-12 PROCEDURE — 36415 COLL VENOUS BLD VENIPUNCTURE: CPT | Mod: IPSPLIT | Performed by: NURSE PRACTITIONER

## 2025-02-12 PROCEDURE — 82947 ASSAY GLUCOSE BLOOD QUANT: CPT | Mod: IPSPLIT

## 2025-02-12 PROCEDURE — 80048 BASIC METABOLIC PNL TOTAL CA: CPT | Mod: IPSPLIT | Performed by: NURSE PRACTITIONER

## 2025-02-12 PROCEDURE — 87075 CULTR BACTERIA EXCEPT BLOOD: CPT | Mod: GENLAB

## 2025-02-12 PROCEDURE — 1200000002 HC GENERAL ROOM WITH TELEMETRY DAILY: Mod: IPSPLIT

## 2025-02-12 PROCEDURE — 2500000005 HC RX 250 GENERAL PHARMACY W/O HCPCS: Mod: IPSPLIT

## 2025-02-12 RX ORDER — VANCOMYCIN HYDROCHLORIDE 1 G/200ML
1000 INJECTION, SOLUTION INTRAVENOUS EVERY 12 HOURS
Status: DISCONTINUED | OUTPATIENT
Start: 2025-02-12 | End: 2025-02-17

## 2025-02-12 RX ORDER — SODIUM CHLORIDE 0.9 G/100ML
INJECTION, SOLUTION IRRIGATION
Status: COMPLETED
Start: 2025-02-12 | End: 2025-02-12

## 2025-02-12 RX ORDER — OXYCODONE HYDROCHLORIDE 5 MG/1
5 TABLET ORAL EVERY 6 HOURS PRN
Status: DISCONTINUED | OUTPATIENT
Start: 2025-02-12 | End: 2025-02-19 | Stop reason: HOSPADM

## 2025-02-12 RX ORDER — VANCOMYCIN HYDROCHLORIDE 1.25 G/25ML
INJECTION, POWDER, LYOPHILIZED, FOR SOLUTION INTRAVENOUS
Status: COMPLETED
Start: 2025-02-12 | End: 2025-02-12

## 2025-02-12 RX ORDER — SODIUM CHLORIDE 9 MG/ML
INJECTION, SOLUTION INTRAVENOUS
Status: COMPLETED
Start: 2025-02-12 | End: 2025-02-12

## 2025-02-12 RX ADMIN — CARBAMAZEPINE 100 MG: 100 TABLET, EXTENDED RELEASE ORAL at 09:38

## 2025-02-12 RX ADMIN — PIPERACILLIN SODIUM AND TAZOBACTAM SODIUM 3.38 G: 3; .375 INJECTION, SOLUTION INTRAVENOUS at 18:15

## 2025-02-12 RX ADMIN — CARBAMAZEPINE 100 MG: 100 TABLET, EXTENDED RELEASE ORAL at 20:20

## 2025-02-12 RX ADMIN — VANCOMYCIN HYDROCHLORIDE 1000 MG: 1 INJECTION, SOLUTION INTRAVENOUS at 20:20

## 2025-02-12 RX ADMIN — GABAPENTIN 300 MG: 300 CAPSULE ORAL at 20:20

## 2025-02-12 RX ADMIN — TRAZODONE HYDROCHLORIDE 50 MG: 50 TABLET ORAL at 20:20

## 2025-02-12 RX ADMIN — PIPERACILLIN SODIUM AND TAZOBACTAM SODIUM 3.38 G: 3; .375 INJECTION, SOLUTION INTRAVENOUS at 12:59

## 2025-02-12 RX ADMIN — HYDRALAZINE HYDROCHLORIDE 25 MG: 25 TABLET ORAL at 20:20

## 2025-02-12 RX ADMIN — PIPERACILLIN SODIUM AND TAZOBACTAM SODIUM 3.38 G: 3; .375 INJECTION, SOLUTION INTRAVENOUS at 06:11

## 2025-02-12 RX ADMIN — PANTOPRAZOLE SODIUM 40 MG: 40 TABLET, DELAYED RELEASE ORAL at 06:16

## 2025-02-12 RX ADMIN — ACETAMINOPHEN 650 MG: 325 TABLET, FILM COATED ORAL at 09:41

## 2025-02-12 RX ADMIN — HYDRALAZINE HYDROCHLORIDE 25 MG: 25 TABLET ORAL at 09:35

## 2025-02-12 RX ADMIN — SODIUM CHLORIDE 250 ML: 9 INJECTION, SOLUTION INTRAVENOUS at 12:58

## 2025-02-12 RX ADMIN — HYDRALAZINE HYDROCHLORIDE 25 MG: 25 TABLET ORAL at 16:37

## 2025-02-12 RX ADMIN — OXYCODONE 5 MG: 5 TABLET ORAL at 01:48

## 2025-02-12 RX ADMIN — SODIUM CHLORIDE: 900 IRRIGANT IRRIGATION at 15:49

## 2025-02-12 RX ADMIN — HEPARIN SODIUM 1600 UNITS/HR: 10000 INJECTION, SOLUTION INTRAVENOUS at 21:54

## 2025-02-12 RX ADMIN — GABAPENTIN 300 MG: 300 CAPSULE ORAL at 16:37

## 2025-02-12 RX ADMIN — VANCOMYCIN HYDROCHLORIDE 1.25 G: 1.25 INJECTION, POWDER, LYOPHILIZED, FOR SOLUTION INTRAVENOUS at 06:48

## 2025-02-12 RX ADMIN — GABAPENTIN 300 MG: 300 CAPSULE ORAL at 09:38

## 2025-02-12 RX ADMIN — HEPARIN SODIUM 1600 UNITS/HR: 10000 INJECTION, SOLUTION INTRAVENOUS at 06:20

## 2025-02-12 RX ADMIN — PIPERACILLIN SODIUM AND TAZOBACTAM SODIUM 3.38 G: 3; .375 INJECTION, SOLUTION INTRAVENOUS at 00:02

## 2025-02-12 ASSESSMENT — ENCOUNTER SYMPTOMS
FEVER: 1
NEUROLOGICAL NEGATIVE: 1
GASTROINTESTINAL NEGATIVE: 1
PSYCHIATRIC NEGATIVE: 1
RESPIRATORY NEGATIVE: 1
MUSCULOSKELETAL NEGATIVE: 1
DIAPHORESIS: 0
WOUND: 1
CHILLS: 1
APPETITE CHANGE: 0
FATIGUE: 0
ACTIVITY CHANGE: 1
EYES NEGATIVE: 1

## 2025-02-12 ASSESSMENT — COGNITIVE AND FUNCTIONAL STATUS - GENERAL
HELP NEEDED FOR BATHING: A LITTLE
TURNING FROM BACK TO SIDE WHILE IN FLAT BAD: A LITTLE
PERSONAL GROOMING: A LITTLE
MOBILITY SCORE: 10
DRESSING REGULAR UPPER BODY CLOTHING: A LITTLE
MOVING TO AND FROM BED TO CHAIR: TOTAL
DRESSING REGULAR LOWER BODY CLOTHING: A LITTLE
STANDING UP FROM CHAIR USING ARMS: TOTAL
WALKING IN HOSPITAL ROOM: TOTAL
DAILY ACTIVITIY SCORE: 18
CLIMB 3 TO 5 STEPS WITH RAILING: TOTAL
MOVING FROM LYING ON BACK TO SITTING ON SIDE OF FLAT BED WITH BEDRAILS: A LITTLE
TOILETING: A LOT

## 2025-02-12 ASSESSMENT — PAIN SCALES - GENERAL
PAINLEVEL_OUTOF10: 0 - NO PAIN
PAINLEVEL_OUTOF10: 5 - MODERATE PAIN
PAINLEVEL_OUTOF10: 0 - NO PAIN
PAINLEVEL_OUTOF10: 7
PAINLEVEL_OUTOF10: 1

## 2025-02-12 ASSESSMENT — PAIN DESCRIPTION - LOCATION: LOCATION: HEAD

## 2025-02-12 ASSESSMENT — PAIN - FUNCTIONAL ASSESSMENT
PAIN_FUNCTIONAL_ASSESSMENT: 0-10
PAIN_FUNCTIONAL_ASSESSMENT: 0-10

## 2025-02-12 ASSESSMENT — ACTIVITIES OF DAILY LIVING (ADL): LACK_OF_TRANSPORTATION: NO

## 2025-02-12 NOTE — PROGRESS NOTES
Occupational Therapy                 Therapy Communication Note    Patient Name: Elliot Partida  MRN: [id number]  Department: [facility]  Room: 207/207-A  Today's Date: 2/12/2025     Discipline: Occupational Therapy    OT Missed Visit: Yes     Missed Visit Reason: Attempted to see the pt. for OT evaluation.  The pt. was unavailable at this time due to being out of his room for a test or a procedure.    Missed Time: Attempt at 12:08PM

## 2025-02-12 NOTE — PROGRESS NOTES
Physical Therapy                 Therapy Communication Note    Patient Name: Elliot Partida  MRN: [id number]  Department: [facility]  Room: 207/207-A  Today's Date: 2/12/2025     Discipline: Physical Therapy          Missed Visit Reason: Missed Visit Reason:  (pt. off floor for MRI)    Missed Time: Attempt  1126    DISPLAY PLAN FREE TEXT DISPLAY PLAN FREE TEXT DISPLAY PLAN FREE TEXT DISPLAY PLAN FREE TEXT DISPLAY PLAN FREE TEXT DISPLAY PLAN FREE TEXT DISPLAY PLAN FREE TEXT

## 2025-02-12 NOTE — DISCHARGE INSTR - OTHER ORDERS
Thank you for choosing [facility] for your Health Care needs.  Also, thank you for allowing us to take you and your families preferences into account when determining your discharge plan.  Stay well!     You may receive a survey in the mail within the next couple weeks. Please take the time to complete it and return it. Your input is ALWAYS important to us. Thank you!  Your Care Transition Team - Dilcia Montanez  & Caprice      For questions about your medications listed on your discharge instructions, please call the Nurses Station at [phone].

## 2025-02-12 NOTE — CONSULTS
Vancomycin Dosing by Pharmacy- INITIAL    Elliot Partida is a 74 y.o. year old male who Pharmacy has been consulted for vancomycin dosing for other diabetic foot infection . Based on the patient's indication and renal status this patient will be dosed based on a goal AUC of 400-600.     Renal function is currently stable.    Visit Vitals  BP (!) 104/46 (BP Location: Left arm, Patient Position: Lying)   Pulse 57   Temp 36.6 °C (97.9 °F) (Temporal)   Resp 16        Lab Results   Component Value Date    CREATININE 0.81 2025    CREATININE 0.68 2025    CREATININE 0.63 2025    CREATININE 0.78 2024        Patient weight is as follows:   Vitals:    25   Weight: 89.1 kg (196 lb 6.9 oz)       Cultures:  No results found for the encounter in last 14 days.        I/O last 3 completed shifts:  In: 616 (6.9 mL/kg) [P.O.:320; I.V.:146 (1.6 mL/kg); IV Piggyback:150]  Out: 350 (3.9 mL/kg) [Urine:350 (0.1 mL/kg/hr)]  Weight: 89.1 kg   I/O during current shift:  No intake/output data recorded.    Temp (24hrs), Av.8 °C (98.3 °F), Min:36.6 °C (97.9 °F), Max:37.1 °C (98.8 °F)         Assessment/Plan     Patient has already been given a loading dose of 1500 mg.  Will initiate vancomycin maintenance,  1000 mg every 12 hours.    This dosing regimen is predicted by [employer] to result in the following pharmacokinetic parameters:  Regimen: 1000 mg IV every 12 hours.  Start time: 18:52 on 2025  Exposure target: AUC24 (range)400-600 mg/L.hr   SFO83-46: 422 mg/L.hr  AUC24,ss: 492 mg/L.hr  Probability of AUC24 > 400: 72 %  Ctrough,ss: 15.8 mg/L  Probability of Ctrough,ss > 20: 30 %      Follow-up level will be ordered on  at 0500 unless clinically indicated sooner.  Will continue to monitor renal function daily while on vancomycin and order serum creatinine at least every 48 hours if not already ordered.  Follow for continued vancomycin needs, clinical response, and signs/symptoms of toxicity.        Jonathan Min, PharmD

## 2025-02-12 NOTE — CARE PLAN
The patient's goals for the shift include      The clinical goals for the shift include no s/s bleeding    Pt on heparin drip. No s/s active bleeding noted

## 2025-02-12 NOTE — CONSULTS
"Nutrition Initial Assessment:   Nutrition Assessment    Reason for Assessment: Admission nursing screening (MST=Stage 3 or 4 Pressure Injury or Multiple Non-Healing Wounds)    Patient is a 74 y.o. male presenting with Acute deep vein thrombosis (DVT) of femoral vein of left lower extremity (Multi).    PMH:  Acute osteomyelitis of the left ankle and foot, acute myocardial infarction (AMI), arteriosclerotic heart disease (ASHD), diabetes mellitus (DM), peripheral vascular disease (PVD), stroke, transient ischemic attack (TIA), subdural abscess, neuropathy, and neuritis.    Nutrition History:  Energy Intake: Good > 75 %  Food and Nutrient History: Assessment conducted remotely. Information was obtained through medical chart review and IDT rounds. 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 He was admitted with worsening left foot wound and diagnosed with a deep vein thrombosis (DVT) of the left femoral vein. He is currently receiving IV antibiotics, including piperacillin-tazobactam and vancomycin, as well as anticoagulation with heparin.  The patient has multiple wounds, including an open wound on the dorsal left foot and pressure injuries on both heels. He has notable lower extremity edema, with +4 edema in the left leg and +2 in the right. Blood glucose levels remain mildly elevated, with recent readings between 123-135 mg/dL.  According to the nursing flowsheet, he consumed 100% of both breakfast and dinner, though lunch was not charted, possibly due to an MRI procedure. He relies on his niece for shopping, transportation, and wound care, and his sister provides meals.       Anthropometrics:  Height: 175.3 cm (5' 9\")   Weight: 89.1 kg (196 lb 6.9 oz)   BMI (Calculated): 28.99  IBW/kg (Dietitian Calculated): 73 kg  Percent of IBW: " 123 %       Weight History:   Wt Readings from Last 6 Encounters:   02/11/25 89.1 kg (196 lb 6.9 oz)   01/17/25 89.8 kg (198 lb)   12/23/24 91.2 kg (201 lb 1 oz)   11/05/24 93.4 kg (206 lb)   09/25/24 102 kg (225 lb 8.5 oz)   09/12/24 107 kg (236 lb 1.8 oz)       Weight Change %:  Weight History / % Weight Change: 02/11/25 (89.1 kg) to 01/17/25 (89.8 kg), 0.8% loss in ~1 month. 02/11/25 (89.1 kg) to 11/05/24 (93.4 kg), 4.6% loss in ~3 months. 02/11/25 (89.1 kg) to 09/12/24 (107 kg), 16.7% loss in ~5 months.    Nutrition Focused Physical Exam Findings:    Subcutaneous Fat Loss:   Defer Subcutaneous Fat Loss Assessment: Defer all  Defer All Reason: assessment done remotely  Muscle Wasting:  Defer Muscle Wasting Assessment: Defer all  Defer All Reason: assessment done remotely  Edema:  Edema: +2 mild, +4 severe  Edema Location: LLE +4, RLE +2  Physical Findings:  Skin: Positive (diabetic ulcer on the dorsal aspect of the left foot, a pressure injury on the left heel, a pressure injury on the right heel, a history of a left partial fifth-ray amputation due to a diabetic ulcer, and a DVT of the left femoral vein.)    Nutrition Significant Labs:  CBC Trend:   Results from last 7 days   Lab Units 02/12/25  0530 02/11/25  1731   WBC AUTO x10*3/uL 10.0 9.6   RBC AUTO x10*6/uL 2.94* 4.16*   HEMOGLOBIN g/dL 7.6* 10.9*   HEMATOCRIT % 24.7* 34.8*   MCV fL 84 84   PLATELETS AUTO x10*3/uL 326 213    , BMP Trend:   Results from last 7 days   Lab Units 02/12/25  0530 02/11/25  1855 02/11/25  1731   GLUCOSE mg/dL 133*  --  121*   CALCIUM mg/dL 8.3*  --  9.3   SODIUM mmol/L 140  --  135*   POTASSIUM mmol/L 3.7   < > 6.1*   CO2 mmol/L 28  --  28   CHLORIDE mmol/L 106  --  101   BUN mg/dL 13  --  14   CREATININE mg/dL 0.81  --  0.68    < > = values in this interval not displayed.    , A1C:  Lab Results   Component Value Date    HGBA1C 5.8 (H) 01/06/2025   , BG POCT trend:   Results from last 7 days   Lab Units 02/12/25  7472  02/12/25  1116 02/12/25  0633   POCT GLUCOSE mg/dL 135* 123* 131*        Nutrition Specific Medications:  Scheduled medications  carBAMazepine XR, 100 mg, oral, BID  gabapentin, 300 mg, oral, TID  hydrALAZINE, 25 mg, oral, TID  insulin lispro, 0-10 Units, subcutaneous, TID AC  pantoprazole, 40 mg, oral, Daily before breakfast  piperacillin-tazobactam, 3.375 g, intravenous, q6h  polyethylene glycol, 17 g, oral, Daily  traZODone, 50 mg, oral, Nightly  vancomycin, 1,000 mg, intravenous, q12h      Continuous medications  heparin, 0-4,500 Units/hr, Last Rate: 1,600 Units/hr (02/12/25 0620)      I/O:    ;      Dietary Orders (From admission, onward)       Start     Ordered    02/11/25 2211  May Participate in Room Service  ( ROOM SERVICE MAY PARTICIPATE)  Once        Question:  .  Answer:  Yes    02/11/25 2210 02/11/25 2112  Adult diet Consistent Carb; CCD 60 gm/meal  Diet effective now        Question Answer Comment   Diet type Consistent Carb    Carb diet selection: CCD 60 gm/meal        02/11/25 2111                     Estimated Needs:   Total Energy Estimated Needs in 24 hours (kCal):  (4582-4011 kcal/day)  Method for Estimating Needs: 25-30 kcal/kg of actual BW  Total Protein Estimated Needs in 24 Hours (g):  (107-134 g/day)  Method for Estimating 24 Hour Protein Needs: 1.2-1.5 g/kg actual body weight (ABW)  Total Fluid Estimated Needs in 24 Hours (mL):  (6763-0152 mL/day)  Method for Estimating 24 Hour Fluid Needs: 1 mL/kcal or per medical team. ( monitoring fluid balance closely due to edema and history of DVT.)  Patient on Order Fluid Restriction: No        Nutrition Diagnosis        Nutrition Diagnosis  Patient has Nutrition Diagnosis: Yes  Diagnosis Status (1): New  Nutrition Diagnosis 1: Increased nutrient needs  Related to (1): increased metablic demand  As Evidenced by (1): multiple non-healing wounds, diabetic ulcer, and pressure injuries       Nutrition Interventions/Recommendations   Nutrition  prescription for oral nutrition    Nutrition Recommendations:  Individualized Nutrition Prescription Provided for : CCD 60g/meal, Nico BID and ProStat BID    Nutrition Interventions/Goals:   Interventions: Medical food supplement, Meals and snacks  Meals and Snacks: Carbohydrate-modified diet  Medical Food Supplement: Commercial beverage medical food supplement therapy  Goal: ProStat BID (100kcal and 15g protein per 30mL) and Nico BID (80-90kcal and 2.5g protein + arginine and glutamine per 1 pkt)  Coordination of Care with Providers:  (IDT rounds)      Education Documentation  Unable to talk with patient. PI nutrition information provided in discharge paper work. Will attempt to see patient before discharge.         Nutrition Monitoring and Evaluation   Food/Nutrient Related History Monitoring  Monitoring and Evaluation Plan: Intake / amount of food  Intake / Amount of food: Consumes at least 75% or more of meals/snacks/supplements, Consumes > or equal to 70% of supplement    Anthropometric Measurements  Monitoring and Evaluation Plan: Body weight  Body Weight: Body weight - Weight reduction from fluids, as needed         Physical Exam Findings  Monitoring and Evaluation Plan: Skin  Skin Finding: Impaired wound healing - Improved wound healing    Goal Status: New goal(s) identified    Time Spent (min): 60 minutes

## 2025-02-12 NOTE — ANESTHESIA PREPROCEDURE EVALUATION
Patient: Elliot Partida    Procedure Information       Date/Time: 02/13/25 1400    Procedures:       DEBRIDEMENT, FOOT (Left)      BIOPSY, BONE, FOOT (Left)      APPLICATION, WOUND VAC, LOWER EXTREMITY (Left)    Location: [facility] / [facility]    Surgeons: Ronald Christianson DPM            Relevant Problems   Cardiac   (+) Benign essential HTN   (+) CAD (coronary artery disease)   (+) Hyperlipidemia   (+) Nonrheumatic aortic valve stenosis   (+) Peripheral arterial disease (CMS-HCC)      Neuro   (+) DM type 2 with diabetic peripheral neuropathy   (+) Inflammatory neuropathy (Multi)   (+) Lumbar back pain with radiculopathy affecting left lower extremity   (+) Thoracic radiculopathy   (+) Trigeminal neuralgia      Endocrine   (+) DM type 2 with diabetic peripheral neuropathy   (+) Obesity   (+) Uncontrolled type 2 diabetes mellitus with hyperglycemia      Hematology   (+) Acute deep vein thrombosis (DVT) of femoral vein of left lower extremity (Multi)   (+) Acute deep vein thrombosis (DVT) of popliteal vein of left lower extremity (Multi)   (+) Anemia      Musculoskeletal   (+) Chronic bilateral low back pain with left-sided sciatica   (+) Osteoarthritis      ID   (+) 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 sinus (Multi)   (+) Osteomyelitis of left foot, unspecified type (Multi)   (+) Wound infection     Vitals:    02/12/25 1407   BP: 122/52   Pulse: 79   Resp: 16   Temp: 36.6 °C (97.9 °F)   SpO2: 95%       Past Surgical History:   Procedure Laterality Date    CARDIAC CATHETERIZATION      CARPAL TUNNEL RELEASE  10/10/2014    EYE SURGERY      FL  ARTHROCENTESIS ASP INJ JOINT.      FOOT RAY RESECTION Left 09/23/2024    L partial 5th ray resection (Dr. Regan DPM)    FOOT SURGERY Left 09/27/2024    Delayed closure w/ graft application (Dr. Amina DPM)    INVASIVE VASCULAR PROCEDURE Bilateral 09/18/2024    Procedure: Lower Extremity Angiogram;  Surgeon: Teofilo Stoddard MD;  Location: [facility];   Service: Cardiovascular;  Laterality: Bilateral;    IRIDOTOMY / IRIDECTOMY Bilateral      Past Medical History:   Diagnosis Date    Acute osteomyelitis of ankle and foot, left (Multi)     AMI (acute myocardial infarction) (Multi)     ASHD (arteriosclerotic heart disease)     At risk for falls     Cellulitis     left foot and ankle    Diabetes mellitus (Multi)     DVT (deep vein thrombosis) in pregnancy ([facility])     Fall risk care plan declined     Hypertension     Meningioma (Multi)     Myocardial infarction (Multi)     Neuritis     Neuropathy     Osteoarthritis     Osteomyelitis     PVD (peripheral vascular disease) (CMS-HCC)     Sprain of right knee 06/25/2015    Stroke (Multi)     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[facility])     TIA (transient ischemic attack)     Tinea pedis of both feet     Trigeminal neuralgia     Weakness        Current Facility-Administered Medications:     acetaminophen (Tylenol) tablet 650 mg, 650 mg, oral, q4h PRN, Delia Gutierresasia, APRN-CNP, 650 mg at 02/11/25 2310    acetaminophen (Tylenol) tablet 650 mg, 650 mg, oral, q4h PRN, Delia Medrano, APRN-CNP, 650 mg at 02/12/25 0941    calcium carbonate (Tums) chewable tablet 500 mg, 500 mg, oral, 4x daily PRN, Delia Gutierresasia, APRN-CNP    carBAMazepine XR (TEGretol XR) 12 hr tablet 100 mg, 100 mg, oral, BID, Chuy Muniz DO, 100 mg at 02/12/25 0938    gabapentin (Neurontin) capsule 300 mg, 300 mg, oral, TID, Chuy Muniz DO, 300 mg at 02/12/25 0938    heparin 25,000 Units in dextrose 5% 250 mL (100 Units/mL) infusion (premix), 0-4,500 Units/hr, intravenous, Continuous, Delia Medrano APRN-CNP, Last Rate: 16 mL/hr at 02/12/25 0620, 1,600 Units/hr at 02/12/25 0620    heparin bolus from bag 3,000-6,000 Units, 3,000-6,000 Units, intravenous, q4h PRN, Delia Gutierresasia, APRN-CNP    hydrALAZINE (Apresoline) tablet 25 mg, 25 mg, oral, TID, Chuy Muniz DO, 25 mg at 02/12/25 0935    insulin lispro injection 0-10 Units, 0-10 Units,  subcutaneous, TID AC, Delia Medrano, APRN-CNP    melatonin tablet 5 mg, 5 mg, oral, Nightly PRN, Delia Medrano, APRN-CNP    ondansetron (Zofran) tablet 4 mg, 4 mg, oral, q8h PRN **OR** ondansetron (Zofran) injection 4 mg, 4 mg, intravenous, q8h PRN, Delia Medrano, APRN-CNP    oxyCODONE (Roxicodone) immediate release tablet 5 mg, 5 mg, oral, q6h PRN, Chuy Muniz DO, 5 mg at 02/12/25 0148    pantoprazole (ProtoNix) EC tablet 40 mg, 40 mg, oral, Daily before breakfast, 40 mg at 02/12/25 0616 **OR** [DISCONTINUED] pantoprazole (ProtoNix) injection 40 mg, 40 mg, intravenous, Daily before breakfast, Delia Medrano, APRN-CNP    piperacillin-tazobactam (Zosyn) 3.375 g in dextrose (iso) IV 50 mL, 3.375 g, intravenous, q6h, Delia Medrano, APRN-CNP, Stopped at 02/12/25 1347    polyethylene glycol (Glycolax, Miralax) packet 17 g, 17 g, oral, Daily, Chuy Muniz DO    sennosides-docusate sodium (Ann-Colace) 8.6-50 mg per tablet 1 tablet, 1 tablet, oral, Nightly PRN, Delia Medrano, APRN-CNP    traZODone (Desyrel) tablet 50 mg, 50 mg, oral, Nightly, Chuy Muniz DO, 50 mg at 02/11/25 9154    vancomycin (Vancocin) 1,000 mg in dextrose 5%  mL, 1,000 mg, intravenous, q12h, Delia Medrano APRN-CNP    vancomycin (Vancocin) pharmacy to dose - pharmacy monitoring, , miscellaneous, Daily PRN, Delia Medrano, APRN-CNP  Prior to Admission medications    Medication Sig Start Date End Date Taking? Authorizing Provider   carBAMazepine (Carbatrol) 100 mg 12 hr capsule Take 1 capsule (100 mg) by mouth 2 times a day for 7 days. Do not crush or chew. 11/6/24 2/11/25 Yes Radha LUNA MD   gabapentin (Neurontin) 300 mg capsule Take 1 capsule (300 mg) by mouth 3 times a day.   Yes Historical Provider, MD   hydrALAZINE (Apresoline) 25 mg tablet Take 1 tablet (25 mg) by mouth 3 times a day. 10/1/24  Yes Jemal Guadarrama MD   potassium chloride CR (Klor-Con M20) 20 mEq ER tablet Take 2 tablets (40  mEq) by mouth once daily. Do not crush or chew. 10/2/24  Yes Jemal Guadarrama MD   traZODone (Desyrel) 50 mg tablet Take 1 tablet (50 mg) by mouth once daily at bedtime.   Yes Historical Provider, MD   apixaban (Eliquis) 5 mg tablet Take 1 tablet (5 mg) by mouth 2 times a day.  2/11/25 Yes Historical Provider, MD   apixaban (Eliquis) 5 mg tablet Take 2 tablets (10 mg) by mouth 2 times a day for 4 days, THEN 1 tablet (5 mg) 2 times a day. 10/1/24 1/3/25  Jemal Guadarrama MD   aspirin 81 mg chewable tablet Chew 1 tablet (81 mg) once daily. Do not fill before September 19, 2024.  Patient not taking: Reported on 2/11/2025 9/19/24   JUDITH Shay DNP   clopidogrel (Plavix) 75 mg tablet Take 1 tablet (75 mg) by mouth once daily. Do not fill before September 19, 2024.  Patient not taking: Reported on 2/11/2025 9/19/24   JUDITH Shay DNP   insulin lispro (HumaLOG) 100 unit/mL injection Inject 0-10 Units under the skin 3 times daily (morning, midday, late afternoon). Take as directed per insulin instructions.  Patient not taking: Reported on 2/11/2025 10/1/24   Jemal Guadarrama MD   polyethylene glycol (Glycolax, Miralax) 17 gram packet Take 17 g by mouth once daily. 10/2/24   Jemal Guadarrama MD     No Known Allergies  Social History     Tobacco Use    Smoking status: Never     Passive exposure: Never    Smokeless tobacco: Never   Substance Use Topics    Alcohol use: Not Currently     Comment: former         Chemistry    Lab Results   Component Value Date/Time     02/12/2025 0530    K 3.7 02/12/2025 0530     02/12/2025 0530    CO2 28 02/12/2025 0530    BUN 13 02/12/2025 0530    CREATININE 0.81 02/12/2025 0530    Lab Results   Component Value Date/Time    CALCIUM 8.3 (L) 02/12/2025 0530    ALKPHOS 141 (H) 02/11/2025 1731    AST 45 (H) 02/11/2025 1731    ALT 16 02/11/2025 1731    BILITOT 0.5 02/11/2025 1731          Lab Results   Component Value Date/Time    WBC 10.0 02/12/2025  0530    HGB 7.6 (L) 02/12/2025 0530    HCT 24.7 (L) 02/12/2025 0530     02/12/2025 0530     Lab Results   Component Value Date/Time    PROTIME 13.0 (H) 01/17/2025 0818    INR 1.2 (H) 01/17/2025 0818     Encounter Date: 12/23/24   ECG 12 lead   Result Value    Ventricular Rate 78    Atrial Rate 78    MT Interval 242    QRS Duration 120    QT Interval 386    QTC Calculation(Bazett) 440    R Axis -51    T Axis 61    QRS Count 13    Q Onset 223    P Onset 102    P Offset 188    T Offset 416    QTC Fredericia 421    Narrative    Sinus rhythm with 1st degree AV block  Left anterior fascicular block  Minimal voltage criteria for LVH, may be normal variant ( Alpine product )  Septal infarct , age undetermined  Abnormal ECG  When compared with ECG of 09-DEC-2019 23:21,  Septal infarct is now Present  ST elevation now present in Anterior leads  T wave inversion no longer evident in Lateral leads  See ED provider note for full interpretation and clinical correlation  Confirmed by Megan Head ([id number]) on 12/27/2024 10:55:58 AM     No results found for this or any previous visit from the past 1095 days.    Clinical information reviewed:   Tobacco  Allergies  Meds   Med Hx  Surg Hx   Fam Hx  Soc Hx      Cardiac clearance requested.    Echo 9/13/24-  CONCLUSIONS:   1. The left ventricular systolic function is normal, with a visually estimated ejection fraction of 60-65%.   2. Spectral Doppler shows an impaired relaxation pattern of left ventricular diastolic filling.   3. There is normal right ventricular global systolic function.   4. Mild to moderate aortic valve stenosis.   5. There is moderate aortic valve cusp calcification.   6. The inferior vena cava appears mildly dilated.    Heart cath 2020-  CONCLUSIONS:   1. Mild non-obstructive coronary artery disease.   2. Culprit vessel(s): left anterior descending.   3. Ventricular arrhythmia requiring therapy.    NPO Detail:  No data recorded     Physical  Exam    Airway  Mallampati: II     Cardiovascular - normal exam     Dental    Pulmonary - normal exam     Abdominal - normal exam  (+) obese  Abdomen: soft             Anesthesia Plan    History of general anesthesia?: yes  History of complications of general anesthesia?: no    ASA 4     MAC   (Light sedation)  The patient is not a current smoker.    intravenous induction   Anesthetic plan and risks discussed with patient.

## 2025-02-12 NOTE — H&P
History Of Present Illness  Elliot Partida is a 74 y.o. male presenting with a complaint of wound infection and foot pain. He has a wound on the dorsum of his left foot that has been getting worse. He has a hx of osteomyelitis and DM2. He has had progressive swelling and pain in the the left leg. He did have a fever. He denies N/V/D/C. SOB.    In the ED:  VS: T 36.9; HR 75; R 16; /61; SpO2 100% on RA  Lab: Glu 121; Na 135; K 6.1; BuN 14; Cr 0.68; Alk phos 141; AST 45; CRP 2.68; WBC 9.6; Hb 10.9; Hcrt 34.8;   Radiology: left foot X-ray Interval amputation of the 5th digit to the level of the 5th metatarsal shaft. Surgical margins are sharp. Marked dorsal soft tissue swelling with likely soft tissue wound at the level of the tarsometatarsal articulations on the left. Mild soft tissue swelling right foot. US LLE Deep venous thrombosis of the left superficial femoral vein.   Medication: zosyn, heparin drip, Vancomycin   Disposition: admitted to med/surg     Past Medical History  Past Medical History:   Diagnosis Date    Acute osteomyelitis of ankle and foot, left (Multi)     AMI (acute myocardial infarction) (Multi)     ASHD (arteriosclerotic heart disease)     At risk for falls     Cellulitis     left foot and ankle    Diabetes mellitus (Multi)     DVT (deep vein thrombosis) in pregnancy ([facility])     Fall risk care plan declined     Hypertension     Meningioma (Multi)     Myocardial infarction (Multi)     Neuritis     Neuropathy     Osteoarthritis     Osteomyelitis     PVD (peripheral vascular disease) (CMS-HCC)     Sprain of right knee 06/25/2015    Stroke (Multi)     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[facility])     TIA (transient ischemic attack)     Tinea pedis of both feet     Trigeminal neuralgia     Weakness        Surgical History  Past Surgical History:   Procedure Laterality Date    CARDIAC CATHETERIZATION      CARPAL TUNNEL RELEASE  10/10/2014    EYE SURGERY      FL  ARTHROCENTESIS ASP INJ JOINT.      FOOT RAY  RESECTION Left 09/23/2024    L partial 5th ray resection (Dr. Regan DPM)    FOOT SURGERY Left 09/27/2024    Delayed closure w/ graft application (Dr. Amina DPM)    INVASIVE VASCULAR PROCEDURE Bilateral 09/18/2024    Procedure: Lower Extremity Angiogram;  Surgeon: Teofilo Stoddard MD;  Location: [facility] Cardiac Cath Lab;  Service: Cardiovascular;  Laterality: Bilateral;    IRIDOTOMY / IRIDECTOMY Bilateral         Social History  He reports that he has never smoked. He has never been exposed to tobacco smoke. He has never used smokeless tobacco. He reports that he does not currently use alcohol. He reports that he does not use drugs.    Family History  Family History   Problem Relation Name Age of Onset    Heart disease Father      Colon cancer Other      Heart attack Other      Diabetes Other      Hypertension Other          Allergies  Patient has no known allergies.    Review of Systems   Constitutional:  Positive for activity change, chills and fever. Negative for appetite change, diaphoresis and fatigue.   HENT: Negative.     Eyes: Negative.    Respiratory: Negative.     Cardiovascular:  Positive for leg swelling.   Gastrointestinal: Negative.    Genitourinary: Negative.    Musculoskeletal: Negative.    Skin:  Positive for wound.   Neurological: Negative.    Psychiatric/Behavioral: Negative.          Physical Exam  Vitals reviewed.   Constitutional:       Appearance: Normal appearance. He is normal weight.   HENT:      Head: Normocephalic and atraumatic.      Right Ear: External ear normal.      Left Ear: External ear normal.      Nose: Nose normal.      Mouth/Throat:      Mouth: Mucous membranes are moist.      Pharynx: Oropharynx is clear.   Eyes:      Conjunctiva/sclera: Conjunctivae normal.      Pupils: Pupils are equal, round, and reactive to light.   Cardiovascular:      Rate and Rhythm: Normal rate and regular rhythm.      Pulses: Normal pulses.      Heart sounds: Murmur heard.   Pulmonary:      Effort: Pulmonary  "effort is normal.      Breath sounds: Normal breath sounds.   Abdominal:      General: Bowel sounds are normal.      Palpations: Abdomen is soft.   Musculoskeletal:         General: Normal range of motion.      Cervical back: Normal range of motion and neck supple.   Skin:     General: Skin is warm and dry.      Comments: Left dorsal ankle non pressure wound with necrotic skin, dressing dry and intact.   Neurological:      General: No focal deficit present.      Mental Status: He is alert and oriented to person, place, and time.   Psychiatric:         Mood and Affect: Mood normal.         Behavior: Behavior normal.          Last Recorded Vitals  Blood pressure 122/52, pulse 79, temperature 36.6 °C (97.9 °F), temperature source Temporal, resp. rate 16, height 1.753 m (5' 9\"), weight 89.1 kg (196 lb 6.9 oz), SpO2 95%.    Relevant Results    Scheduled medications  carBAMazepine XR, 100 mg, oral, BID  gabapentin, 300 mg, oral, TID  hydrALAZINE, 25 mg, oral, TID  insulin lispro, 0-10 Units, subcutaneous, TID AC  pantoprazole, 40 mg, oral, Daily before breakfast  piperacillin-tazobactam, 3.375 g, intravenous, q6h  polyethylene glycol, 17 g, oral, Daily  traZODone, 50 mg, oral, Nightly  vancomycin, 1,000 mg, intravenous, q12h      Continuous medications  heparin, 0-4,500 Units/hr, Last Rate: 1,600 Units/hr (02/12/25 0620)      PRN medications  PRN medications: acetaminophen, acetaminophen, calcium carbonate, heparin, melatonin, ondansetron **OR** ondansetron, oxyCODONE, sennosides-docusate sodium, vancomycin    Results for orders placed or performed during the hospital encounter of 02/11/25 (from the past 24 hours)   Potassium   Result Value Ref Range    Potassium 3.6 3.5 - 5.3 mmol/L   Magnesium   Result Value Ref Range    Magnesium 1.91 1.60 - 2.40 mg/dL   aPTT - baseline   Result Value Ref Range    aPTT 30 27 - 38 seconds   Heparin Assay   Result Value Ref Range    Heparin Unfractionated 0.7 See Comment Below for " Therapeutic Ranges IU/mL   Basic metabolic panel   Result Value Ref Range    Glucose 133 (H) 74 - 99 mg/dL    Sodium 140 136 - 145 mmol/L    Potassium 3.7 3.5 - 5.3 mmol/L    Chloride 106 98 - 107 mmol/L    Bicarbonate 28 21 - 32 mmol/L    Anion Gap 10 10 - 20 mmol/L    Urea Nitrogen 13 6 - 23 mg/dL    Creatinine 0.81 0.50 - 1.30 mg/dL    eGFR >90 >60 mL/min/1.73m*2    Calcium 8.3 (L) 8.6 - 10.3 mg/dL   CBC   Result Value Ref Range    WBC 10.0 4.4 - 11.3 x10*3/uL    nRBC 0.0 0.0 - 0.0 /100 WBCs    RBC 2.94 (L) 4.50 - 5.90 x10*6/uL    Hemoglobin 7.6 (L) 13.5 - 17.5 g/dL    Hematocrit 24.7 (L) 41.0 - 52.0 %    MCV 84 80 - 100 fL    MCH 25.9 (L) 26.0 - 34.0 pg    MCHC 30.8 (L) 32.0 - 36.0 g/dL    RDW 13.4 11.5 - 14.5 %    Platelets 326 150 - 450 x10*3/uL   Heparin Assay   Result Value Ref Range    Heparin Unfractionated 0.7 See Comment Below for Therapeutic Ranges IU/mL   SST TOP   Result Value Ref Range    Extra Tube Hold for add-ons.    POCT GLUCOSE   Result Value Ref Range    POCT Glucose 131 (H) 74 - 99 mg/dL   POCT GLUCOSE   Result Value Ref Range    POCT Glucose 123 (H) 74 - 99 mg/dL   POCT GLUCOSE   Result Value Ref Range    POCT Glucose 135 (H) 74 - 99 mg/dL             Assessment/Plan   Assessment & Plan  Acute deep vein thrombosis (DVT) of femoral vein of left lower extremity (Multi)    Wound infection    DM type 2 with diabetic peripheral neuropathy    Uncontrolled type 2 diabetes mellitus with hyperglycemia    Wound eschar of foot    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   - Stopped taking apixaban  - given heparin bolus in the ED  - started on Heparin drip    Left dorsal foot wound  Acute  on chronic osteomyelitis of left foot  Hx of osteomyelitis  - present on admission; pictures  in chart  - started zosyn 3.375 g q6h and vancomycin1,000 mL BID; day 2  - Podiatry consult  - Wound care per podiatry  - to OR tomorrow  - Cardiology consult for cardiac clearance    Essential HTN  - continue hydralazine 25 mg TID  - monitor BP    DM Type 2 with peripheral neuropathy  - SSI Lispro 0-10 scale  - hypoglycemic protocol  - monitor blood sugar  - continue gabapentin 300 mg TID    Trigeminal neuralgia  - continue gabpentin 300 mg TID; carbamazepine 100 mg BID    Insomnia  - continue trazodone 50 mg hs    PUD ppx  - started pantoprazole 40 mg daily    Code status: Full    Disposition: Patient requires more than 2 inpatient days.    Seen and discussed with MD Eryn Whipple, APRN-CNP  Attending Attestation:    Patient was seen and examined face to face, history and physical was taken personally at bedside the APRN-CNP, was present for the whole duration of the exam who participated in the documentation of this note. I performed the medical decision-making components (assessment and plan of care). I have reviewed the documentation and verified the findings in the note as written with additions or exceptions as stated in the body of this note.   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  Ultrasound of the lower extremity was positive for DVT he was started on heparin drip, IV antibiotics with vancomycin and Zosyn, he was seen this morning and his wound pictures was evaluated patient feet are dry feet are dry, with poor hygiene, could not appreciate any palpable pulses but the feet are not cold no pain with passive range of motion of the lower extremities.  We eventually  needs to switch patient to oral anticoagulation, I am not sure that this DVT is chronic or new 1 given that patient has been taking his anticoagulation, will continue with IV antibiotics, wound care, will get MRI of the foot rule out osteo, and podiatry consult, will get PVR.  Dr. Dickson Thakkar MD  Internal Medicine

## 2025-02-12 NOTE — PROGRESS NOTES
02/12/25 1224   Discharge Planning   Living Arrangements Family members  (Sister Eryn)   Support Systems Family members;Home care staff  (sister and niece)   Assistance Needed Patient alert and oriented;  Patient says his sister and he lives in a 2 story house with basement.  He utilizes the first floor.  He doesn't drive.  His niece assists with shopping, transportation and wound care and his sister cooks.  He says he uses a walker with ambulation.  DME:  Walker, cane, crutches, wheelchair,  chair, grab bars and no home O2;  He sleeps in a recliner.  He is active with [facility] (confirmed)-Nursing 1x/week and his PCP is Candi Padgett NP with [facility] (home visits)   Number of Stairs to Enter Residence 10  (Unsure how many stairs- he enters thru the basement door in the back of the house)   Number of Stairs Within Residence 12   Do you have animals or pets at home? Yes   Type of Animals or Pets 2 cats   Home or Post Acute Services In home services  ([facility] and [facility])   Type of Home Care Services Home nursing visits   Expected Discharge Disposition Home Health   Does the patient need discharge transport arranged? No   Financial Resource Strain   How hard is it for you to pay for the very basics like food, housing, medical care, and heating? Not hard   Housing Stability   In the last 12 months, was there a time when you were not able to pay the mortgage or rent on time? N   In the past 12 months, how many times have you moved where you were living? 0   At any time in the past 12 months, were you homeless or living in a shelter (including now)? N   Transportation Needs   In the past 12 months, has lack of transportation kept you from medical appointments or from getting medications? no   In the past 12 months, has lack of transportation kept you from meetings, work, or from getting things needed for daily living? No   Patient Choice   Patient / Family choosing to utilize agency  / facility established prior to hospitalization Yes   Stroke Family Assessment   Stroke Family Assessment Needed No   Intensity of Service   Intensity of Service >30 min     12:50 pm  Referral sent thru [facility] to [facility].  Confirmed address and pharmacy with patient.  PCP is Candi Padgett NP with [facility] home visits.  [facility] following.

## 2025-02-13 ENCOUNTER — APPOINTMENT (OUTPATIENT)
Dept: WOUND CARE | Facility: HOSPITAL | Age: 75
End: 2025-02-13
Payer: MEDICARE

## 2025-02-13 ENCOUNTER — APPOINTMENT (OUTPATIENT)
Dept: CARDIOLOGY | Facility: HOSPITAL | Age: 75
End: 2025-02-13
Payer: MEDICARE

## 2025-02-13 ENCOUNTER — ANESTHESIA (OUTPATIENT)
Dept: OPERATING ROOM | Facility: HOSPITAL | Age: 75
End: 2025-02-13
Payer: MEDICARE

## 2025-02-13 LAB
ANION GAP SERPL CALC-SCNC: 11 MMOL/L (ref 10–20)
AORTIC VALVE MEAN GRADIENT: 28 MMHG
AORTIC VALVE PEAK VELOCITY: 3.46 M/S
ATRIAL RATE: 59 BPM
AV PEAK GRADIENT: 48 MMHG
AVA (PEAK VEL): 1.56 CM2
AVA (VTI): 1.51 CM2
BUN SERPL-MCNC: 15 MG/DL (ref 6–23)
CALCIUM SERPL-MCNC: 8.4 MG/DL (ref 8.6–10.3)
CHLORIDE SERPL-SCNC: 103 MMOL/L (ref 98–107)
CO2 SERPL-SCNC: 28 MMOL/L (ref 21–32)
CREAT SERPL-MCNC: 0.83 MG/DL (ref 0.5–1.3)
EGFRCR SERPLBLD CKD-EPI 2021: >90 ML/MIN/1.73M*2
EJECTION FRACTION: 58 %
ERYTHROCYTE [DISTWIDTH] IN BLOOD BY AUTOMATED COUNT: 13.5 % (ref 11.5–14.5)
GLUCOSE BLD MANUAL STRIP-MCNC: 126 MG/DL (ref 74–99)
GLUCOSE BLD MANUAL STRIP-MCNC: 132 MG/DL (ref 74–99)
GLUCOSE BLD MANUAL STRIP-MCNC: 171 MG/DL (ref 74–99)
GLUCOSE SERPL-MCNC: 120 MG/DL (ref 74–99)
HCT VFR BLD AUTO: 25 % (ref 41–52)
HGB BLD-MCNC: 7.7 G/DL (ref 13.5–17.5)
HOLD SPECIMEN: NORMAL
IRON SATN MFR SERPL: 5 % (ref 25–45)
IRON SERPL-MCNC: 12 UG/DL (ref 35–150)
LEFT VENTRICLE INTERNAL DIMENSION DIASTOLE: 4.6 CM (ref 3.5–6)
LEFT VENTRICULAR OUTFLOW TRACT DIAMETER: 2.5 CM
MCH RBC QN AUTO: 25.8 PG (ref 26–34)
MCHC RBC AUTO-ENTMCNC: 30.8 G/DL (ref 32–36)
MCV RBC AUTO: 84 FL (ref 80–100)
NRBC BLD-RTO: 0 /100 WBCS (ref 0–0)
P AXIS: 37 DEGREES
P OFFSET: 131 MS
P ONSET: 76 MS
PLATELET # BLD AUTO: 322 X10*3/UL (ref 150–450)
POTASSIUM SERPL-SCNC: 3.7 MMOL/L (ref 3.5–5.3)
PR INTERVAL: 292 MS
Q ONSET: 222 MS
QRS COUNT: 10 BEATS
QRS DURATION: 122 MS
QT INTERVAL: 432 MS
QTC CALCULATION(BAZETT): 427 MS
QTC FREDERICIA: 429 MS
R AXIS: -57 DEGREES
RBC # BLD AUTO: 2.99 X10*6/UL (ref 4.5–5.9)
SODIUM SERPL-SCNC: 138 MMOL/L (ref 136–145)
T AXIS: 54 DEGREES
T OFFSET: 438 MS
TIBC SERPL-MCNC: 248 UG/DL (ref 240–445)
UFH PPP CHRO-ACNC: 0.5 IU/ML
UFH PPP CHRO-ACNC: 0.6 IU/ML
UFH PPP CHRO-ACNC: 0.8 IU/ML
UFH PPP CHRO-ACNC: 0.8 IU/ML
UIBC SERPL-MCNC: 236 UG/DL (ref 110–370)
VANCOMYCIN SERPL-MCNC: 12.7 UG/ML (ref 5–20)
VENTRICULAR RATE: 59 BPM
WBC # BLD AUTO: 10.2 X10*3/UL (ref 4.4–11.3)

## 2025-02-13 PROCEDURE — 7100000002 HC RECOVERY ROOM TIME - EACH INCREMENTAL 1 MINUTE: Mod: IPSPLIT

## 2025-02-13 PROCEDURE — 85520 HEPARIN ASSAY: CPT | Mod: IPSPLIT | Performed by: INTERNAL MEDICINE

## 2025-02-13 PROCEDURE — 85027 COMPLETE CBC AUTOMATED: CPT | Mod: IPSPLIT | Performed by: NURSE PRACTITIONER

## 2025-02-13 PROCEDURE — 0QBH0ZX EXCISION OF LEFT TIBIA, OPEN APPROACH, DIAGNOSTIC: ICD-10-PCS

## 2025-02-13 PROCEDURE — 93005 ELECTROCARDIOGRAM TRACING: CPT | Mod: IPSPLIT

## 2025-02-13 PROCEDURE — 83540 ASSAY OF IRON: CPT | Mod: IPSPLIT | Performed by: NURSE PRACTITIONER

## 2025-02-13 PROCEDURE — 2500000004 HC RX 250 GENERAL PHARMACY W/ HCPCS (ALT 636 FOR OP/ED): Mod: IPSPLIT

## 2025-02-13 PROCEDURE — 2500000005 HC RX 250 GENERAL PHARMACY W/O HCPCS: Mod: IPSPLIT | Performed by: NURSE ANESTHETIST, CERTIFIED REGISTERED

## 2025-02-13 PROCEDURE — 85520 HEPARIN ASSAY: CPT | Mod: IPSPLIT

## 2025-02-13 PROCEDURE — 85520 HEPARIN ASSAY: CPT | Mod: IPSPLIT | Performed by: NURSE PRACTITIONER

## 2025-02-13 PROCEDURE — 87077 CULTURE AEROBIC IDENTIFY: CPT | Mod: GENLAB

## 2025-02-13 PROCEDURE — 83550 IRON BINDING TEST: CPT | Mod: IPSPLIT | Performed by: NURSE PRACTITIONER

## 2025-02-13 PROCEDURE — 88305 TISSUE EXAM BY PATHOLOGIST: CPT | Performed by: PATHOLOGY

## 2025-02-13 PROCEDURE — 2720000007 HC OR 272 NO HCPCS: Mod: IPSPLIT

## 2025-02-13 PROCEDURE — 3700000002 HC GENERAL ANESTHESIA TIME - EACH INCREMENTAL 1 MINUTE: Mod: IPSPLIT

## 2025-02-13 PROCEDURE — 99232 SBSQ HOSP IP/OBS MODERATE 35: CPT | Performed by: NURSE PRACTITIONER

## 2025-02-13 PROCEDURE — 88305 TISSUE EXAM BY PATHOLOGIST: CPT | Mod: TC,GENLAB,IPSPLIT

## 2025-02-13 PROCEDURE — 82947 ASSAY GLUCOSE BLOOD QUANT: CPT | Mod: IPSPLIT

## 2025-02-13 PROCEDURE — 2500000001 HC RX 250 WO HCPCS SELF ADMINISTERED DRUGS (ALT 637 FOR MEDICARE OP): Mod: IPSPLIT | Performed by: NURSE PRACTITIONER

## 2025-02-13 PROCEDURE — 36415 COLL VENOUS BLD VENIPUNCTURE: CPT | Mod: IPSPLIT | Performed by: NURSE PRACTITIONER

## 2025-02-13 PROCEDURE — 1200000002 HC GENERAL ROOM WITH TELEMETRY DAILY: Mod: IPSPLIT

## 2025-02-13 PROCEDURE — 2500000004 HC RX 250 GENERAL PHARMACY W/ HCPCS (ALT 636 FOR OP/ED): Mod: IPSPLIT | Performed by: NURSE ANESTHETIST, CERTIFIED REGISTERED

## 2025-02-13 PROCEDURE — 93325 DOPPLER ECHO COLOR FLOW MAPG: CPT | Mod: IPSPLIT

## 2025-02-13 PROCEDURE — 3700000001 HC GENERAL ANESTHESIA TIME - INITIAL BASE CHARGE: Mod: IPSPLIT

## 2025-02-13 PROCEDURE — 2500000004 HC RX 250 GENERAL PHARMACY W/ HCPCS (ALT 636 FOR OP/ED): Mod: IPSPLIT | Performed by: NURSE PRACTITIONER

## 2025-02-13 PROCEDURE — 7100000001 HC RECOVERY ROOM TIME - INITIAL BASE CHARGE: Mod: IPSPLIT

## 2025-02-13 PROCEDURE — 2500000005 HC RX 250 GENERAL PHARMACY W/O HCPCS: Mod: IPSPLIT

## 2025-02-13 PROCEDURE — 3600000008 HC OR TIME - EACH INCREMENTAL 1 MINUTE - PROCEDURE LEVEL THREE: Mod: IPSPLIT

## 2025-02-13 PROCEDURE — 80048 BASIC METABOLIC PNL TOTAL CA: CPT | Mod: IPSPLIT | Performed by: NURSE PRACTITIONER

## 2025-02-13 PROCEDURE — 87205 SMEAR GRAM STAIN: CPT | Mod: GENLAB

## 2025-02-13 PROCEDURE — 88311 DECALCIFY TISSUE: CPT | Mod: TC,GENLAB,IPSPLIT

## 2025-02-13 PROCEDURE — 3600000003 HC OR TIME - INITIAL BASE CHARGE - PROCEDURE LEVEL THREE: Mod: IPSPLIT

## 2025-02-13 PROCEDURE — 36415 COLL VENOUS BLD VENIPUNCTURE: CPT | Mod: IPSPLIT

## 2025-02-13 PROCEDURE — 82374 ASSAY BLOOD CARBON DIOXIDE: CPT | Mod: IPSPLIT | Performed by: NURSE PRACTITIONER

## 2025-02-13 PROCEDURE — 80202 ASSAY OF VANCOMYCIN: CPT | Mod: IPSPLIT | Performed by: NURSE PRACTITIONER

## 2025-02-13 PROCEDURE — 88311 DECALCIFY TISSUE: CPT | Performed by: PATHOLOGY

## 2025-02-13 PROCEDURE — 2500000001 HC RX 250 WO HCPCS SELF ADMINISTERED DRUGS (ALT 637 FOR MEDICARE OP): Mod: IPSPLIT | Performed by: HOSPITALIST

## 2025-02-13 RX ORDER — LIDOCAINE HYDROCHLORIDE 20 MG/ML
INJECTION, SOLUTION EPIDURAL; INFILTRATION; INTRACAUDAL; PERINEURAL AS NEEDED
Status: DISCONTINUED | OUTPATIENT
Start: 2025-02-13 | End: 2025-02-13

## 2025-02-13 RX ORDER — NORETHINDRONE AND ETHINYL ESTRADIOL 0.5-0.035
KIT ORAL AS NEEDED
Status: DISCONTINUED | OUTPATIENT
Start: 2025-02-13 | End: 2025-02-13

## 2025-02-13 RX ORDER — SODIUM CHLORIDE 0.9 G/100ML
INJECTION, SOLUTION IRRIGATION AS NEEDED
Status: DISCONTINUED | OUTPATIENT
Start: 2025-02-13 | End: 2025-02-13 | Stop reason: HOSPADM

## 2025-02-13 RX ORDER — PROPOFOL 10 MG/ML
INJECTION, EMULSION INTRAVENOUS AS NEEDED
Status: DISCONTINUED | OUTPATIENT
Start: 2025-02-13 | End: 2025-02-13

## 2025-02-13 RX ADMIN — LIDOCAINE HYDROCHLORIDE 40 MG: 20 INJECTION, SOLUTION EPIDURAL; INFILTRATION; INTRACAUDAL; PERINEURAL at 12:42

## 2025-02-13 RX ADMIN — VANCOMYCIN HYDROCHLORIDE 1000 MG: 1 INJECTION, SOLUTION INTRAVENOUS at 21:07

## 2025-02-13 RX ADMIN — VANCOMYCIN HYDROCHLORIDE 1000 MG: 1 INJECTION, SOLUTION INTRAVENOUS at 09:46

## 2025-02-13 RX ADMIN — EPHEDRINE SULFATE 10 MG: 50 INJECTION, SOLUTION INTRAVENOUS at 13:06

## 2025-02-13 RX ADMIN — PROPOFOL 80 MCG/KG/MIN: 10 INJECTION, EMULSION INTRAVENOUS at 12:44

## 2025-02-13 RX ADMIN — GABAPENTIN 300 MG: 300 CAPSULE ORAL at 21:07

## 2025-02-13 RX ADMIN — PIPERACILLIN SODIUM AND TAZOBACTAM SODIUM 3.38 G: 3; .375 INJECTION, SOLUTION INTRAVENOUS at 18:06

## 2025-02-13 RX ADMIN — PIPERACILLIN SODIUM AND TAZOBACTAM SODIUM 3.38 G: 3; .375 INJECTION, SOLUTION INTRAVENOUS at 00:40

## 2025-02-13 RX ADMIN — GABAPENTIN 300 MG: 300 CAPSULE ORAL at 09:45

## 2025-02-13 RX ADMIN — CARBAMAZEPINE 100 MG: 100 TABLET, EXTENDED RELEASE ORAL at 21:07

## 2025-02-13 RX ADMIN — PIPERACILLIN SODIUM AND TAZOBACTAM SODIUM 3.38 G: 3; .375 INJECTION, SOLUTION INTRAVENOUS at 11:56

## 2025-02-13 RX ADMIN — CARBAMAZEPINE 100 MG: 100 TABLET, EXTENDED RELEASE ORAL at 09:45

## 2025-02-13 RX ADMIN — TRAZODONE HYDROCHLORIDE 50 MG: 50 TABLET ORAL at 21:07

## 2025-02-13 RX ADMIN — HEPARIN SODIUM 1200 UNITS/HR: 10000 INJECTION, SOLUTION INTRAVENOUS at 15:51

## 2025-02-13 RX ADMIN — PROPOFOL 50 MG: 10 INJECTION, EMULSION INTRAVENOUS at 12:42

## 2025-02-13 RX ADMIN — HYDRALAZINE HYDROCHLORIDE 25 MG: 25 TABLET ORAL at 09:45

## 2025-02-13 RX ADMIN — HYDRALAZINE HYDROCHLORIDE 25 MG: 25 TABLET ORAL at 21:07

## 2025-02-13 RX ADMIN — EPHEDRINE SULFATE 5 MG: 50 INJECTION, SOLUTION INTRAVENOUS at 13:08

## 2025-02-13 RX ADMIN — PIPERACILLIN SODIUM AND TAZOBACTAM SODIUM 3.38 G: 3; .375 INJECTION, SOLUTION INTRAVENOUS at 06:27

## 2025-02-13 RX ADMIN — GABAPENTIN 300 MG: 300 CAPSULE ORAL at 15:53

## 2025-02-13 RX ADMIN — PANTOPRAZOLE SODIUM 40 MG: 40 TABLET, DELAYED RELEASE ORAL at 06:27

## 2025-02-13 RX ADMIN — PIPERACILLIN SODIUM AND TAZOBACTAM SODIUM 3.38 G: 3; .375 INJECTION, SOLUTION INTRAVENOUS at 23:49

## 2025-02-13 RX ADMIN — HYDRALAZINE HYDROCHLORIDE 25 MG: 25 TABLET ORAL at 15:53

## 2025-02-13 SDOH — HEALTH STABILITY: MENTAL HEALTH: CURRENT SMOKER: 0

## 2025-02-13 ASSESSMENT — PAIN SCALES - GENERAL
PAIN_LEVEL: 0
PAINLEVEL_OUTOF10: 0 - NO PAIN

## 2025-02-13 ASSESSMENT — ENCOUNTER SYMPTOMS
FEVER: 1
WEAKNESS: 0
ABDOMINAL PAIN: 0
SHORTNESS OF BREATH: 0
COUGH: 0
PALPITATIONS: 0
ABDOMINAL DISTENTION: 0
DIZZINESS: 0
WOUND: 1
CHILLS: 0

## 2025-02-13 NOTE — CARE PLAN
Problem: Pain - Adult  Goal: Verbalizes/displays adequate comfort level or baseline comfort level  Outcome: Progressing   The patient's goals for the shift include      The clinical goals for the shift include Patient will tolerate IV antibiotics during nursing shift    Over the shift, the patient did make progress toward the following goals. Pt tolerated IV atb and heparin gtt infusing.

## 2025-02-13 NOTE — NURSING NOTE
Assumed care of pt, pt sleeping in bed, NPO for procedure, rate verify on heparin gtt done, call light with in reach, no needs or changes.     0700- end of shift pt in bed, bed alarm on, NPO for procedure, heparin gtt adjusted per protocol.

## 2025-02-13 NOTE — CARE PLAN
The clinical goals for the shift include Pt will tolerate IV antibiotics today    Pt was NPO for surgery this am. Surgery went well--has wound vac in place. Heels elevated off bed. Denies pain and has not needed and pain meds. Appetite good. No nausea. Heparin drip infusing well--it is at 1200 units/hr now--will recheck at 9pm. Echo and EKG done pre-op. Wound vac has some bloody drainage noted. Was up to [facility] with 2 assist.

## 2025-02-13 NOTE — DISCHARGE INSTR - DIET
Dietitian recommendations for malnutrition/wound healing:   Recommend patient consume high protein supplement 2 times daily to aid in weight gain/prevent weight loss.   Patient also to consume Nico twice daily to aid in wound healing. (See the [facility] located at [address] To purchase Nico at a discounted rate)    Pressure Injury Nutrition Therapy   Good nutrition is important for wound healing. To heal, the body needs extra calories and protein. A registered dietitian nutritionist (RDN) will help you plan your diet to achieve your nutrition goals.  Tips  Eat more calories. Eating more calories gives you more energy, helps you gain weight, and promotes healing.   Eat more protein. Ask your RDN how many protein servings you need to eat per day. Dairy products, meat, chicken, fish, eggs, and beans are good sources of protein.    One serving counts as:  1 ounce meat, including beef, pork, poultry, seafood or wild game  1 ounce cheese  ¼ cup cottage cheese  1 ounce nuts  2 tablespoons peanut butter  1 egg  ½ cup beans  1 cup milk  6 ounces regular yogurt  3 ounces Greek yogurt     When you're not feeling well, here are some strategies that may help you achieve your nutrition goals:  Add extra protein by sprinkling nuts into your cereal, stir-contreras or salad  Drink milk with meals and snacks  Keep snacks nearby such as cottage cheese, yogurt, pudding, almonds and peanut butter, and crackers  Add peanut butter or cheese to toast.   Try nutritional supplements.  Your RDN may have a specific recommendation for you.  Eat a well-balanced diet. A well-balanced diet helps you get the vitamins and minerals that your body needs.  Include grains, fruit and vegetables, dairy and protein servings at every meal.    Good sources of vitamin C include strawberries, cantaloupe, guava, papaya, koffi, broccoli, kohlrabi, spinach, phyllis greens, bell peppers, tomatoes, and tomato juice.  Good sources of zinc include  eggs, liver, meat, milk, seafood, tofu, wheat germ, and foods made with whole grains.  Your health care provider may prescribe a multivitamin/mineral supplement if nutritional deficiencies are suspected.  If so, make sure you take them as prescribed.     Drink Plenty of Fluids. Drinking plenty of fluids is especially important if you have large draining wounds. The only time you shouldn't drink lots of fluids is if your health care provider or RDN has told you to limit your fluid intake.  Ask your RDN how much fluid you need every day.  If you are having trouble meeting your fluid needs, keep a drink nearby and sip on it often.        Manage your glucose level:  High blood glucose levels can prevent wound healing.  If you have diabetes, speak with your RDN for strategies to control your blood glucose levels.     Foods Recommended  Food Group Foods Recommended   Grains Whole grains such as whole wheat, quinoa, millet and bulgur  Breads, rolls, and pasta made from whole grains  Brown or wild rice  Hot or cold cereals made from whole grains   Protein Foods Beef, veal, pork, lamb, poultry  Fish and seafood  Eggs  Tofu  Beans and peas  Nuts and nut butters   Dairy Milk  Buttermilk  Cheese  Yogurt   Vegetables All vegetables, especially dark, green, red, or orange ones.   Bell peppers  Dark leafy greens,  Broccoli  Tomatoes and tomato juice  Peas,   Fruit All fruits especially dark, green, red, or orange ones     Kiwi, cantaloupe, oranges, grapefruit, strawberries, papayas, guava, and koffi   Fats and Oils Olive, peanut, and canola oil  Margarine and butter  Mayonnaise  Cream cheese  Salad dressings   Pressure Injury Sample 1-Day Menu View Nutrient Info  Breakfast 2 scrambled eggs made with:  ¼ cup shredded cheese  2 slices whole wheat toast  2 tablespoons peanut butter  1 cup 2% milk  ¾ cup orange juice  1 cup coffee   Lunch 3 ounces roast beef  2 tablespoons gravy  ½ cup rice  ½ cup peas  1 biscuit  2 tablespoons  butter  1 cup tossed salad  2 tablespoons salad dressing  ½ cup ice cream  1 cup tea   Evening Meal 3 ounces baked chicken with skin  2 tablespoons gravy  ½ cup mashed potatoes  ½ cup spinach  1 slice whole wheat bread  2 tablespoons butter  1 orange  1 cup 2% milk  1 cup water   Evening Snack 2 chocolate sandwich cookies  1 cup 2% milk   From Nutrition Care Manual

## 2025-02-13 NOTE — H&P (VIEW-ONLY)
PODIATRY CONSULT NOTE    SERVICE DATE: 2/12/2025   SERVICE TIME:  1300    REASON FOR CONSULT: Left leg wound  REQUESTING PHYSICIAN: Dickson Thakkar MD  PRIMARY CARE PHYSICIAN: No Assigned PCP Generic Provider, MD    Subjective   HPI:  Elliot Partida is a 74 y.o. male presenting with a complaint of wound infection and foot pain. He has a wound on the dorsum of his left foot that has been getting worse. He has a hx of osteomyelitis and DM2. He has had progressive swelling and pain in the the left leg. He did have a fever. He denies N/V/D/C. SOB.     Podiatry was consulted for left lower extremity wound management. Patient states that he has had the wound for the last several months. He relates a recent history of left 5th digit bone infection and subsequent amputation. Patient states that he recently fell and broke the wound back open that lead to uncontrollable bleeding. Relates pus and foul odor emanating from the wound. Denies any other pedal complaints.    ROS: 10-point review of systems was performed and is otherwise negative except as noted in HPI.  PMH: Reviewed/documented below.  PSH:  Noncontributory except per HPI   FH: Reviewed and noncontributory   SOCIAL:  Reviewed/documented below.  ALLERGIES: Reviewed/documented below.  MEDS: Reviewed/documented below.  VS: Reviewed/documented below.    Past Medical History:   Diagnosis Date    Acute osteomyelitis of ankle and foot, left (Multi)     AMI (acute myocardial infarction) (Multi)     ASHD (arteriosclerotic heart disease)     At risk for falls     Cellulitis     left foot and ankle    Diabetes mellitus (Multi)     DVT (deep vein thrombosis) in pregnancy ([facility])     Fall risk care plan declined     Hypertension     Meningioma (Multi)     Myocardial infarction (Multi)     Neuritis     Neuropathy     Osteoarthritis     Osteomyelitis     PVD (peripheral vascular disease) (CMS-HCC)     Sprain of right knee 06/25/2015    Stroke (Multi)     Subdural abscess  ([facility])     TIA (transient ischemic attack)     Tinea pedis of both feet     Trigeminal neuralgia     Weakness      Past Surgical History:   Procedure Laterality Date    CARDIAC CATHETERIZATION      CARPAL TUNNEL RELEASE  10/10/2014    EYE SURGERY      FL  ARTHROCENTESIS ASP INJ JOINT.      FOOT RAY RESECTION Left 09/23/2024    L partial 5th ray resection (Dr. Spears DPBRAYAN)    FOOT SURGERY Left 09/27/2024    Delayed closure w/ graft application (Dr. Amina DPM)    INVASIVE VASCULAR PROCEDURE Bilateral 09/18/2024    Procedure: Lower Extremity Angiogram;  Surgeon: Teofilo Stoddard MD;  Location: [facility] Cardiac Cath Lab;  Service: Cardiovascular;  Laterality: Bilateral;    IRIDOTOMY / IRIDECTOMY Bilateral      Family History   Problem Relation Name Age of Onset    Heart disease Father      Colon cancer Other      Heart attack Other      Diabetes Other      Hypertension Other       Social History     Tobacco Use    Smoking status: Never     Passive exposure: Never    Smokeless tobacco: Never   Substance Use Topics    Alcohol use: Not Currently     Comment: former    Drug use: Never      Medications Prior to Admission   Medication Sig Dispense Refill Last Dose/Taking    carBAMazepine (Carbatrol) 100 mg 12 hr capsule Take 1 capsule (100 mg) by mouth 2 times a day for 7 days. Do not crush or chew. 14 capsule 0 2/10/2025    gabapentin (Neurontin) 300 mg capsule Take 1 capsule (300 mg) by mouth 3 times a day.   2/10/2025    hydrALAZINE (Apresoline) 25 mg tablet Take 1 tablet (25 mg) by mouth 3 times a day.   2/10/2025    potassium chloride CR (Klor-Con M20) 20 mEq ER tablet Take 2 tablets (40 mEq) by mouth once daily. Do not crush or chew.   2/10/2025    traZODone (Desyrel) 50 mg tablet Take 1 tablet (50 mg) by mouth once daily at bedtime.   2/10/2025    apixaban (Eliquis) 5 mg tablet Take 2 tablets (10 mg) by mouth 2 times a day for 4 days, THEN 1 tablet (5 mg) 2 times a day.       aspirin 81 mg chewable tablet Chew 1 tablet (81  mg) once daily. Do not fill before September 19, 2024. (Patient not taking: Reported on 2/11/2025) 90 tablet 0 Not Taking    clopidogrel (Plavix) 75 mg tablet Take 1 tablet (75 mg) by mouth once daily. Do not fill before September 19, 2024. (Patient not taking: Reported on 2/11/2025) 90 tablet 0 Not Taking    insulin lispro (HumaLOG) 100 unit/mL injection Inject 0-10 Units under the skin 3 times daily (morning, midday, late afternoon). Take as directed per insulin instructions. (Patient not taking: Reported on 2/11/2025)   Not Taking    polyethylene glycol (Glycolax, Miralax) 17 gram packet Take 17 g by mouth once daily.   Unknown        Medications:  Scheduled Meds: carBAMazepine XR, 100 mg, oral, BID  gabapentin, 300 mg, oral, TID  hydrALAZINE, 25 mg, oral, TID  insulin lispro, 0-10 Units, subcutaneous, TID AC  pantoprazole, 40 mg, oral, Daily before breakfast  piperacillin-tazobactam, 3.375 g, intravenous, q6h  polyethylene glycol, 17 g, oral, Daily  traZODone, 50 mg, oral, Nightly  vancomycin, 1,000 mg, intravenous, q12h      Continuous Infusions: heparin, 0-4,500 Units/hr, Last Rate: 1,600 Units/hr (02/12/25 0620)      PRN Meds: PRN medications: acetaminophen, acetaminophen, calcium carbonate, heparin, melatonin, ondansetron **OR** ondansetron, oxyCODONE, sennosides-docusate sodium, vancomycin    Allergies as of 02/11/2025    (No Known Allergies)            Objective   PHYSICAL EXAM:  Physical Exam Performed:  Vitals:    02/12/25 1831   BP: 178/77   Pulse: 79   Resp: 16   Temp: 37.4 °C (99.3 °F)   SpO2: 96%     Body mass index is 29.01 kg/m².    Patient is AOx3 and in no acute distress. Patient is alert and cooperative. Sitting comfortably in bed with dressing clean, dry and intact. Heel off-loading boots in place B/L.    Vascular: Non-palpable DP/PT pulses B/L. Moderate pitting edema noted B/L. Hair growth absent B/L. CFT<5 to B/L hallux. Temperature is warm to cool from tibial tuberosity to distal digits B/L.  No lymphatic streaking noted B/L.    Musculoskeletal: Gross active and passive ROM diminished to age and activity level. Moves all extremities spontaneously. No pain to palpation at feet B/L.    Neurological: Intact light touch sensation B/L. Pain stimuli absent B/L. Denies any numbness, burning or tingling.    Dermatologic: Nails 1-5 are thickened, elongated, discolored with subungual debris B/L. Skin appears diffusely xerotic B/L. Web spaces 1-4 B/L are clean, dry and intact. No rashes or nodules noted B/L. No hyperkeratotic tissue noted B/L.     Wound:  Measurements: 7.5 cm x 4.0 cm x 1.0 cm  Mixed wound base of fibronecrotic tissue.   Moderate serosanguinous drainage with fibrotic slough.   Moderate jennyfer-wound maceration.   Moderate jennyfer-wound erythema.   Moderate evidence of ascending cellulitis or lymphangitis.  Moderate palpable fluctuance. Significant malodor. Significant increased warmth.   Positive  probe to bone or deep structures within the wound base.   Positive  tunneling or tracking.   Positive  undermining. Skin edges irregular but intact.    LABS:   Results for orders placed or performed during the hospital encounter of 02/11/25 (from the past 24 hours)   Heparin Assay   Result Value Ref Range    Heparin Unfractionated 0.7 See Comment Below for Therapeutic Ranges IU/mL   Basic metabolic panel   Result Value Ref Range    Glucose 133 (H) 74 - 99 mg/dL    Sodium 140 136 - 145 mmol/L    Potassium 3.7 3.5 - 5.3 mmol/L    Chloride 106 98 - 107 mmol/L    Bicarbonate 28 21 - 32 mmol/L    Anion Gap 10 10 - 20 mmol/L    Urea Nitrogen 13 6 - 23 mg/dL    Creatinine 0.81 0.50 - 1.30 mg/dL    eGFR >90 >60 mL/min/1.73m*2    Calcium 8.3 (L) 8.6 - 10.3 mg/dL   CBC   Result Value Ref Range    WBC 10.0 4.4 - 11.3 x10*3/uL    nRBC 0.0 0.0 - 0.0 /100 WBCs    RBC 2.94 (L) 4.50 - 5.90 x10*6/uL    Hemoglobin 7.6 (L) 13.5 - 17.5 g/dL    Hematocrit 24.7 (L) 41.0 - 52.0 %    MCV 84 80 - 100 fL    MCH 25.9 (L) 26.0 - 34.0 pg     MCHC 30.8 (L) 32.0 - 36.0 g/dL    RDW 13.4 11.5 - 14.5 %    Platelets 326 150 - 450 x10*3/uL   Heparin Assay   Result Value Ref Range    Heparin Unfractionated 0.7 See Comment Below for Therapeutic Ranges IU/mL   SST TOP   Result Value Ref Range    Extra Tube Hold for add-ons.    POCT GLUCOSE   Result Value Ref Range    POCT Glucose 131 (H) 74 - 99 mg/dL   POCT GLUCOSE   Result Value Ref Range    POCT Glucose 123 (H) 74 - 99 mg/dL   POCT GLUCOSE   Result Value Ref Range    POCT Glucose 135 (H) 74 - 99 mg/dL   POCT GLUCOSE   Result Value Ref Range    POCT Glucose 162 (H) 74 - 99 mg/dL      Lab Results   Component Value Date    HGBA1C 5.8 (H) 01/06/2025      Lab Results   Component Value Date    CRP 2.68 (H) 02/11/2025      Lab Results   Component Value Date    SEDRATE 51 (H) 02/11/2025        Results from last 7 days   Lab Units 02/12/25  0530   WBC AUTO x10*3/uL 10.0   RBC AUTO x10*6/uL 2.94*   HEMOGLOBIN g/dL 7.6*   HEMATOCRIT % 24.7*     Results from last 7 days   Lab Units 02/12/25  0530 02/11/25  1855 02/11/25  1731   SODIUM mmol/L 140  --  135*   POTASSIUM mmol/L 3.7 3.6 6.1*   CHLORIDE mmol/L 106  --  101   CO2 mmol/L 28  --  28   BUN mg/dL 13  --  14   CREATININE mg/dL 0.81  --  0.68   CALCIUM mg/dL 8.3*  --  9.3   MAGNESIUM mg/dL  --  1.91 2.26   BILIRUBIN TOTAL mg/dL  --   --  0.5   ALT U/L  --   --  16   AST U/L  --   --  45*           IMAGING REVIEW:  MR foot left wo IV contrast    Result Date: 2/12/2025  Interpreted By:  Júnior Sena, STUDY: MR FOOT LEFT WO IV CONTRAST;   INDICATION: Signs/Symptoms:r/o osteo.   COMPARISON: Plain film radiographs February 11   ACCESSION NUMBER(S): [id number]   ORDERING CLINICIAN: ABIEL KENT   TECHNIQUE: Multiplanar multisequential MRI left foot without contrast.   FINDINGS: Postsurgical changes amputation of the left 5th digit to the level of the mid metatarsal shaft.   There is a large amount of subcutaneous soft tissue swelling with a dorsal skin wound at the  medial side of the ankle joint roughly at the level of the ankle proper. There is no evidence of adjacent fluid collection. There is what looks to be some partial tearing of the tibialis anterior where the skin wound is seen at the level of the distal tibial metaphysis.   No definite abscess seen.   There is a significant amount of edema of the medial cuneiform. This involves nearly the entire bone although there is no definite erosive change and this is not directly in line with the wound. This is however, suspicious for a focus of osteomyelitis.   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 There is mild ankle arthrosis.   Small bone infarct of the anterior distal tibia. No other marrow edema seen.   No other fracture identified.   No evidence of acute tendinous or ligamentous tear.   No evidence of effusion.         Anteromedial skin wound at the level of the ankle joint with extensive and diffuse subcutaneous edema about the ankle and foot.   There is a large amount of edema isolated to the medial cuneiform. Although this is somewhat remote from the wound the possibility of osteomyelitis is a consideration.   Partial tearing tibialis anteriorly at the level of the distal ankle.   Small bone island distal tibia.   Other degenerative changes.   Signed by: Júnior Sena 2/12/2025 1:10 PM Dictation workstation:   [id number]    Lower extremity venous duplex left    Result Date: 2/11/2025  Interpreted By:  Maynor Kitchen, STUDY: [facility] LOWER EXTREMITY VENOUS DUPLEX LEFT  2/11/2025 5:14 pm   INDICATION: 75 y/o   M with  Signs/Symptoms:lle edema pain. LMP:  Unknown.       COMPARISON: None.   ACCESSION NUMBER(S): [id number]   ORDERING CLINICIAN: JUANITO HAIDER   TECHNIQUE: Routine ultrasound of the  right lower extremity was performed with duplex Doppler (color and spectral) evaluation.   Static images were obtained for remote interpretation.   FINDINGS:  Deep venous thrombosis of the left superficial femoral veins throughout its course.       Deep venous thrombosis of the left superficial femoral vein.   MACRO: None   Signed by: Maynor Kitchen 2/11/2025 5:35 PM Dictation workstation:   [id number]    XR foot 3+ views bilateral    Result Date: 2/11/2025  Interpreted By:  Júnior Sena, STUDY: XR FOOT 3+ VIEWS BILATERAL   INDICATION: Signs/Symptoms:l foot ulcer top of foot fould odor,.   COMPARISON: September 12, 2024     ACCESSION NUMBER(S): [id number]   ORDERING CLINICIAN: JUANITO HAIDER   FINDINGS: Interval amputation of the 5th digit to the level of the 5th metatarsal shaft. Surgical margins are sharp.   Marked dorsal soft tissue swelling with likely soft tissue wound at the level of the tarsometatarsal articulations on the left.   The right foot demonstrates advanced degenerative changes with some mild dorsal soft tissue swelling with no acute osseous abnormality..         Interval amputation of the 5th digit to the level of the 5th metatarsal shaft. Surgical margins are sharp.   Marked dorsal soft tissue swelling with likely soft tissue wound at the level of the tarsometatarsal articulations on the left.   Mild soft tissue swelling right foot.   Signed by: Júnior Sena 2/11/2025 4:19 PM Dictation workstation:   [id number]            Assessment/Plan   ASSESSMENT & PLAN:  #Non-pressure ulceration, left lower extremity with necrosis of bone [L97.524]  #Cellulitis, left lower extremity [L03.116]  #Left leg pain [M79.605]  #Diabetes Mellitus, Type II with polyneuropathy [E11.42]  #Generalized Edema [R60.1]  #Deep Venous Thrombosis, left femoral vein [I82.412]  - Patient was seen and evaluated; all findings were discussed and all questions were answered to patient's satisfaction.  - Charts, labs, vitals and imaging all reviewed.   - Imaging: MRI revealed possible osteomyelitis of the medial cuneiform  - Labs: Glucose 133, WBC 10, ESR 51, CRP 2.68  - Duplex US:  + for DVT of femoral vein of the left lower extremity.  - Wound culture: mixed gram +/- bacteria. Plan for extensive wound and bone culture tomorrow during procedure.  - Blood culture: pending    Plan:  - Abx: IV Vanc/Zosyn  - Performed bedside excisional debridement down to and including the level of muscle/tendon/fascia utilizing a #15 blade without incident. Upon completion of debridement today, fibronecrotic wound base could be visualized with exposed necrotic anterior tibialis tendon.  - Plan for extensive excisional debridement in OR tomorrow with bone biopsy and application of wound vac to left lower extremity wound.  - Surgery scheduled for tomorrow, 02/13/2025 as an add-on. Case likely to go after noon. NPO after midnight. NPO order has been placed.  - Dressings: betadine wet-to-dry, 4x4, Kerlix, tape.  - Nursing staff is able to change/reinforce dressing if & as necessary until next day’s dressing change. Thank you.  - Podiatry will continue to follow while in house.    DVT ppx: Heparin  Weightbearing: NWB to the LLE  Discharge: Pt to follow up 1 week after discharge with Dr. Ronald Christianson DPM   Podiatric Medicine & Surgery  Please Haikmaribel message me with any questions or concerns.            SIGNATURE: Ronald Christianson DPM PATIENT NAME: Elliot Partida   DATE: February 12, 2025 MRN: [id number]   TIME: 8:54 PM CONTACT: Haiku message

## 2025-02-13 NOTE — PROGRESS NOTES
02/13/25 1138   Discharge Planning   Living Arrangements Family members   Support Systems Family members;Home care staff   Assistance Needed Patient alert and oriented; Patient says his sister and he lives in a 2 story house with basement. He utilizes the first floor. He doesn't drive. His niece assists with shopping, transportation and wound care and his sister cooks. He says he uses a walker with ambulation. DME: Walker, cane, crutches, wheelchair,  chair, grab bars and no home O2; He sleeps in a recliner. He is active with [facility] (confirmed)-Nursing 1x/week and his PCP is Candi Padgett NP with [facility] (home visits)   Expected Discharge Disposition Home Health   Intensity of Service   Intensity of Service 0-30 min     Patient will be going to surgery with Podiatry this afternoon.  Updates sent to [facility].  TCC following.

## 2025-02-13 NOTE — NURSING NOTE
Assumed care of patient. Patient has no complaints. Call light is within reach. Nurse will continue to medicate and monitor per MD Order.  1125 Nurse reached out to podiatry and pharmacy secondary to heparin drip and or procedure scheduled for tomorrow

## 2025-02-13 NOTE — PROGRESS NOTES
Occupational Therapy                    Therapy Communication Note    Patient Name: Elliot Partida  MRN: [id number]  Department: [facility] 2  Room: 207/207-A  Today's Date: 2/13/2025     Discipline: Occupational Therapy    OT Missed Visit: Yes     Missed Visit Reason: Missed Visit Reason: Patient in a medical procedure (Pt to OR today, hold therapy evals this date. Will follow up per pt status and as schedule allows.)    Missed Time: Attempt 815

## 2025-02-13 NOTE — ANESTHESIA POSTPROCEDURE EVALUATION
Patient: Elliot Partida    Procedure Summary       Date: 02/13/25 Room / Location: GEN OR 01 / Virtual [facility] OR    Anesthesia Start: 1217 Anesthesia Stop: 1349    Procedures:       DEBRIDEMENT, FOOT (Left: Foot)      BIOPSY, BONE, FOOT (Left: Foot)      APPLICATION, WOUND VAC, LOWER EXTREMITY (Left: Foot) Diagnosis:     Surgeons: Ronald Christianson DPM Responsible Provider: UMER Quezada    Anesthesia Type: MAC ASA Status: 4            Anesthesia Type: MAC    Vitals Value Taken Time   /59 02/13/25 1343   Temp 36.3 °C (97.3 °F) 02/13/25 1343   Pulse 83 02/13/25 1343   Resp 16 02/13/25 1343   SpO2 94 % 02/13/25 1343       Anesthesia Post Evaluation    Patient location during evaluation: PACU  Patient participation: complete - patient participated  Level of consciousness: awake and alert  Pain score: 0  Pain management: adequate  Airway patency: patent  Cardiovascular status: acceptable  Respiratory status: acceptable  Hydration status: acceptable  Postoperative Nausea and Vomiting: none        There were no known notable events for this encounter.

## 2025-02-13 NOTE — PROGRESS NOTES
Elliot Partida is a 74 y.o. male on day 2 of admission presenting with Acute deep vein thrombosis (DVT) of femoral vein of left lower extremity (Multi).      Subjective   Patient assessed at bedside; lying in bed. He is waiting to go to surgery. He denies pain, fever, chills, N/V/D/C.       Objective     Last Recorded Vitals  /51 (BP Location: Left arm, Patient Position: Lying)   Pulse 71   Temp 36.5 °C (97.7 °F) (Temporal)   Resp 17   Wt 89.1 kg (196 lb 6.9 oz)   SpO2 94%   Intake/Output last 3 Shifts:    Intake/Output Summary (Last 24 hours) at 2/13/2025 1612  Last data filed at 2/13/2025 1449  Gross per 24 hour   Intake 1271.65 ml   Output 1350 ml   Net -78.35 ml       Admission Weight  Weight: 89.8 kg (198 lb) (02/11/25 1507)    Daily Weight  02/11/25 : 89.1 kg (196 lb 6.9 oz)    Image Results  ECG 12 lead  Sinus bradycardia with 1st degree AV block  Left anterior fascicular block  Minimal voltage criteria for LVH, may be normal variant ( Mukul product )  Abnormal ECG  When compared with ECG of 23-DEC-2024 14:41,  ST less elevated in Anterior leads  T wave inversion now evident in Anterior leads      Physical Exam  Vitals reviewed.   Constitutional:       Appearance: Normal appearance. He is normal weight.   HENT:      Head: Normocephalic and atraumatic.      Right Ear: External ear normal.      Left Ear: External ear normal.      Nose: Nose normal.      Mouth/Throat:      Mouth: Mucous membranes are moist.      Pharynx: Oropharynx is clear.   Eyes:      Conjunctiva/sclera: Conjunctivae normal.      Pupils: Pupils are equal, round, and reactive to light.   Cardiovascular:      Rate and Rhythm: Normal rate and regular rhythm.      Pulses: Normal pulses.      Heart sounds: Murmur heard.   Pulmonary:      Effort: Pulmonary effort is normal.      Breath sounds: Normal breath sounds.   Abdominal:      General: Bowel sounds are normal.      Palpations: Abdomen is soft.   Musculoskeletal:          General: Normal range of motion.      Cervical back: Normal range of motion and neck supple.   Skin:     General: Skin is warm and dry.      Comments: Left dorsal ankle non pressure wound with necrotic skin, dressing dry and intact.   Neurological:      General: No focal deficit present.      Mental Status: He is alert and oriented to person, place, and time.   Psychiatric:         Mood and Affect: Mood normal.         Behavior: Behavior normal.       Relevant Results    Scheduled medications  carBAMazepine XR, 100 mg, oral, BID  gabapentin, 300 mg, oral, TID  hydrALAZINE, 25 mg, oral, TID  insulin lispro, 0-10 Units, subcutaneous, TID AC  pantoprazole, 40 mg, oral, Daily before breakfast  piperacillin-tazobactam, 3.375 g, intravenous, q6h  polyethylene glycol, 17 g, oral, Daily  traZODone, 50 mg, oral, Nightly  vancomycin, 1,000 mg, intravenous, q12h      Continuous medications  heparin, 0-4,500 Units/hr, Last Rate: 1,200 Units/hr (02/13/25 1551)      PRN medications  PRN medications: acetaminophen, acetaminophen, calcium carbonate, heparin, melatonin, ondansetron **OR** ondansetron, oxyCODONE, sennosides-docusate sodium, vancomycin    Seen and discussed with Dr. 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   - Stopped taking apixaban  - given heparin bolus in the ED  - continue on Heparin drip will change to xarelto tomorrow     Left dorsal foot wound  Acute  on chronic osteomyelitis of left  foot  Hx of osteomyelitis  - present on admission; pictures in chart  - conitnue zosyn 3.375 g q6h and vancomycin1,000 mL BID; day 3  - Podiatry consult  - Wound care per podiatry  - to OR today  - Cardiology consult for cardiac clearance  - wound culture with mixed gram negative and gram positive organism     Essential HTN  - continue hydralazine 25 mg TID  - monitor BP     DM Type 2 with peripheral neuropathy  - SSI Lispro 0-10 scale  - hypoglycemic protocol  - monitor blood sugar  - continue gabapentin 300 mg TID     Trigeminal neuralgia  - continue gabpentin 300 mg TID; carbamazepine 100 mg BID     Insomnia  - continue trazodone 50 mg hs     PUD ppx  - continue pantoprazole 40 mg daily     Code status: Full     Disposition: Patient requires more than 2 inpatient days.         SARAH Malik-CNP

## 2025-02-13 NOTE — CONSULTS
Inpatient consult to Cardiology  Consult performed by: JUDITH Carmona  Consult ordered by: JUDITH Malik  Reason for consult: pre operative clearance          History Of Present Illness  Elliot Partida is a 74 y.o. male presenting with a left foot infection who was sent in by his home health nurse. He did have a fever a few days PTA but none since. Denies any chest pain, shortness of breath, dizziness or palpitations.     Lab work in the ER showed glucose 121, sodium 135, potassium 3.6, BUN/creatinine 14/0.68, magnesium 1.9, CRP 2.68, WBCs 9.6, H&H 10.9/34.8, ultrasound of the lower extremity showed a DVT in the left superficial femoral vein.  X-ray of the left foot showed amputation of the fifth digit, marked dorsal soft tissue swelling with likely soft tissue wound at the level of the tarsometatarsal articulation on the left, mild soft tissue swelling, right foot.  MRI of the left foot showed a large amount of edema isolated to the medial cuneiform, possible osteomyelitis.    No EKG completed in the ER.     He was started on antibiotics and a heparin gtt. Podiatry saw the pt and planning for surgery today.         Past Medical History  Past Medical History:   Diagnosis Date    Acute osteomyelitis of ankle and foot, left (Multi)     AMI (acute myocardial infarction) (Multi)     ASHD (arteriosclerotic heart disease)     At risk for falls     Cellulitis     left foot and ankle    Diabetes mellitus (Multi)     DVT (deep vein thrombosis) in pregnancy ([facility])     Fall risk care plan declined     Hypertension     Meningioma (Multi)     Myocardial infarction (Multi)     Neuritis     Neuropathy     Osteoarthritis     Osteomyelitis     PVD (peripheral vascular disease) (CMS-HCC)     Sprain of right knee 06/25/2015    Stroke (Multi)     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      FOOT RAY RESECTION Left 09/23/2024    L partial 5th ray resection (Dr. Regan DPM)    FOOT SURGERY Left 09/27/2024    Delayed closure w/ graft application (Dr. Amina DPM)    INVASIVE VASCULAR PROCEDURE Bilateral 09/18/2024    Procedure: Lower Extremity Angiogram;  Surgeon: Teofilo Stoddard MD;  Location: [facility];  Service: Cardiovascular;  Laterality: Bilateral;    IRIDOTOMY / IRIDECTOMY Bilateral         Social History  He reports that he has never smoked. He has never been exposed to tobacco smoke. He has never used smokeless tobacco. He reports that he does not currently use alcohol. He reports that he does not use drugs.    Family History  Family History   Problem Relation Name Age of Onset    Heart disease Father      Colon cancer Other      Heart attack Other      Diabetes Other      Hypertension Other          Allergies  Patient has no known allergies.    Review of Systems  Review of Systems   Constitutional:  Positive for fever. Negative for chills.   Respiratory:  Negative for cough and shortness of breath.    Cardiovascular:  Positive for leg swelling. Negative for chest pain and palpitations.   Gastrointestinal:  Negative for abdominal distention and abdominal pain.   Musculoskeletal:  Positive for gait problem.   Skin:  Positive for wound.   Neurological:  Negative for dizziness and weakness.          Physical Exam  Constitutional: Well developed, awake/alert/oriented x3, no distress, alert and cooperative  Eyes: PERRL, EOMI, clear sclera  ENMT: mucous membranes moist, no apparent injury, no lesions seen  Head/Neck: Neck supple, no apparent injury, thyroid without mass or tenderness, No JVD, trachea midline, no bruits  Respiratory/Thorax: Patent airways, CTAB, normal breath sounds with good chest expansion, thorax  "symmetric  Cardiovascular: Regular, rate and rhythm, 3/6 systolic murmur, 2+ equal pulses of the extremities, normal S 1and S 2  Gastrointestinal: Nondistended, soft, non-tender, no rebound tenderness or guarding, no masses palpable, no organomegaly, +BS, no bruits  Musculoskeletal: ROM intact, no joint swelling, normal strength  Extremities: slight swelling to BLE, dressing to left foot  Neurological: alert and oriented x3, intact senses, motor, response and reflexes, normal strength  Lymphatic: No significant lymphadenopathy  Psychological: Appropriate mood and behavior  Skin: Warm and dry, dressing to left foot with drainage noted       Last Recorded Vitals  Blood pressure 127/53, pulse 61, temperature 36.9 °C (98.4 °F), temperature source Temporal, resp. rate 17, height 1.753 m (5' 9\"), weight 89.1 kg (196 lb 6.9 oz), SpO2 95%.    Medications  Scheduled medications  carBAMazepine XR, 100 mg, oral, BID  gabapentin, 300 mg, oral, TID  hydrALAZINE, 25 mg, oral, TID  insulin lispro, 0-10 Units, subcutaneous, TID AC  pantoprazole, 40 mg, oral, Daily before breakfast  piperacillin-tazobactam, 3.375 g, intravenous, q6h  polyethylene glycol, 17 g, oral, Daily  traZODone, 50 mg, oral, Nightly  vancomycin, 1,000 mg, intravenous, q12h      Continuous medications  heparin, 0-4,500 Units/hr, Last Rate: 1,400 Units/hr (02/13/25 0722)      PRN medications  PRN medications: acetaminophen, acetaminophen, calcium carbonate, heparin, melatonin, ondansetron **OR** ondansetron, oxyCODONE, sennosides-docusate sodium, vancomycin    Relevant Results  Results for orders placed or performed during the hospital encounter of 02/11/25 (from the past 24 hours)   Tissue/Wound Culture/Smear    Specimen: Wound/Tissue; Tissue/Biopsy   Result Value Ref Range    Gram Stain No polymorphonuclear leukocytes seen (A)     Gram Stain (A)      (4+) Abundant Mixed Gram positive and Gram negative bacteria   POCT GLUCOSE   Result Value Ref Range    POCT " Glucose 135 (H) 74 - 99 mg/dL   POCT GLUCOSE   Result Value Ref Range    POCT Glucose 162 (H) 74 - 99 mg/dL   CBC   Result Value Ref Range    WBC 10.2 4.4 - 11.3 x10*3/uL    nRBC 0.0 0.0 - 0.0 /100 WBCs    RBC 2.99 (L) 4.50 - 5.90 x10*6/uL    Hemoglobin 7.7 (L) 13.5 - 17.5 g/dL    Hematocrit 25.0 (L) 41.0 - 52.0 %    MCV 84 80 - 100 fL    MCH 25.8 (L) 26.0 - 34.0 pg    MCHC 30.8 (L) 32.0 - 36.0 g/dL    RDW 13.5 11.5 - 14.5 %    Platelets 322 150 - 450 x10*3/uL   Basic metabolic panel   Result Value Ref Range    Glucose 120 (H) 74 - 99 mg/dL    Sodium 138 136 - 145 mmol/L    Potassium 3.7 3.5 - 5.3 mmol/L    Chloride 103 98 - 107 mmol/L    Bicarbonate 28 21 - 32 mmol/L    Anion Gap 11 10 - 20 mmol/L    Urea Nitrogen 15 6 - 23 mg/dL    Creatinine 0.83 0.50 - 1.30 mg/dL    eGFR >90 >60 mL/min/1.73m*2    Calcium 8.4 (L) 8.6 - 10.3 mg/dL   Heparin Assay, UFH   Result Value Ref Range    Heparin Unfractionated 0.8 See Comment Below for Therapeutic Ranges IU/mL   Vancomycin   Result Value Ref Range    Vancomycin 12.7 5.0 - 20.0 ug/mL   SST TOP   Result Value Ref Range    Extra Tube Hold for add-ons.    ECG 12 lead   Result Value Ref Range    Ventricular Rate 59 BPM    Atrial Rate 59 BPM    CA Interval 292 ms    QRS Duration 122 ms    QT Interval 432 ms    QTC Calculation(Bazett) 427 ms    P Axis 37 degrees    R Axis -57 degrees    T Axis 54 degrees    QRS Count 10 beats    Q Onset 222 ms    P Onset 76 ms    P Offset 131 ms    T Offset 438 ms    QTC Fredericia 429 ms   POCT GLUCOSE   Result Value Ref Range    POCT Glucose 126 (H) 74 - 99 mg/dL   Heparin Assay, UFH   Result Value Ref Range    Heparin Unfractionated 0.8 See Comment Below for Therapeutic Ranges IU/mL       MR foot left wo IV contrast   Final Result   Anteromedial skin wound at the level of the ankle joint with   extensive and diffuse subcutaneous edema about the ankle and foot.        There is a large amount of edema isolated to the medial cuneiform.   Although  this is somewhat remote from the wound the possibility of   osteomyelitis is a consideration.        Partial tearing tibialis anteriorly at the level of the distal ankle.        Small bone island distal tibia.        Other degenerative changes.        Signed by: Júnior Sena 2/12/2025 1:10 PM   Dictation workstation:   [id number]      Lower extremity venous duplex left   Final Result   Deep venous thrombosis of the left superficial femoral vein.        MACRO:   None        Signed by: Maynor Kitchen 2/11/2025 5:35 PM   Dictation workstation:   [id number]      XR foot 3+ views bilateral   Final Result        Interval amputation of the 5th digit to the level of the 5th   metatarsal shaft. Surgical margins are sharp.        Marked dorsal soft tissue swelling with likely soft tissue wound at   the level of the tarsometatarsal articulations on the left.        Mild soft tissue swelling right foot.        Signed by: Júnior Sena 2/11/2025 4:19 PM   Dictation workstation:   [id number]      Transthoracic Echo Limited    (Results Pending)       Transthoracic Echo (TTE) Complete    Result Date: 9/13/2024   [facility], [address] [place] [address]              Tel [phone] and Fax [phone] TRANSTHORACIC ECHOCARDIOGRAM REPORT  Patient Name:      SUBHASH SEGAL     Reading Physician:    [id number] Carson Freeman MD Study Date:        9/13/2024            Ordering Provider:    [id number] ABIEL KENT MRN/PID:           [id number]             Fellow: Accession#:        [id number]         Nurse: Date of Birth/Age: 1950 / 74 years  Sonographer:          Cailin Julian RDCS Gender:            M                    Additional Staff: Height:            175.26 cm            Admit Date:           9/12/2024 Weight:            107.05 kg            Admission Status:     Inpatient  -                                                               Routine BSA / BMI:         2.22 m2 / 34.85      Encounter#:           [id number]                    kg/m2 Blood Pressure:    137/66 mmHg          Department Location:  [facility] Study Type:    TRANSTHORACIC ECHO (TTE) COMPLETE Diagnosis/ICD: Abnormal electrocardiogram [ECG] [EKG]-R94.31 Indication:    Abnormal EKG CPT Code:      Echo Complete w Full Doppler-40296 Patient History: Pertinent History: Edema, DM, abnomal EKG, wound infection, WMA. Study Detail: The following Echo studies were performed: 2D, M-Mode, Doppler and               color flow. Technically challenging study due to body habitus,               patient lying in supine position, prominent lung artifact and poor               acoustic windows. Definity used as a contrast agent for               endocardial border definition. Total contrast used for this               procedure was 3.0 mL via IV push.  PHYSICIAN INTERPRETATION: Left Ventricle: The left ventricular systolic function is normal, with a visually estimated ejection fraction of 60-65%. There are no regional left ventricular wall motion abnormalities. The left ventricular cavity size is normal. Spectral Doppler shows an impaired relaxation pattern of left ventricular diastolic filling. Left Atrium: The left atrium is normal in size. Right Ventricle: The right ventricle is normal in size. There is normal right ventricular global systolic function. Right Atrium: The right atrium is normal in size. Aortic Valve: The aortic valve is trileaflet. There is moderate aortic valve cusp calcification. There is evidence of mild to moderate aortic valve stenosis. There is no evidence of aortic valve regurgitation. The peak instantaneous gradient of the aortic valve is 26.2 mmHg. Mitral Valve: The mitral valve is normal in structure. There is mild mitral valve  regurgitation. Tricuspid Valve: The tricuspid valve is structurally normal. There is mild tricuspid regurgitation. Pulmonic Valve: The pulmonic valve is structurally normal. There is physiologic pulmonic valve regurgitation. Pericardium: There is no pericardial effusion noted. Aorta: The aortic root is normal. Pulmonary Artery: The estimated pulmonary artery pressure is normal. Systemic Veins: The inferior vena cava appears mildly dilated.  CONCLUSIONS:  1. The left ventricular systolic function is normal, with a visually estimated ejection fraction of 60-65%.  2. Spectral Doppler shows an impaired relaxation pattern of left ventricular diastolic filling.  3. There is normal right ventricular global systolic function.  4. Mild to moderate aortic valve stenosis.  5. There is moderate aortic valve cusp calcification.  6. The inferior vena cava appears mildly dilated. QUANTITATIVE DATA SUMMARY:  2D MEASUREMENTS:         Normal Ranges: Ao Root d:       3.70 cm (2.0-3.7cm)  AORTA MEASUREMENTS:         Normal Ranges: Asc Ao, d:          3.90 cm (2.1-3.4cm)  LV SYSTOLIC FUNCTION BY 2D PLANIMETRY (MOD):                      Normal Ranges: EF-Visual:      63 % LV EF Reported: 63 %  LV DIASTOLIC FUNCTION:           Normal Ranges: MV Peak E:             1.05 m/s  (0.7-1.2 m/s) MV Peak A:             1.50 m/s  (0.42-0.7 m/s) E/A Ratio:             0.70      (1.0-2.2) MV e'                  0.073 m/s (>8.0) MV lateral e'          0.08 m/s MV medial e'           0.07 m/s E/e' Ratio:            14.40     (<8.0)  AORTIC VALVE:            Normal Ranges: AoV Vmax:      2.56 m/s  (<=1.7m/s) AoV Peak P.2 mmHg (<20mmHg) LVOT Max Jak:  1.25 m/s  (<=1.1m/s) LVOT VTI:      23.90 cm LVOT Diameter: 2.20 cm   (1.8-2.4cm) AoV Area,Vmax: 1.86 cm2  (2.5-4.5cm2)  RIGHT VENTRICLE: TAPSE: 21.1 mm RV s'  0.12 m/s  TRICUSPID VALVE/RVSP:         Normal Ranges: IVC Diam:             2.33 cm  PULMONIC VALVE:          Normal Ranges: PV Max Jak:      1.2 m/s  (0.6-0.9m/s) PV Max P.2 mmHg  [id number] Carson Freeman MD Electronically signed on 2024 at 7:18:35 PM  ** Final **         Assessment/Plan   LHC in  showed mild nonobstructive CAD    Echo in 2024 showed normal LV systolic function with mild to mod AS    Left foot infection, osteomyelitis  -radiology reports reviewed  -Antibiotics  -Podiatry following, planning to OR today  -blood cultures pending    -Denies any ACS symptoms  -EKG ordered and reviewed, SR, no acute ischemic changes  -Murmur noted->previous echo showing mild to moderate AS  -Recheck echo to assess AS  -LHC as above  -He is cleared for surgery at an average cardiac risk  -EKG and troponin with any ACS symptoms  -Monitor on tele    2. Acute DVT  -Has a hx of DVT, was on eliquis and stopped taking  -US showed DVT in left superficial femoral vein   -Heparin gtt  -transition to NOAC after all procedures completed    3. Anemia  -hgb 7.7 today  -Previous iron level slightly low at 44  -Recheck iron studies  -Monitor on AC    4. Diabetes mellitus type 2  -ISS  -Accuchecks  -Hgb A1C 2025-5.8%    5. Hypertension  -stable  -2gm na diet  -cont meds  -monitor      SARAH Carmona-CNP

## 2025-02-13 NOTE — PROGRESS NOTES
Physical Therapy                 Therapy Communication Note    Patient Name: Elliot Partida  MRN: [id number]  Department: [place] [address]  Room: 207/207-A  Today's Date: 2/13/2025     Discipline: Physical Therapy          Missed Visit Reason: Missed Visit Reason: Pt. Order received; chart reviewed. Patient scheduled for OR today. Hold therapy until after sx. Discussed with RN.  Will cont. To follow.

## 2025-02-13 NOTE — OP NOTE
PODIATRIC OPERATIVE REPORT    LOG ID: [id number]  SURGERY/PROCEDURE DATE: 2/13/2025  OR LOCATION: [facility] OR    SURGEON(S)/PROCEDURALIST(S) AND ASSISTANT(S):  Primary: Ronald Christianson DPM    OTHER OR STAFF:  Circulator: Anila Gregg RN; Meeta Gurrola, ALEXANDRA; Hailey Elmore, RN  Scrub Person: Julien Plasencia RN    SURGERY/PROCEDURE(S):  DEBRIDEMENT, FOOT  01351 - [place] DEBRIDEMENT BONE 1ST 20 SQ CM/<    BIOPSY, BONE, FOOT  20240 - [place] BIOPSY BONE OPEN SUPERFICIAL    APPLICATION, WOUND VAC, LOWER EXTREMITY  18013 - [place] NEGATIVE PRESSURE WOUND THERAPY DME >50 SQ CM      PRE-OP/PRE-PROCEDURE DIAGNOSIS:   Chronic osteomyelitis, left tibia/fibula [M86.662]  Non-pressure ulceration with necrosis of bone, left leg [L97.824]    POST-OP/POST-PROCEDURE DIAGNOSIS: Same as Pre-Op    ANESTHESIA:   Anesthesia: General  ASA: IV  Anesthesia Staff: CRNA: UMER Quezada  Intra-op Medications:   Administrations occurring from 1400 to 1710 on 02/13/25:   Medication Name Total Dose   gabapentin (Neurontin) capsule 300 mg Cannot be calculated   hydrALAZINE (Apresoline) tablet 25 mg Cannot be calculated   insulin lispro injection 0-10 Units Cannot be calculated       ESTIMATED BLOOD LOSS: less than 50 mL    SPECIMENS:   Order Name Source Comment Collection Info Order Time   TISSUE/WOUND CULTURE/SMEAR BONE RESECTION  Collected By: Ronald Christianson DPM 2/13/2025  1:20 PM     Release result to [facility]   Immediate        SURGICAL PATHOLOGY EXAM BONE RESECTION  Collected By: Ronald Christianson DPM 2/13/2025  1:16 PM       IMPLANTABLE DEVICES:  Nothing was implanted during the procedure    FINDINGS: Intraoperative findings consistent with clinical and radiographic findings.    DRAINS: Negative pressure wound vac    TOURNIQUET TIMES:       COMPLICATIONS: None; patient tolerated the procedure well.       SURGERY/PROCEDURE DETAILS:  INDICATIONS FOR PROCEDURE:   The patient with diagnosis as outlined above presents for podiatric surgical  intervention today. The patient has attempted and failed conservative treatment as outlined in preoperative clinic notes and wishes to proceed with surgical intervention at this time. The nature of the deformity, problems anticipated procedures, recovery/convalescence, risks/complications including but not limited to numbness, CRPS, over/under correction, problems healing of soft tissue or bone, postoperative wound infection, wound dehiscence, DVT and/or persistent pain/disability have been explained to the patient in detail. An updated H&P and consent have been completed prior to today’s surgical intervention. The patient states that they have been NPO since midnight. No guarantees were given or implied, but it is expected that the patient will have a favorable outcome.  It is with this understanding that we proceed.     PRE-PROCEDURE INFORMATION:  In pre-op holding area, the left extremity to be operated on was clearly marked and the patient verified correct laterality of the marking.  The patient was brought to the operating room and placed on the operating table in the supine position.  A timeout was performed in which identification of the correct patient, procedure, location, and materials was done. Following MAC sedation, local anesthesia was obtained utilizing 15 cc of 1:1 mixture of 1% Lidocaine plain and 0.5% Marcaine plain. The left lower extremity was then scrubbed, prepped and draped in the usual aseptic manner.    DESCRIPTION OF PROCEDURE:     PROCEDURE #1: Excisional Debridement Down to Bone  Attention was then directed towards the left lower extremity wound. Using a #15 blade, nonviable, necrotic tissue was excisionally debrided down to and including the level of bone. Liquefactive necrosis could be visualized to much over the soft tissue overlying the tibia of the left lower extremity. Then, using Versajet hydrosurgery high pressure device, the wound base was debrided of all remaining necrotic  tissue that was too adhered to the wound base with a knife.  All nonviable tissue was removed in its entirety. Anterior tibial tendon could be visualized overlying the wound, which was left intact. Bleeding, granular tissue free from necrosis could be appreciated to the wound base. Post-debridement measurements: 8.8 cm x 5.7 cm x 1.1 cm     PROCEDURE #2: Bone Biopsy, Tibia  Attention was then directed towards the medial aspect of the wound base. Utilizing a bone curette, bone biopsy was taken from the distal tibia. Curette was used in a scooping fashion in order to culture enough of the tibia to be sent to microbiology and pathology for evaluation of potential osteomyelitis.    PROCEDURE #3: Application of Negative Pressure Wound Vac  Black foam sponge was cut to the dimensions of the left lower extremity wound (8.8 cm x 5.7 cm x 1.1 cm OR > 50 sqcm). The foam was then applied to the wound area and secured with adhesive draping. A hole was cut in the middle of the foam in order to apply the wound vac hose. Woudn vac suction was then tested to ensure that there were no leaks in the draping prior to setting the vacuum pressure to 125 mmHg. The left lower extremity was then dressed with Abd pads, Kerlix and ACE.     POSTOPERATIVE INFORMATION: The patient tolerated the above noted procedure and anesthesia well and was transferred to the PACU with vital signs stable, and vascular status intact with capillary refill intact to the most distal aspect of the operative extremity. Postoperative instructions reviewed in detail with the patient and family with written instructions provided. Patient will return to floor. Should any problems, questions, or concerns arise, primary team to [facility] or [facility] podiatry team.    The surgical assistant assisted with positioning, preparation of the surgical site, tissue retraction, suctioning, due to the nature of the case and the difficulty of the case.  The surgical assistant performed a  primary closure and dressing application.        Attending Attestation: I was present and scrubbed for the entire procedure.      SIGNATURE: Ronald Christianson DPM PATIENT NAME: Elliot Partida   DATE: February 13, 2025 MRN: [id number]   TIME: 1:50 PM

## 2025-02-13 NOTE — CONSULTS
PODIATRY CONSULT NOTE    SERVICE DATE: 2/12/2025   SERVICE TIME:  1300    REASON FOR CONSULT: Left leg wound  REQUESTING PHYSICIAN: Dickson Thakkar MD  PRIMARY CARE PHYSICIAN: No Assigned PCP Generic Provider, MD    Subjective   HPI:  Elliot Partida is a 74 y.o. male presenting with a complaint of wound infection and foot pain. He has a wound on the dorsum of his left foot that has been getting worse. He has a hx of osteomyelitis and DM2. He has had progressive swelling and pain in the the left leg. He did have a fever. He denies N/V/D/C. SOB.     Podiatry was consulted for left lower extremity wound management. Patient states that he has had the wound for the last several months. He relates a recent history of left 5th digit bone infection and subsequent amputation. Patient states that he recently fell and broke the wound back open that lead to uncontrollable bleeding. Relates pus and foul odor emanating from the wound. Denies any other pedal complaints.    ROS: 10-point review of systems was performed and is otherwise negative except as noted in HPI.  PMH: Reviewed/documented below.  PSH:  Noncontributory except per HPI   FH: Reviewed and noncontributory   SOCIAL:  Reviewed/documented below.  ALLERGIES: Reviewed/documented below.  MEDS: Reviewed/documented below.  VS: Reviewed/documented below.    Past Medical History:   Diagnosis Date    Acute osteomyelitis of ankle and foot, left (Multi)     AMI (acute myocardial infarction) (Multi)     ASHD (arteriosclerotic heart disease)     At risk for falls     Cellulitis     left foot and ankle    Diabetes mellitus (Multi)     DVT (deep vein thrombosis) in pregnancy ([facility])     Fall risk care plan declined     Hypertension     Meningioma (Multi)     Myocardial infarction (Multi)     Neuritis     Neuropathy     Osteoarthritis     Osteomyelitis     PVD (peripheral vascular disease) (CMS-HCC)     Sprain of right knee 06/25/2015    Stroke (Multi)     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      FOOT RAY RESECTION Left 09/23/2024    L partial 5th ray resection (Dr. Spears DPBRAYAN)    FOOT SURGERY Left 09/27/2024    Delayed closure w/ graft application (Dr. Amina DPM)    INVASIVE VASCULAR PROCEDURE Bilateral 09/18/2024    Procedure: Lower Extremity Angiogram;  Surgeon: Teofilo Stoddard MD;  Location: [facility] Cardiac Cath Lab;  Service: Cardiovascular;  Laterality: Bilateral;    IRIDOTOMY / IRIDECTOMY Bilateral      Family History   Problem Relation Name Age of Onset    Heart disease Father      Colon cancer Other      Heart attack Other      Diabetes Other      Hypertension Other       Social History     Tobacco Use    Smoking status: Never     Passive exposure: Never    Smokeless tobacco: Never   Substance Use Topics    Alcohol use: Not Currently     Comment: former    Drug use: Never      Medications Prior to Admission   Medication Sig Dispense Refill Last Dose/Taking    carBAMazepine (Carbatrol) 100 mg 12 hr capsule Take 1 capsule (100 mg) by mouth 2 times a day for 7 days. Do not crush or chew. 14 capsule 0 2/10/2025    gabapentin (Neurontin) 300 mg capsule Take 1 capsule (300 mg) by mouth 3 times a day.   2/10/2025    hydrALAZINE (Apresoline) 25 mg tablet Take 1 tablet (25 mg) by mouth 3 times a day.   2/10/2025    potassium chloride CR (Klor-Con M20) 20 mEq ER tablet Take 2 tablets (40 mEq) by mouth once daily. Do not crush or chew.   2/10/2025    traZODone (Desyrel) 50 mg tablet Take 1 tablet (50 mg) by mouth once daily at bedtime.   2/10/2025    apixaban (Eliquis) 5 mg tablet Take 2 tablets (10 mg) by mouth 2 times a day for 4 days, THEN 1 tablet (5 mg) 2 times a day.       aspirin 81 mg chewable tablet Chew 1 tablet (81  mg) once daily. Do not fill before September 19, 2024. (Patient not taking: Reported on 2/11/2025) 90 tablet 0 Not Taking    clopidogrel (Plavix) 75 mg tablet Take 1 tablet (75 mg) by mouth once daily. Do not fill before September 19, 2024. (Patient not taking: Reported on 2/11/2025) 90 tablet 0 Not Taking    insulin lispro (HumaLOG) 100 unit/mL injection Inject 0-10 Units under the skin 3 times daily (morning, midday, late afternoon). Take as directed per insulin instructions. (Patient not taking: Reported on 2/11/2025)   Not Taking    polyethylene glycol (Glycolax, Miralax) 17 gram packet Take 17 g by mouth once daily.   Unknown        Medications:  Scheduled Meds: carBAMazepine XR, 100 mg, oral, BID  gabapentin, 300 mg, oral, TID  hydrALAZINE, 25 mg, oral, TID  insulin lispro, 0-10 Units, subcutaneous, TID AC  pantoprazole, 40 mg, oral, Daily before breakfast  piperacillin-tazobactam, 3.375 g, intravenous, q6h  polyethylene glycol, 17 g, oral, Daily  traZODone, 50 mg, oral, Nightly  vancomycin, 1,000 mg, intravenous, q12h      Continuous Infusions: heparin, 0-4,500 Units/hr, Last Rate: 1,600 Units/hr (02/12/25 0620)      PRN Meds: PRN medications: acetaminophen, acetaminophen, calcium carbonate, heparin, melatonin, ondansetron **OR** ondansetron, oxyCODONE, sennosides-docusate sodium, vancomycin    Allergies as of 02/11/2025    (No Known Allergies)            Objective   PHYSICAL EXAM:  Physical Exam Performed:  Vitals:    02/12/25 1831   BP: 178/77   Pulse: 79   Resp: 16   Temp: 37.4 °C (99.3 °F)   SpO2: 96%     Body mass index is 29.01 kg/m².    Patient is AOx3 and in no acute distress. Patient is alert and cooperative. Sitting comfortably in bed with dressing clean, dry and intact. Heel off-loading boots in place B/L.    Vascular: Non-palpable DP/PT pulses B/L. Moderate pitting edema noted B/L. Hair growth absent B/L. CFT<5 to B/L hallux. Temperature is warm to cool from tibial tuberosity to distal digits B/L.  No lymphatic streaking noted B/L.    Musculoskeletal: Gross active and passive ROM diminished to age and activity level. Moves all extremities spontaneously. No pain to palpation at feet B/L.    Neurological: Intact light touch sensation B/L. Pain stimuli absent B/L. Denies any numbness, burning or tingling.    Dermatologic: Nails 1-5 are thickened, elongated, discolored with subungual debris B/L. Skin appears diffusely xerotic B/L. Web spaces 1-4 B/L are clean, dry and intact. No rashes or nodules noted B/L. No hyperkeratotic tissue noted B/L.     Wound:  Measurements: 7.5 cm x 4.0 cm x 1.0 cm  Mixed wound base of fibronecrotic tissue.   Moderate serosanguinous drainage with fibrotic slough.   Moderate jennyfer-wound maceration.   Moderate jennyfer-wound erythema.   Moderate evidence of ascending cellulitis or lymphangitis.  Moderate palpable fluctuance. Significant malodor. Significant increased warmth.   Positive  probe to bone or deep structures within the wound base.   Positive  tunneling or tracking.   Positive  undermining. Skin edges irregular but intact.    LABS:   Results for orders placed or performed during the hospital encounter of 02/11/25 (from the past 24 hours)   Heparin Assay   Result Value Ref Range    Heparin Unfractionated 0.7 See Comment Below for Therapeutic Ranges IU/mL   Basic metabolic panel   Result Value Ref Range    Glucose 133 (H) 74 - 99 mg/dL    Sodium 140 136 - 145 mmol/L    Potassium 3.7 3.5 - 5.3 mmol/L    Chloride 106 98 - 107 mmol/L    Bicarbonate 28 21 - 32 mmol/L    Anion Gap 10 10 - 20 mmol/L    Urea Nitrogen 13 6 - 23 mg/dL    Creatinine 0.81 0.50 - 1.30 mg/dL    eGFR >90 >60 mL/min/1.73m*2    Calcium 8.3 (L) 8.6 - 10.3 mg/dL   CBC   Result Value Ref Range    WBC 10.0 4.4 - 11.3 x10*3/uL    nRBC 0.0 0.0 - 0.0 /100 WBCs    RBC 2.94 (L) 4.50 - 5.90 x10*6/uL    Hemoglobin 7.6 (L) 13.5 - 17.5 g/dL    Hematocrit 24.7 (L) 41.0 - 52.0 %    MCV 84 80 - 100 fL    MCH 25.9 (L) 26.0 - 34.0 pg     MCHC 30.8 (L) 32.0 - 36.0 g/dL    RDW 13.4 11.5 - 14.5 %    Platelets 326 150 - 450 x10*3/uL   Heparin Assay   Result Value Ref Range    Heparin Unfractionated 0.7 See Comment Below for Therapeutic Ranges IU/mL   SST TOP   Result Value Ref Range    Extra Tube Hold for add-ons.    POCT GLUCOSE   Result Value Ref Range    POCT Glucose 131 (H) 74 - 99 mg/dL   POCT GLUCOSE   Result Value Ref Range    POCT Glucose 123 (H) 74 - 99 mg/dL   POCT GLUCOSE   Result Value Ref Range    POCT Glucose 135 (H) 74 - 99 mg/dL   POCT GLUCOSE   Result Value Ref Range    POCT Glucose 162 (H) 74 - 99 mg/dL      Lab Results   Component Value Date    HGBA1C 5.8 (H) 01/06/2025      Lab Results   Component Value Date    CRP 2.68 (H) 02/11/2025      Lab Results   Component Value Date    SEDRATE 51 (H) 02/11/2025        Results from last 7 days   Lab Units 02/12/25  0530   WBC AUTO x10*3/uL 10.0   RBC AUTO x10*6/uL 2.94*   HEMOGLOBIN g/dL 7.6*   HEMATOCRIT % 24.7*     Results from last 7 days   Lab Units 02/12/25  0530 02/11/25  1855 02/11/25  1731   SODIUM mmol/L 140  --  135*   POTASSIUM mmol/L 3.7 3.6 6.1*   CHLORIDE mmol/L 106  --  101   CO2 mmol/L 28  --  28   BUN mg/dL 13  --  14   CREATININE mg/dL 0.81  --  0.68   CALCIUM mg/dL 8.3*  --  9.3   MAGNESIUM mg/dL  --  1.91 2.26   BILIRUBIN TOTAL mg/dL  --   --  0.5   ALT U/L  --   --  16   AST U/L  --   --  45*           IMAGING REVIEW:  MR foot left wo IV contrast    Result Date: 2/12/2025  Interpreted By:  Júnior Sena, STUDY: MR FOOT LEFT WO IV CONTRAST;   INDICATION: Signs/Symptoms:r/o osteo.   COMPARISON: Plain film radiographs February 11   ACCESSION NUMBER(S): [id number]   ORDERING CLINICIAN: ABIEL KENT   TECHNIQUE: Multiplanar multisequential MRI left foot without contrast.   FINDINGS: Postsurgical changes amputation of the left 5th digit to the level of the mid metatarsal shaft.   There is a large amount of subcutaneous soft tissue swelling with a dorsal skin wound at the  medial side of the ankle joint roughly at the level of the ankle proper. There is no evidence of adjacent fluid collection. There is what looks to be some partial tearing of the tibialis anterior where the skin wound is seen at the level of the distal tibial metaphysis.   No definite abscess seen.   There is a significant amount of edema of the medial cuneiform. This involves nearly the entire bone although there is no definite erosive change and this is not directly in line with the wound. This is however, suspicious for a focus of osteomyelitis.   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 There is mild ankle arthrosis.   Small bone infarct of the anterior distal tibia. No other marrow edema seen.   No other fracture identified.   No evidence of acute tendinous or ligamentous tear.   No evidence of effusion.         Anteromedial skin wound at the level of the ankle joint with extensive and diffuse subcutaneous edema about the ankle and foot.   There is a large amount of edema isolated to the medial cuneiform. Although this is somewhat remote from the wound the possibility of osteomyelitis is a consideration.   Partial tearing tibialis anteriorly at the level of the distal ankle.   Small bone island distal tibia.   Other degenerative changes.   Signed by: Júnior Sena 2/12/2025 1:10 PM Dictation workstation:   [id number]    Lower extremity venous duplex left    Result Date: 2/11/2025  Interpreted By:  Maynor Kitchen, STUDY: [facility] LOWER EXTREMITY VENOUS DUPLEX LEFT  2/11/2025 5:14 pm   INDICATION: 73 y/o   M with  Signs/Symptoms:lle edema pain. LMP:  Unknown.       COMPARISON: None.   ACCESSION NUMBER(S): [id number]   ORDERING CLINICIAN: JUANITO HAIDER   TECHNIQUE: Routine ultrasound of the  right lower extremity was performed with duplex Doppler (color and spectral) evaluation.   Static images were obtained for remote interpretation.   FINDINGS:  Deep venous thrombosis of the left superficial femoral veins throughout its course.       Deep venous thrombosis of the left superficial femoral vein.   MACRO: None   Signed by: Maynor Kitchen 2/11/2025 5:35 PM Dictation workstation:   [id number]    XR foot 3+ views bilateral    Result Date: 2/11/2025  Interpreted By:  Júnior Sena, STUDY: XR FOOT 3+ VIEWS BILATERAL   INDICATION: Signs/Symptoms:l foot ulcer top of foot fould odor,.   COMPARISON: September 12, 2024     ACCESSION NUMBER(S): [id number]   ORDERING CLINICIAN: JUANITO HAIDER   FINDINGS: Interval amputation of the 5th digit to the level of the 5th metatarsal shaft. Surgical margins are sharp.   Marked dorsal soft tissue swelling with likely soft tissue wound at the level of the tarsometatarsal articulations on the left.   The right foot demonstrates advanced degenerative changes with some mild dorsal soft tissue swelling with no acute osseous abnormality..         Interval amputation of the 5th digit to the level of the 5th metatarsal shaft. Surgical margins are sharp.   Marked dorsal soft tissue swelling with likely soft tissue wound at the level of the tarsometatarsal articulations on the left.   Mild soft tissue swelling right foot.   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  - Charts, labs, vitals and imaging all reviewed.   - Imaging: MRI revealed possible osteomyelitis of the medial cuneiform  - Labs: Glucose 133, WBC 10, ESR 51, CRP 2.68  - Duplex US:  + for DVT of femoral vein of the left lower extremity.  - Wound culture: mixed gram +/- bacteria. Plan for extensive wound and bone culture tomorrow during procedure.  - Blood culture: pending    Plan:  - Abx: IV Vanc/Zosyn  - Performed bedside excisional debridement down to and including the level of muscle/tendon/fascia utilizing a #15 blade without incident. Upon completion of debridement today, fibronecrotic wound base could be visualized with exposed necrotic anterior tibialis tendon. Post-debridement measurements: 7.9 cm x 4.6 cm x 1.0 cm  - Plan for extensive excisional debridement in OR tomorrow with bone biopsy and application of wound vac to left lower extremity wound.  - Surgery scheduled for tomorrow, 02/13/2025 as an add-on. Case likely to go after noon. NPO after midnight. NPO order has been placed.  - Dressings: betadine wet-to-dry, 4x4, Kerlix, tape.  - Nursing staff is able to change/reinforce dressing if & as necessary until next day’s dressing change. Thank you.  - Podiatry will continue to follow while in house.    DVT ppx: Heparin  Weightbearing: NWB to the LLE  Discharge: Pt to follow up 1 week after discharge with Dr. Ronald Christianson DPM   Podiatric Medicine & Surgery  Please Toño message me with any questions or concerns.            SIGNATURE: Ronald Christianson DPM PATIENT NAME: Elliot Partida   DATE: February 12, 2025 MRN: [id number]   TIME: 8:54 PM CONTACT: Haikmaribel childers

## 2025-02-13 NOTE — PROGRESS NOTES
Vancomycin Dosing by Pharmacy- FOLLOW UP    Elliot Partida is a 74 y.o. year old male who Pharmacy has been consulted for vancomycin dosing for other diabetic foot inf. . Based on the patient's indication and renal status this patient is being dosed based on a goal AUC of 400-600.     Renal function is currently stable.    Current vancomycin dose: 1000 mg given every 12 hours    Estimated vancomycin AUC on current dose: 523 mg/L.hr     Visit Vitals  /53 (BP Location: Left arm, Patient Position: Lying)   Pulse 61   Temp 36.9 °C (98.4 °F) (Temporal)   Resp 17        Lab Results   Component Value Date    CREATININE 0.83 2025    CREATININE 0.81 2025    CREATININE 0.68 2025    CREATININE 0.63 2025        Patient weight is as follows:   Vitals:    25 2202   Weight: 89.1 kg (196 lb 6.9 oz)       Cultures:  No results found for the encounter in last 14 days.       I/O last 3 completed shifts:  In: 1627.7 (18.3 mL/kg) [P.O.:800; I.V.:527.7 (5.9 mL/kg); IV Piggyback:300]  Out: 1350 (15.2 mL/kg) [Urine:1350 (0.4 mL/kg/hr)]  Weight: 89.1 kg   I/O during current shift:  No intake/output data recorded.    Temp (24hrs), Av.1 °C (98.7 °F), Min:36.6 °C (97.9 °F), Max:37.4 °C (99.3 °F)      Assessment/Plan    Within goal AUC range. Continue current vancomycin regimen.    This dosing regimen is predicted by [employer] to result in the following pharmacokinetic parameters:  Regimen: 1000 mg IV every 12 hours.  Start time: 08:20 on 2025  Exposure target: AUC24 (range)400-600 mg/L.hr   [id number]: 523 mg/L.hr  AUC24,ss: 585 mg/L.hr  Probability of AUC24 > 400: 99 %  Ctrough,ss: 19.1 mg/L  Probability of Ctrough,ss > 20: 42 %      The next level will be obtained on  at 0500. May be obtained sooner if clinically indicated.   Will continue to monitor renal function daily while on vancomycin and order serum creatinine at least every 48 hours if not already ordered.  Follow for continued  vancomycin needs, clinical response, and signs/symptoms of toxicity.       Jonathan Min, PharmD

## 2025-02-14 LAB
ANION GAP SERPL CALC-SCNC: 10 MMOL/L (ref 10–20)
B-LACTAMASE ORGANISM ISLT: NEGATIVE
BACTERIA SPEC CULT: ABNORMAL
BUN SERPL-MCNC: 15 MG/DL (ref 6–23)
CALCIUM SERPL-MCNC: 8.2 MG/DL (ref 8.6–10.3)
CHLORIDE SERPL-SCNC: 104 MMOL/L (ref 98–107)
CO2 SERPL-SCNC: 28 MMOL/L (ref 21–32)
CREAT SERPL-MCNC: 0.75 MG/DL (ref 0.5–1.3)
EGFRCR SERPLBLD CKD-EPI 2021: >90 ML/MIN/1.73M*2
ERYTHROCYTE [DISTWIDTH] IN BLOOD BY AUTOMATED COUNT: 13.5 % (ref 11.5–14.5)
GLUCOSE BLD MANUAL STRIP-MCNC: 116 MG/DL (ref 74–99)
GLUCOSE BLD MANUAL STRIP-MCNC: 126 MG/DL (ref 74–99)
GLUCOSE BLD MANUAL STRIP-MCNC: 131 MG/DL (ref 74–99)
GLUCOSE BLD MANUAL STRIP-MCNC: 170 MG/DL (ref 74–99)
GLUCOSE SERPL-MCNC: 130 MG/DL (ref 74–99)
GRAM STN SPEC: ABNORMAL
GRAM STN SPEC: ABNORMAL
HCT VFR BLD AUTO: 24.4 % (ref 41–52)
HGB BLD-MCNC: 7.5 G/DL (ref 13.5–17.5)
MCH RBC QN AUTO: 25.9 PG (ref 26–34)
MCHC RBC AUTO-ENTMCNC: 30.7 G/DL (ref 32–36)
MCV RBC AUTO: 84 FL (ref 80–100)
NRBC BLD-RTO: 0 /100 WBCS (ref 0–0)
PLATELET # BLD AUTO: 311 X10*3/UL (ref 150–450)
POTASSIUM SERPL-SCNC: 3.8 MMOL/L (ref 3.5–5.3)
RBC # BLD AUTO: 2.9 X10*6/UL (ref 4.5–5.9)
SODIUM SERPL-SCNC: 138 MMOL/L (ref 136–145)
UFH PPP CHRO-ACNC: 0.4 IU/ML
WBC # BLD AUTO: 9.4 X10*3/UL (ref 4.4–11.3)

## 2025-02-14 PROCEDURE — 85520 HEPARIN ASSAY: CPT | Mod: IPSPLIT | Performed by: INTERNAL MEDICINE

## 2025-02-14 PROCEDURE — 97165 OT EVAL LOW COMPLEX 30 MIN: CPT | Mod: GO,IPSPLIT

## 2025-02-14 PROCEDURE — 80048 BASIC METABOLIC PNL TOTAL CA: CPT | Mod: IPSPLIT | Performed by: NURSE PRACTITIONER

## 2025-02-14 PROCEDURE — 2500000001 HC RX 250 WO HCPCS SELF ADMINISTERED DRUGS (ALT 637 FOR MEDICARE OP): Mod: IPSPLIT | Performed by: NURSE PRACTITIONER

## 2025-02-14 PROCEDURE — 2500000004 HC RX 250 GENERAL PHARMACY W/ HCPCS (ALT 636 FOR OP/ED): Mod: IPSPLIT | Performed by: NURSE PRACTITIONER

## 2025-02-14 PROCEDURE — 36415 COLL VENOUS BLD VENIPUNCTURE: CPT | Mod: IPSPLIT | Performed by: NURSE PRACTITIONER

## 2025-02-14 PROCEDURE — 97161 PT EVAL LOW COMPLEX 20 MIN: CPT | Mod: GP,IPSPLIT | Performed by: PHYSICAL THERAPIST

## 2025-02-14 PROCEDURE — 1200000002 HC GENERAL ROOM WITH TELEMETRY DAILY: Mod: IPSPLIT

## 2025-02-14 PROCEDURE — 85027 COMPLETE CBC AUTOMATED: CPT | Mod: IPSPLIT | Performed by: NURSE PRACTITIONER

## 2025-02-14 PROCEDURE — 94760 N-INVAS EAR/PLS OXIMETRY 1: CPT | Mod: IPSPLIT

## 2025-02-14 PROCEDURE — 2500000002 HC RX 250 W HCPCS SELF ADMINISTERED DRUGS (ALT 637 FOR MEDICARE OP, ALT 636 FOR OP/ED): Mod: IPSPLIT | Performed by: NURSE PRACTITIONER

## 2025-02-14 PROCEDURE — 82947 ASSAY GLUCOSE BLOOD QUANT: CPT | Mod: IPSPLIT

## 2025-02-14 PROCEDURE — 99232 SBSQ HOSP IP/OBS MODERATE 35: CPT | Performed by: NURSE PRACTITIONER

## 2025-02-14 RX ADMIN — GABAPENTIN 300 MG: 300 CAPSULE ORAL at 14:34

## 2025-02-14 RX ADMIN — VANCOMYCIN HYDROCHLORIDE 1000 MG: 1 INJECTION, SOLUTION INTRAVENOUS at 10:17

## 2025-02-14 RX ADMIN — PIPERACILLIN SODIUM AND TAZOBACTAM SODIUM 3.38 G: 3; .375 INJECTION, SOLUTION INTRAVENOUS at 12:21

## 2025-02-14 RX ADMIN — CARBAMAZEPINE 100 MG: 100 TABLET, EXTENDED RELEASE ORAL at 10:17

## 2025-02-14 RX ADMIN — RIVAROXABAN 15 MG: 15 TABLET, FILM COATED ORAL at 17:30

## 2025-02-14 RX ADMIN — HYDRALAZINE HYDROCHLORIDE 25 MG: 25 TABLET ORAL at 10:17

## 2025-02-14 RX ADMIN — PIPERACILLIN SODIUM AND TAZOBACTAM SODIUM 3.38 G: 3; .375 INJECTION, SOLUTION INTRAVENOUS at 17:30

## 2025-02-14 RX ADMIN — VANCOMYCIN HYDROCHLORIDE 1000 MG: 1 INJECTION, SOLUTION INTRAVENOUS at 20:26

## 2025-02-14 RX ADMIN — PIPERACILLIN SODIUM AND TAZOBACTAM SODIUM 3.38 G: 3; .375 INJECTION, SOLUTION INTRAVENOUS at 06:36

## 2025-02-14 RX ADMIN — INSULIN LISPRO 2 UNITS: 100 INJECTION, SOLUTION INTRAVENOUS; SUBCUTANEOUS at 12:23

## 2025-02-14 RX ADMIN — ACETAMINOPHEN 650 MG: 325 TABLET, FILM COATED ORAL at 20:26

## 2025-02-14 RX ADMIN — TRAZODONE HYDROCHLORIDE 50 MG: 50 TABLET ORAL at 20:26

## 2025-02-14 RX ADMIN — HEPARIN SODIUM 1200 UNITS/HR: 10000 INJECTION, SOLUTION INTRAVENOUS at 12:21

## 2025-02-14 RX ADMIN — GABAPENTIN 300 MG: 300 CAPSULE ORAL at 20:26

## 2025-02-14 RX ADMIN — CARBAMAZEPINE 100 MG: 100 TABLET, EXTENDED RELEASE ORAL at 20:26

## 2025-02-14 RX ADMIN — GABAPENTIN 300 MG: 300 CAPSULE ORAL at 10:17

## 2025-02-14 ASSESSMENT — COGNITIVE AND FUNCTIONAL STATUS - GENERAL
DRESSING REGULAR UPPER BODY CLOTHING: A LITTLE
MOVING FROM LYING ON BACK TO SITTING ON SIDE OF FLAT BED WITH BEDRAILS: A LITTLE
PERSONAL GROOMING: A LITTLE
MOBILITY SCORE: 14
TOILETING: A LOT
TURNING FROM BACK TO SIDE WHILE IN FLAT BAD: A LITTLE
HELP NEEDED FOR BATHING: A LITTLE
TURNING FROM BACK TO SIDE WHILE IN FLAT BAD: A LITTLE
STANDING UP FROM CHAIR USING ARMS: A LOT
DRESSING REGULAR UPPER BODY CLOTHING: A LITTLE
PERSONAL GROOMING: A LITTLE
HELP NEEDED FOR BATHING: A LOT
DRESSING REGULAR LOWER BODY CLOTHING: A LITTLE
DAILY ACTIVITIY SCORE: 16
CLIMB 3 TO 5 STEPS WITH RAILING: TOTAL
MOVING TO AND FROM BED TO CHAIR: A LOT
STANDING UP FROM CHAIR USING ARMS: A LOT
WALKING IN HOSPITAL ROOM: TOTAL
DAILY ACTIVITIY SCORE: 18
TOILETING: A LOT
MOVING FROM LYING ON BACK TO SITTING ON SIDE OF FLAT BED WITH BEDRAILS: A LITTLE
MOVING TO AND FROM BED TO CHAIR: A LOT
WALKING IN HOSPITAL ROOM: A LOT
MOBILITY SCORE: 12
DRESSING REGULAR LOWER BODY CLOTHING: A LOT
CLIMB 3 TO 5 STEPS WITH RAILING: A LOT

## 2025-02-14 ASSESSMENT — PAIN - FUNCTIONAL ASSESSMENT
PAIN_FUNCTIONAL_ASSESSMENT: 0-10

## 2025-02-14 ASSESSMENT — PAIN DESCRIPTION - LOCATION: LOCATION: HEAD

## 2025-02-14 ASSESSMENT — ACTIVITIES OF DAILY LIVING (ADL)
ADL_ASSISTANCE: INDEPENDENT
BATHING_ASSISTANCE: MODERATE

## 2025-02-14 ASSESSMENT — PAIN SCALES - GENERAL
PAINLEVEL_OUTOF10: 0 - NO PAIN
PAINLEVEL_OUTOF10: 0 - NO PAIN
PAINLEVEL_OUTOF10: 3
PAINLEVEL_OUTOF10: 3

## 2025-02-14 NOTE — PROGRESS NOTES
"Elliot Partida is a 74 y.o. male on day 3 of admission presenting with Acute deep vein thrombosis (DVT) of femoral vein of left lower extremity (Multi).    Subjective   POD#1 left ankle wound debridement with wound vac. 120-130 ml output. Nursing reports no canister change and no output charted. Patient has no complaints of pain. Good appetite.        Objective     Physical Exam  General: Well developed, awake/alert/oriented x3, no distress, alert and cooperative.    Skin: Warm and dry    Eyes: EOMI    Head/neck: No apparent injury    Cardiac: Brisk capillary refill to the toes    Respiratory: Normal rise and fall of chest    GI: No obvious abdominal distension    Extremities: Left ankle dressing intact.  Not saturated.  Wound VAC with 120s to 130s to cc of serosanguineous drainage.    Neuro: Sensation intact but decreased to  left toes distal to the wound.  Motor function intact.    Psych: Appropriate mood    Musculoskeletal: Tenderness to palpation of the mid calf    Last Recorded Vitals  Blood pressure 123/59, pulse 73, temperature 36.7 °C (98.1 °F), temperature source Temporal, resp. rate 16, height 1.753 m (5' 9\"), weight 89.1 kg (196 lb 6.9 oz), SpO2 97%.  Intake/Output last 3 Shifts:  I/O last 3 completed shifts:  In: 1878.6 (21.1 mL/kg) [P.O.:600; I.V.:378.6 (4.2 mL/kg); IV Piggyback:900]  Out: 2350 (26.4 mL/kg) [Urine:2350 (0.7 mL/kg/hr)]  Weight: 89.1 kg     Relevant Results  Results for orders placed or performed during the hospital encounter of 02/11/25 (from the past 24 hours)   Tissue/Wound Culture/Smear    Specimen: BONE RESECTION; Tissue   Result Value Ref Range    Gram Stain No polymorphonuclear leukocytes seen     Gram Stain No organisms seen    POCT GLUCOSE   Result Value Ref Range    POCT Glucose 132 (H) 74 - 99 mg/dL   Heparin Assay, UFH   Result Value Ref Range    Heparin Unfractionated 0.6 See Comment Below for Therapeutic Ranges IU/mL   Heparin Assay, UFH   Result Value Ref Range    Heparin " Unfractionated 0.5 See Comment Below for Therapeutic Ranges IU/mL   POCT GLUCOSE   Result Value Ref Range    POCT Glucose 171 (H) 74 - 99 mg/dL   POCT GLUCOSE   Result Value Ref Range    POCT Glucose 131 (H) 74 - 99 mg/dL   Basic metabolic panel   Result Value Ref Range    Glucose 130 (H) 74 - 99 mg/dL    Sodium 138 136 - 145 mmol/L    Potassium 3.8 3.5 - 5.3 mmol/L    Chloride 104 98 - 107 mmol/L    Bicarbonate 28 21 - 32 mmol/L    Anion Gap 10 10 - 20 mmol/L    Urea Nitrogen 15 6 - 23 mg/dL    Creatinine 0.75 0.50 - 1.30 mg/dL    eGFR >90 >60 mL/min/1.73m*2    Calcium 8.2 (L) 8.6 - 10.3 mg/dL   Heparin Assay, UFH   Result Value Ref Range    Heparin Unfractionated 0.4 See Comment Below for Therapeutic Ranges IU/mL   CBC   Result Value Ref Range    WBC 9.4 4.4 - 11.3 x10*3/uL    nRBC 0.0 0.0 - 0.0 /100 WBCs    RBC 2.90 (L) 4.50 - 5.90 x10*6/uL    Hemoglobin 7.5 (L) 13.5 - 17.5 g/dL    Hematocrit 24.4 (L) 41.0 - 52.0 %    MCV 84 80 - 100 fL    MCH 25.9 (L) 26.0 - 34.0 pg    MCHC 30.7 (L) 32.0 - 36.0 g/dL    RDW 13.5 11.5 - 14.5 %    Platelets 311 150 - 450 x10*3/uL   POCT GLUCOSE   Result Value Ref Range    POCT Glucose 170 (H) 74 - 99 mg/dL                      MR foot left wo IV contrast    Result Date: 2/12/2025  Interpreted By:  Júnior Sena, STUDY: MR FOOT LEFT WO IV CONTRAST;   INDICATION: Signs/Symptoms:r/o osteo.   COMPARISON: Plain film radiographs February 11   ACCESSION NUMBER(S): [id number]   ORDERING CLINICIAN: ABIEL KENT   TECHNIQUE: Multiplanar multisequential MRI left foot without contrast.   FINDINGS: Postsurgical changes amputation of the left 5th digit to the level of the mid metatarsal shaft.   There is a large amount of subcutaneous soft tissue swelling with a dorsal skin wound at the medial side of the ankle joint roughly at the level of the ankle proper. There is no evidence of adjacent fluid collection. There is what looks to be some partial tearing of the tibialis anterior where the  skin wound is seen at the level of the distal tibial metaphysis.   No definite abscess seen.   There is a significant amount of edema of the medial cuneiform. This involves nearly the entire bone although there is no definite erosive change and this is not directly in line with the wound. This is however, suspicious for a focus of osteomyelitis.   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 There is mild ankle arthrosis.   Small bone infarct of the anterior distal tibia. No other marrow edema seen.   No other fracture identified.   No evidence of acute tendinous or ligamentous tear.   No evidence of effusion.         Anteromedial skin wound at the level of the ankle joint with extensive and diffuse subcutaneous edema about the ankle and foot.   There is a large amount of edema isolated to the medial cuneiform. Although this is somewhat remote from the wound the possibility of osteomyelitis is a consideration.   Partial tearing tibialis anteriorly at the level of the distal ankle.   Small bone island distal tibia.   Other degenerative changes.   Signed by: Júnior Sena 2/12/2025 1:10 PM Dictation workstation:   [id number]    Lower extremity venous duplex left    Result Date: 2/11/2025  Interpreted By:  Maynor Kitchen, STUDY: [facility] LOWER EXTREMITY VENOUS DUPLEX LEFT  2/11/2025 5:14 pm   INDICATION: 73 y/o   M with  Signs/Symptoms:lle edema pain. LMP:  Unknown.       COMPARISON: None.   ACCESSION NUMBER(S): [id number]   ORDERING CLINICIAN: JUANITO HAIDER   TECHNIQUE: Routine ultrasound of the  right lower extremity was performed with duplex Doppler (color and spectral) evaluation.   Static images were obtained for remote interpretation.   FINDINGS: Deep venous thrombosis of the left superficial femoral veins throughout its course.       Deep venous thrombosis of the left superficial femoral vein.   MACRO: None   Signed by: Maynor Kitchen 2/11/2025 5:35 PM  Dictation workstation:   [id number]    XR foot 3+ views bilateral    Result Date: 2/11/2025  Interpreted By:  Júnior Sena, STUDY: XR FOOT 3+ VIEWS BILATERAL   INDICATION: Signs/Symptoms:l foot ulcer top of foot fould odor,.   COMPARISON: September 12, 2024     ACCESSION NUMBER(S): [id number]   ORDERING CLINICIAN: JUANITO HAIDER   FINDINGS: Interval amputation of the 5th digit to the level of the 5th metatarsal shaft. Surgical margins are sharp.   Marked dorsal soft tissue swelling with likely soft tissue wound at the level of the tarsometatarsal articulations on the left.   The right foot demonstrates advanced degenerative changes with some mild dorsal soft tissue swelling with no acute osseous abnormality..         Interval amputation of the 5th digit to the level of the 5th metatarsal shaft. Surgical margins are sharp.   Marked dorsal soft tissue swelling with likely soft tissue wound at the level of the tarsometatarsal articulations on the left.   Mild soft tissue swelling right foot.   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  - Charts, labs, vitals and imaging all reviewed.   - Imaging: MRI revealed possible osteomyelitis of the medial cuneiform  - Labs: Glucose 133, WBC 10, ESR 51, CRP 2.68  - Duplex US: + for DVT of femoral vein of the left lower extremity.  - Wound culture: mixed gram +/- bacteria. Plan for extensive wound and bone culture tomorrow during procedure.  - Blood culture:  pending     Plan:  - Abx: IV Vanc/Zosyn  - Performed bedside excisional debridement down to and including the level of muscle/tendon/fascia utilizing a #15 blade without incident. Upon completion of debridement today, fibronecrotic wound base could be visualized with exposed necrotic anterior tibialis tendon. Post-debridement measurements: 7.9 cm x 4.6 cm x 1.0 cm  - extensive excisional debridement in OR 2/13, patient is POD#1- doing well overall, wound vac output minimal at 130 ml   - Leave dressing intact  - Nursing staff is able to change/reinforce dressing if & as necessary   - Podiatry will continue to follow while in house.     DVT ppx: Heparin  Weightbearing: NWB to the LLE  Discharge: Pt to follow up 1 week after discharge with Dr. Ronald Christianson              I spent 20 minutes in the professional and overall care of this patient.      Emeterio Hwang PA-C

## 2025-02-14 NOTE — NURSING NOTE
Assumed care of pt, pt resting in bed, wound vac on, heparin gtt infusing, bed alarm on and call light with in reach.     0040- Pt tolerated IV atb and heparin gtt has had 2 therapeutic essay, next draw in AM. Wound vac on left surgical leg, call light with in reach.

## 2025-02-14 NOTE — PROGRESS NOTES
02/14/25 1435   Discharge Planning   Living Arrangements Family members   Support Systems Family members;Home care staff   Assistance Needed PT/OT recommend MOD intensity.  Spoke with patient and he said he would go to [facility]-he's been there before.  Sent referral.  ADOD 2/15-16.  Patient has Medicare and has met the 3 midnight guideline.   Home or Post Acute Services Post acute facilities (Rehab/SNF/etc)   Type of Post Acute Facility Services Skilled nursing   Expected Discharge Disposition SNF   Intensity of Service   Intensity of Service >30 min     Updates also sent to [facility].  TCC following.     4:30 PM  Patient unable to go to SNF.  He has used up all his Medicare days.  He was at [facility] 10/2/24 - 1/11/25.  He would have to private pay for a SNF.  Discussed with patient at bedside and he declined going to SNF.  He can't afford it.  Patient agrees to home care.  Patient was active with [facility] prior to admission and chooses it on discharge.  Updates were sent earlier and [facility] interested but hasn't committed yet. DC plan not secure.

## 2025-02-14 NOTE — CARE PLAN
The patient's goals for the shift include  rest    The clinical goals for the shift include Pt continue on heparin gtt and monitor lab work.    Over the shift, the patient remained safe and stable. Free from injuries.    Problem: Pain - Adult  Goal: Verbalizes/displays adequate comfort level or baseline comfort level  Outcome: Progressing     Problem: Safety - Adult  Goal: Free from fall injury  Outcome: Progressing     Problem: Discharge Planning  Goal: Discharge to home or other facility with appropriate resources  Outcome: Progressing     Problem: Chronic Conditions and Co-morbidities  Goal: Patient's chronic conditions and co-morbidity symptoms are monitored and maintained or improved  Outcome: Progressing     Problem: Nutrition  Goal: Nutrient intake appropriate for maintaining nutritional needs  Outcome: Progressing     Problem: Diabetes  Goal: Achieve decreasing blood glucose levels by end of shift  Outcome: Progressing  Goal: Increase stability of blood glucose readings by end of shift  Outcome: Progressing  Goal: Decrease in ketones present in urine by end of shift  Outcome: Progressing  Goal: Maintain electrolyte levels within acceptable range throughout shift  Outcome: Progressing  Goal: Maintain glucose levels >70mg/dl to <250mg/dl throughout shift  Outcome: Progressing  Goal: No changes in neurological exam by end of shift  Outcome: Progressing  Goal: Learn about and adhere to nutrition recommendations by end of shift  Outcome: Progressing  Goal: Vital signs within normal range for age by end of shift  Outcome: Progressing  Goal: Increase self care and/or family involovement by end of shift  Outcome: Progressing  Goal: Receive DSME education by end of shift  Outcome: Progressing     Problem: Pain  Goal: Takes deep breaths with improved pain control throughout the shift  Outcome: Progressing  Goal: Turns in bed with improved pain control throughout the shift  Outcome: Progressing  Goal: Walks with  improved pain control throughout the shift  Outcome: Progressing  Goal: Performs ADL's with improved pain control throughout shift  Outcome: Progressing  Goal: Participates in PT with improved pain control throughout the shift  Outcome: Progressing  Goal: Free from opioid side effects throughout the shift  Outcome: Progressing  Goal: Free from acute confusion related to pain meds throughout the shift  Outcome: Progressing     Problem: Skin  Goal: Decreased wound size/increased tissue granulation at next dressing change  Outcome: Progressing  Goal: Participates in plan/prevention/treatment measures  Outcome: Progressing  Goal: Prevent/manage excess moisture  Outcome: Progressing  Goal: Prevent/minimize sheer/friction injuries  Outcome: Progressing  Goal: Promote/optimize nutrition  Outcome: Progressing  Goal: Promote skin healing  Outcome: Progressing

## 2025-02-14 NOTE — PROGRESS NOTES
Occupational Therapy    Evaluation    Patient Name: Elliot Partida  MRN: [id number]  Department: [facility]  Room: [facility]  Today's Date: 2/14/2025  Time Calculation  Start Time: 0850  Stop Time: 0909  Time Calculation (min): 19 min    Assessment  IP OT Assessment  OT Assessment: Pt is a 75 yo M referred to occupational therapy for impaired self-care and functional mobility 2/2 hospitalization for acute DVT of femoral vein of LLE. Pt s/p debridement/bone biopsy of L foot 2/13. Pt demonstrates impaired self-care, functional mobility/transfers, and endurance this date. Pt required min A for bed mobility and mod A x2 for STS. Pt agitated and frustrated at times throughout session. Pt would benefit from continued OT services at the MOD intensity level to increase functional independence and help w/ return to PLOF.  Prognosis: Good  Barriers to Discharge Home: Caregiver assistance, Physical needs  Caregiver Assistance: Caregiver assistance needed per identified barriers - however, level of patient's required assistance exceeds assistance available at home  Physical Needs: Stair navigation into home limited by function/safety, Ambulating household distances limited by function/safety, Intermittent mobility assistance needed, Intermittent ADL assistance needed, High falls risk due to function or environment  Evaluation/Treatment Tolerance: Patient tolerated treatment well, Treatment limited secondary to agitation  Medical Staff Made Aware: Yes  End of Session Communication: Bedside nurse  End of Session Patient Position: Bed, 2 rail up, Alarm on  Plan:  Treatment Interventions: ADL retraining, Functional transfer training, UE strengthening/ROM, Endurance training, Patient/family training, Equipment evaluation/education, Fine motor coordination activities, Compensatory technique education  OT Frequency: 3 times per week  OT Discharge Recommendations: Moderate intensity level of continued care  OT Recommended Transfer Status:  Moderate assist, Assist of 2  OT - OK to Discharge: Yes (Based on completed evaluation and care plan recommendations, no barriers to discharge to next site of care)    Subjective   Current Problem:  1. Acute deep vein thrombosis (DVT) of femoral vein of left lower extremity (Multi)        2. Wound of foot        3. Nonrheumatic aortic valve stenosis  Transthoracic Echo Limited    Transthoracic Echo Limited      4. Osteomyelitis of left tibia, unspecified type (Multi)  Surgical Pathology Exam    Surgical Pathology Exam    Tissue/Wound Culture/Smear    Tissue/Wound Culture/Smear    CANCELED: Tissue/Wound Culture/Smear    CANCELED: Tissue/Wound Culture/Smear        General:  General  Reason for Referral: Pt is a 75 yo M referred to occupational therapy for impaired self-care and functional mobility 2/2 hospitalization for acute DVT of femoral vein of LLE. Pt s/p debridement/bone biopsy of L foot 2/13  Referred By: SARAH Malik-CNP  Past Medical History Relevant to Rehab: HTN, type 2 diabetes, DVT, osteomyelitis, BLE edema, stroke, hyperlipidemia, anemia, obesity, osteoarthritis, ulcer, CAD, PAD,  Family/Caregiver Present: No  Co-Treatment: PT  Co-Treatment Reason: To maximize pt safety and outcomes  Prior to Session Communication: Bedside nurse  Patient Position Received: Bed, 2 rail up, Alarm off, not on at start of session  Preferred Learning Style: verbal, visual  General Comment: Pt agreeable to therapy evaluation. Pt frustrated during session and required encouragement to participate. Pt cleared by RN prior to session.  Precautions:  LE Weight Bearing Status: Left Non-Weight Bearing  Medical Precautions: Fall precautions  Precautions Comment: luisa Carballo would vac L foot, IV     Date/Time Vitals Session Patient Position Pulse Resp SpO2 BP MAP (mmHg)    02/14/25 0851 --  --  --  --  --  123/59  --     02/14/25 0927 --  --  --  --  97 %  --  --           Pain:  Pain Assessment  Pain Assessment:  0-10  0-10 (Numeric) Pain Score: 0 - No pain (3/10 pain w/ movement)  Pain Interventions: Repositioned, Ambulation/increased activity  Response to Interventions: Content/relaxed    Objective   Cognition:  Overall Cognitive Status: Within Functional Limits  Arousal/Alertness: Appropriate responses to stimuli  Orientation Level: Oriented X4    Home Living:  Type of Home: House  Lives With: Siblings (Sister)  Home Adaptive Equipment: Walker rolling or standard  Home Layout: Two level, Able to live on main level with bedroom/bathroom  Home Access: Stairs to enter with rails  Entrance Stairs-Rails: Both  Entrance Stairs-Number of Steps: 5  Bathroom Shower/Tub: Tub/shower unit  Bathroom Toilet: Standard  Bathroom Equipment: Grab bars in shower, Shower chair with back   Prior Function:  Level of [place]: Independent with ADLs and functional transfers, Needs assistance with homemaking  Receives Help From: Family  ADL Assistance: Independent  Homemaking Assistance: Needs assistance (Sister completes IADL tasks)  Ambulatory Assistance: Independent (w/ FWW)  IADL History:  Current License: No  ADL:  Eating Assistance: Independent  Grooming Assistance: Stand by  Bathing Assistance: Moderate  UE Dressing Assistance: Minimal  UE Dressing Deficit: Pull around back, Fasteners  LE Dressing Assistance: Maximal  Toileting Assistance with Device: Maximal  Functional Assistance: Moderate  ADL Comments: Pt completed bed mobility w/ min A and STS w/ mod A x2 and poor adhearance to WB precautions. Pt required min A for UE dressing and tying back of gown. Other ADLs anticipated.  Activity Tolerance:  Endurance: Tolerates less than 10 min exercise, no significant change in vital signs  Bed Mobility/Transfers:   Bed Mobility  Bed Mobility: Yes  Bed Mobility 1  Bed Mobility 1: Supine to sitting  Level of Assistance 1: Minimum assistance  Bed Mobility Comments 1: assist for trunk  Bed Mobility 2  Bed Mobility  2: Sitting to supine  Level  of Assistance 2: Moderate assistance  Bed Mobility Comments 2: assist for BLEs    Transfers  Transfer: Yes  Transfer 1  Transfer From 1: Bed to  Transfer to 1: Stand  Technique 1: Sit to stand, Stand to sit  Transfer Device 1: Walker  Transfer Level of Assistance 1: Moderate assistance, +2  Trials/Comments 1: Poor adhearance to NWB precautions for L foot, mod VCs for proper positioning    Sitting Balance:  Static Sitting Balance  Static Sitting-Balance Support: Feet supported  Static Sitting-Level of Assistance: Distant supervision  Dynamic Sitting Balance  Dynamic Sitting-Balance Support: Feet supported  Dynamic Sitting-Level of Assistance: Distant supervision  Standing Balance:  Static Standing Balance  Static Standing-Balance Support: Bilateral upper extremity supported  Static Standing-Level of Assistance: Minimum assistance  Dynamic Standing Balance  Dynamic Standing-Balance Support: Bilateral upper extremity supported  Dynamic Standing-Level of Assistance: Moderate assistance    IADL's:   Current License: No  Sensation:  Light Touch: No apparent deficits  Strength:  Strength Comments: BUEs grossly 4-/5 during functional activities  Coordination:  Movements are Fluid and Coordinated: Yes   Hand Function:  Hand Function  Gross Grasp: Functional  Coordination: Functional  Extremities:   RUE : Within Functional Limits  LUE: Within Functional Limits    Outcome Measures:   [facility] Daily [facility]  Putting on and taking off regular lower body clothing: A lot  Bathing (including washing, rinsing, drying): A lot  Putting on and taking off regular upper body clothing: A little  Toileting, which includes using toilet, bedpan or urinal: A lot  Taking care of personal grooming such as brushing teeth: A little  Eating Meals: None  Daily Activity - Total Score: 16    Education Documentation  Precautions, taught by Fay Harris OT at 2/14/2025 10:29 AM.  Learner: Patient  Readiness: Acceptance  Method: Explanation  Response:  Needs Reinforcement  Comment: Pt education of POC, DC recs, safety and precautions when completing ADLs and transfers    ADL Training, taught by Fay Harris OT at 2/14/2025 10:29 AM.  Learner: Patient  Readiness: Acceptance  Method: Explanation  Response: Needs Reinforcement  Comment: Pt education of POC, DC recs, safety and precautions when completing ADLs and transfers    Goals:   Encounter Problems       Encounter Problems (Active)       ADLs       Patient will perform UB and LB bathing with minimal assist  level of assistance and PRN bathroom equipment.       Start:  02/14/25    Expected End:  02/28/25            Patient with complete upper body dressing with set-up level of assistance donning and doffing all UE clothes with PRN adaptive equipment while edge of bed  and standing       Start:  02/14/25    Expected End:  02/28/25            Patient with complete lower body dressing with minimal assist  level of assistance donning and doffing all LE clothes  with PRN adaptive equipment while edge of bed  and standing       Start:  02/14/25    Expected End:  02/28/25            Patient will complete toileting including hygiene clothing management/hygiene with minimal assist  level of assistance and Prn bathroom equipment.       Start:  02/14/25    Expected End:  02/28/25               BALANCE       Pt will maintain static standing balance during ADL task with minimal assist  level of assistance in order to demonstrate decreased risk of falling and improved postural control while following weight bearing precautions       Start:  02/14/25    Expected End:  02/28/25            Patient will recall and adhere to weight bearing and /or ROM restrictions with all ADL and functional mobility in order to promote healing and safety with functional tasks       Start:  02/14/25    Expected End:  02/28/25               TRANSFERS       Patient will complete functional transfer to chair/toilet with least restrictive device  with moderate assist level of assistance.       Start:  02/14/25    Expected End:  02/28/25

## 2025-02-14 NOTE — PROGRESS NOTES
Elliot Partida is a 74 y.o. male on day 3 of admission presenting with Acute deep vein thrombosis (DVT) of femoral vein of left lower extremity (Multi).      Subjective   Patient assessed at bedside; lying in bed. He complained of pain in his right foot. He admits he can't ambulate or take care of himself at home. He has agreed to SNF. He denies SOB, fever, chills       Objective     Last Recorded Vitals  BP (!) 104/48 (BP Location: Left arm, Patient Position: Lying) Comment: nurse notified  Pulse 70   Temp 36.6 °C (97.9 °F) (Temporal)   Resp 20   Wt 89.1 kg (196 lb 6.9 oz)   SpO2 96%   Intake/Output last 3 Shifts:    Intake/Output Summary (Last 24 hours) at 2/14/2025 1507  Last data filed at 2/14/2025 1345  Gross per 24 hour   Intake 1009.73 ml   Output 1475 ml   Net -465.27 ml       Admission Weight  Weight: 89.8 kg (198 lb) (02/11/25 1507)    Daily Weight  02/11/25 : 89.1 kg (196 lb 6.9 oz)    Image Results      Physical Exam  Vitals reviewed.   Constitutional:       Appearance: Normal appearance. He is normal weight.   HENT:      Head: Normocephalic and atraumatic.      Right Ear: External ear normal.      Left Ear: External ear normal.      Nose: Nose normal.      Mouth/Throat:      Mouth: Mucous membranes are moist.      Pharynx: Oropharynx is clear.   Eyes:      Conjunctiva/sclera: Conjunctivae normal.      Pupils: Pupils are equal, round, and reactive to light.   Cardiovascular:      Rate and Rhythm: Normal rate and regular rhythm.      Pulses: Normal pulses.      Heart sounds: Murmur heard.   Pulmonary:      Effort: Pulmonary effort is normal.      Breath sounds: Normal breath sounds.   Abdominal:      General: Bowel sounds are normal.      Palpations: Abdomen is soft.   Musculoskeletal:         General: Normal range of motion.      Cervical back: Normal range of motion and neck supple.   Skin:     General: Skin is warm and dry.      Comments: Left dorsal ankle non pressure wound with wound vac in  place with bloody drainage  Neurological:      General: No focal deficit present.      Mental Status: He is alert and oriented to person, place, and time.   Psychiatric:         Mood and Affect: Mood normal.         Behavior: Behavior normal.      Relevant Results    Scheduled medications  carBAMazepine XR, 100 mg, oral, BID  gabapentin, 300 mg, oral, TID  hydrALAZINE, 25 mg, oral, TID  insulin lispro, 0-10 Units, subcutaneous, TID AC  pantoprazole, 40 mg, oral, Daily before breakfast  piperacillin-tazobactam, 3.375 g, intravenous, q6h  polyethylene glycol, 17 g, oral, Daily  traZODone, 50 mg, oral, Nightly  vancomycin, 1,000 mg, intravenous, q12h      Continuous medications  heparin, 0-4,500 Units/hr, Last Rate: 1,200 Units/hr (02/14/25 1221)      PRN medications  PRN medications: acetaminophen, acetaminophen, calcium carbonate, heparin, melatonin, ondansetron **OR** ondansetron, oxyCODONE, sennosides-docusate sodium, vancomycin    Results for orders placed or performed during the hospital encounter of 02/11/25 (from the past 24 hours)   POCT GLUCOSE   Result Value Ref Range    POCT Glucose 132 (H) 74 - 99 mg/dL   Heparin Assay, [facility]   Result Value Ref Range    Heparin Unfractionated 0.6 See Comment Below for Therapeutic Ranges IU/mL   Heparin Assay, [facility]   Result Value Ref Range    Heparin Unfractionated 0.5 See Comment Below for Therapeutic Ranges IU/mL   POCT GLUCOSE   Result Value Ref Range    POCT Glucose 171 (H) 74 - 99 mg/dL   POCT GLUCOSE   Result Value Ref Range    POCT Glucose 131 (H) 74 - 99 mg/dL   Basic metabolic panel   Result Value Ref Range    Glucose 130 (H) 74 - 99 mg/dL    Sodium 138 136 - 145 mmol/L    Potassium 3.8 3.5 - 5.3 mmol/L    Chloride 104 98 - 107 mmol/L    Bicarbonate 28 21 - 32 mmol/L    Anion Gap 10 10 - 20 mmol/L    Urea Nitrogen 15 6 - 23 mg/dL    Creatinine 0.75 0.50 - 1.30 mg/dL    eGFR >90 >60 mL/min/1.73m*2    Calcium 8.2 (L) 8.6 - 10.3 mg/dL   Heparin Assay, [facility]   Result Value  Ref Range    Heparin Unfractionated 0.4 See Comment Below for Therapeutic Ranges IU/mL   CBC   Result Value Ref Range    WBC 9.4 4.4 - 11.3 x10*3/uL    nRBC 0.0 0.0 - 0.0 /100 WBCs    RBC 2.90 (L) 4.50 - 5.90 x10*6/uL    Hemoglobin 7.5 (L) 13.5 - 17.5 g/dL    Hematocrit 24.4 (L) 41.0 - 52.0 %    MCV 84 80 - 100 fL    MCH 25.9 (L) 26.0 - 34.0 pg    MCHC 30.7 (L) 32.0 - 36.0 g/dL    RDW 13.5 11.5 - 14.5 %    Platelets 311 150 - 450 x10*3/uL   POCT GLUCOSE   Result Value Ref Range    POCT Glucose 170 (H) 74 - 99 mg/dL                   Assessment/Plan      Assessment & Plan  Acute deep vein thrombosis (DVT) of femoral vein of left lower extremity (Multi)    Wound infection    DM type 2 with diabetic peripheral neuropathy    Uncontrolled type 2 diabetes mellitus with hyperglycemia    Wound eschar of foot    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   - Stopped taking apixaban  - given heparin bolus in the ED  - discontinue on Heparin drip  - started rivaroxaban 15 mg BID x 21 days then 20 mg daily     Left dorsal foot wound  Acute  on chronic osteomyelitis of left foot  Hx of osteomyelitis  - present on admission; pictures in chart  - conitnue zosyn 3.375 g q6h and vancomycin1,000 mL BID; day 3  - Podiatry consult  - Wound care per podiatry  - s/p I&D with bone biopsy on 2/13/25  - Cardiology consult for cardiac clearance  - wound culture with mixed gram negative and gram positive organism  - repeat wound culture pending  - Wound Vac per podiatry    Generalized Weakness  - PT/OT evaluation; recommending moderate intensity therapy     Essential HTN  - continue hydralazine 25 mg TID  - monitor BP     DM Type 2 with peripheral neuropathy  - SSI Lispro 0-10 scale  - hypoglycemic protocol  - monitor blood  sugar  - continue gabapentin 300 mg TID     Trigeminal neuralgia  - continue gabpentin 300 mg TID; carbamazepine 100 mg BID     Insomnia  - continue trazodone 50 mg hs     PUD ppx  - continue pantoprazole 40 mg daily     Code status: Full     Disposition: Patient requires more than 2 inpatient days.              Eryn Colon, APRN-CNP

## 2025-02-14 NOTE — PROGRESS NOTES
Physical Therapy    Physical Therapy Evaluation    Patient Name: Elliot Partida  MRN: [id number]  Department: [facility]  Room: [address] [place] [address]  Today's Date: 2/14/2025   Time Calculation  Start Time: 0851  Stop Time: 0909  Time Calculation (min): 18 min    Assessment/Plan   PT Assessment  PT Assessment Results: Decreased strength, Impaired balance, Decreased mobility, Decreased skin integrity, Pain, Orthopedic restrictions  Rehab Prognosis: Fair  Barriers to Discharge Home: Caregiver assistance, Physical needs  Caregiver Assistance: Caregiver assistance needed per identified barriers - however, level of patient's required assistance exceeds assistance available at home  Physical Needs: Stair navigation into home limited by function/safety, Ambulating household distances limited by function/safety, 24hr mobility assistance needed  Evaluation/Treatment Tolerance: Treatment limited secondary to agitation  Medical Staff Made Aware: Yes  Strengths: Ability to acquire knowledge  Barriers to Participation: Comorbidities, Coping skills  End of Session Communication: Bedside nurse  Assessment Comment: 74 y.o presents with weakness and impaired mobility. Pt. lives with sister and is normally JOLENE with WW. Pt. currently requires modA x 2 for transfers and would benefit from additional PT to address above noted limitations and prevent further decline.  End of Session Patient Position: Bed, 2 rail up, Alarm on  IP OR SWING BED PT PLAN  Inpatient or Swing Bed: Inpatient  PT Plan  Treatment/Interventions: Transfer training, Bed mobility, Gait training, Stair training, Balance training, Strengthening, Endurance training, Therapeutic exercise, Therapeutic activity, Home exercise program  PT Plan: Ongoing PT  PT Frequency: 3 times per week  PT Discharge Recommendations: Moderate intensity level of continued care  Equipment Recommended upon Discharge: Standard walker, Wheelchair  PT Recommended Transfer Status: Assist x2  PT - OK to Discharge:  Yes Based on completed evaluation and care plan recommendations, no barriers to discharge to next site of care      Subjective   General Visit Information:  General  Reason for Referral: impaired mobility, acute DVT of LLE  Referred By: SARAH Malik-CNP  Past Medical History Relevant to Rehab: HTN, type 2 diabetes, DVT, osteomyelitis, BLE edema, stroke, hyperlipidemia, anemia, obesity, osteoarthritis, ulcer, CAD, PAD,  Family/Caregiver Present: No  Co-Treatment: OT  Co-Treatment Reason: pt. complexity  Prior to Session Communication: Bedside nurse  Patient Position Received: Bed, 2 rail up, Alarm off, not on at start of session  General Comment: masimo, tele, wound vac, agitated at times  Home Living:  Home Living  Type of Home: House  Lives With:  (sister)  Home Adaptive Equipment: Walker rolling or standard  Home Layout: Able to live on main level with bedroom/bathroom  Home Access: Stairs to enter with rails  Entrance Stairs-Rails: Both  Entrance Stairs-Number of Steps: 5  Bathroom Shower/Tub: Tub/shower unit  Bathroom Toilet: Standard  Bathroom Equipment: Grab bars in shower (shower chair)  Home Living Comments: sleeps in recliner  Prior Level of Function:  Prior Function Per Pt/Caregiver Report  Level of [place]: Independent with ADLs and functional transfers, Needs assistance with homemaking  Receives Help From:  (sister provides the cooking/cleaning)  Ambulatory Assistance: Independent (with WW)  Precautions:  Precautions  LE Weight Bearing Status: Left Non-Weight Bearing  Medical Precautions: Fall precautions     Objective   Pain:  Pain Assessment  Pain Assessment: 0-10  0-10 (Numeric) Pain Score: 0 - No pain (3/10 in sitting in LLE)  Cognition:  Cognition  Overall Cognitive Status: Within Functional Limits    General Assessments:     Activity Tolerance  Endurance: Decreased tolerance for upright activites    Sensation  Sensation Comment: denies deficits at this time    Strength  Strength  Comments: BLE: grossly 4-/5  Strength  Strength Comments: BLE: grossly 4-/5    Coordination  Movements are Fluid and Coordinated: Yes      Static Standing Balance  Static Standing-Comment/Number of Minutes: fair; difficulty maintaining NWB  Dynamic Standing Balance  Dynamic Standing-Comments: unable to maintain NWB and pivot on RLE at this time (appears to be self-limiting and reluctant to try)  Functional Assessments:  Bed Mobility  Bed Mobility: Yes  Bed Mobility 1  Bed Mobility 1: Supine to sitting  Level of Assistance 1: Minimum assistance  Bed Mobility 2  Bed Mobility  2: Sitting to supine  Level of Assistance 2: Moderate assistance    Transfers  Transfer: Yes  Transfer 1  Technique 1: Sit to stand, Stand to sit  Transfer Device 1: Walker  Transfer Level of Assistance 1: Moderate assistance, +2  Trials/Comments 1: Difficulty maintaining NWB status despite max cues. Pt. agitated during stand    Ambulation/Gait Training  Ambulation/Gait Training Performed: No (pt. declined pivoting to head of bed)  Extremity/Trunk Assessments:  Defer to OT for UE detail   RLE   RLE : Within Functional Limits  LLE   LLE : Within Functional Limits  Outcome Measures:  [facility] Basic Mobility  Turning from your back to your side while in a flat bed without using bedrails: A little  Moving from lying on your back to sitting on the side of a flat bed without using bedrails: A little  Moving to and from bed to chair (including a wheelchair): A lot  Standing up from a chair using your arms (e.g. wheelchair or bedside chair): A lot  To walk in hospital room: Total  Climbing 3-5 steps with railing: Total  Basic Mobility - Total Score: 12    Encounter Problems       Encounter Problems (Active)       Balance       STG - Maintains dynamic standing balance with upper extremity support with >=good- balance        Start:  02/14/25    Expected End:  02/28/25               Mobility       LTG - Patient will propel wheelchair household/community  distances IND       Start:  02/14/25    Expected End:  02/28/25            LTG - Patient will navigate 4-6 steps with rails/device with SBA while maintaining NWB        Start:  02/14/25    Expected End:  02/28/25            Goal 1       Start:  02/14/25    Expected End:  02/28/25               PT Transfers       STG - Transfer from bed to chair JOLENE with SW       Start:  02/14/25    Expected End:  02/28/25            STG - Patient will transfer sit to and from stand JOLENE with SW while maintaining NWB        Start:  02/14/25    Expected End:  02/28/25               Pain - Adult              Education Documentation  Precautions, taught by Francine Taylor, PT at 2/14/2025 10:32 AM.  Learner: Patient  Readiness: Nonacceptance  Method: Explanation, Demonstration  Response: Needs Reinforcement, Refused Teaching  Comment: Educated pt. on NWB status    Education Comments  No comments found.

## 2025-02-15 PROBLEM — D64.9 NORMOCYTIC ANEMIA: Status: ACTIVE | Noted: 2025-02-15

## 2025-02-15 PROBLEM — R53.1 GENERALIZED WEAKNESS: Status: ACTIVE | Noted: 2025-02-15

## 2025-02-15 PROBLEM — G47.00 INSOMNIA: Status: ACTIVE | Noted: 2025-02-15

## 2025-02-15 LAB
ANION GAP SERPL CALC-SCNC: 10 MMOL/L (ref 10–20)
B-LACTAMASE ORGANISM ISLT: NEGATIVE
BACTERIA SPEC CULT: ABNORMAL
BUN SERPL-MCNC: 14 MG/DL (ref 6–23)
CALCIUM SERPL-MCNC: 8.4 MG/DL (ref 8.6–10.3)
CHLORIDE SERPL-SCNC: 105 MMOL/L (ref 98–107)
CO2 SERPL-SCNC: 29 MMOL/L (ref 21–32)
CREAT SERPL-MCNC: 0.74 MG/DL (ref 0.5–1.3)
EGFRCR SERPLBLD CKD-EPI 2021: >90 ML/MIN/1.73M*2
ERYTHROCYTE [DISTWIDTH] IN BLOOD BY AUTOMATED COUNT: 13.7 % (ref 11.5–14.5)
GLUCOSE BLD MANUAL STRIP-MCNC: 137 MG/DL (ref 74–99)
GLUCOSE BLD MANUAL STRIP-MCNC: 144 MG/DL (ref 74–99)
GLUCOSE BLD MANUAL STRIP-MCNC: 151 MG/DL (ref 74–99)
GLUCOSE BLD MANUAL STRIP-MCNC: 179 MG/DL (ref 74–99)
GLUCOSE SERPL-MCNC: 148 MG/DL (ref 74–99)
GRAM STN SPEC: ABNORMAL
HCT VFR BLD AUTO: 25.4 % (ref 41–52)
HGB BLD-MCNC: 7.7 G/DL (ref 13.5–17.5)
MCH RBC QN AUTO: 25.8 PG (ref 26–34)
MCHC RBC AUTO-ENTMCNC: 30.3 G/DL (ref 32–36)
MCV RBC AUTO: 85 FL (ref 80–100)
NRBC BLD-RTO: ABNORMAL /100{WBCS}
PLATELET # BLD AUTO: 301 X10*3/UL (ref 150–450)
POTASSIUM SERPL-SCNC: 4.1 MMOL/L (ref 3.5–5.3)
RBC # BLD AUTO: 2.98 X10*6/UL (ref 4.5–5.9)
SODIUM SERPL-SCNC: 140 MMOL/L (ref 136–145)
WBC # BLD AUTO: 6.8 X10*3/UL (ref 4.4–11.3)

## 2025-02-15 PROCEDURE — 2500000001 HC RX 250 WO HCPCS SELF ADMINISTERED DRUGS (ALT 637 FOR MEDICARE OP): Mod: IPSPLIT | Performed by: NURSE PRACTITIONER

## 2025-02-15 PROCEDURE — 85027 COMPLETE CBC AUTOMATED: CPT | Mod: IPSPLIT | Performed by: NURSE PRACTITIONER

## 2025-02-15 PROCEDURE — 2500000004 HC RX 250 GENERAL PHARMACY W/ HCPCS (ALT 636 FOR OP/ED): Mod: IPSPLIT | Performed by: NURSE PRACTITIONER

## 2025-02-15 PROCEDURE — 2500000002 HC RX 250 W HCPCS SELF ADMINISTERED DRUGS (ALT 637 FOR MEDICARE OP, ALT 636 FOR OP/ED): Mod: IPSPLIT | Performed by: NURSE PRACTITIONER

## 2025-02-15 PROCEDURE — 99233 SBSQ HOSP IP/OBS HIGH 50: CPT

## 2025-02-15 PROCEDURE — 82374 ASSAY BLOOD CARBON DIOXIDE: CPT | Mod: IPSPLIT | Performed by: NURSE PRACTITIONER

## 2025-02-15 PROCEDURE — 36415 COLL VENOUS BLD VENIPUNCTURE: CPT | Mod: IPSPLIT | Performed by: NURSE PRACTITIONER

## 2025-02-15 PROCEDURE — 82947 ASSAY GLUCOSE BLOOD QUANT: CPT | Mod: IPSPLIT

## 2025-02-15 PROCEDURE — 94760 N-INVAS EAR/PLS OXIMETRY 1: CPT | Mod: IPSPLIT

## 2025-02-15 PROCEDURE — 1200000002 HC GENERAL ROOM WITH TELEMETRY DAILY: Mod: IPSPLIT

## 2025-02-15 PROCEDURE — 80048 BASIC METABOLIC PNL TOTAL CA: CPT | Mod: IPSPLIT | Performed by: NURSE PRACTITIONER

## 2025-02-15 RX ORDER — FERROUS SULFATE 325(65) MG
65 TABLET ORAL
Status: DISCONTINUED | OUTPATIENT
Start: 2025-02-16 | End: 2025-02-19 | Stop reason: HOSPADM

## 2025-02-15 RX ADMIN — PIPERACILLIN SODIUM AND TAZOBACTAM SODIUM 3.38 G: 3; .375 INJECTION, SOLUTION INTRAVENOUS at 23:56

## 2025-02-15 RX ADMIN — CARBAMAZEPINE 100 MG: 100 TABLET, EXTENDED RELEASE ORAL at 08:16

## 2025-02-15 RX ADMIN — ACETAMINOPHEN 650 MG: 325 TABLET, FILM COATED ORAL at 14:17

## 2025-02-15 RX ADMIN — PIPERACILLIN SODIUM AND TAZOBACTAM SODIUM 3.38 G: 3; .375 INJECTION, SOLUTION INTRAVENOUS at 00:22

## 2025-02-15 RX ADMIN — CARBAMAZEPINE 100 MG: 100 TABLET, EXTENDED RELEASE ORAL at 20:29

## 2025-02-15 RX ADMIN — TRAZODONE HYDROCHLORIDE 50 MG: 50 TABLET ORAL at 20:30

## 2025-02-15 RX ADMIN — GABAPENTIN 300 MG: 300 CAPSULE ORAL at 14:17

## 2025-02-15 RX ADMIN — VANCOMYCIN HYDROCHLORIDE 1000 MG: 1 INJECTION, SOLUTION INTRAVENOUS at 08:15

## 2025-02-15 RX ADMIN — GABAPENTIN 300 MG: 300 CAPSULE ORAL at 08:16

## 2025-02-15 RX ADMIN — Medication 5 MG: at 20:30

## 2025-02-15 RX ADMIN — POLYETHYLENE GLYCOL 3350 17 G: 17 POWDER, FOR SOLUTION ORAL at 08:15

## 2025-02-15 RX ADMIN — HYDRALAZINE HYDROCHLORIDE 25 MG: 25 TABLET ORAL at 20:31

## 2025-02-15 RX ADMIN — PIPERACILLIN SODIUM AND TAZOBACTAM SODIUM 3.38 G: 3; .375 INJECTION, SOLUTION INTRAVENOUS at 06:04

## 2025-02-15 RX ADMIN — PIPERACILLIN SODIUM AND TAZOBACTAM SODIUM 3.38 G: 3; .375 INJECTION, SOLUTION INTRAVENOUS at 18:42

## 2025-02-15 RX ADMIN — INSULIN LISPRO 2 UNITS: 100 INJECTION, SOLUTION INTRAVENOUS; SUBCUTANEOUS at 17:10

## 2025-02-15 RX ADMIN — GABAPENTIN 300 MG: 300 CAPSULE ORAL at 20:30

## 2025-02-15 RX ADMIN — VANCOMYCIN HYDROCHLORIDE 1000 MG: 1 INJECTION, SOLUTION INTRAVENOUS at 20:32

## 2025-02-15 RX ADMIN — HYDRALAZINE HYDROCHLORIDE 25 MG: 25 TABLET ORAL at 14:16

## 2025-02-15 RX ADMIN — PIPERACILLIN SODIUM AND TAZOBACTAM SODIUM 3.38 G: 3; .375 INJECTION, SOLUTION INTRAVENOUS at 12:59

## 2025-02-15 RX ADMIN — HYDRALAZINE HYDROCHLORIDE 25 MG: 25 TABLET ORAL at 08:16

## 2025-02-15 RX ADMIN — RIVAROXABAN 15 MG: 15 TABLET, FILM COATED ORAL at 08:16

## 2025-02-15 RX ADMIN — RIVAROXABAN 15 MG: 15 TABLET, FILM COATED ORAL at 17:11

## 2025-02-15 RX ADMIN — OXYCODONE 5 MG: 5 TABLET ORAL at 01:05

## 2025-02-15 ASSESSMENT — COGNITIVE AND FUNCTIONAL STATUS - GENERAL
DRESSING REGULAR UPPER BODY CLOTHING: A LITTLE
MOVING FROM LYING ON BACK TO SITTING ON SIDE OF FLAT BED WITH BEDRAILS: A LITTLE
MOVING TO AND FROM BED TO CHAIR: A LOT
DAILY ACTIVITIY SCORE: 18
MOBILITY SCORE: 13
DRESSING REGULAR LOWER BODY CLOTHING: A LITTLE
TOILETING: A LOT
PERSONAL GROOMING: A LITTLE
CLIMB 3 TO 5 STEPS WITH RAILING: TOTAL
TURNING FROM BACK TO SIDE WHILE IN FLAT BAD: A LITTLE
WALKING IN HOSPITAL ROOM: A LOT
HELP NEEDED FOR BATHING: A LITTLE
STANDING UP FROM CHAIR USING ARMS: A LOT

## 2025-02-15 ASSESSMENT — PAIN SCALES - GENERAL
PAINLEVEL_OUTOF10: 7
PAINLEVEL_OUTOF10: 3
PAINLEVEL_OUTOF10: 0 - NO PAIN
PAINLEVEL_OUTOF10: 7
PAINLEVEL_OUTOF10: 4

## 2025-02-15 ASSESSMENT — PAIN DESCRIPTION - LOCATION
LOCATION: LEG
LOCATION: HEAD

## 2025-02-15 ASSESSMENT — PAIN DESCRIPTION - ORIENTATION: ORIENTATION: RIGHT

## 2025-02-15 ASSESSMENT — PAIN - FUNCTIONAL ASSESSMENT
PAIN_FUNCTIONAL_ASSESSMENT: 0-10

## 2025-02-15 NOTE — PROGRESS NOTES
"Occupational Therapy                 Therapy Communication Note    Patient Name: Elliot Partida  MRN: [id number]  Department: [facility]  Room: 207/207A  Today's Date: 2/15/2025     Discipline: Occupational Therapy    OT Missed Visit: Yes     Missed Visit Reason:Attempted to see the pt. for OT .  Pt. declined to participate despite encouragement provided.  Pt. said, No.  I want to sleep.\"    Missed Time: Attempt at 10:25AM      "

## 2025-02-15 NOTE — PROGRESS NOTES
PODIATRY SERVICE CONSULT PROGRESS NOTE    SERVICE DATE: 2/15/2025   SERVICE TIME:  0830    Subjective   INTERVAL HPI:   Pt was seen at bedside.  Pain well controlled.  Patient denies any constitutional symptoms.   No other pedal complaints.   Patient is s/p excisional debridement of wound down to bone, bone biopsy of the tibia and application of wound vac to the left lower extremity (DOS: 02/13/25). Wound vac is in place to the left lower extremity today with   180 mL of drainage in cannister.    Medications:  Scheduled Meds: carBAMazepine XR, 100 mg, oral, BID  gabapentin, 300 mg, oral, TID  hydrALAZINE, 25 mg, oral, TID  insulin lispro, 0-10 Units, subcutaneous, TID AC  pantoprazole, 40 mg, oral, Daily before breakfast  piperacillin-tazobactam, 3.375 g, intravenous, q6h  polyethylene glycol, 17 g, oral, Daily  rivaroxaban, 15 mg, oral, BID   Followed by  [START ON 3/8/2025] rivaroxaban, 20 mg, oral, Daily with breakfast  traZODone, 50 mg, oral, Nightly  vancomycin, 1,000 mg, intravenous, q12h      Continuous Infusions:    PRN Meds: PRN medications: acetaminophen, acetaminophen, calcium carbonate, melatonin, ondansetron **OR** ondansetron, oxyCODONE, sennosides-docusate sodium, vancomycin         Objective   PHYSICAL EXAM:  Physical Exam Performed:  Vitals:    02/15/25 0641   BP: 121/55   Pulse: 57   Resp: 18   Temp: 36.5 °C (97.7 °F)   SpO2: 98%     Body mass index is 29.01 kg/m².    Patient is AOx3 and in no acute distress. Patient is alert and cooperative. Sitting comfortably in bed with dressing clean, dry and intact.    Vascular: Non-palpable DP/PT pulses B/L. Moderate pitting edema noted B/L. Hair growth absent B/L. CFT<5 to B/L hallux. Temperature is warm to cool from tibial tuberosity to distal digits B/L. No lymphatic streaking noted B/L.     Musculoskeletal: Gross active and passive ROM diminished to age and activity level. Moves all extremities spontaneously. No pain to palpation at feet B/L.      Neurological: Intact light touch sensation B/L. Pain stimuli absent B/L. Denies any numbness, burning or tingling.     Dermatologic: Nails 1-5 are thickened, elongated, discolored with subungual debris B/L. Skin appears diffusely xerotic B/L. Web spaces 1-4 B/L are clean, dry and intact. No rashes or nodules noted B/L. No hyperkeratotic tissue noted B/L.      Wound:  Measurements: 8.8 cm x 5.7 cm x 1.1 cm   Mixed wound base of granular with visible anterior tibialis tendon  Moderate serosanguinous drainage found in wound vac with 180 mL of drainage in cannister.  Minimal jennyfer-wound maceration.   Minimal jennyfer-wound erythema.   No evidence of ascending cellulitis or lymphangitis.  Minimal palpable fluctuance. Minimal malodor.   Positive probe to bone or deep structures within the wound base.   No tunneling or tracking.   No undermining. Skin edges irregular but intact.      LABS:   Results for orders placed or performed during the hospital encounter of 02/11/25 (from the past 24 hours)   POCT GLUCOSE   Result Value Ref Range    POCT Glucose 170 (H) 74 - 99 mg/dL   POCT GLUCOSE   Result Value Ref Range    POCT Glucose 126 (H) 74 - 99 mg/dL   POCT GLUCOSE   Result Value Ref Range    POCT Glucose 116 (H) 74 - 99 mg/dL   POCT GLUCOSE   Result Value Ref Range    POCT Glucose 137 (H) 74 - 99 mg/dL      Lab Results   Component Value Date    HGBA1C 5.8 (H) 01/06/2025      Lab Results   Component Value Date    CRP 2.68 (H) 02/11/2025      Lab Results   Component Value Date    SEDRATE 51 (H) 02/11/2025        Results from last 7 days   Lab Units 02/14/25  0714   WBC AUTO x10*3/uL 9.4   RBC AUTO x10*6/uL 2.90*   HEMOGLOBIN g/dL 7.5*   HEMATOCRIT % 24.4*     Results from last 7 days   Lab Units 02/14/25  0713 02/12/25  0530 02/11/25  1855 02/11/25  1731   SODIUM mmol/L 138   < >  --  135*   POTASSIUM mmol/L 3.8   < > 3.6 6.1*   CHLORIDE mmol/L 104   < >  --  101   CO2 mmol/L 28   < >  --  28   BUN mg/dL 15   < >  --  14    CREATININE mg/dL 0.75   < >  --  0.68   CALCIUM mg/dL 8.2*   < >  --  9.3   MAGNESIUM mg/dL  --   --  1.91 2.26   BILIRUBIN TOTAL mg/dL  --   --   --  0.5   ALT U/L  --   --   --  16   AST U/L  --   --   --  45*    < > = values in this interval not displayed.           IMAGING REVIEW:  Transthoracic Echo Limited    Result Date: 2/13/2025   [facility], [address] [place] [address]              Tel [phone] and Fax [phone] TRANSTHORACIC ECHOCARDIOGRAM REPORT  Patient Name:       SHEABRAYAN SEGAL    Reading Physician:    [id number] Carson Freeman MD Study Date:         2/13/2025           Ordering Provider:    [id number] JUAN F NORRIS MRN/PID:            [id number]            Fellow: Accession#:         [id number]        Nurse: Date of Birth/Age:  1950 / 74 years Sonographer:          Trinh Sung RDCS Gender assigned at                     Additional Staff: Birth: Height:             175.26 cm           Admit Date: Weight:             88.91 kg            Admission Status:     Inpatient -                                                               Routine BSA / BMI:          2.05 m2 / 28.94     Encounter#:           [id number]                     kg/m2 Blood Pressure:     127/53 mmHg         Department Location:  [facility] Study Type:    TRANSTHORACIC ECHO (TTE) LIMITED Diagnosis/ICD: Nonrheumatic aortic (valve) stenosis-I35.0 Indication:    Aortic Stenosis CPT Code:      Echo Limited-41733; Doppler Limited-60284; Color Doppler-76396 Patient History: Valve Disorders:   Aortic Stenosis. Diabetes:          Yes Pertinent History: Murmur, CAD, PVD and Hyperlipidemia. Study Detail: The following Echo studies were performed: 2D, M-Mode, Doppler and               color flow. Technically  challenging study due to body habitus and               low pain tolerance. The patient was awake.  PHYSICIAN INTERPRETATION: Left Ventricle: The left ventricular systolic function is normal, with a visually estimated ejection fraction of 55-60%. There are no regional left ventricular wall motion abnormalities. The left ventricular cavity size is normal. There is mildly increased septal and mildly increased posterior left ventricular wall thickness. There is left ventricular concentric remodeling. Spectral Doppler shows a Grade I (impaired relaxation pattern) of left ventricular diastolic filling with normal left atrial filling pressure. Left Atrium: The left atrial size is upper limits of normal. Right Ventricle: The right ventricle is normal in size. There is normal right ventricular global systolic function. Right Atrium: The right atrium is normal in size. Aortic Valve: The aortic valve is probably trileaflet. There is moderate aortic valve cusp calcification. There is evidence of moderate aortic valve stenosis. The aortic valve dimensionless index is 0.31. There is no evidence of aortic valve regurgitation. The peak instantaneous gradient of the aortic valve is 48 mmHg. The mean gradient of the aortic valve is 28 mmHg. Mitral Valve: The mitral valve is normal in structure. There is moderate to severe mitral annular calcification. There is mild mitral valve regurgitation. Tricuspid Valve: The tricuspid valve is structurally normal. There is mild tricuspid regurgitation. Pulmonic Valve: The pulmonic valve is structurally normal. There is physiologic pulmonic valve regurgitation. Pericardium: There is no pericardial effusion noted. Aorta: The aortic root is normal. Systemic Veins: The inferior vena cava appears normal in size.  CONCLUSIONS:  1. The left ventricular systolic function is normal, with a visually estimated ejection fraction of 55-60%.  2. Spectral Doppler shows a Grade I (impaired relaxation pattern)  of left ventricular diastolic filling with normal left atrial filling pressure.  3. There is normal right ventricular global systolic function.  4. There is moderate to severe mitral annular calcification.  5. Moderate aortic valve stenosis. Dimensionless index 0.31 : moderate stenosis. Peak and mean gradients around 48 and 28 mm Hg respectively. Estimated aortic valve area around 1 cm2.  6. There is moderate aortic valve cusp calcification. QUANTITATIVE DATA SUMMARY:  2D MEASUREMENTS:         Normal Ranges: IVSd:            1.05 cm (0.6-1.1cm) LVPWd:           1.09 cm (0.6-1.1cm) LVIDd:           4.60 cm (3.9-5.9cm) LVIDs:           2.96 cm LV Mass Index:   85 g/m2 LV % FS          35.7 %  LV SYSTOLIC FUNCTION:                      Normal Ranges: EF-Visual:      58 % LV EF Reported: 58 %  AORTIC VALVE:                      Normal Ranges: AoV Vmax:                3.46 m/s  (<=1.7m/s) AoV Peak P.9 mmHg (<20mmHg) AoV Mean P.0 mmHg (1.7-11.5mmHg) LVOT Max Jak:            1.10 m/s  (<=1.1m/s) AoV VTI:                 76.60 cm  (18-25cm) LVOT VTI:                23.60 cm LVOT Diameter:           2.50 cm   (1.8-2.4cm) AoV Area, VTI:           1.51 cm2  (2.5-5.5cm2) AoV Area,Vmax:           1.56 cm2  (2.5-4.5cm2) AoV Dimensionless Index: 0.31  TRICUSPID VALVE/RVSP:         Normal Ranges: IVC Diam:             2.10 cm  [id number] Carson Freeman MD Electronically signed on 2025 at 6:58:36 PM  ** Final **     ECG 12 lead    Result Date: 2025  Sinus bradycardia with 1st degree AV block Left anterior fascicular block Minimal voltage criteria for LVH, may be normal variant ( Mukul product ) Abnormal ECG When compared with ECG of 23-DEC-2024 14:41, ST less elevated in Anterior leads T wave inversion now evident in Anterior leads    MR foot left wo IV contrast    Result Date: 2025  Interpreted By:  Júnior Sena, STUDY: MR FOOT LEFT WO IV CONTRAST;   INDICATION:  Signs/Symptoms:r/o osteo.   COMPARISON: Plain film radiographs February 11   ACCESSION NUMBER(S): [id number]   ORDERING CLINICIAN: ABIEL KENT   TECHNIQUE: Multiplanar multisequential MRI left foot without contrast.   FINDINGS: Postsurgical changes amputation of the left 5th digit to the level of the mid metatarsal shaft.   There is a large amount of subcutaneous soft tissue swelling with a dorsal skin wound at the medial side of the ankle joint roughly at the level of the ankle proper. There is no evidence of adjacent fluid collection. There is what looks to be some partial tearing of the tibialis anterior where the skin wound is seen at the level of the distal tibial metaphysis.   No definite abscess seen.   There is a significant amount of edema of the medial cuneiform. This involves nearly the entire bone although there is no definite erosive change and this is not directly in line with the wound. This is however, suspicious for a focus of osteomyelitis.   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 There is mild ankle arthrosis.   Small bone infarct of the anterior distal tibia. No other marrow edema seen.   No other fracture identified.   No evidence of acute tendinous or ligamentous tear.   No evidence of effusion.         Anteromedial skin wound at the level of the ankle joint with extensive and diffuse subcutaneous edema about the ankle and foot.   There is a large amount of edema isolated to the medial cuneiform. Although this is somewhat remote from the wound the possibility of osteomyelitis is a consideration.   Partial tearing tibialis anteriorly at the level of the distal ankle.   Small bone island distal tibia.   Other degenerative changes.   Signed by: Júnior Sena 2/12/2025 1:10 PM Dictation workstation:   [id number]    Lower extremity venous duplex left    Result Date: 2/11/2025  Interpreted By:  Maynor Kitchen, [facility]: [facility]  EXTREM[facility]UP[facility] LEFT  2/11/2025 5:14 pm   INDICATION: 73 y/o   M with  Signs/Symptoms:lle edema pain. LMP:  Unknown.       COMPARISON: None.   ACCESSION NUMBER(S): [id number]   ORDERING CLINICIAN: JUANITO HAIDER   TECHNIQUE: Routine ultrasound of the  right lower extremity was performed with duplex Doppler (color and spectral) evaluation.   Static images were obtained for remote interpretation.   FINDINGS: Deep venous thrombosis of the left superficial femoral veins throughout its course.       Deep venous thrombosis of the left superficial femoral vein.   MACRO: None   Signed by: Maynor Kitchen 2/11/2025 5:35 PM Dictation workstation:   [id number]    XR foot 3+ views bilateral    Result Date: 2/11/2025  Interpreted By:  Júnior Sena, STUDY: XR FOOT 3+ VIEWS BILATERAL   INDICATION: Signs/Symptoms:l foot ulcer top of foot fould odor,.   COMPARISON: September 12, 2024     ACCESSION NUMBER(S): [id number]   ORDERING CLINICIAN: JUANITO HAIDER   FINDINGS: Interval amputation of the 5th digit to the level of the 5th metatarsal shaft. Surgical margins are sharp.   Marked dorsal soft tissue swelling with likely soft tissue wound at the level of the tarsometatarsal articulations on the left.   The right foot demonstrates advanced degenerative changes with some mild dorsal soft tissue swelling with no acute osseous abnormality..         Interval amputation of the 5th digit to the level of the 5th metatarsal shaft. Surgical margins are sharp.   Marked dorsal soft tissue swelling with likely soft tissue wound at the level of the tarsometatarsal articulations on the left.   Mild soft tissue swelling right foot.   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  - Charts, labs, vitals and imaging all reviewed.   - Labs: Glucose 130, WBC 6.8  - Duplex US: + for DVT of femoral vein of the left lower extremity.  - Intra-op bone culture: pending     Plan:  - Abx: IV Vanc/Zosyn  - Patient is s/p excisional debridement of wound down to bone, bone biopsy of the tibia and application of wound vac to the left lower extremity (DOS: 02/13/25). Patient is doing well with no complaints.  - Dressings: wound vac with 4x4, Abd pad, Kerlix, tape. Plan for wound vac change on Monday, 02/17/25.  - Awaiting bone culture results for further treatment plan.  - Nursing staff is able to change/reinforce dressing if & as necessary until next day’s dressing change. Thank you.  - Podiatry will continue to follow while in house.     DVT ppx: Xarelto  Weightbearing: NWB to the LLE  Discharge: Pt to follow up 1 week after discharge with Dr. Ronald Christianson DPM   Podiatric Medicine & Surgery  Please Toño message me with any questions or concerns.            SIGNATURE: Ronald Christianson DPM PATIENT NAME: Elliot Partida   DATE: February 15, 2025 MRN: [id number]   TIME: 8:42 AM CONTACT: Haiku Message

## 2025-02-15 NOTE — PROGRESS NOTES
Elliot Partida is a 74 y.o. male on day 4 of admission presenting with Acute deep vein thrombosis (DVT) of femoral vein of left lower extremity (Multi).      Subjective     Complaining of foot pain, denies chest pain or shortness of breath.  Discussed echocardiogram results, questions answered.    Review of systems:  Constitutional: negative for fever, chills, or malaise  Neuro: negative for dizziness, headache, numbness, tingling  ENT: Negative for nasal congestion or sore throat  Resp: negative for shortness of breath, cough, or wheezing  CV: negative for chest pain, palpitations  GI: negative for abd pain, nausea, vomiting or diarrhea  : negative for dysuria, frequency, or urgency  Skin: Positive for lesions, wounds, or rash  Musculoskeletal: Positive for weakness, myalgia, or arthralgia  Endocrine: Negative for polyuria or polydipsia         Objective   Constitutional: Well developed, awake/alert/oriented x3, no distress, alert and cooperative  Eyes: PERRL, EOMI, clear sclera  ENMT: mucous membranes moist, no apparent injury, no lesions seen  Head/Neck: Neck supple, no apparent injury, thyroid without mass or tenderness, No JVD, trachea midline, no bruits  Respiratory/Thorax: Patent airways, CTAB, normal breath sounds with good chest expansion, thorax symmetric  Cardiovascular: Regular, rate and rhythm, no murmurs, 2+ equal pulses of the extremities, normal S 1and S 2  Gastrointestinal: Nondistended, soft, non-tender, no rebound tenderness or guarding, no masses palpable, no organomegaly, +BS, no bruits  Musculoskeletal: ROM intact, no joint swelling, normal strength  Extremities: normal extremities, no cyanosis edema, Dressing to left lower extremity, wound vac in place  Neurological: alert and oriented x3, intact senses, motor, response and reflexes, normal strength  Lymphatic: No significant lymphadenopathy  Psychological: Appropriate mood and behavior  Skin: Dressing to left lower extremity with wound  "vac      Last Recorded Vitals  /55 (BP Location: Left arm, Patient Position: Lying)   Pulse 57   Temp 36.5 °C (97.7 °F) (Temporal)   Resp 18   Ht 1.753 m (5' 9\")   Wt 89.1 kg (196 lb 6.9 oz)   SpO2 98%   BMI 29.01 kg/m²     Intake/Output last 3 Shifts:  I/O last 3 completed shifts:  In: 320 (3.6 mL/kg) [P.O.:120; IV Piggyback:200]  Out: 1125 (12.6 mL/kg) [Urine:1125 (0.4 mL/kg/hr)]  Weight: 89.1 kg   No intake/output data recorded.    Relevant Results  Scheduled medications  carBAMazepine XR, 100 mg, oral, BID  gabapentin, 300 mg, oral, TID  hydrALAZINE, 25 mg, oral, TID  insulin lispro, 0-10 Units, subcutaneous, TID AC  pantoprazole, 40 mg, oral, Daily before breakfast  piperacillin-tazobactam, 3.375 g, intravenous, q6h  polyethylene glycol, 17 g, oral, Daily  rivaroxaban, 15 mg, oral, BID   Followed by  [START ON 3/8/2025] rivaroxaban, 20 mg, oral, Daily with breakfast  traZODone, 50 mg, oral, Nightly  vancomycin, 1,000 mg, intravenous, q12h      Continuous medications     PRN medications  PRN medications: acetaminophen, acetaminophen, calcium carbonate, melatonin, ondansetron **OR** ondansetron, oxyCODONE, sennosides-docusate sodium, vancomycin    Results for orders placed or performed during the hospital encounter of 02/11/25 (from the past 24 hours)   POCT GLUCOSE   Result Value Ref Range    POCT Glucose 170 (H) 74 - 99 mg/dL   POCT GLUCOSE   Result Value Ref Range    POCT Glucose 126 (H) 74 - 99 mg/dL   POCT GLUCOSE   Result Value Ref Range    POCT Glucose 116 (H) 74 - 99 mg/dL   POCT GLUCOSE   Result Value Ref Range    POCT Glucose 137 (H) 74 - 99 mg/dL       Transthoracic Echo Limited   Final Result      MR foot left wo IV contrast   Final Result   Anteromedial skin wound at the level of the ankle joint with   extensive and diffuse subcutaneous edema about the ankle and foot.        There is a large amount of edema isolated to the medial cuneiform.   Although this is somewhat remote from the " wound the possibility of   osteomyelitis is a consideration.        Partial tearing tibialis anteriorly at the level of the distal ankle.        Small bone island distal tibia.        Other degenerative changes.        Signed by: Júnior Sena 2/12/2025 1:10 PM   Dictation workstation:   [id number]      Lower extremity venous duplex left   Final Result   Deep venous thrombosis of the left superficial femoral vein.        MACRO:   None        Signed by: Maynor Kitchen 2/11/2025 5:35 PM   Dictation workstation:   [id number]      XR foot 3+ views bilateral   Final Result        Interval amputation of the 5th digit to the level of the 5th   metatarsal shaft. Surgical margins are sharp.        Marked dorsal soft tissue swelling with likely soft tissue wound at   the level of the tarsometatarsal articulations on the left.        Mild soft tissue swelling right foot.        Signed by: Júnior Sena 2/11/2025 4:19 PM   Dictation workstation:   [id number]          Transthoracic Echo Limited    Result Date: 2/13/2025   [facility], [address] [place] [address]              Tel [phone] and Fax [phone] TRANSTHORACIC ECHOCARDIOGRAM REPORT  Patient Name:       SUBHASH SEGAL    Reading Physician:    [id number] Carson Freeman MD Study Date:         2/13/2025           Ordering Provider:    [id number] JUAN F NORRIS MRN/PID:            [id number]            Fellow: Accession#:         [id number]        Nurse: Date of Birth/Age:  1950 / 74 years Sonographer:          Trinh Sung RDCS Gender assigned at  M                   Additional Staff: Birth: Height:             175.26 cm           Admit Date: Weight:             88.91 kg            Admission Status:     Inpatient -                                                               Routine BSA / BMI:          2.05 m2 /  28.94     Encounter#:           [id number]                     kg/m2 Blood Pressure:     127/53 mmHg         Department Location:  [facility] Study Type:    TRANSTHORACIC ECHO (TTE) LIMITED Diagnosis/ICD: Nonrheumatic aortic (valve) stenosis-I35.0 Indication:    Aortic Stenosis CPT Code:      Echo Limited-78605; Doppler Limited-21486; Color Doppler-53039 Patient History: Valve Disorders:   Aortic Stenosis. Diabetes:          Yes Pertinent History: Murmur, CAD, PVD and Hyperlipidemia. Study Detail: The following Echo studies were performed: 2D, M-Mode, Doppler and               color flow. Technically challenging study due to body habitus and               low pain tolerance. The patient was awake.  PHYSICIAN INTERPRETATION: Left Ventricle: The left ventricular systolic function is normal, with a visually estimated ejection fraction of 55-60%. There are no regional left ventricular wall motion abnormalities. The left ventricular cavity size is normal. There is mildly increased septal and mildly increased posterior left ventricular wall thickness. There is left ventricular concentric remodeling. Spectral Doppler shows a Grade I (impaired relaxation pattern) of left ventricular diastolic filling with normal left atrial filling pressure. Left Atrium: The left atrial size is upper limits of normal. Right Ventricle: The right ventricle is normal in size. There is normal right ventricular global systolic function. Right Atrium: The right atrium is normal in size. Aortic Valve: The aortic valve is probably trileaflet. There is moderate aortic valve cusp calcification. There is evidence of moderate aortic valve stenosis. The aortic valve dimensionless index is 0.31. There is no evidence of aortic valve regurgitation. The peak instantaneous gradient of the aortic valve is 48 mmHg. The mean gradient of the aortic valve is 28 mmHg. Mitral Valve: The mitral  valve is normal in structure. There is moderate to severe mitral annular calcification. There is mild mitral valve regurgitation. Tricuspid Valve: The tricuspid valve is structurally normal. There is mild tricuspid regurgitation. Pulmonic Valve: The pulmonic valve is structurally normal. There is physiologic pulmonic valve regurgitation. Pericardium: There is no pericardial effusion noted. Aorta: The aortic root is normal. Systemic Veins: The inferior vena cava appears normal in size.  CONCLUSIONS:  1. The left ventricular systolic function is normal, with a visually estimated ejection fraction of 55-60%.  2. Spectral Doppler shows a Grade I (impaired relaxation pattern) of left ventricular diastolic filling with normal left atrial filling pressure.  3. There is normal right ventricular global systolic function.  4. There is moderate to severe mitral annular calcification.  5. Moderate aortic valve stenosis. Dimensionless index 0.31 : moderate stenosis. Peak and mean gradients around 48 and 28 mm Hg respectively. Estimated aortic valve area around 1 cm2.  6. There is moderate aortic valve cusp calcification. QUANTITATIVE DATA SUMMARY:  2D MEASUREMENTS:         Normal Ranges: IVSd:            1.05 cm (0.6-1.1cm) LVPWd:           1.09 cm (0.6-1.1cm) LVIDd:           4.60 cm (3.9-5.9cm) LVIDs:           2.96 cm LV Mass Index:   85 g/m2 LV % FS          35.7 %  LV SYSTOLIC FUNCTION:                      Normal Ranges: EF-Visual:      58 % LV EF Reported: 58 %  AORTIC VALVE:                      Normal Ranges: AoV Vmax:                3.46 m/s  (<=1.7m/s) AoV Peak P.9 mmHg (<20mmHg) AoV Mean P.0 mmHg (1.7-11.5mmHg) LVOT Max Jak:            1.10 m/s  (<=1.1m/s) AoV VTI:                 76.60 cm  (18-25cm) LVOT VTI:                23.60 cm LVOT Diameter:           2.50 cm   (1.8-2.4cm) AoV Area, VTI:           1.51 cm2  (2.5-5.5cm2) AoV Area,Vmax:           1.56 cm2  (2.5-4.5cm2) AoV  Dimensionless Index: 0.31  TRICUSPID VALVE/RVSP:         Normal Ranges: IVC Diam:             2.10 cm  [id number] Carson Freeman MD Electronically signed on 2/13/2025 at 6:58:36 PM  ** Final **     Transthoracic Echo (TTE) Complete    Result Date: 9/13/2024   [facility], [address] [place] [address]              Tel [phone] and Fax [phone] TRANSTHORACIC ECHOCARDIOGRAM REPORT  Patient Name:      SUBHASH SEGAL     Reading Physician:    [id number] Carson Freeman MD Study Date:        9/13/2024            Ordering Provider:    [id number] ABIEL KENT MRN/PID:           [id number]             Fellow: Accession#:        [id number]         Nurse: Date of Birth/Age: 1950 / 74 years  Sonographer:          Cailin Julian RDCS Gender:            M                    Additional Staff: Height:            175.26 cm            Admit Date:           9/12/2024 Weight:            107.05 kg            Admission Status:     Inpatient -                                                               Routine BSA / BMI:         2.22 m2 / 34.85      Encounter#:           [id number]                    kg/m2 Blood Pressure:    137/66 mmHg          Department Location:  [facility] Study Type:    TRANSTHORACIC ECHO (TTE) COMPLETE Diagnosis/ICD: Abnormal electrocardiogram [ECG] [EKG]-R94.31 Indication:    Abnormal EKG CPT Code:      Echo Complete w Full Doppler-47364 Patient History: Pertinent History: Edema, DM, abnomal EKG, wound infection, WMA. Study Detail: The following Echo studies were performed: 2D, M-Mode, Doppler and               color flow. Technically challenging study due to body habitus,               patient lying in supine position, prominent lung artifact and poor               acoustic  windows. Definity used as a contrast agent for               endocardial border definition. Total contrast used for this               procedure was 3.0 mL via IV push.  PHYSICIAN INTERPRETATION: Left Ventricle: The left ventricular systolic function is normal, with a visually estimated ejection fraction of 60-65%. There are no regional left ventricular wall motion abnormalities. The left ventricular cavity size is normal. Spectral Doppler shows an impaired relaxation pattern of left ventricular diastolic filling. Left Atrium: The left atrium is normal in size. Right Ventricle: The right ventricle is normal in size. There is normal right ventricular global systolic function. Right Atrium: The right atrium is normal in size. Aortic Valve: The aortic valve is trileaflet. There is moderate aortic valve cusp calcification. There is evidence of mild to moderate aortic valve stenosis. There is no evidence of aortic valve regurgitation. The peak instantaneous gradient of the aortic valve is 26.2 mmHg. Mitral Valve: The mitral valve is normal in structure. There is mild mitral valve regurgitation. Tricuspid Valve: The tricuspid valve is structurally normal. There is mild tricuspid regurgitation. Pulmonic Valve: The pulmonic valve is structurally normal. There is physiologic pulmonic valve regurgitation. Pericardium: There is no pericardial effusion noted. Aorta: The aortic root is normal. Pulmonary Artery: The estimated pulmonary artery pressure is normal. Systemic Veins: The inferior vena cava appears mildly dilated.  CONCLUSIONS:  1. The left ventricular systolic function is normal, with a visually estimated ejection fraction of 60-65%.  2. Spectral Doppler shows an impaired relaxation pattern of left ventricular diastolic filling.  3. There is normal right ventricular global systolic function.  4. Mild to moderate aortic valve stenosis.  5. There is moderate aortic valve cusp calcification.  6. The inferior vena cava  appears mildly dilated. QUANTITATIVE DATA SUMMARY:  2D MEASUREMENTS:         Normal Ranges: Ao Root d:       3.70 cm (2.0-3.7cm)  AORTA MEASUREMENTS:         Normal Ranges: Asc Ao, d:          3.90 cm (2.1-3.4cm)  LV SYSTOLIC FUNCTION BY 2D PLANIMETRY (MOD):                      Normal Ranges: EF-Visual:      63 % LV EF Reported: 63 %  LV DIASTOLIC FUNCTION:           Normal Ranges: MV Peak E:             1.05 m/s  (0.7-1.2 m/s) MV Peak A:             1.50 m/s  (0.42-0.7 m/s) E/A Ratio:             0.70      (1.0-2.2) MV e'                  0.073 m/s (>8.0) MV lateral e'          0.08 m/s MV medial e'           0.07 m/s E/e' Ratio:            14.40     (<8.0)  AORTIC VALVE:            Normal Ranges: AoV Vmax:      2.56 m/s  (<=1.7m/s) AoV Peak P.2 mmHg (<20mmHg) LVOT Max Jak:  1.25 m/s  (<=1.1m/s) LVOT VTI:      23.90 cm LVOT Diameter: 2.20 cm   (1.8-2.4cm) AoV Area,Vmax: 1.86 cm2  (2.5-4.5cm2)  RIGHT VENTRICLE: TAPSE: 21.1 mm RV s'  0.12 m/s  TRICUSPID VALVE/RVSP:         Normal Ranges: IVC Diam:             2.33 cm  PULMONIC VALVE:          Normal Ranges: PV Max Jak:     1.2 m/s  (0.6-0.9m/s) PV Max P.2 mmHg  [id number] Carson Freeman MD Electronically signed on 2024 at 7:18:35 PM  ** Final **           Assessment/Plan   Assessment & Plan  Acute deep vein thrombosis (DVT) of femoral vein of left lower extremity (Multi)    Wound infection    Benign essential HTN    DM type 2 with diabetic peripheral neuropathy    Uncontrolled type 2 diabetes mellitus with hyperglycemia    Wound eschar of foot    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      Left  foot infection, osteomyelitis  -radiology reports reviewed  -Antibiotics  -Podiatry following, planning to OR today  -blood cultures pending     -Denies any ACS symptoms  -EKG ordered and reviewed, SR, no acute ischemic changes  -Murmur noted->previous echo showing mild to moderate AS  -Echocardiogram reviewed as above, moderate aortic stenosis, continue to monitor outpatient with annual echo  -[facility] as above  -He is cleared for surgery at an average cardiac risk  -EKG and troponin with any ACS symptoms  -Monitor on tele     2. Acute DVT  -Has a hx of DVT, was on eliquis and stopped taking  -US showed DVT in left superficial femoral vein   -Heparin gtt stopped  -Continue Xarelto     3. Anemia  -hgb 7.7 today  -Previous iron level slightly low at 44  -Recheck iron studies  -Monitor on AC     4. Diabetes mellitus type 2  -ISS  -Accuchecks  -Hgb A1C Jan 2025-5.8%     5. Hypertension  -stable  -2gm na diet  -cont meds  -monitor     6. Valvular heart disease  - moderate aortic valve stenosis  - repeat echocardiogram annually    SARAH Lim-CNP

## 2025-02-15 NOTE — PROGRESS NOTES
"Elliot Partida is a 74 y.o. male on day 4 of admission presenting with Acute deep vein thrombosis (DVT) of femoral vein of left lower extremity (Multi).    Subjective     No overnight events reported.     Patient is resting in bed on room air this morning. He denies any pain or shortness of breath and has a good appetite. Wound vac remains in place, podiatry is following the patient. Plan of care discussed with patient, all questions answered.         Objective     Physical Exam  Constitutional:       General: He is not in acute distress.     Appearance: He is not toxic-appearing.   HENT:      Head: Normocephalic and atraumatic.      Mouth/Throat:      Mouth: Mucous membranes are moist.   Eyes:      Conjunctiva/sclera: Conjunctivae normal.   Cardiovascular:      Rate and Rhythm: Normal rate and regular rhythm.   Pulmonary:      Effort: No respiratory distress.      Breath sounds: Normal breath sounds.   Abdominal:      General: There is no distension.      Palpations: Abdomen is soft.      Tenderness: There is no abdominal tenderness.   Musculoskeletal:         General: No swelling.   Skin:     General: Skin is warm and dry.      Comments: LLE wrapped with clean/dry dressing, wound vac in place with bloody drainage    Neurological:      Mental Status: He is alert and oriented to person, place, and time.   Psychiatric:         Mood and Affect: Mood normal.         Behavior: Behavior normal.         Last Recorded Vitals  Blood pressure 139/60, pulse 70, temperature 36.6 °C (97.9 °F), temperature source Temporal, resp. rate 18, height 1.753 m (5' 9\"), weight 89.1 kg (196 lb 6.9 oz), SpO2 96%.  Intake/Output last 3 Shifts:  I/O last 3 completed shifts:  In: 320 (3.6 mL/kg) [P.O.:120; IV Piggyback:200]  Out: 1125 (12.6 mL/kg) [Urine:1125 (0.4 mL/kg/hr)]  Weight: 89.1 kg     Relevant Results          Scheduled medications  carBAMazepine XR, 100 mg, oral, BID  gabapentin, 300 mg, oral, TID  hydrALAZINE, 25 mg, oral, " TID  insulin lispro, 0-10 Units, subcutaneous, TID AC  pantoprazole, 40 mg, oral, Daily before breakfast  piperacillin-tazobactam, 3.375 g, intravenous, q6h  polyethylene glycol, 17 g, oral, Daily  rivaroxaban, 15 mg, oral, BID   Followed by  [START ON 3/8/2025] rivaroxaban, 20 mg, oral, Daily with breakfast  traZODone, 50 mg, oral, Nightly  vancomycin, 1,000 mg, intravenous, q12h      Continuous medications     PRN medications  PRN medications: acetaminophen, acetaminophen, calcium carbonate, melatonin, ondansetron **OR** ondansetron, oxyCODONE, sennosides-docusate sodium, vancomycin    Results for orders placed or performed during the hospital encounter of 02/11/25 (from the past 24 hours)   POCT GLUCOSE   Result Value Ref Range    POCT Glucose 126 (H) 74 - 99 mg/dL   POCT GLUCOSE   Result Value Ref Range    POCT Glucose 116 (H) 74 - 99 mg/dL   POCT GLUCOSE   Result Value Ref Range    POCT Glucose 137 (H) 74 - 99 mg/dL   Basic metabolic panel   Result Value Ref Range    Glucose 148 (H) 74 - 99 mg/dL    Sodium 140 136 - 145 mmol/L    Potassium 4.1 3.5 - 5.3 mmol/L    Chloride 105 98 - 107 mmol/L    Bicarbonate 29 21 - 32 mmol/L    Anion Gap 10 10 - 20 mmol/L    Urea Nitrogen 14 6 - 23 mg/dL    Creatinine 0.74 0.50 - 1.30 mg/dL    eGFR >90 >60 mL/min/1.73m*2    Calcium 8.4 (L) 8.6 - 10.3 mg/dL   CBC   Result Value Ref Range    WBC 6.8 4.4 - 11.3 x10*3/uL    nRBC      RBC 2.98 (L) 4.50 - 5.90 x10*6/uL    Hemoglobin 7.7 (L) 13.5 - 17.5 g/dL    Hematocrit 25.4 (L) 41.0 - 52.0 %    MCV 85 80 - 100 fL    MCH 25.8 (L) 26.0 - 34.0 pg    MCHC 30.3 (L) 32.0 - 36.0 g/dL    RDW 13.7 11.5 - 14.5 %    Platelets 301 150 - 450 x10*3/uL   POCT GLUCOSE   Result Value Ref Range    POCT Glucose 151 (H) 74 - 99 mg/dL     Transthoracic Echo Limited    Result Date: 2/13/2025   [facility], [address] [address]              Tel [phone] and Fax [phone] TRANSTHORACIC ECHOCARDIOGRAM REPORT  Patient  Name:       SUBHASH Cain Physician:    [id number] Carson Freeman MD Study Date:         2/13/2025           Ordering Provider:    [id number] JUAN F NORRIS MRN/PID:            [id number]            Fellow: Accession#:         [id number]        Nurse: Date of Birth/Age:  1950 / 74 years Sonographer:          Trinh Sung RDCS Gender assigned at  M                   Additional Staff: Birth: Height:             175.26 cm           Admit Date: Weight:             88.91 kg            Admission Status:     Inpatient -                                                               Routine BSA / BMI:          2.05 m2 / 28.94     Encounter#:           [id number]                     kg/m2 Blood Pressure:     127/53 mmHg         Department Location:  [facility] Study Type:    TRANSTHORACIC ECHO (TTE) LIMITED Diagnosis/ICD: Nonrheumatic aortic (valve) stenosis-I35.0 Indication:    Aortic Stenosis CPT Code:      Echo Limited-45352; Doppler Limited-87652; Color Doppler-37566 Patient History: Valve Disorders:   Aortic Stenosis. Diabetes:          Yes Pertinent History: Murmur, CAD, PVD and Hyperlipidemia. Study Detail: The following Echo studies were performed: 2D, M-Mode, Doppler and               color flow. Technically challenging study due to body habitus and               low pain tolerance. The patient was awake.  PHYSICIAN INTERPRETATION: Left Ventricle: The left ventricular systolic function is normal, with a visually estimated ejection fraction of 55-60%. There are no regional left ventricular wall motion abnormalities. The left ventricular cavity size is normal. There is mildly increased septal and mildly increased posterior left ventricular wall thickness. There is left ventricular concentric remodeling.  Spectral Doppler shows a Grade I (impaired relaxation pattern) of left ventricular diastolic filling with normal left atrial filling pressure. Left Atrium: The left atrial size is upper limits of normal. Right Ventricle: The right ventricle is normal in size. There is normal right ventricular global systolic function. Right Atrium: The right atrium is normal in size. Aortic Valve: The aortic valve is probably trileaflet. There is moderate aortic valve cusp calcification. There is evidence of moderate aortic valve stenosis. The aortic valve dimensionless index is 0.31. There is no evidence of aortic valve regurgitation. The peak instantaneous gradient of the aortic valve is 48 mmHg. The mean gradient of the aortic valve is 28 mmHg. Mitral Valve: The mitral valve is normal in structure. There is moderate to severe mitral annular calcification. There is mild mitral valve regurgitation. Tricuspid Valve: The tricuspid valve is structurally normal. There is mild tricuspid regurgitation. Pulmonic Valve: The pulmonic valve is structurally normal. There is physiologic pulmonic valve regurgitation. Pericardium: There is no pericardial effusion noted. Aorta: The aortic root is normal. Systemic Veins: The inferior vena cava appears normal in size.  CONCLUSIONS:  1. The left ventricular systolic function is normal, with a visually estimated ejection fraction of 55-60%.  2. Spectral Doppler shows a Grade I (impaired relaxation pattern) of left ventricular diastolic filling with normal left atrial filling pressure.  3. There is normal right ventricular global systolic function.  4. There is moderate to severe mitral annular calcification.  5. Moderate aortic valve stenosis. Dimensionless index 0.31 : moderate stenosis. Peak and mean gradients around 48 and 28 mm Hg respectively. Estimated aortic valve area around 1 cm2.  6. There is moderate aortic valve cusp calcification. QUANTITATIVE DATA SUMMARY:  2D MEASUREMENTS:          Normal Ranges: IVSd:            1.05 cm (0.6-1.1cm) LVPWd:           1.09 cm (0.6-1.1cm) LVIDd:           4.60 cm (3.9-5.9cm) LVIDs:           2.96 cm LV Mass Index:   85 g/m2 LV % FS          35.7 %  LV SYSTOLIC FUNCTION:                      Normal Ranges: EF-Visual:      58 % LV EF Reported: 58 %  AORTIC VALVE:                      Normal Ranges: AoV Vmax:                3.46 m/s  (<=1.7m/s) AoV Peak P.9 mmHg (<20mmHg) AoV Mean P.0 mmHg (1.7-11.5mmHg) LVOT Max Jak:            1.10 m/s  (<=1.1m/s) AoV VTI:                 76.60 cm  (18-25cm) LVOT VTI:                23.60 cm LVOT Diameter:           2.50 cm   (1.8-2.4cm) AoV Area, VTI:           1.51 cm2  (2.5-5.5cm2) AoV Area,Vmax:           1.56 cm2  (2.5-4.5cm2) AoV Dimensionless Index: 0.31  TRICUSPID VALVE/RVSP:         Normal Ranges: IVC Diam:             2.10 cm  [id number] Carson Freeman MD Electronically signed on 2025 at 6:58:36 PM  ** Final **     ECG 12 lead    Result Date: 2025  Sinus bradycardia with 1st degree AV block Left anterior fascicular block Minimal voltage criteria for LVH, may be normal variant ( Mukul product ) Abnormal ECG When compared with ECG of 23-DEC-2024 14:41, ST less elevated in Anterior leads T wave inversion now evident in Anterior leads    MR foot left wo IV contrast    Result Date: 2025  Interpreted By:  Júnior Sena, STUDY: MR FOOT LEFT WO IV CONTRAST;   INDICATION: Signs/Symptoms:r/o osteo.   COMPARISON: Plain film radiographs    ACCESSION NUMBER(S): [id number]   ORDERING CLINICIAN: ABIEL KENT   TECHNIQUE: Multiplanar multisequential MRI left foot without contrast.   FINDINGS: Postsurgical changes amputation of the left 5th digit to the level of the mid metatarsal shaft.   There is a large amount of subcutaneous soft tissue swelling with a dorsal skin wound at the medial side of the ankle joint roughly at the level of the ankle proper. There is no  evidence of adjacent fluid collection. There is what looks to be some partial tearing of the tibialis anterior where the skin wound is seen at the level of the distal tibial metaphysis.   No definite abscess seen.   There is a significant amount of edema of the medial cuneiform. This involves nearly the entire bone although there is no definite erosive change and this is not directly in line with the wound. This is however, suspicious for a focus of osteomyelitis.   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 There is mild ankle arthrosis.   Small bone infarct of the anterior distal tibia. No other marrow edema seen.   No other fracture identified.   No evidence of acute tendinous or ligamentous tear.   No evidence of effusion.         Anteromedial skin wound at the level of the ankle joint with extensive and diffuse subcutaneous edema about the ankle and foot.   There is a large amount of edema isolated to the medial cuneiform. Although this is somewhat remote from the wound the possibility of osteomyelitis is a consideration.   Partial tearing tibialis anteriorly at the level of the distal ankle.   Small bone island distal tibia.   Other degenerative changes.   Signed by: Júnior Sena 2/12/2025 1:10 PM Dictation workstation:   [id number]    Lower extremity venous duplex left    Result Date: 2/11/2025  Interpreted By:  Maynor Kitchen, STUDY: [facility] LOWER EXTREMITY VENOUS DUPLEX LEFT  2/11/2025 5:14 pm   INDICATION: 73 y/o   M with  Signs/Symptoms:lle edema pain. LMP:  Unknown.       COMPARISON: None.   ACCESSION NUMBER(S): [id number]   ORDERING CLINICIAN: JUANITO HAIDER   TECHNIQUE: Routine ultrasound of the  right lower extremity was performed with duplex Doppler (color and spectral) evaluation.   Static images were obtained for remote interpretation.   FINDINGS: Deep venous thrombosis of the left superficial femoral veins throughout its course.        Deep venous thrombosis of the left superficial femoral vein.   MACRO: None   Signed by: Maynor Kitchen 2/11/2025 5:35 PM Dictation workstation:   [id number]    XR foot 3+ views bilateral    Result Date: 2/11/2025  Interpreted By:  Júnior Sena, STUDY: XR FOOT 3+ VIEWS BILATERAL   INDICATION: Signs/Symptoms:l foot ulcer top of foot fould odor,.   COMPARISON: September 12, 2024     ACCESSION NUMBER(S): [id number]   ORDERING CLINICIAN: JUANITO HAIDER   FINDINGS: Interval amputation of the 5th digit to the level of the 5th metatarsal shaft. Surgical margins are sharp.   Marked dorsal soft tissue swelling with likely soft tissue wound at the level of the tarsometatarsal articulations on the left.   The right foot demonstrates advanced degenerative changes with some mild dorsal soft tissue swelling with no acute osseous abnormality..         Interval amputation of the 5th digit to the level of the 5th metatarsal shaft. Surgical margins are sharp.   Marked dorsal soft tissue swelling with likely soft tissue wound at the level of the tarsometatarsal articulations on the left.   Mild soft tissue swelling right foot.   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   - Stopped taking apixaban prior to admission   - given heparin bolus in the ED and started on heparin drip; now discontinued  - continue rivaroxaban 15 mg BID x 21 days then 20 mg daily  - daily CBC      Left dorsal foot wound  Acute on chronic osteomyelitis of left foot  - present on admission; pictures in chart  - s/p I&D with bone biopsy on 2/13/25  - BCx negative x 3 days   - wound culture collected 2/11 grew mixed gram negative and gram positive organism  - wound culture from bone collected 2/13 growing enterococcus faecalis   - conitnue zosyn 3.375 g q6h and vancomycin 1,000 mL BID; day 4  - Wound Vac/dressing changes per podiatry recs  - Oxycodone 5 mg q6h PRN for severe pain   - PT/OT evals; keep NWB to LLE      Anemia, normocytic  - H/H 7.5/24.4 > 7.7/25.4  - MCV 84 > 85  - Iron studies: iron 12, 5% saturation  - Start ferrous sulfate 325 mg every day  - Daily CBC   - Transfuse pRBCs for Hgb < 7.0     Generalized Weakness  - PT/OT evaluation; recommending moderate intensity therapy     Essential HTN  - continue hydralazine 25 mg TID  - telemetry monitoring  - monitor BP     DM Type 2 with peripheral neuropathy  - A1c 5.8 last month   - SSI Lispro 0-10 scale  - continue gabapentin 300 mg TID  - monitor blood sugar ac/hs/prn  - diabetic diet      Trigeminal neuralgia  - continue carbamazepine 100 mg BID     Insomnia  - continue trazodone 50 mg at bedtime      PUD ppx  - continue pantoprazole 40 mg daily     Code status: Full     Disposition: Patient requires more than 2 inpatient days.           Ann-Marie Givens PA-C

## 2025-02-15 NOTE — CARE PLAN
The patient's goals for the shift include  Unsure    The clinical goals for the shift include Pt will tolerate Heparin drip and remain therapeutic via bloodwork    Over the shift, the patient remained safe and stable.      Problem: Pain - Adult  Goal: Verbalizes/displays adequate comfort level or baseline comfort level  Outcome: Progressing     Problem: Discharge Planning  Goal: Discharge to home or other facility with appropriate resources  Outcome: Progressing     Problem: Chronic Conditions and Co-morbidities  Goal: Patient's chronic conditions and co-morbidity symptoms are monitored and maintained or improved  Outcome: Progressing     Problem: Nutrition  Goal: Nutrient intake appropriate for maintaining nutritional needs  Outcome: Progressing     Problem: Diabetes  Goal: Achieve decreasing blood glucose levels by end of shift  Outcome: Progressing  Goal: Increase stability of blood glucose readings by end of shift  Outcome: Progressing  Goal: Decrease in ketones present in urine by end of shift  Outcome: Progressing  Goal: Maintain electrolyte levels within acceptable range throughout shift  Outcome: Progressing  Goal: Learn about and adhere to nutrition recommendations by end of shift  Outcome: Progressing  Goal: Vital signs within normal range for age by end of shift  Outcome: Progressing  Goal: Increase self care and/or family involovement by end of shift  Outcome: Progressing  Goal: Receive DSME education by end of shift  Outcome: Progressing     Problem: Pain  Goal: Takes deep breaths with improved pain control throughout the shift  Outcome: Progressing  Goal: Turns in bed with improved pain control throughout the shift  Outcome: Progressing  Goal: Walks with improved pain control throughout the shift  Outcome: Progressing  Goal: Performs ADL's with improved pain control throughout shift  Outcome: Progressing  Goal: Participates in PT with improved pain control throughout the shift  Outcome:  Progressing  Goal: Free from opioid side effects throughout the shift  Outcome: Progressing  Goal: Free from acute confusion related to pain meds throughout the shift  Outcome: Progressing     Problem: Skin  Goal: Decreased wound size/increased tissue granulation at next dressing change  Outcome: Progressing  Goal: Participates in plan/prevention/treatment measures  2/15/2025 0522 by Aniceto Graves RN  Outcome: Progressing  2/15/2025 0049 by Aniceto Graves RN  Flowsheets (Taken 2/15/2025 0049)  Participates in plan/prevention/treatment measures:   Increase activity/out of bed for meals   Elevate heels  Goal: Prevent/manage excess moisture  Outcome: Progressing  Goal: Prevent/minimize sheer/friction injuries  Outcome: Progressing  Goal: Promote/optimize nutrition  2/15/2025 0522 by Aniceto Graves RN  Outcome: Progressing  2/15/2025 0049 by Aniceto Graves RN  Flowsheets (Taken 2/15/2025 0049)  Promote/optimize nutrition:   Consume > 50% meals/supplements   Monitor/record intake including meals  Goal: Promote skin healing  Outcome: Progressing

## 2025-02-16 VITALS
SYSTOLIC BLOOD PRESSURE: 115 MMHG | OXYGEN SATURATION: 95 % | HEIGHT: 69 IN | HEART RATE: 63 BPM | DIASTOLIC BLOOD PRESSURE: 48 MMHG | TEMPERATURE: 98.4 F | BODY MASS INDEX: 29.09 KG/M2 | WEIGHT: 196.43 LBS | RESPIRATION RATE: 17 BRPM

## 2025-02-16 LAB
ANION GAP SERPL CALC-SCNC: 10 MMOL/L (ref 10–20)
BACTERIA BLD CULT: NORMAL
BACTERIA BLD CULT: NORMAL
BUN SERPL-MCNC: 14 MG/DL (ref 6–23)
CALCIUM SERPL-MCNC: 8.8 MG/DL (ref 8.6–10.3)
CHLORIDE SERPL-SCNC: 104 MMOL/L (ref 98–107)
CO2 SERPL-SCNC: 28 MMOL/L (ref 21–32)
CREAT SERPL-MCNC: 0.8 MG/DL (ref 0.5–1.3)
EGFRCR SERPLBLD CKD-EPI 2021: >90 ML/MIN/1.73M*2
ERYTHROCYTE [DISTWIDTH] IN BLOOD BY AUTOMATED COUNT: 13.6 % (ref 11.5–14.5)
GLUCOSE BLD MANUAL STRIP-MCNC: 107 MG/DL (ref 74–99)
GLUCOSE BLD MANUAL STRIP-MCNC: 118 MG/DL (ref 74–99)
GLUCOSE BLD MANUAL STRIP-MCNC: 132 MG/DL (ref 74–99)
GLUCOSE BLD MANUAL STRIP-MCNC: 140 MG/DL (ref 74–99)
GLUCOSE SERPL-MCNC: 114 MG/DL (ref 74–99)
HCT VFR BLD AUTO: 26.2 % (ref 41–52)
HGB BLD-MCNC: 7.8 G/DL (ref 13.5–17.5)
MCH RBC QN AUTO: 25.4 PG (ref 26–34)
MCHC RBC AUTO-ENTMCNC: 29.8 G/DL (ref 32–36)
MCV RBC AUTO: 85 FL (ref 80–100)
NRBC BLD-RTO: 0 /100 WBCS (ref 0–0)
PLATELET # BLD AUTO: 326 X10*3/UL (ref 150–450)
POTASSIUM SERPL-SCNC: 4.2 MMOL/L (ref 3.5–5.3)
RBC # BLD AUTO: 3.07 X10*6/UL (ref 4.5–5.9)
SODIUM SERPL-SCNC: 138 MMOL/L (ref 136–145)
VANCOMYCIN SERPL-MCNC: 16.2 UG/ML (ref 5–20)
WBC # BLD AUTO: 8.2 X10*3/UL (ref 4.4–11.3)

## 2025-02-16 PROCEDURE — 2500000001 HC RX 250 WO HCPCS SELF ADMINISTERED DRUGS (ALT 637 FOR MEDICARE OP): Mod: IPSPLIT | Performed by: NURSE PRACTITIONER

## 2025-02-16 PROCEDURE — 2500000002 HC RX 250 W HCPCS SELF ADMINISTERED DRUGS (ALT 637 FOR MEDICARE OP, ALT 636 FOR OP/ED): Mod: IPSPLIT | Performed by: NURSE PRACTITIONER

## 2025-02-16 PROCEDURE — 85027 COMPLETE CBC AUTOMATED: CPT | Mod: IPSPLIT

## 2025-02-16 PROCEDURE — 2500000001 HC RX 250 WO HCPCS SELF ADMINISTERED DRUGS (ALT 637 FOR MEDICARE OP): Mod: IPSPLIT

## 2025-02-16 PROCEDURE — 94760 N-INVAS EAR/PLS OXIMETRY 1: CPT | Mod: IPSPLIT

## 2025-02-16 PROCEDURE — 2500000004 HC RX 250 GENERAL PHARMACY W/ HCPCS (ALT 636 FOR OP/ED): Mod: IPSPLIT | Performed by: NURSE PRACTITIONER

## 2025-02-16 PROCEDURE — 99233 SBSQ HOSP IP/OBS HIGH 50: CPT

## 2025-02-16 PROCEDURE — 80202 ASSAY OF VANCOMYCIN: CPT | Mod: IPSPLIT | Performed by: NURSE PRACTITIONER

## 2025-02-16 PROCEDURE — 80048 BASIC METABOLIC PNL TOTAL CA: CPT | Mod: IPSPLIT

## 2025-02-16 PROCEDURE — 1200000002 HC GENERAL ROOM WITH TELEMETRY DAILY: Mod: IPSPLIT

## 2025-02-16 PROCEDURE — 82947 ASSAY GLUCOSE BLOOD QUANT: CPT | Mod: IPSPLIT

## 2025-02-16 PROCEDURE — 36415 COLL VENOUS BLD VENIPUNCTURE: CPT | Mod: IPSPLIT

## 2025-02-16 RX ADMIN — GABAPENTIN 300 MG: 300 CAPSULE ORAL at 20:56

## 2025-02-16 RX ADMIN — RIVAROXABAN 15 MG: 15 TABLET, FILM COATED ORAL at 17:04

## 2025-02-16 RX ADMIN — HYDRALAZINE HYDROCHLORIDE 25 MG: 25 TABLET ORAL at 20:56

## 2025-02-16 RX ADMIN — HYDRALAZINE HYDROCHLORIDE 25 MG: 25 TABLET ORAL at 17:04

## 2025-02-16 RX ADMIN — PIPERACILLIN SODIUM AND TAZOBACTAM SODIUM 3.38 G: 3; .375 INJECTION, SOLUTION INTRAVENOUS at 06:27

## 2025-02-16 RX ADMIN — PIPERACILLIN SODIUM AND TAZOBACTAM SODIUM 3.38 G: 3; .375 INJECTION, SOLUTION INTRAVENOUS at 12:11

## 2025-02-16 RX ADMIN — TRAZODONE HYDROCHLORIDE 50 MG: 50 TABLET ORAL at 20:56

## 2025-02-16 RX ADMIN — RIVAROXABAN 15 MG: 15 TABLET, FILM COATED ORAL at 09:47

## 2025-02-16 RX ADMIN — CARBAMAZEPINE 100 MG: 100 TABLET, EXTENDED RELEASE ORAL at 09:47

## 2025-02-16 RX ADMIN — VANCOMYCIN HYDROCHLORIDE 1000 MG: 1 INJECTION, SOLUTION INTRAVENOUS at 20:57

## 2025-02-16 RX ADMIN — FERROUS SULFATE TAB 325 MG (65 MG ELEMENTAL FE) 325 MG: 325 (65 FE) TAB at 09:47

## 2025-02-16 RX ADMIN — PIPERACILLIN SODIUM AND TAZOBACTAM SODIUM 3.38 G: 3; .375 INJECTION, SOLUTION INTRAVENOUS at 17:05

## 2025-02-16 RX ADMIN — CARBAMAZEPINE 100 MG: 100 TABLET, EXTENDED RELEASE ORAL at 20:56

## 2025-02-16 RX ADMIN — PANTOPRAZOLE SODIUM 40 MG: 40 TABLET, DELAYED RELEASE ORAL at 06:27

## 2025-02-16 RX ADMIN — GABAPENTIN 300 MG: 300 CAPSULE ORAL at 17:04

## 2025-02-16 RX ADMIN — GABAPENTIN 300 MG: 300 CAPSULE ORAL at 09:47

## 2025-02-16 RX ADMIN — VANCOMYCIN HYDROCHLORIDE 1000 MG: 1 INJECTION, SOLUTION INTRAVENOUS at 09:46

## 2025-02-16 ASSESSMENT — COGNITIVE AND FUNCTIONAL STATUS - GENERAL
MOVING FROM LYING ON BACK TO SITTING ON SIDE OF FLAT BED WITH BEDRAILS: A LITTLE
TURNING FROM BACK TO SIDE WHILE IN FLAT BAD: A LITTLE
MOVING TO AND FROM BED TO CHAIR: A LOT
EATING MEALS: A LITTLE
DRESSING REGULAR UPPER BODY CLOTHING: A LITTLE
WALKING IN HOSPITAL ROOM: A LOT
MOVING TO AND FROM BED TO CHAIR: A LOT
TOILETING: A LOT
PERSONAL GROOMING: A LITTLE
DAILY ACTIVITIY SCORE: 17
WALKING IN HOSPITAL ROOM: A LOT
STANDING UP FROM CHAIR USING ARMS: A LOT
TURNING FROM BACK TO SIDE WHILE IN FLAT BAD: A LOT
HELP NEEDED FOR BATHING: A LITTLE
CLIMB 3 TO 5 STEPS WITH RAILING: A LOT
CLIMB 3 TO 5 STEPS WITH RAILING: TOTAL
TOILETING: A LITTLE
MOBILITY SCORE: 13
MOVING FROM LYING ON BACK TO SITTING ON SIDE OF FLAT BED WITH BEDRAILS: A LITTLE
HELP NEEDED FOR BATHING: A LITTLE
STANDING UP FROM CHAIR USING ARMS: A LOT
DRESSING REGULAR LOWER BODY CLOTHING: A LOT
PERSONAL GROOMING: A LITTLE
DAILY ACTIVITIY SCORE: 17
DRESSING REGULAR LOWER BODY CLOTHING: A LOT
DRESSING REGULAR UPPER BODY CLOTHING: A LITTLE
MOBILITY SCORE: 13

## 2025-02-16 ASSESSMENT — PAIN SCALES - GENERAL
PAINLEVEL_OUTOF10: 2
PAINLEVEL_OUTOF10: 2

## 2025-02-16 ASSESSMENT — PAIN - FUNCTIONAL ASSESSMENT: PAIN_FUNCTIONAL_ASSESSMENT: 0-10

## 2025-02-16 NOTE — PROGRESS NOTES
Subjective Data:  No chest pain or dyspnea, has weakness    Overnight Events:    None     Objective Data:  Last Recorded Vitals:  Vitals:    02/15/25 2150 02/16/25 0005 02/16/25 0734 02/16/25 0827   BP:  118/52     BP Location:  Left arm     Patient Position:  Lying     Pulse:  63  57   Resp:  18 18    Temp:  36.6 °C (97.9 °F)     TempSrc:  Temporal Temporal    SpO2: 97% 96%  94%   Weight:       Height:           Last Labs:  CBC - 2/16/2025:  6:43 AM  8.2 7.8 326    26.2      CMP - 2/16/2025:  6:43 AM  8.8 7.5 45 --- 0.5   _ 3.8 16 141      PTT - 2/11/2025:  7:07 PM  1.2   13.0 30     TROPHS   Date/Time Value Ref Range Status   12/23/2024 02:33 PM 4 0 - 20 ng/L Final     BNP   Date/Time Value Ref Range Status   09/12/2024 05:00 PM 25 0 - 99 pg/mL Final     HGBA1C   Date/Time Value Ref Range Status   01/06/2025 04:35 AM 5.8 4.3 - 5.6 % Final   10/03/2024 04:40 AM 7.0 4.3 - 5.6 % Final   09/13/2024 06:19 AM 9.8 see below % Final     VLDL   Date/Time Value Ref Range Status   12/10/2019 06:05 AM 45 0 - 40 mg/dL Final      Last I/O:  I/O last 3 completed shifts:  In: 1540 (17.3 mL/kg) [P.O.:240; IV Piggyback:1300]  Out: 950 (10.7 mL/kg) [Urine:950 (0.3 mL/kg/hr)]  Weight: 89.1 kg     Ejection Fractions:  EF   Date/Time Value Ref Range Status   02/13/2025 12:15 PM 58 %    09/13/2024 02:10 PM 63 %        Inpatient Medications:  Scheduled medications   Medication Dose Route Frequency    carBAMazepine XR  100 mg oral BID    ferrous sulfate (325 mg ferrous sulfate)  65 mg of iron oral Daily with breakfast    gabapentin  300 mg oral TID    hydrALAZINE  25 mg oral TID    insulin lispro  0-10 Units subcutaneous TID AC    pantoprazole  40 mg oral Daily before breakfast    piperacillin-tazobactam  3.375 g intravenous q6h    polyethylene glycol  17 g oral Daily    rivaroxaban  15 mg oral BID    Followed by    [START ON 3/8/2025] rivaroxaban  20 mg oral Daily with breakfast    traZODone  50 mg oral Nightly    vancomycin  1,000 mg  intravenous q12h     PRN medications   Medication    acetaminophen    acetaminophen    calcium carbonate    melatonin    ondansetron    Or    ondansetron    oxyCODONE    sennosides-docusate sodium    vancomycin     Continuous Medications   Medication Dose Last Rate       Physical Exam:  Constitutional: Well developed, awake/alert/oriented x3, no distress, alert and cooperative  Eyes: PERRL, EOMI, clear sclera  ENMT: mucous membranes moist, no apparent injury, no lesions seen  Head/Neck: Neck supple, no apparent injury, thyroid without mass or tenderness, No JVD, trachea midline, no bruits  Respiratory/Thorax: Patent airways, CTAB, normal breath sounds with good chest expansion, thorax symmetric  Cardiovascular: Regular, rate and rhythm, no murmurs, 2+ equal pulses of the extremities, normal S 1and S 2  Gastrointestinal: Nondistended, soft, non-tender, no rebound tenderness or guarding, no masses palpable, no organomegaly, +BS, no bruits  Musculoskeletal: ROM intact, no joint swelling, normal strength  Extremities: normal extremities, no cyanosis edema, Dressing to left lower extremity, wound vac in place  Neurological: alert and oriented x3, intact senses, motor, response and reflexes, normal strength  Lymphatic: No significant lymphadenopathy  Psychological: Appropriate mood and behavior  Skin: Dressing to left lower extremity with wound vac     Assessment/Plan   Acute deep vein thrombosis (DVT) of femoral vein of left lower extremity (Multi)     Wound infection     Benign essential HTN     DM type 2 with diabetic peripheral neuropathy     Uncontrolled type 2 diabetes mellitus with hyperglycemia     Wound eschar of foot     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 sinus (Multi)        Trigeminal neuralgia     History of DVT (deep vein thrombosis)        LHC in 2020 showed mild nonobstructive CAD     Echo in Sept 2024 showed normal LV systolic function with mild to mod aortic stenosis     Echo Feb 13 shows  EF 55-60%, Grade I LVDF, moderate to severe mitral annular calcification, moderate aortic valve stenosis.      Left foot infection, osteomyelitis  -radiology reports reviewed  -Antibiotics  -Podiatry following, planning to OR today  -blood cultures pending  -Denies any ACS symptoms  -EKG ordered and reviewed, SR, no acute ischemic changes  -Murmur noted->previous echo showing mild to moderate AS  -Echocardiogram reviewed as above, moderate aortic stenosis, continue to monitor outpatient with annual echo  -EKG and troponin with any ACS symptoms  -Monitor on tele     2. Acute DVT  -Has a hx of DVT, was on eliquis and stopped taking  -US showed DVT in left femoral vein   -Heparin gtt stopped  -Continue Xarelto     3. Anemia  -hgb 7.8 today  -Previous iron level slightly low at 44  -Recheck iron studies  -Monitor on AC     4. Diabetes mellitus type 2  -ISS  -Accuchecks  -Hgb A1C Jan 2025-5.8%     5. Hypertension  -stable  -2gm na diet  -cont meds  -monitor     6. Valvular heart disease  - moderate aortic valve stenosis  - repeat echocardiogram annually  Peripheral IV 02/11/25 20 G Right;Anterior Forearm (Active)   Site Assessment Clean;Dry;Intact 02/16/25 0950   Dressing Type Transparent 02/16/25 0950   Line Status Flushed 02/16/25 0950   Dressing Status Clean;Dry;Occlusive 02/16/25 0950   Number of days: 5       Peripheral IV 02/11/25 20 G Left;Anterior Forearm (Active)   Site Assessment Clean;Dry;Intact 02/16/25 0950   Dressing Type Transparent 02/16/25 0950   Line Status Flushed 02/16/25 0950   Dressing Status Clean;Dry;Occlusive 02/16/25 0950   Number of days: 5       Code Status:  Full Code    I spent 15 minutes in the professional and overall care of this patient.        Carson Freeman MD

## 2025-02-16 NOTE — CARE PLAN
The patient's goals for the shift include      The clinical goals for the shift include pt will remain free from fall/injury throughout shift, dressing will remain clean dry and intact to left foot, wound vac will remain patent and functioning tovacuum drainage from wound,    Pt requested PRN medication for sleep at  , medication was effective as evidenced by pt resting in bed with eyes closed through night. Pt 's wound vac remained oatent with very minimal drainage accumulated. Dressing remains dry and intact. MD to change dressing at next visit. Pt tolerated iv infusions and po medications without complications.

## 2025-02-16 NOTE — PROGRESS NOTES
"Elliot Partida is a 74 y.o. male on day 5 of admission presenting with Acute deep vein thrombosis (DVT) of femoral vein of left lower extremity (Multi).    Subjective     No overnight events reported.     Patient is resting in bed on room air this morning. He tells the provider to go away because he wants to sleep. Per RN, this has been typical for the patient most mornings since admission. Provider was later able to assess the patient; he denied any pain or shortness of breath. Wound vac remains in place, podiatry is following.         Objective     Physical Exam  Constitutional:       General: He is not in acute distress.     Appearance: He is not toxic-appearing.   HENT:      Head: Normocephalic and atraumatic.      Mouth/Throat:      Mouth: Mucous membranes are moist.   Eyes:      Conjunctiva/sclera: Conjunctivae normal.   Cardiovascular:      Rate and Rhythm: Normal rate and regular rhythm.   Pulmonary:      Effort: No respiratory distress.      Breath sounds: Normal breath sounds.      Comments: On room air   Abdominal:      General: There is no distension.      Palpations: Abdomen is soft.      Tenderness: There is no abdominal tenderness.   Skin:     General: Skin is warm and dry.      Comments: LLE wrapped with clean/dry dressing. Wound vac in place with bloody drainage   Neurological:      Mental Status: He is alert and oriented to person, place, and time.   Psychiatric:      Comments: Irritable          Last Recorded Vitals  Blood pressure 118/52, pulse 57, temperature 36.6 °C (97.9 °F), temperature source Temporal, resp. rate 18, height 1.753 m (5' 9\"), weight 89.1 kg (196 lb 6.9 oz), SpO2 94%.  Intake/Output last 3 Shifts:  I/O last 3 completed shifts:  In: 1540 (17.3 mL/kg) [P.O.:240; IV Piggyback:1300]  Out: 950 (10.7 mL/kg) [Urine:950 (0.3 mL/kg/hr)]  Weight: 89.1 kg     Relevant Results          Scheduled medications  carBAMazepine XR, 100 mg, oral, BID  ferrous sulfate (325 mg ferrous sulfate), " 65 mg of iron, oral, Daily with breakfast  gabapentin, 300 mg, oral, TID  hydrALAZINE, 25 mg, oral, TID  insulin lispro, 0-10 Units, subcutaneous, TID AC  pantoprazole, 40 mg, oral, Daily before breakfast  piperacillin-tazobactam, 3.375 g, intravenous, q6h  polyethylene glycol, 17 g, oral, Daily  rivaroxaban, 15 mg, oral, BID   Followed by  [START ON 3/8/2025] rivaroxaban, 20 mg, oral, Daily with breakfast  traZODone, 50 mg, oral, Nightly  vancomycin, 1,000 mg, intravenous, q12h      Continuous medications     PRN medications  PRN medications: acetaminophen, acetaminophen, calcium carbonate, melatonin, ondansetron **OR** ondansetron, oxyCODONE, sennosides-docusate sodium, vancomycin    Results for orders placed or performed during the hospital encounter of 02/11/25 (from the past 24 hours)   POCT GLUCOSE   Result Value Ref Range    POCT Glucose 151 (H) 74 - 99 mg/dL   POCT GLUCOSE   Result Value Ref Range    POCT Glucose 179 (H) 74 - 99 mg/dL   POCT GLUCOSE   Result Value Ref Range    POCT Glucose 144 (H) 74 - 99 mg/dL   Vancomycin   Result Value Ref Range    Vancomycin 16.2 5.0 - 20.0 ug/mL   Basic metabolic panel   Result Value Ref Range    Glucose 114 (H) 74 - 99 mg/dL    Sodium 138 136 - 145 mmol/L    Potassium 4.2 3.5 - 5.3 mmol/L    Chloride 104 98 - 107 mmol/L    Bicarbonate 28 21 - 32 mmol/L    Anion Gap 10 10 - 20 mmol/L    Urea Nitrogen 14 6 - 23 mg/dL    Creatinine 0.80 0.50 - 1.30 mg/dL    eGFR >90 >60 mL/min/1.73m*2    Calcium 8.8 8.6 - 10.3 mg/dL   CBC   Result Value Ref Range    WBC 8.2 4.4 - 11.3 x10*3/uL    nRBC 0.0 0.0 - 0.0 /100 WBCs    RBC 3.07 (L) 4.50 - 5.90 x10*6/uL    Hemoglobin 7.8 (L) 13.5 - 17.5 g/dL    Hematocrit 26.2 (L) 41.0 - 52.0 %    MCV 85 80 - 100 fL    MCH 25.4 (L) 26.0 - 34.0 pg    MCHC 29.8 (L) 32.0 - 36.0 g/dL    RDW 13.6 11.5 - 14.5 %    Platelets 326 150 - 450 x10*3/uL   POCT GLUCOSE   Result Value Ref Range    POCT Glucose 118 (H) 74 - 99 mg/dL     Transthoracic Echo  Limited    Result Date: 2/13/2025   [facility], [address] [address]              Tel [phone] and Fax [phone] TRANSTHORACIC ECHOCARDIOGRAM REPORT  Patient Name:       SUBHASH SCHMIDT LUZMA    Reading Physician:    [id number] Carson Freeman MD Study Date:         2/13/2025           Ordering Provider:    [id number] JUAN FROBLES DELUCAON MRN/PID:            [id number]            Fellow: Accession#:         [id number]        Nurse: Date of Birth/Age:  1950 / 74 years Sonographer:          Trinh Sung RDCS Gender assigned at                     Additional Staff: Birth: Height:             175.26 cm           Admit Date: Weight:             88.91 kg            Admission Status:     Inpatient -                                                               Routine BSA / BMI:          2.05 m2 / 28.94     Encounter#:           [id number]                     kg/m2 Blood Pressure:     127/53 mmHg         Department Location:  [facility] Study Type:    TRANSTHORACIC ECHO (TTE) LIMITED Diagnosis/ICD: Nonrheumatic aortic (valve) stenosis-I35.0 Indication:    Aortic Stenosis CPT Code:      Echo Limited-12678; Doppler Limited-40620; Color Doppler-59308 Patient History: Valve Disorders:   Aortic Stenosis. Diabetes:          Yes Pertinent History: Murmur, CAD, PVD and Hyperlipidemia. Study Detail: The following Echo studies were performed: 2D, M-Mode, Doppler and               color flow. Technically challenging study due to body habitus and               low pain tolerance. The patient was awake.  PHYSICIAN INTERPRETATION: Left Ventricle: The left ventricular systolic function is normal, with a visually estimated ejection fraction of 55-60%. There are no regional left ventricular wall motion abnormalities.  The left ventricular cavity size is normal. There is mildly increased septal and mildly increased posterior left ventricular wall thickness. There is left ventricular concentric remodeling. Spectral Doppler shows a Grade I (impaired relaxation pattern) of left ventricular diastolic filling with normal left atrial filling pressure. Left Atrium: The left atrial size is upper limits of normal. Right Ventricle: The right ventricle is normal in size. There is normal right ventricular global systolic function. Right Atrium: The right atrium is normal in size. Aortic Valve: The aortic valve is probably trileaflet. There is moderate aortic valve cusp calcification. There is evidence of moderate aortic valve stenosis. The aortic valve dimensionless index is 0.31. There is no evidence of aortic valve regurgitation. The peak instantaneous gradient of the aortic valve is 48 mmHg. The mean gradient of the aortic valve is 28 mmHg. Mitral Valve: The mitral valve is normal in structure. There is moderate to severe mitral annular calcification. There is mild mitral valve regurgitation. Tricuspid Valve: The tricuspid valve is structurally normal. There is mild tricuspid regurgitation. Pulmonic Valve: The pulmonic valve is structurally normal. There is physiologic pulmonic valve regurgitation. Pericardium: There is no pericardial effusion noted. Aorta: The aortic root is normal. Systemic Veins: The inferior vena cava appears normal in size.  CONCLUSIONS:  1. The left ventricular systolic function is normal, with a visually estimated ejection fraction of 55-60%.  2. Spectral Doppler shows a Grade I (impaired relaxation pattern) of left ventricular diastolic filling with normal left atrial filling pressure.  3. There is normal right ventricular global systolic function.  4. There is moderate to severe mitral annular calcification.  5. Moderate aortic valve stenosis. Dimensionless index 0.31 : moderate stenosis. Peak and mean gradients  around 48 and 28 mm Hg respectively. Estimated aortic valve area around 1 cm2.  6. There is moderate aortic valve cusp calcification. QUANTITATIVE DATA SUMMARY:  2D MEASUREMENTS:         Normal Ranges: IVSd:            1.05 cm (0.6-1.1cm) LVPWd:           1.09 cm (0.6-1.1cm) LVIDd:           4.60 cm (3.9-5.9cm) LVIDs:           2.96 cm LV Mass Index:   85 g/m2 LV % FS          35.7 %  LV SYSTOLIC FUNCTION:                      Normal Ranges: EF-Visual:      58 % LV EF Reported: 58 %  AORTIC VALVE:                      Normal Ranges: AoV Vmax:                3.46 m/s  (<=1.7m/s) AoV Peak P.9 mmHg (<20mmHg) AoV Mean P.0 mmHg (1.7-11.5mmHg) LVOT Max Jak:            1.10 m/s  (<=1.1m/s) AoV VTI:                 76.60 cm  (18-25cm) LVOT VTI:                23.60 cm LVOT Diameter:           2.50 cm   (1.8-2.4cm) AoV Area, VTI:           1.51 cm2  (2.5-5.5cm2) AoV Area,Vmax:           1.56 cm2  (2.5-4.5cm2) AoV Dimensionless Index: 0.31  TRICUSPID VALVE/RVSP:         Normal Ranges: IVC Diam:             2.10 cm  [id number] Carson Freeman MD Electronically signed on 2025 at 6:58:36 PM  ** Final **     ECG 12 lead    Result Date: 2025  Sinus bradycardia with 1st degree AV block Left anterior fascicular block Minimal voltage criteria for LVH, may be normal variant ( Mukul product ) Abnormal ECG When compared with ECG of 23-DEC-2024 14:41, ST less elevated in Anterior leads T wave inversion now evident in Anterior leads    MR foot left wo IV contrast    Result Date: 2025  Interpreted By:  Júnior Sena, STUDY: MR FOOT LEFT WO IV CONTRAST;   INDICATION: Signs/Symptoms:r/o osteo.   COMPARISON: Plain film radiographs    ACCESSION NUMBER(S): [id number]   ORDERING CLINICIAN: ABIEL KENT   TECHNIQUE: Multiplanar multisequential MRI left foot without contrast.   FINDINGS: Postsurgical changes amputation of the left 5th digit to the level of the mid metatarsal  shaft.   There is a large amount of subcutaneous soft tissue swelling with a dorsal skin wound at the medial side of the ankle joint roughly at the level of the ankle proper. There is no evidence of adjacent fluid collection. There is what looks to be some partial tearing of the tibialis anterior where the skin wound is seen at the level of the distal tibial metaphysis.   No definite abscess seen.   There is a significant amount of edema of the medial cuneiform. This involves nearly the entire bone although there is no definite erosive change and this is not directly in line with the wound. This is however, suspicious for a focus of osteomyelitis.   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 There is mild ankle arthrosis.   Small bone infarct of the anterior distal tibia. No other marrow edema seen.   No other fracture identified.   No evidence of acute tendinous or ligamentous tear.   No evidence of effusion.         Anteromedial skin wound at the level of the ankle joint with extensive and diffuse subcutaneous edema about the ankle and foot.   There is a large amount of edema isolated to the medial cuneiform. Although this is somewhat remote from the wound the possibility of osteomyelitis is a consideration.   Partial tearing tibialis anteriorly at the level of the distal ankle.   Small bone island distal tibia.   Other degenerative changes.   Signed by: Júnior Sena 2/12/2025 1:10 PM Dictation workstation:   [id number]    Lower extremity venous duplex left    Result Date: 2/11/2025  Interpreted By:  Maynor Kitchen, STUDY: [facility] LOWER EXTREMITY VENOUS DUPLEX LEFT  2/11/2025 5:14 pm   INDICATION: 73 y/o   M with  Signs/Symptoms:lle edema pain. LMP:  Unknown.       COMPARISON: None.   ACCESSION NUMBER(S): [id number]   ORDERING CLINICIAN: JUANITO HAIDER   TECHNIQUE: Routine ultrasound of the  right lower extremity was performed with duplex Doppler  (color and spectral) evaluation.   Static images were obtained for remote interpretation.   FINDINGS: Deep venous thrombosis of the left superficial femoral veins throughout its course.       Deep venous thrombosis of the left superficial femoral vein.   MACRO: None   Signed by: Maynor Kitchen 2/11/2025 5:35 PM Dictation workstation:   [id number]    XR foot 3+ views bilateral    Result Date: 2/11/2025  Interpreted By:  Júnior Sena, STUDY: XR FOOT 3+ VIEWS BILATERAL   INDICATION: Signs/Symptoms:l foot ulcer top of foot fould odor,.   COMPARISON: September 12, 2024     ACCESSION NUMBER(S): [id number]   ORDERING CLINICIAN: JUANITO HAIDER   FINDINGS: Interval amputation of the 5th digit to the level of the 5th metatarsal shaft. Surgical margins are sharp.   Marked dorsal soft tissue swelling with likely soft tissue wound at the level of the tarsometatarsal articulations on the left.   The right foot demonstrates advanced degenerative changes with some mild dorsal soft tissue swelling with no acute osseous abnormality..         Interval amputation of the 5th digit to the level of the 5th metatarsal shaft. Surgical margins are sharp.   Marked dorsal soft tissue swelling with likely soft tissue wound at the level of the tarsometatarsal articulations on the left.   Mild soft tissue swelling right foot.   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   - Stopped  taking apixaban prior to admission   - given heparin bolus in the ED and started on heparin drip; now discontinued  - continue rivaroxaban 15 mg BID x 21 days then 20 mg daily  - daily CBC      Left dorsal foot wound  Acute on chronic osteomyelitis of left foot  - present on admission; pictures in chart  - s/p I&D with bone biopsy on 2/13/25  - BCx negative x 4 days   - wound cultures collected 2/11, 2/12 grew mixed gram negative and gram positive organism  - wound culture from bone collected 2/13 growing rare enterococcus faecalis   - conitnue zosyn 3.375 g q6h and vancomycin 1,000 mL BID; day 5  - Wound Vac/dressing changes per podiatry recs  - Oxycodone 5 mg q6h PRN for severe pain   - PT/OT evals; keep NWB to LLE   - ID consulted, appreciate recs      Anemia, iron deficiency   - H/H 7.7/25.4 > 7.8/26.2   - MCV 85   - Iron studies: iron 12, 5% saturation  - Ferrous sulfate 325 mg every day added this admission   - Daily CBC   - Transfuse pRBCs for Hgb < 7.0      Generalized Weakness  - PT/OT evaluation; recommending moderate intensity therapy     Essential HTN  - continue hydralazine 25 mg TID  - telemetry monitoring  - monitor BP     DM Type 2 with peripheral neuropathy  - A1c 5.8 last month   - SSI Lispro 0-10 scale  - continue gabapentin 300 mg TID  - monitor blood sugar ac/hs/prn  - diabetic diet      Trigeminal neuralgia  - continue carbamazepine 100 mg BID     Insomnia  - continue trazodone 50 mg at bedtime   - melatonin 5 mg PRN at bedtime for insomnia      PUD ppx  - continue pantoprazole 40 mg daily     Code status: Full     Disposition: Patient requires inpatient management.          Ann-Marie Givens PA-C

## 2025-02-16 NOTE — PROGRESS NOTES
Vancomycin Dosing by Pharmacy- FOLLOW UP    Elliot Partida is a 74 y.o. year old male who Pharmacy has been consulted for vancomycin dosing for diabetic foot infection. Based on the patient's indication and renal status this patient is being dosed based on a goal AUC of 400-600.     Renal function is currently stable.    Current vancomycin dose: 1000 mg given every 12 hours    Estimated vancomycin AUC on current dose: 463 mg/L.hr     Visit Vitals  /52 (BP Location: Left arm, Patient Position: Lying)   Pulse 63   Temp 36.6 °C (97.9 °F) (Temporal)   Resp 18        Lab Results   Component Value Date    CREATININE 0.80 2025    CREATININE 0.74 02/15/2025    CREATININE 0.75 2025    CREATININE 0.83 2025        Patient weight is as follows:   Vitals:    25 2202   Weight: 89.1 kg (196 lb 6.9 oz)       Cultures:  Susceptibility data for the encounter in last 14 days.  Collected Specimen Info Organism   25 Tissue from BONE RESECTION Enterococcus faecalis   25 Tissue/Biopsy from Wound/Tissue Mixed Anaerobic Bacteria     Mixed Gram-Positive and Gram-Negative Bacteria   25 Tissue/Biopsy from Diabetic Foot Ulcer Mixed Aerobic and Anaerobic Bacteria         I/O last 3 completed shifts:  In: 1540 (17.3 mL/kg) [P.O.:240; IV Piggyback:1300]  Out: 950 (10.7 mL/kg) [Urine:950 (0.3 mL/kg/hr)]  Weight: 89.1 kg   I/O during current shift:  I/O this shift:  In: -   Out: 275 [Urine:275]    Temp (24hrs), Av.7 °C (98 °F), Min:36.6 °C (97.9 °F), Max:36.8 °C (98.2 °F)      Assessment/Plan    Within goal AUC range. Continue current vancomycin regimen.    This dosing regimen is predicted by [employer] to result in the following pharmacokinetic parameters:  Loading dose: N/A  Regimen: 1000 mg IV every 12 hours.  Start time: 08:32 on 2025  Exposure target: AUC24 (range)400-600 mg/L.hr   [id number]: 459 mg/L.hr  AUC24,ss: 463 mg/L.hr  Probability of AUC24 > 400: 90 %  Ctrough,ss: 15  mg/L  Probability of Ctrough,ss > 20: 3 %    The next level will be obtained on 2/20 at 0500. May be obtained sooner if clinically indicated.   Will continue to monitor renal function daily while on vancomycin and order serum creatinine at least every 48 hours if not already ordered.  Follow for continued vancomycin needs, clinical response, and signs/symptoms of toxicity.       Alexia Rodriguez, PharmD

## 2025-02-17 ENCOUNTER — APPOINTMENT (OUTPATIENT)
Dept: RADIOLOGY | Facility: HOSPITAL | Age: 75
End: 2025-02-17
Payer: MEDICARE

## 2025-02-17 LAB
ANION GAP SERPL CALC-SCNC: 9 MMOL/L (ref 10–20)
BACTERIA SPEC CULT: ABNORMAL
BUN SERPL-MCNC: 14 MG/DL (ref 6–23)
CALCIUM SERPL-MCNC: 8.7 MG/DL (ref 8.6–10.3)
CHLORIDE SERPL-SCNC: 104 MMOL/L (ref 98–107)
CO2 SERPL-SCNC: 30 MMOL/L (ref 21–32)
CREAT SERPL-MCNC: 0.74 MG/DL (ref 0.5–1.3)
EGFRCR SERPLBLD CKD-EPI 2021: >90 ML/MIN/1.73M*2
ERYTHROCYTE [DISTWIDTH] IN BLOOD BY AUTOMATED COUNT: 13.8 % (ref 11.5–14.5)
GLUCOSE BLD MANUAL STRIP-MCNC: 108 MG/DL (ref 74–99)
GLUCOSE BLD MANUAL STRIP-MCNC: 136 MG/DL (ref 74–99)
GLUCOSE BLD MANUAL STRIP-MCNC: 142 MG/DL (ref 74–99)
GLUCOSE BLD MANUAL STRIP-MCNC: 152 MG/DL (ref 74–99)
GLUCOSE SERPL-MCNC: 109 MG/DL (ref 74–99)
GRAM STN SPEC: ABNORMAL
GRAM STN SPEC: ABNORMAL
HCT VFR BLD AUTO: 26.6 % (ref 41–52)
HGB BLD-MCNC: 8.2 G/DL (ref 13.5–17.5)
MCH RBC QN AUTO: 26.2 PG (ref 26–34)
MCHC RBC AUTO-ENTMCNC: 30.8 G/DL (ref 32–36)
MCV RBC AUTO: 85 FL (ref 80–100)
NRBC BLD-RTO: 0 /100 WBCS (ref 0–0)
PLATELET # BLD AUTO: 363 X10*3/UL (ref 150–450)
POTASSIUM SERPL-SCNC: 3.9 MMOL/L (ref 3.5–5.3)
RBC # BLD AUTO: 3.13 X10*6/UL (ref 4.5–5.9)
SODIUM SERPL-SCNC: 139 MMOL/L (ref 136–145)
WBC # BLD AUTO: 8.7 X10*3/UL (ref 4.4–11.3)

## 2025-02-17 PROCEDURE — 2500000002 HC RX 250 W HCPCS SELF ADMINISTERED DRUGS (ALT 637 FOR MEDICARE OP, ALT 636 FOR OP/ED): Mod: IPSPLIT | Performed by: NURSE PRACTITIONER

## 2025-02-17 PROCEDURE — 2500000001 HC RX 250 WO HCPCS SELF ADMINISTERED DRUGS (ALT 637 FOR MEDICARE OP): Mod: IPSPLIT | Performed by: NURSE PRACTITIONER

## 2025-02-17 PROCEDURE — 82374 ASSAY BLOOD CARBON DIOXIDE: CPT | Mod: IPSPLIT

## 2025-02-17 PROCEDURE — 2500000004 HC RX 250 GENERAL PHARMACY W/ HCPCS (ALT 636 FOR OP/ED): Mod: IPSPLIT | Performed by: NURSE PRACTITIONER

## 2025-02-17 PROCEDURE — 85027 COMPLETE CBC AUTOMATED: CPT | Mod: IPSPLIT

## 2025-02-17 PROCEDURE — 82947 ASSAY GLUCOSE BLOOD QUANT: CPT | Mod: IPSPLIT

## 2025-02-17 PROCEDURE — 71045 X-RAY EXAM CHEST 1 VIEW: CPT | Mod: IPSPLIT

## 2025-02-17 PROCEDURE — 1200000002 HC GENERAL ROOM WITH TELEMETRY DAILY: Mod: IPSPLIT

## 2025-02-17 PROCEDURE — 36415 COLL VENOUS BLD VENIPUNCTURE: CPT | Mod: IPSPLIT

## 2025-02-17 PROCEDURE — 71045 X-RAY EXAM CHEST 1 VIEW: CPT | Performed by: RADIOLOGY

## 2025-02-17 PROCEDURE — 2500000001 HC RX 250 WO HCPCS SELF ADMINISTERED DRUGS (ALT 637 FOR MEDICARE OP): Mod: IPSPLIT

## 2025-02-17 PROCEDURE — 99233 SBSQ HOSP IP/OBS HIGH 50: CPT

## 2025-02-17 RX ORDER — LIDOCAINE HYDROCHLORIDE 10 MG/ML
5 INJECTION, SOLUTION INFILTRATION; PERINEURAL ONCE
Status: DISCONTINUED | OUTPATIENT
Start: 2025-02-17 | End: 2025-02-19 | Stop reason: HOSPADM

## 2025-02-17 RX ADMIN — INSULIN LISPRO 2 UNITS: 100 INJECTION, SOLUTION INTRAVENOUS; SUBCUTANEOUS at 11:05

## 2025-02-17 RX ADMIN — GABAPENTIN 300 MG: 300 CAPSULE ORAL at 14:19

## 2025-02-17 RX ADMIN — RIVAROXABAN 15 MG: 15 TABLET, FILM COATED ORAL at 08:06

## 2025-02-17 RX ADMIN — ACETAMINOPHEN 650 MG: 325 TABLET, FILM COATED ORAL at 21:55

## 2025-02-17 RX ADMIN — PIPERACILLIN SODIUM AND TAZOBACTAM SODIUM 3.38 G: 3; .375 INJECTION, SOLUTION INTRAVENOUS at 06:23

## 2025-02-17 RX ADMIN — AMPICILLIN AND SULBACTAM 3 G: 2; 1 INJECTION, POWDER, FOR SOLUTION INTRAVENOUS at 21:42

## 2025-02-17 RX ADMIN — GABAPENTIN 300 MG: 300 CAPSULE ORAL at 08:06

## 2025-02-17 RX ADMIN — GABAPENTIN 300 MG: 300 CAPSULE ORAL at 21:42

## 2025-02-17 RX ADMIN — FERROUS SULFATE TAB 325 MG (65 MG ELEMENTAL FE) 325 MG: 325 (65 FE) TAB at 08:06

## 2025-02-17 RX ADMIN — PANTOPRAZOLE SODIUM 40 MG: 40 TABLET, DELAYED RELEASE ORAL at 06:23

## 2025-02-17 RX ADMIN — HYDRALAZINE HYDROCHLORIDE 25 MG: 25 TABLET ORAL at 08:06

## 2025-02-17 RX ADMIN — PIPERACILLIN SODIUM AND TAZOBACTAM SODIUM 3.38 G: 3; .375 INJECTION, SOLUTION INTRAVENOUS at 00:24

## 2025-02-17 RX ADMIN — AMPICILLIN AND SULBACTAM 3 G: 2; 1 INJECTION, POWDER, FOR SOLUTION INTRAVENOUS at 15:14

## 2025-02-17 RX ADMIN — RIVAROXABAN 15 MG: 15 TABLET, FILM COATED ORAL at 16:04

## 2025-02-17 RX ADMIN — POLYETHYLENE GLYCOL 3350 17 G: 17 POWDER, FOR SOLUTION ORAL at 08:06

## 2025-02-17 RX ADMIN — HYDRALAZINE HYDROCHLORIDE 25 MG: 25 TABLET ORAL at 14:19

## 2025-02-17 RX ADMIN — PIPERACILLIN SODIUM AND TAZOBACTAM SODIUM 3.38 G: 3; .375 INJECTION, SOLUTION INTRAVENOUS at 11:05

## 2025-02-17 RX ADMIN — CARBAMAZEPINE 100 MG: 100 TABLET, EXTENDED RELEASE ORAL at 08:06

## 2025-02-17 RX ADMIN — VANCOMYCIN HYDROCHLORIDE 1000 MG: 1 INJECTION, SOLUTION INTRAVENOUS at 08:06

## 2025-02-17 RX ADMIN — HYDRALAZINE HYDROCHLORIDE 25 MG: 25 TABLET ORAL at 21:42

## 2025-02-17 RX ADMIN — TRAZODONE HYDROCHLORIDE 50 MG: 50 TABLET ORAL at 21:42

## 2025-02-17 RX ADMIN — CARBAMAZEPINE 100 MG: 100 TABLET, EXTENDED RELEASE ORAL at 21:42

## 2025-02-17 ASSESSMENT — PAIN - FUNCTIONAL ASSESSMENT
PAIN_FUNCTIONAL_ASSESSMENT: 0-10
PAIN_FUNCTIONAL_ASSESSMENT: 0-10

## 2025-02-17 ASSESSMENT — ENCOUNTER SYMPTOMS
CONSTITUTIONAL NEGATIVE: 1
WOUND: 1
CARDIOVASCULAR NEGATIVE: 1
PSYCHIATRIC NEGATIVE: 1
RESPIRATORY NEGATIVE: 1
MUSCULOSKELETAL NEGATIVE: 1
GASTROINTESTINAL NEGATIVE: 1
ENDOCRINE NEGATIVE: 1
NEUROLOGICAL NEGATIVE: 1
EYES NEGATIVE: 1

## 2025-02-17 ASSESSMENT — COGNITIVE AND FUNCTIONAL STATUS - GENERAL
DRESSING REGULAR UPPER BODY CLOTHING: A LOT
CLIMB 3 TO 5 STEPS WITH RAILING: A LOT
MOBILITY SCORE: 12
MOVING TO AND FROM BED TO CHAIR: A LOT
DRESSING REGULAR LOWER BODY CLOTHING: A LOT
DAILY ACTIVITIY SCORE: 12
TOILETING: A LOT
PERSONAL GROOMING: A LOT
EATING MEALS: A LOT
WALKING IN HOSPITAL ROOM: A LOT
TURNING FROM BACK TO SIDE WHILE IN FLAT BAD: A LOT
STANDING UP FROM CHAIR USING ARMS: A LOT
HELP NEEDED FOR BATHING: A LOT
MOVING FROM LYING ON BACK TO SITTING ON SIDE OF FLAT BED WITH BEDRAILS: A LOT

## 2025-02-17 ASSESSMENT — PAIN DESCRIPTION - LOCATION: LOCATION: HEAD

## 2025-02-17 ASSESSMENT — PAIN SCALES - GENERAL
PAINLEVEL_OUTOF10: 4
PAINLEVEL_OUTOF10: 0 - NO PAIN
PAINLEVEL_OUTOF10: 0 - NO PAIN

## 2025-02-17 NOTE — PROGRESS NOTES
Elliot Partida is a 74 y.o. male on day 6 of admission presenting with Acute deep vein thrombosis (DVT) of femoral vein of left lower extremity (Multi).      Subjective   He is resting in the bed, states his left foot feels ok, not too much pain. Wound vac to left foot with dressing      Review of systems:  Constitutional: negative for fever, chills, or malaise  Neuro: negative for dizziness, headache, numbness, tingling  ENT: Negative for nasal congestion or sore throat  Resp: negative for shortness of breath, cough, or wheezing  CV: negative for chest pain, palpitations  GI: negative for abd pain, nausea, vomiting or diarrhea  : negative for dysuria, frequency, or urgency  Musculoskeletal: Negative for weakness, myalgia, or arthralgia  Endocrine: Negative for polyuria or polydipsia         Objective   Constitutional: Well developed, awake/alert/oriented x3, no distress, alert and cooperative  Eyes: PERRL, EOMI, clear sclera  ENMT: mucous membranes moist, no apparent injury, no lesions seen  Head/Neck: Neck supple, no apparent injury, thyroid without mass or tenderness, No JVD, trachea midline, no bruits  Respiratory/Thorax: Patent airways, CTAB, normal breath sounds with good chest expansion, thorax symmetric  Cardiovascular: Regular, rate and rhythm, 3/6 systolic murmur, 2+ equal pulses of the extremities, normal S 1and S 2  Gastrointestinal: Nondistended, soft, non-tender, no rebound tenderness or guarding, no masses palpable, no organomegaly, +BS, no bruits  Musculoskeletal: ROM intact, no joint swelling, normal strength  Extremities: dressing to left foot with wound vac  Neurological: alert and oriented x3, intact senses, motor, response and reflexes, normal strength  Lymphatic: No significant lymphadenopathy  Psychological: Appropriate mood and behavior  Skin: Warm and dry, no lesions, no rashes      Last Recorded Vitals  /57 (BP Location: Left arm)   Pulse 69   Temp 36.9 °C (98.4 °F) (Temporal)   " Resp 17   Ht 1.753 m (5' 9\")   Wt 89.1 kg (196 lb 6.9 oz)   SpO2 96%   BMI 29.01 kg/m²     Intake/Output last 3 Shifts:  I/O last 3 completed shifts:  In: 1900 (21.3 mL/kg) [IV Piggyback:1900]  Out: 1825 (20.5 mL/kg) [Urine:1825 (0.6 mL/kg/hr)]  Weight: 89.1 kg   I/O this shift:  In: 480 [P.O.:480]  Out: 400 [Urine:400]    Relevant Results  Scheduled medications  ampicillin-sulbactam, 3 g, intravenous, q6h  carBAMazepine XR, 100 mg, oral, BID  ferrous sulfate (325 mg ferrous sulfate), 65 mg of iron, oral, Daily with breakfast  gabapentin, 300 mg, oral, TID  hydrALAZINE, 25 mg, oral, TID  insulin lispro, 0-10 Units, subcutaneous, TID AC  lidocaine, 5 mL, infiltration, Once  pantoprazole, 40 mg, oral, Daily before breakfast  polyethylene glycol, 17 g, oral, Daily  rivaroxaban, 15 mg, oral, BID   Followed by  [START ON 3/8/2025] rivaroxaban, 20 mg, oral, Daily with breakfast  traZODone, 50 mg, oral, Nightly      Continuous medications     PRN medications  PRN medications: acetaminophen, acetaminophen, alteplase, calcium carbonate, melatonin, ondansetron **OR** ondansetron, oxyCODONE, sennosides-docusate sodium    Results for orders placed or performed during the hospital encounter of 02/11/25 (from the past 24 hours)   POCT GLUCOSE   Result Value Ref Range    POCT Glucose 107 (H) 74 - 99 mg/dL   POCT GLUCOSE   Result Value Ref Range    POCT Glucose 140 (H) 74 - 99 mg/dL   POCT GLUCOSE   Result Value Ref Range    POCT Glucose 108 (H) 74 - 99 mg/dL   Basic metabolic panel   Result Value Ref Range    Glucose 109 (H) 74 - 99 mg/dL    Sodium 139 136 - 145 mmol/L    Potassium 3.9 3.5 - 5.3 mmol/L    Chloride 104 98 - 107 mmol/L    Bicarbonate 30 21 - 32 mmol/L    Anion Gap 9 (L) 10 - 20 mmol/L    Urea Nitrogen 14 6 - 23 mg/dL    Creatinine 0.74 0.50 - 1.30 mg/dL    eGFR >90 >60 mL/min/1.73m*2    Calcium 8.7 8.6 - 10.3 mg/dL   CBC   Result Value Ref Range    WBC 8.7 4.4 - 11.3 x10*3/uL    nRBC 0.0 0.0 - 0.0 /100 WBCs    " RBC 3.13 (L) 4.50 - 5.90 x10*6/uL    Hemoglobin 8.2 (L) 13.5 - 17.5 g/dL    Hematocrit 26.6 (L) 41.0 - 52.0 %    MCV 85 80 - 100 fL    MCH 26.2 26.0 - 34.0 pg    MCHC 30.8 (L) 32.0 - 36.0 g/dL    RDW 13.8 11.5 - 14.5 %    Platelets 363 150 - 450 x10*3/uL   POCT GLUCOSE   Result Value Ref Range    POCT Glucose 152 (H) 74 - 99 mg/dL       Transthoracic Echo Limited   Final Result      MR foot left wo IV contrast   Final Result   Anteromedial skin wound at the level of the ankle joint with   extensive and diffuse subcutaneous edema about the ankle and foot.        There is a large amount of edema isolated to the medial cuneiform.   Although this is somewhat remote from the wound the possibility of   osteomyelitis is a consideration.        Partial tearing tibialis anteriorly at the level of the distal ankle.        Small bone island distal tibia.        Other degenerative changes.        Signed by: Júnior Sena 2/12/2025 1:10 PM   Dictation workstation:   [id number]      Lower extremity venous duplex left   Final Result   Deep venous thrombosis of the left superficial femoral vein.        MACRO:   None        Signed by: Maynor Kitchen 2/11/2025 5:35 PM   Dictation workstation:   [id number]      XR foot 3+ views bilateral   Final Result        Interval amputation of the 5th digit to the level of the 5th   metatarsal shaft. Surgical margins are sharp.        Marked dorsal soft tissue swelling with likely soft tissue wound at   the level of the tarsometatarsal articulations on the left.        Mild soft tissue swelling right foot.        Signed by: Júnior Sena 2/11/2025 4:19 PM   Dictation workstation:   [id number]      Bedside PICC Imaging    (Results Pending)       Transthoracic Echo Limited    Result Date: 2/13/2025   [facility], [address] [facility], [place] [address]              Tel [phone] and Fax [phone] TRANSTHORACIC ECHOCARDIOGRAM REPORT  Patient Name:       SUBHASH Cain  Physician:    [id number] Carson Freeman MD Study Date:         2/13/2025           Ordering Provider:    [id number] JUAN F NORRIS MRN/PID:            [id number]            Fellow: Accession#:         [id number]        Nurse: Date of Birth/Age:  1950 / 74 years Sonographer:          Trinh Sung RDCS Gender assigned at  M                   Additional Staff: Birth: Height:             175.26 cm           Admit Date: Weight:             88.91 kg            Admission Status:     Inpatient -                                                               Routine BSA / BMI:          2.05 m2 / 28.94     Encounter#:           [id number]                     kg/m2 Blood Pressure:     127/53 mmHg         Department Location:  [facility] Study Type:    TRANSTHORACIC ECHO (TTE) LIMITED Diagnosis/ICD: Nonrheumatic aortic (valve) stenosis-I35.0 Indication:    Aortic Stenosis CPT Code:      Echo Limited-03461; Doppler Limited-87265; Color Doppler-02715 Patient History: Valve Disorders:   Aortic Stenosis. Diabetes:          Yes Pertinent History: Murmur, CAD, PVD and Hyperlipidemia. Study Detail: The following Echo studies were performed: 2D, M-Mode, Doppler and               color flow. Technically challenging study due to body habitus and               low pain tolerance. The patient was awake.  PHYSICIAN INTERPRETATION: Left Ventricle: The left ventricular systolic function is normal, with a visually estimated ejection fraction of 55-60%. There are no regional left ventricular wall motion abnormalities. The left ventricular cavity size is normal. There is mildly increased septal and mildly increased posterior left ventricular wall thickness. There is left ventricular concentric remodeling. Spectral Doppler shows a Grade I (impaired  relaxation pattern) of left ventricular diastolic filling with normal left atrial filling pressure. Left Atrium: The left atrial size is upper limits of normal. Right Ventricle: The right ventricle is normal in size. There is normal right ventricular global systolic function. Right Atrium: The right atrium is normal in size. Aortic Valve: The aortic valve is probably trileaflet. There is moderate aortic valve cusp calcification. There is evidence of moderate aortic valve stenosis. The aortic valve dimensionless index is 0.31. There is no evidence of aortic valve regurgitation. The peak instantaneous gradient of the aortic valve is 48 mmHg. The mean gradient of the aortic valve is 28 mmHg. Mitral Valve: The mitral valve is normal in structure. There is moderate to severe mitral annular calcification. There is mild mitral valve regurgitation. Tricuspid Valve: The tricuspid valve is structurally normal. There is mild tricuspid regurgitation. Pulmonic Valve: The pulmonic valve is structurally normal. There is physiologic pulmonic valve regurgitation. Pericardium: There is no pericardial effusion noted. Aorta: The aortic root is normal. Systemic Veins: The inferior vena cava appears normal in size.  CONCLUSIONS:  1. The left ventricular systolic function is normal, with a visually estimated ejection fraction of 55-60%.  2. Spectral Doppler shows a Grade I (impaired relaxation pattern) of left ventricular diastolic filling with normal left atrial filling pressure.  3. There is normal right ventricular global systolic function.  4. There is moderate to severe mitral annular calcification.  5. Moderate aortic valve stenosis. Dimensionless index 0.31 : moderate stenosis. Peak and mean gradients around 48 and 28 mm Hg respectively. Estimated aortic valve area around 1 cm2.  6. There is moderate aortic valve cusp calcification. QUANTITATIVE DATA SUMMARY:  2D MEASUREMENTS:         Normal Ranges: IVSd:            1.05 cm  (0.6-1.1cm) LVPWd:           1.09 cm (0.6-1.1cm) LVIDd:           4.60 cm (3.9-5.9cm) LVIDs:           2.96 cm LV Mass Index:   85 g/m2 LV % FS          35.7 %  LV SYSTOLIC FUNCTION:                      Normal Ranges: EF-Visual:      58 % LV EF Reported: 58 %  AORTIC VALVE:                      Normal Ranges: AoV Vmax:                3.46 m/s  (<=1.7m/s) AoV Peak P.9 mmHg (<20mmHg) AoV Mean P.0 mmHg (1.7-11.5mmHg) LVOT Max Jak:            1.10 m/s  (<=1.1m/s) AoV VTI:                 76.60 cm  (18-25cm) LVOT VTI:                23.60 cm LVOT Diameter:           2.50 cm   (1.8-2.4cm) AoV Area, VTI:           1.51 cm2  (2.5-5.5cm2) AoV Area,Vmax:           1.56 cm2  (2.5-4.5cm2) AoV Dimensionless Index: 0.31  TRICUSPID VALVE/RVSP:         Normal Ranges: IVC Diam:             2.10 cm  [id number] Carson Freeman MD Electronically signed on 2025 at 6:58:36 PM  ** Final **     Transthoracic Echo (TTE) Complete    Result Date: 2024   [facility], [address] [place] [address]              Tel [phone] and Fax [phone] TRANSTHORACIC ECHOCARDIOGRAM REPORT  Patient Name:      SUBHASH Cian Physician:    [id number] Carson Freeman MD Study Date:        2024            Ordering Provider:    [id number] ABIEL KENT MRN/PID:           [id number]             Fellow: Accession#:        [id number]         Nurse: Date of Birth/Age: 1950 / 74 years  Sonographer:          Cailin Julian RDCS Gender:            M                    Additional Staff: Height:            175.26 cm            Admit Date:           2024 Weight:            107.05 kg            Admission Status:     Inpatient -                                                               Routine BSA / BMI:         2.22 m2 / 34.85      Encounter#:            [id number]                    kg/m2 Blood Pressure:    137/66 mmHg          Department Location:  [facility] Study Type:    TRANSTHORACIC ECHO (TTE) COMPLETE Diagnosis/ICD: Abnormal electrocardiogram [ECG] [EKG]-R94.31 Indication:    Abnormal EKG CPT Code:      Echo Complete w Full Doppler-96194 Patient History: Pertinent History: Edema, DM, abnomal EKG, wound infection, WMA. Study Detail: The following Echo studies were performed: 2D, M-Mode, Doppler and               color flow. Technically challenging study due to body habitus,               patient lying in supine position, prominent lung artifact and poor               acoustic windows. Definity used as a contrast agent for               endocardial border definition. Total contrast used for this               procedure was 3.0 mL via IV push.  PHYSICIAN INTERPRETATION: Left Ventricle: The left ventricular systolic function is normal, with a visually estimated ejection fraction of 60-65%. There are no regional left ventricular wall motion abnormalities. The left ventricular cavity size is normal. Spectral Doppler shows an impaired relaxation pattern of left ventricular diastolic filling. Left Atrium: The left atrium is normal in size. Right Ventricle: The right ventricle is normal in size. There is normal right ventricular global systolic function. Right Atrium: The right atrium is normal in size. Aortic Valve: The aortic valve is trileaflet. There is moderate aortic valve cusp calcification. There is evidence of mild to moderate aortic valve stenosis. There is no evidence of aortic valve regurgitation. The peak instantaneous gradient of the aortic valve is 26.2 mmHg. Mitral Valve: The mitral valve is normal in structure. There is mild mitral valve regurgitation. Tricuspid Valve: The tricuspid valve is structurally normal. There is mild tricuspid regurgitation. Pulmonic Valve: The  pulmonic valve is structurally normal. There is physiologic pulmonic valve regurgitation. Pericardium: There is no pericardial effusion noted. Aorta: The aortic root is normal. Pulmonary Artery: The estimated pulmonary artery pressure is normal. Systemic Veins: The inferior vena cava appears mildly dilated.  CONCLUSIONS:  1. The left ventricular systolic function is normal, with a visually estimated ejection fraction of 60-65%.  2. Spectral Doppler shows an impaired relaxation pattern of left ventricular diastolic filling.  3. There is normal right ventricular global systolic function.  4. Mild to moderate aortic valve stenosis.  5. There is moderate aortic valve cusp calcification.  6. The inferior vena cava appears mildly dilated. QUANTITATIVE DATA SUMMARY:  2D MEASUREMENTS:         Normal Ranges: Ao Root d:       3.70 cm (2.0-3.7cm)  AORTA MEASUREMENTS:         Normal Ranges: Asc Ao, d:          3.90 cm (2.1-3.4cm)  LV SYSTOLIC FUNCTION BY 2D PLANIMETRY (MOD):                      Normal Ranges: EF-Visual:      63 % LV EF Reported: 63 %  LV DIASTOLIC FUNCTION:           Normal Ranges: MV Peak E:             1.05 m/s  (0.7-1.2 m/s) MV Peak A:             1.50 m/s  (0.42-0.7 m/s) E/A Ratio:             0.70      (1.0-2.2) MV e'                  0.073 m/s (>8.0) MV lateral e'          0.08 m/s MV medial e'           0.07 m/s E/e' Ratio:            14.40     (<8.0)  AORTIC VALVE:            Normal Ranges: AoV Vmax:      2.56 m/s  (<=1.7m/s) AoV Peak P.2 mmHg (<20mmHg) LVOT Max Jak:  1.25 m/s  (<=1.1m/s) LVOT VTI:      23.90 cm LVOT Diameter: 2.20 cm   (1.8-2.4cm) AoV Area,Vmax: 1.86 cm2  (2.5-4.5cm2)  RIGHT VENTRICLE: TAPSE: 21.1 mm RV s'  0.12 m/s  TRICUSPID VALVE/RVSP:         Normal Ranges: IVC Diam:             2.33 cm  PULMONIC VALVE:          Normal Ranges: PV Max Jak:     1.2 m/s  (0.6-0.9m/s) PV Max P.2 mmHg  [id number] Carson Freeman MD Electronically signed on 2024 at 7:18:35 PM  **  Final **           Assessment/Plan   Assessment & Plan  Acute deep vein thrombosis (DVT) of femoral vein of left lower extremity (Multi)    Wound infection    Benign essential HTN    DM type 2 with diabetic peripheral neuropathy    Uncontrolled type 2 diabetes mellitus with hyperglycemia    Wound eschar of foot    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      Left foot infection, osteomyelitis  -radiology reports reviewed  -Antibiotics  -Podiatry following, s/p OR 2/13  -blood cultures pending  -Denies any ACS symptoms  -EKG ordered and reviewed, SR, no acute ischemic changes  -Echocardiogram reviewed as above, moderate aortic stenosis, continue to monitor outpatient with annual echo  -EKG and troponin with any ACS symptoms  -Monitor on tele     2. Acute DVT  -Has a hx of DVT, was on eliquis and stopped taking  -US showed DVT in left femoral vein   -Continue Xarelto     3. Anemia  -hgb 7.8->8.2 today  -Previous iron level slightly low at 44  -Repeat iron studies reviewed  -Start ferrous sulfate  -Monitor on AC     4. Diabetes mellitus type 2  -ISS  -Accuchecks  -Hgb A1C Jan 2025-5.8%     5. Hypertension  -stable  -2gm na diet  -cont meds  -monitor     6. Valvular heart disease  - moderate aortic valve stenosis  - repeat echocardiogram annually      SARAH Carmona-CNP

## 2025-02-17 NOTE — PROGRESS NOTES
02/17/25 1445   Discharge Planning   Living Arrangements Family members  (Sister)   Support Systems Family members;Home care staff   Assistance Needed Patient is out of Medicare days and cannot pay out of pocket for SNF;  Updates sent to [facility] and requesting confirmation of acceptance.  Patient will need a total of 6 weeks of IV Unasyn (per ID) thru 3/27/25. Patient will also need wound vacc care/dressing changes.  Patient will need PICC line placed.  ADOD 2/19-20.   Type of Residence Private residence   Home or Post Acute Services In home services   Type of Home Care Services Home nursing visits   Expected Discharge Disposition Home H   Intensity of Service   Intensity of Service >30 min     3:00 pm  Called Rosanna friend (patient's home visit provider) [phone] Tamra and tam/virgie to call [facility].      3:30 pm  [facility] has declined 2/2 staffing.  Spoke with the [facility] rep and said Jatin Graf,[facility] and [facility] is used often.  Sent Referrals to other [facility].  Also, spoke with nirajeev Montana and she confirmed that both her and her daughter will help patient as well as her mother/patient's sister.  Patient has help at home.  [facility] following.      4:09 pm   Sent authorization form to [facility] to get the home going wound vacc for patient.        4:30 pm  Sent referral to [facility], an [facility] [employer] [place], [place]   [phone] for the antibiotic.  [facility] following.

## 2025-02-17 NOTE — PROGRESS NOTES
Physical Therapy                 Therapy Communication Note    Patient Name: Elliot Partida  MRN: [id number]  Department: [facility]  Room: 207/207-A  Today's Date: 2/17/2025     Discipline: Physical Therapy    PT Missed Visit: Yes     Missed Visit Reason: Missed Visit Reason: Patient refused    Missed Time: Attempt    Comment: Pt refused PT and unwilling to participate in EOB sitting, functional transfers out of bed or even supine bed TE even with education and max encouragement.

## 2025-02-17 NOTE — PROGRESS NOTES
PODIATRY SERVICE CONSULT PROGRESS NOTE    SERVICE DATE: 2/17/2025   SERVICE TIME:  1500    Subjective   INTERVAL HPI:   Pt was seen at bedside.  Pain well controlled.  Patient denies any constitutional symptoms.   No other pedal complaints.       Medications:  Scheduled Meds: ampicillin-sulbactam, 3 g, intravenous, q6h  carBAMazepine XR, 100 mg, oral, BID  ferrous sulfate (325 mg ferrous sulfate), 65 mg of iron, oral, Daily with breakfast  gabapentin, 300 mg, oral, TID  hydrALAZINE, 25 mg, oral, TID  insulin lispro, 0-10 Units, subcutaneous, TID AC  lidocaine, 5 mL, infiltration, Once  pantoprazole, 40 mg, oral, Daily before breakfast  polyethylene glycol, 17 g, oral, Daily  rivaroxaban, 15 mg, oral, BID   Followed by  [START ON 3/8/2025] rivaroxaban, 20 mg, oral, Daily with breakfast  traZODone, 50 mg, oral, Nightly      Continuous Infusions:    PRN Meds: PRN medications: acetaminophen, acetaminophen, alteplase, calcium carbonate, melatonin, ondansetron **OR** ondansetron, oxyCODONE, sennosides-docusate sodium         Objective   PHYSICAL EXAM:  Physical Exam Performed:  Vitals:    02/17/25 1244   BP: 134/57   Pulse: 69   Resp: 17   Temp: 36.9 °C (98.4 °F)   SpO2: 96%     Body mass index is 29.01 kg/m².    Patient is AOx3 and in no acute distress. Patient is alert and cooperative. Sitting comfortably in bed with dressing clean, dry and intact.     Vascular: Non-palpable DP/PT pulses B/L. Moderate pitting edema noted B/L. Hair growth absent B/L. CFT<5 to B/L hallux. Temperature is warm to cool from tibial tuberosity to distal digits B/L. No lymphatic streaking noted B/L.     Musculoskeletal: Gross active and passive ROM diminished to age and activity level. Moves all extremities spontaneously. No pain to palpation at feet B/L.     Neurological: Intact light touch sensation B/L. Pain stimuli absent B/L. Denies any numbness, burning or tingling.     Dermatologic: Nails 1-5 are thickened, elongated, discolored with  subungual debris B/L. Skin appears diffusely xerotic B/L. Web spaces 1-4 B/L are clean, dry and intact. No rashes or nodules noted B/L. No hyperkeratotic tissue noted B/L.      Wound:  Measurements: 8.8 cm x 5.7 cm x 1.1 cm   Mixed wound base of granular with visible anterior tibialis tendon  Moderate serosanguinous drainage found in wound vac with 180 mL of drainage in cannister.  Minimal jennyfer-wound maceration.   Minimal jennyfer-wound erythema.   No evidence of ascending cellulitis or lymphangitis.  Minimal palpable fluctuance. Minimal malodor.   Positive probe to bone or deep structures within the wound base.   No tunneling or tracking.   No undermining. Skin edges irregular but intact.      LABS:   Results for orders placed or performed during the hospital encounter of 02/11/25 (from the past 24 hours)   POCT GLUCOSE   Result Value Ref Range    POCT Glucose 107 (H) 74 - 99 mg/dL   POCT GLUCOSE   Result Value Ref Range    POCT Glucose 140 (H) 74 - 99 mg/dL   POCT GLUCOSE   Result Value Ref Range    POCT Glucose 108 (H) 74 - 99 mg/dL   Basic metabolic panel   Result Value Ref Range    Glucose 109 (H) 74 - 99 mg/dL    Sodium 139 136 - 145 mmol/L    Potassium 3.9 3.5 - 5.3 mmol/L    Chloride 104 98 - 107 mmol/L    Bicarbonate 30 21 - 32 mmol/L    Anion Gap 9 (L) 10 - 20 mmol/L    Urea Nitrogen 14 6 - 23 mg/dL    Creatinine 0.74 0.50 - 1.30 mg/dL    eGFR >90 >60 mL/min/1.73m*2    Calcium 8.7 8.6 - 10.3 mg/dL   CBC   Result Value Ref Range    WBC 8.7 4.4 - 11.3 x10*3/uL    nRBC 0.0 0.0 - 0.0 /100 WBCs    RBC 3.13 (L) 4.50 - 5.90 x10*6/uL    Hemoglobin 8.2 (L) 13.5 - 17.5 g/dL    Hematocrit 26.6 (L) 41.0 - 52.0 %    MCV 85 80 - 100 fL    MCH 26.2 26.0 - 34.0 pg    MCHC 30.8 (L) 32.0 - 36.0 g/dL    RDW 13.8 11.5 - 14.5 %    Platelets 363 150 - 450 x10*3/uL   POCT GLUCOSE   Result Value Ref Range    POCT Glucose 152 (H) 74 - 99 mg/dL      Lab Results   Component Value Date    HGBA1C 5.8 (H) 01/06/2025      Lab Results    Component Value Date    CRP 2.68 (H) 02/11/2025      Lab Results   Component Value Date    SEDRATE 51 (H) 02/11/2025        Results from last 7 days   Lab Units 02/17/25  0739   WBC AUTO x10*3/uL 8.7   RBC AUTO x10*6/uL 3.13*   HEMOGLOBIN g/dL 8.2*   HEMATOCRIT % 26.6*     Results from last 7 days   Lab Units 02/17/25  0738 02/12/25  0530 02/11/25  1855 02/11/25  1731   SODIUM mmol/L 139   < >  --  135*   POTASSIUM mmol/L 3.9   < > 3.6 6.1*   CHLORIDE mmol/L 104   < >  --  101   CO2 mmol/L 30   < >  --  28   BUN mg/dL 14   < >  --  14   CREATININE mg/dL 0.74   < >  --  0.68   CALCIUM mg/dL 8.7   < >  --  9.3   MAGNESIUM mg/dL  --   --  1.91 2.26   BILIRUBIN TOTAL mg/dL  --   --   --  0.5   ALT U/L  --   --   --  16   AST U/L  --   --   --  45*    < > = values in this interval not displayed.           IMAGING REVIEW:  Transthoracic Echo Limited    Result Date: 2/13/2025   [facility], [address] [place] [address]              Tel [phone] and Fax [phone] TRANSTHORACIC ECHOCARDIOGRAM REPORT  Patient Name:       SUBHASH Cain Physician:    [id number] Carson Freeman MD Study Date:         2/13/2025           Ordering Provider:    [id number] JUAN F NORRIS MRN/PID:            [id number]            Fellow: Accession#:         [id number]        Nurse: Date of Birth/Age:  1950 / 74 years Sonographer:          Trinh Sung RDCS Gender assigned at  M                   Additional Staff: Birth: Height:             175.26 cm           Admit Date: Weight:             88.91 kg            Admission Status:     Inpatient -                                                               Routine BSA / BMI:          2.05 m2 / 28.94     Encounter#:           [id number]                     kg/m2 Blood Pressure:     127/53 mmHg         Department Location:  [facility]                                                               Floor Study Type:    TRANSTHORACIC ECHO (TTE) LIMITED Diagnosis/ICD: Nonrheumatic aortic (valve) stenosis-I35.0 Indication:    Aortic Stenosis CPT Code:      Echo Limited-69092; Doppler Limited-45280; Color Doppler-27933 Patient History: Valve Disorders:   Aortic Stenosis. Diabetes:          Yes Pertinent History: Murmur, CAD, PVD and Hyperlipidemia. Study Detail: The following Echo studies were performed: 2D, M-Mode, Doppler and               color flow. Technically challenging study due to body habitus and               low pain tolerance. The patient was awake.  PHYSICIAN INTERPRETATION: Left Ventricle: The left ventricular systolic function is normal, with a visually estimated ejection fraction of 55-60%. There are no regional left ventricular wall motion abnormalities. The left ventricular cavity size is normal. There is mildly increased septal and mildly increased posterior left ventricular wall thickness. There is left ventricular concentric remodeling. Spectral Doppler shows a Grade I (impaired relaxation pattern) of left ventricular diastolic filling with normal left atrial filling pressure. Left Atrium: The left atrial size is upper limits of normal. Right Ventricle: The right ventricle is normal in size. There is normal right ventricular global systolic function. Right Atrium: The right atrium is normal in size. Aortic Valve: The aortic valve is probably trileaflet. There is moderate aortic valve cusp calcification. There is evidence of moderate aortic valve stenosis. The aortic valve dimensionless index is 0.31. There is no evidence of aortic valve regurgitation. The peak instantaneous gradient of the aortic valve is 48 mmHg. The mean gradient of the aortic valve is 28 mmHg. Mitral Valve: The mitral valve is normal in structure. There is moderate to severe mitral annular calcification. There is mild mitral valve regurgitation.  Tricuspid Valve: The tricuspid valve is structurally normal. There is mild tricuspid regurgitation. Pulmonic Valve: The pulmonic valve is structurally normal. There is physiologic pulmonic valve regurgitation. Pericardium: There is no pericardial effusion noted. Aorta: The aortic root is normal. Systemic Veins: The inferior vena cava appears normal in size.  CONCLUSIONS:  1. The left ventricular systolic function is normal, with a visually estimated ejection fraction of 55-60%.  2. Spectral Doppler shows a Grade I (impaired relaxation pattern) of left ventricular diastolic filling with normal left atrial filling pressure.  3. There is normal right ventricular global systolic function.  4. There is moderate to severe mitral annular calcification.  5. Moderate aortic valve stenosis. Dimensionless index 0.31 : moderate stenosis. Peak and mean gradients around 48 and 28 mm Hg respectively. Estimated aortic valve area around 1 cm2.  6. There is moderate aortic valve cusp calcification. QUANTITATIVE DATA SUMMARY:  2D MEASUREMENTS:         Normal Ranges: IVSd:            1.05 cm (0.6-1.1cm) LVPWd:           1.09 cm (0.6-1.1cm) LVIDd:           4.60 cm (3.9-5.9cm) LVIDs:           2.96 cm LV Mass Index:   85 g/m2 LV % FS          35.7 %  LV SYSTOLIC FUNCTION:                      Normal Ranges: EF-Visual:      58 % LV EF Reported: 58 %  AORTIC VALVE:                      Normal Ranges: AoV Vmax:                3.46 m/s  (<=1.7m/s) AoV Peak P.9 mmHg (<20mmHg) AoV Mean P.0 mmHg (1.7-11.5mmHg) LVOT Max Jak:            1.10 m/s  (<=1.1m/s) AoV VTI:                 76.60 cm  (18-25cm) LVOT VTI:                23.60 cm LVOT Diameter:           2.50 cm   (1.8-2.4cm) AoV Area, VTI:           1.51 cm2  (2.5-5.5cm2) AoV Area,Vmax:           1.56 cm2  (2.5-4.5cm2) AoV Dimensionless Index: 0.31  TRICUSPID VALVE/RVSP:         Normal Ranges: IVC Diam:             2.10 cm  [id number] Carson Freeman MD  Electronically signed on 2/13/2025 at 6:58:36 PM  ** Final **     ECG 12 lead    Result Date: 2/13/2025  Sinus bradycardia with 1st degree AV block Left anterior fascicular block Minimal voltage criteria for LVH, may be normal variant ( Mukul product ) Abnormal ECG When compared with ECG of 23-DEC-2024 14:41, ST less elevated in Anterior leads T wave inversion now evident in Anterior leads    MR foot left wo IV contrast    Result Date: 2/12/2025  Interpreted By:  Júnior Sena, STUDY: MR FOOT LEFT WO IV CONTRAST;   INDICATION: Signs/Symptoms:r/o osteo.   COMPARISON: Plain film radiographs February 11   ACCESSION NUMBER(S): [id number]   ORDERING CLINICIAN: ABIEL KENT   TECHNIQUE: Multiplanar multisequential MRI left foot without contrast.   FINDINGS: Postsurgical changes amputation of the left 5th digit to the level of the mid metatarsal shaft.   There is a large amount of subcutaneous soft tissue swelling with a dorsal skin wound at the medial side of the ankle joint roughly at the level of the ankle proper. There is no evidence of adjacent fluid collection. There is what looks to be some partial tearing of the tibialis anterior where the skin wound is seen at the level of the distal tibial metaphysis.   No definite abscess seen.   There is a significant amount of edema of the medial cuneiform. This involves nearly the entire bone although there is no definite erosive change and this is not directly in line with the wound. This is however, suspicious for a focus of osteomyelitis.   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 There is mild ankle arthrosis.   Small bone infarct of the anterior distal tibia. No other marrow edema seen.   No other fracture identified.   No evidence of acute tendinous or ligamentous tear.   No evidence of effusion.         Anteromedial skin wound at the level of the ankle joint with extensive and diffuse subcutaneous edema  about the ankle and foot.   There is a large amount of edema isolated to the medial cuneiform. Although this is somewhat remote from the wound the possibility of osteomyelitis is a consideration.   Partial tearing tibialis anteriorly at the level of the distal ankle.   Small bone island distal tibia.   Other degenerative changes.   Signed by: Júnior Sena 2/12/2025 1:10 PM Dictation workstation:   [id number]    Lower extremity venous duplex left    Result Date: 2/11/2025  Interpreted By:  Maynor Kitchen, STUDY: [facility] LOWER EXTREMITY VENOUS DUPLEX LEFT  2/11/2025 5:14 pm   INDICATION: 73 y/o   M with  Signs/Symptoms:lle edema pain. LMP:  Unknown.       COMPARISON: None.   ACCESSION NUMBER(S): [id number]   ORDERING CLINICIAN: JUANITO HAIDER   TECHNIQUE: Routine ultrasound of the  right lower extremity was performed with duplex Doppler (color and spectral) evaluation.   Static images were obtained for remote interpretation.   FINDINGS: Deep venous thrombosis of the left superficial femoral veins throughout its course.       Deep venous thrombosis of the left superficial femoral vein.   MACRO: None   Signed by: Maynor Kitchen 2/11/2025 5:35 PM Dictation workstation:   [id number]    XR foot 3+ views bilateral    Result Date: 2/11/2025  Interpreted By:  Júnior Sena, STUDY: XR FOOT 3+ VIEWS BILATERAL   INDICATION: Signs/Symptoms:l foot ulcer top of foot fould odor,.   COMPARISON: September 12, 2024     ACCESSION NUMBER(S): [id number]   ORDERING CLINICIAN: JUANITO HAIDER   FINDINGS: Interval amputation of the 5th digit to the level of the 5th metatarsal shaft. Surgical margins are sharp.   Marked dorsal soft tissue swelling with likely soft tissue wound at the level of the tarsometatarsal articulations on the left.   The right foot demonstrates advanced degenerative changes with some mild dorsal soft tissue swelling with no acute osseous abnormality..         Interval amputation of the 5th digit to the  level of the 5th metatarsal shaft. Surgical margins are sharp.   Marked dorsal soft tissue swelling with likely soft tissue wound at the level of the tarsometatarsal articulations on the left.   Mild soft tissue swelling right foot.   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  - Charts, labs, vitals and imaging all reviewed.   - Labs: Glucose 109 WBC 8.7  - Duplex US: + for DVT of femoral vein of the left lower extremity.  - Intra-op bone culture: pending     Plan:  - Abx: IV Vanc/Zosyn; plan or 6 weeks of IV abx via PICC line  - Patient is s/p excisional debridement of wound down to bone, bone biopsy of the tibia and application of wound vac to the left lower extremity (DOS: 02/13/25). Patient is doing well with no complaints.  - Dressings: wound vac with 4x4, Abd pad, Kerlix, tape. New wound vac sponge and draping applied today. Plan for additional change on Wednesday/Thursday. Will plan to coordinate with discharge.  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 Thank you.  - Podiatry will continue to follow while in house.     DVT ppx: Xarelto  Weightbearing: NWB to the LLE  Discharge: Pt to follow up 1 week after discharge with Dr. Ronald Christianson DPM   Podiatric Medicine & Surgery  Please Haiku message me with any questions or concerns.            SIGNATURE: Ronald Christianson DPM  PATIENT NAME: Elliot Partida   DATE: February 17, 2025 MRN: [id number]   TIME: 3:41 PM CONTACT: Haiku Message

## 2025-02-17 NOTE — PROGRESS NOTES
Vancomycin Dosing by Pharmacy- Cessation of Therapy    Consult to pharmacy for vancomycin dosing has been discontinued by the prescriber, pharmacy will sign off at this time.    Please call pharmacy if there are further questions or re-enter a consult if vancomycin is resumed.     Jonathan Min, MarielleD

## 2025-02-17 NOTE — CONSULTS
Inpatient consult to Infectious Diseases  Consult performed by: Sonam Zhu, APRN-CNP  Consult ordered by: Ann-Marie Givens PA-C  Reason for consult: Diabetic foot wound, positve bone biopsy Enterococcus          Primary MD: No Assigned PCP Generic Provider, MD      History Of Present Illness  Elliot Partida is a 74 y.o. male with past medical history of diabetes mellitus history of osteomyelitis.  He presented with worsening left foot wound.  He reported progressive left foot swelling and pain.  He reported associated fever.  Left foot MRI showed concern for osteomyelitis.   He was evaluated by podiatry, he underwent excisional debridement to bone with bone biopsy performed and wound vac application.  We were consulted due to positive bone biopsy-culture growing Enterococcus faecalis-susceptible to ampicillin, vancomycin.   Wound culture grew anaerobic bacteria, abundant mixed gram-positive and gram-negative bacteria-final.  Blood cultures negative.  Patient seen ans examined.  He denies current fever, chills.  He denies any left foot pain.  He denies nausea vomiting or diarrhea.  Labs with no leukocytosis.   He is on IV zosyn and vancomycin.       Past Medical History  He has a past medical history of Acute osteomyelitis of ankle and foot, left (Multi), AMI (acute myocardial infarction) (Multi), ASHD (arteriosclerotic heart disease), At risk for falls, Cellulitis, Diabetes mellitus (Multi), DVT (deep vein thrombosis) in pregnancy ([facility]), Fall risk care plan declined, Hypertension, Meningioma (Multi), Myocardial infarction (Multi), Neuritis, Neuropathy, Osteoarthritis, Osteomyelitis, PVD (peripheral vascular disease) (CMS-HCC), Sprain of right knee (06/25/2015), Stroke (Multi), 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[facility]), TIA (transient ischemic attack), Tinea pedis of both feet, Trigeminal neuralgia, and Weakness.    Surgical History  He has a past surgical history that includes Carpal tunnel release (10/10/2014); Eye  surgery; FL arthrocentesis ASP INJ joint.; Cardiac catheterization; Iridotomy / iridectomy (Bilateral); Invasive Vascular Procedure (Bilateral, 09/18/2024); Foot ray resection (Left, 09/23/2024); and Foot surgery (Left, 09/27/2024).     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     Immunization History   Administered Date(s) Administered    Moderna SARS-CoV-2 Vaccination 04/14/2021, 05/13/2021     Medications  Home medications:  Medications Prior to Admission   Medication Sig Dispense Refill Last Dose/Taking    carBAMazepine (Carbatrol) 100 mg 12 hr capsule Take 1 capsule (100 mg) by mouth 2 times a day for 7 days. Do not crush or chew. 14 capsule 0 2/10/2025    gabapentin (Neurontin) 300 mg capsule Take 1 capsule (300 mg) by mouth 3 times a day.   2/10/2025    hydrALAZINE (Apresoline) 25 mg tablet Take 1 tablet (25 mg) by mouth 3 times a day.   2/10/2025    potassium chloride CR (Klor-Con M20) 20 mEq ER tablet Take 2 tablets (40 mEq) by mouth once daily. Do not crush or chew.   2/10/2025    traZODone (Desyrel) 50 mg tablet Take 1 tablet (50 mg) by mouth once daily at bedtime.   2/10/2025    apixaban (Eliquis) 5 mg tablet Take 2 tablets (10 mg) by mouth 2 times a day for 4 days, THEN 1 tablet (5 mg) 2 times a day.       aspirin 81 mg chewable tablet Chew 1 tablet (81 mg) once daily. Do not fill  before September 19, 2024. (Patient not taking: Reported on 2/11/2025) 90 tablet 0 Not Taking    clopidogrel (Plavix) 75 mg tablet Take 1 tablet (75 mg) by mouth once daily. Do not fill before September 19, 2024. (Patient not taking: Reported on 2/11/2025) 90 tablet 0 Not Taking    insulin lispro (HumaLOG) 100 unit/mL injection Inject 0-10 Units under the skin 3 times daily (morning, midday, late afternoon). Take as directed per insulin instructions. (Patient not taking: Reported on 2/11/2025)   Not Taking    polyethylene glycol (Glycolax, Miralax) 17 gram packet Take 17 g by mouth once daily.   Unknown     Current medications:  Scheduled medications  carBAMazepine XR, 100 mg, oral, BID  ferrous sulfate (325 mg ferrous sulfate), 65 mg of iron, oral, Daily with breakfast  gabapentin, 300 mg, oral, TID  hydrALAZINE, 25 mg, oral, TID  insulin lispro, 0-10 Units, subcutaneous, TID AC  pantoprazole, 40 mg, oral, Daily before breakfast  piperacillin-tazobactam, 3.375 g, intravenous, q6h  polyethylene glycol, 17 g, oral, Daily  rivaroxaban, 15 mg, oral, BID   Followed by  [START ON 3/8/2025] rivaroxaban, 20 mg, oral, Daily with breakfast  traZODone, 50 mg, oral, Nightly  vancomycin, 1,000 mg, intravenous, q12h      Continuous medications     PRN medications  PRN medications: acetaminophen, acetaminophen, calcium carbonate, melatonin, ondansetron **OR** ondansetron, oxyCODONE, sennosides-docusate sodium, vancomycin    Review of Systems   Constitutional: Negative.    HENT: Negative.     Eyes: Negative.    Respiratory: Negative.     Cardiovascular: Negative.    Gastrointestinal: Negative.    Endocrine: Negative.    Genitourinary: Negative.    Musculoskeletal: Negative.    Skin:  Positive for wound.   Neurological: Negative.    Psychiatric/Behavioral: Negative.          Objective  Range of Vitals (last 24 hours)  Heart Rate:  [57-69]   Temp:  [36.9 °C (98.4 °F)]   Resp:  [17-18]   BP: (115-134)/(48-62)   SpO2:  [94 %-98 %]    Daily Weight  02/11/25 : 89.1 kg (196 lb 6.9 oz)    Body mass index is 29.01 kg/m².     Physical Exam  Constitutional:       Appearance: Normal appearance.   HENT:      Head: Normocephalic and atraumatic.      Nose: Nose normal.      Mouth/Throat:      Mouth: Mucous membranes are moist.      Pharynx: Oropharynx is clear.   Eyes:      General: No scleral icterus.  Cardiovascular:      Rate and Rhythm: Normal rate and regular rhythm.   Pulmonary:      Effort: Pulmonary effort is normal.      Breath sounds: Normal breath sounds.   Abdominal:      General: Bowel sounds are normal.      Palpations: Abdomen is soft.   Musculoskeletal:         General: Normal range of motion.      Cervical back: Normal range of motion and neck supple.   Skin:     General: Skin is warm and dry.      Comments: Left foot dressing    Neurological:      General: No focal deficit present.      Mental Status: He is alert and oriented to person, place, and time. Mental status is at baseline.   Psychiatric:         Mood and Affect: Mood normal.         Behavior: Behavior normal.          Relevant Results  Outside Hospital Results  No  Labs  Results from last 72 hours   Lab Units 02/17/25  0739 02/16/25  0643 02/15/25  0705   WBC AUTO x10*3/uL 8.7 8.2 6.8   HEMOGLOBIN g/dL 8.2* 7.8* 7.7*   HEMATOCRIT % 26.6* 26.2* 25.4*   PLATELETS AUTO x10*3/uL 363 326 301     Results from last 72 hours   Lab Units 02/17/25  0738 02/16/25  0643 02/15/25  0705   SODIUM mmol/L 139 138 140   POTASSIUM mmol/L 3.9 4.2 4.1   CHLORIDE mmol/L 104 104 105   CO2 mmol/L 30 28 29   BUN mg/dL 14 14 14   CREATININE mg/dL 0.74 0.80 0.74   GLUCOSE mg/dL 109* 114* 148*   CALCIUM mg/dL 8.7 8.8 8.4*   ANION GAP mmol/L 9* 10 10   EGFR mL/min/1.73m*2 >90 >90 >90         Estimated Creatinine Clearance: 98.2 mL/min (by C-G formula based on SCr of 0.74 mg/dL).  C-Reactive Protein   Date Value Ref Range Status   02/11/2025 2.68 (H) <1.00 mg/dL Final   11/05/2024 1.34 (H) <1.00 mg/dL  "Final   09/13/2024 9.51 (H) <1.00 mg/dL Final     Sedimentation Rate   Date Value Ref Range Status   02/11/2025 51 (H) 0 - 20 mm/h Final   12/23/2024 14 0 - 20 mm/h Final   11/05/2024 38 (H) 0 - 20 mm/h Final     No results found for: \"HIV1X2\", \"HIVCONF\", \"LDSVQU5VO\"  No results found for: \"HEPCABINIT\", \"HEPCAB\", \"HCVPCRQUANT\"  Microbiology  Susceptibility data from last 90 days.  Collected Specimen Info Organism Ampicillin Gentamicin Synergy Penicillin Trimethoprim/Sulfamethoxazole Vancomycin   02/13/25 Tissue from BONE RESECTION Enterococcus faecalis  S  S  S  R  S   02/12/25 Tissue/Biopsy from Wound/Tissue Mixed Anaerobic Bacteria          Mixed Gram-Positive and Gram-Negative Bacteria        02/11/25 Tissue/Biopsy from Diabetic Foot Ulcer Mixed Aerobic and Anaerobic Bacteria               Imaging  Transthoracic Echo Limited    Result Date: 2/13/2025   [facility], [address] [place] [address]              Tel [phone] and Fax [phone] TRANSTHORACIC ECHOCARDIOGRAM REPORT  Patient Name:       SUBHASH SEGAL    [place] Physician:    [id number] Carson Freeman MD Study Date:         2/13/2025           Ordering Provider:    [id number] JUAN F NORRIS MRN/PID:            [id number]            Fellow: Accession#:         [id number]        Nurse: Date of Birth/Age:  1950 / 74 years Sonographer:          Trinh Sung RDCS Gender assigned at                     Additional Staff: Birth: Height:             175.26 cm           Admit Date: Weight:             88.91 kg            Admission Status:     Inpatient -                                                               Routine BSA / BMI:          2.05 m2 / 28.94     Encounter#:           [id number]                     kg/m2 Blood Pressure:     127/53 mmHg         Department Location:  [facility]"                                                Floor Study Type:    TRANSTHORACIC ECHO (TTE) LIMITED Diagnosis/ICD: Nonrheumatic aortic (valve) stenosis-I35.0 Indication:    Aortic Stenosis CPT Code:      Echo Limited-11862; Doppler Limited-10179; Color Doppler-02307 Patient History: Valve Disorders:   Aortic Stenosis. Diabetes:          Yes Pertinent History: Murmur, CAD, PVD and Hyperlipidemia. Study Detail: The following Echo studies were performed: 2D, M-Mode, Doppler and               color flow. Technically challenging study due to body habitus and               low pain tolerance. The patient was awake.  PHYSICIAN INTERPRETATION: Left Ventricle: The left ventricular systolic function is normal, with a visually estimated ejection fraction of 55-60%. There are no regional left ventricular wall motion abnormalities. The left ventricular cavity size is normal. There is mildly increased septal and mildly increased posterior left ventricular wall thickness. There is left ventricular concentric remodeling. Spectral Doppler shows a Grade I (impaired relaxation pattern) of left ventricular diastolic filling with normal left atrial filling pressure. Left Atrium: The left atrial size is upper limits of normal. Right Ventricle: The right ventricle is normal in size. There is normal right ventricular global systolic function. Right Atrium: The right atrium is normal in size. Aortic Valve: The aortic valve is probably trileaflet. There is moderate aortic valve cusp calcification. There is evidence of moderate aortic valve stenosis. The aortic valve dimensionless index is 0.31. There is no evidence of aortic valve regurgitation. The peak instantaneous gradient of the aortic valve is 48 mmHg. The mean gradient of the aortic valve is 28 mmHg. Mitral Valve: The mitral valve is normal in structure. There is moderate to severe mitral annular calcification. There is mild mitral valve regurgitation. Tricuspid Valve: The tricuspid  valve is structurally normal. There is mild tricuspid regurgitation. Pulmonic Valve: The pulmonic valve is structurally normal. There is physiologic pulmonic valve regurgitation. Pericardium: There is no pericardial effusion noted. Aorta: The aortic root is normal. Systemic Veins: The inferior vena cava appears normal in size.  CONCLUSIONS:  1. The left ventricular systolic function is normal, with a visually estimated ejection fraction of 55-60%.  2. Spectral Doppler shows a Grade I (impaired relaxation pattern) of left ventricular diastolic filling with normal left atrial filling pressure.  3. There is normal right ventricular global systolic function.  4. There is moderate to severe mitral annular calcification.  5. Moderate aortic valve stenosis. Dimensionless index 0.31 : moderate stenosis. Peak and mean gradients around 48 and 28 mm Hg respectively. Estimated aortic valve area around 1 cm2.  6. There is moderate aortic valve cusp calcification. QUANTITATIVE DATA SUMMARY:  2D MEASUREMENTS:         Normal Ranges: IVSd:            1.05 cm (0.6-1.1cm) LVPWd:           1.09 cm (0.6-1.1cm) LVIDd:           4.60 cm (3.9-5.9cm) LVIDs:           2.96 cm LV Mass Index:   85 g/m2 LV % FS          35.7 %  LV SYSTOLIC FUNCTION:                      Normal Ranges: EF-Visual:      58 % LV EF Reported: 58 %  AORTIC VALVE:                      Normal Ranges: AoV Vmax:                3.46 m/s  (<=1.7m/s) AoV Peak P.9 mmHg (<20mmHg) AoV Mean P.0 mmHg (1.7-11.5mmHg) LVOT Max Jak:            1.10 m/s  (<=1.1m/s) AoV VTI:                 76.60 cm  (18-25cm) LVOT VTI:                23.60 cm LVOT Diameter:           2.50 cm   (1.8-2.4cm) AoV Area, VTI:           1.51 cm2  (2.5-5.5cm2) AoV Area,Vmax:           1.56 cm2  (2.5-4.5cm2) AoV Dimensionless Index: 0.31  TRICUSPID VALVE/RVSP:         Normal Ranges: IVC Diam:             2.10 cm  [id number] Carson Freeman MD Electronically signed on  2/13/2025 at 6:58:36 PM  ** Final **     ECG 12 lead    Result Date: 2/13/2025  Sinus bradycardia with 1st degree AV block Left anterior fascicular block Minimal voltage criteria for LVH, may be normal variant ( Mukul product ) Abnormal ECG When compared with ECG of 23-DEC-2024 14:41, ST less elevated in Anterior leads T wave inversion now evident in Anterior leads    MR foot left wo IV contrast    Result Date: 2/12/2025  Interpreted By:  Júnior Sena, STUDY: MR FOOT LEFT WO IV CONTRAST;   INDICATION: Signs/Symptoms:r/o osteo.   COMPARISON: Plain film radiographs February 11   ACCESSION NUMBER(S): [id number]   ORDERING CLINICIAN: ABIEL KENT   TECHNIQUE: Multiplanar multisequential MRI left foot without contrast.   FINDINGS: Postsurgical changes amputation of the left 5th digit to the level of the mid metatarsal shaft.   There is a large amount of subcutaneous soft tissue swelling with a dorsal skin wound at the medial side of the ankle joint roughly at the level of the ankle proper. There is no evidence of adjacent fluid collection. There is what looks to be some partial tearing of the tibialis anterior where the skin wound is seen at the level of the distal tibial metaphysis.   No definite abscess seen.   There is a significant amount of edema of the medial cuneiform. This involves nearly the entire bone although there is no definite erosive change and this is not directly in line with the wound. This is however, suspicious for a focus of osteomyelitis.   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 There is mild ankle arthrosis.   Small bone infarct of the anterior distal tibia. No other marrow edema seen.   No other fracture identified.   No evidence of acute tendinous or ligamentous tear.   No evidence of effusion.         Anteromedial skin wound at the level of the ankle joint with extensive and diffuse subcutaneous edema about the ankle and foot.    There is a large amount of edema isolated to the medial cuneiform. Although this is somewhat remote from the wound the possibility of osteomyelitis is a consideration.   Partial tearing tibialis anteriorly at the level of the distal ankle.   Small bone island distal tibia.   Other degenerative changes.   Signed by: Júnior Sena 2/12/2025 1:10 PM Dictation workstation:   [id number]    Lower extremity venous duplex left    Result Date: 2/11/2025  Interpreted By:  Maynor Kitchen, [facility]UD[facility]REM[facility]UP[facility] LEFT  2/11/2025 5:14 pm   INDICATION: 73 y/o   M with  Signs/Symptoms:lle edema pain. LMP:  Unknown.       COMPARISON: None.   ACCESSION NUMBER(S): [id number]   ORDERING CLINICIAN: JUANITO HAIDER   TECHNIQUE: Routine ultrasound of the  right lower extremity was performed with duplex Doppler (color and spectral) evaluation.   Static images were obtained for remote interpretation.   FINDINGS: Deep venous thrombosis of the left superficial femoral veins throughout its course.       Deep venous thrombosis of the left superficial femoral vein.   MACRO: None   Signed by: Maynor Kitchen 2/11/2025 5:35 PM Dictation workstation:   [id number]    XR foot 3+ views bilateral    Result Date: 2/11/2025  Interpreted By:  Júnior Sena, STUDY: XR FOOT 3+ VIEWS BILATERAL   INDICATION: Signs/Symptoms:l foot ulcer top of foot fould odor,.   COMPARISON: September 12, 2024     ACCESSION NUMBER(S): [id number]   ORDERING CLINICIAN: JUANITO HAIDER   FINDINGS: Interval amputation of the 5th digit to the level of the 5th metatarsal shaft. Surgical margins are sharp.   Marked dorsal soft tissue swelling with likely soft tissue wound at the level of the tarsometatarsal articulations on the left.   The right foot demonstrates advanced degenerative changes with some mild dorsal soft tissue swelling with no acute osseous abnormality..         Interval amputation of the 5th digit to the level of the 5th metatarsal  shaft. Surgical margins are sharp.   Marked dorsal soft tissue swelling with likely soft tissue wound at the level of the tarsometatarsal articulations on the left.   Mild soft tissue swelling right foot.   Signed by: Júnior Sena 2/11/2025 4:19 PM Dictation workstation:   [id number]     Assessment/Plan   Left dorsal foot ulcer-s/p excisional debridement to bone 2/13/2025-culture grew grew anaerobic bacteria, abundant mixed gram-positive and gram-negative bacteria-final.  Left lower extremity cellulitis   Left foot osteomyelitis -bone biopsy grew Enterococcus faecalis -final  Type 2 Diabetes Mellitus with polyneuropathy     Discontinue IV zosyn  Discontinue  IV vancomycin  Start IV Unasyn   Local care  Podiatry follow up  PICC line placement    Discussed with Dr. Velazco      Long term plan IV Unasyn for total 6 weeks till 3/27/2025  Weekly CBC with diff, CMP    Total time spent caring for the patient today was 40 minutes. This includes time spent before the visit reviewing the chart, time spent during the visit, and time spent after the visit on documentation.   Sonam Zhu, SARAH-CNP

## 2025-02-17 NOTE — CARE PLAN
The patient's goals for the shift include      The clinical goals for the shift include pt will remain free from fall/injury, will tolerate iv infusions without complications, pts wound vac will remain draining wound as needed, left leg dressing will remain clean dry and intact    Pt rested in bed throughout the shift. No fall or injury was sustained. Pt tolerated iv antibiotic infusions without complications, pts wound vac remained draining minimal amounts of drainage. Dressing remains dry andintact.

## 2025-02-17 NOTE — PROGRESS NOTES
Occupational Therapy  Therapy Communication Note    Patient Name: Elliot Partida  MRN: [id number]  Department: [facility]  Room: 207/207-A  Today's Date: 2/17/2025     Discipline: Occupational Therapy    OT Missed Visit: Yes     Missed Visit Reason: Missed Visit Reason: Patient refused (Edu on participation with therapy and increasing out of bed activity for practice with transfers and maintaining precautions. Pt. adamantly declined therapy despite multiple activities provided as options. RN present and aware.)    Missed Time: Attempt 1961

## 2025-02-17 NOTE — PROGRESS NOTES
"Elliot Partida is a 74 y.o. male on day 6 of admission presenting with Acute deep vein thrombosis (DVT) of femoral vein of left lower extremity (Multi).    Subjective     No overnight events reported.     Patient is resting comfortably in bed this morning, denies any pain or shortness of breath. Antibiotics were changed to Unasyn today by ID based on bone biopsy results, patient will need a PICC line placed for 6 weeks of IV antibiotics. TCC is following for discharge planning.          Objective     Physical Exam  Constitutional:       General: He is not in acute distress.     Appearance: He is not toxic-appearing.   HENT:      Head: Normocephalic and atraumatic.      Mouth/Throat:      Mouth: Mucous membranes are moist.   Eyes:      Conjunctiva/sclera: Conjunctivae normal.   Cardiovascular:      Rate and Rhythm: Normal rate and regular rhythm.   Pulmonary:      Effort: No respiratory distress.      Breath sounds: Normal breath sounds.   Abdominal:      General: There is no distension.      Palpations: Abdomen is soft.      Tenderness: There is no abdominal tenderness.   Musculoskeletal:         General: No swelling.   Skin:     General: Skin is warm and dry.      Comments: LLE wrapped with clean/dry dressing, wound vac in place with bloody drainage   Neurological:      Mental Status: He is alert and oriented to person, place, and time.   Psychiatric:         Mood and Affect: Mood normal.         Behavior: Behavior normal.         Last Recorded Vitals  Blood pressure 134/57, pulse 69, temperature 36.9 °C (98.4 °F), temperature source Temporal, resp. rate 17, height 1.753 m (5' 9\"), weight 89.1 kg (196 lb 6.9 oz), SpO2 96%.  Intake/Output last 3 Shifts:  I/O last 3 completed shifts:  In: 1900 (21.3 mL/kg) [IV Piggyback:1900]  Out: 1825 (20.5 mL/kg) [Urine:1825 (0.6 mL/kg/hr)]  Weight: 89.1 kg     Relevant Results          Scheduled medications  ampicillin-sulbactam, 3 g, intravenous, q6h  carBAMazepine XR, 100 mg, " oral, BID  ferrous sulfate (325 mg ferrous sulfate), 65 mg of iron, oral, Daily with breakfast  gabapentin, 300 mg, oral, TID  hydrALAZINE, 25 mg, oral, TID  insulin lispro, 0-10 Units, subcutaneous, TID AC  lidocaine, 5 mL, infiltration, Once  pantoprazole, 40 mg, oral, Daily before breakfast  polyethylene glycol, 17 g, oral, Daily  rivaroxaban, 15 mg, oral, BID   Followed by  [START ON 3/8/2025] rivaroxaban, 20 mg, oral, Daily with breakfast  traZODone, 50 mg, oral, Nightly      Continuous medications     PRN medications  PRN medications: acetaminophen, acetaminophen, alteplase, calcium carbonate, melatonin, ondansetron **OR** ondansetron, oxyCODONE, sennosides-docusate sodium    Results for orders placed or performed during the hospital encounter of 02/11/25 (from the past 24 hours)   POCT GLUCOSE   Result Value Ref Range    POCT Glucose 107 (H) 74 - 99 mg/dL   POCT GLUCOSE   Result Value Ref Range    POCT Glucose 140 (H) 74 - 99 mg/dL   POCT GLUCOSE   Result Value Ref Range    POCT Glucose 108 (H) 74 - 99 mg/dL   Basic metabolic panel   Result Value Ref Range    Glucose 109 (H) 74 - 99 mg/dL    Sodium 139 136 - 145 mmol/L    Potassium 3.9 3.5 - 5.3 mmol/L    Chloride 104 98 - 107 mmol/L    Bicarbonate 30 21 - 32 mmol/L    Anion Gap 9 (L) 10 - 20 mmol/L    Urea Nitrogen 14 6 - 23 mg/dL    Creatinine 0.74 0.50 - 1.30 mg/dL    eGFR >90 >60 mL/min/1.73m*2    Calcium 8.7 8.6 - 10.3 mg/dL   CBC   Result Value Ref Range    WBC 8.7 4.4 - 11.3 x10*3/uL    nRBC 0.0 0.0 - 0.0 /100 WBCs    RBC 3.13 (L) 4.50 - 5.90 x10*6/uL    Hemoglobin 8.2 (L) 13.5 - 17.5 g/dL    Hematocrit 26.6 (L) 41.0 - 52.0 %    MCV 85 80 - 100 fL    MCH 26.2 26.0 - 34.0 pg    MCHC 30.8 (L) 32.0 - 36.0 g/dL    RDW 13.8 11.5 - 14.5 %    Platelets 363 150 - 450 x10*3/uL   POCT GLUCOSE   Result Value Ref Range    POCT Glucose 152 (H) 74 - 99 mg/dL     Transthoracic Echo Limited    Result Date: 2/13/2025   [facility], [address] [facility],  [place] [address]              Tel [phone] and Fax [phone] TRANSTHORACIC ECHOCARDIOGRAM REPORT  Patient Name:       SUBHASH SCHMIDT LUZMA    Reading Physician:    [id number] Carson Freeman MD Study Date:         2/13/2025           Ordering Provider:    [id number] JUAN FROBLES NORRIS MRN/PID:            [id number]            Fellow: Accession#:         [id number]        Nurse: Date of Birth/Age:  1950 / 74 years Sonographer:          Trinh Sung RDCS Gender assigned at                     Additional Staff: Birth: Height:             175.26 cm           Admit Date: Weight:             88.91 kg            Admission Status:     Inpatient -                                                               Routine BSA / BMI:          2.05 m2 / 28.94     Encounter#:           [id number]                     kg/m2 Blood Pressure:     127/53 mmHg         Department Location:  [facility] Study Type:    TRANSTHORACIC ECHO (TTE) LIMITED Diagnosis/ICD: Nonrheumatic aortic (valve) stenosis-I35.0 Indication:    Aortic Stenosis CPT Code:      Echo Limited-31934; Doppler Limited-49685; Color Doppler-90302 Patient History: Valve Disorders:   Aortic Stenosis. Diabetes:          Yes Pertinent History: Murmur, CAD, PVD and Hyperlipidemia. Study Detail: The following Echo studies were performed: 2D, M-Mode, Doppler and               color flow. Technically challenging study due to body habitus and               low pain tolerance. The patient was awake.  PHYSICIAN INTERPRETATION: Left Ventricle: The left ventricular systolic function is normal, with a visually estimated ejection fraction of 55-60%. There are no regional left ventricular wall motion abnormalities. The left ventricular cavity size is normal. There is mildly increased septal and  mildly increased posterior left ventricular wall thickness. There is left ventricular concentric remodeling. Spectral Doppler shows a Grade I (impaired relaxation pattern) of left ventricular diastolic filling with normal left atrial filling pressure. Left Atrium: The left atrial size is upper limits of normal. Right Ventricle: The right ventricle is normal in size. There is normal right ventricular global systolic function. Right Atrium: The right atrium is normal in size. Aortic Valve: The aortic valve is probably trileaflet. There is moderate aortic valve cusp calcification. There is evidence of moderate aortic valve stenosis. The aortic valve dimensionless index is 0.31. There is no evidence of aortic valve regurgitation. The peak instantaneous gradient of the aortic valve is 48 mmHg. The mean gradient of the aortic valve is 28 mmHg. Mitral Valve: The mitral valve is normal in structure. There is moderate to severe mitral annular calcification. There is mild mitral valve regurgitation. Tricuspid Valve: The tricuspid valve is structurally normal. There is mild tricuspid regurgitation. Pulmonic Valve: The pulmonic valve is structurally normal. There is physiologic pulmonic valve regurgitation. Pericardium: There is no pericardial effusion noted. Aorta: The aortic root is normal. Systemic Veins: The inferior vena cava appears normal in size.  CONCLUSIONS:  1. The left ventricular systolic function is normal, with a visually estimated ejection fraction of 55-60%.  2. Spectral Doppler shows a Grade I (impaired relaxation pattern) of left ventricular diastolic filling with normal left atrial filling pressure.  3. There is normal right ventricular global systolic function.  4. There is moderate to severe mitral annular calcification.  5. Moderate aortic valve stenosis. Dimensionless index 0.31 : moderate stenosis. Peak and mean gradients around 48 and 28 mm Hg respectively. Estimated aortic valve area around 1 cm2.  6.  There is moderate aortic valve cusp calcification. QUANTITATIVE DATA SUMMARY:  2D MEASUREMENTS:         Normal Ranges: IVSd:            1.05 cm (0.6-1.1cm) LVPWd:           1.09 cm (0.6-1.1cm) LVIDd:           4.60 cm (3.9-5.9cm) LVIDs:           2.96 cm LV Mass Index:   85 g/m2 LV % FS          35.7 %  LV SYSTOLIC FUNCTION:                      Normal Ranges: EF-Visual:      58 % LV EF Reported: 58 %  AORTIC VALVE:                      Normal Ranges: AoV Vmax:                3.46 m/s  (<=1.7m/s) AoV Peak P.9 mmHg (<20mmHg) AoV Mean P.0 mmHg (1.7-11.5mmHg) LVOT Max Jak:            1.10 m/s  (<=1.1m/s) AoV VTI:                 76.60 cm  (18-25cm) LVOT VTI:                23.60 cm LVOT Diameter:           2.50 cm   (1.8-2.4cm) AoV Area, VTI:           1.51 cm2  (2.5-5.5cm2) AoV Area,Vmax:           1.56 cm2  (2.5-4.5cm2) AoV Dimensionless Index: 0.31  TRICUSPID VALVE/RVSP:         Normal Ranges: IVC Diam:             2.10 cm  [id number] Carson Freeman MD Electronically signed on 2025 at 6:58:36 PM  ** Final **     ECG 12 lead    Result Date: 2025  Sinus bradycardia with 1st degree AV block Left anterior fascicular block Minimal voltage criteria for LVH, may be normal variant ( Mukul product ) Abnormal ECG When compared with ECG of 23-DEC-2024 14:41, ST less elevated in Anterior leads T wave inversion now evident in Anterior leads    MR foot left wo IV contrast    Result Date: 2025  Interpreted By:  Júnior Sena, STUDY: MR FOOT LEFT WO IV CONTRAST;   INDICATION: Signs/Symptoms:r/o osteo.   COMPARISON: Plain film radiographs    ACCESSION NUMBER(S): [id number]   ORDERING CLINICIAN: ABIEL KENT   TECHNIQUE: Multiplanar multisequential MRI left foot without contrast.   FINDINGS: Postsurgical changes amputation of the left 5th digit to the level of the mid metatarsal shaft.   There is a large amount of subcutaneous soft tissue swelling with a dorsal skin  wound at the medial side of the ankle joint roughly at the level of the ankle proper. There is no evidence of adjacent fluid collection. There is what looks to be some partial tearing of the tibialis anterior where the skin wound is seen at the level of the distal tibial metaphysis.   No definite abscess seen.   There is a significant amount of edema of the medial cuneiform. This involves nearly the entire bone although there is no definite erosive change and this is not directly in line with the wound. This is however, suspicious for a focus of osteomyelitis.   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 There is mild ankle arthrosis.   Small bone infarct of the anterior distal tibia. No other marrow edema seen.   No other fracture identified.   No evidence of acute tendinous or ligamentous tear.   No evidence of effusion.         Anteromedial skin wound at the level of the ankle joint with extensive and diffuse subcutaneous edema about the ankle and foot.   There is a large amount of edema isolated to the medial cuneiform. Although this is somewhat remote from the wound the possibility of osteomyelitis is a consideration.   Partial tearing tibialis anteriorly at the level of the distal ankle.   Small bone island distal tibia.   Other degenerative changes.   Signed by: Júnior Sena 2/12/2025 1:10 PM Dictation workstation:   [id number]    Lower extremity venous duplex left    Result Date: 2/11/2025  Interpreted By:  Maynor Kitchen, STUDY: [facility] LOWER EXTREMITY VENOUS DUPLEX LEFT  2/11/2025 5:14 pm   INDICATION: 73 y/o   M with  Signs/Symptoms:lle edema pain. LMP:  Unknown.       COMPARISON: None.   ACCESSION NUMBER(S): [id number]   ORDERING CLINICIAN: JUANITO HAIDER   TECHNIQUE: Routine ultrasound of the  right lower extremity was performed with duplex Doppler (color and spectral) evaluation.   Static images were obtained for remote interpretation.    FINDINGS: Deep venous thrombosis of the left superficial femoral veins throughout its course.       Deep venous thrombosis of the left superficial femoral vein.   MACRO: None   Signed by: Maynor Kitchen 2/11/2025 5:35 PM Dictation workstation:   [id number]    XR foot 3+ views bilateral    Result Date: 2/11/2025  Interpreted By:  Júnior Sean, STUDY: XR FOOT 3+ VIEWS BILATERAL   INDICATION: Signs/Symptoms:l foot ulcer top of foot fould odor,.   COMPARISON: September 12, 2024     ACCESSION NUMBER(S): [id number]   ORDERING CLINICIAN: JUANITO HAIDER   FINDINGS: Interval amputation of the 5th digit to the level of the 5th metatarsal shaft. Surgical margins are sharp.   Marked dorsal soft tissue swelling with likely soft tissue wound at the level of the tarsometatarsal articulations on the left.   The right foot demonstrates advanced degenerative changes with some mild dorsal soft tissue swelling with no acute osseous abnormality..         Interval amputation of the 5th digit to the level of the 5th metatarsal shaft. Surgical margins are sharp.   Marked dorsal soft tissue swelling with likely soft tissue wound at the level of the tarsometatarsal articulations on the left.   Mild soft tissue swelling right foot.   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   - Stopped taking apixaban prior to admission   - given heparin bolus in the ED and started on heparin  drip; now discontinued  - continue rivaroxaban 15 mg BID x 21 days then 20 mg daily  - daily CBC      Left dorsal foot wound  Acute on chronic osteomyelitis of left foot  - present on admission; pictures in chart  - s/p I&D with bone biopsy on 2/13/25  - BCx negative x 4 days   - wound cultures collected 2/11, 2/12 grew mixed gram negative and gram positive organism  - wound culture from bone collected 2/13 grew enterococcus faecalis resistant to Bactrim   - zosyn 3.375 g q6h and vancomycin 1,000 mL BID discontinued on 2/17 by ID  - Start Unasyn 3g IV q6h x 6 weeks (last day 3/27/25)  - Wound Vac/dressing changes per podiatry recs  - Oxycodone 5 mg q6h PRN for severe pain   - PT/OT evals; keep NWB to LLE   - ID consulted, appreciate recs   - Will need PICC line placement prior to discharge; nursing supervisor and TCC made aware      Anemia, iron deficiency   - H/H 7.8/26.2 > 8.2/26.6   - MCV 85   - Iron studies: iron 12, 5% saturation  - Ferrous sulfate 325 mg every day added this admission   - Daily CBC   - Transfuse pRBCs for Hgb < 7.0      Generalized Weakness  - PT/OT evaluation; recommending moderate intensity therapy     Essential HTN  - continue hydralazine 25 mg TID  - telemetry monitoring  - monitor BP     DM Type 2 with peripheral neuropathy  - A1c 5.8 last month   - SSI Lispro 0-10 scale  - continue gabapentin 300 mg TID  - monitor blood sugar ac/hs/prn  - diabetic diet      Trigeminal neuralgia  - continue carbamazepine 100 mg BID     Insomnia  - continue trazodone 50 mg at bedtime   - melatonin 5 mg PRN at bedtime for insomnia      PUD ppx  - continue pantoprazole 40 mg daily     Code status: Full     Disposition: Patient requires inpatient management.          Ann-Marie Givens PA-C

## 2025-02-17 NOTE — PROGRESS NOTES
"Nutrition Follow Up Assessment:   Nutrition Assessment         Patient is a 74 y.o. male presenting with Acute deep vein thrombosis (DVT) of femoral vein of left lower extremity (Multi).    PMH:  Acute osteomyelitis of the left ankle and foot, acute myocardial infarction (AMI), arteriosclerotic heart disease (ASHD), diabetes mellitus (DM), peripheral vascular disease (PVD), stroke, transient ischemic attack (TIA), subdural abscess, neuropathy, and neuritis.    Nutrition History:  Energy Intake: Good > 75 %  Food and Nutrient History: Visited with patient in room. He reports eating well with no difficulties chewing or swallowing. He denies nausea, vomiting, constipation, or diarrhea. Patient states he enjoys the food and finds it good. He does not recall trying Nico previously. Provided him with an orange-flavored Nico, which he stated was “okay,” though he noted it coats his tongue. He has not tried ProStat and expressed concern that it will not taste good. He is unsure whether the supplements will actually help with his wound healing.  Patient stated his wound dressing is scheduled to be changed today. Since the initial nutrition assessment, a wound vac has been placed as part of the wound care plan per podiatry. Bone biopsy culture revealed rare Enterococcus faecalis; antibiotics (Zosyn and Vancomycin) are ongoing. Physical therapy emphasized non-weight-bearing (NWB) status to protect the left lower extremity wound and promote healing.  New diagnosis of normocytic anemia with low iron studies noted; ferrous sulfate 325 mg (65 mg elemental iron) added daily. Reinforced the importance of adequate protein intake and nutrient support for wound healing.       Anthropometrics:  Height: 175.3 cm (5' 9\")   Weight: 89.1 kg (196 lb 6.9 oz)   BMI (Calculated): 28.99  IBW/kg (Dietitian Calculated): 73 kg  Percent of IBW: 123 %       Weight History:   Wt Readings from Last 6 Encounters:   02/11/25 89.1 kg (196 lb 6.9 oz) "   01/17/25 89.8 kg (198 lb)   12/23/24 91.2 kg (201 lb 1 oz)   11/05/24 93.4 kg (206 lb)   09/25/24 102 kg (225 lb 8.5 oz)   09/12/24 107 kg (236 lb 1.8 oz)       Weight Change %:  Weight History / % Weight Change: no new weight. Edema has decreased.    Nutrition Focused Physical Exam Findings:    Subcutaneous Fat Loss:   Orbital Fat Pads: Well nourished (slightly bulging fat pads)  Buccal Fat Pads: Well nourished (full, rounded cheeks)  Triceps: Well nourished (ample fat tissue)  Muscle Wasting:  Temporalis: Well nourished (well-defined muscle)  Pectoralis (Clavicular Region): Well nourished (clavicle not visible)  Deltoid/Trapezius: Well nourished (rounded appearance at arm, shoulder, neck)  Interosseous: Defer (hand with edema)  Edema:  Edema: +1 trace  Edema Location: BLE --> decrease from LLE +4, RLE +2 5 days ago.  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, and a DVT of the left femoral vein.)    Nutrition Significant Labs:  CBC Trend:   Results from last 7 days   Lab Units 02/17/25  0739 02/16/25  0643 02/15/25  0705 02/14/25  0714   WBC AUTO x10*3/uL 8.7 8.2 6.8 9.4   RBC AUTO x10*6/uL 3.13* 3.07* 2.98* 2.90*   HEMOGLOBIN g/dL 8.2* 7.8* 7.7* 7.5*   HEMATOCRIT % 26.6* 26.2* 25.4* 24.4*   MCV fL 85 85 85 84   PLATELETS AUTO x10*3/uL 363 326 301 311    , BMP Trend:   Results from last 7 days   Lab Units 02/17/25  0738 02/16/25  0643 02/15/25  0705 02/14/25  0713   GLUCOSE mg/dL 109* 114* 148* 130*   CALCIUM mg/dL 8.7 8.8 8.4* 8.2*   SODIUM mmol/L 139 138 140 138   POTASSIUM mmol/L 3.9 4.2 4.1 3.8   CO2 mmol/L 30 28 29 28   CHLORIDE mmol/L 104 104 105 104   BUN mg/dL 14 14 14 15   CREATININE mg/dL 0.74 0.80 0.74 0.75    , A1C:  Lab Results   Component Value Date    HGBA1C 5.8 (H)  01/06/2025   , BG POCT trend:   Results from last 7 days   Lab Units 02/17/25  1101 02/17/25  0635 02/16/25  2013 02/16/25  1601 02/16/25  1142   POCT GLUCOSE mg/dL 152* 108* 140* 107* 132*        Nutrition Specific Medications:  Scheduled medications  carBAMazepine XR, 100 mg, oral, BID  ferrous sulfate (325 mg ferrous sulfate), 65 mg of iron, oral, Daily with breakfast  gabapentin, 300 mg, oral, TID  hydrALAZINE, 25 mg, oral, TID  insulin lispro, 0-10 Units, subcutaneous, TID AC  pantoprazole, 40 mg, oral, Daily before breakfast  piperacillin-tazobactam, 3.375 g, intravenous, q6h  polyethylene glycol, 17 g, oral, Daily  rivaroxaban, 15 mg, oral, BID   Followed by  [START ON 3/8/2025] rivaroxaban, 20 mg, oral, Daily with breakfast  traZODone, 50 mg, oral, Nightly  vancomycin, 1,000 mg, intravenous, q12h      I/O:   Last BM Date: 02/13/25; Stool Appearance: Formed, Soft (02/15/25 1300)    Dietary Orders (From admission, onward)       Start     Ordered    02/13/25 1443  Adult diet Consistent Carb; CCD 90 gm/meal  Diet effective now        Question Answer Comment   Diet type Consistent Carb    Carb diet selection: CCD 90 gm/meal        02/13/25 1442    02/12/25 1907  Oral nutritional supplements  Until discontinued        Question Answer Comment   Deliver with Breakfast    Deliver with Dinner    Select supplement: Nico        02/12/25 1906    02/12/25 1907  Oral nutritional supplements  Until discontinued        Question Answer Comment   Deliver with Breakfast    Select supplement: Pro-Stat Sugar Free        02/12/25 1906    02/11/25 2211  May Participate in Room Service  ( ROOM SERVICE MAY PARTICIPATE)  Once        Question:  .  Answer:  Yes    02/11/25 2210                Estimated Needs:   Total Energy Estimated Needs in 24 hours (kCal):  (5353-9579 kcal/day)  Method for Estimating Needs: 25-30 kcal/kg of actual BW  Total Protein Estimated Needs in 24 Hours (g):  (107-134 g/day)  Method for Estimating 24 Hour  Protein Needs: 1.2-1.5 g/kg actual body weight (ABW)  Total Fluid Estimated Needs in 24 Hours (mL):  (1198-7317 mL/day)  Method for Estimating 24 Hour Fluid Needs: 1 mL/kcal or per medical team. ( monitoring fluid balance closely due to edema and history of DVT.)  Patient on Order Fluid Restriction: No        Nutrition Diagnosis   Malnutrition Diagnosis  Patient has Malnutrition Diagnosis: No    Nutrition Diagnosis  Patient has Nutrition Diagnosis: Yes  Diagnosis Status (1): Active  Nutrition Diagnosis 1: Increased nutrient needs  Related to (1): increased metablic demand  As Evidenced by (1): multiple non-healing wounds, diabetic ulcer, and pressure injuries       Nutrition Interventions/Recommendations   Nutrition prescription for oral nutrition    Nutrition Recommendations:  Individualized Nutrition Prescription Provided for : CCD 90g/meal, Nico BID and ProStat BID    Nutrition Interventions/Goals:   Interventions: Medical food supplement, Meals and snacks  Meals and Snacks: Carbohydrate-modified diet  Medical Food Supplement: Commercial beverage medical food supplement therapy  Goal: ProStat BID (100kcal and 15g protein per 30mL) and Nico BID (80-90kcal and 2.5g protein + arginine and glutamine per 1 pkt)      Education Documentation  Nutrition Related Education, taught by Leslie Hastings RDN, LD at 2/17/2025 11:21 AM.  Learner: Patient  Readiness: Acceptance  Method: Explanation  Response: Verbalizes Understanding, Needs Reinforcement  Comment: Reviewed ONS and how they help with wound healting. Encouraged increase protien foods.       Nutrition Monitoring and Evaluation   Food/Nutrient Related History Monitoring  Monitoring and Evaluation Plan: Intake / amount of food  Intake / Amount of food: Consumes at least 75% or more of meals/snacks/supplements, Consumes > or equal to 70% of supplement    Anthropometric Measurements  Monitoring and Evaluation Plan: Body weight  Body Weight: Body weight -  Weight reduction from fluids, as needed       Physical Exam Findings  Monitoring and Evaluation Plan: Skin  Skin Finding: Impaired wound healing - Improved wound healing  Other: Evaluate nutrition intervention as compared to nutrition goal(s) and estimated nutrient need criteria.    Goal Status: Some progress toward goal(s)    Time Spent (min): 30 minutes

## 2025-02-18 LAB
ANION GAP SERPL CALC-SCNC: 9 MMOL/L (ref 10–20)
BUN SERPL-MCNC: 21 MG/DL (ref 6–23)
CALCIUM SERPL-MCNC: 8.6 MG/DL (ref 8.6–10.3)
CHLORIDE SERPL-SCNC: 107 MMOL/L (ref 98–107)
CO2 SERPL-SCNC: 31 MMOL/L (ref 21–32)
CREAT SERPL-MCNC: 0.85 MG/DL (ref 0.5–1.3)
EGFRCR SERPLBLD CKD-EPI 2021: >90 ML/MIN/1.73M*2
ERYTHROCYTE [DISTWIDTH] IN BLOOD BY AUTOMATED COUNT: 14 % (ref 11.5–14.5)
GLUCOSE BLD MANUAL STRIP-MCNC: 119 MG/DL (ref 74–99)
GLUCOSE BLD MANUAL STRIP-MCNC: 126 MG/DL (ref 74–99)
GLUCOSE BLD MANUAL STRIP-MCNC: 126 MG/DL (ref 74–99)
GLUCOSE BLD MANUAL STRIP-MCNC: 99 MG/DL (ref 74–99)
GLUCOSE SERPL-MCNC: 132 MG/DL (ref 74–99)
HCT VFR BLD AUTO: 24.8 % (ref 41–52)
HGB BLD-MCNC: 7.4 G/DL (ref 13.5–17.5)
MCH RBC QN AUTO: 25.4 PG (ref 26–34)
MCHC RBC AUTO-ENTMCNC: 29.8 G/DL (ref 32–36)
MCV RBC AUTO: 85 FL (ref 80–100)
NRBC BLD-RTO: 0 /100 WBCS (ref 0–0)
PLATELET # BLD AUTO: 342 X10*3/UL (ref 150–450)
POTASSIUM SERPL-SCNC: 3.7 MMOL/L (ref 3.5–5.3)
RBC # BLD AUTO: 2.91 X10*6/UL (ref 4.5–5.9)
SODIUM SERPL-SCNC: 143 MMOL/L (ref 136–145)
WBC # BLD AUTO: 8.2 X10*3/UL (ref 4.4–11.3)

## 2025-02-18 PROCEDURE — 97535 SELF CARE MNGMENT TRAINING: CPT | Mod: GO,IPSPLIT

## 2025-02-18 PROCEDURE — 85027 COMPLETE CBC AUTOMATED: CPT | Mod: IPSPLIT

## 2025-02-18 PROCEDURE — 82947 ASSAY GLUCOSE BLOOD QUANT: CPT | Mod: IPSPLIT

## 2025-02-18 PROCEDURE — 2500000004 HC RX 250 GENERAL PHARMACY W/ HCPCS (ALT 636 FOR OP/ED): Mod: IPSPLIT | Performed by: NURSE PRACTITIONER

## 2025-02-18 PROCEDURE — 2500000001 HC RX 250 WO HCPCS SELF ADMINISTERED DRUGS (ALT 637 FOR MEDICARE OP): Mod: IPSPLIT | Performed by: NURSE PRACTITIONER

## 2025-02-18 PROCEDURE — 2500000002 HC RX 250 W HCPCS SELF ADMINISTERED DRUGS (ALT 637 FOR MEDICARE OP, ALT 636 FOR OP/ED): Mod: IPSPLIT | Performed by: NURSE PRACTITIONER

## 2025-02-18 PROCEDURE — 2500000001 HC RX 250 WO HCPCS SELF ADMINISTERED DRUGS (ALT 637 FOR MEDICARE OP): Mod: IPSPLIT

## 2025-02-18 PROCEDURE — 99232 SBSQ HOSP IP/OBS MODERATE 35: CPT | Performed by: INTERNAL MEDICINE

## 2025-02-18 PROCEDURE — 94760 N-INVAS EAR/PLS OXIMETRY 1: CPT | Mod: IPSPLIT

## 2025-02-18 PROCEDURE — 82374 ASSAY BLOOD CARBON DIOXIDE: CPT | Mod: IPSPLIT

## 2025-02-18 PROCEDURE — 97530 THERAPEUTIC ACTIVITIES: CPT | Mod: GO,IPSPLIT

## 2025-02-18 PROCEDURE — 1100000001 HC PRIVATE ROOM DAILY: Mod: IPSPLIT

## 2025-02-18 PROCEDURE — 2500000004 HC RX 250 GENERAL PHARMACY W/ HCPCS (ALT 636 FOR OP/ED): Mod: IPSPLIT | Performed by: INTERNAL MEDICINE

## 2025-02-18 RX ORDER — HYDROCODONE BITARTRATE AND ACETAMINOPHEN 5; 325 MG/1; MG/1
1 TABLET ORAL EVERY 6 HOURS PRN
Status: DISCONTINUED | OUTPATIENT
Start: 2025-02-18 | End: 2025-02-19 | Stop reason: HOSPADM

## 2025-02-18 RX ORDER — CARBAMAZEPINE 200 MG/1
200 TABLET ORAL 2 TIMES DAILY PRN
COMMUNITY
End: 2025-02-19 | Stop reason: HOSPADM

## 2025-02-18 RX ADMIN — HYDRALAZINE HYDROCHLORIDE 25 MG: 25 TABLET ORAL at 08:11

## 2025-02-18 RX ADMIN — IRON SUCROSE 200 MG: 20 INJECTION, SOLUTION INTRAVENOUS at 09:44

## 2025-02-18 RX ADMIN — AMPICILLIN AND SULBACTAM 3 G: 2; 1 INJECTION, POWDER, FOR SOLUTION INTRAVENOUS at 14:25

## 2025-02-18 RX ADMIN — GABAPENTIN 300 MG: 300 CAPSULE ORAL at 20:47

## 2025-02-18 RX ADMIN — HYDROCODONE BITARTRATE AND ACETAMINOPHEN 1 TABLET: 5; 325 TABLET ORAL at 21:03

## 2025-02-18 RX ADMIN — CARBAMAZEPINE 100 MG: 100 TABLET, EXTENDED RELEASE ORAL at 08:11

## 2025-02-18 RX ADMIN — CARBAMAZEPINE 100 MG: 100 TABLET, EXTENDED RELEASE ORAL at 20:47

## 2025-02-18 RX ADMIN — PANTOPRAZOLE SODIUM 40 MG: 40 TABLET, DELAYED RELEASE ORAL at 06:13

## 2025-02-18 RX ADMIN — AMPICILLIN AND SULBACTAM 3 G: 2; 1 INJECTION, POWDER, FOR SOLUTION INTRAVENOUS at 03:51

## 2025-02-18 RX ADMIN — POLYETHYLENE GLYCOL 3350 17 G: 17 POWDER, FOR SOLUTION ORAL at 08:11

## 2025-02-18 RX ADMIN — RIVAROXABAN 15 MG: 15 TABLET, FILM COATED ORAL at 16:17

## 2025-02-18 RX ADMIN — HYDRALAZINE HYDROCHLORIDE 25 MG: 25 TABLET ORAL at 14:25

## 2025-02-18 RX ADMIN — ACETAMINOPHEN 650 MG: 325 TABLET, FILM COATED ORAL at 16:19

## 2025-02-18 RX ADMIN — AMPICILLIN AND SULBACTAM 3 G: 2; 1 INJECTION, POWDER, FOR SOLUTION INTRAVENOUS at 20:47

## 2025-02-18 RX ADMIN — RIVAROXABAN 15 MG: 15 TABLET, FILM COATED ORAL at 08:11

## 2025-02-18 RX ADMIN — FERROUS SULFATE TAB 325 MG (65 MG ELEMENTAL FE) 325 MG: 325 (65 FE) TAB at 08:11

## 2025-02-18 RX ADMIN — TRAZODONE HYDROCHLORIDE 50 MG: 50 TABLET ORAL at 20:47

## 2025-02-18 RX ADMIN — OXYCODONE 5 MG: 5 TABLET ORAL at 13:06

## 2025-02-18 RX ADMIN — GABAPENTIN 300 MG: 300 CAPSULE ORAL at 08:11

## 2025-02-18 RX ADMIN — AMPICILLIN AND SULBACTAM 3 G: 2; 1 INJECTION, POWDER, FOR SOLUTION INTRAVENOUS at 08:11

## 2025-02-18 RX ADMIN — GABAPENTIN 300 MG: 300 CAPSULE ORAL at 14:25

## 2025-02-18 ASSESSMENT — COGNITIVE AND FUNCTIONAL STATUS - GENERAL
DRESSING REGULAR LOWER BODY CLOTHING: A LOT
DAILY ACTIVITIY SCORE: 17
PERSONAL GROOMING: A LOT
MOVING TO AND FROM BED TO CHAIR: A LOT
TOILETING: A LOT
CLIMB 3 TO 5 STEPS WITH RAILING: A LOT
DRESSING REGULAR UPPER BODY CLOTHING: A LOT
DAILY ACTIVITIY SCORE: 12
DRESSING REGULAR UPPER BODY CLOTHING: A LITTLE
HELP NEEDED FOR BATHING: A LOT
WALKING IN HOSPITAL ROOM: A LOT
HELP NEEDED FOR BATHING: A LOT
EATING MEALS: A LOT
TURNING FROM BACK TO SIDE WHILE IN FLAT BAD: A LOT
MOVING FROM LYING ON BACK TO SITTING ON SIDE OF FLAT BED WITH BEDRAILS: A LOT
DRESSING REGULAR LOWER BODY CLOTHING: A LOT
STANDING UP FROM CHAIR USING ARMS: A LOT
MOBILITY SCORE: 12
PERSONAL GROOMING: A LITTLE
TOILETING: A LITTLE

## 2025-02-18 ASSESSMENT — PAIN SCALES - GENERAL
PAINLEVEL_OUTOF10: 0 - NO PAIN
PAINLEVEL_OUTOF10: 3
PAINLEVEL_OUTOF10: 6
PAINLEVEL_OUTOF10: 0 - NO PAIN
PAINLEVEL_OUTOF10: 0 - NO PAIN
PAINLEVEL_OUTOF10: 8

## 2025-02-18 ASSESSMENT — PAIN DESCRIPTION - ORIENTATION: ORIENTATION: RIGHT

## 2025-02-18 ASSESSMENT — PAIN SCALES - WONG BAKER
WONGBAKER_NUMERICALRESPONSE: HURTS WHOLE LOT
WONGBAKER_NUMERICALRESPONSE: HURTS LITTLE MORE

## 2025-02-18 ASSESSMENT — PAIN DESCRIPTION - LOCATION
LOCATION: FOOT
LOCATION: HEAD
LOCATION: HEAD

## 2025-02-18 ASSESSMENT — PAIN DESCRIPTION - DESCRIPTORS
DESCRIPTORS: ACHING;THROBBING
DESCRIPTORS: ACHING;THROBBING

## 2025-02-18 ASSESSMENT — PAIN - FUNCTIONAL ASSESSMENT
PAIN_FUNCTIONAL_ASSESSMENT: 0-10

## 2025-02-18 ASSESSMENT — ACTIVITIES OF DAILY LIVING (ADL): HOME_MANAGEMENT_TIME_ENTRY: 29

## 2025-02-18 NOTE — PROGRESS NOTES
Elliot Partida is a 74 y.o. male on day 7 of admission presenting with Acute deep vein thrombosis (DVT) of femoral vein of left lower extremity (Multi).    Subjective   Interval History:   Reports foot pain  Afebrile, no chills  No chest pain  No shortness of breath            Objective   Range of Vitals (last 24 hours)  Heart Rate:  [58-71]   Temp:  [36.9 °C (98.4 °F)-37 °C (98.6 °F)]   Resp:  [16-18]   BP: (107-135)/(41-59)   SpO2:  [95 %-96 %]   Daily Weight  02/11/25 : 89.1 kg (196 lb 6.9 oz)    Body mass index is 29.01 kg/m².    Physical Exam  Constitutional:       Appearance: Normal appearance.   HENT:      Head: Normocephalic and atraumatic.      Nose: Nose normal.      Mouth/Throat:      Mouth: Mucous membranes are moist.      Pharynx: Oropharynx is clear.   Eyes:      General: No scleral icterus.  Cardiovascular:      Rate and Rhythm: Normal rate and regular rhythm.   Pulmonary:      Effort: Pulmonary effort is normal.      Breath sounds: Normal breath sounds.   Abdominal:      General: Bowel sounds are normal.      Palpations: Abdomen is soft.   Musculoskeletal:         General: Normal range of motion.      Cervical back: Normal range of motion and neck supple.   Skin:     General: Skin is warm and dry.      Comments: Left foot dressing    Neurological:      General: No focal deficit present.      Mental Status: He is alert and oriented to person, place, and time. Mental status is at baseline.   Psychiatric:         Mood and Affect: Mood normal.         Behavior: Behavior normal.           Antibiotics  ampicillin-sulbactam (Unasyn) IV 3 g in 100 mL NS (ADV/MBP)  AMPICILLIN-SULBACTAM IV 3 G  ML NS (ADV/MBP)    Relevant Results  Labs  Results from last 72 hours   Lab Units 02/18/25  0603 02/17/25  0739 02/16/25  0643   WBC AUTO x10*3/uL 8.2 8.7 8.2   HEMOGLOBIN g/dL 7.4* 8.2* 7.8*   HEMATOCRIT % 24.8* 26.6* 26.2*   PLATELETS AUTO x10*3/uL 342 363 326     Results from last 72 hours   Lab Units  02/18/25  0603 02/17/25  0738 02/16/25  0643   SODIUM mmol/L 143 139 138   POTASSIUM mmol/L 3.7 3.9 4.2   CHLORIDE mmol/L 107 104 104   CO2 mmol/L 31 30 28   BUN mg/dL 21 14 14   CREATININE mg/dL 0.85 0.74 0.80   GLUCOSE mg/dL 132* 109* 114*   CALCIUM mg/dL 8.6 8.7 8.8   ANION GAP mmol/L 9* 9* 10   EGFR mL/min/1.73m*2 >90 >90 >90         Estimated Creatinine Clearance: 85.5 mL/min (by C-G formula based on SCr of 0.85 mg/dL).  C-Reactive Protein   Date Value Ref Range Status   02/11/2025 2.68 (H) <1.00 mg/dL Final   11/05/2024 1.34 (H) <1.00 mg/dL Final   09/13/2024 9.51 (H) <1.00 mg/dL Final     Microbiology  Susceptibility data from last 14 days.  Collected Specimen Info Organism Ampicillin Gentamicin Synergy Penicillin Trimethoprim/Sulfamethoxazole Vancomycin   02/13/25 Tissue from BONE RESECTION Enterococcus faecalis  S  S  S  R  S   02/12/25 Tissue/Biopsy from Wound/Tissue Mixed Anaerobic Bacteria          Mixed Gram-Positive and Gram-Negative Bacteria        02/11/25 Tissue/Biopsy from Diabetic Foot Ulcer Mixed Aerobic and Anaerobic Bacteria             Imaging  XR chest 1 view    Result Date: 2/17/2025  Interpreted By:  Everett Johnson, STUDY: XR CHEST 1 VIEW;  2/17/2025 9:28 pm   INDICATION: Signs/Symptoms:PICC placement.     COMPARISON: None.   ACCESSION NUMBER(S): [id number]   ORDERING CLINICIAN: SARITA BAUM   FINDINGS: Left PICC with tip over the superior cavoatrial junction.   CARDIOMEDIASTINAL SILHOUETTE: Cardiomediastinal silhouette is normal in size and configuration.   LUNGS: There is no consolidation. There is no effusion. No pneumothorax   ABDOMEN: No remarkable upper abdominal findings.   BONES: No acute osseous changes.       1. Left PICC with its tip over the superior cavoatrial junction       MACRO: None   Signed by: Everett Johnson 2/17/2025 9:32 PM Dictation workstation:   [id number]    Transthoracic Echo Limited    Result Date: 2/13/2025   [address],  [place] [address]              Tel [phone] and Fax [phone] TRANSTHORACIC ECHOCARDIOGRAM REPORT  Patient Name:       SUBHASH SCHMIDT LUZMA    Reading Physician:    [id number] Carson Freeman MD Study Date:         2/13/2025           Ordering Provider:    [id number] JUAN FROBLES NORRIS MRN/PID:            [id number]            Fellow: Accession#:         [id number]        Nurse: Date of Birth/Age:  1950 / 74 years Sonographer:          Trinh Sung RDCS Gender assigned at                     Additional Staff: Birth: Height:             175.26 cm           Admit Date: Weight:             88.91 kg            Admission Status:     Inpatient -                                                               Routine BSA / BMI:          2.05 m2 / 28.94     Encounter#:           [id number]                     kg/m2 Blood Pressure:     127/53 mmHg         Department Location:  [facility] Study Type:    TRANSTHORACIC ECHO (TTE) LIMITED Diagnosis/ICD: Nonrheumatic aortic (valve) stenosis-I35.0 Indication:    Aortic Stenosis CPT Code:      Echo Limited-98483; Doppler Limited-60080; Color Doppler-29593 Patient History: Valve Disorders:   Aortic Stenosis. Diabetes:          Yes Pertinent History: Murmur, CAD, PVD and Hyperlipidemia. Study Detail: The following Echo studies were performed: 2D, M-Mode, Doppler and               color flow. Technically challenging study due to body habitus and               low pain tolerance. The patient was awake.  PHYSICIAN INTERPRETATION: Left Ventricle: The left ventricular systolic function is normal, with a visually estimated ejection fraction of 55-60%. There are no regional left ventricular wall motion abnormalities. The left ventricular cavity size is normal. There is mildly increased septal and  mildly increased posterior left ventricular wall thickness. There is left ventricular concentric remodeling. Spectral Doppler shows a Grade I (impaired relaxation pattern) of left ventricular diastolic filling with normal left atrial filling pressure. Left Atrium: The left atrial size is upper limits of normal. Right Ventricle: The right ventricle is normal in size. There is normal right ventricular global systolic function. Right Atrium: The right atrium is normal in size. Aortic Valve: The aortic valve is probably trileaflet. There is moderate aortic valve cusp calcification. There is evidence of moderate aortic valve stenosis. The aortic valve dimensionless index is 0.31. There is no evidence of aortic valve regurgitation. The peak instantaneous gradient of the aortic valve is 48 mmHg. The mean gradient of the aortic valve is 28 mmHg. Mitral Valve: The mitral valve is normal in structure. There is moderate to severe mitral annular calcification. There is mild mitral valve regurgitation. Tricuspid Valve: The tricuspid valve is structurally normal. There is mild tricuspid regurgitation. Pulmonic Valve: The pulmonic valve is structurally normal. There is physiologic pulmonic valve regurgitation. Pericardium: There is no pericardial effusion noted. Aorta: The aortic root is normal. Systemic Veins: The inferior vena cava appears normal in size.  CONCLUSIONS:  1. The left ventricular systolic function is normal, with a visually estimated ejection fraction of 55-60%.  2. Spectral Doppler shows a Grade I (impaired relaxation pattern) of left ventricular diastolic filling with normal left atrial filling pressure.  3. There is normal right ventricular global systolic function.  4. There is moderate to severe mitral annular calcification.  5. Moderate aortic valve stenosis. Dimensionless index 0.31 : moderate stenosis. Peak and mean gradients around 48 and 28 mm Hg respectively. Estimated aortic valve area around 1 cm2.  6.  There is moderate aortic valve cusp calcification. QUANTITATIVE DATA SUMMARY:  2D MEASUREMENTS:         Normal Ranges: IVSd:            1.05 cm (0.6-1.1cm) LVPWd:           1.09 cm (0.6-1.1cm) LVIDd:           4.60 cm (3.9-5.9cm) LVIDs:           2.96 cm LV Mass Index:   85 g/m2 LV % FS          35.7 %  LV SYSTOLIC FUNCTION:                      Normal Ranges: EF-Visual:      58 % LV EF Reported: 58 %  AORTIC VALVE:                      Normal Ranges: AoV Vmax:                3.46 m/s  (<=1.7m/s) AoV Peak P.9 mmHg (<20mmHg) AoV Mean P.0 mmHg (1.7-11.5mmHg) LVOT Max Jak:            1.10 m/s  (<=1.1m/s) AoV VTI:                 76.60 cm  (18-25cm) LVOT VTI:                23.60 cm LVOT Diameter:           2.50 cm   (1.8-2.4cm) AoV Area, VTI:           1.51 cm2  (2.5-5.5cm2) AoV Area,Vmax:           1.56 cm2  (2.5-4.5cm2) AoV Dimensionless Index: 0.31  TRICUSPID VALVE/RVSP:         Normal Ranges: IVC Diam:             2.10 cm  [id number] Carson Freeman MD Electronically signed on 2025 at 6:58:36 PM  ** Final **     ECG 12 lead    Result Date: 2025  Sinus bradycardia with 1st degree AV block Left anterior fascicular block Minimal voltage criteria for LVH, may be normal variant ( Mukul product ) Abnormal ECG When compared with ECG of 23-DEC-2024 14:41, ST less elevated in Anterior leads T wave inversion now evident in Anterior leads    MR foot left wo IV contrast    Result Date: 2025  Interpreted By:  Júnior Sena, STUDY: MR FOOT LEFT WO IV CONTRAST;   INDICATION: Signs/Symptoms:r/o osteo.   COMPARISON: Plain film radiographs    ACCESSION NUMBER(S): [id number]   ORDERING CLINICIAN: ABIEL KENT   TECHNIQUE: Multiplanar multisequential MRI left foot without contrast.   FINDINGS: Postsurgical changes amputation of the left 5th digit to the level of the mid metatarsal shaft.   There is a large amount of subcutaneous soft tissue swelling with a dorsal skin  wound at the medial side of the ankle joint roughly at the level of the ankle proper. There is no evidence of adjacent fluid collection. There is what looks to be some partial tearing of the tibialis anterior where the skin wound is seen at the level of the distal tibial metaphysis.   No definite abscess seen.   There is a significant amount of edema of the medial cuneiform. This involves nearly the entire bone although there is no definite erosive change and this is not directly in line with the wound. This is however, suspicious for a focus of osteomyelitis.   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 There is mild ankle arthrosis.   Small bone infarct of the anterior distal tibia. No other marrow edema seen.   No other fracture identified.   No evidence of acute tendinous or ligamentous tear.   No evidence of effusion.         Anteromedial skin wound at the level of the ankle joint with extensive and diffuse subcutaneous edema about the ankle and foot.   There is a large amount of edema isolated to the medial cuneiform. Although this is somewhat remote from the wound the possibility of osteomyelitis is a consideration.   Partial tearing tibialis anteriorly at the level of the distal ankle.   Small bone island distal tibia.   Other degenerative changes.   Signed by: Júnior Sena 2/12/2025 1:10 PM Dictation workstation:   [id number]    Lower extremity venous duplex left    Result Date: 2/11/2025  Interpreted By:  Maynor Kitchen, STUDY: [facility] LOWER EXTREMITY VENOUS DUPLEX LEFT  2/11/2025 5:14 pm   INDICATION: 75 y/o   M with  Signs/Symptoms:lle edema pain. LMP:  Unknown.       COMPARISON: None.   ACCESSION NUMBER(S): [id number]   ORDERING CLINICIAN: JUANITO HAIDER   TECHNIQUE: Routine ultrasound of the  right lower extremity was performed with duplex Doppler (color and spectral) evaluation.   Static images were obtained for remote interpretation.    FINDINGS: Deep venous thrombosis of the left superficial femoral veins throughout its course.       Deep venous thrombosis of the left superficial femoral vein.   MACRO: None   Signed by: Maynor Kitchen 2/11/2025 5:35 PM Dictation workstation:   [id number]    XR foot 3+ views bilateral    Result Date: 2/11/2025  Interpreted By:  Júnior Sena, STUDY: XR FOOT 3+ VIEWS BILATERAL   INDICATION: Signs/Symptoms:l foot ulcer top of foot fould odor,.   COMPARISON: September 12, 2024     ACCESSION NUMBER(S): [id number]   ORDERING CLINICIAN: JUANITO HAIDER   FINDINGS: Interval amputation of the 5th digit to the level of the 5th metatarsal shaft. Surgical margins are sharp.   Marked dorsal soft tissue swelling with likely soft tissue wound at the level of the tarsometatarsal articulations on the left.   The right foot demonstrates advanced degenerative changes with some mild dorsal soft tissue swelling with no acute osseous abnormality..         Interval amputation of the 5th digit to the level of the 5th metatarsal shaft. Surgical margins are sharp.   Marked dorsal soft tissue swelling with likely soft tissue wound at the level of the tarsometatarsal articulations on the left.   Mild soft tissue swelling right foot.   Signed by: Júnior Sena 2/11/2025 4:19 PM Dictation workstation:   [id number]           Assessment/Plan   Left dorsal foot ulcer-s/p excisional debridement to bone 2/13/2025-culture grew grew anaerobic bacteria, abundant mixed gram-positive and gram-negative bacteria-final.  Left lower extremity cellulitis   Left foot osteomyelitis -bone biopsy grew Enterococcus faecalis -final  Type 2 Diabetes Mellitus with polyneuropathy       IV Unasyn   Local care  Podiatry follow up  PICC line placed     Discussed with Dr. Velazco      Long term plan IV Unasyn for total 6 weeks till 3/27/2025  Weekly CBC with diff, CMP    Alternative Could price IV zosyn 4.5 grams every 6 hours to see if any cost savings.     Total  time spent caring for the patient today was 20 minutes. This includes time spent before the visit reviewing the chart, time spent during the visit, and time spent after the visit on documentation.     SARAH Kothari-CNP

## 2025-02-18 NOTE — PROGRESS NOTES
Elliot Partida is a 74 y.o. male on day 7 of admission presenting with Acute deep vein thrombosis (DVT) of femoral vein of left lower extremity (Multi).      Subjective   Patient seen and examined at bedside. He is doing ok, He is NWB to his LLE. Wound vac in place. He denies chest pain, SOB, fever, chills.       Objective     Last Recorded Vitals  BP (!) 107/41 (BP Location: Right arm)   Pulse 58   Temp 37 °C (98.6 °F) (Temporal)   Resp 16   Wt 89.1 kg (196 lb 6.9 oz)   SpO2 96%   Intake/Output last 3 Shifts:    Intake/Output Summary (Last 24 hours) at 2/18/2025 0712  Last data filed at 2/17/2025 2221  Gross per 24 hour   Intake 700 ml   Output 650 ml   Net 50 ml       Admission Weight  Weight: 89.8 kg (198 lb) (02/11/25 1507)    Daily Weight  02/11/25 : 89.1 kg (196 lb 6.9 oz)    Image Results      Physical Exam  Constitutional:       General: He is not in acute distress.     Appearance: He is not toxic-appearing.   HENT:      Head: Normocephalic and atraumatic.      Mouth/Throat:      Mouth: Mucous membranes are moist.   Eyes:      Conjunctiva/sclera: Conjunctivae normal.   Cardiovascular:      Rate and Rhythm: Normal rate and regular rhythm.   Pulmonary:      Effort: No respiratory distress.      Breath sounds: Normal breath sounds.   Abdominal:      General: There is no distension.      Palpations: Abdomen is soft.      Tenderness: There is no abdominal tenderness.   Musculoskeletal:         General: No swelling.   Skin:     General: Skin is warm and dry.      Comments: LLE wrapped with clean/dry dressing, wound vac in place with bloody drainage   Neurological:      Mental Status: He is alert and oriented to person, place, and time.   Psychiatric:         Mood and Affect: Mood normal.         Behavior: Behavior normal.       Relevant Results    Scheduled medications  ampicillin-sulbactam, 3 g, intravenous, q6h  carBAMazepine XR, 100 mg, oral, BID  ferrous sulfate (325 mg ferrous sulfate), 65 mg of iron,  oral, Daily with breakfast  gabapentin, 300 mg, oral, TID  hydrALAZINE, 25 mg, oral, TID  insulin lispro, 0-10 Units, subcutaneous, TID AC  lidocaine, 5 mL, infiltration, Once  pantoprazole, 40 mg, oral, Daily before breakfast  polyethylene glycol, 17 g, oral, Daily  rivaroxaban, 15 mg, oral, BID   Followed by  [START ON 3/8/2025] rivaroxaban, 20 mg, oral, Daily with breakfast  traZODone, 50 mg, oral, Nightly      Continuous medications     PRN medications  PRN medications: acetaminophen, acetaminophen, alteplase, calcium carbonate, melatonin, ondansetron **OR** ondansetron, oxyCODONE, sennosides-docusate sodium    Results for orders placed or performed during the hospital encounter of 02/11/25 (from the past 24 hours)   Basic metabolic panel   Result Value Ref Range    Glucose 109 (H) 74 - 99 mg/dL    Sodium 139 136 - 145 mmol/L    Potassium 3.9 3.5 - 5.3 mmol/L    Chloride 104 98 - 107 mmol/L    Bicarbonate 30 21 - 32 mmol/L    Anion Gap 9 (L) 10 - 20 mmol/L    Urea Nitrogen 14 6 - 23 mg/dL    Creatinine 0.74 0.50 - 1.30 mg/dL    eGFR >90 >60 mL/min/1.73m*2    Calcium 8.7 8.6 - 10.3 mg/dL   CBC   Result Value Ref Range    WBC 8.7 4.4 - 11.3 x10*3/uL    nRBC 0.0 0.0 - 0.0 /100 WBCs    RBC 3.13 (L) 4.50 - 5.90 x10*6/uL    Hemoglobin 8.2 (L) 13.5 - 17.5 g/dL    Hematocrit 26.6 (L) 41.0 - 52.0 %    MCV 85 80 - 100 fL    MCH 26.2 26.0 - 34.0 pg    MCHC 30.8 (L) 32.0 - 36.0 g/dL    RDW 13.8 11.5 - 14.5 %    Platelets 363 150 - 450 x10*3/uL   POCT GLUCOSE   Result Value Ref Range    POCT Glucose 152 (H) 74 - 99 mg/dL   POCT GLUCOSE   Result Value Ref Range    POCT Glucose 136 (H) 74 - 99 mg/dL   POCT GLUCOSE   Result Value Ref Range    POCT Glucose 142 (H) 74 - 99 mg/dL   Basic metabolic panel   Result Value Ref Range    Glucose 132 (H) 74 - 99 mg/dL    Sodium 143 136 - 145 mmol/L    Potassium 3.7 3.5 - 5.3 mmol/L    Chloride 107 98 - 107 mmol/L    Bicarbonate 31 21 - 32 mmol/L    Anion Gap 9 (L) 10 - 20 mmol/L    Urea  Nitrogen 21 6 - 23 mg/dL    Creatinine 0.85 0.50 - 1.30 mg/dL    eGFR >90 >60 mL/min/1.73m*2    Calcium 8.6 8.6 - 10.3 mg/dL   CBC   Result Value Ref Range    WBC 8.2 4.4 - 11.3 x10*3/uL    nRBC 0.0 0.0 - 0.0 /100 WBCs    RBC 2.91 (L) 4.50 - 5.90 x10*6/uL    Hemoglobin 7.4 (L) 13.5 - 17.5 g/dL    Hematocrit 24.8 (L) 41.0 - 52.0 %    MCV 85 80 - 100 fL    MCH 25.4 (L) 26.0 - 34.0 pg    MCHC 29.8 (L) 32.0 - 36.0 g/dL    RDW 14.0 11.5 - 14.5 %    Platelets 342 150 - 450 x10*3/uL   POCT GLUCOSE   Result Value Ref Range    POCT Glucose 126 (H) 74 - 99 mg/dL                   Assessment/Plan        This patient has a central line   Reason for the central line remaining today? 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   - Stopped taking apixaban prior to admission   - given heparin bolus in the ED and started on heparin drip; now discontinued  - continue rivaroxaban 15 mg BID x 21 days then 20 mg daily  - daily CBC      Left dorsal foot wound  Acute on chronic osteomyelitis of left foot  - present on admission; pictures in chart  - s/p I&D with bone biopsy on 2/13/25  - BCx negative x 4 days   - wound cultures collected 2/11, 2/12 grew mixed gram negative and gram positive organism  - wound culture from bone collected 2/13 grew enterococcus faecalis resistant to Bactrim   - discontinued  zosyn  and vancomycin on 2/17 by ID  - continue Unasyn 3g IV q6h x 6 weeks (last day 3/27/25)  - Wound Vac/dressing changes per podiatry recs  - continue Oxycodone 5  mg q6h PRN for severe pain   - PT/OT evals; keep NWB to LLE   - ID consulted, appreciate recs   - Will need PICC line placement prior to discharge; nursing supervisor and TCC made aware      Anemia, iron deficiency   - H/H 7.8/26.2 > 8.2/26.6   - MCV 85   - Iron studies: iron 12, 5% saturation  - Ferrous sulfate added this admission   - Daily CBC   - Transfuse pRBCs for Hgb < 7.0      Generalized Weakness  - PT/OT evaluation; recommending moderate intensity therapy  - Has no medicare days left; home with home health     Essential HTN  - continue hydralazine  - telemetry monitoring  - monitor BP     DM Type 2 with peripheral neuropathy  - A1c 5.8 last month   - SSI Lispro 0-10 scale  - continue gabapentin   - monitor blood sugar ac/hs/prn  - diabetic diet      Trigeminal neuralgia  - continue carbamazepine      Insomnia  - continue trazodone   - melatonin  PRN at bedtime for insomnia      PUD ppx  - continue pantoprazole      Code status: Full     Disposition: Patient requires inpatient management.        Eryn Colon, APRN-CNP  Scribed for Dr. Dickson Thakkar MD

## 2025-02-18 NOTE — PROGRESS NOTES
Pharmacy Medication History Review    Elliot Partida is a 74 y.o. male admitted for Acute deep vein thrombosis (DVT) of femoral vein of left lower extremity (Multi). Pharmacy reviewed the patient's [id number] medications and allergies for accuracy.    Sources used to confirm medication list: Informant interview  Informant: Other family member (Niece: Nat)  Informant credibility: Excellent (Able to recall medication, indication, strength, frequency, and/or prescriber for >90% of medications).    Total number of adjustments: 7     Medications Added Medications Removed Medications Adjusted  Adjustments Made   Carbamazepine 200 MG Eliquis     Aspirin Chewable Carbamazepine 100 MG Cap      Clopidogrel      Humalog      Miralax       The list below reflectives the updated PTA list. Please review each medication in order reconciliation for additional clarification and justification.  Medications Prior to Admission   Medication Sig Dispense Refill Last Dose/Taking    gabapentin (Neurontin) 300 mg capsule Take 1 capsule (300 mg) by mouth 3 times a day.   2/10/2025    hydrALAZINE (Apresoline) 25 mg tablet Take 1 tablet (25 mg) by mouth 3 times a day.   2/10/2025    potassium chloride CR (Klor-Con M20) 20 mEq ER tablet Take 2 tablets (40 mEq) by mouth once daily. Do not crush or chew.   2/10/2025    traZODone (Desyrel) 50 mg tablet Take 1 tablet (50 mg) by mouth once daily at bedtime.   2/10/2025    apixaban (Eliquis) 5 mg tablet Take 2 tablets (10 mg) by mouth 2 times a day for 4 days, THEN 1 tablet (5 mg) 2 times a day. (Patient not taking: Reported on 2/18/2025)   Not Taking    aspirin 81 mg chewable tablet Chew 1 tablet (81 mg) once daily. Do not fill before September 19, 2024. 90 tablet 0     carBAMazepine (Carbatrol) 100 mg 12 hr capsule Take 1 capsule (100 mg) by mouth 2 times a day for 7 days. Do not crush or chew. (Patient not taking: Reported on 2/18/2025) 14 capsule 0 Not Taking    carBAMazepine  (TEGretol) 200 mg tablet Take 1 tablet (200 mg) by mouth 2 times a day as needed.       clopidogrel (Plavix) 75 mg tablet Take 1 tablet (75 mg) by mouth once daily. Do not fill before September 19, 2024. (Patient not taking: Reported on 2/11/2025) 90 tablet 0 Not Taking    insulin lispro (HumaLOG) 100 unit/mL injection Inject 0-10 Units under the skin 3 times daily (morning, midday, late afternoon). Take as directed per insulin instructions. (Patient not taking: Reported on 2/11/2025)   Not Taking    polyethylene glycol (Glycolax, Miralax) 17 gram packet Take 17 g by mouth once daily. (Patient not taking: Reported on 2/18/2025)   Not Taking        The list below reflectives the updated allergy list. Please review each documented allergy for additional clarification and justification.  Allergies  Reviewed by Nandini Lyon RN on 2/11/2025   No Known Allergies         Below are additional concerns with the patient's PTA list.  Spoke w/pt's niece, Nat, via phone call. Reviewed PTA list. Please review changes made in the chart above.    Marylou Garcia CPhT  Medication History Pharmacy Technician  MILAGROS 8-4:30  Available via [employer] Video Secure Chat  OR  [phone]

## 2025-02-18 NOTE — PROGRESS NOTES
Physical Therapy                 Therapy Communication Note    Patient Name: Elliot Partida  MRN: [id number]  Department: [facility]  Room: 207/207-A  Today's Date: 2/18/2025     Discipline: Physical Therapy    PT Missed Visit: Yes     Missed Visit Reason: Missed Visit Reason: Patient refused    Missed Time: Attempt    Comment: Pt. Refused therapy today, stated he had gotten in a chair for OT, and that is all the therapy he was doing today.

## 2025-02-18 NOTE — PROGRESS NOTES
Occupational Therapy    Occupational Therapy Treatment    Name: Elliot Partida  MRN: [id number]  Department: [facility]  Room: [address] [place] [address]  Date: 02/18/25  Time Calculation  Start Time: 1014  Stop Time: 1058  Time Calculation (min): 44 min    Assessment:  OT Assessment: Pt is a 73 yo M referred to occupational therapy for impaired self-care and functional mobility 2/2 hospitalization for acute DVT of femoral vein of LLE. Pt w/ good progress toards goals this date and increased participation. Pt completed bed mobility and transfer to chair w/ min A and improved adhearance to NWB precautions. Pt also completed grooming tasks seated in chair w/ setup assist. Pt would continue to benefit from OT services at the MOD intensity level to increase functional independence and help w/ return to PLOF.  Prognosis: Good  Barriers to Discharge Home: Caregiver assistance, Physical needs  Caregiver Assistance: Caregiver assistance needed per identified barriers - however, level of patient's required assistance exceeds assistance available at home  Physical Needs: Stair navigation into home limited by function/safety, Ambulating household distances limited by function/safety, Intermittent mobility assistance needed, Intermittent ADL assistance needed, High falls risk due to function or environment  Evaluation/Treatment Tolerance: Patient tolerated treatment well  Medical Staff Made Aware: Yes  End of Session Communication: Bedside nurse  End of Session Patient Position: Up in chair, Alarm on  Plan:  Treatment Interventions: ADL retraining, Functional transfer training, UE strengthening/ROM, Endurance training, Patient/family training, Equipment evaluation/education, Compensatory technique education  OT Frequency: 3 times per week  OT Discharge Recommendations: Moderate intensity level of continued care  OT Recommended Transfer Status: Minimal assist, Assist of 1  OT - OK to Discharge: Yes (Based on completed evaluation and care plan  recommendations, no barriers to discharge to next site of care)    Subjective   Previous Visit Info:  OT Last Visit  OT Received On: 02/18/25  General:  General  Reason for Referral: Pt is a 73 yo M referred to occupational therapy for impaired self-care and functional mobility 2/2 hospitalization for acute DVT of femoral vein of LLE. Pt s/p debridement/bone biopsy of L foot 2/13  Referred By: Eryn Colon, APRN-CNP  Past Medical History Relevant to Rehab: HTN, type 2 diabetes, DVT, osteomyelitis, BLE edema, stroke, hyperlipidemia, anemia, obesity, osteoarthritis, ulcer, CAD, PAD,  Family/Caregiver Present: No  Prior to Session Communication: Bedside nurse  Patient Position Received: Bed, 2 rail up, Alarm off, not on at start of session  Preferred Learning Style: verbal, visual  General Comment: Pt agreeable to therapy session. Pt slightly agitated at times but overall w/ good participation. Pt cleared by RN prior to session.  Precautions:  LE Weight Bearing Status: Left Non-Weight Bearing  Medical Precautions: Fall precautions  Precautions Comment: tele, maximinoo, wound vac L foot    Pain Assessment:  Pain Assessment  Pain Assessment: 0-10  0-10 (Numeric) Pain Score: 0 - No pain     Objective   Cognition:  Overall Cognitive Status: Within Functional Limits  Arousal/Alertness: Appropriate responses to stimuli  Orientation Level: Oriented X4  Activities of Daily Living:    Grooming  Grooming Level of Assistance: Setup  Grooming Where Assessed: Chair  Grooming Comments: Pt completed grooming tasks w/ setup assist including washing face/hands and shaving face w/ electric razor.    Bed Mobility/Transfers:   Bed Mobility  Bed Mobility: Yes  Bed Mobility 1  Bed Mobility 1: Supine to sitting  Level of Assistance 1: Minimum assistance  Bed Mobility Comments 1: min A to bring LLE off of bed    Transfers  Transfer: Yes  Transfer 1  Transfer From 1: Bed to  Transfer to 1: Chair with arms  Technique 1: Stand pivot  Transfer  Device 1: Walker  Transfer Level of Assistance 1: Minimum assistance  Trials/Comments 1: Improved adhearance to NWB precautions during stand pivot transfer to chair w/ min A for balance and line management    Sitting Balance:  Static Sitting Balance  Static Sitting-Balance Support: Feet supported  Static Sitting-Level of Assistance: Independent  Dynamic Sitting Balance  Dynamic Sitting-Balance Support: Feet supported  Dynamic Sitting-Level of Assistance: Independent  Dynamic Sitting-Balance: Reaching for objects  Standing Balance:  Static Standing Balance  Static Standing-Balance Support: Bilateral upper extremity supported  Static Standing-Level of Assistance: Minimum assistance  Dynamic Standing Balance  Dynamic Standing-Balance Support: Bilateral upper extremity supported  Dynamic Standing-Level of Assistance: Minimum assistance    Strength:  Strength  Strength Comments: BUEs grossly 4-/5 during functional activities  RUE: Within Functional Limits   LUE: Within Functional Limits    Outcome Measures:  [facility] Daily Activity  Putting on and taking off regular lower body clothing: A lot  Bathing (including washing, rinsing, drying): A lot  Putting on and taking off regular upper body clothing: A little  Toileting, which includes using toilet, bedpan or urinal: A little  Taking care of personal grooming such as brushing teeth: A little  Eating Meals: None  Daily Activity - Total Score: 17    Education Documentation  Precautions, taught by Fay Harris OT at 2/18/2025 11:28 AM.  Learner: Patient  Readiness: Acceptance  Method: Explanation  Response: Verbalizes Understanding  Comment: Pt education on POC, DC recs, safety and body mechanics when completing transfers and ADLs    ADL Training, taught by Fay Harris OT at 2/18/2025 11:28 AM.  Learner: Patient  Readiness: Acceptance  Method: Explanation  Response: Verbalizes Understanding  Comment: Pt education on POC, DC recs, safety and body mechanics when  completing transfers and ADLs    Goals:  Encounter Problems       Encounter Problems (Active)       ADLs       Patient will perform UB and LB bathing with minimal assist  level of assistance and PRN bathroom equipment. (Progressing)       Start:  02/14/25    Expected End:  02/28/25            Patient with complete upper body dressing with set-up level of assistance donning and doffing all UE clothes with PRN adaptive equipment while edge of bed  and standing       Start:  02/14/25    Expected End:  02/28/25            Patient with complete lower body dressing with minimal assist  level of assistance donning and doffing all LE clothes  with PRN adaptive equipment while edge of bed  and standing       Start:  02/14/25    Expected End:  02/28/25            Patient will complete toileting including hygiene clothing management/hygiene with minimal assist  level of assistance and Prn bathroom equipment.       Start:  02/14/25    Expected End:  02/28/25               BALANCE       Pt will maintain static standing balance during ADL task with minimal assist  level of assistance in order to demonstrate decreased risk of falling and improved postural control while following weight bearing precautions (Progressing)       Start:  02/14/25    Expected End:  02/28/25            Patient will recall and adhere to weight bearing and /or ROM restrictions with all ADL and functional mobility in order to promote healing and safety with functional tasks (Progressing)       Start:  02/14/25    Expected End:  02/28/25               TRANSFERS       Patient will complete functional transfer to chair/toilet with least restrictive device with moderate assist level of assistance. (Progressing)       Start:  02/14/25    Expected End:  02/28/25                  decreased strength/impaired balance

## 2025-02-18 NOTE — PROGRESS NOTES
02/18/25 0912   Discharge Planning   Living Arrangements Family members  (sister)   Support Systems Family members;Home care staff  (sister, niece, great niece)   Assistance Needed [facility] accepted patient for home care;[facility], an Option [facility] [place], [place]    [phone] has accepted as pharmacy;  Patient needs PICC- start date not set yet.  [facility]   Home or Post Acute Services In home services   Type of Home Care Services Home nursing visits;Home PT;Home OT   Expected Discharge Disposition Home H     Addendum:  [facility]ER[facility] received authorization form and is working on bringing home going wound vacc for patient.  [facility] following.      9:30 AM  Patient received PICC line.  Pharmacy and [facility] involvement pending.  [facility] following.     12:10 AM  [employer] RX only, drug will go through drug plan, $75.97 copay per week, $140.00 a week for pharmacy cost, total per week is $215.97/  Patient aware of weekly cost.  Informed patient of other option of patient going to infusion center/ER x 4 /day for 6 weeks and patient declined.  He will pay the $216 weekly charge.  Informed [facility] via [employer].  Updated [facility] with pharmacy information.   Nothing confirmed for start date.  [facility] following.     1:40 pm  [facility] is sending their nurse educator to come to hospital to see patient and do some teaching-requesting a morning time: 9:30 -10 am.     3:15 pm  Patient to receive a virtual teaming tomorrow from the [facility].

## 2025-02-18 NOTE — CARE PLAN
The patient's goals for the shift include  be left alone    The clinical goals for the shift include pt will tolerate IV ATB with no adverse reactions. Pt will have PICC line placed with no complications.    Over the shift, the patient remained stable and free from injuries. PICC line was placed in [facility] with no complications. Placement verified.      Problem: Pain - Adult  Goal: Verbalizes/displays adequate comfort level or baseline comfort level  Outcome: Progressing     Problem: Discharge Planning  Goal: Discharge to home or other facility with appropriate resources  Outcome: Progressing     Problem: Chronic Conditions and Co-morbidities  Goal: Patient's chronic conditions and co-morbidity symptoms are monitored and maintained or improved  Outcome: Progressing     Problem: Nutrition  Goal: Nutrient intake appropriate for maintaining nutritional needs  Outcome: Progressing     Problem: Diabetes  Goal: Achieve decreasing blood glucose levels by end of shift  Outcome: Progressing  Goal: Increase stability of blood glucose readings by end of shift  Outcome: Progressing  Goal: Decrease in ketones present in urine by end of shift  Outcome: Progressing  Goal: Maintain electrolyte levels within acceptable range throughout shift  Outcome: Progressing  Goal: Learn about and adhere to nutrition recommendations by end of shift  Outcome: Progressing  Goal: Vital signs within normal range for age by end of shift  Outcome: Progressing  Goal: Increase self care and/or family involovement by end of shift  Outcome: Progressing  Goal: Receive DSME education by end of shift  Outcome: Progressing     Problem: Pain  Goal: Takes deep breaths with improved pain control throughout the shift  Outcome: Progressing  Goal: Turns in bed with improved pain control throughout the shift  Outcome: Progressing  Goal: Walks with improved pain control throughout the shift  Outcome: Progressing  Goal: Performs ADL's with improved pain control  throughout shift  Outcome: Progressing  Goal: Participates in PT with improved pain control throughout the shift  Outcome: Progressing  Goal: Free from opioid side effects throughout the shift  Outcome: Progressing  Goal: Free from acute confusion related to pain meds throughout the shift  Outcome: Progressing     Problem: Skin  Goal: Decreased wound size/increased tissue granulation at next dressing change  2/18/2025 0521 by Aniceto Graves RN  Outcome: Progressing  2/18/2025 0004 by Aniceto Graves RN  Flowsheets (Taken 2/18/2025 0004)  Decreased wound size/increased tissue granulation at next dressing change: Promote sleep for wound healing  Goal: Participates in plan/prevention/treatment measures  Outcome: Progressing  Goal: Prevent/manage excess moisture  Outcome: Progressing  Goal: Prevent/minimize sheer/friction injuries  Outcome: Progressing  Goal: Promote/optimize nutrition  Outcome: Progressing  Goal: Promote skin healing  Outcome: Progressing     Problem: Fall/Injury  Goal: Not fall by end of shift  Outcome: Progressing  Goal: Be free from injury by end of the shift  Outcome: Progressing  Goal: Verbalize understanding of personal risk factors for fall in the hospital  Outcome: Progressing  Goal: Verbalize understanding of risk factor reduction measures to prevent injury from fall in the home  Outcome: Progressing  Goal: Use assistive devices by end of the shift  Outcome: Progressing  Goal: Pace activities to prevent fatigue by end of the shift  Outcome: Progressing

## 2025-02-19 ENCOUNTER — APPOINTMENT (OUTPATIENT)
Dept: RADIOLOGY | Facility: HOSPITAL | Age: 75
End: 2025-02-19
Payer: MEDICARE

## 2025-02-19 VITALS
SYSTOLIC BLOOD PRESSURE: 124 MMHG | WEIGHT: 196.43 LBS | HEART RATE: 79 BPM | OXYGEN SATURATION: 97 % | RESPIRATION RATE: 16 BRPM | TEMPERATURE: 98.1 F | HEIGHT: 69 IN | BODY MASS INDEX: 29.09 KG/M2 | DIASTOLIC BLOOD PRESSURE: 87 MMHG

## 2025-02-19 LAB
ANION GAP SERPL CALC-SCNC: 9 MMOL/L (ref 10–20)
BUN SERPL-MCNC: 20 MG/DL (ref 6–23)
CALCIUM SERPL-MCNC: 8.7 MG/DL (ref 8.6–10.3)
CHLORIDE SERPL-SCNC: 107 MMOL/L (ref 98–107)
CO2 SERPL-SCNC: 30 MMOL/L (ref 21–32)
CREAT SERPL-MCNC: 0.71 MG/DL (ref 0.5–1.3)
EGFRCR SERPLBLD CKD-EPI 2021: >90 ML/MIN/1.73M*2
ERYTHROCYTE [DISTWIDTH] IN BLOOD BY AUTOMATED COUNT: 14.1 % (ref 11.5–14.5)
GLUCOSE BLD MANUAL STRIP-MCNC: 104 MG/DL (ref 74–99)
GLUCOSE BLD MANUAL STRIP-MCNC: 109 MG/DL (ref 74–99)
GLUCOSE SERPL-MCNC: 104 MG/DL (ref 74–99)
HCT VFR BLD AUTO: 26.1 % (ref 41–52)
HGB BLD-MCNC: 7.7 G/DL (ref 13.5–17.5)
MCH RBC QN AUTO: 25.2 PG (ref 26–34)
MCHC RBC AUTO-ENTMCNC: 29.5 G/DL (ref 32–36)
MCV RBC AUTO: 86 FL (ref 80–100)
NRBC BLD-RTO: 0 /100 WBCS (ref 0–0)
PLATELET # BLD AUTO: 325 X10*3/UL (ref 150–450)
POTASSIUM SERPL-SCNC: 3.8 MMOL/L (ref 3.5–5.3)
RBC # BLD AUTO: 3.05 X10*6/UL (ref 4.5–5.9)
SODIUM SERPL-SCNC: 142 MMOL/L (ref 136–145)
WBC # BLD AUTO: 7.7 X10*3/UL (ref 4.4–11.3)

## 2025-02-19 PROCEDURE — 97530 THERAPEUTIC ACTIVITIES: CPT | Mod: GP,CQ,IPSPLIT

## 2025-02-19 PROCEDURE — 93971 EXTREMITY STUDY: CPT | Mod: IPSPLIT

## 2025-02-19 PROCEDURE — 93971 EXTREMITY STUDY: CPT | Performed by: RADIOLOGY

## 2025-02-19 PROCEDURE — 2500000001 HC RX 250 WO HCPCS SELF ADMINISTERED DRUGS (ALT 637 FOR MEDICARE OP): Mod: IPSPLIT | Performed by: NURSE PRACTITIONER

## 2025-02-19 PROCEDURE — 2500000001 HC RX 250 WO HCPCS SELF ADMINISTERED DRUGS (ALT 637 FOR MEDICARE OP): Mod: IPSPLIT

## 2025-02-19 PROCEDURE — 82374 ASSAY BLOOD CARBON DIOXIDE: CPT | Mod: IPSPLIT

## 2025-02-19 PROCEDURE — 2500000002 HC RX 250 W HCPCS SELF ADMINISTERED DRUGS (ALT 637 FOR MEDICARE OP, ALT 636 FOR OP/ED): Mod: IPSPLIT | Performed by: NURSE PRACTITIONER

## 2025-02-19 PROCEDURE — 85027 COMPLETE CBC AUTOMATED: CPT | Mod: IPSPLIT

## 2025-02-19 PROCEDURE — 2500000004 HC RX 250 GENERAL PHARMACY W/ HCPCS (ALT 636 FOR OP/ED): Mod: IPSPLIT | Performed by: NURSE PRACTITIONER

## 2025-02-19 PROCEDURE — 82947 ASSAY GLUCOSE BLOOD QUANT: CPT | Mod: IPSPLIT

## 2025-02-19 PROCEDURE — 99239 HOSP IP/OBS DSCHRG MGMT >30: CPT | Performed by: NURSE PRACTITIONER

## 2025-02-19 RX ORDER — FERROUS SULFATE 325(65) MG
65 TABLET ORAL
Qty: 30 TABLET | Refills: 0 | Status: SHIPPED | OUTPATIENT
Start: 2025-02-20 | End: 2025-03-22

## 2025-02-19 RX ORDER — LIDOCAINE HYDROCHLORIDE 10 MG/ML
5 INJECTION, SOLUTION INFILTRATION; PERINEURAL ONCE
Status: DISCONTINUED | OUTPATIENT
Start: 2025-02-19 | End: 2025-02-19 | Stop reason: HOSPADM

## 2025-02-19 RX ORDER — HEPARIN SODIUM,PORCINE/PF 10 UNIT/ML
50 SYRINGE (ML) INTRAVENOUS AS NEEDED
Status: DISCONTINUED | OUTPATIENT
Start: 2025-02-19 | End: 2025-02-19 | Stop reason: HOSPADM

## 2025-02-19 RX ORDER — INSULIN PUMP SYRINGE, 3 ML
EACH MISCELLANEOUS
Qty: 1 EACH | Refills: 0 | Status: SHIPPED | OUTPATIENT
Start: 2025-02-19 | End: 2026-02-19

## 2025-02-19 RX ORDER — HEPARIN SODIUM,PORCINE/PF 10 UNIT/ML
50 SYRINGE (ML) INTRAVENOUS EVERY 8 HOURS
Status: DISCONTINUED | OUTPATIENT
Start: 2025-02-19 | End: 2025-02-19 | Stop reason: HOSPADM

## 2025-02-19 RX ORDER — HYDROCODONE BITARTRATE AND ACETAMINOPHEN 5; 325 MG/1; MG/1
1 TABLET ORAL EVERY 6 HOURS PRN
Qty: 20 TABLET | Refills: 0 | Status: SHIPPED | OUTPATIENT
Start: 2025-02-19 | End: 2025-02-24

## 2025-02-19 RX ADMIN — HYDRALAZINE HYDROCHLORIDE 25 MG: 25 TABLET ORAL at 15:12

## 2025-02-19 RX ADMIN — Medication 50 UNITS: at 15:12

## 2025-02-19 RX ADMIN — AMPICILLIN AND SULBACTAM 3 G: 2; 1 INJECTION, POWDER, FOR SOLUTION INTRAVENOUS at 03:31

## 2025-02-19 RX ADMIN — AMPICILLIN AND SULBACTAM 3 G: 2; 1 INJECTION, POWDER, FOR SOLUTION INTRAVENOUS at 13:54

## 2025-02-19 RX ADMIN — RIVAROXABAN 15 MG: 15 TABLET, FILM COATED ORAL at 08:41

## 2025-02-19 RX ADMIN — GABAPENTIN 300 MG: 300 CAPSULE ORAL at 08:41

## 2025-02-19 RX ADMIN — GABAPENTIN 300 MG: 300 CAPSULE ORAL at 15:12

## 2025-02-19 RX ADMIN — CARBAMAZEPINE 100 MG: 100 TABLET, EXTENDED RELEASE ORAL at 08:41

## 2025-02-19 RX ADMIN — HYDRALAZINE HYDROCHLORIDE 25 MG: 25 TABLET ORAL at 08:41

## 2025-02-19 RX ADMIN — FERROUS SULFATE TAB 325 MG (65 MG ELEMENTAL FE) 325 MG: 325 (65 FE) TAB at 08:41

## 2025-02-19 RX ADMIN — AMPICILLIN AND SULBACTAM 3 G: 2; 1 INJECTION, POWDER, FOR SOLUTION INTRAVENOUS at 09:51

## 2025-02-19 ASSESSMENT — COGNITIVE AND FUNCTIONAL STATUS - GENERAL
MOBILITY SCORE: 12
MOVING FROM LYING ON BACK TO SITTING ON SIDE OF FLAT BED WITH BEDRAILS: A LITTLE
MOVING TO AND FROM BED TO CHAIR: A LOT
STANDING UP FROM CHAIR USING ARMS: A LOT
CLIMB 3 TO 5 STEPS WITH RAILING: TOTAL
WALKING IN HOSPITAL ROOM: TOTAL
TURNING FROM BACK TO SIDE WHILE IN FLAT BAD: A LITTLE

## 2025-02-19 ASSESSMENT — PAIN SCALES - GENERAL: PAINLEVEL_OUTOF10: 7

## 2025-02-19 ASSESSMENT — PAIN - FUNCTIONAL ASSESSMENT: PAIN_FUNCTIONAL_ASSESSMENT: 0-10

## 2025-02-19 NOTE — NURSING NOTE
0630 Introduced myself to patient, vitals assessment and physical assessment complete, patient tolerated well. Patient has no complaints at this time.     1000 Scheduled IV antibiotics running at 200ml/hr as ordered, patient tolerating well, IV site free of redness and warmth, no drainage or odor noted.     1100 observed and assisted Podiatry in replacement of wound vac. Patient assisted to bed and dressing changed, patient tolerated well, pleasant throughout, no complaints at this time.     1230 Vitals assessment complete patient tolerated well, call bell within reach and side rails up.     1400 Scheduled IV antibiotic running at 200 ml/hr as ordered, patient tolerated well, IV site patent no s/s of infection.

## 2025-02-19 NOTE — PROGRESS NOTES
02/19/25 0921   Discharge Planning   Living Arrangements Family members   Support Systems Family members;Home care staff   Assistance Needed [facility] has accepted patient for home care;[facility], an [facility] has accepted as pharmacy; Patient has his PICC; [facility] has delievered the home going wound vacc.  Podiatry wants to change dressing prior to discharge.  [facility] needs to coordinate time for [facility] nursing to initiate first IV ATB at home once patient is medically cleared.   Home or Post Acute Services In home services   Type of Home Care Services Home nursing visits;Home PT;Home OT   Expected Discharge Disposition Home H   Intensity of Service   Intensity of Service >30 min     11:25 AM  [facility] to start this evening (Nurse); [facility] to deliver antibiotics by then of day to patient's home;  Wound vacc received and with patient; 3 pm IV ATB to be given and then patient will be discharged.  IMM letter given and explained to patient. Consent/signature obtained and copy given to patient.   Patient's niece will be picking him up.      12:45 pm  Patient/family requesting wheelchair and glucometer/lancets.  RX for both received.  Wheelchair request sent thru [facility] via [employer].      1:00 pm  Called Britt- patient's home PCP provider- and informed Tamra Recio that patient is going home. She will have PCP visit patient within 72 hours.    Will fax requested paperwork-discharge summary , ID report and podiatry report along with ID's phone number  to [phone].      1:55 pm  Patient has a wheelchair and won't need a new one.  Patient will be given a surgical shoe as well - PT Francine juarez for patient.      DC PLAN:  Home today with [facility] infusion-N, PT/OT

## 2025-02-19 NOTE — CARE PLAN
The patient's goals for the shift include  stop this HA    The clinical goals for the shift include Pt will tolerate IV ATB with no adverse reactions    Over the shift, the patient remained stable and free from injuries. VSS. BP soft. Tolerating IV ATB. Wound vac remains in place draining scant bloody discharge.      Problem: Pain - Adult  Goal: Verbalizes/displays adequate comfort level or baseline comfort level  Outcome: Progressing     Problem: Discharge Planning  Goal: Discharge to home or other facility with appropriate resources  Outcome: Progressing     Problem: Chronic Conditions and Co-morbidities  Goal: Patient's chronic conditions and co-morbidity symptoms are monitored and maintained or improved  Outcome: Progressing     Problem: Nutrition  Goal: Nutrient intake appropriate for maintaining nutritional needs  Outcome: Progressing     Problem: Diabetes  Goal: Achieve decreasing blood glucose levels by end of shift  Outcome: Progressing  Goal: Increase stability of blood glucose readings by end of shift  Outcome: Progressing  Goal: Decrease in ketones present in urine by end of shift  Outcome: Progressing  Goal: Maintain electrolyte levels within acceptable range throughout shift  Outcome: Progressing  Goal: Learn about and adhere to nutrition recommendations by end of shift  Outcome: Progressing  Goal: Vital signs within normal range for age by end of shift  Outcome: Progressing  Goal: Increase self care and/or family involovement by end of shift  Outcome: Progressing  Goal: Receive DSME education by end of shift  Outcome: Progressing     Problem: Pain  Goal: Takes deep breaths with improved pain control throughout the shift  Outcome: Progressing  Goal: Turns in bed with improved pain control throughout the shift  Outcome: Progressing  Goal: Walks with improved pain control throughout the shift  Outcome: Progressing  Goal: Performs ADL's with improved pain control throughout shift  Outcome:  Progressing  Goal: Participates in PT with improved pain control throughout the shift  Outcome: Progressing  Goal: Free from opioid side effects throughout the shift  Outcome: Progressing  Goal: Free from acute confusion related to pain meds throughout the shift  Outcome: Progressing     Problem: Skin  Goal: Decreased wound size/increased tissue granulation at next dressing change  Outcome: Progressing  Goal: Participates in plan/prevention/treatment measures  Outcome: Progressing  Goal: Prevent/manage excess moisture  Outcome: Progressing  Goal: Prevent/minimize sheer/friction injuries  Outcome: Progressing  Goal: Promote/optimize nutrition  Outcome: Progressing  Goal: Promote skin healing  2/19/2025 0648 by Aniceto Graves RN  Outcome: Progressing  2/19/2025 0019 by Aniceto Graves RN  Flowsheets (Taken 2/19/2025 0019)  Promote skin healing:   Assess skin/pad under line(s)/device(s)   Turn/reposition every 2 hours/use positioning/transfer devices   Ensure correct size (line/device) and apply per  instructions     Problem: Fall/Injury  Goal: Not fall by end of shift  Outcome: Progressing  Goal: Be free from injury by end of the shift  Outcome: Progressing  Goal: Verbalize understanding of personal risk factors for fall in the hospital  Outcome: Progressing  Goal: Verbalize understanding of risk factor reduction measures to prevent injury from fall in the home  Outcome: Progressing  Goal: Use assistive devices by end of the shift  Outcome: Progressing  Goal: Pace activities to prevent fatigue by end of the shift  Outcome: Progressing

## 2025-02-19 NOTE — DISCHARGE INSTR - APPOINTMENTS
Dr. Ronald Christianson-Podiatrist- wants to see you in one week-call to schedule it when you get home (for week of Feb. 24th)  [facility] - [place] Office  [address] [place] [id number]    Phone: [phone]

## 2025-02-19 NOTE — DISCHARGE SUMMARY
Discharge Diagnosis  Acute deep vein thrombosis (DVT) of femoral vein of left lower extremity (Multi)  Left dorsal foot wound  Acute on chronic osteomyelitis of left foot  Iron deficiency anemia  Generalized weakness    Issues Requiring Follow-Up      Discharge Meds     Medication List      START taking these medications     ampicillin-sulbactam 3 g in sodium chloride 0.9% 100 mL IV; Infuse 3 g   at 200 mL/hr over 30 minutes into a venous catheter every 6 hours.   ferrous sulfate (325 mg ferrous sulfate) tablet; Take 1 tablet (325 mg)   by mouth once daily with breakfast.; Start taking on: February 20, 2025   HYDROcodone-acetaminophen 5-325 mg tablet; Commonly known as: Norco;   Take 1 tablet by mouth every 6 hours if needed for severe pain (7 - 10)   for up to 5 days.   * rivaroxaban 15 mg tablet; Commonly known as: Xarelto; Take 1 tablet   (15 mg) by mouth 2 times daily (morning and late afternoon) for 16 days.   Take with food.   * rivaroxaban 20 mg tablet; Commonly known as: Xarelto; Take 1 tablet   (20 mg) by mouth once daily in the evening. Take with meals. Take with   food. Do not fill before March 7, 2025.; Start taking on: March 7, 2025  * This list has 2 medication(s) that are the same as other medications   prescribed for you. Read the directions carefully, and ask your doctor or   other care provider to review them with you.     CHANGE how you take these medications     carBAMazepine 100 mg 12 hr capsule; Commonly known as: Carbatrol; Take 1   capsule (100 mg) by mouth 2 times a day for 7 days. Do not crush or chew.;   What changed: Another medication with the same name was removed. Continue   taking this medication, and follow the directions you see here.     CONTINUE taking these medications     aspirin 81 mg chewable tablet; Chew 1 tablet (81 mg) once daily. Do not   fill before September 19, 2024.   gabapentin 300 mg capsule; Commonly known as: Neurontin   hydrALAZINE 25 mg tablet; Commonly known as:  Apresoline; Take 1 tablet   (25 mg) by mouth 3 times a day.   potassium chloride CR 20 mEq ER tablet; Commonly known as: Klor-Con M20;   Take 2 tablets (40 mEq) by mouth once daily. Do not crush or chew.   traZODone 50 mg tablet; Commonly known as: Desyrel     STOP taking these medications     apixaban 5 mg tablet; Commonly known as: Eliquis   clopidogrel 75 mg tablet; Commonly known as: Plavix   insulin lispro 100 unit/mL injection   polyethylene glycol 17 gram packet; Commonly known as: Glycolax, Miralax       Test Results Pending At Discharge  Pending Labs       Order Current Status    Extra Tubes Collected (02/13/25 0523)    Surgical Pathology Exam In process         74 y.o. male presenting with a complaint of wound infection and foot pain. He has a wound on the dorsum of his left foot that has been getting worse. He has a hx of osteomyelitis and DM2. He has had progressive swelling and pain in the the left leg. He did have a fever. He denies N/V/D/C. SOB.     In the ED:  VS: T 36.9; HR 75; R 16; /61; SpO2 100% on RA  Lab: Glu 121; Na 135; K 6.1; BuN 14; Cr 0.68; Alk phos 141; AST 45; CRP 2.68; WBC 9.6; Hb 10.9; Hcrt 34.8;   Radiology: left foot X-ray Interval amputation of the 5th digit to the level of the 5th metatarsal shaft. Surgical margins are sharp. Marked dorsal soft tissue swelling with likely soft tissue wound at the level of the tarsometatarsal articulations on the left. Mild soft tissue swelling right foot. US LLE Deep venous thrombosis of the left superficial femoral vein.   Medication: zosyn, heparin drip, Vancomycin   Disposition: admitted to med/surg    Hospital Course    Acute DVT of left femoral vein  Hx of DVT  - US LLE: Deep venous thrombosis of the left superficial femoral vein.   - Stopped taking apixaban prior to admission   - given heparin bolus in the ED and started on heparin drip; now discontinued  - continue rivaroxaban 15 mg BID x 21 days then 20 mg daily  - daily CBC       Left dorsal foot wound  Acute on chronic osteomyelitis of left foot  - present on admission; pictures in chart  - s/p I&D with bone biopsy on 2/13/25  - BCx negative x 4 days   - wound cultures collected 2/11, 2/12 grew mixed gram negative and gram positive organism  - wound culture from bone collected 2/13 grew enterococcus faecalis resistant to Bactrim   - discontinued  zosyn  and vancomycin on 2/17 by ID  - continue Unasyn 3g IV q6h x 6 weeks (last day 3/27/25)  - Wound Vac/dressing changes per podiatry recs  - continue Oxycodone 5 mg q6h PRN for severe pain   - PT/OT evals; keep NWB to LLE   - ID consulted, appreciate recs   - Will need PICC line placement prior to discharge; nursing supervisor and TCC made aware      Anemia, iron deficiency   - H/H 7.8/26.2 > 8.2/26.6   - MCV 85   - Iron studies: iron 12, 5% saturation  - Ferrous sulfate added this admission   - Daily CBC   - Transfuse pRBCs for Hgb < 7.0      Generalized Weakness  - PT/OT evaluation; recommending moderate intensity therapy  - Has no medicare days left; home with home health     Essential HTN  - continue hydralazine  - telemetry monitoring  - monitor BP     DM Type 2 with peripheral neuropathy  - A1c 5.8 last month   - SSI Lispro 0-10 scale  - continue gabapentin   - monitor blood sugar ac/hs/prn  - diabetic diet      Trigeminal neuralgia  - continue carbamazepine      Insomnia  - continue trazodone   - melatonin  PRN at bedtime for insomnia      PUD ppx  - continue pantoprazole      Code status: Full     Disposition: Patient was stable to be discharged to home with home health. He was discharged on unasyn for a total of 6 weeks and with a wound Vac. He will follow up with his PCP and with podiatry    Total cumulative time spent in preparation of this discharge including documentation review, coordination of care with the medical team including PT/SW/care coordinators and treating consultants, discussion with patient and pertinent family  members and finalization of prescriptions, follow-up appointments, and this discharge summary was approximately 45 minutes.     Pertinent Physical Exam At Time of Discharge  Physical Exam  Constitutional:       General: He is not in acute distress.     Appearance: He is not toxic-appearing.   HENT:      Head: Normocephalic and atraumatic.      Mouth/Throat:      Mouth: Mucous membranes are moist.   Eyes:      Conjunctiva/sclera: Conjunctivae normal.   Cardiovascular:      Rate and Rhythm: Normal rate and regular rhythm.   Pulmonary:      Effort: No respiratory distress.      Breath sounds: Normal breath sounds.   Abdominal:      General: There is no distension.      Palpations: Abdomen is soft.      Tenderness: There is no abdominal tenderness.   Musculoskeletal:         General: No swelling.   Skin:     General: Skin is warm and dry.      Comments: LLE wrapped with clean/dry dressing, wound vac in place with bloody drainage   Neurological:      Mental Status: He is alert and oriented to person, place, and time.   Psychiatric:         Mood and Affect: Mood normal.         Behavior: Behavior normal.         Outpatient Follow-Up  Future Appointments   Date Time Provider Department Center   3/4/2025  2:40 PM Teofilo Stoddard MD [id number]         Eryn Colon APRN-CNP

## 2025-02-19 NOTE — PROGRESS NOTES
Physical Therapy    Physical Therapy Treatment    Patient Name: Elliot Partida  MRN: [id number]  Department: [facility]  Room: [address] [place] [address]  Today's Date: 2/19/2025  Time Calculation  Start Time: 0728  Stop Time: 0759  Time Calculation (min): 31 min         Assessment/Plan   PT Assessment  PT Assessment Results: Decreased strength, Impaired balance, Decreased mobility, Decreased skin integrity, Pain, Orthopedic restrictions  Rehab Prognosis: Fair  Barriers to Discharge Home: Caregiver assistance, Physical needs  Caregiver Assistance: Caregiver assistance needed per identified barriers - however, level of patient's required assistance exceeds assistance available at home  Physical Needs: Stair navigation into home limited by function/safety, Ambulating household distances limited by function/safety, 24hr mobility assistance needed  Evaluation/Treatment Tolerance: Treatment limited secondary to agitation  Medical Staff Made Aware: Yes  Strengths: Support of Caregivers  Barriers to Participation: Comorbidities  End of Session Communication: PCT/NA/CTA  Assessment Comment: Pt cont to demonstrate self limiting behavior and poor NWB status maintained with unwillingness to try other transfer methods and unwilling to keep foot off ground even with education. Bed mobiltiy min a for L LE to EOB with pt unwilling to attempt on own at all and refused adaptive equiptment.  EOB sit with leaning Good no lob.  Stressed importance of NWB and offered education on slide board transfer to maintain status, pt refused.  STS EOB to stand walker mod a x 1 with poor NWB control even with cues and weight shifting off le, SPT mod a x 1 with poor NWB and uncontrolled sit with unkind expressive words at therapist during and after session.  Session ended immediately.  Pt left up in chair, alarm on, call bell wihtin reach and all needs addressed.  End of Session Patient Position: Up in chair, Alarm on     PT Plan  Treatment/Interventions: Bed mobility,  Transfer training, Neuromuscular re-education, Therapeutic activity, Endurance training  PT Plan: Ongoing PT  PT Frequency: 3 times per week  PT Discharge Recommendations: Moderate intensity level of continued care  Equipment Recommended upon Discharge: Standard walker, Wheelchair  PT Recommended Transfer Status: Assist x2  PT - OK to Discharge: Yes      General Visit Information:   PT  Visit  PT Received On: 02/19/25  Response to Previous Treatment: Patient with no complaints from previous session.  General  Reason for Referral: impaired mobility, acute DVT of LLE  Referred By: SARAH Malik-CNP  Past Medical History Relevant to Rehab: HTN, type 2 diabetes, DVT, osteomyelitis, BLE edema, stroke, hyperlipidemia, anemia, obesity, osteoarthritis, ulcer, CAD, PAD,  Family/Caregiver Present: No  Prior to Session Communication: PCT/NA/CTA  Patient Position Received: Bed, 2 rail up, Alarm on  Preferred Learning Style: verbal, visual  General Comment: Pt agreeable to therapy this morning and was cleared by nursing prior to session.  Pt short with PTA with all answers and rude with questions right off begining of session.    Subjective  I am fine and I dont want to do anything, but I will get up in the chair I Guess.   Precautions:  Precautions  LE Weight Bearing Status: Left Non-Weight Bearing  Medical Precautions: Fall precautions  Precautions Comment: tele, masimo, wound vac L foot     Date/Time Vitals Session Patient Position Pulse Resp SpO2 BP MAP (mmHg)    02/19/25 06:48:41 --  --  64  --  96 %  --  --     02/19/25 0654 --  --  61  16  97 %  124/54  --                 Objective   Pain:  Pain Assessment  Pain Assessment: 0-10  0-10 (Numeric) Pain Score: 7  Pain Type: Acute pain  Pain Location: Ankle  Pain Orientation: Left  Clinical Progression: Not changed  Patient's Stated Pain Goal: No pain  Pain Interventions: Medication (See MAR), Elevated (post PT)  Cognition:  Cognition  Overall Cognitive Status:  "Within Functional Limits  Arousal/Alertness: Appropriate responses to stimuli  Orientation Level: Oriented X4  Coordination:  Movements are Fluid and Coordinated: Yes  Postural Control:  Dynamic Sitting Balance  Dynamic Sitting-Balance Support: No upper extremity supported, Feet supported  Dynamic Sitting-Level of Assistance: Modified independent  Dynamic Sitting-Balance: Lateral lean  Dynamic Sitting-Comments: EOB sit with dynamic leaning to loreto and doff gown and breif due to poor NWB control in standing.  min a for breif management.      Activity Tolerance:  Activity Tolerance  Endurance: Decreased tolerance for upright activites  Treatments:     Bed Mobility  Bed Mobility: Yes  Bed Mobility 1  Bed Mobility 1: Supine to sitting  Level of Assistance 1: Minimum assistance  Bed Mobility Comments 1: min a for LLE to EOB    Transfers  Transfer: Yes  Transfer 1  Transfer From 1: Bed to  Transfer to 1: Stand  Technique 1: Sit to stand, Stand to sit  Transfer Device 1: Walker, Gait belt  Transfer Level of Assistance 1: Moderate assistance  Trials/Comments 1: Height of bed elevated with cues for proper ue push off and use of standard walker for NWB and balance control, pt cont to verbalize \" i have to put weight through my leg, there is no other way around it if you want me out of this bed.\"  PTA attempted to educate about body mechanics and use of walker to maintain proper NWB status, pt unwilling to listen.  Transfers 2  Transfer From 2: Bed to  Transfer to 2: Chair with arms  Technique 2: Sit to stand, Stand pivot  Transfer Device 2: Walker, Gait belt  Transfer Level of Assistance 2: Moderate assistance  Trials/Comments 2: poor NWB control through L LE even with verbal cues and weight shifting away attempted.  PTA offered and educated about slide board transfer to aid in NWB status, pt refused to try and said \"ill just put my foot down.\" mod a for SPT with at least 50% weight bearing through L LE during standing and " "pivoting entire time up on feet and to and from standing transition.         Outcome Measures:  [facility] Basic Mobility  Turning from your back to your side while in a flat bed without using bedrails: A little  Moving from lying on your back to sitting on the side of a flat bed without using bedrails: A little  Moving to and from bed to chair (including a wheelchair): A lot  Standing up from a chair using your arms (e.g. wheelchair or bedside chair): A lot  To walk in hospital room: Total  Climbing 3-5 steps with railing: Total  Basic Mobility - Total Score: 12    Education Documentation  Mobility Training, taught by Kimberly Casillas PTA at 2/19/2025  8:15 AM.  Learner: Patient  Readiness: Nonacceptance  Method: Explanation  Response: Needs Reinforcement  Comment: Education for proper NWB status for SPT as well as slide board transfer education attempted, pt cont to refuse and reports \" It doesnt matter what you say I am going to put my foot down, ain't no other way.\"    Precautions, taught by Kimberly Casillas PTA at 2/19/2025  8:15 AM.  Learner: Patient  Readiness: Nonacceptance  Method: Explanation  Response: Needs Reinforcement  Comment: Education for proper NWB status for SPT as well as slide board transfer education attempted, pt cont to refuse and reports \" It doesnt matter what you say I am going to put my foot down, ain't no other way.\"    Education Comments  No comments found.             Encounter Problems       Encounter Problems (Active)       Balance       STG - Maintains dynamic standing balance with upper extremity support with >=good- balance        Start:  02/14/25    Expected End:  02/28/25               Mobility       LTG - Patient will propel wheelchair household/community distances IND       Start:  02/14/25    Expected End:  02/28/25            LTG - Patient will navigate 4-6 steps with rails/device with SBA while maintaining NWB        Start:  02/14/25    Expected End:  02/28/25       "      Goal 1       Start:  02/14/25    Expected End:  02/28/25               PT Transfers       STG - Transfer from bed to chair JOLENE with SW (Progressing)       Start:  02/14/25    Expected End:  02/28/25            STG - Patient will transfer sit to and from stand JOLENE with SW while maintaining NWB  (Progressing)       Start:  02/14/25    Expected End:  02/28/25               Pain - Adult

## 2025-02-19 NOTE — PROGRESS NOTES
PODIATRY SERVICE CONSULT PROGRESS NOTE    SERVICE DATE: 2/19/2025   SERVICE TIME:  1100    Subjective   INTERVAL HPI:   Pt was seen at bedside.  Pain well controlled.  Patient denies any constitutional symptoms.   No other pedal complaints.       Medications:  Scheduled Meds: ampicillin-sulbactam, 3 g, intravenous, q6h  carBAMazepine XR, 100 mg, oral, BID  ferrous sulfate (325 mg ferrous sulfate), 65 mg of iron, oral, Daily with breakfast  gabapentin, 300 mg, oral, TID  heparin flush, 50 Units, intra-catheter, q8h  hydrALAZINE, 25 mg, oral, TID  insulin lispro, 0-10 Units, subcutaneous, TID AC  lidocaine, 5 mL, infiltration, Once  lidocaine, 5 mL, infiltration, Once  pantoprazole, 40 mg, oral, Daily before breakfast  polyethylene glycol, 17 g, oral, Daily  rivaroxaban, 15 mg, oral, BID   Followed by  [START ON 3/8/2025] rivaroxaban, 20 mg, oral, Daily with breakfast  traZODone, 50 mg, oral, Nightly      Continuous Infusions:    PRN Meds: PRN medications: acetaminophen, acetaminophen, alteplase, calcium carbonate, heparin flush, HYDROcodone-acetaminophen, melatonin, ondansetron **OR** ondansetron, oxyCODONE, sennosides-docusate sodium         Objective   PHYSICAL EXAM:  Physical Exam Performed:  Vitals:    02/19/25 0834   BP: 120/59   Pulse:    Resp: 18   Temp:    SpO2:      Body mass index is 29.01 kg/m².    Patient is AOx3 and in no acute distress. Patient is alert and cooperative. Sitting comfortably in bed with dressing clean, dry and intact.     Vascular: Non-palpable DP/PT pulses B/L. Moderate pitting edema noted B/L. Hair growth absent B/L. CFT<5 to B/L hallux. Temperature is warm to cool from tibial tuberosity to distal digits B/L. No lymphatic streaking noted B/L.     Musculoskeletal: Gross active and passive ROM diminished to age and activity level. Moves all extremities spontaneously. No pain to palpation at feet B/L.     Neurological: Intact light touch sensation B/L. Pain stimuli absent B/L. Denies any  numbness, burning or tingling.     Dermatologic: Nails 1-5 are thickened, elongated, discolored with subungual debris B/L. Skin appears diffusely xerotic B/L. Web spaces 1-4 B/L are clean, dry and intact. No rashes or nodules noted B/L. No hyperkeratotic tissue noted B/L.      Wound:  Measurements: 8.5 cm x 5.6 cm x 1.0 cm   Mixed wound base of granular with visible anterior tibialis tendon  Moderate serosanguinous drainage found in wound vac with 200 mL of drainage in cannister.  Minimal jennyfer-wound maceration.   Minimal jennyfer-wound erythema.   No evidence of ascending cellulitis or lymphangitis.  Minimal palpable fluctuance. Minimal malodor.   Positive probe to bone or deep structures within the wound base.   No tunneling or tracking.   No undermining. Skin edges irregular but intact.      LABS:   Results for orders placed or performed during the hospital encounter of 02/11/25 (from the past 24 hours)   POCT GLUCOSE   Result Value Ref Range    POCT Glucose 99 74 - 99 mg/dL   POCT GLUCOSE   Result Value Ref Range    POCT Glucose 126 (H) 74 - 99 mg/dL   Basic metabolic panel   Result Value Ref Range    Glucose 104 (H) 74 - 99 mg/dL    Sodium 142 136 - 145 mmol/L    Potassium 3.8 3.5 - 5.3 mmol/L    Chloride 107 98 - 107 mmol/L    Bicarbonate 30 21 - 32 mmol/L    Anion Gap 9 (L) 10 - 20 mmol/L    Urea Nitrogen 20 6 - 23 mg/dL    Creatinine 0.71 0.50 - 1.30 mg/dL    eGFR >90 >60 mL/min/1.73m*2    Calcium 8.7 8.6 - 10.3 mg/dL   CBC   Result Value Ref Range    WBC 7.7 4.4 - 11.3 x10*3/uL    nRBC 0.0 0.0 - 0.0 /100 WBCs    RBC 3.05 (L) 4.50 - 5.90 x10*6/uL    Hemoglobin 7.7 (L) 13.5 - 17.5 g/dL    Hematocrit 26.1 (L) 41.0 - 52.0 %    MCV 86 80 - 100 fL    MCH 25.2 (L) 26.0 - 34.0 pg    MCHC 29.5 (L) 32.0 - 36.0 g/dL    RDW 14.1 11.5 - 14.5 %    Platelets 325 150 - 450 x10*3/uL   POCT GLUCOSE   Result Value Ref Range    POCT Glucose 109 (H) 74 - 99 mg/dL   POCT GLUCOSE   Result Value Ref Range    POCT Glucose 104 (H) 74 -  99 mg/dL      Lab Results   Component Value Date    HGBA1C 5.8 (H) 01/06/2025      Lab Results   Component Value Date    CRP 2.68 (H) 02/11/2025      Lab Results   Component Value Date    SEDRATE 51 (H) 02/11/2025        Results from last 7 days   Lab Units 02/19/25  0622   WBC AUTO x10*3/uL 7.7   RBC AUTO x10*6/uL 3.05*   HEMOGLOBIN g/dL 7.7*   HEMATOCRIT % 26.1*     Results from last 7 days   Lab Units 02/19/25  0622   SODIUM mmol/L 142   POTASSIUM mmol/L 3.8   CHLORIDE mmol/L 107   CO2 mmol/L 30   BUN mg/dL 20   CREATININE mg/dL 0.71   CALCIUM mg/dL 8.7           IMAGING REVIEW:  XR chest 1 view    Result Date: 2/17/2025  Interpreted By:  Everett Johnson, STUDY: XR CHEST 1 VIEW;  2/17/2025 9:28 pm   INDICATION: Signs/Symptoms:PICC placement.     COMPARISON: None.   ACCESSION NUMBER(S): [id number]   ORDERING CLINICIAN: SARITA BAUM   FINDINGS: Left PICC with tip over the superior cavoatrial junction.   CARDIOMEDIASTINAL SILHOUETTE: Cardiomediastinal silhouette is normal in size and configuration.   LUNGS: There is no consolidation. There is no effusion. No pneumothorax   ABDOMEN: No remarkable upper abdominal findings.   BONES: No acute osseous changes.       1. Left PICC with its tip over the superior cavoatrial junction       MACRO: None   Signed by: Everett Johnson 2/17/2025 9:32 PM Dictation workstation:   [id number]    Transthoracic Echo Limited    Result Date: 2/13/2025   [facility], [address] [place] [address]              Tel [phone] and Fax [phone] TRANSTHORACIC ECHOCARDIOGRAM REPORT  Patient Name:       SUBHASH Cain Physician:    [id number] Carson Freeman MD Study Date:         2/13/2025           Ordering Provider:    [id number] JUAN F NORRIS MRN/PID:            [id number]            Fellow: Accession#:         [id number]         Nurse: Date of Birth/Age:  1950 / 74 years Sonographer:          Trinh Sung RDCS Gender assigned at  M                   Additional Staff: Birth: Height:             175.26 cm           Admit Date: Weight:             88.91 kg            Admission Status:     Inpatient -                                                               Routine BSA / BMI:          2.05 m2 / 28.94     Encounter#:           [id number]                     kg/m2 Blood Pressure:     127/53 mmHg         Department Location:  [facility] Study Type:    TRANSTHORACIC ECHO (TTE) LIMITED Diagnosis/ICD: Nonrheumatic aortic (valve) stenosis-I35.0 Indication:    Aortic Stenosis CPT Code:      Echo Limited-19301; Doppler Limited-39740; Color Doppler-43818 Patient History: Valve Disorders:   Aortic Stenosis. Diabetes:          Yes Pertinent History: Murmur, CAD, PVD and Hyperlipidemia. Study Detail: The following Echo studies were performed: 2D, M-Mode, Doppler and               color flow. Technically challenging study due to body habitus and               low pain tolerance. The patient was awake.  PHYSICIAN INTERPRETATION: Left Ventricle: The left ventricular systolic function is normal, with a visually estimated ejection fraction of 55-60%. There are no regional left ventricular wall motion abnormalities. The left ventricular cavity size is normal. There is mildly increased septal and mildly increased posterior left ventricular wall thickness. There is left ventricular concentric remodeling. Spectral Doppler shows a Grade I (impaired relaxation pattern) of left ventricular diastolic filling with normal left atrial filling pressure. Left Atrium: The left atrial size is upper limits of normal. Right Ventricle: The right ventricle is normal in size. There is normal right ventricular global systolic function. Right Atrium: The right atrium is normal in size. Aortic Valve:  The aortic valve is probably trileaflet. There is moderate aortic valve cusp calcification. There is evidence of moderate aortic valve stenosis. The aortic valve dimensionless index is 0.31. There is no evidence of aortic valve regurgitation. The peak instantaneous gradient of the aortic valve is 48 mmHg. The mean gradient of the aortic valve is 28 mmHg. Mitral Valve: The mitral valve is normal in structure. There is moderate to severe mitral annular calcification. There is mild mitral valve regurgitation. Tricuspid Valve: The tricuspid valve is structurally normal. There is mild tricuspid regurgitation. Pulmonic Valve: The pulmonic valve is structurally normal. There is physiologic pulmonic valve regurgitation. Pericardium: There is no pericardial effusion noted. Aorta: The aortic root is normal. Systemic Veins: The inferior vena cava appears normal in size.  CONCLUSIONS:  1. The left ventricular systolic function is normal, with a visually estimated ejection fraction of 55-60%.  2. Spectral Doppler shows a Grade I (impaired relaxation pattern) of left ventricular diastolic filling with normal left atrial filling pressure.  3. There is normal right ventricular global systolic function.  4. There is moderate to severe mitral annular calcification.  5. Moderate aortic valve stenosis. Dimensionless index 0.31 : moderate stenosis. Peak and mean gradients around 48 and 28 mm Hg respectively. Estimated aortic valve area around 1 cm2.  6. There is moderate aortic valve cusp calcification. QUANTITATIVE DATA SUMMARY:  2D MEASUREMENTS:         Normal Ranges: IVSd:            1.05 cm (0.6-1.1cm) LVPWd:           1.09 cm (0.6-1.1cm) LVIDd:           4.60 cm (3.9-5.9cm) LVIDs:           2.96 cm LV Mass Index:   85 g/m2 LV % FS          35.7 %  LV SYSTOLIC FUNCTION:                      Normal Ranges: EF-Visual:      58 % LV EF Reported: 58 %  AORTIC VALVE:                      Normal Ranges: AoV Vmax:                3.46 m/s   (<=1.7m/s) AoV Peak P.9 mmHg (<20mmHg) AoV Mean P.0 mmHg (1.7-11.5mmHg) LVOT Max Jak:            1.10 m/s  (<=1.1m/s) AoV VTI:                 76.60 cm  (18-25cm) LVOT VTI:                23.60 cm LVOT Diameter:           2.50 cm   (1.8-2.4cm) AoV Area, VTI:           1.51 cm2  (2.5-5.5cm2) AoV Area,Vmax:           1.56 cm2  (2.5-4.5cm2) AoV Dimensionless Index: 0.31  TRICUSPID VALVE/RVSP:         Normal Ranges: IVC Diam:             2.10 cm  [id number] Carson Freeman MD Electronically signed on 2025 at 6:58:36 PM  ** Final **     ECG 12 lead    Result Date: 2025  Sinus bradycardia with 1st degree AV block Left anterior fascicular block Minimal voltage criteria for LVH, may be normal variant ( Mukul product ) Abnormal ECG When compared with ECG of 23-DEC-2024 14:41, ST less elevated in Anterior leads T wave inversion now evident in Anterior leads    MR foot left wo IV contrast    Result Date: 2025  Interpreted By:  Júnior Sena, STUDY: MR FOOT LEFT WO IV CONTRAST;   INDICATION: Signs/Symptoms:r/o osteo.   COMPARISON: Plain film radiographs    ACCESSION NUMBER(S): [id number]   ORDERING CLINICIAN: ABIEL KENT   TECHNIQUE: Multiplanar multisequential MRI left foot without contrast.   FINDINGS: Postsurgical changes amputation of the left 5th digit to the level of the mid metatarsal shaft.   There is a large amount of subcutaneous soft tissue swelling with a dorsal skin wound at the medial side of the ankle joint roughly at the level of the ankle proper. There is no evidence of adjacent fluid collection. There is what looks to be some partial tearing of the tibialis anterior where the skin wound is seen at the level of the distal tibial metaphysis.   No definite abscess seen.   There is a significant amount of edema of the medial cuneiform. This involves nearly the entire bone although there is no definite erosive change and this is not directly in  line with the wound. This is however, suspicious for a focus of osteomyelitis.   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 There is mild ankle arthrosis.   Small bone infarct of the anterior distal tibia. No other marrow edema seen.   No other fracture identified.   No evidence of acute tendinous or ligamentous tear.   No evidence of effusion.         Anteromedial skin wound at the level of the ankle joint with extensive and diffuse subcutaneous edema about the ankle and foot.   There is a large amount of edema isolated to the medial cuneiform. Although this is somewhat remote from the wound the possibility of osteomyelitis is a consideration.   Partial tearing tibialis anteriorly at the level of the distal ankle.   Small bone island distal tibia.   Other degenerative changes.   Signed by: Júnior Sena 2/12/2025 1:10 PM Dictation workstation:   [id number]    Lower extremity venous duplex left    Result Date: 2/11/2025  Interpreted By:  Maynor Kitchen, STUDY: [facility] LOWER EXTREMITY VENOUS DUPLEX LEFT  2/11/2025 5:14 pm   INDICATION: 73 y/o   M with  Signs/Symptoms:lle edema pain. LMP:  Unknown.       COMPARISON: None.   ACCESSION NUMBER(S): [id number]   ORDERING CLINICIAN: JUANITO HAIDER   TECHNIQUE: Routine ultrasound of the  right lower extremity was performed with duplex Doppler (color and spectral) evaluation.   Static images were obtained for remote interpretation.   FINDINGS: Deep venous thrombosis of the left superficial femoral veins throughout its course.       Deep venous thrombosis of the left superficial femoral vein.   MACRO: None   Signed by: Maynor Kitchen 2/11/2025 5:35 PM Dictation workstation:   [id number]    XR foot 3+ views bilateral    Result Date: 2/11/2025  Interpreted By:  Júnior Sena, STUDY: XR FOOT 3+ VIEWS BILATERAL   INDICATION: Signs/Symptoms:l foot ulcer top of foot fould odor,.   COMPARISON: September 12, 2024      ACCESSION NUMBER(S): [id number]   ORDERING CLINICIAN: JUANITO HAIDER   FINDINGS: Interval amputation of the 5th digit to the level of the 5th metatarsal shaft. Surgical margins are sharp.   Marked dorsal soft tissue swelling with likely soft tissue wound at the level of the tarsometatarsal articulations on the left.   The right foot demonstrates advanced degenerative changes with some mild dorsal soft tissue swelling with no acute osseous abnormality..         Interval amputation of the 5th digit to the level of the 5th metatarsal shaft. Surgical margins are sharp.   Marked dorsal soft tissue swelling with likely soft tissue wound at the level of the tarsometatarsal articulations on the left.   Mild soft tissue swelling right foot.   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  - Charts, labs, vitals and imaging all reviewed.   - Labs: Glucose 104, WBC 7.7  - Duplex US: + for DVT of femoral vein of the left lower extremity.  - Intra-op bone culture: pending     Plan:  - Abx: IV Vanc/Zosyn; plan or 6 weeks of IV abx via PICC line  - Patient is s/p excisional debridement of wound down to bone, bone biopsy of the tibia and application of wound vac to the left lower extremity (DOS: 02/13/25). Patient is doing well with no complaints.  - Upon obtaining verbal consent, excisional debridement of left lower leg wound performed today down to and including the level of tendon without incident utilizing a #15 blade.  (Post-debridement measurements: 8.6 cm x 5.8 cm x 1.1 cm)  - Dressings: wound vac with 4x4, Abd pad, Kerlix, tape. New wound vac sponge and draping applied today (02/19/25).  - Patient home vac ordered/coordinated.  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 Thank you.  - Patient cleared for discharge from a podiatric standpoint.     DVT ppx: Xarelto  Weightbearing: NWB to the LLE  Discharge: Pt to follow up 1 week after discharge with Dr. Ronald Christianson DPM   Podiatric Medicine & Surgery  Please Toño message me with any questions or concerns.            SIGNATURE: Ronald Christianson DPM PATIENT NAME: Elliot Partida   DATE: February 19, 2025 MRN: [id number]   TIME: 11:24 AM CONTACT: Haikmaribel Lambert

## 2025-02-19 NOTE — CARE PLAN
The patient's goals for the shift include  Pt will discharge home     The clinical goals for the shift include Pt will tolerate IV abx throughout shift.    Pt discharged home today with home health care. VS were stable at time of discharge and pt denied pain. Discharge instructions were reviewed with patient. Pt verbally understood discharge instructions and had no questions or concerns. Wound care supplies sent with patient. Pt was transferred off floor via wheelchair.

## 2025-02-20 NOTE — PROGRESS NOTES
"Chief Complaint:   SNF F/U  -Left foot osteomyelitis  -Left foot cellulitis  -Left foot diabetic ulcer  -PVD  -Venous insufficiency  -DVT of LLE  -Physical deconditioning/weakness  -Trigeminal neuralgia     HPI:   74 year-old male presenting to [facility] ER on 9/12/24 with \"months\" of LLE pain with edema, redness, and warmth. He was noted to have a large necrotic ulcer under his 5th toe. He reported that he had not seen a provider or taken any medications for about \"2 years\". Work-up in ER: Glu 310, Na+ 129, Alb 3.3, XR of L foot showed osteomyelitis of the 5th metatarsal head, US of LLE showed acute DVT in the left mid-distal femoral and popliteal veins. He was started on IV Heparin, IV Zosyn, and IV Vanco. He was admitted to the hospital for further evaluation and treatment. Hospital course:    Left foot osteomyelitis/cellulitis/DVT of LLE/PAD-IV ATB, IV heparin (transitioned to Eliquis), ID consult, podiatry consult, elevate LLE, local wound care, analgesics prn, patient transferred to [facility] for vascular consult on 9/14, underwent LLE revascularization with Dr. Stoddard on 9/18, recommending repeat BEN in 4 weeks with OP F/U, underwent L partial 5th ray resection on 9/23 with Dr. Spears, taken back to OR on 9/27 for delayed closure with graft application by Dr. 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 was HDS and discharged to [facility] on " 10/2/24. On 11/5, patient was sent to [facility] ER for evaluation of R eye pain and ringing in R ear. He was treated with Excedrin in-house, which was not effective. In the ER, he was Dx with trigeminal neuralgia and started on tegretol. Pt. was sent to the ER on 12/23 with c/o HA, left-sided facial pain, dizziness, and watering of his left eye. He was given Prednisone and Ibuprofen in-house with concern for acute flare of trigeminal neuralgia vs migraine HA. Pt. had no improvement in symptoms and requested to go to the ER for evaluation. Work-up in ER: CT of head negative for acute findings, showed atrophy and chronic microvascular ischemic disease with an old lacunar infarct in the R basal ganglia noted, stable meningioma. Neuro was consulted and had low concern for stroke. Migraine HA was suspected and patient was treated with Reglan, Decadron, and D/C'ed back to NH.     Pt. noted to have increased edema to LLE on 12/31 and was ordered arterial and venous US, which were negative for acute findings. Today, patient is c/o N/V/D, abdominal discomfort, and headache. He denies dizziness, SOB, cough, chest pain, or dysuria. He reports poor PO intake. Staff report no clinical concerns.     ROS:    As above in HPI. Otherwise, all other systems have been reviewed and are negative for complaint.    Medications reviewed and verified in NH chart.     Patient Active Problem List   Diagnosis    Wound infection    Osteomyelitis of left foot, unspecified type (Multi)    Acute deep vein thrombosis (DVT) of popliteal vein of left lower extremity (Multi)    Bilateral lower extremity edema    CAD (coronary artery disease)    Benign essential HTN    Inflammatory neuropathy (Multi)    History of stroke    Weakness of left lower extremity    Ambulatory dysfunction    Chronic bilateral low back pain with left-sided sciatica    DM type 2 with diabetic peripheral neuropathy    Neural foraminal stenosis of lumbosacral spine     Hyperlipidemia    Lumbar back pain with radiculopathy affecting left lower extremity    Peripheral arterial disease (CMS-HCC)    Uncontrolled type 2 diabetes mellitus with hyperglycemia    Anemia    Obesity    Delayed surgical wound healing of foot amputation stump (Multi)    Impaired mobility and ADLs    Weakness    Thoracic radiculopathy    Osteoarthritis    Cellulitis of foot, left    Meningioma (Multi)    Tremor of left hand    Diabetic ulcer of left foot associated with type 2 diabetes mellitus, limited to breakdown of skin    Acute deep vein thrombosis (DVT) of femoral vein of left lower extremity (Multi)    Nonrheumatic aortic valve stenosis    Diastolic dysfunction    Non-pressure chronic ulcer of other part of left foot with necrosis of bone    Hemiplegia and hemiparesis following cerebral infarction affecting left non-dominant side (Multi)    Wound eschar of foot    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 sinus (Multi)    Trigeminal neuralgia    History of DVT (deep vein thrombosis)    Insomnia    Generalized weakness    Normocytic anemia        Past Medical History:   Diagnosis Date    Acute osteomyelitis of ankle and foot, left (Multi)     AMI (acute myocardial infarction) (Multi)     ASHD (arteriosclerotic heart disease)     At risk for falls     Cellulitis     left foot and ankle    Diabetes mellitus (Multi)     DVT (deep vein thrombosis) in pregnancy (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)     TIA (transient ischemic attack)     Tinea pedis of both feet     Trigeminal neuralgia     Weakness        Past Surgical History:   Procedure Laterality Date    CARDIAC CATHETERIZATION      CARPAL TUNNEL RELEASE  10/10/2014    EYE  SURGERY      FL  ARTHROCENTESIS ASP INJ JOINT.      FOOT RAY RESECTION Left 09/23/2024    L partial 5th ray resection (Dr. Regan DPM)    FOOT SURGERY Left 09/27/2024    Delayed closure w/ graft application (Dr. Amina DPM)    INVASIVE VASCULAR PROCEDURE Bilateral 09/18/2024    Procedure: Lower Extremity Angiogram;  Surgeon: Teofilo Stoddard MD;  Location: [facility] Cardiac Cath Lab;  Service: Cardiovascular;  Laterality: Bilateral;    IRIDOTOMY / IRIDECTOMY Bilateral        Family History   Problem Relation Name Age of Onset    Heart disease Father      Colon cancer Other      Heart attack Other      Diabetes Other      Hypertension Other         Social History     Tobacco Use   Smoking Status Never    Passive exposure: Never   Smokeless Tobacco Never       Social History     Substance and Sexual Activity   Alcohol Use Not Currently    Comment: former       Social History     Substance and Sexual Activity   Drug Use Never       No Known Allergies     Vital Signs:   115/68-74-16-98.0-98% on RA    Physical Exam:  General: Sitting up in bed in NAD, alert   Head/Face: NCAT, symmetrical  Eyes: PERRLA, no injection, no discharge  ENT: Hearing not impaired, ears without scars or lesions, nasal mucosa and turbinates pink, septum midline, lips pink and moist  Neck: Supple, symmetrical  Respiratory: CTA but diminished without adventitious sounds, respirations even and nonlabored without use of accessory muscles, good air exchange  Cardio: RRR without murmur or gallops, normal S1S2, NP edema to RLE, 2-3+ P edema to LLE, pedal pulses 1+/4 bilaterally  Chest/Breast: Symmetrical  GI: BS x 4, normoactive, non-distended, abd round and soft, no masses or tenderness  : No suprapubic tenderness or distention  MSK: Gait not assessed, joints with full ROM without pain or contractures  Skin: Skin warm and dry, no induration, vascular ulcer of L anterior lower leg, surgical wound of L lateral foot, skin tags to R buttocks (chronic per patient)    Neurologic: Cranial nerves II through XII intact, decreased sensation to BLE  Psychiatric: Alert to person, place, and time, calm and cooperative    Results/Data:   1/6/25: HgbA1C 5.8  12/23/24: Glu 116, K+ 5.4, LFTs WNL, WBC 11.8, Hgb 10.9, Hct 34.6  11/5/24: CMP WNL, Mag 2.03, CRP 1.34, Sed Rate 38, Hgb 10.3, Hct 32.7  10/30/24: Glu 139, Cr 0.6, Hgb 9.1, Hct 29.4, Sed Rate 46, CRP 2.3  10/23/24: Rav777, Cr 0.6, Hgb 9.0, Hct 29.1, Iron 44, TIBC 261, Ferritin 21.1, Transferrin 193, Folate 4.4, Vit B12 358  10/16/24: Glu 135, Cr 0.6, Hgb 8.5, Hct 26.7  10/3/24: Glu 111, Cr 0.6, HgbA1C 7.0, Hgb 9.0, Hct 28.8    Assessment/Plan:  Left foot osteomyelitis/cellulitis/PAD/venous insufficiency-s/p LLE revascularization with Dr. Stoddard on 9/18, s/p L partial 5th ray resection on 9/23 with Dr. Spears, s/p delayed closure with graft application by Dr. Huynh on 9/27, c/w local wound care, c/w Vit C, MVI, and LPS Critical Care BID to promote wound healing, s/p Augmentin (end date 10/9), c/w ASA and plavix, c/w Tylenol and Gabapentin, c/w Oxycodone prn for mod-severe pain, tizanidine prn for muscle spasms, c/w Doxycycline (end date 1/12), wound care team following, seen by podiatry on 10/14, WBAT to LLE, F/U with podiatry as needed, F/U with vascular  DVT of LLE-c/w eliquis, elevate LLE, monitor H&H  Physical deconditioning/weakness-c/w PT/OT  Insomnia/depression-c/w trazodone and melatonin, supportive care, monitor behaviors, patient declines Psych services at this time   Anemia-c/w MVI, iron, and Vit B12, monitor CBC, iron panel, and Vit B12 level   CAD/HTN/Hx MI/HLD/diastolic dysfunction/AVS-FAVIAN diet, c/w ASA, hydralazine, and lisinopril, monitor BP, monitor lipid panel, needs established with cardiology after discharge  DM2 with peripheral neuropathy-LCS diet, accuchecks, BP and lipid control, yearly eye exam, diabetic foot care, diabetic diet education (RD following), monitor HgbA1C  Hypokalemia-c/w KCl, monitor K+  level  Right frontal lobe meningioma-followed by Dr. Sin ([facility]), seen on 8/4/23 and advised to F/U with neuro-oncology [facility] in 2 years with MRI for surveillance  Hx stroke/LLE weakness/L hand tremor-followed by neurology   Constipation-c/w miralax, monitor BMs  OA/lumbar radiculopathy/thoracic radiculopathy-Tylenol prn, F/U with specialists after discharge  Trigeminal neuralgia-c/w tegretol (increased to 200 mg BID)  Migraine HA-s/p reglan and decadron, continue to monitor   Abdominal discomfort/N/V/D-hold stool softeners and laxatives x 3 days, Imodium prn for diarrhea, Zofran prn for N/V, encourage fluid intake to prevent dehydration, monitor closely    Orders:  Imodium 2 mg PO Q 6 hours prn for diarrhea   Hold stool softeners/laxatives x 3 days   Zofran 4 mg PO Q 6 hours prn for N/V     Code Status:   Full Code    Pt. may discharge home with [facility]. Total cumulative time spent in preparation of this discharge, including documentation review, coordination of care with the medical team including PT/OT/SW/treating consultants, discussion with patient and/or pertinent family members, finalization of prescriptions, F/U appointments, and this discharge summary was approximately 40 minutes.

## 2025-02-24 ENCOUNTER — LAB REQUISITION (OUTPATIENT)
Dept: LAB | Facility: HOSPITAL | Age: 75
End: 2025-02-24
Payer: MEDICARE

## 2025-02-24 DIAGNOSIS — M86.672 OTHER CHRONIC OSTEOMYELITIS, LEFT ANKLE AND FOOT (MULTI): ICD-10-CM

## 2025-02-24 LAB
ALBUMIN SERPL BCP-MCNC: 3.3 G/DL (ref 3.4–5)
ALP SERPL-CCNC: 92 U/L (ref 33–136)
ALT SERPL W P-5'-P-CCNC: 8 U/L (ref 10–52)
ANION GAP SERPL CALC-SCNC: 13 MMOL/L (ref 10–20)
AST SERPL W P-5'-P-CCNC: 12 U/L (ref 9–39)
BILIRUB SERPL-MCNC: 0.2 MG/DL (ref 0–1.2)
BUN SERPL-MCNC: 16 MG/DL (ref 6–23)
CALCIUM SERPL-MCNC: 8.8 MG/DL (ref 8.6–10.3)
CHLORIDE SERPL-SCNC: 107 MMOL/L (ref 98–107)
CO2 SERPL-SCNC: 26 MMOL/L (ref 21–32)
CREAT SERPL-MCNC: 0.65 MG/DL (ref 0.5–1.3)
EGFRCR SERPLBLD CKD-EPI 2021: >90 ML/MIN/1.73M*2
ERYTHROCYTE [DISTWIDTH] IN BLOOD BY AUTOMATED COUNT: 15.2 % (ref 11.5–14.5)
GLUCOSE SERPL-MCNC: 104 MG/DL (ref 74–99)
HCT VFR BLD AUTO: 27.7 % (ref 41–52)
HGB BLD-MCNC: 8.5 G/DL (ref 13.5–17.5)
MCH RBC QN AUTO: 25.8 PG (ref 26–34)
MCHC RBC AUTO-ENTMCNC: 30.7 G/DL (ref 32–36)
MCV RBC AUTO: 84 FL (ref 80–100)
NRBC BLD-RTO: 0 /100 WBCS (ref 0–0)
PLATELET # BLD AUTO: 318 X10*3/UL (ref 150–450)
POTASSIUM SERPL-SCNC: 4.1 MMOL/L (ref 3.5–5.3)
PROT SERPL-MCNC: 6.4 G/DL (ref 6.4–8.2)
RBC # BLD AUTO: 3.29 X10*6/UL (ref 4.5–5.9)
SODIUM SERPL-SCNC: 142 MMOL/L (ref 136–145)
WBC # BLD AUTO: 8.9 X10*3/UL (ref 4.4–11.3)

## 2025-02-24 PROCEDURE — 80053 COMPREHEN METABOLIC PANEL: CPT

## 2025-02-24 PROCEDURE — 85027 COMPLETE CBC AUTOMATED: CPT

## 2025-02-27 ENCOUNTER — APPOINTMENT (OUTPATIENT)
Dept: RADIOLOGY | Facility: HOSPITAL | Age: 75
End: 2025-02-27
Payer: MEDICARE

## 2025-02-27 ENCOUNTER — HOSPITAL ENCOUNTER (EMERGENCY)
Facility: HOSPITAL | Age: 75
Discharge: HOME | End: 2025-02-28
Attending: EMERGENCY MEDICINE
Payer: MEDICARE

## 2025-02-27 DIAGNOSIS — R11.2 NAUSEA AND VOMITING, UNSPECIFIED VOMITING TYPE: Primary | ICD-10-CM

## 2025-02-27 DIAGNOSIS — R10.84 GENERALIZED ABDOMINAL PAIN: ICD-10-CM

## 2025-02-27 LAB
ALBUMIN SERPL BCP-MCNC: 3.6 G/DL (ref 3.4–5)
ALP SERPL-CCNC: 90 U/L (ref 33–136)
ALT SERPL W P-5'-P-CCNC: 9 U/L (ref 10–52)
ANION GAP SERPL CALC-SCNC: 13 MMOL/L (ref 10–20)
APPEARANCE UR: CLEAR
AST SERPL W P-5'-P-CCNC: 13 U/L (ref 9–39)
BASOPHILS # BLD AUTO: 0.03 X10*3/UL (ref 0–0.1)
BASOPHILS NFR BLD AUTO: 0.3 %
BILIRUB SERPL-MCNC: 0.5 MG/DL (ref 0–1.2)
BILIRUB UR STRIP.AUTO-MCNC: NEGATIVE MG/DL
BUN SERPL-MCNC: 16 MG/DL (ref 6–23)
CALCIUM SERPL-MCNC: 9.2 MG/DL (ref 8.6–10.3)
CHLORIDE SERPL-SCNC: 103 MMOL/L (ref 98–107)
CO2 SERPL-SCNC: 28 MMOL/L (ref 21–32)
COLOR UR: NORMAL
CREAT SERPL-MCNC: 0.7 MG/DL (ref 0.5–1.3)
EGFRCR SERPLBLD CKD-EPI 2021: >90 ML/MIN/1.73M*2
EOSINOPHIL # BLD AUTO: 0.48 X10*3/UL (ref 0–0.4)
EOSINOPHIL NFR BLD AUTO: 4.4 %
ERYTHROCYTE [DISTWIDTH] IN BLOOD BY AUTOMATED COUNT: 15.5 % (ref 11.5–14.5)
FLUAV RNA RESP QL NAA+PROBE: NOT DETECTED
FLUBV RNA RESP QL NAA+PROBE: NOT DETECTED
GLUCOSE SERPL-MCNC: 136 MG/DL (ref 74–99)
GLUCOSE UR STRIP.AUTO-MCNC: NORMAL MG/DL
HCT VFR BLD AUTO: 30.2 % (ref 41–52)
HGB BLD-MCNC: 9.3 G/DL (ref 13.5–17.5)
IMM GRANULOCYTES # BLD AUTO: 0.04 X10*3/UL (ref 0–0.5)
IMM GRANULOCYTES NFR BLD AUTO: 0.4 % (ref 0–0.9)
KETONES UR STRIP.AUTO-MCNC: NEGATIVE MG/DL
LEUKOCYTE ESTERASE UR QL STRIP.AUTO: NEGATIVE
LIPASE SERPL-CCNC: 25 U/L (ref 9–82)
LYMPHOCYTES # BLD AUTO: 1.73 X10*3/UL (ref 0.8–3)
LYMPHOCYTES NFR BLD AUTO: 15.7 %
MCH RBC QN AUTO: 25.7 PG (ref 26–34)
MCHC RBC AUTO-ENTMCNC: 30.8 G/DL (ref 32–36)
MCV RBC AUTO: 83 FL (ref 80–100)
MONOCYTES # BLD AUTO: 0.72 X10*3/UL (ref 0.05–0.8)
MONOCYTES NFR BLD AUTO: 6.6 %
NEUTROPHILS # BLD AUTO: 7.99 X10*3/UL (ref 1.6–5.5)
NEUTROPHILS NFR BLD AUTO: 72.6 %
NITRITE UR QL STRIP.AUTO: NEGATIVE
NRBC BLD-RTO: 0 /100 WBCS (ref 0–0)
PH UR STRIP.AUTO: 7 [PH]
PLATELET # BLD AUTO: 259 X10*3/UL (ref 150–450)
POTASSIUM SERPL-SCNC: 3.8 MMOL/L (ref 3.5–5.3)
PROT SERPL-MCNC: 6.7 G/DL (ref 6.4–8.2)
PROT UR STRIP.AUTO-MCNC: NEGATIVE MG/DL
RBC # BLD AUTO: 3.62 X10*6/UL (ref 4.5–5.9)
RBC # UR STRIP.AUTO: NEGATIVE MG/DL
SARS-COV-2 RNA RESP QL NAA+PROBE: NOT DETECTED
SODIUM SERPL-SCNC: 140 MMOL/L (ref 136–145)
SP GR UR STRIP.AUTO: 1.02
UROBILINOGEN UR STRIP.AUTO-MCNC: NORMAL MG/DL
WBC # BLD AUTO: 11 X10*3/UL (ref 4.4–11.3)

## 2025-02-27 PROCEDURE — 74177 CT ABD & PELVIS W/CONTRAST: CPT

## 2025-02-27 PROCEDURE — 99285 EMERGENCY DEPT VISIT HI MDM: CPT | Mod: 25 | Performed by: EMERGENCY MEDICINE

## 2025-02-27 PROCEDURE — 2550000001 HC RX 255 CONTRASTS: Performed by: EMERGENCY MEDICINE

## 2025-02-27 PROCEDURE — 74177 CT ABD & PELVIS W/CONTRAST: CPT | Performed by: RADIOLOGY

## 2025-02-27 PROCEDURE — 85025 COMPLETE CBC W/AUTO DIFF WBC: CPT | Performed by: EMERGENCY MEDICINE

## 2025-02-27 PROCEDURE — 83690 ASSAY OF LIPASE: CPT | Performed by: EMERGENCY MEDICINE

## 2025-02-27 PROCEDURE — 96361 HYDRATE IV INFUSION ADD-ON: CPT

## 2025-02-27 PROCEDURE — 81003 URINALYSIS AUTO W/O SCOPE: CPT | Performed by: EMERGENCY MEDICINE

## 2025-02-27 PROCEDURE — 87636 SARSCOV2 & INF A&B AMP PRB: CPT | Performed by: EMERGENCY MEDICINE

## 2025-02-27 PROCEDURE — 80053 COMPREHEN METABOLIC PANEL: CPT | Performed by: EMERGENCY MEDICINE

## 2025-02-27 PROCEDURE — 2500000004 HC RX 250 GENERAL PHARMACY W/ HCPCS (ALT 636 FOR OP/ED): Performed by: EMERGENCY MEDICINE

## 2025-02-27 PROCEDURE — 96375 TX/PRO/DX INJ NEW DRUG ADDON: CPT

## 2025-02-27 PROCEDURE — 96374 THER/PROPH/DIAG INJ IV PUSH: CPT

## 2025-02-27 RX ORDER — WATER
500 LIQUID (ML) MISCELLANEOUS ONCE
Status: COMPLETED | OUTPATIENT
Start: 2025-02-27 | End: 2025-02-27

## 2025-02-27 RX ORDER — FAMOTIDINE 10 MG/ML
20 INJECTION, SOLUTION INTRAVENOUS ONCE
Status: COMPLETED | OUTPATIENT
Start: 2025-02-27 | End: 2025-02-27

## 2025-02-27 RX ORDER — ONDANSETRON HYDROCHLORIDE 2 MG/ML
4 INJECTION, SOLUTION INTRAVENOUS ONCE
Status: COMPLETED | OUTPATIENT
Start: 2025-02-27 | End: 2025-02-27

## 2025-02-27 RX ADMIN — ONDANSETRON 4 MG: 2 INJECTION INTRAMUSCULAR; INTRAVENOUS at 18:02

## 2025-02-27 RX ADMIN — IOHEXOL 75 ML: 350 INJECTION, SOLUTION INTRAVENOUS at 18:11

## 2025-02-27 RX ADMIN — Medication 500 ML: at 22:00

## 2025-02-27 RX ADMIN — FAMOTIDINE 20 MG: 10 INJECTION INTRAVENOUS at 21:57

## 2025-02-27 RX ADMIN — SODIUM CHLORIDE, POTASSIUM CHLORIDE, SODIUM LACTATE AND CALCIUM CHLORIDE 1000 ML: 600; 310; 30; 20 INJECTION, SOLUTION INTRAVENOUS at 18:02

## 2025-02-27 ASSESSMENT — PAIN SCALES - GENERAL: PAINLEVEL_OUTOF10: 10 - WORST POSSIBLE PAIN

## 2025-02-27 ASSESSMENT — COLUMBIA-SUICIDE SEVERITY RATING SCALE - C-SSRS
1. IN THE PAST MONTH, HAVE YOU WISHED YOU WERE DEAD OR WISHED YOU COULD GO TO SLEEP AND NOT WAKE UP?: NO
2. HAVE YOU ACTUALLY HAD ANY THOUGHTS OF KILLING YOURSELF?: NO
6. HAVE YOU EVER DONE ANYTHING, STARTED TO DO ANYTHING, OR PREPARED TO DO ANYTHING TO END YOUR LIFE?: NO

## 2025-02-27 ASSESSMENT — PAIN - FUNCTIONAL ASSESSMENT: PAIN_FUNCTIONAL_ASSESSMENT: 0-10

## 2025-02-27 ASSESSMENT — PAIN DESCRIPTION - PAIN TYPE: TYPE: ACUTE PAIN

## 2025-02-27 ASSESSMENT — PAIN DESCRIPTION - LOCATION: LOCATION: HEAD

## 2025-02-27 NOTE — ED TRIAGE NOTES
Pt to ED from via EMS c/o vomiting for a few days along with headache and runny nose, pt denies any abd pain, chest pain, or sob, pt has PICC line already placed, pt was recently admitted to hospital for wound care

## 2025-02-27 NOTE — ED PROVIDER NOTES
HPI   Chief Complaint   Patient presents with    Vomiting       HPI        Patient History   Past Medical History:   Diagnosis Date    Acute osteomyelitis of ankle and foot, left (Multi)     AMI (acute myocardial infarction) (Multi)     ASHD (arteriosclerotic heart disease)     At risk for falls     Cellulitis     left foot and ankle    Diabetes mellitus (Multi)     DVT (deep vein thrombosis) in pregnancy ([facility])     Fall risk care plan declined     Hypertension     Meningioma (Multi)     Myocardial infarction (Multi)     Neuritis     Neuropathy     Osteoarthritis     Osteomyelitis     PVD (peripheral vascular disease) (CMS-HCC)     Sprain of right knee 06/25/2015    Stroke (Multi)     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      FOOT RAY RESECTION Left 09/23/2024    L partial 5th ray resection (Dr. Spears, DPM)    FOOT SURGERY Left 09/27/2024    Delayed closure w/ graft application (Dr. Huynh, DPM)    INVASIVE VASCULAR PROCEDURE Bilateral 09/18/2024    Procedure: Lower Extremity Angiogram;  Surgeon: Teofilo Stoddard MD;  Location: [facility] Cardiac Cath Lab;  Service: Cardiovascular;  Laterality: Bilateral;    IRIDOTOMY / IRIDECTOMY Bilateral      Family History   Problem Relation Name Age of Onset    Heart disease Father      Colon cancer Other      Heart attack Other      Diabetes Other      Hypertension Other       Social History     Tobacco Use    Smoking status: Never     Passive exposure: Never    Smokeless tobacco: Never   Substance Use Topics    Alcohol use: Not Currently     Comment: former    Drug use: Never       Physical Exam   ED Triage Vitals [02/27/25 1731]   Temperature Heart Rate Respirations BP   36.5 °C (97.7 °F) 80 18 171/63      Pulse Ox Temp  Source Heart Rate Source Patient Position   100 % Temporal -- --      BP Location FiO2 (%)     -- --       Physical Exam  Constitutional:       General: He is not in acute distress.     Appearance: Normal appearance. He is not toxic-appearing.   HENT:      Head: Normocephalic and atraumatic.      Right Ear: Tympanic membrane normal.      Left Ear: Tympanic membrane normal.      Mouth/Throat:      Mouth: Mucous membranes are moist.      Pharynx: Oropharynx is clear.   Eyes:      Conjunctiva/sclera: Conjunctivae normal.      Pupils: Pupils are equal, round, and reactive to light.   Cardiovascular:      Rate and Rhythm: Normal rate and regular rhythm.      Pulses: Normal pulses.      Heart sounds: Normal heart sounds.   Pulmonary:      Effort: Pulmonary effort is normal. No respiratory distress.      Breath sounds: Normal breath sounds. No wheezing.   Abdominal:      General: Bowel sounds are normal.      Palpations: Abdomen is soft.      Tenderness: There is abdominal tenderness. There is no guarding or rebound.   Musculoskeletal:         General: Normal range of motion.      Cervical back: Normal range of motion.   Skin:     General: Skin is warm and dry.   Neurological:      General: No focal deficit present.      Mental Status: He is alert and oriented to person, place, and time.           ED Course & MDM   Diagnoses as of 02/27/25 1856   Nausea and vomiting, unspecified vomiting type   Generalized abdominal pain                 No data recorded     [place] Coma Scale Score: 15 (02/27/25 1730 : María Elena Gilmore RN)                           Medical Decision Making  74-year-old male presents to the ER with chief complaint of nausea vomiting for the past 2 days.  Patient reports he has unable keep anything down so came to the ED for evaluation.  On exam patient has diffuse abdominal tenderness slightly worse in the left lower quadrant.  Patient denies any diarrhea.  Initial workup was started here in the ED  including imaging and blood work.  At this time awaiting blood work and CAT scan patient given some Zofran and fluids.  Patient is to be signed out to the night physician Dr. ARELLANO        Procedure  Procedures     Ryder Chappell, DO  02/27/25 3623

## 2025-02-28 VITALS
DIASTOLIC BLOOD PRESSURE: 62 MMHG | WEIGHT: 197 LBS | HEIGHT: 69 IN | TEMPERATURE: 99 F | RESPIRATION RATE: 18 BRPM | BODY MASS INDEX: 29.18 KG/M2 | OXYGEN SATURATION: 96 % | SYSTOLIC BLOOD PRESSURE: 130 MMHG | HEART RATE: 82 BPM

## 2025-02-28 LAB
ATRIAL RATE: 59 BPM
HOLD SPECIMEN: NORMAL
P AXIS: 37 DEGREES
P OFFSET: 131 MS
P ONSET: 76 MS
PR INTERVAL: 292 MS
Q ONSET: 222 MS
QRS COUNT: 10 BEATS
QRS DURATION: 122 MS
QT INTERVAL: 432 MS
QTC CALCULATION(BAZETT): 427 MS
QTC FREDERICIA: 429 MS
R AXIS: -57 DEGREES
T AXIS: 54 DEGREES
T OFFSET: 438 MS
VENTRICULAR RATE: 59 BPM

## 2025-02-28 RX ORDER — ONDANSETRON 4 MG/1
4 TABLET, ORALLY DISINTEGRATING ORAL EVERY 8 HOURS PRN
Qty: 15 TABLET | Refills: 0 | Status: SHIPPED | OUTPATIENT
Start: 2025-02-28

## 2025-02-28 NOTE — DISCHARGE INSTRUCTIONS
Push clear fluids.  Advance diet as tolerated.  Only take nausea medication as directed and only as needed.  Return if acutely worse or new worrisome symptoms.  Continue all your regular home medications as directed.

## 2025-02-28 NOTE — PROGRESS NOTES
Emergency Medicine Transition of Care Note.    I received Elliot Partida in signout from Dr. JUANI Chappell.  Please see the previous ED provider note for all HPI, PE and MDM up to the time of signout at 1900. This is in addition to the primary record.    In brief Elliot Partida is an 74 y.o. male presenting for   Chief Complaint   Patient presents with    Vomiting     At the time of signout we were awaiting: labs/imaging    74-year-old male presents to the ER with chief complaint of nausea vomiting for the past 2 days. Patient reports he has unable keep anything down so came to the ED for evaluation. On exam patient has diffuse abdominal tenderness slightly worse in the left lower quadrant. Patient denies any diarrhea. Initial workup was started here in the ED including imaging and blood work. At this time awaiting blood work and CAT scan patient given some Zofran and fluids. Patient is to be signed out to the night physician Dr. GRAHAM.     Patient has a wound vac on his Left Lower leg.  He denies any increase in pain/drainage.        ED Course as of 02/28/25 0312   Thu Feb 27, 2025 2132 CBC reveals white count normal 11.0, hemoglobin 9.3, medic at 30.2, platelet 259 [EC]   2132 Comprehensive metabolic panel is unremarkable.   [EC]   2133 Urinalysis with Reflex Culture and Microscopic  Urinalysis is negative for infection.  Specific gravity 1.021.  Negative ketones. [EC]   2133 Sars-CoV-2 and Influenza A/B PCR  Flu a is not detected  Flu B is not detected  Coronavirus is not detected  Lipase is normal [EC]   2135 CT Abdomen/pelvis:  IMPRESSION:  Mild circumferential thickening of the distal esophagus suggestive of  esophagitis.      Markedly distended stomach.      Mild bilateral hydronephrosis with transition point at the UPJ.      Prostatomegaly.      Colonic diverticulosis.      Mild left external iliac lymphadenopathy.      Signed by: Harmony Smith 2/27/2025 6:54 PM  Dictation workstation:   [id number]   [EC]    Fri Feb 28, 2025   0310 On reevaluation the patient is feeling better.  He states his stomach is settled down.  He has been able to drink clear fluids here without difficulty.    The patient we discharged home.  I am writing him a prescription for some Zofran and sending it to his pharmacy.  The patient is asked to follow-up with his primary care as needed and to return if acutely worsening worrisome symptoms. [EC]      ED Course User Index  [EC] Thomas Montalvo DO         Diagnoses as of 02/28/25 0312   Nausea and vomiting, unspecified vomiting type   Generalized abdominal pain       Medical Decision Making  Patient has been able to drink clear liquids here without difficulty.  Patient states he feels much better on reassessment and would like to be discharged home.        Final diagnoses:   [R11.2] Nausea and vomiting, unspecified vomiting type   [R10.84] Generalized abdominal pain           Procedure  Procedures    Thomas Montalvo DO

## 2025-03-03 ENCOUNTER — LAB REQUISITION (OUTPATIENT)
Dept: LAB | Facility: HOSPITAL | Age: 75
End: 2025-03-03
Payer: MEDICARE

## 2025-03-03 DIAGNOSIS — M86.672 OTHER CHRONIC OSTEOMYELITIS, LEFT ANKLE AND FOOT (MULTI): ICD-10-CM

## 2025-03-03 LAB
ALBUMIN SERPL BCP-MCNC: 3.2 G/DL (ref 3.4–5)
ALP SERPL-CCNC: 67 U/L (ref 33–136)
ALT SERPL W P-5'-P-CCNC: 7 U/L (ref 10–52)
ANION GAP SERPL CALC-SCNC: 12 MMOL/L (ref 10–20)
AST SERPL W P-5'-P-CCNC: 12 U/L (ref 9–39)
BASOPHILS # BLD AUTO: 0.03 X10*3/UL (ref 0–0.1)
BASOPHILS NFR BLD AUTO: 0.4 %
BILIRUB SERPL-MCNC: 0.3 MG/DL (ref 0–1.2)
BUN SERPL-MCNC: 16 MG/DL (ref 6–23)
CALCIUM SERPL-MCNC: 9 MG/DL (ref 8.6–10.3)
CHLORIDE SERPL-SCNC: 103 MMOL/L (ref 98–107)
CO2 SERPL-SCNC: 30 MMOL/L (ref 21–32)
CREAT SERPL-MCNC: 0.74 MG/DL (ref 0.5–1.3)
EGFRCR SERPLBLD CKD-EPI 2021: >90 ML/MIN/1.73M*2
EOSINOPHIL # BLD AUTO: 0.26 X10*3/UL (ref 0–0.4)
EOSINOPHIL NFR BLD AUTO: 3.8 %
ERYTHROCYTE [DISTWIDTH] IN BLOOD BY AUTOMATED COUNT: 15.6 % (ref 11.5–14.5)
GLUCOSE SERPL-MCNC: 176 MG/DL (ref 74–99)
HCT VFR BLD AUTO: 28 % (ref 41–52)
HGB BLD-MCNC: 8.3 G/DL (ref 13.5–17.5)
IMM GRANULOCYTES # BLD AUTO: 0.02 X10*3/UL (ref 0–0.5)
IMM GRANULOCYTES NFR BLD AUTO: 0.3 % (ref 0–0.9)
LYMPHOCYTES # BLD AUTO: 1.41 X10*3/UL (ref 0.8–3)
LYMPHOCYTES NFR BLD AUTO: 20.3 %
MCH RBC QN AUTO: 25.4 PG (ref 26–34)
MCHC RBC AUTO-ENTMCNC: 29.6 G/DL (ref 32–36)
MCV RBC AUTO: 86 FL (ref 80–100)
MONOCYTES # BLD AUTO: 0.55 X10*3/UL (ref 0.05–0.8)
MONOCYTES NFR BLD AUTO: 7.9 %
NEUTROPHILS # BLD AUTO: 4.66 X10*3/UL (ref 1.6–5.5)
NEUTROPHILS NFR BLD AUTO: 67.3 %
NRBC BLD-RTO: 0 /100 WBCS (ref 0–0)
PLATELET # BLD AUTO: 221 X10*3/UL (ref 150–450)
POTASSIUM SERPL-SCNC: 4 MMOL/L (ref 3.5–5.3)
PROT SERPL-MCNC: 6 G/DL (ref 6.4–8.2)
RBC # BLD AUTO: 3.27 X10*6/UL (ref 4.5–5.9)
SODIUM SERPL-SCNC: 141 MMOL/L (ref 136–145)
WBC # BLD AUTO: 6.9 X10*3/UL (ref 4.4–11.3)

## 2025-03-03 PROCEDURE — 80053 COMPREHEN METABOLIC PANEL: CPT

## 2025-03-03 PROCEDURE — 85025 COMPLETE CBC W/AUTO DIFF WBC: CPT

## 2025-03-26 DIAGNOSIS — M86.9 OSTEOMYELITIS OF LEFT FOOT, UNSPECIFIED TYPE (MULTI): ICD-10-CM

## 2025-03-26 DIAGNOSIS — I73.9 PERIPHERAL ARTERIAL DISEASE (CMS-HCC): ICD-10-CM

## 2025-04-01 ENCOUNTER — OFFICE VISIT (OUTPATIENT)
Dept: CARDIOLOGY | Facility: CLINIC | Age: 75
End: 2025-04-01
Payer: MEDICARE

## 2025-04-01 ENCOUNTER — HOSPITAL ENCOUNTER (OUTPATIENT)
Dept: VASCULAR MEDICINE | Facility: CLINIC | Age: 75
Discharge: HOME | End: 2025-04-01
Payer: MEDICARE

## 2025-04-01 VITALS
SYSTOLIC BLOOD PRESSURE: 185 MMHG | BODY MASS INDEX: 29.09 KG/M2 | DIASTOLIC BLOOD PRESSURE: 82 MMHG | WEIGHT: 197 LBS | HEART RATE: 70 BPM | OXYGEN SATURATION: 99 %

## 2025-04-01 DIAGNOSIS — M86.9 OSTEOMYELITIS OF LEFT FOOT, UNSPECIFIED TYPE (MULTI): ICD-10-CM

## 2025-04-01 DIAGNOSIS — S81.802S OPEN WOUND OF LEFT LOWER EXTREMITY, SEQUELA: Primary | ICD-10-CM

## 2025-04-01 DIAGNOSIS — I73.9 PERIPHERAL ARTERIAL DISEASE (CMS-HCC): ICD-10-CM

## 2025-04-01 PROCEDURE — 93922 UPR/L XTREMITY ART 2 LEVELS: CPT

## 2025-04-01 PROCEDURE — 93922 UPR/L XTREMITY ART 2 LEVELS: CPT | Performed by: SURGERY

## 2025-04-01 PROCEDURE — 99214 OFFICE O/P EST MOD 30 MIN: CPT | Performed by: PHYSICIAN ASSISTANT

## 2025-04-02 NOTE — PROGRESS NOTES
Referred by Dr. Gardner ref. provider found for No chief complaint on file.     History Of Present Illness:    Elliot Partida is a 74 y.o. male presenting to establish care. Patient presents d/t wound to his LLE. He follows with Dr. Mcclure (podiatry). Reports that he had a deep infection that required surgery. On chart review patient was diagnosed with cellulitis found to have osteomyelitis and underwent excisional debridement with bone biopsy, wound vac application and IV antibiotics that were eventually transitioned to oral. His family member with him reports that wound care has been going well with slow improvement in the wound. He presents for evaluation of peripheral arterial disease. States that he is non ambulatory largely d/t the wound and healing. Reports pain at the wound site, states that it bled with debridement. He feels that he is improving. Denies any systemic symptoms of illness.   Prior to injury did not recall any claudication or rest pain.       Past Medical History:  He has a past medical history of Acute osteomyelitis of ankle and foot, left (Multi), AMI (acute myocardial infarction) (Multi), ASHD (arteriosclerotic heart disease), At risk for falls, Cellulitis, Diabetes mellitus (Multi), DVT (deep vein thrombosis) in pregnancy ([facility]), Fall risk care plan declined, Hypertension, Meningioma (Multi), Myocardial infarction (Multi), Neuritis, Neuropathy, Osteoarthritis, Osteomyelitis, PVD (peripheral vascular disease) (CMS-HCC), Sprain of right knee (06/25/2015), Stroke (Multi), 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[facility]), TIA (transient ischemic attack), Tinea pedis of both feet, Trigeminal neuralgia, and Weakness.    Past Surgical History:  He has a past surgical history that includes Carpal tunnel release (10/10/2014); Eye surgery; FL arthrocentesis ASP INJ joint.; Cardiac catheterization; Iridotomy / iridectomy (Bilateral); Invasive Vascular Procedure (Bilateral, 09/18/2024); Foot ray resection (Left, 09/23/2024);  and Foot surgery (Left, 09/27/2024).      Social History:  He reports that he has never smoked. He has never been exposed to tobacco smoke. He has never used smokeless tobacco. He reports that he does not currently use alcohol. He reports that he does not use drugs.    Family History:  Family History   Problem Relation Name Age of Onset    Heart disease Father      Colon cancer Other      Heart attack Other      Diabetes Other      Hypertension Other          Allergies:  Patient has no known allergies.    Outpatient Medications:  Current Outpatient Medications   Medication Instructions    aspirin 81 mg chewable tablet Chew 1 tablet (81 mg) once daily. Do not fill before September 19, 2024.    Blood glucose monitoring meter To check blood sugar every morning before breakfast    carBAMazepine (CARBATROL) 100 mg, oral, 2 times daily, Do not crush or chew.    gabapentin (NEURONTIN) 300 mg, 3 times daily    hydrALAZINE (APRESOLINE) 25 mg, oral, 3 times daily    ondansetron ODT (ZOFRAN-ODT) 4 mg, oral, Every 8 hours PRN    potassium chloride CR (Klor-Con M20) 20 mEq ER tablet 40 mEq, oral, Daily, Do not crush or chew.    rivaroxaban (XARELTO) 15 mg, oral, 2 times daily (morning and late afternoon), Take with food.    rivaroxaban (XARELTO) 20 mg, oral, Daily with evening meal, Take with food.    traZODone (DESYREL) 50 mg, Nightly        Last Recorded Vitals:  Vitals:    04/01/25 1128   BP: (!) 185/82   Pulse: 70   SpO2: 99%   Weight: 89.4 kg (197 lb)       Physical Exam:  Physical Exam  Constitutional:       General: He is not in acute distress.  HENT:      Head: Normocephalic.      Nose: Nose normal.      Mouth/Throat:      Mouth: Mucous membranes are moist.   Eyes:      Conjunctiva/sclera: Conjunctivae normal.   Pulmonary:      Effort: Pulmonary effort is normal.   Abdominal:      General: Abdomen is flat. There is no distension.   Musculoskeletal:         General: Tenderness and signs of injury present.      Cervical  back: Neck supple.      Comments: Dressing to lower extremity c/d/I.     Skin:     General: Skin is warm.   Neurological:      General: No focal deficit present.      Mental Status: He is alert. Mental status is at baseline.   Psychiatric:         Mood and Affect: Mood normal.         Behavior: Behavior normal.         @PESEC->PESEC(CIRCULAR REFERENCE)@       Last Labs:  CBC -  Lab Results   Component Value Date    WBC 6.9 03/03/2025    HGB 8.3 (L) 03/03/2025    HCT 28.0 (L) 03/03/2025    MCV 86 03/03/2025     03/03/2025       CMP -  Lab Results   Component Value Date    CALCIUM 9.0 03/03/2025    PROT 6.0 (L) 03/03/2025    ALBUMIN 3.2 (L) 03/03/2025    AST 12 03/03/2025    ALT 7 (L) 03/03/2025    ALKPHOS 67 03/03/2025    BILITOT 0.3 03/03/2025       LIPID PANEL -   Lab Results   Component Value Date    CHOL 180 12/10/2019    TRIG 224 (H) 12/10/2019    HDL 31.0 (A) 12/10/2019    CHHDL 5.8 (A) 12/10/2019    LDLF 104 (H) 12/10/2019    VLDL 45 (H) 12/10/2019    NHDL 149 12/10/2019       RENAL FUNCTION PANEL -   Lab Results   Component Value Date    GLUCOSE 176 (H) 03/03/2025     03/03/2025    K 4.0 03/03/2025     03/03/2025    CO2 30 03/03/2025    ANIONGAP 12 03/03/2025    BUN 16 03/03/2025    CREATININE 0.74 03/03/2025    GFRMALE >90 03/21/2022    CALCIUM 9.0 03/03/2025    ALBUMIN 3.2 (L) 03/03/2025        Lab Results   Component Value Date    BNP 25 09/12/2024    HGBA1C 5.8 (H) 01/06/2025    HGBA1C 9.8 (H) 09/13/2024       Last Cardiology Tests:  ECG:  ECG 12 lead 02/13/2025    Echo:  Transthoracic Echo Limited 02/13/2025    Ejection Fractions:  EF   Date/Time Value Ref Range Status   02/13/2025 12:15 PM 58 %      Cath:  No results found for this or any previous visit from the past 1095 days.    Stress Test:  No results found for this or any previous visit from the past 1095 days.    Cardiac Imaging:  Vascular US BEN (04/01/2025):  CONCLUSIONS:  Right Lower PVR: Evidence of mild arterial occlusive  disease in the right lower extremity at rest. Normal digital perfusion noted. Multiphasic flow is noted in the right common femoral artery, right posterior tibial artery and right dorsalis pedis artery. Disease was called by PVR tracings and Doppler waveforms.  Left Lower PVR: Evidence of mild arterial occlusive disease in the left lower extremity at rest. Decreased digital perfusion noted. Multiphasic flow is noted in the left common femoral artery, left posterior tibial artery and left dorsalis pedis artery. Disease was called by PVR tracings and Doppler waveforms. No left PTA due to bandages.     Comparison:  Compared with study from 1/7/2025, Right TBI has improved to 0.68.     Imaging & Doppler Findings:     RIGHT Lower PVR                Pressures Ratios  Right Posterior Tibial (Ankle) 166 mmHg  1.06  Right Dorsalis Pedis (Ankle)   191 mmHg  1.22  Right Digit (Great Toe)        106 mmHg  0.68           LEFT Lower PVR              Pressures Ratios  Left Dorsalis Pedis (Ankle) 187 mmHg  1.20  Left Digit (Great Toe)      89 mmHg   0.57    Vascular US BEN (01/07/2025):  Comparison:  Compared with study from 9/16/2024, On today's study, the bilateral lower extremities demonstrated mild disease compared to previous of normal.     Additional Findings:  Technically difficult due to bilateral wounds/bandages and left leg  edema/swelling.        Imaging & Doppler Findings:     RIGHT Lower PVR                Pressures Ratios  Right Posterior Tibial (Ankle) 124 mmHg  0.89  Right Dorsalis Pedis (Ankle)   121 mmHg  0.87  Right Digit (Great Toe)        45 mmHg   0.32           LEFT Lower PVR                Pressures Ratios  Left Posterior Tibial (Ankle) 119 mmHg  0.86  Left Dorsalis Pedis (Ankle)   105 mmHg  0.76  Left Digit (Great Toe)        34 mmHg   0.24        Lab review: I have Chemistry BMP   Lab Results   Component Value Date    GLUCOSE 176 (H) 03/03/2025    CALCIUM 9.0 03/03/2025    CO2 30 03/03/2025    CREATININE  0.74 03/03/2025   , CBC:  Lab Results   Component Value Date    WBC 6.9 03/03/2025    RBC 3.27 (L) 03/03/2025    HGB 8.3 (L) 03/03/2025    HCT 28.0 (L) 03/03/2025    MCV 86 03/03/2025    MCH 25.4 (L) 03/03/2025    MCHC 29.6 (L) 03/03/2025    RDW 15.6 (H) 03/03/2025    NRBC 0.0 03/03/2025   , Coags:   Lab Results   Component Value Date    APTT 30 02/11/2025    INR 1.2 (H) 01/17/2025   , and Lipids:   Lab Results   Component Value Date    CHOL 180 12/10/2019    HDL 31.0 (A) 12/10/2019    TRIG 224 (H) 12/10/2019     Imaging review: I have  personally reviewed the result(s) BEN    Assessment/Plan   Problem List Items Addressed This Visit    None    Elliot Partida is a 75 yo male with a PMH of DVT LLE, HTN, HLD, DM type II, peripheral neuropathy, ONEL, left lower extremity wound w/ osteomyelitis who presents to establish care.     Left lower extremity osteomyelitis s/p debridement/wound vac  DVT of the LLE (provoked, first incidence)  Mild PAD    Continue with wound care, healing well.   PVR recommended, full study not completed d/t bandages, limited d/t patient discomfort.   C/w ASA 81mg daily, recommend lipid panel (mod-intensity statin therapy)  C/w Xarelto for DVT (dx 2/2025).   RTC in 1 month to eval wound progress.           Antonella Neal PA-C

## 2025-05-05 ENCOUNTER — APPOINTMENT (OUTPATIENT)
Dept: SURGERY | Facility: CLINIC | Age: 75
End: 2025-05-05
Payer: MEDICARE

## 2025-05-05 VITALS
SYSTOLIC BLOOD PRESSURE: 150 MMHG | TEMPERATURE: 98.7 F | BODY MASS INDEX: 29.18 KG/M2 | HEART RATE: 82 BPM | DIASTOLIC BLOOD PRESSURE: 78 MMHG | WEIGHT: 197 LBS | HEIGHT: 69 IN

## 2025-05-05 DIAGNOSIS — M86.472 CHRONIC OSTEOMYELITIS OF LEFT FOOT WITH DRAINING SINUS (MULTI): Primary | ICD-10-CM

## 2025-05-05 DIAGNOSIS — R29.898 WEAKNESS OF LEFT LOWER EXTREMITY: ICD-10-CM

## 2025-05-05 DIAGNOSIS — R23.4 WOUND ESCHAR OF FOOT: ICD-10-CM

## 2025-05-05 PROCEDURE — 3078F DIAST BP <80 MM HG: CPT | Performed by: SURGERY

## 2025-05-05 PROCEDURE — 3077F SYST BP >= 140 MM HG: CPT | Performed by: SURGERY

## 2025-05-05 PROCEDURE — 3008F BODY MASS INDEX DOCD: CPT | Performed by: SURGERY

## 2025-05-05 PROCEDURE — 1159F MED LIST DOCD IN RCRD: CPT | Performed by: SURGERY

## 2025-05-05 PROCEDURE — 1160F RVW MEDS BY RX/DR IN RCRD: CPT | Performed by: SURGERY

## 2025-05-05 PROCEDURE — 1036F TOBACCO NON-USER: CPT | Performed by: SURGERY

## 2025-05-05 PROCEDURE — 99205 OFFICE O/P NEW HI 60 MIN: CPT | Performed by: SURGERY

## 2025-05-05 NOTE — Clinical Note
Ronald -it may be helpful to refer him to the wound center at [place] for additional help.  His leg is very swollen so a compression dressing with an Exufiber cover might help to decrease the swelling and allow him to have some additional mobility instead of having the VAC at all times

## 2025-05-05 NOTE — PATIENT INSTRUCTIONS
I have reviewed all information in your chart.  Your left lower extremity wound is healing however it is very swollen.  An option is to apply Exufiber dressing with a Mepilex border cover and a compression wrap to help decrease the swelling of the foot.  This will allow you to also ambulate.  I would recommend considering referral to the wound care center which can be done at the [facility].  With regards to your buttock wounds you can apply zinc oxide paste to this on a daily basis.  Follow as needed

## 2025-05-05 NOTE — PROGRESS NOTES
Subjective   Patient ID: Elliot Partida is a 74 y.o. male who presents for a second opinion on the left foot wound.    HPI   This patient is history dates back to 23 September 2024 when he underwent application of his left fifth toe.  He then had delayed primary closure of the left foot wound on 27 September 2024.  He then presented to [facility] in the early part of 2025 with a wound infection and was noted to have chronic osteomyelitis of the left foot.  He underwent debridement of the foot on 2/23/2025 and has had a VAC dressing in place since that time.  I have reviewed picture of his left foot wound taken on 2/17/2025.  Medical History[1]     Medications Ordered Prior to Encounter[2]     Review of Systems   All other systems reviewed and are negative.  In a wheelchair.  Has a VAC dressing in his left foot.    Vitals:    05/05/25 1635   BP: 150/78   Pulse: 82   Temp: 37.1 °C (98.7 °F)        Objective     Physical Exam  Vitals reviewed.   Constitutional:       Appearance: Normal appearance.   HENT:      Head: Normocephalic.   Cardiovascular:      Rate and Rhythm: Normal rate and regular rhythm.      Heart sounds: Normal heart sounds.   Pulmonary:      Effort: Pulmonary effort is normal.      Breath sounds: Normal breath sounds.   Abdominal:      General: Abdomen is flat. There is no distension.      Palpations: Abdomen is soft. There is no mass.      Tenderness: There is no abdominal tenderness. There is no guarding.   Musculoskeletal:      Left lower leg: Swelling and deformity present. 3+ Edema present.        Legs:       Comments: Has a VAC dressing in place significant pitting edema of left lower extremity.  Warm.  Dry skin.   Skin:     General: Skin is warm.      Comments: Has dry skin in the buttock region no obvious ulcer.   Neurological:      General: No focal deficit present.   Psychiatric:         Mood and Affect: Mood normal.         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  Would suggest Exufiber with Mepilex border and compression dressing to help decrease edema of the left lower extremity which would give him a break from the VAC dressing.  Recommend topical zinc oxide paste to buttock region.  This will help to heal any small open wounds present on the buttock region.  He could also be referred to the wound center for management of his left foot wound    Bimal Snider MD        [1]   Past Medical History:  Diagnosis Date    Acute osteomyelitis of ankle and foot, left (Multi)     AMI (acute myocardial infarction) (Multi)     ASHD (arteriosclerotic heart disease)     At risk for falls     Cellulitis     left foot and ankle    Diabetes mellitus (Multi)     DVT (deep vein thrombosis) in pregnancy ([facility])     Fall risk care plan declined     Hypertension     Meningioma (Multi)     Myocardial infarction (Multi)     Neuritis     Neuropathy     Osteoarthritis     Osteomyelitis     PVD (peripheral vascular disease) (CMS-HCC)     Sprain of right knee 06/25/2015    Stroke (Multi)     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[facility])     TIA (transient ischemic attack)     Tinea pedis of both feet     Trigeminal neuralgia     Weakness    [2]   Current Outpatient Medications on File Prior to Visit   Medication Sig Dispense Refill    aspirin 81 mg chewable tablet Chew 1 tablet (81 mg) once daily. Do not fill before September 19, 2024. 90 tablet 0    Blood glucose monitoring meter To check blood sugar every morning before breakfast 1 each 0    gabapentin (Neurontin) 300 mg capsule Take 1 capsule (300 mg) by mouth 3 times a day.      hydrALAZINE (Apresoline) 25 mg tablet Take 1 tablet (25 mg) by mouth 3 times a day.      ondansetron ODT (Zofran-ODT) 4 mg disintegrating tablet Dissolve 1 tablet (4 mg) in the mouth every 8 hours  if needed for nausea or vomiting. 15 tablet 0    potassium chloride CR (Klor-Con M20) 20 mEq ER tablet Take 2 tablets (40 mEq) by mouth once daily. Do not crush or chew.      traZODone (Desyrel) 50 mg tablet Take 1 tablet (50 mg) by mouth once daily at bedtime.      carBAMazepine (Carbatrol) 100 mg 12 hr capsule Take 1 capsule (100 mg) by mouth 2 times a day for 7 days. Do not crush or chew. (Patient not taking: Reported on 4/1/2025) 14 capsule 0    rivaroxaban (Xarelto) 15 mg tablet Take 1 tablet (15 mg) by mouth 2 times daily (morning and late afternoon) for 16 days. Take with food. (Patient not taking: Reported on 4/1/2025) 32 tablet 0    rivaroxaban (Xarelto) 20 mg tablet Take 1 tablet (20 mg) by mouth once daily in the evening. Take with meals. Take with food. Do not fill before March 7, 2025. 30 tablet 0     No current facility-administered medications on file prior to visit.

## 2025-05-08 ENCOUNTER — APPOINTMENT (OUTPATIENT)
Dept: RADIOLOGY | Facility: HOSPITAL | Age: 75
DRG: 637 | End: 2025-05-08
Payer: MEDICARE

## 2025-05-08 ENCOUNTER — HOSPITAL ENCOUNTER (INPATIENT)
Facility: HOSPITAL | Age: 75
DRG: 637 | End: 2025-05-08
Attending: EMERGENCY MEDICINE | Admitting: INTERNAL MEDICINE
Payer: MEDICARE

## 2025-05-08 DIAGNOSIS — L97.524 NON-PRESSURE CHRONIC ULCER OF OTHER PART OF LEFT FOOT WITH NECROSIS OF BONE: ICD-10-CM

## 2025-05-08 DIAGNOSIS — Z86.718 HISTORY OF DVT (DEEP VEIN THROMBOSIS): ICD-10-CM

## 2025-05-08 DIAGNOSIS — M86.9 OSTEOMYELITIS OF LEFT FOOT, UNSPECIFIED TYPE (MULTI): ICD-10-CM

## 2025-05-08 DIAGNOSIS — T14.8XXA WOUND INFECTION: Primary | ICD-10-CM

## 2025-05-08 DIAGNOSIS — M79.89 LEFT ARM SWELLING: ICD-10-CM

## 2025-05-08 DIAGNOSIS — L08.9 WOUND INFECTION: Primary | ICD-10-CM

## 2025-05-08 DIAGNOSIS — I69.354 HEMIPLEGIA AND HEMIPARESIS FOLLOWING CEREBRAL INFARCTION AFFECTING LEFT NON-DOMINANT SIDE (MULTI): ICD-10-CM

## 2025-05-08 DIAGNOSIS — L03.116 CELLULITIS OF FOOT, LEFT: ICD-10-CM

## 2025-05-08 DIAGNOSIS — M86.472 CHRONIC OSTEOMYELITIS OF LEFT FOOT WITH DRAINING SINUS (MULTI): ICD-10-CM

## 2025-05-08 DIAGNOSIS — U07.1 COVID-19: ICD-10-CM

## 2025-05-08 LAB
ALBUMIN SERPL BCP-MCNC: 3.3 G/DL (ref 3.4–5)
ALP SERPL-CCNC: 96 U/L (ref 33–136)
ALT SERPL W P-5'-P-CCNC: 13 U/L (ref 10–52)
ANION GAP SERPL CALC-SCNC: 13 MMOL/L (ref 10–20)
APPEARANCE UR: CLEAR
AST SERPL W P-5'-P-CCNC: 17 U/L (ref 9–39)
BASOPHILS # BLD AUTO: 0.03 X10*3/UL (ref 0–0.1)
BASOPHILS NFR BLD AUTO: 0.4 %
BILIRUB SERPL-MCNC: 0.4 MG/DL (ref 0–1.2)
BILIRUB UR STRIP.AUTO-MCNC: NEGATIVE MG/DL
BUN SERPL-MCNC: 18 MG/DL (ref 6–23)
CALCIUM SERPL-MCNC: 8.8 MG/DL (ref 8.6–10.3)
CHLORIDE SERPL-SCNC: 105 MMOL/L (ref 98–107)
CO2 SERPL-SCNC: 26 MMOL/L (ref 21–32)
COLOR UR: COLORLESS
CREAT SERPL-MCNC: 0.56 MG/DL (ref 0.5–1.3)
CRP SERPL-MCNC: 1.54 MG/DL
EGFRCR SERPLBLD CKD-EPI 2021: >90 ML/MIN/1.73M*2
EOSINOPHIL # BLD AUTO: 0.3 X10*3/UL (ref 0–0.4)
EOSINOPHIL NFR BLD AUTO: 3.6 %
ERYTHROCYTE [DISTWIDTH] IN BLOOD BY AUTOMATED COUNT: 16 % (ref 11.5–14.5)
ERYTHROCYTE [SEDIMENTATION RATE] IN BLOOD BY WESTERGREN METHOD: 14 MM/H (ref 0–20)
GLUCOSE SERPL-MCNC: 134 MG/DL (ref 74–99)
GLUCOSE UR STRIP.AUTO-MCNC: NORMAL MG/DL
HCT VFR BLD AUTO: 36.2 % (ref 41–52)
HGB BLD-MCNC: 11.2 G/DL (ref 13.5–17.5)
HOLD SPECIMEN: 293
IMM GRANULOCYTES # BLD AUTO: 0.04 X10*3/UL (ref 0–0.5)
IMM GRANULOCYTES NFR BLD AUTO: 0.5 % (ref 0–0.9)
KETONES UR STRIP.AUTO-MCNC: NEGATIVE MG/DL
LACTATE SERPL-SCNC: 1.2 MMOL/L (ref 0.4–2)
LEUKOCYTE ESTERASE UR QL STRIP.AUTO: ABNORMAL
LYMPHOCYTES # BLD AUTO: 1.76 X10*3/UL (ref 0.8–3)
LYMPHOCYTES NFR BLD AUTO: 21.1 %
MCH RBC QN AUTO: 25.6 PG (ref 26–34)
MCHC RBC AUTO-ENTMCNC: 30.9 G/DL (ref 32–36)
MCV RBC AUTO: 83 FL (ref 80–100)
MONOCYTES # BLD AUTO: 0.71 X10*3/UL (ref 0.05–0.8)
MONOCYTES NFR BLD AUTO: 8.5 %
NEUTROPHILS # BLD AUTO: 5.52 X10*3/UL (ref 1.6–5.5)
NEUTROPHILS NFR BLD AUTO: 65.9 %
NITRITE UR QL STRIP.AUTO: NEGATIVE
NRBC BLD-RTO: ABNORMAL /100{WBCS}
PH UR STRIP.AUTO: 6.5 [PH]
PLATELET # BLD AUTO: 206 X10*3/UL (ref 150–450)
POTASSIUM SERPL-SCNC: 4 MMOL/L (ref 3.5–5.3)
PROT SERPL-MCNC: 6.4 G/DL (ref 6.4–8.2)
PROT UR STRIP.AUTO-MCNC: NEGATIVE MG/DL
RBC # BLD AUTO: 4.38 X10*6/UL (ref 4.5–5.9)
RBC # UR STRIP.AUTO: NEGATIVE MG/DL
RBC #/AREA URNS AUTO: NORMAL /HPF
SODIUM SERPL-SCNC: 140 MMOL/L (ref 136–145)
SP GR UR STRIP.AUTO: 1.01
UROBILINOGEN UR STRIP.AUTO-MCNC: NORMAL MG/DL
WBC # BLD AUTO: 8.4 X10*3/UL (ref 4.4–11.3)
WBC #/AREA URNS AUTO: NORMAL /HPF

## 2025-05-08 PROCEDURE — 73590 X-RAY EXAM OF LOWER LEG: CPT | Mod: LT,IPSPLIT

## 2025-05-08 PROCEDURE — 86140 C-REACTIVE PROTEIN: CPT | Performed by: EMERGENCY MEDICINE

## 2025-05-08 PROCEDURE — 2500000004 HC RX 250 GENERAL PHARMACY W/ HCPCS (ALT 636 FOR OP/ED): Performed by: EMERGENCY MEDICINE

## 2025-05-08 PROCEDURE — 36415 COLL VENOUS BLD VENIPUNCTURE: CPT | Performed by: EMERGENCY MEDICINE

## 2025-05-08 PROCEDURE — 96365 THER/PROPH/DIAG IV INF INIT: CPT

## 2025-05-08 PROCEDURE — 80053 COMPREHEN METABOLIC PANEL: CPT | Performed by: EMERGENCY MEDICINE

## 2025-05-08 PROCEDURE — 99285 EMERGENCY DEPT VISIT HI MDM: CPT | Performed by: EMERGENCY MEDICINE

## 2025-05-08 PROCEDURE — 2500000005 HC RX 250 GENERAL PHARMACY W/O HCPCS: Performed by: EMERGENCY MEDICINE

## 2025-05-08 PROCEDURE — 81001 URINALYSIS AUTO W/SCOPE: CPT | Performed by: EMERGENCY MEDICINE

## 2025-05-08 PROCEDURE — 2500000001 HC RX 250 WO HCPCS SELF ADMINISTERED DRUGS (ALT 637 FOR MEDICARE OP): Mod: IPSPLIT | Performed by: HOSPITALIST

## 2025-05-08 PROCEDURE — 96367 TX/PROPH/DG ADDL SEQ IV INF: CPT

## 2025-05-08 PROCEDURE — 99223 1ST HOSP IP/OBS HIGH 75: CPT | Performed by: NURSE PRACTITIONER

## 2025-05-08 PROCEDURE — 2500000001 HC RX 250 WO HCPCS SELF ADMINISTERED DRUGS (ALT 637 FOR MEDICARE OP): Performed by: EMERGENCY MEDICINE

## 2025-05-08 PROCEDURE — 1100000001 HC PRIVATE ROOM DAILY: Mod: IPSPLIT

## 2025-05-08 PROCEDURE — 87077 CULTURE AEROBIC IDENTIFY: CPT | Mod: GENLAB | Performed by: EMERGENCY MEDICINE

## 2025-05-08 PROCEDURE — 85025 COMPLETE CBC W/AUTO DIFF WBC: CPT | Performed by: EMERGENCY MEDICINE

## 2025-05-08 PROCEDURE — 83605 ASSAY OF LACTIC ACID: CPT | Performed by: EMERGENCY MEDICINE

## 2025-05-08 PROCEDURE — 73590 X-RAY EXAM OF LOWER LEG: CPT | Mod: LEFT SIDE | Performed by: RADIOLOGY

## 2025-05-08 PROCEDURE — 73630 X-RAY EXAM OF FOOT: CPT | Mod: LT

## 2025-05-08 PROCEDURE — 85652 RBC SED RATE AUTOMATED: CPT | Performed by: EMERGENCY MEDICINE

## 2025-05-08 PROCEDURE — 2500000004 HC RX 250 GENERAL PHARMACY W/ HCPCS (ALT 636 FOR OP/ED): Mod: JZ,IPSPLIT | Performed by: HOSPITALIST

## 2025-05-08 PROCEDURE — 96366 THER/PROPH/DIAG IV INF ADDON: CPT

## 2025-05-08 PROCEDURE — 87086 URINE CULTURE/COLONY COUNT: CPT | Mod: GENLAB | Performed by: EMERGENCY MEDICINE

## 2025-05-08 PROCEDURE — 94760 N-INVAS EAR/PLS OXIMETRY 1: CPT | Mod: IPSPLIT

## 2025-05-08 PROCEDURE — 73630 X-RAY EXAM OF FOOT: CPT | Mod: LEFT SIDE | Performed by: RADIOLOGY

## 2025-05-08 PROCEDURE — 2500000004 HC RX 250 GENERAL PHARMACY W/ HCPCS (ALT 636 FOR OP/ED): Mod: JZ,IPSPLIT | Performed by: NURSE PRACTITIONER

## 2025-05-08 PROCEDURE — 87040 BLOOD CULTURE FOR BACTERIA: CPT | Mod: GENLAB | Performed by: EMERGENCY MEDICINE

## 2025-05-08 RX ORDER — ACETAMINOPHEN 325 MG/1
650 TABLET ORAL EVERY 4 HOURS PRN
Status: DISCONTINUED | OUTPATIENT
Start: 2025-05-08 | End: 2025-05-12

## 2025-05-08 RX ORDER — LIDOCAINE 40 MG/G
CREAM TOPICAL 4 TIMES DAILY PRN
Status: DISCONTINUED | OUTPATIENT
Start: 2025-05-08 | End: 2025-05-20 | Stop reason: HOSPADM

## 2025-05-08 RX ORDER — ALUMINUM HYDROXIDE, MAGNESIUM HYDROXIDE, AND SIMETHICONE 1200; 120; 1200 MG/30ML; MG/30ML; MG/30ML
20 SUSPENSION ORAL EVERY 4 HOURS PRN
Status: DISCONTINUED | OUTPATIENT
Start: 2025-05-08 | End: 2025-05-20 | Stop reason: HOSPADM

## 2025-05-08 RX ORDER — GABAPENTIN 300 MG/1
300 CAPSULE ORAL 3 TIMES DAILY
Status: DISCONTINUED | OUTPATIENT
Start: 2025-05-08 | End: 2025-05-20 | Stop reason: HOSPADM

## 2025-05-08 RX ORDER — ACETAMINOPHEN 650 MG/1
650 SUPPOSITORY RECTAL EVERY 4 HOURS PRN
Status: DISCONTINUED | OUTPATIENT
Start: 2025-05-08 | End: 2025-05-09

## 2025-05-08 RX ORDER — ONDANSETRON HYDROCHLORIDE 2 MG/ML
4 INJECTION, SOLUTION INTRAVENOUS EVERY 4 HOURS PRN
Status: DISCONTINUED | OUTPATIENT
Start: 2025-05-08 | End: 2025-05-20 | Stop reason: HOSPADM

## 2025-05-08 RX ORDER — CARBAMAZEPINE 200 MG/1
200 TABLET ORAL 2 TIMES DAILY PRN
COMMUNITY

## 2025-05-08 RX ORDER — POLYETHYLENE GLYCOL 3350 17 G/17G
17 POWDER, FOR SOLUTION ORAL DAILY
Status: DISCONTINUED | OUTPATIENT
Start: 2025-05-08 | End: 2025-05-20 | Stop reason: HOSPADM

## 2025-05-08 RX ORDER — LIDOCAINE HYDROCHLORIDE 10 MG/ML
5 INJECTION, SOLUTION EPIDURAL; INFILTRATION; INTRACAUDAL; PERINEURAL ONCE
Status: DISCONTINUED | OUTPATIENT
Start: 2025-05-08 | End: 2025-05-16

## 2025-05-08 RX ORDER — CIPROFLOXACIN 500 MG/1
500 TABLET, FILM COATED ORAL
Status: ON HOLD | COMMUNITY
End: 2025-05-09 | Stop reason: ENTERED-IN-ERROR

## 2025-05-08 RX ORDER — TRAZODONE HYDROCHLORIDE 50 MG/1
50 TABLET ORAL NIGHTLY
Status: DISCONTINUED | OUTPATIENT
Start: 2025-05-08 | End: 2025-05-20 | Stop reason: HOSPADM

## 2025-05-08 RX ORDER — ACETAMINOPHEN 325 MG/1
650 TABLET ORAL ONCE
Status: COMPLETED | OUTPATIENT
Start: 2025-05-08 | End: 2025-05-08

## 2025-05-08 RX ORDER — CARBAMAZEPINE 200 MG/1
200 TABLET ORAL 2 TIMES DAILY
Status: DISCONTINUED | OUTPATIENT
Start: 2025-05-08 | End: 2025-05-20 | Stop reason: HOSPADM

## 2025-05-08 RX ORDER — ACETAMINOPHEN 160 MG/5ML
650 SOLUTION ORAL EVERY 4 HOURS PRN
Status: DISCONTINUED | OUTPATIENT
Start: 2025-05-08 | End: 2025-05-09

## 2025-05-08 RX ORDER — VANCOMYCIN 2 G/400ML
2 INJECTION, SOLUTION INTRAVENOUS ONCE
Status: COMPLETED | OUTPATIENT
Start: 2025-05-08 | End: 2025-05-08

## 2025-05-08 RX ORDER — POTASSIUM CHLORIDE 20 MEQ/1
40 TABLET, EXTENDED RELEASE ORAL DAILY
Status: DISCONTINUED | OUTPATIENT
Start: 2025-05-09 | End: 2025-05-20 | Stop reason: HOSPADM

## 2025-05-08 RX ORDER — HYDRALAZINE HYDROCHLORIDE 25 MG/1
25 TABLET, FILM COATED ORAL 3 TIMES DAILY
Status: DISCONTINUED | OUTPATIENT
Start: 2025-05-08 | End: 2025-05-20 | Stop reason: HOSPADM

## 2025-05-08 RX ADMIN — GABAPENTIN 300 MG: 300 CAPSULE ORAL at 22:03

## 2025-05-08 RX ADMIN — CARBAMAZEPINE 200 MG: 200 TABLET ORAL at 22:03

## 2025-05-08 RX ADMIN — LIDOCAINE: 40 CREAM TOPICAL at 12:01

## 2025-05-08 RX ADMIN — HYDRALAZINE HYDROCHLORIDE 25 MG: 25 TABLET ORAL at 22:03

## 2025-05-08 RX ADMIN — PIPERACILLIN SODIUM AND TAZOBACTAM SODIUM 4.5 G: 4; .5 INJECTION, SOLUTION INTRAVENOUS at 18:03

## 2025-05-08 RX ADMIN — ONDANSETRON 4 MG: 2 INJECTION INTRAMUSCULAR; INTRAVENOUS at 22:03

## 2025-05-08 RX ADMIN — VANCOMYCIN 2 G: 2 INJECTION, SOLUTION INTRAVENOUS at 12:10

## 2025-05-08 RX ADMIN — ACETAMINOPHEN 650 MG: 325 TABLET ORAL at 12:00

## 2025-05-08 RX ADMIN — PIPERACILLIN SODIUM AND TAZOBACTAM SODIUM 4.5 G: 4; .5 INJECTION, SOLUTION INTRAVENOUS at 11:24

## 2025-05-08 RX ADMIN — TRAZODONE HYDROCHLORIDE 50 MG: 50 TABLET ORAL at 22:03

## 2025-05-08 SDOH — SOCIAL STABILITY: SOCIAL INSECURITY: ARE YOU OR HAVE YOU BEEN THREATENED OR ABUSED PHYSICALLY, EMOTIONALLY, OR SEXUALLY BY ANYONE?: NO

## 2025-05-08 SDOH — SOCIAL STABILITY: SOCIAL INSECURITY: HAVE YOU HAD ANY THOUGHTS OF HARMING ANYONE ELSE?: NO

## 2025-05-08 SDOH — ECONOMIC STABILITY: FOOD INSECURITY: WITHIN THE PAST 12 MONTHS, YOU WORRIED THAT YOUR FOOD WOULD RUN OUT BEFORE YOU GOT THE MONEY TO BUY MORE.: NEVER TRUE

## 2025-05-08 SDOH — SOCIAL STABILITY: SOCIAL INSECURITY: WITHIN THE LAST YEAR, HAVE YOU BEEN HUMILIATED OR EMOTIONALLY ABUSED IN OTHER WAYS BY YOUR PARTNER OR EX-PARTNER?: NO

## 2025-05-08 SDOH — SOCIAL STABILITY: SOCIAL INSECURITY: ARE THERE ANY APPARENT SIGNS OF INJURIES/BEHAVIORS THAT COULD BE RELATED TO ABUSE/NEGLECT?: NO

## 2025-05-08 SDOH — ECONOMIC STABILITY: FOOD INSECURITY: WITHIN THE PAST 12 MONTHS, THE FOOD YOU BOUGHT JUST DIDN'T LAST AND YOU DIDN'T HAVE MONEY TO GET MORE.: NEVER TRUE

## 2025-05-08 SDOH — ECONOMIC STABILITY: INCOME INSECURITY: IN THE PAST 12 MONTHS HAS THE ELECTRIC, GAS, OIL, OR WATER COMPANY THREATENED TO SHUT OFF SERVICES IN YOUR HOME?: NO

## 2025-05-08 SDOH — SOCIAL STABILITY: SOCIAL INSECURITY: WERE YOU ABLE TO COMPLETE ALL THE BEHAVIORAL HEALTH SCREENINGS?: YES

## 2025-05-08 SDOH — SOCIAL STABILITY: SOCIAL INSECURITY: WITHIN THE LAST YEAR, HAVE YOU BEEN AFRAID OF YOUR PARTNER OR EX-PARTNER?: NO

## 2025-05-08 SDOH — SOCIAL STABILITY: SOCIAL INSECURITY: DO YOU FEEL UNSAFE GOING BACK TO THE PLACE WHERE YOU ARE LIVING?: NO

## 2025-05-08 SDOH — SOCIAL STABILITY: SOCIAL INSECURITY: DOES ANYONE TRY TO KEEP YOU FROM HAVING/CONTACTING OTHER FRIENDS OR DOING THINGS OUTSIDE YOUR HOME?: NO

## 2025-05-08 SDOH — SOCIAL STABILITY: SOCIAL INSECURITY: HAVE YOU HAD THOUGHTS OF HARMING ANYONE ELSE?: NO

## 2025-05-08 SDOH — SOCIAL STABILITY: SOCIAL INSECURITY: DO YOU FEEL ANYONE HAS EXPLOITED OR TAKEN ADVANTAGE OF YOU FINANCIALLY OR OF YOUR PERSONAL PROPERTY?: NO

## 2025-05-08 SDOH — SOCIAL STABILITY: SOCIAL INSECURITY: ABUSE: ADULT

## 2025-05-08 SDOH — SOCIAL STABILITY: SOCIAL INSECURITY: HAS ANYONE EVER THREATENED TO HURT YOUR FAMILY OR YOUR PETS?: NO

## 2025-05-08 ASSESSMENT — ENCOUNTER SYMPTOMS
GASTROINTESTINAL NEGATIVE: 1
ENDOCRINE NEGATIVE: 1
CONSTITUTIONAL NEGATIVE: 1
RESPIRATORY NEGATIVE: 1
POOR WOUND HEALING: 1
PSYCHIATRIC NEGATIVE: 1
MYALGIAS: 1
NEUROLOGICAL NEGATIVE: 1
CARDIOVASCULAR NEGATIVE: 1
HEMATOLOGIC/LYMPHATIC NEGATIVE: 1
EYES NEGATIVE: 1

## 2025-05-08 ASSESSMENT — LIFESTYLE VARIABLES
PRESCIPTION_ABUSE_PAST_12_MONTHS: NO
HOW MANY STANDARD DRINKS CONTAINING ALCOHOL DO YOU HAVE ON A TYPICAL DAY: PATIENT DOES NOT DRINK
AUDIT-C TOTAL SCORE: 0
AUDIT-C TOTAL SCORE: 0
HOW OFTEN DO YOU HAVE A DRINK CONTAINING ALCOHOL: NEVER
HOW OFTEN DO YOU HAVE 6 OR MORE DRINKS ON ONE OCCASION: NEVER
SKIP TO QUESTIONS 9-10: 1
SUBSTANCE_ABUSE_PAST_12_MONTHS: NO

## 2025-05-08 ASSESSMENT — COGNITIVE AND FUNCTIONAL STATUS - GENERAL
CLIMB 3 TO 5 STEPS WITH RAILING: A LITTLE
HELP NEEDED FOR BATHING: A LITTLE
DAILY ACTIVITIY SCORE: 20
MOVING FROM LYING ON BACK TO SITTING ON SIDE OF FLAT BED WITH BEDRAILS: A LITTLE
PATIENT BASELINE BEDBOUND: NO
STANDING UP FROM CHAIR USING ARMS: A LITTLE
TURNING FROM BACK TO SIDE WHILE IN FLAT BAD: A LITTLE
DRESSING REGULAR UPPER BODY CLOTHING: A LITTLE
MOBILITY SCORE: 18
MOVING TO AND FROM BED TO CHAIR: A LITTLE
DRESSING REGULAR LOWER BODY CLOTHING: A LITTLE
WALKING IN HOSPITAL ROOM: A LITTLE
TOILETING: A LITTLE

## 2025-05-08 ASSESSMENT — ACTIVITIES OF DAILY LIVING (ADL)
GROOMING: INDEPENDENT
TOILETING: INDEPENDENT
ASSISTIVE_DEVICE: WALKER
LACK_OF_TRANSPORTATION: NO
HEARING - RIGHT EAR: FUNCTIONAL
DRESSING YOURSELF: INDEPENDENT
HEARING - LEFT EAR: FUNCTIONAL
FEEDING YOURSELF: INDEPENDENT
ADEQUATE_TO_COMPLETE_ADL: YES
WALKS IN HOME: INDEPENDENT
BATHING: INDEPENDENT
JUDGMENT_ADEQUATE_SAFELY_COMPLETE_DAILY_ACTIVITIES: YES
PATIENT'S MEMORY ADEQUATE TO SAFELY COMPLETE DAILY ACTIVITIES?: YES

## 2025-05-08 ASSESSMENT — COLUMBIA-SUICIDE SEVERITY RATING SCALE - C-SSRS
1. IN THE PAST MONTH, HAVE YOU WISHED YOU WERE DEAD OR WISHED YOU COULD GO TO SLEEP AND NOT WAKE UP?: NO
6. HAVE YOU EVER DONE ANYTHING, STARTED TO DO ANYTHING, OR PREPARED TO DO ANYTHING TO END YOUR LIFE?: NO
2. HAVE YOU ACTUALLY HAD ANY THOUGHTS OF KILLING YOURSELF?: NO

## 2025-05-08 ASSESSMENT — PATIENT HEALTH QUESTIONNAIRE - PHQ9
2. FEELING DOWN, DEPRESSED OR HOPELESS: SEVERAL DAYS
1. LITTLE INTEREST OR PLEASURE IN DOING THINGS: NOT AT ALL
SUM OF ALL RESPONSES TO PHQ9 QUESTIONS 1 & 2: 1

## 2025-05-08 ASSESSMENT — PAIN SCALES - GENERAL
PAINLEVEL_OUTOF10: 0 - NO PAIN
PAINLEVEL_OUTOF10: 0 - NO PAIN

## 2025-05-08 ASSESSMENT — PAIN INTENSITY VAS: VAS_PAIN_BASICVITALS_IP: 0

## 2025-05-08 ASSESSMENT — PAIN - FUNCTIONAL ASSESSMENT: PAIN_FUNCTIONAL_ASSESSMENT: 0-10

## 2025-05-08 NOTE — ED PROVIDER NOTES
HPI   Chief Complaint   Patient presents with    Wound Infection       74-year-old male with chronic nonhealing wound on the left foot presents as advised by his podiatrist Dr. Christianson for admission and management of chronic wound on the left foot.  The wound has been present for 9 months.  He has been seeing his podiatrist regularly who has been debriding the area.  He also had a wound VAC for some time.  Given ongoing persistent nonhealing wound hospitalization was advised for IV antibiotics and podiatry comanagement.  No fevers.      History provided by:  Patient and medical records          Patient History   Medical History[1]  Surgical History[2]  Family History[3]  Social History[4]    Physical Exam   ED Triage Vitals [05/08/25 0850]   Temperature Heart Rate Respirations BP   36.5 °C (97.7 °F) 77 16 (!) 190/76      Pulse Ox Temp src Heart Rate Source Patient Position   99 % -- -- --      BP Location FiO2 (%)     -- --       Physical Exam  Vitals and nursing note reviewed.   Constitutional:       Appearance: Normal appearance.   HENT:      Head: Normocephalic and atraumatic.      Right Ear: External ear normal.      Left Ear: External ear normal.      Nose: Nose normal.   Eyes:      Extraocular Movements: Extraocular movements intact.      Pupils: Pupils are equal, round, and reactive to light.   Cardiovascular:      Rate and Rhythm: Normal rate and regular rhythm.   Pulmonary:      Effort: Pulmonary effort is normal.      Breath sounds: Normal breath sounds.   Abdominal:      Palpations: Abdomen is soft.      Tenderness: There is no abdominal tenderness.   Musculoskeletal:         General: No deformity. Normal range of motion.      Cervical back: Normal range of motion.        Legs:       Comments: Large wound on the dorsum of the foot and ankle with good granulation tissue at the base and some fibrinous exudate.  Pitting edema to both lower extremities.   Skin:     General: Skin is warm and dry.   Neurological:       General: No focal deficit present.      Mental Status: He is alert and oriented to person, place, and time.      Cranial Nerves: No cranial nerve deficit.      Sensory: No sensory deficit.      Motor: Weakness (Left leg weakness, chronic, secondary to stroke) present.   Psychiatric:         Mood and Affect: Mood normal.           ED Course & MDM   ED Course as of 05/08/25 1340   Thu May 08, 2025   1010 74-year-old male with wound infection.  Chronic wound left foot failing outpatient management with wound care.  Wound and blood cultures ordered sent and pending.  Zosyn and vancomycin given empirically.  X-ray left foot and labs.  Will require hospitalization under medicine with podiatry consult. [BT]   1339 Laboratory studies reviewed.  CRP 1.54.  Hemoglobin 11.2.  Urine negative for infection.  Labs otherwise unremarkable.  X-ray left foot with questionable proximal fourth phalanx fracture.  Case discussed with medicine hospitalist who will admit for wound infection left lower extremity [BT]   1340 Lactate: 1.2  No severe sepsis or septic shock. 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 Soft   tissue swelling. No additional acute osseous findings of the left   foot.        MACRO:   None        Signed by: Osmar Garza 5/8/2025 10:04 AM   Dictation workstation:   [id number]      Bedside PICC Imaging    (Results Pending)                   No data recorded     [place] Coma Scale Score: 15 (05/08/25 1018 : Francine Torres RN)                            Medical Decision Making      Procedure  Procedures         [1]   Past Medical History:  Diagnosis Date    Acute osteomyelitis of ankle and foot, left (Multi)     AMI (acute myocardial infarction) (Multi)     ASHD (arteriosclerotic heart disease)     At risk for falls     Cellulitis     left foot and ankle    Diabetes mellitus (Multi)     DVT (deep vein thrombosis) in pregnancy ([facility])     Fall risk care plan declined     Hypertension     Meningioma (Multi)     Myocardial infarction (Multi)     Neuritis     Neuropathy     Osteoarthritis     Osteomyelitis     PVD (peripheral vascular disease) (CMS-HCC)     Sprain of right knee 06/25/2015    Stroke (Multi)     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[facility])     TIA (transient ischemic attack)     Tinea pedis of both feet     Trigeminal neuralgia     Weakness    [2]   Past Surgical History:  Procedure Laterality Date    CARDIAC CATHETERIZATION      CARPAL TUNNEL RELEASE  10/10/2014    EYE SURGERY      FL  ARTHROCENTESIS ASP INJ JOINT.      FOOT RAY RESECTION Left 09/23/2024    L partial 5th ray resection (Dr. Spears, DPM)    FOOT SURGERY Left 09/27/2024    Delayed closure w/ graft application (Dr. Huynh, DPBRAYAN)    INVASIVE VASCULAR PROCEDURE Bilateral 09/18/2024    Procedure: Lower Extremity Angiogram;  Surgeon: Teofilo Stoddard MD;  Location: [facility] Cardiac Cath Lab;  Service: Cardiovascular;  Laterality: Bilateral;    IRIDOTOMY / IRIDECTOMY Bilateral    [3]   Family History  Problem Relation Name Age of Onset    Heart disease Father      Colon cancer Other      Heart attack Other      Diabetes Other      Hypertension Other     [4]   Social History  Tobacco Use    Smoking status: Never     Passive exposure: Never    Smokeless tobacco: Never   Vaping Use    Vaping status: Never Used   Substance Use Topics    Alcohol use: Not Currently     Comment: former    Drug use: Never        Sven Rodriguez DO  05/08/25 1206

## 2025-05-08 NOTE — ED TRIAGE NOTES
Patient presents to ED for evaluation of foot wound per recommendation of surgeon. Patient saw Dr Robles yesterday and was told to come to  ED, patient unable to come to ED yesterday, presents today for evaluation of left dorsal foot wound and iv antibiotic treatment. Patient states he has a wound to right heel as well, but that is not area of concern

## 2025-05-08 NOTE — H&P
"History Of Present Illness:    Elliot Partida is a very pleasant 74 year old gentleman with a history of moderate aortic stenosis, osteomyelitis, DM, neuropathy, DVT, trigeminal neuralgia, and HTN, sent to the ER for an admission for wound on his left foot. He has a chronic wound on his left foot for the past nine months. Wound is nonhealing. He has had multiple debridement and a wound VAC. Denies pain, fever or dizziness.     Review of Systems   Constitutional: Negative.   HENT: Negative.     Eyes: Negative.    Cardiovascular: Negative.    Respiratory: Negative.     Endocrine: Negative.    Hematologic/Lymphatic: Negative.    Skin:  Positive for poor wound healing.   Musculoskeletal:  Positive for muscle weakness and myalgias.   Gastrointestinal: Negative.    Neurological: Negative.    Psychiatric/Behavioral: Negative.           Last Recorded Vitals:  Vitals:    05/08/25 1530 05/08/25 1545 05/08/25 1600 05/08/25 1626   BP:  154/65  157/76   BP Location:    Left arm   Patient Position:    Lying   Pulse:  76  77   Resp:    18   Temp:  36.7 °C (98.1 °F)  36.5 °C (97.7 °F)   TempSrc:    Temporal   SpO2: 100% 100% 100% 97%   Weight:    94.9 kg (209 lb 3.5 oz)   Height:    1.753 m (5' 9\")       Last Labs:  CBC - 5/8/2025: 12:39 PM  8.4 11.2 206    36.2      CMP - 5/8/2025: 11:45 AM  8.8 6.4 17 --- 0.4   _ 3.3 13 96      PTT - 2/11/2025:  7:07 PM  1.2   13.0 30     Troponin I, High Sensitivity   Date/Time Value Ref Range Status   12/23/2024 02:33 PM 4 0 - 20 ng/L Final     BNP   Date/Time Value Ref Range Status   09/12/2024 05:00 PM 25 0 - 99 pg/mL Final     Hemoglobin A1C   Date/Time Value Ref Range Status   01/06/2025 04:35 AM 5.8 (H) 4.3 - 5.6 % Final   10/03/2024 04:40 AM 7.0 (H) 4.3 - 5.6 % Final   09/13/2024 06:19 AM 9.8 (H) see below % Final     VLDL   Date/Time Value Ref Range Status   12/10/2019 06:05 AM 45 (H) 0 - 40 mg/dL Final      Last I/O:  No intake/output data recorded.    Past Cardiology Tests (Last 3 " Years):  EKG    Echo:  Transthoracic Echo Limited 02/13/2025  1. The left ventricular systolic function is normal, with a visually estimated ejection fraction of 55-60%.   2. Spectral Doppler shows a Grade I (impaired relaxation pattern) of left ventricular diastolic filling with normal left atrial filling pressure.   3. There is normal right ventricular global systolic function.   4. There is moderate to severe mitral annular calcification.   5. Moderate aortic valve stenosis. Dimensionless index 0.31 : moderate stenosis. Peak and mean gradients around 48 and 28 mm Hg respectively. Estimated aortic valve area around 1 cm2.   6. There is moderate aortic valve cusp calcification.    Transthoracic Echo (TTE) Complete 09/13/2024  1. The left ventricular systolic function is normal, with a visually estimated ejection fraction of 60-65%.   2. Spectral Doppler shows an impaired relaxation pattern of left ventricular diastolic filling.   3. There is normal right ventricular global systolic function.   4. Mild to moderate aortic valve stenosis.   5. There is moderate aortic valve cusp calcification.   6. The inferior vena cava appears mildly dilated.     Ejection Fractions:  EF   Date/Time Value Ref Range Status   02/13/2025 12:15 PM 58 %    09/13/2024 02:10 PM 63 %      Cath:  Heart cath 1/2020  1. Mild non-obstructive coronary artery disease.   2. Culprit vessel(s): left anterior descending.   3. Ventricular arrhythmia requiring therapy.    Stress Test:  No results found for this or any previous visit from the past 1095 days.    Cardiac Imaging:  No results found for this or any previous visit from the past 1095 days.      Past Medical History:  He has a past medical history of Acute osteomyelitis of ankle and foot, left (Multi), AMI (acute myocardial infarction) (Multi), ASHD (arteriosclerotic heart disease), At risk for falls, Cellulitis, Diabetes mellitus (Multi), DVT (deep vein thrombosis) in pregnancy ([facility]), Fall  risk care plan declined, Hypertension, Meningioma (Multi), Myocardial infarction (Multi), Neuritis, Neuropathy, Osteoarthritis, Osteomyelitis, PVD (peripheral vascular disease) ([facility]), Sprain of right knee (06/25/2015), Stroke (Multi), 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[facility]), TIA (transient ischemic attack), Tinea pedis of both feet, Trigeminal neuralgia, and Weakness.    Past Surgical History:  He has a past surgical history that includes Carpal tunnel release (10/10/2014); Eye surgery; FL arthrocentesis ASP INJ joint.; Cardiac catheterization; Iridotomy / iridectomy (Bilateral); Invasive Vascular Procedure (Bilateral, 09/18/2024); Foot ray resection (Left, 09/23/2024); and Foot surgery (Left, 09/27/2024).      Social History:  He reports that he has never smoked. He has never been exposed to tobacco smoke. He has never used smokeless tobacco. He reports that he does not currently use alcohol. He reports that he does not use drugs.    Family History:  Family History[1]     Allergies:  Patient has no known allergies.    Inpatient Medications:  Scheduled Medications[2]  PRN Medications[3]  Continuous Medications[4]  Outpatient Medications:  Current Outpatient Medications   Medication Instructions    aspirin 81 mg chewable tablet Chew 1 tablet (81 mg) once daily. Do not fill before September 19, 2024.    Blood glucose monitoring meter To check blood sugar every morning before breakfast    carBAMazepine (CARBATROL) 100 mg, oral, 2 times daily, Do not crush or chew.    carBAMazepine (TEGRETOL) 200 mg, oral, 2 times daily    ciprofloxacin (CIPRO) 500 mg, oral    gabapentin (NEURONTIN) 300 mg, 3 times daily    hydrALAZINE (APRESOLINE) 25 mg, oral, 3 times daily    ondansetron ODT (ZOFRAN-ODT) 4 mg, oral, Every 8 hours PRN    potassium chloride CR (Klor-Con M20) 20 mEq ER tablet 40 mEq, oral, Daily, Do not crush or chew.    rivaroxaban (XARELTO) 15 mg, oral, 2 times daily (morning and late afternoon), Take with food.    rivaroxaban  (XARELTO) 20 mg, oral, Daily with evening meal, Take with food.    traZODone (DESYREL) 50 mg, Nightly       Physical Exam:  Physical Exam  Vitals reviewed.   HENT:      Head: Normocephalic.      Nose: Nose normal.   Eyes:      Pupils: Pupils are equal, round, and reactive to light.   Cardiovascular:      Rate and Rhythm: Normal rate and regular rhythm. 3/6 BECKIE   Pulmonary:      Effort: Pulmonary effort is normal.      Breath sounds: Normal breath sounds.   Abdominal:      General: Abdomen is flat.      Palpations: Abdomen is soft.   Musculoskeletal:         General: Normal range of motion.      Cervical back: Normal range of motion.   Skin:     General: Skin is warm and dry.   Neurological:      General: No focal deficit present.      Mental Status: He is alert and oriented to person, place, and time.   Psychiatric:         Mood and Affect: Mood normal.       Extremities   Pedal edema bilaterally      Assessment/Plan   Wound Infection     Non-pressure chronic ulcer of other part of left foot with necrosis of bone     Hemiplegia and hemiparesis following cerebral infarction affecting left non-dominant side (Multi)     History of DVT (deep vein thrombosis) (Xarelto)     DM   Neuropathy     HTN     Trigeminal neuralgia     Wound Infection   Left foot   Consult podiatry rec appreciated   In ER given Vanco and Zosyn   PICC line ordered     History of DVT (deep vein thrombosis) (Xarelto)       DM   Neuropathy     HTN     Trigeminal neuralgia            Code Status:  Full Code    I spent minutes in the professional and overall care of this patient.        Ani Corley, APRN-CNP  Attending Attestation:    Patient was seen and examined face to face, history and physical was taken personally at bedside the APRN-CNP, was present for the whole duration of the exam who participated in the documentation of this note. I performed the medical decision-making components (assessment and plan of care). I have reviewed the  documentation and verified the findings in the note as written with additions or exceptions as stated in the body of this note.  Patient with diabetic foot ulcer, has been followed by podiatry, had several debridement of the left foot wound, apparently was seen by is podiatry, was advised to go to emergency room for possible debridement and MRI of the left foot, patient sitting in chair in no distress, denies chest pain, cough, shortness of breath, no significant pain, he can move and wiggle his toes, left foot is dressed, heart is regular, lungs fair air movement no crackles, abdomen soft bowel sounds are present.  Patient is going to have MRI no he is on Zosyn and vancomycin we will continue with that podiatry is going to see the patient today and will consult ID.    Dr. Dickson Thakkar MD  Internal Medicine        [1]   Family History  Problem Relation Name Age of Onset    Heart disease Father      Colon cancer Other      Heart attack Other      Diabetes Other      Hypertension Other     [2]   Scheduled medications   Medication Dose Route Frequency    lidocaine  5 mL infiltration Once    piperacillin-tazobactam  4.5 g intravenous q8h    polyethylene glycol  17 g oral Daily   [3]   PRN medications   Medication    acetaminophen    Or    acetaminophen    Or    acetaminophen    lidocaine   [4]   Continuous Medications   Medication Dose Last Rate

## 2025-05-08 NOTE — CARE PLAN
The patient's goals for the shift include to get some sleep    The clinical goals for the shift include pt will have no c/o pain this shift    Patient admitted to med/surg this shift. Over the shift, the patient did make progress toward the following goals. Tolerating IV ATB and remaining afebrile. No c/o pain.       Problem: Pain - Adult  Goal: Verbalizes/displays adequate comfort level or baseline comfort level  Outcome: Progressing     Problem: Safety - Adult  Goal: Free from fall injury  Outcome: Progressing     Problem: Discharge Planning  Goal: Discharge to home or other facility with appropriate resources  Outcome: Progressing     Problem: Chronic Conditions and Co-morbidities  Goal: Patient's chronic conditions and co-morbidity symptoms are monitored and maintained or improved  Outcome: Progressing     Problem: Skin  Goal: Decreased wound size/increased tissue granulation at next dressing change  Outcome: Progressing  Flowsheets (Taken 5/8/2025 1736)  Decreased wound size/increased tissue granulation at next dressing change: Protective dressings over bony prominences  Goal: Participates in plan/prevention/treatment measures  Outcome: Progressing  Flowsheets (Taken 5/8/2025 1736)  Participates in plan/prevention/treatment measures: Elevate heels  Goal: Prevent/manage excess moisture  Outcome: Progressing  Flowsheets (Taken 5/8/2025 1736)  Prevent/manage excess moisture: Monitor for/manage infection if present  Goal: Prevent/minimize sheer/friction injuries  Outcome: Progressing  Flowsheets (Taken 5/8/2025 1736)  Prevent/minimize sheer/friction injuries: Turn/reposition every 2 hours/use positioning/transfer devices  Goal: Promote/optimize nutrition  Outcome: Progressing  Flowsheets (Taken 5/8/2025 1736)  Promote/optimize nutrition: Monitor/record intake including meals  Goal: Promote skin healing  Outcome: Progressing  Flowsheets (Taken 5/8/2025 1736)  Promote skin healing: Turn/reposition every 2 hours/use  positioning/transfer devices

## 2025-05-09 ENCOUNTER — APPOINTMENT (OUTPATIENT)
Dept: RADIOLOGY | Facility: HOSPITAL | Age: 75
DRG: 637 | End: 2025-05-09
Payer: MEDICARE

## 2025-05-09 ENCOUNTER — APPOINTMENT (OUTPATIENT)
Dept: CARDIOLOGY | Facility: HOSPITAL | Age: 75
DRG: 637 | End: 2025-05-09
Payer: MEDICARE

## 2025-05-09 LAB
ALBUMIN SERPL BCP-MCNC: 3.2 G/DL (ref 3.4–5)
ALP SERPL-CCNC: 84 U/L (ref 33–136)
ALT SERPL W P-5'-P-CCNC: 13 U/L (ref 10–52)
ANION GAP SERPL CALC-SCNC: 10 MMOL/L (ref 10–20)
AST SERPL W P-5'-P-CCNC: 17 U/L (ref 9–39)
BACTERIA UR CULT: NORMAL
BILIRUB SERPL-MCNC: 0.6 MG/DL (ref 0–1.2)
BUN SERPL-MCNC: 17 MG/DL (ref 6–23)
CALCIUM SERPL-MCNC: 8.5 MG/DL (ref 8.6–10.3)
CHLORIDE SERPL-SCNC: 106 MMOL/L (ref 98–107)
CO2 SERPL-SCNC: 27 MMOL/L (ref 21–32)
CREAT SERPL-MCNC: 0.77 MG/DL (ref 0.5–1.3)
EGFRCR SERPLBLD CKD-EPI 2021: >90 ML/MIN/1.73M*2
ERYTHROCYTE [DISTWIDTH] IN BLOOD BY AUTOMATED COUNT: 16.3 % (ref 11.5–14.5)
GLUCOSE BLD MANUAL STRIP-MCNC: 109 MG/DL (ref 74–99)
GLUCOSE BLD MANUAL STRIP-MCNC: 134 MG/DL (ref 74–99)
GLUCOSE BLD MANUAL STRIP-MCNC: 159 MG/DL (ref 74–99)
GLUCOSE BLD MANUAL STRIP-MCNC: 166 MG/DL (ref 74–99)
GLUCOSE BLD MANUAL STRIP-MCNC: 85 MG/DL (ref 74–99)
GLUCOSE SERPL-MCNC: 118 MG/DL (ref 74–99)
HCT VFR BLD AUTO: 34.2 % (ref 41–52)
HGB BLD-MCNC: 10.4 G/DL (ref 13.5–17.5)
MCH RBC QN AUTO: 25.6 PG (ref 26–34)
MCHC RBC AUTO-ENTMCNC: 30.4 G/DL (ref 32–36)
MCV RBC AUTO: 84 FL (ref 80–100)
NRBC BLD-RTO: 0 /100 WBCS (ref 0–0)
PLATELET # BLD AUTO: 220 X10*3/UL (ref 150–450)
POTASSIUM SERPL-SCNC: 3.9 MMOL/L (ref 3.5–5.3)
PROT SERPL-MCNC: 6.1 G/DL (ref 6.4–8.2)
RBC # BLD AUTO: 4.07 X10*6/UL (ref 4.5–5.9)
SODIUM SERPL-SCNC: 139 MMOL/L (ref 136–145)
WBC # BLD AUTO: 9.4 X10*3/UL (ref 4.4–11.3)

## 2025-05-09 PROCEDURE — 36415 COLL VENOUS BLD VENIPUNCTURE: CPT | Mod: IPSPLIT | Performed by: NURSE PRACTITIONER

## 2025-05-09 PROCEDURE — A9575 INJ GADOTERATE MEGLUMI 0.1ML: HCPCS | Mod: IPSPLIT | Performed by: INTERNAL MEDICINE

## 2025-05-09 PROCEDURE — 94760 N-INVAS EAR/PLS OXIMETRY 1: CPT | Mod: IPSPLIT

## 2025-05-09 PROCEDURE — 80053 COMPREHEN METABOLIC PANEL: CPT | Mod: IPSPLIT | Performed by: NURSE PRACTITIONER

## 2025-05-09 PROCEDURE — 2550000001 HC RX 255 CONTRASTS: Mod: IPSPLIT | Performed by: INTERNAL MEDICINE

## 2025-05-09 PROCEDURE — 93005 ELECTROCARDIOGRAM TRACING: CPT | Mod: IPSPLIT

## 2025-05-09 PROCEDURE — 73720 MRI LWR EXTREMITY W/O&W/DYE: CPT | Mod: LT,IPSPLIT

## 2025-05-09 PROCEDURE — 2500000004 HC RX 250 GENERAL PHARMACY W/ HCPCS (ALT 636 FOR OP/ED): Mod: JZ,IPSPLIT | Performed by: NURSE PRACTITIONER

## 2025-05-09 PROCEDURE — 73720 MRI LWR EXTREMITY W/O&W/DYE: CPT | Mod: LEFT SIDE | Performed by: RADIOLOGY

## 2025-05-09 PROCEDURE — 87040 BLOOD CULTURE FOR BACTERIA: CPT | Mod: GENLAB | Performed by: NURSE PRACTITIONER

## 2025-05-09 PROCEDURE — 82947 ASSAY GLUCOSE BLOOD QUANT: CPT | Mod: IPSPLIT

## 2025-05-09 PROCEDURE — 2500000004 HC RX 250 GENERAL PHARMACY W/ HCPCS (ALT 636 FOR OP/ED): Mod: JZ,IPSPLIT

## 2025-05-09 PROCEDURE — 2500000001 HC RX 250 WO HCPCS SELF ADMINISTERED DRUGS (ALT 637 FOR MEDICARE OP): Mod: IPSPLIT | Performed by: HOSPITALIST

## 2025-05-09 PROCEDURE — 1100000001 HC PRIVATE ROOM DAILY: Mod: IPSPLIT

## 2025-05-09 PROCEDURE — 99233 SBSQ HOSP IP/OBS HIGH 50: CPT | Performed by: NURSE PRACTITIONER

## 2025-05-09 PROCEDURE — 87081 CULTURE SCREEN ONLY: CPT | Mod: GENLAB | Performed by: EMERGENCY MEDICINE

## 2025-05-09 PROCEDURE — 85027 COMPLETE CBC AUTOMATED: CPT | Mod: IPSPLIT | Performed by: NURSE PRACTITIONER

## 2025-05-09 RX ORDER — DEXTROSE 50 % IN WATER (D50W) INTRAVENOUS SYRINGE
12.5
Status: DISCONTINUED | OUTPATIENT
Start: 2025-05-09 | End: 2025-05-20 | Stop reason: HOSPADM

## 2025-05-09 RX ORDER — VANCOMYCIN HYDROCHLORIDE 1 G/200ML
1 INJECTION, SOLUTION INTRAVENOUS EVERY 12 HOURS
Status: DISCONTINUED | OUTPATIENT
Start: 2025-05-09 | End: 2025-05-14 | Stop reason: DRUGHIGH

## 2025-05-09 RX ORDER — GADOTERATE MEGLUMINE 376.9 MG/ML
19 INJECTION INTRAVENOUS
Status: COMPLETED | OUTPATIENT
Start: 2025-05-09 | End: 2025-05-09

## 2025-05-09 RX ORDER — INSULIN LISPRO 100 [IU]/ML
0-10 INJECTION, SOLUTION INTRAVENOUS; SUBCUTANEOUS
Status: DISCONTINUED | OUTPATIENT
Start: 2025-05-09 | End: 2025-05-20 | Stop reason: HOSPADM

## 2025-05-09 RX ORDER — SODIUM CHLORIDE 9 MG/ML
INJECTION, SOLUTION INTRAVENOUS
Status: COMPLETED
Start: 2025-05-09 | End: 2025-05-09

## 2025-05-09 RX ORDER — DEXTROSE 50 % IN WATER (D50W) INTRAVENOUS SYRINGE
25
Status: DISCONTINUED | OUTPATIENT
Start: 2025-05-09 | End: 2025-05-20 | Stop reason: HOSPADM

## 2025-05-09 RX ORDER — VANCOMYCIN HYDROCHLORIDE 1 G/20ML
INJECTION, POWDER, LYOPHILIZED, FOR SOLUTION INTRAVENOUS DAILY PRN
Status: DISCONTINUED | OUTPATIENT
Start: 2025-05-09 | End: 2025-05-20 | Stop reason: HOSPADM

## 2025-05-09 RX ORDER — ENOXAPARIN SODIUM 100 MG/ML
40 INJECTION SUBCUTANEOUS DAILY
Status: DISCONTINUED | OUTPATIENT
Start: 2025-05-09 | End: 2025-05-20 | Stop reason: HOSPADM

## 2025-05-09 RX ADMIN — HYDRALAZINE HYDROCHLORIDE 25 MG: 25 TABLET ORAL at 15:06

## 2025-05-09 RX ADMIN — ENOXAPARIN SODIUM 40 MG: 40 INJECTION SUBCUTANEOUS at 13:16

## 2025-05-09 RX ADMIN — SODIUM CHLORIDE 250 ML: 9 INJECTION, SOLUTION INTRAVENOUS at 10:41

## 2025-05-09 RX ADMIN — CARBAMAZEPINE 200 MG: 200 TABLET ORAL at 20:34

## 2025-05-09 RX ADMIN — PIPERACILLIN SODIUM AND TAZOBACTAM SODIUM 4.5 G: 4; .5 INJECTION, SOLUTION INTRAVENOUS at 02:16

## 2025-05-09 RX ADMIN — GABAPENTIN 300 MG: 300 CAPSULE ORAL at 10:41

## 2025-05-09 RX ADMIN — GABAPENTIN 300 MG: 300 CAPSULE ORAL at 15:06

## 2025-05-09 RX ADMIN — GADOTERATE MEGLUMINE 19 ML: 376.9 INJECTION INTRAVENOUS at 09:41

## 2025-05-09 RX ADMIN — VANCOMYCIN HYDROCHLORIDE 1 G: 1 INJECTION, SOLUTION INTRAVENOUS at 13:16

## 2025-05-09 RX ADMIN — CARBAMAZEPINE 200 MG: 200 TABLET ORAL at 10:41

## 2025-05-09 RX ADMIN — HYDRALAZINE HYDROCHLORIDE 25 MG: 25 TABLET ORAL at 10:41

## 2025-05-09 RX ADMIN — PIPERACILLIN SODIUM AND TAZOBACTAM SODIUM 4.5 G: 4; .5 INJECTION, SOLUTION INTRAVENOUS at 20:34

## 2025-05-09 RX ADMIN — PIPERACILLIN SODIUM AND TAZOBACTAM SODIUM 4.5 G: 4; .5 INJECTION, SOLUTION INTRAVENOUS at 10:54

## 2025-05-09 ASSESSMENT — COGNITIVE AND FUNCTIONAL STATUS - GENERAL
MOVING FROM LYING ON BACK TO SITTING ON SIDE OF FLAT BED WITH BEDRAILS: A LITTLE
TURNING FROM BACK TO SIDE WHILE IN FLAT BAD: A LITTLE
DAILY ACTIVITIY SCORE: 19
WALKING IN HOSPITAL ROOM: A LITTLE
TOILETING: A LITTLE
MOVING TO AND FROM BED TO CHAIR: A LITTLE
PERSONAL GROOMING: A LITTLE
DRESSING REGULAR LOWER BODY CLOTHING: A LITTLE
CLIMB 3 TO 5 STEPS WITH RAILING: A LITTLE
MOBILITY SCORE: 18
STANDING UP FROM CHAIR USING ARMS: A LITTLE
DRESSING REGULAR UPPER BODY CLOTHING: A LITTLE
HELP NEEDED FOR BATHING: A LITTLE

## 2025-05-09 ASSESSMENT — PAIN SCALES - GENERAL
PAINLEVEL_OUTOF10: 0 - NO PAIN

## 2025-05-09 ASSESSMENT — PAIN - FUNCTIONAL ASSESSMENT: PAIN_FUNCTIONAL_ASSESSMENT: 0-10

## 2025-05-09 ASSESSMENT — ACTIVITIES OF DAILY LIVING (ADL): LACK_OF_TRANSPORTATION: NO

## 2025-05-09 ASSESSMENT — PAIN INTENSITY VAS: VAS_PAIN_BASICVITALS_IP: 0

## 2025-05-09 NOTE — PROGRESS NOTES
Physical Therapy                 Therapy Communication Note    Patient Name: Elliot Partida  MRN: [id number]  Department: [facility]  Room: [address] [place] [address]A  Today's Date: 5/9/2025     Discipline: Physical Therapy          Missed Visit Reason: Missed Visit Reason: Patient refused    Missed Time: Attempt Pt. Adamantly refused therapy at this time.

## 2025-05-09 NOTE — PROGRESS NOTES
Occupational Therapy                    Therapy Communication Note    Patient Name: Elliot Partida  MRN: [id number]  Department: [facility]  Room: [address] [place] [address]A  Today's Date: 5/9/2025     Discipline: Occupational Therapy    OT Missed Visit: Yes     Missed Visit Reason: Missed Visit Reason: Patient refused (Pt adamently refusing therapy this date. Pt educated on importance of OOB mobility and ADL completion. Pt continued to refuse 2/2 increased fatigue. Will continue to follow up per pt status and as schedule allows.)    Missed Time: Attempt

## 2025-05-09 NOTE — PROGRESS NOTES
Pharmacy Medication History Review    Elliot Partiad is a 74 y.o. male admitted for Wound infection. Pharmacy reviewed the patient's shtpe-wn-tudcqljal medications and allergies for accuracy.    Sources used to confirm medication list: Informant interview and Medication Dispense History  Informant: Patient  Informant credibility: Poor (Able to recall medication, indication, strength, frequency, and/or prescriber for <50% of medications).     Total number of adjustments: 8     Medications Added Medications Removed Medications Adjusted  Adjustments Made    Aspirin Carbamazepine 200 MG Sticky note added    Carbamazepine 100 MG ER Gabapentin TID --> BID PRN (Sticky note added as well)    Potassium Chloride      Xarelto (X2)      Ciprofloxacin       The list below reflectives the updated PTA list. Please review each medication in order reconciliation for additional clarification and justification.  Medications Prior to Admission   Medication Sig Dispense Refill Last Dose/Taking    carBAMazepine (TEGretol) 200 mg tablet Take 1 tablet (200 mg) by mouth 2 times a day as needed.   5/8/2025    gabapentin (Neurontin) 300 mg capsule Take 1 capsule (300 mg) by mouth 3 times a day. (Patient taking differently: Take 1 capsule (300 mg) by mouth 2 times a day as needed.)   5/8/2025    hydrALAZINE (Apresoline) 25 mg tablet Take 1 tablet (25 mg) by mouth 3 times a day.   5/8/2025    aspirin 81 mg chewable tablet Chew 1 tablet (81 mg) once daily. Do not fill before September 19, 2024. (Patient not taking: Reported on 5/8/2025) 90 tablet 0 Not Taking    Blood glucose monitoring meter To check blood sugar every morning before breakfast 1 each 0     carBAMazepine (Carbatrol) 100 mg 12 hr capsule Take 1 capsule (100 mg) by mouth 2 times a day for 7 days. Do not crush or chew. (Patient not taking: Reported on 2/18/2025) 14 capsule 0     ondansetron ODT (Zofran-ODT) 4 mg disintegrating tablet Dissolve 1 tablet (4 mg) in the mouth every 8  hours if needed for nausea or vomiting. 15 tablet 0 Unknown    potassium chloride CR (Klor-Con M20) 20 mEq ER tablet Take 2 tablets (40 mEq) by mouth once daily. Do not crush or chew. (Patient not taking: Reported on 5/9/2025)   Not Taking    rivaroxaban (Xarelto) 15 mg tablet Take 1 tablet (15 mg) by mouth 2 times daily (morning and late afternoon) for 16 days. Take with food. (Patient not taking: Reported on 5/9/2025) 32 tablet 0 Not Taking    rivaroxaban (Xarelto) 20 mg tablet Take 1 tablet (20 mg) by mouth once daily in the evening. Take with meals. Take with food. Do not fill before March 7, 2025. (Patient not taking: Reported on 5/8/2025) 30 tablet 0 Not Taking    traZODone (Desyrel) 50 mg tablet Take 1 tablet (50 mg) by mouth once daily at bedtime.   Unknown        The list below reflectives the updated allergy list. Please review each documented allergy for additional clarification and justification.  Allergies  Reviewed by Eunice Mancilla RN on 5/8/2025   No Known Allergies         Below are additional concerns with the patient's PTA list.  Spoke w/pt at bedside. Reviewed PTA and allergy list. Please review changes made to the PTA list in the chart above. Pt was a poor historian and was in and out of sleep during the interview. Didn't really seem to want to participate.    Marylou Garcia CPhT  Medication History Pharmacy Technician  MIL[facility] 8-4:30  Available via [employer] Secure Chat  OR  [phone]

## 2025-05-09 NOTE — NURSING NOTE
1850: RN rounding on patient, lethargic, only responding to voice and sternal rubbing. Previously patient AxOx4. VSS. /63, HR 74, T 37, SpO2 94 on RA. BG 85. G. Marcela CNP notified and at bedside, EKG done. Hold gabapentin, and hydralazine for night time dose per G. Marcela CNP. No other new orders at this time. Continue to monitor per G. Marcela. Patient call bell in reach, bed alarm on.

## 2025-05-09 NOTE — CONSULTS
Vancomycin Dosing by Pharmacy- INITIAL    Elliot Partida is a 74 y.o. year old male who Pharmacy has been consulted for vancomycin dosing for osteomyelitis/septic arthritis. Based on the patient's indication and renal status this patient will be dosed based on a goal AUC of 400-600.     Renal function is currently stable.    Visit Vitals  /60 (BP Location: Left arm, Patient Position: Lying)   Pulse 85   Temp 37 °C (98.6 °F) (Temporal)   Resp 17        Lab Results   Component Value Date    CREATININE 0.77 2025    CREATININE 0.56 2025    CREATININE 0.74 2025    CREATININE 0.70 2025        Patient weight is as follows:   Vitals:    25 1626   Weight: 94.9 kg (209 lb 3.5 oz)       Cultures:  No results found for the encounter in last 14 days.        I/O last 3 completed shifts:  In: 1640 (17.3 mL/kg) [P.O.:1040; IV Piggyback:600]  Out: 300 (3.2 mL/kg) [Urine:300 (0.1 mL/kg/hr)]  Weight: 94.9 kg   I/O during current shift:  I/O this shift:  In: 120 [P.O.:120]  Out: 200 [Urine:200]    Temp (24hrs), Av.8 °C (98.3 °F), Min:36.5 °C (97.7 °F), Max:37.2 °C (99 °F)         Assessment/Plan     Patient has already been given a loading dose of 2000 mg.  Will initiate vancomycin maintenance, 1000 mg every 12 hours.    This dosing regimen is predicted by [employer] to result in the following pharmacokinetic parameters:  Loading dose: N/A  Regimen: 1000 mg IV every 12 hours.  Start time: 12:51 on 2025  Exposure target: AUC24 (range)400-600 mg/L.hr   [id number]: 402 mg/L.hr  AUC24,ss: 455 mg/L.hr  Probability of AUC24 > 400: 64 %  Ctrough,ss: 14.5 mg/L  Probability of Ctrough,ss > 20: 24 %      Follow-up level will be ordered on 5/10/25 at AM unless clinically indicated sooner.  Will continue to monitor renal function daily while on vancomycin and order serum creatinine at least every 48 hours if not already ordered.  Follow for continued vancomycin needs, clinical response, and  signs/symptoms of toxicity.       Erasto Albert, PharmD

## 2025-05-09 NOTE — PROGRESS NOTES
Elliot Partida is a 74 y.o. male on day 1 of admission presenting with Wound infection.    Subjective   He is sitting up in a chair washing himself up.  Alert and oriented.  He states he is been dealing with this left foot wound for 9 months.  He is familiar with podiatry and is waiting to see him today.  We discussed ongoing antibiotics and wound care.  Continue to monitor, all questions answered.     Objective     Physical Exam  Constitutional:       General: He is not in acute distress.     Appearance: Normal appearance. He is obese. He is not toxic-appearing.   HENT:      Head: Normocephalic and atraumatic.      Mouth/Throat:      Mouth: Mucous membranes are moist.   Eyes:      Extraocular Movements: Extraocular movements intact.      Pupils: Pupils are equal, round, and reactive to light.   Cardiovascular:      Rate and Rhythm: Normal rate and regular rhythm.      Pulses: Normal pulses.      Heart sounds: Normal heart sounds. No murmur heard.     No gallop.   Pulmonary:      Effort: Pulmonary effort is normal. No respiratory distress.      Breath sounds: Normal breath sounds. No wheezing, rhonchi or rales.   Abdominal:      General: Bowel sounds are normal. There is no distension.      Palpations: Abdomen is soft.      Tenderness: There is no abdominal tenderness. There is no guarding or rebound.   Musculoskeletal:         General: No swelling, tenderness, deformity or signs of injury. Normal range of motion.      Cervical back: Normal range of motion and neck supple.      Right lower leg: Edema present.      Left lower leg: Edema present.   Skin:     General: Skin is warm and dry.      Capillary Refill: Capillary refill takes less than 2 seconds.      Coloration: Skin is not jaundiced.      Findings: No bruising or rash.      Comments: Left foot wrapped, ace over, toes HECTOR   Neurological:      General: No focal deficit present.      Mental Status: He is alert and oriented to person, place, and time. Mental  "status is at baseline.      Cranial Nerves: No cranial nerve deficit.      Sensory: No sensory deficit.      Motor: No weakness.      Gait: Gait abnormal.   Psychiatric:         Mood and Affect: Mood normal.         Behavior: Behavior normal.         Thought Content: Thought content normal.         Judgment: Judgment normal.       Last Recorded Vitals  Blood pressure 122/56, pulse 75, temperature 37.2 °C (99 °F), temperature source Temporal, resp. rate 16, height 1.753 m (5' 9\"), weight 94.9 kg (209 lb 3.5 oz), SpO2 92%.  Intake/Output last 3 Shifts:  I/O last 3 completed shifts:  In: 1640 (17.3 mL/kg) [P.O.:1040; IV Piggyback:600]  Out: 300 (3.2 mL/kg) [Urine:300 (0.1 mL/kg/hr)]  Weight: 94.9 kg     Scheduled medications  carBAMazepine, 200 mg, oral, BID  gabapentin, 300 mg, oral, TID  hydrALAZINE, 25 mg, oral, TID  lidocaine, 5 mL, infiltration, Once  piperacillin-tazobactam, 4.5 g, intravenous, q8h  polyethylene glycol, 17 g, oral, Daily  [Held by provider] potassium chloride CR, 40 mEq, oral, Daily  traZODone, 50 mg, oral, Nightly     PRN medications  PRN Medications[1]    Relevant Results  XR tibia fibula left 2 views  Result Date: 5/8/2025  Thick periosteal new bone formation along the left tibia likely due to history of chronic osteomyelitis and healing. No acute bone destruction.     MACRO: None   Signed by: Mariam Vang 5/8/2025 10:46 PM Dictation workstation:   [id number]    XR foot left 3+ views  Result Date: 5/8/2025  Questionable age-indeterminate nondisplaced fracture near the base of the left foot 4th proximal phalanx, only apparent on one view. Soft tissue swelling. No additional acute osseous findings of the left foot.   MACRO: None   Signed by: Osmar Garza 5/8/2025 10:04 AM Dictation workstation:   [id number]     Latest Reference Range & Units 05/08/25 11:45 05/08/25 12:39 05/09/25 08:49   GLUCOSE 74 - 99 mg/dL 134 (H)  118 (H)   SODIUM 136 - 145 mmol/L 140  139   POTASSIUM 3.5 - 5.3 mmol/L 4.0  " 3.9   CHLORIDE 98 - 107 mmol/L 105  106   Bicarbonate 21 - 32 mmol/L 26  27   Anion Gap 10 - 20 mmol/L 13  10   Blood Urea Nitrogen 6 - 23 mg/dL 18  17   Creatinine 0.50 - 1.30 mg/dL 0.56  0.77   EGFR >60 mL/min/1.73m*2 >90  >90   Calcium 8.6 - 10.3 mg/dL 8.8  8.5 (L)   Albumin 3.4 - 5.0 g/dL 3.3 (L)  3.2 (L)   Alkaline Phosphatase 33 - 136 U/L 96  84   ALT 10 - 52 U/L 13  13   AST 9 - 39 U/L 17  17   Bilirubin Total 0.0 - 1.2 mg/dL 0.4  0.6   Total Protein 6.4 - 8.2 g/dL 6.4  6.1 (L)   Lactate 0.4 - 2.0 mmol/L 1.2     C-Reactive Protein <1.00 mg/dL 1.54 (H)     WBC 4.4 - 11.3 x10*3/uL  8.4 9.4   nRBC 0.0 - 0.0 /100 WBCs  COMMENT ONLY 0.0   RBC 4.50 - 5.90 x10*6/uL  4.38 (L) 4.07 (L)   HEMOGLOBIN 13.5 - 17.5 g/dL  11.2 (L) 10.4 (L)   HEMATOCRIT 41.0 - 52.0 %  36.2 (L) 34.2 (L)   MCV 80 - 100 fL  83 84   MCH 26.0 - 34.0 pg  25.6 (L) 25.6 (L)   MCHC 32.0 - 36.0 g/dL  30.9 (L) 30.4 (L)   RED CELL DISTRIBUTION WIDTH 11.5 - 14.5 %  16.0 (H) 16.3 (H)   Platelets 150 - 450 x10*3/uL  206 220     Assessment & Plan  Wound infection    Osteomyelitis of left foot, unspecified type (Multi)    Acute deep vein thrombosis (DVT) of popliteal vein of left lower extremity    Bilateral lower extremity edema    Benign essential HTN    DM type 2 with diabetic peripheral neuropathy    Hyperlipidemia    Trigeminal neuralgia      Wound infection  Osteomyelitis of left foot, unspecified type (Multi)  -XR tib/fib: Thick periosteal new bone formation along the left tibia likely due to history of chronic osteomyelitis and healing. No acute bone destruction.  -MRI: 1. No evidence to suggest osteomyelitis. 2. Partially visualized large soft tissue ulceration along the  anterior aspect of the distal tibia and tibial talar joint with possible exposure of the underlying anterior tibialis tendon. 3. Moderate amount of fluid along the posterior as well as anterior aspect of the medial head of the gastrocnemius extending from the level of the knee to  the distal lower leg. This is nonspecific with infectious fasciitis felt to be less likely although not entirely excluded. Edema of the anterior compartment muscles of the lower leg which may represent infectious myositis. No evidence of intramuscular abscess. 4. Extensive diffuse soft tissue edema noted throughout the left lower extremity and partially visualized right lower extremity 5. Moderate diffuse fatty atrophy of the lower leg muscles.  -CRP 1.54  -SR 14  -lactate 1.2  -Podiatry consult  -continue zosyn, vancomycin  -wound culture: GPC  -follow wound culture, WBC, blood cx  - NWB until seen by Podiatry  - PT/OT    Acute deep vein thrombosis (DVT) of popliteal vein of left lower extremity  Bilateral lower extremity edema  - previous xarelto for DVT, completed  - elevate legs when resting    Benign essential HTN  Hyperlipidemia  - Echo: 1. The left ventricular systolic function is normal, with a visually estimated ejection fraction of 55-60%.   2. Spectral Doppler shows a Grade I (impaired relaxation pattern) of left ventricular diastolic filling with normal left atrial filling pressure.  3. There is normal right ventricular global systolic function.  4. There is moderate to severe mitral annular calcification. 5. Moderate aortic valve stenosis. Dimensionless index 0.31 : moderate stenosis. Peak and mean gradients around 48 and 28 mm Hg respectively. Estimated aortic valve area around 1 cm2.  6. There is moderate aortic valve cusp calcification.  - continue hydralazine  - monitor HR, BP    DM type 2 with diabetic peripheral neuropathy  -sliding scale with lispro coverage  -A1c: 5.8    Trigeminal neuralgia  - continue carbamazepine, neurontin    GI ppx: PPI  DVT ppx: enoxaparin  Fluids: prn  Electrolytes: replace as needed  Nutrition: reg  Adjuncts: PIV  Code Status:full code   Pt requires inpatient stay at this time.    Delia Medrano, APRN-CNP         [1] PRN medications: acetaminophen **OR**  acetaminophen **OR** acetaminophen, alum-mag hydroxide-simeth, benzocaine-menthol, lidocaine, ondansetron

## 2025-05-09 NOTE — CARE PLAN
The patient's goals for the shift include to get some sleep    The clinical goals for the shift include Pt will tolerate IV ATB AND remain afebrile    Over the shift, the patient did make progress toward the following goals. Tolerated IV ATB and remained afebrile. Rested this shift, tolerated MRI. Good appetite. BG WNL. Up to chair, and bathed. Podiatry seen patient.       Problem: Pain - Adult  Goal: Verbalizes/displays adequate comfort level or baseline comfort level  Outcome: Met     Problem: Discharge Planning  Goal: Discharge to home or other facility with appropriate resources  Outcome: Progressing     Problem: Chronic Conditions and Co-morbidities  Goal: Patient's chronic conditions and co-morbidity symptoms are monitored and maintained or improved  Outcome: Progressing     Problem: Nutrition  Goal: Nutrient intake appropriate for maintaining nutritional needs  Outcome: Progressing     Problem: Skin  Goal: Decreased wound size/increased tissue granulation at next dressing change  Outcome: Progressing  Flowsheets (Taken 5/9/2025 0422 by Caty Carranza, RN)  Decreased wound size/increased tissue granulation at next dressing change: Protective dressings over bony prominences  Goal: Participates in plan/prevention/treatment measures  Outcome: Progressing  Flowsheets (Taken 5/9/2025 0422 by Caty Carranza, RN)  Participates in plan/prevention/treatment measures:   Discuss with provider PT/OT consult   Elevate heels  Goal: Promote/optimize nutrition  Outcome: Progressing  Flowsheets (Taken 5/9/2025 0422 by Caty Carranza, RN)  Promote/optimize nutrition: Monitor/record intake including meals  Goal: Promote skin healing  Outcome: Progressing  Flowsheets (Taken 5/8/2025 7321)  Promote skin healing: Turn/reposition every 2 hours/use positioning/transfer devices     Problem: Diabetes  Goal: Achieve decreasing blood glucose levels by end of shift  Outcome: Progressing  Goal: Increase stability of blood glucose readings by end of  shift  Outcome: Progressing  Goal: Decrease in ketones present in urine by end of shift  Outcome: Progressing  Goal: Maintain electrolyte levels within acceptable range throughout shift  Outcome: Progressing  Goal: Maintain glucose levels >70mg/dl to <250mg/dl throughout shift  Outcome: Progressing  Goal: No changes in neurological exam by end of shift  Outcome: Progressing  Goal: Learn about and adhere to nutrition recommendations by end of shift  Outcome: Progressing  Goal: Vital signs within normal range for age by end of shift  Outcome: Progressing  Goal: Increase self care and/or family involovement by end of shift  Outcome: Progressing  Goal: Receive DSME education by end of shift  Outcome: Progressing     Problem: Fall/Injury  Goal: Not fall by end of shift  Outcome: Met  Goal: Be free from injury by end of the shift  Outcome: Met  Goal: Verbalize understanding of personal risk factors for fall in the hospital  Outcome: Met  Goal: Verbalize understanding of risk factor reduction measures to prevent injury from fall in the home  Outcome: Met  Goal: Use assistive devices by end of the shift  Outcome: Met  Goal: Pace activities to prevent fatigue by end of the shift  Outcome: Met

## 2025-05-09 NOTE — CONSULTS
PODIATRY CONSULT NOTE    SERVICE DATE: 5/9/2025   SERVICE TIME:  1230    REASON FOR CONSULT: Left leg wound  REQUESTING PHYSICIAN: Ani Jones, APRN-CNP  PRIMARY CARE PHYSICIAN: No Assigned PCP Generic Provider, MD    Subjective   HPI:  Elliot Partida is a very pleasant 74 year old gentleman with a history of moderate aortic stenosis, osteomyelitis, DM, neuropathy, DVT, trigeminal neuralgia, and HTN, sent to the ER for an admission for wound on his left foot. He has a chronic wound on his left foot for the past nine months. Wound is nonhealing. He has had multiple debridement and a wound VAC. Denies pain, fever or dizziness.     Podiatry was consulted for left leg wound management. Patient has a long-standing history with podiatry. He presented to clinic on Wednesday of this week. At that time, left leg edema and cellulitis were significantly increased with malodor produced from the wound. It was decided ta this point that patient should be admitted for IV antibiotics due to history of osteomyelitis of the tibia.     ROS: 10-point review of systems was performed and is otherwise negative except as noted in HPI.  PMH: Reviewed/documented below.  PSH:  Noncontributory except per HPI   FH: Reviewed and noncontributory   SOCIAL:  Reviewed/documented below.  ALLERGIES: Reviewed/documented below.  MEDS: Reviewed/documented below.  VS: Reviewed/documented below.    Medical History[1]  Surgical History[2]  Family History[3]  Social History[4]   Prescriptions Prior to Admission[5]     Medications:  Scheduled Meds: Scheduled Medications[6]  Continuous Infusions: Continuous Medications[7]  PRN Meds: PRN Medications[8]    Allergies as of 05/08/2025    (No Known Allergies)            Objective   PHYSICAL EXAM:  Physical Exam Performed:  Vitals:    05/09/25 1219   BP: 127/60   Pulse: 85   Resp: 17   Temp: 37 °C (98.6 °F)   SpO2: 96%     Body mass index is 30.9 kg/m².    Patient is AOx3 and in no acute distress. Patient  is alert and cooperative. Sitting comfortably in bed with dressing clean, dry and intact. Heel off-loading boots in place B/L.    Vascular: Non-palpable DP/PT pulses B/L. Severe pitting edema noted B/L. Hair growth absent B/L. CFT<5 to B/L hallux. Temperature is warm to cool from tibial tuberosity to distal digits B/L. No lymphatic streaking noted B/L.    Musculoskeletal: Gross active and passive ROM diminished to age and activity level. Moves all extremities spontaneously. No pain to palpation at feet B/L.    Neurological: Absent light touch sensation B/L. Pain stimuli absent B/L. Denies any numbness, burning or tingling.    Dermatologic: Nails 1-5 are thickened, elongated, discolored with subungual debris B/L. Skin appears diffusely xerotic B/L. Web spaces 1-4 B/L are clean, dry and intact. No rashes or nodules noted B/L. No hyperkeratotic tissue noted B/L.     Wound:  Measurements: 6.8 cm x 5.9 cm x 1.0 cm  Mixed wound base of fibrogranular tissue.   Significant serosanguinous drainage with fibrotic slough.   Mild jennyfer-wound maceration.   Mild jennyfer-wound erythema.   Moderate evidence of ascending cellulitis or lymphangitis.  Mild palpable fluctuance. Moderate malodor. Mild increased warmth.   Positive probe to bone or deep structures within the wound base.   Positive tunneling or tracking.   No undermining. Skin edges irregular but intact.    LABS:   Results for orders placed or performed during the hospital encounter of 05/08/25 (from the past 24 hours)   CBC with Differential   Result Value Ref Range    WBC 8.4 4.4 - 11.3 x10*3/uL    nRBC      RBC 4.38 (L) 4.50 - 5.90 x10*6/uL    Hemoglobin 11.2 (L) 13.5 - 17.5 g/dL    Hematocrit 36.2 (L) 41.0 - 52.0 %    MCV 83 80 - 100 fL    MCH 25.6 (L) 26.0 - 34.0 pg    MCHC 30.9 (L) 32.0 - 36.0 g/dL    RDW 16.0 (H) 11.5 - 14.5 %    Platelets 206 150 - 450 x10*3/uL    Neutrophils % 65.9 40.0 - 80.0 %    Immature Granulocytes %, Automated 0.5 0.0 - 0.9 %    Lymphocytes %  21.1 13.0 - 44.0 %    Monocytes % 8.5 2.0 - 10.0 %    Eosinophils % 3.6 0.0 - 6.0 %    Basophils % 0.4 0.0 - 2.0 %    Neutrophils Absolute 5.52 (H) 1.60 - 5.50 x10*3/uL    Immature Granulocytes Absolute, Automated 0.04 0.00 - 0.50 x10*3/uL    Lymphocytes Absolute 1.76 0.80 - 3.00 x10*3/uL    Monocytes Absolute 0.71 0.05 - 0.80 x10*3/uL    Eosinophils Absolute 0.30 0.00 - 0.40 x10*3/uL    Basophils Absolute 0.03 0.00 - 0.10 x10*3/uL   POCT GLUCOSE   Result Value Ref Range    POCT Glucose 109 (H) 74 - 99 mg/dL   CBC   Result Value Ref Range    WBC 9.4 4.4 - 11.3 x10*3/uL    nRBC 0.0 0.0 - 0.0 /100 WBCs    RBC 4.07 (L) 4.50 - 5.90 x10*6/uL    Hemoglobin 10.4 (L) 13.5 - 17.5 g/dL    Hematocrit 34.2 (L) 41.0 - 52.0 %    MCV 84 80 - 100 fL    MCH 25.6 (L) 26.0 - 34.0 pg    MCHC 30.4 (L) 32.0 - 36.0 g/dL    RDW 16.3 (H) 11.5 - 14.5 %    Platelets 220 150 - 450 x10*3/uL   Comprehensive metabolic panel   Result Value Ref Range    Glucose 118 (H) 74 - 99 mg/dL    Sodium 139 136 - 145 mmol/L    Potassium 3.9 3.5 - 5.3 mmol/L    Chloride 106 98 - 107 mmol/L    Bicarbonate 27 21 - 32 mmol/L    Anion Gap 10 10 - 20 mmol/L    Urea Nitrogen 17 6 - 23 mg/dL    Creatinine 0.77 0.50 - 1.30 mg/dL    eGFR >90 >60 mL/min/1.73m*2    Calcium 8.5 (L) 8.6 - 10.3 mg/dL    Albumin 3.2 (L) 3.4 - 5.0 g/dL    Alkaline Phosphatase 84 33 - 136 U/L    Total Protein 6.1 (L) 6.4 - 8.2 g/dL    AST 17 9 - 39 U/L    Bilirubin, Total 0.6 0.0 - 1.2 mg/dL    ALT 13 10 - 52 U/L   POCT GLUCOSE   Result Value Ref Range    POCT Glucose 166 (H) 74 - 99 mg/dL      Lab Results   Component Value Date    HGBA1C 5.8 (H) 01/06/2025      Lab Results   Component Value Date    CRP 1.54 (H) 05/08/2025      Lab Results   Component Value Date    SEDRATE 14 05/08/2025        Results from last 7 days   Lab Units 05/09/25  0849   WBC AUTO x10*3/uL 9.4   RBC AUTO x10*6/uL 4.07*   HEMOGLOBIN g/dL 10.4*   HEMATOCRIT % 34.2*     Results from last 7 days   Lab Units 05/09/25  0849    SODIUM mmol/L 139   POTASSIUM mmol/L 3.9   CHLORIDE mmol/L 106   CO2 mmol/L 27   BUN mg/dL 17   CREATININE mg/dL 0.77   CALCIUM mg/dL 8.5*   BILIRUBIN TOTAL mg/dL 0.6   ALT U/L 13   AST U/L 17     Results from last 7 days   Lab Units 05/08/25  1013   COLOR U  Colorless*   APPEARANCE U  Clear   PH U  6.5   SPEC GRAV UR  1.006   PROTEIN U mg/dL NEGATIVE   BLOOD UR mg/dL NEGATIVE   NITRITE U  NEGATIVE   WBC UR /HPF 1-5       IMAGING REVIEW:  Imaging  MR tibia fibula left w and wo IV contrast  Result Date: 5/9/2025  1. No evidence to suggest osteomyelitis. 2. Partially visualized large soft tissue ulceration along the anterior aspect of the distal tibia and tibial talar joint with possible exposure of the underlying anterior tibialis tendon. 3. Moderate amount of fluid along the posterior as well as anterior aspect of the medial head of the gastrocnemius extending from the level of the knee to the distal lower leg. This is nonspecific with infectious fasciitis felt to be less likely although not entirely excluded. Edema of the anterior compartment muscles of the lower leg which may represent infectious myositis. No evidence of intramuscular abscess. 4. Extensive diffuse soft tissue edema noted throughout the left lower extremity and partially visualized right lower extremity 5. Moderate diffuse fatty atrophy of the lower leg muscles.   I personally reviewed the images/study and I agree with Dr. Melia Lewis and the findings as stated.   MACRO: None   Signed by: Lina Moreno 5/9/2025 11:14 AM Dictation workstation:   [id number]    XR tibia fibula left 2 views  Result Date: 5/8/2025  Thick periosteal new bone formation along the left tibia likely due to history of chronic osteomyelitis and healing. No acute bone destruction.     MACRO: None   Signed by: Mariam Vang 5/8/2025 10:46 PM Dictation workstation:   [id number]    XR foot left 3+ views  Result Date: 5/8/2025  Questionable age-indeterminate nondisplaced  fracture near the base of the left foot 4th proximal phalanx, only apparent on one view. Soft tissue swelling. No additional acute osseous findings of the left foot.   MACRO: None   Signed by: Osmar Garza 5/8/2025 10:04 AM Dictation workstation:   [id number]      Cardiology, Vascular, and Other Imaging  No other imaging results found for the past 7 days            Assessment/Plan   ASSESSMENT & PLAN:  #Chronic osteomyelitis, left tibia [M86.662]  #Left lower extremity cellulitis [L03.116]  #Generalized edema [R60.0]  #Diabetes Mellitus, Type II, with polyneuropathy [E11.42]  - Patient was seen and evaluated; all findings were discussed and all questions were answered to patient's satisfaction.  - Charts, labs, vitals and imaging all reviewed.   - Imaging: MRI - no evidence of active osteomyelitic process  - Labs: WBC 9.4 ESR 14, CRP 1.54  - Wound culture: pending  - Blood culture: pending    Plan:  - Abx: IV Vanc/Zosyn  - Likely no plans for surgical intervention during this visit. Patient is being treated with local wound care as an outpatient. Patient was recently admitted due to cellulitis of the left lower extremity, likely due to chronic osteomyelitis (confirmed from previous surgical pathology report)  - Dressings: Betadine-soaked 4x4 to wound base, covered with dry gauze, Abd pads, Kerlix and Nowak compressive dressing.  - Nursing staff is able to change/reinforce dressing if & as necessary until next day’s dressing change. Thank you.  - Podiatry will continue to follow while in house.    Weightbearing: PWB to the left lower extremity for transfer.  Discharge: Pt to follow up 1 week after discharge.    Ronald Christianson DPM  Podiatric Medicine & Surgery  Please Toño message me with any questions or concerns.            SIGNATURE: Ronald Christianson DPM PATIENT NAME: Elliot Partida   DATE: May 9, 2025 MRN: [id number]   TIME: 12:32 PM CONTACT: Haiku message             [1]   Past Medical History:  Diagnosis Date     Acute osteomyelitis of ankle and foot, left (Multi)     AMI (acute myocardial infarction) (Multi)     ASHD (arteriosclerotic heart disease)     At risk for falls     Cellulitis     left foot and ankle    Diabetes mellitus (Multi)     DVT (deep vein thrombosis) in pregnancy ([facility])     Fall risk care plan declined     Hypertension     Meningioma (Multi)     Myocardial infarction (Multi)     Neuritis     Neuropathy     Osteoarthritis     Osteomyelitis     PVD (peripheral vascular disease) (CMS-HCC)     Sprain of right knee 06/25/2015    Stroke (Multi)     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[facility])     TIA (transient ischemic attack)     Tinea pedis of both feet     Trigeminal neuralgia     Weakness    [2]   Past Surgical History:  Procedure Laterality Date    CARDIAC CATHETERIZATION      CARPAL TUNNEL RELEASE  10/10/2014    EYE SURGERY      FL  ARTHROCENTESIS ASP INJ JOINT.      FOOT RAY RESECTION Left 09/23/2024    L partial 5th ray resection (Dr. Spears, DPM)    FOOT SURGERY Left 09/27/2024    Delayed closure w/ graft application (Dr. Huynh, DPM)    INVASIVE VASCULAR PROCEDURE Bilateral 09/18/2024    Procedure: Lower Extremity Angiogram;  Surgeon: Teofilo Stoddard MD;  Location: [facility];  Service: Cardiovascular;  Laterality: Bilateral;    IRIDOTOMY / IRIDECTOMY Bilateral    [3]   Family History  Problem Relation Name Age of Onset    Heart disease Father      Colon cancer Other      Heart attack Other      Diabetes Other      Hypertension Other     [4]   Social History  Tobacco Use    Smoking status: Never     Passive exposure: Never    Smokeless tobacco: Never   Vaping Use    Vaping status: Never Used   Substance Use Topics    Alcohol use: Not Currently     Comment: former    Drug use: Never   [5]   Medications Prior to Admission   Medication Sig Dispense Refill Last Dose/Taking    carBAMazepine (TEGretol) 200 mg tablet Take 1 tablet (200 mg) by mouth 2 times a day as needed.   5/8/2025    gabapentin (Neurontin) 300 mg  capsule Take 1 capsule (300 mg) by mouth 3 times a day.   5/8/2025    hydrALAZINE (Apresoline) 25 mg tablet Take 1 tablet (25 mg) by mouth 3 times a day.   5/8/2025    potassium chloride CR (Klor-Con M20) 20 mEq ER tablet Take 2 tablets (40 mEq) by mouth once daily. Do not crush or chew.   5/8/2025    aspirin 81 mg chewable tablet Chew 1 tablet (81 mg) once daily. Do not fill before September 19, 2024. (Patient not taking: Reported on 5/8/2025) 90 tablet 0 Not Taking    Blood glucose monitoring meter To check blood sugar every morning before breakfast 1 each 0     carBAMazepine (Carbatrol) 100 mg 12 hr capsule Take 1 capsule (100 mg) by mouth 2 times a day for 7 days. Do not crush or chew. (Patient not taking: Reported on 2/18/2025) 14 capsule 0     ondansetron ODT (Zofran-ODT) 4 mg disintegrating tablet Dissolve 1 tablet (4 mg) in the mouth every 8 hours if needed for nausea or vomiting. (Patient not taking: Reported on 5/8/2025) 15 tablet 0 Not Taking    rivaroxaban (Xarelto) 15 mg tablet Take 1 tablet (15 mg) by mouth 2 times daily (morning and late afternoon) for 16 days. Take with food. (Patient not taking: Reported on 4/1/2025) 32 tablet 0     rivaroxaban (Xarelto) 20 mg tablet Take 1 tablet (20 mg) by mouth once daily in the evening. Take with meals. Take with food. Do not fill before March 7, 2025. (Patient not taking: Reported on 5/8/2025) 30 tablet 0 Not Taking    traZODone (Desyrel) 50 mg tablet Take 1 tablet (50 mg) by mouth once daily at bedtime.   Unknown   [6] carBAMazepine, 200 mg, oral, BID  gabapentin, 300 mg, oral, TID  hydrALAZINE, 25 mg, oral, TID  lidocaine, 5 mL, infiltration, Once  piperacillin-tazobactam, 4.5 g, intravenous, q8h  polyethylene glycol, 17 g, oral, Daily  [Held by provider] potassium chloride CR, 40 mEq, oral, Daily  traZODone, 50 mg, oral, Nightly  [7]    [8] PRN medications: acetaminophen **OR** [DISCONTINUED] acetaminophen **OR** [DISCONTINUED] acetaminophen, alum-mag  hydroxide-simeth, benzocaine-menthol, lidocaine, ondansetron

## 2025-05-09 NOTE — CARE PLAN
Problem: Pain - Adult  Goal: Verbalizes/displays adequate comfort level or baseline comfort level  Outcome: Progressing     Problem: Skin  Goal: Decreased wound size/increased tissue granulation at next dressing change  Outcome: Progressing  Flowsheets (Taken 5/9/2025 0422)  Decreased wound size/increased tissue granulation at next dressing change: Protective dressings over bony prominences  Goal: Participates in plan/prevention/treatment measures  Outcome: Progressing  Flowsheets (Taken 5/9/2025 0422)  Participates in plan/prevention/treatment measures:   Discuss with provider PT/OT consult   Elevate heels  Goal: Promote/optimize nutrition  Outcome: Progressing  Flowsheets (Taken 5/9/2025 0422)  Promote/optimize nutrition: Monitor/record intake including meals     Problem: Safety - Adult  Goal: Free from fall injury  Outcome: Met     Problem: Skin  Goal: Prevent/manage excess moisture  Outcome: Met  Goal: Prevent/minimize sheer/friction injuries  Outcome: Met   The patient's goals for the shift include to get some sleep    The clinical goals for the shift include Pt will have no falls this shift.    Over the shift, the patient did  make progress toward the following goals.

## 2025-05-09 NOTE — PROGRESS NOTES
05/09/25 1158   Discharge Planning   Living Arrangements Family members  (His sister Eryn)   Support Systems Family members   Assistance Needed Pateint alert and oriented;  Patient lives wit his sister in a 2 story house.  He utilizes the main floor.  He wears a surgical shoe and ambulates with a walker.  DME available:  Walker, cane, crutches, wheelchair, Shower chair and grab bars.  No home oxygen.  He sleeps in a recliner.  His niecs assists with the shopping and his sister helps with ADLs.    He has had [facility] home care but currently, no home care.  His PCP is with [facility] NP who does home visits Confirmed address and pharmacy.  [facility] following for home care needs.   Type of Residence Private residence   Number of Stairs to Enter Residence 5   Number of Stairs Within Residence 12  (Upstairs)   Do you have animals or pets at home? Yes   Type of Animals or Pets 2 cats   Home or Post Acute Services None   Expected Discharge Disposition Home   Does the patient need discharge transport arranged? No   Financial Resource Strain   How hard is it for you to pay for the very basics like food, housing, medical care, and heating? Not hard   Housing Stability   In the last 12 months, was there a time when you were not able to pay the mortgage or rent on time? N   In the past 12 months, how many times have you moved where you were living? 0   At any time in the past 12 months, were you homeless or living in a shelter (including now)? N   Transportation Needs   In the past 12 months, has lack of transportation kept you from medical appointments or from getting medications? no   In the past 12 months, has lack of transportation kept you from meetings, work, or from getting things needed for daily living? No   Stroke Family Assessment   Stroke Family Assessment Needed No

## 2025-05-10 LAB
GLUCOSE BLD MANUAL STRIP-MCNC: 116 MG/DL (ref 74–99)
GLUCOSE BLD MANUAL STRIP-MCNC: 117 MG/DL (ref 74–99)
GLUCOSE BLD MANUAL STRIP-MCNC: 145 MG/DL (ref 74–99)
GLUCOSE BLD MANUAL STRIP-MCNC: 177 MG/DL (ref 74–99)

## 2025-05-10 PROCEDURE — 2500000004 HC RX 250 GENERAL PHARMACY W/ HCPCS (ALT 636 FOR OP/ED): Mod: JZ,IPSPLIT | Performed by: NURSE PRACTITIONER

## 2025-05-10 PROCEDURE — 94760 N-INVAS EAR/PLS OXIMETRY 1: CPT | Mod: IPSPLIT

## 2025-05-10 PROCEDURE — 2500000004 HC RX 250 GENERAL PHARMACY W/ HCPCS (ALT 636 FOR OP/ED): Mod: JZ,IPSPLIT

## 2025-05-10 PROCEDURE — 82947 ASSAY GLUCOSE BLOOD QUANT: CPT | Mod: IPSPLIT

## 2025-05-10 PROCEDURE — 2500000002 HC RX 250 W HCPCS SELF ADMINISTERED DRUGS (ALT 637 FOR MEDICARE OP, ALT 636 FOR OP/ED): Mod: IPSPLIT | Performed by: NURSE PRACTITIONER

## 2025-05-10 PROCEDURE — 2500000001 HC RX 250 WO HCPCS SELF ADMINISTERED DRUGS (ALT 637 FOR MEDICARE OP): Mod: IPSPLIT | Performed by: HOSPITALIST

## 2025-05-10 PROCEDURE — 1100000001 HC PRIVATE ROOM DAILY: Mod: IPSPLIT

## 2025-05-10 PROCEDURE — 2500000005 HC RX 250 GENERAL PHARMACY W/O HCPCS: Mod: IPSPLIT | Performed by: INTERNAL MEDICINE

## 2025-05-10 PROCEDURE — 2500000001 HC RX 250 WO HCPCS SELF ADMINISTERED DRUGS (ALT 637 FOR MEDICARE OP): Mod: IPSPLIT | Performed by: NURSE PRACTITIONER

## 2025-05-10 PROCEDURE — 99233 SBSQ HOSP IP/OBS HIGH 50: CPT | Performed by: NURSE PRACTITIONER

## 2025-05-10 RX ORDER — SODIUM CHLORIDE 9 MG/ML
INJECTION, SOLUTION INTRAVENOUS
Status: COMPLETED
Start: 2025-05-10 | End: 2025-05-10

## 2025-05-10 RX ADMIN — VANCOMYCIN HYDROCHLORIDE 1 G: 1 INJECTION, SOLUTION INTRAVENOUS at 00:59

## 2025-05-10 RX ADMIN — PIPERACILLIN SODIUM AND TAZOBACTAM SODIUM 4.5 G: 4; .5 INJECTION, SOLUTION INTRAVENOUS at 04:29

## 2025-05-10 RX ADMIN — BENZOCAINE AND MENTHOL 1 LOZENGE: 15; 3.6 LOZENGE ORAL at 20:59

## 2025-05-10 RX ADMIN — INSULIN LISPRO 2 UNITS: 100 INJECTION, SOLUTION INTRAVENOUS; SUBCUTANEOUS at 11:42

## 2025-05-10 RX ADMIN — TRAZODONE HYDROCHLORIDE 50 MG: 50 TABLET ORAL at 21:01

## 2025-05-10 RX ADMIN — Medication 2 L/MIN: at 03:24

## 2025-05-10 RX ADMIN — SODIUM CHLORIDE 250 ML: 9 INJECTION, SOLUTION INTRAVENOUS at 04:39

## 2025-05-10 RX ADMIN — GABAPENTIN 300 MG: 300 CAPSULE ORAL at 08:53

## 2025-05-10 RX ADMIN — CARBAMAZEPINE 200 MG: 200 TABLET ORAL at 08:53

## 2025-05-10 RX ADMIN — PIPERACILLIN SODIUM AND TAZOBACTAM SODIUM 4.5 G: 4; .5 INJECTION, SOLUTION INTRAVENOUS at 21:03

## 2025-05-10 RX ADMIN — HYDRALAZINE HYDROCHLORIDE 25 MG: 25 TABLET ORAL at 15:43

## 2025-05-10 RX ADMIN — HYDRALAZINE HYDROCHLORIDE 25 MG: 25 TABLET ORAL at 21:01

## 2025-05-10 RX ADMIN — GABAPENTIN 300 MG: 300 CAPSULE ORAL at 21:01

## 2025-05-10 RX ADMIN — ENOXAPARIN SODIUM 40 MG: 40 INJECTION SUBCUTANEOUS at 08:53

## 2025-05-10 RX ADMIN — Medication 2 L/MIN: at 22:38

## 2025-05-10 RX ADMIN — CARBAMAZEPINE 200 MG: 200 TABLET ORAL at 21:01

## 2025-05-10 RX ADMIN — ACETAMINOPHEN 650 MG: 325 TABLET, FILM COATED ORAL at 21:50

## 2025-05-10 RX ADMIN — VANCOMYCIN HYDROCHLORIDE 1 G: 1 INJECTION, SOLUTION INTRAVENOUS at 12:22

## 2025-05-10 RX ADMIN — HYDRALAZINE HYDROCHLORIDE 25 MG: 25 TABLET ORAL at 08:53

## 2025-05-10 RX ADMIN — PIPERACILLIN SODIUM AND TAZOBACTAM SODIUM 4.5 G: 4; .5 INJECTION, SOLUTION INTRAVENOUS at 13:47

## 2025-05-10 RX ADMIN — GABAPENTIN 300 MG: 300 CAPSULE ORAL at 15:43

## 2025-05-10 ASSESSMENT — PAIN SCALES - GENERAL
PAINLEVEL_OUTOF10: 0 - NO PAIN
PAINLEVEL_OUTOF10: 0 - NO PAIN
PAINLEVEL_OUTOF10: 3
PAINLEVEL_OUTOF10: 8

## 2025-05-10 ASSESSMENT — PAIN - FUNCTIONAL ASSESSMENT
PAIN_FUNCTIONAL_ASSESSMENT: 0-10

## 2025-05-10 ASSESSMENT — PAIN DESCRIPTION - DESCRIPTORS: DESCRIPTORS: BURNING

## 2025-05-10 ASSESSMENT — PAIN DESCRIPTION - LOCATION: LOCATION: HEAD

## 2025-05-10 NOTE — PROGRESS NOTES
"Elliot Partida is a 74 y.o. male on day 2 of admission presenting with Wound infection.    Subjective   \"I am okay, just want to get this going\"       Objective   No overnight issues  Pain controlled  Pt declined lab work  Anticipate OR tomorrow and midline placement for possible long term antibiotics  Usually on 2 liters of 02 has been desatting to 87 to 89% overnight    Physical Exam  Vitals and nursing note reviewed.   Constitutional:       Appearance: Normal appearance.   HENT:      Head: Normocephalic and atraumatic.      Right Ear: Tympanic membrane normal.      Left Ear: Tympanic membrane normal.      Nose: Nose normal.      Mouth/Throat:      Mouth: Mucous membranes are moist.   Eyes:      Extraocular Movements: Extraocular movements intact.      Pupils: Pupils are equal, round, and reactive to light.   Cardiovascular:      Rate and Rhythm: Normal rate and regular rhythm.      Pulses: Normal pulses.      Heart sounds: Normal heart sounds.   Pulmonary:      Effort: Pulmonary effort is normal.      Breath sounds: Normal breath sounds.   Abdominal:      General: Bowel sounds are normal.      Palpations: Abdomen is soft.   Musculoskeletal:      Cervical back: Normal range of motion and neck supple.      Comments: Right lower leg bandage intact toes pink and warm   Skin:     General: Skin is warm and dry.      Capillary Refill: Capillary refill takes 2 to 3 seconds.   Neurological:      Mental Status: He is alert and oriented to person, place, and time.       Left foot bandage intact     Last Recorded Vitals  Blood pressure 126/63, pulse 74, temperature 37 °C (98.6 °F), temperature source Temporal, resp. rate 18, height 1.753 m (5' 9\"), weight 94.9 kg (209 lb 3.5 oz), SpO2 97%.  Intake/Output last 3 Shifts:  I/O last 3 completed shifts:  In: 2400 (25.3 mL/kg) [P.O.:1600; IV Piggyback:800]  Out: 800 (8.4 mL/kg) [Urine:800 (0.2 mL/kg/hr)]  Weight: 94.9 kg     Relevant Results                        "       Assessment & Plan  Wound infection    Osteomyelitis of left foot, unspecified type (Multi)    Acute deep vein thrombosis (DVT) of popliteal vein of left lower extremity    Bilateral lower extremity edema    Benign essential HTN    DM type 2 with diabetic peripheral neuropathy    Hyperlipidemia    Trigeminal neuralgia      Wound infection  Osteomyelitis of left foot, unspecified type (Multi)  -XR tib/fib: Thick periosteal new bone formation along the left tibia likely due to history of chronic osteomyelitis and healing. No acute bone destruction.  -MRI: 1. No evidence to suggest osteomyelitis. 2. Partially visualized large soft tissue ulceration along the  anterior aspect of the distal tibia and tibial talar joint with possible exposure of the underlying anterior tibialis tendon. 3. Moderate amount of fluid along the posterior as well as anterior aspect of the medial head of the gastrocnemius extending from the level of the knee to the distal lower leg. This is nonspecific with infectious fasciitis felt to be less likely although not entirely excluded. Edema of the anterior compartment muscles of the lower leg which may represent infectious myositis. No evidence of intramuscular abscess. 4. Extensive diffuse soft tissue edema noted throughout the left lower extremity and partially visualized right lower extremity 5. Moderate diffuse fatty atrophy of the lower leg muscles.  -Podiatry  and ID consult  -continue zosyn, vancomycin  -wound culture: GPC  -follow wound culture, WBC, blood cx  - NWB until seen by Podiatry  - PT/OT     Chronic 02 is on 2 liters 02 at night, has been on it continuously  - RT to evaluate    Acute deep vein thrombosis (DVT) of popliteal vein of left lower extremity  Bilateral lower extremity edema  - previous xarelto for DVT, completed  - elevate legs when resting     Benign essential HTN  Hyperlipidemia  - Echo: 1. The left ventricular systolic function is normal, with a visually  estimated ejection fraction of 55-60%.   2. Spectral Doppler shows a Grade I (impaired relaxation pattern) of left ventricular diastolic filling with normal left atrial filling pressure.  3. There is normal right ventricular global systolic function.  4. There is moderate to severe mitral annular calcification. 5. Moderate aortic valve stenosis. Dimensionless index 0.31 : moderate stenosis. Peak and mean gradients around 48 and 28 mm Hg respectively. Estimated aortic valve area around 1 cm2.  6. There is moderate aortic valve cusp calcification.  - continue hydralazine  - monitor HR, BP     DM type 2 with diabetic peripheral neuropathy  -sliding scale with lispro coverage  -A1c: 5.8     Trigeminal neuralgia  - continue carbamazepine, neurontin     GI ppx: PPI  DVT ppx: enoxaparin  Fluids: prn  Electrolytes: replace as needed  Nutrition: reg  Adjuncts: PIV  Code Status:full code   Pt requires inpatient stay at this time.    Eryn Mccarthy, APRN-CNP

## 2025-05-10 NOTE — CARE PLAN
Problem: Chronic Conditions and Co-morbidities  Goal: Patient's chronic conditions and co-morbidity symptoms are monitored and maintained or improved  Outcome: Progressing     Problem: Skin  Goal: Participates in plan/prevention/treatment measures  5/10/2025 0503 by Caty Carranza RN  Flowsheets (Taken 5/10/2025 0503)  Participates in plan/prevention/treatment measures:   Discuss with provider PT/OT consult   Elevate heels  5/10/2025 0503 by Caty Carranza RN  Outcome: Progressing  Flowsheets (Taken 5/10/2025 0503)  Participates in plan/prevention/treatment measures:   Discuss with provider PT/OT consult   Elevate heels  Goal: Promote skin healing  5/10/2025 0503 by Caty Carranza RN  Flowsheets (Taken 5/10/2025 0503)  Promote skin healing:   Assess skin/pad under line(s)/device(s)   Turn/reposition every 2 hours/use positioning/transfer devices  5/10/2025 0503 by Caty Carranza RN  Outcome: Progressing  Flowsheets (Taken 5/10/2025 0503)  Promote skin healing:   Assess skin/pad under line(s)/device(s)   Turn/reposition every 2 hours/use positioning/transfer devices     Problem: Diabetes  Goal: Increase stability of blood glucose readings by end of shift  Outcome: Progressing   The patient's goals for the shift include to get some sleep    The clinical goals for the shift include Pt will have no falls this shift.    Over the shift, the patient did  make progress toward the following goals.

## 2025-05-10 NOTE — CONSULTS
Inpatient consult to Infectious Diseases  Consult performed by: Baljit Velazco MD  Consult ordered by: Eryn Mccarthy, APRN-CNP            Primary MD: No Assigned PCP Generic Provider, MD    Reason For Consult  Infected left leg ulcer    History Of Present Illness  Elliot Partida is a 74 y.o. male presenting with infected left lower leg/proximal dorsal foot ulcer.  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  He has had his wound for quite some time, about 9 months.  He had interval worsening, with malodor.  He was admitted for further evaluation management.  He had a PVR done on 4/1/2025 which was remarkable for mild peripheral vascular disease.  He denies any fever or chills.  Wound culture was obtained.  He is on IV vancomycin and Zosyn     Past Medical History  He has a past medical history of Acute osteomyelitis of ankle and foot, left (Multi), AMI (acute myocardial infarction) (Multi), ASHD (arteriosclerotic heart disease), At risk for falls, Cellulitis, Diabetes mellitus (Multi), DVT (deep vein thrombosis) in pregnancy ([facility]), Fall risk care plan declined, Hypertension, Meningioma (Multi), Myocardial infarction (Multi), Neuritis, Neuropathy, Osteoarthritis, Osteomyelitis, PVD (peripheral vascular disease) (CMS-HCC), Sprain of right knee (06/25/2015), Stroke (Multi), 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[facility]), TIA (transient ischemic attack), Tinea pedis of both feet, Trigeminal neuralgia, and Weakness.    Surgical History  He has a past surgical history that includes Carpal tunnel release (10/10/2014); Eye surgery; FL arthrocentesis ASP INJ joint.; Cardiac catheterization; Iridotomy / iridectomy (Bilateral); Invasive Vascular Procedure (Bilateral, 09/18/2024); Foot ray resection (Left, 09/23/2024); and Foot surgery (Left, 09/27/2024).     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     Immunization History   Administered Date(s) Administered    Moderna SARS-CoV-2 Vaccination 04/14/2021, 05/13/2021     Medications  Home medications:  Prescriptions Prior to Admission[2]  Current medications:  Scheduled medications  Scheduled Medications[3]  Continuous medications  Continuous Medications[4]  PRN medications  PRN Medications[5]    Review of Systems   Constitutional:  Negative for chills and fever.   Respiratory:  Negative for cough, shortness of breath and wheezing.    Cardiovascular:  Negative for chest pain, palpitations and leg swelling.   Gastrointestinal:  Negative for abdominal pain, diarrhea, nausea and vomiting.   Skin:  Positive for wound.   All other systems reviewed and are negative.       Objective  Range of Vitals (last 24 hours)  Heart Rate:  [72-82]   Temp:  [37 °C (98.6 °F)-37.1 °C (98.8 °F)]   Resp:  [15-18]   BP: (126-142)/(63-65)   SpO2:  [86 %-97 %]   Daily Weight  05/08/25 : 94.9 kg (209 lb 3.5 oz)    Body mass index is 30.9 kg/m².     Physical Exam  Constitutional:       Appearance: Normal appearance.   HENT:      Head: Normocephalic and atraumatic.      Right Ear: External ear normal.      Left Ear: External ear normal.      Nose: Nose normal.   Eyes:      General: No scleral icterus.     Extraocular Movements: Extraocular movements intact.      Conjunctiva/sclera: Conjunctivae normal.   Cardiovascular:      Rate and Rhythm: Regular rhythm.      Heart sounds: Normal heart sounds, S1 normal and S2 normal.   Pulmonary:      Breath sounds: Normal  breath sounds and air entry.   Abdominal:      General: Bowel sounds are normal.      Palpations: Abdomen is soft.      Tenderness: There is no abdominal tenderness.   Musculoskeletal:      Cervical back: Normal range of motion and neck supple.      Right lower leg: No edema.      Left lower leg: No edema.   Skin:     General: Skin is warm and dry.      Comments: Left anterior lower leg/dorsal foot ulcer with moderate amount of granulation tissue and moderate amount of fat necrosis   Neurological:      Mental Status: He is alert.   Psychiatric:         Behavior: Behavior normal. Behavior is cooperative.          Relevant Results  Outside Hospital Results    Labs  Results from last 72 hours   Lab Units 05/09/25  0849 05/08/25  1239   WBC AUTO x10*3/uL 9.4 8.4   HEMOGLOBIN g/dL 10.4* 11.2*   HEMATOCRIT % 34.2* 36.2*   PLATELETS AUTO x10*3/uL 220 206   NEUTROS PCT AUTO %  --  65.9   LYMPHS PCT AUTO %  --  21.1   MONOS PCT AUTO %  --  8.5   EOS PCT AUTO %  --  3.6     Results from last 72 hours   Lab Units 05/09/25  0849 05/08/25  1145   SODIUM mmol/L 139 140   POTASSIUM mmol/L 3.9 4.0   CHLORIDE mmol/L 106 105   CO2 mmol/L 27 26   BUN mg/dL 17 18   CREATININE mg/dL 0.77 0.56   GLUCOSE mg/dL 118* 134*   CALCIUM mg/dL 8.5* 8.8   ANION GAP mmol/L 10 13   EGFR mL/min/1.73m*2 >90 >90     Results from last 72 hours   Lab Units 05/09/25  0849 05/08/25  1145   ALK PHOS U/L 84 96   BILIRUBIN TOTAL mg/dL 0.6 0.4   PROTEIN TOTAL g/dL 6.1* 6.4   ALT U/L 13 13   AST U/L 17 17   ALBUMIN g/dL 3.2* 3.3*     Estimated Creatinine Clearance: 97.2 mL/min (by C-G formula based on SCr of 0.77 mg/dL).  C-Reactive Protein   Date Value Ref Range Status   05/08/2025 1.54 (H) <1.00 mg/dL Final   02/11/2025 2.68 (H) <1.00 mg/dL Final   11/05/2024 1.34 (H) <1.00 mg/dL Final     Sedimentation Rate   Date Value Ref Range Status   05/08/2025 14 0 - 20 mm/h Final   02/11/2025 51 (H) 0 - 20 mm/h Final   12/23/2024 14 0 - 20 mm/h Final     No results  "found for: \"HIV1X2\", \"HIVCONF\", \"OCAUFQ0OZ\"  No results found for: \"HEPCABINIT\", \"HEPCAB\", \"HCVPCRQUANT\"  Microbiology  Susceptibility data from last 90 days.  Collected Specimen Info Organism Ampicillin Gentamicin Synergy Penicillin Trimethoprim/Sulfamethoxazole Vancomycin   02/13/25 Tissue from BONE RESECTION Enterococcus faecalis  S  S  S  R  S   02/12/25 Tissue/Biopsy from Wound/Tissue Mixed Anaerobic Bacteria          Mixed Gram-Positive and Gram-Negative Bacteria        02/11/25 Tissue/Biopsy from Diabetic Foot Ulcer Mixed Aerobic and Anaerobic Bacteria           Imaging  MR tibia fibula left w and wo IV contrast  Result Date: 5/9/2025  Interpreted By:  Lina Moreno  and Debbie Cárdenas STUDY: MRI of the left tibia/fibula without and with IV contrast dated 5/9/2025.   INDICATION: Signs/Symptoms:Chronic osteomyelitis of the tibia.     COMPARISON: Left tibia/fibula radiograph 05/08/2025   ACCESSION NUMBER(S): [id number]   ORDERING CLINICIAN: ADA ESPINOZA   TECHNIQUE: Multiplanar multisequence MRI of the  left tibia/fibula was performed without and with intravenous gadolinium based contrast.   FINDINGS: OSSEOUS STRUCTURES AND JOINTS:   No fracture or dislocation is evident. Bone marrow signal intensity is within normal limits. No joint effusion is evident.   SOFT TISSUES: Partially visualized large soft tissue ulceration along the anterior aspect of the tibiotalar joint to the distal tibia with possible exposure of the anterior tibialis tendon (axial image 74). There is extensive diffuse subcutaneous edema noted throughout the lower extremities without rim enhancing fluid collection. Moderate amount of fascial plane fluid noted in the anterior as well as posterior aspect of the medial head of the gastrocnemius. There is moderate diffuse generalized muscle atrophy. There is intramuscular muscle edema along the anterior compartment without intramuscular abscess. The visualized muscles and tendons are intact. " Ligaments are not well assessed on this examination.       1. No evidence to suggest osteomyelitis. 2. Partially visualized large soft tissue ulceration along the anterior aspect of the distal tibia and tibial talar joint with possible exposure of the underlying anterior tibialis tendon. 3. Moderate amount of fluid along the posterior as well as anterior aspect of the medial head of the gastrocnemius extending from the level of the knee to the distal lower leg. This is nonspecific with infectious fasciitis felt to be less likely although not entirely excluded. Edema of the anterior compartment muscles of the lower leg which may represent infectious myositis. No evidence of intramuscular abscess. 4. Extensive diffuse soft tissue edema noted throughout the left lower extremity and partially visualized right lower extremity 5. Moderate diffuse fatty atrophy of the lower leg muscles.   I personally reviewed the images/study and I agree with Dr. Melia Lewis and the findings as stated.   MACRO: None   Signed by: Lina Moreno 5/9/2025 11:14 AM Dictation workstation:   [id number]    XR tibia fibula left 2 views  Result Date: 5/8/2025  Interpreted By:  Mariam Vang, STUDY: XR TIBIA FIBULA LEFT 2 VIEWS; ;  5/8/2025 10:25 pm   INDICATION: Signs/Symptoms:Chronic tibial osteomyelitis.     COMPARISON: None.   ACCESSION NUMBER(S): [id number]   ORDERING CLINICIAN: ADA ESPINOZA   FINDINGS: Two views of the left tibia and fibula show thick periosteal bone formation along the tibial diaphysis likely due to a longstanding process. There is no acute bone destruction to suggest acute osteomyelitis. No fracture or dislocation.       Thick periosteal new bone formation along the left tibia likely due to history of chronic osteomyelitis and healing. No acute bone destruction.     MACRO: None   Signed by: Mariam Vang 5/8/2025 10:46 PM Dictation workstation:   [id number]    XR foot left 3+ views  Result Date:  5/8/2025  Interpreted By:  Osmar Garza, STUDY: XR FOOT LEFT 3+ VIEWS;  5/8/2025 9:26 am   INDICATION: Signs/Symptoms:wound.   COMPARISON: None.   ACCESSION NUMBER(S): [id number]   ORDERING CLINICIAN: LUDA JALLOH   FINDINGS: Post left transmetatarsal amputation. There is a possible age indeterminate nondisplaced fracture near the base of the 4th proximal phalanx only seen on the oblique view. No additional acute fracture, dislocation, or focal osseous destruction identified. Scattered degenerative changes. Small calcaneal enthesophytes.       Questionable age-indeterminate nondisplaced fracture near the base of the left foot 4th proximal phalanx, only apparent on one view. Soft tissue swelling. No additional acute osseous findings of the left foot.   MACRO: None   Signed by: Osmar Garza 5/8/2025 10:04 AM Dictation workstation:   [id number]      Assessment/Plan   Type 2 diabetes with peripheral angiopathy without gangrene-recent PVR remarkable for mild peripheral vascular disease  Left leg/dorsal foot cellulitis  Left tibial osteomyelitis-pathology report from 2/13/2025 reviewed    Continue vancomycin  Continue Zosyn  Follow-up wound culture  Local care  Consider vascular evaluation, can be done as outpatient  Supportive care  Monitor temperature and WBC  Long-term plan is 6 weeks of IV antibiotic therapy    This is a complex infectious disease issue and the following was performed today (for more details please see the above note): Management decisions reflecting the added complexity (e.g., changes in antimicrobial therapy, infection control strategies).     Baljit Velazco MD         [1]   Family History  Problem Relation Name Age of Onset    Heart disease Father      Colon cancer Other      Heart attack Other      Diabetes Other      Hypertension Other     [2]   Medications Prior to Admission   Medication Sig Dispense Refill Last Dose/Taking    carBAMazepine (TEGretol) 200 mg tablet Take 1 tablet  (200 mg) by mouth 2 times a day as needed.   5/8/2025    gabapentin (Neurontin) 300 mg capsule Take 1 capsule (300 mg) by mouth 3 times a day. (Patient taking differently: Take 1 capsule (300 mg) by mouth 2 times a day as needed.)   5/8/2025    hydrALAZINE (Apresoline) 25 mg tablet Take 1 tablet (25 mg) by mouth 3 times a day.   5/8/2025    aspirin 81 mg chewable tablet Chew 1 tablet (81 mg) once daily. Do not fill before September 19, 2024. (Patient not taking: Reported on 5/8/2025) 90 tablet 0 Not Taking    Blood glucose monitoring meter To check blood sugar every morning before breakfast 1 each 0     carBAMazepine (Carbatrol) 100 mg 12 hr capsule Take 1 capsule (100 mg) by mouth 2 times a day for 7 days. Do not crush or chew. (Patient not taking: Reported on 2/18/2025) 14 capsule 0     ondansetron ODT (Zofran-ODT) 4 mg disintegrating tablet Dissolve 1 tablet (4 mg) in the mouth every 8 hours if needed for nausea or vomiting. 15 tablet 0 Unknown    potassium chloride CR (Klor-Con M20) 20 mEq ER tablet Take 2 tablets (40 mEq) by mouth once daily. Do not crush or chew. (Patient not taking: Reported on 5/9/2025)   Not Taking    rivaroxaban (Xarelto) 15 mg tablet Take 1 tablet (15 mg) by mouth 2 times daily (morning and late afternoon) for 16 days. Take with food. (Patient not taking: Reported on 5/9/2025) 32 tablet 0 Not Taking    rivaroxaban (Xarelto) 20 mg tablet Take 1 tablet (20 mg) by mouth once daily in the evening. Take with meals. Take with food. Do not fill before March 7, 2025. (Patient not taking: Reported on 5/8/2025) 30 tablet 0 Not Taking    traZODone (Desyrel) 50 mg tablet Take 1 tablet (50 mg) by mouth once daily at bedtime.   Unknown   [3] carBAMazepine, 200 mg, oral, BID  enoxaparin, 40 mg, subcutaneous, Daily  gabapentin, 300 mg, oral, TID  hydrALAZINE, 25 mg, oral, TID  insulin lispro, 0-10 Units, subcutaneous, TID AC  lidocaine, 5 mL, infiltration, Once  piperacillin-tazobactam, 4.5 g,  intravenous, q8h  polyethylene glycol, 17 g, oral, Daily  [Held by provider] potassium chloride CR, 40 mEq, oral, Daily  traZODone, 50 mg, oral, Nightly  vancomycin, 1 g, intravenous, q12h    [4]    [5] PRN medications: acetaminophen **OR** [DISCONTINUED] acetaminophen **OR** [DISCONTINUED] acetaminophen, alum-mag hydroxide-simeth, benzocaine-menthol, dextrose, dextrose, glucagon, glucagon, lidocaine, ondansetron, oxygen, vancomycin

## 2025-05-10 NOTE — PROGRESS NOTES
Physical Therapy                 Therapy Communication Note    Patient Name: Elliot Partida  MRN: [id number]  Department: [facility]  Room: [address] [place] [address]-A  Today's Date: 5/10/2025     Discipline: Physical Therapy    PT Missed Visit: Yes     Missed Visit Reason: Missed Visit Reason: Patient refused (Pt  was initially asleep.  Refused PT OT.  Waving his arm for us to leave the room and leave him alone.)    Missed Time: Attempt  8:10 am    Comment:

## 2025-05-10 NOTE — PROGRESS NOTES
Occupational Therapy                 Therapy Communication Note    Patient Name: Elliot Partida  MRN: [id number]  Department: [facility]  Room: [address] [place], [place] [address]  Today's Date: 5/10/2025     Discipline: Occupational Therapy    OT Missed Visit: Yes     Missed Visit Reason: Attempted to see the pt. for OT evaluation.  Despite encouragement provided, the pt. declined reporting that he wants to sleep at this time.    Missed Time: Attempt at 8:09AM

## 2025-05-10 NOTE — NURSING NOTE
Patient resting in bed at this time with call light in reach. Vss. Patient denies pain. IV ATB continues.

## 2025-05-11 LAB
ALBUMIN SERPL BCP-MCNC: 3 G/DL (ref 3.4–5)
ALP SERPL-CCNC: 87 U/L (ref 33–136)
ALT SERPL W P-5'-P-CCNC: 11 U/L (ref 10–52)
ANION GAP SERPL CALC-SCNC: 13 MMOL/L (ref 10–20)
AST SERPL W P-5'-P-CCNC: 19 U/L (ref 9–39)
B-LACTAMASE ORGANISM ISLT: POSITIVE
BACTERIA BLD CULT: NORMAL
BACTERIA SPEC CULT: ABNORMAL
BACTERIA SPEC CULT: ABNORMAL
BASOPHILS # BLD AUTO: 0.03 X10*3/UL (ref 0–0.1)
BASOPHILS NFR BLD AUTO: 0.3 %
BILIRUB SERPL-MCNC: 0.8 MG/DL (ref 0–1.2)
BUN SERPL-MCNC: 17 MG/DL (ref 6–23)
CALCIUM SERPL-MCNC: 8.2 MG/DL (ref 8.6–10.3)
CHLORIDE SERPL-SCNC: 104 MMOL/L (ref 98–107)
CO2 SERPL-SCNC: 22 MMOL/L (ref 21–32)
CREAT SERPL-MCNC: 0.66 MG/DL (ref 0.5–1.3)
EGFRCR SERPLBLD CKD-EPI 2021: >90 ML/MIN/1.73M*2
EOSINOPHIL # BLD AUTO: 0.07 X10*3/UL (ref 0–0.4)
EOSINOPHIL NFR BLD AUTO: 0.7 %
ERYTHROCYTE [DISTWIDTH] IN BLOOD BY AUTOMATED COUNT: 16.4 % (ref 11.5–14.5)
ERYTHROCYTE [SEDIMENTATION RATE] IN BLOOD BY WESTERGREN METHOD: 12 MM/H (ref 0–20)
GLUCOSE BLD MANUAL STRIP-MCNC: 110 MG/DL (ref 74–99)
GLUCOSE BLD MANUAL STRIP-MCNC: 123 MG/DL (ref 74–99)
GLUCOSE BLD MANUAL STRIP-MCNC: 131 MG/DL (ref 74–99)
GLUCOSE BLD MANUAL STRIP-MCNC: 133 MG/DL (ref 74–99)
GLUCOSE BLD MANUAL STRIP-MCNC: 137 MG/DL (ref 74–99)
GLUCOSE SERPL-MCNC: 135 MG/DL (ref 74–99)
GRAM STN SPEC: ABNORMAL
GRAM STN SPEC: ABNORMAL
HCT VFR BLD AUTO: 37.1 % (ref 41–52)
HGB BLD-MCNC: 10.9 G/DL (ref 13.5–17.5)
IMM GRANULOCYTES # BLD AUTO: 0.05 X10*3/UL (ref 0–0.5)
IMM GRANULOCYTES NFR BLD AUTO: 0.5 % (ref 0–0.9)
LACTATE SERPL-SCNC: 0.7 MMOL/L (ref 0.4–2)
LYMPHOCYTES # BLD AUTO: 0.53 X10*3/UL (ref 0.8–3)
LYMPHOCYTES NFR BLD AUTO: 5.5 %
MCH RBC QN AUTO: 25.3 PG (ref 26–34)
MCHC RBC AUTO-ENTMCNC: 29.4 G/DL (ref 32–36)
MCV RBC AUTO: 86 FL (ref 80–100)
MONOCYTES # BLD AUTO: 0.85 X10*3/UL (ref 0.05–0.8)
MONOCYTES NFR BLD AUTO: 8.8 %
NEUTROPHILS # BLD AUTO: 8.13 X10*3/UL (ref 1.6–5.5)
NEUTROPHILS NFR BLD AUTO: 84.2 %
NRBC BLD-RTO: 0 /100 WBCS (ref 0–0)
PLATELET # BLD AUTO: 192 X10*3/UL (ref 150–450)
POTASSIUM SERPL-SCNC: 4.2 MMOL/L (ref 3.5–5.3)
PROT SERPL-MCNC: 6.1 G/DL (ref 6.4–8.2)
RBC # BLD AUTO: 4.3 X10*6/UL (ref 4.5–5.9)
SODIUM SERPL-SCNC: 135 MMOL/L (ref 136–145)
STAPHYLOCOCCUS SPEC CULT: ABNORMAL
VANCOMYCIN SERPL-MCNC: 16 UG/ML (ref 5–20)
WBC # BLD AUTO: 9.7 X10*3/UL (ref 4.4–11.3)

## 2025-05-11 PROCEDURE — 2500000001 HC RX 250 WO HCPCS SELF ADMINISTERED DRUGS (ALT 637 FOR MEDICARE OP): Mod: IPSPLIT | Performed by: HOSPITALIST

## 2025-05-11 PROCEDURE — 36415 COLL VENOUS BLD VENIPUNCTURE: CPT | Mod: IPSPLIT | Performed by: NURSE PRACTITIONER

## 2025-05-11 PROCEDURE — 82947 ASSAY GLUCOSE BLOOD QUANT: CPT | Mod: IPSPLIT

## 2025-05-11 PROCEDURE — 80053 COMPREHEN METABOLIC PANEL: CPT | Mod: IPSPLIT | Performed by: NURSE PRACTITIONER

## 2025-05-11 PROCEDURE — 2500000004 HC RX 250 GENERAL PHARMACY W/ HCPCS (ALT 636 FOR OP/ED): Mod: JZ,IPSPLIT | Performed by: NURSE PRACTITIONER

## 2025-05-11 PROCEDURE — 2500000005 HC RX 250 GENERAL PHARMACY W/O HCPCS: Mod: IPSPLIT | Performed by: INTERNAL MEDICINE

## 2025-05-11 PROCEDURE — 94760 N-INVAS EAR/PLS OXIMETRY 1: CPT | Mod: IPSPLIT

## 2025-05-11 PROCEDURE — 83605 ASSAY OF LACTIC ACID: CPT | Mod: IPSPLIT | Performed by: NURSE PRACTITIONER

## 2025-05-11 PROCEDURE — 1100000001 HC PRIVATE ROOM DAILY: Mod: IPSPLIT

## 2025-05-11 PROCEDURE — 85025 COMPLETE CBC W/AUTO DIFF WBC: CPT | Mod: IPSPLIT | Performed by: NURSE PRACTITIONER

## 2025-05-11 PROCEDURE — 99232 SBSQ HOSP IP/OBS MODERATE 35: CPT | Performed by: NURSE PRACTITIONER

## 2025-05-11 PROCEDURE — 2500000001 HC RX 250 WO HCPCS SELF ADMINISTERED DRUGS (ALT 637 FOR MEDICARE OP): Mod: IPSPLIT | Performed by: NURSE PRACTITIONER

## 2025-05-11 PROCEDURE — 80202 ASSAY OF VANCOMYCIN: CPT | Mod: IPSPLIT | Performed by: NURSE PRACTITIONER

## 2025-05-11 PROCEDURE — 85652 RBC SED RATE AUTOMATED: CPT | Mod: IPSPLIT | Performed by: NURSE PRACTITIONER

## 2025-05-11 RX ORDER — KETOROLAC TROMETHAMINE 15 MG/ML
15 INJECTION, SOLUTION INTRAMUSCULAR; INTRAVENOUS EVERY 6 HOURS PRN
Status: DISPENSED | OUTPATIENT
Start: 2025-05-11 | End: 2025-05-16

## 2025-05-11 RX ORDER — METOCLOPRAMIDE HYDROCHLORIDE 5 MG/ML
10 INJECTION INTRAMUSCULAR; INTRAVENOUS EVERY 8 HOURS PRN
Status: DISCONTINUED | OUTPATIENT
Start: 2025-05-11 | End: 2025-05-20 | Stop reason: HOSPADM

## 2025-05-11 RX ADMIN — HYDRALAZINE HYDROCHLORIDE 25 MG: 25 TABLET ORAL at 09:00

## 2025-05-11 RX ADMIN — CARBAMAZEPINE 200 MG: 200 TABLET ORAL at 20:43

## 2025-05-11 RX ADMIN — ACETAMINOPHEN 650 MG: 325 TABLET, FILM COATED ORAL at 20:43

## 2025-05-11 RX ADMIN — HYDRALAZINE HYDROCHLORIDE 25 MG: 25 TABLET ORAL at 15:05

## 2025-05-11 RX ADMIN — VANCOMYCIN HYDROCHLORIDE 1 G: 1 INJECTION, SOLUTION INTRAVENOUS at 13:33

## 2025-05-11 RX ADMIN — GABAPENTIN 300 MG: 300 CAPSULE ORAL at 15:05

## 2025-05-11 RX ADMIN — VANCOMYCIN HYDROCHLORIDE 1 G: 1 INJECTION, SOLUTION INTRAVENOUS at 01:28

## 2025-05-11 RX ADMIN — PIPERACILLIN SODIUM AND TAZOBACTAM SODIUM 4.5 G: 4; .5 INJECTION, SOLUTION INTRAVENOUS at 12:25

## 2025-05-11 RX ADMIN — PIPERACILLIN SODIUM AND TAZOBACTAM SODIUM 4.5 G: 4; .5 INJECTION, SOLUTION INTRAVENOUS at 05:27

## 2025-05-11 RX ADMIN — ENOXAPARIN SODIUM 40 MG: 40 INJECTION SUBCUTANEOUS at 09:01

## 2025-05-11 RX ADMIN — ACETAMINOPHEN 650 MG: 325 TABLET, FILM COATED ORAL at 11:54

## 2025-05-11 RX ADMIN — HYDRALAZINE HYDROCHLORIDE 25 MG: 25 TABLET ORAL at 20:43

## 2025-05-11 RX ADMIN — Medication 2 L/MIN: at 22:53

## 2025-05-11 RX ADMIN — TRAZODONE HYDROCHLORIDE 50 MG: 50 TABLET ORAL at 20:43

## 2025-05-11 RX ADMIN — GABAPENTIN 300 MG: 300 CAPSULE ORAL at 09:00

## 2025-05-11 RX ADMIN — KETOROLAC TROMETHAMINE 15 MG: 15 INJECTION, SOLUTION INTRAMUSCULAR; INTRAVENOUS at 15:05

## 2025-05-11 RX ADMIN — PIPERACILLIN SODIUM AND TAZOBACTAM SODIUM 4.5 G: 4; .5 INJECTION, SOLUTION INTRAVENOUS at 20:46

## 2025-05-11 RX ADMIN — CARBAMAZEPINE 200 MG: 200 TABLET ORAL at 09:01

## 2025-05-11 RX ADMIN — GABAPENTIN 300 MG: 300 CAPSULE ORAL at 20:43

## 2025-05-11 RX ADMIN — Medication 3 L/MIN: at 15:18

## 2025-05-11 ASSESSMENT — PAIN - FUNCTIONAL ASSESSMENT
PAIN_FUNCTIONAL_ASSESSMENT: 0-10

## 2025-05-11 ASSESSMENT — PAIN SCALES - GENERAL
PAINLEVEL_OUTOF10: 3
PAINLEVEL_OUTOF10: 10 - WORST POSSIBLE PAIN
PAINLEVEL_OUTOF10: 7
PAINLEVEL_OUTOF10: 6
PAINLEVEL_OUTOF10: 0 - NO PAIN
PAINLEVEL_OUTOF10: 10 - WORST POSSIBLE PAIN

## 2025-05-11 ASSESSMENT — ENCOUNTER SYMPTOMS
WHEEZING: 0
ABDOMINAL PAIN: 0
FEVER: 0
DIARRHEA: 0
SHORTNESS OF BREATH: 0
NAUSEA: 0
COUGH: 0
VOMITING: 0
PALPITATIONS: 0
CHILLS: 0
WOUND: 1

## 2025-05-11 ASSESSMENT — PAIN DESCRIPTION - LOCATION
LOCATION: HEAD
LOCATION: HEAD

## 2025-05-11 ASSESSMENT — PAIN DESCRIPTION - DESCRIPTORS
DESCRIPTORS: HEADACHE

## 2025-05-11 NOTE — CARE PLAN
The patient's goals for the shift include to get some sleep    The clinical goals for the shift include Patient will remain free from fall/injury this shift    Assumed care of patient at 1930. Patient has remained free of fall/injury. Tolerated IV antibiotics. Requested PRN cough drop and tylenol for throat pain and voiced general malaise. Effective per patient. Patient able to rest throughout night. Patient in bed with call light within reach.

## 2025-05-11 NOTE — NURSING NOTE
Vss. Patient c/o HA 10/10. Administered Tylenol, did not help, received new order of Toradol and administered, helped with pain some. IV ATB therapy continues.

## 2025-05-11 NOTE — PROGRESS NOTES
"Elliot Partida is a 74 y.o. male on day 3 of admission presenting with Wound infection.    Subjective   Resting in bed this morning. Denies chest pain or shortness of breath.        Objective   No overnight issues  Pain controlled  Anticipate OR tomorrow and midline placement for possible long term antibiotics  Usually on 2 liters of 02 has been desatting to 87 to 89% overnight    Physical Exam  Vitals and nursing note reviewed.   Constitutional:       Appearance: Normal appearance.   HENT:      Head: Normocephalic and atraumatic.      Right Ear: Tympanic membrane normal.      Left Ear: Tympanic membrane normal.      Nose: Nose normal.      Mouth/Throat:      Mouth: Mucous membranes are moist.   Eyes:      Extraocular Movements: Extraocular movements intact.      Pupils: Pupils are equal, round, and reactive to light.   Cardiovascular:      Rate and Rhythm: Normal rate and regular rhythm.      Pulses: Normal pulses.      Heart sounds: Normal heart sounds.   Pulmonary:      Effort: Pulmonary effort is normal.      Breath sounds: Normal breath sounds.   Abdominal:      General: Bowel sounds are normal.      Palpations: Abdomen is soft.   Musculoskeletal:      Cervical back: Normal range of motion and neck supple.      Comments: Right lower leg bandage intact toes pink and warm   Skin:     General: Skin is warm and dry.      Capillary Refill: Capillary refill takes 2 to 3 seconds.   Neurological:      Mental Status: He is alert and oriented to person, place, and time.       Left foot bandage intact     Last Recorded Vitals  Blood pressure 156/70, pulse 95, temperature 37.4 °C (99.3 °F), temperature source Temporal, resp. rate 16, height 1.753 m (5' 9\"), weight 94.9 kg (209 lb 3.5 oz), SpO2 (!) 88%.  Intake/Output last 3 Shifts:  I/O last 3 completed shifts:  In: 1800 (19 mL/kg) [P.O.:600; IV Piggyback:1200]  Out: 1450 (15.3 mL/kg) [Urine:1450 (0.4 mL/kg/hr)]  Weight: 94.9 kg     Relevant Results             "       Assessment & Plan  Wound infection    Osteomyelitis of left foot, unspecified type (Multi)    Acute deep vein thrombosis (DVT) of popliteal vein of left lower extremity    Bilateral lower extremity edema    Benign essential HTN    DM type 2 with diabetic peripheral neuropathy    Hyperlipidemia    Trigeminal neuralgia      Wound infection  Osteomyelitis of left foot, unspecified type (Multi)  -XR tib/fib: Thick periosteal new bone formation along the left tibia likely due to history of chronic osteomyelitis and healing. No acute bone destruction.  -MRI: 1. No evidence to suggest osteomyelitis. 2. Partially visualized large soft tissue ulceration along the  anterior aspect of the distal tibia and tibial talar joint with possible exposure of the underlying anterior tibialis tendon. 3. Moderate amount of fluid along the posterior as well as anterior aspect of the medial head of the gastrocnemius extending from the level of the knee to the distal lower leg. This is nonspecific with infectious fasciitis felt to be less likely although not entirely excluded. Edema of the anterior compartment muscles of the lower leg which may represent infectious myositis. No evidence of intramuscular abscess. 4. Extensive diffuse soft tissue edema noted throughout the left lower extremity and partially visualized right lower extremity 5. Moderate diffuse fatty atrophy of the lower leg muscles.  -Podiatry  and ID consult  -continue zosyn, vancomycin  -wound culture: GPC  -follow wound culture, WBC, blood cx  - PWB to the left lower extremity for transfer   - PT/OT  -PICC line or midline placement possibly tomorrow for long term antibiotics      Chronic 02 is on 2 liters 02 at night, has been on it continuously  - RT to evaluate    Acute deep vein thrombosis (DVT) of popliteal vein of left lower extremity  Bilateral lower extremity edema  - previous xarelto for DVT, completed  - elevate legs when resting     Benign essential  HTN  Hyperlipidemia  - Echo: 1. The left ventricular systolic function is normal, with a visually estimated ejection fraction of 55-60%.   2. Spectral Doppler shows a Grade I (impaired relaxation pattern) of left ventricular diastolic filling with normal left atrial filling pressure.  3. There is normal right ventricular global systolic function.  4. There is moderate to severe mitral annular calcification. 5. Moderate aortic valve stenosis. Dimensionless index 0.31 : moderate stenosis. Peak and mean gradients around 48 and 28 mm Hg respectively. Estimated aortic valve area around 1 cm2.  6. There is moderate aortic valve cusp calcification.  - continue hydralazine  - monitor HR, BP     DM type 2 with diabetic peripheral neuropathy  -sliding scale with lispro coverage  -A1c: 5.8     Trigeminal neuralgia  - continue carbamazepine, neurontin     GI ppx: PPI  DVT ppx: enoxaparin  Fluids: prn  Electrolytes: replace as needed  Nutrition: reg  Adjuncts: PIV  Code Status:full code   Pt requires inpatient stay at this time.    Ani Corley, APRN-CNP

## 2025-05-11 NOTE — PROGRESS NOTES
Vancomycin Dosing by Pharmacy- FOLLOW UP    Elliot Partida is a 74 y.o. year old male who Pharmacy has been consulted for vancomycin dosing for other diabetic foot inf. Based on the patient's indication and renal status this patient is being dosed based on a goal AUC of 400-600.     Renal function is currently stable.    Current vancomycin dose: 1000 mg given every 12 hours    Estimated vancomycin AUC on current dose: 404 mg/L.hr     Visit Vitals  /70 (BP Location: Left arm, Patient Position: Lying)   Pulse 95   Temp 37.4 °C (99.3 °F) (Temporal)   Resp 16        Lab Results   Component Value Date    CREATININE 0.66 2025    CREATININE 0.77 2025    CREATININE 0.56 2025    CREATININE 0.74 2025        Patient weight is as follows:   Vitals:    25 1626   Weight: 94.9 kg (209 lb 3.5 oz)       Cultures:  No results found for the encounter in last 14 days.       I/O last 3 completed shifts:  In: 1800 (19 mL/kg) [P.O.:600; IV Piggyback:1200]  Out: 1450 (15.3 mL/kg) [Urine:1450 (0.4 mL/kg/hr)]  Weight: 94.9 kg   I/O during current shift:  I/O this shift:  In: -   Out: 300 [Urine:300]    Temp (24hrs), Av.9 °C (98.4 °F), Min:36.6 °C (97.9 °F), Max:37.4 °C (99.3 °F)      Assessment/Plan    Within goal AUC range. Continue current vancomycin regimen.    This dosing regimen is predicted by [employer] to result in the following pharmacokinetic parameters:  Regimen: 1000 mg IV every 12 hours.  Start time: 13:28 on 2025  Exposure target: AUC24 (range)400-600 mg/L.hr   [id number]: 399 mg/L.hr  AUC24,ss: 404 mg/L.hr  Probability of AUC24 > 400: 53 %  Ctrough,ss: 12.4 mg/L  Probability of Ctrough,ss > 20: 2 %      The next level will be obtained on  at 0500. May be obtained sooner if clinically indicated.   Will continue to monitor renal function daily while on vancomycin and order serum creatinine at least every 48 hours if not already ordered.  Follow for continued vancomycin needs,  clinical response, and signs/symptoms of toxicity.       Jonathan Min, PharmD

## 2025-05-12 ENCOUNTER — APPOINTMENT (OUTPATIENT)
Dept: RADIOLOGY | Facility: HOSPITAL | Age: 75
DRG: 637 | End: 2025-05-12
Payer: MEDICARE

## 2025-05-12 ENCOUNTER — APPOINTMENT (OUTPATIENT)
Dept: CARDIOLOGY | Facility: HOSPITAL | Age: 75
DRG: 637 | End: 2025-05-12
Payer: MEDICARE

## 2025-05-12 VITALS
BODY MASS INDEX: 30.99 KG/M2 | HEART RATE: 89 BPM | SYSTOLIC BLOOD PRESSURE: 150 MMHG | DIASTOLIC BLOOD PRESSURE: 84 MMHG | HEIGHT: 69 IN | OXYGEN SATURATION: 90 % | TEMPERATURE: 99 F | RESPIRATION RATE: 16 BRPM | WEIGHT: 209.22 LBS

## 2025-05-12 LAB
ALBUMIN SERPL BCP-MCNC: 3 G/DL (ref 3.4–5)
ALP SERPL-CCNC: 84 U/L (ref 33–136)
ALT SERPL W P-5'-P-CCNC: 13 U/L (ref 10–52)
ANION GAP SERPL CALC-SCNC: 11 MMOL/L (ref 10–20)
AST SERPL W P-5'-P-CCNC: 18 U/L (ref 9–39)
B-LACTAMASE ORGANISM ISLT: POSITIVE
BACTERIA BLD CULT: NORMAL
BACTERIA BLD CULT: NORMAL
BACTERIA SPEC CULT: ABNORMAL
BACTERIA SPEC CULT: ABNORMAL
BASOPHILS # BLD AUTO: 0.02 X10*3/UL (ref 0–0.1)
BASOPHILS NFR BLD AUTO: 0.3 %
BILIRUB SERPL-MCNC: 0.5 MG/DL (ref 0–1.2)
BUN SERPL-MCNC: 23 MG/DL (ref 6–23)
CALCIUM SERPL-MCNC: 8.2 MG/DL (ref 8.6–10.3)
CHLORIDE SERPL-SCNC: 101 MMOL/L (ref 98–107)
CO2 SERPL-SCNC: 27 MMOL/L (ref 21–32)
CREAT SERPL-MCNC: 0.72 MG/DL (ref 0.5–1.3)
EGFRCR SERPLBLD CKD-EPI 2021: >90 ML/MIN/1.73M*2
EOSINOPHIL # BLD AUTO: 0.01 X10*3/UL (ref 0–0.4)
EOSINOPHIL NFR BLD AUTO: 0.1 %
ERYTHROCYTE [DISTWIDTH] IN BLOOD BY AUTOMATED COUNT: 16.1 % (ref 11.5–14.5)
GLUCOSE BLD MANUAL STRIP-MCNC: 108 MG/DL (ref 74–99)
GLUCOSE BLD MANUAL STRIP-MCNC: 113 MG/DL (ref 74–99)
GLUCOSE BLD MANUAL STRIP-MCNC: 124 MG/DL (ref 74–99)
GLUCOSE BLD MANUAL STRIP-MCNC: 128 MG/DL (ref 74–99)
GLUCOSE BLD MANUAL STRIP-MCNC: 128 MG/DL (ref 74–99)
GLUCOSE SERPL-MCNC: 129 MG/DL (ref 74–99)
GRAM STN SPEC: ABNORMAL
GRAM STN SPEC: ABNORMAL
HCT VFR BLD AUTO: 33.8 % (ref 41–52)
HGB BLD-MCNC: 10.5 G/DL (ref 13.5–17.5)
HOLD SPECIMEN: NORMAL
IMM GRANULOCYTES # BLD AUTO: 0.04 X10*3/UL (ref 0–0.5)
IMM GRANULOCYTES NFR BLD AUTO: 0.6 % (ref 0–0.9)
LACTATE SERPL-SCNC: 0.9 MMOL/L (ref 0.4–2)
LYMPHOCYTES # BLD AUTO: 0.64 X10*3/UL (ref 0.8–3)
LYMPHOCYTES NFR BLD AUTO: 9 %
MCH RBC QN AUTO: 25.3 PG (ref 26–34)
MCHC RBC AUTO-ENTMCNC: 31.1 G/DL (ref 32–36)
MCV RBC AUTO: 81 FL (ref 80–100)
MONOCYTES # BLD AUTO: 0.96 X10*3/UL (ref 0.05–0.8)
MONOCYTES NFR BLD AUTO: 13.6 %
NEUTROPHILS # BLD AUTO: 5.41 X10*3/UL (ref 1.6–5.5)
NEUTROPHILS NFR BLD AUTO: 76.4 %
NRBC BLD-RTO: 0 /100 WBCS (ref 0–0)
PLATELET # BLD AUTO: 226 X10*3/UL (ref 150–450)
POTASSIUM SERPL-SCNC: 3.6 MMOL/L (ref 3.5–5.3)
PROT SERPL-MCNC: 6.1 G/DL (ref 6.4–8.2)
RBC # BLD AUTO: 4.15 X10*6/UL (ref 4.5–5.9)
SODIUM SERPL-SCNC: 135 MMOL/L (ref 136–145)
WBC # BLD AUTO: 7.1 X10*3/UL (ref 4.4–11.3)

## 2025-05-12 PROCEDURE — 2500000004 HC RX 250 GENERAL PHARMACY W/ HCPCS (ALT 636 FOR OP/ED): Mod: JZ,IPSPLIT | Performed by: NURSE PRACTITIONER

## 2025-05-12 PROCEDURE — 71045 X-RAY EXAM CHEST 1 VIEW: CPT | Performed by: RADIOLOGY

## 2025-05-12 PROCEDURE — 2500000001 HC RX 250 WO HCPCS SELF ADMINISTERED DRUGS (ALT 637 FOR MEDICARE OP): Mod: IPSPLIT | Performed by: HOSPITALIST

## 2025-05-12 PROCEDURE — 2500000005 HC RX 250 GENERAL PHARMACY W/O HCPCS: Mod: IPSPLIT | Performed by: INTERNAL MEDICINE

## 2025-05-12 PROCEDURE — 82947 ASSAY GLUCOSE BLOOD QUANT: CPT | Mod: IPSPLIT

## 2025-05-12 PROCEDURE — 99232 SBSQ HOSP IP/OBS MODERATE 35: CPT | Performed by: NURSE PRACTITIONER

## 2025-05-12 PROCEDURE — 2500000005 HC RX 250 GENERAL PHARMACY W/O HCPCS: Mod: IPSPLIT | Performed by: NURSE PRACTITIONER

## 2025-05-12 PROCEDURE — 97166 OT EVAL MOD COMPLEX 45 MIN: CPT | Mod: GO,IPSPLIT | Performed by: OCCUPATIONAL THERAPIST

## 2025-05-12 PROCEDURE — 80053 COMPREHEN METABOLIC PANEL: CPT | Mod: IPSPLIT | Performed by: NURSE PRACTITIONER

## 2025-05-12 PROCEDURE — 83605 ASSAY OF LACTIC ACID: CPT | Mod: IPSPLIT | Performed by: NURSE PRACTITIONER

## 2025-05-12 PROCEDURE — 2500000001 HC RX 250 WO HCPCS SELF ADMINISTERED DRUGS (ALT 637 FOR MEDICARE OP): Mod: IPSPLIT | Performed by: NURSE PRACTITIONER

## 2025-05-12 PROCEDURE — 85025 COMPLETE CBC W/AUTO DIFF WBC: CPT | Mod: IPSPLIT | Performed by: NURSE PRACTITIONER

## 2025-05-12 PROCEDURE — 1100000001 HC PRIVATE ROOM DAILY: Mod: IPSPLIT

## 2025-05-12 PROCEDURE — 93005 ELECTROCARDIOGRAM TRACING: CPT | Mod: IPSPLIT

## 2025-05-12 PROCEDURE — 2500000004 HC RX 250 GENERAL PHARMACY W/ HCPCS (ALT 636 FOR OP/ED): Mod: JZ,IPSPLIT | Performed by: HOSPITALIST

## 2025-05-12 PROCEDURE — 2500000004 HC RX 250 GENERAL PHARMACY W/ HCPCS (ALT 636 FOR OP/ED): Mod: JZ,IPSPLIT

## 2025-05-12 PROCEDURE — 94760 N-INVAS EAR/PLS OXIMETRY 1: CPT | Mod: IPSPLIT

## 2025-05-12 PROCEDURE — 02HV33Z INSERTION OF INFUSION DEVICE INTO SUPERIOR VENA CAVA, PERCUTANEOUS APPROACH: ICD-10-PCS | Performed by: INTERNAL MEDICINE

## 2025-05-12 PROCEDURE — 70450 CT HEAD/BRAIN W/O DYE: CPT | Performed by: RADIOLOGY

## 2025-05-12 PROCEDURE — 71045 X-RAY EXAM CHEST 1 VIEW: CPT | Mod: IPSPLIT

## 2025-05-12 PROCEDURE — 70450 CT HEAD/BRAIN W/O DYE: CPT | Mod: IPSPLIT

## 2025-05-12 PROCEDURE — 97162 PT EVAL MOD COMPLEX 30 MIN: CPT | Mod: GP,IPSPLIT | Performed by: PHYSICAL THERAPIST

## 2025-05-12 PROCEDURE — 36415 COLL VENOUS BLD VENIPUNCTURE: CPT | Mod: IPSPLIT | Performed by: NURSE PRACTITIONER

## 2025-05-12 RX ORDER — HYDRALAZINE HYDROCHLORIDE 20 MG/ML
10 INJECTION INTRAMUSCULAR; INTRAVENOUS EVERY 4 HOURS PRN
Status: DISCONTINUED | OUTPATIENT
Start: 2025-05-12 | End: 2025-05-20 | Stop reason: HOSPADM

## 2025-05-12 RX ORDER — ACETAMINOPHEN 650 MG/1
650 SUPPOSITORY RECTAL EVERY 4 HOURS PRN
Status: DISCONTINUED | OUTPATIENT
Start: 2025-05-12 | End: 2025-05-20 | Stop reason: HOSPADM

## 2025-05-12 RX ORDER — LORAZEPAM 2 MG/ML
1 INJECTION INTRAMUSCULAR ONCE
Status: COMPLETED | OUTPATIENT
Start: 2025-05-12 | End: 2025-05-12

## 2025-05-12 RX ORDER — ACETAMINOPHEN 160 MG/5ML
650 SOLUTION ORAL EVERY 4 HOURS PRN
Status: DISCONTINUED | OUTPATIENT
Start: 2025-05-12 | End: 2025-05-20 | Stop reason: HOSPADM

## 2025-05-12 RX ORDER — SODIUM CHLORIDE 9 MG/ML
INJECTION, SOLUTION INTRAVENOUS
Status: COMPLETED
Start: 2025-05-12 | End: 2025-05-12

## 2025-05-12 RX ORDER — ACETAMINOPHEN 325 MG/1
650 TABLET ORAL EVERY 4 HOURS PRN
Status: DISCONTINUED | OUTPATIENT
Start: 2025-05-12 | End: 2025-05-20 | Stop reason: HOSPADM

## 2025-05-12 RX ORDER — LORAZEPAM 2 MG/ML
INJECTION INTRAMUSCULAR
Status: DISCONTINUED
Start: 2025-05-12 | End: 2025-05-12 | Stop reason: WASHOUT

## 2025-05-12 RX ADMIN — Medication 6 L/MIN: at 22:31

## 2025-05-12 RX ADMIN — CARBAMAZEPINE 200 MG: 200 TABLET ORAL at 08:03

## 2025-05-12 RX ADMIN — VANCOMYCIN HYDROCHLORIDE 1 G: 1 INJECTION, SOLUTION INTRAVENOUS at 02:13

## 2025-05-12 RX ADMIN — VANCOMYCIN HYDROCHLORIDE 1 G: 1 INJECTION, SOLUTION INTRAVENOUS at 13:37

## 2025-05-12 RX ADMIN — PIPERACILLIN SODIUM AND TAZOBACTAM SODIUM 4.5 G: 4; .5 INJECTION, SOLUTION INTRAVENOUS at 06:05

## 2025-05-12 RX ADMIN — PIPERACILLIN SODIUM AND TAZOBACTAM SODIUM 4.5 G: 4; .5 INJECTION, SOLUTION INTRAVENOUS at 12:39

## 2025-05-12 RX ADMIN — ACETAMINOPHEN 650 MG: 325 TABLET, FILM COATED ORAL at 14:22

## 2025-05-12 RX ADMIN — ONDANSETRON 4 MG: 2 INJECTION INTRAMUSCULAR; INTRAVENOUS at 06:36

## 2025-05-12 RX ADMIN — HYDRALAZINE HYDROCHLORIDE 25 MG: 25 TABLET ORAL at 08:03

## 2025-05-12 RX ADMIN — LORAZEPAM 1 MG: 2 INJECTION INTRAMUSCULAR; INTRAVENOUS at 15:27

## 2025-05-12 RX ADMIN — ENOXAPARIN SODIUM 40 MG: 40 INJECTION SUBCUTANEOUS at 08:03

## 2025-05-12 RX ADMIN — SODIUM CHLORIDE 500 ML: 900 INJECTION, SOLUTION INTRAVENOUS at 12:39

## 2025-05-12 RX ADMIN — Medication 2 L/MIN: at 09:30

## 2025-05-12 RX ADMIN — GABAPENTIN 300 MG: 300 CAPSULE ORAL at 08:03

## 2025-05-12 RX ADMIN — POLYETHYLENE GLYCOL 3350 17 G: 17 POWDER, FOR SOLUTION ORAL at 08:03

## 2025-05-12 RX ADMIN — ACETAMINOPHEN 650 MG: 325 TABLET, FILM COATED ORAL at 08:03

## 2025-05-12 ASSESSMENT — COGNITIVE AND FUNCTIONAL STATUS - GENERAL
MOVING TO AND FROM BED TO CHAIR: TOTAL
TOILETING: A LOT
MOBILITY SCORE: 8
STANDING UP FROM CHAIR USING ARMS: TOTAL
PERSONAL GROOMING: A LITTLE
DAILY ACTIVITIY SCORE: 15
EATING MEALS: A LITTLE
CLIMB 3 TO 5 STEPS WITH RAILING: TOTAL
MOVING FROM LYING ON BACK TO SITTING ON SIDE OF FLAT BED WITH BEDRAILS: A LOT
DRESSING REGULAR LOWER BODY CLOTHING: A LOT
DRESSING REGULAR UPPER BODY CLOTHING: A LITTLE
HELP NEEDED FOR BATHING: A LOT
TURNING FROM BACK TO SIDE WHILE IN FLAT BAD: A LOT
WALKING IN HOSPITAL ROOM: TOTAL

## 2025-05-12 ASSESSMENT — PAIN SCALES - WONG BAKER: WONGBAKER_NUMERICALRESPONSE: HURTS LITTLE MORE

## 2025-05-12 ASSESSMENT — PAIN - FUNCTIONAL ASSESSMENT
PAIN_FUNCTIONAL_ASSESSMENT: 0-10
PAIN_FUNCTIONAL_ASSESSMENT: 0-10

## 2025-05-12 ASSESSMENT — PAIN SCALES - GENERAL
PAINLEVEL_OUTOF10: 0 - NO PAIN
PAINLEVEL_OUTOF10: 0 - NO PAIN
PAINLEVEL_OUTOF10: 3
PAINLEVEL_OUTOF10: 0 - NO PAIN

## 2025-05-12 ASSESSMENT — PAIN INTENSITY VAS: VAS_PAIN_BASICVITALS_IP: 0

## 2025-05-12 ASSESSMENT — ACTIVITIES OF DAILY LIVING (ADL)
BATHING_ASSISTANCE: MAXIMAL
ADL_ASSISTANCE: NEEDS ASSISTANCE

## 2025-05-12 NOTE — CARE PLAN
The patient's goals for the shift include to get some sleep    The clinical goals for the shift include Pt will remain afebrile this shift    Problem: Discharge Planning  Goal: Discharge to home or other facility with appropriate resources  Outcome: Progressing     Problem: Chronic Conditions and Co-morbidities  Goal: Patient's chronic conditions and co-morbidity symptoms are monitored and maintained or improved  Outcome: Progressing     Problem: Nutrition  Goal: Nutrient intake appropriate for maintaining nutritional needs  Outcome: Progressing     Problem: Skin  Goal: Decreased wound size/increased tissue granulation at next dressing change  Outcome: Progressing  Flowsheets (Taken 5/12/2025 1108)  Decreased wound size/increased tissue granulation at next dressing change:   Promote sleep for wound healing   Protective dressings over bony prominences  Goal: Participates in plan/prevention/treatment measures  Outcome: Progressing  Flowsheets (Taken 5/12/2025 1108)  Participates in plan/prevention/treatment measures:   Discuss with provider PT/OT consult   Elevate heels   Increase activity/out of bed for meals  Goal: Promote/optimize nutrition  Outcome: Progressing  Flowsheets (Taken 5/12/2025 1108)  Promote/optimize nutrition:   Consume > 50% meals/supplements   Monitor/record intake including meals   Offer water/supplements/favorite foods  Goal: Promote skin healing  Outcome: Progressing  Flowsheets (Taken 5/12/2025 1108)  Promote skin healing:   Turn/reposition every 2 hours/use positioning/transfer devices   Protective dressings over bony prominences   Assess skin/pad under line(s)/device(s)     Problem: Diabetes  Goal: Achieve decreasing blood glucose levels by end of shift  Outcome: Progressing  Goal: Increase stability of blood glucose readings by end of shift  Outcome: Progressing  Goal: Decrease in ketones present in urine by end of shift  Outcome: Progressing  Goal: Maintain electrolyte levels within  "acceptable range throughout shift  Outcome: Progressing  Goal: Maintain glucose levels >70mg/dl to <250mg/dl throughout shift  Outcome: Progressing  Goal: No changes in neurological exam by end of shift  Outcome: Progressing  Goal: Learn about and adhere to nutrition recommendations by end of shift  Outcome: Progressing  Goal: Vital signs within normal range for age by end of shift  Outcome: Progressing  Goal: Increase self care and/or family involovement by end of shift  Outcome: Progressing  Goal: Receive DSME education by end of shift  Outcome: Progressing     Problem: Pain  Goal: Takes deep breaths with improved pain control throughout the shift  Outcome: Progressing  Goal: Turns in bed with improved pain control throughout the shift  Outcome: Progressing  Goal: Walks with improved pain control throughout the shift  Outcome: Progressing  Goal: Performs ADL's with improved pain control throughout shift  Outcome: Progressing  Goal: Participates in PT with improved pain control throughout the shift  Outcome: Progressing  Goal: Free from opioid side effects throughout the shift  Outcome: Progressing  Goal: Free from acute confusion related to pain meds throughout the shift  Outcome: Progressing   0930: Stroke alert called. Notes made in another area.     1400: Tried to call family back to give them an update. No answer voicemail left with place,name, and phone number.     15:38 Code violet was called after pt received a PICC line placement. Tamara Dotson went to leave the room after placing the line pt started yelling and scraming \"GET ME OUT OF HERE\" \"ANYWHERE BUT HERE\". He was extremely mad about getting the line in even after he consented to it. HE then began swearing at staff and tried to swing his legs at people and out of the bed. Pat Castillo was then called. Pt received 1mg of lorazepam. He also was put on 6L o2 due to sating  at 84%. SPO2 increases and pt is now sleeping.    Pt had stable blood pressures  " "throughout the day. Spo2 decreased after lorazepam ordered.  Was relieved by more oxygen. Pt had minimal pain throughout the shift. Was relieved with prn tylenol when he received it for his elevated temp throughout the day. Also, decreased his temp. Pt has been snarky throughout the day. Calling staff names throughout the shift and telling us to \"GO AWAY\". Pt refused most turns. Was able to turn him and put a protective mepilex on bottom.       18:55 Tried to call pts family members back again. No answer. Voicemail again left.   "

## 2025-05-12 NOTE — SIGNIFICANT EVENT
Rt was called bedside . Rt placed patient on 6L NC where his pulse ox maintained 91%. Pt received ativan after a code violet was called.

## 2025-05-12 NOTE — PROGRESS NOTES
Physical Therapy    Physical Therapy Evaluation    Patient Name: Elliot Partida  MRN: [id number]  Department: [facility]  Room: [address] [place], [place] [address]  Today's Date: 5/12/2025   Time Calculation  Start Time: 0905  Stop Time: 0920  Time Calculation (min): 15 min    Assessment/Plan   PT Assessment  PT Assessment Results: Decreased strength, Impaired balance, Decreased mobility, Decreased endurance, Decreased cognition, Impaired judgement, Decreased skin integrity, Orthopedic restrictions, Pain  Rehab Prognosis: Fair  Barriers to Discharge Home: Cognition needs, Physical needs  Cognition Needs: 24hr supervision for safety awareness needed, Cognition-related high falls risk  Physical Needs: Ambulating household distances limited by function/safety, In-home setup navigation limited by function/safety, 24hr mobility assistance needed  Evaluation/Treatment Tolerance: Treatment limited secondary to medical complications (Comment)  Medical Staff Made Aware: Yes  Barriers to Participation: Comorbidities  End of Session Communication: Bedside nurse, Charge Nurse, Physician  Assessment Comment: 74 y.o presents with weakness and impaired mobility. Pt. Lives with sister and is normally JOLENE with WW. Pt. currently requires  maxA x 2 for transfers (difficulty maintaining PWB) and would benefit from additional PT to address above noted limitations and prevent further decline.  Stroke alert called by RN at end of session as pt. became lethargic, dizzy and c/o severe headache (L sided facial drop noted).   End of Session Patient Position: Bed, 2 rail up (team present for [place] Alert)  IP OR SWING BED PT PLAN  Inpatient or Swing Bed: Inpatient  PT Plan  Treatment/Interventions: Transfer training, Bed mobility, Gait training, Stair training, Balance training, Strengthening, Endurance training, Therapeutic exercise, Therapeutic activity, Home exercise program, Neuromuscular re-education, Positioning, Postural re-education, Wheelchair management  PT Plan:  Ongoing PT  PT Frequency: 3 times per week  PT Discharge Recommendations: Moderate intensity level of continued care  PT Recommended Transfer Status: Assist x2  PT - OK to Discharge: Yes Based on completed evaluation and care plan recommendations, no barriers to discharge to next site of care       Subjective   General Visit Information:  General  Reason for Referral: impaired mobility, wound infection  Referred By: SARAH Austin-CNP  Past Medical History Relevant to Rehab: HTN, type 2 diabetes, DVT, osteomyelitis, BLE edema, stroke, hyperlipidemia, anemia, obesity, osteoarthritis, ulcer, CAD, PAD  Co-Treatment: OT  Co-Treatment Reason: pt. complexity  Prior to Session Communication: Bedside nurse  General Comment: luisa, pt. found yelling and trying to get out of bed, ace wrap intact on LLE. Stroke alert called by RN at end of session as pt. became lethargic, dizzy and c/o severe headache (L sided facial drop noted)  Home Living:  Home Living  Type of Home: House  Lives With:  (sister)  Home Adaptive Equipment: Walker rolling or standard, Wheelchair-manual  Home Layout: Able to live on main level with bedroom/bathroom  Home Access: Stairs to enter with rails  Entrance Stairs-Rails: Both  Entrance Stairs-Number of Steps: 5  Bathroom Shower/Tub: Tub/shower unit  Bathroom Toilet: Standard  Bathroom Equipment: Grab bars in shower (shower chair)  Home Living Comments: sleeps in recliner  Prior Level of Function:  Prior Function Per Pt/Caregiver Report  Level of [place]: Needs assistance with ADLs, Needs assistance with homemaking  Receives Help From:  (sister)  Ambulatory Assistance: Independent (states he amb. with WW; WC bump up the stairs)  Precautions:  Precautions  LE Weight Bearing Status: Left Partial Weight Bearing  Medical Precautions: Fall precautions, Neuro precautions      Date/Time Vitals Session Patient Position Pulse Resp SpO2 BP MAP (mmHg)    05/12/25 0905 --  --  100  --  99 %  118/64   --     05/12/25 0921 --  --  95  --  94 %  118/64  --     05/12/25 0924 --  --  94  --  95 %  103/55  --     05/12/25 0927 --  --  92  --  94 %  --  --     05/12/25 0930 --  --  91  --  95 %  --  --     05/12/25 09:30:17 --  --  --  --  --  103/55  --     05/12/25 0933 --  --  90  --  95 %  --  --     05/12/25 0936 --  --  94  --  94 %  --  --              Objective   Pain:  Pain Assessment  Pain Assessment: 0-10  0-10 (Numeric) Pain Score: 0 - No pain  Hager-Baker FACES Pain Rating:  (pt. c/o severe headache during session (RN notified))  Cognition:  Cognition  Overall Cognitive Status: Impaired  Arousal/Alertness:  (pt. was initially agitated and yelling out but became extremely lethargic once sitting EOB and slumped over)  Orientation Level: Disoriented to time    General Assessments:     Activity Tolerance  Endurance: Decreased tolerance for upright activites (significant change in mentation and alertness once sitting. Notified RN.)    Sensation  Sensation Comment: denies deficits    Strength  Strength Comments: Defer to OT for UE detail MMT: extremely limited effort 2/2 fatigue and dizziness. RLE: grossly 3-/5 LLE: 0/5  Coordination  Movements are Fluid and Coordinated: No       Static Sitting Balance  Static Sitting-Comment/Number of Minutes: good initially but with time pt. became more lethargic and dizzy with sitting and required modA to maintain upright position    Static Standing Balance  Static Standing-Comment/Number of Minutes: poor; maxA x 2  Dynamic Standing Balance  Dynamic Standing-Comments: unable  Functional Assessments:  Bed Mobility  Bed Mobility: Yes  Bed Mobility 1  Bed Mobility 1: Supine to sitting  Level of Assistance 1: Moderate assistance, +2  Bed Mobility 2  Bed Mobility  2: Sitting to supine  Level of Assistance 2: Dependent    Transfers  Transfer: Yes  Transfer 1  Technique 1: Sit to stand, Stand to sit  Transfer Device 1: Walker, Gait belt  Transfer Level of Assistance 1: +2, Maximum  assistance, Maximum verbal cues    Ambulation/Gait Training  Ambulation/Gait Training Performed: No  Extremity/Trunk Assessments:  Defer to OT for UE detail   RLE   RLE : Within Functional Limits  LLE   LLE : Within Functional Limits  Outcome Measures:  [facility] Basic Mobility  Turning from your back to your side while in a flat bed without using bedrails: A lot  Moving from lying on your back to sitting on the side of a flat bed without using bedrails: A lot  Moving to and from bed to chair (including a wheelchair): Total  Standing up from a chair using your arms (e.g. wheelchair or bedside chair): Total  To walk in hospital room: Total  Climbing 3-5 steps with railing: Total  Basic Mobility - Total Score: 8    Encounter Problems       Encounter Problems (Active)       Balance       STG - Maintains static sitting balance with upper extremity support with >=good- balance        Start:  05/12/25    Expected End:  05/26/25               Mobility       STG - Patient will propel the wheelchair JOLENE       Start:  05/12/25    Expected End:  05/26/25               PT Transfers       STG - Transfer from bed to chair with SBA and SW       Start:  05/12/25    Expected End:  05/26/25            STG - Patient will transfer sit to and from stand with SBA and SW       Start:  05/12/25    Expected End:  05/26/25            STG - Patient maintains weight bearing status during transfers IND       Start:  05/12/25    Expected End:  05/26/25               Pain - Adult              Education Documentation  Mobility Training, taught by Francine Taylor PT at 5/12/2025 10:57 AM.  Learner: Patient  Readiness: Acceptance  Method: Explanation  Response: Verbalizes Understanding  Comment: Educated pt. on PT POC    Education Comments  No comments found.

## 2025-05-12 NOTE — CARE PLAN
"The patient's goals for the shift include to get some sleep    The clinical goals for the shift include Patient will remain afebrile this shift    Assumed care of patient at 1930. Patient reporting general malaise, chills, and headache at beginning of shift, tylenol administered and cool rag applied to forehead for comfort. Patient afebrile throughout shift. Patient refusing q2h turns this shift. Upon waking, patient continues to state \"leave me alone\". VSS.  and 110 at HS. VSS. Tolerated IV antibiotics. Patient is resting in bed with call light within reach.    Problem: Skin  Goal: Participates in plan/prevention/treatment measures  Outcome: Not Progressing  Goal: Promote skin healing  Outcome: Not Progressing     Problem: Pain  Goal: Turns in bed with improved pain control throughout the shift  Outcome: Not Progressing  Goal: Participates in PT with improved pain control throughout the shift  Outcome: Not Progressing     "

## 2025-05-12 NOTE — SIGNIFICANT EVENT
Rt bedside during rapid response. Patient on 2L spo2 94%. No respiratory distress. Dr. Chappell dismissed Rt.

## 2025-05-12 NOTE — NURSING NOTE
I was in another pt's room when charge nurse contacted me over the radio and stated that this pt was dizzy and clammy. I had told her he had a low grade fever this morning when I was in there and was given tylenol. Charge nurse ended up calling a stroke alert due to pt stating he couldn't see and pt had a change of mental status. He had some general weakness to the right side and had residual from a previous CVA to the left side. He seemed to be more spaced than this morning. Vitals were done. Blood glucose was done and was 124. EKG was done. Labs were done. Pt was taken down for a CT at this time.

## 2025-05-12 NOTE — PROGRESS NOTES
Occupational Therapy    Evaluation    Patient Name: Elliot Partida  MRN: [id number]  Department: [facility]  Room: [address] [place], [place] [address]  Today's Date: 5/12/2025  Time Calculation  Start Time: 0905  Stop Time: 0920  Time Calculation (min): 15 min    Assessment  IP OT Assessment  OT Assessment: Pt. is a 74 y.o. male referred to occupational therapy for impaired self-care 2/2 admission for wound infection. Pt. displays decreased balance, transfers, self-care, endurnace, and cognition. Pt. would benefit from skilled OT at MOD intensity to address above deficits and return to PLOF in LRE.  Prognosis: Good  Barriers to Discharge Home: Cognition needs, Physical needs  Cognition Needs: 24hr supervision for safety awareness needed, Medication and/or medical management daily assist needed, Insight of patient limited regarding functional ability/needs  Physical Needs: Stair navigation into home limited by function/safety, 24hr ADL assistance needed, 24hr mobility assistance needed, Ambulating household distances limited by function/safety, High falls risk due to function or environment, Weight bearing precautions unable to be safely maintained  Evaluation/Treatment Tolerance: Patient tolerated treatment well  Medical Staff Made Aware: Yes  End of Session Communication: Bedside nurse  End of Session Patient Position: Bed, 2 rail up (team present for stroke alert)  Plan:  Treatment Interventions: ADL retraining, Functional transfer training, UE strengthening/ROM, Endurance training, Patient/family training, Neuromuscular reeducation, Equipment evaluation/education, Compensatory technique education, Cognitive reorientation  OT Frequency: 3 times per week  OT Discharge Recommendations: Moderate intensity level of continued care  OT Recommended Transfer Status: Dependent  OT - OK to Discharge: Yes (Based on completed evaluation and care plan recommendations, no barriers to discharge to next site of care.)    Subjective   Current Problem:  1. Wound  infection        2. Non-pressure chronic ulcer of other part of left foot with necrosis of bone        3. Hemiplegia and hemiparesis following cerebral infarction affecting left non-dominant side (Multi)        4. History of DVT (deep vein thrombosis)          General:  General  Reason for Referral: impaired self-care d/t admission for wound infection  Referred By: Ani Jones, APRN-CNP  Past Medical History Relevant to Rehab: HTN, type 2 diabetes, DVT, osteomyelitis, BLE edema, stroke, hyperlipidemia, anemia, obesity, osteoarthritis, ulcer, CAD, PAD, aortic stenosis, neuropathy, trigeminal neuralgia, meningioma, MI, neuritis, PVD, subdural abscess, tinea pedis  Family/Caregiver Present: No  Co-Treatment: PT  Co-Treatment Reason: pt. complexity and hx of refusals  Prior to Session Communication: Bedside nurse  Patient Position Received: Bed, 2 rail up, Alarm on  General Comment: Pt. attempting to sit at EOB, yelling out for help upon OT arrival. OT/PT assisted pt. to EOB, evaluated UE/LE ROM and strength and attempted one stand. During this course, pt. was agitated and repeatedly called OT names. When seated back at EOB, pt. became very lethargic and started to slump forward. Pt. complained of being tired. Assisted pt. back to bed, pt. complained of headache, pain, and dizziness. Noted L sided facial droop. Pt. reported previous hx of stroke affecting L side. Charge RN present to assess pt. and called stroke alert. At this time, pt.'s demeanor changed and he was no longer agitated. Pt. left with stroke team.  Precautions:  LE Weight Bearing Status: Left Partial Weight Bearing  Medical Precautions: Fall precautions  Precautions Comment: masimo,    Pain:  Pain Assessment  Pain Assessment: 0-10  0-10 (Numeric) Pain Score: 0 - No pain (pt. c/o of severe HA at end of session; providers aware)    Objective   Cognition:  Overall Cognitive Status: Impaired  Arousal/Alertness:  (pt. intially agitated then became  lethargic and changed his demeanor towards therapist.)  Orientation Level: Disoriented to time, Disoriented to situation    Home Living:  Type of Home: House  Lives With: Siblings (sister)  Home Adaptive Equipment: Walker rolling or standard, Wheelchair-manual  Home Layout: Able to live on main level with bedroom/bathroom  Home Access: Stairs to enter with rails  Entrance Stairs-Rails: Both  Entrance Stairs-Number of Steps: 5  Bathroom Shower/Tub: Tub/shower unit  Bathroom Toilet: Standard  Bathroom Equipment: Grab bars in shower (shower)  Home Living Comments: sleeps in recliner   Prior Function:  Level of [place]: Needs assistance with ADLs, Needs assistance with homemaking  Receives Help From: Family  ADL Assistance: Needs assistance (reports he does not bathe, sister helps him with dressing)  Homemaking Assistance: Needs assistance (sister completes IADL tasks)  Ambulatory Assistance: Needs assistance (states he ambulates with wheeled walker; Niece w/c bumps him up the stairs into the house)  Vocational: Retired  Leisure: Pt. reported that he watches tv in his free time.  ADL:  Eating Assistance: Stand by (anticipated)  Grooming Assistance: Minimal (anticipated)  Bathing Assistance: Maximal (anticipaterd)  UE Dressing Assistance: Minimal (anticipated)  LE Dressing Assistance: Maximal (anticipated; pt. refused therapist request to don socks)  Toileting Assistance with Device: Maximal (anticipated)  Activity Tolerance:  Endurance: Decreased tolerance for upright activites, Tolerates less than 10 min exercise, no significant change in vital signs  Bed Mobility/Transfers: Bed Mobility  Bed Mobility: Yes  Bed Mobility 1  Bed Mobility 1: Supine to sitting  Level of Assistance 1: Moderate assistance, +2  Bed Mobility 2  Bed Mobility  2: Sitting to supine  Level of Assistance 2: Dependent    Transfers  Transfer: Yes  Transfer 1  Transfer From 1: Bed to  Transfer to 1: Sit, Stand  Technique 1: Sit to stand, Stand to  sit  Transfer Device 1: Walker, Gait belt  Transfer Level of Assistance 1: +2, Maximum assistance, Maximum verbal cues  Trials/Comments 1: poor carryover of WB status; pt. unable to complete another stand    Functional Mobility:  Functional Mobility  Functional Mobility Performed: No  Sitting Balance:  Static Sitting Balance  Static Sitting-Balance Support: Feet supported  Static Sitting-Level of Assistance: Close supervision  Dynamic Sitting Balance  Dynamic Sitting-Balance Support: Feet supported  Dynamic Sitting-Level of Assistance: Contact guard  Dynamic Sitting-Balance: Forward lean  Standing Balance:  Static Standing Balance  Static Standing-Balance Support: Bilateral upper extremity supported  Static Standing-Level of Assistance: Maximum assistance  Vision: Vision - Basic Assessment  Current Vision:  (reported blurriness in eyes today)  Sensation:  Sensation Comment: denies deficits  Strength:  Strength Comments: BUE: 3+/5 grossly throughout  Coordination:  Movements are Fluid and Coordinated: Yes   Hand Function:  Hand Function  Gross Grasp: Functional  Coordination: Functional  Extremities: RUE   RUE : Within Functional Limits and LUE   LUE: Within Functional Limits    Outcome Measures: [facility] Daily Activity  Putting on and taking off regular lower body clothing: A lot  Bathing (including washing, rinsing, drying): A lot  Putting on and taking off regular upper body clothing: A little  Toileting, which includes using toilet, bedpan or urinal: A lot  Taking care of personal grooming such as brushing teeth: A little  Eating Meals: A little  Daily Activity - Total Score: 15      Education Documentation  Precautions, taught by Soo Chua OT at 5/12/2025  1:02 PM.  Learner: Patient  Readiness: Acceptance  Method: Explanation  Response: Needs Reinforcement  Comment: edu on POC and WB precautions.    ADL Training, taught by Soo Chua OT at 5/12/2025  1:02 PM.  Learner: Patient  Readiness: Acceptance  Method:  Explanation  Response: Needs Reinforcement  Comment: edu on POC and WB precautions.    Education Comments  No comments found.      Goals:   Encounter Problems       Encounter Problems (Active)       ADLs       Patient with complete upper body dressing with set-up, supervision level of assistance donning and doffing all UE clothes with PRN adaptive equipment while supported sitting       Start:  05/12/25    Expected End:  05/26/25            Patient will complete daily grooming tasks brushing teeth and washing face/hair with set-up, supervision level of assistance and PRN adaptive equipment while supported sitting.       Start:  05/12/25    Expected End:  05/26/25            Patient will complete toileting including hygiene clothing management/hygiene with minimal assist  level of assistance.       Start:  05/12/25    Expected End:  05/26/25               COGNITION/SAFETY       Patient will recall and adhere to weight bearing and /or ROM restrictions with all ADL and functional mobility in order to promote healing and safety with functional tasks       Start:  05/12/25    Expected End:  05/26/25               TRANSFERS       Patient will perform bed mobility set-up, supervision level of assistance and bed rails and leg  in order to improve safety and independence with mobility       Start:  05/12/25    Expected End:  05/26/25            Patient will complete functional transfer to chair, bed, commode with least restrictive device with supervision level of assistance.       Start:  05/12/25    Expected End:  05/26/25

## 2025-05-12 NOTE — PROGRESS NOTES
05/12/25 1438   Discharge Planning   Living Arrangements Family members   Support Systems Family members   Assistance Needed PT/OT rec. MOD intensity, patient declined to discuss discharge planning, stroke alert - MRI negative for stroke, PICC Line placement today for long term antibiotics, for possible sepsis, Will discuss discharge planning when patient medically ready.   Type of Residence Private residence   Home or Post Acute Services Post acute facilities (Rehab/SNF/etc)   Type of Post Acute Facility Services Rehab;Skilled nursing   Expected Discharge Disposition SNF     Patient would not allow to discuss IMM letter, will attempt later or tomorrow.

## 2025-05-12 NOTE — PROGRESS NOTES
Elliot Partida is a 74 y.o. male on day 4 of admission presenting with Wound infection.    Subjective   Interval History:   Afebrile, no chills  No left leg/foot pain  No chest pain or shortness of breath.  No nausea vomiting or diarrhea     Review of Systems   All other systems reviewed and are negative.      Objective   Range of Vitals (last 24 hours)  Heart Rate:  []   Temp:  [36.6 °C (97.9 °F)-38.2 °C (100.8 °F)]   Resp:  [16-18]   BP: (103-156)/(55-84)   SpO2:  [88 %-96 %]   Daily Weight  05/08/25 : 94.9 kg (209 lb 3.5 oz)    Body mass index is 30.9 kg/m².    Physical Exam  Constitutional:       Appearance: Normal appearance.   HENT:      Head: Normocephalic and atraumatic.      Right Ear: External ear normal.      Left Ear: External ear normal.      Nose: Nose normal.   Eyes:      General: No scleral icterus.     Extraocular Movements: Extraocular movements intact.      Conjunctiva/sclera: Conjunctivae normal.   Cardiovascular:      Rate and Rhythm: Regular rhythm.      Heart sounds: Normal heart sounds, S1 normal and S2 normal.   Pulmonary:      Breath sounds: Normal breath sounds and air entry.   Abdominal:      General: Bowel sounds are normal.      Palpations: Abdomen is soft.      Tenderness: There is no abdominal tenderness.   Musculoskeletal:      Cervical back: Normal range of motion and neck supple.      Right lower leg: No edema.      Left lower leg: No edema.   Skin:     General: Skin is warm and dry.      Comments: Left anterior lower leg/dorsal foot ulcer with moderate amount of granulation tissue and moderate amount of fat necrosis   Neurological:      Mental Status: He is alert.   Psychiatric:         Behavior: Behavior normal. Behavior is cooperative.    Antibiotics  piperacillin-tazobactam - 4.5 gram/100 mL  vancomycin - 1 gram/200 mL    Relevant Results  Labs  Results from last 72 hours   Lab Units 05/12/25  0624 05/11/25  0725   WBC AUTO x10*3/uL 7.1 9.7   HEMOGLOBIN g/dL 10.5* 10.9*    HEMATOCRIT % 33.8* 37.1*   PLATELETS AUTO x10*3/uL 226 192   NEUTROS PCT AUTO % 76.4 84.2   LYMPHS PCT AUTO % 9.0 5.5   MONOS PCT AUTO % 13.6 8.8   EOS PCT AUTO % 0.1 0.7     Results from last 72 hours   Lab Units 05/12/25  0624 05/11/25  0725   SODIUM mmol/L 135* 135*   POTASSIUM mmol/L 3.6 4.2   CHLORIDE mmol/L 101 104   CO2 mmol/L 27 22   BUN mg/dL 23 17   CREATININE mg/dL 0.72 0.66   GLUCOSE mg/dL 129* 135*   CALCIUM mg/dL 8.2* 8.2*   ANION GAP mmol/L 11 13   EGFR mL/min/1.73m*2 >90 >90     Results from last 72 hours   Lab Units 05/12/25  0624 05/11/25  0725   ALK PHOS U/L 84 87   BILIRUBIN TOTAL mg/dL 0.5 0.8   PROTEIN TOTAL g/dL 6.1* 6.1*   ALT U/L 13 11   AST U/L 18 19   ALBUMIN g/dL 3.0* 3.0*     Estimated Creatinine Clearance: 103.9 mL/min (by C-G formula based on SCr of 0.72 mg/dL).  C-Reactive Protein   Date Value Ref Range Status   05/08/2025 1.54 (H) <1.00 mg/dL Final   02/11/2025 2.68 (H) <1.00 mg/dL Final   11/05/2024 1.34 (H) <1.00 mg/dL Final     Microbiology  Susceptibility data from last 14 days.  Collected Specimen Info Organism Clindamycin Erythromycin Oxacillin Tetracycline Trimethoprim/Sulfamethoxazole Vancomycin   05/09/25 Swab from Anterior Nares Methicillin Susceptible Staphylococcus aureus (MSSA)         05/08/25 Tissue/Biopsy from Wound/Tissue Methicillin Resistant Staphylococcus aureus (MRSA)  R  R  R  R  S  S     Mixed Aerobic and Anaerobic Bacteria              Imaging  CT brain attack head wo IV contrast  Result Date: 5/12/2025  Interpreted By:  Enedina Umana, STUDY: CT BRAIN ATTACK HEAD WO IV CONTRAST;  5/12/2025 9:37 am   INDICATION: Signs/Symptoms:stroke alert.     COMPARISON: 12/23/2024   ACCESSION NUMBER(S): [id number]   ORDERING CLINICIAN: KATIE ANDRYC   TECHNIQUE: Unenhanced CT images of the head were obtained.   FINDINGS: Patient is rotated.  The ventricles, cisterns and sulci are enlarged, consistent with diffuse volume loss. There are areas of nonspecific white matter  hypodensity, which are probably age related or microvascular in nature. Low-density lacunar type infarct in the right basal ganglia. These findings are similar to the previous exam. There is no acute intracranial hemorrhage, mass effect or midline shift. No extraaxial fluid collection.   No focal calvarial lesion.   Bilateral ethmoid, left sphenoid and left maxillary sinus mucosal thickening.       No acute intracranial hemorrhage or mass-effect.   Multifocal sinus mucosal thickening.   MACRO: Enedina Umana discussed the significance and urgency of this critical finding by secure chat with  KATIE HILTON on 5/12/2025 at 9:47 am. (**-RCF-**) Findings:  See findings.     Signed by: Enedina Umana 5/12/2025 9:48 AM Dictation workstation:   [id number]    MR tibia fibula left w and wo IV contrast  Result Date: 5/9/2025  Interpreted By:  Lina Moreno and Abuhamdeh Imran STUDY: MRI of the left tibia/fibula without and with IV contrast dated 5/9/2025.   INDICATION: Signs/Symptoms:Chronic osteomyelitis of the tibia.     COMPARISON: Left tibia/fibula radiograph 05/08/2025   ACCESSION NUMBER(S): [id number]   ORDERING CLINICIAN: ADA ESPINOZA   TECHNIQUE: Multiplanar multisequence MRI of the  left tibia/fibula was performed without and with intravenous gadolinium based contrast.   FINDINGS: OSSEOUS STRUCTURES AND JOINTS:   No fracture or dislocation is evident. Bone marrow signal intensity is within normal limits. No joint effusion is evident.   SOFT TISSUES: Partially visualized large soft tissue ulceration along the anterior aspect of the tibiotalar joint to the distal tibia with possible exposure of the anterior tibialis tendon (axial image 74). There is extensive diffuse subcutaneous edema noted throughout the lower extremities without rim enhancing fluid collection. Moderate amount of fascial plane fluid noted in the anterior as well as posterior aspect of the medial head of the gastrocnemius. There is moderate  diffuse generalized muscle atrophy. There is intramuscular muscle edema along the anterior compartment without intramuscular abscess. The visualized muscles and tendons are intact. Ligaments are not well assessed on this examination.       1. No evidence to suggest osteomyelitis. 2. Partially visualized large soft tissue ulceration along the anterior aspect of the distal tibia and tibial talar joint with possible exposure of the underlying anterior tibialis tendon. 3. Moderate amount of fluid along the posterior as well as anterior aspect of the medial head of the gastrocnemius extending from the level of the knee to the distal lower leg. This is nonspecific with infectious fasciitis felt to be less likely although not entirely excluded. Edema of the anterior compartment muscles of the lower leg which may represent infectious myositis. No evidence of intramuscular abscess. 4. Extensive diffuse soft tissue edema noted throughout the left lower extremity and partially visualized right lower extremity 5. Moderate diffuse fatty atrophy of the lower leg muscles.   I personally reviewed the images/study and I agree with Dr. Melia Lewis and the findings as stated.   MACRO: None   Signed by: Lina Moreno 5/9/2025 11:14 AM Dictation workstation:   [id number]    XR tibia fibula left 2 views  Result Date: 5/8/2025  Interpreted By:  Mariam Vang, STUDY: XR TIBIA FIBULA LEFT 2 VIEWS; ;  5/8/2025 10:25 pm   INDICATION: Signs/Symptoms:Chronic tibial osteomyelitis.     COMPARISON: None.   ACCESSION NUMBER(S): [id number]   ORDERING CLINICIAN: ADA ESPINOZA   FINDINGS: Two views of the left tibia and fibula show thick periosteal bone formation along the tibial diaphysis likely due to a longstanding process. There is no acute bone destruction to suggest acute osteomyelitis. No fracture or dislocation.       Thick periosteal new bone formation along the left tibia likely due to history of chronic osteomyelitis and healing. No  acute bone destruction.     MACRO: None   Signed by: Mariam Vang 5/8/2025 10:46 PM Dictation workstation:   [id number]    XR foot left 3+ views  Result Date: 5/8/2025  Interpreted By:  Osmar Garza, STUDY: XR FOOT LEFT 3+ VIEWS;  5/8/2025 9:26 am   INDICATION: Signs/Symptoms:wound.   COMPARISON: None.   ACCESSION NUMBER(S): [id number]   ORDERING CLINICIAN: LUDA JALLOH   FINDINGS: Post left transmetatarsal amputation. There is a possible age indeterminate nondisplaced fracture near the base of the 4th proximal phalanx only seen on the oblique view. No additional acute fracture, dislocation, or focal osseous destruction identified. Scattered degenerative changes. Small calcaneal enthesophytes.       Questionable age-indeterminate nondisplaced fracture near the base of the left foot 4th proximal phalanx, only apparent on one view. Soft tissue swelling. No additional acute osseous findings of the left foot.   MACRO: None   Signed by: Osmar Garza 5/8/2025 10:04 AM Dictation workstation:   [id number]      Assessment/Plan   Type 2 diabetes with peripheral angiopathy without gangrene-recent PVR remarkable for mild peripheral vascular disease  Left leg/dorsal foot cellulitis  Left tibial osteomyelitis-pathology report from 2/13/2025 reviewed, positive wound culture for MRSA     Continue vancomycin  Discontinue Zosyn  PICC line placement  Local care  Consider vascular evaluation, can be done as outpatient  Supportive care  Monitor temperature and WBC  Long-term plan is 6 weeks of IV antibiotic therapy  Weekly CBC with differential, CMP, CRP while on therapy     This is a complex infectious disease issue and the following was performed today (for more details please see the above note): Management decisions reflecting the added complexity (e.g., changes in antimicrobial therapy, infection control strategies).     Baljit Velazco MD

## 2025-05-12 NOTE — PROGRESS NOTES
Elliot Partida is a 74 y.o. male on day 4 of admission presenting with Wound infection.      Subjective   Patient was assessed at bedside. He was awake and grumpy. He stated he was dead. Would not answer question appropriate. Nursing called a code stroke; they felt he was not acting right. CT head negative. Later after getting his PICC line he became very aggressive; and yelling at staff. He was throwing his feet over the edge of the bed stating he was getting out of here. He was given a dose of lorazepam to calm him down.       Objective     Last Recorded Vitals  /79 (BP Location: Left arm)   Pulse 97   Temp (!) 38.4 °C (101.1 °F)   Resp 17   Wt 94.9 kg (209 lb 3.5 oz)   SpO2 (!) 89%   Intake/Output last 3 Shifts:    Intake/Output Summary (Last 24 hours) at 5/12/2025 1505  Last data filed at 5/12/2025 1421  Gross per 24 hour   Intake 700 ml   Output 400 ml   Net 300 ml       Admission Weight  Weight: 89.4 kg (197 lb) (05/08/25 0850)    Daily Weight  05/08/25 : 94.9 kg (209 lb 3.5 oz)    Image Results      Physical Exam  Vitals reviewed.   Constitutional:       Appearance: Normal appearance. He is obese.   HENT:      Head: Normocephalic and atraumatic.      Right Ear: External ear normal.      Left Ear: External ear normal.      Nose: Nose normal.      Mouth/Throat:      Mouth: Mucous membranes are moist.      Pharynx: Oropharynx is clear.   Eyes:      Conjunctiva/sclera: Conjunctivae normal.      Pupils: Pupils are equal, round, and reactive to light.   Cardiovascular:      Rate and Rhythm: Normal rate and regular rhythm.      Pulses: Normal pulses.      Heart sounds: Normal heart sounds.   Pulmonary:      Effort: Pulmonary effort is normal.      Breath sounds: Rales present.   Abdominal:      General: Bowel sounds are normal.      Palpations: Abdomen is soft.   Musculoskeletal:         General: Normal range of motion.      Cervical back: Normal range of motion and neck supple.   Skin:     General:  Skin is warm and dry.      Comments: Stage 2 pressure wound to his buttocks, LLE dressing intact   Neurological:      Mental Status: He is alert. He is disoriented.      Motor: Weakness present.         Relevant Results    Scheduled medications  Scheduled Medications[1]  Continuous medications  Continuous Medications[2]  PRN medications  PRN Medications[3]    Results for orders placed or performed during the hospital encounter of 05/08/25 (from the past 24 hours)   POCT GLUCOSE   Result Value Ref Range    POCT Glucose 131 (H) 74 - 99 mg/dL   POCT GLUCOSE   Result Value Ref Range    POCT Glucose 123 (H) 74 - 99 mg/dL   POCT GLUCOSE   Result Value Ref Range    POCT Glucose 110 (H) 74 - 99 mg/dL   CBC and Auto Differential   Result Value Ref Range    WBC 7.1 4.4 - 11.3 x10*3/uL    nRBC 0.0 0.0 - 0.0 /100 WBCs    RBC 4.15 (L) 4.50 - 5.90 x10*6/uL    Hemoglobin 10.5 (L) 13.5 - 17.5 g/dL    Hematocrit 33.8 (L) 41.0 - 52.0 %    MCV 81 80 - 100 fL    MCH 25.3 (L) 26.0 - 34.0 pg    MCHC 31.1 (L) 32.0 - 36.0 g/dL    RDW 16.1 (H) 11.5 - 14.5 %    Platelets 226 150 - 450 x10*3/uL    Neutrophils % 76.4 40.0 - 80.0 %    Immature Granulocytes %, Automated 0.6 0.0 - 0.9 %    Lymphocytes % 9.0 13.0 - 44.0 %    Monocytes % 13.6 2.0 - 10.0 %    Eosinophils % 0.1 0.0 - 6.0 %    Basophils % 0.3 0.0 - 2.0 %    Neutrophils Absolute 5.41 1.60 - 5.50 x10*3/uL    Immature Granulocytes Absolute, Automated 0.04 0.00 - 0.50 x10*3/uL    Lymphocytes Absolute 0.64 (L) 0.80 - 3.00 x10*3/uL    Monocytes Absolute 0.96 (H) 0.05 - 0.80 x10*3/uL    Eosinophils Absolute 0.01 0.00 - 0.40 x10*3/uL    Basophils Absolute 0.02 0.00 - 0.10 x10*3/uL   Comprehensive metabolic panel   Result Value Ref Range    Glucose 129 (H) 74 - 99 mg/dL    Sodium 135 (L) 136 - 145 mmol/L    Potassium 3.6 3.5 - 5.3 mmol/L    Chloride 101 98 - 107 mmol/L    Bicarbonate 27 21 - 32 mmol/L    Anion Gap 11 10 - 20 mmol/L    Urea Nitrogen 23 6 - 23 mg/dL    Creatinine 0.72 0.50 -  1.30 mg/dL    eGFR >90 >60 mL/min/1.73m*2    Calcium 8.2 (L) 8.6 - 10.3 mg/dL    Albumin 3.0 (L) 3.4 - 5.0 g/dL    Alkaline Phosphatase 84 33 - 136 U/L    Total Protein 6.1 (L) 6.4 - 8.2 g/dL    AST 18 9 - 39 U/L    Bilirubin, Total 0.5 0.0 - 1.2 mg/dL    ALT 13 10 - 52 U/L   POCT GLUCOSE   Result Value Ref Range    POCT Glucose 128 (H) 74 - 99 mg/dL   POCT GLUCOSE   Result Value Ref Range    POCT Glucose 124 (H) 74 - 99 mg/dL   Lactate   Result Value Ref Range    Lactate 0.9 0.4 - 2.0 mmol/L   Lavender Top   Result Value Ref Range    Extra Tube Hold for add-ons.    POCT GLUCOSE   Result Value Ref Range    POCT Glucose 113 (H) 74 - 99 mg/dL                     Assessment & Plan  Wound infection    Osteomyelitis of left foot, unspecified type (Multi)    Acute deep vein thrombosis (DVT) of popliteal vein of left lower extremity    Bilateral lower extremity edema    Benign essential HTN    DM type 2 with diabetic peripheral neuropathy    Hyperlipidemia    Trigeminal neuralgia      Wound infection  Osteomyelitis of left foot, unspecified type (Multi)  - XR tib/fib: Thick periosteal new bone formation along the left tibia likely due to history of chronic osteomyelitis and healing. No acute bone destruction.  - MRI: No evidence to suggest osteomyelitis. 2. Partially visualized large soft tissue ulceration along the  anterior aspect of the distal tibia and tibial talar joint with possible exposure of the underlying anterior tibialis tendon. Moderate amount of fluid along the posterior as well as anterior aspect of the medial head of the gastrocnemius extending from the level of the knee to the distal lower leg. This is nonspecific with infectious fasciitis felt to be less likely although not entirely excluded. Edema of the anterior compartment muscles of the lower leg which may represent infectious myositis. No evidence of intramuscular abscess.  Extensive diffuse soft tissue edema noted throughout the left lower extremity  and partially visualized right lower extremity Moderate diffuse fatty atrophy of the lower leg muscles.  -Podiatry  and ID consult  -discontinue zosyn  -continue vancomycin 1g q12h  -wound culture: GPC grew MRSA   -follow wound culture, WBC, blood cx  - PWB to the left lower extremity for transfer   - PT/OT  -PICC line placed for 6 weeks IV antibiotics  - Chronic 02 is on 2 liters 02 at night, has been on it continuously  - RT to evaluate     Acute deep vein thrombosis (DVT) of popliteal vein of left lower extremity  Bilateral lower extremity edema  - previous xarelto for DVT, completed  - elevate legs when resting     Benign essential HTN  Hyperlipidemia  - Echo: 1. The left ventricular systolic function is normal, with a visually estimated ejection fraction of 55-60%.   2. Spectral Doppler shows a Grade I (impaired relaxation pattern) of left ventricular diastolic filling with normal left atrial filling pressure.  3. There is normal right ventricular global systolic function.  4. There is moderate to severe mitral annular calcification. 5. Moderate aortic valve stenosis. Dimensionless index 0.31 : moderate stenosis. Peak and mean gradients around 48 and 28 mm Hg respectively. Estimated aortic valve area around 1 cm2.  6. There is moderate aortic valve cusp calcification.  - continue hydralazine 25 mg TID  - monitor HR, BP     DM type 2 with diabetic peripheral neuropathy  -sliding scale with lispro coverage 0-10  -A1c: 5.8     Trigeminal neuralgia  - continue carbamazepine 200 mg BID, gabapentin 300 mg TID        DVT ppx:   - continue enoxaparin 40 mg daily     Code Status: Full code     Disposition: Pt requires inpatient stay at this time.         SARAH Malik-CNP           [1] carBAMazepine, 200 mg, oral, BID  enoxaparin, 40 mg, subcutaneous, Daily  gabapentin, 300 mg, oral, TID  hydrALAZINE, 25 mg, oral, TID  insulin lispro, 0-10 Units, subcutaneous, TID AC  lidocaine, 5 mL, infiltration,  Once  polyethylene glycol, 17 g, oral, Daily  [Held by provider] potassium chloride CR, 40 mEq, oral, Daily  traZODone, 50 mg, oral, Nightly  vancomycin, 1 g, intravenous, q12h  [2]    [3] PRN medications: acetaminophen **OR** [DISCONTINUED] acetaminophen **OR** [DISCONTINUED] acetaminophen, alum-mag hydroxide-simeth, benzocaine-menthol, dextrose, dextrose, glucagon, glucagon, ketorolac, lidocaine, metoclopramide, ondansetron, oxygen, vancomycin

## 2025-05-13 PROBLEM — R50.9 FEVER: Status: ACTIVE | Noted: 2025-05-13

## 2025-05-13 LAB
ANION GAP SERPL CALC-SCNC: 11 MMOL/L (ref 10–20)
ATRIAL RATE: 78 BPM
ATRIAL RATE: 90 BPM
BACTERIA BLD CULT: NORMAL
BNP SERPL-MCNC: 301 PG/ML (ref 0–99)
BUN SERPL-MCNC: 28 MG/DL (ref 6–23)
CALCIUM SERPL-MCNC: 8.1 MG/DL (ref 8.6–10.3)
CHLORIDE SERPL-SCNC: 101 MMOL/L (ref 98–107)
CO2 SERPL-SCNC: 29 MMOL/L (ref 21–32)
CREAT SERPL-MCNC: 0.75 MG/DL (ref 0.5–1.3)
EGFRCR SERPLBLD CKD-EPI 2021: >90 ML/MIN/1.73M*2
ERYTHROCYTE [DISTWIDTH] IN BLOOD BY AUTOMATED COUNT: 15.9 % (ref 11.5–14.5)
FLUAV RNA RESP QL NAA+PROBE: NOT DETECTED
FLUBV RNA RESP QL NAA+PROBE: NOT DETECTED
GLUCOSE BLD MANUAL STRIP-MCNC: 119 MG/DL (ref 74–99)
GLUCOSE BLD MANUAL STRIP-MCNC: 125 MG/DL (ref 74–99)
GLUCOSE BLD MANUAL STRIP-MCNC: 127 MG/DL (ref 74–99)
GLUCOSE BLD MANUAL STRIP-MCNC: 90 MG/DL (ref 74–99)
GLUCOSE SERPL-MCNC: 132 MG/DL (ref 74–99)
HCT VFR BLD AUTO: 33 % (ref 41–52)
HGB BLD-MCNC: 10.1 G/DL (ref 13.5–17.5)
MCH RBC QN AUTO: 25.3 PG (ref 26–34)
MCHC RBC AUTO-ENTMCNC: 30.6 G/DL (ref 32–36)
MCV RBC AUTO: 83 FL (ref 80–100)
NRBC BLD-RTO: 0 /100 WBCS (ref 0–0)
P AXIS: 31 DEGREES
P AXIS: 58 DEGREES
P OFFSET: 129 MS
P OFFSET: 146 MS
P ONSET: 62 MS
P ONSET: 82 MS
PLATELET # BLD AUTO: 245 X10*3/UL (ref 150–450)
POTASSIUM SERPL-SCNC: 3.5 MMOL/L (ref 3.5–5.3)
PR INTERVAL: 256 MS
PR INTERVAL: 316 MS
Q ONSET: 210 MS
Q ONSET: 220 MS
QRS COUNT: 13 BEATS
QRS COUNT: 14 BEATS
QRS DURATION: 120 MS
QRS DURATION: 138 MS
QT INTERVAL: 384 MS
QT INTERVAL: 398 MS
QTC CALCULATION(BAZETT): 437 MS
QTC CALCULATION(BAZETT): 486 MS
QTC FREDERICIA: 419 MS
QTC FREDERICIA: 455 MS
R AXIS: -49 DEGREES
R AXIS: -59 DEGREES
RBC # BLD AUTO: 4 X10*6/UL (ref 4.5–5.9)
RSV RNA RESP QL NAA+PROBE: NOT DETECTED
SARS-COV-2 RNA RESP QL NAA+PROBE: DETECTED
SODIUM SERPL-SCNC: 137 MMOL/L (ref 136–145)
T AXIS: 59 DEGREES
T AXIS: 74 DEGREES
T OFFSET: 409 MS
T OFFSET: 412 MS
VANCOMYCIN SERPL-MCNC: 16.6 UG/ML (ref 5–20)
VENTRICULAR RATE: 78 BPM
VENTRICULAR RATE: 90 BPM
WBC # BLD AUTO: 9.6 X10*3/UL (ref 4.4–11.3)

## 2025-05-13 PROCEDURE — 80048 BASIC METABOLIC PNL TOTAL CA: CPT | Mod: IPSPLIT | Performed by: STUDENT IN AN ORGANIZED HEALTH CARE EDUCATION/TRAINING PROGRAM

## 2025-05-13 PROCEDURE — 99233 SBSQ HOSP IP/OBS HIGH 50: CPT | Performed by: NURSE PRACTITIONER

## 2025-05-13 PROCEDURE — 2500000004 HC RX 250 GENERAL PHARMACY W/ HCPCS (ALT 636 FOR OP/ED): Mod: JZ,IPSPLIT | Performed by: INTERNAL MEDICINE

## 2025-05-13 PROCEDURE — 86738 MYCOPLASMA ANTIBODY: CPT | Performed by: INTERNAL MEDICINE

## 2025-05-13 PROCEDURE — 2500000004 HC RX 250 GENERAL PHARMACY W/ HCPCS (ALT 636 FOR OP/ED): Mod: JZ,IPSPLIT | Performed by: NURSE PRACTITIONER

## 2025-05-13 PROCEDURE — 2500000004 HC RX 250 GENERAL PHARMACY W/ HCPCS (ALT 636 FOR OP/ED): Mod: JZ,IPSPLIT | Performed by: STUDENT IN AN ORGANIZED HEALTH CARE EDUCATION/TRAINING PROGRAM

## 2025-05-13 PROCEDURE — 94760 N-INVAS EAR/PLS OXIMETRY 1: CPT | Mod: IPSPLIT

## 2025-05-13 PROCEDURE — 2500000001 HC RX 250 WO HCPCS SELF ADMINISTERED DRUGS (ALT 637 FOR MEDICARE OP): Mod: IPSPLIT | Performed by: NURSE PRACTITIONER

## 2025-05-13 PROCEDURE — 80202 ASSAY OF VANCOMYCIN: CPT | Mod: IPSPLIT | Performed by: NURSE PRACTITIONER

## 2025-05-13 PROCEDURE — 2020000001 HC ICU ROOM DAILY: Mod: IPSPLIT

## 2025-05-13 PROCEDURE — 82947 ASSAY GLUCOSE BLOOD QUANT: CPT | Mod: IPSPLIT

## 2025-05-13 PROCEDURE — 83880 ASSAY OF NATRIURETIC PEPTIDE: CPT | Mod: IPSPLIT | Performed by: STUDENT IN AN ORGANIZED HEALTH CARE EDUCATION/TRAINING PROGRAM

## 2025-05-13 PROCEDURE — 85027 COMPLETE CBC AUTOMATED: CPT | Mod: IPSPLIT | Performed by: STUDENT IN AN ORGANIZED HEALTH CARE EDUCATION/TRAINING PROGRAM

## 2025-05-13 PROCEDURE — 87637 SARSCOV2&INF A&B&RSV AMP PRB: CPT | Mod: IPSPLIT | Performed by: INTERNAL MEDICINE

## 2025-05-13 PROCEDURE — 2500000005 HC RX 250 GENERAL PHARMACY W/O HCPCS: Mod: IPSPLIT | Performed by: STUDENT IN AN ORGANIZED HEALTH CARE EDUCATION/TRAINING PROGRAM

## 2025-05-13 PROCEDURE — 2500000001 HC RX 250 WO HCPCS SELF ADMINISTERED DRUGS (ALT 637 FOR MEDICARE OP): Mod: IPSPLIT | Performed by: STUDENT IN AN ORGANIZED HEALTH CARE EDUCATION/TRAINING PROGRAM

## 2025-05-13 PROCEDURE — 2500000001 HC RX 250 WO HCPCS SELF ADMINISTERED DRUGS (ALT 637 FOR MEDICARE OP): Mod: IPSPLIT | Performed by: HOSPITALIST

## 2025-05-13 PROCEDURE — 87449 NOS EACH ORGANISM AG IA: CPT | Mod: GENLAB | Performed by: INTERNAL MEDICINE

## 2025-05-13 PROCEDURE — 2500000005 HC RX 250 GENERAL PHARMACY W/O HCPCS: Mod: IPSPLIT | Performed by: NURSE PRACTITIONER

## 2025-05-13 PROCEDURE — XW033E5 INTRODUCTION OF REMDESIVIR ANTI-INFECTIVE INTO PERIPHERAL VEIN, PERCUTANEOUS APPROACH, NEW TECHNOLOGY GROUP 5: ICD-10-PCS | Performed by: INTERNAL MEDICINE

## 2025-05-13 RX ORDER — DEXAMETHASONE SODIUM PHOSPHATE 10 MG/ML
6 INJECTION INTRAMUSCULAR; INTRAVENOUS EVERY 24 HOURS
Status: DISCONTINUED | OUTPATIENT
Start: 2025-05-14 | End: 2025-05-16

## 2025-05-13 RX ORDER — LORAZEPAM 2 MG/ML
1 INJECTION INTRAMUSCULAR ONCE
Status: DISCONTINUED | OUTPATIENT
Start: 2025-05-13 | End: 2025-05-16

## 2025-05-13 RX ORDER — LORAZEPAM 2 MG/ML
1 INJECTION INTRAMUSCULAR ONCE
Status: COMPLETED | OUTPATIENT
Start: 2025-05-13 | End: 2025-05-13

## 2025-05-13 RX ORDER — AMOXICILLIN 250 MG
1 CAPSULE ORAL NIGHTLY
Status: DISCONTINUED | OUTPATIENT
Start: 2025-05-13 | End: 2025-05-20 | Stop reason: HOSPADM

## 2025-05-13 RX ORDER — FUROSEMIDE 10 MG/ML
40 INJECTION INTRAMUSCULAR; INTRAVENOUS ONCE
Status: COMPLETED | OUTPATIENT
Start: 2025-05-13 | End: 2025-05-13

## 2025-05-13 RX ORDER — FUROSEMIDE 10 MG/ML
20 INJECTION INTRAMUSCULAR; INTRAVENOUS ONCE
Status: COMPLETED | OUTPATIENT
Start: 2025-05-13 | End: 2025-05-13

## 2025-05-13 RX ADMIN — SENNOSIDES AND DOCUSATE SODIUM 1 TABLET: 50; 8.6 TABLET ORAL at 20:47

## 2025-05-13 RX ADMIN — KETOROLAC TROMETHAMINE 15 MG: 15 INJECTION, SOLUTION INTRAMUSCULAR; INTRAVENOUS at 01:18

## 2025-05-13 RX ADMIN — GABAPENTIN 300 MG: 300 CAPSULE ORAL at 20:47

## 2025-05-13 RX ADMIN — LORAZEPAM 1 MG: 2 INJECTION INTRAMUSCULAR; INTRAVENOUS at 03:26

## 2025-05-13 RX ADMIN — PIPERACILLIN SODIUM AND TAZOBACTAM SODIUM 3.38 G: 3; .375 INJECTION, SOLUTION INTRAVENOUS at 11:49

## 2025-05-13 RX ADMIN — GABAPENTIN 300 MG: 300 CAPSULE ORAL at 14:37

## 2025-05-13 RX ADMIN — Medication 50 PERCENT: at 09:13

## 2025-05-13 RX ADMIN — VANCOMYCIN HYDROCHLORIDE 1 G: 1 INJECTION, SOLUTION INTRAVENOUS at 14:36

## 2025-05-13 RX ADMIN — CARBAMAZEPINE 200 MG: 200 TABLET ORAL at 11:50

## 2025-05-13 RX ADMIN — HYDRALAZINE HYDROCHLORIDE 25 MG: 25 TABLET ORAL at 20:47

## 2025-05-13 RX ADMIN — DEXAMETHASONE SODIUM PHOSPHATE 6 MG: 10 INJECTION INTRAMUSCULAR; INTRAVENOUS at 23:49

## 2025-05-13 RX ADMIN — PIPERACILLIN SODIUM AND TAZOBACTAM SODIUM 3.38 G: 3; .375 INJECTION, SOLUTION INTRAVENOUS at 22:43

## 2025-05-13 RX ADMIN — PIPERACILLIN SODIUM AND TAZOBACTAM SODIUM 3.38 G: 3; .375 INJECTION, SOLUTION INTRAVENOUS at 16:14

## 2025-05-13 RX ADMIN — GABAPENTIN 300 MG: 300 CAPSULE ORAL at 11:50

## 2025-05-13 RX ADMIN — TRAZODONE HYDROCHLORIDE 50 MG: 50 TABLET ORAL at 20:47

## 2025-05-13 RX ADMIN — HYDRALAZINE HYDROCHLORIDE 25 MG: 25 TABLET ORAL at 14:36

## 2025-05-13 RX ADMIN — Medication 6 L/MIN: at 22:24

## 2025-05-13 RX ADMIN — ACETAMINOPHEN 325MG 650 MG: 325 TABLET ORAL at 18:30

## 2025-05-13 RX ADMIN — FUROSEMIDE 20 MG: 10 INJECTION, SOLUTION INTRAVENOUS at 11:49

## 2025-05-13 RX ADMIN — CARBAMAZEPINE 200 MG: 200 TABLET ORAL at 20:47

## 2025-05-13 RX ADMIN — FUROSEMIDE 40 MG: 10 INJECTION, SOLUTION INTRAMUSCULAR; INTRAVENOUS at 22:42

## 2025-05-13 RX ADMIN — VANCOMYCIN HYDROCHLORIDE 1 G: 1 INJECTION, SOLUTION INTRAVENOUS at 01:18

## 2025-05-13 RX ADMIN — Medication 6 L/MIN: at 20:40

## 2025-05-13 RX ADMIN — REMDESIVIR 200 MG: 100 INJECTION, POWDER, LYOPHILIZED, FOR SOLUTION INTRAVENOUS at 13:57

## 2025-05-13 RX ADMIN — ENOXAPARIN SODIUM 40 MG: 40 INJECTION SUBCUTANEOUS at 11:50

## 2025-05-13 RX ADMIN — Medication 6 L/MIN: at 21:48

## 2025-05-13 RX ADMIN — ACETAMINOPHEN 650 MG: 650 SUPPOSITORY RECTAL at 01:20

## 2025-05-13 RX ADMIN — HYDRALAZINE HYDROCHLORIDE 25 MG: 25 TABLET ORAL at 11:50

## 2025-05-13 ASSESSMENT — PAIN - FUNCTIONAL ASSESSMENT
PAIN_FUNCTIONAL_ASSESSMENT: FLACC (FACE, LEGS, ACTIVITY, CRY, CONSOLABILITY)
PAIN_FUNCTIONAL_ASSESSMENT: FLACC (FACE, LEGS, ACTIVITY, CRY, CONSOLABILITY)
PAIN_FUNCTIONAL_ASSESSMENT: 0-10
PAIN_FUNCTIONAL_ASSESSMENT: FLACC (FACE, LEGS, ACTIVITY, CRY, CONSOLABILITY)
PAIN_FUNCTIONAL_ASSESSMENT: 0-10

## 2025-05-13 ASSESSMENT — PAIN SCALES - GENERAL
PAINLEVEL_OUTOF10: 0 - NO PAIN

## 2025-05-13 ASSESSMENT — COGNITIVE AND FUNCTIONAL STATUS - GENERAL
MOVING TO AND FROM BED TO CHAIR: A LITTLE
STANDING UP FROM CHAIR USING ARMS: A LITTLE
DAILY ACTIVITIY SCORE: 19
WALKING IN HOSPITAL ROOM: A LITTLE
DRESSING REGULAR LOWER BODY CLOTHING: A LITTLE
MOVING FROM LYING ON BACK TO SITTING ON SIDE OF FLAT BED WITH BEDRAILS: A LITTLE
PERSONAL GROOMING: A LITTLE
TOILETING: A LITTLE
HELP NEEDED FOR BATHING: A LITTLE
DRESSING REGULAR UPPER BODY CLOTHING: A LITTLE
MOBILITY SCORE: 18
CLIMB 3 TO 5 STEPS WITH RAILING: A LITTLE
TURNING FROM BACK TO SIDE WHILE IN FLAT BAD: A LITTLE

## 2025-05-13 NOTE — PROGRESS NOTES
Elliot Partida is a 74 y.o. male on day 5 of admission presenting with Wound infection.    Subjective   Interval History:   Interval temperature spike  Interval worsening respiratory failure  Somewhat agitated  On Ventimask  Nurse present during evaluation    Review of Systems   Unable to perform ROS: Mental status change       Objective   Range of Vitals (last 24 hours)  Heart Rate:  []   Temp:  [36.1 °C (97 °F)-38.7 °C (101.7 °F)]   Resp:  [16-24]   BP: (115-159)/(55-82)   SpO2:  [88 %-98 %]   Daily Weight  05/08/25 : 94.9 kg (209 lb 3.5 oz)    Body mass index is 30.9 kg/m².    Physical Exam  Constitutional:       Appearance: Ill looking  HENT:      Head: Normocephalic and atraumatic.      Right Ear: External ear normal.      Left Ear: External ear normal.      Nose: Nose normal.   Eyes:      General: No scleral icterus.     Extraocular Movements: Extraocular movements intact.      Conjunctiva/sclera: Conjunctivae normal.   Cardiovascular:      Rate and Rhythm: Regular rhythm.      Heart sounds: Normal heart sounds, S1 normal and S2 normal.   Pulmonary:      Breath sounds: Normal breath sounds and air entry.   Abdominal:      General: Bowel sounds are normal.      Palpations: Abdomen is soft.      Tenderness: There is no abdominal tenderness.   Musculoskeletal:      Cervical back: Normal range of motion and neck supple.      Right lower leg: No edema.      Left lower leg: No edema.   Skin:     General: Skin is warm and dry.      Comments: Left anterior lower leg/dorsal foot ulcer with moderate amount of granulation tissue and moderate amount of fat necrosis   Neurological:      Mental Status: He is somewhat lethargic  Psychiatric:         Behavior: Behavior is agitated    Antibiotics  vancomycin - 1 gram/200 mL    Relevant Results  Labs  Results from last 72 hours   Lab Units 05/13/25  0605 05/12/25  0624 05/11/25  0725   WBC AUTO x10*3/uL 9.6 7.1 9.7   HEMOGLOBIN g/dL 10.1* 10.5* 10.9*   HEMATOCRIT %  33.0* 33.8* 37.1*   PLATELETS AUTO x10*3/uL 245 226 192   NEUTROS PCT AUTO %  --  76.4 84.2   LYMPHS PCT AUTO %  --  9.0 5.5   MONOS PCT AUTO %  --  13.6 8.8   EOS PCT AUTO %  --  0.1 0.7     Results from last 72 hours   Lab Units 05/13/25  0605 05/12/25  0624 05/11/25  0725   SODIUM mmol/L 137 135* 135*   POTASSIUM mmol/L 3.5 3.6 4.2   CHLORIDE mmol/L 101 101 104   CO2 mmol/L 29 27 22   BUN mg/dL 28* 23 17   CREATININE mg/dL 0.75 0.72 0.66   GLUCOSE mg/dL 132* 129* 135*   CALCIUM mg/dL 8.1* 8.2* 8.2*   ANION GAP mmol/L 11 11 13   EGFR mL/min/1.73m*2 >90 >90 >90     Results from last 72 hours   Lab Units 05/12/25  0624 05/11/25  0725   ALK PHOS U/L 84 87   BILIRUBIN TOTAL mg/dL 0.5 0.8   PROTEIN TOTAL g/dL 6.1* 6.1*   ALT U/L 13 11   AST U/L 18 19   ALBUMIN g/dL 3.0* 3.0*     Estimated Creatinine Clearance: 99.8 mL/min (by C-G formula based on SCr of 0.75 mg/dL).  C-Reactive Protein   Date Value Ref Range Status   05/08/2025 1.54 (H) <1.00 mg/dL Final   02/11/2025 2.68 (H) <1.00 mg/dL Final   11/05/2024 1.34 (H) <1.00 mg/dL Final     Microbiology  Susceptibility data from last 14 days.  Collected Specimen Info Organism Clindamycin Erythromycin Oxacillin Tetracycline Trimethoprim/Sulfamethoxazole Vancomycin   05/09/25 Swab from Anterior Nares Methicillin Susceptible Staphylococcus aureus (MSSA)         05/08/25 Tissue/Biopsy from Wound/Tissue Methicillin Resistant Staphylococcus aureus (MRSA)  R  R  R  R  S  S     Mixed Aerobic and Anaerobic Bacteria            Imaging  ECG 12 Lead  Result Date: 5/13/2025  Sinus rhythm with 1st degree AV block Left axis deviation Nonspecific intraventricular block Possible Anterolateral infarct (cited on or before 09-MAY-2025) Abnormal ECG When compared with ECG of 09-MAY-2025 18:47, (unconfirmed) Serial changes of Anterior infarct Present    ECG 12 lead  Result Date: 5/13/2025  Sinus rhythm with 1st degree AV block Left anterior fascicular block Minimal voltage criteria for LVH, may  be normal variant ( Mukul product ) Anterior infarct , age undetermined Abnormal ECG When compared with ECG of 13-FEB-2025 09:37, No significant change was found    XR chest 1 view  Addendum Date: 5/12/2025  Interpreted By:  Júnior Sena, ADDENDUM: Left-sided PICC line is noted and projects to the level of the superior vena cava without pneumothorax.   Signed by: Júnior Sena 5/12/2025 5:41 PM   -------- ORIGINAL REPORT -------- Dictation workstation:   [id number]    Result Date: 5/12/2025  Interpreted By:  Júnior Sena, STUDY: XR CHEST 1 VIEW   INDICATION: Signs/Symptoms:PICC placement.   COMPARISON: February 17, 2025   ACCESSION NUMBER(S): [id number]   ORDERING CLINICIAN: BERNIE MEDRANO   FINDINGS: Cardiomegaly with new perihilar and basilar edema with patchy multifocal airspace opacities right worse than left.   Small bilateral pleural effusions.   No evidence of pneumothorax.         Cardiomegaly with new perihilar and basilar edema with patchy multifocal airspace opacities right worse than left.   Small bilateral pleural effusions.   Signed by: Júnior Sena 5/12/2025 5:28 PM Dictation workstation:   [id number]    CT brain attack head wo IV contrast  Result Date: 5/12/2025  Interpreted By:  Enedina Umana, STUDY: CT BRAIN ATTACK HEAD WO IV CONTRAST;  5/12/2025 9:37 am   INDICATION: Signs/Symptoms:stroke alert.     COMPARISON: 12/23/2024   ACCESSION NUMBER(S): [id number]   ORDERING CLINICIAN: KATIE HILTON   TECHNIQUE: Unenhanced CT images of the head were obtained.   FINDINGS: Patient is rotated.  The ventricles, cisterns and sulci are enlarged, consistent with diffuse volume loss. There are areas of nonspecific white matter hypodensity, which are probably age related or microvascular in nature. Low-density lacunar type infarct in the right basal ganglia. These findings are similar to the previous exam. There is no acute intracranial hemorrhage, mass effect or midline shift. No extraaxial fluid  collection.   No focal calvarial lesion.   Bilateral ethmoid, left sphenoid and left maxillary sinus mucosal thickening.       No acute intracranial hemorrhage or mass-effect.   Multifocal sinus mucosal thickening.   MACRO: Enedina Umana discussed the significance and urgency of this critical finding by secure chat with  KATIE HILTON on 5/12/2025 at 9:47 am. (**-RCF-**) Findings:  See findings.     Signed by: Enedina Umana 5/12/2025 9:48 AM Dictation workstation:   [id number]    MR tibia fibula left w and wo IV contrast  Result Date: 5/9/2025  Interpreted By:  Lina Moreno,  and Debbie Cárdenas STUDY: MRI of the left tibia/fibula without and with IV contrast dated 5/9/2025.   INDICATION: Signs/Symptoms:Chronic osteomyelitis of the tibia.     COMPARISON: Left tibia/fibula radiograph 05/08/2025   ACCESSION NUMBER(S): [id number]   ORDERING CLINICIAN: ADA ESPINOZA   TECHNIQUE: Multiplanar multisequence MRI of the  left tibia/fibula was performed without and with intravenous gadolinium based contrast.   FINDINGS: OSSEOUS STRUCTURES AND JOINTS:   No fracture or dislocation is evident. Bone marrow signal intensity is within normal limits. No joint effusion is evident.   SOFT TISSUES: Partially visualized large soft tissue ulceration along the anterior aspect of the tibiotalar joint to the distal tibia with possible exposure of the anterior tibialis tendon (axial image 74). There is extensive diffuse subcutaneous edema noted throughout the lower extremities without rim enhancing fluid collection. Moderate amount of fascial plane fluid noted in the anterior as well as posterior aspect of the medial head of the gastrocnemius. There is moderate diffuse generalized muscle atrophy. There is intramuscular muscle edema along the anterior compartment without intramuscular abscess. The visualized muscles and tendons are intact. Ligaments are not well assessed on this examination.       1. No evidence to suggest osteomyelitis.  2. Partially visualized large soft tissue ulceration along the anterior aspect of the distal tibia and tibial talar joint with possible exposure of the underlying anterior tibialis tendon. 3. Moderate amount of fluid along the posterior as well as anterior aspect of the medial head of the gastrocnemius extending from the level of the knee to the distal lower leg. This is nonspecific with infectious fasciitis felt to be less likely although not entirely excluded. Edema of the anterior compartment muscles of the lower leg which may represent infectious myositis. No evidence of intramuscular abscess. 4. Extensive diffuse soft tissue edema noted throughout the left lower extremity and partially visualized right lower extremity 5. Moderate diffuse fatty atrophy of the lower leg muscles.   I personally reviewed the images/study and I agree with Dr. Melia Lewis and the findings as stated.   MACRO: None   Signed by: Lina Moreno 5/9/2025 11:14 AM Dictation workstation:   [id number]    XR tibia fibula left 2 views  Result Date: 5/8/2025  Interpreted By:  Mariam Vang, STUDY: XR TIBIA FIBULA LEFT 2 VIEWS; ;  5/8/2025 10:25 pm   INDICATION: Signs/Symptoms:Chronic tibial osteomyelitis.     COMPARISON: None.   ACCESSION NUMBER(S): [id number]   ORDERING CLINICIAN: ADA ESPINOZA   FINDINGS: Two views of the left tibia and fibula show thick periosteal bone formation along the tibial diaphysis likely due to a longstanding process. There is no acute bone destruction to suggest acute osteomyelitis. No fracture or dislocation.       Thick periosteal new bone formation along the left tibia likely due to history of chronic osteomyelitis and healing. No acute bone destruction.     MACRO: None   Signed by: Mariam Vang 5/8/2025 10:46 PM Dictation workstation:   [id number]    XR foot left 3+ views  Result Date: 5/8/2025  Interpreted By:  Osmar Garza, STUDY: XR FOOT LEFT 3+ VIEWS;  5/8/2025 9:26 am   INDICATION:  Signs/Symptoms:wound.   COMPARISON: None.   ACCESSION NUMBER(S): [id number]   ORDERING CLINICIAN: LUDA JALLOH   FINDINGS: Post left transmetatarsal amputation. There is a possible age indeterminate nondisplaced fracture near the base of the 4th proximal phalanx only seen on the oblique view. No additional acute fracture, dislocation, or focal osseous destruction identified. Scattered degenerative changes. Small calcaneal enthesophytes.       Questionable age-indeterminate nondisplaced fracture near the base of the left foot 4th proximal phalanx, only apparent on one view. Soft tissue swelling. No additional acute osseous findings of the left foot.   MACRO: None   Signed by: Osmar Garza 5/8/2025 10:04 AM Dictation workstation:   [id number]         Assessment/Plan   Type 2 diabetes with peripheral angiopathy without gangrene-recent PVR remarkable for mild peripheral vascular disease  Left leg/dorsal foot cellulitis  Left tibial osteomyelitis-pathology report from 2/13/2025 reviewed, positive wound culture for MRSA  Acute on chronic respiratory failure-viral panel ordered, remarkable for coronavirus     Continue vancomycin  Restart IV Zosyn  Procalcitonin  Remdesivir  Dexamethasone  Repeat chest x-ray  Blood cultures x 2  PICC line placement  Local care  Consider vascular evaluation, can be done as outpatient  Supportive care  Monitor temperature and WBC  Long-term plan is 6 weeks of IV antibiotic therapy  Weekly CBC with differential, CMP, CRP while on therapy     This is a complex infectious disease issue and the following was performed today (for more details please see the above note): Management decisions reflecting the added complexity (e.g., changes in antimicrobial therapy, infection control strategies).    Baljit Velazco MD

## 2025-05-13 NOTE — CONSULTS
"Nutrition Initial Assessment:   Nutrition Assessment    Reason for Assessment: Dietitian discretion (wound infection per IDT rounds)    Patient is a 74 y.o. male presenting with Wound infection.     PMH: Osteomyelitis of left foot, type unspecified, type 2 diabetes mellitus with diabetic peripheral neuropathy, acute deep vein thrombosis (DVT) of the left popliteal vein, bilateral lower extremity edema, fever, hyperlipidemia, benign essential hypertension (HTN), and trigeminal neuralgia.    Nutrition History:  Energy Intake: Poor < 50 %  Pain affecting nutrition status: N/A  Food and Nutrient History: Attempted to visit the patient at bedside several times throughout the day; however, the patient was resting or sleeping during each attempt. Per nursing reports, the patient has been intermittently combative today and required breathing treatments.  Oral intake is reported to be <50%, placing the patient at risk for inadequate nutrition, particularly in the context of increased metabolic demands related to respiratory illness and healing needs.  To support nutritional status and promote wound/tissue healing, will initiate Nico and ProStat as oral nutritional supplements, as tolerated.  Food Allergy:  (NKFA)       Anthropometrics:  Height: 175.3 cm (5' 9\")   Weight: 91.1 kg (200 lb 13.4 oz)   BMI (Calculated): 29.65  IBW/kg (Dietitian Calculated): 73 kg  Percent of IBW: 126 %       Weight History:   Wt Readings from Last 10 Encounters:   05/14/25 91.1 kg (200 lb 13.4 oz)   05/05/25 89.4 kg (197 lb)   04/01/25 89.4 kg (197 lb)   02/27/25 89.4 kg (197 lb)   02/11/25 89.1 kg (196 lb 6.9 oz)   01/17/25 89.8 kg (198 lb)   12/23/24 91.2 kg (201 lb 1 oz)   11/05/24 93.4 kg (206 lb)   09/25/24 102 kg (225 lb 8.5 oz)   09/12/24 107 kg (236 lb 1.8 oz)       Weight Change %:  Weight History / % Weight Change: 05/14/25 (91.1 kg) to 05/05/25 (89.4 kg) = +1.7 kg (+3.7 lb), +1.9% gain in ~1 week. 05/14/25 (91.1 kg) to 02/11/25 (89.1 " kg) = +2.0 kg (+4.4 lb), +2.2% gain in ~3 months. 05/14/25 (91.1 kg) to 11/05/24 (93.4 kg) = -2.3 kg (-5.1 lb), -2.5% loss in ~6.5 months. 05/14/25 (91.1 kg) to 09/12/24 (107.0 kg) = -15.9 kg (-35.1 lb), -14.9% loss in ~8 months. (hx of fluid shifts)    Nutrition Focused Physical Exam Findings:    Subcutaneous Fat Loss:   Defer Subcutaneous Fat Loss Assessment: Defer all  Defer All Reason: pt sleeping  Muscle Wasting:  Defer Muscle Wasting Assessment: Defer all  Edema:  Edema: +2 mild, +3 moderate  Edema Location: Generalized Edema: Non-pitting  RUE Edema: Mild pitting, slight indentation  LUE Edema: Mild pitting, slight indentation  RLE Edema: Moderate pitting, indentation subsides rapidly  LLE Edema: Moderate pitting, indentation subsides rapidly  Physical Findings:  Skin: Positive (Skin tear with redness to the groin, pressure injury to the coccyx, and traumatic skin tear to the posterior left long finger (D3).)    Nutrition Significant Labs:  CBC Trend:   Results from last 7 days   Lab Units 05/14/25 0511 05/13/25 0605 05/12/25 0624 05/11/25  0725   WBC AUTO x10*3/uL 7.5 9.6 7.1 9.7   RBC AUTO x10*6/uL 4.28* 4.00* 4.15* 4.30*   HEMOGLOBIN g/dL 10.8* 10.1* 10.5* 10.9*   HEMATOCRIT % 34.6* 33.0* 33.8* 37.1*   MCV fL 81 83 81 86   PLATELETS AUTO x10*3/uL 239 245 226 192    , BMP Trend:   Results from last 7 days   Lab Units 05/14/25 0511 05/13/25 0605 05/12/25 0624 05/11/25  0725   GLUCOSE mg/dL 130* 132* 129* 135*   CALCIUM mg/dL 8.3* 8.1* 8.2* 8.2*   SODIUM mmol/L 136 137 135* 135*   POTASSIUM mmol/L 3.5 3.5 3.6 4.2   CO2 mmol/L 31 29 27 22   CHLORIDE mmol/L 98 101 101 104   BUN mg/dL 22 28* 23 17   CREATININE mg/dL 0.68 0.75 0.72 0.66   BG POCT trend:   Results from last 7 days   Lab Units 05/14/25  1643 05/14/25  1140 05/14/25  0751 05/13/25  2246 05/13/25  1620   POCT GLUCOSE mg/dL 80 95 124* 90 125*     Nutrition Specific Medications:  carBAMazepine, 200 mg, oral, BID  dexAMETHasone, 6 mg, intravenous,  q24h  enoxaparin, 40 mg, subcutaneous, Daily  gabapentin, 300 mg, oral, TID  hydrALAZINE, 25 mg, oral, TID  insulin lispro, 0-10 Units, subcutaneous, TID AC  ipratropium-albuteroL, 3 mL, nebulization, TID  lidocaine, 5 mL, infiltration, Once  piperacillin-tazobactam, 3.375 g, intravenous, q6h  polyethylene glycol, 17 g, oral, Daily  [Held by provider] potassium chloride CR, 40 mEq, oral, Daily  remdesivir, 100 mg, intravenous, q24h  sennosides-docusate sodium, 1 tablet, oral, Nightly  traZODone, 50 mg, oral, Nightly  vancomycin 1.25 g in sodium chloride 0.9% 250 mL IV, 1,250 mg, intravenous, q12h    Continuous medications  Continuous Medications[1]    I/O:   Last BM Date: 05/09/25; Stool Appearance: Soft (05/09/25 1316)    Dietary Orders (From admission, onward)       Start     Ordered    05/13/25 1412  Oral nutritional supplements  Until discontinued        Question Answer Comment   Deliver with Breakfast    Deliver with Dinner    Select supplement: Nico        05/13/25 1411    05/13/25 1412  Oral nutritional supplements  Until discontinued        Question Answer Comment   Deliver with Breakfast    Deliver with Dinner    Select supplement: Pro-Stat Sugar Free        05/13/25 1411    05/10/25 1243  Adult diet Consistent Carb; CCD 75 gm/meal  Diet effective now        Question Answer Comment   Diet type Consistent Carb    Carb diet selection: CCD 75 gm/meal        05/10/25 1242    05/08/25 1632  May Participate in Room Service With Assistance  ( ROOM SERVICE MAY PARTICIPATE WITH ASSISTANCE)  Once        Question:  .  Answer:  Yes    05/08/25 1631                Estimated Needs:   Total Energy Estimated Needs in 24 hours (kCal): 2300 kCal  Method for Estimating Needs: ~25 kcal/kg of actual BW  Total Protein Estimated Needs in 24 Hours (g):  (88-110g)  Method for Estimating 24 Hour Protein Needs: 1.2-1.5 g/kg of IBW  Total Fluid Estimated Needs in 24 Hours (mL): 2300 mL  Method for Estimating 24 Hour Fluid Needs: 1  mL/kcal or per medical team.  Patient on Order Fluid Restriction: No        Nutrition Diagnosis   Malnutrition Diagnosis  Patient has Malnutrition Diagnosis:  (reassess NFPE when the patient is awake to evaluate for possible muscle or fat loss. If either is present, the patient may meet criteria for moderate or severe malnutrition)    Nutrition Diagnosis  Patient has Nutrition Diagnosis: Yes  Diagnosis Status (1): New  Nutrition Diagnosis 1: Increased nutrient needs  As Evidenced by (1): wound infection, multiple non-healing wounds, diabetic ulcer, and pressure injuries       Nutrition Interventions/Recommendations   Nutrition prescription for oral nutrition    Nutrition Recommendations:  Individualized Nutrition Prescription Provided for : CCD 75, Nico BID and ProStat BID    Nutrition Interventions/Goals:   Meals and Snacks: Carbohydrate-modified diet  Goal: if intake contiunes to be <50%, consider liberlizing diet to promote intake and healing.  Medical Food Supplement: Commercial beverage medical food supplement therapy  Goal: ProStat (100kcal and 15g protein per 30mL) and Nico (80-90kcal and 2.5g protein + arginine and glutamine per 1 pkt)      Education Documentation  Pt sleeping.         Nutrition Monitoring and Evaluation   Food/Nutrient Related History Monitoring  Monitoring and Evaluation Plan: Intake / amount of food  Intake / Amount of food: Consumes at least 50% or more of meals/snacks/supplements    Anthropometric Measurements  Monitoring and Evaluation Plan: Body weight  Body Weight: Body weight - Maintain stable weight         Physical Exam Findings  Monitoring and Evaluation Plan: Skin  Skin Finding: Impaired wound healing - Improved wound healing    Goal Status: New goal(s) identified    Time Spent (min): 60 minutes              [1] D5 % and 0.9 % sodium chloride, 50 mL/hr, Last Rate: 50 mL/hr (05/14/25 7417)

## 2025-05-13 NOTE — PROGRESS NOTES
Vancomycin Dosing by Pharmacy- FOLLOW UP    Elliot Partida is a 74 y.o. year old male who Pharmacy has been consulted for vancomycin dosing for other diabetic foot infection. Based on the patient's indication and renal status this patient is being dosed based on a goal AUC of 400-600.     Renal function is currently stable.    Current vancomycin dose: 1000 mg given every 12 hours    Estimated vancomycin AUC on current dose: 415 mg/L.hr     Visit Vitals  /55 (BP Location: Right arm)   Pulse 77   Temp 36.6 °C (97.9 °F) (Temporal)   Resp 18        Lab Results   Component Value Date    CREATININE 0.75 2025    CREATININE 0.72 2025    CREATININE 0.66 2025    CREATININE 0.77 2025        Patient weight is as follows:   Vitals:    25 1626   Weight: 94.9 kg (209 lb 3.5 oz)       Cultures:  Susceptibility data for the encounter in last 14 days.  Collected Specimen Info Organism Clindamycin Erythromycin Oxacillin Tetracycline Trimethoprim/Sulfamethoxazole Vancomycin   25 Swab from Anterior Nares Methicillin Susceptible Staphylococcus aureus (MSSA)         25 Tissue/Biopsy from Wound/Tissue Methicillin Resistant Staphylococcus aureus (MRSA)  R  R  R  R  S  S     Mixed Aerobic and Anaerobic Bacteria               I/O last 3 completed shifts:  In: 1400 (14.8 mL/kg) [P.O.:600; IV Piggyback:800]  Out: 1025 (10.8 mL/kg) [Urine:1025 (0.3 mL/kg/hr)]  Weight: 94.9 kg   I/O during current shift:  No intake/output data recorded.    Temp (24hrs), Av.6 °C (99.6 °F), Min:36.1 °C (97 °F), Max:38.7 °C (101.7 °F)      Assessment/Plan    Within goal AUC range. Continue current vancomycin regimen.    This dosing regimen is predicted by [employer] to result in the following pharmacokinetic parameters:  Loading dose: N/A  Regimen: 1000 mg IV every 12 hours.  Start time: 13:18 on 2025  Exposure target: AUC24 (range)400-600 mg/L.hr   [id number]: 412 mg/L.hr  AUC24,ss: 415 mg/L.hr  Probability  of AUC24 > 400: 63 %  Ctrough,ss: 13 mg/L  Probability of Ctrough,ss > 20: 1 %      The next level will be obtained on 5/15/25 at AM. May be obtained sooner if clinically indicated.   Will continue to monitor renal function daily while on vancomycin and order serum creatinine at least every 48 hours if not already ordered.  Follow for continued vancomycin needs, clinical response, and signs/symptoms of toxicity.       Erasto Albert, PharmD

## 2025-05-13 NOTE — CARE PLAN
Problem: Skin  Goal: Participates in plan/prevention/treatment measures  Outcome: Progressing     Problem: Pain  Goal: Turns in bed with improved pain control throughout the shift  Outcome: Progressing     Problem: Safety - Medical Restraint  Goal: Remains free of injury from restraints (Restraint for Interference with Medical Device)  Outcome: Progressing  Goal: Free from restraint(s) (Restraint for Interference with Medical Device)  Outcome: Progressing     Problem: Skin  Goal: Decreased wound size/increased tissue granulation at next dressing change  Flowsheets (Taken 5/13/2025 0348)  Decreased wound size/increased tissue granulation at next dressing change: Promote sleep for wound healing  Goal: Promote/optimize nutrition  Flowsheets (Taken 5/13/2025 0348)  Promote/optimize nutrition:   Offer water/supplements/favorite foods   Monitor/record intake including meals   The patient's goals for the shift include to get some sleep    The clinical goals for the shift include Pt will have no s/s of resp. distress this shift.    Over the shift, the patient did make progress toward the following goals.      Notified by RN that patient's fingers became dusky after shift change on side of arterial line. Assessed hand. Art line was removed and coloration/warmth improved to affected fingers. RN unable to identified radial pulse by doppler (but ulnar identified). STAT arterial US ordered. Consider vascular consult pending US /clinical results.     Addendum: US showed radial artery stenosis but flow throughout the RUE . Vascular consult placed.     Afia De Leon MD  Pulmonary, Allergy, Critical Care, and Sleep Medicine   [facility]   Pager: [phone]

## 2025-05-13 NOTE — PROGRESS NOTES
Occupational Therapy  Therapy Communication Note    Patient Name: Elliot Partida  MRN: [id number]  Department: [facility]  Room: [address] [place], [place] [address]  Today's Date: 5/13/2025     Discipline: Occupational Therapy    OT Missed Visit: Yes     Missed Visit Reason: Missed Visit Reason: Patient placed on medical hold (Pt. is currently in restraints, had recieved Ativan last night and has no yet woken up this morning. RN deferred therapy.)    Missed Time: Attempt 0738

## 2025-05-13 NOTE — SIGNIFICANT EVENT
Pt unable to participate in consult - is on venturi mask and in/out of sleep  Will try tomorrow  Likely the agitation was related to hypoxia.

## 2025-05-13 NOTE — CARE PLAN
Problem: Safety - Medical Restraint  Goal: Remains free of injury from restraints (Restraint for Interference with Medical Device)  5/13/2025 0349 by Caty Carranza RN  Outcome: Progressing  5/13/2025 0348 by Caty Carranza RN  Outcome: Progressing     Problem: Safety - Medical Restraint  Goal: Free from restraint(s) (Restraint for Interference with Medical Device)  5/13/2025 0349 by Caty Carranza RN  Outcome: Progressing  5/13/2025 0348 by Caty Carranza RN  Outcome: Progressing   The patient's goals for the shift include to get some sleep    The clinical goals for the shift include Pt will have no s/s of resp. distress this shift.    Over the shift, the patient did not make progress toward the following goals.
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
## 2025-05-13 NOTE — PROGRESS NOTES
Elliot Partida is a 74 y.o. male on day 5 of admission presenting with Wound infection.    Subjective   Patient lying in bed, patient conversant, states he feels 'tired', alert and oriented, restraints in place bilateral wrists, no skin break down,  patient denies pain at this time, aggravated regarding venti mask, because it is pulling at his nasal bridge, but redirectable. Denies Abdominal pain, SOB, CP, N/V/D. Unable to recall last BM. Patient to transfer to ICU for declining presentation.        Objective     Physical Exam  Constitutional:       General: He is not in acute distress.     Appearance: He is ill-appearing.      Comments: Bilateral soft wrists restraints   HENT:      Head: Normocephalic and atraumatic.      Mouth/Throat:      Mouth: Mucous membranes are moist.   Eyes:      General: No scleral icterus.        Right eye: No discharge.         Left eye: No discharge.      Pupils: Pupils are equal, round, and reactive to light.   Cardiovascular:      Rate and Rhythm: Normal rate and regular rhythm.      Pulses: Normal pulses.      Heart sounds: No murmur heard.  Pulmonary:      Effort: Pulmonary effort is normal. No respiratory distress.      Comments: 50% Venti Mask, diminished bilaterally  Abdominal:      General: Bowel sounds are normal. There is no distension.      Palpations: Abdomen is soft. There is no mass.      Tenderness: There is no abdominal tenderness.   Musculoskeletal:         General: Swelling present.      Right lower leg: No edema.      Left lower leg: No edema.      Comments: LLE dressing intact, bilateral upper extremity swelling    Skin:     General: Skin is warm and dry.      Capillary Refill: Capillary refill takes 2 to 3 seconds.      Comments: Sacral dressing intact   Neurological:      Mental Status: He is alert.      Cranial Nerves: No cranial nerve deficit.      Motor: No weakness.   Psychiatric:      Comments: Lethargic, arousable and responsive         Last Recorded  "Vitals  Blood pressure 115/55, pulse 77, temperature 36.6 °C (97.9 °F), temperature source Temporal, resp. rate 18, height 1.753 m (5' 9\"), weight 94.9 kg (209 lb 3.5 oz), SpO2 98%.  Intake/Output last 3 Shifts:  I/O last 3 completed shifts:  In: 1400 (14.8 mL/kg) [P.O.:600; IV Piggyback:800]  Out: 1025 (10.8 mL/kg) [Urine:1025 (0.3 mL/kg/hr)]  Weight: 94.9 kg     Relevant Results    Current Facility-Administered Medications:     acetaminophen (Tylenol) tablet 650 mg, 650 mg, oral, q4h PRN **OR** acetaminophen (Tylenol) oral liquid 650 mg, 650 mg, nasogastric tube, q4h PRN **OR** acetaminophen (Tylenol) suppository 650 mg, 650 mg, rectal, q4h PRN, Jennie Tan MD, 650 mg at 05/13/25 0120    alum-mag hydroxide-simeth (Mylanta) 200-200-20 mg/5 mL oral suspension 20 mL, 20 mL, oral, q4h PRN, Chuy Muniz DO    benzocaine-menthol (Cepastat Sore Throat) lozenge 1 lozenge, 1 lozenge, Mouth/Throat, q2h PRN, Chuy Muniz DO, 1 lozenge at 05/10/25 2059    carBAMazepine (TEGretol) tablet 200 mg, 200 mg, oral, BID, Chuy Muniz DO, 200 mg at 05/12/25 0803    dextrose 50 % injection 12.5 g, 12.5 g, intravenous, q15 min PRN, Delia L Marcela, APRN-CNP    dextrose 50 % injection 25 g, 25 g, intravenous, q15 min PRN, Delia L Marcela, APRN-CNP    enoxaparin (Lovenox) syringe 40 mg, 40 mg, subcutaneous, Daily, Delia L Marcela, APRN-CNP, 40 mg at 05/12/25 0803    furosemide (Lasix) injection 20 mg, 20 mg, intravenous, Once, Yue Rodriguez APRN-CNP    gabapentin (Neurontin) capsule 300 mg, 300 mg, oral, TID, Chuy Muniz DO, 300 mg at 05/12/25 0803    glucagon (Glucagen) injection 1 mg, 1 mg, intramuscular, q15 min PRN, Delia Medrano, APRN-CNP    glucagon (Glucagen) injection 1 mg, 1 mg, intramuscular, q15 min PRN, Delia Gutierresasia, APRN-CNP    hydrALAZINE (Apresoline) injection 10 mg, 10 mg, intravenous, q4h PRN, Jennie Tan MD    hydrALAZINE (Apresoline) tablet 25 mg, 25 mg, oral, TID, " Chuy Muniz DO, 25 mg at 05/12/25 0803    insulin lispro injection 0-10 Units, 0-10 Units, subcutaneous, TID AC, JUDITH Mishra, 2 Units at 05/10/25 1142    ketorolac (Toradol) injection 15 mg, 15 mg, intravenous, q6h PRN, JUDITH Austin, 15 mg at 05/13/25 0118    lidocaine (LMX) 4 % cream, , Topical, 4x daily PRN, Sven Rodriguez DO, Given at 05/08/25 1201    lidocaine PF (Xylocaine) 10 mg/mL (1 %) injection 50 mg, 5 mL, infiltration, Once, Sven Rodriguez DO    LORazepam (Ativan) injection 1 mg, 1 mg, intravenous, Once, Jennie Tan MD    metoclopramide (Reglan) injection 10 mg, 10 mg, intravenous, q8h PRN, JUDITH Austin    ondansetron (Zofran) injection 4 mg, 4 mg, intravenous, q4h PRN, Chuy Muniz DO, 4 mg at 05/12/25 0636    oxygen (O2) therapy, , inhalation, Continuous PRN - O2/gases, JUDITH Malik, 6 L/min at 05/12/25 2231    oxygen (O2) therapy, , inhalation, Continuous PRN - O2/gases, Jennie Tan MD, 50 percent at 05/13/25 0913    piperacillin-tazobactam (Zosyn) 3.375 g in dextrose (iso) IV 50 mL, 3.375 g, intravenous, q6h, Baljit Velazco MD    polyethylene glycol (Glycolax, Miralax) packet 17 g, 17 g, oral, Daily, JUDITH Austin, 17 g at 05/12/25 0803    [Held by provider] potassium chloride CR (Klor-Con M20) ER tablet 40 mEq, 40 mEq, oral, Daily, Chuy Muniz DO    sennosides-docusate sodium (Ann-Colace) 8.6-50 mg per tablet 1 tablet, 1 tablet, oral, Nightly, JUDITH Landaverde    traZODone (Desyrel) tablet 50 mg, 50 mg, oral, Nightly, Chuy Muniz, , 50 mg at 05/11/25 2043    vancomycin (Vancocin) 1 g in dextrose 5%  mL, 1 g, intravenous, q12h, JUDITH Mishra, Stopped at 05/13/25 0216    vancomycin (Vancocin) pharmacy to dose - pharmacy monitoring, , miscellaneous, Daily PRN, JUDITH Mishra   Results for orders placed or performed during the hospital  encounter of 05/08/25 (from the past 24 hours)   POCT GLUCOSE   Result Value Ref Range    POCT Glucose 128 (H) 74 - 99 mg/dL   POCT GLUCOSE   Result Value Ref Range    POCT Glucose 108 (H) 74 - 99 mg/dL   Vancomycin   Result Value Ref Range    Vancomycin 16.6 5.0 - 20.0 ug/mL   B-type natriuretic peptide   Result Value Ref Range     (H) 0 - 99 pg/mL   Basic metabolic panel   Result Value Ref Range    Glucose 132 (H) 74 - 99 mg/dL    Sodium 137 136 - 145 mmol/L    Potassium 3.5 3.5 - 5.3 mmol/L    Chloride 101 98 - 107 mmol/L    Bicarbonate 29 21 - 32 mmol/L    Anion Gap 11 10 - 20 mmol/L    Urea Nitrogen 28 (H) 6 - 23 mg/dL    Creatinine 0.75 0.50 - 1.30 mg/dL    eGFR >90 >60 mL/min/1.73m*2    Calcium 8.1 (L) 8.6 - 10.3 mg/dL   CBC   Result Value Ref Range    WBC 9.6 4.4 - 11.3 x10*3/uL    nRBC 0.0 0.0 - 0.0 /100 WBCs    RBC 4.00 (L) 4.50 - 5.90 x10*6/uL    Hemoglobin 10.1 (L) 13.5 - 17.5 g/dL    Hematocrit 33.0 (L) 41.0 - 52.0 %    MCV 83 80 - 100 fL    MCH 25.3 (L) 26.0 - 34.0 pg    MCHC 30.6 (L) 32.0 - 36.0 g/dL    RDW 15.9 (H) 11.5 - 14.5 %    Platelets 245 150 - 450 x10*3/uL   POCT GLUCOSE   Result Value Ref Range    POCT Glucose 127 (H) 74 - 99 mg/dL   POCT GLUCOSE   Result Value Ref Range    POCT Glucose 119 (H) 74 - 99 mg/dL    XR chest 1 view  Addendum Date: 5/12/2025  Interpreted By:  Júnior Sena, ADDENDUM: Left-sided PICC line is noted and projects to the level of the superior vena cava without pneumothorax.   Signed by: Júnior Sena 5/12/2025 5:41 PM   -------- ORIGINAL REPORT -------- Dictation workstation:   [id number]    Result Date: 5/12/2025  Interpreted By:  Júnior Sena, STUDY: XR CHEST 1 VIEW   INDICATION: Signs/Symptoms:PICC placement.   COMPARISON: February 17, 2025   ACCESSION NUMBER(S): [id number]   ORDERING CLINICIAN: BERNIE MEDRANO   FINDINGS: Cardiomegaly with new perihilar and basilar edema with patchy multifocal airspace opacities right worse than left.   Small bilateral  pleural effusions.   No evidence of pneumothorax.         Cardiomegaly with new perihilar and basilar edema with patchy multifocal airspace opacities right worse than left.   Small bilateral pleural effusions.   Signed by: Júnior Sena 5/12/2025 5:28 PM Dictation workstation:   [id number]    CT brain attack head wo IV contrast  Result Date: 5/12/2025  Interpreted By:  Enedina Umana, STUDY: CT BRAIN ATTACK HEAD WO IV CONTRAST;  5/12/2025 9:37 am   INDICATION: Signs/Symptoms:stroke alert.     COMPARISON: 12/23/2024   ACCESSION NUMBER(S): [id number]   ORDERING CLINICIAN: KATIE HILTON   TECHNIQUE: Unenhanced CT images of the head were obtained.   FINDINGS: Patient is rotated.  The ventricles, cisterns and sulci are enlarged, consistent with diffuse volume loss. There are areas of nonspecific white matter hypodensity, which are probably age related or microvascular in nature. Low-density lacunar type infarct in the right basal ganglia. These findings are similar to the previous exam. There is no acute intracranial hemorrhage, mass effect or midline shift. No extraaxial fluid collection.   No focal calvarial lesion.   Bilateral ethmoid, left sphenoid and left maxillary sinus mucosal thickening.       No acute intracranial hemorrhage or mass-effect.   Multifocal sinus mucosal thickening.   MACRO: Enedina Umana discussed the significance and urgency of this critical finding by secure chat with  KATIE HILTON on 5/12/2025 at 9:47 am. (**-RCF-**) Findings:  See findings.     Signed by: Enedina Umana 5/12/2025 9:48 AM Dictation workstation:   [id number]                This patient has a central line   Reason for the central line remaining today? Parenteral medication  Extended antibiotic infusion               Assessment & Plan  Wound infection    Osteomyelitis of left foot, unspecified type (Multi)    Acute deep vein thrombosis (DVT) of popliteal vein of left lower extremity    Bilateral lower extremity edema    Benign  essential HTN    DM type 2 with diabetic peripheral neuropathy    Hyperlipidemia    Trigeminal neuralgia    Fever    Wound infection  Osteomyelitis of left foot, unspecified type (Multi)  - XR tib/fib: Thick periosteal new bone formation along the left tibia likely due to history of chronic osteomyelitis and healing. No acute bone destruction.  - MRI: No evidence to suggest osteomyelitis. 2. Partially visualized large soft tissue ulceration along the  anterior aspect of the distal tibia and tibial talar joint with possible exposure of the underlying anterior tibialis tendon. Moderate amount of fluid along the posterior as well as anterior aspect of the medial head of the gastrocnemius extending from the level of the knee to the distal lower leg. This is nonspecific with infectious fasciitis felt to be less likely although not entirely excluded. Edema of the anterior compartment muscles of the lower leg which may represent infectious myositis. No evidence of intramuscular abscess.  Extensive diffuse soft tissue edema noted throughout the left lower extremity and partially visualized right lower extremity Moderate diffuse fatty atrophy of the lower leg muscles.  -Podiatry  and ID consult  -ID recommend discontinue Zosyn and continue with Vancomycin 6 weeks  -discontinue zosyn,- Zosyn resumed   -continue vancomycin 1g q12h  -wound culture: GPC grew MRSA   - Blood culture- no growth at 4 days 5/9  - follow wound culture, WBC, blood cx  - Podiatry recs PWB to the left lower extremity for transfer, Nursing staff is able to change/reinforce dressing if & as necessary until next day’s dressing change, Betadine-soaked 4x4 to wound base, covered with dry gauze, Abd pads, Kerlix and [place] compressive dressing. Likely no plans for surgical intervention during this visit. Patient is being treated with local wound care as an outpatient. Patient was recently admitted due to cellulitis of the left lower extremity, likely due to  chronic osteomyelitis   - PT/OT- Mod intensity level  - PICC line placed for 6 weeks IV antibiotics-CHG bath  - Chronic 02 is on 2 liters 02 at night, has been on it continuously  - RT to evaluate  - Monitor temperature  - Follow WBC 8.4>9.4>9.7>7.1>9.6        Acute deep vein thrombosis (DVT) of popliteal vein of left lower extremity  Bilateral lower extremity edema  - previous xarelto for DVT, completed  - elevate legs when resting       Benign essential HTN  Hyperlipidemia  - Echo: 1. The left ventricular systolic function is normal, with a visually estimated ejection fraction of 55-60%.   2. Spectral Doppler shows a Grade I (impaired relaxation pattern) of left ventricular diastolic filling with normal left atrial filling pressure.  3. There is normal right ventricular global systolic function.  4. There is moderate to severe mitral annular calcification. 5. Moderate aortic valve stenosis. Dimensionless index 0.31 : moderate stenosis. Peak and mean gradients around 48 and 28 mm Hg respectively. Estimated aortic valve area around 1 cm2.  6. There is moderate aortic valve cusp calcification.  - continue hydralazine 25 mg TID  - monitor HR, BP     DM type 2 with diabetic peripheral neuropathy  - sliding scale with lispro coverage 0-10  - A1c: 5.8  - Consistent carb 75gm/meal  - AccuCheck q4/day  - Hypoglycemia protocol     Trigeminal neuralgia  - continue carbamazepine 200 mg BID, gabapentin 300 mg TID     Fever  - fever overnight 5/12 38.7 Axillary, Zosyn resumed  - COVID + 5/13  - Remdisivir x 5 days  - Currently requiring Venti mask 50%  - mycoplasma pneuminiae, IgM  - legionella r/o  - Monitor fever  - Monitor WBC   - CXR: Cardiomegaly with new perihilar and basilar edema with patchy  multifocal airspace opacities right worse than left.  - ID consulted, appreciate reccomendations      Small bilateral pleural effusions.    GI ppx: PPI  DVT ppx: Enoxaparin     Fluids: As needed  Electrolytes: replace as  needed  Nutrition: Consistent carb 75gm/meal  Adjuncts: PICC    Code Status: FC  Pt requires inpatient stay at this time.           Yue Rodriguez, APRN-CNP

## 2025-05-13 NOTE — PROGRESS NOTES
Physical Therapy                 Therapy Communication Note    Patient Name: Elliot Partida  MRN: [id number]  Department: [facility]  Room: [address] [place], [place] [address]  Today's Date: 5/13/2025     Discipline: Physical Therapy    PT Missed Visit: Yes     Missed Visit Reason: Missed Visit Reason: Patient placed on medical hold (Pt agitated and in restraints.  Nurse asked therapy not to see pt today.)    Missed Time: Attempt    Comment:

## 2025-05-13 NOTE — NURSING NOTE
1300: Received patient from med surg, assessment completed, vitals obtained. Patient oriented to room and call bell system  1700: patient refused dinner

## 2025-05-13 NOTE — CARE PLAN
The patient's goals for the shift include to get some sleep    The clinical goals for the shift include Pt will have no signs and symptoms of respiratory distress during this shift    Received patient from [facility] this shift. Patient refused lunch and dinner. Patient remains on a venturi mask and restraints. When restraints are moved for ROM the patient will make attempts to remove venturi mask. Patient tested positive for covid today and started on Remdesivir. Patient continues on Vanco and zosyn per orders. Dressing to coccyx and left foot dry and intact. Patient utilizes urinal for bathroom needs. No BM this shift

## 2025-05-13 NOTE — CARE PLAN
Problem: Skin  Goal: Participates in plan/prevention/treatment measures  Outcome: Progressing     Problem: Pain  Goal: Turns in bed with improved pain control throughout the shift  Outcome: Progressing     Problem: Safety - Medical Restraint  Goal: Remains free of injury from restraints (Restraint for Interference with Medical Device)  5/13/2025 0350 by Caty Carranza RN  Flowsheets (Taken 5/13/2025 0350)  Remains free of injury from restraints (restraint for interference with medical device):   Determine that other, less restrictive measures have been tried or would not be effective before applying the restraint   Evaluate the patient's condition at the time of restraint application   Inform patient/family regarding the reason for restraint   Every 2 hours: Monitor safety, psychosocial status, comfort, nutrition and hydration  5/13/2025 0349 by Caty Carranza RN  Outcome: Progressing  5/13/2025 0348 by Caty Carranza RN  Outcome: Progressing  Goal: Free from restraint(s) (Restraint for Interference with Medical Device)  5/13/2025 0350 by Caty Carranza RN  Flowsheets (Taken 5/13/2025 0350)  Free from restraint(s) (restraint for interference with medical device):   ONCE/SHIFT or MINIMUM Every 12 hours: Assess and document the continuing need for restraints   Identify and implement measures to help patient regain control  5/13/2025 0349 by Caty Carranza RN  Outcome: Progressing  5/13/2025 0348 by Caty Carranza RN  Outcome: Progressing   The patient's goals for the shift include to get some sleep    The clinical goals for the shift include Pt will have no s/s of resp. distress this shift.    Over the shift, the patient did not make progress toward the following goals.

## 2025-05-13 NOTE — CARE PLAN
The patient's goals for the shift include to get some sleep    The clinical goals for the shift include Pt will have no signs and symptoms of respiratory distress during this shift    Problem: Discharge Planning  Goal: Discharge to home or other facility with appropriate resources  5/13/2025 0751 by Radha Shelley RN  Outcome: Progressing  5/13/2025 0751 by Radha Shelley RN  Outcome: Progressing     Problem: Chronic Conditions and Co-morbidities  Goal: Patient's chronic conditions and co-morbidity symptoms are monitored and maintained or improved  5/13/2025 0751 by Radha Shelley RN  Outcome: Progressing  5/13/2025 0751 by Radha Shelley RN  Outcome: Progressing     Problem: Nutrition  Goal: Nutrient intake appropriate for maintaining nutritional needs  5/13/2025 0751 by Radha Shelley RN  Outcome: Progressing  5/13/2025 0751 by Radha Shelley RN  Outcome: Progressing     Problem: Skin  Goal: Decreased wound size/increased tissue granulation at next dressing change  5/13/2025 0751 by Radha Shelley RN  Outcome: Progressing  Flowsheets (Taken 5/13/2025 0751)  Decreased wound size/increased tissue granulation at next dressing change: Promote sleep for wound healing  5/13/2025 0751 by Radha Shelley RN  Outcome: Progressing  Flowsheets (Taken 5/13/2025 0751)  Decreased wound size/increased tissue granulation at next dressing change: Promote sleep for wound healing  Goal: Participates in plan/prevention/treatment measures  5/13/2025 0751 by Radha Shelley RN  Outcome: Progressing  Flowsheets (Taken 5/13/2025 0751)  Participates in plan/prevention/treatment measures:   Discuss with provider PT/OT consult   Elevate heels   Increase activity/out of bed for meals  5/13/2025 0751 by Radha Shelley RN  Outcome: Progressing  Flowsheets (Taken 5/13/2025 0751)  Participates in plan/prevention/treatment measures:   Discuss with provider PT/OT consult   Elevate heels   Increase activity/out of bed  for meals  Goal: Promote/optimize nutrition  5/13/2025 0751 by Radha Shelley RN  Outcome: Progressing  Flowsheets (Taken 5/13/2025 0751)  Promote/optimize nutrition:   Monitor/record intake including meals   Offer water/supplements/favorite foods  5/13/2025 0751 by Radha Shelley RN  Outcome: Progressing  Flowsheets (Taken 5/13/2025 0751)  Promote/optimize nutrition:   Monitor/record intake including meals   Offer water/supplements/favorite foods  Goal: Promote skin healing  5/13/2025 0751 by Radha Shelley RN  Outcome: Progressing  Flowsheets (Taken 5/13/2025 0751)  Promote skin healing:   Turn/reposition every 2 hours/use positioning/transfer devices   Protective dressings over bony prominences   Assess skin/pad under line(s)/device(s)  5/13/2025 0751 by Radha Shelley RN  Outcome: Progressing  Flowsheets (Taken 5/13/2025 0751)  Promote skin healing:   Turn/reposition every 2 hours/use positioning/transfer devices   Protective dressings over bony prominences   Assess skin/pad under line(s)/device(s)     Problem: Skin  Goal: Participates in plan/prevention/treatment measures  5/13/2025 0751 by Radha Shelley RN  Outcome: Progressing  Flowsheets (Taken 5/13/2025 0751)  Participates in plan/prevention/treatment measures:   Discuss with provider PT/OT consult   Elevate heels   Increase activity/out of bed for meals  5/13/2025 0751 by Radha Shelley RN  Outcome: Progressing  Flowsheets (Taken 5/13/2025 0751)  Participates in plan/prevention/treatment measures:   Discuss with provider PT/OT consult   Elevate heels   Increase activity/out of bed for meals     Problem: Skin  Goal: Promote/optimize nutrition  5/13/2025 0751 by Radha Shelley RN  Outcome: Progressing  Flowsheets (Taken 5/13/2025 0751)  Promote/optimize nutrition:   Monitor/record intake including meals   Offer water/supplements/favorite foods  5/13/2025 0751 by Radha Shelley RN  Outcome: Progressing  Flowsheets (Taken 5/13/2025  0751)  Promote/optimize nutrition:   Monitor/record intake including meals   Offer water/supplements/favorite foods     Problem: Diabetes  Goal: Achieve decreasing blood glucose levels by end of shift  5/13/2025 0751 by Radha Shelley RN  Outcome: Progressing  5/13/2025 0751 by Radha Shelley RN  Outcome: Progressing  Goal: Increase stability of blood glucose readings by end of shift  5/13/2025 0751 by Radha Shelley RN  Outcome: Progressing  5/13/2025 0751 by Radha Shelley RN  Outcome: Progressing  Goal: Decrease in ketones present in urine by end of shift  5/13/2025 0751 by Radha Shelley RN  Outcome: Progressing  5/13/2025 0751 by Radha Shelley RN  Outcome: Progressing  Goal: Maintain electrolyte levels within acceptable range throughout shift  5/13/2025 0751 by Radha Shelley RN  Outcome: Progressing  5/13/2025 0751 by Radha Shelley RN  Outcome: Progressing  Goal: Maintain glucose levels >70mg/dl to <250mg/dl throughout shift  5/13/2025 0751 by Radha Shelley RN  Outcome: Progressing  5/13/2025 0751 by Radha Shelley RN  Outcome: Progressing  Goal: No changes in neurological exam by end of shift  5/13/2025 0751 by Radha Shelley RN  Outcome: Progressing  5/13/2025 0751 by Radha Shelley RN  Outcome: Progressing  Goal: Learn about and adhere to nutrition recommendations by end of shift  5/13/2025 0751 by Radha Shelley RN  Outcome: Progressing  5/13/2025 0751 by Rdaha Shelley RN  Outcome: Progressing  Goal: Vital signs within normal range for age by end of shift  5/13/2025 0751 by Radha Shelley RN  Outcome: Progressing  5/13/2025 0751 by Radha Shelley RN  Outcome: Progressing  Goal: Increase self care and/or family involovement by end of shift  5/13/2025 0751 by Radha Shelley RN  Outcome: Progressing  5/13/2025 0751 by Radha Shelley RN  Outcome: Progressing  Goal: Receive DSME education by end of shift  5/13/2025 0751 by Radha Shelley RN  Outcome:  Progressing  5/13/2025 0751 by Radha Shelley, RN  Outcome: Progressing     Problem: Pain  Goal: Takes deep breaths with improved pain control throughout the shift  5/13/2025 0751 by Radha Shelley, RN  Outcome: Progressing  5/13/2025 0751 by Radha Shelley, RN  Outcome: Progressing  Goal: Turns in bed with improved pain control throughout the shift  5/13/2025 0751 by Radha Shelley, RN  Outcome: Progressing  5/13/2025 0751 by Radha Shelley, RN  Outcome: Progressing  Goal: Walks with improved pain control throughout the shift  5/13/2025 0751 by Radha Shelley, RN  Outcome: Progressing  5/13/2025 0751 by Radha Shelley, RN  Outcome: Progressing  Goal: Performs ADL's with improved pain control throughout shift  5/13/2025 0751 by Radha Shelley, RN  Outcome: Progressing  5/13/2025 0751 by Radha Shelley, RN  Outcome: Progressing  Goal: Participates in PT with improved pain control throughout the shift  5/13/2025 0751 by Radha Shelley, RN  Outcome: Progressing  5/13/2025 0751 by Radha Shelley, RN  Outcome: Progressing  Goal: Free from opioid side effects throughout the shift  5/13/2025 0751 by Radha Shelley, RN  Outcome: Progressing  5/13/2025 0751 by Radha Shelley, RN  Outcome: Progressing  Goal: Free from acute confusion related to pain meds throughout the shift  5/13/2025 0751 by Radha Shelley, RN  Outcome: Progressing  5/13/2025 0751 by Radha Shelley, RN  Outcome: Progressing     Problem: Safety - Medical Restraint  Goal: Remains free of injury from restraints (Restraint for Interference with Medical Device)  5/13/2025 0751 by Radha Shelley, RN  Outcome: Progressing  5/13/2025 0751 by Radha Shelley, RN  Outcome: Progressing  Goal: Free from restraint(s) (Restraint for Interference with Medical Device)  5/13/2025 0751 by Radha Shelley, RN  Outcome: Progressing  5/13/2025 0751 by Radha Shelley, RN  Outcome: Progressing

## 2025-05-14 PROBLEM — G93.41 METABOLIC ENCEPHALOPATHY: Status: ACTIVE | Noted: 2025-05-14

## 2025-05-14 PROBLEM — U07.1 COVID-19: Status: ACTIVE | Noted: 2025-05-13

## 2025-05-14 LAB
ALBUMIN SERPL BCP-MCNC: 2.9 G/DL (ref 3.4–5)
ALP SERPL-CCNC: 75 U/L (ref 33–136)
ALT SERPL W P-5'-P-CCNC: 11 U/L (ref 10–52)
ANION GAP SERPL CALC-SCNC: 11 MMOL/L (ref 10–20)
AST SERPL W P-5'-P-CCNC: 16 U/L (ref 9–39)
BILIRUB SERPL-MCNC: 0.4 MG/DL (ref 0–1.2)
BUN SERPL-MCNC: 22 MG/DL (ref 6–23)
CA-I BLD-SCNC: 1.21 MMOL/L (ref 1.1–1.33)
CALCIUM SERPL-MCNC: 8.3 MG/DL (ref 8.6–10.3)
CHLORIDE SERPL-SCNC: 98 MMOL/L (ref 98–107)
CO2 SERPL-SCNC: 31 MMOL/L (ref 21–32)
CREAT SERPL-MCNC: 0.68 MG/DL (ref 0.5–1.3)
CRP SERPL-MCNC: 24.5 MG/DL
EGFRCR SERPLBLD CKD-EPI 2021: >90 ML/MIN/1.73M*2
ERYTHROCYTE [DISTWIDTH] IN BLOOD BY AUTOMATED COUNT: 16 % (ref 11.5–14.5)
GLUCOSE BLD MANUAL STRIP-MCNC: 106 MG/DL (ref 74–99)
GLUCOSE BLD MANUAL STRIP-MCNC: 124 MG/DL (ref 74–99)
GLUCOSE BLD MANUAL STRIP-MCNC: 80 MG/DL (ref 74–99)
GLUCOSE BLD MANUAL STRIP-MCNC: 95 MG/DL (ref 74–99)
GLUCOSE SERPL-MCNC: 130 MG/DL (ref 74–99)
HCT VFR BLD AUTO: 34.6 % (ref 41–52)
HGB BLD-MCNC: 10.8 G/DL (ref 13.5–17.5)
LEGIONELLA AG UR QL: NEGATIVE
MAGNESIUM SERPL-MCNC: 1.89 MG/DL (ref 1.6–2.4)
MCH RBC QN AUTO: 25.2 PG (ref 26–34)
MCHC RBC AUTO-ENTMCNC: 31.2 G/DL (ref 32–36)
MCV RBC AUTO: 81 FL (ref 80–100)
NRBC BLD-RTO: 0 /100 WBCS (ref 0–0)
PLATELET # BLD AUTO: 239 X10*3/UL (ref 150–450)
POTASSIUM SERPL-SCNC: 3.5 MMOL/L (ref 3.5–5.3)
PROCALCITONIN SERPL-MCNC: 0.28 NG/ML
PROT SERPL-MCNC: 6.1 G/DL (ref 6.4–8.2)
RBC # BLD AUTO: 4.28 X10*6/UL (ref 4.5–5.9)
SODIUM SERPL-SCNC: 136 MMOL/L (ref 136–145)
WBC # BLD AUTO: 7.5 X10*3/UL (ref 4.4–11.3)

## 2025-05-14 PROCEDURE — 87040 BLOOD CULTURE FOR BACTERIA: CPT | Mod: GENLAB | Performed by: INTERNAL MEDICINE

## 2025-05-14 PROCEDURE — 94760 N-INVAS EAR/PLS OXIMETRY 1: CPT | Mod: IPSPLIT

## 2025-05-14 PROCEDURE — 85027 COMPLETE CBC AUTOMATED: CPT | Mod: IPSPLIT | Performed by: NURSE PRACTITIONER

## 2025-05-14 PROCEDURE — 84075 ASSAY ALKALINE PHOSPHATASE: CPT | Mod: IPSPLIT | Performed by: NURSE PRACTITIONER

## 2025-05-14 PROCEDURE — 2500000005 HC RX 250 GENERAL PHARMACY W/O HCPCS: Mod: IPSPLIT | Performed by: NURSE PRACTITIONER

## 2025-05-14 PROCEDURE — 82330 ASSAY OF CALCIUM: CPT | Mod: IPSPLIT | Performed by: NURSE PRACTITIONER

## 2025-05-14 PROCEDURE — 99232 SBSQ HOSP IP/OBS MODERATE 35: CPT | Performed by: NURSE PRACTITIONER

## 2025-05-14 PROCEDURE — 2500000004 HC RX 250 GENERAL PHARMACY W/ HCPCS (ALT 636 FOR OP/ED): Mod: JZ,IPSPLIT | Performed by: NURSE PRACTITIONER

## 2025-05-14 PROCEDURE — 86140 C-REACTIVE PROTEIN: CPT | Mod: IPSPLIT | Performed by: NURSE PRACTITIONER

## 2025-05-14 PROCEDURE — 37799 UNLISTED PX VASCULAR SURGERY: CPT | Mod: IPSPLIT | Performed by: NURSE PRACTITIONER

## 2025-05-14 PROCEDURE — 2500000004 HC RX 250 GENERAL PHARMACY W/ HCPCS (ALT 636 FOR OP/ED): Mod: IPSPLIT | Performed by: NURSE PRACTITIONER

## 2025-05-14 PROCEDURE — 9420000001 HC RT PATIENT EDUCATION 5 MIN: Mod: IPSPLIT

## 2025-05-14 PROCEDURE — 94640 AIRWAY INHALATION TREATMENT: CPT | Mod: IPSPLIT

## 2025-05-14 PROCEDURE — 83735 ASSAY OF MAGNESIUM: CPT | Mod: IPSPLIT | Performed by: NURSE PRACTITIONER

## 2025-05-14 PROCEDURE — 2500000001 HC RX 250 WO HCPCS SELF ADMINISTERED DRUGS (ALT 637 FOR MEDICARE OP): Mod: IPSPLIT | Performed by: NURSE PRACTITIONER

## 2025-05-14 PROCEDURE — 2500000004 HC RX 250 GENERAL PHARMACY W/ HCPCS (ALT 636 FOR OP/ED): Mod: IPSPLIT | Performed by: INTERNAL MEDICINE

## 2025-05-14 PROCEDURE — 2020000001 HC ICU ROOM DAILY: Mod: IPSPLIT

## 2025-05-14 PROCEDURE — 36415 COLL VENOUS BLD VENIPUNCTURE: CPT | Mod: IPSPLIT | Performed by: INTERNAL MEDICINE

## 2025-05-14 PROCEDURE — 2500000002 HC RX 250 W HCPCS SELF ADMINISTERED DRUGS (ALT 637 FOR MEDICARE OP, ALT 636 FOR OP/ED): Mod: JZ,IPSPLIT | Performed by: NURSE PRACTITIONER

## 2025-05-14 PROCEDURE — 3E0333Z INTRODUCTION OF ANTI-INFLAMMATORY INTO PERIPHERAL VEIN, PERCUTANEOUS APPROACH: ICD-10-PCS | Performed by: INTERNAL MEDICINE

## 2025-05-14 PROCEDURE — 84145 PROCALCITONIN (PCT): CPT | Mod: GENLAB | Performed by: INTERNAL MEDICINE

## 2025-05-14 PROCEDURE — 94664 DEMO&/EVAL PT USE INHALER: CPT | Mod: IPSPLIT

## 2025-05-14 PROCEDURE — 82947 ASSAY GLUCOSE BLOOD QUANT: CPT | Mod: IPSPLIT

## 2025-05-14 RX ORDER — DEXTROSE MONOHYDRATE AND SODIUM CHLORIDE 5; .9 G/100ML; G/100ML
50 INJECTION, SOLUTION INTRAVENOUS CONTINUOUS
Status: ACTIVE | OUTPATIENT
Start: 2025-05-14 | End: 2025-05-15

## 2025-05-14 RX ORDER — IPRATROPIUM BROMIDE AND ALBUTEROL SULFATE 2.5; .5 MG/3ML; MG/3ML
3 SOLUTION RESPIRATORY (INHALATION) 3 TIMES DAILY
Status: DISCONTINUED | OUTPATIENT
Start: 2025-05-14 | End: 2025-05-14

## 2025-05-14 RX ORDER — VANCOMYCIN 1.75 G/350ML
1250 INJECTION, SOLUTION INTRAVENOUS EVERY 12 HOURS
Status: DISCONTINUED | OUTPATIENT
Start: 2025-05-14 | End: 2025-05-14 | Stop reason: CLARIF

## 2025-05-14 RX ORDER — IPRATROPIUM BROMIDE AND ALBUTEROL SULFATE 2.5; .5 MG/3ML; MG/3ML
3 SOLUTION RESPIRATORY (INHALATION) AS NEEDED
Status: DISCONTINUED | OUTPATIENT
Start: 2025-05-14 | End: 2025-05-14

## 2025-05-14 RX ORDER — IPRATROPIUM BROMIDE AND ALBUTEROL SULFATE 2.5; .5 MG/3ML; MG/3ML
3 SOLUTION RESPIRATORY (INHALATION) EVERY 2 HOUR PRN
Status: DISCONTINUED | OUTPATIENT
Start: 2025-05-14 | End: 2025-05-17

## 2025-05-14 RX ORDER — FUROSEMIDE 10 MG/ML
40 INJECTION INTRAMUSCULAR; INTRAVENOUS ONCE
Status: COMPLETED | OUTPATIENT
Start: 2025-05-14 | End: 2025-05-14

## 2025-05-14 RX ORDER — ALBUTEROL SULFATE 0.83 MG/ML
2.5 SOLUTION RESPIRATORY (INHALATION) EVERY 2 HOUR PRN
Status: DISCONTINUED | OUTPATIENT
Start: 2025-05-14 | End: 2025-05-20 | Stop reason: HOSPADM

## 2025-05-14 RX ORDER — ZINC OXIDE 20 G/100G
1 OINTMENT TOPICAL 3 TIMES DAILY
Status: DISCONTINUED | OUTPATIENT
Start: 2025-05-14 | End: 2025-05-20 | Stop reason: HOSPADM

## 2025-05-14 RX ADMIN — ENOXAPARIN SODIUM 40 MG: 40 INJECTION SUBCUTANEOUS at 08:00

## 2025-05-14 RX ADMIN — REMDESIVIR 100 MG: 100 INJECTION, POWDER, LYOPHILIZED, FOR SOLUTION INTRAVENOUS at 13:41

## 2025-05-14 RX ADMIN — HYDRALAZINE HYDROCHLORIDE 25 MG: 25 TABLET ORAL at 08:00

## 2025-05-14 RX ADMIN — IPRATROPIUM BROMIDE AND ALBUTEROL SULFATE 3 ML: .5; 3 SOLUTION RESPIRATORY (INHALATION) at 16:05

## 2025-05-14 RX ADMIN — PIPERACILLIN SODIUM AND TAZOBACTAM SODIUM 3.38 G: 3; .375 INJECTION, SOLUTION INTRAVENOUS at 11:37

## 2025-05-14 RX ADMIN — DEXAMETHASONE SODIUM PHOSPHATE 6 MG: 10 INJECTION INTRAMUSCULAR; INTRAVENOUS at 22:03

## 2025-05-14 RX ADMIN — ZINC OXIDE 1 APPLICATION: 200 OINTMENT TOPICAL at 22:05

## 2025-05-14 RX ADMIN — CARBAMAZEPINE 200 MG: 200 TABLET ORAL at 22:02

## 2025-05-14 RX ADMIN — VANCOMYCIN 1250 MG: 1.75 INJECTION, SOLUTION INTRAVENOUS at 12:31

## 2025-05-14 RX ADMIN — CARBAMAZEPINE 200 MG: 200 TABLET ORAL at 08:00

## 2025-05-14 RX ADMIN — GABAPENTIN 300 MG: 300 CAPSULE ORAL at 08:00

## 2025-05-14 RX ADMIN — Medication 4 L/MIN: at 20:33

## 2025-05-14 RX ADMIN — FUROSEMIDE 40 MG: 10 INJECTION, SOLUTION INTRAMUSCULAR; INTRAVENOUS at 17:18

## 2025-05-14 RX ADMIN — PIPERACILLIN SODIUM AND TAZOBACTAM SODIUM 3.38 G: 3; .375 INJECTION, SOLUTION INTRAVENOUS at 22:02

## 2025-05-14 RX ADMIN — HYDRALAZINE HYDROCHLORIDE 25 MG: 25 TABLET ORAL at 15:06

## 2025-05-14 RX ADMIN — GABAPENTIN 300 MG: 300 CAPSULE ORAL at 22:02

## 2025-05-14 RX ADMIN — POLYETHYLENE GLYCOL 3350 17 G: 17 POWDER, FOR SOLUTION ORAL at 08:00

## 2025-05-14 RX ADMIN — DEXTROSE AND SODIUM CHLORIDE 50 ML/HR: 5; .9 INJECTION, SOLUTION INTRAVENOUS at 18:39

## 2025-05-14 RX ADMIN — VANCOMYCIN HYDROCHLORIDE 1.25 G: 1.25 INJECTION, POWDER, LYOPHILIZED, FOR SOLUTION INTRAVENOUS at 22:02

## 2025-05-14 RX ADMIN — PIPERACILLIN SODIUM AND TAZOBACTAM SODIUM 3.38 G: 3; .375 INJECTION, SOLUTION INTRAVENOUS at 04:38

## 2025-05-14 RX ADMIN — TRAZODONE HYDROCHLORIDE 50 MG: 50 TABLET ORAL at 22:02

## 2025-05-14 RX ADMIN — PIPERACILLIN SODIUM AND TAZOBACTAM SODIUM 3.38 G: 3; .375 INJECTION, SOLUTION INTRAVENOUS at 17:18

## 2025-05-14 RX ADMIN — GABAPENTIN 300 MG: 300 CAPSULE ORAL at 15:06

## 2025-05-14 RX ADMIN — SENNOSIDES AND DOCUSATE SODIUM 1 TABLET: 50; 8.6 TABLET ORAL at 22:02

## 2025-05-14 RX ADMIN — VANCOMYCIN HYDROCHLORIDE 1 G: 1 INJECTION, SOLUTION INTRAVENOUS at 00:15

## 2025-05-14 ASSESSMENT — PAIN - FUNCTIONAL ASSESSMENT
PAIN_FUNCTIONAL_ASSESSMENT: 0-10
PAIN_FUNCTIONAL_ASSESSMENT: 0-10

## 2025-05-14 ASSESSMENT — PAIN SCALES - GENERAL
PAINLEVEL_OUTOF10: 0 - NO PAIN

## 2025-05-14 NOTE — PROGRESS NOTES
"Elliot Partida is a 74 y.o. male on day 6 of admission presenting with Wound infection.    Subjective   Patient lying in bed, offered and accepted water, took a few sips and then declined further water. Patient reports feeling tired. Denies CP, denies SOB, nausea, or abdominal pain.        Objective     Physical Exam  Constitutional:       Appearance: He is ill-appearing.      Comments: Patient lethargic but arousable, soft wrist restraints noted,   HENT:      Head: Normocephalic and atraumatic.      Mouth/Throat:      Mouth: Mucous membranes are moist.   Eyes:      General: No scleral icterus.        Right eye: No discharge.         Left eye: No discharge.   Cardiovascular:      Rate and Rhythm: Normal rate and regular rhythm.      Pulses: Normal pulses.      Heart sounds: Normal heart sounds. No murmur heard.     No gallop.   Pulmonary:      Effort: Pulmonary effort is normal. No respiratory distress.      Breath sounds: Wheezing and rhonchi present.      Comments: 6 LPM supplemental O2 via NC   Abdominal:      General: Abdomen is flat. There is no distension.      Palpations: Abdomen is soft. There is no mass.      Tenderness: There is no abdominal tenderness.      Hernia: No hernia is present.   Musculoskeletal:      Cervical back: No rigidity or tenderness.      Comments: LLE dressing intact    Skin:     General: Skin is warm.      Capillary Refill: Capillary refill takes 2 to 3 seconds.      Comments: Sacral dressing intact    Neurological:      Mental Status: He is alert and oriented to person, place, and time.         Last Recorded Vitals  Blood pressure 144/57, pulse 82, temperature 37.1 °C (98.8 °F), temperature source Temporal, resp. rate 16, height 1.753 m (5' 9\"), weight 91.1 kg (200 lb 13.4 oz), SpO2 97%.  Intake/Output last 3 Shifts:  I/O last 3 completed shifts:  In: 980 (10.8 mL/kg) [P.O.:180; IV Piggyback:800]  Out: 3125 (34.3 mL/kg) [Urine:3125 (1 mL/kg/hr)]  Weight: 91.1 kg     Relevant " Results  Current Medications[1]   Results for orders placed or performed during the hospital encounter of 05/08/25 (from the past 24 hours)   POCT GLUCOSE   Result Value Ref Range    POCT Glucose 90 74 - 99 mg/dL   CBC   Result Value Ref Range    WBC 7.5 4.4 - 11.3 x10*3/uL    nRBC 0.0 0.0 - 0.0 /100 WBCs    RBC 4.28 (L) 4.50 - 5.90 x10*6/uL    Hemoglobin 10.8 (L) 13.5 - 17.5 g/dL    Hematocrit 34.6 (L) 41.0 - 52.0 %    MCV 81 80 - 100 fL    MCH 25.2 (L) 26.0 - 34.0 pg    MCHC 31.2 (L) 32.0 - 36.0 g/dL    RDW 16.0 (H) 11.5 - 14.5 %    Platelets 239 150 - 450 x10*3/uL   Comprehensive metabolic panel   Result Value Ref Range    Glucose 130 (H) 74 - 99 mg/dL    Sodium 136 136 - 145 mmol/L    Potassium 3.5 3.5 - 5.3 mmol/L    Chloride 98 98 - 107 mmol/L    Bicarbonate 31 21 - 32 mmol/L    Anion Gap 11 10 - 20 mmol/L    Urea Nitrogen 22 6 - 23 mg/dL    Creatinine 0.68 0.50 - 1.30 mg/dL    eGFR >90 >60 mL/min/1.73m*2    Calcium 8.3 (L) 8.6 - 10.3 mg/dL    Albumin 2.9 (L) 3.4 - 5.0 g/dL    Alkaline Phosphatase 75 33 - 136 U/L    Total Protein 6.1 (L) 6.4 - 8.2 g/dL    AST 16 9 - 39 U/L    Bilirubin, Total 0.4 0.0 - 1.2 mg/dL    ALT 11 10 - 52 U/L   C-reactive protein   Result Value Ref Range    C-Reactive Protein 24.50 (H) <1.00 mg/dL   Blood Culture    Specimen: Central Line/Catheter (Specify below); Blood culture   Result Value Ref Range    Blood Culture Loaded on Instrument - Culture in progress    Blood Culture    Specimen: Peripheral Venipuncture; Blood culture   Result Value Ref Range    Blood Culture Loaded on Instrument - Culture in progress    Procalcitonin   Result Value Ref Range    Procalcitonin 0.28 (H) <=0.07 ng/mL   Magnesium   Result Value Ref Range    Magnesium 1.89 1.60 - 2.40 mg/dL   POCT GLUCOSE   Result Value Ref Range    POCT Glucose 124 (H) 74 - 99 mg/dL   Calcium, ionized   Result Value Ref Range    POCT Calcium, Ionized 1.21 1.1 - 1.33 mmol/L   POCT GLUCOSE   Result Value Ref Range    POCT Glucose  95 74 - 99 mg/dL   POCT GLUCOSE   Result Value Ref Range    POCT Glucose 80 74 - 99 mg/dL                                  Assessment & Plan  Wound infection    Osteomyelitis of left foot, unspecified type (Multi)    Acute deep vein thrombosis (DVT) of popliteal vein of left lower extremity    Bilateral lower extremity edema    Benign essential HTN    DM type 2 with diabetic peripheral neuropathy    Hyperlipidemia    Trigeminal neuralgia    Fever    Metabolic encephalopathy    COVID-19    Wound infection  Osteomyelitis of left foot, unspecified type (Multi)  - XR tib/fib: Thick periosteal new bone formation along the left tibia likely due to history of chronic osteomyelitis and healing. No acute bone destruction.  - MRI: No evidence to suggest osteomyelitis. 2. Partially visualized large soft tissue ulceration along the  anterior aspect of the distal tibia and tibial talar joint with possible exposure of the underlying anterior tibialis tendon. Moderate amount of fluid along the posterior as well as anterior aspect of the medial head of the gastrocnemius extending from the level of the knee to the distal lower leg. This is nonspecific with infectious fasciitis felt to be less likely although not entirely excluded. Edema of the anterior compartment muscles of the lower leg which may represent infectious myositis. No evidence of intramuscular abscess.  Extensive diffuse soft tissue edema noted throughout the left lower extremity and partially visualized right lower extremity Moderate diffuse fatty atrophy of the lower leg muscles.  -Podiatry  and ID consult  - No plans for surgical intervention during this visit.   -CRP 24.5 most likely due to Chronic osteomyelitis   -ID recommend discontinue Zosyn and continue with Vancomycin 6 weeks  -discontinue zosyn,- Zosyn resumed   -continue vancomycin 1g q12h  -wound culture: GPC grew MRSA   - Blood culture- no growth at 4 days 5/9  - follow wound culture, WBC, blood cx  - PWB  to the left lower extremity for transfer  - Nursing staff is able to change/reinforce dressing if & as necessary until next day’s dressing change  -  Betadine-soaked 4x4 to wound base, covered with dry gauze, Abd pads, Kerlix and [place]ak compressive dressing.   - PT/OT- Mod intensity level  - PICC line placed for 6 weeks IV antibiotics-CHG bath  - Chronic 02 is on 2 liters 02 at night, has been on it continuously  - RT to evaluate  - Monitor temperature  - Follow WBC 8.4>9.4>9.7>7.1>9.6 >7.5  - Dressing changed 5/14            Acute deep vein thrombosis (DVT) of popliteal vein of left lower extremity  Bilateral lower extremity edema  - previous xarelto for DVT, completed  - elevate legs when resting        Benign essential HTN  Hyperlipidemia  - Echo: 1. The left ventricular systolic function is normal, with a visually estimated ejection fraction of 55-60%.   2. Spectral Doppler shows a Grade I (impaired relaxation pattern) of left ventricular diastolic filling with normal left atrial filling pressure.  3. There is normal right ventricular global systolic function.  4. There is moderate to severe mitral annular calcification. 5. Moderate aortic valve stenosis. Dimensionless index 0.31 : moderate stenosis. Peak and mean gradients around 48 and 28 mm Hg respectively. Estimated aortic valve area around 1 cm2.  6. There is moderate aortic valve cusp calcification.  - continue hydralazine 25 mg TID  - monitor HR, BP     DM type 2 with diabetic peripheral neuropathy  - sliding scale with lispro coverage 0-10  - A1c: 5.8  - Consistent carb 75gm/meal  - AccuCheck q4/day  - Hypoglycemia protocol  - Decreased PO intake   - Maintenace fluids D5NS at 50mL/hr      Trigeminal neuralgia  - continue carbamazepine 200 mg BID, gabapentin 300 mg TID     Fever  Metabolic encephalopathy  COVID-19  - fever overnight 5/12 38.7 Axillary, Zosyn resumed  - COVID + 5/13  - Remdisivir x 5 days  - Currently requiring Venti mask 50%  - mycoplasma  "pneuminiae, IgM  - legionella r/o  - Monitor fever  - Monitor WBC   - CXR: Cardiomegaly with new perihilar and basilar edema with patchy  multifocal airspace opacities right worse than left.Small bilateral pleural effusions.   - ID consulted, appreciate recommendations  - Psych consulted for behavior issues 5/12: Evaluated 5/13 'Pt unable to participate in consult - is on venturi mask and in/out of sleep.Will try tomorrow. Likely the agitation was related to hypoxia.\"   - Lama Catheter for accurate output  - I/O hourly           GI ppx: PPI  DVT ppx: Enoxaparin   Fluids: D5NS 50ml/hr  Electrolytes: replace as needed  Nutrition: Consistent carb 75gm/meal  Adjuncts: PICC     Code Status:           Yue SANTOS Rodriguez APRN-CNP         [1]   Current Facility-Administered Medications:     acetaminophen (Tylenol) tablet 650 mg, 650 mg, oral, q4h PRN, 650 mg at 05/13/25 1830 **OR** acetaminophen (Tylenol) oral liquid 650 mg, 650 mg, nasogastric tube, q4h PRN **OR** acetaminophen (Tylenol) suppository 650 mg, 650 mg, rectal, q4h PRN, Yue C Michael, APRN-CNP, 650 mg at 05/13/25 0120    albuterol 2.5 mg /3 mL (0.083 %) nebulizer solution 2.5 mg, 2.5 mg, nebulization, q2h PRN, Yue C Michael, APRN-CNP    alum-mag hydroxide-simeth (Mylanta) 200-200-20 mg/5 mL oral suspension 20 mL, 20 mL, oral, q4h PRN, Yue C Michael, APRN-CNP    benzocaine-menthol (Cepastat Sore Throat) lozenge 1 lozenge, 1 lozenge, Mouth/Throat, q2h PRN, Yue C Michael, APRN-CNP, 1 lozenge at 05/10/25 2059    carBAMazepine (TEGretol) tablet 200 mg, 200 mg, oral, BID, Yue C Michael, APRN-CNP, 200 mg at 05/14/25 0800    D5 % and 0.9 % sodium chloride infusion, 50 mL/hr, intravenous, Continuous, Yue C Michael, APRN-CNP    dexAMETHasone (PF) (Decadron) injection 6 mg, 6 mg, intravenous, q24h, Baljit Velazco MD, 6 mg at 05/13/25 9196    dextrose 50 % injection 12.5 g, 12.5 g, intravenous, q15 min PRN, Yue Rodriguez APRN-CNP    dextrose 50 % " injection 25 g, 25 g, intravenous, q15 min PRN, Yue C Michael, APRN-CNP    enoxaparin (Lovenox) syringe 40 mg, 40 mg, subcutaneous, Daily, Yue C Michael, APRN-CNP, 40 mg at 05/14/25 0800    gabapentin (Neurontin) capsule 300 mg, 300 mg, oral, TID, Yue C Michael, APRN-CNP, 300 mg at 05/14/25 1506    glucagon (Glucagen) injection 1 mg, 1 mg, intramuscular, q15 min PRN, Yue C Michael, APRN-CNP    glucagon (Glucagen) injection 1 mg, 1 mg, intramuscular, q15 min PRN, Yue C Michael, APRN-CNP    hydrALAZINE (Apresoline) injection 10 mg, 10 mg, intravenous, q4h PRN, Yue C Michael, APRN-CNP    hydrALAZINE (Apresoline) tablet 25 mg, 25 mg, oral, TID, Yue C Michael, APRN-CNP, 25 mg at 05/14/25 1506    insulin lispro injection 0-10 Units, 0-10 Units, subcutaneous, TID AC, Yue C Michael, APRN-CNP, 2 Units at 05/10/25 1142    ipratropium-albuteroL (Duo-Neb) 0.5-2.5 mg/3 mL nebulizer solution 3 mL, 3 mL, nebulization, TID, Yue C Michael, APRN-CNP, 3 mL at 05/14/25 1605    ketorolac (Toradol) injection 15 mg, 15 mg, intravenous, q6h PRN, Yue C Michael, APRN-CNP, 15 mg at 05/13/25 0118    lidocaine (LMX) 4 % cream, , Topical, 4x daily PRN, Yue C Michael, APRN-CNP, Given at 05/08/25 1201    lidocaine PF (Xylocaine) 10 mg/mL (1 %) injection 50 mg, 5 mL, infiltration, Once, Yue C Michael, APRN-CNP    LORazepam (Ativan) injection 1 mg, 1 mg, intravenous, Once, Yue C Michael, APRN-CNP    metoclopramide (Reglan) injection 10 mg, 10 mg, intravenous, q8h PRN, Yue C Michael, APRN-CNP    ondansetron (Zofran) injection 4 mg, 4 mg, intravenous, q4h PRN, Yue C Michael, APRN-CNP, 4 mg at 05/12/25 0636    oxygen (O2) therapy, , inhalation, Continuous PRN - O2/gases, Yue C Michael, APRN-CNP, 50 percent at 05/13/25 0913    oxygen (O2) therapy, , inhalation, Continuous PRN - O2/gases, Yue C Michael, APRN-CNP, 4 L/min at 05/14/25 1613    piperacillin-tazobactam (Zosyn) 3.375 g in dextrose (iso) IV 50 mL, 3.375 g,  intravenous, q6h, Yue C Michael, APRN-CNP, Last Rate: 0 mL/hr at 05/14/25 1230, 3.375 g at 05/14/25 1718    polyethylene glycol (Glycolax, Miralax) packet 17 g, 17 g, oral, Daily, Yue C Michael, APRN-CNP, 17 g at 05/14/25 0800    [Held by provider] potassium chloride CR (Klor-Con M20) ER tablet 40 mEq, 40 mEq, oral, Daily, Yue C Michael, APRN-CNP    [COMPLETED] remdesivir (Veklury) 200 mg in sodium chloride 0.9% 250 mL IV, 200 mg, intravenous, Once, Stopped at 05/13/25 1427 **FOLLOWED BY** remdesivir (Veklury) 100 mg in sodium chloride 0.9% 100 mL IV, 100 mg, intravenous, q24h, Yue C Michael, APRN-CNP, 100 mg at 05/14/25 1341    sennosides-docusate sodium (Ann-Colace) 8.6-50 mg per tablet 1 tablet, 1 tablet, oral, Nightly, Yue C Michael, APRN-CNP, 1 tablet at 05/13/25 2047    traZODone (Desyrel) tablet 50 mg, 50 mg, oral, Nightly, Yue C Michael, APRN-CNP, 50 mg at 05/13/25 2047    vancomycin (Vancocin) pharmacy to dose - pharmacy monitoring, , miscellaneous, Daily PRN, Yue C Michael, APRN-CNP    vancomycin 1.25 g in sodium chloride 0.9% 250 mL IV, 1,250 mg, intravenous, q12h, Yue C Michael, APRN-CNP    zinc oxide 20 % ointment 1 Application, 1 Application, Topical, TID, Yue C Michael, APRN-CNP

## 2025-05-14 NOTE — CARE PLAN
The patient's goals for the shift include to get some sleep    The clinical goals for the shift include Patient will be weaned down from 6 liters NC and pulse ox will be maintained at 90% or greater this shift 5/14/25 1900.    Patient was weaned down from 6 liters to 4 liters NC this shift. Patient did complete goal for this shift. Patient refused care at times. Patient refused to eat and drink aside from a few sips hear and there.  Patient blood sugar 80 and refused to drink juice.  D5 Ns at 50 ordered. Dressing changed by wound nurse.

## 2025-05-14 NOTE — PROGRESS NOTES
Vancomycin Dosing by Pharmacy- FOLLOW UP    Elliot Partida is a 74 y.o. year old male who Pharmacy has been consulted for vancomycin dosing for osteomyelitis/septic arthritis. Based on the patient's indication and renal status this patient is being dosed based on a goal AUC of 400-600.     Renal function is currently improving.    Current vancomycin dose: 1000 mg given every 12 hours    Estimated vancomycin AUC on current dose: 390 mg/L.hr     Visit Vitals  /54   Pulse 75   Temp 36.4 °C (97.5 °F) (Temporal)   Resp 19        Lab Results   Component Value Date    CREATININE 0.68 2025    CREATININE 0.75 2025    CREATININE 0.72 2025    CREATININE 0.66 2025        Patient weight is as follows:   Vitals:    25 0200   Weight: 91.1 kg (200 lb 13.4 oz)       Cultures:  Susceptibility data for the encounter in last 14 days.  Collected Specimen Info Organism Clindamycin Erythromycin Oxacillin Tetracycline Trimethoprim/Sulfamethoxazole Vancomycin   25 Swab from Anterior Nares Methicillin Susceptible Staphylococcus aureus (MSSA)         25 Tissue/Biopsy from Wound/Tissue Methicillin Resistant Staphylococcus aureus (MRSA)  R  R  R  R  S  S     Mixed Aerobic and Anaerobic Bacteria               I/O last 3 completed shifts:  In: 980 (10.8 mL/kg) [P.O.:180; IV Piggyback:800]  Out: 3125 (34.3 mL/kg) [Urine:3125 (1 mL/kg/hr)]  Weight: 91.1 kg   I/O during current shift:  No intake/output data recorded.    Temp (24hrs), Av.4 °C (97.5 °F), Min:36.2 °C (97.2 °F), Max:36.7 °C (98.1 °F)      Assessment/Plan    Below goal AUC. Orders placed for new vancomcyin regimen of 1250mg every 12 hours to begin at 25 @ 1300.     This dosing regimen is predicted by [employer] to result in the following pharmacokinetic parameters:  Regimen: 1250 mg IV every 12 hours.  Start time: 12:15 on 2025  Exposure target: AUC24 (range)400-600 mg/L.hr   KYL06-05: 487 mg/L.hr  AUC24,ss: 487  mg/L.hr  Probability of AUC24 > 400: 96 %  Ctrough,ss: 14.9 mg/L  Probability of Ctrough,ss > 20: 8 %    The next level will be obtained on 5/15/25 at 0500. May be obtained sooner if clinically indicated.   Will continue to monitor renal function daily while on vancomycin and order serum creatinine at least every 48 hours if not already ordered.  Follow for continued vancomycin needs, clinical response, and signs/symptoms of toxicity.       Romulo Wells, PharmD

## 2025-05-14 NOTE — PROGRESS NOTES
Occupational Therapy                 Therapy Communication Note    Patient Name: Elliot Partida  MRN: [id number]  Department: [facility]  Room: 05/05-A  Today's Date: 5/14/2025     Discipline: Occupational Therapy    Missed Visit: Yes     Missed Visit Reason: Pt.'s condition is not appropriate for participation. The pt. is too lethargic to participate at this time. The pt. is also in restraints at this time due to attempting to pull out medical devices. (PICC line)    Missed Time: Attempt at 8:25AM

## 2025-05-14 NOTE — PROGRESS NOTES
Physical Therapy                 Therapy Communication Note    Patient Name: Elliot Partida  MRN: [id number]  Department: [facility]  Room: 05/05-A  Today's Date: 5/14/2025     Discipline: Physical Therapy    Missed Visit: PT Missed Visit: Yes     Missed Visit Reason: Missed Visit Reason: Patient placed on medical hold (Pt still in restraints.  Hold PT until appropriate.)    Missed Time: Attempt    Comment:

## 2025-05-14 NOTE — CARE PLAN
The patient's goals for the shift include to get some sleep    The clinical goals for the shift include Patient to remain free of increased oxygenation needs by end of shift    Over the shift, the patient did not make progress toward the following goals. Patient weaned from 50 % venti mask to 6L NC O2 acute. Patient sustained >92 % Spo2 with no respiratory distress noted. Mentation noted to have improved, intermittently confused at times, forgetful and still attempts to remove medical equipment overnight. Patient started on IVP decadron. Bilateral soft wrist restraints remain on at this time as patient cannot verbalize understanding of education for criteria for restraint removal to protect medical lines. Patient denies pain. Patient presented with coarse crackles and edema generalized and BUE, x1 dose of lasix IVP administered with positive results of 1450 ml urine output overnight. Patient remained NSR with 1st degree AV block. Patient received total bath and bed change. Pure wick in place. Blood cultures drawn. Patient started on IVP decadron. Patient remained afebrile throughout night.       Problem: Discharge Planning  Goal: Discharge to home or other facility with appropriate resources  Outcome: Not Progressing     Problem: Chronic Conditions and Co-morbidities  Goal: Patient's chronic conditions and co-morbidity symptoms are monitored and maintained or improved  Outcome: Not Progressing     Problem: Nutrition  Goal: Nutrient intake appropriate for maintaining nutritional needs  Outcome: Not Progressing     Problem: Skin  Goal: Participates in plan/prevention/treatment measures  Outcome: Not Progressing  Goal: Promote/optimize nutrition  Outcome: Not Progressing  Goal: Promote skin healing  Outcome: Not Progressing     Problem: Diabetes  Goal: No changes in neurological exam by end of shift  Outcome: Not Progressing  Goal: Learn about and adhere to nutrition recommendations by end of shift  Outcome: Not  Progressing  Goal: Receive DSME education by end of shift  Outcome: Not Progressing     Problem: Pain  Goal: Walks with improved pain control throughout the shift  Outcome: Not Progressing  Goal: Performs ADL's with improved pain control throughout shift  Outcome: Not Progressing  Goal: Participates in PT with improved pain control throughout the shift  Outcome: Not Progressing     Problem: Safety - Medical Restraint  Goal: Free from restraint(s) (Restraint for Interference with Medical Device)  Outcome: Not Progressing

## 2025-05-14 NOTE — PROGRESS NOTES
Elliot Partida is a 74 y.o. male on day 6 of admission presenting with Wound infection.    Subjective   Interval History:   Afebrile, no chills  Interval improvement, on low-flow oxygen  Nonproductive cough  No chest pain  No nausea vomiting or diarrhea    Review of Systems   All other systems reviewed and are negative.      Objective   Range of Vitals (last 24 hours)  Heart Rate:  [71-91]   Temp:  [36.2 °C (97.2 °F)-37.1 °C (98.8 °F)]   Resp:  [18-28]   BP: (113-178)/(51-86)   Weight:  [91.1 kg (200 lb 13.4 oz)]   SpO2:  [94 %-99 %]   Daily Weight  05/14/25 : 91.1 kg (200 lb 13.4 oz)    Body mass index is 29.66 kg/m².    Physical Exam  Constitutional:       Appearance: Ill looking  HENT:      Head: Normocephalic and atraumatic.      Right Ear: External ear normal.      Left Ear: External ear normal.      Nose: Nose normal.   Eyes:      General: No scleral icterus.     Extraocular Movements: Extraocular movements intact.      Conjunctiva/sclera: Conjunctivae normal.   Cardiovascular:      Rate and Rhythm: Regular rhythm.      Heart sounds: Normal heart sounds, S1 normal and S2 normal.   Pulmonary:      Breath sounds: Normal breath sounds and air entry.   Abdominal:      General: Bowel sounds are normal.      Palpations: Abdomen is soft.      Tenderness: There is no abdominal tenderness.   Musculoskeletal:      Cervical back: Normal range of motion and neck supple.      Right lower leg: No edema.      Left lower leg: No edema.   Skin:     General: Skin is warm and dry.      Comments: Left anterior lower leg/dorsal foot ulcer with moderate amount of granulation tissue and moderate amount of fat necrosis   Neurological:      Mental Status: He is somewhat lethargic  Psychiatric:         Behavior: Behavior is agitated    Antibiotics  piperacillin-tazobactam - 3.375 gram/50 mL  vancomycin - 1.25 gram/250 mL    Relevant Results  Labs  Results from last 72 hours   Lab Units 05/14/25  0511 05/13/25  0605 05/12/25  0682    WBC AUTO x10*3/uL 7.5 9.6 7.1   HEMOGLOBIN g/dL 10.8* 10.1* 10.5*   HEMATOCRIT % 34.6* 33.0* 33.8*   PLATELETS AUTO x10*3/uL 239 245 226   NEUTROS PCT AUTO %  --   --  76.4   LYMPHS PCT AUTO %  --   --  9.0   MONOS PCT AUTO %  --   --  13.6   EOS PCT AUTO %  --   --  0.1     Results from last 72 hours   Lab Units 05/14/25  0511 05/13/25  0605 05/12/25  0624   SODIUM mmol/L 136 137 135*   POTASSIUM mmol/L 3.5 3.5 3.6   CHLORIDE mmol/L 98 101 101   CO2 mmol/L 31 29 27   BUN mg/dL 22 28* 23   CREATININE mg/dL 0.68 0.75 0.72   GLUCOSE mg/dL 130* 132* 129*   CALCIUM mg/dL 8.3* 8.1* 8.2*   ANION GAP mmol/L 11 11 11   EGFR mL/min/1.73m*2 >90 >90 >90     Results from last 72 hours   Lab Units 05/14/25  0511 05/12/25  0624   ALK PHOS U/L 75 84   BILIRUBIN TOTAL mg/dL 0.4 0.5   PROTEIN TOTAL g/dL 6.1* 6.1*   ALT U/L 11 13   AST U/L 16 18   ALBUMIN g/dL 2.9* 3.0*     Estimated Creatinine Clearance: 108 mL/min (by C-G formula based on SCr of 0.68 mg/dL).  C-Reactive Protein   Date Value Ref Range Status   05/14/2025 24.50 (H) <1.00 mg/dL Final   05/08/2025 1.54 (H) <1.00 mg/dL Final   02/11/2025 2.68 (H) <1.00 mg/dL Final     Microbiology  Susceptibility data from last 14 days.  Collected Specimen Info Organism Clindamycin Erythromycin Oxacillin Tetracycline Trimethoprim/Sulfamethoxazole Vancomycin   05/09/25 Swab from Anterior Nares Methicillin Susceptible Staphylococcus aureus (MSSA)         05/08/25 Tissue/Biopsy from Wound/Tissue Methicillin Resistant Staphylococcus aureus (MRSA)  R  R  R  R  S  S     Mixed Aerobic and Anaerobic Bacteria              Imaging  ECG 12 Lead  Result Date: 5/13/2025  Sinus rhythm with 1st degree AV block Left axis deviation Nonspecific intraventricular block Possible Anterolateral infarct (cited on or before 09-MAY-2025) Abnormal ECG When compared with ECG of 09-MAY-2025 18:47, (unconfirmed) Serial changes of Anterior infarct Present    ECG 12 lead  Result Date: 5/13/2025  Sinus rhythm with  1st degree AV block Left anterior fascicular block Minimal voltage criteria for LVH, may be normal variant ( [place] product ) Anterior infarct , age undetermined Abnormal ECG When compared with ECG of 13-FEB-2025 09:37, No significant change was found    XR chest 1 view  Addendum Date: 5/12/2025  Interpreted By:  Júnior Sena, ADDENDUM: Left-sided PICC line is noted and projects to the level of the superior vena cava without pneumothorax.   Signed by: Júnior Sena 5/12/2025 5:41 PM   -------- ORIGINAL REPORT -------- Dictation workstation:   [id number]    Result Date: 5/12/2025  Interpreted By:  Júnior Sena, STUDY: XR CHEST 1 VIEW   INDICATION: Signs/Symptoms:PICC placement.   COMPARISON: February 17, 2025   ACCESSION NUMBER(S): [id number]   ORDERING CLINICIAN: BERNIE MEDRANO   FINDINGS: Cardiomegaly with new perihilar and basilar edema with patchy multifocal airspace opacities right worse than left.   Small bilateral pleural effusions.   No evidence of pneumothorax.         Cardiomegaly with new perihilar and basilar edema with patchy multifocal airspace opacities right worse than left.   Small bilateral pleural effusions.   Signed by: Júnior Sena 5/12/2025 5:28 PM Dictation workstation:   [id number]    CT brain attack head wo IV contrast  Result Date: 5/12/2025  Interpreted By:  Enedina Umana, STUDY: CT BRAIN ATTACK HEAD WO IV CONTRAST;  5/12/2025 9:37 am   INDICATION: Signs/Symptoms:stroke alert.     COMPARISON: 12/23/2024   ACCESSION NUMBER(S): [id number]   ORDERING CLINICIAN: KATIE HILTON   TECHNIQUE: Unenhanced CT images of the head were obtained.   FINDINGS: Patient is rotated.  The ventricles, cisterns and sulci are enlarged, consistent with diffuse volume loss. There are areas of nonspecific white matter hypodensity, which are probably age related or microvascular in nature. Low-density lacunar type infarct in the right basal ganglia. These findings are similar to the previous exam. There is no  acute intracranial hemorrhage, mass effect or midline shift. No extraaxial fluid collection.   No focal calvarial lesion.   Bilateral ethmoid, left sphenoid and left maxillary sinus mucosal thickening.       No acute intracranial hemorrhage or mass-effect.   Multifocal sinus mucosal thickening.   MACRO: Enedina Umana discussed the significance and urgency of this critical finding by secure chat with  KATIE HILTON on 5/12/2025 at 9:47 am. (**-RCF-**) Findings:  See findings.     Signed by: Enedina Umana 5/12/2025 9:48 AM Dictation workstation:   [id number]    MR tibia fibula left w and wo IV contrast  Result Date: 5/9/2025  Interpreted By:  Lina Moreno and Abuhamdeh Imran STUDY: MRI of the left tibia/fibula without and with IV contrast dated 5/9/2025.   INDICATION: Signs/Symptoms:Chronic osteomyelitis of the tibia.     COMPARISON: Left tibia/fibula radiograph 05/08/2025   ACCESSION NUMBER(S): [id number]   ORDERING CLINICIAN: ADA ESPINOZA   TECHNIQUE: Multiplanar multisequence MRI of the  left tibia/fibula was performed without and with intravenous gadolinium based contrast.   FINDINGS: OSSEOUS STRUCTURES AND JOINTS:   No fracture or dislocation is evident. Bone marrow signal intensity is within normal limits. No joint effusion is evident.   SOFT TISSUES: Partially visualized large soft tissue ulceration along the anterior aspect of the tibiotalar joint to the distal tibia with possible exposure of the anterior tibialis tendon (axial image 74). There is extensive diffuse subcutaneous edema noted throughout the lower extremities without rim enhancing fluid collection. Moderate amount of fascial plane fluid noted in the anterior as well as posterior aspect of the medial head of the gastrocnemius. There is moderate diffuse generalized muscle atrophy. There is intramuscular muscle edema along the anterior compartment without intramuscular abscess. The visualized muscles and tendons are intact. Ligaments are not  well assessed on this examination.       1. No evidence to suggest osteomyelitis. 2. Partially visualized large soft tissue ulceration along the anterior aspect of the distal tibia and tibial talar joint with possible exposure of the underlying anterior tibialis tendon. 3. Moderate amount of fluid along the posterior as well as anterior aspect of the medial head of the gastrocnemius extending from the level of the knee to the distal lower leg. This is nonspecific with infectious fasciitis felt to be less likely although not entirely excluded. Edema of the anterior compartment muscles of the lower leg which may represent infectious myositis. No evidence of intramuscular abscess. 4. Extensive diffuse soft tissue edema noted throughout the left lower extremity and partially visualized right lower extremity 5. Moderate diffuse fatty atrophy of the lower leg muscles.   I personally reviewed the images/study and I agree with Dr. Melia Lewis and the findings as stated.   MACRO: None   Signed by: Lina Moreno 5/9/2025 11:14 AM Dictation workstation:   [id number]    XR tibia fibula left 2 views  Result Date: 5/8/2025  Interpreted By:  Mariam Vang, STUDY: XR TIBIA FIBULA LEFT 2 VIEWS; ;  5/8/2025 10:25 pm   INDICATION: Signs/Symptoms:Chronic tibial osteomyelitis.     COMPARISON: None.   ACCESSION NUMBER(S): [id number]   ORDERING CLINICIAN: ADA ESPINOZA   FINDINGS: Two views of the left tibia and fibula show thick periosteal bone formation along the tibial diaphysis likely due to a longstanding process. There is no acute bone destruction to suggest acute osteomyelitis. No fracture or dislocation.       Thick periosteal new bone formation along the left tibia likely due to history of chronic osteomyelitis and healing. No acute bone destruction.     MACRO: None   Signed by: Mariam Vang 5/8/2025 10:46 PM Dictation workstation:   [id number]    XR foot left 3+ views  Result Date: 5/8/2025  Interpreted By:  Kayla  Osmar, STUDY: XR FOOT LEFT 3+ VIEWS;  5/8/2025 9:26 am   INDICATION: Signs/Symptoms:wound.   COMPARISON: None.   ACCESSION NUMBER(S): [id number]   ORDERING CLINICIAN: LUDA JALLOH   FINDINGS: Post left transmetatarsal amputation. There is a possible age indeterminate nondisplaced fracture near the base of the 4th proximal phalanx only seen on the oblique view. No additional acute fracture, dislocation, or focal osseous destruction identified. Scattered degenerative changes. Small calcaneal enthesophytes.       Questionable age-indeterminate nondisplaced fracture near the base of the left foot 4th proximal phalanx, only apparent on one view. Soft tissue swelling. No additional acute osseous findings of the left foot.   MACRO: None   Signed by: Osmar Garza 5/8/2025 10:04 AM Dictation workstation:   [id number]     Assessment/Plan   Type 2 diabetes with peripheral angiopathy without gangrene-recent PVR remarkable for mild peripheral vascular disease  Left leg/dorsal foot cellulitis, resolving  Left tibial osteomyelitis-pathology report from 2/13/2025 reviewed, positive wound culture for MRSA  Acute on chronic respiratory failure-viral panel ordered, remarkable for coronavirus, resolving     Continue vancomycin  Continue IV Zosyn  Remdesivir  Dexamethasone  Follow-up Mycoplasma IgM  Follow-up blood cultures   PICC line placement  Local care  Consider vascular evaluation, can be done as outpatient  Supportive care  Monitor temperature and WBC  Long-term plan is 6 weeks of IV antibiotic therapy  Weekly CBC with differential, CMP, CRP while on therapy     This is a complex infectious disease issue and the following was performed today (for more details please see the above note): Management decisions reflecting the added complexity (e.g., changes in antimicrobial therapy, infection control strategies).    Baljit Velazco MD

## 2025-05-15 ENCOUNTER — APPOINTMENT (OUTPATIENT)
Dept: RADIOLOGY | Facility: HOSPITAL | Age: 75
DRG: 637 | End: 2025-05-15
Payer: MEDICARE

## 2025-05-15 LAB
ALBUMIN SERPL BCP-MCNC: 2.8 G/DL (ref 3.4–5)
ALP SERPL-CCNC: 65 U/L (ref 33–136)
ALT SERPL W P-5'-P-CCNC: 10 U/L (ref 10–52)
ANION GAP SERPL CALC-SCNC: 11 MMOL/L (ref 10–20)
AST SERPL W P-5'-P-CCNC: 14 U/L (ref 9–39)
BILIRUB SERPL-MCNC: 0.3 MG/DL (ref 0–1.2)
BUN SERPL-MCNC: 22 MG/DL (ref 6–23)
CALCIUM SERPL-MCNC: 8.2 MG/DL (ref 8.6–10.3)
CHLORIDE SERPL-SCNC: 100 MMOL/L (ref 98–107)
CO2 SERPL-SCNC: 32 MMOL/L (ref 21–32)
CREAT SERPL-MCNC: 0.64 MG/DL (ref 0.5–1.3)
CRP SERPL-MCNC: 18.64 MG/DL
EGFRCR SERPLBLD CKD-EPI 2021: >90 ML/MIN/1.73M*2
ERYTHROCYTE [DISTWIDTH] IN BLOOD BY AUTOMATED COUNT: 15.9 % (ref 11.5–14.5)
GLUCOSE BLD MANUAL STRIP-MCNC: 120 MG/DL (ref 74–99)
GLUCOSE BLD MANUAL STRIP-MCNC: 136 MG/DL (ref 74–99)
GLUCOSE BLD MANUAL STRIP-MCNC: 169 MG/DL (ref 74–99)
GLUCOSE BLD MANUAL STRIP-MCNC: 182 MG/DL (ref 74–99)
GLUCOSE SERPL-MCNC: 169 MG/DL (ref 74–99)
HCT VFR BLD AUTO: 32.6 % (ref 41–52)
HGB BLD-MCNC: 10.2 G/DL (ref 13.5–17.5)
M PNEUMO IGM SER IA-ACNC: 0.01 U/L
MCH RBC QN AUTO: 25.4 PG (ref 26–34)
MCHC RBC AUTO-ENTMCNC: 31.3 G/DL (ref 32–36)
MCV RBC AUTO: 81 FL (ref 80–100)
NRBC BLD-RTO: 0 /100 WBCS (ref 0–0)
PLATELET # BLD AUTO: 239 X10*3/UL (ref 150–450)
POTASSIUM SERPL-SCNC: 3.4 MMOL/L (ref 3.5–5.3)
PROT SERPL-MCNC: 6 G/DL (ref 6.4–8.2)
RBC # BLD AUTO: 4.02 X10*6/UL (ref 4.5–5.9)
SODIUM SERPL-SCNC: 140 MMOL/L (ref 136–145)
VANCOMYCIN SERPL-MCNC: 21.2 UG/ML (ref 5–20)
WBC # BLD AUTO: 4.7 X10*3/UL (ref 4.4–11.3)

## 2025-05-15 PROCEDURE — 2500000001 HC RX 250 WO HCPCS SELF ADMINISTERED DRUGS (ALT 637 FOR MEDICARE OP): Mod: IPSPLIT | Performed by: NURSE PRACTITIONER

## 2025-05-15 PROCEDURE — 85027 COMPLETE CBC AUTOMATED: CPT | Mod: IPSPLIT | Performed by: NURSE PRACTITIONER

## 2025-05-15 PROCEDURE — 2500000002 HC RX 250 W HCPCS SELF ADMINISTERED DRUGS (ALT 637 FOR MEDICARE OP, ALT 636 FOR OP/ED): Mod: IPSPLIT | Performed by: NURSE PRACTITIONER

## 2025-05-15 PROCEDURE — 82947 ASSAY GLUCOSE BLOOD QUANT: CPT | Mod: IPSPLIT

## 2025-05-15 PROCEDURE — 2500000005 HC RX 250 GENERAL PHARMACY W/O HCPCS: Mod: IPSPLIT | Performed by: NURSE PRACTITIONER

## 2025-05-15 PROCEDURE — 86140 C-REACTIVE PROTEIN: CPT | Mod: IPSPLIT | Performed by: NURSE PRACTITIONER

## 2025-05-15 PROCEDURE — 80202 ASSAY OF VANCOMYCIN: CPT | Mod: IPSPLIT | Performed by: NURSE PRACTITIONER

## 2025-05-15 PROCEDURE — 93971 EXTREMITY STUDY: CPT | Mod: IPSPLIT

## 2025-05-15 PROCEDURE — 37799 UNLISTED PX VASCULAR SURGERY: CPT | Mod: IPSPLIT | Performed by: NURSE PRACTITIONER

## 2025-05-15 PROCEDURE — 2500000004 HC RX 250 GENERAL PHARMACY W/ HCPCS (ALT 636 FOR OP/ED): Mod: JZ,IPSPLIT | Performed by: NURSE PRACTITIONER

## 2025-05-15 PROCEDURE — 94760 N-INVAS EAR/PLS OXIMETRY 1: CPT | Mod: IPSPLIT

## 2025-05-15 PROCEDURE — 99222 1ST HOSP IP/OBS MODERATE 55: CPT | Performed by: PSYCHIATRY & NEUROLOGY

## 2025-05-15 PROCEDURE — 1100000001 HC PRIVATE ROOM DAILY: Mod: IPSPLIT

## 2025-05-15 PROCEDURE — 2500000004 HC RX 250 GENERAL PHARMACY W/ HCPCS (ALT 636 FOR OP/ED): Mod: IPSPLIT | Performed by: NURSE PRACTITIONER

## 2025-05-15 PROCEDURE — 2500000004 HC RX 250 GENERAL PHARMACY W/ HCPCS (ALT 636 FOR OP/ED): Mod: IPSPLIT | Performed by: INTERNAL MEDICINE

## 2025-05-15 PROCEDURE — 93971 EXTREMITY STUDY: CPT | Performed by: RADIOLOGY

## 2025-05-15 PROCEDURE — 2500000005 HC RX 250 GENERAL PHARMACY W/O HCPCS: Mod: IPSPLIT | Performed by: INTERNAL MEDICINE

## 2025-05-15 PROCEDURE — 99232 SBSQ HOSP IP/OBS MODERATE 35: CPT | Performed by: NURSE PRACTITIONER

## 2025-05-15 PROCEDURE — 84075 ASSAY ALKALINE PHOSPHATASE: CPT | Mod: IPSPLIT | Performed by: NURSE PRACTITIONER

## 2025-05-15 PROCEDURE — 2500000001 HC RX 250 WO HCPCS SELF ADMINISTERED DRUGS (ALT 637 FOR MEDICARE OP): Mod: IPSPLIT | Performed by: INTERNAL MEDICINE

## 2025-05-15 RX ORDER — AMOXICILLIN AND CLAVULANATE POTASSIUM 875; 125 MG/1; MG/1
1 TABLET, FILM COATED ORAL EVERY 12 HOURS SCHEDULED
Status: COMPLETED | OUTPATIENT
Start: 2025-05-15 | End: 2025-05-18

## 2025-05-15 RX ORDER — DEXTROSE MONOHYDRATE AND SODIUM CHLORIDE 5; .9 G/100ML; G/100ML
50 INJECTION, SOLUTION INTRAVENOUS CONTINUOUS
Status: ACTIVE | OUTPATIENT
Start: 2025-05-15 | End: 2025-05-16

## 2025-05-15 RX ADMIN — PIPERACILLIN SODIUM AND TAZOBACTAM SODIUM 3.38 G: 3; .375 INJECTION, SOLUTION INTRAVENOUS at 11:08

## 2025-05-15 RX ADMIN — GABAPENTIN 300 MG: 300 CAPSULE ORAL at 15:24

## 2025-05-15 RX ADMIN — KETOROLAC TROMETHAMINE 15 MG: 15 INJECTION, SOLUTION INTRAMUSCULAR; INTRAVENOUS at 21:00

## 2025-05-15 RX ADMIN — PIPERACILLIN SODIUM AND TAZOBACTAM SODIUM 3.38 G: 3; .375 INJECTION, SOLUTION INTRAVENOUS at 05:05

## 2025-05-15 RX ADMIN — Medication 2 L/MIN: at 20:00

## 2025-05-15 RX ADMIN — POLYETHYLENE GLYCOL 3350 17 G: 17 POWDER, FOR SOLUTION ORAL at 09:27

## 2025-05-15 RX ADMIN — SENNOSIDES AND DOCUSATE SODIUM 1 TABLET: 50; 8.6 TABLET ORAL at 20:24

## 2025-05-15 RX ADMIN — REMDESIVIR 100 MG: 100 INJECTION, POWDER, LYOPHILIZED, FOR SOLUTION INTRAVENOUS at 13:11

## 2025-05-15 RX ADMIN — HYDRALAZINE HYDROCHLORIDE 25 MG: 25 TABLET ORAL at 20:25

## 2025-05-15 RX ADMIN — GABAPENTIN 300 MG: 300 CAPSULE ORAL at 09:27

## 2025-05-15 RX ADMIN — HYDRALAZINE HYDROCHLORIDE 25 MG: 25 TABLET ORAL at 15:24

## 2025-05-15 RX ADMIN — AMOXICILLIN AND CLAVULANATE POTASSIUM 1 TABLET: 875; 125 TABLET, FILM COATED ORAL at 18:00

## 2025-05-15 RX ADMIN — HYDRALAZINE HYDROCHLORIDE 25 MG: 25 TABLET ORAL at 09:26

## 2025-05-15 RX ADMIN — ZINC OXIDE 1 APPLICATION: 200 OINTMENT TOPICAL at 15:26

## 2025-05-15 RX ADMIN — DEXAMETHASONE SODIUM PHOSPHATE 6 MG: 10 INJECTION INTRAMUSCULAR; INTRAVENOUS at 20:24

## 2025-05-15 RX ADMIN — VANCOMYCIN HYDROCHLORIDE 1.25 G: 1.25 INJECTION, POWDER, LYOPHILIZED, FOR SOLUTION INTRAVENOUS at 20:24

## 2025-05-15 RX ADMIN — Medication 2 L/MIN: at 21:17

## 2025-05-15 RX ADMIN — INSULIN LISPRO 2 UNITS: 100 INJECTION, SOLUTION INTRAVENOUS; SUBCUTANEOUS at 09:27

## 2025-05-15 RX ADMIN — CARBAMAZEPINE 200 MG: 200 TABLET ORAL at 20:25

## 2025-05-15 RX ADMIN — ENOXAPARIN SODIUM 40 MG: 40 INJECTION SUBCUTANEOUS at 09:27

## 2025-05-15 RX ADMIN — ACETAMINOPHEN 325MG 650 MG: 325 TABLET ORAL at 20:59

## 2025-05-15 RX ADMIN — ZINC OXIDE 1 APPLICATION: 200 OINTMENT TOPICAL at 20:59

## 2025-05-15 RX ADMIN — VANCOMYCIN HYDROCHLORIDE 1.25 G: 1.25 INJECTION, POWDER, LYOPHILIZED, FOR SOLUTION INTRAVENOUS at 09:27

## 2025-05-15 RX ADMIN — GABAPENTIN 300 MG: 300 CAPSULE ORAL at 20:24

## 2025-05-15 RX ADMIN — CARBAMAZEPINE 200 MG: 200 TABLET ORAL at 09:33

## 2025-05-15 ASSESSMENT — PAIN - FUNCTIONAL ASSESSMENT
PAIN_FUNCTIONAL_ASSESSMENT: 0-10
PAIN_FUNCTIONAL_ASSESSMENT: 0-10
PAIN_FUNCTIONAL_ASSESSMENT: CPOT (CRITICAL CARE PAIN OBSERVATION TOOL)
PAIN_FUNCTIONAL_ASSESSMENT: 0-10

## 2025-05-15 ASSESSMENT — PAIN SCALES - GENERAL
PAINLEVEL_OUTOF10: 0 - NO PAIN
PAINLEVEL_OUTOF10: 7
PAINLEVEL_OUTOF10: 0 - NO PAIN

## 2025-05-15 NOTE — PROGRESS NOTES
Elliot Partida is a 74 y.o. male on day 7 of admission presenting with Wound infection.    Subjective   Interval History:   Afebrile, no chills  On low-flow oxygen  Nonproductive cough  No chest pain  No nausea vomiting or diarrhea    Review of Systems   All other systems reviewed and are negative.      Objective   Range of Vitals (last 24 hours)  Heart Rate:  [63-83]   Temp:  [36 °C (96.8 °F)-36.6 °C (97.9 °F)]   Resp:  [13-21]   BP: (102-151)/(41-67)   SpO2:  [91 %-100 %]   Daily Weight  05/14/25 : 91.1 kg (200 lb 13.4 oz)    Body mass index is 29.66 kg/m².    Physical Exam  Constitutional:       Appearance: Awake, alert  HENT:      Head: Normocephalic and atraumatic.      Right Ear: External ear normal.      Left Ear: External ear normal.      Nose: Nose normal.   Eyes:      General: No scleral icterus.     Extraocular Movements: Extraocular movements intact.      Conjunctiva/sclera: Conjunctivae normal.   Cardiovascular:      Rate and Rhythm: Regular rhythm.      Heart sounds: Normal heart sounds, S1 normal and S2 normal.   Pulmonary:      Breath sounds: Normal breath sounds and air entry.   Abdominal:      General: Bowel sounds are normal.      Palpations: Abdomen is soft.      Tenderness: There is no abdominal tenderness.   Musculoskeletal:      Cervical back: Normal range of motion and neck supple.      Right lower leg: No edema.      Left lower leg: No edema.   Skin:     General: Skin is warm and dry.      Comments: Left anterior lower leg/dorsal foot ulcer with moderate amount of granulation tissue and moderate amount of fat necrosis   Neurological:      Mental Status: He is awake, alert  Psychiatric:         Behavior: Behavior is awake, alert       Antibiotics  piperacillin-tazobactam - 3.375 gram/50 mL  vancomycin  vancomycin IV 1250 mg in 250 mL NS (ADV/MBP)    Relevant Results  Labs  Results from last 72 hours   Lab Units 05/15/25  0506 05/14/25  0511 05/13/25  0605   WBC AUTO x10*3/uL 4.7 7.5 9.6    HEMOGLOBIN g/dL 10.2* 10.8* 10.1*   HEMATOCRIT % 32.6* 34.6* 33.0*   PLATELETS AUTO x10*3/uL 239 239 245     Results from last 72 hours   Lab Units 05/15/25  0506 05/14/25  0511 05/13/25  0605   SODIUM mmol/L 140 136 137   POTASSIUM mmol/L 3.4* 3.5 3.5   CHLORIDE mmol/L 100 98 101   CO2 mmol/L 32 31 29   BUN mg/dL 22 22 28*   CREATININE mg/dL 0.64 0.68 0.75   GLUCOSE mg/dL 169* 130* 132*   CALCIUM mg/dL 8.2* 8.3* 8.1*   ANION GAP mmol/L 11 11 11   EGFR mL/min/1.73m*2 >90 >90 >90     Results from last 72 hours   Lab Units 05/15/25  0506 05/14/25  0511   ALK PHOS U/L 65 75   BILIRUBIN TOTAL mg/dL 0.3 0.4   PROTEIN TOTAL g/dL 6.0* 6.1*   ALT U/L 10 11   AST U/L 14 16   ALBUMIN g/dL 2.8* 2.9*     Estimated Creatinine Clearance: 114.7 mL/min (by C-G formula based on SCr of 0.64 mg/dL).  C-Reactive Protein   Date Value Ref Range Status   05/15/2025 18.64 (H) <1.00 mg/dL Final   05/14/2025 24.50 (H) <1.00 mg/dL Final   05/08/2025 1.54 (H) <1.00 mg/dL Final     Microbiology  Susceptibility data from last 14 days.  Collected Specimen Info Organism Clindamycin Erythromycin Oxacillin Tetracycline Trimethoprim/Sulfamethoxazole Vancomycin   05/09/25 Swab from Anterior Nares Methicillin Susceptible Staphylococcus aureus (MSSA)         05/08/25 Tissue/Biopsy from Wound/Tissue Methicillin Resistant Staphylococcus aureus (MRSA)  R  R  R  R  S  S     Mixed Aerobic and Anaerobic Bacteria            Imaging  Vascular US upper extremity venous duplex left  Result Date: 5/15/2025  Interpreted By:  Jose Chase, STUDY: VASC US UPPER EXTREMITY VENOUS DUPLEX LEFT; 5/15/2025 12:11 pm   INDICATION: Signs/Symptoms:Swelling  after PICC line placement 3 days ago.   COMPARISON: None   ACCESSION NUMBER(S): [id number]   ORDERING CLINICIAN: BERNIE MEDRANO   TECHNIQUE: Black and white as well as color-flow duplex images were obtained along with Doppler wave analysis of the deep venous system of the upper extremity. The internal jugular vein,  subclavian vein as well as the axillary and brachial vein of the upper extremity were evaluated. The basilic and cephalic veins were also imaged.   FINDINGS: Examination demonstrates normal compressibility and flow. PICC line is present within the basilic vein.       No evidence for deep vein thrombosis by this exam.   MACRO: none   Signed by: Jose Chase 5/15/2025 1:36 PM Dictation workstation:   [id number]    ECG 12 Lead  Result Date: 5/13/2025  Sinus rhythm with 1st degree AV block Left axis deviation Nonspecific intraventricular block Possible Anterolateral infarct (cited on or before 09-MAY-2025) Abnormal ECG When compared with ECG of 09-MAY-2025 18:47, (unconfirmed) Serial changes of Anterior infarct Present    ECG 12 lead  Result Date: 5/13/2025  Sinus rhythm with 1st degree AV block Left anterior fascicular block Minimal voltage criteria for LVH, may be normal variant ( [place] product ) Anterior infarct , age undetermined Abnormal ECG When compared with ECG of 13-FEB-2025 09:37, No significant change was found    XR chest 1 view  Addendum Date: 5/12/2025  Interpreted By:  Júnior Sena, ADDENDUM: Left-sided PICC line is noted and projects to the level of the superior vena cava without pneumothorax.   Signed by: Júnior Sena 5/12/2025 5:41 PM   -------- ORIGINAL REPORT -------- Dictation workstation:   [id number]    Result Date: 5/12/2025  Interpreted By:  Júnior Sena, STUDY: XR CHEST 1 VIEW   INDICATION: Signs/Symptoms:PICC placement.   COMPARISON: February 17, 2025   ACCESSION NUMBER(S): [id number]   ORDERING CLINICIAN: BERNIE MEDRANO   FINDINGS: Cardiomegaly with new perihilar and basilar edema with patchy multifocal airspace opacities right worse than left.   Small bilateral pleural effusions.   No evidence of pneumothorax.         Cardiomegaly with new perihilar and basilar edema with patchy multifocal airspace opacities right worse than left.   Small bilateral pleural effusions.   Signed by:  Júnior Nathen 5/12/2025 5:28 PM Dictation workstation:   [id number]    CT brain attack head wo IV contrast  Result Date: 5/12/2025  Interpreted By:  Enedina Umana, STUDY: CT BRAIN ATTACK HEAD WO IV CONTRAST;  5/12/2025 9:37 am   INDICATION: Signs/Symptoms:stroke alert.     COMPARISON: 12/23/2024   ACCESSION NUMBER(S): [id number]   ORDERING CLINICIAN: KATIE HILTON   TECHNIQUE: Unenhanced CT images of the head were obtained.   FINDINGS: Patient is rotated.  The ventricles, cisterns and sulci are enlarged, consistent with diffuse volume loss. There are areas of nonspecific white matter hypodensity, which are probably age related or microvascular in nature. Low-density lacunar type infarct in the right basal ganglia. These findings are similar to the previous exam. There is no acute intracranial hemorrhage, mass effect or midline shift. No extraaxial fluid collection.   No focal calvarial lesion.   Bilateral ethmoid, left sphenoid and left maxillary sinus mucosal thickening.       No acute intracranial hemorrhage or mass-effect.   Multifocal sinus mucosal thickening.   MACRO: Enedina Umana discussed the significance and urgency of this critical finding by secure chat with  KATIE HILTON on 5/12/2025 at 9:47 am. (**-RCF-**) Findings:  See findings.     Signed by: Enedina Umana 5/12/2025 9:48 AM Dictation workstation:   [id number]    MR tibia fibula left w and wo IV contrast  Result Date: 5/9/2025  Interpreted By:  Lina Moreno,  and Debbie Cárdenas STUDY: MRI of the left tibia/fibula without and with IV contrast dated 5/9/2025.   INDICATION: Signs/Symptoms:Chronic osteomyelitis of the tibia.     COMPARISON: Left tibia/fibula radiograph 05/08/2025   ACCESSION NUMBER(S): [id number]   ORDERING CLINICIAN: ADA ESPINOZA   TECHNIQUE: Multiplanar multisequence MRI of the  left tibia/fibula was performed without and with intravenous gadolinium based contrast.   FINDINGS: OSSEOUS STRUCTURES AND JOINTS:   No fracture or  dislocation is evident. Bone marrow signal intensity is within normal limits. No joint effusion is evident.   SOFT TISSUES: Partially visualized large soft tissue ulceration along the anterior aspect of the tibiotalar joint to the distal tibia with possible exposure of the anterior tibialis tendon (axial image 74). There is extensive diffuse subcutaneous edema noted throughout the lower extremities without rim enhancing fluid collection. Moderate amount of fascial plane fluid noted in the anterior as well as posterior aspect of the medial head of the gastrocnemius. There is moderate diffuse generalized muscle atrophy. There is intramuscular muscle edema along the anterior compartment without intramuscular abscess. The visualized muscles and tendons are intact. Ligaments are not well assessed on this examination.       1. No evidence to suggest osteomyelitis. 2. Partially visualized large soft tissue ulceration along the anterior aspect of the distal tibia and tibial talar joint with possible exposure of the underlying anterior tibialis tendon. 3. Moderate amount of fluid along the posterior as well as anterior aspect of the medial head of the gastrocnemius extending from the level of the knee to the distal lower leg. This is nonspecific with infectious fasciitis felt to be less likely although not entirely excluded. Edema of the anterior compartment muscles of the lower leg which may represent infectious myositis. No evidence of intramuscular abscess. 4. Extensive diffuse soft tissue edema noted throughout the left lower extremity and partially visualized right lower extremity 5. Moderate diffuse fatty atrophy of the lower leg muscles.   I personally reviewed the images/study and I agree with Dr. Melia Lewis and the findings as stated.   MACRO: None   Signed by: Lina Moreno 5/9/2025 11:14 AM Dictation workstation:   [id number]    XR tibia fibula left 2 views  Result Date: 5/8/2025  Interpreted By:  Taj  Mariam, STUDY: XR TIBIA FIBULA LEFT 2 VIEWS; ;  5/8/2025 10:25 pm   INDICATION: Signs/Symptoms:Chronic tibial osteomyelitis.     COMPARISON: None.   ACCESSION NUMBER(S): [id number]   ORDERING CLINICIAN: ADA ESPINOZA   FINDINGS: Two views of the left tibia and fibula show thick periosteal bone formation along the tibial diaphysis likely due to a longstanding process. There is no acute bone destruction to suggest acute osteomyelitis. No fracture or dislocation.       Thick periosteal new bone formation along the left tibia likely due to history of chronic osteomyelitis and healing. No acute bone destruction.     MACRO: None   Signed by: Mariam Vang 5/8/2025 10:46 PM Dictation workstation:   [id number]    XR foot left 3+ views  Result Date: 5/8/2025  Interpreted By:  Osmar Garza, STUDY: XR FOOT LEFT 3+ VIEWS;  5/8/2025 9:26 am   INDICATION: Signs/Symptoms:wound.   COMPARISON: None.   ACCESSION NUMBER(S): [id number]   ORDERING CLINICIAN: LUDA JALLOH   FINDINGS: Post left transmetatarsal amputation. There is a possible age indeterminate nondisplaced fracture near the base of the 4th proximal phalanx only seen on the oblique view. No additional acute fracture, dislocation, or focal osseous destruction identified. Scattered degenerative changes. Small calcaneal enthesophytes.       Questionable age-indeterminate nondisplaced fracture near the base of the left foot 4th proximal phalanx, only apparent on one view. Soft tissue swelling. No additional acute osseous findings of the left foot.   MACRO: None   Signed by: Osmar Garza 5/8/2025 10:04 AM Dictation workstation:   [id number]      Assessment/Plan   Type 2 diabetes with peripheral angiopathy without gangrene-recent PVR remarkable for mild peripheral vascular disease  Left leg/dorsal foot cellulitis, resolving  Left tibial osteomyelitis-pathology report from 2/13/2025 reviewed, positive wound culture for MRSA  Acute on chronic respiratory failure-viral panel  ordered, remarkable for coronavirus, resolving     Continue vancomycin  Discontinue Zosyn  IV Unasyn  Remdesivir-total of 5 days  Dexamethasone-total of 10 days  Follow-up Mycoplasma IgM  Follow-up blood cultures   PICC line placement  Local care  Consider vascular evaluation, can be done as outpatient  Supportive care  Monitor temperature and WBC  Long-term plan is 6 weeks of IV antibiotic therapy  Weekly CBC with differential, CMP, CRP while on therapy     This is a complex infectious disease issue and the following was performed today (for more details please see the above note): Management decisions reflecting the added complexity (e.g., changes in antimicrobial therapy, infection control strategies).    Baljit Velazco MD

## 2025-05-15 NOTE — PROGRESS NOTES
Physical Therapy                 Therapy Communication Note    Patient Name: Elliot Partida  MRN: [id number]  Department: [facility]  Room: 05/05-A  Today's Date: 5/15/2025     Discipline: Physical Therapy    Missed Visit: PT Missed Visit: Yes     Missed Visit Reason: Missed Visit Reason: Patient refused    Missed Time: Attempt    Comment: Pt adamantly refused therapy. Pt was cussing at therapists. Pt refused placing wound mattress underneath and refused sitting on EOB while eating.

## 2025-05-15 NOTE — CARE PLAN
The patient's goals for the shift include to get some sleep    The clinical goals for the shift include patient O2 will remain above 92% this shift.    Patient O2 has remained above 92% and he has not needed an increase of oxygen at all throughout the night. Patient seems to be more alert this morning and is tolerating IV ATB through PICC well. Patient asking for fresh water and would like some cherry jello. Patient is making jokes and able to keep conversation. Patient states he feels like he has lost some time and is a little confused on what has happened. Patient reoriented.

## 2025-05-15 NOTE — CARE PLAN
The patient's goals for the shift include to get some sleep    The clinical goals for the shift include Patient pulse ox will remain above 92% this shift.      Patient maintained his pulse ox above 92% this shift while on nasal oxygen. Pt was seen by Dr Christianson today, dressing was changed by him, stated it looked the best it has. Patient became more friendly towards staff as the day went on.

## 2025-05-15 NOTE — PROGRESS NOTES
Vancomycin Dosing by Pharmacy- FOLLOW UP    Elliot Partida is a 74 y.o. year old male who Pharmacy has been consulted for vancomycin dosing for osteomyelitis/septic arthritis. Based on the patient's indication and renal status this patient is being dosed based on a goal AUC of 400-600.     Renal function is currently stable.    Current vancomycin dose: 1250 mg given every 12 hours    Estimated vancomycin AUC on current dose: 483 mg/L.hr     Visit Vitals  BP (!) 115/45   Pulse 63   Temp 36.5 °C (97.7 °F) (Temporal)   Resp 18        Lab Results   Component Value Date    CREATININE 0.64 05/15/2025    CREATININE 0.68 2025    CREATININE 0.75 2025    CREATININE 0.72 2025        Patient weight is as follows:   Vitals:    25 0200   Weight: 91.1 kg (200 lb 13.4 oz)       Cultures:  Susceptibility data for the encounter in last 14 days.  Collected Specimen Info Organism Clindamycin Erythromycin Oxacillin Tetracycline Trimethoprim/Sulfamethoxazole Vancomycin   25 Swab from Anterior Nares Methicillin Susceptible Staphylococcus aureus (MSSA)         25 Tissue/Biopsy from Wound/Tissue Methicillin Resistant Staphylococcus aureus (MRSA)  R  R  R  R  S  S     Mixed Aerobic and Anaerobic Bacteria               I/O last 3 completed shifts:  In: 877.5 (9.6 mL/kg) [I.V.:577.5 (6.3 mL/kg); IV Piggyback:300]  Out: 3950 (43.4 mL/kg) [Urine:3950 (1.2 mL/kg/hr)]  Weight: 91.1 kg   I/O during current shift:  No intake/output data recorded.    Temp (24hrs), Av.3 °C (97.4 °F), Min:36 °C (96.8 °F), Max:36.6 °C (97.9 °F)      Assessment/Plan    Within goal AUC range. Continue current vancomycin regimen.    This dosing regimen is predicted by [employer] to result in the following pharmacokinetic parameters:  Loading dose: N/A  Regimen: 1250 mg IV every 12 hours.  Start time: 10:02 on 05/15/2025  Exposure target: AUC24 (range)400-600 mg/L.hr   OEB24-25: 489 mg/L.hr  AUC24,ss: 483 mg/L.hr  Probability of  AUC24 > 400: 98 %  Ctrough,ss: 15.1 mg/L  Probability of Ctrough,ss > 20: 6 %      The next level will be obtained on 5/17/25 at AM. May be obtained sooner if clinically indicated.   Will continue to monitor renal function daily while on vancomycin and order serum creatinine at least every 48 hours if not already ordered.  Follow for continued vancomycin needs, clinical response, and signs/symptoms of toxicity.       Erasto Albert, PharmD

## 2025-05-15 NOTE — PROGRESS NOTES
05/15/25 9008   Discharge Planning   Assistance Needed Patient declined PT/OT, Spoke with patient after talking to sister Eryn about dischaing home and sister doing IV antibiotics, informed patient that sister stated that she is not able to do home IV antibiotics and that she thinks he should go to SNF for long term IV antibiotics,patient states that he spoke to Dr. Robles podiatrist and  he thinks [facility] would be  best to be transferred. Dr. Robles plan is to come to hospital today and assess the patient. Will follow.

## 2025-05-15 NOTE — PROGRESS NOTES
05/15/25 4201   Discharge Planning   Assistance Needed Patient declined PT/OT, Spoke with patient after talking to sister Eryn about dischaing home and sister doing IV antibiotics, informed patient that sister stated that she is not able to do home IV antibiotics and that she thinks he should go to SNF for long term IV antibiotics,patient states that he spoke to Dr. Robles podiatrist an dthat he thinks [facility] would be best best to be transferred. Dr. Robles plan is to come to hospital today and assess the patient. Will follow.

## 2025-05-15 NOTE — PROGRESS NOTES
PODIATRY SERVICE CONSULT PROGRESS NOTE    SERVICE DATE: 5/15/2025   SERVICE TIME:  1600    Subjective   INTERVAL HPI:   Pt was seen at bedside.  Pain well controlled.  Patient denies any constitutional symptoms.   No other pedal complaints.   Patient has tested positive for Covid-19 since initial consult.    Medications:  Scheduled Meds: Scheduled Medications[1]  Continuous Infusions: Continuous Medications[2]  PRN Meds: PRN Medications[3]         Objective   PHYSICAL EXAM:  Physical Exam Performed:  Vitals:    05/15/25 1621   BP:    Pulse: 72   Resp: 14   Temp:    SpO2: 100%     Body mass index is 29.66 kg/m².    Patient is AOx3 and in no acute distress. Patient is alert and cooperative. Sitting comfortably in bed with dressing clean, dry and intact. Heel off-loading boots in place B/L.     Vascular: Non-palpable DP/PT pulses B/L. Minimal pitting edema noted B/L. Hair growth absent B/L. CFT<5 to B/L hallux. Temperature is warm to cool from tibial tuberosity to distal digits B/L. No lymphatic streaking noted B/L.     Musculoskeletal: Gross active and passive ROM diminished to age and activity level. Moves all extremities spontaneously. No pain to palpation at feet B/L.     Neurological: Absent light touch sensation B/L. Pain stimuli absent B/L. Denies any numbness, burning or tingling.     Dermatologic: Nails 1-5 are thickened, elongated, discolored with subungual debris B/L. Skin appears diffusely xerotic B/L. Web spaces 1-4 B/L are clean, dry and intact. No rashes or nodules noted B/L. No hyperkeratotic tissue noted B/L.      Wound:  Measurements: 6.3 cm x 5.0 cm x 0.4 cm  Mixed wound base of fibrogranular tissue.   Minimal serosanguineous drainage today with regular borders.   Minimal jennyfer-wound maceration.   Mild jennyfer-wound erythema.   Moderate evidence of ascending cellulitis or lymphangitis.  Mild palpable fluctuance. No malodor. Mild increased warmth.   No probe to bone or deep structures today  No tunneling  or tracking.   No undermining. Skin edges irregular but intact.      LABS:   Results for orders placed or performed during the hospital encounter of 05/08/25 (from the past 24 hours)   POCT GLUCOSE   Result Value Ref Range    POCT Glucose 80 74 - 99 mg/dL   POCT GLUCOSE   Result Value Ref Range    POCT Glucose 106 (H) 74 - 99 mg/dL   CBC   Result Value Ref Range    WBC 4.7 4.4 - 11.3 x10*3/uL    nRBC 0.0 0.0 - 0.0 /100 WBCs    RBC 4.02 (L) 4.50 - 5.90 x10*6/uL    Hemoglobin 10.2 (L) 13.5 - 17.5 g/dL    Hematocrit 32.6 (L) 41.0 - 52.0 %    MCV 81 80 - 100 fL    MCH 25.4 (L) 26.0 - 34.0 pg    MCHC 31.3 (L) 32.0 - 36.0 g/dL    RDW 15.9 (H) 11.5 - 14.5 %    Platelets 239 150 - 450 x10*3/uL   Comprehensive metabolic panel   Result Value Ref Range    Glucose 169 (H) 74 - 99 mg/dL    Sodium 140 136 - 145 mmol/L    Potassium 3.4 (L) 3.5 - 5.3 mmol/L    Chloride 100 98 - 107 mmol/L    Bicarbonate 32 21 - 32 mmol/L    Anion Gap 11 10 - 20 mmol/L    Urea Nitrogen 22 6 - 23 mg/dL    Creatinine 0.64 0.50 - 1.30 mg/dL    eGFR >90 >60 mL/min/1.73m*2    Calcium 8.2 (L) 8.6 - 10.3 mg/dL    Albumin 2.8 (L) 3.4 - 5.0 g/dL    Alkaline Phosphatase 65 33 - 136 U/L    Total Protein 6.0 (L) 6.4 - 8.2 g/dL    AST 14 9 - 39 U/L    Bilirubin, Total 0.3 0.0 - 1.2 mg/dL    ALT 10 10 - 52 U/L   C-reactive protein   Result Value Ref Range    C-Reactive Protein 18.64 (H) <1.00 mg/dL   Vancomycin   Result Value Ref Range    Vancomycin 21.2 (H) 5.0 - 20.0 ug/mL   POCT GLUCOSE   Result Value Ref Range    POCT Glucose 182 (H) 74 - 99 mg/dL   POCT GLUCOSE   Result Value Ref Range    POCT Glucose 120 (H) 74 - 99 mg/dL      Lab Results   Component Value Date    HGBA1C 5.8 (H) 01/06/2025      Lab Results   Component Value Date    CRP 18.64 (H) 05/15/2025      Lab Results   Component Value Date    SEDRATE 12 05/11/2025        Results from last 7 days   Lab Units 05/15/25  0506   WBC AUTO x10*3/uL 4.7   RBC AUTO x10*6/uL 4.02*   HEMOGLOBIN g/dL 10.2*    HEMATOCRIT % 32.6*     Results from last 7 days   Lab Units 05/15/25  0506 05/14/25  0511   SODIUM mmol/L 140 136   POTASSIUM mmol/L 3.4* 3.5   CHLORIDE mmol/L 100 98   CO2 mmol/L 32 31   BUN mg/dL 22 22   CREATININE mg/dL 0.64 0.68   CALCIUM mg/dL 8.2* 8.3*   MAGNESIUM mg/dL  --  1.89   BILIRUBIN TOTAL mg/dL 0.3 0.4   ALT U/L 10 11   AST U/L 14 16           IMAGING REVIEW:  Imaging  Vascular US upper extremity venous duplex left  Result Date: 5/15/2025  No evidence for deep vein thrombosis by this exam.   MACRO: none   Signed by: Jose Chase 5/15/2025 1:36 PM Dictation workstation:   [id number]    XR chest 1 view  Addendum Date: 5/12/2025  Interpreted By:  Júniro Sena, ADDENDUM: Left-sided PICC line is noted and projects to the level of the superior vena cava without pneumothorax.   Signed by: Júnior Sena 5/12/2025 5:41 PM   -------- ORIGINAL REPORT -------- Dictation workstation:   [id number]    Result Date: 5/12/2025    Cardiomegaly with new perihilar and basilar edema with patchy multifocal airspace opacities right worse than left.   Small bilateral pleural effusions.   Signed by: Júnior Sena 5/12/2025 5:28 PM Dictation workstation:   [id number]    CT brain attack head wo IV contrast  Result Date: 5/12/2025  No acute intracranial hemorrhage or mass-effect.   Multifocal sinus mucosal thickening.   MACRO: Enedina Umana discussed the significance and urgency of this critical finding by secure chat with  KATIE HILTON on 5/12/2025 at 9:47 am. (**-RCF-**) Findings:  See findings.     Signed by: Enedina Umana 5/12/2025 9:48 AM Dictation workstation:   [id number]    MR tibia fibula left w and wo IV contrast  Result Date: 5/9/2025  1. No evidence to suggest osteomyelitis. 2. Partially visualized large soft tissue ulceration along the anterior aspect of the distal tibia and tibial talar joint with possible exposure of the underlying anterior tibialis tendon. 3. Moderate amount of fluid along the posterior as  well as anterior aspect of the medial head of the gastrocnemius extending from the level of the knee to the distal lower leg. This is nonspecific with infectious fasciitis felt to be less likely although not entirely excluded. Edema of the anterior compartment muscles of the lower leg which may represent infectious myositis. No evidence of intramuscular abscess. 4. Extensive diffuse soft tissue edema noted throughout the left lower extremity and partially visualized right lower extremity 5. Moderate diffuse fatty atrophy of the lower leg muscles.   I personally reviewed the images/study and I agree with Dr. Melia Lewis and the findings as stated.   MACRO: None   Signed by: Lina Moreno 5/9/2025 11:14 AM Dictation workstation:   [id number]    XR tibia fibula left 2 views  Result Date: 5/8/2025  Thick periosteal new bone formation along the left tibia likely due to history of chronic osteomyelitis and healing. No acute bone destruction.     MACRO: None   Signed by: Mariam Vang 5/8/2025 10:46 PM Dictation workstation:   [id number]      Cardiology, Vascular, and Other Imaging  ECG 12 Lead  Result Date: 5/13/2025  Sinus rhythm with 1st degree AV block Left axis deviation Nonspecific intraventricular block Possible Anterolateral infarct (cited on or before 09-MAY-2025) Abnormal ECG When compared with ECG of 09-MAY-2025 18:47, (unconfirmed) Serial changes of Anterior infarct Present    ECG 12 lead  Result Date: 5/13/2025  Sinus rhythm with 1st degree AV block Left anterior fascicular block Minimal voltage criteria for LVH, may be normal variant ( [place] product ) Anterior infarct , age undetermined Abnormal ECG When compared with ECG of 13-FEB-2025 09:37, No significant change was found              Assessment/Plan   ASSESSMENT & PLAN:  #Chronic osteomyelitis, left tibia [M86.662]  #Left lower extremity cellulitis [L03.116]  #Generalized edema [R60.0]  #Diabetes Mellitus, Type II, with polyneuropathy  [E11.42]  - Patient was seen and evaluated; all findings were discussed and all questions were answered to patient's satisfaction.  - Charts, labs, vitals and imaging all reviewed.   - Imaging: MRI - no evidence of active osteomyelitic process  - Labs: WBC 4.7, CRP 18.64, Glucose 169  - Wound culture: pending  - Blood culture: pending     Plan:  - Abx: IV Vanc/Zosyn via PICC line for 6 weeks.  - No plans for surgical intervention during this visit. Patient is being treated with local wound care as an outpatient. Wound appearance has substantially improved since dialysis. Wound borders are now regular with significantly less drainage.  - Discussed with patient in detail the prognosis for left lower extremity wound and osteomyelitis. May have to consider consult for potential limb viability/BKA in the future if abx fail.  - Dressings: Xeroform to wound base, covered with dry gauze, Abd pads, Kerlix and ACE bandage (little to no compression when applying). May change xeroform to betadine-soaked 4x4 if drainage increases.  - Nursing staff is able to change/reinforce dressing if & as necessary until next day’s dressing change. Thank you.  - Recommend discharge to SNF for IV abx administration for 6 weeks and for physical therapy.  - Podiatry will continue to follow while in house.     Weightbearing: PWB to the left lower extremity for transfer.  Discharge: Pt to follow up 1 week after discharge. Patient will need to call in order to arrange transport from SNF.     Ronald Christianson DPM  Podiatric Medicine & Surgery  Please EMcube message me with any questions or concerns.            SIGNATURE: Ronald Christianson DPM PATIENT NAME: Elliot Partida   DATE: May 15, 2025 MRN: [id number]   TIME: 4:23 PM CONTACT: [facility]           [1] amoxicillin-clavulanate, 1 tablet, oral, q12h CLEMENTE  carBAMazepine, 200 mg, oral, BID  dexAMETHasone, 6 mg, intravenous, q24h  enoxaparin, 40 mg, subcutaneous, Daily  gabapentin, 300 mg, oral,  TID  hydrALAZINE, 25 mg, oral, TID  insulin lispro, 0-10 Units, subcutaneous, TID AC  lidocaine, 5 mL, infiltration, Once  LORazepam, 1 mg, intravenous, Once  polyethylene glycol, 17 g, oral, Daily  [Held by provider] potassium chloride CR, 40 mEq, oral, Daily  remdesivir, 100 mg, intravenous, q24h  sennosides-docusate sodium, 1 tablet, oral, Nightly  traZODone, 50 mg, oral, Nightly  vancomycin 1.25 g in sodium chloride 0.9% 250 mL IV, 1,250 mg, intravenous, q12h  zinc oxide, 1 Application, Topical, TID  [2] D5 % and 0.9 % sodium chloride, 50 mL/hr, Last Rate: 50 mL/hr (05/15/25 0656)  D5 % and 0.9 % sodium chloride, 50 mL/hr  [3] PRN medications: acetaminophen **OR** acetaminophen **OR** acetaminophen, albuterol, alum-mag hydroxide-simeth, benzocaine-menthol, dextrose, dextrose, glucagon, glucagon, hydrALAZINE, ipratropium-albuteroL, ketorolac, lidocaine, metoclopramide, ondansetron, oxygen, oxygen, vancomycin

## 2025-05-15 NOTE — PROGRESS NOTES
Elliot Partida is a 74 y.o. male on day 7 of admission presenting with Wound infection.      Subjective   Patient assessed at bedside; lying in bed. He is complained of her buttom hurting. He seems to be feeling better; he does not want to get out of bed. He keeps telling every one to leave him alone.       Objective     Last Recorded Vitals  /52   Pulse 73   Temp 36 °C (96.8 °F) (Temporal)   Resp 17   Wt 91.1 kg (200 lb 13.4 oz)   SpO2 100%   Intake/Output last 3 Shifts:    Intake/Output Summary (Last 24 hours) at 5/15/2025 1336  Last data filed at 5/15/2025 1200  Gross per 24 hour   Intake 817.5 ml   Output 2605 ml   Net -1787.5 ml       Admission Weight  Weight: 89.4 kg (197 lb) (05/08/25 0850)    Daily Weight  05/14/25 : 91.1 kg (200 lb 13.4 oz)    Image Results  ECG 12 Lead  Sinus rhythm with 1st degree AV block  Left axis deviation  Nonspecific intraventricular block  Possible Anterolateral infarct (cited on or before 09-MAY-2025)  Abnormal ECG  When compared with ECG of 09-MAY-2025 18:47, (unconfirmed)  Serial changes of Anterior infarct Present  ECG 12 lead  Sinus rhythm with 1st degree AV block  Left anterior fascicular block  Minimal voltage criteria for LVH, may be normal variant ( [place]avernier product )  Anterior infarct , age undetermined  Abnormal ECG  When compared with ECG of 13-FEB-2025 09:37,  No significant change was found      Physical Exam  Vitals reviewed.   Constitutional:       Appearance: He is obese. He is ill-appearing.   HENT:      Head: Normocephalic and atraumatic.      Right Ear: External ear normal.      Left Ear: External ear normal.      Nose: Nose normal.      Mouth/Throat:      Mouth: Mucous membranes are moist.   Eyes:      Conjunctiva/sclera: Conjunctivae normal.      Pupils: Pupils are equal, round, and reactive to light.   Cardiovascular:      Rate and Rhythm: Normal rate and regular rhythm.      Pulses: Normal pulses.      Heart sounds: Normal heart sounds.    Pulmonary:      Effort: Pulmonary effort is normal.      Breath sounds: Normal breath sounds.   Abdominal:      General: Bowel sounds are normal.      Palpations: Abdomen is soft.   Musculoskeletal:         General: Swelling present. Normal range of motion.      Cervical back: Normal range of motion and neck supple.      Comments: PICC line in left upper arm   Skin:     General: Skin is warm and dry.      Findings: Bruising present.      Comments: Stage 2 pressure wound to buttocks   Neurological:      General: No focal deficit present.      Mental Status: He is alert. He is disoriented.   Psychiatric:         Mood and Affect: Mood normal.         Behavior: Behavior normal.         Relevant Results    Scheduled medications  Scheduled Medications[1]  Continuous medications  Continuous Medications[2]  PRN medications  PRN Medications[3]    Results for orders placed or performed during the hospital encounter of 05/08/25 (from the past 24 hours)   POCT GLUCOSE   Result Value Ref Range    POCT Glucose 80 74 - 99 mg/dL   POCT GLUCOSE   Result Value Ref Range    POCT Glucose 106 (H) 74 - 99 mg/dL   CBC   Result Value Ref Range    WBC 4.7 4.4 - 11.3 x10*3/uL    nRBC 0.0 0.0 - 0.0 /100 WBCs    RBC 4.02 (L) 4.50 - 5.90 x10*6/uL    Hemoglobin 10.2 (L) 13.5 - 17.5 g/dL    Hematocrit 32.6 (L) 41.0 - 52.0 %    MCV 81 80 - 100 fL    MCH 25.4 (L) 26.0 - 34.0 pg    MCHC 31.3 (L) 32.0 - 36.0 g/dL    RDW 15.9 (H) 11.5 - 14.5 %    Platelets 239 150 - 450 x10*3/uL   Comprehensive metabolic panel   Result Value Ref Range    Glucose 169 (H) 74 - 99 mg/dL    Sodium 140 136 - 145 mmol/L    Potassium 3.4 (L) 3.5 - 5.3 mmol/L    Chloride 100 98 - 107 mmol/L    Bicarbonate 32 21 - 32 mmol/L    Anion Gap 11 10 - 20 mmol/L    Urea Nitrogen 22 6 - 23 mg/dL    Creatinine 0.64 0.50 - 1.30 mg/dL    eGFR >90 >60 mL/min/1.73m*2    Calcium 8.2 (L) 8.6 - 10.3 mg/dL    Albumin 2.8 (L) 3.4 - 5.0 g/dL    Alkaline Phosphatase 65 33 - 136 U/L    Total  Protein 6.0 (L) 6.4 - 8.2 g/dL    AST 14 9 - 39 U/L    Bilirubin, Total 0.3 0.0 - 1.2 mg/dL    ALT 10 10 - 52 U/L   C-reactive protein   Result Value Ref Range    C-Reactive Protein 18.64 (H) <1.00 mg/dL   Vancomycin   Result Value Ref Range    Vancomycin 21.2 (H) 5.0 - 20.0 ug/mL   POCT GLUCOSE   Result Value Ref Range    POCT Glucose 182 (H) 74 - 99 mg/dL   POCT GLUCOSE   Result Value Ref Range    POCT Glucose 120 (H) 74 - 99 mg/dL                        This patient has a central line   Reason for the central line remaining today? Parenteral medication    This patient has a urinary catheter   Reason for the urinary catheter remaining today? critically ill patient who need accurate urinary output measurements          Assessment & Plan  Wound infection    Osteomyelitis of left foot, unspecified type (Multi)    Acute deep vein thrombosis (DVT) of popliteal vein of left lower extremity    Bilateral lower extremity edema    Benign essential HTN    DM type 2 with diabetic peripheral neuropathy    Hyperlipidemia    Trigeminal neuralgia    Fever    Metabolic encephalopathy    COVID-19    Wound infection  Osteomyelitis of left foot, unspecified type (Multi)  - XR tib/fib: Thick periosteal new bone formation along the left tibia likely due to history of chronic osteomyelitis and healing. No acute bone destruction.  - MRI: No evidence to suggest osteomyelitis. 2. Partially visualized large soft tissue ulceration along the  anterior aspect of the distal tibia and tibial talar joint with possible exposure of the underlying anterior tibialis tendon. Moderate amount of fluid along the posterior as well as anterior aspect of the medial head of the gastrocnemius extending from the level of the knee to the distal lower leg. This is nonspecific with infectious fasciitis felt to be less likely although not entirely excluded. Edema of the anterior compartment muscles of the lower leg which may represent infectious myositis. No  evidence of intramuscular abscess.  Extensive diffuse soft tissue edema noted throughout the left lower extremity and partially visualized right lower extremity Moderate diffuse fatty atrophy of the lower leg muscles.  -Podiatry  and ID consult  - No plans for surgical intervention during this visit.   -CRP 24.5 most likely due to Chronic osteomyelitis   -ID recommend discontinue Zosyn and continue with Vancomycin 6 weeks  -discontinue zosyn  -continue vancomycin 1g q12h  -wound culture: GPC grew MRSA   - Blood culture- no growth at 4 days 5/9  - follow wound culture, WBC, blood cx  - PWB to the left lower extremity for transfer  - Nursing staff is able to change/reinforce dressing if & as necessary until next day’s dressing change  -  Betadine-soaked 4x4 to wound base, covered with dry gauze, Abd pads, Kerlix and [place] compressive dressing.   - PT/OT- Mod intensity level  - PICC line placed for 6 weeks IV antibiotics-CHG bath  - Chronic 02 is on 2 liters 02 at night, has been on it continuously  - RT to evaluate  - Monitor temperature  - Follow WBC 8.4>9.4>9.7>7.1>9.6 >7.5  - Dressing changed 5/14     Acute deep vein thrombosis (DVT) of popliteal vein of left lower extremity  Bilateral lower extremity edema  - previous xarelto for DVT, completed  - elevate legs when resting        Benign essential HTN  Hyperlipidemia  - Echo: normal LVSF with an EF of 55-60% with a Grade I (impaired relaxation pattern) of  LVDF. Moderate aortic valve stenosis.    - continue hydralazine 25 mg TID  - monitor HR, BP     DM type 2 with diabetic peripheral neuropathy  - sliding scale with lispro coverage 0-10  - A1c: 5.8  - Consistent carb 75gm/meal  - AccuCheck q4/day  - Hypoglycemia protocol  - Decreased PO intake   - Maintenace fluids D5NS at 50mL/hr      Trigeminal neuralgia  - continue carbamazepine 200 mg BID, gabapentin 300 mg TID     Fever  Metabolic encephalopathy  COVID-19  - fever overnight 5/12 38.7 Axillary, Zosyn  "resumed  - COVID + 5/13  - Remdisivir x 5 days  - Currently requiring Venti mask 50%  - mycoplasma pneuminiae, IgM  - legionella r/o  - Monitor fever  - Monitor WBC   - CXR: Cardiomegaly with new perihilar and basilar edema with patchy  multifocal airspace opacities right worse than left.Small bilateral pleural effusions.   - ID consulted, appreciate recommendations  - Psych consulted for behavior issues 5/12: Evaluated 5/13 'Pt unable to participate in consult - is on venturi mask and in/out of sleep.Will try tomorrow. Likely the agitation was related to hypoxia.\"   - Lama Catheter for accurate output  - I/O hourly    Normocytic anemia  - Hb 10.2/ MCV 81  - monitor CBC    Hypokalemia  - K 3.4  - replaced with KCL 40 mEq  - monitor BMP      GI ppx:   - continue pantoprazole 40 mg daily    DVT ppx:   - continue Enoxaparin  40 mg daily        Code Status: Full    Disposition; Patient requires inpatient stay.           Eryn Colon, APRN-CNP           [1] amoxicillin-clavulanate, 1 tablet, oral, q12h CLEMENTE  carBAMazepine, 200 mg, oral, BID  dexAMETHasone, 6 mg, intravenous, q24h  enoxaparin, 40 mg, subcutaneous, Daily  gabapentin, 300 mg, oral, TID  hydrALAZINE, 25 mg, oral, TID  insulin lispro, 0-10 Units, subcutaneous, TID AC  lidocaine, 5 mL, infiltration, Once  LORazepam, 1 mg, intravenous, Once  polyethylene glycol, 17 g, oral, Daily  [Held by provider] potassium chloride CR, 40 mEq, oral, Daily  remdesivir, 100 mg, intravenous, q24h  sennosides-docusate sodium, 1 tablet, oral, Nightly  traZODone, 50 mg, oral, Nightly  vancomycin 1.25 g in sodium chloride 0.9% 250 mL IV, 1,250 mg, intravenous, q12h  zinc oxide, 1 Application, Topical, TID  [2] D5 % and 0.9 % sodium chloride, 50 mL/hr, Last Rate: 50 mL/hr (05/15/25 0656)  [3] PRN medications: acetaminophen **OR** acetaminophen **OR** acetaminophen, albuterol, alum-mag hydroxide-simeth, benzocaine-menthol, dextrose, dextrose, glucagon, glucagon, hydrALAZINE, " ipratropium-albuteroL, ketorolac, lidocaine, metoclopramide, ondansetron, oxygen, oxygen, vancomycin

## 2025-05-15 NOTE — PROGRESS NOTES
Occupational Therapy                 Therapy Communication Note    Patient Name: Elliot Partida  MRN: [id number]  Department: [facility]  Room: 05/05-A  Today's Date: 5/15/2025     Discipline: Occupational Therapy    Missed Visit:  Yes    Missed Visit Reason:  Pt adamently refusing therapy this date. Pt cussing at therapists and refusing placement of waffle matress to decrease pain. Pt also refusing sitting EOB to eat breakfast. Pt educated on importance of OOB mobility and ADL completion. Pt continued to adamently refuse this date. Will follow up per pt status and as schedule allows.     Missed Time: Attempt

## 2025-05-16 LAB
ALBUMIN SERPL BCP-MCNC: 2.7 G/DL (ref 3.4–5)
ALP SERPL-CCNC: 58 U/L (ref 33–136)
ALT SERPL W P-5'-P-CCNC: 7 U/L (ref 10–52)
ANION GAP SERPL CALC-SCNC: 7 MMOL/L (ref 10–20)
AST SERPL W P-5'-P-CCNC: 14 U/L (ref 9–39)
BILIRUB SERPL-MCNC: 0.2 MG/DL (ref 0–1.2)
BUN SERPL-MCNC: 22 MG/DL (ref 6–23)
CALCIUM SERPL-MCNC: 8.3 MG/DL (ref 8.6–10.3)
CHLORIDE SERPL-SCNC: 103 MMOL/L (ref 98–107)
CO2 SERPL-SCNC: 34 MMOL/L (ref 21–32)
CREAT SERPL-MCNC: 0.68 MG/DL (ref 0.5–1.3)
CRP SERPL-MCNC: 10.6 MG/DL
EGFRCR SERPLBLD CKD-EPI 2021: >90 ML/MIN/1.73M*2
ERYTHROCYTE [DISTWIDTH] IN BLOOD BY AUTOMATED COUNT: 15.9 % (ref 11.5–14.5)
GLUCOSE BLD MANUAL STRIP-MCNC: 103 MG/DL (ref 74–99)
GLUCOSE BLD MANUAL STRIP-MCNC: 106 MG/DL (ref 74–99)
GLUCOSE BLD MANUAL STRIP-MCNC: 162 MG/DL (ref 74–99)
GLUCOSE BLD MANUAL STRIP-MCNC: 189 MG/DL (ref 74–99)
GLUCOSE SERPL-MCNC: 202 MG/DL (ref 74–99)
HCT VFR BLD AUTO: 30.8 % (ref 41–52)
HGB BLD-MCNC: 9.5 G/DL (ref 13.5–17.5)
MCH RBC QN AUTO: 25.5 PG (ref 26–34)
MCHC RBC AUTO-ENTMCNC: 30.8 G/DL (ref 32–36)
MCV RBC AUTO: 83 FL (ref 80–100)
NRBC BLD-RTO: 0 /100 WBCS (ref 0–0)
PLATELET # BLD AUTO: 247 X10*3/UL (ref 150–450)
POTASSIUM SERPL-SCNC: 3.4 MMOL/L (ref 3.5–5.3)
PROT SERPL-MCNC: 5.4 G/DL (ref 6.4–8.2)
RBC # BLD AUTO: 3.73 X10*6/UL (ref 4.5–5.9)
SODIUM SERPL-SCNC: 141 MMOL/L (ref 136–145)
WBC # BLD AUTO: 4.3 X10*3/UL (ref 4.4–11.3)

## 2025-05-16 PROCEDURE — 97530 THERAPEUTIC ACTIVITIES: CPT | Mod: GO,IPSPLIT | Performed by: OCCUPATIONAL THERAPIST

## 2025-05-16 PROCEDURE — 2500000001 HC RX 250 WO HCPCS SELF ADMINISTERED DRUGS (ALT 637 FOR MEDICARE OP): Mod: IPSPLIT | Performed by: STUDENT IN AN ORGANIZED HEALTH CARE EDUCATION/TRAINING PROGRAM

## 2025-05-16 PROCEDURE — 2500000002 HC RX 250 W HCPCS SELF ADMINISTERED DRUGS (ALT 637 FOR MEDICARE OP, ALT 636 FOR OP/ED): Mod: IPSPLIT | Performed by: NURSE PRACTITIONER

## 2025-05-16 PROCEDURE — 86140 C-REACTIVE PROTEIN: CPT | Mod: IPSPLIT | Performed by: NURSE PRACTITIONER

## 2025-05-16 PROCEDURE — 82947 ASSAY GLUCOSE BLOOD QUANT: CPT | Mod: IPSPLIT

## 2025-05-16 PROCEDURE — 1100000001 HC PRIVATE ROOM DAILY: Mod: IPSPLIT

## 2025-05-16 PROCEDURE — 2500000004 HC RX 250 GENERAL PHARMACY W/ HCPCS (ALT 636 FOR OP/ED): Mod: IPSPLIT | Performed by: STUDENT IN AN ORGANIZED HEALTH CARE EDUCATION/TRAINING PROGRAM

## 2025-05-16 PROCEDURE — 2500000001 HC RX 250 WO HCPCS SELF ADMINISTERED DRUGS (ALT 637 FOR MEDICARE OP): Mod: IPSPLIT | Performed by: INTERNAL MEDICINE

## 2025-05-16 PROCEDURE — 2500000001 HC RX 250 WO HCPCS SELF ADMINISTERED DRUGS (ALT 637 FOR MEDICARE OP): Mod: IPSPLIT | Performed by: NURSE PRACTITIONER

## 2025-05-16 PROCEDURE — 2500000004 HC RX 250 GENERAL PHARMACY W/ HCPCS (ALT 636 FOR OP/ED): Mod: JZ,IPSPLIT | Performed by: NURSE PRACTITIONER

## 2025-05-16 PROCEDURE — 99232 SBSQ HOSP IP/OBS MODERATE 35: CPT | Performed by: NURSE PRACTITIONER

## 2025-05-16 PROCEDURE — 94760 N-INVAS EAR/PLS OXIMETRY 1: CPT | Mod: IPSPLIT

## 2025-05-16 PROCEDURE — 85027 COMPLETE CBC AUTOMATED: CPT | Mod: IPSPLIT | Performed by: NURSE PRACTITIONER

## 2025-05-16 PROCEDURE — 80053 COMPREHEN METABOLIC PANEL: CPT | Mod: IPSPLIT | Performed by: NURSE PRACTITIONER

## 2025-05-16 PROCEDURE — 2500000005 HC RX 250 GENERAL PHARMACY W/O HCPCS: Mod: IPSPLIT | Performed by: INTERNAL MEDICINE

## 2025-05-16 PROCEDURE — 97530 THERAPEUTIC ACTIVITIES: CPT | Mod: GP,CQ,IPSPLIT

## 2025-05-16 PROCEDURE — 37799 UNLISTED PX VASCULAR SURGERY: CPT | Mod: IPSPLIT | Performed by: NURSE PRACTITIONER

## 2025-05-16 PROCEDURE — 2500000005 HC RX 250 GENERAL PHARMACY W/O HCPCS: Mod: IPSPLIT | Performed by: STUDENT IN AN ORGANIZED HEALTH CARE EDUCATION/TRAINING PROGRAM

## 2025-05-16 PROCEDURE — 2500000004 HC RX 250 GENERAL PHARMACY W/ HCPCS (ALT 636 FOR OP/ED): Mod: IPSPLIT | Performed by: NURSE PRACTITIONER

## 2025-05-16 RX ORDER — DEXTROSE MONOHYDRATE AND SODIUM CHLORIDE 5; .9 G/100ML; G/100ML
50 INJECTION, SOLUTION INTRAVENOUS CONTINUOUS
Status: DISCONTINUED | OUTPATIENT
Start: 2025-05-16 | End: 2025-05-17

## 2025-05-16 RX ORDER — DEXAMETHASONE 6 MG/1
6 TABLET ORAL DAILY
Status: DISCONTINUED | OUTPATIENT
Start: 2025-05-16 | End: 2025-05-20 | Stop reason: HOSPADM

## 2025-05-16 RX ADMIN — DEXAMETHASONE 6 MG: 6 TABLET ORAL at 16:38

## 2025-05-16 RX ADMIN — AMOXICILLIN AND CLAVULANATE POTASSIUM 1 TABLET: 875; 125 TABLET, FILM COATED ORAL at 09:08

## 2025-05-16 RX ADMIN — DEXTROSE AND SODIUM CHLORIDE 50 ML/HR: 5; .9 INJECTION, SOLUTION INTRAVENOUS at 15:37

## 2025-05-16 RX ADMIN — CARBAMAZEPINE 200 MG: 200 TABLET ORAL at 09:11

## 2025-05-16 RX ADMIN — DEXTROSE AND SODIUM CHLORIDE 50 ML/HR: 5; .9 INJECTION, SOLUTION INTRAVENOUS at 03:11

## 2025-05-16 RX ADMIN — DEXTROSE AND SODIUM CHLORIDE 50 ML/HR: 5; .9 INJECTION, SOLUTION INTRAVENOUS at 22:40

## 2025-05-16 RX ADMIN — INSULIN LISPRO 2 UNITS: 100 INJECTION, SOLUTION INTRAVENOUS; SUBCUTANEOUS at 09:33

## 2025-05-16 RX ADMIN — TRAZODONE HYDROCHLORIDE 50 MG: 50 TABLET ORAL at 21:12

## 2025-05-16 RX ADMIN — Medication 1 L/MIN: at 21:28

## 2025-05-16 RX ADMIN — POLYETHYLENE GLYCOL 3350 17 G: 17 POWDER, FOR SOLUTION ORAL at 09:09

## 2025-05-16 RX ADMIN — VANCOMYCIN HYDROCHLORIDE 1.25 G: 1.25 INJECTION, POWDER, LYOPHILIZED, FOR SOLUTION INTRAVENOUS at 22:41

## 2025-05-16 RX ADMIN — VANCOMYCIN HYDROCHLORIDE 1.25 G: 1.25 INJECTION, POWDER, LYOPHILIZED, FOR SOLUTION INTRAVENOUS at 09:09

## 2025-05-16 RX ADMIN — CARBAMAZEPINE 200 MG: 200 TABLET ORAL at 21:12

## 2025-05-16 RX ADMIN — GABAPENTIN 300 MG: 300 CAPSULE ORAL at 21:12

## 2025-05-16 RX ADMIN — Medication 1 L/MIN: at 14:59

## 2025-05-16 RX ADMIN — ENOXAPARIN SODIUM 40 MG: 40 INJECTION SUBCUTANEOUS at 09:09

## 2025-05-16 RX ADMIN — REMDESIVIR 100 MG: 100 INJECTION, POWDER, LYOPHILIZED, FOR SOLUTION INTRAVENOUS at 13:49

## 2025-05-16 RX ADMIN — ACETAMINOPHEN 325MG 650 MG: 325 TABLET ORAL at 09:09

## 2025-05-16 RX ADMIN — ALBUTEROL SULFATE 2.5 MG: 2.5 SOLUTION RESPIRATORY (INHALATION) at 01:43

## 2025-05-16 RX ADMIN — HYDRALAZINE HYDROCHLORIDE 25 MG: 25 TABLET ORAL at 21:12

## 2025-05-16 RX ADMIN — AMOXICILLIN AND CLAVULANATE POTASSIUM 1 TABLET: 875; 125 TABLET, FILM COATED ORAL at 21:12

## 2025-05-16 RX ADMIN — GABAPENTIN 300 MG: 300 CAPSULE ORAL at 09:09

## 2025-05-16 RX ADMIN — GABAPENTIN 300 MG: 300 CAPSULE ORAL at 14:47

## 2025-05-16 RX ADMIN — HYDRALAZINE HYDROCHLORIDE 25 MG: 25 TABLET ORAL at 09:09

## 2025-05-16 RX ADMIN — HYDRALAZINE HYDROCHLORIDE 25 MG: 25 TABLET ORAL at 14:47

## 2025-05-16 ASSESSMENT — COGNITIVE AND FUNCTIONAL STATUS - GENERAL
DRESSING REGULAR LOWER BODY CLOTHING: TOTAL
DRESSING REGULAR UPPER BODY CLOTHING: A LITTLE
EATING MEALS: A LITTLE
MOVING FROM LYING ON BACK TO SITTING ON SIDE OF FLAT BED WITH BEDRAILS: A LOT
HELP NEEDED FOR BATHING: A LOT
DAILY ACTIVITIY SCORE: 13
TURNING FROM BACK TO SIDE WHILE IN FLAT BAD: A LOT
CLIMB 3 TO 5 STEPS WITH RAILING: TOTAL
TOILETING: TOTAL
MOBILITY SCORE: 10
STANDING UP FROM CHAIR USING ARMS: A LOT
PERSONAL GROOMING: A LITTLE
MOVING TO AND FROM BED TO CHAIR: A LOT
WALKING IN HOSPITAL ROOM: TOTAL

## 2025-05-16 ASSESSMENT — PAIN SCALES - GENERAL
PAINLEVEL_OUTOF10: 0 - NO PAIN

## 2025-05-16 ASSESSMENT — PAIN - FUNCTIONAL ASSESSMENT
PAIN_FUNCTIONAL_ASSESSMENT: 0-10

## 2025-05-16 ASSESSMENT — PAIN SCALES - WONG BAKER
WONGBAKER_NUMERICALRESPONSE: HURTS WHOLE LOT
WONGBAKER_NUMERICALRESPONSE: NO HURT

## 2025-05-16 NOTE — CARE PLAN
The patient's goals for the shift include to get some sleep    The clinical goals for the shift include Patient will get out of bed this shift.    The patient did get out of bed once today to the bedside commode, then he did let OT work with him. He refused to be moved and turned every two hours. Nurse tried to encourage pt to eat and drink more fluids this shift.

## 2025-05-16 NOTE — PROGRESS NOTES
Physical Therapy    Physical Therapy Treatment    Patient Name: Elliot Partida  MRN: [id number]  Department: [facility] ICU  Room: 05/05-A  Today's Date: 5/16/2025  Time Calculation  Start Time: 1029  Stop Time: 1048  Time Calculation (min): 19 min    Assessment/Plan   PT Assessment  PT Assessment Results: Decreased strength, Impaired balance, Decreased mobility, Decreased endurance, Decreased cognition, Impaired judgement, Decreased skin integrity, Orthopedic restrictions, Pain  Rehab Prognosis: Fair  Barriers to Discharge Home: Cognition needs, Physical needs  Cognition Needs: 24hr supervision for safety awareness needed, Cognition-related high falls risk  Physical Needs: Ambulating household distances limited by function/safety, In-home setup navigation limited by function/safety, 24hr mobility assistance needed  Evaluation/Treatment Tolerance: Patient limited by fatigue  Medical Staff Made Aware: Yes  Strengths: Attitude of self  Barriers to Participation: Comorbidities  End of Session Communication: Bedside nurse  Assessment Comment: Pt demonstrated improvement with bed mobility and transfers from maxAx2 to minAx2. Pt was able to perform stand pivot from bed to bedside commode using standard walker with minAx2; pt required cues throughout to maintain 25% PWB status of LLE. Pt was able to maintain static standing during BM hygiene using standard walker with CGA and VC. Pt would continue to benefit from skilled therapy to address above limitations and prevent further decline.  End of Session Patient Position: Bed, 2 rail up (OT present)     PT Plan  Treatment/Interventions: Transfer training, Bed mobility, Neuromuscular re-education  PT Plan: Ongoing PT  PT Frequency: 3 times per week  PT Discharge Recommendations: Moderate intensity level of continued care  PT Recommended Transfer Status: Assist x2  PT - OK to Discharge: Yes    PT Visit Info:  PT Received On: 05/16/25     General Visit Information:   General  Reason  for Referral: impaired mobility, wound infection  Referred By: SARAH Austin-CNP  Past Medical History Relevant to Rehab: HTN, type 2 diabetes, DVT, osteomyelitis, BLE edema, stroke, hyperlipidemia, anemia, obesity, osteoarthritis, ulcer, CAD, PAD, aortic stenosis, neuropathy, trigeminal neuralgia, meningioma, MI, neuritis, PVD, subdural abscess, tinea pedis  Family/Caregiver Present: No  Prior to Session Communication: Bedside nurse  Patient Position Received:  (Sitting on EOB with nurse present)  Preferred Learning Style: auditory, verbal  General Comment: Pt was sitting at EOB with nurse present on arrival. Pt wanted to transfer to bed side [facility] and was agreeable to therapy.    Subjective   Precautions:  Precautions  LE Weight Bearing Status: Left Partial Weight Bearing (25%)  Medical Precautions: Fall precautions  Precautions Comment: covid +, droplet plus precautions     Date/Time Vitals Session Patient Position Pulse Resp SpO2 BP MAP (mmHg)    05/16/25 1200 --  --  --  --  97 %  160/72  96           Objective   Pain:  Pain Assessment  Pain Assessment: 0-10  0-10 (Numeric) Pain Score: 0 - No pain    Cognition:  Cognition  Arousal/Alertness: Appropriate responses to stimuli  Orientation Level: Oriented X4    Coordination:  Movements are Fluid and Coordinated: Yes    Postural Control:  Static Standing Balance  Static Standing-Balance Support: Bilateral upper extremity supported  Static Standing-Level of Assistance: Contact guard  Static Standing-Comment/Number of Minutes: 2-3 minutes during BM hygiene, cued throughout to maintain 25% PWB status on LLE    Treatments:  Bed Mobility 1  Bed Mobility 1: Sitting to supine  Level of Assistance 1: Minimum assistance, +2  Bed Mobility Comments 1: pt required BLE management    Transfers  Transfer: Yes  Transfer 1  Transfer From 1: Bed to  [facility]  Technique 1: Stand pivot, Sit to stand, Stand to sit  Transfer Device 1: Walker, Gait  belt  Transfer Level of Assistance 1: Minimum assistance, +2  Trials/Comments 1: STP from bed <> bedside commode using standard walker with minAx2. Pt required VC to maintain 25% PWB of LLE throughout.    Outcome Measures:  [facility] Basic Mobility  Turning from your back to your side while in a flat bed without using bedrails: A lot  Moving from lying on your back to sitting on the side of a flat bed without using bedrails: A lot  Moving to and from bed to chair (including a wheelchair): A lot  Standing up from a chair using your arms (e.g. wheelchair or bedside chair): A lot  To walk in hospital room: Total  Climbing 3-5 steps with railing: Total  Basic Mobility - Total Score: 10    FSS-ICU  Ambulation: Unable to attempt due to weakness  Rolling: Moderate assistance (performs 50 - 74% of task)  Sitting: Moderate assistance (performs 50 - 74% of task)  Transfer Sit-to-Stand: Moderate assistance (performs 50 - 74% of task)  Transfer Supine-to-Sit: Moderate assistance (performs 50 - 74% of task)  Total Score: 12    Education Documentation  Mobility Training, taught by Josi Boston PTA at 5/16/2025 12:27 PM.  Learner: Patient  Readiness: Acceptance  Method: Explanation  Response: Verbalizes Understanding  Comment: WB status, education on standard walker    Education Comments  No comments found.      Encounter Problems       Encounter Problems (Active)       Balance       STG - Maintains static sitting balance with upper extremity support with >=good- balance  (Progressing)       Start:  05/12/25    Expected End:  05/26/25               Mobility       STG - Patient will propel the wheelchair JOLENE (Progressing)       Start:  05/12/25    Expected End:  05/26/25               PT Transfers       STG - Transfer from bed to chair with SBA and SW (Progressing)       Start:  05/12/25    Expected End:  05/26/25            STG - Patient will transfer sit to and from stand with SBA and SW (Progressing)       Start:  05/12/25     Expected End:  05/26/25            STG - Patient maintains weight bearing status during transfers IND (Progressing)       Start:  05/12/25    Expected End:  05/26/25               Pain - Adult

## 2025-05-16 NOTE — NURSING NOTE
"2045 patient refuses majority of nurses care. Patient states \"get out of my room and leave me alone\" When assessed, patient is CAM +, complains of pain and medicated as appropriate. Held pm dose of trazadone due to drowsiness  "

## 2025-05-16 NOTE — PROGRESS NOTES
Elliot Partida is a 74 y.o. male on day 8 of admission presenting with Wound infection.    Subjective   Patient lying in bed, states that he still feels terrible. Feels that his breathing is better with the oxygen. Patient more conversant and tolerant of interventions. Denies headache, dizziness, chest pain, abdominal pain, nausea, vomiting, or diarrhea. Reports productive cough and utilizing blue emesis bag for mucus.        Objective     Physical Exam  Constitutional:       General: He is not in acute distress.     Appearance: He is ill-appearing. He is not toxic-appearing.   HENT:      Head: Normocephalic and atraumatic.      Nose: Nose normal.      Mouth/Throat:      Mouth: Mucous membranes are moist.   Eyes:      General: No scleral icterus.        Right eye: No discharge.         Left eye: No discharge.   Cardiovascular:      Rate and Rhythm: Normal rate and regular rhythm.      Pulses: Normal pulses.      Heart sounds: Normal heart sounds.   Pulmonary:      Effort: Pulmonary effort is normal. No respiratory distress.      Breath sounds: Normal breath sounds. No stridor. No wheezing or rhonchi.      Comments: Currently on 1LPM O2via NC   Abdominal:      General: There is no distension.      Palpations: Abdomen is soft. There is no mass.      Tenderness: There is no abdominal tenderness.      Hernia: No hernia is present.   Musculoskeletal:         General: Swelling present.      Cervical back: Normal range of motion. No rigidity or tenderness.   Skin:     General: Skin is warm and dry.      Capillary Refill: Capillary refill takes 2 to 3 seconds.      Findings: Bruising present.      Comments: Dressing noted to sacrum, intact  Dressing intact to LLE   Neurological:      Mental Status: He is alert and oriented to person, place, and time.      Motor: Weakness present.      Comments: Generalized    Psychiatric:         Mood and Affect: Mood normal.         Behavior: Behavior normal.         Last Recorded  "Vitals  Blood pressure 155/65, pulse 88, temperature 36.9 °C (98.4 °F), temperature source Temporal, resp. rate 20, height 1.753 m (5' 9\"), weight 94.4 kg (208 lb 1.8 oz), SpO2 96%.  Intake/Output last 3 Shifts:  I/O last 3 completed shifts:  In: 3067.7 (32.5 mL/kg) [P.O.:840; I.V.:1577.7 (16.7 mL/kg); IV Piggyback:650]  Out: 3235 (34.3 mL/kg) [Urine:3235 (1 mL/kg/hr)]  Weight: 94.4 kg     Relevant Results    Current Facility-Administered Medications:     acetaminophen (Tylenol) tablet 650 mg, 650 mg, oral, q4h PRN, 650 mg at 05/16/25 0909 **OR** acetaminophen (Tylenol) oral liquid 650 mg, 650 mg, nasogastric tube, q4h PRN **OR** acetaminophen (Tylenol) suppository 650 mg, 650 mg, rectal, q4h PRN, Yue C Michael, APRN-CNP, 650 mg at 05/13/25 0120    albuterol 2.5 mg /3 mL (0.083 %) nebulizer solution 2.5 mg, 2.5 mg, nebulization, q2h PRN, Yue C Michael, APRN-CNP, 2.5 mg at 05/16/25 0143    alum-mag hydroxide-simeth (Mylanta) 200-200-20 mg/5 mL oral suspension 20 mL, 20 mL, oral, q4h PRN, Yue C Michael, APRN-CNP    amoxicillin-clavulanate (Augmentin) 875-125 mg per tablet 1 tablet, 1 tablet, oral, q12h CLEMENTE, Baljit Velazco MD, 1 tablet at 05/16/25 0908    benzocaine-menthol (Cepastat Sore Throat) lozenge 1 lozenge, 1 lozenge, Mouth/Throat, q2h PRN, Yue C Michael, APRN-CNP, 1 lozenge at 05/10/25 2059    carBAMazepine (TEGretol) tablet 200 mg, 200 mg, oral, BID, Yue C Michale, APRN-CNP, 200 mg at 05/16/25 0911    D5 % and 0.9 % sodium chloride infusion, 50 mL/hr, intravenous, Continuous, Yue C Michael, APRN-CNP    D5 % and 0.9 % sodium chloride infusion, 50 mL/hr, intravenous, Continuous, Yue C Michael, APRN-CNP, Last Rate: 50 mL/hr at 05/16/25 1656, 50 mL/hr at 05/16/25 1656    dexAMETHasone (Decadron) tablet 6 mg, 6 mg, oral, Daily, Yue C Michael, APRN-CNP, 6 mg at 05/16/25 1638    dextrose 50 % injection 12.5 g, 12.5 g, intravenous, q15 min PRN, Yue C Michael, APRN-CNP    dextrose 50 % " injection 25 g, 25 g, intravenous, q15 min PRN, Yue C Michael, APRN-CNP    enoxaparin (Lovenox) syringe 40 mg, 40 mg, subcutaneous, Daily, Yue C Michael, APRN-CNP, 40 mg at 05/16/25 0909    gabapentin (Neurontin) capsule 300 mg, 300 mg, oral, TID, Yue C Michael, APRN-CNP, 300 mg at 05/16/25 1447    glucagon (Glucagen) injection 1 mg, 1 mg, intramuscular, q15 min PRN, Yue C Michael, APRN-CNP    glucagon (Glucagen) injection 1 mg, 1 mg, intramuscular, q15 min PRN, Yue C Michael, APRN-CNP    hydrALAZINE (Apresoline) injection 10 mg, 10 mg, intravenous, q4h PRN, Yue C Michael, APRN-CNP    hydrALAZINE (Apresoline) tablet 25 mg, 25 mg, oral, TID, Yue C Michael, APRN-CNP, 25 mg at 05/16/25 1447    insulin lispro injection 0-10 Units, 0-10 Units, subcutaneous, TID AC, Yue C Michael, APRN-CNP, 2 Units at 05/16/25 0933    ipratropium-albuteroL (Duo-Neb) 0.5-2.5 mg/3 mL nebulizer solution 3 mL, 3 mL, nebulization, q2h PRN, Yue C Michael, APRN-CNP    lidocaine (LMX) 4 % cream, , Topical, 4x daily PRN, Yue C Michael, APRN-CNP, Given at 05/08/25 1201    metoclopramide (Reglan) injection 10 mg, 10 mg, intravenous, q8h PRN, Yue C Michael, APRN-CNP    ondansetron (Zofran) injection 4 mg, 4 mg, intravenous, q4h PRN, Yue C Michael, APRN-CNP, 4 mg at 05/12/25 0636    oxygen (O2) therapy, , inhalation, Continuous PRN - O2/gases, Yue C Michael, APRN-CNP, 50 percent at 05/13/25 0913    oxygen (O2) therapy, , inhalation, Continuous PRN - O2/gases, Martínez Gautam MD, 1 L/min at 05/16/25 1459    polyethylene glycol (Glycolax, Miralax) packet 17 g, 17 g, oral, Daily, SARAH Landaverde-CNP, 17 g at 05/16/25 0909    [Held by provider] potassium chloride CR (Klor-Con M20) ER tablet 40 mEq, 40 mEq, oral, Daily, JUDITH Landaverde    [COMPLETED] remdesivir (Veklury) 200 mg in sodium chloride 0.9% 250 mL IV, 200 mg, intravenous, Once, Stopped at 05/13/25 1427 **FOLLOWED BY** remdesivir (Veklury) 100 mg in sodium  chloride 0.9% 100 mL IV, 100 mg, intravenous, q24h, Yue C Michael, APRN-CNP, 100 mg at 05/16/25 1349    sennosides-docusate sodium (Ann-Colace) 8.6-50 mg per tablet 1 tablet, 1 tablet, oral, Nightly, Yue C Michael, APRN-CNP, 1 tablet at 05/15/25 2024    traZODone (Desyrel) tablet 50 mg, 50 mg, oral, Nightly, Yue C Michael, APRN-CNP, 50 mg at 05/14/25 2202    vancomycin (Vancocin) pharmacy to dose - pharmacy monitoring, , miscellaneous, Daily PRN, Yue C Michael, APRN-CNP    vancomycin 1.25 g in sodium chloride 0.9% 250 mL IV, 1,250 mg, intravenous, q12h, Yue C Michael, APRN-CNP, Stopped at 05/16/25 1024    zinc oxide 20 % ointment 1 Application, 1 Application, Topical, TID, Yue C Michael, APRN-CNP, 1 Application at 05/15/25 2059    Latest Reference Range & Units 05/15/25 05:06 05/16/25 05:34   GLUCOSE 74 - 99 mg/dL 169 (H) 202 (H)   SODIUM 136 - 145 mmol/L 140 141   POTASSIUM 3.5 - 5.3 mmol/L 3.4 (L) 3.4 (L)   CHLORIDE 98 - 107 mmol/L 100 103   Bicarbonate 21 - 32 mmol/L 32 34 (H)   Anion Gap 10 - 20 mmol/L 11 7 (L)   Blood Urea Nitrogen 6 - 23 mg/dL 22 22   Creatinine 0.50 - 1.30 mg/dL 0.64 0.68   EGFR >60 mL/min/1.73m*2 >90 >90   Calcium 8.6 - 10.3 mg/dL 8.2 (L) 8.3 (L)   Albumin 3.4 - 5.0 g/dL 2.8 (L) 2.7 (L)   Alkaline Phosphatase 33 - 136 U/L 65 58   ALT 10 - 52 U/L 10 7 (L)   AST 9 - 39 U/L 14 14   Bilirubin Total 0.0 - 1.2 mg/dL 0.3 0.2   Total Protein 6.4 - 8.2 g/dL 6.0 (L) 5.4 (L)   C-Reactive Protein <1.00 mg/dL 18.64 (H) 10.60 (H)   WBC 4.4 - 11.3 x10*3/uL 4.7 4.3 (L)   nRBC 0.0 - 0.0 /100 WBCs 0.0 0.0   RBC 4.50 - 5.90 x10*6/uL 4.02 (L) 3.73 (L)   HEMOGLOBIN 13.5 - 17.5 g/dL 10.2 (L) 9.5 (L)   HEMATOCRIT 41.0 - 52.0 % 32.6 (L) 30.8 (L)   MCV 80 - 100 fL 81 83   MCH 26.0 - 34.0 pg 25.4 (L) 25.5 (L)   MCHC 32.0 - 36.0 g/dL 31.3 (L) 30.8 (L)   RED CELL DISTRIBUTION WIDTH 11.5 - 14.5 % 15.9 (H) 15.9 (H)   Platelets 150 - 450 x10*3/uL 239 247   (H): Data is abnormally high  (L): Data is abnormally  low                            Assessment & Plan  Wound infection    Osteomyelitis of left foot, unspecified type (Multi)    Acute deep vein thrombosis (DVT) of popliteal vein of left lower extremity    Bilateral lower extremity edema    Benign essential HTN    DM type 2 with diabetic peripheral neuropathy    Hyperlipidemia    Trigeminal neuralgia    Fever    Metabolic encephalopathy    COVID-19    Wound infection  Osteomyelitis of left foot, unspecified type (Multi)  - XR tib/fib: Thick periosteal new bone formation along the left tibia likely due to history of chronic osteomyelitis and healing. No acute bone destruction.  - MRI: No evidence to suggest osteomyelitis. 2. Partially visualized large soft tissue ulceration along the  anterior aspect of the distal tibia and tibial talar joint with possible exposure of the underlying anterior tibialis tendon. Moderate amount of fluid along the posterior as well as anterior aspect of the medial head of the gastrocnemius extending from the level of the knee to the distal lower leg. This is nonspecific with infectious fasciitis felt to be less likely although not entirely excluded. Edema of the anterior compartment muscles of the lower leg which may represent infectious myositis. No evidence of intramuscular abscess.  Extensive diffuse soft tissue edema noted throughout the left lower extremity and partially visualized right lower extremity Moderate diffuse fatty atrophy of the lower leg muscles.  -Podiatry  and ID consult  - No plans for surgical intervention during this visit.   -CRP 24.5 most likely due to Chronic osteomyelitis   - Follow CRP 2.68> 1.54> 24.50 > 18.64 > 10.60  -ID recommend discontinue Zosyn and continue with Vancomycin 6 weeks  -discontinue zosyn  -continue vancomycin 1g q12h  -wound culture: GPC grew MRSA   - Blood culture- no growth at 4 days 5/9  - follow wound culture, WBC, blood cx  - PWB to the left lower extremity for transfer  - Nursing staff is  "able to change/reinforce dressing if & as necessary until next day’s dressing change  -  Betadine-soaked 4x4 to wound base, covered with dry gauze, Abd pads, Kerlix and [place] compressive dressing.   - PT/OT- Mod intensity level  - PICC line placed for 6 weeks IV antibiotics-CHG bath  - RT to evaluate  - Monitor temperature  - Follow WBC 8.4>9.4>9.7>7.1>9.6 >7.5  - Dressing changed 5/14     Acute deep vein thrombosis (DVT) of popliteal vein of left lower extremity  Bilateral lower extremity edema  - previous xarelto for DVT, completed  - elevate legs when resting        Benign essential HTN  Hyperlipidemia  - Echo: normal LVSF with an EF of 55-60% with a Grade I (impaired relaxation pattern) of  LVDF. Moderate aortic valve stenosis.    - continue hydralazine 25 mg TID  - monitor HR, BP     DM type 2 with diabetic peripheral neuropathy  - sliding scale with lispro coverage 0-10  - A1c: 5.8  - Consistent carb 75gm/meal  - AccuCheck q4/day  - Hypoglycemia protocol  - Decreased appetite  - Maintenance fluids D5NS at 50mL/hr      Trigeminal neuralgia  - continue carbamazepine 200 mg BID, gabapentin 300 mg TID     Fever  Metabolic encephalopathy  COVID-19  - fever overnight 5/12 38.7 Axillary, Zosyn resumed  - COVID + 5/13  - Remdisivir x 5 days  - Currently requiring Venti mask 50%,   - mycoplasma pneuminiae, IgM 0.01  - legionella negative   - Monitor fever  - Monitor WBC   - CXR: Cardiomegaly with new perihilar and basilar edema with patchy  multifocal airspace opacities right worse than left.Small bilateral pleural effusions.   - ID consulted, appreciate recommendations  - Psych consulted for behavior issues 5/12: Evaluated 5/13 'Pt unable to participate in consult - is on venturi mask and in/out of sleep.Will try tomorrow. Likely the agitation was related to hypoxia.\"   - Lama Catheter for accurate output  - I/O hourly  - Currently weaned to 1 LPM O2 via NC      Normocytic anemia  - Hb 10.2/ MCV 81  - monitor CBC   "   Hypokalemia  - K 3.4 > 3.4  - replaced with KCL 40 mEq  - monitor BMP  - Resume potassium 40 meq daily     GI ppx: PPI  DVT ppx: Lovenox  Fluids: Maintenance fluids D5NS at 50mL/hr   Electrolytes: replace as needed  Nutrition: Consistent carb 75gm/meal  Adjuncts: PICC     Code Status: Full  Pt requires inpatient stay at this time.        Yue Rodriguez, APRN-CNP

## 2025-05-16 NOTE — PROGRESS NOTES
Occupational Therapy    Occupational Therapy Treatment    Name: Elliot Partida  MRN: [id number]  Department: [facility]  Room: 05/05-A  Date: 05/16/25  Time Calculation  Start Time: 1045  Stop Time: 1110  Time Calculation (min): 25 min    Assessment:  OT Assessment: Pt improved participation and assist level with bed mobility and sit to stand transfers improved. Needs cues for PWB when stepping, recommend containing movement to SPT only to maintain compliance. Patient required encouragement for further treatment, can be self limiting. Encouraged patient to sit up in chair and educated on benefits of sitting upright but refused and only willing to return to bed.  Prognosis: Good  Barriers to Discharge Home: Cognition needs, Physical needs  Cognition Needs: 24hr supervision for safety awareness needed, Medication and/or medical management daily assist needed, Insight of patient limited regarding functional ability/needs  Physical Needs: Stair navigation into home limited by function/safety, 24hr ADL assistance needed, 24hr mobility assistance needed, Ambulating household distances limited by function/safety, High falls risk due to function or environment, Weight bearing precautions unable to be safely maintained  Evaluation/Treatment Tolerance: Patient tolerated treatment well  End of Session Communication: Bedside nurse  End of Session Patient Position: Bed, 2 rail up, Alarm on  Plan:  Treatment Interventions: ADL retraining, Functional transfer training, UE strengthening/ROM, Endurance training, Patient/family training, Neuromuscular reeducation, Equipment evaluation/education, Compensatory technique education, Cognitive reorientation  OT Frequency: 3 times per week  OT Discharge Recommendations: Moderate intensity level of continued care  OT Recommended Transfer Status: Assist of 2  OT - OK to Discharge: Yes (Based on completed evaluation and care plan recommendations, no barriers to discharge to next site of  care.)    Subjective     OT Visit Info:  OT Received On: 05/16/25  General:  General  Past Medical History Relevant to Rehab: HTN, type 2 diabetes, DVT, osteomyelitis, BLE edema, stroke, hyperlipidemia, anemia, obesity, osteoarthritis, ulcer, CAD, PAD, aortic stenosis, neuropathy, trigeminal neuralgia, meningioma, MI, neuritis, PVD, subdural abscess, tinea pedis  Prior to Session Communication: Bedside nurse (PTA)  Patient Position Received: Bed, 2 rail up, Alarm off, caregiver present (PTA and nurse positioning patient in bed after PT session, transitioned care over)  General Comment: [place] [facility] [place], participated with PT session  Precautions:  LE Weight Bearing Status: Left Partial Weight Bearing (25%)  Medical Precautions: Fall precautions, Infection precautions  Precautions Comment: covid +, droplet plus precautions     Date/Time Vitals Session Patient Position Pulse Resp SpO2 BP MAP (mmHg)    05/16/25 1000 --  --  --  --  98 %  --  --           Pain Assessment:  Pain Assessment  0-10 (Numeric) Pain Score: 0 - No pain  Hager-Baker FACES Pain Rating: No hurt    Objective   Cognition:  Arousal/Alertness: Appropriate responses to stimuli  Orientation Level: Oriented X4     Bed Mobility/Transfers: Bed Mobility 1  Bed Mobility 1: Supine to sitting  Level of Assistance 1: Minimum assistance  Bed Mobility Comments 1: deflated air mattress and required increased cueing to perform movement, OT assisted 10% only  Bed Mobility 2  Bed Mobility  2: Sitting to supine  Level of Assistance 2: Minimum assistance  Bed Mobility Comments 2: required increased encouragment to perform himself as much as possible, patient able to obtain RLE up on bed and OT assisted with LLE only  Bed Mobility 3  Bed Mobility 3: Rolling right, Rolling left  Level of Assistance 3: Close supervision  Bed Mobility Comments 3: use of B rails on bed    Transfer 1  Technique 1: Sit to stand, Stand to sit  Transfer Device  1: Walker  Transfer Level of Assistance 1: Minimum assistance, Minimal verbal cues  Trials/Comments 1: repeated sit to stand x 5 with various bed heights, started higher and went low. Pt able to maintain PWB with sit<>stand transfers, took 2 side steps to get to HOB and required increased cueing to maintain PWB     Outcome Measures:  [facility] Daily Activity  Putting on and taking off regular lower body clothing: Total  Bathing (including washing, rinsing, drying): A lot  Putting on and taking off regular upper body clothing: A little  Toileting, which includes using toilet, bedpan or urinal: Total  Taking care of personal grooming such as brushing teeth: A little  Eating Meals: A little  Daily Activity - Total Score: 13    Goals:  Encounter Problems       Encounter Problems (Active)       ADLs       Patient with complete upper body dressing with set-up, supervision level of assistance donning and doffing all UE clothes with PRN adaptive equipment while supported sitting (Progressing)       Start:  05/12/25    Expected End:  05/26/25            Patient will complete daily grooming tasks brushing teeth and washing face/hair with set-up, supervision level of assistance and PRN adaptive equipment while supported sitting. (Progressing)       Start:  05/12/25    Expected End:  05/26/25            Patient will complete toileting including hygiene clothing management/hygiene with minimal assist  level of assistance. (Progressing)       Start:  05/12/25    Expected End:  05/26/25               COGNITION/SAFETY       Patient will recall and adhere to weight bearing and /or ROM restrictions with all ADL and functional mobility in order to promote healing and safety with functional tasks (Progressing)       Start:  05/12/25    Expected End:  05/26/25               TRANSFERS       Patient will perform bed mobility set-up, supervision level of assistance and bed rails and leg  in order to improve safety and independence with  mobility (Progressing)       Start:  05/12/25    Expected End:  05/26/25            Patient will complete functional transfer to chair, bed, commode with least restrictive device with supervision level of assistance. (Progressing)       Start:  05/12/25    Expected End:  05/26/25

## 2025-05-16 NOTE — CARE PLAN
The patient's goals for the shift include to get some sleep    The clinical goals for the shift include will continue to wean oxygen as appropriate    Over the shift, the patient did make progress toward the following goals. Patient was weaned to 1LNC; attempted RA and patient felt SOB while sats maintaining 92%. Replaced 1L and SOB subsided.

## 2025-05-16 NOTE — PROGRESS NOTES
05/16/25 1513   Discharge Planning   Assistance Needed - Patient out of SNF days, no secondary insurance, patient active with [facility], patient unable to get out of bed, lives with sister, sister unable to care for patient,   Vanco possible to be changed Saturday, so no script given. Mariam Pederson from [facility] following in case edna can get insurance dayscorrected of if any  available days, then maybe can discharge to [facility], [facility] cannot accept.     [facility] aware patient with not discharge over the weekend. Plan for discharge early next week when discharge plan secure. Patient aware discharge plan not secure. ADOD 5/19

## 2025-05-17 LAB
ALBUMIN SERPL BCP-MCNC: 2.7 G/DL (ref 3.4–5)
ALP SERPL-CCNC: 59 U/L (ref 33–136)
ALT SERPL W P-5'-P-CCNC: 8 U/L (ref 10–52)
ANION GAP SERPL CALC-SCNC: 8 MMOL/L (ref 10–20)
AST SERPL W P-5'-P-CCNC: 14 U/L (ref 9–39)
BILIRUB SERPL-MCNC: 0.2 MG/DL (ref 0–1.2)
BUN SERPL-MCNC: 17 MG/DL (ref 6–23)
CALCIUM SERPL-MCNC: 8.3 MG/DL (ref 8.6–10.3)
CHLORIDE SERPL-SCNC: 107 MMOL/L (ref 98–107)
CO2 SERPL-SCNC: 33 MMOL/L (ref 21–32)
CREAT SERPL-MCNC: 0.58 MG/DL (ref 0.5–1.3)
CRP SERPL-MCNC: 5.97 MG/DL
EGFRCR SERPLBLD CKD-EPI 2021: >90 ML/MIN/1.73M*2
ERYTHROCYTE [DISTWIDTH] IN BLOOD BY AUTOMATED COUNT: 16.2 % (ref 11.5–14.5)
GLUCOSE BLD MANUAL STRIP-MCNC: 129 MG/DL (ref 74–99)
GLUCOSE BLD MANUAL STRIP-MCNC: 133 MG/DL (ref 74–99)
GLUCOSE BLD MANUAL STRIP-MCNC: 155 MG/DL (ref 74–99)
GLUCOSE BLD MANUAL STRIP-MCNC: 158 MG/DL (ref 74–99)
GLUCOSE BLD MANUAL STRIP-MCNC: 162 MG/DL (ref 74–99)
GLUCOSE SERPL-MCNC: 147 MG/DL (ref 74–99)
HCT VFR BLD AUTO: 31.4 % (ref 41–52)
HGB BLD-MCNC: 9.6 G/DL (ref 13.5–17.5)
MCH RBC QN AUTO: 25.3 PG (ref 26–34)
MCHC RBC AUTO-ENTMCNC: 30.6 G/DL (ref 32–36)
MCV RBC AUTO: 83 FL (ref 80–100)
NRBC BLD-RTO: 0 /100 WBCS (ref 0–0)
PLATELET # BLD AUTO: 268 X10*3/UL (ref 150–450)
POTASSIUM SERPL-SCNC: 3.2 MMOL/L (ref 3.5–5.3)
PROT SERPL-MCNC: 5.3 G/DL (ref 6.4–8.2)
RBC # BLD AUTO: 3.8 X10*6/UL (ref 4.5–5.9)
SODIUM SERPL-SCNC: 145 MMOL/L (ref 136–145)
VANCOMYCIN SERPL-MCNC: 22.4 UG/ML (ref 5–20)
WBC # BLD AUTO: 5.5 X10*3/UL (ref 4.4–11.3)

## 2025-05-17 PROCEDURE — 82947 ASSAY GLUCOSE BLOOD QUANT: CPT | Mod: IPSPLIT

## 2025-05-17 PROCEDURE — 2500000005 HC RX 250 GENERAL PHARMACY W/O HCPCS: Mod: IPSPLIT | Performed by: STUDENT IN AN ORGANIZED HEALTH CARE EDUCATION/TRAINING PROGRAM

## 2025-05-17 PROCEDURE — 94664 DEMO&/EVAL PT USE INHALER: CPT | Mod: IPSPLIT

## 2025-05-17 PROCEDURE — 2500000004 HC RX 250 GENERAL PHARMACY W/ HCPCS (ALT 636 FOR OP/ED): Mod: JZ,IPSPLIT

## 2025-05-17 PROCEDURE — 2500000004 HC RX 250 GENERAL PHARMACY W/ HCPCS (ALT 636 FOR OP/ED): Mod: JZ,IPSPLIT | Performed by: STUDENT IN AN ORGANIZED HEALTH CARE EDUCATION/TRAINING PROGRAM

## 2025-05-17 PROCEDURE — 2500000001 HC RX 250 WO HCPCS SELF ADMINISTERED DRUGS (ALT 637 FOR MEDICARE OP): Mod: IPSPLIT | Performed by: STUDENT IN AN ORGANIZED HEALTH CARE EDUCATION/TRAINING PROGRAM

## 2025-05-17 PROCEDURE — 2500000002 HC RX 250 W HCPCS SELF ADMINISTERED DRUGS (ALT 637 FOR MEDICARE OP, ALT 636 FOR OP/ED): Mod: IPSPLIT | Performed by: STUDENT IN AN ORGANIZED HEALTH CARE EDUCATION/TRAINING PROGRAM

## 2025-05-17 PROCEDURE — 97535 SELF CARE MNGMENT TRAINING: CPT | Mod: GO,IPSPLIT

## 2025-05-17 PROCEDURE — 36416 COLLJ CAPILLARY BLOOD SPEC: CPT | Mod: IPSPLIT | Performed by: STUDENT IN AN ORGANIZED HEALTH CARE EDUCATION/TRAINING PROGRAM

## 2025-05-17 PROCEDURE — 2500000004 HC RX 250 GENERAL PHARMACY W/ HCPCS (ALT 636 FOR OP/ED): Mod: IPSPLIT | Performed by: STUDENT IN AN ORGANIZED HEALTH CARE EDUCATION/TRAINING PROGRAM

## 2025-05-17 PROCEDURE — 86140 C-REACTIVE PROTEIN: CPT | Mod: IPSPLIT | Performed by: STUDENT IN AN ORGANIZED HEALTH CARE EDUCATION/TRAINING PROGRAM

## 2025-05-17 PROCEDURE — 80053 COMPREHEN METABOLIC PANEL: CPT | Mod: IPSPLIT | Performed by: STUDENT IN AN ORGANIZED HEALTH CARE EDUCATION/TRAINING PROGRAM

## 2025-05-17 PROCEDURE — 94760 N-INVAS EAR/PLS OXIMETRY 1: CPT | Mod: IPSPLIT

## 2025-05-17 PROCEDURE — 85027 COMPLETE CBC AUTOMATED: CPT | Mod: IPSPLIT | Performed by: STUDENT IN AN ORGANIZED HEALTH CARE EDUCATION/TRAINING PROGRAM

## 2025-05-17 PROCEDURE — 99233 SBSQ HOSP IP/OBS HIGH 50: CPT | Performed by: NURSE PRACTITIONER

## 2025-05-17 PROCEDURE — 9420000001 HC RT PATIENT EDUCATION 5 MIN: Mod: IPSPLIT

## 2025-05-17 PROCEDURE — 2500000005 HC RX 250 GENERAL PHARMACY W/O HCPCS: Mod: IPSPLIT | Performed by: INTERNAL MEDICINE

## 2025-05-17 PROCEDURE — 1100000001 HC PRIVATE ROOM DAILY: Mod: IPSPLIT

## 2025-05-17 PROCEDURE — 94640 AIRWAY INHALATION TREATMENT: CPT | Mod: IPSPLIT

## 2025-05-17 PROCEDURE — 80202 ASSAY OF VANCOMYCIN: CPT | Mod: IPSPLIT | Performed by: STUDENT IN AN ORGANIZED HEALTH CARE EDUCATION/TRAINING PROGRAM

## 2025-05-17 RX ORDER — DEXTROSE MONOHYDRATE AND SODIUM CHLORIDE 5; .9 G/100ML; G/100ML
50 INJECTION, SOLUTION INTRAVENOUS CONTINUOUS
Status: DISPENSED | OUTPATIENT
Start: 2025-05-17 | End: 2025-05-18

## 2025-05-17 RX ADMIN — ALBUTEROL SULFATE 2.5 MG: 2.5 SOLUTION RESPIRATORY (INHALATION) at 08:34

## 2025-05-17 RX ADMIN — REMDESIVIR 100 MG: 100 INJECTION, POWDER, LYOPHILIZED, FOR SOLUTION INTRAVENOUS at 14:03

## 2025-05-17 RX ADMIN — POTASSIUM CHLORIDE 40 MEQ: 1500 TABLET, EXTENDED RELEASE ORAL at 09:46

## 2025-05-17 RX ADMIN — AMOXICILLIN AND CLAVULANATE POTASSIUM 1 TABLET: 875; 125 TABLET, FILM COATED ORAL at 21:39

## 2025-05-17 RX ADMIN — GABAPENTIN 300 MG: 300 CAPSULE ORAL at 14:03

## 2025-05-17 RX ADMIN — ACETAMINOPHEN 325MG 650 MG: 325 TABLET ORAL at 15:43

## 2025-05-17 RX ADMIN — SENNOSIDES AND DOCUSATE SODIUM 1 TABLET: 50; 8.6 TABLET ORAL at 21:39

## 2025-05-17 RX ADMIN — VANCOMYCIN HYDROCHLORIDE 1.25 G: 1.25 INJECTION, POWDER, LYOPHILIZED, FOR SOLUTION INTRAVENOUS at 21:39

## 2025-05-17 RX ADMIN — HYDRALAZINE HYDROCHLORIDE 25 MG: 25 TABLET ORAL at 14:03

## 2025-05-17 RX ADMIN — AMOXICILLIN AND CLAVULANATE POTASSIUM 1 TABLET: 875; 125 TABLET, FILM COATED ORAL at 09:46

## 2025-05-17 RX ADMIN — CARBAMAZEPINE 200 MG: 200 TABLET ORAL at 21:39

## 2025-05-17 RX ADMIN — HYDRALAZINE HYDROCHLORIDE 25 MG: 25 TABLET ORAL at 09:46

## 2025-05-17 RX ADMIN — GABAPENTIN 300 MG: 300 CAPSULE ORAL at 09:45

## 2025-05-17 RX ADMIN — VANCOMYCIN HYDROCHLORIDE 1.25 G: 1.25 INJECTION, POWDER, LYOPHILIZED, FOR SOLUTION INTRAVENOUS at 09:46

## 2025-05-17 RX ADMIN — DEXAMETHASONE 6 MG: 6 TABLET ORAL at 09:46

## 2025-05-17 RX ADMIN — GABAPENTIN 300 MG: 300 CAPSULE ORAL at 21:39

## 2025-05-17 RX ADMIN — ENOXAPARIN SODIUM 40 MG: 40 INJECTION SUBCUTANEOUS at 09:46

## 2025-05-17 RX ADMIN — CARBAMAZEPINE 200 MG: 200 TABLET ORAL at 09:46

## 2025-05-17 RX ADMIN — DEXTROSE AND SODIUM CHLORIDE 50 ML/HR: 5; .9 INJECTION, SOLUTION INTRAVENOUS at 21:41

## 2025-05-17 RX ADMIN — HYDRALAZINE HYDROCHLORIDE 25 MG: 25 TABLET ORAL at 21:39

## 2025-05-17 RX ADMIN — LIDOCAINE: 40 CREAM TOPICAL at 11:14

## 2025-05-17 RX ADMIN — Medication 2 L/MIN: at 21:18

## 2025-05-17 RX ADMIN — TRAZODONE HYDROCHLORIDE 50 MG: 50 TABLET ORAL at 21:39

## 2025-05-17 RX ADMIN — ONDANSETRON 4 MG: 2 INJECTION INTRAMUSCULAR; INTRAVENOUS at 14:03

## 2025-05-17 ASSESSMENT — PAIN SCALES - GENERAL
PAINLEVEL_OUTOF10: 0 - NO PAIN
PAINLEVEL_OUTOF10: 7
PAINLEVEL_OUTOF10: 0 - NO PAIN
PAINLEVEL_OUTOF10: 0 - NO PAIN
PAINLEVEL_OUTOF10: 4

## 2025-05-17 ASSESSMENT — ACTIVITIES OF DAILY LIVING (ADL)
HOME_MANAGEMENT_TIME_ENTRY: 46
BATHING_LEVEL_OF_ASSISTANCE: MODERATE ASSISTANCE

## 2025-05-17 ASSESSMENT — COGNITIVE AND FUNCTIONAL STATUS - GENERAL
WALKING IN HOSPITAL ROOM: A LOT
DRESSING REGULAR LOWER BODY CLOTHING: A LOT
HELP NEEDED FOR BATHING: A LOT
DRESSING REGULAR UPPER BODY CLOTHING: A LOT
TURNING FROM BACK TO SIDE WHILE IN FLAT BAD: A LITTLE
DRESSING REGULAR LOWER BODY CLOTHING: TOTAL
STANDING UP FROM CHAIR USING ARMS: A LOT
MOVING TO AND FROM BED TO CHAIR: A LOT
MOVING FROM LYING ON BACK TO SITTING ON SIDE OF FLAT BED WITH BEDRAILS: A LITTLE
PERSONAL GROOMING: A LITTLE
TOILETING: A LOT
DAILY ACTIVITIY SCORE: 13
PERSONAL GROOMING: A LITTLE
TOILETING: A LOT
MOBILITY SCORE: 13
CLIMB 3 TO 5 STEPS WITH RAILING: TOTAL
DAILY ACTIVITIY SCORE: 16
EATING MEALS: A LITTLE
DRESSING REGULAR UPPER BODY CLOTHING: A LITTLE
HELP NEEDED FOR BATHING: A LOT

## 2025-05-17 ASSESSMENT — PAIN - FUNCTIONAL ASSESSMENT
PAIN_FUNCTIONAL_ASSESSMENT: 0-10

## 2025-05-17 ASSESSMENT — PAIN DESCRIPTION - LOCATION: LOCATION: HEAD

## 2025-05-17 NOTE — PROGRESS NOTES
Occupational Therapy    Occupational Therapy Treatment    Name: Elliot Partida  MRN: [id number]  Department: [facility]  Room: [address] [place] [address]  Date: 05/17/25  Time Calculation  Start Time: 0853  Stop Time: 0939  Time Calculation (min): 46 min    Assessment:  OT Assessment: Good participation in graded progression of self care with education provided on safety, compensatory techniques & adaptive equipment.  Pt. required CGA/Zainab & cues for standing balance while performing front perineal hygiene care with cues for use of unilateral support to reduce his risk for falls.  The pt. also appeared to demonstrate good compliance with L LE PWB status during static standing balance & during sit to stand transfers using a FWW.  Pt. presently requires modA & cues for bathing as he requires assistance to bathe his distal LE's,  assistance for standing balance(CGA/Zainab & cues) as well as Zainab & cues for back perineal hygiene care.  Prognosis: Good  Barriers to Discharge Home: Caregiver assistance, Physical needs, Cognition needs  Caregiver Assistance: Caregiver assistance needed per identified barriers - however, level of patient's required assistance exceeds assistance available at home  Cognition Needs: 24hr supervision for safety awareness needed, Medication and/or medical management daily assist needed, Insight of patient limited regarding functional ability/needs  Physical Needs: Stair navigation into home limited by function/safety, 24hr ADL assistance needed, 24hr mobility assistance needed, Ambulating household distances limited by function/safety, High falls risk due to function or environment, Weight bearing precautions unable to be safely maintained  Evaluation/Treatment Tolerance: Patient tolerated treatment well  Medical Staff Made Aware: Yes  End of Session Communication: Bedside nurse  End of Session Patient Position: Up in chair, Alarm on  Plan:  Treatment Interventions: ADL retraining, Functional transfer training,  Cognitive reorientation, Patient/family training, Equipment evaluation/education, Neuromuscular reeducation, Compensatory technique education  OT Frequency: 4 times per week  OT Discharge Recommendations: Moderate intensity level of continued care  OT Recommended Transfer Status:  (MinAx1 & cues for sit to stand with a FWW)  OT - OK to Discharge: Yes Based on completed evaluation and care plan recommendations, no barriers to discharge to next site of care.      Subjective     OT Visit Info:  OT Received On: 05/17/25  General:  General  Reason for Referral: Impaired self care skills & functional transfers due to hospitalization for wound infection & Covid 19  Past Medical History Relevant to Rehab: HTN, type 2 diabetes, DVT, osteomyelitis, BLE edema, stroke, hyperlipidemia, anemia, obesity, osteoarthritis, ulcer, CAD, PAD, aortic stenosis, neuropathy, trigeminal neuralgia, meningioma, MI, neuritis, PVD, subdural abscess, tinea pedis  Family/Caregiver Present: No  Prior to Session Communication: Bedside nurse  Patient Position Received: Up in chair, Alarm on  Preferred Learning Style: verbal, visual  General Comment: Pt. was cooperative at this time.  LUE edematous.  Dressing in place L distal LE.  Precautions:  LE Weight Bearing Status: Left Partial Weight Bearing (25%)  Precautions Comment: safety precautions, Covid precautions/isolation precautions, PICC line in L UE, Lama catheter         Pain Assessment:  Pain Assessment  Pain Assessment: 0-10  0-10 (Numeric) Pain Score: 4  Pain Location: Pt. reported buttocks pain.  Pain Interventions: Repositioned.  Offered to provide the pt. with a pressure reduction cushion, however the pt. declined this.  Response to Interventions: No change in pain    Objective   Cognition:  Overall Cognitive Status: Pt. was pleasant & cooperative at this time.  Arousal/Alertness: Appropriate responses to stimuli  Orientation Level: Oriented to self, place & time.  Activities of Daily  Living: Grooming  Grooming Level of Assistance: Zainab set up & cues from a sitting position for safety due to the pt.'s L LE PWB status.  Assistance provided with shaving per pt.'s request.  Grooming Where Assessed: Chair    UE Bathing  UE Bathing Level of Assistance: S set up & cues  UE Bathing Where Assessed: bedside chair    LE Bathing  LE Bathing Level of Assistance: Moderate assistance. Pt. required Zainab to bathe his distal LE's.  Unable to bathe his L distal LE d/t dressing in place. Pt. performed front perineal hygiene care with CGA/Zainab & cues for standing balance & required Zainab for back perienal hygiene care.  LE Bathing Where Assessed:  bedside chair    UE Dressing  UE Dressing Level of Assistance: Moderate verbal cues & cues to assist with donning a hospital gown  UE Dressing Where Assessed: Chair    LE Dressing  LE Dressing: Yes  LE Dressing Adaptive Equipment: Reacher  Sock Level of Assistance: Unable to don a sock on his L LE due to bulky dressing in place.  LE Dressing Where Assessed: Chair  LE Dressing Comments: Pt. declined to wear a pull up brief or pants at this time. OT did however instruct the pt. on use of a reacher for increased ease with clothing intiation over his distal B LE's due to his decreased functional reach.  Instructed the pt. on use of a foot stool to elevate his R LE on for increased ease with functional reach when donning his slipper sock. Despite use of this compensatory technique, Zainab was needed to don his R slipper sock at this time.         Bed Mobility/Transfers: Transfers  Transfer: Yes  Transfer 1  Technique 1: Sit to stand, Stand to sit  Transfer Device 1: Walker  Transfer Level of Assistance 1: Minimum assistance & cues  Trials/Comments 1: Repeated times during self care performance with mod cues for safety.  Pt. appeared to comply with L LE PWB status during sit to stand transfers with the FWW.    Standing Balance:  Static Standing Balance  Static Standing-Level of  Assistance: CGA/Zainab & cues for standing balance during self care with cues for consistent use of unilateral support for safety.    Outcome Measures:  [facility] Daily Activity  Putting on and taking off regular lower body clothing: Total  Bathing (including washing, rinsing, drying): A lot  Putting on and taking off regular upper body clothing: A lot  Toileting, which includes using toilet, bedpan or urinal: A lot  Taking care of personal grooming such as brushing teeth: A little  Eating Meals: A little  Daily Activity - Total Score: 13      Education Documentation  Precautions, taught by Hilary Judd OT at 5/17/2025  1:29 PM.  Learner: Patient  Readiness: Acceptance  Method: Explanation, Demonstration  Response: Needs Reinforcement, Demonstrated Understanding, Verbalizes Understanding  Comment: Education on safety with transfers, safety with balance, L LE PWB status, compensation techniques for self care & use of adaptive equipment.    ADL Training, taught by Hilary Judd OT at 5/17/2025  1:29 PM.  Learner: Patient  Readiness: Acceptance  Method: Explanation, Demonstration  Response: Needs Reinforcement, Demonstrated Understanding, Verbalizes Understanding  Comment: Education on safety with transfers, safety with balance, L LE PWB status, compensation techniques for self care & use of adaptive equipment.    Goals:  Encounter Problems       Encounter Problems (Active)       ADLs       Patient with complete upper body dressing with set-up, supervision level of assistance donning and doffing all UE clothes with PRN adaptive equipment while supported sitting (Progressing)       Start:  05/12/25    Expected End:  05/26/25            Patient will complete daily grooming tasks brushing teeth and washing face/hair with set-up, supervision level of assistance and PRN adaptive equipment while supported sitting. (Progressing)       Start:  05/12/25    Expected End:  05/26/25            Patient will complete toileting  including hygiene clothing management/hygiene with minimal assist  level of assistance. (Progressing)       Start:  05/12/25    Expected End:  05/26/25               ADLs       Patient with complete lower body dressing with Zainab with use of adaptive equipment PRN       Start:  05/17/25    Expected End:  05/26/25            Zainab bathing with A/safety devices (Progressing)       Start:  05/17/25    Expected End:  05/26/25               COGNITION/SAFETY       Patient will recall and adhere to weight bearing and /or ROM restrictions with all ADL and functional mobility in order to promote healing and safety with functional tasks (Progressing)       Start:  05/12/25    Expected End:  05/26/25               TRANSFERS       Patient will perform bed mobility set-up, supervision level of assistance and bed rails and leg  in order to improve safety and independence with mobility (Progressing)       Start:  05/12/25    Expected End:  05/26/25            Patient will complete functional transfer to chair, bed, commode with least restrictive device with supervision level of assistance. (Progressing)       Start:  05/12/25    Expected End:  05/26/25

## 2025-05-17 NOTE — CARE PLAN
The patient's goals for the shift include to get some sleep    The clinical goals for the shift include Pt will tolerate fluids during shift.    Over the shift, the patient did make progress toward the following goals. Pt tolerated IV atb and fluids, pt refused repositioning at times during shift. Pt up to bedside commode x1 for BM, indwelling gallo catheter in place for accurate I & O's. Call light with in reach.       Problem: Pain - Adult  Goal: Verbalizes/displays adequate comfort level or baseline comfort level  Outcome: Met     Problem: Safety - Adult  Goal: Free from fall injury  Outcome: Met     Problem: Skin  Goal: Decreased wound size/increased tissue granulation at next dressing change  Outcome: Progressing  Goal: Participates in plan/prevention/treatment measures  Outcome: Progressing  Goal: Prevent/manage excess moisture  Outcome: Progressing  Goal: Prevent/minimize sheer/friction injuries  Outcome: Progressing  Goal: Promote/optimize nutrition  Outcome: Progressing  Goal: Promote skin healing  Outcome: Progressing  Flowsheets (Taken 5/17/2025 0612)  Promote skin healing: Turn/reposition every 2 hours/use positioning/transfer devices  Note: Pt refused positioning at times.      Problem: Fall/Injury  Goal: Not fall by end of shift  Outcome: Met  Goal: Be free from injury by end of the shift  Outcome: Met  Goal: Verbalize understanding of personal risk factors for fall in the hospital  Outcome: Met  Goal: Verbalize understanding of risk factor reduction measures to prevent injury from fall in the home  Outcome: Met  Goal: Use assistive devices by end of the shift  Outcome: Met  Goal: Pace activities to prevent fatigue by end of the shift  Outcome: Met     Problem: Pain  Goal: Takes deep breaths with improved pain control throughout the shift  Outcome: Progressing  Goal: Turns in bed with improved pain control throughout the shift  Outcome: Progressing  Goal: Walks with improved pain control throughout the  shift  Outcome: Progressing  Goal: Performs ADL's with improved pain control throughout shift  Outcome: Progressing  Goal: Participates in PT with improved pain control throughout the shift  Outcome: Progressing  Goal: Free from opioid side effects throughout the shift  Outcome: Progressing  Goal: Free from acute confusion related to pain meds throughout the shift  Outcome: Progressing

## 2025-05-17 NOTE — PROGRESS NOTES
Vancomycin Dosing by Pharmacy- FOLLOW UP    Elliot Partida is a 74 y.o. year old male who Pharmacy has been consulted for vancomycin dosing for osteomyelitis/septic arthritis. Based on the patient's indication and renal status this patient is being dosed based on a goal AUC of 400-600.     Renal function is currently stable.    Current vancomycin dose: 1250 mg given every 12 hours    Estimated vancomycin AUC on current dose: 493 mg/L.hr     Visit Vitals  /70 (BP Location: Right arm, Patient Position: Lying)   Pulse 67   Temp 36.6 °C (97.9 °F) (Temporal)   Resp 20        Lab Results   Component Value Date    CREATININE 0.58 2025    CREATININE 0.68 2025    CREATININE 0.64 05/15/2025    CREATININE 0.68 2025        Patient weight is as follows:   Vitals:    25 1021   Weight: 94.4 kg (208 lb 1.8 oz)       Cultures:  Susceptibility data for the encounter in last 14 days.  Collected Specimen Info Organism Clindamycin Erythromycin Oxacillin Tetracycline Trimethoprim/Sulfamethoxazole Vancomycin   25 Swab from Anterior Nares Methicillin Susceptible Staphylococcus aureus (MSSA)         25 Tissue/Biopsy from Wound/Tissue Methicillin Resistant Staphylococcus aureus (MRSA)  R  R  R  R  S  S     Mixed Aerobic and Anaerobic Bacteria               I/O last 3 completed shifts:  In: 3769.3 (39.9 mL/kg) [P.O.:1440; I.V.:1709.3 (18.1 mL/kg); IV Piggyback:620]  Out: 2562 (27.1 mL/kg) [Urine:2562 (0.8 mL/kg/hr)]  Weight: 94.4 kg   I/O during current shift:  No intake/output data recorded.    Temp (24hrs), Av.7 °C (98 °F), Min:36.6 °C (97.9 °F), Max:36.9 °C (98.4 °F)      Assessment/Plan    Within goal AUC range. Continue current vancomycin regimen.    This dosing regimen is predicted by [employer] to result in the following pharmacokinetic parameters:  Loading dose: N/A  Regimen: 1250 mg IV every 12 hours.  Start time: 10:41 on 2025  Exposure target: AUC24 (range)400-600 mg/L.hr    [id number]: 498 mg/L.hr  AUC24,ss: 493 mg/L.hr  Probability of AUC24 > 400: 99 %  Ctrough,ss: 16.7 mg/L  Probability of Ctrough,ss > 20: 9 %      The next level will be obtained on 5/19/25 at AM. May be obtained sooner if clinically indicated.   Will continue to monitor renal function daily while on vancomycin and order serum creatinine at least every 48 hours if not already ordered.  Follow for continued vancomycin needs, clinical response, and signs/symptoms of toxicity.       Erasto Albert, PharmD

## 2025-05-17 NOTE — PROGRESS NOTES
Physical Therapy                 Therapy Communication Note    Patient Name: Elliot Partida  MRN: [id number]  Department: [facility]  Room: [address] [place], [place] [address]  Today's Date: 5/17/2025     Discipline: Physical Therapy    Missed Visit: PT Missed Visit: Yes     Missed Visit Reason: Missed Visit Reason: Patient refused    Missed Time: Attempt    Comment: Pt refused, pt stated that he has been sitting up all morning and that he was too tired to participate in physical therapy today.

## 2025-05-17 NOTE — PROGRESS NOTES
Elliot Partida is a 74 y.o. male on day 9 of admission presenting with Wound infection.    Subjective   Interval History:   Afebrile, no chills  On low-flow oxygen  Nonproductive cough  No chest pain  No nausea vomiting or diarrhea     Review of Systems   All other systems reviewed and are negative.      Objective   Range of Vitals (last 24 hours)  Heart Rate:  [67-91]   Temp:  [36.6 °C (97.9 °F)-36.9 °C (98.4 °F)]   Resp:  [18-20]   BP: (105-162)/(65-78)   SpO2:  [87 %-97 %]   Daily Weight  05/16/25 : 94.4 kg (208 lb 1.8 oz)    Body mass index is 30.73 kg/m².    Physical Exam  Constitutional:       Appearance: Awake, alert  HENT:      Head: Normocephalic and atraumatic.      Right Ear: External ear normal.      Left Ear: External ear normal.      Nose: Nose normal.   Eyes:      General: No scleral icterus.     Extraocular Movements: Extraocular movements intact.      Conjunctiva/sclera: Conjunctivae normal.   Cardiovascular:      Rate and Rhythm: Regular rhythm.      Heart sounds: Normal heart sounds, S1 normal and S2 normal.   Pulmonary:      Breath sounds: Normal breath sounds and air entry.   Abdominal:      General: Bowel sounds are normal.      Palpations: Abdomen is soft.      Tenderness: There is no abdominal tenderness.   Musculoskeletal:      Cervical back: Normal range of motion and neck supple.      Right lower leg: No edema.      Left lower leg: No edema.   Skin:     General: Skin is warm and dry.      Comments: Left leg and foot dressing  Neurological:      Mental Status: He is awake, alert  Psychiatric:         Behavior: Behavior is awake, alert    Antibiotics  amoxicillin-clavulanate - 875-125 mg  vancomycin  vancomycin IV 1250 mg in 250 mL NS (ADV/MBP)    Relevant Results  Labs  Results from last 72 hours   Lab Units 05/17/25  0717 05/16/25  0534 05/15/25  0506   WBC AUTO x10*3/uL 5.5 4.3* 4.7   HEMOGLOBIN g/dL 9.6* 9.5* 10.2*   HEMATOCRIT % 31.4* 30.8* 32.6*   PLATELETS AUTO x10*3/uL 268 247 239      Results from last 72 hours   Lab Units 05/17/25  0717 05/16/25  0534 05/15/25  0506   SODIUM mmol/L 145 141 140   POTASSIUM mmol/L 3.2* 3.4* 3.4*   CHLORIDE mmol/L 107 103 100   CO2 mmol/L 33* 34* 32   BUN mg/dL 17 22 22   CREATININE mg/dL 0.58 0.68 0.64   GLUCOSE mg/dL 147* 202* 169*   CALCIUM mg/dL 8.3* 8.3* 8.2*   ANION GAP mmol/L 8* 7* 11   EGFR mL/min/1.73m*2 >90 >90 >90     Results from last 72 hours   Lab Units 05/17/25  0717 05/16/25  0534 05/15/25  0506   ALK PHOS U/L 59 58 65   BILIRUBIN TOTAL mg/dL 0.2 0.2 0.3   PROTEIN TOTAL g/dL 5.3* 5.4* 6.0*   ALT U/L 8* 7* 10   AST U/L 14 14 14   ALBUMIN g/dL 2.7* 2.7* 2.8*     Estimated Creatinine Clearance: 125 mL/min (by C-G formula based on SCr of 0.58 mg/dL).  C-Reactive Protein   Date Value Ref Range Status   05/17/2025 5.97 (H) <1.00 mg/dL Final   05/16/2025 10.60 (H) <1.00 mg/dL Final   05/15/2025 18.64 (H) <1.00 mg/dL Final     Microbiology  Susceptibility data from last 14 days.  Collected Specimen Info Organism Clindamycin Erythromycin Oxacillin Tetracycline Trimethoprim/Sulfamethoxazole Vancomycin   05/09/25 Swab from Anterior Nares Methicillin Susceptible Staphylococcus aureus (MSSA)         05/08/25 Tissue/Biopsy from Wound/Tissue Methicillin Resistant Staphylococcus aureus (MRSA)  R  R  R  R  S  S     Mixed Aerobic and Anaerobic Bacteria            Imaging  Vascular US upper extremity venous duplex left  Result Date: 5/15/2025  Interpreted By:  Jose Chase, STUDY: VASC US UPPER EXTREMITY VENOUS DUPLEX LEFT; 5/15/2025 12:11 pm   INDICATION: Signs/Symptoms:Swelling  after PICC line placement 3 days ago.   COMPARISON: None   ACCESSION NUMBER(S): [id number]   ORDERING CLINICIAN: BERNIE MEDRANO   TECHNIQUE: Black and white as well as color-flow duplex images were obtained along with Doppler wave analysis of the deep venous system of the upper extremity. The internal jugular vein, subclavian vein as well as the axillary and brachial vein of the  upper extremity were evaluated. The basilic and cephalic veins were also imaged.   FINDINGS: Examination demonstrates normal compressibility and flow. PICC line is present within the basilic vein.       No evidence for deep vein thrombosis by this exam.   MACRO: none   Signed by: Jose Chase 5/15/2025 1:36 PM Dictation workstation:   [id number]    ECG 12 Lead  Result Date: 5/13/2025  Sinus rhythm with 1st degree AV block Left axis deviation Nonspecific intraventricular block Possible Anterolateral infarct (cited on or before 09-MAY-2025) Abnormal ECG When compared with ECG of 09-MAY-2025 18:47, (unconfirmed) Serial changes of Anterior infarct Present    ECG 12 lead  Result Date: 5/13/2025  Sinus rhythm with 1st degree AV block Left anterior fascicular block Minimal voltage criteria for LVH, may be normal variant ( [place] product ) Anterior infarct , age undetermined Abnormal ECG When compared with ECG of 13-FEB-2025 09:37, No significant change was found    XR chest 1 view  Addendum Date: 5/12/2025  Interpreted By:  Júnior Sena, ADDENDUM: Left-sided PICC line is noted and projects to the level of the superior vena cava without pneumothorax.   Signed by: Júniro Sena 5/12/2025 5:41 PM   -------- ORIGINAL REPORT -------- Dictation workstation:   [id number]    Result Date: 5/12/2025  Interpreted By:  Júnior Sena, STUDY: XR CHEST 1 VIEW   INDICATION: Signs/Symptoms:PICC placement.   COMPARISON: February 17, 2025   ACCESSION NUMBER(S): [id number]   ORDERING CLINICIAN: BERNIE MEDRANO   FINDINGS: Cardiomegaly with new perihilar and basilar edema with patchy multifocal airspace opacities right worse than left.   Small bilateral pleural effusions.   No evidence of pneumothorax.         Cardiomegaly with new perihilar and basilar edema with patchy multifocal airspace opacities right worse than left.   Small bilateral pleural effusions.   Signed by: Júnior Sena 5/12/2025 5:28 PM Dictation workstation:    [id number]    CT brain attack head wo IV contrast  Result Date: 5/12/2025  Interpreted By:  Enedina Umana, STUDY: CT BRAIN ATTACK HEAD WO IV CONTRAST;  5/12/2025 9:37 am   INDICATION: Signs/Symptoms:stroke alert.     COMPARISON: 12/23/2024   ACCESSION NUMBER(S): [id number]   ORDERING CLINICIAN: KATIE HILTON   TECHNIQUE: Unenhanced CT images of the head were obtained.   FINDINGS: Patient is rotated.  The ventricles, cisterns and sulci are enlarged, consistent with diffuse volume loss. There are areas of nonspecific white matter hypodensity, which are probably age related or microvascular in nature. Low-density lacunar type infarct in the right basal ganglia. These findings are similar to the previous exam. There is no acute intracranial hemorrhage, mass effect or midline shift. No extraaxial fluid collection.   No focal calvarial lesion.   Bilateral ethmoid, left sphenoid and left maxillary sinus mucosal thickening.       No acute intracranial hemorrhage or mass-effect.   Multifocal sinus mucosal thickening.   MACRO: Enedina Umana discussed the significance and urgency of this critical finding by secure chat with  KATIE HILTON on 5/12/2025 at 9:47 am. (**-RCF-**) Findings:  See findings.     Signed by: Enedina Umana 5/12/2025 9:48 AM Dictation workstation:   [id number]    MR tibia fibula left w and wo IV contrast  Result Date: 5/9/2025  Interpreted By:  Lina Moreno,  and Debbie Cárdenas STUDY: MRI of the left tibia/fibula without and with IV contrast dated 5/9/2025.   INDICATION: Signs/Symptoms:Chronic osteomyelitis of the tibia.     COMPARISON: Left tibia/fibula radiograph 05/08/2025   ACCESSION NUMBER(S): [id number]   ORDERING CLINICIAN: ADA ESPINOZA   TECHNIQUE: Multiplanar multisequence MRI of the  left tibia/fibula was performed without and with intravenous gadolinium based contrast.   FINDINGS: OSSEOUS STRUCTURES AND JOINTS:   No fracture or dislocation is evident. Bone marrow signal intensity is  within normal limits. No joint effusion is evident.   SOFT TISSUES: Partially visualized large soft tissue ulceration along the anterior aspect of the tibiotalar joint to the distal tibia with possible exposure of the anterior tibialis tendon (axial image 74). There is extensive diffuse subcutaneous edema noted throughout the lower extremities without rim enhancing fluid collection. Moderate amount of fascial plane fluid noted in the anterior as well as posterior aspect of the medial head of the gastrocnemius. There is moderate diffuse generalized muscle atrophy. There is intramuscular muscle edema along the anterior compartment without intramuscular abscess. The visualized muscles and tendons are intact. Ligaments are not well assessed on this examination.       1. No evidence to suggest osteomyelitis. 2. Partially visualized large soft tissue ulceration along the anterior aspect of the distal tibia and tibial talar joint with possible exposure of the underlying anterior tibialis tendon. 3. Moderate amount of fluid along the posterior as well as anterior aspect of the medial head of the gastrocnemius extending from the level of the knee to the distal lower leg. This is nonspecific with infectious fasciitis felt to be less likely although not entirely excluded. Edema of the anterior compartment muscles of the lower leg which may represent infectious myositis. No evidence of intramuscular abscess. 4. Extensive diffuse soft tissue edema noted throughout the left lower extremity and partially visualized right lower extremity 5. Moderate diffuse fatty atrophy of the lower leg muscles.   I personally reviewed the images/study and I agree with Dr. Melia Lewis and the findings as stated.   MACRO: None   Signed by: Lina Moreno 5/9/2025 11:14 AM Dictation workstation:   [id number]    XR tibia fibula left 2 views  Result Date: 5/8/2025  Interpreted By:  Mariam Vang, STUDY: XR TIBIA FIBULA LEFT 2 VIEWS; ;  5/8/2025  10:25 pm   INDICATION: Signs/Symptoms:Chronic tibial osteomyelitis.     COMPARISON: None.   ACCESSION NUMBER(S): [id number]   ORDERING CLINICIAN: ADA ESPINOZA   FINDINGS: Two views of the left tibia and fibula show thick periosteal bone formation along the tibial diaphysis likely due to a longstanding process. There is no acute bone destruction to suggest acute osteomyelitis. No fracture or dislocation.       Thick periosteal new bone formation along the left tibia likely due to history of chronic osteomyelitis and healing. No acute bone destruction.     MACRO: None   Signed by: Mariam Vang 5/8/2025 10:46 PM Dictation workstation:   [id number]    XR foot left 3+ views  Result Date: 5/8/2025  Interpreted By:  Osmar Garza, STUDY: XR FOOT LEFT 3+ VIEWS;  5/8/2025 9:26 am   INDICATION: Signs/Symptoms:wound.   COMPARISON: None.   ACCESSION NUMBER(S): [id number]   ORDERING CLINICIAN: LUDA JALLOH   FINDINGS: Post left transmetatarsal amputation. There is a possible age indeterminate nondisplaced fracture near the base of the 4th proximal phalanx only seen on the oblique view. No additional acute fracture, dislocation, or focal osseous destruction identified. Scattered degenerative changes. Small calcaneal enthesophytes.       Questionable age-indeterminate nondisplaced fracture near the base of the left foot 4th proximal phalanx, only apparent on one view. Soft tissue swelling. No additional acute osseous findings of the left foot.   MACRO: None   Signed by: Osmar Garza 5/8/2025 10:04 AM Dictation workstation:   [id number]      Assessment/Plan   Type 2 diabetes with peripheral angiopathy without gangrene-recent PVR remarkable for mild peripheral vascular disease  Left leg/dorsal foot cellulitis, resolving  Left tibial osteomyelitis-pathology report from 2/13/2025 reviewed, positive wound culture for MRSA  Acute on chronic respiratory failure-viral panel ordered, remarkable for coronavirus, resolving      Continue vancomycin  Augmentin to complete total 10 days  Remdesivir-total of 5 days, completed  Dexamethasone-total of 10 days  Follow-up blood cultures   PICC line placement  Local care  Consider vascular evaluation, can be done as outpatient  Supportive care  Monitor temperature and WBC  Long-term plan is 6 weeks of IV antibiotic therapy  Weekly CBC with differential, CMP, CRP while on therapy  IV [place] on days 1, 8, and 35 as outpatient is an alternative if patient does not qualify for skilled nursing facility and cannot do antibiotics at home     This is a complex infectious disease issue and the following was performed today (for more details please see the above note): Management decisions reflecting the added complexity (e.g., changes in antimicrobial therapy, infection control strategies).    Baljit Velazoc MD

## 2025-05-17 NOTE — PROGRESS NOTES
"Elliot Partida is a 74 y.o. male on day 9 of admission presenting with Wound infection.    Subjective   Feels like he is getting worse  Remains on 2 liters of 02  Satting 94%  Resp on way to see patient  Has not been out of bed  Encouraged patient to sit in bed for meals   Has gallo cath so has not been going to bathroom  States eating okay, no diarrhea or constipation          Objective     Physical Exam  Vitals and nursing note reviewed.   Constitutional:       Appearance: Normal appearance.   HENT:      Head: Normocephalic and atraumatic.      Right Ear: Tympanic membrane normal.      Left Ear: Tympanic membrane normal.      Nose: Nose normal.      Mouth/Throat:      Mouth: Mucous membranes are moist.   Eyes:      Extraocular Movements: Extraocular movements intact.      Pupils: Pupils are equal, round, and reactive to light.   Cardiovascular:      Rate and Rhythm: Normal rate and regular rhythm.      Pulses: Normal pulses.      Heart sounds: Normal heart sounds.   Pulmonary:      Effort: Pulmonary effort is normal.      Breath sounds: Normal breath sounds.   Abdominal:      General: Bowel sounds are normal.      Palpations: Abdomen is soft.      Comments: Gallo cath   Musculoskeletal:         General: Normal range of motion.      Cervical back: Normal range of motion and neck supple.   Skin:     General: Skin is warm.      Capillary Refill: Capillary refill takes 2 to 3 seconds.      Comments: Wound on legs refer to nursing photos   Neurological:      Mental Status: He is alert.      Comments: irritable   Psychiatric:         Thought Content: Thought content normal.         Last Recorded Vitals  Blood pressure 147/70, pulse 67, temperature 36.6 °C (97.9 °F), temperature source Temporal, resp. rate 20, height 1.753 m (5' 9\"), weight 94.4 kg (208 lb 1.8 oz), SpO2 96%.  Intake/Output last 3 Shifts:  I/O last 3 completed shifts:  In: 3769.3 (39.9 mL/kg) [P.O.:1440; I.V.:1709.3 (18.1 mL/kg); IV Piggyback:620]  Out: " 2562 (27.1 mL/kg) [Urine:2562 (0.8 mL/kg/hr)]  Weight: 94.4 kg     Relevant Results                          Assessment & Plan  Wound infection    Osteomyelitis of left foot, unspecified type (Multi)    Acute deep vein thrombosis (DVT) of popliteal vein of left lower extremity    Bilateral lower extremity edema    Benign essential HTN    DM type 2 with diabetic peripheral neuropathy    Hyperlipidemia    Trigeminal neuralgia    Fever    Metabolic encephalopathy    COVID-19      Wound infection  Osteomyelitis of left foot, unspecified type (Multi)  - XR tib/fib: Thick periosteal new bone formation along the left tibia likely due to history of chronic osteomyelitis and healing. No acute bone destruction.  - MRI: No evidence to suggest osteomyelitis. 2. Partially visualized large soft tissue ulceration along the  anterior aspect of the distal tibia and tibial talar joint with possible exposure of the underlying anterior tibialis tendon. Moderate amount of fluid along the posterior as well as anterior aspect of the medial head of the gastrocnemius extending from the level of the knee to the distal lower leg. This is nonspecific with infectious fasciitis felt to be less likely although not entirely excluded. Edema of the anterior compartment muscles of the lower leg which may represent infectious myositis. No evidence of intramuscular abscess.  Extensive diffuse soft tissue edema noted throughout the left lower extremity and partially visualized right lower extremity Moderate diffuse fatty atrophy of the lower leg muscles.  -Podiatry  and ID consult  - No plans for surgical intervention during this visit.   -CRP 24.5 most likely due to Chronic osteomyelitis   - Follow CRP -ID recommend discontinue Zosyn and continue with Vancomycin 6 weeks  -discontinue zosyn  -continue vancomycin 1g q12h  -wound culture: GPC grew MRSA   - Blood culture- no growth at 5 days 5/9  - follow wound culture, WBC, blood cx  - PWB to the left  "lower extremity for transfer  - Nursing staff is able to change/reinforce dressing if & as necessary until next day’s dressing change  -  Betadine-soaked 4x4 to wound base, covered with dry gauze, Abd pads, Kerlix and [place] compressive dressing.   - PT/OT- Mod intensity level  - PICC line placed for 6 weeks IV antibiotics-CHG bath  - RT to evaluate  - Monitor temperature  - Follow WBC   - Dressing changed 5/14     Acute deep vein thrombosis (DVT) of popliteal vein of left lower extremity  Bilateral lower extremity edema  - previous xarelto for DVT, completed  - elevate legs when resting        Benign essential HTN  Hyperlipidemia  - Echo: normal LVSF with an EF of 55-60% with a Grade I (impaired relaxation pattern) of  LVDF. Moderate aortic valve stenosis.    - continue hydralazine 25 mg TID  - monitor HR, BP     DM type 2 with diabetic peripheral neuropathy  - sliding scale with lispro coverage 0-10  - A1c: 5.8  - Consistent carb 75gm/meal  - AccuCheck q4/day  - Hypoglycemia protocol  - Decreased appetite  - Maintenance fluids D5NS at 50mL/hr      Trigeminal neuralgia  - continue carbamazepine 200 mg BID, gabapentin 300 mg TID     Fever  Metabolic encephalopathy  COVID-19  - fever overnight 5/12 38.7 Axillary, Zosyn resumed  - COVID + 5/13  - Remdisivir x 5 days  - Currently requiring Venti mask 50%,   - mycoplasma pneuminiae, IgM 0.01  - legionella negative   - Monitor fever  - Monitor WBC   - CXR: Cardiomegaly with new perihilar and basilar edema with patchy  multifocal airspace opacities right worse than left.Small bilateral pleural effusions.   - ID consulted- Psych consulted for behavior issues 5/12: Evaluated 5/13 'Pt unable to participate in consult - is on venturi mask and in/out of sleep.Will try tomorrow. Likely the agitation was related to hypoxia.\"  5/15 signed off. If not eating start Remeron 7.5 mg po hs  - Lama Catheter for accurate output  - I/O hourly  - Currently weaned to 21 LPM O2 via NC    "   Normocytic anemia  - Hb 10.2/ MCV 81  - monitor CBC     Hypokalemia  - K 3.4 > 3.4 >3.2  - replaced with KCL 40 mEq  - monitor BMP  - Resume potassium 40 meq daily      GI ppx: PPI  DVT ppx: Lovenox  Fluids: Maintenance fluids D5NS at 50mL/hr   Electrolytes: replace as needed  Nutrition: Consistent carb 75gm/meal  Adjuncts: PICC      Code Status: Full  Pt requires inpatient stay at this time.    Eryn Mccarthy, APRN-CNP

## 2025-05-17 NOTE — CARE PLAN
The patient's goals for the shift include to get some sleep    The clinical goals for the shift include Patient will tolerate IV fluids and remain afebrile    Over the shift, the patient did make progress toward the following goals. Pt tolerated IV fluids, and remained afebrile. VSS. BG WNL. Poor appetite. Up to chair for breakfast, refused PT, tolerated OT. Tolerated IV ATB and remdesivir. Tired this shift. Refused turns.     Problem: Nutrition  Goal: Nutrient intake appropriate for maintaining nutritional needs  Outcome: Not Progressing     Problem: Diabetes  Goal: Increase self care and/or family involovement by end of shift  Outcome: Not Progressing     Problem: Pain  Goal: Performs ADL's with improved pain control throughout shift  Outcome: Not Progressing  Goal: Participates in PT with improved pain control throughout the shift  Outcome: Not Progressing      Problem: Skin  Goal: Decreased wound size/increased tissue granulation at next dressing change  Outcome: Progressing  Flowsheets (Taken 5/17/2025 1618)  Decreased wound size/increased tissue granulation at next dressing change: Promote sleep for wound healing  Goal: Participates in plan/prevention/treatment measures  Outcome: Progressing  Flowsheets (Taken 5/17/2025 1618)  Participates in plan/prevention/treatment measures:   Elevate heels   Increase activity/out of bed for meals  Goal: Prevent/manage excess moisture  Outcome: Progressing  Flowsheets (Taken 5/17/2025 1618)  Prevent/manage excess moisture:   Cleanse incontinence/protect with barrier cream   Follow provider orders for dressing changes  Goal: Prevent/minimize sheer/friction injuries  Outcome: Progressing  Flowsheets (Taken 5/17/2025 1618)  Prevent/minimize sheer/friction injuries:   Turn/reposition every 2 hours/use positioning/transfer devices   Increase activity/out of bed for meals  Goal: Promote/optimize nutrition  Outcome: Progressing  Flowsheets (Taken 5/17/2025 1618)  Promote/optimize  nutrition:   Monitor/record intake including meals   Assist with feeding  Goal: Promote skin healing  Outcome: Progressing  Flowsheets (Taken 5/17/2025 1618)  Promote skin healing:   Turn/reposition every 2 hours/use positioning/transfer devices   Protective dressings over bony prominences     Problem: Chronic Conditions and Co-morbidities  Goal: Patient's chronic conditions and co-morbidity symptoms are monitored and maintained or improved  Outcome: Progressing     Problem: Discharge Planning  Goal: Discharge to home or other facility with appropriate resources  Outcome: Progressing     Problem: Diabetes  Goal: Achieve decreasing blood glucose levels by end of shift  Outcome: Progressing  Goal: Increase stability of blood glucose readings by end of shift  Outcome: Progressing  Goal: Decrease in ketones present in urine by end of shift  Outcome: Progressing  Goal: Maintain electrolyte levels within acceptable range throughout shift  Outcome: Progressing  Goal: Maintain glucose levels >70mg/dl to <250mg/dl throughout shift  Outcome: Progressing  Goal: No changes in neurological exam by end of shift  Outcome: Progressing  Goal: Learn about and adhere to nutrition recommendations by end of shift  Outcome: Progressing  Goal: Vital signs within normal range for age by end of shift  Outcome: Progressing  Goal: Increase self care and/or family involovement by end of shift  Outcome: Not Progressing  Goal: Receive DSME education by end of shift  Outcome: Progressing     Problem: Pain  Goal: Takes deep breaths with improved pain control throughout the shift  Outcome: Progressing  Goal: Turns in bed with improved pain control throughout the shift  Outcome: Progressing  Goal: Walks with improved pain control throughout the shift  Outcome: Progressing  Goal: Performs ADL's with improved pain control throughout shift  Outcome: Not Progressing  Goal: Participates in PT with improved pain control throughout the shift  Outcome: Not  Progressing  Goal: Free from opioid side effects throughout the shift  Outcome: Met  Goal: Free from acute confusion related to pain meds throughout the shift  Outcome: Met

## 2025-05-18 VITALS
RESPIRATION RATE: 19 BRPM | WEIGHT: 208.11 LBS | TEMPERATURE: 97.9 F | SYSTOLIC BLOOD PRESSURE: 143 MMHG | BODY MASS INDEX: 30.82 KG/M2 | OXYGEN SATURATION: 95 % | DIASTOLIC BLOOD PRESSURE: 64 MMHG | HEART RATE: 93 BPM | HEIGHT: 69 IN

## 2025-05-18 LAB
ALBUMIN SERPL BCP-MCNC: 2.7 G/DL (ref 3.4–5)
ALP SERPL-CCNC: 55 U/L (ref 33–136)
ALT SERPL W P-5'-P-CCNC: 7 U/L (ref 10–52)
ANION GAP SERPL CALC-SCNC: 7 MMOL/L (ref 10–20)
AST SERPL W P-5'-P-CCNC: 14 U/L (ref 9–39)
BACTERIA BLD CULT: NORMAL
BACTERIA BLD CULT: NORMAL
BASOPHILS # BLD AUTO: 0.02 X10*3/UL (ref 0–0.1)
BASOPHILS NFR BLD AUTO: 0.3 %
BILIRUB SERPL-MCNC: 0.2 MG/DL (ref 0–1.2)
BUN SERPL-MCNC: 15 MG/DL (ref 6–23)
CALCIUM SERPL-MCNC: 8.2 MG/DL (ref 8.6–10.3)
CHLORIDE SERPL-SCNC: 107 MMOL/L (ref 98–107)
CO2 SERPL-SCNC: 33 MMOL/L (ref 21–32)
CREAT SERPL-MCNC: 0.66 MG/DL (ref 0.5–1.3)
CRP SERPL-MCNC: 4.28 MG/DL
EGFRCR SERPLBLD CKD-EPI 2021: >90 ML/MIN/1.73M*2
EOSINOPHIL # BLD AUTO: 0.09 X10*3/UL (ref 0–0.4)
EOSINOPHIL NFR BLD AUTO: 1.4 %
ERYTHROCYTE [DISTWIDTH] IN BLOOD BY AUTOMATED COUNT: 16.4 % (ref 11.5–14.5)
GLUCOSE BLD MANUAL STRIP-MCNC: 103 MG/DL (ref 74–99)
GLUCOSE BLD MANUAL STRIP-MCNC: 171 MG/DL (ref 74–99)
GLUCOSE BLD MANUAL STRIP-MCNC: 171 MG/DL (ref 74–99)
GLUCOSE BLD MANUAL STRIP-MCNC: 175 MG/DL (ref 74–99)
GLUCOSE SERPL-MCNC: 116 MG/DL (ref 74–99)
HCT VFR BLD AUTO: 32.4 % (ref 41–52)
HGB BLD-MCNC: 9.6 G/DL (ref 13.5–17.5)
IMM GRANULOCYTES # BLD AUTO: 0.04 X10*3/UL (ref 0–0.5)
IMM GRANULOCYTES NFR BLD AUTO: 0.6 % (ref 0–0.9)
INR PPP: 1.2 (ref 0.9–1.1)
LYMPHOCYTES # BLD AUTO: 1.87 X10*3/UL (ref 0.8–3)
LYMPHOCYTES NFR BLD AUTO: 28.1 %
MAGNESIUM SERPL-MCNC: 2.15 MG/DL (ref 1.6–2.4)
MCH RBC QN AUTO: 24.9 PG (ref 26–34)
MCHC RBC AUTO-ENTMCNC: 29.6 G/DL (ref 32–36)
MCV RBC AUTO: 84 FL (ref 80–100)
MONOCYTES # BLD AUTO: 0.76 X10*3/UL (ref 0.05–0.8)
MONOCYTES NFR BLD AUTO: 11.4 %
NEUTROPHILS # BLD AUTO: 3.87 X10*3/UL (ref 1.6–5.5)
NEUTROPHILS NFR BLD AUTO: 58.2 %
NRBC BLD-RTO: 0 /100 WBCS (ref 0–0)
PLATELET # BLD AUTO: 237 X10*3/UL (ref 150–450)
POTASSIUM SERPL-SCNC: 3.4 MMOL/L (ref 3.5–5.3)
PROT SERPL-MCNC: 5.3 G/DL (ref 6.4–8.2)
PROTHROMBIN TIME: 12.8 SECONDS (ref 9.8–12.4)
RBC # BLD AUTO: 3.85 X10*6/UL (ref 4.5–5.9)
SODIUM SERPL-SCNC: 144 MMOL/L (ref 136–145)
WBC # BLD AUTO: 6.7 X10*3/UL (ref 4.4–11.3)

## 2025-05-18 PROCEDURE — 2500000002 HC RX 250 W HCPCS SELF ADMINISTERED DRUGS (ALT 637 FOR MEDICARE OP, ALT 636 FOR OP/ED): Mod: IPSPLIT | Performed by: NURSE PRACTITIONER

## 2025-05-18 PROCEDURE — 2500000001 HC RX 250 WO HCPCS SELF ADMINISTERED DRUGS (ALT 637 FOR MEDICARE OP): Mod: IPSPLIT | Performed by: STUDENT IN AN ORGANIZED HEALTH CARE EDUCATION/TRAINING PROGRAM

## 2025-05-18 PROCEDURE — 2500000002 HC RX 250 W HCPCS SELF ADMINISTERED DRUGS (ALT 637 FOR MEDICARE OP, ALT 636 FOR OP/ED): Mod: IPSPLIT | Performed by: STUDENT IN AN ORGANIZED HEALTH CARE EDUCATION/TRAINING PROGRAM

## 2025-05-18 PROCEDURE — 85610 PROTHROMBIN TIME: CPT | Mod: IPSPLIT | Performed by: NURSE PRACTITIONER

## 2025-05-18 PROCEDURE — 83735 ASSAY OF MAGNESIUM: CPT | Mod: IPSPLIT | Performed by: NURSE PRACTITIONER

## 2025-05-18 PROCEDURE — 94640 AIRWAY INHALATION TREATMENT: CPT | Mod: IPSPLIT

## 2025-05-18 PROCEDURE — 86140 C-REACTIVE PROTEIN: CPT | Mod: IPSPLIT | Performed by: STUDENT IN AN ORGANIZED HEALTH CARE EDUCATION/TRAINING PROGRAM

## 2025-05-18 PROCEDURE — 80053 COMPREHEN METABOLIC PANEL: CPT | Mod: IPSPLIT | Performed by: STUDENT IN AN ORGANIZED HEALTH CARE EDUCATION/TRAINING PROGRAM

## 2025-05-18 PROCEDURE — 2500000004 HC RX 250 GENERAL PHARMACY W/ HCPCS (ALT 636 FOR OP/ED): Mod: IPSPLIT | Performed by: STUDENT IN AN ORGANIZED HEALTH CARE EDUCATION/TRAINING PROGRAM

## 2025-05-18 PROCEDURE — 2500000004 HC RX 250 GENERAL PHARMACY W/ HCPCS (ALT 636 FOR OP/ED): Mod: JZ,IPSPLIT | Performed by: STUDENT IN AN ORGANIZED HEALTH CARE EDUCATION/TRAINING PROGRAM

## 2025-05-18 PROCEDURE — 85025 COMPLETE CBC W/AUTO DIFF WBC: CPT | Mod: IPSPLIT | Performed by: NURSE PRACTITIONER

## 2025-05-18 PROCEDURE — 2500000005 HC RX 250 GENERAL PHARMACY W/O HCPCS: Mod: IPSPLIT | Performed by: INTERNAL MEDICINE

## 2025-05-18 PROCEDURE — 82947 ASSAY GLUCOSE BLOOD QUANT: CPT | Mod: IPSPLIT

## 2025-05-18 PROCEDURE — 1100000001 HC PRIVATE ROOM DAILY: Mod: IPSPLIT

## 2025-05-18 PROCEDURE — 2500000004 HC RX 250 GENERAL PHARMACY W/ HCPCS (ALT 636 FOR OP/ED): Mod: JZ,IPSPLIT | Performed by: NURSE PRACTITIONER

## 2025-05-18 PROCEDURE — 99232 SBSQ HOSP IP/OBS MODERATE 35: CPT | Performed by: NURSE PRACTITIONER

## 2025-05-18 PROCEDURE — 94760 N-INVAS EAR/PLS OXIMETRY 1: CPT | Mod: IPSPLIT

## 2025-05-18 RX ORDER — POTASSIUM CHLORIDE 20 MEQ/1
40 TABLET, EXTENDED RELEASE ORAL EVERY 4 HOURS
Status: COMPLETED | OUTPATIENT
Start: 2025-05-18 | End: 2025-05-18

## 2025-05-18 RX ADMIN — POTASSIUM CHLORIDE 40 MEQ: 1500 TABLET, EXTENDED RELEASE ORAL at 08:37

## 2025-05-18 RX ADMIN — TRAZODONE HYDROCHLORIDE 50 MG: 50 TABLET ORAL at 21:55

## 2025-05-18 RX ADMIN — ENOXAPARIN SODIUM 40 MG: 40 INJECTION SUBCUTANEOUS at 08:37

## 2025-05-18 RX ADMIN — Medication 2 L/MIN: at 17:08

## 2025-05-18 RX ADMIN — INSULIN LISPRO 2 UNITS: 100 INJECTION, SOLUTION INTRAVENOUS; SUBCUTANEOUS at 12:11

## 2025-05-18 RX ADMIN — Medication 2 L/MIN: at 21:10

## 2025-05-18 RX ADMIN — HYDRALAZINE HYDROCHLORIDE 25 MG: 25 TABLET ORAL at 08:37

## 2025-05-18 RX ADMIN — ALBUTEROL SULFATE 2.5 MG: 2.5 SOLUTION RESPIRATORY (INHALATION) at 17:08

## 2025-05-18 RX ADMIN — VANCOMYCIN HYDROCHLORIDE 1.25 G: 1.25 INJECTION, POWDER, LYOPHILIZED, FOR SOLUTION INTRAVENOUS at 10:28

## 2025-05-18 RX ADMIN — ALBUTEROL SULFATE 2.5 MG: 2.5 SOLUTION RESPIRATORY (INHALATION) at 10:36

## 2025-05-18 RX ADMIN — HYDRALAZINE HYDROCHLORIDE 25 MG: 25 TABLET ORAL at 21:55

## 2025-05-18 RX ADMIN — ALTEPLASE 2 MG: 2.2 INJECTION, POWDER, LYOPHILIZED, FOR SOLUTION INTRAVENOUS at 08:34

## 2025-05-18 RX ADMIN — Medication 2 L/MIN: at 10:36

## 2025-05-18 RX ADMIN — SENNOSIDES AND DOCUSATE SODIUM 1 TABLET: 50; 8.6 TABLET ORAL at 21:55

## 2025-05-18 RX ADMIN — CARBAMAZEPINE 200 MG: 200 TABLET ORAL at 21:55

## 2025-05-18 RX ADMIN — VANCOMYCIN HYDROCHLORIDE 1.25 G: 1.25 INJECTION, POWDER, LYOPHILIZED, FOR SOLUTION INTRAVENOUS at 21:55

## 2025-05-18 RX ADMIN — DEXAMETHASONE 6 MG: 6 TABLET ORAL at 08:37

## 2025-05-18 RX ADMIN — AMOXICILLIN AND CLAVULANATE POTASSIUM 1 TABLET: 875; 125 TABLET, FILM COATED ORAL at 08:37

## 2025-05-18 RX ADMIN — CARBAMAZEPINE 200 MG: 200 TABLET ORAL at 08:37

## 2025-05-18 RX ADMIN — POTASSIUM CHLORIDE 40 MEQ: 1500 TABLET, EXTENDED RELEASE ORAL at 16:12

## 2025-05-18 RX ADMIN — POTASSIUM CHLORIDE 40 MEQ: 1500 TABLET, EXTENDED RELEASE ORAL at 13:01

## 2025-05-18 RX ADMIN — AMOXICILLIN AND CLAVULANATE POTASSIUM 1 TABLET: 875; 125 TABLET, FILM COATED ORAL at 21:55

## 2025-05-18 RX ADMIN — INSULIN LISPRO 2 UNITS: 100 INJECTION, SOLUTION INTRAVENOUS; SUBCUTANEOUS at 17:14

## 2025-05-18 RX ADMIN — POLYETHYLENE GLYCOL 3350 17 G: 17 POWDER, FOR SOLUTION ORAL at 08:37

## 2025-05-18 RX ADMIN — GABAPENTIN 300 MG: 300 CAPSULE ORAL at 21:55

## 2025-05-18 RX ADMIN — GABAPENTIN 300 MG: 300 CAPSULE ORAL at 08:37

## 2025-05-18 RX ADMIN — GABAPENTIN 300 MG: 300 CAPSULE ORAL at 16:13

## 2025-05-18 RX ADMIN — HYDRALAZINE HYDROCHLORIDE 25 MG: 25 TABLET ORAL at 16:13

## 2025-05-18 ASSESSMENT — COGNITIVE AND FUNCTIONAL STATUS - GENERAL
DRESSING REGULAR LOWER BODY CLOTHING: A LITTLE
STANDING UP FROM CHAIR USING ARMS: A LITTLE
CLIMB 3 TO 5 STEPS WITH RAILING: A LITTLE
HELP NEEDED FOR BATHING: A LITTLE
MOVING TO AND FROM BED TO CHAIR: A LITTLE
WALKING IN HOSPITAL ROOM: A LITTLE
MOBILITY SCORE: 18
TURNING FROM BACK TO SIDE WHILE IN FLAT BAD: A LITTLE
DRESSING REGULAR UPPER BODY CLOTHING: A LITTLE
DAILY ACTIVITIY SCORE: 20
TOILETING: A LITTLE
MOVING FROM LYING ON BACK TO SITTING ON SIDE OF FLAT BED WITH BEDRAILS: A LITTLE

## 2025-05-18 ASSESSMENT — PAIN INTENSITY VAS: VAS_PAIN_BASICVITALS_IP: 0

## 2025-05-18 ASSESSMENT — PAIN SCALES - GENERAL: PAINLEVEL_OUTOF10: 0 - NO PAIN

## 2025-05-18 NOTE — NURSING NOTE
Patient declined to be repositioned. Attempted to draw labs from PICC line, no blood return present, repositioned arm unable to obtain a blood return.

## 2025-05-18 NOTE — CARE PLAN
Problem: Discharge Planning  Goal: Discharge to home or other facility with appropriate resources  Outcome: Progressing     Problem: Chronic Conditions and Co-morbidities  Goal: Patient's chronic conditions and co-morbidity symptoms are monitored and maintained or improved  Outcome: Progressing     Problem: Nutrition  Goal: Nutrient intake appropriate for maintaining nutritional needs  Outcome: Progressing     Problem: Skin  Goal: Decreased wound size/increased tissue granulation at next dressing change  Outcome: Progressing  Flowsheets (Taken 5/17/2025 2316)  Decreased wound size/increased tissue granulation at next dressing change: Promote sleep for wound healing  Goal: Participates in plan/prevention/treatment measures  Outcome: Progressing  Flowsheets (Taken 5/17/2025 2316)  Participates in plan/prevention/treatment measures:   Discuss with provider PT/OT consult   Elevate heels   Increase activity/out of bed for meals  Goal: Prevent/manage excess moisture  Outcome: Progressing  Flowsheets (Taken 5/17/2025 2316)  Prevent/manage excess moisture:   Moisturize dry skin   Follow provider orders for dressing changes   Monitor for/manage infection if present  Goal: Prevent/minimize sheer/friction injuries  Outcome: Progressing  Flowsheets (Taken 5/17/2025 2316)  Prevent/minimize sheer/friction injuries:   Turn/reposition every 2 hours/use positioning/transfer devices   HOB 30 degrees or less   Increase activity/out of bed for meals   Use pull sheet  Goal: Promote/optimize nutrition  Outcome: Progressing  Flowsheets (Taken 5/17/2025 2316)  Promote/optimize nutrition:   Assist with feeding   Consume > 50% meals/supplements   Offer water/supplements/favorite foods   Monitor/record intake including meals  Goal: Promote skin healing  Outcome: Progressing  Flowsheets (Taken 5/17/2025 2316)  Promote skin healing:   Turn/reposition every 2 hours/use positioning/transfer devices   Assess skin/pad under line(s)/device(s)      Problem: Diabetes  Goal: Achieve decreasing blood glucose levels by end of shift  Outcome: Progressing  Goal: Increase stability of blood glucose readings by end of shift  Outcome: Progressing  Goal: Decrease in ketones present in urine by end of shift  Outcome: Progressing  Goal: Maintain electrolyte levels within acceptable range throughout shift  Outcome: Progressing  Goal: Maintain glucose levels >70mg/dl to <250mg/dl throughout shift  Outcome: Progressing  Goal: No changes in neurological exam by end of shift  Outcome: Progressing  Goal: Learn about and adhere to nutrition recommendations by end of shift  Outcome: Progressing  Goal: Vital signs within normal range for age by end of shift  Outcome: Progressing  Goal: Increase self care and/or family involovement by end of shift  Outcome: Progressing  Goal: Receive DSME education by end of shift  Outcome: Progressing     Problem: Pain  Goal: Takes deep breaths with improved pain control throughout the shift  Outcome: Progressing  Goal: Turns in bed with improved pain control throughout the shift  Outcome: Progressing  Goal: Walks with improved pain control throughout the shift  Outcome: Progressing  Goal: Performs ADL's with improved pain control throughout shift  Outcome: Progressing  Goal: Participates in PT with improved pain control throughout the shift  Outcome: Progressing

## 2025-05-18 NOTE — CARE PLAN
The patient's goals for the shift include to get some sleep    The clinical goals for the shift include Patient will tolerate IV ATB and remain afebrile    Over the shift, the patient did make progress toward the following goals. Tolerated IV ATB and remained afebrile. PICC line flushed with cath flow d/t no blood return. After given 2mg of Cathflow, PICC line is now patent, blood return noted. Up to chair for lunch, tolerated fairly well. IV fluids d/c'd patient appetite improved.       Problem: Discharge Planning  Goal: Discharge to home or other facility with appropriate resources  Outcome: Progressing     Problem: Chronic Conditions and Co-morbidities  Goal: Patient's chronic conditions and co-morbidity symptoms are monitored and maintained or improved  Outcome: Progressing     Problem: Nutrition  Goal: Nutrient intake appropriate for maintaining nutritional needs  Outcome: Progressing     Problem: Skin  Goal: Decreased wound size/increased tissue granulation at next dressing change  Outcome: Progressing  Flowsheets (Taken 5/18/2025 1858)  Decreased wound size/increased tissue granulation at next dressing change:   Promote sleep for wound healing   Protective dressings over bony prominences  Goal: Participates in plan/prevention/treatment measures  Outcome: Progressing  Flowsheets (Taken 5/18/2025 1858)  Participates in plan/prevention/treatment measures:   Elevate heels   Increase activity/out of bed for meals  Goal: Prevent/manage excess moisture  Outcome: Progressing  Flowsheets (Taken 5/18/2025 1858)  Prevent/manage excess moisture:   Monitor for/manage infection if present   Cleanse incontinence/protect with barrier cream  Goal: Prevent/minimize sheer/friction injuries  Outcome: Progressing  Flowsheets (Taken 5/18/2025 1858)  Prevent/minimize sheer/friction injuries:   Use pull sheet   Increase activity/out of bed for meals   Turn/reposition every 2 hours/use positioning/transfer devices   HOB 30 degrees or  less  Goal: Promote/optimize nutrition  Outcome: Progressing  Flowsheets (Taken 5/18/2025 1858)  Promote/optimize nutrition:   Offer water/supplements/favorite foods   Consume > 50% meals/supplements   Monitor/record intake including meals  Goal: Promote skin healing  Outcome: Progressing  Flowsheets (Taken 5/18/2025 1858)  Promote skin healing:   Turn/reposition every 2 hours/use positioning/transfer devices   Protective dressings over bony prominences     Problem: Diabetes  Goal: Achieve decreasing blood glucose levels by end of shift  Outcome: Progressing  Goal: Increase stability of blood glucose readings by end of shift  Outcome: Progressing  Goal: Decrease in ketones present in urine by end of shift  Outcome: Progressing  Goal: Maintain electrolyte levels within acceptable range throughout shift  Outcome: Progressing  Goal: Maintain glucose levels >70mg/dl to <250mg/dl throughout shift  Outcome: Progressing  Goal: No changes in neurological exam by end of shift  Outcome: Progressing  Goal: Learn about and adhere to nutrition recommendations by end of shift  Outcome: Progressing  Goal: Vital signs within normal range for age by end of shift  Outcome: Progressing  Goal: Increase self care and/or family involovement by end of shift  Outcome: Progressing  Goal: Receive DSME education by end of shift  Outcome: Progressing     Problem: Pain  Goal: Takes deep breaths with improved pain control throughout the shift  Outcome: Progressing  Goal: Turns in bed with improved pain control throughout the shift  Outcome: Progressing  Goal: Walks with improved pain control throughout the shift  Outcome: Progressing  Goal: Performs ADL's with improved pain control throughout shift  Outcome: Progressing  Goal: Participates in PT with improved pain control throughout the shift  Outcome: Progressing

## 2025-05-18 NOTE — PROGRESS NOTES
Elliot Partida is a 74 y.o. male on day 10 of admission presenting with Wound infection.      Subjective   Patient assessed at bedside; sitting up in eating breakfast. He seemed to be in good spirits. He denies pain, fever, chills, N/V/D/C.        Objective     Last Recorded Vitals  /71   Pulse 79   Temp 36.3 °C (97.3 °F) (Temporal)   Resp 18   Wt 94.4 kg (208 lb 1.8 oz)   SpO2 99%   Intake/Output last 3 Shifts:    Intake/Output Summary (Last 24 hours) at 5/18/2025 1205  Last data filed at 5/18/2025 1201  Gross per 24 hour   Intake 1170.83 ml   Output 2400 ml   Net -1229.17 ml       Admission Weight  Weight: 89.4 kg (197 lb) (05/08/25 0850)    Daily Weight  05/16/25 : 94.4 kg (208 lb 1.8 oz)    Image Results      Physical Exam  Vitals reviewed.   Constitutional:       Appearance: He is obese. He is ill-appearing.   HENT:      Head: Normocephalic and atraumatic.      Right Ear: External ear normal.      Left Ear: External ear normal.      Nose: Nose normal.      Mouth/Throat:      Mouth: Mucous membranes are moist.   Eyes:      Conjunctiva/sclera: Conjunctivae normal.      Pupils: Pupils are equal, round, and reactive to light.   Cardiovascular:      Rate and Rhythm: Normal rate and regular rhythm.      Pulses: Normal pulses.      Heart sounds: Normal heart sounds.   Pulmonary:      Effort: Pulmonary effort is normal.      Breath sounds: Normal breath sounds.   Abdominal:      General: Bowel sounds are normal.      Palpations: Abdomen is soft.   Musculoskeletal:         General: Swelling present. Normal range of motion.      Cervical back: Normal range of motion and neck supple.      Comments: PICC line in left upper arm   Skin:     General: Skin is warm and dry.      Findings: Bruising present.      Comments: Stage 2 pressure wound to buttocks   Neurological:      General: No focal deficit present.      Mental Status: He is alert. He is disoriented.   Psychiatric:         Mood and Affect: Mood normal.          Behavior: Behavior normal.         Relevant Results    Scheduled medications  Scheduled Medications[1]  Continuous medications  Continuous Medications[2]  PRN medications  PRN Medications[3]    Results for orders placed or performed during the hospital encounter of 05/08/25 (from the past 24 hours)   POCT GLUCOSE   Result Value Ref Range    POCT Glucose 162 (H) 74 - 99 mg/dL   POCT GLUCOSE   Result Value Ref Range    POCT Glucose 158 (H) 74 - 99 mg/dL   POCT GLUCOSE   Result Value Ref Range    POCT Glucose 155 (H) 74 - 99 mg/dL   Comprehensive metabolic panel   Result Value Ref Range    Glucose 116 (H) 74 - 99 mg/dL    Sodium 144 136 - 145 mmol/L    Potassium 3.4 (L) 3.5 - 5.3 mmol/L    Chloride 107 98 - 107 mmol/L    Bicarbonate 33 (H) 21 - 32 mmol/L    Anion Gap 7 (L) 10 - 20 mmol/L    Urea Nitrogen 15 6 - 23 mg/dL    Creatinine 0.66 0.50 - 1.30 mg/dL    eGFR >90 >60 mL/min/1.73m*2    Calcium 8.2 (L) 8.6 - 10.3 mg/dL    Albumin 2.7 (L) 3.4 - 5.0 g/dL    Alkaline Phosphatase 55 33 - 136 U/L    Total Protein 5.3 (L) 6.4 - 8.2 g/dL    AST 14 9 - 39 U/L    Bilirubin, Total 0.2 0.0 - 1.2 mg/dL    ALT 7 (L) 10 - 52 U/L   C-reactive protein   Result Value Ref Range    C-Reactive Protein 4.28 (H) <1.00 mg/dL   CBC and Auto Differential   Result Value Ref Range    WBC 6.7 4.4 - 11.3 x10*3/uL    nRBC 0.0 0.0 - 0.0 /100 WBCs    RBC 3.85 (L) 4.50 - 5.90 x10*6/uL    Hemoglobin 9.6 (L) 13.5 - 17.5 g/dL    Hematocrit 32.4 (L) 41.0 - 52.0 %    MCV 84 80 - 100 fL    MCH 24.9 (L) 26.0 - 34.0 pg    MCHC 29.6 (L) 32.0 - 36.0 g/dL    RDW 16.4 (H) 11.5 - 14.5 %    Platelets 237 150 - 450 x10*3/uL    Neutrophils % 58.2 40.0 - 80.0 %    Immature Granulocytes %, Automated 0.6 0.0 - 0.9 %    Lymphocytes % 28.1 13.0 - 44.0 %    Monocytes % 11.4 2.0 - 10.0 %    Eosinophils % 1.4 0.0 - 6.0 %    Basophils % 0.3 0.0 - 2.0 %    Neutrophils Absolute 3.87 1.60 - 5.50 x10*3/uL    Immature Granulocytes Absolute, Automated 0.04 0.00 - 0.50  x10*3/uL    Lymphocytes Absolute 1.87 0.80 - 3.00 x10*3/uL    Monocytes Absolute 0.76 0.05 - 0.80 x10*3/uL    Eosinophils Absolute 0.09 0.00 - 0.40 x10*3/uL    Basophils Absolute 0.02 0.00 - 0.10 x10*3/uL   Magnesium   Result Value Ref Range    Magnesium 2.15 1.60 - 2.40 mg/dL   Protime-INR   Result Value Ref Range    Protime 12.8 (H) 9.8 - 12.4 seconds    INR 1.2 (H) 0.9 - 1.1   POCT GLUCOSE   Result Value Ref Range    POCT Glucose 103 (H) 74 - 99 mg/dL   POCT GLUCOSE   Result Value Ref Range    POCT Glucose 171 (H) 74 - 99 mg/dL                        This patient has a central line   Reason for the central line remaining today? Parenteral medication    This patient has a urinary catheter   Reason for the urinary catheter remaining today? urinary retention/bladder outlet obstruction, acute or chronic          Assessment & Plan  Wound infection    Osteomyelitis of left foot, unspecified type (Multi)    Acute deep vein thrombosis (DVT) of popliteal vein of left lower extremity    Bilateral lower extremity edema    Benign essential HTN    DM type 2 with diabetic peripheral neuropathy    Hyperlipidemia    Trigeminal neuralgia    Fever    Metabolic encephalopathy    COVID-19    Wound infection  Osteomyelitis of left foot, unspecified type (Multi)  - XR tib/fib: Thick periosteal new bone formation along the left tibia likely due to history of chronic osteomyelitis and healing. No acute bone destruction.  - MRI: No evidence to suggest osteomyelitis. 2. Partially visualized large soft tissue ulceration along the anterior aspect of the distal tibia and tibial talar joint with possible exposure of the underlying anterior tibialis tendon. Moderate amount of fluid along the posterior as well as anterior aspect of the medial head of the gastrocnemius extending from the level of the knee to the distal lower leg. This is nonspecific with infectious fasciitis felt to be less likely although not entirely excluded. Edema of the  anterior compartment muscles of the lower leg which may represent infectious myositis. No evidence of intramuscular abscess.  Extensive diffuse soft tissue edema noted throughout the left lower extremity and partially visualized right lower extremity Moderate diffuse fatty atrophy of the lower leg muscles.  - Podiatry consult:  Xeroform to wound base, covered with dry gauze, Abd pads, Kerlix and ACE bandage (little to no compression when applying). May change xeroform to betadine-soaked 4x4 if drainage increases.   -ID consult  - No plans for surgical intervention during this visit.   -CRP 24.5 most likely due to Chronic osteomyelitis   - Follow CRP -ID recommend discontinue Zosyn and continue with Vancomycin 6 weeks  -discontinue zosyn  -continue vancomycin 1g q12h  -wound culture: GPC grew MRSA   - Blood culture- no growth at 5 days 5/9  - follow wound culture, WBC, blood cx  - PWB to the left lower extremity for transfer  - Nursing staff is able to change/reinforce dressing if & as necessary until next day’s dressing change  - PT/OT- Mod intensity level  - PICC line placed for 6 weeks IV antibiotics-CHG bath  - RT to evaluate  - Monitor temperature  - Follow WBC   - Dressing changed 5/14     Acute deep vein thrombosis (DVT) of popliteal vein of left lower extremity  Bilateral lower extremity edema  - previous xarelto for DVT, completed  - elevate legs when resting        Benign essential HTN  Hyperlipidemia  - Echo: normal LVSF with an EF of 55-60% with a Grade I (impaired relaxation pattern) of  LVDF. Moderate aortic valve stenosis.    - continue hydralazine 25 mg TID  - monitor HR, BP     DM type 2 with diabetic peripheral neuropathy  - sliding scale with lispro coverage 0-10  - A1c: 5.8  - Consistent carb 75gm/meal  - AccuCheck q4/day  - Hypoglycemia protocol  - Decreased appetite  - discontinue Maintenance fluids D5NS at 50mL/hr      Trigeminal neuralgia  - continue carbamazepine 200 mg BID, gabapentin 300 mg  "TID     Fever  Metabolic encephalopathy  COVID-19  - fever overnight 5/12 38.7 Axillary, Zosyn resumed  - COVID + 5/13  - completed Remdisivir x 5 days  - Currently on 2lpm via NC  - mycoplasma pneuminiae, IgM 0.01  - legionella negative   - Monitor fever  - Monitor WBC   - CXR: Cardiomegaly with new perihilar and basilar edema with patchy  multifocal airspace opacities right worse than left.Small bilateral pleural effusions.   - ID consulted- Psych consulted for behavior issues 5/12: Evaluated 5/13 'Pt unable to participate in consult - is on venturi mask and in/out of sleep.Will try tomorrow. Likely the agitation was related to hypoxia.\"  5/15 signed off. If not eating start Remeron 7.5 mg po hs  - Lama Catheter for accurate output  - I/O hourly        Normocytic anemia  - Hb 10.2/ MCV 81  - monitor CBC     Hypokalemia  - K 3.4 > 3.4 >3.2 > 3.4  - replaced with KCL 40 mEq  - monitor BMP  - Resume potassium 40 meq daily        DVT ppx  - continue enoxapairn 40 mg daily         Code Status: Full    Disposition: Pt requires inpatient stay at this time.           Eryn Colon, APRN-CNP           [1] amoxicillin-clavulanate, 1 tablet, oral, q12h CLEMENTE  carBAMazepine, 200 mg, oral, BID  dexAMETHasone, 6 mg, oral, Daily  enoxaparin, 40 mg, subcutaneous, Daily  gabapentin, 300 mg, oral, TID  hydrALAZINE, 25 mg, oral, TID  insulin lispro, 0-10 Units, subcutaneous, TID AC  polyethylene glycol, 17 g, oral, Daily  potassium chloride CR, 40 mEq, oral, Daily  sennosides-docusate sodium, 1 tablet, oral, Nightly  traZODone, 50 mg, oral, Nightly  vancomycin 1.25 g in sodium chloride 0.9% 250 mL IV, 1,250 mg, intravenous, q12h  zinc oxide, 1 Application, Topical, TID  [2] D5 % and 0.9 % sodium chloride, 50 mL/hr, Last Rate: Stopped (05/18/25 0834)  [3] PRN medications: acetaminophen **OR** acetaminophen **OR** acetaminophen, albuterol, alteplase, alum-mag hydroxide-simeth, benzocaine-menthol, dextrose, dextrose, glucagon, " glucagon, hydrALAZINE, lidocaine, metoclopramide, ondansetron, oxygen, vancomycin

## 2025-05-19 LAB
GLUCOSE BLD MANUAL STRIP-MCNC: 145 MG/DL (ref 74–99)
GLUCOSE BLD MANUAL STRIP-MCNC: 145 MG/DL (ref 74–99)
GLUCOSE BLD MANUAL STRIP-MCNC: 165 MG/DL (ref 74–99)
GLUCOSE BLD MANUAL STRIP-MCNC: 86 MG/DL (ref 74–99)
HOLD SPECIMEN: 293
VANCOMYCIN SERPL-MCNC: 26.3 UG/ML (ref 5–20)

## 2025-05-19 PROCEDURE — 2500000002 HC RX 250 W HCPCS SELF ADMINISTERED DRUGS (ALT 637 FOR MEDICARE OP, ALT 636 FOR OP/ED): Mod: IPSPLIT | Performed by: NURSE PRACTITIONER

## 2025-05-19 PROCEDURE — 99232 SBSQ HOSP IP/OBS MODERATE 35: CPT | Performed by: NURSE PRACTITIONER

## 2025-05-19 PROCEDURE — 2500000001 HC RX 250 WO HCPCS SELF ADMINISTERED DRUGS (ALT 637 FOR MEDICARE OP): Mod: IPSPLIT | Performed by: STUDENT IN AN ORGANIZED HEALTH CARE EDUCATION/TRAINING PROGRAM

## 2025-05-19 PROCEDURE — 82947 ASSAY GLUCOSE BLOOD QUANT: CPT | Mod: IPSPLIT

## 2025-05-19 PROCEDURE — 1100000001 HC PRIVATE ROOM DAILY: Mod: IPSPLIT

## 2025-05-19 PROCEDURE — 2500000004 HC RX 250 GENERAL PHARMACY W/ HCPCS (ALT 636 FOR OP/ED): Mod: IPSPLIT | Performed by: STUDENT IN AN ORGANIZED HEALTH CARE EDUCATION/TRAINING PROGRAM

## 2025-05-19 PROCEDURE — 2500000005 HC RX 250 GENERAL PHARMACY W/O HCPCS: Mod: IPSPLIT | Performed by: INTERNAL MEDICINE

## 2025-05-19 PROCEDURE — 97112 NEUROMUSCULAR REEDUCATION: CPT | Mod: GP,CQ,IPSPLIT

## 2025-05-19 PROCEDURE — 2500000004 HC RX 250 GENERAL PHARMACY W/ HCPCS (ALT 636 FOR OP/ED): Mod: JZ,IPSPLIT | Performed by: STUDENT IN AN ORGANIZED HEALTH CARE EDUCATION/TRAINING PROGRAM

## 2025-05-19 PROCEDURE — 2500000002 HC RX 250 W HCPCS SELF ADMINISTERED DRUGS (ALT 637 FOR MEDICARE OP, ALT 636 FOR OP/ED): Mod: IPSPLIT | Performed by: STUDENT IN AN ORGANIZED HEALTH CARE EDUCATION/TRAINING PROGRAM

## 2025-05-19 PROCEDURE — 97530 THERAPEUTIC ACTIVITIES: CPT | Mod: GP,CQ,IPSPLIT

## 2025-05-19 PROCEDURE — 94640 AIRWAY INHALATION TREATMENT: CPT | Mod: IPSPLIT

## 2025-05-19 PROCEDURE — 80202 ASSAY OF VANCOMYCIN: CPT | Mod: IPSPLIT | Performed by: STUDENT IN AN ORGANIZED HEALTH CARE EDUCATION/TRAINING PROGRAM

## 2025-05-19 PROCEDURE — 94760 N-INVAS EAR/PLS OXIMETRY 1: CPT | Mod: IPSPLIT

## 2025-05-19 RX ORDER — FLUTICASONE PROPIONATE 50 MCG
2 SPRAY, SUSPENSION (ML) NASAL DAILY
Status: DISCONTINUED | OUTPATIENT
Start: 2025-05-19 | End: 2025-05-20 | Stop reason: HOSPADM

## 2025-05-19 RX ADMIN — DEXAMETHASONE 6 MG: 6 TABLET ORAL at 09:42

## 2025-05-19 RX ADMIN — VANCOMYCIN HYDROCHLORIDE 1.25 G: 1.25 INJECTION, POWDER, LYOPHILIZED, FOR SOLUTION INTRAVENOUS at 20:38

## 2025-05-19 RX ADMIN — ZINC OXIDE 1 APPLICATION: 200 OINTMENT TOPICAL at 09:43

## 2025-05-19 RX ADMIN — VANCOMYCIN HYDROCHLORIDE 1.25 G: 1.25 INJECTION, POWDER, LYOPHILIZED, FOR SOLUTION INTRAVENOUS at 09:45

## 2025-05-19 RX ADMIN — ENOXAPARIN SODIUM 40 MG: 40 INJECTION SUBCUTANEOUS at 09:42

## 2025-05-19 RX ADMIN — POTASSIUM CHLORIDE 40 MEQ: 1500 TABLET, EXTENDED RELEASE ORAL at 09:42

## 2025-05-19 RX ADMIN — HYDRALAZINE HYDROCHLORIDE 25 MG: 25 TABLET ORAL at 16:00

## 2025-05-19 RX ADMIN — GABAPENTIN 300 MG: 300 CAPSULE ORAL at 09:42

## 2025-05-19 RX ADMIN — GABAPENTIN 300 MG: 300 CAPSULE ORAL at 20:37

## 2025-05-19 RX ADMIN — GABAPENTIN 300 MG: 300 CAPSULE ORAL at 16:00

## 2025-05-19 RX ADMIN — SENNOSIDES AND DOCUSATE SODIUM 1 TABLET: 50; 8.6 TABLET ORAL at 20:37

## 2025-05-19 RX ADMIN — HYDRALAZINE HYDROCHLORIDE 25 MG: 25 TABLET ORAL at 09:42

## 2025-05-19 RX ADMIN — POLYETHYLENE GLYCOL 3350 17 G: 17 POWDER, FOR SOLUTION ORAL at 09:42

## 2025-05-19 RX ADMIN — TRAZODONE HYDROCHLORIDE 50 MG: 50 TABLET ORAL at 20:37

## 2025-05-19 RX ADMIN — CARBAMAZEPINE 200 MG: 200 TABLET ORAL at 20:37

## 2025-05-19 RX ADMIN — Medication 2 L/MIN: at 10:19

## 2025-05-19 RX ADMIN — FLUTICASONE PROPIONATE 2 SPRAY: 50 SPRAY, METERED NASAL at 10:43

## 2025-05-19 RX ADMIN — CARBAMAZEPINE 200 MG: 200 TABLET ORAL at 09:41

## 2025-05-19 RX ADMIN — Medication 2 L/MIN: at 21:19

## 2025-05-19 RX ADMIN — HYDRALAZINE HYDROCHLORIDE 25 MG: 25 TABLET ORAL at 20:37

## 2025-05-19 RX ADMIN — ALBUTEROL SULFATE 2.5 MG: 2.5 SOLUTION RESPIRATORY (INHALATION) at 10:19

## 2025-05-19 ASSESSMENT — COGNITIVE AND FUNCTIONAL STATUS - GENERAL
MOVING TO AND FROM BED TO CHAIR: A LITTLE
DRESSING REGULAR UPPER BODY CLOTHING: A LITTLE
MOVING FROM LYING ON BACK TO SITTING ON SIDE OF FLAT BED WITH BEDRAILS: A LITTLE
DRESSING REGULAR LOWER BODY CLOTHING: A LITTLE
STANDING UP FROM CHAIR USING ARMS: A LITTLE
TURNING FROM BACK TO SIDE WHILE IN FLAT BAD: A LITTLE
CLIMB 3 TO 5 STEPS WITH RAILING: TOTAL
TOILETING: A LITTLE
MOVING TO AND FROM BED TO CHAIR: A LOT
TURNING FROM BACK TO SIDE WHILE IN FLAT BAD: A LITTLE
MOBILITY SCORE: 12
WALKING IN HOSPITAL ROOM: A LITTLE
MOVING FROM LYING ON BACK TO SITTING ON SIDE OF FLAT BED WITH BEDRAILS: A LITTLE
WALKING IN HOSPITAL ROOM: TOTAL
DAILY ACTIVITIY SCORE: 20
CLIMB 3 TO 5 STEPS WITH RAILING: A LOT
CLIMB 3 TO 5 STEPS WITH RAILING: A LITTLE
WALKING IN HOSPITAL ROOM: A LITTLE
MOVING FROM LYING ON BACK TO SITTING ON SIDE OF FLAT BED WITH BEDRAILS: A LITTLE
HELP NEEDED FOR BATHING: A LITTLE
MOBILITY SCORE: 17
STANDING UP FROM CHAIR USING ARMS: A LITTLE
MOBILITY SCORE: 18
STANDING UP FROM CHAIR USING ARMS: A LOT
MOVING TO AND FROM BED TO CHAIR: A LITTLE
TURNING FROM BACK TO SIDE WHILE IN FLAT BAD: A LITTLE

## 2025-05-19 ASSESSMENT — PAIN SCALES - GENERAL
PAINLEVEL_OUTOF10: 0 - NO PAIN
PAINLEVEL_OUTOF10: 0 - NO PAIN

## 2025-05-19 ASSESSMENT — PAIN - FUNCTIONAL ASSESSMENT
PAIN_FUNCTIONAL_ASSESSMENT: 0-10
PAIN_FUNCTIONAL_ASSESSMENT: 0-10

## 2025-05-19 NOTE — PROGRESS NOTES
Elliot Partida is a 74 y.o. male on day 11 of admission presenting with Wound infection.      Subjective   Patient assessed at bedside; sitting up in bed. He was in a good mood and very pleasant today. He is wanting to go home or get out of hospital. He denies pain, fever, chills, N/V/D/C.       Objective     Last Recorded Vitals  /81 (BP Location: Right arm, Patient Position: Lying)   Pulse 72   Temp 36.5 °C (97.7 °F) (Temporal)   Resp 15   Wt 94.4 kg (208 lb 1.8 oz)   SpO2 95%   Intake/Output last 3 Shifts:    Intake/Output Summary (Last 24 hours) at 5/19/2025 1411  Last data filed at 5/19/2025 1100  Gross per 24 hour   Intake 720 ml   Output 1950 ml   Net -1230 ml       Admission Weight  Weight: 89.4 kg (197 lb) (05/08/25 0850)    Daily Weight  05/16/25 : 94.4 kg (208 lb 1.8 oz)    Image Results      Physical Exam  Vitals reviewed.   Constitutional:       Appearance: He is obese. He is ill-appearing.   HENT:      Head: Normocephalic and atraumatic.      Right Ear: External ear normal.      Left Ear: External ear normal.      Nose: Nose normal.      Mouth/Throat:      Mouth: Mucous membranes are moist.   Eyes:      Conjunctiva/sclera: Conjunctivae normal.      Pupils: Pupils are equal, round, and reactive to light.   Cardiovascular:      Rate and Rhythm: Normal rate and regular rhythm.      Pulses: Normal pulses.      Heart sounds: Normal heart sounds.   Pulmonary:      Effort: Pulmonary effort is normal.      Breath sounds: Normal breath sounds.   Abdominal:      General: Bowel sounds are normal.      Palpations: Abdomen is soft.   Musculoskeletal:         General: Swelling present. Normal range of motion.      Cervical back: Normal range of motion and neck supple.      Comments: PICC line in left upper arm   Skin:     General: Skin is warm and dry.      Findings: Bruising present.      Comments: Stage 2 pressure wound to buttocks, dorsal left foot with heeling non pressure wound  Neurological:       General: No focal deficit present.      Mental Status: He is alert. He is disoriented.   Psychiatric:         Mood and Affect: Mood normal.         Behavior: Behavior normal.       Relevant Results    Scheduled medications  Scheduled Medications[1]  Continuous medications  Continuous Medications[2]  PRN medications  PRN Medications[3]      Results for orders placed or performed during the hospital encounter of 05/08/25 (from the past 24 hours)   POCT GLUCOSE   Result Value Ref Range    POCT Glucose 171 (H) 74 - 99 mg/dL   POCT GLUCOSE   Result Value Ref Range    POCT Glucose 175 (H) 74 - 99 mg/dL   Vancomycin   Result Value Ref Range    Vancomycin 26.3 (H) 5.0 - 20.0 ug/mL   Lavender Top   Result Value Ref Range    Extra Tube 293    SST TOP   Result Value Ref Range    Extra Tube 293    POCT GLUCOSE   Result Value Ref Range    POCT Glucose 86 74 - 99 mg/dL   POCT GLUCOSE   Result Value Ref Range    POCT Glucose 145 (H) 74 - 99 mg/dL                      Assessment & Plan  Wound infection    Osteomyelitis of left foot, unspecified type (Multi)    Acute deep vein thrombosis (DVT) of popliteal vein of left lower extremity    Bilateral lower extremity edema    Benign essential HTN    DM type 2 with diabetic peripheral neuropathy    Hyperlipidemia    Trigeminal neuralgia    Fever    Metabolic encephalopathy    COVID-19    Wound infection  Osteomyelitis of left foot, unspecified type (Multi)  - XR tib/fib: Thick periosteal new bone formation along the left tibia likely due to history of chronic osteomyelitis and healing. No acute bone destruction.  - MRI: No evidence to suggest osteomyelitis. 2. Partially visualized large soft tissue ulceration along the anterior aspect of the distal tibia and tibial talar joint with possible exposure of the underlying anterior tibialis tendon. Moderate amount of fluid along the posterior as well as anterior aspect of the medial head of the gastrocnemius extending from the level of the  knee to the distal lower leg. This is nonspecific with infectious fasciitis felt to be less likely although not entirely excluded. Edema of the anterior compartment muscles of the lower leg which may represent infectious myositis. No evidence of intramuscular abscess.  Extensive diffuse soft tissue edema noted throughout the left lower extremity and partially visualized right lower extremity Moderate diffuse fatty atrophy of the lower leg muscles.  - Podiatry consult:  Xeroform to wound base, covered with dry gauze, Abd pads, Kerlix and ACE bandage (little to no compression when applying). May change xeroform to betadine-soaked 4x4 if drainage increases.   - ID consult; recommending 6 weeks of vancomcin or dalvance on day 1,8, and 35  - No plans for surgical intervention during this visit.   - CRP 24.5 most likely due to Chronic osteomyelitis   - Follow CRP -ID recommend discontinue Zosyn and continue with Vancomycin 6 weeks  - discontinue zosyn  - continue vancomycin 1g q12h  - wound culture: GPC grew MRSA   - Blood culture- no growth at 5 days 5/9  - follow wound culture, WBC, blood cx  - PWB to the left lower extremity for transfer  - Nursing staff is able to change/reinforce dressing if & as necessary until next day’s dressing change  - PT/OT- Mod intensity level  - PICC line placed for 6 weeks IV antibiotics-CHG bath  - RT to evaluate  - Monitor temperature  - Follow WBC   - Dressing changed 5/14     Acute deep vein thrombosis (DVT) of popliteal vein of left lower extremity  Bilateral lower extremity edema  - previous xarelto for DVT, completed  - elevate legs when resting        Benign essential HTN  Hyperlipidemia  - Echo: normal LVSF with an EF of 55-60% with a Grade I (impaired relaxation pattern) of  LVDF. Moderate aortic valve stenosis.    - continue hydralazine 25 mg TID  - monitor HR, BP     DM type 2 with diabetic peripheral neuropathy  - sliding scale with lispro coverage 0-10  - A1c: 5.8  -  "Consistent carb 75gm/meal  - AccuCheck q4/day  - Hypoglycemia protocol  - Decreased appetite  - discontinue Maintenance fluids D5NS at 50mL/hr      Trigeminal neuralgia  - continue carbamazepine 200 mg BID, gabapentin 300 mg TID     Fever  Metabolic encephalopathy  COVID-19  - fever overnight 5/12 38.7 Axillary, Zosyn resumed  - COVID + 5/13  - completed Remdisivir x 5 days  - Currently on 2lpm via NC  - mycoplasma pneuminiae, IgM 0.01  - legionella negative   - Monitor fever  - Monitor WBC   - CXR: Cardiomegaly with new perihilar and basilar edema with patchy  multifocal airspace opacities right worse than left.Small bilateral pleural effusions.   - ID consulted- Psych consulted for behavior issues 5/12: Evaluated 5/13 'Pt unable to participate in consult - is on venturi mask and in/out of sleep.Will try tomorrow. Likely the agitation was related to hypoxia.\"  5/15 signed off. If not eating start Remeron 7.5 mg po hs  - discontinued Lama Catheter   - I/O hourly        Normocytic anemia  - Hb 10.2/ MCV 81  - monitor CBC     Hypokalemia  - K 3.4 > 3.4 >3.2 > 3.4  - replaced with KCL 40 mEq  - monitor BMP  - Resume potassium 40 meq daily   - given another dose of KCL 40 mEq        DVT ppx  - continue enoxapairn 40 mg daily         Code Status: Full    Disposition: Patient waiting on SNF vs home with outpatient infusion.              Eryn Colon, APRN-CNP           [1] carBAMazepine, 200 mg, oral, BID  dexAMETHasone, 6 mg, oral, Daily  enoxaparin, 40 mg, subcutaneous, Daily  fluticasone, 2 spray, Each Nostril, Daily  gabapentin, 300 mg, oral, TID  hydrALAZINE, 25 mg, oral, TID  insulin lispro, 0-10 Units, subcutaneous, TID AC  polyethylene glycol, 17 g, oral, Daily  potassium chloride CR, 40 mEq, oral, Daily  sennosides-docusate sodium, 1 tablet, oral, Nightly  traZODone, 50 mg, oral, Nightly  vancomycin 1.25 g in sodium chloride 0.9% 250 mL IV, 1,250 mg, intravenous, q12h  zinc oxide, 1 Application, Topical, " TID  [2]    [3] PRN medications: acetaminophen **OR** acetaminophen **OR** acetaminophen, albuterol, alteplase, alum-mag hydroxide-simeth, benzocaine-menthol, dextrose, dextrose, glucagon, glucagon, hydrALAZINE, lidocaine, metoclopramide, ondansetron, oxygen, vancomycin

## 2025-05-19 NOTE — PROGRESS NOTES
Elliot Partida is a 74 y.o. male on day 11 of admission presenting with Wound infection.    Subjective   Interval History:   Afebrile, no chills  Nonproductive cough  No chest pain  On low-flow oxygen  No shortness of breath at rest  No nausea vomiting or diarrhea     Review of Systems   All other systems reviewed and are negative.      Objective   Range of Vitals (last 24 hours)  Heart Rate:  [72-93]   Temp:  [36.5 °C (97.7 °F)-36.6 °C (97.9 °F)]   Resp:  [15-20]   BP: (143-166)/(64-81)   SpO2:  [95 %-99 %]   Daily Weight  05/16/25 : 94.4 kg (208 lb 1.8 oz)    Body mass index is 30.73 kg/m².    Physical Exam  Constitutional:       Appearance: Awake, alert  HENT:      Head: Normocephalic and atraumatic.      Right Ear: External ear normal.      Left Ear: External ear normal.      Nose: Nose normal.   Eyes:      General: No scleral icterus.     Extraocular Movements: Extraocular movements intact.      Conjunctiva/sclera: Conjunctivae normal.   Cardiovascular:      Rate and Rhythm: Regular rhythm.      Heart sounds: Normal heart sounds, S1 normal and S2 normal.   Pulmonary:      Breath sounds: Normal breath sounds and air entry.   Abdominal:      General: Bowel sounds are normal.      Palpations: Abdomen is soft.      Tenderness: There is no abdominal tenderness.   Musculoskeletal:      Cervical back: Normal range of motion and neck supple.      Right lower leg: No edema.      Left lower leg: No edema.   Skin:     General: Skin is warm and dry.      Comments: Left leg and foot dressing  Neurological:      Mental Status: He is awake, alert  Psychiatric:         Behavior: Behavior is awake, alert    Antibiotics  vancomycin  vancomycin IV 1250 mg in 250 mL NS (ADV/MBP)    Relevant Results  Labs  Results from last 72 hours   Lab Units 05/18/25  0740 05/17/25  0717   WBC AUTO x10*3/uL 6.7 5.5   HEMOGLOBIN g/dL 9.6* 9.6*   HEMATOCRIT % 32.4* 31.4*   PLATELETS AUTO x10*3/uL 237 268   NEUTROS PCT AUTO % 58.2  --    LYMPHS  PCT AUTO % 28.1  --    MONOS PCT AUTO % 11.4  --    EOS PCT AUTO % 1.4  --      Results from last 72 hours   Lab Units 05/18/25  0740 05/17/25  0717   SODIUM mmol/L 144 145   POTASSIUM mmol/L 3.4* 3.2*   CHLORIDE mmol/L 107 107   CO2 mmol/L 33* 33*   BUN mg/dL 15 17   CREATININE mg/dL 0.66 0.58   GLUCOSE mg/dL 116* 147*   CALCIUM mg/dL 8.2* 8.3*   ANION GAP mmol/L 7* 8*   EGFR mL/min/1.73m*2 >90 >90     Results from last 72 hours   Lab Units 05/18/25  0740 05/17/25  0717   ALK PHOS U/L 55 59   BILIRUBIN TOTAL mg/dL 0.2 0.2   PROTEIN TOTAL g/dL 5.3* 5.3*   ALT U/L 7* 8*   AST U/L 14 14   ALBUMIN g/dL 2.7* 2.7*     Estimated Creatinine Clearance: 113.1 mL/min (by C-G formula based on SCr of 0.66 mg/dL).  C-Reactive Protein   Date Value Ref Range Status   05/18/2025 4.28 (H) <1.00 mg/dL Final   05/17/2025 5.97 (H) <1.00 mg/dL Final   05/16/2025 10.60 (H) <1.00 mg/dL Final     Microbiology  Susceptibility data from last 14 days.  Collected Specimen Info Organism Clindamycin Erythromycin Oxacillin Tetracycline Trimethoprim/Sulfamethoxazole Vancomycin   05/09/25 Swab from Anterior Nares Methicillin Susceptible Staphylococcus aureus (MSSA)         05/08/25 Tissue/Biopsy from Wound/Tissue Methicillin Resistant Staphylococcus aureus (MRSA)  R  R  R  R  S  S     Mixed Aerobic and Anaerobic Bacteria            Imaging  Vascular US upper extremity venous duplex left  Result Date: 5/15/2025  Interpreted By:  Jose Chase, STUDY: [facility] US UPPER EXTREMITY VENOUS DUPLEX LEFT; 5/15/2025 12:11 pm   INDICATION: Signs/Symptoms:Swelling  after PICC line placement 3 days ago.   COMPARISON: None   ACCESSION NUMBER(S): [id number]   ORDERING CLINICIAN: BERNIE MEDRANO   TECHNIQUE: Black and white as well as color-flow duplex images were obtained along with Doppler wave analysis of the deep venous system of the upper extremity. The internal jugular vein, subclavian vein as well as the axillary and brachial vein of the upper extremity were  evaluated. The basilic and cephalic veins were also imaged.   FINDINGS: Examination demonstrates normal compressibility and flow. PICC line is present within the basilic vein.       No evidence for deep vein thrombosis by this exam.   MACRO: none   Signed by: Jose Chase 5/15/2025 1:36 PM Dictation workstation:   [id number]    ECG 12 Lead  Result Date: 5/13/2025  Sinus rhythm with 1st degree AV block Left axis deviation Nonspecific intraventricular block Possible Anterolateral infarct (cited on or before 09-MAY-2025) Abnormal ECG When compared with ECG of 09-MAY-2025 18:47, (unconfirmed) Serial changes of Anterior infarct Present    ECG 12 lead  Result Date: 5/13/2025  Sinus rhythm with 1st degree AV block Left anterior fascicular block Minimal voltage criteria for LVH, may be normal variant ( Mukul product ) Anterior infarct , age undetermined Abnormal ECG When compared with ECG of 13-FEB-2025 09:37, No significant change was found    XR chest 1 view  Addendum Date: 5/12/2025  Interpreted By:  Júnior Sena, ADDENDUM: Left-sided PICC line is noted and projects to the level of the superior vena cava without pneumothorax.   Signed by: Júnior Sena 5/12/2025 5:41 PM   -------- ORIGINAL REPORT -------- Dictation workstation:   [id number]    Result Date: 5/12/2025  Interpreted By:  Júnior Sena, STUDY: XR CHEST 1 VIEW   INDICATION: Signs/Symptoms:PICC placement.   COMPARISON: February 17, 2025   ACCESSION NUMBER(S): [id number]   ORDERING CLINICIAN: BERNIE MEDRANO   FINDINGS: Cardiomegaly with new perihilar and basilar edema with patchy multifocal airspace opacities right worse than left.   Small bilateral pleural effusions.   No evidence of pneumothorax.         Cardiomegaly with new perihilar and basilar edema with patchy multifocal airspace opacities right worse than left.   Small bilateral pleural effusions.   Signed by: Júnior Sena 5/12/2025 5:28 PM Dictation workstation:   [id number]    CT brain attack  head wo IV contrast  Result Date: 5/12/2025  Interpreted By:  Enedina Umana, STUDY: CT BRAIN ATTACK HEAD WO IV CONTRAST;  5/12/2025 9:37 am   INDICATION: Signs/Symptoms:stroke alert.     COMPARISON: 12/23/2024   ACCESSION NUMBER(S): [id number]   ORDERING CLINICIAN: KATIE HILTON   TECHNIQUE: Unenhanced CT images of the head were obtained.   FINDINGS: Patient is rotated.  The ventricles, cisterns and sulci are enlarged, consistent with diffuse volume loss. There are areas of nonspecific white matter hypodensity, which are probably age related or microvascular in nature. Low-density lacunar type infarct in the right basal ganglia. These findings are similar to the previous exam. There is no acute intracranial hemorrhage, mass effect or midline shift. No extraaxial fluid collection.   No focal calvarial lesion.   Bilateral ethmoid, left sphenoid and left maxillary sinus mucosal thickening.       No acute intracranial hemorrhage or mass-effect.   Multifocal sinus mucosal thickening.   MACRO: Enedina Umana discussed the significance and urgency of this critical finding by secure chat with  KATIE HILTON on 5/12/2025 at 9:47 am. (**-RCF-**) Findings:  See findings.     Signed by: Enedina Umana 5/12/2025 9:48 AM Dictation workstation:   [id number]    MR tibia fibula left w and wo IV contrast  Result Date: 5/9/2025  Interpreted By:  Lina Moreno  and Debbie Cárdenas STUDY: MRI of the left tibia/fibula without and with IV contrast dated 5/9/2025.   INDICATION: Signs/Symptoms:Chronic osteomyelitis of the tibia.     COMPARISON: Left tibia/fibula radiograph 05/08/2025   ACCESSION NUMBER(S): [id number]   ORDERING CLINICIAN: ADA ESPINOZA   TECHNIQUE: Multiplanar multisequence MRI of the  left tibia/fibula was performed without and with intravenous gadolinium based contrast.   FINDINGS: OSSEOUS STRUCTURES AND JOINTS:   No fracture or dislocation is evident. Bone marrow signal intensity is within normal limits. No joint  effusion is evident.   SOFT TISSUES: Partially visualized large soft tissue ulceration along the anterior aspect of the tibiotalar joint to the distal tibia with possible exposure of the anterior tibialis tendon (axial image 74). There is extensive diffuse subcutaneous edema noted throughout the lower extremities without rim enhancing fluid collection. Moderate amount of fascial plane fluid noted in the anterior as well as posterior aspect of the medial head of the gastrocnemius. There is moderate diffuse generalized muscle atrophy. There is intramuscular muscle edema along the anterior compartment without intramuscular abscess. The visualized muscles and tendons are intact. Ligaments are not well assessed on this examination.       1. No evidence to suggest osteomyelitis. 2. Partially visualized large soft tissue ulceration along the anterior aspect of the distal tibia and tibial talar joint with possible exposure of the underlying anterior tibialis tendon. 3. Moderate amount of fluid along the posterior as well as anterior aspect of the medial head of the gastrocnemius extending from the level of the knee to the distal lower leg. This is nonspecific with infectious fasciitis felt to be less likely although not entirely excluded. Edema of the anterior compartment muscles of the lower leg which may represent infectious myositis. No evidence of intramuscular abscess. 4. Extensive diffuse soft tissue edema noted throughout the left lower extremity and partially visualized right lower extremity 5. Moderate diffuse fatty atrophy of the lower leg muscles.   I personally reviewed the images/study and I agree with Dr. Melia Lewis and the findings as stated.   MACRO: None   Signed by: Lina Moreno 5/9/2025 11:14 AM Dictation workstation:   [id number]    XR tibia fibula left 2 views  Result Date: 5/8/2025  Interpreted By:  Mariam Vang, STUDY: XR TIBIA FIBULA LEFT 2 VIEWS; ;  5/8/2025 10:25 pm   INDICATION:  Signs/Symptoms:Chronic tibial osteomyelitis.     COMPARISON: None.   ACCESSION NUMBER(S): [id number]   ORDERING CLINICIAN: ADA ESPINOZA   FINDINGS: Two views of the left tibia and fibula show thick periosteal bone formation along the tibial diaphysis likely due to a longstanding process. There is no acute bone destruction to suggest acute osteomyelitis. No fracture or dislocation.       Thick periosteal new bone formation along the left tibia likely due to history of chronic osteomyelitis and healing. No acute bone destruction.     MACRO: None   Signed by: Mariam Vang 5/8/2025 10:46 PM Dictation workstation:   [id number]    XR foot left 3+ views  Result Date: 5/8/2025  Interpreted By:  Osmar Garza, STUDY: XR FOOT LEFT 3+ VIEWS;  5/8/2025 9:26 am   INDICATION: Signs/Symptoms:wound.   COMPARISON: None.   ACCESSION NUMBER(S): [id number]   ORDERING CLINICIAN: LUDA JALLOH   FINDINGS: Post left transmetatarsal amputation. There is a possible age indeterminate nondisplaced fracture near the base of the 4th proximal phalanx only seen on the oblique view. No additional acute fracture, dislocation, or focal osseous destruction identified. Scattered degenerative changes. Small calcaneal enthesophytes.       Questionable age-indeterminate nondisplaced fracture near the base of the left foot 4th proximal phalanx, only apparent on one view. Soft tissue swelling. No additional acute osseous findings of the left foot.   MACRO: None   Signed by: Osmar Garza 5/8/2025 10:04 AM Dictation workstation:   [id number]      Assessment/Plan   Type 2 diabetes with peripheral angiopathy without gangrene-recent PVR remarkable for mild peripheral vascular disease  Left leg/dorsal foot cellulitis, resolving  Left tibial osteomyelitis-pathology report from 2/13/2025 reviewed, positive wound culture for MRSA  Acute on chronic respiratory failure-viral panel ordered, remarkable for coronavirus, resolving     Continue  vancomycin  Completed 10 days of treatment for pneumonia  Remdesivir-total of 5 days, completed  Dexamethasone-total of 10 days  Follow-up blood cultures   PICC line placement  Local care  Consider vascular evaluation, can be done as outpatient  Supportive care  Monitor temperature and WBC  Long-term plan is 6 weeks of IV antibiotic therapy  Weekly CBC with differential, CMP, CRP while on therapy  IV Dalvance on days 1, 8, and 35 as outpatient is an alternative if patient does not qualify for skilled nursing facility and cannot do antibiotics at home     This is a complex infectious disease issue and the following was performed today (for more details please see the above note): Management decisions reflecting the added complexity (e.g., changes in antimicrobial therapy, infection control strategies).    Baljit Velazco MD

## 2025-05-19 NOTE — CARE PLAN
Problem: Skin  Goal: Participates in plan/prevention/treatment measures  Outcome: Progressing  Goal: Promote/optimize nutrition  Outcome: Progressing  Goal: Promote skin healing  Outcome: Progressing     Problem: Pain  Goal: Takes deep breaths with improved pain control throughout the shift  Outcome: Progressing  Goal: Turns in bed with improved pain control throughout the shift  Outcome: Progressing     Problem: Skin  Goal: Prevent/manage excess moisture  Outcome: Met  Goal: Prevent/minimize sheer/friction injuries  Outcome: Met     Problem: Skin  Goal: Decreased wound size/increased tissue granulation at next dressing change  Flowsheets (Taken 5/19/2025 5745)  Decreased wound size/increased tissue granulation at next dressing change:   Promote sleep for wound healing   Protective dressings over bony prominences   The patient's goals for the shift include to get some sleep    The clinical goals for the shift include Pt will  have no s/s of resp. distress this shift.    Over the shift, the patient did  make progress toward the following goals.

## 2025-05-19 NOTE — PROGRESS NOTES
Physical Therapy    Physical Therapy Treatment    Patient Name: Elliot Partida  MRN: [id number]  Department: [facility]  Room: [address] [place], [place] [address]  Today's Date: 5/19/2025  Time Calculation  Start Time: 1117  Stop Time: 1157  Time Calculation (min): 40 min    Assessment/Plan   PT Assessment  PT Assessment Results: Decreased strength, Impaired balance, Decreased mobility, Decreased endurance, Decreased cognition, Impaired judgement, Decreased skin integrity, Orthopedic restrictions, Pain  Rehab Prognosis: Fair  Barriers to Discharge Home: Cognition needs, Physical needs  Cognition Needs: 24hr supervision for safety awareness needed, Cognition-related high falls risk  Physical Needs: Ambulating household distances limited by function/safety, In-home setup navigation limited by function/safety, 24hr mobility assistance needed  Evaluation/Treatment Tolerance: Patient limited by fatigue  Medical Staff Made Aware: Yes  Strengths: Attitude of self  Barriers to Participation: Comorbidities  End of Session Communication: Bedside nurse  Assessment Comment: Pt demonstrated an improvement with bed mobility and transfers requiring Zainab. Pt performed STP from bed to beside commode with minAx1/CGAx1 for safety. Pt cued throughout session to maintain WB status. Pt reported feeling SOB throughout and required extended rest breaks in between activities. Pt would continue to benefit from skilled therapy to address above limitations and prevent further decline.  End of Session Patient Position: Bed, 3 rail up, Alarm on     PT Plan  Treatment/Interventions: Bed mobility, Transfer training, Neuromuscular re-education  PT Plan: Ongoing PT  PT Frequency: 3 times per week  PT Discharge Recommendations: Moderate intensity level of continued care  PT Recommended Transfer Status: Assist x1, Assist x2  PT - OK to Discharge: Yes    PT Visit Info:  PT Received On: 05/19/25     General Visit Information:   General  Reason for Referral: impaired mobility, wound  infection  Referred By: Ani Jones, APRN-CNP  Past Medical History Relevant to Rehab: HTN, type 2 diabetes, DVT, osteomyelitis, BLE edema, stroke, hyperlipidemia, anemia, obesity, osteoarthritis, ulcer, CAD, PAD, aortic stenosis, neuropathy, trigeminal neuralgia, meningioma, MI, neuritis, PVD, subdural abscess, tinea pedis  Family/Caregiver Present: No  Prior to Session Communication: Bedside nurse  Patient Position Received: Bed, 3 rail up, Alarm on  Preferred Learning Style: verbal, visual  General Comment: Pt was cooperative at this time and agreeable to therapy. Pt was cleared by nursing prior to session.    Subjective   Precautions:  Precautions  LE Weight Bearing Status: Left Partial Weight Bearing (25%)  Medical Precautions: Fall precautions, Infection precautions  Precautions Comment: safety precautions, Covid precautions/isolation precautions, PICC line in L UE, Lama catheter     Date/Time Vitals Session Patient Position Pulse Resp SpO2 BP MAP (mmHg)    05/19/25 1117 Pre PT  --  --  --  95 %  --  --           Vital Signs Comment: SpO2 was monitored throughout session ranging from 90-95%; pt reported feeling difficulty of breathing and extended rest breaks were taken.     Objective   Pain:  Pain Assessment  Pain Assessment: 0-10  0-10 (Numeric) Pain Score: 0 - No pain  Cognition:  Cognition  Orientation Level: Oriented X4  Coordination:  Movements are Fluid and Coordinated: Yes    Treatments:  Balance/Neuromuscular Re-Education  Balance/Neuromuscular Re-Education Activity Performed: Yes  Balance/Neuromuscular Re-Education Activity 1: Static standing (2 x 1-2 minutes using FWW as BUE support, pt cued throughout to maintain WB status)    Bed Mobility  Bed Mobility: Yes  Bed Mobility 1  Bed Mobility 1: Supine to sitting  Level of Assistance 1: Minimum assistance  Bed Mobility Comments 1: pt required extended time to perform activity, Zainab for trunk management  Bed Mobility 2  Bed Mobility  2: Sitting  to supine  Level of Assistance 2: Minimum assistance  Bed Mobility Comments 2: pt required extended time to perform activity, pt required Zainab for LLE management    Transfers  Transfer: Yes  Transfer 1  Transfer From 1: Bed to, Commode-standard to  Transfer to 1: Commode-standard, Bed  Technique 1: Stand pivot  Transfer Device 1: Gait belt, Walker  Transfer Level of Assistance 1: Minimum assistance, Moderate verbal cues (required cues to maintain WB status throughout)  Trials/Comments 1: minAx1/CGAx1 for safety  Transfers 2  Transfer From 2: Sit to  Transfer to 2: Stand  Technique 2: Sit to stand, Stand to sit  Transfer Device 2: Walker, Gait belt  Transfer Level of Assistance 2: Minimum assistance, Moderate verbal cues  Trials/Comments 2: pt required cues on how to perform activity while maintaining WB status, pt performed from vaired surfaces (EOB, bedside commode)    Outcome Measures:  [facility] Basic Mobility  Turning from your back to your side while in a flat bed without using bedrails: A little  Moving from lying on your back to sitting on the side of a flat bed without using bedrails: A little  Moving to and from bed to chair (including a wheelchair): A lot  Standing up from a chair using your arms (e.g. wheelchair or bedside chair): A lot  To walk in hospital room: Total  Climbing 3-5 steps with railing: Total  Basic Mobility - Total Score: 12    Education Documentation  Mobility Training, taught by Josi Boston PTA at 5/19/2025 12:17 PM.  Learner: Patient  Readiness: Acceptance  Method: Explanation  Response: Verbalizes Understanding  Comment: technique for transfers, maintaining WB status    Education Comments  No comments found.      Encounter Problems       Encounter Problems (Active)       Balance       STG - Maintains static sitting balance with upper extremity support with >=good- balance  (Progressing)       Start:  05/12/25    Expected End:  05/26/25               Mobility       STG - Patient  will propel the wheelchair JOLENE (Progressing)       Start:  05/12/25    Expected End:  05/26/25               PT Transfers       STG - Transfer from bed to chair with SBA and SW (Progressing)       Start:  05/12/25    Expected End:  05/26/25            STG - Patient will transfer sit to and from stand with SBA and SW (Progressing)       Start:  05/12/25    Expected End:  05/26/25            STG - Patient maintains weight bearing status during transfers IND (Progressing)       Start:  05/12/25    Expected End:  05/26/25               Pain - Adult

## 2025-05-19 NOTE — PROGRESS NOTES
05/19/25 1236   Discharge Planning   Assistance Needed Pateint alert and oriented;  Patient lives wit his sister in a 2 story house.  He utilizes the main floor.  He wears a surgical shoe and ambulates with a walker.  DME available:  Walker, cane, crutches, wheelchair, Shower chair and grab bars.  No home oxygen.  He sleeps in a recliner.  His niecs assists with the shopping and his sister helps with ADLs.    He has had [facility] home care but currently, no home care.  His PCP is with [facility] Candi Padgett NP who does home visits Confirmed address and pharmacy.  [facility] following for home care needs.   Who is requesting discharge planning? Provider   Home or Post Acute Services Post acute facilities (Rehab/SNF/etc)   Type of Post Acute Facility Services Rehab;Skilled nursing   Expected Discharge Disposition SNF  ([facility] representative was at bedside talking with patient. Attempted to speak with her, but [facility] was unavailable due to phone call. Message left with Elba to return call back. Discharge plan still pending.)   Does the patient need discharge transport arranged? Yes   RoundTrip coordination needed? Yes   Has discharge transport been arranged? No   Patient Choice   Provider Choice list and CMS website (https://medicare.gov/care-compare#search) for post-acute Quality and Resource Measure Data were provided and reviewed with: Patient   Patient / Family choosing to utilize agency / facility established prior to hospitalization No   Stroke Family Assessment   Stroke Family Assessment Needed No   Intensity of Service   Intensity of Service 0-30 min

## 2025-05-19 NOTE — PROGRESS NOTES
Vancomycin Dosing by Pharmacy- FOLLOW UP    Elliot Partida is a 74 y.o. year old male who Pharmacy has been consulted for vancomycin dosing for osteomyelitis/septic arthritis. Based on the patient's indication and renal status this patient is being dosed based on a goal AUC of 500-600.     Renal function is currently stable.    Current vancomycin dose: 1250 mg given every 12 hours    Estimated vancomycin AUC on current dose: 599 mg/L.hr     Visit Vitals  /81 (BP Location: Right arm, Patient Position: Lying)   Pulse 72   Temp 36.5 °C (97.7 °F) (Temporal)   Resp 15        Lab Results   Component Value Date    CREATININE 0.66 2025    CREATININE 0.58 2025    CREATININE 0.68 2025    CREATININE 0.64 05/15/2025        Patient weight is as follows:   Vitals:    25 1021   Weight: 94.4 kg (208 lb 1.8 oz)       Cultures:  Susceptibility data for the encounter in last 14 days.  Collected Specimen Info Organism Clindamycin Erythromycin Oxacillin Tetracycline Trimethoprim/Sulfamethoxazole Vancomycin   25 Swab from Anterior Nares Methicillin Susceptible Staphylococcus aureus (MSSA)         25 Tissue/Biopsy from Wound/Tissue Methicillin Resistant Staphylococcus aureus (MRSA)  R  R  R  R  S  S     Mixed Aerobic and Anaerobic Bacteria               I/O last 3 completed shifts:  In: 1500 (15.9 mL/kg) [P.O.:800; I.V.:700 (7.4 mL/kg)]  Out: 3650 (38.7 mL/kg) [Urine:3650 (1.1 mL/kg/hr)]  Weight: 94.4 kg   I/O during current shift:  No intake/output data recorded.    Temp (24hrs), Av.6 °C (97.8 °F), Min:36.5 °C (97.7 °F), Max:36.6 °C (97.9 °F)      Assessment/Plan    Within goal AUC range. Continue current vancomycin regimen.    This dosing regimen is predicted by [employer] to result in the following pharmacokinetic parameters:  Loading dose: N/A  Regimen: 1250 mg IV every 12 hours.  Start time: 21:45 on 2025  Exposure target: AUC24 (range)400-600 mg/L.hr   OUP11-07: 597  mg/L.hr  AUC24,ss: 599 mg/L.hr  Probability of AUC24 > 400: 100 %  Ctrough,ss: 20.5 mg/L  Probability of Ctrough,ss > 20: 58 %    The next level will be obtained on 5/21 at 05:00 since he is so close to the high end of the range. May be obtained sooner if clinically indicated.   Will continue to monitor renal function daily while on vancomycin and order serum creatinine at least every 48 hours if not already ordered.  Follow for continued vancomycin needs, clinical response, and signs/symptoms of toxicity.       Lidia Salter, PharmD

## 2025-05-19 NOTE — PROGRESS NOTES
Occupational Therapy  Therapy Communication Note    Patient Name: Elliot Partida  MRN: [id number]  Department: [facility]  Room: [address] [place], [place] [address]  Today's Date: 5/19/2025     Discipline: Occupational Therapy    Missed Visit: OT Missed Visit: Yes     Missed Visit Reason: Missed Visit Reason: Patient refused (Pt. declined OT session today stating he would rather rest. Benefits of therpy and distinction between OT/PT provided today. Pt. verbalized understanding but pleasantly declined therapy)    Missed Time: Attempt 1440

## 2025-05-19 NOTE — CARE PLAN
The patient's goals for the shift include to get some sleep    The clinical goals for the shift include Patient will tolerate IV antibiotics this shift.    1600 Assumed care of patient. Assessment completed as charted. Patient lying in bed. Appropriate and cooperative. Dressing changed to left foot, patient tolerated well. Updated on plan of care. Aware that order has been placed for gallo removal, agrees to plan. Call light within reach.    1700 Catheter removed without difficulty. Patient tolerated well. Denies complaints. Call light within reach.    1830 Patient urinated in urinal without difficulty. Denies pain with urination. Discussed zinc oxide again, patient continues to refuse.

## 2025-05-19 NOTE — CONSULTS
"Nutrition Follow Up Assessment:   Nutrition Assessment         Patient is a 74 y.o. male presenting with Wound infection.     Nutrition History:  Energy Intake: Good > 75 %  Pain affecting nutrition status: N/A  Food and Nutrient History: Unable to assess patient directly today due to repeated timing conflicts (either with other providers or patient asleep). However, per IDT rounds and review of nursing documentation, patient has been eating 100% of meals and tolerating supplements well (Nico and Pro-Stat BID). Appetite appears improved compared to initial consult, where intake was reported as <50%.       Anthropometrics:  Height: 175.3 cm (5' 9\")   Weight: 94.4 kg (208 lb 1.8 oz)   BMI (Calculated): 30.72  IBW/kg (Dietitian Calculated): 73 kg  Percent of IBW: 126 %       Weight History:   Date/Time Weight   05/16/25 1021 94.4 kg (208 lb 1.8 oz)   05/16/25 0520 94.4 kg (208 lb 1.8 oz)   05/15/25 0800 91.1 kg (200 lb 13.4 oz)   05/14/25 0200 91.1 kg (200 lb 13.4 oz)   05/08/25 1626 94.9 kg (209 lb 3.5 oz)   05/08/25 0850 89.4 kg (197 lb)     Weight Change %:  Weight History / % Weight Change: 4% gain from 5/13 to 5/16th (fluid?) Edema likely explains weight gain over the past few days (+3.3 kg from 5/14 to 5/16)    Nutrition Focused Physical Exam Findings:    Subcutaneous Fat Loss:   Defer Subcutaneous Fat Loss Assessment: Defer all  Defer All Reason: unable to see in person  Muscle Wasting:  Defer Muscle Wasting Assessment: Defer all  Edema:  Edema Location: Generalized Edema: Non-pitting  RUE Edema: Non-pitting  LUE Edema: Mild pitting, slight indentation  RLE Edema: Non-pitting  LLE Edema: Moderate pitting, indentation subsides rapidly  Physical Findings:  Skin: Positive (Skin tear with redness to the groin, pressure injury to the coccyx, and traumatic skin tear to the posterior left long finger (D3).)  Digestive System Findings: Loose stool    Nutrition Significant Labs:  CBC Trend:   Results from last 7 days "   Lab Units 05/20/25  0519 05/18/25  0740 05/17/25  0717 05/16/25  0534   WBC AUTO x10*3/uL 10.7 6.7 5.5 4.3*   RBC AUTO x10*6/uL 4.03* 3.85* 3.80* 3.73*   HEMOGLOBIN g/dL 10.2* 9.6* 9.6* 9.5*   HEMATOCRIT % 33.7* 32.4* 31.4* 30.8*   MCV fL 84 84 83 83   PLATELETS AUTO x10*3/uL 281 237 268 247    , BMP Trend:   Results from last 7 days   Lab Units 05/20/25  0519 05/18/25  0740 05/17/25  0717 05/16/25  0534   GLUCOSE mg/dL 104* 116* 147* 202*   CALCIUM mg/dL 8.8 8.2* 8.3* 8.3*   SODIUM mmol/L 141 144 145 141   POTASSIUM mmol/L 3.7 3.4* 3.2* 3.4*   CO2 mmol/L 34* 33* 33* 34*   CHLORIDE mmol/L 103 107 107 103   BUN mg/dL 12 15 17 22   CREATININE mg/dL 0.63 0.66 0.58 0.68   BG POCT trend:   Results from last 7 days   Lab Units 05/20/25  1638 05/20/25  1100 05/19/25  1953 05/19/25  1558 05/19/25  1127   POCT GLUCOSE mg/dL 186* 133* 145* 165* 145*     Nutrition Specific Medications:  Scheduled medications  Scheduled Medications[1]    I/O:   Last BM Date: 05/19/25; Stool Appearance: Loose (05/19/25 0940)    Dietary Orders (From admission, onward)       Start     Ordered    05/13/25 1412  Oral nutritional supplements  Until discontinued        Question Answer Comment   Deliver with Breakfast    Deliver with Dinner    Select supplement: Nico        05/13/25 1411    05/13/25 1412  Oral nutritional supplements  Until discontinued        Question Answer Comment   Deliver with Breakfast    Deliver with Dinner    Select supplement: Pro-Stat Sugar Free        05/13/25 1411    05/10/25 1243  Adult diet Consistent Carb; CCD 75 gm/meal  Diet effective now        Question Answer Comment   Diet type Consistent Carb    Carb diet selection: CCD 75 gm/meal        05/10/25 1242    05/08/25 1632  May Participate in Room Service With Assistance  ( ROOM SERVICE MAY PARTICIPATE WITH ASSISTANCE)  Once        Question:  .  Answer:  Yes    05/08/25 1631                Estimated Needs:   Total Energy Estimated Needs in 24 hours (kCal): 2300  kCal  Method for Estimating Needs: ~25 kcal/kg of actual BW  Total Protein Estimated Needs in 24 Hours (g):  (88-110g)  Method for Estimating 24 Hour Protein Needs: 1.2-1.5 g/kg of IBW  Total Fluid Estimated Needs in 24 Hours (mL): 2300 mL  Method for Estimating 24 Hour Fluid Needs: 1 mL/kcal or per medical team.  Patient on Order Fluid Restriction: No        Nutrition Diagnosis   Malnutrition Diagnosis  Patient has Malnutrition Diagnosis:  (reassess NFPE when the patient is awake to evaluate for possible muscle or fat loss. If either is present, the patient may meet criteria for moderate or severe malnutrition)    Nutrition Diagnosis  Patient has Nutrition Diagnosis: Yes  Diagnosis Status (1): Active  Nutrition Diagnosis 1: Increased nutrient needs  Related to (1): increased metablic demand  As Evidenced by (1): wound infection, multiple non-healing wounds, diabetic ulcer, and pressure injuries  Additional Assessment Information (1): positive shift in intake patterns, with patient reportedly consuming full meals and both supplements. Inflammatory markers improving.       Nutrition Interventions/Recommendations   Nutrition prescription for oral nutrition    Nutrition Recommendations:  Individualized Nutrition Prescription Provided for : CCD 75, Nico BID and ProStat BID    Nutrition Interventions/Goals:   Meals and Snacks: Carbohydrate-modified diet  Medical Food Supplement: Commercial beverage medical food supplement therapy  Goal: Nico BID (80-90kcal and 2.5g protein + arginine and glutamine per 1 pkt) and ProStat BID(100kcal and 15g protein per 30mL)      Education Documentation  Pt sleeping.         Nutrition Monitoring and Evaluation   Food/Nutrient Related History Monitoring  Monitoring and Evaluation Plan: Intake / amount of food  Intake / Amount of food: Consumes at least 75% or more of meals/snacks/supplements (improving)    Anthropometric Measurements  Monitoring and Evaluation Plan: Body weight  Body  Weight: Body weight - Maintain stable weight (gain, edema?)         Physical Exam Findings  Monitoring and Evaluation Plan: Skin  Skin Finding: Impaired wound healing - Improved wound healing (improving)    Goal Status: Some progress toward goal(s)    Time Spent (min): 30 minutes              [1] carBAMazepine, 200 mg, oral, BID  dexAMETHasone, 6 mg, oral, Daily  enoxaparin, 40 mg, subcutaneous, Daily  fluticasone, 2 spray, Each Nostril, Daily  gabapentin, 300 mg, oral, TID  hydrALAZINE, 25 mg, oral, TID  insulin lispro, 0-10 Units, subcutaneous, TID AC  polyethylene glycol, 17 g, oral, Daily  potassium chloride CR, 40 mEq, oral, Daily  sennosides-docusate sodium, 1 tablet, oral, Nightly  traZODone, 50 mg, oral, Nightly  vancomycin 1.25 g in sodium chloride 0.9% 250 mL IV, 1,250 mg, intravenous, q12h  zinc oxide, 1 Application, Topical, TID

## 2025-05-20 VITALS
TEMPERATURE: 97 F | RESPIRATION RATE: 16 BRPM | SYSTOLIC BLOOD PRESSURE: 130 MMHG | WEIGHT: 208.11 LBS | DIASTOLIC BLOOD PRESSURE: 71 MMHG | BODY MASS INDEX: 30.82 KG/M2 | OXYGEN SATURATION: 100 % | HEART RATE: 68 BPM | HEIGHT: 69 IN

## 2025-05-20 LAB
ANION GAP SERPL CALC-SCNC: 8 MMOL/L (ref 10–20)
BUN SERPL-MCNC: 12 MG/DL (ref 6–23)
CALCIUM SERPL-MCNC: 8.8 MG/DL (ref 8.6–10.3)
CHLORIDE SERPL-SCNC: 103 MMOL/L (ref 98–107)
CO2 SERPL-SCNC: 34 MMOL/L (ref 21–32)
CREAT SERPL-MCNC: 0.63 MG/DL (ref 0.5–1.3)
CRP SERPL-MCNC: 3.85 MG/DL
EGFRCR SERPLBLD CKD-EPI 2021: >90 ML/MIN/1.73M*2
ERYTHROCYTE [DISTWIDTH] IN BLOOD BY AUTOMATED COUNT: 16.3 % (ref 11.5–14.5)
GLUCOSE BLD MANUAL STRIP-MCNC: 133 MG/DL (ref 74–99)
GLUCOSE BLD MANUAL STRIP-MCNC: 186 MG/DL (ref 74–99)
GLUCOSE SERPL-MCNC: 104 MG/DL (ref 74–99)
HCT VFR BLD AUTO: 33.7 % (ref 41–52)
HGB BLD-MCNC: 10.2 G/DL (ref 13.5–17.5)
MCH RBC QN AUTO: 25.3 PG (ref 26–34)
MCHC RBC AUTO-ENTMCNC: 30.3 G/DL (ref 32–36)
MCV RBC AUTO: 84 FL (ref 80–100)
NRBC BLD-RTO: 0 /100 WBCS (ref 0–0)
PLATELET # BLD AUTO: 281 X10*3/UL (ref 150–450)
POTASSIUM SERPL-SCNC: 3.7 MMOL/L (ref 3.5–5.3)
RBC # BLD AUTO: 4.03 X10*6/UL (ref 4.5–5.9)
SODIUM SERPL-SCNC: 141 MMOL/L (ref 136–145)
WBC # BLD AUTO: 10.7 X10*3/UL (ref 4.4–11.3)

## 2025-05-20 PROCEDURE — 97535 SELF CARE MNGMENT TRAINING: CPT | Mod: GO,IPSPLIT

## 2025-05-20 PROCEDURE — 9420000001 HC RT PATIENT EDUCATION 5 MIN: Mod: IPSPLIT

## 2025-05-20 PROCEDURE — 82947 ASSAY GLUCOSE BLOOD QUANT: CPT | Mod: IPSPLIT

## 2025-05-20 PROCEDURE — 2500000001 HC RX 250 WO HCPCS SELF ADMINISTERED DRUGS (ALT 637 FOR MEDICARE OP): Mod: IPSPLIT | Performed by: STUDENT IN AN ORGANIZED HEALTH CARE EDUCATION/TRAINING PROGRAM

## 2025-05-20 PROCEDURE — 85027 COMPLETE CBC AUTOMATED: CPT | Mod: IPSPLIT | Performed by: NURSE PRACTITIONER

## 2025-05-20 PROCEDURE — 2500000002 HC RX 250 W HCPCS SELF ADMINISTERED DRUGS (ALT 637 FOR MEDICARE OP, ALT 636 FOR OP/ED): Mod: IPSPLIT | Performed by: NURSE PRACTITIONER

## 2025-05-20 PROCEDURE — 80048 BASIC METABOLIC PNL TOTAL CA: CPT | Mod: IPSPLIT | Performed by: NURSE PRACTITIONER

## 2025-05-20 PROCEDURE — 86140 C-REACTIVE PROTEIN: CPT | Mod: IPSPLIT | Performed by: NURSE PRACTITIONER

## 2025-05-20 PROCEDURE — 2500000002 HC RX 250 W HCPCS SELF ADMINISTERED DRUGS (ALT 637 FOR MEDICARE OP, ALT 636 FOR OP/ED): Mod: IPSPLIT | Performed by: STUDENT IN AN ORGANIZED HEALTH CARE EDUCATION/TRAINING PROGRAM

## 2025-05-20 PROCEDURE — 99239 HOSP IP/OBS DSCHRG MGMT >30: CPT | Performed by: NURSE PRACTITIONER

## 2025-05-20 PROCEDURE — 2500000004 HC RX 250 GENERAL PHARMACY W/ HCPCS (ALT 636 FOR OP/ED): Mod: JZ,IPSPLIT | Performed by: STUDENT IN AN ORGANIZED HEALTH CARE EDUCATION/TRAINING PROGRAM

## 2025-05-20 RX ORDER — DEXAMETHASONE 6 MG/1
6 TABLET ORAL DAILY
Start: 2025-05-21 | End: 2025-05-23 | Stop reason: HOSPADM

## 2025-05-20 RX ADMIN — GABAPENTIN 300 MG: 300 CAPSULE ORAL at 09:24

## 2025-05-20 RX ADMIN — HYDRALAZINE HYDROCHLORIDE 25 MG: 25 TABLET ORAL at 15:15

## 2025-05-20 RX ADMIN — ZINC OXIDE 1 APPLICATION: 200 OINTMENT TOPICAL at 15:47

## 2025-05-20 RX ADMIN — CARBAMAZEPINE 200 MG: 200 TABLET ORAL at 09:24

## 2025-05-20 RX ADMIN — POTASSIUM CHLORIDE 40 MEQ: 1500 TABLET, EXTENDED RELEASE ORAL at 09:33

## 2025-05-20 RX ADMIN — INSULIN LISPRO 2 UNITS: 100 INJECTION, SOLUTION INTRAVENOUS; SUBCUTANEOUS at 16:44

## 2025-05-20 RX ADMIN — FLUTICASONE PROPIONATE 2 SPRAY: 50 SPRAY, METERED NASAL at 09:26

## 2025-05-20 RX ADMIN — ZINC OXIDE 1 APPLICATION: 200 OINTMENT TOPICAL at 09:25

## 2025-05-20 RX ADMIN — HYDRALAZINE HYDROCHLORIDE 25 MG: 25 TABLET ORAL at 09:25

## 2025-05-20 RX ADMIN — DEXAMETHASONE 6 MG: 6 TABLET ORAL at 09:24

## 2025-05-20 RX ADMIN — ENOXAPARIN SODIUM 40 MG: 40 INJECTION SUBCUTANEOUS at 09:24

## 2025-05-20 RX ADMIN — VANCOMYCIN HYDROCHLORIDE 1.25 G: 1.25 INJECTION, POWDER, LYOPHILIZED, FOR SOLUTION INTRAVENOUS at 09:27

## 2025-05-20 RX ADMIN — GABAPENTIN 300 MG: 300 CAPSULE ORAL at 15:15

## 2025-05-20 ASSESSMENT — COGNITIVE AND FUNCTIONAL STATUS - GENERAL
MOBILITY SCORE: 17
MOVING FROM LYING ON BACK TO SITTING ON SIDE OF FLAT BED WITH BEDRAILS: A LITTLE
TOILETING: A LITTLE
WALKING IN HOSPITAL ROOM: A LITTLE
TOILETING: A LOT
DRESSING REGULAR LOWER BODY CLOTHING: A LOT
HELP NEEDED FOR BATHING: A LITTLE
DRESSING REGULAR UPPER BODY CLOTHING: A LITTLE
CLIMB 3 TO 5 STEPS WITH RAILING: A LOT
MOVING TO AND FROM BED TO CHAIR: A LITTLE
TURNING FROM BACK TO SIDE WHILE IN FLAT BAD: A LITTLE
HELP NEEDED FOR BATHING: A LOT
DRESSING REGULAR UPPER BODY CLOTHING: A LITTLE
DRESSING REGULAR LOWER BODY CLOTHING: A LITTLE
DAILY ACTIVITIY SCORE: 16
STANDING UP FROM CHAIR USING ARMS: A LITTLE
DAILY ACTIVITIY SCORE: 20
PERSONAL GROOMING: A LITTLE

## 2025-05-20 ASSESSMENT — PAIN SCALES - GENERAL
PAINLEVEL_OUTOF10: 0 - NO PAIN
PAINLEVEL_OUTOF10: 0 - NO PAIN

## 2025-05-20 ASSESSMENT — ACTIVITIES OF DAILY LIVING (ADL)
BATHING_LEVEL_OF_ASSISTANCE: MODERATE ASSISTANCE
HOME_MANAGEMENT_TIME_ENTRY: 35

## 2025-05-20 ASSESSMENT — PAIN - FUNCTIONAL ASSESSMENT
PAIN_FUNCTIONAL_ASSESSMENT: 0-10
PAIN_FUNCTIONAL_ASSESSMENT: 0-10

## 2025-05-20 NOTE — PROGRESS NOTES
05/20/25 1022   Discharge Planning   Who is requesting discharge planning? Provider   Home or Post Acute Services Post acute facilities (Rehab/SNF/etc)   Type of Post Acute Facility Services Rehab;Skilled nursing   Expected Discharge Disposition SNF  (Patient aware [facility] is not able to accept, patient would like more facilities to be sent the referral due to he feels like SNF would be better for him than going home.)   Does the patient need discharge transport arranged? Yes   RoundTrip coordination needed? Yes   Has discharge transport been arranged? No   Patient Choice   Provider Choice list and CMS website (https://medicare.gov/care-compare#search) for post-acute Quality and Resource Measure Data were provided and reviewed with: Patient   Patient / Family choosing to utilize agency / facility established prior to hospitalization No        05/20/25 1208   Discharge Planning   Who is requesting discharge planning? Provider   Home or Post Acute Services Post acute facilities (Rehab/SNF/etc)   Expected Discharge Disposition SNF  (Patient aware [facility] accepted patient, he requested his niece, Rubén be made aware. Rubén aware of discharge to [facility].)   Does the patient need discharge transport arranged? Yes   RoundTrip coordination needed? Yes   Has discharge transport been arranged? No   What day is the transport expected? 05/20/25

## 2025-05-20 NOTE — CARE PLAN
The patient's goals for the shift include to get some sleep    The clinical goals for the shift include work with therapy    Goal met. Vss. Patient denies pain. Patient discharging to [facility] . Patient needs total 16 weeks of ATB therapy. Left upper PICC intact and patent. Called and gave nurse to nurse report to Bonnie.

## 2025-05-20 NOTE — DISCHARGE INSTR - DIET
Dietitian recommendations for wound healing:   Recommend patient consume high protein supplement 1-2 times daily to aid in weight gain/prevent weight loss.   Patient also to consume Nico twice daily to aid in wound healing. (See the [facility] located at [address] [place] [address] To purchase Nico at a discounted rate)

## 2025-05-20 NOTE — DISCHARGE INSTRUCTIONS
Patient to follow up with Podiatry, Dr. Christianson [phone]: Pt to follow up 1 week after discharge. Patient will need to call in order to arrange transport from SNF.

## 2025-05-20 NOTE — DISCHARGE SUMMARY
Caller: Judith Sandoval    Relationship: Self    Best call back number:       [phone] (Mobile)     What medication are you requesting:     DIFLUCAN    What are your current symptoms:     YEAST INFECTION - WHITE DISCHARGE/ITCHING    How long have you been experiencing symptoms:     SINCE 4/8    Have you had these symptoms before:    [x] Yes  [] No    Have you been treated for these symptoms before:   [x] Yes  [] No    If a prescription is needed, what is your preferred pharmacy and phone number:      [place], [place]    TELEPHONE CONTACT:    [phone]           Discharge Diagnosis  Wound infection               Issues Requiring Follow-Up  Follow up with Dr. Christianson's office in 1 week, facility to schedule appointment and transportation.       Discharge Meds     Medication List      START taking these medications     dexAMETHasone 6 mg tablet; Commonly known as: Decadron; Take 1 tablet (6   mg) by mouth once daily for 2 doses.; Start taking on: May 21, 2025   oxygen gas therapy; Commonly known as: O2; Inhale 2 L/min at 120,000   mL/hr if needed (desaturation at hs).   sodium chloride 0.9% piggyback 250 mL with vancomycin 1.25 gram recon   soln 1.25 g; Infuse 1.25 g at 200 mL/hr over 75 minutes into a venous   catheter every 12 hours.     CHANGE how you take these medications     carBAMazepine 200 mg tablet; Commonly known as: TEGretol; What changed:   Another medication with the same name was removed. Continue taking this   medication, and follow the directions you see here.     CONTINUE taking these medications     Blood glucose monitoring meter; To check blood sugar every morning   before breakfast   gabapentin 300 mg capsule; Commonly known as: Neurontin   hydrALAZINE 25 mg tablet; Commonly known as: Apresoline; Take 1 tablet   (25 mg) by mouth 3 times a day.   ondansetron ODT 4 mg disintegrating tablet; Commonly known as:   Zofran-ODT; Dissolve 1 tablet (4 mg) in the mouth every 8 hours if needed   for nausea or vomiting.   traZODone 50 mg tablet; Commonly known as: Desyrel     STOP taking these medications     aspirin 81 mg chewable tablet   potassium chloride CR 20 mEq ER tablet; Commonly known as: Klor-Con M20   rivaroxaban 15 mg tablet; Commonly known as: Xarelto   rivaroxaban 20 mg tablet; Commonly known as: Xarelto       Test Results Pending At Discharge  Pending Labs       No current pending labs.            Hospital Course  Patient admitted to the hospital from the ED for non-healing wound on his left foot. Patient has a history of osteomyelitis, DM, neuropathy,  hypertension, trigeminal neuralgia, and hyperlipidemia.    In the ED, labs, wound and blood cultures and imaging were obtained.   Labs WBC 8.4, H/H 11.2/36.2 , Na 140, K 4.0, Cl 105, CO2 26, BUN 18, CR 0.56 Glucose 134 CRP 1.54 Lactate 1.2  Tissue Culture +MRSA, +Moderate gram positive cocci  Blood cultures No growth  XR tib/fib: IMPRESSION: Thick periosteal new bone formation along the left tibia likely due  to history of chronic osteomyelitis and healing. No acute bone destruction.  Zosyn and Vancomycin initiated     While on med/surg patient became aggressive and combative with staff; refusing treatment,interventions, and therapies. Patient continued to become hypoxic with supplemental oxygen and was transferred to ICU. Patient was found to be COVID (+). Remdesivir was started for 5 days along with Vancomycin and Zosyn. Patient was followed by Infectious Disease and Podiatry throughout hospitalization. Podiatry did not recommend surgery for this visit.  PICC line was placed for Vancomycin infusion twice daily until June 18, 2025.  Discharged to [facility] to complete 6 week Vancomycin course and PT/OT.    This morning, patient sitting up in chair, more conversant. Denies HA, fever, chills, dizziness, CP, SOB, abdominal pain, nausea, vomiting or diarrhea. Reports would like lidocaine to his buttock, denies any other pain.     Wound infection  Osteomyelitis of left foot, unspecified type (Multi)  - XR tib/fib: Thick periosteal new bone formation along the left tibia likely due to history of chronic osteomyelitis and healing. No acute bone destruction.  - MRI: No evidence to suggest osteomyelitis. 2. Partially visualized large soft tissue ulceration along the anterior aspect of the distal tibia and tibial talar joint with possible exposure of the underlying anterior tibialis tendon. Moderate amount of fluid along the posterior as well as anterior aspect of the medial head of the gastrocnemius  extending from the level of the knee to the distal lower leg. This is nonspecific with infectious fasciitis felt to be less likely although not entirely excluded. Edema of the anterior compartment muscles of the lower leg which may represent infectious myositis. No evidence of intramuscular abscess.  Extensive diffuse soft tissue edema noted throughout the left lower extremity and partially visualized right lower extremity Moderate diffuse fatty atrophy of the lower leg muscles.  - Podiatry consult:  Xeroform to wound base, covered with dry gauze, Abd pads, Kerlix and ACE bandage (little to no compression when applying). May change xeroform to betadine-soaked 4x4 if drainage increases.   - ID consult; recommending 6 weeks of vancomcin or dalvance on day 1,8, and 35  - No plans for surgical intervention during this visit.   - CRP 24.5 most likely due to Chronic osteomyelitis   - Follow CRP -ID recommend discontinue Zosyn and continue with Vancomycin 6 weeks  - discontinue zosyn  - continue vancomycin 1g q12h  - wound culture: GPC grew MRSA   - Blood culture- no growth at 5 days 5/9  - follow wound culture, WBC, blood cx  - PWB to the left lower extremity for transfer  - Nursing staff is able to change/reinforce dressing if & as necessary until next day’s dressing change  - PT/OT- Mod intensity level  - PICC line placed for 6 weeks IV antibiotics-CHG bath  - RT to evaluate  - Monitor temperature  - Follow WBC   - Dressing changed 5/14 by Wound care nurse      Acute deep vein thrombosis (DVT) of popliteal vein of left lower extremity  Bilateral lower extremity edema  - previous xarelto for DVT, completed  - elevate legs when resting        Benign essential HTN  Hyperlipidemia  - Echo: normal LVSF with an EF of 55-60% with a Grade I (impaired relaxation pattern) of  LVDF. Moderate aortic valve stenosis.    - continue hydralazine 25 mg TID  - monitor HR, BP     DM type 2 with diabetic peripheral neuropathy  - sliding  "scale with lispro coverage 0-10  - A1c: 5.8  - Consistent carb 75gm/meal  - AccuCheck q4/day  - Hypoglycemia protocol  - Decreased appetite  - discontinue Maintenance fluids D5NS at 50mL/hr      Trigeminal neuralgia  - continue carbamazepine 200 mg BID, gabapentin 300 mg TID     Fever  Metabolic encephalopathy  COVID-19  - fever overnight 5/12 38.7 Axillary, Zosyn resumed  - COVID + 5/13  - completed Remdisivir x 5 days  - Currently on 2lpm via NC  - mycoplasma pneuminiae, IgM 0.01  - legionella negative   - Monitor fever  - Monitor WBC   - CXR: Cardiomegaly with new perihilar and basilar edema with patchy  multifocal airspace opacities right worse than left.Small bilateral pleural effusions.   - ID consulted- Psych consulted for behavior issues 5/12: Evaluated 5/13 'Pt unable to participate in consult - is on venturi mask and in/out of sleep.Will try tomorrow. Likely the agitation was related to hypoxia.\"  5/15 signed off. If not eating start Remeron 7.5 mg po hs  - discontinued Lama Catheter   - I/O hourly        Normocytic anemia  - Hb 10.2/ MCV 81  - monitor CBC     Hypokalemia  - K 3.4 > 3.4 >3.2 > 3.4  - replaced with KCL 40 mEq  - monitor BMP  - Resume potassium 40 meq daily   - given another dose of KCL 40 mEq            Pertinent Physical Exam At Time of Discharge  Physical Exam  Constitutional:       General: He is not in acute distress.     Appearance: He is obese. He is ill-appearing. He is not toxic-appearing.   HENT:      Head: Normocephalic and atraumatic.      Mouth/Throat:      Mouth: Mucous membranes are moist.   Eyes:      General: No scleral icterus.        Right eye: No discharge.         Left eye: No discharge.      Pupils: Pupils are equal, round, and reactive to light.   Cardiovascular:      Rate and Rhythm: Normal rate and regular rhythm.      Pulses: Normal pulses.      Heart sounds: Normal heart sounds. No murmur heard.     No gallop.   Pulmonary:      Effort: Pulmonary effort is normal. " "No respiratory distress.      Breath sounds: No wheezing, rhonchi or rales.      Comments: Diminished bilaterally  Chest:      Chest wall: No tenderness.   Abdominal:      General: There is no distension.      Palpations: Abdomen is soft. There is no mass.      Tenderness: There is no abdominal tenderness.   Musculoskeletal:         General: No swelling.      Cervical back: Normal range of motion. No rigidity or tenderness.      Right lower leg: No edema.      Left lower leg: No edema.      Comments: PICC left upper arm    Skin:     General: Skin is warm and dry.      Capillary Refill: Capillary refill takes 2 to 3 seconds.      Comments: Left foot dressing intact  Sacral wound not assessed, patient stated \"I am not moving\"    Neurological:      General: No focal deficit present.      Mental Status: He is alert and oriented to person, place, and time.   Psychiatric:      Comments: Irritable and uncooperative at times       Stable for discharge to [facility].  Total cumulative time spent in preparation of this discharge including documentation review, coordination of care with the medical team including PT/SW/care coordinators and treating consultants, discussion with patient and pertinent family members and finalization of prescriptions, follow-up appointments, and this discharge summary was approximately 45 minutes.     Outpatient Follow-Up  No future appointments.      Yue Rodriguez, APRN-CNP  "

## 2025-05-20 NOTE — CARE PLAN
"The patient's goals for the shift include to get some sleep    The clinical goals for the shift include Patient will tolerate IV antibiotics this shift    Assumed care of patient at 1930. Patient denies pain this shift. Declined application of zinc oxide at HS d/t using ointment in the past and stating \"it does not work for me\". Left leg wound dressing remains in place and clean. Patient does refuse most q2h turns, but has been otherwise cooperative with care this shift.  PICC line in place with brisk blood return. Vancomycin administered without issue. Patient has been voiding continently throughout the shift with a total of 950mLs output. Patient resting in bed with call light within reach and safety measures in place.    " Pt seen for nutrition consult.  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 Pt. states that on 12/24/19 she had developed symptoms of of UTI, including dysuria and low-grade body temperature/fever. Pt. now found to have EColi bacteremia. Transplant nephrology team is following for AURELIO and immunosuppression management.

## 2025-05-21 ENCOUNTER — HOSPITAL ENCOUNTER (OUTPATIENT)
Facility: HOSPITAL | Age: 75
Setting detail: OBSERVATION
Discharge: NURSING, REHAB OR LONG-TERM CARE | End: 2025-05-23
Attending: FAMILY MEDICINE | Admitting: INTERNAL MEDICINE
Payer: MEDICARE

## 2025-05-21 ENCOUNTER — APPOINTMENT (OUTPATIENT)
Dept: CARDIOLOGY | Facility: HOSPITAL | Age: 75
End: 2025-05-21
Payer: MEDICARE

## 2025-05-21 ENCOUNTER — APPOINTMENT (OUTPATIENT)
Dept: RADIOLOGY | Facility: HOSPITAL | Age: 75
End: 2025-05-21
Payer: MEDICARE

## 2025-05-21 DIAGNOSIS — Z86.16 HISTORY OF COVID-19: ICD-10-CM

## 2025-05-21 DIAGNOSIS — L08.9 WOUND INFECTION: ICD-10-CM

## 2025-05-21 DIAGNOSIS — T14.8XXA WOUND INFECTION: ICD-10-CM

## 2025-05-21 DIAGNOSIS — R91.8 BILATERAL PULMONARY INFILTRATES: ICD-10-CM

## 2025-05-21 DIAGNOSIS — J90 BILATERAL PLEURAL EFFUSION: ICD-10-CM

## 2025-05-21 DIAGNOSIS — J15.9 BACTERIAL PNEUMONIA: ICD-10-CM

## 2025-05-21 DIAGNOSIS — R07.9 CHEST PAIN, UNSPECIFIED TYPE: Primary | ICD-10-CM

## 2025-05-21 DIAGNOSIS — Z86.73 HISTORY OF STROKE: ICD-10-CM

## 2025-05-21 DIAGNOSIS — M86.9: ICD-10-CM

## 2025-05-21 DIAGNOSIS — M86.9 OSTEOMYELITIS OF LEFT FOOT, UNSPECIFIED TYPE (MULTI): ICD-10-CM

## 2025-05-21 LAB
ALBUMIN SERPL BCP-MCNC: 3.1 G/DL (ref 3.4–5)
ALP SERPL-CCNC: 76 U/L (ref 33–136)
ALT SERPL W P-5'-P-CCNC: 8 U/L (ref 10–52)
ANION GAP SERPL CALC-SCNC: 8 MMOL/L (ref 10–20)
APPEARANCE UR: CLEAR
APTT PPP: 29 SECONDS (ref 26–36)
AST SERPL W P-5'-P-CCNC: 11 U/L (ref 9–39)
BASOPHILS # BLD AUTO: 0.02 X10*3/UL (ref 0–0.1)
BASOPHILS NFR BLD AUTO: 0.2 %
BILIRUB SERPL-MCNC: 0.3 MG/DL (ref 0–1.2)
BILIRUB UR STRIP.AUTO-MCNC: NEGATIVE MG/DL
BNP SERPL-MCNC: 92 PG/ML (ref 0–99)
BUN SERPL-MCNC: 17 MG/DL (ref 6–23)
CALCIUM SERPL-MCNC: 8.8 MG/DL (ref 8.6–10.3)
CARDIAC TROPONIN I PNL SERPL HS: 10 NG/L (ref 0–20)
CARDIAC TROPONIN I PNL SERPL HS: 10 NG/L (ref 0–20)
CHLORIDE SERPL-SCNC: 103 MMOL/L (ref 98–107)
CO2 SERPL-SCNC: 33 MMOL/L (ref 21–32)
COLOR UR: COLORLESS
CREAT SERPL-MCNC: 0.8 MG/DL (ref 0.5–1.3)
D DIMER PPP FEU-MCNC: 1303 NG/ML FEU
EGFRCR SERPLBLD CKD-EPI 2021: >90 ML/MIN/1.73M*2
EOSINOPHIL # BLD AUTO: 0.03 X10*3/UL (ref 0–0.4)
EOSINOPHIL NFR BLD AUTO: 0.3 %
ERYTHROCYTE [DISTWIDTH] IN BLOOD BY AUTOMATED COUNT: 16.3 % (ref 11.5–14.5)
GLUCOSE BLD MANUAL STRIP-MCNC: 170 MG/DL (ref 74–99)
GLUCOSE SERPL-MCNC: 172 MG/DL (ref 74–99)
GLUCOSE UR STRIP.AUTO-MCNC: NORMAL MG/DL
HCT VFR BLD AUTO: 34.2 % (ref 41–52)
HGB BLD-MCNC: 10.3 G/DL (ref 13.5–17.5)
IMM GRANULOCYTES # BLD AUTO: 0.12 X10*3/UL (ref 0–0.5)
IMM GRANULOCYTES NFR BLD AUTO: 1.3 % (ref 0–0.9)
INR PPP: 1.1 (ref 0.9–1.1)
KETONES UR STRIP.AUTO-MCNC: NEGATIVE MG/DL
LACTATE SERPL-SCNC: 0.9 MMOL/L (ref 0.4–2)
LEUKOCYTE ESTERASE UR QL STRIP.AUTO: NEGATIVE
LYMPHOCYTES # BLD AUTO: 1.18 X10*3/UL (ref 0.8–3)
LYMPHOCYTES NFR BLD AUTO: 13 %
MCH RBC QN AUTO: 25.3 PG (ref 26–34)
MCHC RBC AUTO-ENTMCNC: 30.1 G/DL (ref 32–36)
MCV RBC AUTO: 84 FL (ref 80–100)
MONOCYTES # BLD AUTO: 0.52 X10*3/UL (ref 0.05–0.8)
MONOCYTES NFR BLD AUTO: 5.7 %
NEUTROPHILS # BLD AUTO: 7.21 X10*3/UL (ref 1.6–5.5)
NEUTROPHILS NFR BLD AUTO: 79.5 %
NITRITE UR QL STRIP.AUTO: NEGATIVE
NRBC BLD-RTO: 0 /100 WBCS (ref 0–0)
PH UR STRIP.AUTO: 7.5 [PH]
PLATELET # BLD AUTO: 296 X10*3/UL (ref 150–450)
POTASSIUM SERPL-SCNC: 4.1 MMOL/L (ref 3.5–5.3)
PROT SERPL-MCNC: 6 G/DL (ref 6.4–8.2)
PROT UR STRIP.AUTO-MCNC: NEGATIVE MG/DL
PROTHROMBIN TIME: 12.1 SECONDS (ref 9.8–12.4)
RBC # BLD AUTO: 4.07 X10*6/UL (ref 4.5–5.9)
RBC # UR STRIP.AUTO: NEGATIVE MG/DL
SARS-COV-2 RNA RESP QL NAA+PROBE: NOT DETECTED
SODIUM SERPL-SCNC: 140 MMOL/L (ref 136–145)
SP GR UR STRIP.AUTO: 1.02
UROBILINOGEN UR STRIP.AUTO-MCNC: NORMAL MG/DL
WBC # BLD AUTO: 9.1 X10*3/UL (ref 4.4–11.3)

## 2025-05-21 PROCEDURE — 2500000004 HC RX 250 GENERAL PHARMACY W/ HCPCS (ALT 636 FOR OP/ED): Mod: JW | Performed by: HOSPITALIST

## 2025-05-21 PROCEDURE — 83880 ASSAY OF NATRIURETIC PEPTIDE: CPT | Performed by: FAMILY MEDICINE

## 2025-05-21 PROCEDURE — 87635 SARS-COV-2 COVID-19 AMP PRB: CPT | Performed by: FAMILY MEDICINE

## 2025-05-21 PROCEDURE — 2500000001 HC RX 250 WO HCPCS SELF ADMINISTERED DRUGS (ALT 637 FOR MEDICARE OP): Performed by: NURSE PRACTITIONER

## 2025-05-21 PROCEDURE — 96372 THER/PROPH/DIAG INJ SC/IM: CPT | Performed by: NURSE PRACTITIONER

## 2025-05-21 PROCEDURE — 84484 ASSAY OF TROPONIN QUANT: CPT | Performed by: FAMILY MEDICINE

## 2025-05-21 PROCEDURE — 81003 URINALYSIS AUTO W/O SCOPE: CPT | Performed by: FAMILY MEDICINE

## 2025-05-21 PROCEDURE — 9420000001 HC RT PATIENT EDUCATION 5 MIN

## 2025-05-21 PROCEDURE — 82947 ASSAY GLUCOSE BLOOD QUANT: CPT

## 2025-05-21 PROCEDURE — 2500000002 HC RX 250 W HCPCS SELF ADMINISTERED DRUGS (ALT 637 FOR MEDICARE OP, ALT 636 FOR OP/ED): Performed by: HOSPITALIST

## 2025-05-21 PROCEDURE — 99285 EMERGENCY DEPT VISIT HI MDM: CPT | Mod: 25 | Performed by: FAMILY MEDICINE

## 2025-05-21 PROCEDURE — 96361 HYDRATE IV INFUSION ADD-ON: CPT

## 2025-05-21 PROCEDURE — 85730 THROMBOPLASTIN TIME PARTIAL: CPT | Performed by: FAMILY MEDICINE

## 2025-05-21 PROCEDURE — 83605 ASSAY OF LACTIC ACID: CPT | Performed by: FAMILY MEDICINE

## 2025-05-21 PROCEDURE — 94760 N-INVAS EAR/PLS OXIMETRY 1: CPT

## 2025-05-21 PROCEDURE — 80053 COMPREHEN METABOLIC PANEL: CPT | Performed by: FAMILY MEDICINE

## 2025-05-21 PROCEDURE — 85379 FIBRIN DEGRADATION QUANT: CPT | Performed by: FAMILY MEDICINE

## 2025-05-21 PROCEDURE — G0378 HOSPITAL OBSERVATION PER HR: HCPCS

## 2025-05-21 PROCEDURE — 71275 CT ANGIOGRAPHY CHEST: CPT

## 2025-05-21 PROCEDURE — 96375 TX/PRO/DX INJ NEW DRUG ADDON: CPT | Mod: 59

## 2025-05-21 PROCEDURE — 2500000004 HC RX 250 GENERAL PHARMACY W/ HCPCS (ALT 636 FOR OP/ED): Mod: JZ | Performed by: NURSE PRACTITIONER

## 2025-05-21 PROCEDURE — 36415 COLL VENOUS BLD VENIPUNCTURE: CPT | Performed by: FAMILY MEDICINE

## 2025-05-21 PROCEDURE — 71275 CT ANGIOGRAPHY CHEST: CPT | Performed by: STUDENT IN AN ORGANIZED HEALTH CARE EDUCATION/TRAINING PROGRAM

## 2025-05-21 PROCEDURE — 96366 THER/PROPH/DIAG IV INF ADDON: CPT

## 2025-05-21 PROCEDURE — 2550000001 HC RX 255 CONTRASTS: Mod: JZ | Performed by: FAMILY MEDICINE

## 2025-05-21 PROCEDURE — 85025 COMPLETE CBC W/AUTO DIFF WBC: CPT | Performed by: FAMILY MEDICINE

## 2025-05-21 PROCEDURE — 96367 TX/PROPH/DG ADDL SEQ IV INF: CPT

## 2025-05-21 PROCEDURE — 2500000005 HC RX 250 GENERAL PHARMACY W/O HCPCS: Performed by: FAMILY MEDICINE

## 2025-05-21 PROCEDURE — 93005 ELECTROCARDIOGRAM TRACING: CPT

## 2025-05-21 PROCEDURE — 2500000001 HC RX 250 WO HCPCS SELF ADMINISTERED DRUGS (ALT 637 FOR MEDICARE OP): Performed by: HOSPITALIST

## 2025-05-21 PROCEDURE — 87075 CULTR BACTERIA EXCEPT BLOOD: CPT | Mod: GENLAB,59 | Performed by: FAMILY MEDICINE

## 2025-05-21 PROCEDURE — 2500000004 HC RX 250 GENERAL PHARMACY W/ HCPCS (ALT 636 FOR OP/ED): Mod: JZ | Performed by: FAMILY MEDICINE

## 2025-05-21 PROCEDURE — 85610 PROTHROMBIN TIME: CPT | Performed by: FAMILY MEDICINE

## 2025-05-21 PROCEDURE — 2500000005 HC RX 250 GENERAL PHARMACY W/O HCPCS: Performed by: NURSE PRACTITIONER

## 2025-05-21 PROCEDURE — 96365 THER/PROPH/DIAG IV INF INIT: CPT | Mod: 59

## 2025-05-21 RX ORDER — HEPARIN SODIUM,PORCINE 10 UNIT/ML
10 VIAL (ML) INTRAVENOUS AS NEEDED
Status: ON HOLD | COMMUNITY
End: 2025-05-22 | Stop reason: ENTERED-IN-ERROR

## 2025-05-21 RX ORDER — DEXTROSE MONOHYDRATE 50 MG/ML
INJECTION, SOLUTION INTRAVENOUS
Status: DISPENSED
Start: 2025-05-21 | End: 2025-05-22

## 2025-05-21 RX ORDER — ACETAMINOPHEN 650 MG/1
650 SUPPOSITORY RECTAL EVERY 4 HOURS PRN
Status: DISCONTINUED | OUTPATIENT
Start: 2025-05-21 | End: 2025-05-22

## 2025-05-21 RX ORDER — GABAPENTIN 300 MG/1
300 CAPSULE ORAL 2 TIMES DAILY PRN
Status: DISCONTINUED | OUTPATIENT
Start: 2025-05-21 | End: 2025-05-23 | Stop reason: HOSPADM

## 2025-05-21 RX ORDER — ACETAMINOPHEN 325 MG/1
650 TABLET ORAL EVERY 4 HOURS PRN
Status: DISCONTINUED | OUTPATIENT
Start: 2025-05-21 | End: 2025-05-23 | Stop reason: HOSPADM

## 2025-05-21 RX ORDER — ONDANSETRON 4 MG/1
4 TABLET, FILM COATED ORAL EVERY 8 HOURS PRN
Status: DISCONTINUED | OUTPATIENT
Start: 2025-05-21 | End: 2025-05-22

## 2025-05-21 RX ORDER — ACETAMINOPHEN 160 MG/5ML
650 SOLUTION ORAL EVERY 4 HOURS PRN
Status: DISCONTINUED | OUTPATIENT
Start: 2025-05-21 | End: 2025-05-22

## 2025-05-21 RX ORDER — PANTOPRAZOLE SODIUM 40 MG/1
40 TABLET, DELAYED RELEASE ORAL
Status: DISCONTINUED | OUTPATIENT
Start: 2025-05-22 | End: 2025-05-23 | Stop reason: HOSPADM

## 2025-05-21 RX ORDER — DEXAMETHASONE 6 MG/1
6 TABLET ORAL DAILY
Status: DISCONTINUED | OUTPATIENT
Start: 2025-05-22 | End: 2025-05-22

## 2025-05-21 RX ORDER — ALBUTEROL SULFATE 0.83 MG/ML
2.5 SOLUTION RESPIRATORY (INHALATION) EVERY 2 HOUR PRN
Status: DISCONTINUED | OUTPATIENT
Start: 2025-05-21 | End: 2025-05-23 | Stop reason: HOSPADM

## 2025-05-21 RX ORDER — ENOXAPARIN SODIUM 100 MG/ML
40 INJECTION SUBCUTANEOUS EVERY 24 HOURS
Status: DISCONTINUED | OUTPATIENT
Start: 2025-05-21 | End: 2025-05-23 | Stop reason: HOSPADM

## 2025-05-21 RX ORDER — TRAZODONE HYDROCHLORIDE 50 MG/1
50 TABLET ORAL NIGHTLY
Status: DISCONTINUED | OUTPATIENT
Start: 2025-05-21 | End: 2025-05-23 | Stop reason: HOSPADM

## 2025-05-21 RX ORDER — HEPARIN 100 UNIT/ML
1 SYRINGE INTRAVENOUS EVERY 8 HOURS PRN
Status: DISCONTINUED | OUTPATIENT
Start: 2025-05-21 | End: 2025-05-23 | Stop reason: HOSPADM

## 2025-05-21 RX ORDER — VANCOMYCIN HYDROCHLORIDE 1.5 G/30ML
INJECTION, POWDER, LYOPHILIZED, FOR SOLUTION INTRAVENOUS
Status: DISPENSED
Start: 2025-05-21 | End: 2025-05-22

## 2025-05-21 RX ORDER — ONDANSETRON HYDROCHLORIDE 2 MG/ML
4 INJECTION, SOLUTION INTRAVENOUS EVERY 8 HOURS PRN
Status: DISCONTINUED | OUTPATIENT
Start: 2025-05-21 | End: 2025-05-23 | Stop reason: HOSPADM

## 2025-05-21 RX ORDER — AMOXICILLIN 250 MG
1 CAPSULE ORAL NIGHTLY PRN
Status: DISCONTINUED | OUTPATIENT
Start: 2025-05-21 | End: 2025-05-23 | Stop reason: HOSPADM

## 2025-05-21 RX ORDER — ACETAMINOPHEN 500 MG
5 TABLET ORAL NIGHTLY PRN
Status: DISCONTINUED | OUTPATIENT
Start: 2025-05-21 | End: 2025-05-23 | Stop reason: HOSPADM

## 2025-05-21 RX ORDER — SODIUM CHLORIDE 9 MG/ML
125 INJECTION, SOLUTION INTRAVENOUS CONTINUOUS
Status: DISCONTINUED | OUTPATIENT
Start: 2025-05-21 | End: 2025-05-21

## 2025-05-21 RX ORDER — GUAIFENESIN 600 MG/1
600 TABLET, EXTENDED RELEASE ORAL EVERY 12 HOURS PRN
Status: DISCONTINUED | OUTPATIENT
Start: 2025-05-21 | End: 2025-05-23 | Stop reason: HOSPADM

## 2025-05-21 RX ORDER — FUROSEMIDE 10 MG/ML
40 INJECTION INTRAMUSCULAR; INTRAVENOUS EVERY 12 HOURS
Status: COMPLETED | OUTPATIENT
Start: 2025-05-21 | End: 2025-05-22

## 2025-05-21 RX ORDER — INSULIN LISPRO 100 [IU]/ML
2-10 INJECTION, SOLUTION INTRAVENOUS; SUBCUTANEOUS
Status: ON HOLD | COMMUNITY
End: 2025-05-22 | Stop reason: ENTERED-IN-ERROR

## 2025-05-21 RX ORDER — PANTOPRAZOLE SODIUM 40 MG/10ML
40 INJECTION, POWDER, LYOPHILIZED, FOR SOLUTION INTRAVENOUS
Status: DISCONTINUED | OUTPATIENT
Start: 2025-05-22 | End: 2025-05-22

## 2025-05-21 RX ORDER — HYDRALAZINE HYDROCHLORIDE 25 MG/1
25 TABLET, FILM COATED ORAL 3 TIMES DAILY
Status: DISCONTINUED | OUTPATIENT
Start: 2025-05-21 | End: 2025-05-23 | Stop reason: HOSPADM

## 2025-05-21 RX ORDER — INSULIN LISPRO 100 [IU]/ML
0-10 INJECTION, SOLUTION INTRAVENOUS; SUBCUTANEOUS
Status: DISCONTINUED | OUTPATIENT
Start: 2025-05-21 | End: 2025-05-22

## 2025-05-21 RX ORDER — GUAIFENESIN/DEXTROMETHORPHAN 100-10MG/5
5 SYRUP ORAL EVERY 4 HOURS PRN
Status: DISCONTINUED | OUTPATIENT
Start: 2025-05-21 | End: 2025-05-23 | Stop reason: HOSPADM

## 2025-05-21 RX ADMIN — TRAZODONE HYDROCHLORIDE 50 MG: 50 TABLET ORAL at 21:27

## 2025-05-21 RX ADMIN — HEPARIN 100 UNITS: 100 SYRINGE at 23:49

## 2025-05-21 RX ADMIN — Medication 3 L/MIN: at 17:28

## 2025-05-21 RX ADMIN — PIPERACILLIN SODIUM AND TAZOBACTAM SODIUM 3.38 G: 3; .375 INJECTION, SOLUTION INTRAVENOUS at 18:44

## 2025-05-21 RX ADMIN — SODIUM CHLORIDE 125 ML/HR: 0.9 INJECTION, SOLUTION INTRAVENOUS at 17:28

## 2025-05-21 RX ADMIN — DEXTROSE MONOHYDRATE 1500 MG: 5 INJECTION, SOLUTION INTRAVENOUS at 21:27

## 2025-05-21 RX ADMIN — Medication 5 MG: at 21:27

## 2025-05-21 RX ADMIN — HYDRALAZINE HYDROCHLORIDE 25 MG: 25 TABLET ORAL at 21:31

## 2025-05-21 RX ADMIN — IOHEXOL 75 ML: 350 INJECTION, SOLUTION INTRAVENOUS at 17:53

## 2025-05-21 RX ADMIN — INSULIN LISPRO 2 UNITS: 100 INJECTION, SOLUTION INTRAVENOUS; SUBCUTANEOUS at 21:36

## 2025-05-21 RX ADMIN — Medication 3 L/MIN: at 22:01

## 2025-05-21 RX ADMIN — SENNOSIDES AND DOCUSATE SODIUM 1 TABLET: 50; 8.6 TABLET ORAL at 21:27

## 2025-05-21 RX ADMIN — ENOXAPARIN SODIUM 40 MG: 40 INJECTION SUBCUTANEOUS at 21:34

## 2025-05-21 RX ADMIN — FUROSEMIDE 40 MG: 10 INJECTION, SOLUTION INTRAMUSCULAR; INTRAVENOUS at 21:27

## 2025-05-21 SDOH — ECONOMIC STABILITY: FOOD INSECURITY: WITHIN THE PAST 12 MONTHS, THE FOOD YOU BOUGHT JUST DIDN'T LAST AND YOU DIDN'T HAVE MONEY TO GET MORE.: NEVER TRUE

## 2025-05-21 SDOH — SOCIAL STABILITY: SOCIAL INSECURITY: WITHIN THE LAST YEAR, HAVE YOU BEEN HUMILIATED OR EMOTIONALLY ABUSED IN OTHER WAYS BY YOUR PARTNER OR EX-PARTNER?: NO

## 2025-05-21 SDOH — SOCIAL STABILITY: SOCIAL INSECURITY: WITHIN THE LAST YEAR, HAVE YOU BEEN AFRAID OF YOUR PARTNER OR EX-PARTNER?: NO

## 2025-05-21 SDOH — ECONOMIC STABILITY: INCOME INSECURITY: IN THE PAST 12 MONTHS HAS THE ELECTRIC, GAS, OIL, OR WATER COMPANY THREATENED TO SHUT OFF SERVICES IN YOUR HOME?: NO

## 2025-05-21 SDOH — SOCIAL STABILITY: SOCIAL INSECURITY: ARE YOU OR HAVE YOU BEEN THREATENED OR ABUSED PHYSICALLY, EMOTIONALLY, OR SEXUALLY BY ANYONE?: NO

## 2025-05-21 SDOH — SOCIAL STABILITY: SOCIAL INSECURITY: DO YOU FEEL UNSAFE GOING BACK TO THE PLACE WHERE YOU ARE LIVING?: NO

## 2025-05-21 SDOH — ECONOMIC STABILITY: HOUSING INSECURITY: AT ANY TIME IN THE PAST 12 MONTHS, WERE YOU HOMELESS OR LIVING IN A SHELTER (INCLUDING NOW)?: NO

## 2025-05-21 SDOH — ECONOMIC STABILITY: TRANSPORTATION INSECURITY: IN THE PAST 12 MONTHS, HAS LACK OF TRANSPORTATION KEPT YOU FROM MEDICAL APPOINTMENTS OR FROM GETTING MEDICATIONS?: NO

## 2025-05-21 SDOH — ECONOMIC STABILITY: FOOD INSECURITY: HOW HARD IS IT FOR YOU TO PAY FOR THE VERY BASICS LIKE FOOD, HOUSING, MEDICAL CARE, AND HEATING?: NOT HARD AT ALL

## 2025-05-21 SDOH — ECONOMIC STABILITY: HOUSING INSECURITY: IN THE PAST 12 MONTHS, HOW MANY TIMES HAVE YOU MOVED WHERE YOU WERE LIVING?: 0

## 2025-05-21 SDOH — SOCIAL STABILITY: SOCIAL INSECURITY: HAS ANYONE EVER THREATENED TO HURT YOUR FAMILY OR YOUR PETS?: NO

## 2025-05-21 SDOH — SOCIAL STABILITY: SOCIAL INSECURITY: DOES ANYONE TRY TO KEEP YOU FROM HAVING/CONTACTING OTHER FRIENDS OR DOING THINGS OUTSIDE YOUR HOME?: NO

## 2025-05-21 SDOH — ECONOMIC STABILITY: FOOD INSECURITY: WITHIN THE PAST 12 MONTHS, YOU WORRIED THAT YOUR FOOD WOULD RUN OUT BEFORE YOU GOT THE MONEY TO BUY MORE.: NEVER TRUE

## 2025-05-21 SDOH — SOCIAL STABILITY: SOCIAL INSECURITY: ABUSE: ADULT

## 2025-05-21 SDOH — ECONOMIC STABILITY: HOUSING INSECURITY: IN THE LAST 12 MONTHS, WAS THERE A TIME WHEN YOU WERE NOT ABLE TO PAY THE MORTGAGE OR RENT ON TIME?: NO

## 2025-05-21 SDOH — SOCIAL STABILITY: SOCIAL INSECURITY: ARE THERE ANY APPARENT SIGNS OF INJURIES/BEHAVIORS THAT COULD BE RELATED TO ABUSE/NEGLECT?: NO

## 2025-05-21 SDOH — SOCIAL STABILITY: SOCIAL INSECURITY: WERE YOU ABLE TO COMPLETE ALL THE BEHAVIORAL HEALTH SCREENINGS?: YES

## 2025-05-21 SDOH — SOCIAL STABILITY: SOCIAL INSECURITY: HAVE YOU HAD THOUGHTS OF HARMING ANYONE ELSE?: NO

## 2025-05-21 SDOH — SOCIAL STABILITY: SOCIAL INSECURITY: DO YOU FEEL ANYONE HAS EXPLOITED OR TAKEN ADVANTAGE OF YOU FINANCIALLY OR OF YOUR PERSONAL PROPERTY?: NO

## 2025-05-21 SDOH — SOCIAL STABILITY: SOCIAL INSECURITY: HAVE YOU HAD ANY THOUGHTS OF HARMING ANYONE ELSE?: NO

## 2025-05-21 ASSESSMENT — LIFESTYLE VARIABLES
SKIP TO QUESTIONS 9-10: 1
HOW MANY STANDARD DRINKS CONTAINING ALCOHOL DO YOU HAVE ON A TYPICAL DAY: PATIENT DOES NOT DRINK
AUDIT-C TOTAL SCORE: 0
HOW OFTEN DO YOU HAVE 6 OR MORE DRINKS ON ONE OCCASION: NEVER
AUDIT-C TOTAL SCORE: 0
HOW OFTEN DO YOU HAVE A DRINK CONTAINING ALCOHOL: NEVER

## 2025-05-21 ASSESSMENT — ACTIVITIES OF DAILY LIVING (ADL)
TOILETING: NEEDS ASSISTANCE
FEEDING YOURSELF: INDEPENDENT
GROOMING: INDEPENDENT
LACK_OF_TRANSPORTATION: NO
JUDGMENT_ADEQUATE_SAFELY_COMPLETE_DAILY_ACTIVITIES: YES
DRESSING YOURSELF: NEEDS ASSISTANCE
WALKS IN HOME: NEEDS ASSISTANCE
LACK_OF_TRANSPORTATION: NO
BATHING: NEEDS ASSISTANCE
ASSISTIVE_DEVICE: WALKER
HEARING - LEFT EAR: FUNCTIONAL
ADEQUATE_TO_COMPLETE_ADL: YES
HEARING - RIGHT EAR: FUNCTIONAL
PATIENT'S MEMORY ADEQUATE TO SAFELY COMPLETE DAILY ACTIVITIES?: YES

## 2025-05-21 ASSESSMENT — COGNITIVE AND FUNCTIONAL STATUS - GENERAL
PATIENT BASELINE BEDBOUND: NO
CLIMB 3 TO 5 STEPS WITH RAILING: A LOT
TURNING FROM BACK TO SIDE WHILE IN FLAT BAD: A LITTLE
DRESSING REGULAR UPPER BODY CLOTHING: A LITTLE
HELP NEEDED FOR BATHING: A LITTLE
TOILETING: A LITTLE
MOVING TO AND FROM BED TO CHAIR: A LITTLE
MOBILITY SCORE: 17
DAILY ACTIVITIY SCORE: 20
MOVING FROM LYING ON BACK TO SITTING ON SIDE OF FLAT BED WITH BEDRAILS: A LITTLE
DRESSING REGULAR LOWER BODY CLOTHING: A LITTLE
STANDING UP FROM CHAIR USING ARMS: A LITTLE
WALKING IN HOSPITAL ROOM: A LITTLE

## 2025-05-21 ASSESSMENT — PATIENT HEALTH QUESTIONNAIRE - PHQ9
2. FEELING DOWN, DEPRESSED OR HOPELESS: SEVERAL DAYS
SUM OF ALL RESPONSES TO PHQ9 QUESTIONS 1 & 2: 1
1. LITTLE INTEREST OR PLEASURE IN DOING THINGS: NOT AT ALL

## 2025-05-21 ASSESSMENT — PAIN SCALES - GENERAL
PAINLEVEL_OUTOF10: 0 - NO PAIN

## 2025-05-21 ASSESSMENT — PAIN - FUNCTIONAL ASSESSMENT
PAIN_FUNCTIONAL_ASSESSMENT: 0-10
PAIN_FUNCTIONAL_ASSESSMENT: 0-10

## 2025-05-21 NOTE — ED PROVIDER NOTES
Emergency Department Provider Note       History of Present Illness     History provided by: Patient  Limitations to History: None  External Records Reviewed with Brief Summary: Reviewed patient recent visit    HPI:  Elliot Partida is a 74 y.o. male was brought into the emergency room by the squad from [facility].  Patient was diagnosed with COVID-19 infection about a week ago.  Patient has been chest pain off-and-on for last 1 month and decided come to ER for evaluation.  Denies any pain in arms or jaw.  He does have a history of marked multiple comorbidities including history of stroke with left-sided weakness more in the left leg.  He also is bedbound and healed ulcer with chronic skin hypertrophic skin changes and dry skin in lower extremities.  He denies hemoptysis.  Denies any pain in the legs.  Physical Exam   Triage vitals:  T    HR    BP    RR    O2        General: Chronically sick looking male patient with marked weakness in left leg as well as right lift his right leg more safely left leg strength is about 2/5 right leg strength 5 out of 5 left upper extremity strength is about 4/5 right arm extension 5/5.  No facial drooping or drooling.  He was breathing without acute distress no respiratory distress.  Some crackle coarse rhonchi slight wet cough on auscultation.  Heart with regular rate and rhythm.  No friction rub.  Mild peripheral edema calf is nontender.  Abdomen soft positive bowel sound nontender.  No guarding rebound or rigidity gentle palpation no pulsatile mass noted.    Hematuria chronic dressing of the heel with pressure sores ulcer dry skin and hypertrophic skin nail.  Pulses are diminished in the DP PT, bilaterally.  Radial pulses intact.      Eyes: Gaze conjugate.  No scleral icterus or injection  HENT: Normo-cephalic, atraumatic. No stridor  CV: Regular rate, regular rhythm. Radial pulses 2+ bilaterally  Resp: Breathing non-labored, speaking in full sentences.  Clear to  auscultation bilaterally  GI: Soft, non-distended, non-tender. No rebound or guarding.  : Deferred  MSK/Extremities: No gross bony deformities. Moving all extremities weakness left leg chronic heel ulcers as described above.  Skin: Warm. Appropriate color  Neuro: Alert. Oriented. Face symmetric. Speech is fluent.  Gross strength and sensation intact in b/l UE and LEs  Psych: Appropriate mood and affect      Medical Decision Making & ED Course   Medical Decision Makin y.o. male chronically sick looking male patient sent to the ER due to chest pain however he complained of chest pain off and for a month.  Ever diagnosed COVID-19 infection what he describes as of a week to 10 days ago.  Has cough denies any abdominal pain vomiting or diarrhea.  He had extensive medication list including amlodipine atorvastatin vitamin D, clonidine, meloxicam, Namenda Seroquel Xarelto Synthyroid and other as needed medicines including Ativan Geodon and Tylenol.  His EKG showed sinus rhythm first-degree AV block left axis deviation.  Nonspecific intraventricular block, minimal voltage criteria for LVH may be normal variant, septal infarct age undetermined.  No ST-T evaluation.  No STEMI.  I verified this EKG with Dr. Byrd cardiologist on-call who agreed with no STEMI.  She has COVID-19 and has been no chest pain I ordered D-dimer but also ordered CT of the chest using PE protocol his GFR was pending though at the time of ordered an 1 to make sure GFR is okay before he got contrast.  He also has CBC chemistry electrolytes troponin drawn.  He is hemodynamic stable.  Workup in progress.  4:40 PM   Patient's troponin was -10, however D-dimer was elevated 1303, white count of 9000 H&H of 10.3 and 34.  And platelet counts were 296.  Serum glucose 172 sodium potassium chloride was normal    Bicarb was elevated at 33 anion gap was low 8 BUN of 17 creatinine 0.8 and GFR more than 90.  PT/INR was 12 and 1.1 lactate of 0.9 troponin was  once again 10.  Urinalysis pending.  Patient has been taken to CT for CT angio.  Patient white counts are 9000 H&H of 10 and 34 with left shift.  Chemistry showed glucose 172 sodium potassium normal chloride was normal bicarb was 33 elevated BUN was 17 creatinine 0.8.  Urinalysis was unremarkable.  However D-dimer was elevated 1303.  Troponin x 2 was -10 and 10, lactate 0.9.  BNP was 92.    CT angio chest showed no major pulmonary embolism subsegmental branches not well-visualized patient found.  Infiltrate or effusion as described in CT report.  Patient had cultures drawn and IV antibiotics started and subsequently admitted for further care and treatment.    Patient heart score  7       Differential diagnoses considered include but are not limited to: Pneumonia, sepsis, COVID-19 syndrome, osteomyelitis, bilateral heel wound, failure outpatient treatment    Social Determinants of Health which Significantly Impact Care: Chronic comorbidities     EKG Independent Interpretation: EKG interpreted by myself. Please see ED Course for full interpretation.    Independent Result Review and Interpretation: Labs reviewed EKG read and patient evaluated imaging study read by radiologist also reviewed with clinical assessment and final disposition for admission    Chronic conditions affecting the patient's care: As documented MDM    The patient was discussed with the following consultants/services: None    Care Considerations: As documented in [facility]    ED Course:  Diagnoses as of 05/23/25 0221   Chest pain, unspecified type   Bacterial pneumonia   Bilateral pulmonary infiltrates   Bilateral pleural effusion   History of COVID-19   History of stroke   Periostitis without osteomyelitis, of the ankle and foot (Multi)       Disposition   As a result of their workup, the patient will require admission to the hospital.  The patient was informed of his diagnosis.  The patient was given the opportunity to ask questions and I answered them.  The patient agreed to be admitted to the hospital.    Procedures   Procedures    Patient heart score of 7    Nikia Blanton MD  Emergency Medicine                                                            Nikia Blanton MD  05/23/25 0227

## 2025-05-21 NOTE — ED TRIAGE NOTES
Pt to ED via EMS from [facility] c/o chest pain x1 month, pt dx with Covid 8 days ago, endorses productive cough with yellow sputum, pt on 2L NC baseline, pt not currently having chest pain

## 2025-05-22 ENCOUNTER — NURSING HOME VISIT (OUTPATIENT)
Dept: POST ACUTE CARE | Facility: EXTERNAL LOCATION | Age: 75
End: 2025-05-22
Payer: MEDICARE

## 2025-05-22 DIAGNOSIS — Z91.81 AT RISK FOR FALLING: ICD-10-CM

## 2025-05-22 DIAGNOSIS — I25.10 ASHD (ARTERIOSCLEROTIC HEART DISEASE): ICD-10-CM

## 2025-05-22 DIAGNOSIS — I82.499 DEEP VEIN THROMBOSIS (DVT) OF OTHER VEIN OF LOWER EXTREMITY, UNSPECIFIED CHRONICITY, UNSPECIFIED LATERALITY: ICD-10-CM

## 2025-05-22 DIAGNOSIS — I10 HYPERTENSION, UNSPECIFIED TYPE: ICD-10-CM

## 2025-05-22 DIAGNOSIS — E11.9 TYPE 2 DIABETES MELLITUS WITHOUT COMPLICATION, UNSPECIFIED WHETHER LONG TERM INSULIN USE: ICD-10-CM

## 2025-05-22 DIAGNOSIS — D32.9 MENINGIOMA (MULTI): ICD-10-CM

## 2025-05-22 DIAGNOSIS — U07.1 COVID-19: Primary | ICD-10-CM

## 2025-05-22 DIAGNOSIS — R53.1 WEAKNESS: ICD-10-CM

## 2025-05-22 DIAGNOSIS — M19.90 OSTEOARTHRITIS, UNSPECIFIED OSTEOARTHRITIS TYPE, UNSPECIFIED SITE: ICD-10-CM

## 2025-05-22 DIAGNOSIS — G62.9 NEUROPATHY: ICD-10-CM

## 2025-05-22 DIAGNOSIS — M86.9 OSTEOMYELITIS, UNSPECIFIED SITE, UNSPECIFIED TYPE (MULTI): ICD-10-CM

## 2025-05-22 LAB
ANION GAP SERPL CALC-SCNC: <7 MMOL/L (ref 10–20)
BUN SERPL-MCNC: 15 MG/DL (ref 6–23)
CALCIUM SERPL-MCNC: 8.7 MG/DL (ref 8.6–10.3)
CHLORIDE SERPL-SCNC: 101 MMOL/L (ref 98–107)
CO2 SERPL-SCNC: 36 MMOL/L (ref 21–32)
CREAT SERPL-MCNC: 0.82 MG/DL (ref 0.5–1.3)
EGFRCR SERPLBLD CKD-EPI 2021: >90 ML/MIN/1.73M*2
ERYTHROCYTE [DISTWIDTH] IN BLOOD BY AUTOMATED COUNT: 16.2 % (ref 11.5–14.5)
GLUCOSE BLD MANUAL STRIP-MCNC: 130 MG/DL (ref 74–99)
GLUCOSE BLD MANUAL STRIP-MCNC: 144 MG/DL (ref 74–99)
GLUCOSE BLD MANUAL STRIP-MCNC: 176 MG/DL (ref 74–99)
GLUCOSE BLD MANUAL STRIP-MCNC: 94 MG/DL (ref 74–99)
GLUCOSE SERPL-MCNC: 113 MG/DL (ref 74–99)
HCT VFR BLD AUTO: 31.7 % (ref 41–52)
HGB BLD-MCNC: 9.7 G/DL (ref 13.5–17.5)
HOLD SPECIMEN: NORMAL
MCH RBC QN AUTO: 25.5 PG (ref 26–34)
MCHC RBC AUTO-ENTMCNC: 30.6 G/DL (ref 32–36)
MCV RBC AUTO: 83 FL (ref 80–100)
NRBC BLD-RTO: 0 /100 WBCS (ref 0–0)
PLATELET # BLD AUTO: 263 X10*3/UL (ref 150–450)
POTASSIUM SERPL-SCNC: 3.2 MMOL/L (ref 3.5–5.3)
RBC # BLD AUTO: 3.8 X10*6/UL (ref 4.5–5.9)
SODIUM SERPL-SCNC: 140 MMOL/L (ref 136–145)
WBC # BLD AUTO: 9.4 X10*3/UL (ref 4.4–11.3)

## 2025-05-22 PROCEDURE — 96366 THER/PROPH/DIAG IV INF ADDON: CPT

## 2025-05-22 PROCEDURE — 99306 1ST NF CARE HIGH MDM 50: CPT | Performed by: INTERNAL MEDICINE

## 2025-05-22 PROCEDURE — G0378 HOSPITAL OBSERVATION PER HR: HCPCS

## 2025-05-22 PROCEDURE — 80048 BASIC METABOLIC PNL TOTAL CA: CPT | Performed by: NURSE PRACTITIONER

## 2025-05-22 PROCEDURE — 2500000004 HC RX 250 GENERAL PHARMACY W/ HCPCS (ALT 636 FOR OP/ED): Performed by: NURSE PRACTITIONER

## 2025-05-22 PROCEDURE — 96376 TX/PRO/DX INJ SAME DRUG ADON: CPT

## 2025-05-22 PROCEDURE — 94760 N-INVAS EAR/PLS OXIMETRY 1: CPT

## 2025-05-22 PROCEDURE — 2500000001 HC RX 250 WO HCPCS SELF ADMINISTERED DRUGS (ALT 637 FOR MEDICARE OP): Performed by: NURSE PRACTITIONER

## 2025-05-22 PROCEDURE — 2500000002 HC RX 250 W HCPCS SELF ADMINISTERED DRUGS (ALT 637 FOR MEDICARE OP, ALT 636 FOR OP/ED): Performed by: NURSE PRACTITIONER

## 2025-05-22 PROCEDURE — 99222 1ST HOSP IP/OBS MODERATE 55: CPT | Performed by: NURSE PRACTITIONER

## 2025-05-22 PROCEDURE — 82947 ASSAY GLUCOSE BLOOD QUANT: CPT | Mod: 59

## 2025-05-22 PROCEDURE — 85027 COMPLETE CBC AUTOMATED: CPT | Performed by: NURSE PRACTITIONER

## 2025-05-22 PROCEDURE — 96372 THER/PROPH/DIAG INJ SC/IM: CPT | Performed by: NURSE PRACTITIONER

## 2025-05-22 PROCEDURE — 9420000001 HC RT PATIENT EDUCATION 5 MIN

## 2025-05-22 PROCEDURE — 2500000001 HC RX 250 WO HCPCS SELF ADMINISTERED DRUGS (ALT 637 FOR MEDICARE OP): Performed by: HOSPITALIST

## 2025-05-22 PROCEDURE — 2500000005 HC RX 250 GENERAL PHARMACY W/O HCPCS: Performed by: NURSE PRACTITIONER

## 2025-05-22 PROCEDURE — 94640 AIRWAY INHALATION TREATMENT: CPT

## 2025-05-22 RX ORDER — ACETAMINOPHEN 325 MG/1
650 TABLET ORAL EVERY 4 HOURS PRN
Start: 2025-05-22

## 2025-05-22 RX ORDER — INSULIN LISPRO 100 [IU]/ML
0-15 INJECTION, SOLUTION INTRAVENOUS; SUBCUTANEOUS
Status: DISCONTINUED | OUTPATIENT
Start: 2025-05-22 | End: 2025-05-23 | Stop reason: HOSPADM

## 2025-05-22 RX ORDER — CARBAMAZEPINE 200 MG/1
200 TABLET ORAL 2 TIMES DAILY
Status: DISCONTINUED | OUTPATIENT
Start: 2025-05-22 | End: 2025-05-23 | Stop reason: HOSPADM

## 2025-05-22 RX ORDER — VANCOMYCIN HYDROCHLORIDE 1 G/20ML
INJECTION, POWDER, LYOPHILIZED, FOR SOLUTION INTRAVENOUS DAILY PRN
Status: DISCONTINUED | OUTPATIENT
Start: 2025-05-22 | End: 2025-05-23 | Stop reason: HOSPADM

## 2025-05-22 RX ORDER — POTASSIUM CHLORIDE 20 MEQ/1
40 TABLET, EXTENDED RELEASE ORAL ONCE
Status: COMPLETED | OUTPATIENT
Start: 2025-05-22 | End: 2025-05-22

## 2025-05-22 RX ORDER — FUROSEMIDE 10 MG/ML
40 INJECTION INTRAMUSCULAR; INTRAVENOUS EVERY 12 HOURS
Status: DISCONTINUED | OUTPATIENT
Start: 2025-05-22 | End: 2025-05-22

## 2025-05-22 RX ORDER — VANCOMYCIN 1.25 G/250ML
1750 INJECTION, SOLUTION INTRAVENOUS EVERY 24 HOURS
Status: DISCONTINUED | OUTPATIENT
Start: 2025-05-22 | End: 2025-05-23 | Stop reason: HOSPADM

## 2025-05-22 RX ORDER — DEXAMETHASONE 6 MG/1
6 TABLET ORAL DAILY
Status: COMPLETED | OUTPATIENT
Start: 2025-05-22 | End: 2025-05-22

## 2025-05-22 RX ORDER — AMOXICILLIN 250 MG
1 CAPSULE ORAL NIGHTLY PRN
Start: 2025-05-22

## 2025-05-22 RX ADMIN — CARBAMAZEPINE 200 MG: 200 TABLET ORAL at 09:15

## 2025-05-22 RX ADMIN — HYDRALAZINE HYDROCHLORIDE 25 MG: 25 TABLET ORAL at 20:47

## 2025-05-22 RX ADMIN — ENOXAPARIN SODIUM 40 MG: 40 INJECTION SUBCUTANEOUS at 20:46

## 2025-05-22 RX ADMIN — Medication 1 L/MIN: at 09:37

## 2025-05-22 RX ADMIN — ACETAMINOPHEN 650 MG: 325 TABLET, FILM COATED ORAL at 06:31

## 2025-05-22 RX ADMIN — TRAZODONE HYDROCHLORIDE 50 MG: 50 TABLET ORAL at 20:47

## 2025-05-22 RX ADMIN — DEXAMETHASONE 6 MG: 6 TABLET ORAL at 09:15

## 2025-05-22 RX ADMIN — HYDRALAZINE HYDROCHLORIDE 25 MG: 25 TABLET ORAL at 16:37

## 2025-05-22 RX ADMIN — VANCOMYCIN 1750 MG: 1.25 INJECTION, SOLUTION INTRAVENOUS at 20:47

## 2025-05-22 RX ADMIN — HYDRALAZINE HYDROCHLORIDE 25 MG: 25 TABLET ORAL at 09:21

## 2025-05-22 RX ADMIN — PANTOPRAZOLE SODIUM 40 MG: 40 TABLET, DELAYED RELEASE ORAL at 06:31

## 2025-05-22 RX ADMIN — Medication 1 L/MIN: at 23:03

## 2025-05-22 RX ADMIN — POTASSIUM CHLORIDE 40 MEQ: 1500 TABLET, EXTENDED RELEASE ORAL at 09:15

## 2025-05-22 RX ADMIN — CARBAMAZEPINE 200 MG: 200 TABLET ORAL at 20:47

## 2025-05-22 RX ADMIN — FUROSEMIDE 40 MG: 10 INJECTION, SOLUTION INTRAMUSCULAR; INTRAVENOUS at 09:15

## 2025-05-22 ASSESSMENT — ENCOUNTER SYMPTOMS
VOMITING: 0
FATIGUE: 1
ABDOMINAL PAIN: 0
COUGH: 1
EYES NEGATIVE: 1
FEVER: 1
WEAKNESS: 1
DIZZINESS: 1
LIGHT-HEADEDNESS: 1
CONSTIPATION: 0
NAUSEA: 0
DIARRHEA: 0
ENDOCRINE NEGATIVE: 1
SHORTNESS OF BREATH: 1
GASTROINTESTINAL NEGATIVE: 1
CHILLS: 0
FEVER: 0
WOUND: 1
WHEEZING: 0

## 2025-05-22 ASSESSMENT — COGNITIVE AND FUNCTIONAL STATUS - GENERAL
DRESSING REGULAR LOWER BODY CLOTHING: A LITTLE
STANDING UP FROM CHAIR USING ARMS: A LITTLE
DAILY ACTIVITIY SCORE: 20
TOILETING: A LITTLE
STANDING UP FROM CHAIR USING ARMS: A LITTLE
MOVING FROM LYING ON BACK TO SITTING ON SIDE OF FLAT BED WITH BEDRAILS: A LITTLE
DRESSING REGULAR LOWER BODY CLOTHING: A LITTLE
DRESSING REGULAR UPPER BODY CLOTHING: A LITTLE
CLIMB 3 TO 5 STEPS WITH RAILING: A LOT
TURNING FROM BACK TO SIDE WHILE IN FLAT BAD: A LITTLE
CLIMB 3 TO 5 STEPS WITH RAILING: A LOT
HELP NEEDED FOR BATHING: A LITTLE
DAILY ACTIVITIY SCORE: 20
WALKING IN HOSPITAL ROOM: A LITTLE
WALKING IN HOSPITAL ROOM: A LITTLE
HELP NEEDED FOR BATHING: A LITTLE
MOVING FROM LYING ON BACK TO SITTING ON SIDE OF FLAT BED WITH BEDRAILS: A LITTLE
TOILETING: A LITTLE
DRESSING REGULAR UPPER BODY CLOTHING: A LITTLE
MOBILITY SCORE: 17
TURNING FROM BACK TO SIDE WHILE IN FLAT BAD: A LITTLE
MOVING TO AND FROM BED TO CHAIR: A LITTLE

## 2025-05-22 ASSESSMENT — PAIN - FUNCTIONAL ASSESSMENT
PAIN_FUNCTIONAL_ASSESSMENT: 0-10

## 2025-05-22 ASSESSMENT — ACTIVITIES OF DAILY LIVING (ADL): LACK_OF_TRANSPORTATION: NO

## 2025-05-22 ASSESSMENT — PAIN DESCRIPTION - DESCRIPTORS: DESCRIPTORS: ACHING

## 2025-05-22 ASSESSMENT — PAIN DESCRIPTION - LOCATION: LOCATION: HEAD

## 2025-05-22 ASSESSMENT — PAIN SCALES - GENERAL
PAINLEVEL_OUTOF10: 0 - NO PAIN
PAINLEVEL_OUTOF10: 3

## 2025-05-22 NOTE — PROGRESS NOTES
Pharmacy Medication History Review    Elliot Partida is a 74 y.o. male admitted for Pleural effusion. Pharmacy reviewed the patient's [id number] medications and allergies for accuracy.    Sources used to confirm medication list: Medication Dispense History and CareEverywhere  Informant: Other  Informant credibility: N/A (Informant not utilized, facility/pharmacy/med list used - list last updated on 05/20/2025)    Total number of adjustments: 5     Medications Added Medications Removed Medications Adjusted  Adjustments Made    Heparin Flush Gabapentin BID --> TID    Saline Flush Zofran ODT Sticky note added    Insulin Lispro       The list below reflectives the updated PTA list. Please review each medication in order reconciliation for additional clarification and justification.  Medications Prior to Admission   Medication Sig Dispense Refill Last Dose/Taking    Blood glucose monitoring meter To check blood sugar every morning before breakfast 1 each 0 5/21/2025    dexAMETHasone (Decadron) 6 mg tablet Take 1 tablet (6 mg) by mouth once daily for 2 doses.   5/21/2025    gabapentin (Neurontin) 300 mg capsule Take 1 capsule (300 mg) by mouth 3 times a day.   5/21/2025    hydrALAZINE (Apresoline) 25 mg tablet Take 1 tablet (25 mg) by mouth 3 times a day.   5/21/2025    ondansetron ODT (Zofran-ODT) 4 mg disintegrating tablet Dissolve 1 tablet (4 mg) in the mouth every 8 hours if needed for nausea or vomiting. 15 tablet 0 5/21/2025    oxygen (O2) gas therapy Inhale 2 L/min at 120,000 mL/hr if needed (desaturation at hs).   5/21/2025    sodium chloride 0.9% piggyback 250 mL with vancomycin 1.25 gram recon soln 1.25 g Infuse 1.25 g at 200 mL/hr over 75 minutes into a venous catheter every 12 hours. 14180 mL 0 5/21/2025    traZODone (Desyrel) 50 mg tablet Take 1 tablet (50 mg) by mouth once daily at bedtime.   5/20/2025    carBAMazepine (TEGretol) 200 mg tablet Take 1 tablet (200 mg) by mouth 2 times a day as  needed.           The list below reflectives the updated allergy list. Please review each documented allergy for additional clarification and justification.  Allergies  Reviewed by Aniceto Graves RN on 5/21/2025   No Known Allergies         Below are additional concerns with the patient's PTA list.  Pt was recently discharged from [facility] on 05/20/2025. Due to only being at [facility] for less than 24 hours, discharge AVS has been used to update the PTA list at this time. Please review any changes made to the PTA list in the chart above and refer to the PTA list to review any sticky notes added to medications.    Marylou Garcia CPhT  Medication History Pharmacy Technician  MILAGROS 8-4:30  Available via [employer] Secure Chat  OR  [phone]

## 2025-05-22 NOTE — CONSULTS
Vancomycin Dosing by Pharmacy- INITIAL    Elliot Partida is a 74 y.o. year old male who Pharmacy has been consulted for vancomycin dosing for osteomyelitis/septic arthritis. Based on the patient's indication and renal status this patient will be dosed based on a goal AUC of 400-600.     Renal function is currently stable.    Visit Vitals  /73 (BP Location: Right arm, Patient Position: Lying)   Pulse 70   Temp 36.5 °C (97.7 °F) (Temporal)   Resp 17        Lab Results   Component Value Date    CREATININE 0.82 2025    CREATININE 0.80 2025    CREATININE 0.63 2025    CREATININE 0.66 2025        Patient weight is as follows:   Vitals:    25 0415   Weight: 95.4 kg (210 lb 5.1 oz)       Cultures:  No results found for the encounter in last 14 days.        I/O last 3 completed shifts:  In: 1482 (15.5 mL/kg) [P.O.:832; I.V.:100 (1 mL/kg); IV Piggyback:550]  Out: 1950 (20.4 mL/kg) [Urine:1950 (0.6 mL/kg/hr)]  Weight: 95.4 kg   I/O during current shift:  I/O this shift:  In: 300 [P.O.:300]  Out: -     Temp (24hrs), Av.7 °C (98.1 °F), Min:36.4 °C (97.5 °F), Max:37.1 °C (98.8 °F)         Assessment/Plan     Patient will not be given a loading dose.  Will initiate vancomycin maintenance, 1750 mg every 24 hours.    This dosing regimen is predicted by [employer] to result in the following pharmacokinetic parameters:  Loading dose: N/A  Regimen: 1750 mg IV every 24 hours.  Start time: 09:27 on 2025  Exposure target: AUC24 (range) 400-600 mg/L.hr   [id number]: 429 mg/L.hr  AUC24,ss: 479 mg/L.hr  Probability of AUC24 > 400: 98 %  Ctrough,ss: 14.5 mg/L  Probability of Ctrough,ss > 20: 0 %      Follow-up level will be ordered on 25 at AM unless clinically indicated sooner.  Will continue to monitor renal function daily while on vancomycin and order serum creatinine at least every 48 hours if not already ordered.  Follow for continued vancomycin needs, clinical response, and signs/symptoms  of toxicity.       Erasto Albert, PharmD

## 2025-05-22 NOTE — H&P
Internal Medicine - History and Physical   Subjective    Elliot Partida is a 74 y.o. year old male patient presented to the ED on 5/21/2025 with CP.    HPI:  Elliot Partida is a 74 year old gentleman with a history of moderate aortic stenosis, osteomyelitis, DM, neuropathy, DVT, trigeminal neuralgia, and HTN presented to the ED with c/o CP, productive cough.  Today endorses feeling dizzy/lightheaded as well as SOB prior to admission.  He was diuresed with IV lasix, weaned to RA this am.    ED Course: T 37.1 C, HR 85, RR 18, 181/81, 99% on RA.  Labs remarkable for HS Trop neg x2, BNP 92, D Dimer 1303, Bicarb 33.  BC x2 obtained, Covid Neg, UA neg.    CT PE neg for PE, revealed mod eber pleural effusions L>R.    Review of Systems:  Review of Systems   Constitutional:  Positive for fatigue and fever. Negative for chills.   HENT: Negative.     Eyes: Negative.    Respiratory:  Positive for shortness of breath.    Cardiovascular:  Positive for leg swelling.   Gastrointestinal: Negative.    Endocrine: Negative.    Genitourinary: Negative.    Skin:  Positive for wound.   Neurological:  Positive for dizziness, weakness and light-headedness. Negative for syncope.          Meds    Home medications:  Current Outpatient Medications   Medication Instructions    Blood glucose monitoring meter To check blood sugar every morning before breakfast    carBAMazepine (TEGRETOL) 200 mg, oral, 2 times daily PRN    dexAMETHasone (DECADRON) 6 mg, oral, Daily    gabapentin (NEURONTIN) 300 mg, 3 times daily    heparin lock flush (porcine) 10 Units, intra-catheter, As needed    hydrALAZINE (APRESOLINE) 25 mg, oral, 3 times daily    insulin lispro 2-10 Units, subcutaneous, 3 times daily before meals, Take as directed per insulin instructions. Sliding scale coverage.    ondansetron ODT (ZOFRAN-ODT) 4 mg, oral, Every 8 hours PRN    oxygen (O2) 2 L/min (2 L/min), inhalation, As needed    SODIUM CHLORIDE 0.9 %, FLUSH, INJ intravenous, 2 times  "daily    sodium chloride 0.9% piggyback 250 mL with vancomycin 1.25 gram recon soln 1.25 g 1.25 g, intravenous, Every 12 hours    traZODone (DESYREL) 50 mg, Nightly        Inpatient medications:  Scheduled medications  Scheduled Medications[1]  Continuous medications  Continuous Medications[2]  PRN medications  PRN Medications[3]     Objective    Blood pressure 146/73, pulse 67, temperature 36.5 °C (97.7 °F), temperature source Temporal, resp. rate 17, height 1.753 m (5' 9\"), weight 95.4 kg (210 lb 5.1 oz), SpO2 98%.     Constitutional: elderly male, pt in NAD, alert and cooperative  Eyes: PERRL, EOMI, no icterus   ENMT: mucous membranes moist, no apparent injury, no lesions seen  Head/Neck: Neck supple, no apparent injury  Respiratory/Thorax: Lungs diminished eber bases, no wheezing non-labored breathing, no cough, on RA  Cardiovascular: Regular, rate and rhythm, + 3/6 systolic murmur, normal S1 and S2  Gastrointestinal: Nondistended, soft, non-tender, BS present x 4  Musculoskeletal: ROM intact, no joint swelling  Extremities: normal extremities, BLE venous stasis changes, +1-2 pitting edema  Neurological: alert and oriented x 3, speech clear, follows commands appropriately  Skin: Warm and dry, eber LE wounds wrapped in Kerlix        Intake/Output Summary (Last 24 hours) at 5/22/2025 0754  Last data filed at 5/22/2025 0746  Gross per 24 hour   Intake 1782 ml   Output 1950 ml   Net -168 ml     Labs:   Results from last 72 hours   Lab Units 05/22/25  0455 05/21/25  1654 05/20/25  0519   SODIUM mmol/L 140 140 141   POTASSIUM mmol/L 3.2* 4.1 3.7   CHLORIDE mmol/L 101 103 103   CO2 mmol/L 36* 33* 34*   BUN mg/dL 15 17 12   CREATININE mg/dL 0.82 0.80 0.63   GLUCOSE mg/dL 113* 172* 104*   CALCIUM mg/dL 8.7 8.8 8.8   ANION GAP mmol/L <7* 8* 8*   EGFR mL/min/1.73m*2 >90 >90 >90      Results from last 72 hours   Lab Units 05/22/25  0455 05/21/25  1654 05/20/25  0519   WBC AUTO x10*3/uL 9.4 9.1 10.7   HEMOGLOBIN g/dL 9.7* " 10.3* 10.2*   HEMATOCRIT % 31.7* 34.2* 33.7*   PLATELETS AUTO x10*3/uL 263 296 281   NEUTROS PCT AUTO %  --  79.5  --    LYMPHS PCT AUTO %  --  13.0  --    MONOS PCT AUTO %  --  5.7  --    EOS PCT AUTO %  --  0.3  --                  Micro/ID:     Lab Results   Component Value Date    URINECULTURE  05/08/2025     Clinically insignificant growth based on current clinical standards.    BLOODCULT Loaded on Instrument - Culture in progress 05/21/2025    BLOODCULT Loaded on Instrument - Culture in progress 05/21/2025       Summary of Key Imaging Results  5/21 CT PE: 1.  No evidence of pulmonary emboli to the segmental level. Limited evaluation of distal segmental and subsegmental branches due to mixing artifact. Mildly enlarged main pulmonary trunk measuring 3.2 cm in caliber raising possibility of pulmonary arterial hypertension. Mild left-sided cardiomegaly.  2. Moderate-sized bilateral pleural effusions with associated subsegmental consolidations favored to represent compression atelectasis. Additional chronic findings as described above.    Assessment and Plan     Assessment:  Elliot Partida is a 74 y.o. year old male patient with history of history of moderate aortic stenosis, osteomyelitis, DM, neuropathy, DVT, trigeminal neuralgia, and HTN, recent Covid infection (5/13/25) admitted on 05/22/25 for CP    Plan:  NEUROLOGY/PSYCH:  Dx:  No acute issues.  Hx of Neuropathy and Trigeminal neuralgia  Management:  A&Ox3  PRN Tylenol for pain  Melatonin for sleep    CARDIOVASCULAR:  Dx:  hx of Moderate Aortic Stenosis, HTN.  Admitted with c/o CP, SOB.  2/2025 TTE: LVED 55-60%, no WMA, DD, Mod Aortic Stenosis  Management:  ACS ruled out, HS Trop neg x2, BNP 92.    HDS  S/p 40 mg IV lasix x2 (last dose @0900).  Re-eval for continued IV diuresis with Goal negative 500 ml- 1 L  Continue Hydralazine 25 mg TID  Keep K >4, Mag >2    PULMONARY:  Dx:  Recent Covid infection.  Guillermo Pleural effusions L>R  Management:  Weaned to  RA  Diruesis as above  Bronchial hygiene with vPEP BID  SpO2 goal >92%    RENAL/GENITOURINARY:  Dx: No acute issues  Management:  Voiding per external cath    GASTROENTEROLOGY:  Dx:  no acute issues.  HypoK  Management:  Diet: Low Na diet  Bowel R  Replete K    ENDOCRINOLOGY:  Dx:  DM Type II  Management:  A1C: 5.8 (1/2025)  Blood sugars:   SSI Lispro #3 scale    HEMATOLOGY:  Dx:  Normocytic anemia  Management:  No signs of bleeding  Lovenox for DVT ppx    MUSCULOSKELETAL:  Dx:  BLE wounds  Management:  Wound care orders in place  Continue IV atb per ID recs    INFECTIOUS DISEASE:  Dx: Recent Covid + (5/13). OM, + MRSA foot wound (5/8/25)  Management:  Tmax: 36.5 C  MICRO: 5/21 BC x2 NGTD  ID consulted  Antimicrobials: Vancomycin infusion twice daily until June 18, 2025.     Prophylaxis:  DVT ppx: Lovenox  GI ppx: PPI  Hardware:  Catheter: none  Drains: none  Lines: PIV, PICC (5/12/25)  Social:  Code: Full Code    HPOA: Natty Gee, sister, [phone]    Disposition: continue inpatient stay for volume overload requiring IV diuresis  Discharge Planning: from [facility]    JUDITH Jenkins   05/22/25 at 7:54 AM     Disclaimer: Documentation completed with the information available at the time of input. The times in the chart may not be reflective of actual patient care times, interventions, or procedures. Documentation occurs after the physical care of the patient.      Attending Attestation:    I was present with the APRN-CNP who participated in the documentation of this note. I have personally seen and re-examined the patient and performed the medical decision-making components (assessment and plan of care). I have reviewed the documentation and verified the findings in the note as written with additions or exceptions as stated in the body of this note.    74 year old male with a history of moderate aortic stenosis, osteomyelitis, DM, neuropathy, DVT, Trigeminal neuralgia and HTN presented to  the ED for productive cough and chest pain. He also endorsed dizziness, lightheadedness and shortness of breath. Patient has been given IV Lasix and weaned oxygen down to room air.    ED course:   Vitals- T 37.1 C, HR 85, RR 18, 181/81, 99% on RA.  Labs: Trop neg x2, BNP 92, D Dimer 1303, Bicarb 33. BC x2 obtained, Covid Neg, UA neg.   CT chest is negative for PE and patient has bilateral pleural effusions L>R.    Admitting diagnosis are    Chest pain  Moderate aortic stenosis  Bilateral pleural effusions    DVT prophylaxis- Lovenox    Martínez Gautam MD  Internal Medicine.       [1] carBAMazepine, 200 mg, oral, BID  dexAMETHasone, 6 mg, oral, Daily  dextrose 5%, , ,   enoxaparin, 40 mg, subcutaneous, q24h  furosemide, 40 mg, intravenous, q12h  hydrALAZINE, 25 mg, oral, TID  insulin lispro, 0-10 Units, subcutaneous, Before meals & nightly  insulin lispro, 0-15 Units, subcutaneous, TID AC  pantoprazole, 40 mg, oral, Daily before breakfast   Or  pantoprazole, 40 mg, intravenous, Daily before breakfast  potassium chloride CR, 40 mEq, oral, Once  traZODone, 50 mg, oral, Nightly  vancomycin 1.25 g in sodium chloride 0.9% 250 mL IV, 1,250 mg, intravenous, q12h  vancomycin, , ,   [2]    [3] PRN medications: acetaminophen **OR** [DISCONTINUED] acetaminophen **OR** [DISCONTINUED] acetaminophen, acetaminophen **OR** [DISCONTINUED] acetaminophen **OR** [DISCONTINUED] acetaminophen, albuterol, benzocaine-menthol, dextromethorphan-guaifenesin, dextrose 5%, gabapentin, guaiFENesin, heparin flush, melatonin, ondansetron **OR** ondansetron, oxygen, sennosides-docusate sodium, vancomycin

## 2025-05-22 NOTE — LETTER
Patient: Elliot Partida  : 1950    Encounter Date: 2025    PLACE OF SERVICE:  [facility]    This is new/initial history and physical.    Subjective  Patient ID: Elliot Partida is a 74 y.o. male who presents for Annual Exam and new/initial history.    Mr. Elliot Partida is a 74-year-old male with recent history of COVID-19 infection with bilateral pleural effusions.  He is a diabetic and treated for foot osteomyelitis.  He continues on IV vancomycin.  He is unable to care for himself and requires supportive care.    Review of Systems   Constitutional:  Negative for chills and fever.   Cardiovascular:  Negative for chest pain.   All other systems reviewed and are negative.    Objective  /80   Pulse 84   Temp 36.9 °C (98.4 °F)   Resp 18     Physical Exam  Vitals reviewed.   Constitutional:       General: He is not in acute distress.     Comments: This is a well-developed, well-nourished male, sitting in a chair.   HENT:      Right Ear: Tympanic membrane, ear canal and external ear normal.      Left Ear: Tympanic membrane, ear canal and external ear normal.   Eyes:      General: No scleral icterus.     Pupils: Pupils are equal, round, and reactive to light.   Neck:      Vascular: No carotid bruit.   Cardiovascular:      Heart sounds: Normal heart sounds, S1 normal and S2 normal. No murmur heard.     No friction rub.   Pulmonary:      Breath sounds: Decreased breath sounds (throughout) present.   Abdominal:      Palpations: There is no hepatomegaly, splenomegaly or mass.   Musculoskeletal:         General: No swelling or deformity. Normal range of motion.      Cervical back: Neck supple.      Right lower leg: No edema.      Left lower leg: No edema.      Comments: The patient's foot is currently dressed.  There is no foul odor.  There is no color drainage.   Lymphadenopathy:      Cervical: No cervical adenopathy.      Upper Body:      Right upper body: No axillary adenopathy.      Left  upper body: No axillary adenopathy.      Lower Body: No right inguinal adenopathy. No left inguinal adenopathy.   Neurological:      Mental Status: He is oriented to person, place, and time.      Cranial Nerves: Cranial nerves 2-12 are intact. No cranial nerve deficit.      Sensory: No sensory deficit.      Motor: Motor function is intact. No weakness.      Gait: Gait is intact.      Deep Tendon Reflexes: Reflexes normal.   Psychiatric:         Mood and Affect: Mood normal. Mood is not anxious or depressed. Affect is not angry.         Behavior: Behavior is not agitated.         Thought Content: Thought content normal.         Judgment: Judgment normal.     LAB WORK:  Laboratory studies were reviewed.    Assessment/Plan  Problem List Items Addressed This Visit           ICD-10-CM    Weakness R53.1    Osteoarthritis M19.90    Meningioma (Multi) D32.9    COVID-19 - Primary U07.1     Other Visit Diagnoses         Codes      Type 2 diabetes mellitus without complication, unspecified whether long term insulin use     E11.9      Deep vein thrombosis (DVT) of other vein of lower extremity, unspecified chronicity, unspecified laterality     I82.499      Osteomyelitis, unspecified site, unspecified type (Multi)     M86.9      Hypertension, unspecified type     I10      Neuropathy     G62.9      ASHD (arteriosclerotic heart disease)     I25.10      At risk for falling     Z91.81        1. COVID-19 with pleural effusion.  Follow with Infectious Disease.  2. Osteomyelitis to foot.  Follow with Podiatry and Infectious Disease.  3. Diabetes, on insulin.  4. DVT, on anticoagulated.  5. Meningioma.  Follow with Neurosurgery.  6. Hypertension, medically controlled.  7. Neuropathy, on gabapentin.  8. ASHD, on aspirin.  9. Osteoarthritis, on Tylenol.  10. Weakness, on PT/OT.  11. Fall risk, on fall precautions.    Scribe Attestation  By signing my name below, IHanna, Etienne attest that this documentation has been prepared under  the direction and in the presence of Marium Singh MD.     All medical record entries made by the scribe were personally dictated by me I have reviewed the chart and agree the record accurately reflects my personal performance of his history physical examination and management      Electronically Signed By: Marium Singh MD   6/5/25  1:37 AM

## 2025-05-22 NOTE — CONSULTS
"Nutrition Initial Assessment:   Nutrition Assessment    Reason for Assessment: Admission nursing screening (Stage 3 or 4 Pressure Injury or Multiple Non-Healing Wounds)    Patient is a 74 y.o. male presenting with Pleural effusion admitted for evaluation of chest pain and volume overload in the setting of multiple comorbidities, including DM II, moderate aortic stenosis, neuropathy, DVT, and osteomyelitis with active MRSA foot wound.    Nutrition History:  Energy Intake: Good > 75 %  Food and Nutrient History: Visited patient at bedside. He was alert, oriented, and verbal but expressed strong dissatisfaction with his current cardiac diet, stating, “I’d rather die than eat cardboard.” He refused nutrition education and rejected suggestions for salt-free flavoring strategies. The diet was liberalized to a regular diet following discussion with the provider.  He declined nutrition-focused physical exam (NFPE) and refused all supplements, including Nico, despite the presence of an active MRSA-positive foot wound. Patient was not receptive to further nutrition counseling at this time.  From care transition notes:  Patient is AAOx3, feeds himself independently, but requires assistance with bathing, dressing, and toileting.  He uses multiple forms of DME (walker, cane, crutches, wheelchair, grab bars) and previously lived at [facility], receiving meal support and laundry help from his niece.  Food Allergy:  (nkfa)       Anthropometrics:  Height: 175.3 cm (5' 9\")   Weight: 95.4 kg (210 lb 5.1 oz)   BMI (Calculated): 31.04  IBW/kg (Dietitian Calculated): 73 kg  Percent of IBW: 131 %       Weight History:   Wt Readings from Last 10 Encounters:   05/22/25 95.4 kg (210 lb 5.1 oz)   05/16/25 94.4 kg (208 lb 1.8 oz)   05/05/25 89.4 kg (197 lb)   04/01/25 89.4 kg (197 lb)   02/27/25 89.4 kg (197 lb)   02/11/25 89.1 kg (196 lb 6.9 oz)   01/17/25 89.8 kg (198 lb)   12/23/24 91.2 kg (201 lb 1 oz)   11/05/24 93.4 kg (206 lb) "   09/25/24 102 kg (225 lb 8.5 oz)     Weight Change %:  Weight History / % Weight Change: 05/22/25 (95.4 kg) to 05/16/25 (94.4 kg) = +1.0 kg (+2.2 lb), +1.1% gain in ~1 week. 05/22/25 (95.4 kg) to 02/27/25 (89.4 kg) = +6.0 kg (+13.2 lb), +6.7% gain in ~3 months. Recent weight gain likely reflects fluid retention rather than true nutritional repletion. History suggests chronic fluctuating intake and metabolic burden.  Significant Weight Loss: No    Nutrition Focused Physical Exam Findings:    Subcutaneous Fat Loss:   Defer Subcutaneous Fat Loss Assessment: Defer all  Defer All Reason: patient refusal.  Muscle Wasting:  Defer Muscle Wasting Assessment: Defer all  Defer All Reason: patient refusal.  Edema:  Edema Location: 1-2+ BLE) per nursing flowsheet; legs wrapped in Kerlix.  Physical Findings:  Hair: Negative  Eyes: Negative  Nails: Negative  Skin: Positive (MRSA foot wound.)    Nutrition Significant Labs:  CBC Trend:   Results from last 7 days   Lab Units 05/22/25  0455 05/21/25  1654 05/20/25  0519 05/18/25  0740   WBC AUTO x10*3/uL 9.4 9.1 10.7 6.7   RBC AUTO x10*6/uL 3.80* 4.07* 4.03* 3.85*   HEMOGLOBIN g/dL 9.7* 10.3* 10.2* 9.6*   HEMATOCRIT % 31.7* 34.2* 33.7* 32.4*   MCV fL 83 84 84 84   PLATELETS AUTO x10*3/uL 263 296 281 237    , BMP Trend:   Results from last 7 days   Lab Units 05/22/25  0455 05/21/25  1654 05/20/25  0519 05/18/25  0740   GLUCOSE mg/dL 113* 172* 104* 116*   CALCIUM mg/dL 8.7 8.8 8.8 8.2*   SODIUM mmol/L 140 140 141 144   POTASSIUM mmol/L 3.2* 4.1 3.7 3.4*   CO2 mmol/L 36* 33* 34* 33*   CHLORIDE mmol/L 101 103 103 107   BUN mg/dL 15 17 12 15   CREATININE mg/dL 0.82 0.80 0.63 0.66    , A1C:  Lab Results   Component Value Date    HGBA1C 5.8 (H) 01/06/2025   , BG POCT trend:   Results from last 7 days   Lab Units 05/22/25  2018 05/22/25  1603 05/22/25  1120 05/22/25  0647 05/21/25 2024   POCT GLUCOSE mg/dL 176* 144* 130* 94 170*    , Liver Function Trend:   Results from last 7 days   Lab  Units 05/21/25  1654 05/18/25  0740 05/17/25  0717 05/16/25  0534   ALK PHOS U/L 76 55 59 58   AST U/L 11 14 14 14   ALT U/L 8* 7* 8* 7*   BILIRUBIN TOTAL mg/dL 0.3 0.2 0.2 0.2        Nutrition Specific Medications:  Scheduled medications  Scheduled Medications[1]    I/O:    ;      Dietary Orders (From admission, onward)       Start     Ordered    05/21/25 2035  May Participate in Room Service With Assistance  ( ROOM SERVICE MAY PARTICIPATE WITH ASSISTANCE)  Once        Question:  .  Answer:  Yes    05/21/25 2034 05/21/25 1901  Adult diet Regular, Cardiac; 70 gm fat; 2 - 3 grams Sodium; 1600 mL fluid  Diet effective now        Question Answer Comment   Diet type Regular    Diet type Cardiac    Fat restriction: 70 gm fat    Sodium restriction: 2 - 3 grams Sodium    Dietary fluid restriction / 24h: 1600 mL fluid        05/21/25 1900           Estimated Needs:   Total Energy Estimated Needs in 24 hours (kCal): 2190 kCal (1604-1788 kcal)  Method for Estimating Needs: 25-30 kcal/kg of IBW  Total Protein Estimated Needs in 24 Hours (g):  (88-110g)  Method for Estimating 24 Hour Protein Needs: 1.2-1.5 g/kg of IBW  Total Fluid Estimated Needs in 24 Hours (mL): 1600 mL  Method for Estimating 24 Hour Fluid Needs: per medical team.  Patient on Order Fluid Restriction: Yes        Nutrition Diagnosis        Nutrition Diagnosis  Patient has Nutrition Diagnosis: Yes  Diagnosis Status (1): New  Nutrition Diagnosis 1: Increased nutrient needs  Related to (1): increased metablic demand  As Evidenced by (1): wound infection, multiple non-healing wounds, diabetic ulcer, and pressure injuries       Nutrition Interventions/Recommendations   Nutrition prescription for oral nutrition    Nutrition Recommendations:  Individualized Nutrition Prescription Provided for : Liberalized to Regular diet per patient preference, FR 1800 mL    Nutrition Interventions/Goals:   Meals and Snacks: General healthful diet, Fluid-modified  diet  Coordination of Care with Providers: Provider (IDT rounds; Delia STYLES NP)      Education Documentation  Nutrition Related Education, taught by Leslie Hastings RDN, LD at 5/22/2025  8:21 PM.  Learner: Patient  Readiness: Refuses  Method: Explanation  Response: No Evidence of Learning              Nutrition Monitoring and Evaluation   Food/Nutrient Related History Monitoring  Monitoring and Evaluation Plan: Intake / amount of food  Intake / Amount of food: Consumes at least 75% or more of meals/snacks/supplements    Anthropometric Measurements  Monitoring and Evaluation Plan: Body weight  Body Weight: Body weight - Maintain stable weight         Physical Exam Findings  Monitoring and Evaluation Plan: Skin  Skin Finding: Impaired wound healing - Improved wound healing    Goal Status: New goal(s) identified    Time Spent (min): 75 minutes              [1] carBAMazepine, 200 mg, oral, BID  enoxaparin, 40 mg, subcutaneous, q24h  hydrALAZINE, 25 mg, oral, TID  insulin lispro, 0-15 Units, subcutaneous, TID AC  pantoprazole, 40 mg, oral, Daily before breakfast  traZODone, 50 mg, oral, Nightly  vancomycin, 1,750 mg, intravenous, q24h

## 2025-05-22 NOTE — PROGRESS NOTES
Occupational Therapy                 Therapy Communication Note    Patient Name: Elliot Partida  MRN: [id number]  Department: [facility]  Room: [address] [place] [address]-A  Today's Date: 5/22/2025     Discipline: Occupational Therapy    Missed Visit:  Yes    Missed Visit Reason:  Pt refusing OOB mobility and working with therapy this date 2/2 wanting to sleep. Pt educated on importance of OT/PT in the hospital, patient continued to refuse. Will follow up per pt status and as schedule allows.     Missed Time: Attempt 1130

## 2025-05-22 NOTE — CARE PLAN
The patient's goals for the shift include      The clinical goals for the shift include Signs and symptoms of infection to resolve by ADOD      OOB to chair with assist and a walker. Denies pain.

## 2025-05-22 NOTE — CONSULTS
Inpatient consult to Infectious Diseases  Consult performed by: Baljit Velazco MD  Consult ordered by: Delia Medrano, APRN-CNP            Primary MD: No Assigned PCP Generic Provider, MD    Reason For Consult  Left tibial osteomyelitis    History Of Present Illness  Elliot Partida is a 74 y.o. male presenting with chest pain, productive cough.  He was recently diagnosed with coronavirus.  He developed acute respiratory failure for which he was placed on supplemental oxygen.  He received 5 days of remdesivir and was placed on 10 days of dexamethasone.  He had CT of the chest which was remarkable for bilateral pleural effusion.  He denies any left leg or foot pain.  He is on IV vancomycin.  He denies any fever or chills.  He reports nonproductive cough  He denies any chest pain  He is on 2 L of oxygen     Past Medical History  He has a past medical history of Acute osteomyelitis of ankle and foot, left (Multi), AMI (acute myocardial infarction) (Multi), ASHD (arteriosclerotic heart disease), At risk for falls, Cellulitis, Diabetes mellitus (Multi), DVT (deep vein thrombosis) in pregnancy ([facility]), Fall risk care plan declined, Hypertension, Meningioma (Multi), Myocardial infarction (Multi), Neuritis, Neuropathy, Osteoarthritis, Osteomyelitis, PVD (peripheral vascular disease), Sprain of right knee (06/25/2015), Stroke (Multi), 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[facility]), TIA (transient ischemic attack), Tinea pedis of both feet, Trigeminal neuralgia, and Weakness.    Surgical History  He has a past surgical history that includes Carpal tunnel release (10/10/2014); Eye surgery; FL arthrocentesis ASP INJ joint.; Cardiac catheterization; Iridotomy / iridectomy (Bilateral); Invasive Vascular Procedure (Bilateral, 09/18/2024); Foot ray resection (Left, 09/23/2024); and Foot surgery (Left, 09/27/2024).     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     Immunization History   Administered Date(s) Administered    Moderna SARS-CoV-2 Vaccination 04/14/2021, 05/13/2021     Medications  Home medications:  Prescriptions Prior to Admission[2]  Current medications:  Scheduled medications  Scheduled Medications[3]  Continuous medications  Continuous Medications[4]  PRN medications  PRN Medications[5]    Review of Systems   Constitutional:  Negative for chills and fever.   Respiratory:  Positive for cough and shortness of breath. Negative for wheezing.    Cardiovascular:  Negative for chest pain and leg swelling.   Gastrointestinal:  Negative for abdominal pain, constipation, diarrhea, nausea and vomiting.   All other systems reviewed and are negative.       Objective  Range of Vitals (last 24 hours)  Heart Rate:  [61-85]   Temp:  [36.4 °C (97.5 °F)-37.1 °C (98.8 °F)]   Resp:  [16-20]   BP: (118-181)/(58-81)   Height:  [175.3 cm (5' 9\")]   Weight:  [89.4 kg (197 lb)-95.4 kg (210 lb 5.1 oz)]   SpO2:  [89 %-100 %]   Daily Weight  05/22/25 : 95.4 kg (210 lb 5.1 oz)    Body mass index is 31.06 kg/m².     Physical Exam  Constitutional:       Appearance: Normal appearance.   HENT:      Head: Normocephalic and atraumatic.      Right Ear: External ear normal.      Left Ear: External ear normal.      Nose: Nose normal.   Eyes:      General: No scleral icterus.     Extraocular Movements: Extraocular movements intact.      Conjunctiva/sclera: Conjunctivae normal.   Cardiovascular:      Rate and Rhythm: Normal rate and regular rhythm.      Heart " sounds: Normal heart sounds.   Pulmonary:      Breath sounds: Decreased breath sounds present.   Abdominal:      General: Bowel sounds are normal.      Palpations: Abdomen is soft.      Tenderness: There is no abdominal tenderness.   Musculoskeletal:      Cervical back: Normal range of motion and neck supple.      Right lower leg: No edema.      Left lower leg: No edema.   Feet:      Comments: Left dorsal foot wound, with large amount of granulation tissue.  Picture  Neurological:      Mental Status: He is alert.   Psychiatric:         Behavior: Behavior normal. Behavior is cooperative.          Relevant Results  Outside Hospital Results    Labs  Results from last 72 hours   Lab Units 05/22/25 0455 05/21/25 1654 05/20/25 0519   WBC AUTO x10*3/uL 9.4 9.1 10.7   HEMOGLOBIN g/dL 9.7* 10.3* 10.2*   HEMATOCRIT % 31.7* 34.2* 33.7*   PLATELETS AUTO x10*3/uL 263 296 281   NEUTROS PCT AUTO %  --  79.5  --    LYMPHS PCT AUTO %  --  13.0  --    MONOS PCT AUTO %  --  5.7  --    EOS PCT AUTO %  --  0.3  --      Results from last 72 hours   Lab Units 05/22/25 0455 05/21/25 1654 05/20/25  0519   SODIUM mmol/L 140 140 141   POTASSIUM mmol/L 3.2* 4.1 3.7   CHLORIDE mmol/L 101 103 103   CO2 mmol/L 36* 33* 34*   BUN mg/dL 15 17 12   CREATININE mg/dL 0.82 0.80 0.63   GLUCOSE mg/dL 113* 172* 104*   CALCIUM mg/dL 8.7 8.8 8.8   ANION GAP mmol/L <7* 8* 8*   EGFR mL/min/1.73m*2 >90 >90 >90     Results from last 72 hours   Lab Units 05/21/25 1654   ALK PHOS U/L 76   BILIRUBIN TOTAL mg/dL 0.3   PROTEIN TOTAL g/dL 6.0*   ALT U/L 8*   AST U/L 11   ALBUMIN g/dL 3.1*     Estimated Creatinine Clearance: 91.5 mL/min (by C-G formula based on SCr of 0.82 mg/dL).  C-Reactive Protein   Date Value Ref Range Status   05/20/2025 3.85 (H) <1.00 mg/dL Final   05/18/2025 4.28 (H) <1.00 mg/dL Final   05/17/2025 5.97 (H) <1.00 mg/dL Final     Sedimentation Rate   Date Value Ref Range Status   05/11/2025 12 0 - 20 mm/h Final   05/08/2025 14 0 - 20 mm/h  "Final   02/11/2025 51 (H) 0 - 20 mm/h Final     No results found for: \"HIV1X2\", \"HIVCONF\", \"AHCFAN6TG\"  No results found for: \"HEPCABINIT\", \"HEPCAB\", \"HCVPCRQUANT\"  Microbiology  Susceptibility data from last 90 days.  Collected Specimen Info Organism Clindamycin Erythromycin Oxacillin Tetracycline Trimethoprim/Sulfamethoxazole Vancomycin   05/09/25 Swab from Anterior Nares Methicillin Susceptible Staphylococcus aureus (MSSA)         05/08/25 Tissue/Biopsy from Wound/Tissue Methicillin Resistant Staphylococcus aureus (MRSA)  R  R  R  R  S  S     Mixed Aerobic and Anaerobic Bacteria              Imaging  ECG 12 lead  Result Date: 5/22/2025  Sinus rhythm with 1st degree AV block Left axis deviation Nonspecific intraventricular block Minimal voltage criteria for LVH, may be normal variant ( Mukul product ) Cannot rule out Septal infarct (cited on or before 09-MAY-2025) Abnormal ECG When compared with ECG of 12-MAY-2025 09:32, (unconfirmed) Serial changes of Septal infarct Present    CT angio chest for pulmonary embolism  Result Date: 5/21/2025  Interpreted By:  Ajit Romo, STUDY: CT ANGIO CHEST FOR PULMONARY EMBOLISM;  5/21/2025 5:52 pm   INDICATION: Signs/Symptoms:COVID-19 infection, chest pain.   COMPARISON: Chest CT 09/14/2024.   ACCESSION NUMBER(S): [id number]   ORDERING CLINICIAN: ADDI ALBERTO   TECHNIQUE: Helical data acquisition of the chest was obtained after administration of intravenous contrast as part of pulmonary CT angiography protocol.   Axial contiguous images were reformatted in coronal and sagittal planes. Axial and coronal MIP images were created and reviewed.   FINDINGS: POTENTIAL LIMITATIONS OF THE STUDY: Motion and mixing artifact which limits evaluation of the distal branch vessels.   HEART AND VESSELS: No discrete filling defects within the main pulmonary artery or its branches.   Main pulmonary trunk is mildly enlarged measuring 3.2 cm in caliber raising possibility punctate " hypertension similar to prior.   The thoracic aorta is of normal course and caliber. There is a left-sided PICC line terminating in the superior vena cava.   Mild-to-moderate coronary artery calcifications are seen.The study is not optimized for evaluation of coronary arteries.   Mild left-sided cardiomegaly.   No evidence of pericardial effusion.   MEDIASTINUM AND ROLO, LOWER NECK AND AXILLA: The visualized thyroid gland is within normal limits.   No evidence of thoracic lymphadenopathy by CT criteria.   Esophagus appears within normal limits as seen.   LUNGS AND AIRWAYS: The trachea and central airways are patent. No endobronchial lesion.   There are moderate bilateral pleural effusions with associated subsegmental consolidations favored to represent compression atelectasis.   UPPER ABDOMEN: There is hepatomegaly and hepatic steatosis.   CHEST WALL AND OSSEOUS STRUCTURES: There are no suspicious osseous lesions. The visualized osseous structures are intact.       1.  No evidence of pulmonary emboli to the segmental level. Limited evaluation of distal segmental and subsegmental branches due to mixing artifact. Mildly enlarged main pulmonary trunk measuring 3.2 cm in caliber raising possibility of pulmonary arterial hypertension. Mild left-sided cardiomegaly. 2. Moderate-sized bilateral pleural effusions with associated subsegmental consolidations favored to represent compression atelectasis. Additional chronic findings as described above.     Signed by: Ajit Romo 5/21/2025 6:10 PM Dictation workstation:   [id number]    Vascular US upper extremity venous duplex left  Result Date: 5/15/2025  Interpreted By:  Jose Chase, STUDY: VASC US UPPER EXTREMITY VENOUS DUPLEX LEFT; 5/15/2025 12:11 pm   INDICATION: Signs/Symptoms:Swelling  after PICC line placement 3 days ago.   COMPARISON: None   ACCESSION NUMBER(S): [id number]   ORDERING CLINICIAN: BERNIE MEDRANO   TECHNIQUE: Black and white as well as color-flow  duplex images were obtained along with Doppler wave analysis of the deep venous system of the upper extremity. The internal jugular vein, subclavian vein as well as the axillary and brachial vein of the upper extremity were evaluated. The basilic and cephalic veins were also imaged.   FINDINGS: Examination demonstrates normal compressibility and flow. PICC line is present within the basilic vein.       No evidence for deep vein thrombosis by this exam.   MACRO: none   Signed by: Jose Chase 5/15/2025 1:36 PM Dictation workstation:   [id number]    ECG 12 Lead  Result Date: 5/13/2025  Sinus rhythm with 1st degree AV block Left axis deviation Nonspecific intraventricular block Possible Anterolateral infarct (cited on or before 09-MAY-2025) Abnormal ECG When compared with ECG of 09-MAY-2025 18:47, (unconfirmed) Serial changes of Anterior infarct Present    ECG 12 lead  Result Date: 5/13/2025  Sinus rhythm with 1st degree AV block Left anterior fascicular block Minimal voltage criteria for LVH, may be normal variant ( Mukul product ) Anterior infarct , age undetermined Abnormal ECG When compared with ECG of 13-FEB-2025 09:37, No significant change was found    XR chest 1 view  Addendum Date: 5/12/2025  Interpreted By:  Júnior Sena, ADDENDUM: Left-sided PICC line is noted and projects to the level of the superior vena cava without pneumothorax.   Signed by: Júnior Sena 5/12/2025 5:41 PM   -------- ORIGINAL REPORT -------- Dictation workstation:   [id number]    Result Date: 5/12/2025  Interpreted By:  Júnior Sena, STUDY: XR CHEST 1 VIEW   INDICATION: Signs/Symptoms:PICC placement.   COMPARISON: February 17, 2025   ACCESSION NUMBER(S): [id number]   ORDERING CLINICIAN: BERNIE MEDRANO   FINDINGS: Cardiomegaly with new perihilar and basilar edema with patchy multifocal airspace opacities right worse than left.   Small bilateral pleural effusions.   No evidence of pneumothorax.         Cardiomegaly with new  perihilar and basilar edema with patchy multifocal airspace opacities right worse than left.   Small bilateral pleural effusions.   Signed by: Júnior Sena 5/12/2025 5:28 PM Dictation workstation:   [id number]    CT brain attack head wo IV contrast  Result Date: 5/12/2025  Interpreted By:  Enedina Umana, STUDY: CT BRAIN ATTACK HEAD WO IV CONTRAST;  5/12/2025 9:37 am   INDICATION: Signs/Symptoms:stroke alert.     COMPARISON: 12/23/2024   ACCESSION NUMBER(S): [id number]   ORDERING CLINICIAN: KATIE HILTON   TECHNIQUE: Unenhanced CT images of the head were obtained.   FINDINGS: Patient is rotated.  The ventricles, cisterns and sulci are enlarged, consistent with diffuse volume loss. There are areas of nonspecific white matter hypodensity, which are probably age related or microvascular in nature. Low-density lacunar type infarct in the right basal ganglia. These findings are similar to the previous exam. There is no acute intracranial hemorrhage, mass effect or midline shift. No extraaxial fluid collection.   No focal calvarial lesion.   Bilateral ethmoid, left sphenoid and left maxillary sinus mucosal thickening.       No acute intracranial hemorrhage or mass-effect.   Multifocal sinus mucosal thickening.   MACRO: Enedina Umana discussed the significance and urgency of this critical finding by secure chat with  KATIE HILTON on 5/12/2025 at 9:47 am. (**-RCF-**) Findings:  See findings.     Signed by: Enedina Umana 5/12/2025 9:48 AM Dictation workstation:   [id number]    MR tibia fibula left w and wo IV contrast  Result Date: 5/9/2025  Interpreted By:  Lina Moreno,  and Debbie Cárdenas STUDY: MRI of the left tibia/fibula without and with IV contrast dated 5/9/2025.   INDICATION: Signs/Symptoms:Chronic osteomyelitis of the tibia.     COMPARISON: Left tibia/fibula radiograph 05/08/2025   ACCESSION NUMBER(S): [id number]   ORDERING CLINICIAN: ADA ESPINOZA   TECHNIQUE: Multiplanar multisequence MRI of the  left  tibia/fibula was performed without and with intravenous gadolinium based contrast.   FINDINGS: OSSEOUS STRUCTURES AND JOINTS:   No fracture or dislocation is evident. Bone marrow signal intensity is within normal limits. No joint effusion is evident.   SOFT TISSUES: Partially visualized large soft tissue ulceration along the anterior aspect of the tibiotalar joint to the distal tibia with possible exposure of the anterior tibialis tendon (axial image 74). There is extensive diffuse subcutaneous edema noted throughout the lower extremities without rim enhancing fluid collection. Moderate amount of fascial plane fluid noted in the anterior as well as posterior aspect of the medial head of the gastrocnemius. There is moderate diffuse generalized muscle atrophy. There is intramuscular muscle edema along the anterior compartment without intramuscular abscess. The visualized muscles and tendons are intact. Ligaments are not well assessed on this examination.       1. No evidence to suggest osteomyelitis. 2. Partially visualized large soft tissue ulceration along the anterior aspect of the distal tibia and tibial talar joint with possible exposure of the underlying anterior tibialis tendon. 3. Moderate amount of fluid along the posterior as well as anterior aspect of the medial head of the gastrocnemius extending from the level of the knee to the distal lower leg. This is nonspecific with infectious fasciitis felt to be less likely although not entirely excluded. Edema of the anterior compartment muscles of the lower leg which may represent infectious myositis. No evidence of intramuscular abscess. 4. Extensive diffuse soft tissue edema noted throughout the left lower extremity and partially visualized right lower extremity 5. Moderate diffuse fatty atrophy of the lower leg muscles.   I personally reviewed the images/study and I agree with Dr. Melia Lewis and the findings as stated.   MACRO: None   Signed by: Lina  Toddyves 5/9/2025 11:14 AM Dictation workstation:   [id number]    XR tibia fibula left 2 views  Result Date: 5/8/2025  Interpreted By:  Mariam Vang, STUDY: XR TIBIA FIBULA LEFT 2 VIEWS; ;  5/8/2025 10:25 pm   INDICATION: Signs/Symptoms:Chronic tibial osteomyelitis.     COMPARISON: None.   ACCESSION NUMBER(S): [id number]   ORDERING CLINICIAN: ADA ESPINOZA   FINDINGS: Two views of the left tibia and fibula show thick periosteal bone formation along the tibial diaphysis likely due to a longstanding process. There is no acute bone destruction to suggest acute osteomyelitis. No fracture or dislocation.       Thick periosteal new bone formation along the left tibia likely due to history of chronic osteomyelitis and healing. No acute bone destruction.     MACRO: None   Signed by: Mariam Vang 5/8/2025 10:46 PM Dictation workstation:   [id number]    XR foot left 3+ views  Result Date: 5/8/2025  Interpreted By:  Osmar Garza, STUDY: XR FOOT LEFT 3+ VIEWS;  5/8/2025 9:26 am   INDICATION: Signs/Symptoms:wound.   COMPARISON: None.   ACCESSION NUMBER(S): [id number]   ORDERING CLINICIAN: LUDA JALLOH   FINDINGS: Post left transmetatarsal amputation. There is a possible age indeterminate nondisplaced fracture near the base of the 4th proximal phalanx only seen on the oblique view. No additional acute fracture, dislocation, or focal osseous destruction identified. Scattered degenerative changes. Small calcaneal enthesophytes.       Questionable age-indeterminate nondisplaced fracture near the base of the left foot 4th proximal phalanx, only apparent on one view. Soft tissue swelling. No additional acute osseous findings of the left foot.   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       Baljit Velazco MD         [1]   Family History  Problem Relation Name Age of Onset    Heart disease Father      Colon cancer Other      Heart attack Other      Diabetes Other      Hypertension Other     [2]   Medications Prior to Admission   Medication Sig Dispense Refill Last Dose/Taking    Blood glucose monitoring meter To check blood sugar every morning before breakfast 1 each 0 5/21/2025    dexAMETHasone (Decadron) 6 mg tablet Take 1 tablet (6 mg) by mouth once daily for 2 doses.   5/21/2025    gabapentin (Neurontin) 300 mg capsule Take 1 capsule (300 mg) by mouth 3 times a day. (Patient taking differently: Take 1 capsule (300 mg) by mouth 2 times a day as needed.)   5/21/2025    hydrALAZINE (Apresoline) 25 mg tablet Take 1 tablet (25 mg) by mouth 3 times a day.   5/21/2025    ondansetron ODT (Zofran-ODT) 4 mg disintegrating tablet Dissolve 1 tablet (4 mg) in the mouth every 8 hours if needed for nausea or vomiting. 15 tablet 0 5/21/2025    oxygen (O2) gas therapy Inhale 2 L/min at 120,000 mL/hr if needed (desaturation at hs).   5/21/2025    sodium chloride 0.9% piggyback 250 mL with vancomycin 1.25 gram recon soln 1.25 g Infuse 1.25 g at 200 mL/hr over 75 minutes into a venous catheter every 12 hours. 45068 mL 0 5/21/2025    traZODone (Desyrel) 50 mg tablet Take 1 tablet (50 mg) by mouth once daily at bedtime.   5/20/2025    carBAMazepine (TEGretol) 200 mg tablet Take 1 tablet (200 mg) by mouth 2 times a day as needed.       heparin lock flush,  porcine, 10 unit/mL injection 1 mL (10 Units) by intra-catheter route if needed (patency).   Unknown    insulin lispro 100 unit/mL injection Inject 2-10 Units under the skin 3 times a day before meals. Take as directed per insulin instructions. Sliding scale coverage.       SODIUM CHLORIDE 0.9 %, FLUSH, INJ Infuse into a venous catheter 2 times a day.      [3] carBAMazepine, 200 mg, oral, BID  enoxaparin, 40 mg, subcutaneous, q24h  hydrALAZINE, 25 mg, oral, TID  insulin lispro, 0-15 Units, subcutaneous, TID AC  pantoprazole, 40 mg, oral, Daily before breakfast   Or  pantoprazole, 40 mg, intravenous, Daily before breakfast  traZODone, 50 mg, oral, Nightly  vancomycin, 1,750 mg, intravenous, q24h    [4]    [5] PRN medications: acetaminophen **OR** [DISCONTINUED] acetaminophen **OR** [DISCONTINUED] acetaminophen, acetaminophen **OR** [DISCONTINUED] acetaminophen **OR** [DISCONTINUED] acetaminophen, albuterol, benzocaine-menthol, dextromethorphan-guaifenesin, gabapentin, guaiFENesin, heparin flush, melatonin, ondansetron **OR** ondansetron, oxygen, sennosides-docusate sodium, vancomycin

## 2025-05-22 NOTE — PROGRESS NOTES
Physical Therapy                 Therapy Communication Note    Patient Name: Elliot Partida  MRN: [id number]  Department: [facility]  Room: [address] [place], [place] [address]  Today's Date: 5/22/2025     Discipline: Physical Therapy    Missed Visit:       Missed Visit Reason: Missed Visit Reason: Patient refused    Missed Time: Attempt 1125

## 2025-05-22 NOTE — PROGRESS NOTES
05/22/25 1017   Discharge Planning   Living Arrangements Other (Comment)  (Per patient, he has been at SNF for 9 month. He was at [facility] previously and now [facility]. Priro to SNF ,he was living at home with his sister.)   Support Systems Family members;Children   Assistance Needed AAOX3. He requires assistance with bathing, dressing, and toileting. He states he is able to stand pivot with assistance. He is able to feed himself independently. He manages his own finances. His niece assists with laundry and the SNF provides meals. He stated he has not driven for about 6 months. DME: walker, cane, crutches, wheelchair, grab bars.   Type of Residence Nursing home/residential care;Skilled nursing facility  ([facility])   Number of Stairs to Enter Residence 5  (no railing)   Number of Stairs Within Residence 12  (to the basement, but he does not use. He states he cannot navigate stairs)   Do you have animals or pets at home? Yes   Type of Animals or Pets 2 cats   Care Facility Name [facility]   Who is requesting discharge planning? Provider   Home or Post Acute Services Post acute facilities (Rehab/SNF/etc)   Type of Post Acute Facility Services Skilled nursing   Expected Discharge Disposition SNF  ([facility] SNF return. They are able to accept when ready.)   Does the patient need discharge transport arranged? Yes   RoundTrip coordination needed? Yes   Has discharge transport been arranged? No   Financial Resource Strain   How hard is it for you to pay for the very basics like food, housing, medical care, and heating? Not hard   Housing Stability   In the last 12 months, was there a time when you were not able to pay the mortgage or rent on time? N   In the past 12 months, how many times have you moved where you were living? 0   At any time in the past 12 months, were you homeless or living in a shelter (including now)? N   Transportation Needs   In the past 12 months, has lack of  transportation kept you from medical appointments or from getting medications? no   In the past 12 months, has lack of transportation kept you from meetings, work, or from getting things needed for daily living? No   Patient Choice   Provider Choice list and CMS website (https://medicare.gov/care-compare#search) for post-acute Quality and Resource Measure Data were provided and reviewed with: Patient   Patient / Family choosing to utilize agency / facility established prior to hospitalization Yes   Stroke Family Assessment   Stroke Family Assessment Needed No   Intensity of Service   Intensity of Service >30 min

## 2025-05-22 NOTE — CARE PLAN
The patient's goals for the shift include to eat dinner    The clinical goals for the shift include Pt will report no chest pain. Pt will tolerate IV ATB with no adverse reactions.    Over the shift, the patient did not make progress toward the following goals.     Problem: Discharge Planning  Goal: Discharge to home or other facility with appropriate resources  Outcome: Not Progressing     Problem: Chronic Conditions and Co-morbidities  Goal: Patient's chronic conditions and co-morbidity symptoms are monitored and maintained or improved  Outcome: Not Progressing     Problem: Fall/Injury  Goal: Verbalize understanding of personal risk factors for fall in the hospital  Outcome: Not Progressing  Goal: Verbalize understanding of risk factor reduction measures to prevent injury from fall in the home  Outcome: Not Progressing  Goal: Use assistive devices by end of the shift  Outcome: Not Progressing  Goal: Pace activities to prevent fatigue by end of the shift  Outcome: Not Progressing     Problem: Skin  Goal: Decreased wound size/increased tissue granulation at next dressing change  Outcome: Not Progressing  Goal: Prevent/minimize sheer/friction injuries  Outcome: Not Progressing  Goal: Promote skin healing  Outcome: Not Progressing     Problem: Pain  Goal: Walks with improved pain control throughout the shift  Outcome: Not Progressing  Goal: Performs ADL's with improved pain control throughout shift  Outcome: Not Progressing  Goal: Participates in PT with improved pain control throughout the shift  Outcome: Not Progressing     Problem: Heart Failure  Goal: Reduction in peripheral edema within 24 hours  Outcome: Not Progressing  Goal: Report improvement of dyspnea/breathlessness this shift  Outcome: Not Progressing  Goal: Weight from fluid excess reduced over 2-3 days, then stabilize  Outcome: Not Progressing  Goal: Increase self care and/or family involvement in 24 hours  Outcome: Not Progressing

## 2025-05-22 NOTE — CARE PLAN
Problem: Skin  Goal: Participates in plan/prevention/treatment measures  5/22/2025 1757 by Gina Shume, RN  Flowsheets (Taken 5/22/2025 1757)  Participates in plan/prevention/treatment measures:   Discuss with provider PT/OT consult   Elevate heels   Increase activity/out of bed for meals  5/22/2025 1751 by Gina Shume, RN  Outcome: Progressing   The patient's goals for the shift include

## 2025-05-23 VITALS
SYSTOLIC BLOOD PRESSURE: 151 MMHG | DIASTOLIC BLOOD PRESSURE: 71 MMHG | TEMPERATURE: 97.7 F | HEIGHT: 69 IN | HEART RATE: 72 BPM | WEIGHT: 201.94 LBS | RESPIRATION RATE: 17 BRPM | BODY MASS INDEX: 29.91 KG/M2 | OXYGEN SATURATION: 96 %

## 2025-05-23 LAB
ANION GAP SERPL CALC-SCNC: 8 MMOL/L (ref 10–20)
BUN SERPL-MCNC: 16 MG/DL (ref 6–23)
CALCIUM SERPL-MCNC: 8.7 MG/DL (ref 8.6–10.3)
CHLORIDE SERPL-SCNC: 101 MMOL/L (ref 98–107)
CO2 SERPL-SCNC: 36 MMOL/L (ref 21–32)
CREAT SERPL-MCNC: 0.77 MG/DL (ref 0.5–1.3)
EGFRCR SERPLBLD CKD-EPI 2021: >90 ML/MIN/1.73M*2
ERYTHROCYTE [DISTWIDTH] IN BLOOD BY AUTOMATED COUNT: 16.2 % (ref 11.5–14.5)
GLUCOSE BLD MANUAL STRIP-MCNC: 112 MG/DL (ref 74–99)
GLUCOSE BLD MANUAL STRIP-MCNC: 121 MG/DL (ref 74–99)
GLUCOSE BLD MANUAL STRIP-MCNC: 176 MG/DL (ref 74–99)
GLUCOSE SERPL-MCNC: 117 MG/DL (ref 74–99)
HCT VFR BLD AUTO: 31.5 % (ref 41–52)
HGB BLD-MCNC: 9.4 G/DL (ref 13.5–17.5)
MAGNESIUM SERPL-MCNC: 2.03 MG/DL (ref 1.6–2.4)
MCH RBC QN AUTO: 25 PG (ref 26–34)
MCHC RBC AUTO-ENTMCNC: 29.8 G/DL (ref 32–36)
MCV RBC AUTO: 84 FL (ref 80–100)
NRBC BLD-RTO: 0 /100 WBCS (ref 0–0)
PLATELET # BLD AUTO: 274 X10*3/UL (ref 150–450)
POTASSIUM SERPL-SCNC: 3.2 MMOL/L (ref 3.5–5.3)
PROCALCITONIN SERPL-MCNC: 0.07 NG/ML
RBC # BLD AUTO: 3.76 X10*6/UL (ref 4.5–5.9)
SODIUM SERPL-SCNC: 142 MMOL/L (ref 136–145)
VANCOMYCIN SERPL-MCNC: 26.9 UG/ML (ref 5–20)
WBC # BLD AUTO: 8.2 X10*3/UL (ref 4.4–11.3)

## 2025-05-23 PROCEDURE — 80202 ASSAY OF VANCOMYCIN: CPT | Performed by: NURSE PRACTITIONER

## 2025-05-23 PROCEDURE — 80048 BASIC METABOLIC PNL TOTAL CA: CPT | Performed by: NURSE PRACTITIONER

## 2025-05-23 PROCEDURE — 82947 ASSAY GLUCOSE BLOOD QUANT: CPT | Mod: 59

## 2025-05-23 PROCEDURE — G0378 HOSPITAL OBSERVATION PER HR: HCPCS

## 2025-05-23 PROCEDURE — 99239 HOSP IP/OBS DSCHRG MGMT >30: CPT | Performed by: NURSE PRACTITIONER

## 2025-05-23 PROCEDURE — 84145 PROCALCITONIN (PCT): CPT | Mod: GENLAB | Performed by: NURSE PRACTITIONER

## 2025-05-23 PROCEDURE — 94760 N-INVAS EAR/PLS OXIMETRY 1: CPT

## 2025-05-23 PROCEDURE — 2500000001 HC RX 250 WO HCPCS SELF ADMINISTERED DRUGS (ALT 637 FOR MEDICARE OP): Performed by: HOSPITALIST

## 2025-05-23 PROCEDURE — 94640 AIRWAY INHALATION TREATMENT: CPT

## 2025-05-23 PROCEDURE — 2500000002 HC RX 250 W HCPCS SELF ADMINISTERED DRUGS (ALT 637 FOR MEDICARE OP, ALT 636 FOR OP/ED): Performed by: NURSE PRACTITIONER

## 2025-05-23 PROCEDURE — 2500000004 HC RX 250 GENERAL PHARMACY W/ HCPCS (ALT 636 FOR OP/ED): Mod: JZ | Performed by: NURSE PRACTITIONER

## 2025-05-23 PROCEDURE — 83735 ASSAY OF MAGNESIUM: CPT | Performed by: NURSE PRACTITIONER

## 2025-05-23 PROCEDURE — 85027 COMPLETE CBC AUTOMATED: CPT | Performed by: NURSE PRACTITIONER

## 2025-05-23 PROCEDURE — 96376 TX/PRO/DX INJ SAME DRUG ADON: CPT

## 2025-05-23 PROCEDURE — 2500000001 HC RX 250 WO HCPCS SELF ADMINISTERED DRUGS (ALT 637 FOR MEDICARE OP): Performed by: NURSE PRACTITIONER

## 2025-05-23 RX ORDER — VANCOMYCIN 1.25 G/250ML
1.75 INJECTION, SOLUTION INTRAVENOUS EVERY 24 HOURS
Qty: 9100 ML | Refills: 0 | Status: SHIPPED | OUTPATIENT
Start: 2025-05-23 | End: 2025-06-18

## 2025-05-23 RX ORDER — POTASSIUM CHLORIDE 20 MEQ/1
40 TABLET, EXTENDED RELEASE ORAL 2 TIMES DAILY
Status: DISCONTINUED | OUTPATIENT
Start: 2025-05-23 | End: 2025-05-23 | Stop reason: HOSPADM

## 2025-05-23 RX ORDER — FUROSEMIDE 10 MG/ML
40 INJECTION INTRAMUSCULAR; INTRAVENOUS ONCE
Status: COMPLETED | OUTPATIENT
Start: 2025-05-23 | End: 2025-05-23

## 2025-05-23 RX ADMIN — CARBAMAZEPINE 200 MG: 200 TABLET ORAL at 08:49

## 2025-05-23 RX ADMIN — INSULIN LISPRO 3 UNITS: 100 INJECTION, SOLUTION INTRAVENOUS; SUBCUTANEOUS at 12:47

## 2025-05-23 RX ADMIN — HYDRALAZINE HYDROCHLORIDE 25 MG: 25 TABLET ORAL at 08:49

## 2025-05-23 RX ADMIN — FUROSEMIDE 40 MG: 10 INJECTION, SOLUTION INTRAMUSCULAR; INTRAVENOUS at 08:50

## 2025-05-23 RX ADMIN — POTASSIUM CHLORIDE 40 MEQ: 1500 TABLET, EXTENDED RELEASE ORAL at 08:49

## 2025-05-23 RX ADMIN — HYDRALAZINE HYDROCHLORIDE 25 MG: 25 TABLET ORAL at 15:10

## 2025-05-23 RX ADMIN — PANTOPRAZOLE SODIUM 40 MG: 40 TABLET, DELAYED RELEASE ORAL at 06:23

## 2025-05-23 ASSESSMENT — PAIN SCALES - GENERAL
PAINLEVEL_OUTOF10: 0 - NO PAIN

## 2025-05-23 ASSESSMENT — PAIN - FUNCTIONAL ASSESSMENT
PAIN_FUNCTIONAL_ASSESSMENT: 0-10

## 2025-05-23 NOTE — CARE PLAN
The clinical goals for the shift include Pt will work with PT and be OOB today    Pt discharging back to [facility] today. Denies pain. PICC line intact left arm. Taking diet well. Pt will be on Vanco at Rehab facility. Potassium was replaced today. Dressings to feet dry and intact. Foam dressing to sacrum intact.

## 2025-05-23 NOTE — CARE PLAN
The patient's goals for the shift include      The clinical goals for the shift include Maintain safety.  Tolerate IV ABX    Alert and oriented.  Resting in bed.  Tolerating po intake well without c/o n/v.  External catheter in place and voiding clear yellow urine.  Dressings changed to bilateral feet.  O2 on at 1L at start of shift, removed at 2 am.  PICC to left arm flushes with good blood return.  Tolerating IV antibiotics.  Safety maintained.  Refusing turn and position.

## 2025-05-23 NOTE — CARE PLAN
The patient's goals for the shift include      The clinical goals for the shift include Pt will work with PT and be OOB today    Report called to [facility].

## 2025-05-23 NOTE — PROGRESS NOTES
Elliot Partida is a 74 y.o. male on day 0 of admission presenting with Pleural effusion.    Subjective   Interval History:   Patient seen and examined  On room air  Sitting in a chair  Afebrile, no chills  No cough, chest pain or shortness of breath  No nausea vomiting or diarrhea     Review of Systems   All other systems reviewed and are negative.      Objective   Range of Vitals (last 24 hours)  Heart Rate:  [63-81]   Temp:  [36.5 °C (97.7 °F)-36.6 °C (97.9 °F)]   Resp:  [13-18]   BP: (122-148)/(61-66)   Weight:  [91.6 kg (201 lb 15.1 oz)]   SpO2:  [91 %-96 %]   Daily Weight  05/23/25 : 91.6 kg (201 lb 15.1 oz)    Body mass index is 29.82 kg/m².    Physical Exam  Constitutional:       Appearance: Normal appearance.   HENT:      Head: Normocephalic and atraumatic.      Right Ear: External ear normal.      Left Ear: External ear normal.      Nose: Nose normal.   Eyes:      General: No scleral icterus.     Extraocular Movements: Extraocular movements intact.      Conjunctiva/sclera: Conjunctivae normal.   Cardiovascular:      Rate and Rhythm: Normal rate and regular rhythm.      Heart sounds: Normal heart sounds.   Pulmonary:      Breath sounds: Decreased breath sounds present.   Abdominal:      General: Bowel sounds are normal.      Palpations: Abdomen is soft.      Tenderness: There is no abdominal tenderness.   Musculoskeletal:      Cervical back: Normal range of motion and neck supple.      Right lower leg: No edema.      Left lower leg: No edema.   Feet:      Comments: Left dorsal foot wound, with large amount of granulation tissue.  Picture  Neurological:      Mental Status: He is alert.   Psychiatric:         Behavior: Behavior normal. Behavior is cooperative.     Antibiotics  vancomycin - 1.75 gram/350 mL, 1.75 gram/350 mL  VANCOMYCIN IV 1250 MG  ML NS (ADV/MBP)    Relevant Results  Labs  Results from last 72 hours   Lab Units 05/23/25  0546 05/22/25  0455 05/21/25  1654   WBC AUTO x10*3/uL 8.2 9.4  9.1   HEMOGLOBIN g/dL 9.4* 9.7* 10.3*   HEMATOCRIT % 31.5* 31.7* 34.2*   PLATELETS AUTO x10*3/uL 274 263 296   NEUTROS PCT AUTO %  --   --  79.5   LYMPHS PCT AUTO %  --   --  13.0   MONOS PCT AUTO %  --   --  5.7   EOS PCT AUTO %  --   --  0.3     Results from last 72 hours   Lab Units 05/23/25  0546 05/22/25  0455 05/21/25  1654   SODIUM mmol/L 142 140 140   POTASSIUM mmol/L 3.2* 3.2* 4.1   CHLORIDE mmol/L 101 101 103   CO2 mmol/L 36* 36* 33*   BUN mg/dL 16 15 17   CREATININE mg/dL 0.77 0.82 0.80   GLUCOSE mg/dL 117* 113* 172*   CALCIUM mg/dL 8.7 8.7 8.8   ANION GAP mmol/L 8* <7* 8*   EGFR mL/min/1.73m*2 >90 >90 >90     Results from last 72 hours   Lab Units 05/21/25  1654   ALK PHOS U/L 76   BILIRUBIN TOTAL mg/dL 0.3   PROTEIN TOTAL g/dL 6.0*   ALT U/L 8*   AST U/L 11   ALBUMIN g/dL 3.1*     Estimated Creatinine Clearance: 95.6 mL/min (by C-G formula based on SCr of 0.77 mg/dL).  C-Reactive Protein   Date Value Ref Range Status   05/20/2025 3.85 (H) <1.00 mg/dL Final   05/18/2025 4.28 (H) <1.00 mg/dL Final   05/17/2025 5.97 (H) <1.00 mg/dL Final     Microbiology  Susceptibility data from last 14 days.  Collected Specimen Info Organism   05/09/25 Swab from Anterior Nares Methicillin Susceptible Staphylococcus aureus (MSSA)     Imaging  ECG 12 lead  Result Date: 5/22/2025  Sinus rhythm with 1st degree AV block Left axis deviation Nonspecific intraventricular block Minimal voltage criteria for LVH, may be normal variant ( [place] product ) Cannot rule out Septal infarct (cited on or before 09-MAY-2025) Abnormal ECG When compared with ECG of 12-MAY-2025 09:32, (unconfirmed) Serial changes of Septal infarct Present    CT angio chest for pulmonary embolism  Result Date: 5/21/2025  Interpreted By:  Ajit Romo, STUDY: CT ANGIO CHEST FOR PULMONARY EMBOLISM;  5/21/2025 5:52 pm   INDICATION: Signs/Symptoms:COVID-19 infection, chest pain.   COMPARISON: Chest CT 09/14/2024.   ACCESSION NUMBER(S): [id number]   ORDERING  CLINICIAN: ADDI ALBERTO   TECHNIQUE: Helical data acquisition of the chest was obtained after administration of intravenous contrast as part of pulmonary CT angiography protocol.   Axial contiguous images were reformatted in coronal and sagittal planes. Axial and coronal MIP images were created and reviewed.   FINDINGS: POTENTIAL LIMITATIONS OF THE STUDY: Motion and mixing artifact which limits evaluation of the distal branch vessels.   HEART AND VESSELS: No discrete filling defects within the main pulmonary artery or its branches.   Main pulmonary trunk is mildly enlarged measuring 3.2 cm in caliber raising possibility punctate hypertension similar to prior.   The thoracic aorta is of normal course and caliber. There is a left-sided PICC line terminating in the superior vena cava.   Mild-to-moderate coronary artery calcifications are seen.The study is not optimized for evaluation of coronary arteries.   Mild left-sided cardiomegaly.   No evidence of pericardial effusion.   MEDIASTINUM AND ROLO, LOWER NECK AND AXILLA: The visualized thyroid gland is within normal limits.   No evidence of thoracic lymphadenopathy by CT criteria.   Esophagus appears within normal limits as seen.   LUNGS AND AIRWAYS: The trachea and central airways are patent. No endobronchial lesion.   There are moderate bilateral pleural effusions with associated subsegmental consolidations favored to represent compression atelectasis.   UPPER ABDOMEN: There is hepatomegaly and hepatic steatosis.   CHEST WALL AND OSSEOUS STRUCTURES: There are no suspicious osseous lesions. The visualized osseous structures are intact.       1.  No evidence of pulmonary emboli to the segmental level. Limited evaluation of distal segmental and subsegmental branches due to mixing artifact. Mildly enlarged main pulmonary trunk measuring 3.2 cm in caliber raising possibility of pulmonary arterial hypertension. Mild left-sided cardiomegaly. 2. Moderate-sized bilateral  pleural effusions with associated subsegmental consolidations favored to represent compression atelectasis. Additional chronic findings as described above.     Signed by: Ajit Romo 5/21/2025 6:10 PM Dictation workstation:   [id number]    Vascular US upper extremity venous duplex left  Result Date: 5/15/2025  Interpreted By:  Jose Chase, STUDY: VASC US UPPER EXTREMITY VENOUS DUPLEX LEFT; 5/15/2025 12:11 pm   INDICATION: Signs/Symptoms:Swelling  after PICC line placement 3 days ago.   COMPARISON: None   ACCESSION NUMBER(S): [id number]   ORDERING CLINICIAN: BERNIE MEDRANO   TECHNIQUE: Black and white as well as color-flow duplex images were obtained along with Doppler wave analysis of the deep venous system of the upper extremity. The internal jugular vein, subclavian vein as well as the axillary and brachial vein of the upper extremity were evaluated. The basilic and cephalic veins were also imaged.   FINDINGS: Examination demonstrates normal compressibility and flow. PICC line is present within the basilic vein.       No evidence for deep vein thrombosis by this exam.   MACRO: none   Signed by: Jose Chase 5/15/2025 1:36 PM Dictation workstation:   [id number]    ECG 12 Lead  Result Date: 5/13/2025  Sinus rhythm with 1st degree AV block Left axis deviation Nonspecific intraventricular block Possible Anterolateral infarct (cited on or before 09-MAY-2025) Abnormal ECG When compared with ECG of 09-MAY-2025 18:47, (unconfirmed) Serial changes of Anterior infarct Present    ECG 12 lead  Result Date: 5/13/2025  Sinus rhythm with 1st degree AV block Left anterior fascicular block Minimal voltage criteria for LVH, may be normal variant ( [place] product ) Anterior infarct , age undetermined Abnormal ECG When compared with ECG of 13-FEB-2025 09:37, No significant change was found    XR chest 1 view  Addendum Date: 5/12/2025  Interpreted By:  Júnior Sena, ADDENDUM: Left-sided PICC line is noted and projects to  the level of the superior vena cava without pneumothorax.   Signed by: Júnior Sena 5/12/2025 5:41 PM   -------- ORIGINAL REPORT -------- Dictation workstation:   [id number]    Result Date: 5/12/2025  Interpreted By:  Júnior Sena, STUDY: XR CHEST 1 VIEW   INDICATION: Signs/Symptoms:PICC placement.   COMPARISON: February 17, 2025   ACCESSION NUMBER(S): [id number]   ORDERING CLINICIAN: BERNIE MEDRANO   FINDINGS: Cardiomegaly with new perihilar and basilar edema with patchy multifocal airspace opacities right worse than left.   Small bilateral pleural effusions.   No evidence of pneumothorax.         Cardiomegaly with new perihilar and basilar edema with patchy multifocal airspace opacities right worse than left.   Small bilateral pleural effusions.   Signed by: Júnior Sena 5/12/2025 5:28 PM Dictation workstation:   [id number]    CT brain attack head wo IV contrast  Result Date: 5/12/2025  Interpreted By:  Enedina Umana, STUDY: CT BRAIN ATTACK HEAD WO IV CONTRAST;  5/12/2025 9:37 am   INDICATION: Signs/Symptoms:stroke alert.     COMPARISON: 12/23/2024   ACCESSION NUMBER(S): [id number]   ORDERING CLINICIAN: KATIE HILTON   TECHNIQUE: Unenhanced CT images of the head were obtained.   FINDINGS: Patient is rotated.  The ventricles, cisterns and sulci are enlarged, consistent with diffuse volume loss. There are areas of nonspecific white matter hypodensity, which are probably age related or microvascular in nature. Low-density lacunar type infarct in the right basal ganglia. These findings are similar to the previous exam. There is no acute intracranial hemorrhage, mass effect or midline shift. No extraaxial fluid collection.   No focal calvarial lesion.   Bilateral ethmoid, left sphenoid and left maxillary sinus mucosal thickening.       No acute intracranial hemorrhage or mass-effect.   Multifocal sinus mucosal thickening.   MACRO: Enedina Umana discussed the significance and urgency of this critical finding by  secure chat with  KATIE HILTON on 5/12/2025 at 9:47 am. (**-RCF-**) Findings:  See findings.     Signed by: Enedina Umana 5/12/2025 9:48 AM Dictation workstation:   [id number]    MR tibia fibula left w and wo IV contrast  Result Date: 5/9/2025  Interpreted By:  Lina Moreno and Abuhamdeh Imran STUDY: MRI of the left tibia/fibula without and with IV contrast dated 5/9/2025.   INDICATION: Signs/Symptoms:Chronic osteomyelitis of the tibia.     COMPARISON: Left tibia/fibula radiograph 05/08/2025   ACCESSION NUMBER(S): [id number]   ORDERING CLINICIAN: ADA ESPINOZA   TECHNIQUE: Multiplanar multisequence MRI of the  left tibia/fibula was performed without and with intravenous gadolinium based contrast.   FINDINGS: OSSEOUS STRUCTURES AND JOINTS:   No fracture or dislocation is evident. Bone marrow signal intensity is within normal limits. No joint effusion is evident.   SOFT TISSUES: Partially visualized large soft tissue ulceration along the anterior aspect of the tibiotalar joint to the distal tibia with possible exposure of the anterior tibialis tendon (axial image 74). There is extensive diffuse subcutaneous edema noted throughout the lower extremities without rim enhancing fluid collection. Moderate amount of fascial plane fluid noted in the anterior as well as posterior aspect of the medial head of the gastrocnemius. There is moderate diffuse generalized muscle atrophy. There is intramuscular muscle edema along the anterior compartment without intramuscular abscess. The visualized muscles and tendons are intact. Ligaments are not well assessed on this examination.       1. No evidence to suggest osteomyelitis. 2. Partially visualized large soft tissue ulceration along the anterior aspect of the distal tibia and tibial talar joint with possible exposure of the underlying anterior tibialis tendon. 3. Moderate amount of fluid along the posterior as well as anterior aspect of the medial head of the gastrocnemius  extending from the level of the knee to the distal lower leg. This is nonspecific with infectious fasciitis felt to be less likely although not entirely excluded. Edema of the anterior compartment muscles of the lower leg which may represent infectious myositis. No evidence of intramuscular abscess. 4. Extensive diffuse soft tissue edema noted throughout the left lower extremity and partially visualized right lower extremity 5. Moderate diffuse fatty atrophy of the lower leg muscles.   I personally reviewed the images/study and I agree with Dr. Melia Lewis and the findings as stated.   MACRO: None   Signed by: Lina Moreno 5/9/2025 11:14 AM Dictation workstation:   [id number]    XR tibia fibula left 2 views  Result Date: 5/8/2025  Interpreted By:  Mariam Vang, STUDY: XR TIBIA FIBULA LEFT 2 VIEWS; ;  5/8/2025 10:25 pm   INDICATION: Signs/Symptoms:Chronic tibial osteomyelitis.     COMPARISON: None.   ACCESSION NUMBER(S): [id number]   ORDERING CLINICIAN: ADA ESPINOZA   FINDINGS: Two views of the left tibia and fibula show thick periosteal bone formation along the tibial diaphysis likely due to a longstanding process. There is no acute bone destruction to suggest acute osteomyelitis. No fracture or dislocation.       Thick periosteal new bone formation along the left tibia likely due to history of chronic osteomyelitis and healing. No acute bone destruction.     MACRO: None   Signed by: Mariam Vang 5/8/2025 10:46 PM Dictation workstation:   [id number]    XR foot left 3+ views  Result Date: 5/8/2025  Interpreted By:  Osmar Garza, STUDY: XR FOOT LEFT 3+ VIEWS;  5/8/2025 9:26 am   INDICATION: Signs/Symptoms:wound.   COMPARISON: None.   ACCESSION NUMBER(S): [id number]   ORDERING CLINICIAN: LUDA JALLOH   FINDINGS: Post left transmetatarsal amputation. There is a possible age indeterminate nondisplaced fracture near the base of the 4th proximal phalanx only seen on the oblique view. No additional acute  fracture, dislocation, or focal osseous destruction identified. Scattered degenerative changes. Small calcaneal enthesophytes.       Questionable age-indeterminate nondisplaced fracture near the base of the left foot 4th proximal phalanx, only apparent on one view. Soft tissue swelling. No additional acute osseous findings of the left foot.   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  Type 2 diabetes with peripheral angiopathy without gangrene-most recent PVL was remarkable for mild peripheral vascular disease     Continue vancomycin-monitor levels  Oxygen as needed  Local care  Monitor temperature and WBC  Long-term plan is to complete 6 weeks of antibiotic therapy, 6/18/2025  Weekly CBC with differential, CMP, CRP while on vancomycin        This is a complex infectious disease issue and the following was performed today (for more details please see the above note): Management decisions reflecting the added complexity (e.g., changes in antimicrobial therapy, infection control strategies).     Baljit Velazco MD

## 2025-05-23 NOTE — PROGRESS NOTES
05/23/25 1038   Discharge Planning   Living Arrangements Other (Comment)  (Per patient, he has been at SNF for 9 month. He was at [facility] previously and now [facility]. [facility] to [facility] ,he was living at home with his sister.)   Support Systems Family members;Children   Assistance Needed AAOX3. He requires assistance with bathing, dressing, and toileting. He states he is able to stand pivot with assistance. He is able to feed himself independently. He manages his own finances. His niece assists with laundry and the SNF provides meals. He stated he has not driven for about 6 months. DME: walker, cane, crutches, wheelchair, grab bars.   Type of Residence Nursing home/residential care;Skilled nursing facility   Number of Stairs to Enter Residence 5  (no railing)   Number of Stairs Within Residence 12  (to the basement, but he does not use. He states he cannot navigate stairs)   Do you have animals or pets at home? Yes   Type of Animals or Pets 2 cats   Care Facility Name [facility] or Post Acute Services Post acute facilities (Rehab/SNF/etc)   Type of Post Acute Facility Services Skilled nursing   Expected Discharge Disposition   ([facility] return. They are able to accept when ready.)   Does the patient need discharge transport arranged? Yes   RoundTrip coordination needed? Yes   Has discharge transport been arranged? No   Intensity of Service   Intensity of Service 0-30 min        05/23/25 1204   Discharge Planning   Expected Discharge Disposition SNF  ([facility] return. Patient is medically ready for discharge. [facility] will send final order and arrange transportation.)   DC Secure - No barriers to discharge when medically ready. [place] <30 days needs completed prior to discharge.

## 2025-05-23 NOTE — PROGRESS NOTES
Physical Therapy                 Therapy Communication Note    Patient Name: Elliot Partida  MRN: [id number]  Department: [facility]  Room: [address] [place], [place] [address]  Today's Date: 5/23/2025     Discipline: Physical Therapy    Missed Visit:       Missed Visit Reason: Missed Visit Reason: Patient refused    Missed Time: Attempt 925

## 2025-05-23 NOTE — PROGRESS NOTES
Occupational Therapy                 Therapy Communication Note    Patient Name: Elliot Partida  MRN: [id number]  Department: [facility]  Room: [address] [place] [address]-A  Today's Date: 5/23/2025     Discipline: Occupational Therapy    Missed Visit: OT Missed Visit: Yes     Missed Visit Reason: Missed Visit Reason: Patient refused (Pt refusing therapy evaluation this date. Pt education provided on importance of ADL completion and OOB mobility. Pt continued to refuse. Will follow up per pt status and as schedule allows.)    Missed Time: Attempt 924

## 2025-05-24 LAB
ATRIAL RATE: 79 BPM
P AXIS: 62 DEGREES
P OFFSET: 131 MS
P ONSET: 64 MS
PR INTERVAL: 292 MS
Q ONSET: 210 MS
QRS COUNT: 13 BEATS
QRS DURATION: 136 MS
QT INTERVAL: 392 MS
QTC CALCULATION(BAZETT): 449 MS
QTC FREDERICIA: 429 MS
R AXIS: -61 DEGREES
T AXIS: 85 DEGREES
T OFFSET: 406 MS
VENTRICULAR RATE: 79 BPM

## 2025-05-24 NOTE — DISCHARGE SUMMARY
"Discharge Diagnosis  Pleural effusion     Discharge Meds     Medication List      START taking these medications     acetaminophen 325 mg tablet; Commonly known as: Tylenol; Take 2 tablets   (650 mg) by mouth every 4 hours if needed for mild pain (1 - 3), moderate   pain (4 - 6), headaches or fever (temp greater than 38.0 C) (greater than   or equal to 38 C).   sennosides-docusate sodium 8.6-50 mg tablet; Commonly known as:   Ann-Colace; Take 1 tablet by mouth as needed at bedtime for constipation.   vancomycin IVPB; Commonly known as: Xellia; Infuse 350 mL (1.75 g) at   200 mL/hr over 105 minutes into a venous catheter once every 24 hours for   26 days.     CONTINUE taking these medications     Blood glucose monitoring meter; To check blood sugar every morning   before breakfast   carBAMazepine 200 mg tablet; Commonly known as: TEGretol   gabapentin 300 mg capsule; Commonly known as: Neurontin   hydrALAZINE 25 mg tablet; Commonly known as: Apresoline; Take 1 tablet   (25 mg) by mouth 3 times a day.   ondansetron ODT 4 mg disintegrating tablet; Commonly known as:   Zofran-ODT; Dissolve 1 tablet (4 mg) in the mouth every 8 hours if needed   for nausea or vomiting.   oxygen gas therapy; Commonly known as: O2; Inhale 2 L/min at 120,000   mL/hr if needed (desaturation at hs).   sodium chloride 0.9% piggyback 250 mL with vancomycin 1.25 gram recon   soln 1.25 g; Infuse 1.25 g at 200 mL/hr over 75 minutes into a venous   catheter every 12 hours.   traZODone 50 mg tablet; Commonly known as: Desyrel     STOP taking these medications     dexAMETHasone 6 mg tablet; Commonly known as: Decadron       Test Results Pending At Discharge  Pending Labs       Order Current Status    Blood Culture Preliminary result    Blood Culture Preliminary result            Hospital Course   HPI: \"Elliot Partida is a 74 year old gentleman with a history of moderate aortic stenosis, osteomyelitis, DM, neuropathy, DVT, trigeminal neuralgia, and " "HTN presented to the ED with c/o CP, productive cough.  Today endorses feeling dizzy/lightheaded as well as SOB prior to admission.  He was diuresed with IV lasix, weaned to RA this am.\"    Elliot was admitted to Medicine and treated for chest pain.  Troponins were negative, he remained hemodynamically stable.  He did have large bilateral pleural effusions on imaging and was given 40 mg IV Lasix x 3 doses and diuresed well.  His shortness of breath subsided and he had no further chest pain after admission.  He was discharged back to SNF after improvement to continue his IV vancomycin therapy for bilateral lower extremity wounds.  Chronic medical conditions were also addressed and monitored. PT/OT evals were done and infectious disease consulted during their inpatient stay. Labs were closely monitored.  He was discharged in stable condition with the above medication changes and/or additions. Recommendations were made to follow up with pt's PCP in 1-2 weeks.     Pertinent Physical Exam At Time of Discharge  Physical Exam  Constitutional: elderly male, pt in NAD, alert and cooperative  Eyes: PERRL, EOMI, no icterus   ENMT: mucous membranes moist, no apparent injury, no lesions seen  Head/Neck: Neck supple, no apparent injury  Respiratory/Thorax: Lungs diminished eber bases, no wheezing non-labored breathing, no cough, on RA  Cardiovascular: Regular, rate and rhythm, + 3/6 systolic murmur, normal S1 and S2  Gastrointestinal: Nondistended, soft, non-tender, BS present x 4  Musculoskeletal: ROM intact, no joint swelling  Extremities: normal extremities, BLE venous stasis changes, +1-2 pitting edema  Neurological: alert and oriented x 3, speech clear, follows commands appropriately  Skin: Warm and dry, eber LE wounds wrapped in Kerlix    Stable for discharge to SNF.  Total cumulative time spent in preparation of this discharge including documentation review, coordination of care with the medical team including PT/SW/care " coordinators and treating consultants, discussion with patient and pertinent family members and finalization of prescriptions, follow-up appointments, and this discharge summary was approximately 45 minutes.    Outpatient Follow-Up  No future appointments.      Delia Medrano, SARAH-CNP

## 2025-05-25 LAB
BACTERIA BLD CULT: NORMAL
BACTERIA BLD CULT: NORMAL

## 2025-05-26 LAB
BACTERIA BLD CULT: NORMAL
BACTERIA BLD CULT: NORMAL

## 2025-05-27 ENCOUNTER — NURSING HOME VISIT (OUTPATIENT)
Dept: POST ACUTE CARE | Facility: EXTERNAL LOCATION | Age: 75
End: 2025-05-27
Payer: MEDICARE

## 2025-05-27 DIAGNOSIS — I25.10 ASHD (ARTERIOSCLEROTIC HEART DISEASE): ICD-10-CM

## 2025-05-27 DIAGNOSIS — M19.90 OSTEOARTHRITIS, UNSPECIFIED OSTEOARTHRITIS TYPE, UNSPECIFIED SITE: ICD-10-CM

## 2025-05-27 DIAGNOSIS — M86.9 OSTEOMYELITIS, UNSPECIFIED SITE, UNSPECIFIED TYPE (MULTI): ICD-10-CM

## 2025-05-27 DIAGNOSIS — G62.9 NEUROPATHY: ICD-10-CM

## 2025-05-27 DIAGNOSIS — Z91.81 AT RISK FOR FALLING: ICD-10-CM

## 2025-05-27 DIAGNOSIS — I10 HYPERTENSION, UNSPECIFIED TYPE: ICD-10-CM

## 2025-05-27 DIAGNOSIS — U07.1 COVID-19: ICD-10-CM

## 2025-05-27 DIAGNOSIS — I82.499 DEEP VEIN THROMBOSIS (DVT) OF OTHER VEIN OF LOWER EXTREMITY, UNSPECIFIED CHRONICITY, UNSPECIFIED LATERALITY: ICD-10-CM

## 2025-05-27 DIAGNOSIS — E11.9 TYPE 2 DIABETES MELLITUS WITHOUT COMPLICATION, UNSPECIFIED WHETHER LONG TERM INSULIN USE: Primary | ICD-10-CM

## 2025-05-27 DIAGNOSIS — M86.472 CHRONIC OSTEOMYELITIS OF LEFT FOOT WITH DRAINING SINUS (MULTI): ICD-10-CM

## 2025-05-27 DIAGNOSIS — Z87.09 HISTORY OF PLEURAL EFFUSION: ICD-10-CM

## 2025-05-27 DIAGNOSIS — R53.1 WEAKNESS: ICD-10-CM

## 2025-05-27 DIAGNOSIS — D32.9 MENINGIOMA (MULTI): ICD-10-CM

## 2025-05-27 PROCEDURE — 99309 SBSQ NF CARE MODERATE MDM 30: CPT | Performed by: INTERNAL MEDICINE

## 2025-05-27 NOTE — LETTER
Patient: Elliot Partida  : 1950    Encounter Date: 2025    PLACE OF SERVICE:  [facility]    This is a subsequent visit.    Subjective  Patient ID: Elliot Partida is a 74 y.o. male who presents for Follow-up.    Mr. Elliot Partida is a 74-year-old male with history of diabetes with osteomyelitis to his foot.  He continues on IV vancomycin.  He has recently been treated for COVID-19 and developed bilateral pleural effusions.  He is unable to care for himself and requires supportive care.    Review of Systems   Constitutional:  Negative for chills and fever.   Cardiovascular:  Negative for chest pain.   All other systems reviewed and are negative.    Objective  /82   Pulse 82   Temp 36.8 °C (98.2 °F)   Resp 18     Physical Exam  Vitals reviewed.   Constitutional:       General: He is not in acute distress.     Comments: This is a well-developed, well-nourished male, sitting in a chair.   HENT:      Right Ear: Tympanic membrane, ear canal and external ear normal.      Left Ear: Tympanic membrane, ear canal and external ear normal.   Eyes:      General: No scleral icterus.     Pupils: Pupils are equal, round, and reactive to light.   Neck:      Vascular: No carotid bruit.   Cardiovascular:      Heart sounds: Normal heart sounds, S1 normal and S2 normal. No murmur heard.     No friction rub.   Pulmonary:      Breath sounds: Decreased breath sounds (throughout) present.   Abdominal:      Palpations: There is no hepatomegaly, splenomegaly or mass.   Musculoskeletal:         General: No swelling or deformity. Normal range of motion.      Cervical back: Neck supple.      Right lower leg: No edema.      Left lower leg: No edema.      Comments: The patient's foot is currently dressed.   Lymphadenopathy:      Cervical: No cervical adenopathy.      Upper Body:      Right upper body: No axillary adenopathy.      Left upper body: No axillary adenopathy.      Lower Body: No right inguinal adenopathy.  No left inguinal adenopathy.   Neurological:      Mental Status: He is oriented to person, place, and time.      Cranial Nerves: Cranial nerves 2-12 are intact. No cranial nerve deficit.      Sensory: No sensory deficit.      Motor: Motor function is intact. No weakness.      Gait: Gait is intact.      Deep Tendon Reflexes: Reflexes normal.   Psychiatric:         Mood and Affect: Mood normal. Mood is not anxious or depressed. Affect is not angry.         Behavior: Behavior is not agitated.         Thought Content: Thought content normal.         Judgment: Judgment normal.     LAB WORK:  Laboratory studies were reviewed.    Assessment/Plan  Problem List Items Addressed This Visit           ICD-10-CM    Weakness R53.1    Osteoarthritis M19.90    Meningioma (Multi) D32.9    COVID-19 U07.1     Other Visit Diagnoses         Codes      Type 2 diabetes mellitus without complication, unspecified whether long term insulin use    -  Primary E11.9      Osteomyelitis, unspecified site, unspecified type (Multi)     M86.9      History of pleural effusion     Z87.09      Deep vein thrombosis (DVT) of other vein of lower extremity, unspecified chronicity, unspecified laterality     I82.499      Hypertension, unspecified type     I10      Neuropathy     G62.9      ASHD (arteriosclerotic heart disease)     I25.10      At risk for falling     Z91.81        1. Diabetes, on insulin.  2. Osteomyelitis foot.  Continue to follow with Podiatry and Infection Disease.  3. COVID-19, stable.  4. Pleural effusion.  Follow with Pulmonology.  5. DVT, anticoagulated.  6. Hypertension, medically controlled.   7. Neuropathy, on gabapentin.  8. ASHD, on aspirin.  9. Osteoarthritis, on Tylenol.  10. Meningioma.  Follow with Neurosurgery.  11. Weakness, on PT/OT.  12. Fall risk, on fall precautions.    Scribe Attestation  By signing my name below, Hanna OLIVER, Scralfred attest that this documentation has been prepared under the direction and in the  presence of Marium Singh MD.     All medical record entries made by the scribe were personally dictated by me I have reviewed the chart and agree the record accurately reflects my personal performance of his history physical examination and management      Electronically Signed By: Marium Singh MD   6/5/25  1:08 AM

## 2025-05-29 ENCOUNTER — NURSING HOME VISIT (OUTPATIENT)
Dept: POST ACUTE CARE | Facility: EXTERNAL LOCATION | Age: 75
End: 2025-05-29
Payer: MEDICARE

## 2025-05-29 DIAGNOSIS — E11.9 TYPE 2 DIABETES MELLITUS WITHOUT COMPLICATION, UNSPECIFIED WHETHER LONG TERM INSULIN USE: ICD-10-CM

## 2025-05-29 DIAGNOSIS — R11.0 NAUSEA: ICD-10-CM

## 2025-05-29 DIAGNOSIS — M86.9 OSTEOMYELITIS OF LEFT FOOT, UNSPECIFIED TYPE (MULTI): ICD-10-CM

## 2025-05-29 DIAGNOSIS — I10 HYPERTENSION, UNSPECIFIED TYPE: ICD-10-CM

## 2025-05-29 DIAGNOSIS — F32.A DEPRESSION, UNSPECIFIED DEPRESSION TYPE: ICD-10-CM

## 2025-05-29 DIAGNOSIS — K59.00 CONSTIPATION, UNSPECIFIED CONSTIPATION TYPE: ICD-10-CM

## 2025-05-29 DIAGNOSIS — M62.81 MUSCLE WEAKNESS (GENERALIZED): Primary | ICD-10-CM

## 2025-05-29 DIAGNOSIS — R42 DIZZINESS: ICD-10-CM

## 2025-05-29 PROCEDURE — 99310 SBSQ NF CARE HIGH MDM 45: CPT | Performed by: NURSE PRACTITIONER

## 2025-05-30 ENCOUNTER — HOSPITAL ENCOUNTER (EMERGENCY)
Facility: HOSPITAL | Age: 75
Discharge: NURSING, REHAB OR LONG-TERM CARE | End: 2025-05-30
Payer: MEDICARE

## 2025-05-30 ENCOUNTER — APPOINTMENT (OUTPATIENT)
Dept: RADIOLOGY | Facility: HOSPITAL | Age: 75
End: 2025-05-30
Payer: MEDICARE

## 2025-05-30 PROCEDURE — 93971 EXTREMITY STUDY: CPT

## 2025-05-30 ASSESSMENT — PAIN - FUNCTIONAL ASSESSMENT: PAIN_FUNCTIONAL_ASSESSMENT: 0-10

## 2025-05-30 ASSESSMENT — PAIN SCALES - GENERAL
PAINLEVEL_OUTOF10: 0 - NO PAIN

## 2025-05-30 NOTE — ED PROVIDER NOTES
HPI   Chief Complaint   Patient presents with    Vascular Access Problem     Pt has PICC line in L arm. Per staff at [facility], they noticed it was swollen, so they ordered ultrasound, which showed he had DVT in that arm. Per staff, they were not able to flush or draw blood from the line. Pt denies any pain       Is a 74-year-old male coming in for DVT on outpatient ultrasound near the patient's PICC line to the left arm.  Patient has no complaints.  He has no discomfort.  He has had swelling however reports that the swelling appears to be at baseline per himself.  He denies any shortness of breath.  No chest pain.  Again denies any discomfort to the arm.  He is getting peripheral vancomycin secondary to a foot infection.      History provided by:  Patient and nursing home          Patient History   Medical History[1]  Surgical History[2]  Family History[3]  Social History[4]    Physical Exam   ED Triage Vitals [05/30/25 1223]   Temperature Heart Rate Respirations BP   37 °C (98.6 °F) 70 16 141/57      Pulse Ox Temp Source Heart Rate Source Patient Position   95 % Temporal -- --      BP Location FiO2 (%)     -- --       Physical Exam  Vitals and nursing note reviewed.   Constitutional:       General: He is not in acute distress.     Appearance: Normal appearance. He is not toxic-appearing.   HENT:      Head: Normocephalic and atraumatic.      Nose: Nose normal.      Mouth/Throat:      Mouth: Mucous membranes are moist.      Pharynx: Oropharynx is clear.   Eyes:      Extraocular Movements: Extraocular movements intact.      Conjunctiva/sclera: Conjunctivae normal.      Pupils: Pupils are equal, round, and reactive to light.   Cardiovascular:      Rate and Rhythm: Normal rate and regular rhythm.      Pulses: Normal pulses.      Heart sounds: Normal heart sounds.   Pulmonary:      Effort: Pulmonary effort is normal. No respiratory distress.      Breath sounds: Normal breath sounds.   Abdominal:      General:  Abdomen is flat. Bowel sounds are normal.      Palpations: Abdomen is soft.      Tenderness: There is no abdominal tenderness.   Musculoskeletal:         General: Normal range of motion.      Cervical back: Normal range of motion and neck supple.      Comments: Left upper extremity swelling with PICC line in place.   Skin:     General: Skin is warm and dry.      Coloration: Skin is not jaundiced or pale.      Findings: No bruising.   Neurological:      General: No focal deficit present.      Mental Status: He is alert and oriented to person, place, and time. Mental status is at baseline.   Psychiatric:         Mood and Affect: Mood normal.         Behavior: Behavior normal.           ED Course & MDM   Diagnoses as of 05/30/25 1640   Acute deep vein thrombosis (DVT) of left upper extremity, unspecified vein                 No data recorded     [place] Coma Scale Score: 15 (05/30/25 1224 : Audrey Lawson RN)                           Medical Decision Making  Summary:  Medical Decision Making:   Patient presented as described in HPI. Patient case including ROS, PE, and treatment and plan discussed with ED attending if attached as cosigner. Results from labs and or imaging included below if completed. Elliot SCHMIDT Helga  is a 74 y.o. coming in for Patient presents with:  Vascular Access Problem: Pt has PICC line in L arm. Per staff at [facility], they noticed it was swollen, so they ordered ultrasound, which showed he had DVT in that arm. Per staff, they were not able to flush or draw blood from the line. Pt denies any pain  .  Patient had an outpatient study completed at the nursing home which showed DVT in the left upper arm.  Images are notably reviewed by myself as well as the report.  Ultrasound will be completed again.  Ultrasound does show DVT in the subclavian area as well as partially down into the arm.  There is superficial thrombus near the PICC line.  Lab work was completed on the patient.  No  concerning lab abnormalities.  Patient was previously on anticoagulation however is currently not on it now.  Discussed with patient's findings with Dr. Balderrama who recommended outpatient DOAC treatment.  He was previously on Xarelto.  Will be started back on Xarelto.  Will be discharged back to nursing facility.  Again he has no complaints of chest pain or shortness of breath.  He denies any other symptoms.  Advised nursing home to contact outpatient services about getting another PICC line placed.  Nurse practitioner at site asked if we could pull the PICC line because they were going to send to another facility of the PICC line removed.  Clinically I do not believe that the patient needs transferred again so I advised that we will pull the PICC line and leave the IV in place for the patient to get his IV vancomycin.  Another PICC can be placed outpatient.   nurse practitioner at nursing home agrees.      Disposition is completed with shared decision making with the patient or guardian present with the patient. They were advised to follow up with PCP or recommended provider in 2-3 days for another evaluation and exam. I advised the patient to return or go to closest emergency room immediately if symptoms change, get worse, or new symptoms develop prior to follow up. I explained the plan and treatment course. Patient/guardian is in agreement with plan, treatment course, and follow up and state that they will comply.    Labs Reviewed  CBC WITH AUTO DIFFERENTIAL - Abnormal     WBC                           7.9                    nRBC                          0.0                    RBC                           3.71 (*)               Hemoglobin                    9.7 (*)                Hematocrit                    31.9 (*)               MCV                           86                     MCH                           26.1                   MCHC                          30.4 (*)               RDW                            16.7 (*)               Platelets                     209                    Neutrophils %                 66.4                   Immature Granulocytes %, Automated   0.4                    Lymphocytes %                 18.9                   Monocytes %                   11.5                   Eosinophils %                 2.4                    Basophils %                   0.4                    Neutrophils Absolute          5.27                   Immature Granulocytes Absolute, Au*   0.03                   Lymphocytes Absolute          1.50                   Monocytes Absolute            0.91 (*)               Eosinophils Absolute          0.19                   Basophils Absolute            0.03                COMPREHENSIVE METABOLIC PANEL - Abnormal     Glucose                       139 (*)                Sodium                        141                    Potassium                     3.7                    Chloride                      104                    Bicarbonate                   32                     Anion Gap                     9 (*)                  Urea Nitrogen                 14                     Creatinine                    1.00                   eGFR                          79                     Calcium                       9.0                    Albumin                       3.3 (*)                Alkaline Phosphatase          66                     Total Protein                 6.4                    AST                           15                     Bilirubin, Total              0.5                    ALT                           8 (*)               COAGULATION SCREEN - Abnormal   Vascular US upper extremity venous duplex left   Final Result    Evidence for acute partially occlusive DVT within the left subclavian,    axillary and brachial veins.     Superficial thrombus within the basilic vein, surrounding the PICC    line.    Signed by Trent Sher MD                             Tests/Medications/Escalations of Care considered but not given: As in MDM    Patient care discussed with: N/A  Social Determinants affecting care: N/A    Final diagnosis and disposition as documented     Diagnoses as of 05/30/25 1641  Acute deep vein thrombosis (DVT) of left upper extremity, unspecified vein       Shared decision making was completed and determined that patient will be discharged. I discussed the differential; results and discharge plan with the patient and/or family/friend/caregiver if present.  I emphasized the importance of follow-up with the physician I referred them to in the timeframe recommended.  I explained reasons for the patient to return to the Emergency Department. They agreed that if they feel their condition is worsening or if they have any other concern they should call 911 immediately for further assistance. I gave the patient an opportunity to ask all questions they had and answered all of them accordingly. They understand return precautions and discharge instructions. The patient and/or family/friend/caregiver expressed understanding verbally and that they would comply.     Disposition: Discharge      This note has been transcribed using voice recognition and may contain grammatical errors, misplaced words, incorrect words, incorrect phrases or other errors.         Procedure  Procedures       Navdeep Kent PA-C  05/30/25 1643         [1]   Past Medical History:  Diagnosis Date    Acute osteomyelitis of ankle and foot, left (Multi)     AMI (acute myocardial infarction) (Multi)     ASHD (arteriosclerotic heart disease)     At risk for falls     Cellulitis     left foot and ankle    Diabetes mellitus (Multi)     DVT (deep vein thrombosis) in pregnancy ([facility])     Fall risk care plan declined     Hypertension     Meningioma (Multi)     Myocardial infarction (Multi)     Neuritis     Neuropathy     Osteoarthritis     Osteomyelitis     PVD (peripheral vascular disease)      Sprain of right knee 06/25/2015    Stroke (Multi)     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      FOOT RAY RESECTION Left 09/23/2024    L partial 5th ray resection (Dr. Spears DPBRAYAN)    FOOT SURGERY Left 09/27/2024    Delayed closure w/ graft application (Dr. Amina DPM)    INVASIVE VASCULAR PROCEDURE Bilateral 09/18/2024    Procedure: Lower Extremity Angiogram;  Surgeon: Teofilo Stoddard MD;  Location: [facility] Cardiac Cath Lab;  Service: Cardiovascular;  Laterality: Bilateral;    IRIDOTOMY / IRIDECTOMY Bilateral    [3]   Family History  Problem Relation Name Age of Onset    Heart disease Father      Colon cancer Other      Heart attack Other      Diabetes Other      Hypertension Other     [4]   Social History  Tobacco Use    Smoking status: Never     Passive exposure: Never    Smokeless tobacco: Never   Vaping Use    Vaping status: Never Used   Substance Use Topics    Alcohol use: Not Currently     Comment: former    Drug use: Never        Navdeep Kent PA-C  05/30/25 1954

## 2025-05-30 NOTE — ED TRIAGE NOTES
Pt has PICC line in L arm. Per staff at [facility], they noticed it was swollen, so they ordered ultrasound, which showed he had DVT in that arm. Per staff, they were not able to flush or draw blood from the line. Pt denies any pain

## 2025-05-31 ENCOUNTER — HOSPITAL ENCOUNTER (EMERGENCY)
Facility: HOSPITAL | Age: 75
Discharge: NURSING, REHAB OR LONG-TERM CARE | End: 2025-05-31
Payer: MEDICARE

## 2025-05-31 VITALS
TEMPERATURE: 98.2 F | BODY MASS INDEX: 29.18 KG/M2 | SYSTOLIC BLOOD PRESSURE: 182 MMHG | RESPIRATION RATE: 18 BRPM | HEIGHT: 69 IN | DIASTOLIC BLOOD PRESSURE: 69 MMHG | OXYGEN SATURATION: 95 % | WEIGHT: 197 LBS | HEART RATE: 84 BPM

## 2025-05-31 DIAGNOSIS — Z76.89 ENCOUNTER FOR MEDICATION ADMINISTRATION: Primary | ICD-10-CM

## 2025-05-31 PROCEDURE — 99283 EMERGENCY DEPT VISIT LOW MDM: CPT

## 2025-05-31 PROCEDURE — 2500000002 HC RX 250 W HCPCS SELF ADMINISTERED DRUGS (ALT 637 FOR MEDICARE OP, ALT 636 FOR OP/ED)

## 2025-05-31 RX ADMIN — RIVAROXABAN 15 MG: 15 TABLET, FILM COATED ORAL at 02:23

## 2025-05-31 ASSESSMENT — PAIN - FUNCTIONAL ASSESSMENT: PAIN_FUNCTIONAL_ASSESSMENT: 0-10

## 2025-05-31 ASSESSMENT — PAIN SCALES - GENERAL: PAINLEVEL_OUTOF10: 0 - NO PAIN

## 2025-05-31 NOTE — ED PROVIDER NOTES
HPI   Chief Complaint   Patient presents with    Dvt     BIB EMS for dvt in distal left subclavian, axillary, brachial, basillic. Was sent to [facility] [facility], picc line was removed and then they sent pt back. Facility and Dr. Singh wants pt started on xeralto as they will not be able to get medication for 24hrs. Pt with no c/o        Patient is a 74-year-old male presenting with a chief complaint of need of medication.  Patient was sent in by his nursing facility, patient was diagnosed with a DVT, was prescribed Xarelto, nursing home was concerned that he would not build to get the prescription filled in time, thus sent him back, patient has no acute complaints at this time, he is frustrated with being in the emergency department,      History provided by:  Patient          Patient History   Medical History[1]  Surgical History[2]  Family History[3]  Social History[4]    Physical Exam   ED Triage Vitals [05/31/25 0121]   Temperature Heart Rate Respirations BP   36.8 °C (98.2 °F) 84 18 (!) 182/69      Pulse Ox Temp Source Heart Rate Source Patient Position   95 % Temporal Monitor Sitting      BP Location FiO2 (%)     Left arm --       Physical Exam  Vitals and nursing note reviewed. Exam conducted with a chaperone present.   Constitutional:       General: He is not in acute distress.     Appearance: Normal appearance. He is well-developed and normal weight. He is not ill-appearing, toxic-appearing or diaphoretic.   HENT:      Head: Normocephalic and atraumatic.      Nose: Nose normal.      Mouth/Throat:      Mouth: Mucous membranes are moist.      Pharynx: Oropharynx is clear.   Eyes:      Extraocular Movements: Extraocular movements intact.      Conjunctiva/sclera: Conjunctivae normal.      Pupils: Pupils are equal, round, and reactive to light.   Cardiovascular:      Rate and Rhythm: Normal rate and regular rhythm.      Pulses: Normal pulses.      Heart sounds: Normal heart sounds. No murmur heard.  Pulmonary:       Effort: Pulmonary effort is normal. No respiratory distress.      Breath sounds: Normal breath sounds.   Abdominal:      General: Abdomen is flat. Bowel sounds are normal.      Palpations: Abdomen is soft.      Tenderness: There is no abdominal tenderness.   Musculoskeletal:         General: Swelling present. Normal range of motion.      Cervical back: Normal range of motion and neck supple.   Skin:     General: Skin is warm and dry.      Capillary Refill: Capillary refill takes less than 2 seconds.   Neurological:      General: No focal deficit present.      Mental Status: He is alert and oriented to person, place, and time. Mental status is at baseline.      Cranial Nerves: No cranial nerve deficit.   Psychiatric:         Mood and Affect: Mood normal.         Behavior: Behavior normal.         Thought Content: Thought content normal.         Judgment: Judgment normal.           ED Course & MDM   Diagnoses as of 05/31/25 0303   Encounter for medication administration                 No data recorded     [place] Coma Scale Score: 15 (05/31/25 0132 : Daniel Samayoa RN)                           Medical Decision Making  Patient seen and evaluated at bedside, patient is in no acute distress.  I will order a dose of Xarelto. Differential diagnosis includes but is not limited to DVT  I spoke with the patient's provider, they agreed for 1 dose of Xarelto, and they will make sure the patient gets there prescription filled at the nursing facility.    Diagnosis: Encounter for medication administration  .        Procedure  Procedures  .  Sections of this report were created using voice-to-text technology and may contain errors in translation    Winston Chaves DO  Emergency Medicine           [1]   Past Medical History:  Diagnosis Date    Acute osteomyelitis of ankle and foot, left (Multi)     AMI (acute myocardial infarction) (Multi)     ASHD (arteriosclerotic heart disease)     At risk for falls     Cellulitis     left foot  and ankle    Diabetes mellitus (Multi)     DVT (deep vein thrombosis) in pregnancy (HHS-HCC)     Fall risk care plan declined     Hypertension     Meningioma (Multi)     Myocardial infarction (Multi)     Neuritis     Neuropathy     Osteoarthritis     Osteomyelitis     PVD (peripheral vascular disease)     Sprain of right knee 06/25/2015    Stroke (Multi)     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      FOOT RAY RESECTION Left 09/23/2024    L partial 5th ray resection (Dr. Spears, DPM)    FOOT SURGERY Left 09/27/2024    Delayed closure w/ graft application (Dr. Huynh, [facility])    INVASIVE VASCULAR PROCEDURE Bilateral 09/18/2024    Procedure: Lower Extremity Angiogram;  Surgeon: Teofilo Stoddard MD;  Location: [facility];  Service: Cardiovascular;  Laterality: Bilateral;    IRIDOTOMY / IRIDECTOMY Bilateral    [3]   Family History  Problem Relation Name Age of Onset    Heart disease Father      Colon cancer Other      Heart attack Other      Diabetes Other      Hypertension Other     [4]   Social History  Tobacco Use    Smoking status: Never     Passive exposure: Never    Smokeless tobacco: Never   Vaping Use    Vaping status: Never Used   Substance Use Topics    Alcohol use: Not Currently     Comment: former    Drug use: Never        Winston Chaves DO  05/31/25 030

## 2025-06-02 ENCOUNTER — NURSING HOME VISIT (OUTPATIENT)
Dept: POST ACUTE CARE | Facility: EXTERNAL LOCATION | Age: 75
End: 2025-06-02
Payer: MEDICARE

## 2025-06-02 VITALS
RESPIRATION RATE: 18 BRPM | TEMPERATURE: 98.2 F | HEART RATE: 82 BPM | DIASTOLIC BLOOD PRESSURE: 82 MMHG | SYSTOLIC BLOOD PRESSURE: 126 MMHG

## 2025-06-02 VITALS
DIASTOLIC BLOOD PRESSURE: 80 MMHG | RESPIRATION RATE: 18 BRPM | TEMPERATURE: 98.4 F | SYSTOLIC BLOOD PRESSURE: 130 MMHG | HEART RATE: 84 BPM

## 2025-06-02 DIAGNOSIS — M86.9 OSTEOMYELITIS, UNSPECIFIED SITE, UNSPECIFIED TYPE (MULTI): Primary | ICD-10-CM

## 2025-06-02 DIAGNOSIS — I25.10 ASHD (ARTERIOSCLEROTIC HEART DISEASE): ICD-10-CM

## 2025-06-02 DIAGNOSIS — D32.9 MENINGIOMA (MULTI): ICD-10-CM

## 2025-06-02 DIAGNOSIS — I82.499 DEEP VEIN THROMBOSIS (DVT) OF OTHER VEIN OF LOWER EXTREMITY, UNSPECIFIED CHRONICITY, UNSPECIFIED LATERALITY: ICD-10-CM

## 2025-06-02 DIAGNOSIS — M19.90 OSTEOARTHRITIS, UNSPECIFIED OSTEOARTHRITIS TYPE, UNSPECIFIED SITE: ICD-10-CM

## 2025-06-02 DIAGNOSIS — I10 HYPERTENSION, UNSPECIFIED TYPE: ICD-10-CM

## 2025-06-02 DIAGNOSIS — G62.9 NEUROPATHY: ICD-10-CM

## 2025-06-02 DIAGNOSIS — Z87.09 HISTORY OF PLEURAL EFFUSION: ICD-10-CM

## 2025-06-02 DIAGNOSIS — U07.1 COVID-19: ICD-10-CM

## 2025-06-02 DIAGNOSIS — E11.9 TYPE 2 DIABETES MELLITUS WITHOUT COMPLICATION, UNSPECIFIED WHETHER LONG TERM INSULIN USE: ICD-10-CM

## 2025-06-02 LAB
ATRIAL RATE: 78 BPM
ATRIAL RATE: 90 BPM
P AXIS: 31 DEGREES
P AXIS: 58 DEGREES
P OFFSET: 129 MS
P OFFSET: 146 MS
P ONSET: 62 MS
P ONSET: 82 MS
PR INTERVAL: 256 MS
PR INTERVAL: 316 MS
Q ONSET: 210 MS
Q ONSET: 220 MS
QRS COUNT: 13 BEATS
QRS COUNT: 14 BEATS
QRS DURATION: 120 MS
QRS DURATION: 138 MS
QT INTERVAL: 384 MS
QT INTERVAL: 398 MS
QTC CALCULATION(BAZETT): 437 MS
QTC CALCULATION(BAZETT): 486 MS
QTC FREDERICIA: 419 MS
QTC FREDERICIA: 455 MS
R AXIS: -49 DEGREES
R AXIS: -59 DEGREES
T AXIS: 59 DEGREES
T AXIS: 74 DEGREES
T OFFSET: 409 MS
T OFFSET: 412 MS
VENTRICULAR RATE: 78 BPM
VENTRICULAR RATE: 90 BPM

## 2025-06-02 PROCEDURE — 99309 SBSQ NF CARE MODERATE MDM 30: CPT | Performed by: INTERNAL MEDICINE

## 2025-06-02 ASSESSMENT — ENCOUNTER SYMPTOMS
FEVER: 0
FEVER: 0
CHILLS: 0
CHILLS: 0

## 2025-06-02 NOTE — LETTER
Patient: Elliot Partida  : 1950    Encounter Date: 2025    PLACE OF SERVICE:  [facility].    This is a subsequent visit.    Subjective  Patient ID: Elliot Partida is a 74 y.o. male who presents for Follow-up.    Mr. Elliot Partida is a 74-year-old male with history of diabetes with osteomyelitis to the foot.  He has a history of DVT and is unable to care for himself.  He requires supportive care.    Review of Systems   Constitutional:  Negative for chills and fever.   Cardiovascular:  Negative for chest pain.   All other systems reviewed and are negative.    Objective  /78   Pulse 76   Temp 36.8 °C (98.2 °F)   Resp 16     Physical Exam  Vitals reviewed.   Constitutional:       General: He is not in acute distress.     Comments: This is a well-developed, well-nourished male, sitting in a chair.   HENT:      Right Ear: Tympanic membrane, ear canal and external ear normal.      Left Ear: Tympanic membrane, ear canal and external ear normal.   Eyes:      General: No scleral icterus.     Pupils: Pupils are equal, round, and reactive to light.   Neck:      Vascular: No carotid bruit.   Cardiovascular:      Heart sounds: Normal heart sounds, S1 normal and S2 normal. No murmur heard.     No friction rub.   Pulmonary:      Effort: Pulmonary effort is normal.      Breath sounds: Decreased breath sounds (throughout) present.   Abdominal:      Palpations: There is no hepatomegaly, splenomegaly or mass.   Musculoskeletal:         General: No swelling or deformity. Normal range of motion.      Cervical back: Neck supple.      Right lower leg: No edema.      Left lower leg: No edema.   Lymphadenopathy:      Cervical: No cervical adenopathy.      Upper Body:      Right upper body: No axillary adenopathy.      Left upper body: No axillary adenopathy.      Lower Body: No right inguinal adenopathy. No left inguinal adenopathy.   Skin:     Comments: The patient's foot is currently dressed.  There is no  foul odor.  There is no color drainage.   Neurological:      Mental Status: He is oriented to person, place, and time.      Cranial Nerves: Cranial nerves 2-12 are intact. No cranial nerve deficit.      Sensory: No sensory deficit.      Motor: Motor function is intact. No weakness.      Gait: Gait is intact.      Deep Tendon Reflexes: Reflexes normal.   Psychiatric:         Mood and Affect: Mood normal. Mood is not anxious or depressed. Affect is not angry.         Behavior: Behavior is not agitated.         Thought Content: Thought content normal.         Judgment: Judgment normal.     LAB WORK: Laboratory studies reviewed.    Assessment/Plan  Problem List Items Addressed This Visit           ICD-10-CM       Hematology and Neoplasia    Meningioma (Multi) D32.9       Infectious Diseases    COVID-19 U07.1       Musculoskeletal and Injuries    Osteoarthritis M19.90     Other Visit Diagnoses         Codes      Osteomyelitis, unspecified site, unspecified type (Multi)    -  Primary M86.9      Type 2 diabetes mellitus without complication, unspecified whether long term insulin use     E11.9      Deep vein thrombosis (DVT) of other vein of lower extremity, unspecified chronicity, unspecified laterality     I82.499      Hypertension, unspecified type     I10      History of pleural effusion     Z87.09      ASHD (arteriosclerotic heart disease)     I25.10      Neuropathy     G62.9        1. Osteomyelitis foot, continue wound, follow with Podiatry.  2. Diabetes, on insulin.  3. DVT, anticoagulated.  4. Hypertension, med controlled.  5. Pleural effusion, follow with Pulmonology.  6. ASHD, on aspirin.  7. Neuropathy, on gabapentin.  8. Meningioma, follow with Neurosurgery.  9. Osteoarthritis, on Tylenol.  10. History of COVID-19, stable.    Scribe Attestation  By signing my name below, IJohnna Scribe attest that this documentation has been prepared under the direction and in the presence of Marium Singh MD.     All  medical record entries made by the scribe were personally dictated by me I have reviewed the chart and agree the record accurately reflects my personal performance of his history physical examination and management      Electronically Signed By: Marium Singh MD   6/7/25 11:44 PM

## 2025-06-02 NOTE — PROGRESS NOTES
PLACE OF SERVICE:  [facility]    This is a subsequent visit.    Subjective   Patient ID: Elliot Partida is a 74 y.o. male who presents for Follow-up.    Mr. Elliot Partida is a 74-year-old male with history of diabetes with osteomyelitis to his foot.  He continues on IV vancomycin.  He has recently been treated for COVID-19 and developed bilateral pleural effusions.  He is unable to care for himself and requires supportive care.    Review of Systems   Constitutional:  Negative for chills and fever.   Cardiovascular:  Negative for chest pain.   All other systems reviewed and are negative.    Objective   /82   Pulse 82   Temp 36.8 °C (98.2 °F)   Resp 18     Physical Exam  Vitals reviewed.   Constitutional:       General: He is not in acute distress.     Comments: This is a well-developed, well-nourished male, sitting in a chair.   HENT:      Right Ear: Tympanic membrane, ear canal and external ear normal.      Left Ear: Tympanic membrane, ear canal and external ear normal.   Eyes:      General: No scleral icterus.     Pupils: Pupils are equal, round, and reactive to light.   Neck:      Vascular: No carotid bruit.   Cardiovascular:      Heart sounds: Normal heart sounds, S1 normal and S2 normal. No murmur heard.     No friction rub.   Pulmonary:      Breath sounds: Decreased breath sounds (throughout) present.   Abdominal:      Palpations: There is no hepatomegaly, splenomegaly or mass.   Musculoskeletal:         General: No swelling or deformity. Normal range of motion.      Cervical back: Neck supple.      Right lower leg: No edema.      Left lower leg: No edema.      Comments: The patient's foot is currently dressed.   Lymphadenopathy:      Cervical: No cervical adenopathy.      Upper Body:      Right upper body: No axillary adenopathy.      Left upper body: No axillary adenopathy.      Lower Body: No right inguinal adenopathy. No left inguinal adenopathy.   Neurological:      Mental Status: He is  oriented to person, place, and time.      Cranial Nerves: Cranial nerves 2-12 are intact. No cranial nerve deficit.      Sensory: No sensory deficit.      Motor: Motor function is intact. No weakness.      Gait: Gait is intact.      Deep Tendon Reflexes: Reflexes normal.   Psychiatric:         Mood and Affect: Mood normal. Mood is not anxious or depressed. Affect is not angry.         Behavior: Behavior is not agitated.         Thought Content: Thought content normal.         Judgment: Judgment normal.     LAB WORK:  Laboratory studies were reviewed.    Assessment/Plan   Problem List Items Addressed This Visit           ICD-10-CM    Weakness R53.1    Osteoarthritis M19.90    Meningioma (Multi) D32.9    COVID-19 U07.1     Other Visit Diagnoses         Codes      Type 2 diabetes mellitus without complication, unspecified whether long term insulin use    -  Primary E11.9      Osteomyelitis, unspecified site, unspecified type (Multi)     M86.9      History of pleural effusion     Z87.09      Deep vein thrombosis (DVT) of other vein of lower extremity, unspecified chronicity, unspecified laterality     I82.499      Hypertension, unspecified type     I10      Neuropathy     G62.9      ASHD (arteriosclerotic heart disease)     I25.10      At risk for falling     Z91.81        1. Diabetes, on insulin.  2. Osteomyelitis foot.  Continue to follow with Podiatry and Infection Disease.  3. COVID-19, stable.  4. Pleural effusion.  Follow with Pulmonology.  5. DVT, anticoagulated.  6. Hypertension, medically controlled.   7. Neuropathy, on gabapentin.  8. ASHD, on aspirin.  9. Osteoarthritis, on Tylenol.  10. Meningioma.  Follow with Neurosurgery.  11. Weakness, on PT/OT.  12. Fall risk, on fall precautions.    Scribe Attestation  By signing my name below, Hanna OLIVER Scribe attest that this documentation has been prepared under the direction and in the presence of Marium Singh MD.     All medical record entries made by the  ricarda were personally dictated by me I have reviewed the chart and agree the record accurately reflects my personal performance of his history physical examination and management

## 2025-06-02 NOTE — PROGRESS NOTES
PLACE OF SERVICE:  [facility] [place]    This is new/initial history and physical.    Subjective   Patient ID: Elliot Partida is a 74 y.o. male who presents for Annual Exam and new/initial history.    Mr. Elliot Partida is a 74-year-old male with recent history of COVID-19 infection with bilateral pleural effusions.  He is a diabetic and treated for foot osteomyelitis.  He continues on IV vancomycin.  He is unable to care for himself and requires supportive care.    Review of Systems   Constitutional:  Negative for chills and fever.   Cardiovascular:  Negative for chest pain.   All other systems reviewed and are negative.    Objective   /80   Pulse 84   Temp 36.9 °C (98.4 °F)   Resp 18     Physical Exam  Vitals reviewed.   Constitutional:       General: He is not in acute distress.     Comments: This is a well-developed, well-nourished male, sitting in a chair.   HENT:      Right Ear: Tympanic membrane, ear canal and external ear normal.      Left Ear: Tympanic membrane, ear canal and external ear normal.   Eyes:      General: No scleral icterus.     Pupils: Pupils are equal, round, and reactive to light.   Neck:      Vascular: No carotid bruit.   Cardiovascular:      Heart sounds: Normal heart sounds, S1 normal and S2 normal. No murmur heard.     No friction rub.   Pulmonary:      Breath sounds: Decreased breath sounds (throughout) present.   Abdominal:      Palpations: There is no hepatomegaly, splenomegaly or mass.   Musculoskeletal:         General: No swelling or deformity. Normal range of motion.      Cervical back: Neck supple.      Right lower leg: No edema.      Left lower leg: No edema.      Comments: The patient's foot is currently dressed.  There is no foul odor.  There is no color drainage.   Lymphadenopathy:      Cervical: No cervical adenopathy.      Upper Body:      Right upper body: No axillary adenopathy.      Left upper body: No axillary adenopathy.      Lower Body: No right inguinal  adenopathy. No left inguinal adenopathy.   Neurological:      Mental Status: He is oriented to person, place, and time.      Cranial Nerves: Cranial nerves 2-12 are intact. No cranial nerve deficit.      Sensory: No sensory deficit.      Motor: Motor function is intact. No weakness.      Gait: Gait is intact.      Deep Tendon Reflexes: Reflexes normal.   Psychiatric:         Mood and Affect: Mood normal. Mood is not anxious or depressed. Affect is not angry.         Behavior: Behavior is not agitated.         Thought Content: Thought content normal.         Judgment: Judgment normal.     LAB WORK:  Laboratory studies were reviewed.    Assessment/Plan   Problem List Items Addressed This Visit           ICD-10-CM    Weakness R53.1    Osteoarthritis M19.90    Meningioma (Multi) D32.9    COVID-19 - Primary U07.1     Other Visit Diagnoses         Codes      Type 2 diabetes mellitus without complication, unspecified whether long term insulin use     E11.9      Deep vein thrombosis (DVT) of other vein of lower extremity, unspecified chronicity, unspecified laterality     I82.499      Osteomyelitis, unspecified site, unspecified type (Multi)     M86.9      Hypertension, unspecified type     I10      Neuropathy     G62.9      ASHD (arteriosclerotic heart disease)     I25.10      At risk for falling     Z91.81        1. COVID-19 with pleural effusion.  Follow with Infectious Disease.  2. Osteomyelitis to foot.  Follow with Podiatry and Infectious Disease.  3. Diabetes, on insulin.  4. DVT, on anticoagulated.  5. Meningioma.  Follow with Neurosurgery.  6. Hypertension, medically controlled.  7. Neuropathy, on gabapentin.  8. ASHD, on aspirin.  9. Osteoarthritis, on Tylenol.  10. Weakness, on PT/OT.  11. Fall risk, on fall precautions.    Scribe Attestation  By signing my name below, Hanna OLIVER Scribe attest that this documentation has been prepared under the direction and in the presence of Marium Singh MD.     All  medical record entries made by the scribe were personally dictated by me I have reviewed the chart and agree the record accurately reflects my personal performance of his history physical examination and management

## 2025-06-03 ENCOUNTER — APPOINTMENT (OUTPATIENT)
Dept: RADIOLOGY | Facility: HOSPITAL | Age: 75
End: 2025-06-03
Payer: MEDICARE

## 2025-06-03 ENCOUNTER — APPOINTMENT (OUTPATIENT)
Dept: CARDIOLOGY | Facility: HOSPITAL | Age: 75
End: 2025-06-03
Payer: MEDICARE

## 2025-06-03 ENCOUNTER — HOSPITAL ENCOUNTER (EMERGENCY)
Facility: HOSPITAL | Age: 75
Discharge: HOME | End: 2025-06-03
Attending: EMERGENCY MEDICINE
Payer: MEDICARE

## 2025-06-03 VITALS
SYSTOLIC BLOOD PRESSURE: 126 MMHG | HEART RATE: 76 BPM | TEMPERATURE: 98.2 F | RESPIRATION RATE: 16 BRPM | DIASTOLIC BLOOD PRESSURE: 78 MMHG

## 2025-06-03 VITALS
BODY MASS INDEX: 29.22 KG/M2 | DIASTOLIC BLOOD PRESSURE: 68 MMHG | SYSTOLIC BLOOD PRESSURE: 166 MMHG | TEMPERATURE: 98.4 F | RESPIRATION RATE: 10 BRPM | WEIGHT: 197.31 LBS | HEIGHT: 69 IN | HEART RATE: 68 BPM | OXYGEN SATURATION: 95 %

## 2025-06-03 DIAGNOSIS — R60.1 ANASARCA: ICD-10-CM

## 2025-06-03 DIAGNOSIS — R51.9 ACUTE NONINTRACTABLE HEADACHE, UNSPECIFIED HEADACHE TYPE: Primary | ICD-10-CM

## 2025-06-03 DIAGNOSIS — D64.9 ANEMIA, UNSPECIFIED TYPE: ICD-10-CM

## 2025-06-03 DIAGNOSIS — I10 HYPERTENSION, UNSPECIFIED TYPE: ICD-10-CM

## 2025-06-03 DIAGNOSIS — R53.83 MALAISE AND FATIGUE: ICD-10-CM

## 2025-06-03 DIAGNOSIS — M25.511 RIGHT SHOULDER PAIN, UNSPECIFIED CHRONICITY: ICD-10-CM

## 2025-06-03 DIAGNOSIS — R53.81 MALAISE AND FATIGUE: ICD-10-CM

## 2025-06-03 LAB
ALBUMIN SERPL BCP-MCNC: 3.2 G/DL (ref 3.4–5)
ALP SERPL-CCNC: 68 U/L (ref 33–136)
ALT SERPL W P-5'-P-CCNC: 6 U/L (ref 10–52)
ANION GAP SERPL CALCULATED.3IONS-SCNC: 11 MMOL/L (ref 10–20)
APPEARANCE UR: CLEAR
AST SERPL W P-5'-P-CCNC: 11 U/L (ref 9–39)
BASOPHILS # BLD AUTO: 0.02 X10*3/UL (ref 0–0.1)
BASOPHILS NFR BLD AUTO: 0.4 %
BILIRUB SERPL-MCNC: 0.3 MG/DL (ref 0–1.2)
BILIRUB UR STRIP.AUTO-MCNC: NEGATIVE MG/DL
BNP SERPL-MCNC: 57 PG/ML (ref 0–99)
BUN SERPL-MCNC: 15 MG/DL (ref 6–23)
CALCIUM SERPL-MCNC: 8.9 MG/DL (ref 8.6–10.3)
CARDIAC TROPONIN I PNL SERPL HS: 9 NG/L (ref 0–20)
CHLORIDE SERPL-SCNC: 106 MMOL/L (ref 98–107)
CO2 SERPL-SCNC: 30 MMOL/L (ref 21–32)
COLOR UR: ABNORMAL
CREAT SERPL-MCNC: 0.98 MG/DL (ref 0.5–1.3)
EGFRCR SERPLBLD CKD-EPI 2021: 81 ML/MIN/1.73M*2
EOSINOPHIL # BLD AUTO: 0.23 X10*3/UL (ref 0–0.4)
EOSINOPHIL NFR BLD AUTO: 4 %
ERYTHROCYTE [DISTWIDTH] IN BLOOD BY AUTOMATED COUNT: 16.7 % (ref 11.5–14.5)
GLUCOSE SERPL-MCNC: 158 MG/DL (ref 74–99)
GLUCOSE UR STRIP.AUTO-MCNC: NORMAL MG/DL
HCT VFR BLD AUTO: 30.5 % (ref 41–52)
HGB BLD-MCNC: 9.3 G/DL (ref 13.5–17.5)
IMM GRANULOCYTES # BLD AUTO: 0.01 X10*3/UL (ref 0–0.5)
IMM GRANULOCYTES NFR BLD AUTO: 0.2 % (ref 0–0.9)
KETONES UR STRIP.AUTO-MCNC: NEGATIVE MG/DL
LEUKOCYTE ESTERASE UR QL STRIP.AUTO: ABNORMAL
LYMPHOCYTES # BLD AUTO: 1.41 X10*3/UL (ref 0.8–3)
LYMPHOCYTES NFR BLD AUTO: 24.8 %
MCH RBC QN AUTO: 25.4 PG (ref 26–34)
MCHC RBC AUTO-ENTMCNC: 30.5 G/DL (ref 32–36)
MCV RBC AUTO: 83 FL (ref 80–100)
MONOCYTES # BLD AUTO: 0.68 X10*3/UL (ref 0.05–0.8)
MONOCYTES NFR BLD AUTO: 12 %
MUCOUS THREADS #/AREA URNS AUTO: ABNORMAL /LPF
NEUTROPHILS # BLD AUTO: 3.33 X10*3/UL (ref 1.6–5.5)
NEUTROPHILS NFR BLD AUTO: 58.6 %
NITRITE UR QL STRIP.AUTO: NEGATIVE
NRBC BLD-RTO: 0 /100 WBCS (ref 0–0)
PH UR STRIP.AUTO: 6 [PH]
PLATELET # BLD AUTO: 216 X10*3/UL (ref 150–450)
POTASSIUM SERPL-SCNC: 3.5 MMOL/L (ref 3.5–5.3)
PROT SERPL-MCNC: 6 G/DL (ref 6.4–8.2)
PROT UR STRIP.AUTO-MCNC: NEGATIVE MG/DL
RBC # BLD AUTO: 3.66 X10*6/UL (ref 4.5–5.9)
RBC # UR STRIP.AUTO: NEGATIVE MG/DL
RBC #/AREA URNS AUTO: ABNORMAL /HPF
SODIUM SERPL-SCNC: 143 MMOL/L (ref 136–145)
SP GR UR STRIP.AUTO: 1.01
SQUAMOUS #/AREA URNS AUTO: ABNORMAL /HPF
UROBILINOGEN UR STRIP.AUTO-MCNC: NORMAL MG/DL
WBC # BLD AUTO: 5.7 X10*3/UL (ref 4.4–11.3)
WBC #/AREA URNS AUTO: ABNORMAL /HPF

## 2025-06-03 PROCEDURE — 73030 X-RAY EXAM OF SHOULDER: CPT | Mod: RIGHT SIDE | Performed by: RADIOLOGY

## 2025-06-03 PROCEDURE — 71045 X-RAY EXAM CHEST 1 VIEW: CPT

## 2025-06-03 PROCEDURE — 36415 COLL VENOUS BLD VENIPUNCTURE: CPT | Performed by: EMERGENCY MEDICINE

## 2025-06-03 PROCEDURE — 2500000001 HC RX 250 WO HCPCS SELF ADMINISTERED DRUGS (ALT 637 FOR MEDICARE OP)

## 2025-06-03 PROCEDURE — 85025 COMPLETE CBC W/AUTO DIFF WBC: CPT | Performed by: EMERGENCY MEDICINE

## 2025-06-03 PROCEDURE — 93005 ELECTROCARDIOGRAM TRACING: CPT

## 2025-06-03 PROCEDURE — 84484 ASSAY OF TROPONIN QUANT: CPT | Performed by: EMERGENCY MEDICINE

## 2025-06-03 PROCEDURE — 81001 URINALYSIS AUTO W/SCOPE: CPT | Performed by: EMERGENCY MEDICINE

## 2025-06-03 PROCEDURE — 71045 X-RAY EXAM CHEST 1 VIEW: CPT | Performed by: RADIOLOGY

## 2025-06-03 PROCEDURE — 70450 CT HEAD/BRAIN W/O DYE: CPT

## 2025-06-03 PROCEDURE — 80053 COMPREHEN METABOLIC PANEL: CPT | Performed by: EMERGENCY MEDICINE

## 2025-06-03 PROCEDURE — 73030 X-RAY EXAM OF SHOULDER: CPT | Mod: RT

## 2025-06-03 PROCEDURE — 83880 ASSAY OF NATRIURETIC PEPTIDE: CPT | Performed by: EMERGENCY MEDICINE

## 2025-06-03 PROCEDURE — 87086 URINE CULTURE/COLONY COUNT: CPT | Mod: TRILAB | Performed by: EMERGENCY MEDICINE

## 2025-06-03 PROCEDURE — 99285 EMERGENCY DEPT VISIT HI MDM: CPT | Mod: 25 | Performed by: EMERGENCY MEDICINE

## 2025-06-03 RX ORDER — OXYCODONE AND ACETAMINOPHEN 5; 325 MG/1; MG/1
1 TABLET ORAL ONCE
Refills: 0 | Status: COMPLETED | OUTPATIENT
Start: 2025-06-03 | End: 2025-06-03

## 2025-06-03 RX ORDER — LIDOCAINE AND PRILOCAINE 25; 25 MG/G; MG/G
CREAM TOPICAL ONCE
Status: COMPLETED | OUTPATIENT
Start: 2025-06-03 | End: 2025-06-03

## 2025-06-03 RX ORDER — ACETAMINOPHEN 325 MG/1
975 TABLET ORAL ONCE
Status: COMPLETED | OUTPATIENT
Start: 2025-06-03 | End: 2025-06-03

## 2025-06-03 RX ADMIN — OXYCODONE HYDROCHLORIDE AND ACETAMINOPHEN 1 TABLET: 5; 325 TABLET ORAL at 14:08

## 2025-06-03 RX ADMIN — LIDOCAINE AND PRILOCAINE: 25; 25 CREAM TOPICAL at 14:08

## 2025-06-03 RX ADMIN — ACETAMINOPHEN 975 MG: 325 TABLET ORAL at 19:31

## 2025-06-03 ASSESSMENT — PAIN - FUNCTIONAL ASSESSMENT
PAIN_FUNCTIONAL_ASSESSMENT: 0-10
PAIN_FUNCTIONAL_ASSESSMENT: 0-10

## 2025-06-03 ASSESSMENT — COLUMBIA-SUICIDE SEVERITY RATING SCALE - C-SSRS
6. HAVE YOU EVER DONE ANYTHING, STARTED TO DO ANYTHING, OR PREPARED TO DO ANYTHING TO END YOUR LIFE?: NO
2. HAVE YOU ACTUALLY HAD ANY THOUGHTS OF KILLING YOURSELF?: NO
1. IN THE PAST MONTH, HAVE YOU WISHED YOU WERE DEAD OR WISHED YOU COULD GO TO SLEEP AND NOT WAKE UP?: NO

## 2025-06-03 ASSESSMENT — PAIN SCALES - GENERAL
PAINLEVEL_OUTOF10: 4
PAINLEVEL_OUTOF10: 8

## 2025-06-03 ASSESSMENT — ENCOUNTER SYMPTOMS
FEVER: 0
CHILLS: 0

## 2025-06-03 NOTE — DISCHARGE INSTRUCTIONS
Please follow-up with your primary care provider regarding today's visit and your symptoms.    It is important to remember that your care does not end here and you must continue to monitor your condition closely. Please return to the emergency department for any worsening or concerning signs or symptoms as directed by our conversations and the discharge instructions. If you do not have a doctor please contact the referral number on your discharge instructions. Please contact any physician specialists provided in your discharge notes as it is very important to follow up with them regarding your condition. If you are unable to reach the physicians provided, please come back to the Emergency Department at any time.

## 2025-06-03 NOTE — PROGRESS NOTES
PLACE OF SERVICE:  [facility].    This is a subsequent visit.    Subjective   Patient ID: Elliot Partida is a 74 y.o. male who presents for Follow-up.    Mr. Elliot Partida is a 74-year-old male with history of diabetes with osteomyelitis to the foot.  He has a history of DVT and is unable to care for himself.  He requires supportive care.    Review of Systems   Constitutional:  Negative for chills and fever.   Cardiovascular:  Negative for chest pain.   All other systems reviewed and are negative.    Objective   /78   Pulse 76   Temp 36.8 °C (98.2 °F)   Resp 16     Physical Exam  Vitals reviewed.   Constitutional:       General: He is not in acute distress.     Comments: This is a well-developed, well-nourished male, sitting in a chair.   HENT:      Right Ear: Tympanic membrane, ear canal and external ear normal.      Left Ear: Tympanic membrane, ear canal and external ear normal.   Eyes:      General: No scleral icterus.     Pupils: Pupils are equal, round, and reactive to light.   Neck:      Vascular: No carotid bruit.   Cardiovascular:      Heart sounds: Normal heart sounds, S1 normal and S2 normal. No murmur heard.     No friction rub.   Pulmonary:      Effort: Pulmonary effort is normal.      Breath sounds: Decreased breath sounds (throughout) present.   Abdominal:      Palpations: There is no hepatomegaly, splenomegaly or mass.   Musculoskeletal:         General: No swelling or deformity. Normal range of motion.      Cervical back: Neck supple.      Right lower leg: No edema.      Left lower leg: No edema.   Lymphadenopathy:      Cervical: No cervical adenopathy.      Upper Body:      Right upper body: No axillary adenopathy.      Left upper body: No axillary adenopathy.      Lower Body: No right inguinal adenopathy. No left inguinal adenopathy.   Skin:     Comments: The patient's foot is currently dressed.  There is no foul odor.  There is no color drainage.   Neurological:      Mental  Status: He is oriented to person, place, and time.      Cranial Nerves: Cranial nerves 2-12 are intact. No cranial nerve deficit.      Sensory: No sensory deficit.      Motor: Motor function is intact. No weakness.      Gait: Gait is intact.      Deep Tendon Reflexes: Reflexes normal.   Psychiatric:         Mood and Affect: Mood normal. Mood is not anxious or depressed. Affect is not angry.         Behavior: Behavior is not agitated.         Thought Content: Thought content normal.         Judgment: Judgment normal.     LAB WORK: Laboratory studies reviewed.    Assessment/Plan   Problem List Items Addressed This Visit           ICD-10-CM       Hematology and Neoplasia    Meningioma (Multi) D32.9       Infectious Diseases    COVID-19 U07.1       Musculoskeletal and Injuries    Osteoarthritis M19.90     Other Visit Diagnoses         Codes      Osteomyelitis, unspecified site, unspecified type (Multi)    -  Primary M86.9      Type 2 diabetes mellitus without complication, unspecified whether long term insulin use     E11.9      Deep vein thrombosis (DVT) of other vein of lower extremity, unspecified chronicity, unspecified laterality     I82.499      Hypertension, unspecified type     I10      History of pleural effusion     Z87.09      ASHD (arteriosclerotic heart disease)     I25.10      Neuropathy     G62.9        1. Osteomyelitis foot, continue wound, follow with Podiatry.  2. Diabetes, on insulin.  3. DVT, anticoagulated.  4. Hypertension, med controlled.  5. Pleural effusion, follow with Pulmonology.  6. ASHD, on aspirin.  7. Neuropathy, on gabapentin.  8. Meningioma, follow with Neurosurgery.  9. Osteoarthritis, on Tylenol.  10. History of COVID-19, stable.    Scribe Attestation  By signing my name below, Johnna OLIVER Scribe attest that this documentation has been prepared under the direction and in the presence of Marium Singh MD.     All medical record entries made by the scribe were personally dictated  by me I have reviewed the chart and agree the record accurately reflects my personal performance of his history physical examination and management

## 2025-06-03 NOTE — PROGRESS NOTES
*Provider Impression*    Patient is a 74 year old male who is seen today for management of multiple medical problems     #Weakness / Dizziness - PT/OT, add meclizine 25mg q6h PRN, check cbc w diff, renal panel, mag  #Osteomyelitis L foot - PWB LLE, wound care, vancomycin 1.25grams IV daily until 6/18, acetaminophen 650mg q4h PRN  #T2DM w/ neuropathy - diet controlled, gabapentin 300mg TID,   #HTN - hydralazine 25mg TID  #Nausea / Constipation - zofran 4mg q8h PRN, senna-S 8.6/50mg 1 tab BID PRN  #Depression - trazodone 50mg QHS  #ACP - Full Code    Follow up as needed      *Chief Complaint*     osteomyelitis    *History of Present Illness*    Patient is a 75 y/o male w/ PMH as below who was admitted to the hospital from the ED for non-healing wound on his left foot. 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 He was started on vanc and zosyn and admitted for further evaluation and management. While on med/surg patient became aggressive and combative with staff; refusing treatment,interventions, and therapies. Patient continued to become hypoxic with supplemental oxygen and was transferred to ICU. Patient was found to be COVID (+). Remdesivir was started for 5 days along with Vancomycin and Zosyn. He was followed by Infectious Disease and Podiatry throughout hospitalization. Podiatry did not recommend surgery but long term antibiotics, so PICC line was placed for Vancomycin infusion twice daily until 6/18/25. Discharged to [facility] on 5/20 to complete 6 week Vancomycin course and PT/OT.      He was sent to the ED on 5/20 with c/o CP, productive cough. He was diuresed with IV lasix, weaned to RA this am. Troponins were negative, he remained hemodynamically stable.  He did have large bilateral pleural effusions on imaging and was given 40 mg IV Lasix x 3 doses.  His shortness of breath  subsided and he had no further chest pain after admission.  Once stable he was discharged back to [facility] on .    His labs appreciated as below.     He had some dizziness today, so started him on meclizine. He is seen sitting up in his rom today and reports intermittent dizziness still.  No f/c, sweats, CP, SOB, cough, n/v, constipation, diarrhea, LUTS, or any other new c/o presently.     Allergies - NKA  PMH - HTN, HLD, CAD, PAD, AV stenosis, DVT, T2DM, obesity, osteomyelitis, anemia, meningioma, osteomyelitis, cellulitis, OA, CVA, inflammatory neuropathy, trigeminal neuralgia, edema, ASHD,   PSH - cardiac cath, carpal tunnel release, eye surgery, arthrocentesis, foot ray resection, angiogram, iridotomy  FH - Father had heart disease  SocHx -  Never smoker, No EtOH    *Review of Systems*  All other systems reviewed are negative except as noted in the HPI     *Vital Signs*   Date: 25  - T: 97.8  P: 73  R: 18  BP: 125/60  SpO2: 98% on O2    *Results / Data*  CBC - Date: 25  WBC: 8.6  HGB: 10.4  HCT: 32.4  PLT: 253  ;   BMP - Date: 25  Na: 148  K: 3.4  Cl: 104  CO2: 32  BUN: 15  Cr: 0.8  Glu: 98  Ca: 8.8 Alb: 3.3  Ma.2 ;   LFT - Date: 25  AST: 11  ALT: 7  ALP: 70  Tbili: 0.4  ;   Coags - Date:   INR:   PT:  Other - Date: 25  A1c: 6.2%  Folate: 5.3  B12: 553  25-OH-D: 17  CRP: 21.1    *Physical Exam*  Gen: (+) NAD, (+) well-appearing  HEENT: (+) normocephalic, (+) MMM  Neck: (+) supple  Lungs: (+) CTAB, (-) wheezes, (-) rales, (-) rhonchi  Heart: (+) RRR, (+) S1 S2, (-) murmurs  Pulses: (+) palpable  Abd: (+) soft, (+) NT, (+) ND, (+) BS+  Ext: (-) edema, (-) deformity  MSK: (-) joint swelling  Skin: (+) warm, (+) dry, (-) rash  Neuro: (+) follows commands, (-) tremor, (+) alert

## 2025-06-03 NOTE — PROGRESS NOTES
Attestation/Supervisory note for BERNABE Hood      The patient is a 74-year-old male presenting to the emergency department for evaluation of a posterior headache and right shoulder pain.  The patient states that he was recently diagnosed with a bunch of blood clots in his left arm and is taking eliquis, he states he has had some swelling in his arm because of it.  He states that this is not changed.  He states that the only new symptoms that he has today is a posterior headache.  He states it is of the base of his skull.  No better or worse.  No radiation.  He also reports that he has some pain in his right shoulder.  He states he has had it before but he feels like it is worse.  He states he feels like it is painful when he is moving his shoulder.  He reportedly had some blurred vision bilaterally this morning but does not have any at this time.  He denies any focal weakness or numbness.  He denies any chest pain or shortness of breath.  No abdominal pain.  No nausea, vomiting or diarrhea.  No urinary complaints.  No fever or chills.  No cough or congestion.  All pertinent positives and negatives are recorded above.  All other systems reviewed and otherwise negative.  Vital signs with mild hypertension but otherwise within normal limits.  Physical exam with a well-nourished well-developed male with disheveled appearance but no evidence of acute distress.  HEENT exam with dry mucous membranes but otherwise unremarkable.  He does not have any evidence of airway compromise or respiratory distress.  Abdominal exam is benign.  He does have evidence of anasarca.  He does have pale skin.  NIH stroke scale score of 0 at this time.      EKG with sinus rhythm at 76 bpm, first-degree AV block, leftward axis, normal ST segment, LVH criteria, and normal T waves      Oral Percocet ordered      Diagnostic labs with anemia but the results of the remainder of the diagnostic labs were pending at the time of my departure.      CT head  wo IV contrast    (Results Pending)   XR shoulder right 2+ views    (Results Pending)   XR chest 1 view    (Results Pending)        The patient does not have any gross motor, neurologic or vascular deficits on exam.  No visible or palpable bony deformity.  He is well-perfused on exam and has no evidence of sepsis.  He is hemodynamically stable.  EKG without evidence of a STEMI but the results of the troponins were pending at the time of my departure.  Diagnostic imaging was obtained but the formal radiology readings were pending at the time of my departure.      BERNABE Hood will continue manage the patient primarily.  Anticipate disposition based on the results of the remaining diagnostic labs and imaging.  If these results do not show indication for admission and/or transfer, anticipate that the patient will be released to go back to the long-term care facility where he resides for outpatient follow-up with his primary care provider.            Impression/diagnosis:  Posterior headache  Visual disturbance, resolved  Malaise and fatigue  Anasarca  Right shoulder pain, acute on chronic  Hypertension, unspecified  Anemia, unspecified      I personally saw the patient and made/approve the management plan and take responsibility for the patient management.      I independently interpreted the following study (S) EKG and diagnostic labs      I personally discussed the patient's management with the patient      I reviewed the results of the diagnostic labs and diagnostic imaging.  Formal radiology read was completed by the radiologist.      Tash Carrillo MD

## 2025-06-03 NOTE — ED PROVIDER NOTES
HPI   Chief Complaint   Patient presents with    Headache     Pt has wound on back side that he is getting vancomycin for through a right arm PICC line. Pt is from a nursing home. Pt is on Eliquis for left arm DVT with large amount of swelling. Pt sent in from nursing home due to headache in back of head. Pt did have some blurred vision this morning that has resolved.        HPI  Patient is a 74-year-old male brought in by EMS from [facility] for evaluation of right shoulder pain as well as a headache.  Headache is at the base of his skull does come and go without therapy.  Denies any vision changes contrary to triage note.  No extremity weakness or numbness.  Patient states that he has been getting the headaches on and off over the past week or so.  He also has pain in his left upper extremity but was recently diagnosed with a DVT and was just recently started on Xarelto a few days ago.  He is taking that as prescribed.  He also endorses pain in his right shoulder that is new over the past couple days as well.  No injuring event.  Believes it may be arthritis.  He otherwise denies chest pain shortness of breath lightheadedness or dizziness nausea vomiting diarrhea or acute illness at this time.      Patient History   Medical History[1]  Surgical History[2]  Family History[3]  Social History[4]    Physical Exam   ED Triage Vitals [06/03/25 1327]   Temperature Heart Rate Respirations BP   36.9 °C (98.4 °F) 79 17 (!) 185/73      Pulse Ox Temp src Heart Rate Source Patient Position   97 % -- -- --      BP Location FiO2 (%)     -- --       Physical Exam  Vitals and nursing note reviewed.   Constitutional:       General: He is not in acute distress.     Appearance: He is well-developed.      Comments: Disheveled but in no apparent distress   HENT:      Head: Normocephalic and atraumatic.   Eyes:      Conjunctiva/sclera: Conjunctivae normal.      Pupils: Pupils are equal, round, and reactive to  light.   Cardiovascular:      Rate and Rhythm: Normal rate and regular rhythm.      Heart sounds: Murmur heard.   Pulmonary:      Effort: Pulmonary effort is normal. No respiratory distress.      Breath sounds: Normal breath sounds.   Musculoskeletal:      Cervical back: Neck supple.      Comments: Diffuse swelling of the left upper extremity with adequate +2 radial pulses bilaterally, intact sensation of the upper extremities bilaterally and capillary refill 2 seconds of bilateral upper extremities, no color change of the extremities they are warm and dry   Skin:     General: Skin is warm and dry.      Capillary Refill: Capillary refill takes less than 2 seconds.   Neurological:      Mental Status: He is alert.      Comments: Oriented to person place and time providing history without difficulty, no focal neurologic deficit   Psychiatric:         Mood and Affect: Mood normal.           ED Course & MDM   Diagnoses as of 06/05/25 0832   Acute nonintractable headache, unspecified headache type   Malaise and fatigue   Right shoulder pain, unspecified chronicity   Hypertension, unspecified type   Anasarca   Anemia, unspecified type                 No data recorded     [place] Coma Scale Score: 15 (06/03/25 1331 : Oumou Lynn RN)                           Medical Decision Making  Parts of this chart have been completed using voice recognition software. Please excuse any errors of transcription.  My thought process and reason for plan has been formulated from the time that I saw the patient until the time of disposition and is not specific to one specific moment during their visit and furthermore my MDM encompasses this entire chart and not only this text box.      HPI: Detailed above.    Exam: A medically appropriate exam performed, outlined above, given the known history and presentation.    History obtained from: Patient    Medications given during visit:  Medications   lidocaine-prilocaine (Emla) cream ( Topical  Given 6/3/25 1408)   oxyCODONE-acetaminophen (Percocet) 5-325 mg per tablet 1 tablet (1 tablet oral Given 6/3/25 1408)   acetaminophen (Tylenol) tablet 975 mg (975 mg oral Given 6/3/25 1931)        Diagnostic/tests  Labs Reviewed   CBC WITH AUTO DIFFERENTIAL - Abnormal       Result Value    WBC 5.7      nRBC 0.0      RBC 3.66 (*)     Hemoglobin 9.3 (*)     Hematocrit 30.5 (*)     MCV 83      MCH 25.4 (*)     MCHC 30.5 (*)     RDW 16.7 (*)     Platelets 216      Neutrophils % 58.6      Immature Granulocytes %, Automated 0.2      Lymphocytes % 24.8      Monocytes % 12.0      Eosinophils % 4.0      Basophils % 0.4      Neutrophils Absolute 3.33      Immature Granulocytes Absolute, Automated 0.01      Lymphocytes Absolute 1.41      Monocytes Absolute 0.68      Eosinophils Absolute 0.23      Basophils Absolute 0.02     COMPREHENSIVE METABOLIC PANEL - Abnormal    Glucose 158 (*)     Sodium 143      Potassium 3.5      Chloride 106      Bicarbonate 30      Anion Gap 11      Urea Nitrogen 15      Creatinine 0.98      eGFR 81      Calcium 8.9      Albumin 3.2 (*)     Alkaline Phosphatase 68      Total Protein 6.0 (*)     AST 11      Bilirubin, Total 0.3      ALT 6 (*)    URINALYSIS WITH REFLEX CULTURE AND MICROSCOPIC - Abnormal    Color, Urine Light-Yellow      Appearance, Urine Clear      Specific Gravity, Urine 1.011      pH, Urine 6.0      Protein, Urine NEGATIVE      Glucose, Urine Normal      Blood, Urine NEGATIVE      Ketones, Urine NEGATIVE      Bilirubin, Urine NEGATIVE      Urobilinogen, Urine Normal      Nitrite, Urine NEGATIVE      Leukocyte Esterase, Urine 75 Tiffanie/uL (*)    MICROSCOPIC ONLY, URINE - Abnormal    WBC, Urine 6-10 (*)     RBC, Urine 1-2      Squamous Epithelial Cells, Urine 1-9 (SPARSE)      Mucus, Urine FEW     B-TYPE NATRIURETIC PEPTIDE - Normal    BNP 57      Narrative:        <100 pg/mL - Heart failure unlikely  100-299 pg/mL - Intermediate probability of acute heart                  failure  exacerbation. Correlate with clinical                  context and patient history.    >=300 pg/mL - Heart Failure likely. Correlate with clinical                  context and patient history.    BNP testing is performed using different testing methodology at [facility] than at other [facility]. Direct result comparisons should only be made within the same method.      SERIAL TROPONIN-INITIAL - Normal    Troponin I, High Sensitivity 9      Narrative:     Less than 99th percentile of normal range cutoff-  Female and children under 18 years old <14 ng/L; Male <21 ng/L: Negative  Repeat testing should be performed if clinically indicated.     Female and children under 18 years old 14-50 ng/L; Male 21-50 ng/L:  Consistent with possible cardiac damage and possible increased clinical   risk. Serial measurements may help to assess extent of myocardial damage.     >50 ng/L: Consistent with cardiac damage, increased clinical risk and  myocardial infarction. Serial measurements may help assess extent of   myocardial damage.      NOTE: Children less than 1 year old may have higher baseline troponin   levels and results should be interpreted in conjunction with the overall   clinical context.     NOTE: Troponin I testing is performed using a different   testing methodology at [facility] than at other   [facility]. Direct result comparisons should only   be made within the same method.   URINE CULTURE   URINALYSIS WITH REFLEX CULTURE AND MICROSCOPIC    Narrative:     The following orders were created for panel order Urinalysis with Reflex Culture and Microscopic.  Procedure                               Abnormality         Status                     ---------                               -----------         ------                     Urinalysis with Reflex C...[[id number]]  Abnormal            Final result               Extra Urine Gray Tube[[id number]]                                                          Please view results for these tests on the individual orders.   TROPONIN SERIES- (INITIAL, 1 HR)    Narrative:     The following orders were created for panel order Troponin I Series, High Sensitivity (0, 1 HR).  Procedure                               Abnormality         Status                     ---------                               -----------         ------                     Troponin I, High Sensiti...[[id number]]  Normal              Final result               Troponin, High Sensitivi...[[id number]]                                                   Please view results for these tests on the individual orders.   EXTRA URINE GRAY TUBE   SERIAL TROPONIN, 1 HOUR      XR shoulder right 2+ views   Final Result   1. No evidence of acute fracture or dislocation.   2. Degenerative changes, as described above.        MACRO:   None.        Signed by: Pepe Huang 6/3/2025 2:59 PM   Dictation workstation:   [id number]      XR chest 1 view   Final Result   No focal infiltrate or pneumothorax is identified.        MACRO:   None.        Signed by: Pepe Huang 6/3/2025 2:59 PM   Dictation workstation:   [id number]      CT head wo IV contrast   Final Result   Age-related/chronic changes similar to prior. No acute intracranial   process.                  MACRO:   None.        Signed by: Julien Romeo 6/3/2025 2:46 PM   Dictation workstation:   [id number]           Considerations/further MDM:  Patient is a 74-year-old male presenting for evaluation of headache, shoulder pain, left arm pain recently diagnosed DVT    Patient awake alert in no apparent distress during the visit.  He is hemodynamically stable.  He does have evidence of diffuse edema of his left upper extremity in the setting of recently diagnosed DVT but his left upper extremity is neurovascularly intact on my assessment with adequate perfusion and pulse and sensation.  He is neurologically intact on my assessment.  Laboratory workup  unremarkable from ER standpoint.  CT head without evidence of acute mass or intracranial hemorrhage.  Patient reports that his headache has resolved.  X-ray of the right shoulder was performed without evidence of acute fracture or dislocation suspect arthritis in the AC joint provided patient's location of pain.  I have low suspicion for acute emergent pathology requiring additional workup or inpatient hospitalization at this time.  Released back to his nursing facility in good condition.  I discussed the laboratory and imaging findings with the patient at bedside. Patient's questions and concerns were addressed. Patient was released in good condition, discharged with instructions to follow up with primary care provider and appropriate specialist, and to return to ED at any time for worsening symptoms or any other concerns. Patient demonstrates understanding of the findings and the importance of appropriate follow up care.             Procedure  Procedures       [1]   Past Medical History:  Diagnosis Date    Acute osteomyelitis of ankle and foot, left (Multi)     AMI (acute myocardial infarction) (Multi)     ASHD (arteriosclerotic heart disease)     At risk for falls     Cellulitis     left foot and ankle    COVID-19     Diabetes mellitus (Multi)     DVT (deep vein thrombosis) in pregnancy ([facility])     Fall risk care plan declined     Hypertension     Meningioma (Multi)     Myocardial infarction (Multi)     Neuritis     Neuropathy     Osteoarthritis     Osteomyelitis     Pleural effusion     PVD (peripheral vascular disease)     Sprain of right knee 06/25/2015    Stroke (Multi)     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      FOOT RAY RESECTION Left  09/23/2024    L partial 5th ray resection (Dr. Regan DPM)    FOOT SURGERY Left 09/27/2024    Delayed closure w/ graft application (Dr. Amina DPM)    INVASIVE VASCULAR PROCEDURE Bilateral 09/18/2024    Procedure: Lower Extremity Angiogram;  Surgeon: Teofilo Stoddard MD;  Location: [facility] Cardiac Cath Lab;  Service: Cardiovascular;  Laterality: Bilateral;    IRIDOTOMY / IRIDECTOMY Bilateral    [3]   Family History  Problem Relation Name Age of Onset    Heart disease Father      Colon cancer Other      Heart attack Other      Diabetes Other      Hypertension Other     [4]   Social History  Tobacco Use    Smoking status: Never     Passive exposure: Never    Smokeless tobacco: Never   Vaping Use    Vaping status: Never Used   Substance Use Topics    Alcohol use: Never     Comment: former    Drug use: Never        Estefania Hood PA-C  06/05/25 0832

## 2025-06-05 ENCOUNTER — APPOINTMENT (OUTPATIENT)
Dept: RADIOLOGY | Facility: HOSPITAL | Age: 75
End: 2025-06-05
Payer: MEDICARE

## 2025-06-05 ENCOUNTER — HOSPITAL ENCOUNTER (EMERGENCY)
Facility: HOSPITAL | Age: 75
Discharge: HOME | End: 2025-06-05
Payer: MEDICARE

## 2025-06-05 VITALS
RESPIRATION RATE: 14 BRPM | SYSTOLIC BLOOD PRESSURE: 172 MMHG | HEIGHT: 69 IN | WEIGHT: 191 LBS | TEMPERATURE: 98.1 F | OXYGEN SATURATION: 95 % | DIASTOLIC BLOOD PRESSURE: 75 MMHG | HEART RATE: 74 BPM | BODY MASS INDEX: 28.29 KG/M2

## 2025-06-05 DIAGNOSIS — I82.402 ACUTE DEEP VEIN THROMBOSIS (DVT) OF LEFT LOWER EXTREMITY, UNSPECIFIED VEIN: Primary | ICD-10-CM

## 2025-06-05 LAB
ALBUMIN SERPL BCP-MCNC: 3.3 G/DL (ref 3.4–5)
ALP SERPL-CCNC: 61 U/L (ref 33–136)
ALT SERPL W P-5'-P-CCNC: 6 U/L (ref 10–52)
ANION GAP SERPL CALCULATED.3IONS-SCNC: 10 MMOL/L (ref 10–20)
AST SERPL W P-5'-P-CCNC: 12 U/L (ref 9–39)
ATRIAL RATE: 76 BPM
BACTERIA UR CULT: NORMAL
BASOPHILS # BLD AUTO: 0.03 X10*3/UL (ref 0–0.1)
BASOPHILS NFR BLD AUTO: 0.6 %
BILIRUB SERPL-MCNC: 0.4 MG/DL (ref 0–1.2)
BUN SERPL-MCNC: 15 MG/DL (ref 6–23)
CALCIUM SERPL-MCNC: 8.8 MG/DL (ref 8.6–10.3)
CHLORIDE SERPL-SCNC: 108 MMOL/L (ref 98–107)
CO2 SERPL-SCNC: 30 MMOL/L (ref 21–32)
CREAT SERPL-MCNC: 1.05 MG/DL (ref 0.5–1.3)
EGFRCR SERPLBLD CKD-EPI 2021: 75 ML/MIN/1.73M*2
EOSINOPHIL # BLD AUTO: 0.24 X10*3/UL (ref 0–0.4)
EOSINOPHIL NFR BLD AUTO: 4.4 %
ERYTHROCYTE [DISTWIDTH] IN BLOOD BY AUTOMATED COUNT: 16.6 % (ref 11.5–14.5)
GLUCOSE SERPL-MCNC: 118 MG/DL (ref 74–99)
HCT VFR BLD AUTO: 31.3 % (ref 41–52)
HGB BLD-MCNC: 9.5 G/DL (ref 13.5–17.5)
IMM GRANULOCYTES # BLD AUTO: 0.02 X10*3/UL (ref 0–0.5)
IMM GRANULOCYTES NFR BLD AUTO: 0.4 % (ref 0–0.9)
INR PPP: 1.3 (ref 0.9–1.2)
LYMPHOCYTES # BLD AUTO: 1.3 X10*3/UL (ref 0.8–3)
LYMPHOCYTES NFR BLD AUTO: 24 %
MCH RBC QN AUTO: 25.5 PG (ref 26–34)
MCHC RBC AUTO-ENTMCNC: 30.4 G/DL (ref 32–36)
MCV RBC AUTO: 84 FL (ref 80–100)
MONOCYTES # BLD AUTO: 0.64 X10*3/UL (ref 0.05–0.8)
MONOCYTES NFR BLD AUTO: 11.8 %
NEUTROPHILS # BLD AUTO: 3.19 X10*3/UL (ref 1.6–5.5)
NEUTROPHILS NFR BLD AUTO: 58.8 %
NRBC BLD-RTO: 0 /100 WBCS (ref 0–0)
P AXIS: 28 DEGREES
P OFFSET: 147 MS
P ONSET: 87 MS
PLATELET # BLD AUTO: 215 X10*3/UL (ref 150–450)
POTASSIUM SERPL-SCNC: 3.5 MMOL/L (ref 3.5–5.3)
PR INTERVAL: 268 MS
PROT SERPL-MCNC: 6.2 G/DL (ref 6.4–8.2)
PROTHROMBIN TIME: 14.2 SECONDS (ref 9.3–12.7)
Q ONSET: 221 MS
QRS COUNT: 12 BEATS
QRS DURATION: 138 MS
QT INTERVAL: 412 MS
QTC CALCULATION(BAZETT): 463 MS
QTC FREDERICIA: 445 MS
R AXIS: -59 DEGREES
RBC # BLD AUTO: 3.72 X10*6/UL (ref 4.5–5.9)
SODIUM SERPL-SCNC: 144 MMOL/L (ref 136–145)
T AXIS: 86 DEGREES
T OFFSET: 427 MS
VENTRICULAR RATE: 76 BPM
WBC # BLD AUTO: 5.4 X10*3/UL (ref 4.4–11.3)

## 2025-06-05 PROCEDURE — 93971 EXTREMITY STUDY: CPT | Mod: FOREIGN READ | Performed by: RADIOLOGY

## 2025-06-05 PROCEDURE — 85025 COMPLETE CBC W/AUTO DIFF WBC: CPT

## 2025-06-05 PROCEDURE — 36415 COLL VENOUS BLD VENIPUNCTURE: CPT

## 2025-06-05 PROCEDURE — 93971 EXTREMITY STUDY: CPT

## 2025-06-05 PROCEDURE — 80053 COMPREHEN METABOLIC PANEL: CPT

## 2025-06-05 PROCEDURE — 2550000001 HC RX 255 CONTRASTS

## 2025-06-05 PROCEDURE — 99285 EMERGENCY DEPT VISIT HI MDM: CPT | Mod: 25

## 2025-06-05 PROCEDURE — 85610 PROTHROMBIN TIME: CPT

## 2025-06-05 PROCEDURE — 71275 CT ANGIOGRAPHY CHEST: CPT

## 2025-06-05 PROCEDURE — 71275 CT ANGIOGRAPHY CHEST: CPT | Performed by: RADIOLOGY

## 2025-06-05 RX ADMIN — IOHEXOL 75 ML: 350 INJECTION, SOLUTION INTRAVENOUS at 14:52

## 2025-06-05 ASSESSMENT — PAIN SCALES - GENERAL: PAINLEVEL_OUTOF10: 0 - NO PAIN

## 2025-06-05 ASSESSMENT — PAIN - FUNCTIONAL ASSESSMENT: PAIN_FUNCTIONAL_ASSESSMENT: 0-10

## 2025-06-05 NOTE — DISCHARGE INSTRUCTIONS
Continue taking your Xarelto for blood clots in the left upper and lower extremity.  Return to the emergency department at anytime with any new or worsening symptoms.  Follow-up with vascular as well.

## 2025-06-05 NOTE — ED PROVIDER NOTES
HPI   Chief Complaint   Patient presents with    Leg Pain     Patient stated that he was told he has a blood clot in his leg; but he already takes blood thinners. Patient denies pain.        Patient is a 74-year-old male presenting to the emergency department from [facility] for evaluation of possible DVT in the left lower extremity.  Patient was seen in the emergency department on 5/30/25 and diagnosed with a DVT of the left upper extremity.  He was started on Xarelto.  He had an ultrasound of the left upper extremity as well as left lower extremity today and noted a DVT in both.  He was therefore sent to the emergency department for further evaluation.  Per patient's records he is taking the Xarelto.  Patient denies any complaints at this time.  Denies any shortness of breath and is satting at 96% on room air.  He denies any pain in the left upper or lower extremity.  Denies any chest pain, shortness of breath, fevers, chills, nausea, vomiting, abdominal pain.              Patient History   Medical History[1]  Surgical History[2]  Family History[3]  Social History[4]    Physical Exam   ED Triage Vitals   Temperature Heart Rate Respirations BP   06/05/25 1134 06/05/25 1134 06/05/25 1134 06/05/25 1137   36.7 °C (98.1 °F) 85 16 (!) 190/77      Pulse Ox Temp Source Heart Rate Source Patient Position   06/05/25 1134 06/05/25 1134 06/05/25 1134 06/05/25 1134   96 % Oral Monitor Sitting      BP Location FiO2 (%)     06/05/25 1134 --     Right arm        Physical Exam  Vitals and nursing note reviewed.   Constitutional:       General: He is not in acute distress.     Appearance: Normal appearance. He is not ill-appearing or toxic-appearing.   HENT:      Head: Normocephalic and atraumatic.      Nose: Nose normal.      Mouth/Throat:      Mouth: Mucous membranes are moist.   Eyes:      Extraocular Movements: Extraocular movements intact.      Pupils: Pupils are equal, round, and reactive to light.    Cardiovascular:      Rate and Rhythm: Normal rate and regular rhythm.      Pulses: Normal pulses.   Pulmonary:      Effort: Pulmonary effort is normal.      Breath sounds: No wheezing, rhonchi or rales.   Abdominal:      Palpations: Abdomen is soft.      Tenderness: There is no abdominal tenderness.   Musculoskeletal:         General: No tenderness (No tenderness in the left lower extremity or left upper extremity.  Mild swelling to the left upper extremity specifically in the hand).      Cervical back: Normal range of motion.      Right lower leg: No edema.      Left lower leg: No edema.   Skin:     General: Skin is warm and dry.      Comments: Wound to the left lower extremity that is wrapped and healing well   Neurological:      General: No focal deficit present.      Mental Status: He is alert and oriented to person, place, and time.   Psychiatric:         Mood and Affect: Mood normal.         Behavior: Behavior normal.           ED Course & MDM   Diagnoses as of 06/05/25 1537   Acute deep vein thrombosis (DVT) of left lower extremity, unspecified vein                 No data recorded     [place] Coma Scale Score: 15 (06/05/25 1137 : Kayy Espinoza RN)                           Medical Decision Making  **Disclaimer parts of this chart have been completed using voice recognition software. Please excuse any errors of transcription.     Evaluated this patient independently and my supervising physician was available for consultation.    HPI: Detailed above.    Exam: A medically appropriate exam performed, outlined above, given the known history and presentation.    History obtained from: Patient    Labs/Diagnostics:  Labs Reviewed   CBC WITH AUTO DIFFERENTIAL - Abnormal       Result Value    WBC 5.4      nRBC 0.0      RBC 3.72 (*)     Hemoglobin 9.5 (*)     Hematocrit 31.3 (*)     MCV 84      MCH 25.5 (*)     MCHC 30.4 (*)     RDW 16.6 (*)     Platelets 215      Neutrophils % 58.8      Immature Granulocytes %,  Automated 0.4      Lymphocytes % 24.0      Monocytes % 11.8      Eosinophils % 4.4      Basophils % 0.6      Neutrophils Absolute 3.19      Immature Granulocytes Absolute, Automated 0.02      Lymphocytes Absolute 1.30      Monocytes Absolute 0.64      Eosinophils Absolute 0.24      Basophils Absolute 0.03     COMPREHENSIVE METABOLIC PANEL - Abnormal    Glucose 118 (*)     Sodium 144      Potassium 3.5      Chloride 108 (*)     Bicarbonate 30      Anion Gap 10      Urea Nitrogen 15      Creatinine 1.05      eGFR 75      Calcium 8.8      Albumin 3.3 (*)     Alkaline Phosphatase 61      Total Protein 6.2 (*)     AST 12      Bilirubin, Total 0.4      ALT 6 (*)    PROTIME-INR - Abnormal    Protime 14.2 (*)     INR 1.3 (*)     Narrative:     INR Therapeutic Range: 2.0-3.5     CT angio chest for pulmonary embolism   Final Result   MILD BIBASILAR INTERSTITIAL EDEMA        THE RIGHT PLEURAL EFFUSION HAS DECREASED IN SIZE SINCE 21 MAY 2025        LEFT PLEURAL EFFUSION HAS MARGINALLY DECREASED IN SIZE AS WELL        NO ACUTE PULMONARY EMBOLISM THROUGH THE LOBAR BRANCH LEVEL. SEGMENTAL   AND SUBSEGMENTAL LEVELS OF THE PULMONARY ARTERY CANNOT BE EVALUATED        NO AORTIC DISSECTION OR OTHER ACUTE THORACIC AORTIC FINDINGS        NO AIRSPACE CONSOLIDATION, GROUND-GLASS AIRSPACE DISEASE OR ANY OTHER   SIGN OF ACTIVE INFECTION        NO PERICARDIAL EFFUSION        NO PNEUMOTHORAX        MACRO:   None        Signed by: Timmy Castro 6/5/2025 3:21 PM   Dictation workstation:   [id number]      Lower extremity venous duplex left   Final Result   Evidence for acute occlusive DVT within the left deep femoral,   femoral, and popliteal veins.    There is no significant change when compared to prior ultrasound from   February 2025.   Signed by Sherice Mejia MD        EMERGENCY DEPARTMENT COURSE and DIFFERENTIAL DIAGNOSIS/MDM:  Patient is a 74-year-old male presenting to the emergency department from [facility] nursing facility for evaluation of  "possible DVT in the left lower extremity.  On physical exam vital signs remarkable for hypertension but otherwise stable patient is in no acute distress.  Patient has no significant pain in the left upper or lower extremity.  There are some swelling to the left hand but no significant swelling to the left lower extremity.  No tenderness and full range of motion.  No chest pain or shortness of breath.  Patient was just darted on blood thinner 6 days ago.  Ultrasound of the left lower extremity ordered as well as diagnostic labs.  CMP showed a chloride of 108 but otherwise no further electrolyte abnormalities.  CBC remarkable for anemia but no leukocytosis.  Ultrasound of the left lower extremity showed DVT within the left deep femoral and popliteal veins with no significant change compared to prior ultrasound.  I spoke with patient's PCP who recommended CT PE study.  CT PE study came back with no evidence of PE.  After speaking with pharmacist do not feel that patient needs to be placed on heparin.  Patient is given Xarelto at the facility and was just initiated on Xarelto therefore feel patient can continue with this.  Should follow-up outpatient with primary care physician as well as vascular.  He will return to the emergency department with any new or worsening symptoms.      Vitals:    Vitals:    06/05/25 1134 06/05/25 1137   BP:  (!) 190/77   BP Location:  Right arm   Patient Position:  Sitting   Pulse: 85    Resp: 16    Temp: 36.7 °C (98.1 °F)    TempSrc: Oral    SpO2: 96%    Weight: 86.6 kg (191 lb)    Height: 1.753 m (5' 9\")      History Limited by:    None    Independent history obtained from:    None    External records reviewed:    Inpatient Notes/Discharge Summary from previous emergency department visits over the past few days where patient has been seen for DVTs.  Also reviewed paperwork from nursing facility    Diagnostics interpreted by me:    Ultrasound(s) see MDM    Discussions with other " clinicians:    None    Chronic conditions impacting care:    None    Social determinants of health affecting care:    None    Diagnostic tests considered but not performed: None    ED Medications managed:    Medications   iohexol (OMNIPaque) 350 mg iodine/mL solution 75 mL (75 mL intravenous Given 6/5/25 3485)       Prescription drugs considered:    None    Screenings:              Procedure  Procedures         [1]   Past Medical History:  Diagnosis Date    Acute osteomyelitis of ankle and foot, left (Multi)     AMI (acute myocardial infarction) (Multi)     ASHD (arteriosclerotic heart disease)     At risk for falls     Cellulitis     left foot and ankle    COVID-19     Diabetes mellitus (Multi)     DVT (deep vein thrombosis) in pregnancy ([facility])     Fall risk care plan declined     Hypertension     Meningioma (Multi)     Myocardial infarction (Multi)     Neuritis     Neuropathy     Osteoarthritis     Osteomyelitis     Pleural effusion     PVD (peripheral vascular disease)     Sprain of right knee 06/25/2015    Stroke (Multi)     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      FOOT RAY RESECTION Left 09/23/2024    L partial 5th ray resection (Dr. Spears, [facility]AR)    FOOT SURGERY Left 09/27/2024    Delayed closure w/ graft application (Dr. Amina DPM)    INVASIVE VASCULAR PROCEDURE Bilateral 09/18/2024    Procedure: Lower Extremity Angiogram;  Surgeon: Teofilo Stoddard MD;  Location: [facility];  Service: Cardiovascular;  Laterality: Bilateral;    IRIDOTOMY / IRIDECTOMY Bilateral    [3]   Family History  Problem Relation Name Age of Onset    Heart disease Father      Colon cancer Other      Heart attack Other      Diabetes Other      Hypertension Other      [4]   Social History  Tobacco Use    Smoking status: Never     Passive exposure: Never    Smokeless tobacco: Never   Vaping Use    Vaping status: Never Used   Substance Use Topics    Alcohol use: Never     Comment: former    Drug use: Never        Tiffani Tubbs PA-C  06/05/25 153

## 2025-06-06 ENCOUNTER — APPOINTMENT (OUTPATIENT)
Dept: CARDIOLOGY | Facility: HOSPITAL | Age: 75
End: 2025-06-06
Payer: MEDICARE

## 2025-06-06 ENCOUNTER — HOSPITAL ENCOUNTER (EMERGENCY)
Facility: HOSPITAL | Age: 75
Discharge: ANOTHER HOSPITAL | End: 2025-06-08
Payer: MEDICARE

## 2025-06-06 ENCOUNTER — APPOINTMENT (OUTPATIENT)
Dept: RADIOLOGY | Facility: HOSPITAL | Age: 75
End: 2025-06-06
Payer: MEDICARE

## 2025-06-06 DIAGNOSIS — M79.601 ARM PAIN, RIGHT: ICD-10-CM

## 2025-06-06 DIAGNOSIS — I82.622 ACUTE DEEP VEIN THROMBOSIS (DVT) OF LEFT UPPER EXTREMITY, UNSPECIFIED VEIN: ICD-10-CM

## 2025-06-06 DIAGNOSIS — Z51.81 ALTERATION IN ANTICOAGULATION: ICD-10-CM

## 2025-06-06 DIAGNOSIS — I82.492 ACUTE DEEP VEIN THROMBOSIS (DVT) OF OTHER SPECIFIED VEIN OF LEFT LOWER EXTREMITY: ICD-10-CM

## 2025-06-06 DIAGNOSIS — Z79.01 ALTERATION IN ANTICOAGULATION: ICD-10-CM

## 2025-06-06 DIAGNOSIS — M79.603 ARM PAIN: Primary | ICD-10-CM

## 2025-06-06 LAB
ALBUMIN SERPL BCP-MCNC: 3 G/DL (ref 3.4–5)
ALP SERPL-CCNC: 64 U/L (ref 33–136)
ALT SERPL W P-5'-P-CCNC: 6 U/L (ref 10–52)
ANION GAP SERPL CALC-SCNC: 9 MMOL/L (ref 10–20)
APTT PPP: 41 SECONDS (ref 26–36)
AST SERPL W P-5'-P-CCNC: 11 U/L (ref 9–39)
BASOPHILS # BLD AUTO: 0.02 X10*3/UL (ref 0–0.1)
BASOPHILS NFR BLD AUTO: 0.4 %
BILIRUB SERPL-MCNC: 0.3 MG/DL (ref 0–1.2)
BUN SERPL-MCNC: 18 MG/DL (ref 6–23)
CALCIUM SERPL-MCNC: 8.6 MG/DL (ref 8.6–10.3)
CARDIAC TROPONIN I PNL SERPL HS: 8 NG/L (ref 0–20)
CARDIAC TROPONIN I PNL SERPL HS: 8 NG/L (ref 0–20)
CHLORIDE SERPL-SCNC: 105 MMOL/L (ref 98–107)
CO2 SERPL-SCNC: 32 MMOL/L (ref 21–32)
CREAT SERPL-MCNC: 1.1 MG/DL (ref 0.5–1.3)
EGFRCR SERPLBLD CKD-EPI 2021: 71 ML/MIN/1.73M*2
EOSINOPHIL # BLD AUTO: 0.24 X10*3/UL (ref 0–0.4)
EOSINOPHIL NFR BLD AUTO: 4.9 %
ERYTHROCYTE [DISTWIDTH] IN BLOOD BY AUTOMATED COUNT: 16.4 % (ref 11.5–14.5)
GLUCOSE SERPL-MCNC: 171 MG/DL (ref 74–99)
HCT VFR BLD AUTO: 28.6 % (ref 41–52)
HGB BLD-MCNC: 9 G/DL (ref 13.5–17.5)
IMM GRANULOCYTES # BLD AUTO: 0.02 X10*3/UL (ref 0–0.5)
IMM GRANULOCYTES NFR BLD AUTO: 0.4 % (ref 0–0.9)
INR PPP: 1.5 (ref 0.9–1.1)
LYMPHOCYTES # BLD AUTO: 1.54 X10*3/UL (ref 0.8–3)
LYMPHOCYTES NFR BLD AUTO: 31.5 %
MCH RBC QN AUTO: 26.4 PG (ref 26–34)
MCHC RBC AUTO-ENTMCNC: 31.5 G/DL (ref 32–36)
MCV RBC AUTO: 84 FL (ref 80–100)
MONOCYTES # BLD AUTO: 0.55 X10*3/UL (ref 0.05–0.8)
MONOCYTES NFR BLD AUTO: 11.2 %
NEUTROPHILS # BLD AUTO: 2.52 X10*3/UL (ref 1.6–5.5)
NEUTROPHILS NFR BLD AUTO: 51.6 %
NRBC BLD-RTO: 0 /100 WBCS (ref 0–0)
PLATELET # BLD AUTO: 191 X10*3/UL (ref 150–450)
POTASSIUM SERPL-SCNC: 3.3 MMOL/L (ref 3.5–5.3)
PROT SERPL-MCNC: 5.7 G/DL (ref 6.4–8.2)
PROTHROMBIN TIME: 17 SECONDS (ref 9.8–12.4)
RBC # BLD AUTO: 3.41 X10*6/UL (ref 4.5–5.9)
SODIUM SERPL-SCNC: 143 MMOL/L (ref 136–145)
UFH PPP CHRO-ACNC: 0.9 IU/ML (ref ?–1.1)
UFH PPP CHRO-ACNC: 1.2 IU/ML (ref ?–1.1)
WBC # BLD AUTO: 4.9 X10*3/UL (ref 4.4–11.3)

## 2025-06-06 PROCEDURE — 85520 HEPARIN ASSAY: CPT | Performed by: PHYSICIAN ASSISTANT

## 2025-06-06 PROCEDURE — 85610 PROTHROMBIN TIME: CPT | Performed by: PHYSICIAN ASSISTANT

## 2025-06-06 PROCEDURE — 36415 COLL VENOUS BLD VENIPUNCTURE: CPT | Performed by: PHYSICIAN ASSISTANT

## 2025-06-06 PROCEDURE — 85025 COMPLETE CBC W/AUTO DIFF WBC: CPT | Performed by: PHYSICIAN ASSISTANT

## 2025-06-06 PROCEDURE — 84484 ASSAY OF TROPONIN QUANT: CPT | Performed by: PHYSICIAN ASSISTANT

## 2025-06-06 PROCEDURE — 96366 THER/PROPH/DIAG IV INF ADDON: CPT

## 2025-06-06 PROCEDURE — 93971 EXTREMITY STUDY: CPT

## 2025-06-06 PROCEDURE — 83036 HEMOGLOBIN GLYCOSYLATED A1C: CPT | Mod: GENLAB

## 2025-06-06 PROCEDURE — 93971 EXTREMITY STUDY: CPT | Performed by: RADIOLOGY

## 2025-06-06 PROCEDURE — 85613 RUSSELL VIPER VENOM DILUTED: CPT | Mod: GENLAB | Performed by: PHYSICIAN ASSISTANT

## 2025-06-06 PROCEDURE — 86146 BETA-2 GLYCOPROTEIN ANTIBODY: CPT | Mod: GENLAB | Performed by: PHYSICIAN ASSISTANT

## 2025-06-06 PROCEDURE — 80053 COMPREHEN METABOLIC PANEL: CPT | Performed by: PHYSICIAN ASSISTANT

## 2025-06-06 PROCEDURE — 99285 EMERGENCY DEPT VISIT HI MDM: CPT | Mod: 25

## 2025-06-06 PROCEDURE — 85520 HEPARIN ASSAY: CPT | Mod: GENLAB | Performed by: PHYSICIAN ASSISTANT

## 2025-06-06 PROCEDURE — 85730 THROMBOPLASTIN TIME PARTIAL: CPT | Performed by: PHYSICIAN ASSISTANT

## 2025-06-06 PROCEDURE — 2500000004 HC RX 250 GENERAL PHARMACY W/ HCPCS (ALT 636 FOR OP/ED): Performed by: PHYSICIAN ASSISTANT

## 2025-06-06 PROCEDURE — 93005 ELECTROCARDIOGRAM TRACING: CPT

## 2025-06-06 PROCEDURE — 86147 CARDIOLIPIN ANTIBODY EA IG: CPT | Mod: GENLAB | Performed by: PHYSICIAN ASSISTANT

## 2025-06-06 PROCEDURE — 96365 THER/PROPH/DIAG IV INF INIT: CPT

## 2025-06-06 RX ORDER — HEPARIN SODIUM 10000 [USP'U]/100ML
0-4500 INJECTION, SOLUTION INTRAVENOUS CONTINUOUS
Status: DISCONTINUED | OUTPATIENT
Start: 2025-06-06 | End: 2025-06-08 | Stop reason: HOSPADM

## 2025-06-06 RX ADMIN — HEPARIN SODIUM 1600 UNITS/HR: 10000 INJECTION, SOLUTION INTRAVENOUS at 17:28

## 2025-06-06 RX ADMIN — HEPARIN SODIUM 14 UNITS/HR: 10000 INJECTION, SOLUTION INTRAVENOUS at 23:09

## 2025-06-06 ASSESSMENT — PAIN DESCRIPTION - ONSET: ONSET: ONGOING

## 2025-06-06 ASSESSMENT — PAIN DESCRIPTION - PROGRESSION: CLINICAL_PROGRESSION: NOT CHANGED

## 2025-06-06 ASSESSMENT — PAIN DESCRIPTION - ORIENTATION: ORIENTATION: LEFT

## 2025-06-06 ASSESSMENT — PAIN DESCRIPTION - LOCATION: LOCATION: ARM

## 2025-06-06 ASSESSMENT — PAIN SCALES - GENERAL
PAINLEVEL_OUTOF10: 0 - NO PAIN
PAINLEVEL_OUTOF10: 8

## 2025-06-06 ASSESSMENT — PAIN DESCRIPTION - PAIN TYPE: TYPE: ACUTE PAIN

## 2025-06-06 ASSESSMENT — PAIN DESCRIPTION - FREQUENCY: FREQUENCY: CONSTANT/CONTINUOUS

## 2025-06-06 ASSESSMENT — PAIN - FUNCTIONAL ASSESSMENT: PAIN_FUNCTIONAL_ASSESSMENT: 0-10

## 2025-06-06 ASSESSMENT — PAIN DESCRIPTION - DESCRIPTORS: DESCRIPTORS: ACHING

## 2025-06-06 NOTE — ED TRIAGE NOTES
Pt arrives to [facility] via EMS from [facility], presenting with left arm pain, numbness/tingling. Patient has a known blood clot in his left arm, currently on warfarin. Pt A&Ox4, resp easy and unlabored on room air, skin of appropriate color. Pt prescribed tramadol today, first dose given at 1340 today.

## 2025-06-06 NOTE — ED PROVIDER NOTES
HPI   Chief Complaint   Patient presents with    Arm Pain    Possible Blood Clot    Numbness       74-year-old male from [facility] here for questionable eval of oral anticoagulation outpatient failure patient is currently on Xarelto for DVT in his left arm after he had increased pain and swelling to the arm where he had PICC line in, patient had the PICC line removed started on Xarelto this was at the end of May 2025, he was currently getting antibiotics for chronic osteomyelitis of the left foot on Vanco 1.25 g till June 18, 2025 per previous note with history of wounds/ulcers to both feet, did note the patient had an ultrasound of the left lower extremity from February 2025,     Patient was placed on Xarelto but notes to have questionable extension of the DVT in the left lower leg concerning that he might have outpatient failure  And his primary care doctor, Dr. Singh wanted him admitted start a heparin drip to have hematology evaluate patient for possible failure of oral anticoagulation    Patient is not in any respiratory distress no shortness of breath    Did have DVT study of the left lower extremity yesterday along with a CTA that was performed yesterday CTA was negative for pulmonary embolism                  Patient History   Medical History[1]  Surgical History[2]  Family History[3]  Social History[4]    Physical Exam   ED Triage Vitals [06/06/25 1500]   Temperature Heart Rate Respirations BP   36.7 °C (98 °F) 73 14 150/65      Pulse Ox Temp Source Heart Rate Source Patient Position   98 % Temporal Monitor Lying      BP Location FiO2 (%)     Right arm --       Physical Exam  Vitals and nursing note reviewed.   Constitutional:       General: He is not in acute distress.     Appearance: He is well-developed. He is obese.   HENT:      Head: Normocephalic and atraumatic.      Right Ear: Tympanic membrane, ear canal and external ear normal.      Left Ear: Tympanic membrane, ear canal and external ear  normal.      Nose: Nose normal.   Eyes:      Conjunctiva/sclera: Conjunctivae normal.   Cardiovascular:      Rate and Rhythm: Normal rate and regular rhythm.      Pulses: Normal pulses.      Heart sounds: Murmur heard.      No gallop.   Pulmonary:      Effort: Pulmonary effort is normal. No respiratory distress.      Breath sounds: Normal breath sounds.   Abdominal:      Palpations: Abdomen is soft.      Tenderness: There is no abdominal tenderness.   Musculoskeletal:         General: No swelling.      Cervical back: Neck supple.      Comments: Slight swelling noted to his left upper extremity currently nontender with  Does have some tenderness over his left middle finger, but note extensive nail thickening elongated,    Skin:     General: Skin is warm and dry.      Capillary Refill: Capillary refill takes less than 2 seconds.      Comments: Bilateral lower extremities/feet with dressings dry and intact   Neurological:      General: No focal deficit present.      Mental Status: He is alert and oriented to person, place, and time.   Psychiatric:         Mood and Affect: Mood normal.           ED Course & MDM   Diagnoses as of 06/06/25 1820   Acute deep vein thrombosis (DVT) of left upper extremity, unspecified vein   Acute deep vein thrombosis (DVT) of other specified vein of left lower extremity   Alteration in anticoagulation                 No data recorded     [place] Coma Scale Score: 15 (06/06/25 1507 : Sun Salazar RN)                           Medical Decision Making  Differential:   1) DVT left upper and left lower extremity   2) questionable oral anticoagulation dysfunction--> outpatient oral anticoagulation failure    74-year-old male currently at [facility] had DVT in the left lower extremity noted from an ultrasound in February repeat ultrasound yesterday showed continued of it did indicate no acute changes compared to the previous but this along with his ultrasound positive DVT in the left  upper extremity but his family doctor concerned about possible oral anticoagulation outpatient failure patient has been on Xarelto since May 30, 2025.  did speak with Dr. Singh , and let them know that the DVT in the left lower extremity had been present and February and appears to be unchanged but he was still concerned about possible oral anticoagulation failure and wanted him admitted to a facility that has hematology, I did speak with transfer center.  Who got me contacted with hematology on-call Dr. Richard, who graciously accepted patient as consult at [facility], then spoke to the hospitalist Dr. Parekh who accepted the patient  Heparin drip started.  Patient awaiting transfer to [facility]  Current condition is stable,     Did do ultrasound of the right upper extremity to rule out possible DVT in multiple extremities along with ultrasound of the right lower extremity to ensure patient does not have DVTs, both right upper and right lower were negative for clots although slight suboptimal imaging    Hematology did asked me to get some specific lab work prior to the heparin drip starting this was ordered    Labs Reviewed  CBC WITH AUTO DIFFERENTIAL - Abnormal     WBC                           4.9                    nRBC                          0.0                    RBC                           3.41 (*)               Hemoglobin                    9.0 (*)                Hematocrit                    28.6 (*)               MCV                           84                     MCH                           26.4                   MCHC                          31.5 (*)               RDW                           16.4 (*)               Platelets                     191                    Neutrophils %                 51.6                   Immature Granulocytes %, Automated   0.4                    Lymphocytes %                 31.5                   Monocytes %                   11.2                   Eosinophils %                  4.9                    Basophils %                   0.4                    Neutrophils Absolute          2.52                   Immature Granulocytes Absolute, Au*   0.02                   Lymphocytes Absolute          1.54                   Monocytes Absolute            0.55                   Eosinophils Absolute          0.24                   Basophils Absolute            0.02                COMPREHENSIVE METABOLIC PANEL - Abnormal     Glucose                       171 (*)                Sodium                        143                    Potassium                     3.3 (*)                Chloride                      105                    Bicarbonate                   32                     Anion Gap                     9 (*)                  Urea Nitrogen                 18                     Creatinine                    1.10                   eGFR                          71                     Calcium                       8.6                    Albumin                       3.0 (*)                Alkaline Phosphatase          64                     Total Protein                 5.7 (*)                AST                           11                     Bilirubin, Total              0.3                    ALT                           6 (*)               PROTIME-INR - Abnormal     Protime                       17.0 (*)               INR                           1.5 (*)             APTT - Abnormal     aPTT                          41 (*)                     Narrative: The APTT is no longer used for monitoring Unfractionated Heparin Therapy. For monitoring Heparin Therapy, use the Heparin Assay.  SERIAL TROPONIN-INITIAL - Normal     Troponin I, High Sensitivity   8                          Narrative: Less than 99th percentile of normal range cutoff-                  Female and children under 18 years old <14 ng/L; Male <21 ng/L: Negative                  Repeat testing should be performed if  clinically indicated.                                     Female and children under 18 years old 14-50 ng/L; Male 21-50 ng/L:                  Consistent with possible cardiac damage and possible increased clinical                   risk. Serial measurements may help to assess extent of myocardial damage.                                     >50 ng/L: Consistent with cardiac damage, increased clinical risk and                  myocardial infarction. Serial measurements may help assess extent of                   myocardial damage.                                      NOTE: Children less than 1 year old may have higher baseline troponin                   levels and results should be interpreted in conjunction with the overall                   clinical context.                                     NOTE: Troponin I testing is performed using a different                   testing methodology at [facility] than at other                   [facility]. Direct result comparisons should only                   be made within the same method.  SERIAL TROPONIN, 1 HOUR - Normal     Troponin I, High Sensitivity   8                          Narrative: Less than 99th percentile of normal range cutoff-                  Female and children under 18 years old <14 ng/L; Male <21 ng/L: Negative                  Repeat testing should be performed if clinically indicated.                                     Female and children under 18 years old 14-50 ng/L; Male 21-50 ng/L:                  Consistent with possible cardiac damage and possible increased clinical                   risk. Serial measurements may help to assess extent of myocardial damage.                                     >50 ng/L: Consistent with cardiac damage, increased clinical risk and                  myocardial infarction. Serial measurements may help assess extent of                   myocardial damage.                                      NOTE:  Children less than 1 year old may have higher baseline troponin                   levels and results should be interpreted in conjunction with the overall                   clinical context.                                     NOTE: Troponin I testing is performed using a different                   testing methodology at [facility] than at other                   [facility]. Direct result comparisons should only                   be made within the same method.  HEPARIN ASSAY - Normal     Heparin Unfractionated        0.9                        Narrative: The therapeutic reference range for UFH may be either 0.3-0.6 IU/mL or 0.3-0.7 IU/mL based on the clinical setting for anticoagulant therapy and the associated nomogram used. For Heparin dosing guidelines based on clinical scenario and Heparin Assay results, please refer to local Pharmacy and the [facility] Guidelines for Anticoagulation Therapy available on the [facility] intranet at: [email or web address]  TROPONIN SERIES- (INITIAL, 1 HR)         Narrative: The following orders were created for panel order Troponin Series, (0, 1 HR).                  Procedure                               Abnormality         Status                                     ---------                               -----------         ------                                     Troponin I, High Sensiti...[[id number]]  Normal              Final result                               Troponin, High Sensitivi...[[id number]]  Normal              Final result                                                 Please view results for these tests on the individual orders.  HEPARIN ASSAY, LOVENOX  LUPUS ANTICOAG. WITH INTERPRETATION[ESTEVEZ]  BETA-2 GLYCOPROTEIN ANTIBODIES  ANTI-CARDIOLIPIN ANTIBODY (IGA, IGG, AND IGM)  Lower extremity venous duplex right   Final Result    Suboptimal visualization of the  calf veins. No sonographic evidence    of acute DVT in the visualized vessels of the right lower extremity.          MACRO:    None          Signed by: Nnamdi Dodson 6/6/2025 4:53 PM    Dictation workstation:   [id number]     Vascular US Upper Extremity Venous Duplex Right   Final Result    No sonographic evidence of right upper extremity DVT.          Partial visualization of occlusive thrombus in the left subclavian    vein as noted on prior ultrasound. Please refer to separate or of    left upper extremity DVT for additional detail          MACRO:    None          Signed by: Nnamdi Dodson 6/6/2025 4:51 PM    Dictation workstation:   [id number]        Plan:    Impression:        Orders and Diagnoses for this visit    Amount and/or Complexity of Data Reviewed  ECG/medicine tests: independent interpretation performed.     Details: EKG done at 338 shows sinus rhythm with first-degree AV block rate of 72 left axis deviation nonspecific interventricular block QT/QTc 418/457 LVH        Procedure  Procedures       Lucero Ortega PA-C  06/06/25 1837       Lucero Ortega PA-C  06/13/25 2024         [1]   Past Medical History:  Diagnosis Date    Acute osteomyelitis of ankle and foot, left (Multi)     AMI (acute myocardial infarction) (Multi)     ASHD (arteriosclerotic heart disease)     At risk for falls     Cellulitis     left foot and ankle    COVID-19     Diabetes mellitus (Multi)     DVT (deep vein thrombosis) in pregnancy ([facility])     Fall risk care plan declined     Hypertension     Meningioma (Multi)     Myocardial infarction (Multi)     Neuritis     Neuropathy     Osteoarthritis     Osteomyelitis     Pleural effusion     PVD (peripheral vascular disease)     Sprain of right knee 06/25/2015    Stroke (Multi)     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[facility]     TIA (transient ischemic attack)     Tinea pedis of both feet     Trigeminal neuralgia     Weakness    [2]   Past Surgical History:  Procedure Laterality Date    CARDIAC  CATHETERIZATION      CARPAL TUNNEL RELEASE  10/10/2014    EYE SURGERY      FL  ARTHROCENTESIS ASP INJ JOINT.      FOOT RAY RESECTION Left 09/23/2024    L partial 5th ray resection (Dr. Spears DPBRAYAN)    FOOT SURGERY Left 09/27/2024    Delayed closure w/ graft application (Dr. Huynh, CONSUELOM)    INVASIVE VASCULAR PROCEDURE Bilateral 09/18/2024    Procedure: Lower Extremity Angiogram;  Surgeon: Teofilo Stoddard MD;  Location: [facility] Cardiac Cath Lab;  Service: Cardiovascular;  Laterality: Bilateral;    IRIDOTOMY / IRIDECTOMY Bilateral    [3]   Family History  Problem Relation Name Age of Onset    Heart disease Father      Colon cancer Other      Heart attack Other      Diabetes Other      Hypertension Other     [4]   Social History  Tobacco Use    Smoking status: Never     Passive exposure: Never    Smokeless tobacco: Never   Vaping Use    Vaping status: Never Used   Substance Use Topics    Alcohol use: Never     Comment: former    Drug use: Never        Lucero Ortega PA-C  06/13/25 2028

## 2025-06-07 ENCOUNTER — NURSING HOME VISIT (OUTPATIENT)
Dept: POST ACUTE CARE | Facility: EXTERNAL LOCATION | Age: 75
End: 2025-06-07
Payer: MEDICARE

## 2025-06-07 DIAGNOSIS — I25.10 ASHD (ARTERIOSCLEROTIC HEART DISEASE): ICD-10-CM

## 2025-06-07 DIAGNOSIS — G62.9 NEUROPATHY: ICD-10-CM

## 2025-06-07 DIAGNOSIS — Z87.09 HISTORY OF PLEURAL EFFUSION: ICD-10-CM

## 2025-06-07 DIAGNOSIS — D32.9 MENINGIOMA (MULTI): ICD-10-CM

## 2025-06-07 DIAGNOSIS — I82.499 DEEP VEIN THROMBOSIS (DVT) OF OTHER VEIN OF LOWER EXTREMITY, UNSPECIFIED CHRONICITY, UNSPECIFIED LATERALITY: ICD-10-CM

## 2025-06-07 DIAGNOSIS — I10 HYPERTENSION, UNSPECIFIED TYPE: ICD-10-CM

## 2025-06-07 DIAGNOSIS — R53.1 WEAKNESS: ICD-10-CM

## 2025-06-07 DIAGNOSIS — M19.90 OSTEOARTHRITIS, UNSPECIFIED OSTEOARTHRITIS TYPE, UNSPECIFIED SITE: ICD-10-CM

## 2025-06-07 DIAGNOSIS — M86.9 OSTEOMYELITIS OF LEFT FOOT, UNSPECIFIED TYPE (MULTI): ICD-10-CM

## 2025-06-07 DIAGNOSIS — Z91.81 AT RISK FOR FALLING: ICD-10-CM

## 2025-06-07 DIAGNOSIS — E11.9 TYPE 2 DIABETES MELLITUS WITHOUT COMPLICATION, UNSPECIFIED WHETHER LONG TERM INSULIN USE: Primary | ICD-10-CM

## 2025-06-07 LAB
B2 GLYCOPROT1 IGA SER-ACNC: <0.6 U/ML
B2 GLYCOPROT1 IGG SER-ACNC: <1.4 U/ML
B2 GLYCOPROT1 IGM SER-ACNC: 0.5 U/ML
CARDIOLIPIN IGA SERPL-ACNC: <0.5 APL U/ML
CARDIOLIPIN IGG SER IA-ACNC: <1.6 GPL U/ML
CARDIOLIPIN IGM SER IA-ACNC: 0.5 MPL U/ML
LMWH PPP CHRO-ACNC: 1.1 IU/ML
UFH PPP CHRO-ACNC: 0.7 IU/ML (ref ?–1.1)
UFH PPP CHRO-ACNC: 0.7 IU/ML (ref ?–1.1)

## 2025-06-07 PROCEDURE — 99309 SBSQ NF CARE MODERATE MDM 30: CPT | Performed by: INTERNAL MEDICINE

## 2025-06-07 PROCEDURE — 2500000001 HC RX 250 WO HCPCS SELF ADMINISTERED DRUGS (ALT 637 FOR MEDICARE OP): Performed by: EMERGENCY MEDICINE

## 2025-06-07 PROCEDURE — 85520 HEPARIN ASSAY: CPT | Performed by: EMERGENCY MEDICINE

## 2025-06-07 PROCEDURE — 96366 THER/PROPH/DIAG IV INF ADDON: CPT

## 2025-06-07 PROCEDURE — 2500000001 HC RX 250 WO HCPCS SELF ADMINISTERED DRUGS (ALT 637 FOR MEDICARE OP)

## 2025-06-07 PROCEDURE — 96375 TX/PRO/DX INJ NEW DRUG ADDON: CPT

## 2025-06-07 PROCEDURE — 85520 HEPARIN ASSAY: CPT | Performed by: PHYSICIAN ASSISTANT

## 2025-06-07 PROCEDURE — 2500000004 HC RX 250 GENERAL PHARMACY W/ HCPCS (ALT 636 FOR OP/ED): Performed by: PHYSICIAN ASSISTANT

## 2025-06-07 PROCEDURE — 2500000004 HC RX 250 GENERAL PHARMACY W/ HCPCS (ALT 636 FOR OP/ED): Performed by: EMERGENCY MEDICINE

## 2025-06-07 RX ORDER — ACETAMINOPHEN 325 MG/1
650 TABLET ORAL ONCE
Status: COMPLETED | OUTPATIENT
Start: 2025-06-07 | End: 2025-06-07

## 2025-06-07 RX ORDER — VANCOMYCIN HYDROCHLORIDE 1.5 G/30ML
INJECTION, POWDER, LYOPHILIZED, FOR SOLUTION INTRAVENOUS
Status: COMPLETED
Start: 2025-06-07 | End: 2025-06-07

## 2025-06-07 RX ORDER — GABAPENTIN 300 MG/1
300 CAPSULE ORAL 3 TIMES DAILY
Status: DISCONTINUED | OUTPATIENT
Start: 2025-06-07 | End: 2025-06-08 | Stop reason: HOSPADM

## 2025-06-07 RX ORDER — HYDRALAZINE HYDROCHLORIDE 20 MG/ML
20 INJECTION INTRAMUSCULAR; INTRAVENOUS ONCE
Status: COMPLETED | OUTPATIENT
Start: 2025-06-07 | End: 2025-06-07

## 2025-06-07 RX ORDER — HYDRALAZINE HYDROCHLORIDE 25 MG/1
25 TABLET, FILM COATED ORAL 3 TIMES DAILY
Status: DISCONTINUED | OUTPATIENT
Start: 2025-06-07 | End: 2025-06-08 | Stop reason: HOSPADM

## 2025-06-07 RX ORDER — VANCOMYCIN 1.25 G/250ML
1750 INJECTION, SOLUTION INTRAVENOUS EVERY 24 HOURS
Status: DISCONTINUED | OUTPATIENT
Start: 2025-06-07 | End: 2025-06-07

## 2025-06-07 RX ORDER — HYDROCODONE BITARTRATE AND ACETAMINOPHEN 5; 325 MG/1; MG/1
1 TABLET ORAL ONCE
Refills: 0 | Status: COMPLETED | OUTPATIENT
Start: 2025-06-07 | End: 2025-06-07

## 2025-06-07 RX ORDER — ACETAMINOPHEN 325 MG/1
TABLET ORAL
Status: COMPLETED
Start: 2025-06-07 | End: 2025-06-07

## 2025-06-07 RX ORDER — CLONIDINE HYDROCHLORIDE 0.1 MG/1
0.1 TABLET ORAL ONCE
Status: COMPLETED | OUTPATIENT
Start: 2025-06-07 | End: 2025-06-07

## 2025-06-07 RX ADMIN — GABAPENTIN 300 MG: 300 CAPSULE ORAL at 20:10

## 2025-06-07 RX ADMIN — HYDRALAZINE HYDROCHLORIDE 20 MG: 20 INJECTION INTRAMUSCULAR; INTRAVENOUS at 06:29

## 2025-06-07 RX ADMIN — ACETAMINOPHEN 650 MG: 325 TABLET, FILM COATED ORAL at 03:50

## 2025-06-07 RX ADMIN — HYDROCODONE BITARTRATE AND ACETAMINOPHEN 1 TABLET: 5; 325 TABLET ORAL at 20:10

## 2025-06-07 RX ADMIN — HYDRALAZINE HYDROCHLORIDE 25 MG: 25 TABLET ORAL at 20:10

## 2025-06-07 RX ADMIN — HYDRALAZINE HYDROCHLORIDE 25 MG: 25 TABLET ORAL at 15:50

## 2025-06-07 RX ADMIN — VANCOMYCIN HYDROCHLORIDE 1500 MG: 1.5 INJECTION, POWDER, LYOPHILIZED, FOR SOLUTION INTRAVENOUS at 20:50

## 2025-06-07 RX ADMIN — GABAPENTIN 300 MG: 300 CAPSULE ORAL at 15:50

## 2025-06-07 RX ADMIN — HEPARIN SODIUM 1400 UNITS/HR: 10000 INJECTION, SOLUTION INTRAVENOUS at 11:17

## 2025-06-07 RX ADMIN — CLONIDINE HYDROCHLORIDE 0.1 MG: 0.1 TABLET ORAL at 04:51

## 2025-06-07 RX ADMIN — ACETAMINOPHEN 650 MG: 325 TABLET ORAL at 03:50

## 2025-06-07 ASSESSMENT — PAIN DESCRIPTION - LOCATION: LOCATION: HEAD

## 2025-06-07 ASSESSMENT — PAIN SCALES - GENERAL
PAINLEVEL_OUTOF10: 0 - NO PAIN
PAINLEVEL_OUTOF10: 3
PAINLEVEL_OUTOF10: 0 - NO PAIN

## 2025-06-07 ASSESSMENT — PAIN DESCRIPTION - DESCRIPTORS: DESCRIPTORS: POUNDING

## 2025-06-07 ASSESSMENT — PAIN DESCRIPTION - PAIN TYPE: TYPE: ACUTE PAIN

## 2025-06-07 ASSESSMENT — PAIN - FUNCTIONAL ASSESSMENT
PAIN_FUNCTIONAL_ASSESSMENT: 0-10
PAIN_FUNCTIONAL_ASSESSMENT: 0-10

## 2025-06-07 ASSESSMENT — PAIN DESCRIPTION - PROGRESSION: CLINICAL_PROGRESSION: GRADUALLY IMPROVING

## 2025-06-07 NOTE — LETTER
Patient: Elliot Partida  : 1950    Encounter Date: 2025    PLACE OF SERVICE:  [facility]    This is a subsequent visit.    Subjective  Patient ID: Elliot Partida is a 74 y.o. male who presents for Follow-up.    Mr. Elliot Partida is a 74-year-old diabetic male with history of osteomyelitis to his left foot.  He continues to undergo wound care and is followed by Podiatry.    Review of Systems   Constitutional:  Negative for chills and fever.   Cardiovascular:  Negative for chest pain.   All other systems reviewed and are negative.    Objective  /84   Pulse 80   Temp 36.7 °C (98 °F)   Resp 16     Physical Exam  Vitals reviewed.   Constitutional:       Comments: This is a well developed, well-nourished male, lying in bed, appearing weak.   HENT:      Right Ear: Tympanic membrane, ear canal and external ear normal.      Left Ear: Tympanic membrane, ear canal and external ear normal.   Eyes:      General: No scleral icterus.     Pupils: Pupils are equal, round, and reactive to light.   Neck:      Vascular: No carotid bruit.   Cardiovascular:      Heart sounds: Normal heart sounds, S1 normal and S2 normal. No murmur heard.     No friction rub.   Pulmonary:      Effort: Pulmonary effort is normal.      Breath sounds: Normal breath sounds and air entry.   Abdominal:      Palpations: There is no hepatomegaly, splenomegaly or mass.   Musculoskeletal:         General: No swelling or deformity. Normal range of motion.      Cervical back: Neck supple.      Right lower leg: No edema.      Left lower leg: No edema.      Comments: There is dressing applied to the patient's left lower extremity.  There is no foul odor.  There is no color drainage.   Lymphadenopathy:      Cervical: No cervical adenopathy.      Upper Body:      Right upper body: No axillary adenopathy.      Left upper body: No axillary adenopathy.      Lower Body: No right inguinal adenopathy. No left inguinal adenopathy.    Neurological:      Mental Status: He is oriented to person, place, and time. He is lethargic.      Cranial Nerves: Cranial nerves 2-12 are intact. No cranial nerve deficit.      Sensory: No sensory deficit.      Motor: Motor function is intact. No weakness.      Gait: Gait is intact.      Deep Tendon Reflexes: Reflexes normal.   Psychiatric:         Mood and Affect: Mood normal. Mood is not anxious or depressed. Affect is not angry.         Behavior: Behavior is not agitated.         Thought Content: Thought content normal.         Judgment: Judgment normal.     LAB WORK:  Laboratory studies were reviewed.    Assessment/Plan  Problem List Items Addressed This Visit           ICD-10-CM    Osteomyelitis of left foot, unspecified type (Multi) M86.9    Weakness R53.1    Osteoarthritis M19.90    Meningioma (Multi) D32.9     Other Visit Diagnoses         Codes      Type 2 diabetes mellitus without complication, unspecified whether long term insulin use    -  Primary E11.9      Hypertension, unspecified type     I10      Deep vein thrombosis (DVT) of other vein of lower extremity, unspecified chronicity, unspecified laterality     I82.499      Neuropathy     G62.9      ASHD (arteriosclerotic heart disease)     I25.10      History of pleural effusion     Z87.09      At risk for falling     Z91.81        1. Diabetes, on insulin.  2. Osteomyelitis to the left foot.  Continue wound care.  Follow with Podiatry.  3. Hypertension, medically controlled.  4. DVT, anticoagulated.  5. Neuropathy, on gabapentin.  6. ASHD, on aspirin.  7. Pleural effusion.  Follow with Pulmonology.  8. Osteoarthritis, on Tylenol.  9. Meningioma.  Follow with Neurosurgery.  10. Weakness, on PT/OT.  11. Fall risk, on fall precautions.    Scribe Attestation  By signing my name below, Hanna OLIVER Scribe attest that this documentation has been prepared under the direction and in the presence of Marium Singh MD.     All medical record entries made by  the scribe were personally dictated by me I have reviewed the chart and agree the record accurately reflects my personal performance of his history physical examination and management      Electronically Signed By: Marium Singh MD   6/13/25 11:15 PM

## 2025-06-07 NOTE — PROGRESS NOTES
Emergency Medicine Transition of Care Note.    I received Elliot Partida in signout from   EILEEN Ortega. Please see the previous ED provider note for all HPI, PE and MDM up to the time of signout at 2000.. This is in addition to the primary record.    In brief Elliot Partida is an 74 y.o. male presenting for   Chief Complaint   Patient presents with    Arm Pain    Possible Blood Clot    Numbness     At the time of signout we were awaiting: Transfer    Diagnoses as of 06/07/25 0517   Acute deep vein thrombosis (DVT) of left upper extremity, unspecified vein   Acute deep vein thrombosis (DVT) of other specified vein of left lower extremity   Alteration in anticoagulation       Medical Decision Making    Patient was handed off to me at change of shift by my midlevel, Lucero ortega.  Patient is being transferred to [facility] with a heparin drip in order to obtain a hematology consult.  Patient had been treated as an outpatient with  an antiten inhibitor but apparently failed as the DVT in the arm has progressed.  Patient is heparinized and has been accepted at [facility] for admission but there is no bed at this point.        Final diagnoses:   [I82.622] Acute deep vein thrombosis (DVT) of left upper extremity, unspecified vein   [I82.492] Acute deep vein thrombosis (DVT) of other specified vein of left lower extremity   [Z51.81, Z79.01] Alteration in anticoagulation           Procedure  Procedures    Bobby Rodrigues MD

## 2025-06-08 ENCOUNTER — HOSPITAL ENCOUNTER (INPATIENT)
Facility: HOSPITAL | Age: 75
End: 2025-06-08
Attending: INTERNAL MEDICINE | Admitting: STUDENT IN AN ORGANIZED HEALTH CARE EDUCATION/TRAINING PROGRAM
Payer: MEDICARE

## 2025-06-08 VITALS
OXYGEN SATURATION: 98 % | HEART RATE: 75 BPM | BODY MASS INDEX: 28.29 KG/M2 | SYSTOLIC BLOOD PRESSURE: 167 MMHG | HEIGHT: 69 IN | TEMPERATURE: 96.9 F | RESPIRATION RATE: 15 BRPM | DIASTOLIC BLOOD PRESSURE: 65 MMHG | WEIGHT: 191 LBS

## 2025-06-08 VITALS
HEART RATE: 68 BPM | TEMPERATURE: 97.9 F | RESPIRATION RATE: 16 BRPM | SYSTOLIC BLOOD PRESSURE: 138 MMHG | DIASTOLIC BLOOD PRESSURE: 56 MMHG | OXYGEN SATURATION: 95 % | WEIGHT: 199.74 LBS | HEIGHT: 69 IN | BODY MASS INDEX: 29.58 KG/M2

## 2025-06-08 DIAGNOSIS — I10 PRIMARY HYPERTENSION: ICD-10-CM

## 2025-06-08 DIAGNOSIS — I82.402 DVT, RECURRENT, LOWER EXTREMITY, ACUTE, LEFT: Primary | ICD-10-CM

## 2025-06-08 DIAGNOSIS — M86.9 OSTEOMYELITIS OF LEFT FOOT, UNSPECIFIED TYPE (MULTI): ICD-10-CM

## 2025-06-08 DIAGNOSIS — D47.2 MGUS (MONOCLONAL GAMMOPATHY OF UNKNOWN SIGNIFICANCE): ICD-10-CM

## 2025-06-08 DIAGNOSIS — K22.89 ESOPHAGEAL MASS: ICD-10-CM

## 2025-06-08 LAB
ALBUMIN SERPL BCP-MCNC: 3.3 G/DL (ref 3.4–5)
ANION GAP SERPL CALC-SCNC: 13 MMOL/L (ref 10–20)
BASOPHILS # BLD AUTO: 0.02 X10*3/UL (ref 0–0.1)
BASOPHILS NFR BLD AUTO: 0.4 %
BUN SERPL-MCNC: 14 MG/DL (ref 6–23)
CALCIUM SERPL-MCNC: 9 MG/DL (ref 8.6–10.6)
CHLORIDE SERPL-SCNC: 106 MMOL/L (ref 98–107)
CO2 SERPL-SCNC: 27 MMOL/L (ref 21–32)
CREAT SERPL-MCNC: 1 MG/DL (ref 0.5–1.3)
EGFRCR SERPLBLD CKD-EPI 2021: 79 ML/MIN/1.73M*2
EOSINOPHIL # BLD AUTO: 0.26 X10*3/UL (ref 0–0.4)
EOSINOPHIL NFR BLD AUTO: 4.7 %
ERYTHROCYTE [DISTWIDTH] IN BLOOD BY AUTOMATED COUNT: 16.6 % (ref 11.5–14.5)
EST. AVERAGE GLUCOSE BLD GHB EST-MCNC: 120 MG/DL
GLUCOSE BLD MANUAL STRIP-MCNC: 114 MG/DL (ref 74–99)
GLUCOSE BLD MANUAL STRIP-MCNC: 122 MG/DL (ref 74–99)
GLUCOSE BLD MANUAL STRIP-MCNC: 128 MG/DL (ref 74–99)
GLUCOSE BLD MANUAL STRIP-MCNC: 137 MG/DL (ref 74–99)
GLUCOSE BLD MANUAL STRIP-MCNC: 142 MG/DL (ref 74–99)
GLUCOSE SERPL-MCNC: 142 MG/DL (ref 74–99)
HBA1C MFR BLD: 5.8 % (ref ?–5.7)
HCT VFR BLD AUTO: 29.4 % (ref 41–52)
HGB BLD-MCNC: 9 G/DL (ref 13.5–17.5)
IMM GRANULOCYTES # BLD AUTO: 0.02 X10*3/UL (ref 0–0.5)
IMM GRANULOCYTES NFR BLD AUTO: 0.4 % (ref 0–0.9)
LYMPHOCYTES # BLD AUTO: 1.56 X10*3/UL (ref 0.8–3)
LYMPHOCYTES NFR BLD AUTO: 28.2 %
MAGNESIUM SERPL-MCNC: 2.14 MG/DL (ref 1.6–2.4)
MCH RBC QN AUTO: 26.5 PG (ref 26–34)
MCHC RBC AUTO-ENTMCNC: 30.6 G/DL (ref 32–36)
MCV RBC AUTO: 87 FL (ref 80–100)
MONOCYTES # BLD AUTO: 0.53 X10*3/UL (ref 0.05–0.8)
MONOCYTES NFR BLD AUTO: 9.6 %
NEUTROPHILS # BLD AUTO: 3.15 X10*3/UL (ref 1.6–5.5)
NEUTROPHILS NFR BLD AUTO: 56.7 %
NRBC BLD-RTO: 0 /100 WBCS (ref 0–0)
PHOSPHATE SERPL-MCNC: 3.5 MG/DL (ref 2.5–4.9)
PLATELET # BLD AUTO: 205 X10*3/UL (ref 150–450)
POTASSIUM SERPL-SCNC: 3.5 MMOL/L (ref 3.5–5.3)
RBC # BLD AUTO: 3.4 X10*6/UL (ref 4.5–5.9)
SODIUM SERPL-SCNC: 142 MMOL/L (ref 136–145)
UFH PPP CHRO-ACNC: 0.7 IU/ML (ref ?–1.1)
WBC # BLD AUTO: 5.5 X10*3/UL (ref 4.4–11.3)

## 2025-06-08 PROCEDURE — 2500000004 HC RX 250 GENERAL PHARMACY W/ HCPCS (ALT 636 FOR OP/ED)

## 2025-06-08 PROCEDURE — 85520 HEPARIN ASSAY: CPT

## 2025-06-08 PROCEDURE — 36415 COLL VENOUS BLD VENIPUNCTURE: CPT

## 2025-06-08 PROCEDURE — 80069 RENAL FUNCTION PANEL: CPT

## 2025-06-08 PROCEDURE — 1210000001 HC SEMI-PRIVATE ROOM DAILY

## 2025-06-08 PROCEDURE — 2500000004 HC RX 250 GENERAL PHARMACY W/ HCPCS (ALT 636 FOR OP/ED): Performed by: INTERNAL MEDICINE

## 2025-06-08 PROCEDURE — 2500000001 HC RX 250 WO HCPCS SELF ADMINISTERED DRUGS (ALT 637 FOR MEDICARE OP)

## 2025-06-08 PROCEDURE — 99233 SBSQ HOSP IP/OBS HIGH 50: CPT | Performed by: INTERNAL MEDICINE

## 2025-06-08 PROCEDURE — 2500000004 HC RX 250 GENERAL PHARMACY W/ HCPCS (ALT 636 FOR OP/ED): Performed by: EMERGENCY MEDICINE

## 2025-06-08 PROCEDURE — 83735 ASSAY OF MAGNESIUM: CPT

## 2025-06-08 PROCEDURE — 82947 ASSAY GLUCOSE BLOOD QUANT: CPT

## 2025-06-08 PROCEDURE — 85025 COMPLETE CBC W/AUTO DIFF WBC: CPT

## 2025-06-08 RX ORDER — ACETAMINOPHEN 325 MG/1
650 TABLET ORAL EVERY 4 HOURS PRN
Status: DISPENSED | OUTPATIENT
Start: 2025-06-08

## 2025-06-08 RX ORDER — GABAPENTIN 300 MG/1
300 CAPSULE ORAL 3 TIMES DAILY
Status: DISPENSED | OUTPATIENT
Start: 2025-06-08

## 2025-06-08 RX ORDER — DEXTROSE 50 % IN WATER (D50W) INTRAVENOUS SYRINGE
12.5
Status: ACTIVE | OUTPATIENT
Start: 2025-06-08

## 2025-06-08 RX ORDER — VANCOMYCIN HYDROCHLORIDE 1 G/20ML
INJECTION, POWDER, LYOPHILIZED, FOR SOLUTION INTRAVENOUS DAILY PRN
Status: DISPENSED | OUTPATIENT
Start: 2025-06-08

## 2025-06-08 RX ORDER — VANCOMYCIN 1.25 G/250ML
1750 INJECTION, SOLUTION INTRAVENOUS EVERY 24 HOURS
Status: DISCONTINUED | OUTPATIENT
Start: 2025-06-08 | End: 2025-06-08 | Stop reason: SDUPTHER

## 2025-06-08 RX ORDER — AMOXICILLIN 250 MG
1 CAPSULE ORAL NIGHTLY PRN
Status: ACTIVE | OUTPATIENT
Start: 2025-06-08

## 2025-06-08 RX ORDER — HYDRALAZINE HYDROCHLORIDE 25 MG/1
25 TABLET, FILM COATED ORAL 3 TIMES DAILY
Status: DISPENSED | OUTPATIENT
Start: 2025-06-08

## 2025-06-08 RX ORDER — INSULIN LISPRO 100 [IU]/ML
0-5 INJECTION, SOLUTION INTRAVENOUS; SUBCUTANEOUS
Status: ACTIVE | OUTPATIENT
Start: 2025-06-08

## 2025-06-08 RX ORDER — DEXTROSE 50 % IN WATER (D50W) INTRAVENOUS SYRINGE
25
Status: ACTIVE | OUTPATIENT
Start: 2025-06-08

## 2025-06-08 RX ORDER — POLYETHYLENE GLYCOL 3350 17 G/17G
17 POWDER, FOR SOLUTION ORAL DAILY
Status: DISPENSED | OUTPATIENT
Start: 2025-06-08

## 2025-06-08 RX ORDER — HEPARIN SODIUM 10000 [USP'U]/100ML
0-4500 INJECTION, SOLUTION INTRAVENOUS CONTINUOUS
Status: DISPENSED | OUTPATIENT
Start: 2025-06-08

## 2025-06-08 RX ORDER — POTASSIUM CHLORIDE 20 MEQ/1
40 TABLET, EXTENDED RELEASE ORAL DAILY
Status: ON HOLD | COMMUNITY

## 2025-06-08 RX ADMIN — VANCOMYCIN HYDROCHLORIDE 1500 MG: 1.5 INJECTION, POWDER, LYOPHILIZED, FOR SOLUTION INTRAVENOUS at 20:04

## 2025-06-08 RX ADMIN — HYDRALAZINE HYDROCHLORIDE 25 MG: 25 TABLET ORAL at 15:09

## 2025-06-08 RX ADMIN — HEPARIN SODIUM 1400 UNITS/HR: 10000 INJECTION, SOLUTION INTRAVENOUS at 06:28

## 2025-06-08 RX ADMIN — HEPARIN SODIUM 1400 UNITS/HR: 10000 INJECTION, SOLUTION INTRAVENOUS at 19:07

## 2025-06-08 RX ADMIN — HYDRALAZINE HYDROCHLORIDE 25 MG: 25 TABLET ORAL at 20:04

## 2025-06-08 RX ADMIN — HYDRALAZINE HYDROCHLORIDE 25 MG: 25 TABLET ORAL at 08:59

## 2025-06-08 RX ADMIN — GABAPENTIN 300 MG: 300 CAPSULE ORAL at 09:00

## 2025-06-08 RX ADMIN — GABAPENTIN 300 MG: 300 CAPSULE ORAL at 20:04

## 2025-06-08 RX ADMIN — GABAPENTIN 300 MG: 300 CAPSULE ORAL at 15:09

## 2025-06-08 RX ADMIN — ACETAMINOPHEN 650 MG: 325 TABLET ORAL at 20:43

## 2025-06-08 SDOH — HEALTH STABILITY: PHYSICAL HEALTH
HOW OFTEN DO YOU NEED TO HAVE SOMEONE HELP YOU WHEN YOU READ INSTRUCTIONS, PAMPHLETS, OR OTHER WRITTEN MATERIAL FROM YOUR DOCTOR OR PHARMACY?: NEVER

## 2025-06-08 SDOH — ECONOMIC STABILITY: FOOD INSECURITY: HOW HARD IS IT FOR YOU TO PAY FOR THE VERY BASICS LIKE FOOD, HOUSING, MEDICAL CARE, AND HEATING?: PATIENT DECLINED

## 2025-06-08 SDOH — SOCIAL STABILITY: SOCIAL NETWORK
DO YOU BELONG TO ANY CLUBS OR ORGANIZATIONS SUCH AS CHURCH GROUPS, UNIONS, FRATERNAL OR ATHLETIC GROUPS, OR SCHOOL GROUPS?: PATIENT DECLINED

## 2025-06-08 SDOH — SOCIAL STABILITY: SOCIAL NETWORK: HOW OFTEN DO YOU GET TOGETHER WITH FRIENDS OR RELATIVES?: PATIENT DECLINED

## 2025-06-08 SDOH — SOCIAL STABILITY: SOCIAL INSECURITY: ABUSE: ADULT

## 2025-06-08 SDOH — SOCIAL STABILITY: SOCIAL INSECURITY
WITHIN THE LAST YEAR, HAVE YOU BEEN HUMILIATED OR EMOTIONALLY ABUSED IN OTHER WAYS BY YOUR PARTNER OR EX-PARTNER?: PATIENT DECLINED

## 2025-06-08 SDOH — SOCIAL STABILITY: SOCIAL INSECURITY: WITHIN THE LAST YEAR, HAVE YOU BEEN AFRAID OF YOUR PARTNER OR EX-PARTNER?: PATIENT DECLINED

## 2025-06-08 SDOH — ECONOMIC STABILITY: FOOD INSECURITY: WITHIN THE PAST 12 MONTHS, THE FOOD YOU BOUGHT JUST DIDN'T LAST AND YOU DIDN'T HAVE MONEY TO GET MORE.: PATIENT DECLINED

## 2025-06-08 SDOH — SOCIAL STABILITY: SOCIAL NETWORK: HOW OFTEN DO YOU ATTEND MEETINGS OF THE CLUBS OR ORGANIZATIONS YOU BELONG TO?: PATIENT DECLINED

## 2025-06-08 SDOH — ECONOMIC STABILITY: FOOD INSECURITY
WITHIN THE PAST 12 MONTHS, YOU WORRIED THAT YOUR FOOD WOULD RUN OUT BEFORE YOU GOT THE MONEY TO BUY MORE.: PATIENT DECLINED

## 2025-06-08 SDOH — SOCIAL STABILITY: SOCIAL INSECURITY: HAVE YOU HAD THOUGHTS OF HARMING ANYONE ELSE?: NO

## 2025-06-08 SDOH — SOCIAL STABILITY: SOCIAL INSECURITY: ARE YOU MARRIED, WIDOWED, DIVORCED, SEPARATED, NEVER MARRIED, OR LIVING WITH A PARTNER?: PATIENT DECLINED

## 2025-06-08 SDOH — SOCIAL STABILITY: SOCIAL INSECURITY: DO YOU FEEL ANYONE HAS EXPLOITED OR TAKEN ADVANTAGE OF YOU FINANCIALLY OR OF YOUR PERSONAL PROPERTY?: NO

## 2025-06-08 SDOH — ECONOMIC STABILITY: HOUSING INSECURITY: IN THE PAST 12 MONTHS, HOW MANY TIMES HAVE YOU MOVED WHERE YOU WERE LIVING?: 0

## 2025-06-08 SDOH — ECONOMIC STABILITY: TRANSPORTATION INSECURITY
IN THE PAST 12 MONTHS, HAS LACK OF TRANSPORTATION KEPT YOU FROM MEDICAL APPOINTMENTS OR FROM GETTING MEDICATIONS?: PATIENT DECLINED

## 2025-06-08 SDOH — ECONOMIC STABILITY: INCOME INSECURITY
IN THE PAST 12 MONTHS HAS THE ELECTRIC, GAS, OIL, OR WATER COMPANY THREATENED TO SHUT OFF SERVICES IN YOUR HOME?: PATIENT DECLINED

## 2025-06-08 SDOH — SOCIAL STABILITY: SOCIAL INSECURITY: ARE YOU OR HAVE YOU BEEN THREATENED OR ABUSED PHYSICALLY, EMOTIONALLY, OR SEXUALLY BY ANYONE?: NO

## 2025-06-08 SDOH — ECONOMIC STABILITY: HOUSING INSECURITY: IN THE LAST 12 MONTHS, WAS THERE A TIME WHEN YOU WERE NOT ABLE TO PAY THE MORTGAGE OR RENT ON TIME?: PATIENT DECLINED

## 2025-06-08 SDOH — SOCIAL STABILITY: SOCIAL NETWORK: IN A TYPICAL WEEK, HOW MANY TIMES DO YOU TALK ON THE PHONE WITH FAMILY, FRIENDS, OR NEIGHBORS?: PATIENT DECLINED

## 2025-06-08 SDOH — HEALTH STABILITY: MENTAL HEALTH
DO YOU FEEL STRESS - TENSE, RESTLESS, NERVOUS, OR ANXIOUS, OR UNABLE TO SLEEP AT NIGHT BECAUSE YOUR MIND IS TROUBLED ALL THE TIME - THESE DAYS?: PATIENT DECLINED

## 2025-06-08 SDOH — ECONOMIC STABILITY: HOUSING INSECURITY: AT ANY TIME IN THE PAST 12 MONTHS, WERE YOU HOMELESS OR LIVING IN A SHELTER (INCLUDING NOW)?: PATIENT DECLINED

## 2025-06-08 SDOH — SOCIAL STABILITY: SOCIAL INSECURITY
WITHIN THE LAST YEAR, HAVE YOU BEEN KICKED, HIT, SLAPPED, OR OTHERWISE PHYSICALLY HURT BY YOUR PARTNER OR EX-PARTNER?: PATIENT DECLINED

## 2025-06-08 SDOH — SOCIAL STABILITY: SOCIAL INSECURITY
WITHIN THE LAST YEAR, HAVE YOU BEEN RAPED OR FORCED TO HAVE ANY KIND OF SEXUAL ACTIVITY BY YOUR PARTNER OR EX-PARTNER?: PATIENT DECLINED

## 2025-06-08 SDOH — HEALTH STABILITY: PHYSICAL HEALTH: ON AVERAGE, HOW MANY MINUTES DO YOU ENGAGE IN EXERCISE AT THIS LEVEL?: 0 MIN

## 2025-06-08 SDOH — SOCIAL STABILITY: SOCIAL INSECURITY: ARE THERE ANY APPARENT SIGNS OF INJURIES/BEHAVIORS THAT COULD BE RELATED TO ABUSE/NEGLECT?: NO

## 2025-06-08 SDOH — HEALTH STABILITY: PHYSICAL HEALTH: ON AVERAGE, HOW MANY DAYS PER WEEK DO YOU ENGAGE IN MODERATE TO STRENUOUS EXERCISE (LIKE A BRISK WALK)?: 0 DAYS

## 2025-06-08 SDOH — SOCIAL STABILITY: SOCIAL NETWORK: HOW OFTEN DO YOU ATTEND CHURCH OR RELIGIOUS SERVICES?: PATIENT DECLINED

## 2025-06-08 SDOH — SOCIAL STABILITY: SOCIAL INSECURITY: HAS ANYONE EVER THREATENED TO HURT YOUR FAMILY OR YOUR PETS?: NO

## 2025-06-08 SDOH — SOCIAL STABILITY: SOCIAL INSECURITY: DO YOU FEEL UNSAFE GOING BACK TO THE PLACE WHERE YOU ARE LIVING?: NO

## 2025-06-08 SDOH — SOCIAL STABILITY: SOCIAL INSECURITY: HAVE YOU HAD ANY THOUGHTS OF HARMING ANYONE ELSE?: NO

## 2025-06-08 SDOH — SOCIAL STABILITY: SOCIAL INSECURITY: WERE YOU ABLE TO COMPLETE ALL THE BEHAVIORAL HEALTH SCREENINGS?: YES

## 2025-06-08 SDOH — SOCIAL STABILITY: SOCIAL INSECURITY: DOES ANYONE TRY TO KEEP YOU FROM HAVING/CONTACTING OTHER FRIENDS OR DOING THINGS OUTSIDE YOUR HOME?: NO

## 2025-06-08 ASSESSMENT — PAIN - FUNCTIONAL ASSESSMENT
PAIN_FUNCTIONAL_ASSESSMENT: 0-10

## 2025-06-08 ASSESSMENT — LIFESTYLE VARIABLES
HOW OFTEN DO YOU HAVE 6 OR MORE DRINKS ON ONE OCCASION: NEVER
AUDIT-C TOTAL SCORE: 0
AUDIT-C TOTAL SCORE: 0
HOW MANY STANDARD DRINKS CONTAINING ALCOHOL DO YOU HAVE ON A TYPICAL DAY: PATIENT DOES NOT DRINK
SKIP TO QUESTIONS 9-10: 1
HOW OFTEN DO YOU HAVE A DRINK CONTAINING ALCOHOL: NEVER

## 2025-06-08 ASSESSMENT — ACTIVITIES OF DAILY LIVING (ADL)
HEARING - RIGHT EAR: FUNCTIONAL
PATIENT'S MEMORY ADEQUATE TO SAFELY COMPLETE DAILY ACTIVITIES?: YES
FEEDING YOURSELF: INDEPENDENT
HEARING - LEFT EAR: FUNCTIONAL
DRESSING YOURSELF: NEEDS ASSISTANCE
ASSISTIVE_DEVICE: WALKER
TOILETING: NEEDS ASSISTANCE
GROOMING: INDEPENDENT
WALKS IN HOME: NEEDS ASSISTANCE
JUDGMENT_ADEQUATE_SAFELY_COMPLETE_DAILY_ACTIVITIES: YES
ADEQUATE_TO_COMPLETE_ADL: YES
BATHING: NEEDS ASSISTANCE
LACK_OF_TRANSPORTATION: PATIENT DECLINED

## 2025-06-08 ASSESSMENT — COGNITIVE AND FUNCTIONAL STATUS - GENERAL
STANDING UP FROM CHAIR USING ARMS: A LOT
CLIMB 3 TO 5 STEPS WITH RAILING: TOTAL
DRESSING REGULAR LOWER BODY CLOTHING: A LOT
PATIENT BASELINE BEDBOUND: NO
TOILETING: A LITTLE
MOBILITY SCORE: 15
DAILY ACTIVITIY SCORE: 17
MOBILITY SCORE: 15
MOVING FROM LYING ON BACK TO SITTING ON SIDE OF FLAT BED WITH BEDRAILS: A LITTLE
DRESSING REGULAR LOWER BODY CLOTHING: A LITTLE
HELP NEEDED FOR BATHING: A LOT
PERSONAL GROOMING: A LITTLE
PERSONAL GROOMING: A LITTLE
CLIMB 3 TO 5 STEPS WITH RAILING: A LOT
MOVING TO AND FROM BED TO CHAIR: A LOT
DAILY ACTIVITIY SCORE: 17
DRESSING REGULAR UPPER BODY CLOTHING: A LITTLE
STANDING UP FROM CHAIR USING ARMS: A LOT
WALKING IN HOSPITAL ROOM: A LOT
MOBILITY SCORE: 19
DRESSING REGULAR UPPER BODY CLOTHING: A LITTLE
DAILY ACTIVITIY SCORE: 20
WALKING IN HOSPITAL ROOM: A LITTLE
TOILETING: A LITTLE
HELP NEEDED FOR BATHING: A LOT
TOILETING: A LITTLE
DRESSING REGULAR UPPER BODY CLOTHING: A LITTLE
MOVING TO AND FROM BED TO CHAIR: A LITTLE
CLIMB 3 TO 5 STEPS WITH RAILING: TOTAL
MOVING TO AND FROM BED TO CHAIR: A LOT
DRESSING REGULAR LOWER BODY CLOTHING: A LOT
HELP NEEDED FOR BATHING: A LITTLE
WALKING IN HOSPITAL ROOM: A LOT

## 2025-06-08 ASSESSMENT — PATIENT HEALTH QUESTIONNAIRE - PHQ9
SUM OF ALL RESPONSES TO PHQ9 QUESTIONS 1 & 2: 1
1. LITTLE INTEREST OR PLEASURE IN DOING THINGS: NOT AT ALL
2. FEELING DOWN, DEPRESSED OR HOPELESS: SEVERAL DAYS

## 2025-06-08 ASSESSMENT — PAIN SCALES - GENERAL
PAINLEVEL_OUTOF10: 0 - NO PAIN
PAINLEVEL_OUTOF10: 1
PAINLEVEL_OUTOF10: 9
PAINLEVEL_OUTOF10: 1

## 2025-06-08 NOTE — PROGRESS NOTES
Pharmacy Medication History Review    Elliot Partida is a 74 y.o. male admitted for DVT, recurrent, lower extremity, acute, left. Pharmacy reviewed the patient's [id number] medications and allergies for accuracy.    Medications ADDED:  N/A  Medications CHANGED:  N/A  Medications REMOVED:   Carbamazepine 200 mg    The list below reflects the updated PTA list.   Prior to Admission Medications   Prescriptions Last Dose Informant   Blood glucose monitoring meter  Self   Sig: To check blood sugar every morning before breakfast   acetaminophen (Tylenol) 325 mg tablet 6/7/2025 Self   Sig: Take 2 tablets (650 mg) by mouth every 4 hours if needed for mild pain (1 - 3), moderate pain (4 - 6), headaches or fever (temp greater than 38.0 C) (greater than or equal to 38 C).   gabapentin (Neurontin) 300 mg capsule 6/7/2025 Self   Sig: Take 1 capsule (300 mg) by mouth 3 times a day.  Last fill 1/24/25 for 30 days 30 tablets    hydrALAZINE (Apresoline) 25 mg tablet 6/7/2025 Self   Sig: Take 1 tablet (25 mg) by mouth 3 times a day.   ondansetron ODT (Zofran-ODT) 4 mg disintegrating tablet Not Taking Self   Sig: Dissolve 1 tablet (4 mg) in the mouth every 8 hours if needed for nausea or vomiting.   Patient not taking: Reported on 6/8/2025   oxygen (O2) gas therapy  Self   Sig: Inhale 2 L/min at 120,000 mL/hr if needed (desaturation at hs).   sennosides-docusate sodium (Ann-Colace) 8.6-50 mg tablet  Self   Sig: Take 1 tablet by mouth as needed at bedtime for constipation.   sodium chloride 0.9% piggyback 250 mL with vancomycin 1.25 gram recon soln 1.25 g  Self   Sig: Infuse 1.25 g at 200 mL/hr over 75 minutes into a venous catheter every 12 hours.   traZODone (Desyrel) 50 mg tablet 6/7/2025 Bedtime Self   Sig: Take 1 tablet (50 mg) by mouth once daily at bedtime.   vancomycin (Xellia) IVPB  Self   Sig: Infuse 350 mL (1.75 g) at 200 mL/hr over 105 minutes into a venous catheter once every 24 hours for 26 days.     "  Facility-Administered Medications: None        The list below reflects the updated allergy list. Please review each documented allergy for additional clarification and justification.  Allergies  Reviewed by Nikolai Mora on 6/8/2025   No Known Allergies         Patient declines M2B at discharge.     Sources:   [facility]  Pharmacy dispense history  Patient Interview Poor historian  Chart Review  Care Everywhere           - discharge summary 5/23/25 via SARAH Mishra-CNP     Additional Comments:  Patient seem to be very careless during the interview giving little detail on medication history      NIKOLAI MORA  Pharmacy Technician  06/08/25     Secure Chat preferred   If no response call [phone] or ponUp \"Med Rec\"    "

## 2025-06-08 NOTE — CARE PLAN
The patient's goals for the shift include relax    The clinical goals for the shift include pt will remain stable during the shift    Pt met goals

## 2025-06-08 NOTE — H&P
History and Physical        Subjective   Elliot is a 74 y.o. male who presented to [facility] as a transfer for DVT, admitted to Hospitalist Team B on 6/8/2025 for DVT, recurrent, lower extremity, acute, left.    HPI:  Elliot Partida is a 73 yo. Male with PMHx of Moderate aortic stenosis, osteomyelitis (on Vanc until 6/18), DM c/b neuropathy, DVT, trigeminal neuralgia, and HTN presented to [facility] as a transfer from [facility] for further evaluation of possible AC treatment failure. Patient had US in February which showed LLE DVT and LUE on 5/30. Repeat scans in June showed DVT present, however, no comment on changes. Patient was started on Heparin at OSH before being transferred at request of patients PCP who was concerned for Xarelto treatment failure. Unclear if patient has been receiving Xarelto at this time as patient is poor historian regarding daily medications. Patient seen resting in bed comfortably, in no acute distress. Patient is aware of the reason for transfer and has no questions or concerns at this time. Denies chest pain, headache, vision changes, sob, abdominal pain, nausea, vomiting, changes in urinary or bowel function.         Problem List[1]   Medical History[2]  Surgical History[3]  Current Outpatient Medications   Medication Instructions    acetaminophen (TYLENOL) 650 mg, oral, Every 4 hours PRN    Blood glucose monitoring meter To check blood sugar every morning before breakfast    carBAMazepine (TEGRETOL) 200 mg, oral, 2 times daily PRN    gabapentin (NEURONTIN) 300 mg, oral, 3 times daily    hydrALAZINE (APRESOLINE) 25 mg, oral, 3 times daily    ondansetron ODT (ZOFRAN-ODT) 4 mg, oral, Every 8 hours PRN    oxygen (O2) 2 L/min (2 L/min), inhalation, As needed    sennosides-docusate sodium (Ann-Colace) 8.6-50 mg tablet 1 tablet, oral, Nightly PRN    sodium chloride 0.9% piggyback 250 mL with vancomycin 1.25 gram recon soln 1.25 g 1.25 g, intravenous, Every 12 hours    traZODone (DESYREL) 50 mg,  "oral, Nightly    vancomycin (Xellia) IVPB 1.75 g, intravenous, Every 24 hours      RX Allergies[4]   Social History[5]  Family History[6]    Scheduled Medications:   Scheduled Medications[7]     Continuous Medications:   Continuous Medications[8]     PRN Medications:   PRN Medications[9]      Objective   Vitals:  Most Recent: /73 (BP Location: Right arm, Patient Position: Lying)   Pulse 78   Temp 36.6 °C (97.9 °F) (Temporal)   Resp 15   Ht 1.753 m (5' 9.02\")   Wt 90.6 kg (199 lb 11.8 oz)   SpO2 96%   BMI 29.48 kg/m²     24hr Min/Max:  Temp  Min: 36.1 °C (96.9 °F)  Max: 36.6 °C (97.9 °F)  Pulse  Min: 50  Max: 80  BP  Min: 121/53  Max: 167/65  Resp  Min: 10  Max: 20  SpO2  Min: 91 %  Max: 100 %      Intake/Output Summary (Last 24 hours) at 6/8/2025 0606  Last data filed at 6/8/2025 0520  Gross per 24 hour   Intake 240 ml   Output --   Net 240 ml         Physical exam:    Physical Exam  Constitutional:       General: He is not in acute distress.     Appearance: Normal appearance. He is not ill-appearing.   Eyes:      Extraocular Movements: Extraocular movements intact.      Conjunctiva/sclera: Conjunctivae normal.   Cardiovascular:      Rate and Rhythm: Normal rate.   Pulmonary:      Effort: Pulmonary effort is normal.      Breath sounds: No wheezing or rhonchi.   Abdominal:      General: There is no distension.      Tenderness: There is no abdominal tenderness. There is no guarding.   Neurological:      Mental Status: He is alert and oriented to person, place, and time. Mental status is at baseline.   Psychiatric:         Mood and Affect: Mood normal.         Behavior: Behavior normal.          Lab/Radiology/Diagnostic Review:  Results for orders placed or performed during the hospital encounter of 06/08/25 (from the past 24 hours)   POCT GLUCOSE   Result Value Ref Range    POCT Glucose 137 (H) 74 - 99 mg/dL       Assessment     Elliot Partida is a 74 y.o. male with PMHx of Moderate aortic stenosis, " osteomyelitis (on Vanc until 6/18), DM c/b neuropathy, DVT, trigeminal neuralgia, and HTN presented to [facility] as a transfer from [facility] for further evaluation of possible AC treatment failure. Recent scans showed DVT in LLE and ULE, however, unsure if changed from previous scans. Patient unsure if he has been taking Xarelto as prescribed. Will consult Vasc Med for evaluation of possible treatment failure. Heparin drip ordered. Determined to be hemodynamically stable and appropriate for regular nursing floor. To be admitted to Hospitalist Team B for further management. Detailed plan as follows:    Plan      Assessment & Plan  DVT, recurrent, lower extremity, acute, left      #DVT  :: LLE and ULE, noted on scans as far back as February  - Patient was prescribed Xarelto, PCP concerned for tx failure  - 6/6 UE duplex showed Partial visualization of occlusive thrombus in the left subclavian  Vein  - 6/5 LLE Duplex showed evidence for acute occlusive DVT within the left deep femoral,  femoral, and popliteal veins. There is no significant change when compared to prior ultrasound from February 2025.  - Patient started on Heparin drip at OSH  - CT PE negative 6/5/2025  - Heparin ordered  [ ] consider Vasc Med consult in AM    #Osteomyelitis  - continued Vanc 1.75 g until 6/18 per previous ID recs  - wound care consult placed    #HTN  #AS  - continued home Hydralazine 25 mg TID    #DM  - most recent A1c 5.8  - carb controlled diet  - ss1 ordered  - Continued home Gabapentin 300 mg TID      Fluids: PRN  O2: PRN  DVT ppx: Heparin  GI ppx: n/a  Abx: Vanc  Diet: Carb controlled  Code Status: Full Code   NOK: Eryn Gee (sibling)  [phone]   PCP: No Assigned PCP Generic Provider, MD       Patient discussed with attending physician Dr. Jennings  Plan preliminary until cosigned by attending physician.    Martínez Lawrence MD  Family Medicine  PGY-2        [1]   Patient Active Problem List  Diagnosis    Wound infection     Osteomyelitis of left foot, unspecified type (Multi)    Acute deep vein thrombosis (DVT) of popliteal vein of left lower extremity    Bilateral lower extremity edema    CAD (coronary artery disease)    Benign essential HTN    Inflammatory neuropathy (Multi)    History of stroke    Weakness of left lower extremity    Ambulatory dysfunction    Chronic bilateral low back pain with left-sided sciatica    DM type 2 with diabetic peripheral neuropathy    Neural foraminal stenosis of lumbosacral spine    Hyperlipidemia    Lumbar back pain with radiculopathy affecting left lower extremity    Peripheral arterial disease    Uncontrolled type 2 diabetes mellitus with hyperglycemia    Anemia    Obesity    Delayed surgical wound healing of foot amputation stump (Multi)    Impaired mobility and ADLs    Weakness    Thoracic radiculopathy    Osteoarthritis    Cellulitis of foot, left    Meningioma (Multi)    Tremor of left hand    Diabetic ulcer of left foot associated with type 2 diabetes mellitus, limited to breakdown of skin    Acute deep vein thrombosis (DVT) of femoral vein of left lower extremity    Nonrheumatic aortic valve stenosis    Diastolic dysfunction    Non-pressure chronic ulcer of other part of left foot with necrosis of bone    Hemiplegia and hemiparesis following cerebral infarction affecting left non-dominant side (Multi)    Wound eschar of foot    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 sinus (Multi)    Trigeminal neuralgia    History of DVT (deep vein thrombosis)    Insomnia    Generalized weakness    Normocytic anemia    Fever    Metabolic encephalopathy    COVID-19    Pleural effusion    DVT, recurrent, lower extremity, acute, left   [2]   Past Medical History:  Diagnosis Date    Acute osteomyelitis of ankle and foot, left (Multi)     AMI (acute myocardial infarction) (Multi)     ASHD (arteriosclerotic heart disease)     At risk for falls     Cellulitis     left foot and ankle    COVID-19      Diabetes mellitus (Multi)     DVT (deep vein thrombosis) in pregnancy ([facility]-H[facility])     Fall risk care plan declined     Hypertension     Meningioma (Multi)     Myocardial infarction (Multi)     Neuritis     Neuropathy     Osteoarthritis     Osteomyelitis     Pleural effusion     PVD (peripheral vascular disease)     Sprain of right knee 06/25/2015    Stroke (Multi)     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[facility]HCC)     TIA (transient ischemic attack)     Tinea pedis of both feet     Trigeminal neuralgia     Weakness    [3]   Past Surgical History:  Procedure Laterality Date    CARDIAC CATHETERIZATION      CARPAL TUNNEL RELEASE  10/10/2014    EYE SURGERY      FL  ARTHROCENTESIS ASP INJ JOINT.      FOOT RAY RESECTION Left 09/23/2024    L partial 5th ray resection (Dr. Spears DPBRAYAN)    FOOT SURGERY Left 09/27/2024    Delayed closure w/ graft application (Dr. Amina DPM)    INVASIVE VASCULAR PROCEDURE Bilateral 09/18/2024    Procedure: Lower Extremity Angiogram;  Surgeon: Teofilo Stoddard MD;  Location: [facility] Cardiac Cath Lab;  Service: Cardiovascular;  Laterality: Bilateral;    IRIDOTOMY / IRIDECTOMY Bilateral    [4] No Known Allergies  [5]   Social History  Tobacco Use    Smoking status: Never     Passive exposure: Never    Smokeless tobacco: Never   Vaping Use    Vaping status: Never Used   Substance Use Topics    Alcohol use: Never     Comment: former    Drug use: Never   [6]   Family History  Problem Relation Name Age of Onset    Heart disease Father      Colon cancer Other      Heart attack Other      Diabetes Other      Hypertension Other     [7] gabapentin, 300 mg, oral, TID  heparin, 80 Units/kg, intravenous, Once  hydrALAZINE, 25 mg, oral, TID  polyethylene glycol, 17 g, oral, Daily  vancomycin, 1,750 mg, intravenous, q24h  [8] heparin, 0-4,500 Units/hr  [9] PRN medications: acetaminophen, sennosides-docusate sodium

## 2025-06-08 NOTE — PROGRESS NOTES
Elliot Partida is a 74 y.o. male on day 0 of admission presenting with DVT, recurrent, lower extremity, acute, left.      Subjective   Pt was seen and examined at bedside, states that he is feeling better, no shortness of breath, and denied having any other symptom or review of system.  Stated that he was sent by PCP to be evaluated by hematology for his recurrent DVT.       Objective     Last Recorded Vitals  /57   Pulse 68   Temp 35.8 °C (96.4 °F)   Resp 16   Wt 90.6 kg (199 lb 11.8 oz)   SpO2 95%   Intake/Output last 3 Shifts:    Intake/Output Summary (Last 24 hours) at 6/8/2025 1649  Last data filed at 6/8/2025 0901  Gross per 24 hour   Intake 275.7 ml   Output --   Net 275.7 ml       Admission Weight  Weight: 90.6 kg (199 lb 11.8 oz) (06/08/25 0523)    Daily Weight  06/08/25 : 90.6 kg (199 lb 11.8 oz)    Image Results  Lower extremity venous duplex right  Narrative: Interpreted By:  Nnamdi Dodson,   STUDY:  [facility] US LOWER EXTREMITY VENOUS DUPLEX RIGHT;  6/6/2025 4:45 pm      INDICATION:  Signs/Symptoms:eval dvt , pain swelling.      COMPARISON:  None.      ACCESSION NUMBER(S):  [id number]      ORDERING CLINICIAN:  MIGNON PURI      TECHNIQUE:  Grayscale, color and spectral Doppler sonographic images of the right  lower extremity deep venous system. The left common femoral vein was  imaged for comparison.      FINDINGS:  There is normal compressibility of the right common femoral vein,  saphenous femoral junction, femoral vein and popliteal vein. The  posterior tibial and peroneal veins are suboptimally visualized.  There is normal spontaneous and phasic variation throughout the leg  by spectral doppler.      The left common femoral vein is patent.      OTHER FINDINGS: None.      Impression: Suboptimal visualization of the calf veins. No sonographic evidence  of acute DVT in the visualized vessels of the right lower extremity.      MACRO:  None      Signed by: Nnamdi Dodson 6/6/2025 4:53  PM  Dictation workstation:   [id number]  Vascular US Upper Extremity Venous Duplex Right  Narrative: Interpreted By:  Nnamdi Dodson,   STUDY:  VASC US UPPER EXTREMITY VENOUS DUPLEX RIGHT;  6/6/2025 4:43 pm      INDICATION:  Signs/Symptoms:eval dvt  pain.      COMPARISON:  Upper extremity Doppler ultrasound 05/30/2025.      ACCESSION NUMBER(S):  [id number]      ORDERING CLINICIAN:  MIGNON PURI      TECHNIQUE:  Grayscale, color and spectral Doppler sonographic imaging of the  right upper extremity deep venous system.      FINDINGS:  The right cephalic vein is not well visualized. Evaluation of the  brachial vein is also solid limited due to placement of PICC line.  Segmental visualization of the right internal jugular vein,  subclavian vein, axillary vein, basilic vein and brachial vein  demonstrate normal gray scale appearance, compressibility, color flow  and venous waveforms. The radial and ulnar veins are not visualized.      Redemonstration of occlusive thrombus in the left subclavian vein.      Impression: No sonographic evidence of right upper extremity DVT.      Partial visualization of occlusive thrombus in the left subclavian  vein as noted on prior ultrasound. Please refer to separate or of  left upper extremity DVT for additional detail      MACRO:  None      Signed by: Nnamdi Dodson 6/6/2025 4:51 PM  Dictation workstation:   [id number]      Physical Exam  Constitutional: Elderly gentleman, alert active, cooperative not in acute distress  Eyes: PERRLA, clear sclera  ENMT: Moist mucosal membranes, no exudate  Head / Neck: Atraumatic, normocephalic, supple neck, JVP not visualized  Lungs: Patent airways, CTABL  Heart: RRR, S1S2, no murmurs appreciated, palpable pulses in all extremities  GI: Soft, NT, ND, bowel sounds present in all quadrants  MSK: Moves all extremities freely, no restriction  of ROM, no joint edema  Extremities: Left foot with chronic ulcer wrapped with dressing dry and intact, mild  bilateral lower extremities peripheral edema.  Single-lumen PICC line inserted in the right arm, insertion site intact  : No Lama catheter inserted  Breast: Deferred  Neurological: AAO x 3 to person, place and date, facial muscles symmetrical, sensation intact, strength 4/4, no acute focal neurological deficits appreciated  Psychological: Appropriate mood and behavior    Relevant Results             Scheduled medications  Scheduled Medications[1]  Continuous medications  Continuous Medications[2]  PRN medications  PRN Medications[3]         This patient has a central line   Reason for the central line remaining today? Parenteral medication            Assessment & Plan  DVT, recurrent, lower extremity, acute, left      75 yo. Male with PMHx of Moderate aortic stenosis, osteomyelitis (on Vanc until 6/18), DM c/b neuropathy, DVT, trigeminal neuralgia, and HTN presented to [facility] as a transfer from [facility] for further evaluation of possible AC treatment failure. Patient had US in February which showed LLE DVT and LUE on 5/30. Repeat scans in June showed DVT present, however, no comment on changes        Lower extremity DVT  LLE and ULE, noted on scans as far back as February  - Patient was prescribed Xarelto, PCP concerned for tx failure  - 6/6 UE duplex showed Partial visualization of occlusive thrombus in the left subclavian  Vein  - 6/5 LLE Duplex showed evidence for acute occlusive DVT within the left deep femoral,  femoral, and popliteal veins. There is no significant change when compared to prior ultrasound from February 2025.  - Patient started on Heparin drip at OSH  - CT PE negative 6/5/2025  - On heparin drip  - Hematology consulted, discussed with on-call service, during initial consultation  - Coagulopathy workup initiated  - May need vascular referral awaiting hematology recommendations first    Osteomyelitis of the left foot: Status post amputation of the fifth digit  - On outpatient vancomycin 1.75 g  IV piggyback through 6/18 via PICC line on the right arm  - Wound nurse consulted    Trigeminal neuralgia  - Gabapentin 300 mg 3 times daily    Hypertension  - Hydralazine 25 mg 3 times daily    Diabetes: Appears controlled, A1c is 5.8%  - Not on any medication  - Insulin sliding scale in place    Diet  - Diabetic    DVT prophylaxis  - Previously on Xarelto, on hold, heparin drip in place    Disposition: Presenting with DVT in the left lower extremity, transferred from [place] for coagulopathy workup with hematology, discharge pending final recommendation by hematology services.    ADOD in 2 days or up to 4 days if warfarin therapy initiated    Srinivas Gonzalez DO           [1] gabapentin, 300 mg, oral, TID  heparin, 80 Units/kg, intravenous, Once  hydrALAZINE, 25 mg, oral, TID  insulin lispro, 0-5 Units, subcutaneous, TID AC  polyethylene glycol, 17 g, oral, Daily  vancomycin, 1,500 mg, intravenous, q24h  [2] heparin, 0-4,500 Units/hr, Last Rate: 1,400 Units/hr (06/08/25 0636)  [3] PRN medications: acetaminophen, dextrose, dextrose, glucagon, glucagon, sennosides-docusate sodium, vancomycin

## 2025-06-08 NOTE — CARE PLAN
The patient's goals for the shift include relax    The clinical goals for the shift include pt will remain stable during the shift      Problem: Pain - Adult  Goal: Verbalizes/displays adequate comfort level or baseline comfort level  Outcome: Progressing     Problem: Safety - Adult  Goal: Free from fall injury  Outcome: Progressing     Problem: Discharge Planning  Goal: Discharge to home or other facility with appropriate resources  Outcome: Progressing     Problem: Chronic Conditions and Co-morbidities  Goal: Patient's chronic conditions and co-morbidity symptoms are monitored and maintained or improved  Outcome: Progressing     Problem: Nutrition  Goal: Nutrient intake appropriate for maintaining nutritional needs  Outcome: Progressing     Problem: Skin  Goal: Decreased wound size/increased tissue granulation at next dressing change  Outcome: Progressing  Goal: Participates in plan/prevention/treatment measures  Outcome: Progressing  Goal: Prevent/manage excess moisture  Outcome: Progressing  Flowsheets (Taken 6/8/2025 1643)  Prevent/manage excess moisture:   Cleanse incontinence/protect with barrier cream   Follow provider orders for dressing changes   Moisturize dry skin   Monitor for/manage infection if present   Use wicking fabric (obtain order)  Goal: Prevent/minimize sheer/friction injuries  Outcome: Progressing  Goal: Promote/optimize nutrition  Outcome: Progressing  Goal: Promote skin healing  Outcome: Progressing     Problem: Fall/Injury  Goal: Not fall by end of shift  Outcome: Progressing  Goal: Be free from injury by end of the shift  Outcome: Progressing  Goal: Verbalize understanding of personal risk factors for fall in the hospital  Outcome: Progressing  Goal: Verbalize understanding of risk factor reduction measures to prevent injury from fall in the home  Outcome: Progressing  Goal: Use assistive devices by end of the shift  Outcome: Progressing  Goal: Pace activities to prevent fatigue by end of  the shift  Outcome: Progressing

## 2025-06-08 NOTE — PROGRESS NOTES
Vancomycin Dosing by Pharmacy- INITIAL    Elliot Partida is a 74 y.o. year old male who Pharmacy has been consulted for vancomycin dosing for cellulitis, skin and soft tissue. Based on the patient's indication and renal status this patient will be dosed based on a goal AUC of 400-600.     Renal function is currently declining.    Visit Vitals  /73 (BP Location: Right arm, Patient Position: Lying)   Pulse 78   Temp 36.6 °C (97.9 °F) (Temporal)   Resp 15        Lab Results   Component Value Date    CREATININE 1.10 2025    CREATININE 1.05 2025    CREATININE 0.98 2025    CREATININE 1.00 2025        Patient weight is as follows:   Vitals:    25 05   Weight: 90.6 kg (199 lb 11.8 oz)       Cultures:  No results found for the encounter in last 14 days.        I/O last 3 completed shifts:  In: 241.9 (2.7 mL/kg) [P.O.:240; I.V.:1.9 (0 mL/kg)]  Out: - (0 mL/kg)   Weight: 90.6 kg   I/O during current shift:  I/O this shift:  In: 33.8 [I.V.:33.8]  Out: -     Temp (24hrs), Av.4 °C (97.6 °F), Min:36.1 °C (96.9 °F), Max:36.6 °C (97.9 °F)         Assessment/Plan     Patient has already been given a loading dose of 1500 mg.  Will initiate vancomycin maintenance, 1500 mg every 24 hours starting 21:00 on 25    This dosing regimen is predicted by [employer] to result in the following pharmacokinetic parameters:  Loading dose: N/A  Regimen: 1500 mg IV every 24 hours.  Start time: 20:50 on 2025  Exposure target: AUC24 (range) 400-600 mg/L.hr   [id number]: 440 mg/L.hr  AUC24,ss: 470 mg/L.hr  Probability of AUC24 > 400: 68 %  Ctrough,ss: 13.5 mg/L  Probability of Ctrough,ss > 20: 19 %      Follow-up level will be ordered on  at AM labs unless clinically indicated sooner.  Will continue to monitor renal function daily while on vancomycin and order serum creatinine at least every 48 hours if not already ordered.  Follow for continued vancomycin needs, clinical response, and signs/symptoms  of toxicity.       Calista Bobo, [facility]

## 2025-06-08 NOTE — ED NOTES
Patient's dressings on bilateral lower legs taken off. Wounds cleansed with dermal wound cleanser and wet to dry sterile dressing applied.      Naomie Trent RN  06/07/25 [id number]

## 2025-06-08 NOTE — PROGRESS NOTES
Pharmacy Medication History Review    Elliot Partida is a 74 y.o. male admitted for DVT, recurrent, lower extremity, acute, left. Pharmacy reviewed the patient's emkxd-ju-vwzzmiapp medications and allergies for accuracy.    Medications ADDED:  Potassium 20 mEq  Medications CHANGED:  N/A  Medications REMOVED:   Carbamazepine 200 mg    The list below reflects the updated PTA list.   Prior to Admission Medications   Prescriptions Last Dose Informant   Blood glucose monitoring meter  Self   Sig: To check blood sugar every morning before breakfast   acetaminophen (Tylenol) 325 mg tablet 6/7/2025 Self   Sig: Take 2 tablets (650 mg) by mouth every 4 hours if needed for mild pain (1 - 3), moderate pain (4 - 6), headaches or fever (temp greater than 38.0 C) (greater than or equal to 38 C).   gabapentin (Neurontin) 300 mg capsule 6/7/2025 Self   Sig: Take 1 capsule (300 mg) by mouth 3 times a day.  Last fill 1/24/25 for 30 days 30 tablets    hydrALAZINE (Apresoline) 25 mg tablet 6/7/2025 Self   Sig: Take 1 tablet (25 mg) by mouth 3 times a day.   ondansetron ODT (Zofran-ODT) 4 mg disintegrating tablet Not Taking Self   Sig: Dissolve 1 tablet (4 mg) in the mouth every 8 hours if needed for nausea or vomiting.   Patient not taking: Reported on 6/8/2025   oxygen (O2) gas therapy  Self   Sig: Inhale 2 L/min at 120,000 mL/hr if needed (desaturation at hs).   potassium chloride CR 20 mEq ER tablet     Sig: Take 2 tablets (40 mEq) by mouth once daily. Do not crush or chew.   sennosides-docusate sodium (Ann-Colace) 8.6-50 mg tablet  Self   Sig: Take 1 tablet by mouth as needed at bedtime for constipation.   sodium chloride 0.9% piggyback 250 mL with vancomycin 1.25 gram recon soln 1.25 g  Self   Sig: Infuse 1.25 g at 200 mL/hr over 75 minutes into a venous catheter every 12 hours.   traZODone (Desyrel) 50 mg tablet 6/7/2025 Bedtime Self   Sig: Take 1 tablet (50 mg) by mouth once daily at bedtime.   vancomycin (Xellia) IVPB  Self  "  Sig: Infuse 350 mL (1.75 g) at 200 mL/hr over 105 minutes into a venous catheter once every 24 hours for 26 days.      Facility-Administered Medications: None         The list below reflects the updated allergy list. Please review each documented allergy for additional clarification and justification.  Allergies  Reviewed by Nkiolai Mora on 6/8/2025   No Known Allergies         Patient declines M2B at discharge.     Sources:   [facility]  Pharmacy dispense history  Patient Interview Poor historian  Chart Review  Care Everywhere           - discharge summary 5/23/25 via SARAH Mishra-CNP     Additional Comments:  Patient seem to be very careless during the interview giving little detail on medication history  Patient reports he not know anything about the medications below            - Ann Colace            - Sodium chloride piggyback            - Xellia IVPB      NIKOLAI MORA  Pharmacy Technician  06/08/25     Secure Chat preferred   If no response call [phone] or [employer] \"Med Rec\"    "

## 2025-06-09 LAB
ANION GAP SERPL CALC-SCNC: 11 MMOL/L (ref 10–20)
BUN SERPL-MCNC: 12 MG/DL (ref 6–23)
CALCIUM SERPL-MCNC: 8.8 MG/DL (ref 8.6–10.6)
CHLORIDE SERPL-SCNC: 107 MMOL/L (ref 98–107)
CO2 SERPL-SCNC: 31 MMOL/L (ref 21–32)
CREAT SERPL-MCNC: 0.95 MG/DL (ref 0.5–1.3)
DRVVT SCREEN TO CONFIRM RATIO: 2.11 RATIO
DRVVT/DRVVT CFM NRMLZD PPP-RTO: 1.87 RATIO
DRVVT/DRVVT CFM P DOAC NEUT NORM PPP-RTO: 1.13 RATIO
EGFRCR SERPLBLD CKD-EPI 2021: 85 ML/MIN/1.73M*2
GLUCOSE BLD MANUAL STRIP-MCNC: 105 MG/DL (ref 74–99)
GLUCOSE BLD MANUAL STRIP-MCNC: 116 MG/DL (ref 74–99)
GLUCOSE BLD MANUAL STRIP-MCNC: 141 MG/DL (ref 74–99)
GLUCOSE BLD MANUAL STRIP-MCNC: 187 MG/DL (ref 74–99)
GLUCOSE SERPL-MCNC: 100 MG/DL (ref 74–99)
LA 2 SCREEN W REFLEX-IMP: NORMAL
NORMALIZED SCT PPP-RTO: 0.77 RATIO
POTASSIUM SERPL-SCNC: 3.3 MMOL/L (ref 3.5–5.3)
SILICA CLOTTING TIME CONFIRMATION: 2.08 RATIO
SILICA CLOTTING TIME SCREEN: 1.61 RATIO
SODIUM SERPL-SCNC: 146 MMOL/L (ref 136–145)
UFH PPP CHRO-ACNC: 0.7 IU/ML (ref ?–1.1)
VANCOMYCIN SERPL-MCNC: 21.4 UG/ML (ref 5–20)

## 2025-06-09 PROCEDURE — 1210000001 HC SEMI-PRIVATE ROOM DAILY

## 2025-06-09 PROCEDURE — 82947 ASSAY GLUCOSE BLOOD QUANT: CPT

## 2025-06-09 PROCEDURE — 2500000004 HC RX 250 GENERAL PHARMACY W/ HCPCS (ALT 636 FOR OP/ED)

## 2025-06-09 PROCEDURE — 85520 HEPARIN ASSAY: CPT

## 2025-06-09 PROCEDURE — 97166 OT EVAL MOD COMPLEX 45 MIN: CPT | Mod: GO

## 2025-06-09 PROCEDURE — 99232 SBSQ HOSP IP/OBS MODERATE 35: CPT | Performed by: INTERNAL MEDICINE

## 2025-06-09 PROCEDURE — 80048 BASIC METABOLIC PNL TOTAL CA: CPT | Performed by: INTERNAL MEDICINE

## 2025-06-09 PROCEDURE — 2500000002 HC RX 250 W HCPCS SELF ADMINISTERED DRUGS (ALT 637 FOR MEDICARE OP, ALT 636 FOR OP/ED): Performed by: INTERNAL MEDICINE

## 2025-06-09 PROCEDURE — 99223 1ST HOSP IP/OBS HIGH 75: CPT

## 2025-06-09 PROCEDURE — 2500000004 HC RX 250 GENERAL PHARMACY W/ HCPCS (ALT 636 FOR OP/ED): Performed by: INTERNAL MEDICINE

## 2025-06-09 PROCEDURE — 2500000001 HC RX 250 WO HCPCS SELF ADMINISTERED DRUGS (ALT 637 FOR MEDICARE OP): Performed by: INTERNAL MEDICINE

## 2025-06-09 PROCEDURE — 80202 ASSAY OF VANCOMYCIN: CPT

## 2025-06-09 PROCEDURE — 2500000001 HC RX 250 WO HCPCS SELF ADMINISTERED DRUGS (ALT 637 FOR MEDICARE OP)

## 2025-06-09 RX ORDER — RIVAROXABAN 15 MG-20MG
1 KIT ORAL SEE ADMIN INSTRUCTIONS
COMMUNITY
Start: 2025-05-31 | End: 2025-06-10 | Stop reason: HOSPADM

## 2025-06-09 RX ORDER — HYDRALAZINE HYDROCHLORIDE 50 MG/1
50 TABLET, FILM COATED ORAL 3 TIMES DAILY
Status: DISCONTINUED | OUTPATIENT
Start: 2025-06-09 | End: 2025-06-10 | Stop reason: HOSPADM

## 2025-06-09 RX ORDER — OXYCODONE HYDROCHLORIDE 5 MG/1
5 TABLET ORAL EVERY 6 HOURS PRN
Refills: 0 | Status: DISCONTINUED | OUTPATIENT
Start: 2025-06-09 | End: 2025-06-10 | Stop reason: HOSPADM

## 2025-06-09 RX ORDER — POTASSIUM CHLORIDE 20 MEQ/1
40 TABLET, EXTENDED RELEASE ORAL 2 TIMES DAILY
Status: COMPLETED | OUTPATIENT
Start: 2025-06-09 | End: 2025-06-09

## 2025-06-09 RX ORDER — TRAMADOL HYDROCHLORIDE 25 MG/1
25 TABLET, COATED ORAL EVERY 4 HOURS PRN
COMMUNITY
Start: 2025-06-06 | End: 2025-06-20

## 2025-06-09 RX ORDER — AMLODIPINE BESYLATE 5 MG/1
2.5 TABLET ORAL DAILY
Status: DISCONTINUED | OUTPATIENT
Start: 2025-06-09 | End: 2025-06-10 | Stop reason: HOSPADM

## 2025-06-09 RX ORDER — MECLIZINE HYDROCHLORIDE 25 MG/1
25 TABLET ORAL EVERY 6 HOURS PRN
COMMUNITY

## 2025-06-09 RX ADMIN — GABAPENTIN 300 MG: 300 CAPSULE ORAL at 14:21

## 2025-06-09 RX ADMIN — OXYCODONE 5 MG: 5 TABLET ORAL at 13:01

## 2025-06-09 RX ADMIN — HEPARIN SODIUM 1400 UNITS/HR: 10000 INJECTION, SOLUTION INTRAVENOUS at 13:01

## 2025-06-09 RX ADMIN — AMLODIPINE BESYLATE 2.5 MG: 5 TABLET ORAL at 17:23

## 2025-06-09 RX ADMIN — GABAPENTIN 300 MG: 300 CAPSULE ORAL at 08:02

## 2025-06-09 RX ADMIN — POTASSIUM CHLORIDE 40 MEQ: 1500 TABLET, EXTENDED RELEASE ORAL at 10:29

## 2025-06-09 RX ADMIN — ACETAMINOPHEN 650 MG: 325 TABLET ORAL at 14:21

## 2025-06-09 RX ADMIN — HYDRALAZINE HYDROCHLORIDE 50 MG: 50 TABLET ORAL at 20:44

## 2025-06-09 RX ADMIN — VANCOMYCIN HYDROCHLORIDE 1500 MG: 1.5 INJECTION, POWDER, LYOPHILIZED, FOR SOLUTION INTRAVENOUS at 20:44

## 2025-06-09 RX ADMIN — GABAPENTIN 300 MG: 300 CAPSULE ORAL at 20:44

## 2025-06-09 RX ADMIN — HYDRALAZINE HYDROCHLORIDE 25 MG: 25 TABLET ORAL at 14:29

## 2025-06-09 RX ADMIN — HYDRALAZINE HYDROCHLORIDE 25 MG: 25 TABLET ORAL at 08:01

## 2025-06-09 ASSESSMENT — COGNITIVE AND FUNCTIONAL STATUS - GENERAL
DRESSING REGULAR UPPER BODY CLOTHING: A LITTLE
MOBILITY SCORE: 15
MOVING TO AND FROM BED TO CHAIR: A LOT
DRESSING REGULAR LOWER BODY CLOTHING: A LOT
DRESSING REGULAR LOWER BODY CLOTHING: A LOT
HELP NEEDED FOR BATHING: A LOT
DRESSING REGULAR UPPER BODY CLOTHING: A LITTLE
DAILY ACTIVITIY SCORE: 16
DAILY ACTIVITIY SCORE: 17
WALKING IN HOSPITAL ROOM: A LOT
CLIMB 3 TO 5 STEPS WITH RAILING: TOTAL
TOILETING: A LOT
HELP NEEDED FOR BATHING: A LOT
STANDING UP FROM CHAIR USING ARMS: A LOT
TOILETING: A LITTLE
DRESSING REGULAR UPPER BODY CLOTHING: A LITTLE
PERSONAL GROOMING: A LITTLE
CLIMB 3 TO 5 STEPS WITH RAILING: TOTAL
STANDING UP FROM CHAIR USING ARMS: A LOT
HELP NEEDED FOR BATHING: A LOT
PERSONAL GROOMING: A LITTLE
DRESSING REGULAR LOWER BODY CLOTHING: A LOT
PERSONAL GROOMING: A LITTLE
WALKING IN HOSPITAL ROOM: A LOT
TOILETING: A LITTLE
MOVING TO AND FROM BED TO CHAIR: A LOT

## 2025-06-09 ASSESSMENT — PAIN - FUNCTIONAL ASSESSMENT
PAIN_FUNCTIONAL_ASSESSMENT: 0-10

## 2025-06-09 ASSESSMENT — ACTIVITIES OF DAILY LIVING (ADL)
BATHING_ASSISTANCE: MODERATE
ADL_ASSISTANCE: INDEPENDENT
LACK_OF_TRANSPORTATION: NO

## 2025-06-09 ASSESSMENT — PAIN DESCRIPTION - LOCATION: LOCATION: LEG

## 2025-06-09 ASSESSMENT — PAIN DESCRIPTION - ORIENTATION: ORIENTATION: LEFT

## 2025-06-09 ASSESSMENT — PAIN SCALES - GENERAL
PAINLEVEL_OUTOF10: 0 - NO PAIN
PAINLEVEL_OUTOF10: 9

## 2025-06-09 ASSESSMENT — PAIN DESCRIPTION - DESCRIPTORS: DESCRIPTORS: ACHING

## 2025-06-09 NOTE — PROGRESS NOTES
Pharmacy Medication History Review    Elliot Partida is a 74 y.o. male admitted for DVT, recurrent, lower extremity, acute, left. Pharmacy reviewed the patient's gahsf-bx-rmrysndqz medications and allergies for accuracy.    The list below reflectives the updated PTA list. Please review each medication in order reconciliation for additional clarification and justification.  Medications Prior to Admission   Medication Sig Dispense Refill Last Dose/Taking    acetaminophen (Tylenol) 325 mg tablet Take 2 tablets (650 mg) by mouth every 4 hours if needed for mild pain (1 - 3), moderate pain (4 - 6), headaches or fever (temp greater than 38.0 C) (greater than or equal to 38 C).   6/7/2025    gabapentin (Neurontin) 300 mg capsule Take 1 capsule (300 mg) by mouth 3 times a day.   6/7/2025    hydrALAZINE (Apresoline) 25 mg tablet Take 1 tablet (25 mg) by mouth 3 times a day.   6/7/2025    traZODone (Desyrel) 50 mg tablet Take 1 tablet (50 mg) by mouth once daily at bedtime.   6/7/2025 Bedtime    Blood glucose monitoring meter To check blood sugar every morning before breakfast 1 each 0     meclizine (Antivert) 25 mg tablet Take 1 tablet (25 mg) by mouth every 6 hours if needed for dizziness.       ondansetron ODT (Zofran-ODT) 4 mg disintegrating tablet Dissolve 1 tablet (4 mg) in the mouth every 8 hours if needed for nausea or vomiting. 15 tablet 0     oxygen (O2) gas therapy Inhale 2 L/min at 120,000 mL/hr if needed (desaturation at hs).       rivaroxaban (Xarelto DVT-PE Treat 30d Start) 15 mg (42)- 20 mg (9) tablets,dose pack Take 51 tablets (1 Package) by mouth see administration instructions. Loading dose 5/31-6/21/25       sennosides-docusate sodium (Ann-Colace) 8.6-50 mg tablet Take 1 tablet by mouth as needed at bedtime for constipation. (Patient taking differently: Take 1 tablet by mouth 2 times a day as needed for constipation.)       sodium chloride 0.9% piggyback 250 mL with vancomycin 1.25 gram recon soln 1.25  g Infuse 1.25 g at 200 mL/hr over 75 minutes into a venous catheter every 12 hours. 79359 mL 0     traMADoL 25 mg tablet Take 25 mg by mouth every 4 hours if needed (pain).       vancomycin (Xellia) IVPB Infuse 350 mL (1.75 g) at 200 mL/hr over 105 minutes into a venous catheter once every 24 hours for 26 days. 9100 mL 0         The list below reflectives the updated allergy list. Please review each documented allergy for additional clarification and justification.  Allergies  Reviewed by Nikolai Dejesus on 6/8/2025   No Known Allergies         Below are additional concerns with the patient's PTA list.  Medications ADDED:  Xarelto dvt pack (started 5/31/25), on loading dose until 6/21/25  Tramadol prn   Meclizine prn  Medications CHANGED:  Senna to prn   Medications REMOVED:   Potassium     Patient is from a facility ([facility]), med history completed using [facility] MAR in pt's chart.     Leidy Shahid, PharmD  Susie Morrison 3 Pharmacist

## 2025-06-09 NOTE — CARE PLAN
The patient's goals for the shift include relax    The clinical goals for the shift include Pt will remain safe and free from falls      Problem: Pain - Adult  Goal: Verbalizes/displays adequate comfort level or baseline comfort level  Outcome: Progressing     Problem: Safety - Adult  Goal: Free from fall injury  Outcome: Progressing     Problem: Discharge Planning  Goal: Discharge to home or other facility with appropriate resources  Outcome: Progressing     Problem: Chronic Conditions and Co-morbidities  Goal: Patient's chronic conditions and co-morbidity symptoms are monitored and maintained or improved  Outcome: Progressing     Problem: Nutrition  Goal: Nutrient intake appropriate for maintaining nutritional needs  Outcome: Progressing     Problem: Skin  Goal: Decreased wound size/increased tissue granulation at next dressing change  Outcome: Progressing  Goal: Participates in plan/prevention/treatment measures  Outcome: Progressing  Goal: Prevent/manage excess moisture  6/8/2025 2357 by Cliff Almaraz RN  Flowsheets (Taken 6/8/2025 2357)  Prevent/manage excess moisture: Moisturize dry skin  6/8/2025 2357 by Cliff Almaraz RN  Outcome: Progressing  Flowsheets (Taken 6/8/2025 2357)  Prevent/manage excess moisture: Moisturize dry skin  Goal: Prevent/minimize sheer/friction injuries  Outcome: Progressing  Goal: Promote/optimize nutrition  Outcome: Progressing  Goal: Promote skin healing  Outcome: Progressing     Problem: Fall/Injury  Goal: Not fall by end of shift  Outcome: Progressing  Goal: Be free from injury by end of the shift  Outcome: Progressing  Goal: Verbalize understanding of personal risk factors for fall in the hospital  Outcome: Progressing  Goal: Verbalize understanding of risk factor reduction measures to prevent injury from fall in the home  Outcome: Progressing  Goal: Use assistive devices by end of the shift  Outcome: Progressing  Goal: Pace activities to prevent fatigue by end of the  shift  Outcome: Progressing

## 2025-06-09 NOTE — PROGRESS NOTES
Elliot Partida is a 74 y.o. male on day 1 of admission presenting with DVT, recurrent, lower extremity, acute, left.      Subjective   Patient was seen and examined at bedside this morning, stated that his pain was unbearable this morning, not relieved by Tylenol and was started on Oxy IR 5 mg p.o. every 6 hours as needed pain.   Vitals also showed persistently elevated BP, hydralazine increased to 50 mg 3 times daily, amlodipine 2.5 mg daily started.  Hematology has not seen patient yet at time of note.    Objective     Last Recorded Vitals  BP (!) 182/73   Pulse 81   Temp 36.7 °C (98.1 °F)   Resp 16   Wt 90.6 kg (199 lb 11.8 oz)   SpO2 97%   Intake/Output last 3 Shifts:    Intake/Output Summary (Last 24 hours) at 6/9/2025 1614  Last data filed at 6/9/2025 1322  Gross per 24 hour   Intake 667.27 ml   Output 1950 ml   Net -1282.73 ml       Admission Weight  Weight: 90.6 kg (199 lb 11.8 oz) (06/08/25 0523)    Daily Weight  06/08/25 : 90.6 kg (199 lb 11.8 oz)    Image Results  Lower extremity venous duplex right  Narrative: Interpreted By:  Nnamdi Dodson,   STUDY:  [facility] LOWER EXTREMITY VENOUS DUPLEX RIGHT;  6/6/2025 4:45 pm      INDICATION:  Signs/Symptoms:eval dvt , pain swelling.      COMPARISON:  None.      ACCESSION NUMBER(S):  [id number]      ORDERING CLINICIAN:  MIGNON PURI      TECHNIQUE:  Grayscale, color and spectral Doppler sonographic images of the right  lower extremity deep venous system. The left common femoral vein was  imaged for comparison.      FINDINGS:  There is normal compressibility of the right common femoral vein,  saphenous femoral junction, femoral vein and popliteal vein. The  posterior tibial and peroneal veins are suboptimally visualized.  There is normal spontaneous and phasic variation throughout the leg  by spectral doppler.      The left common femoral vein is patent.      OTHER FINDINGS: None.      Impression: Suboptimal visualization of the calf veins. No sonographic  evidence  of acute DVT in the visualized vessels of the right lower extremity.      MACRO:  None      Signed by: Nnamdi Dodson 6/6/2025 4:53 PM  Dictation workstation:   [id number]  Vascular US Upper Extremity Venous Duplex Right  Narrative: Interpreted By:  Nnamdi Dodson,   STUDY:  [facility] US UPPER EXTREMITY VENOUS DUPLEX RIGHT;  6/6/2025 4:43 pm      INDICATION:  Signs/Symptoms:eval dvt  pain.      COMPARISON:  Upper extremity Doppler ultrasound 05/30/2025.      ACCESSION NUMBER(S):  [id number]      ORDERING CLINICIAN:  MIGNON PURI      TECHNIQUE:  Grayscale, color and spectral Doppler sonographic imaging of the  right upper extremity deep venous system.      FINDINGS:  The right cephalic vein is not well visualized. Evaluation of the  brachial vein is also solid limited due to placement of PICC line.  Segmental visualization of the right internal jugular vein,  subclavian vein, axillary vein, basilic vein and brachial vein  demonstrate normal gray scale appearance, compressibility, color flow  and venous waveforms. The radial and ulnar veins are not visualized.      Redemonstration of occlusive thrombus in the left subclavian vein.      Impression: No sonographic evidence of right upper extremity DVT.      Partial visualization of occlusive thrombus in the left subclavian  vein as noted on prior ultrasound. Please refer to separate or of  left upper extremity DVT for additional detail      MACRO:  None      Signed by: Nnamdi Dodson 6/6/2025 4:51 PM  Dictation workstation:   [id number]      Physical Exam  Constitutional: Elderly gentleman, alert active, cooperative not in acute distress  Eyes: PERRLA, clear sclera  ENMT: Moist mucosal membranes, no exudate  Head / Neck: Atraumatic, normocephalic, supple neck, JVP not visualized  Lungs: Patent airways, CTABL  Heart: RRR, S1S2, no murmurs appreciated, palpable pulses in all extremities  GI: Soft, NT, ND, bowel sounds present in all quadrants  MSK: Moves all  extremities freely, no restriction  of ROM, no joint edema  Extremities: Left foot with chronic ulcer wrapped with dressing dry and intact, mild bilateral lower extremities peripheral edema.  Single-lumen PICC line inserted in the right arm, insertion site intact  : No Lama catheter inserted  Breast: Deferred  Neurological: AAO x 3 to person, place and date, facial muscles symmetrical, sensation intact, strength 4/4, no acute focal neurological deficits appreciated  Psychological: Appropriate mood and behavior  Relevant Results                Scheduled medications  Scheduled Medications[1]  Continuous medications  Continuous Medications[2]  PRN medications  PRN Medications[3]                Assessment & Plan  DVT, recurrent, lower extremity, acute, left    73 yo. Male with PMHx of Moderate aortic stenosis, osteomyelitis (on Vanc until 6/18), DM c/b neuropathy, DVT, trigeminal neuralgia, and HTN presented to [facility] as a transfer from [facility] for further evaluation of possible AC treatment failure. Patient had US in February which showed LLE DVT and LUE on 5/30. Repeat scans in June showed DVT present, however, no comment on changes      Lower extremity DVT  LLE and ULE, noted on scans as far back as February  - Patient was prescribed Xarelto, PCP concerned for tx failure  - 6/6 UE duplex showed Partial visualization of occlusive thrombus in the left subclavian  Vein  - 6/5 LLE Duplex showed evidence for acute occlusive DVT within the left deep femoral,  femoral, and popliteal veins. There is no significant change when compared to prior ultrasound from February 2025.  - Patient started on Heparin drip at OSH  - CT PE negative 6/5/2025  - On heparin drip  - Hematology consulted, discussed with on-call service, during initial consultation, evaluation recommendation pending  - Coagulopathy workup initiated  - May need vascular referral awaiting hematology recommendations first     Osteomyelitis of the left foot:  Status post amputation of the fifth digit  - On outpatient vancomycin 1.75 g IV piggyback through 6/18 via PICC line on the right arm  - Wound nurse consulted     Trigeminal neuralgia  - Gabapentin 300 mg 3 times daily     Hypertension: Elevated today  - Hydralazine 25 mg 3 times daily, increased to 50 3 times daily  -Adding low-dose amlodipine 2.5 mg daily, can discontinue when normotensive, and keep hydralazine as previously scheduled     Diabetes: Appears controlled, A1c is 5.8%  - Not on any medication  - Insulin sliding scale in place     Diet  - Diabetic     DVT prophylaxis  - Previously on Xarelto, on hold, heparin drip in place     Disposition: Presenting with DVT in the left lower extremity, transferred from [place] for coagulopathy workup with hematology, discharge pending final recommendation by hematology services.     ADOD in 2 days or up to 4 days if warfarin therapy initiated           Srinivas Gonzalez DO           [1] amLODIPine, 2.5 mg, oral, Daily  gabapentin, 300 mg, oral, TID  heparin, 80 Units/kg, intravenous, Once  hydrALAZINE, 50 mg, oral, TID  insulin lispro, 0-5 Units, subcutaneous, TID AC  polyethylene glycol, 17 g, oral, Daily  vancomycin, 1,500 mg, intravenous, q24h  [2] heparin, 0-4,500 Units/hr, Last Rate: 1,400 Units/hr (06/09/25 1301)  [3] PRN medications: acetaminophen, dextrose, dextrose, glucagon, glucagon, oxyCODONE, sennosides-docusate sodium, vancomycin

## 2025-06-09 NOTE — PROGRESS NOTES
Occupational Therapy    Evaluation    Patient Name: Elliot Partida  MRN: [id number]  Department: [facility] 3  Room: [address] [place] [address]  Today's Date: 6/9/2025  Time Calculation  Start Time: 1435  Stop Time: 1450  Time Calculation (min): 15 min    Assessment  IP OT Assessment  OT Assessment: Pt presents with decreased ability to complete ADLs, IADLs, & transfers safely. Pt would continue to benefit from skilled OT to maximize his functional independence & increase safety.  Prognosis: Fair  Barriers to Discharge Home: Caregiver assistance, Physical needs  Caregiver Assistance: Caregiver assistance needed per identified barriers - however, level of patient's required assistance exceeds assistance available at home  Physical Needs: Stair navigation into home limited by function/safety, Intermittent mobility assistance needed, Intermittent ADL assistance needed  Evaluation/Treatment Tolerance: Treatment limited secondary to agitation  Medical Staff Made Aware: Yes  End of Session Communication: Bedside nurse  End of Session Patient Position: Bed, 3 rail up, Alarm off, not on at start of session  Plan:  Treatment Interventions: ADL retraining, Functional transfer training, Equipment evaluation/education, Compensatory technique education  OT Frequency: 3 times per week  OT Discharge Recommendations: Moderate intensity level of continued care  Equipment Recommended upon Discharge:  (pt owns wheeled walker)  OT Recommended Transfer Status: Minimal assist  OT - OK to Discharge: Yes    Subjective   Current Problem:  1. DVT, recurrent, lower extremity, acute, left          OT Visit Info:  OT Received On: 06/09/25  General Visit Info:  General  Reason for Referral: transfer from [facility] for possible AC treatment failure. Patient had US in February which showed LLE DVT and LUE on 5/30. Repeat scans in June showed DVT present, however, no comment on changes. Patient was started on Heparin at OSH before being transferred at request of  "patients PCP  Past Medical History Relevant to Rehab: Moderate aortic stenosis, osteomyelitis (on Vanc until 6/18), DM c/b neuropathy, DVT, trigeminal neuralgia, and HTN  Family/Caregiver Present: No  Prior to Session Communication: Bedside nurse  Patient Position Received: Bed, 3 rail up, Alarm off, not on at start of session  General Comment: Pt supine upon arrival; pt agreeable to work with OT with mod encouragement & assurance he could get back to watching show; pt demonstrated slight agitation  Precautions:  Medical Precautions: Fall precautions     Date/Time Vitals Session Patient Position Pulse Resp SpO2 BP MAP (mmHg)    06/09/25 14:20:59 --  --  81  16  97 %  182/73  109           Vital Signs Comment: pt denied lightheaded, dizzy, SOB when completing functional mobility; pt noted slight lightheadedness when sup>sit    Pain:  Pain Assessment  Pain Assessment: 0-10  0-10 (Numeric) Pain Score:  (did not report)  Pain Interventions: Repositioned, Ambulation/increased activity, Distraction    Objective   Cognition:  Safety/Judgement: Exceptions to WFL  Insight: Mild (Pt slighltly agitated, requesting multiple times to \"just watch the show\" & for people to stop \"bugging him\")  Impulsive: Mildly  Task Initiation: WFL  Processing Speed: Within funtional limits     Home Living:  Type of Home: House  Lives With: Siblings (Lives with sister who does not work & \"worse off than\" him)  Home Adaptive Equipment: Walker rolling or standard, Cane, Wheelchair-manual  Home Layout: Two level, Able to live on main level with bedroom/bathroom (Attic is second floor, but is used?)  Entrance Stairs-Number of Steps: 5  Bathroom Shower/Tub: Tub/shower unit  Bathroom Toilet: Low-rise  Bathroom Equipment: Grab bars in shower  Home Living Comments: Pt presents from [facility] ([facility] but reports his sister is currently living at his house, but she is not much help   Prior Function:  Level of [place]: Independent with ADLs and " "functional transfers  Receives Help From: Family (Nurse coming to his house every other day to assist in wound care for \"a long time\" & roldan lives nearby & assists in transportation)  ADL Assistance: Independent  Homemaking Assistance: Needs assistance (Reports he has been unable to complete, sister helps somewhat)  Ambulatory Assistance: Independent (with wheeled walker)  Leisure: watching TV  Prior Function Comments: - driving, - falls; pt presents from [facility] ([facility])     ADL:  Eating Assistance: Independent (anticipate)  Grooming Assistance: Minimal (anticipate)  Bathing Assistance: Moderate (anticipate)  UE Dressing Assistance: Minimal (anticipate)  LE Dressing Assistance: Maximal (anticipate)  Toileting Assistance with Device: Moderate (anticipate)  Activity Tolerance:  Endurance: Tolerates 10 - 20 min exercise with multiple rests  Bed Mobility/Transfers: Bed Mobility  Bed Mobility: Yes  Bed Mobility 1  Bed Mobility 1: Supine to sitting  Level of Assistance 1: Close supervision, Minimal verbal cues  Bed Mobility Comments 1: HOB elevated & pt used bed rails & increased time to complete  Bed Mobility 2  Bed Mobility  2: Sitting to supine  Level of Assistance 2: Minimum assistance, Moderate verbal cues  Bed Mobility Comments 2: VC for IV safety, assist required to lift LLE onto bed    Transfers  Transfer: Yes  Transfer 1  Transfer From 1: Bed to  Transfer to 1: Stand  Technique 1: Sit to stand  Transfer Device 1: Walker  Transfer Level of Assistance 1: Minimum assistance  Trials/Comments 1: Pt required 2 attempts for sit>stand near EOB; first attempt cleared 25%, then attempted again requiring MIN A to stand with VC for safe hand placement, walker management, & maintaining upright posture  Transfers 2  Transfer From 2: Stand to  Transfer to 2: Bed  Technique 2: Stand to sit  Transfer Device 2: Walker  Transfer Level of Assistance 2: Maximum verbal cues, Minimum assistance  Trials/Comments 2: Pt required " VC for appropriate body mechanics & walker management    Functional Mobility:  Functional Mobility  Functional Mobility Performed: Yes  Functional Mobility 1  Surface 1: Level tile  Device 1: Rolling walker  Assistance 1: Contact guard, Moderate verbal cues  Comments 1: VC for walker management  Sitting Balance:  Static Sitting Balance  Static Sitting-Balance Support: Feet supported, Bilateral upper extremity supported  Static Sitting-Level of Assistance: Distant supervision  Static Sitting-Comment/Number of Minutes: <2 min  Standing Balance:  Static Standing Balance  Static Standing-Balance Support: Bilateral upper extremity supported (on walker)  Static Standing-Level of Assistance: Contact guard     Vision: Vision - Basic Assessment  Current Vision: Wears glasses only for reading (Pt reports ~1 month ago having blurry & double vision; pt did not seek medical attention & the issue resolved)  Sensation:  Light Touch: No apparent deficits    Perception:  Inattention/Neglect: Appears intact      Hand Function:  Hand Function  Gross Grasp: Functional  Extremities: RUE   RUE : Within Functional Limits (pt agitated, requesting to get back to watching TV, did not assess ROM)  RUE Strength  RUE Overall Strength: Greater than or equal to 3/5 as evidenced by functional mobility, LUE   LUE: Within Functional Limits (pt agitated, requesting to get back to watching TV, did not assess ROM)  LUE Strength  LUE Overall Strength: Greater than or equal to 3/5 as evidenced by functional mobility, RLE   RLE : Within Functional Limits (pt agitated, requesting to get back to watching TV, did not assess ROM or strength), and LLE   LLE : Exceptions to WFL (Pt unable to lift LLE onto bed while seated; LLE wrapped in dressings with wound care supposed to see him later today)    Outcome Measures: [facility] Daily Activity  Putting on and taking off regular lower body clothing: A lot  Bathing (including washing, rinsing, drying): A lot  Putting on  and taking off regular upper body clothing: A little  Toileting, which includes using toilet, bedpan or urinal: A lot  Taking care of personal grooming such as brushing teeth: A little  Eating Meals: None  Daily Activity - Total Score: 16    OT Adult Other Outcome Measures  4AT: negative    Education Documentation  Body Mechanics, taught by FILIBERTO Cruz at 6/9/2025  3:24 PM.  Learner: Patient  Readiness: Acceptance  Method: Explanation, Demonstration  Response: Verbalizes Understanding, Needs Reinforcement  Comment: walker management, body mechanics when completing functional mobility, & safe hand placement for transfers    ADL Training, taught by FILIBERTO Cruz at 6/9/2025  3:24 PM.  Learner: Patient  Readiness: Acceptance  Method: Explanation, Demonstration  Response: Verbalizes Understanding, Needs Reinforcement  Comment: walker management, body mechanics when completing functional mobility, & safe hand placement for transfers    Education Comments  No comments found.      Goals:   Encounter Problems       Encounter Problems (Active)       ADLs       Patient will perform UB and LB bathing with minimal assist  level of assistance and shower chair. (Progressing)       Start:  06/09/25    Expected End:  06/23/25            Patient with complete upper body dressing with stand by assist level of assistance donning and doffing all UE clothes with no adaptive equipment while edge of bed  (Progressing)       Start:  06/09/25    Expected End:  06/23/25            Patient with complete lower body dressing with minimal assist  level of assistance donning and doffing all LE clothes  with PRN adaptive equipment while edge of bed  (Progressing)       Start:  06/09/25    Expected End:  06/23/25            Patient will complete daily grooming tasks with contact guard assist level of assistance and PRN adaptive equipment while standing. (Progressing)       Start:  06/09/25    Expected End:  06/23/25             Patient will complete toileting including hygiene clothing management/hygiene with minimal assist  level of assistance and grab bars. (Progressing)       Start:  06/09/25    Expected End:  06/23/25               BALANCE       Pt will maintain dynamic standing balance during ADL task with stand by assist level of assistance in order to demonstrate decreased risk of falling and improved postural control. (Progressing)       Start:  06/09/25    Expected End:  06/23/25               COGNITION/SAFETY       Patient will score WFL on standardized cognitive assessment with min cues and within reasonable time frame (Progressing)       Start:  06/09/25    Expected End:  06/23/25               MOBILITY       Patient will perform Functional mobility mod  Household distances/Community Distances with modified independent level of assistance and front wheeled walker in order to improve safety and functional mobility. (Progressing)       Start:  06/09/25    Expected End:  06/23/25               TRANSFERS       Patient will complete sit to stand transfer with modified independent level of assistance and front wheeled walker in order to improve safety and prepare for out of bed mobility. (Progressing)       Start:  06/09/25    Expected End:  06/23/25                 Completion of this session, clinical decision making, and documentation performed under the supervision/direction of Kiah SOMMER/TENNILLE

## 2025-06-09 NOTE — CONSULTS
Wound Care Consult     Visit Date: 6/9/2025      Patient Name: Elliot Partida         MRN: [id number]             Reason for Consult: Multiple sites         Assessment:   06/09/25 1500   Wound 05/08/25 Pressure Injury Coccyx   Date First Assessed/Time First Assessed: 05/08/25 1626   Primary Wound Type: Pressure Injury  Location: Coccyx   Present on Admission to [facility]   Wound Image    Site Assessment Dry;Red   Wound Length (cm) 5 cm   Wound Width (cm) 8 cm   Wound Surface Area (cm^2) 31.42 cm^2   Wound Depth (cm) 0.1 cm   Wound Volume (cm^3) 2.094 cm^3   State of Healing Epithelial islands   Drainage Description Serous   Drainage Amount Scant   Dressing Moisture barrier;Silicone border dressing   Dressing Changed New   Dressing Status Clean;Dry   Wound location: Gluteal cleft    Recommendations: BID for gluteal cleft. Keep clean and dry.  Wash with warm wipes as needed and pat dry.  Apply [employer] barrier ointment with each clean up and as needed. Apply a sacral Mepilex border foam to sacrum for protection.  Check under every shift and as needed.  Change as needed and as soiled.         Wound 02/11/25 Pressure Injury Heel Right;Posterior   Date First Assessed/Time First Assessed: 02/11/25 2250   Primary Wound Type: Pressure Injury  Location: Heel  Wound Location Orientation: Right;Posterior   Present on Admission to Healthcare [facility]   Wound Image    Site Assessment Eschar;Dry   Ann-Wound Assessment Calloused;Dry   Pressure Injury Stage U   Wound Length (cm) 1 cm   Wound Width (cm) 1.5 cm   Wound Surface Area (cm^2) 1.18 cm^2   State of Healing Eschar   Drainage Description None   Drainage Amount None   Dressing Other (Comment);ABD;Kerlix/rolled gauze  (Betadine)   Dressing Changed New   Dressing Status Clean;Dry   Wound location: Right heel    Recommendations: DAILY for R heel. Cover wounds with Vashe wound cleanser ([employer] order #[id number]) soaked 4 x 4 cm sterile gauze for 1-5 minutes,  then gently pat dry.  Paint with Betadine pad with ABD and secure with Kerlix gauze.        Wound 02/11/25 Ankle Left;Dorsal   Date First Assessed/Time First Assessed: 02/11/25 2250   Location: Ankle  Wound Location Orientation: Left;Dorsal   Present on Admission to Healthcare Facility [facility]   Wound Image    Wound Length (cm) 9 cm   Wound Width (cm) 3 cm   Wound Surface Area (cm^2) 21.21 cm^2   Wound Depth (cm) 0.1 cm   Wound Volume (cm^3) 1.414 cm^3   Wound Healing % 96   State of Healing Early/partial granulation   Drainage Description Yellow;Serous   Drainage Amount Small   Dressing Other (Comment);Non adherent;ABD;Kerlix/rolled gauze  (Hydrofera Blue Ready)   Dressing Changed New   Dressing Status Clean;Dry   Wound location: Anterior Left ankle  Recommendations: EVERY OTHER DAY for left ankle. Cover wounds with Vashe wound cleanser ([employer] order #[id number]) soaked 4 x 4 cm sterile gauze for 1-5 minutes, then gently pat dry.  Apply 1 layer of adaptic to wound bed, cover with Hydrofera Blue Ready pad with ABD and secure with Kerlix gauze. Elevate heels off the bed surface at all times.       Wound Plan:  Primary provider please review the wound care consult note and pending wound care order. If you agree with orders please file in EMR.      While in bed patient should only be on a fitted sheet, and one EHOB repositioning sheet with appropriate white chux. Please do not use brief while patient is resting in bed. Turn and reposition patient at least every 2 hours.  Elevate heels with EHOB truVue boots off the bed surface at all times.      While inpatient, Secure chat with questions or reconsult wound care if condition worsens or changes. For urgent communications please message the group through Securlyaging at: [facility], Thank you.       Lisa Sena RN, CWON  6/9/2025  5:17 PM

## 2025-06-09 NOTE — CARE PLAN
The patient's goals for the shift include relax    The clinical goals for the shift include Pt will express having better pain control by the end of this shift      Problem: Pain - Adult  Goal: Verbalizes/displays adequate comfort level or baseline comfort level  Outcome: Progressing     Problem: Safety - Adult  Goal: Free from fall injury  Outcome: Progressing     Problem: Discharge Planning  Goal: Discharge to home or other facility with appropriate resources  Outcome: Progressing     Problem: Chronic Conditions and Co-morbidities  Goal: Patient's chronic conditions and co-morbidity symptoms are monitored and maintained or improved  Outcome: Progressing     Problem: Nutrition  Goal: Nutrient intake appropriate for maintaining nutritional needs  Outcome: Progressing     Problem: Skin  Goal: Decreased wound size/increased tissue granulation at next dressing change  Outcome: Progressing  Goal: Participates in plan/prevention/treatment measures  Outcome: Progressing  Goal: Prevent/manage excess moisture  Outcome: Progressing  Flowsheets (Taken 6/9/2025 0929)  Prevent/manage excess moisture:   Cleanse incontinence/protect with barrier cream   Follow provider orders for dressing changes   Moisturize dry skin   Monitor for/manage infection if present   Use wicking fabric (obtain order)  Goal: Prevent/minimize sheer/friction injuries  Outcome: Progressing  Goal: Promote/optimize nutrition  Outcome: Progressing  Goal: Promote skin healing  Outcome: Progressing     Problem: Fall/Injury  Goal: Not fall by end of shift  Outcome: Progressing  Goal: Be free from injury by end of the shift  Outcome: Progressing  Goal: Verbalize understanding of personal risk factors for fall in the hospital  Outcome: Progressing  Goal: Verbalize understanding of risk factor reduction measures to prevent injury from fall in the home  Outcome: Progressing  Goal: Use assistive devices by end of the shift  Outcome: Progressing  Goal: Pace activities  to prevent fatigue by end of the shift  Outcome: Progressing

## 2025-06-09 NOTE — PROGRESS NOTES
Vancomycin Dosing by Pharmacy- FOLLOW UP    Elliot Partida is a 74 y.o. year old male who Pharmacy has been consulted for vancomycin dosing for cellulitis, skin and soft tissue. Based on the patient's indication and renal status this patient is being dosed based on a goal AUC of 400-600.   Of note, pt is to be on vanc thru ; at facility pt was on 1750mg q24h.    Renal function is currently stable.    Current vancomycin dose: 1500 mg given every 24 hours    Estimated vancomycin AUC on current dose: 549 mg/L.hr     Visit Vitals  /68 (BP Location: Left arm, Patient Position: Lying)   Pulse 62   Temp 36.5 °C (97.7 °F) (Temporal)   Resp 18        Lab Results   Component Value Date    CREATININE 0.95 2025    CREATININE 1.00 2025    CREATININE 1.10 2025    CREATININE 1.05 2025        Patient weight is as follows:   Vitals:    25 0523   Weight: 90.6 kg (199 lb 11.8 oz)       Cultures:  No results found for the encounter in last 14 days.       I/O last 3 completed shifts:  In: 755.6 (8.3 mL/kg) [P.O.:540; I.V.:215.6 (2.4 mL/kg)]  Out: 1950 (21.5 mL/kg) [Urine:1950 (0.6 mL/kg/hr)]  Weight: 90.6 kg   I/O during current shift:  I/O this shift:  In: 138.6 [I.V.:138.6]  Out: -     Temp (24hrs), Av.3 °C (97.3 °F), Min:35.8 °C (96.4 °F), Max:36.6 °C (97.9 °F)      Assessment/Plan    Within goal AUC range. Continue current vancomycin regimen.    This dosing regimen is predicted by [employer] to result in the following pharmacokinetic parameters:  Loading dose: N/A  Regimen: 1500 mg IV every 24 hours.  Start time: 20:04 on 2025  Exposure target: AUC24 (range) 400-600 mg/L.hr   [id number]: 549 mg/L.hr  AUC24,ss: 575 mg/L.hr  Probability of AUC24 > 400: 99 %  Ctrough,ss: 16.2 mg/L  Probability of Ctrough,ss > 20: 20 %      The next level will be obtained on  at 0500. May be obtained sooner if clinically indicated.   Will continue to monitor renal function daily while on vancomycin  and order serum creatinine at least every 48 hours if not already ordered.  Follow for continued vancomycin needs, clinical response, and signs/symptoms of toxicity.       Leidy Shahid, PharmD

## 2025-06-09 NOTE — CONSULTS
Reason For Consult  Breakthrough DVT    History Of Present Illness  Elliot Partida is a 74 y.o. male w PMHx moderate aortic stenosis, osteomyelitis, DM c/b neuropathy, DVT, trigeminal neuralgia, HTN who is here for evaluation for DOAC failure. He presented to [facility] on 6/3 from SNF for headache and R shoulder pain, as well as LUE pain associated with a recent diagnosis of DVT, for which he was started on xarelto end of May. Again returned to [facility] on 6/5 after found to have extension of a previous LLE DVT diagnosed in February. Pt was asymptomatic. Transferred to [facility], then accepted to [facility] for evaluation of DOAC failure.     At bedside, pt reports he has been staying at a facility as is unsure of what medications he is taking on a day to day basis. He agrees he is on a blood thinner. Denies a history of clots prior to the first clot in February, and denies history of stroke of heart attack. Denies family history of clotting disorder. Denies personal history of cancer. Mother had an unknown cancer, but nobody else had cancer in the family. He initially states he did a colonoscopy once long ago, but then later relates it was in fact a FIT test. He is uninterested in doing colonoscopies.     HEME HISTORY:  -2/11/25: stopped apixaban  2/11/25: DVT of L superficial femoral vein, started on heparin  2/19/25: DVT US UE without clot, discharged on xarelto  2/25/25:????  5/15/25: DVT US LUE without DVT  5/21/25: CT PE without PE  5/30/25: DVT US LUE with ACUTE partial DVT in L subclavian, axillary, brachial   6/5/25: DVT US of LLE with ACUTE occlusive DVT in L deep femoral, femoral, popliteal, NO change from Feb 2025 ultrasound  6/5/25: CT PE without PE  6/6/25: DVT US of RLE without DVT  6/6/25 17:15: anti-Xa of 0.9  6/6/25 17:28: started on heparin gtt     Past Medical History  He has a past medical history of Acute osteomyelitis of ankle and foot, left (Multi), AMI (acute myocardial infarction)  "(Multi), ASHD (arteriosclerotic heart disease), At risk for falls, Cellulitis, COVID-19, Diabetes mellitus (Multi), DVT (deep vein thrombosis) in pregnancy ([facility]HCC), Fall risk care plan declined, Hypertension, Meningioma (Multi), Myocardial infarction (Multi), Neuritis, Neuropathy, Osteoarthritis, Osteomyelitis, Pleural effusion, PVD (peripheral vascular disease), Sprain of right knee (06/25/2015), Stroke (Multi), 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[facility]-HCC), TIA (transient ischemic attack), Tinea pedis of both feet, Trigeminal neuralgia, and Weakness.    Surgical History  He has a past surgical history that includes Carpal tunnel release (10/10/2014); Eye surgery; FL arthrocentesis ASP INJ joint.; Cardiac catheterization; Iridotomy / iridectomy (Bilateral); Invasive Vascular Procedure (Bilateral, 09/18/2024); Foot ray resection (Left, 09/23/2024); and Foot surgery (Left, 09/27/2024).     Social History  He reports that he has never smoked. He has never been exposed to tobacco smoke. He has never used smokeless tobacco. He reports that he does not drink alcohol and does not use drugs.    Family History  Family History[1]     Allergies  Patient has no known allergies.     Physical Exam  Gen: well-developed elderly man in no acute distress  Eyes: anicteric, bilateral injection, no discharge, pupils equal and reactive  HENT: NCAT, no discharge from orifices, oral mucosa moist, dentition adequate, no masses in neck  Heart: no pitting edema in BL LEs  Lungs: normal effort on RA  Abdo: soft, non-distended  MSK: no deformities of soft tissues or joints  Neuro: Aox4  Skin: warm, dry, and intact throughout  Psych: appropriate mood and affect, no phobias/delusions/hallucinations     Last Recorded Vitals  Blood pressure 173/68, pulse 62, temperature 36.5 °C (97.7 °F), temperature source Temporal, resp. rate 18, height 1.753 m (5' 9.02\"), weight 90.6 kg (199 lb 11.8 oz), SpO2 96%.    Relevant Results  Results for orders placed or performed " during the hospital encounter of 06/08/25 (from the past 24 hours)   Magnesium   Result Value Ref Range    Magnesium 2.14 1.60 - 2.40 mg/dL   Renal Function Panel   Result Value Ref Range    Glucose 142 (H) 74 - 99 mg/dL    Sodium 142 136 - 145 mmol/L    Potassium 3.5 3.5 - 5.3 mmol/L    Chloride 106 98 - 107 mmol/L    Bicarbonate 27 21 - 32 mmol/L    Anion Gap 13 10 - 20 mmol/L    Urea Nitrogen 14 6 - 23 mg/dL    Creatinine 1.00 0.50 - 1.30 mg/dL    eGFR 79 >60 mL/min/1.73m*2    Calcium 9.0 8.6 - 10.6 mg/dL    Phosphorus 3.5 2.5 - 4.9 mg/dL    Albumin 3.3 (L) 3.4 - 5.0 g/dL   CBC and Auto Differential   Result Value Ref Range    WBC 5.5 4.4 - 11.3 x10*3/uL    nRBC 0.0 0.0 - 0.0 /100 WBCs    RBC 3.40 (L) 4.50 - 5.90 x10*6/uL    Hemoglobin 9.0 (L) 13.5 - 17.5 g/dL    Hematocrit 29.4 (L) 41.0 - 52.0 %    MCV 87 80 - 100 fL    MCH 26.5 26.0 - 34.0 pg    MCHC 30.6 (L) 32.0 - 36.0 g/dL    RDW 16.6 (H) 11.5 - 14.5 %    Platelets 205 150 - 450 x10*3/uL    Neutrophils % 56.7 40.0 - 80.0 %    Immature Granulocytes %, Automated 0.4 0.0 - 0.9 %    Lymphocytes % 28.2 13.0 - 44.0 %    Monocytes % 9.6 2.0 - 10.0 %    Eosinophils % 4.7 0.0 - 6.0 %    Basophils % 0.4 0.0 - 2.0 %    Neutrophils Absolute 3.15 1.60 - 5.50 x10*3/uL    Immature Granulocytes Absolute, Automated 0.02 0.00 - 0.50 x10*3/uL    Lymphocytes Absolute 1.56 0.80 - 3.00 x10*3/uL    Monocytes Absolute 0.53 0.05 - 0.80 x10*3/uL    Eosinophils Absolute 0.26 0.00 - 0.40 x10*3/uL    Basophils Absolute 0.02 0.00 - 0.10 x10*3/uL   POCT GLUCOSE   Result Value Ref Range    POCT Glucose 142 (H) 74 - 99 mg/dL   POCT GLUCOSE   Result Value Ref Range    POCT Glucose 128 (H) 74 - 99 mg/dL   POCT GLUCOSE   Result Value Ref Range    POCT Glucose 114 (H) 74 - 99 mg/dL   Basic Metabolic Panel   Result Value Ref Range    Glucose 100 (H) 74 - 99 mg/dL    Sodium 146 (H) 136 - 145 mmol/L    Potassium 3.3 (L) 3.5 - 5.3 mmol/L    Chloride 107 98 - 107 mmol/L    Bicarbonate 31 21 - 32  mmol/L    Anion Gap 11 10 - 20 mmol/L    Urea Nitrogen 12 6 - 23 mg/dL    Creatinine 0.95 0.50 - 1.30 mg/dL    eGFR 85 >60 mL/min/1.73m*2    Calcium 8.8 8.6 - 10.6 mg/dL   Heparin Assay, UFH   Result Value Ref Range    Heparin Unfractionated 0.7 See Comment Below for Therapeutic Ranges IU/mL   POCT GLUCOSE   Result Value Ref Range    POCT Glucose 105 (H) 74 - 99 mg/dL     Lower extremity venous duplex right  Result Date: 6/6/2025  Interpreted By:  Nnamdi Dodson, STUDY: [facility] US LOWER EXTREMITY VENOUS DUPLEX RIGHT;  6/6/2025 4:45 pm   INDICATION: Signs/Symptoms:eval dvt , pain swelling.   COMPARISON: None.   ACCESSION NUMBER(S): [id number]   ORDERING CLINICIAN: MIGNON PURI   TECHNIQUE: Grayscale, color and spectral Doppler sonographic images of the right lower extremity deep venous system. The left common femoral vein was imaged for comparison.   FINDINGS: There is normal compressibility of the right common femoral vein, saphenous femoral junction, femoral vein and popliteal vein. The posterior tibial and peroneal veins are suboptimally visualized. There is normal spontaneous and phasic variation throughout the leg by spectral doppler.   The left common femoral vein is patent.   OTHER FINDINGS: None.       Suboptimal visualization of the calf veins. No sonographic evidence of acute DVT in the visualized vessels of the right lower extremity.   MACRO: None   Signed by: Nnamdi Dodson 6/6/2025 4:53 PM Dictation workstation:   [id number]    Vascular US Upper Extremity Venous Duplex Right  Result Date: 6/6/2025  Interpreted By:  Nnamdi Dodson, STUDY: [facility] US UPPER EXTREMITY VENOUS DUPLEX RIGHT;  6/6/2025 4:43 pm   INDICATION: Signs/Symptoms:eval dvt  pain.   COMPARISON: Upper extremity Doppler ultrasound 05/30/2025.   ACCESSION NUMBER(S): [id number]   ORDERING CLINICIAN: MIGNON PURI   TECHNIQUE: Grayscale, color and spectral Doppler sonographic imaging of the right upper extremity deep venous system.   FINDINGS: The  right cephalic vein is not well visualized. Evaluation of the brachial vein is also solid limited due to placement of PICC line. Segmental visualization of the right internal jugular vein, subclavian vein, axillary vein, basilic vein and brachial vein demonstrate normal gray scale appearance, compressibility, color flow and venous waveforms. The radial and ulnar veins are not visualized.   Redemonstration of occlusive thrombus in the left subclavian vein.       No sonographic evidence of right upper extremity DVT.   Partial visualization of occlusive thrombus in the left subclavian vein as noted on prior ultrasound. Please refer to separate or of left upper extremity DVT for additional detail   MACRO: None   Signed by: Nnamdi Dodson 6/6/2025 4:51 PM Dictation workstation:   [id number]    CT angio chest for pulmonary embolism  Result Date: 6/5/2025  Interpreted By:  Timmy Castro, STUDY: CT ANGIO CHEST FOR PULMONARY EMBOLISM;  6/5/2025 3:00 pm   INDICATION: Signs/Symptoms:rule out PE.     COMPARISON: CT chest with contrast 21 May 2025   ACCESSION NUMBER(S): [id number]   ORDERING CLINICIAN: ASAF CORONA   TECHNIQUE: Pulmonary arterial phase CT chest after the uneventful administration of 75 mL IV contrast (Omnipaque 350).   Three dimensional maximum intensity projection (3-D MIPs) image/s were created on a separate dedicated workstation, reviewed and saved   FINDINGS: CARDIOVASCULAR:   Acute pulmonary embolism: Negative through the  lobar (second order) branch level. Substantial sized branches from segmental (third order) branch and distally cannot be evaluated Acute right heart strain: Negative. No CT evidence of acute right heart strain Cardiac thrombus:  Negative; no obvious right heart or other cardiac thrombus is seen Pulmonary arteries ectasia:  Unchanged borderline to mild   Heart size:  Borderline but unchanged Pericardial effusion:  Negative   Thoracic aortic aneurysm:  Negative Aortic dissection:   Negative   Heart failure change:  Negative.  No sign of interstitial or alveolar edema. Other:  n/a   NONVASCULAR MEDIASTINUM: Esophagus:  Grossly normal by CT Mediastinal Mass:  Negative Hiatal hernia:  None Other:  n/a   LYMPH NODES: No thoracic adenopathy   LUNGS / AIRSPACES / AIRWAYS:   LARGE AIRWAYS Filling defect: Negative Wall thickening: Negative Bronchiectasis: Negative Other: N/A   AIRSPACES Fibrosis: Negative Emphysema: Negative Consolidation: Negative Ground glass airspace disease: Negative Edema: Mild bibasilar Nodule / Mass: Negative Other: Motion artifact   PLEURA: Effusion:  Right pleural effusion has decreased in size, now trace quantity, previously at least small if not moderate. Left effusion has perhaps marginally decreased in size, still small Pneumothorax: Both sides negative Other:  n/a   CHEST WALL: Soft tissues of the chest wall are unremarkable   SKELETON: No acute or contributory abnormality   UPPER ABDOMEN: Included subdiaphragmatic structures have no acute or contributory abnormality       MILD BIBASILAR INTERSTITIAL EDEMA   THE RIGHT PLEURAL EFFUSION HAS DECREASED IN SIZE SINCE 21 MAY 2025   LEFT PLEURAL EFFUSION HAS MARGINALLY DECREASED IN SIZE AS WELL   NO ACUTE PULMONARY EMBOLISM THROUGH THE LOBAR BRANCH LEVEL. SEGMENTAL AND SUBSEGMENTAL LEVELS OF THE PULMONARY ARTERY CANNOT BE EVALUATED   NO AORTIC DISSECTION OR OTHER ACUTE THORACIC AORTIC FINDINGS   NO AIRSPACE CONSOLIDATION, GROUND-GLASS AIRSPACE DISEASE OR ANY OTHER SIGN OF ACTIVE INFECTION   NO PERICARDIAL EFFUSION   NO PNEUMOTHORAX   MACRO: None   Signed by: Timmy Castro 6/5/2025 3:21 PM Dictation workstation:   [id number]    Lower extremity venous duplex left  Result Date: 6/5/2025  STUDY: Left Lower Extremity Venous Doppler Ultrasound; 6/5/2025 12:59 PM INDICATION: Positive DVT on multiple ultrasound scan done in routine facility. Currently on a Eliquis. COMPARISON: US GUILHERME 02/11/2025, 09/12/2024. ACCESSION NUMBER(S):  [id number] ORDERING CLINICIAN: ASAF CORONA TECHNIQUE:  Real-time grayscale, color, and spectral doppler ultrasound imaging of the left lower extremity veins was performed. FINDINGS: There is acute occlusive DVT demonstrated within the left deep femoral, femoral, and popliteal veins. The left external iliac and common femoral veins demonstrated normal compressibility, normal phasic venous flow and normal response to augmentation.  The visualized deep calf veins are patent.  The contralateral common femoral vein is free of thrombosis.    Evidence for acute occlusive DVT within the left deep femoral, femoral, and popliteal veins. There is no significant change when compared to prior ultrasound from February 2025. Signed by Sherice Mejia MD    ECG 12 lead  Result Date: 6/5/2025  Sinus rhythm with 1st degree AV block Left axis deviation Nonspecific intraventricular block Minimal voltage criteria for LVH, may be normal variant ( [place] product ) Cannot rule out Septal infarct (cited on or before 09-MAY-2025) Abnormal ECG When compared with ECG of 21-MAY-2025 16:33, No significant change was found Confirmed by Long Min ([id number]) on 6/5/2025 12:09:34 PM    XR chest 1 view  Result Date: 6/3/2025  Interpreted By:  Pepe Huang, STUDY: XR CHEST 1 VIEW  6/3/2025 2:50 pm   INDICATION: Signs/Symptoms:Malaise and fatigue   COMPARISON: 05/12/2025   ACCESSION NUMBER(S): [id number]   ORDERING CLINICIAN: SHAI BREAUX   TECHNIQUE: A single AP portable radiograph of the chest was obtained.   FINDINGS: Multiple cardiac monitoring leads are seen over the chest.   No focal infiltrate, pleural effusion or pneumothorax is identified. The cardiac silhouette is within normal limits for size.       No focal infiltrate or pneumothorax is identified.   MACRO: None.   Signed by: Pepe Huang 6/3/2025 2:59 PM Dictation workstation:   [id number]    XR shoulder right 2+ views  Result Date: 6/3/2025  Interpreted By:  Pepe Huang, STUDY: XR  SHOULDER RIGHT 2+ VIEWS 6/3/2025 2:50 pm   INDICATION: Signs/Symptoms:Right shoulder pain   COMPARISON: None.   ACCESSION NUMBER(S): [id number]   ORDERING CLINICIAN: ELMO OLIVAS   TECHNIQUE: 3 views of the right shoulder including AP , axillary and scapular Y-views were obtained.   FINDINGS: There is no radiographic evidence of acute fracture or dislocation identified.  Mild hypertrophic degenerative changes are seen in the right shoulder.       1. No evidence of acute fracture or dislocation. 2. Degenerative changes, as described above.   MACRO: None.   Signed by: Pepe Huang 6/3/2025 2:59 PM Dictation workstation:   [id number]    CT head wo IV contrast  Result Date: 6/3/2025  Interpreted By:  Julien Romeo, STUDY: CT HEAD WO IV CONTRAST;  6/3/2025 2:40 pm   INDICATION: Signs/Symptoms:Headache at the base of the skull, recently started on Xarelto.     COMPARISON: 05/12/2025.   ACCESSION NUMBER(S): [id number]   ORDERING CLINICIAN: ELMO OLIVAS   TECHNIQUE: Contiguous unenhanced axial images were obtained through the brain.   FINDINGS: INTRACRANIAL: Mild generalized atrophy is again seen.  Nonspecific irregular low-attenuation changes throughout the periventricular and subcortical white matter are similar to prior, most likely related to chronic microvascular disease. There is a stable small remote lacunar infarct of the right basal ganglia and internal capsule anterior limb. No acute intracranial bleed, midline shift, or mass effect is seen. Gray-white differentiation is maintained. No extra-axial fluid collection or hydrocephalus. Irregular atherosclerotic calcifications again seen in the internal carotid artery and vertebral artery segments   Bones are intact.   EXTRACRANIAL: Paranasal sinus mild peripheral mucosal thickening is most prominent in the ethmoid air cells. No paranasal sinus air-fluid level. Mastoid air cells are clear.       Age-related/chronic changes similar to prior. No acute  intracranial process.       MACRO: None.   Signed by: Julien Romeo 6/3/2025 2:46 PM Dictation workstation:   [id number]    ECG 12 Lead  Result Date: 6/2/2025  Sinus rhythm with 1st degree AV block Left axis deviation Nonspecific intraventricular block Possible Anterolateral infarct (cited on or before 09-MAY-2025) Abnormal ECG When compared with ECG of 09-MAY-2025 18:47, (unconfirmed) Serial changes of Anterior infarct Present Confirmed by Berta Rooney ([id number]) on 6/2/2025 1:55:04 PM Also confirmed by Berta Rooney ([id number])  on 6/2/2025 1:57:02 PM    ECG 12 lead  Result Date: 6/2/2025  Sinus rhythm with 1st degree AV block Left anterior fascicular block Minimal voltage criteria for LVH, may be normal variant ( [place] product ) Anterior infarct , age undetermined Abnormal ECG When compared with ECG of 13-FEB-2025 09:37, No significant change was found Confirmed by Berta Rooney ([id number]) on 6/2/2025 1:54:58 PM Also confirmed by Berta Rooney ([id number])  on 6/2/2025 1:56:55 PM    Vascular US upper extremity venous duplex left  Result Date: 5/30/2025  STUDY: Left upper extremity venous ultrasound; Completed Time: 5/30/2025 13:35 INDICATION: LUE swelling.  Recent PICC line placement. COMPARISON: None available. ACCESSION NUMBER(S): [id number] ORDERING CLINICIAN: RAJINDER CONNER TECHNIQUE: Ultrasound of the left upper extremity veins.  Multiple images were obtained. FINDINGS: There is acute partially occlusive DVT demonstrated within the left subclavian, axillary and brachial veins.  Superficial thrombus is visualized within the basilic vein, surrounding the PICC line. The left internal jugular vein demonstrated normal compressibility, normal phasic venous flow, and normal response to augmentation.  There is no evidence for echogenic thrombi.  The cephalic vein is patent.  The radial and ulnar veins are not evaluated.    Evidence for acute partially occlusive DVT within the left  subclavian, axillary and brachial veins. Superficial thrombus within the basilic vein, surrounding the PICC line. Signed by Trent Sher MD    ECG 12 lead  Result Date: 5/24/2025  Sinus rhythm with 1st degree AV block Left axis deviation Nonspecific intraventricular block Minimal voltage criteria for LVH, may be normal variant ( [place] product ) Cannot rule out Septal infarct (cited on or before 09-MAY-2025) Abnormal ECG When compared with ECG of 12-MAY-2025 09:32, (unconfirmed) Serial changes of Septal infarct Present See ED provider note for full interpretation and clinical correlation Confirmed by Flower Shaikh ([id number]) on 5/24/2025 11:30:22 AM    CT angio chest for pulmonary embolism  Result Date: 5/21/2025  Interpreted By:  Ajit Romo, STUDY: CT ANGIO CHEST FOR PULMONARY EMBOLISM;  5/21/2025 5:52 pm   INDICATION: Signs/Symptoms:COVID-19 infection, chest pain.   COMPARISON: Chest CT 09/14/2024.   ACCESSION NUMBER(S): [id number]   ORDERING CLINICIAN: ADDI ALBERTO   TECHNIQUE: Helical data acquisition of the chest was obtained after administration of intravenous contrast as part of pulmonary CT angiography protocol.   Axial contiguous images were reformatted in coronal and sagittal planes. Axial and coronal MIP images were created and reviewed.   FINDINGS: POTENTIAL LIMITATIONS OF THE STUDY: Motion and mixing artifact which limits evaluation of the distal branch vessels.   HEART AND VESSELS: No discrete filling defects within the main pulmonary artery or its branches.   Main pulmonary trunk is mildly enlarged measuring 3.2 cm in caliber raising possibility punctate hypertension similar to prior.   The thoracic aorta is of normal course and caliber. There is a left-sided PICC line terminating in the superior vena cava.   Mild-to-moderate coronary artery calcifications are seen.The study is not optimized for evaluation of coronary arteries.   Mild left-sided cardiomegaly.   No evidence of pericardial  effusion.   MEDIASTINUM AND ROLO, LOWER NECK AND AXILLA: The visualized thyroid gland is within normal limits.   No evidence of thoracic lymphadenopathy by CT criteria.   Esophagus appears within normal limits as seen.   LUNGS AND AIRWAYS: The trachea and central airways are patent. No endobronchial lesion.   There are moderate bilateral pleural effusions with associated subsegmental consolidations favored to represent compression atelectasis.   UPPER ABDOMEN: There is hepatomegaly and hepatic steatosis.   CHEST WALL AND OSSEOUS STRUCTURES: There are no suspicious osseous lesions. The visualized osseous structures are intact.       1.  No evidence of pulmonary emboli to the segmental level. Limited evaluation of distal segmental and subsegmental branches due to mixing artifact. Mildly enlarged main pulmonary trunk measuring 3.2 cm in caliber raising possibility of pulmonary arterial hypertension. Mild left-sided cardiomegaly. 2. Moderate-sized bilateral pleural effusions with associated subsegmental consolidations favored to represent compression atelectasis. Additional chronic findings as described above.     Signed by: Ajit Romo 5/21/2025 6:10 PM Dictation workstation:   [id number]    Vascular US upper extremity venous duplex left  Result Date: 5/15/2025  Interpreted By:  Jose Chase, STUDY: [facility] US UPPER EXTREMITY VENOUS DUPLEX LEFT; 5/15/2025 12:11 pm   INDICATION: Signs/Symptoms:Swelling  after PICC line placement 3 days ago.   COMPARISON: None   ACCESSION NUMBER(S): [id number]   ORDERING CLINICIAN: BERNIE MEDRANO   TECHNIQUE: Black and white as well as color-flow duplex images were obtained along with Doppler wave analysis of the deep venous system of the upper extremity. The internal jugular vein, subclavian vein as well as the axillary and brachial vein of the upper extremity were evaluated. The basilic and cephalic veins were also imaged.   FINDINGS: Examination demonstrates normal  compressibility and flow. PICC line is present within the basilic vein.       No evidence for deep vein thrombosis by this exam.   MACRO: none   Signed by: Jose Chase 5/15/2025 1:36 PM Dictation workstation:   [id number]    XR chest 1 view  Addendum Date: 5/12/2025  Interpreted By:  Júnior Sena, ADDENDUM: Left-sided PICC line is noted and projects to the level of the superior vena cava without pneumothorax.   Signed by: Júnior Sena 5/12/2025 5:41 PM   -------- ORIGINAL REPORT -------- Dictation workstation:   [id number]    Result Date: 5/12/2025  Interpreted By:  Júnior Sena, STUDY: XR CHEST 1 VIEW   INDICATION: Signs/Symptoms:PICC placement.   COMPARISON: February 17, 2025   ACCESSION NUMBER(S): [id number]   ORDERING CLINICIAN: BERNIE MEDRANO   FINDINGS: Cardiomegaly with new perihilar and basilar edema with patchy multifocal airspace opacities right worse than left.   Small bilateral pleural effusions.   No evidence of pneumothorax.         Cardiomegaly with new perihilar and basilar edema with patchy multifocal airspace opacities right worse than left.   Small bilateral pleural effusions.   Signed by: Júnior Sena 5/12/2025 5:28 PM Dictation workstation:   [id number]    CT brain attack head wo IV contrast  Result Date: 5/12/2025  Interpreted By:  Enedina Umana, STUDY: CT BRAIN ATTACK HEAD WO IV CONTRAST;  5/12/2025 9:37 am   INDICATION: Signs/Symptoms:stroke alert.     COMPARISON: 12/23/2024   ACCESSION NUMBER(S): [id number]   ORDERING CLINICIAN: KATIE HILTON   TECHNIQUE: Unenhanced CT images of the head were obtained.   FINDINGS: Patient is rotated.  The ventricles, cisterns and sulci are enlarged, consistent with diffuse volume loss. There are areas of nonspecific white matter hypodensity, which are probably age related or microvascular in nature. Low-density lacunar type infarct in the right basal ganglia. These findings are similar to the previous exam. There is no acute intracranial  hemorrhage, mass effect or midline shift. No extraaxial fluid collection.   No focal calvarial lesion.   Bilateral ethmoid, left sphenoid and left maxillary sinus mucosal thickening.       No acute intracranial hemorrhage or mass-effect.   Multifocal sinus mucosal thickening.   MACRO: Enedina Umana discussed the significance and urgency of this critical finding by secure chat with  KATIE HILTON on 5/12/2025 at 9:47 am. (**-RCF-**) Findings:  See findings.     Signed by: Enedina Umana 5/12/2025 9:48 AM Dictation workstation:   [id number]     Assessment/Plan     74 y.o. male w PMHx moderate aortic stenosis, osteomyelitis, DM c/b neuropathy, DVT, trigeminal neuralgia, HTN who is here for evaluation for DOAC failure.     Despite discrepancies in the method of ultrasound imaging between the radiology and vascular lab teams, there seems to be a clear increase in the LLE clot burden between February and now, along with evidence of a new clot in the LUE. Both imaging studies done recently show acute features of thrombus, indicative of evolving/extending clot burden. Given that pt had detectable anti-Xa activity on admission (heparin assay 0.9), he was compliant at least at that time, and one is led to suspect that he was compliant with medication given his residence in a SNF.     It is worth consideration that, during his many recent hospitalizations, he may have undergone periods off anticoagulation which would place him at higher risk of thrombosis. Further, he had COVID-19, which is a known thrombotic risk. Further, he has been prescribed carbamazepine at a previous hospitalization, which is a known CYP inducer that would interfere with rivaroxaban levels. Further, there is some suspicion for undiagnosed malignancy given the unintentional weight loss and lack of up to date cancer screening. Given the complexity of the case, we would prefer to consider this a case of xarelto failure.     #DOAC  failure    Recommendations:  -please send SPEP and serum free light chains  -discharge on lovenox 1mg/kg BID  -consider workup for esophageal wall thickening noted on CT imaging, including to rule out esophageal cancer  -in outpatient setting, update age-appropriate cancer screening including colonoscopy and PSA    Thank you for the consult. We will sign off.     I spent 60 minutes in the professional and overall care of this patient.    Thomas Bueno MD PhD  Hem/onc fellow  [id number]         [1]   Family History  Problem Relation Name Age of Onset    Heart disease Father      Colon cancer Other      Heart attack Other      Diabetes Other      Hypertension Other

## 2025-06-09 NOTE — PROGRESS NOTES
06/09/25 0953   Discharge Planning   Living Arrangements Other (Comment)  (Pt is from [facility]. Prior to SNF, pt was living at home with his sister Eryn.)   Support Systems Family members   Assistance Needed Pt will return to SNF at discharge.   Type of Residence Skilled nursing facility   Who is requesting discharge planning? Patient   Home or Post Acute Services Post acute facilities (Rehab/SNF/etc)   Type of Post Acute Facility Services Skilled nursing   Expected Discharge Disposition SNF   Does the patient need discharge transport arranged? Yes   RoundTrip coordination needed? Yes   Has discharge transport been arranged? No   Financial Resource Strain   How hard is it for you to pay for the very basics like food, housing, medical care, and heating? Not hard   Housing Stability   In the last 12 months, was there a time when you were not able to pay the mortgage or rent on time? N   At any time in the past 12 months, were you homeless or living in a shelter (including now)? N   Transportation Needs   In the past 12 months, has lack of transportation kept you from medical appointments or from getting medications? no   In the past 12 months, has lack of transportation kept you from meetings, work, or from getting things needed for daily living? No   Patient Choice   Patient / Family choosing to utilize agency / facility established prior to hospitalization Yes   Intensity of Service   Intensity of Service 0-30 min     Assessment Note:  Met with pt and introduced myself as care coordinator and member of the Care Transitions team for discharge planning.   Pt was at [facility] prior to admission for therapy and IV atbs.  Pt would like to return when medically ready.  A referral was sent to the SNF via [facility] for return.  Prior to SNF stay, pt lived with his sister.  Family was available to assist with ADLs, shopping, etc.  Pt's niece provided transport to drs appts.  Pt's address,  phone number and contact information was verified. PT and OT evaluations were ordered.  Pt does not have any questions/concerns at this time.     Previous Home Care: Pt was recently active with [facility] for wound care nurse.  DME: Wheeled walker, cane, crutches, wheelchair, and grab bars in the shower.  Pharmacy: Pt uses a pharmacy located in [place] (could not recall the name).  Falls: Denies.  PCP:  Pt denies having a regular PCP. Pt states I have a surgeon Dr. Robles.    Transitional Care Coordination Progress Note:  Patient was discussed during interdisciplinary rounds.  Team members present: medical team, and TCC.  Plan per medical team: Pt with left LE DVT, hematology was consulted. Await recommendations for anticoagulation.  Pt also has a left foot osteomyelitis, plan to continue IV atbs.  Payer: Medicare A/B  Status: Inpatient.  Discharge disposition: Pt would like to return to [facility] at discharge, referral was sent via [facility].  Potential barriers: None.  ADOD: 6/10.  Care coordinator will continue to follow for discharge planning needs.     Awa Persaud MSN, RN-BC  Transitional Care Coordinator (TCC)  [phone]

## 2025-06-10 VITALS
HEART RATE: 67 BPM | HEIGHT: 69 IN | SYSTOLIC BLOOD PRESSURE: 141 MMHG | DIASTOLIC BLOOD PRESSURE: 59 MMHG | BODY MASS INDEX: 29.58 KG/M2 | OXYGEN SATURATION: 94 % | TEMPERATURE: 98.1 F | WEIGHT: 199.74 LBS | RESPIRATION RATE: 16 BRPM

## 2025-06-10 DIAGNOSIS — M86.9 OSTEOMYELITIS OF LEFT FOOT, UNSPECIFIED TYPE (MULTI): ICD-10-CM

## 2025-06-10 PROBLEM — I82.402: Status: RESOLVED | Noted: 2025-06-08 | Resolved: 2025-06-10

## 2025-06-10 LAB
ANION GAP SERPL CALC-SCNC: 12 MMOL/L (ref 10–20)
BUN SERPL-MCNC: 14 MG/DL (ref 6–23)
CALCIUM SERPL-MCNC: 8.6 MG/DL (ref 8.6–10.6)
CHLORIDE SERPL-SCNC: 107 MMOL/L (ref 98–107)
CO2 SERPL-SCNC: 29 MMOL/L (ref 21–32)
CREAT SERPL-MCNC: 0.95 MG/DL (ref 0.5–1.3)
EGFRCR SERPLBLD CKD-EPI 2021: 85 ML/MIN/1.73M*2
ERYTHROCYTE [DISTWIDTH] IN BLOOD BY AUTOMATED COUNT: 16.9 % (ref 11.5–14.5)
GLUCOSE BLD MANUAL STRIP-MCNC: 104 MG/DL (ref 74–99)
GLUCOSE BLD MANUAL STRIP-MCNC: 130 MG/DL (ref 74–99)
GLUCOSE BLD MANUAL STRIP-MCNC: 133 MG/DL (ref 74–99)
GLUCOSE SERPL-MCNC: 112 MG/DL (ref 74–99)
HCT VFR BLD AUTO: 27.7 % (ref 41–52)
HGB BLD-MCNC: 8.6 G/DL (ref 13.5–17.5)
MCH RBC QN AUTO: 26.2 PG (ref 26–34)
MCHC RBC AUTO-ENTMCNC: 31 G/DL (ref 32–36)
MCV RBC AUTO: 85 FL (ref 80–100)
NRBC BLD-RTO: 0 /100 WBCS (ref 0–0)
PLATELET # BLD AUTO: 206 X10*3/UL (ref 150–450)
POTASSIUM SERPL-SCNC: 3.7 MMOL/L (ref 3.5–5.3)
PROT SERPL-MCNC: 5.7 G/DL (ref 6.4–8.2)
RBC # BLD AUTO: 3.28 X10*6/UL (ref 4.5–5.9)
SODIUM SERPL-SCNC: 144 MMOL/L (ref 136–145)
UFH PPP CHRO-ACNC: 0.4 IU/ML (ref ?–1.1)
WBC # BLD AUTO: 5.8 X10*3/UL (ref 4.4–11.3)

## 2025-06-10 PROCEDURE — 80048 BASIC METABOLIC PNL TOTAL CA: CPT | Performed by: INTERNAL MEDICINE

## 2025-06-10 PROCEDURE — 2500000001 HC RX 250 WO HCPCS SELF ADMINISTERED DRUGS (ALT 637 FOR MEDICARE OP)

## 2025-06-10 PROCEDURE — 86334 IMMUNOFIX E-PHORESIS SERUM: CPT | Performed by: INTERNAL MEDICINE

## 2025-06-10 PROCEDURE — 82947 ASSAY GLUCOSE BLOOD QUANT: CPT

## 2025-06-10 PROCEDURE — 2500000004 HC RX 250 GENERAL PHARMACY W/ HCPCS (ALT 636 FOR OP/ED)

## 2025-06-10 PROCEDURE — 2500000004 HC RX 250 GENERAL PHARMACY W/ HCPCS (ALT 636 FOR OP/ED): Performed by: INTERNAL MEDICINE

## 2025-06-10 PROCEDURE — 99239 HOSP IP/OBS DSCHRG MGMT >30: CPT | Performed by: INTERNAL MEDICINE

## 2025-06-10 PROCEDURE — 85520 HEPARIN ASSAY: CPT

## 2025-06-10 PROCEDURE — 84155 ASSAY OF PROTEIN SERUM: CPT | Performed by: INTERNAL MEDICINE

## 2025-06-10 PROCEDURE — 2500000001 HC RX 250 WO HCPCS SELF ADMINISTERED DRUGS (ALT 637 FOR MEDICARE OP): Performed by: INTERNAL MEDICINE

## 2025-06-10 PROCEDURE — 83521 IG LIGHT CHAINS FREE EACH: CPT | Performed by: INTERNAL MEDICINE

## 2025-06-10 PROCEDURE — 85027 COMPLETE CBC AUTOMATED: CPT

## 2025-06-10 RX ORDER — ENOXAPARIN SODIUM 100 MG/ML
1 INJECTION SUBCUTANEOUS EVERY 12 HOURS
Status: DISCONTINUED | OUTPATIENT
Start: 2025-06-10 | End: 2025-06-10 | Stop reason: HOSPADM

## 2025-06-10 RX ORDER — ENOXAPARIN SODIUM 100 MG/ML
90 INJECTION SUBCUTANEOUS EVERY 12 HOURS
Start: 2025-06-10

## 2025-06-10 RX ORDER — OXYCODONE HYDROCHLORIDE 5 MG/1
5 TABLET ORAL EVERY 6 HOURS PRN
Qty: 12 TABLET | Refills: 0 | Status: SHIPPED | OUTPATIENT
Start: 2025-06-10 | End: 2025-06-13

## 2025-06-10 RX ORDER — HYDRALAZINE HYDROCHLORIDE 50 MG/1
50 TABLET, FILM COATED ORAL 3 TIMES DAILY
Start: 2025-06-10 | End: 2025-07-10

## 2025-06-10 RX ORDER — AMLODIPINE BESYLATE 2.5 MG/1
2.5 TABLET ORAL DAILY
Start: 2025-06-11 | End: 2025-07-11

## 2025-06-10 RX ADMIN — HYDRALAZINE HYDROCHLORIDE 50 MG: 50 TABLET ORAL at 15:34

## 2025-06-10 RX ADMIN — GABAPENTIN 300 MG: 300 CAPSULE ORAL at 15:34

## 2025-06-10 RX ADMIN — AMLODIPINE BESYLATE 2.5 MG: 5 TABLET ORAL at 08:49

## 2025-06-10 RX ADMIN — ENOXAPARIN SODIUM 90 MG: 100 INJECTION SUBCUTANEOUS at 16:42

## 2025-06-10 RX ADMIN — HEPARIN SODIUM 1400 UNITS/HR: 10000 INJECTION, SOLUTION INTRAVENOUS at 07:55

## 2025-06-10 RX ADMIN — GABAPENTIN 300 MG: 300 CAPSULE ORAL at 08:49

## 2025-06-10 RX ADMIN — HYDRALAZINE HYDROCHLORIDE 50 MG: 50 TABLET ORAL at 08:49

## 2025-06-10 ASSESSMENT — PAIN SCALES - GENERAL
PAINLEVEL_OUTOF10: 0 - NO PAIN
PAINLEVEL_OUTOF10: 0 - NO PAIN

## 2025-06-10 NOTE — PROGRESS NOTES
06/10/25 1359   Discharge Planning   Expected Discharge Disposition SNF  (Return to [facility] (phone number for nursing report is [phone]).)   Has discharge transport been arranged? Yes  ([facility] ([phone]) via stretcher.)   What day is the transport expected? 06/10/25   What time is the transport expected? 1988     Transitional Care Coordinator Progress Note: Discharge  Pt is medically ready for discharge back to SNF. Pt will discharge on subcutaneous Lovenox. Final gold form and AVS were sent via [employer]. Blue transfer slip was delivered to the .  Pt, pt's sister Eryn ([phone]), bedside RN and provider are aware of the discharge plans. No other discharge needs identified at this time.     Awa Persaud MSN, RN-BC  Transitional Care Coordinator (TCC)  [phone]

## 2025-06-10 NOTE — CARE PLAN
The patient's goals for the shift include relax    The clinical goals for the shift include Pt will remain safe and brandon from falls      Problem: Pain - Adult  Goal: Verbalizes/displays adequate comfort level or baseline comfort level  Outcome: Progressing     Problem: Safety - Adult  Goal: Free from fall injury  Outcome: Progressing     Problem: Discharge Planning  Goal: Discharge to home or other facility with appropriate resources  Outcome: Progressing     Problem: Chronic Conditions and Co-morbidities  Goal: Patient's chronic conditions and co-morbidity symptoms are monitored and maintained or improved  Outcome: Progressing     Problem: Nutrition  Goal: Nutrient intake appropriate for maintaining nutritional needs  Outcome: Progressing     Problem: Skin  Goal: Decreased wound size/increased tissue granulation at next dressing change  Outcome: Progressing  Goal: Participates in plan/prevention/treatment measures  Outcome: Progressing  Goal: Prevent/manage excess moisture  Outcome: Progressing  Goal: Prevent/minimize sheer/friction injuries  Outcome: Progressing  Goal: Promote/optimize nutrition  Outcome: Progressing  Goal: Promote skin healing  Outcome: Progressing     Problem: Fall/Injury  Goal: Not fall by end of shift  Outcome: Progressing  Goal: Be free from injury by end of the shift  Outcome: Progressing  Goal: Verbalize understanding of personal risk factors for fall in the hospital  Outcome: Progressing  Goal: Verbalize understanding of risk factor reduction measures to prevent injury from fall in the home  Outcome: Progressing  Goal: Use assistive devices by end of the shift  Outcome: Progressing  Goal: Pace activities to prevent fatigue by end of the shift  Outcome: Progressing

## 2025-06-10 NOTE — DISCHARGE SUMMARY
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   oxyCODONE 5 mg immediate release tablet; Commonly known as: Roxicodone;   Take 1 tablet (5 mg) by mouth every 6 hours if needed for severe pain (7 -   10) for up to 3 days.     CHANGE how you take these medications     hydrALAZINE 50 mg tablet; Commonly known as: Apresoline; Take 1 tablet   (50 mg) by mouth 3 times a day.; What changed: medication strength, how   much to take     CONTINUE taking these medications     acetaminophen 325 mg tablet; Commonly known as: Tylenol; Take 2 tablets   (650 mg) by mouth every 4 hours if needed for mild pain (1 - 3), moderate   pain (4 - 6), headaches or fever (temp greater than 38.0 C) (greater than   or equal to 38 C).   Blood glucose monitoring meter; To check blood sugar every morning   before breakfast   gabapentin 300 mg capsule; Commonly known as: Neurontin   meclizine 25 mg tablet; Commonly known as: Antivert   ondansetron ODT 4 mg disintegrating tablet; Commonly known as:   Zofran-ODT; Dissolve 1 tablet (4 mg) in the mouth every 8 hours if needed   for nausea or vomiting.   oxygen gas therapy; Commonly known as: O2; Inhale 2 L/min at 120,000   mL/hr if needed (desaturation at hs).   sennosides-docusate sodium 8.6-50 mg tablet; Commonly known as:   Ann-Colace; Take 1 tablet by mouth as needed at bedtime for constipation.   traMADoL 25 mg tablet   traZODone 50 mg tablet; Commonly known as: Desyrel   vancomycin IVPB; Commonly known  as: Xellia; Infuse 350 mL (1.75 g) at   200 mL/hr over 105 minutes into a venous catheter once every 24 hours for   26 days.     STOP taking these medications     sodium chloride 0.9% piggyback 250 mL with vancomycin 1.25 gram recon   soln 1.25 g   Xarelto DVT-PE Treat 30d Start 15 mg (42)- 20 mg (9) tablets,dose pack;   Generic drug: rivaroxaban       Test Results Pending At Discharge  Pending Labs       Order Current Status    Immunoglobulin free LT chains blood Collected (06/10/25 1239)    Protein, Total Collected (06/10/25 1240)    Serum Protein Electrophoresis + Immunofixation Collected (06/10/25 1240)    Serum Protein Electrophoresis + Immunofixation Collected (06/10/25 1240)            Hospital Course  73 yo. Male with PMHx of Moderate aortic stenosis, osteomyelitis (on Vanc until 6/18), DM c/b neuropathy, DVT, trigeminal neuralgia, and HTN presented to [facility] as a transfer from [facility] for further evaluation of possible AC treatment failure. Patient had US in February which showed LLE DVT and LUE on 5/30. Repeat scans in June showed DVT .  On admission in the regular nursing floor patient was started on IV heparin drip.  Hematology/oncology was consulted and recommended switch from p.o. Xarelto to subcutaneous Lovenox.  I called and discussed this with patient's niece on the phone verbalized understanding of this plan of care.  Patient was seen by PT OT who recommended continuing extended-care facility.  He was discharged stable condition to extended-care facility for further management.      The discharge process took about 35 minutes.      Pertinent Physical Exam At Time of Discharge  Physical Exam  Vitals:    06/09/25 2011   BP: 157/71   Pulse: 77   Resp: 17   Temp: 36.7 °C (98.1 °F)   SpO2: 94%     Constitutional: Elderly gentleman, alert active, cooperative not in acute distress  Eyes: PERRLA, clear sclera  ENMT: Moist mucosal membranes, no exudate  Head / Neck: Atraumatic, normocephalic, supple neck,  JVP not visualized  Lungs: Patent airways, CTABL  Heart: RRR, S1S2, no murmurs appreciated, palpable pulses in all extremities  GI: Soft, NT, ND, bowel sounds present in all quadrants  MSK: Moves all extremities freely, no restriction  of ROM, no joint edema  Extremities: Left foot with chronic ulcer wrapped with dressing dry and intact, mild bilateral lower extremities peripheral edema.  Single-lumen PICC line inserted in the right arm, insertion site intact  : No Lama catheter inserted  Breast: Deferred  Neurological: AAO x 3 to person, place and date, facial muscles symmetrical, sensation intact, strength 4/4, no acute focal neurological deficits appreciated  Psychological: Appropriate mood and behavior  Outpatient Follow-Up  No future appointments.      Lázaro Navarro MD

## 2025-06-10 NOTE — PROGRESS NOTES
Physical Therapy                 Therapy Communication Note    Patient Name: Elliot Partida  MRN: [id number]  Department: [place] [address]  Room: [address] [place], [place] [address]  Today's Date: 6/10/2025     Discipline: Physical Therapy    Missed Visit: PT Missed Visit: Yes     Missed Visit Reason: Missed Visit Reason: Other (Comment) (@1630 per chart review, pt to discharge back to SNF this date.  If pt does remain inpatient, will likely attempt eval next date pt's available and willing to participate.)    Missed Time: Attempt      06/10/25 at 4:32 PM - Audrey Paniagua, PT

## 2025-06-10 NOTE — PROGRESS NOTES
Pharmacy Transitions of Care Note    Pharmacy verified the cost of the following medications at discharge:  Medication: Enoxaparin 1mg/kg BID  Cost: per 30d: $226.63 for 54 ml (90mg)    Per billing details, high cost is due to patient's co-insurance.     Please contact me with any questions.     Thank you,    Leidy Shahid, PharmD, BCPS  [facility]ie [facility] 3 Pharmacist    6/10/2025

## 2025-06-10 NOTE — PROGRESS NOTES
Elliot Partida is a 74 y.o. male on day 2 of admission presenting with DVT, recurrent, lower extremity, acute, left.      Subjective   Patient was seen and examined.  Pain is subsided today according to patient.  Blood pressure is now better controlled.  Seen by hematologist who recommended discharge on subcutaneous Lovenox.  Called and discussed with patient's niece over the phone about the plan of care and she verbalized understanding.  Questions answered.  Awaiting authorization for placement in extended-care facility.    Objective     Last Recorded Vitals  /71   Pulse 77   Temp 36.7 °C (98.1 °F)   Resp 17   Wt 90.6 kg (199 lb 11.8 oz)   SpO2 94%   Intake/Output last 3 Shifts:    Intake/Output Summary (Last 24 hours) at 6/10/2025 1155  Last data filed at 6/10/2025 0700  Gross per 24 hour   Intake 1008.84 ml   Output 1300 ml   Net -291.16 ml       Admission Weight  Weight: 90.6 kg (199 lb 11.8 oz) (06/08/25 0523)    Daily Weight  06/08/25 : 90.6 kg (199 lb 11.8 oz)    Image Results  ECG 12 lead  Sinus rhythm with 1st degree AV block  Left axis deviation  Nonspecific intraventricular block  Minimal voltage criteria for LVH, may be normal variant ( Adel product )  Cannot rule out Anterior infarct (cited on or before 09-MAY-2025)  Abnormal ECG  When compared with ECG of 03-JUN-2025 14:26,  No significant change was found      Physical Exam  Constitutional: Elderly gentleman, alert active, cooperative not in acute distress  Eyes: PERRLA, clear sclera  ENMT: Moist mucosal membranes, no exudate  Head / Neck: Atraumatic, normocephalic, supple neck, JVP not visualized  Lungs: Patent airways, CTABL  Heart: RRR, S1S2, no murmurs appreciated, palpable pulses in all extremities  GI: Soft, NT, ND, bowel sounds present in all quadrants  MSK: Moves all extremities freely, no restriction  of ROM, no joint edema  Extremities: Left foot with chronic ulcer wrapped with dressing dry and intact, mild bilateral lower  extremities peripheral edema.  Single-lumen PICC line inserted in the right arm, insertion site intact  : No Lama catheter inserted  Breast: Deferred  Neurological: AAO x 3 to person, place and date, facial muscles symmetrical, sensation intact, strength 4/4, no acute focal neurological deficits appreciated  Psychological: Appropriate mood and behavior  Relevant Results                Scheduled medications  Scheduled Medications[1]  Continuous medications  Continuous Medications[2]  PRN medications  PRN Medications[3]                Assessment & Plan  DVT, recurrent, lower extremity, acute, left    73 yo. Male with PMHx of Moderate aortic stenosis, osteomyelitis (on Vanc until 6/18), DM c/b neuropathy, DVT, trigeminal neuralgia, and HTN presented to [facility] as a transfer from [facility] for further evaluation of possible AC treatment failure. Patient had US in February which showed LLE DVT and LUE on 5/30. Repeat scans in June showed DVT present, however, no comment on changes      Lower extremity DVT  LLE and ULE, noted on scans as far back as February  - Patient was prescribed Xarelto, PCP concerned for tx failure  - 6/6 UE duplex showed Partial visualization of occlusive thrombus in the left subclavian  Vein  - 6/5 LLE Duplex showed evidence for acute occlusive DVT within the left deep femoral,  femoral, and popliteal veins. There is no significant change when compared to prior ultrasound from February 2025.  - Patient started on Heparin drip at OSH  - CT PE negative 6/5/2025  - On heparin drip  - Hematology consulted, discussed with on-call service, during initial consultation, evaluation recommendation pending  - Coagulopathy workup initiated  - May need vascular referral awaiting hematology recommendations first     Osteomyelitis of the left foot: Status post amputation of the fifth digit  - On outpatient vancomycin 1.75 g IV piggyback through 6/18 via PICC line on the right arm  - Wound nurse consulted      Trigeminal neuralgia  - Gabapentin 300 mg 3 times daily     Hypertension: Elevated today  - Hydralazine 25 mg 3 times daily, increased to 50 3 times daily  -Adding low-dose amlodipine 2.5 mg daily, can discontinue when normotensive, and keep hydralazine as previously scheduled     Diabetes: Appears controlled, A1c is 5.8%  - Not on any medication  - Insulin sliding scale in place     Diet  - Diabetic     DVT prophylaxis  - Previously on Xarelto, on hold, heparin drip in place     Disposition: Presenting with DVT in the left lower extremity, transferred from [place] for coagulopathy workup with hematology, discharge pending final recommendation by hematology services.         ADOD pending authorization for skilled nursing facility.             Lázaro Navarro MD           [1] amLODIPine, 2.5 mg, oral, Daily  gabapentin, 300 mg, oral, TID  heparin, 80 Units/kg, intravenous, Once  hydrALAZINE, 50 mg, oral, TID  insulin lispro, 0-5 Units, subcutaneous, TID AC  polyethylene glycol, 17 g, oral, Daily  vancomycin, 1,500 mg, intravenous, q24h     [2] heparin, 0-4,500 Units/hr, Last Rate: 1,400 Units/hr (06/10/25 0755)     [3] PRN medications: acetaminophen, dextrose, dextrose, glucagon, glucagon, oxyCODONE, sennosides-docusate sodium, vancomycin

## 2025-06-10 NOTE — CONSULTS
"Nutrition Assessment      Reason for Assessment: Admission nursing screening    Elliot Partida is a 74 y.o. male on day 1 of admission presenting with DVT, recurrent, lower extremity, acute, left.     Nutrition History:  Food and Nutrient History: Met with patient who was sleeping, but easy to wake up. Patient kept his eyes closed during entire interview. Responses were mumbled, so somewhat difficult to understand. Patient stated \"I ate when I needed to eat\" when asked how many meals he typically consumed at his facility. When asked about percentages of meals consumed overall, patient replied \"whatever I wanted to eat\". States he drank  Raf-Aid and milk. \"They ([facility]NF) tried to give me a protein drink, but I wouldn't drink it.\" Patient declined Oral Nutritional Supplements this admission as well.   Vitamin/Herbal Supplement Use: None per home med list.       Anthropometrics:  Height: 175.3 cm (5' 9.02\")   Weight: 90.6 kg (199 lb 11.8 oz)   BMI (Calculated): 29.48  IBW/kg (Dietitian Calculated): 72.7 kg  Percent of IBW: 124.6 %                      Weight History:   Wt Readings from Last 20 Encounters:   06/08/25 90.6 kg (199 lb 11.8 oz)   06/06/25 86.6 kg (191 lb)   06/05/25 86.6 kg (191 lb)   06/03/25 89.5 kg (197 lb 5 oz)   05/31/25 89.4 kg (197 lb)   05/30/25 89.4 kg (197 lb)   05/23/25 91.6 kg (201 lb 15.1 oz)   05/16/25 94.4 kg (208 lb 1.8 oz)   05/05/25 89.4 kg (197 lb)   04/01/25 89.4 kg (197 lb)   02/27/25 89.4 kg (197 lb)   02/11/25 89.1 kg (196 lb 6.9 oz)   01/17/25 89.8 kg (198 lb)   12/23/24 91.2 kg (201 lb 1 oz)   11/05/24 93.4 kg (206 lb)   09/25/24 102 kg (225 lb 8.5 oz)   09/12/24 107 kg (236 lb 1.8 oz)   01/11/23 109 kg (239 lb 15.9 oz)   11/30/22 109 kg (239 lb 15.9 oz)   07/20/21 103 kg (228 lb)       Weight Change %:  Weight History / % Weight Change: 19.1% weight loss 9 months (Sept 2024-June 2025)  Significant Weight Loss: Yes  Per patient, \"I lost a lot of weight over the months.\"    Nutrition " "Focused Physical Exam Findings:    Subcutaneous Fat Loss:   Defer Subcutaneous Fat Loss Assessment: Defer all  Defer All Reason: Patient declined.  Muscle Wasting:  Defer Muscle Wasting Assessment: Defer all  Defer All Reason: Patient declined.  Edema:  Edema:  (Non-pitting L UE and L LE.)  Physical Findings:  Hair: Negative  Skin: Positive (Coccyx/gluteal cleft P.I., R heel unstageable P.I. and L ankle wound.)  Digestive System Findings:  (Denied N/V/C/D)  Mouth Findings:  (Denied chewing/swallowing difficulty.)    Nutrition Significant Labs:  CBC Trend:   Results from last 7 days   Lab Units 06/10/25  0628 06/08/25  1148 06/06/25  1539 06/05/25  1153   WBC AUTO x10*3/uL 5.8 5.5 4.9 5.4   RBC AUTO x10*6/uL 3.28* 3.40* 3.41* 3.72*   HEMOGLOBIN g/dL 8.6* 9.0* 9.0* 9.5*   HEMATOCRIT % 27.7* 29.4* 28.6* 31.3*   MCV fL 85 87 84 84   PLATELETS AUTO x10*3/uL 206 205 191 215    , BMP Trend:   Results from last 7 days   Lab Units 06/10/25  0628 06/09/25  0644 06/08/25  1148 06/06/25  1539   GLUCOSE mg/dL 112* 100* 142* 171*   CALCIUM mg/dL 8.6 8.8 9.0 8.6   SODIUM mmol/L 144 146* 142 143   POTASSIUM mmol/L 3.7 3.3* 3.5 3.3*   CO2 mmol/L 29 31 27 32   CHLORIDE mmol/L 107 107 106 105   BUN mg/dL 14 12 14 18   CREATININE mg/dL 0.95 0.95 1.00 1.10    , A1C:  Lab Results   Component Value Date    HGBA1C 5.8 (H) 06/06/2025   , BG POCT trend:   Results from last 7 days   Lab Units 06/10/25  0748 06/09/25 2011 06/09/25  1606 06/09/25  1137 06/09/25  0713   POCT GLUCOSE mg/dL 104* 187* 141* 116* 105*    , Renal Lab Trend:   Results from last 7 days   Lab Units 06/10/25  0628 06/09/25  0644 06/08/25  1148 06/06/25  1539   POTASSIUM mmol/L 3.7 3.3* 3.5 3.3*   PHOSPHORUS mg/dL  --   --  3.5  --    SODIUM mmol/L 144 146* 142 143   MAGNESIUM mg/dL  --   --  2.14  --    EGFR mL/min/1.73m*2 85 85 79 71   BUN mg/dL 14 12 14 18   CREATININE mg/dL 0.95 0.95 1.00 1.10    , Vit D: No results found for: \"VITD25\" , Vit B12: No results found for: " "\"OIRBMBII76\"     Nutrition Specific Medications:    Scheduled medications  amLODIPine, 2.5 mg, oral, Daily  gabapentin, 300 mg, oral, TID  heparin, 80 Units/kg, intravenous, Once  hydrALAZINE, 50 mg, oral, TID  insulin lispro, 0-5 Units, subcutaneous, TID AC  polyethylene glycol, 17 g, oral, Daily  vancomycin, 1,500 mg, intravenous, q24h    Continuous medications  heparin, 0-4,500 Units/hr, Last Rate: 1,400 Units/hr (06/10/25 0755)    PRN medications  PRN medications: acetaminophen, dextrose, dextrose, glucagon, glucagon, oxyCODONE, sennosides-docusate sodium, vancomycin    I/O:    ;      Dietary Orders (From admission, onward)       Start     Ordered    06/08/25 0616  May Participate in Room Service  ( ROOM SERVICE MAY PARTICIPATE)  Once        Question:  .  Answer:  Yes    06/08/25 0615 06/08/25 0601  Adult diet Consistent Carb; CCD 60 gm/meal  Diet effective now        Question Answer Comment   Diet type Consistent Carb    Carb diet selection: CCD 60 gm/meal        06/08/25 0601                     Estimated Needs:   Total Energy Estimated Needs in 24 hours (kCal): 2200 kCal  Method for Estimating Needs: IBW / 30 kcal  Total Protein Estimated Needs in 24 Hours (g):  ()  Method for Estimating 24 Hour Protein Needs: IBW / 1.3-1.5 g              Nutrition Diagnosis   Malnutrition Diagnosis  Patient has Malnutrition Diagnosis:  (Unable to diagnose as patient declined physical exam and choose not to provide diet hx. Visually, does not appear to have any facial muscle wasting or fat loss.)    Nutrition Diagnosis  Patient has Nutrition Diagnosis: Yes  Diagnosis Status (1): New  Nutrition Diagnosis 1: Increased nutrient needs  Related to (1): increased metablic demand  As Evidenced by (1): R heel unstageable P.I., coccyx/gluteal cleft P.I. and L ankle wound  Additional Nutrition Diagnosis: Diagnosis 2  Nutrition Diagnosis 2: Unintended weight loss  Related to (2): suspect decreased appetite/PO intake  As " Evidenced by (2): 19.1% wt loss x 9 months.       Nutrition Interventions/Recommendations   Nutrition prescription for oral nutrition    Nutrition Recommendations:  Diabetic Diet increased to 75 gm carb/meal to more closely meet estimated needs.   Order daily 220 mg Zinc Sulfate x 14 days  Order daily MVI   Order 500 mg Vitamin C daily.    Nutrition Interventions/Goals:   Vitamin and Mineral Supplement Therapy:   -Vitamin C supplement therapy  - Multivitamin multimineral supplement therapy   - Zinc supplement therapy      Education Documentation  Briefly discussed importance of adequate protein for wound healing.           Nutrition Monitoring and Evaluation   Food/Nutrient Related History Monitoring  Monitoring and Evaluation Plan: Intake / amount of food  Intake / Amount of food: Consumes at least 75% or more of meals/snacks/supplements    Anthropometric Measurements  Monitoring and Evaluation Plan: Body weight  Body Weight: Body weight - Maintain stable weight         Physical Exam Findings  Monitoring and Evaluation Plan: Skin  Skin Finding: Impaired wound healing - Improved wound healing    Goal Status: New goal(s) identified    Time Spent (min): 45 minutes

## 2025-06-10 NOTE — CARE PLAN
The patient's goals for the shift include relax    The clinical goals for the shift include Pt will remain HDS throughout shift      Problem: Pain - Adult  Goal: Verbalizes/displays adequate comfort level or baseline comfort level  Outcome: Progressing     Problem: Safety - Adult  Goal: Free from fall injury  Outcome: Progressing     Problem: Discharge Planning  Goal: Discharge to home or other facility with appropriate resources  Outcome: Progressing     Problem: Chronic Conditions and Co-morbidities  Goal: Patient's chronic conditions and co-morbidity symptoms are monitored and maintained or improved  Outcome: Progressing     Problem: Nutrition  Goal: Nutrient intake appropriate for maintaining nutritional needs  Outcome: Progressing     Problem: Skin  Goal: Decreased wound size/increased tissue granulation at next dressing change  Outcome: Progressing  Goal: Participates in plan/prevention/treatment measures  Outcome: Progressing  Goal: Prevent/manage excess moisture  Outcome: Progressing  Goal: Prevent/minimize sheer/friction injuries  Outcome: Progressing  Flowsheets (Taken 6/10/2025 0763)  Prevent/minimize sheer/friction injuries:   Complete micro-shifts as needed if patient unable. Adjust patient position to relieve pressure points, not a full turn   HOB 30 degrees or less   Increase activity/out of bed for meals   Turn/reposition every 2 hours/use positioning/transfer devices   Use pull sheet   Utilize specialty bed per algorithm  Goal: Promote/optimize nutrition  Outcome: Progressing  Goal: Promote skin healing  Outcome: Progressing     Problem: Fall/Injury  Goal: Not fall by end of shift  Outcome: Progressing  Goal: Be free from injury by end of the shift  Outcome: Progressing  Goal: Verbalize understanding of personal risk factors for fall in the hospital  Outcome: Progressing  Goal: Verbalize understanding of risk factor reduction measures to prevent injury from fall in the home  Outcome:  Progressing  Goal: Use assistive devices by end of the shift  Outcome: Progressing  Goal: Pace activities to prevent fatigue by end of the shift  Outcome: Progressing

## 2025-06-11 ENCOUNTER — NURSING HOME VISIT (OUTPATIENT)
Dept: POST ACUTE CARE | Facility: EXTERNAL LOCATION | Age: 75
End: 2025-06-11
Payer: MEDICARE

## 2025-06-11 VITALS
RESPIRATION RATE: 16 BRPM | HEART RATE: 80 BPM | SYSTOLIC BLOOD PRESSURE: 124 MMHG | DIASTOLIC BLOOD PRESSURE: 84 MMHG | TEMPERATURE: 98 F

## 2025-06-11 DIAGNOSIS — M19.90 OSTEOARTHRITIS, UNSPECIFIED OSTEOARTHRITIS TYPE, UNSPECIFIED SITE: ICD-10-CM

## 2025-06-11 DIAGNOSIS — Z91.81 AT RISK FOR FALLING: ICD-10-CM

## 2025-06-11 DIAGNOSIS — I25.10 ASHD (ARTERIOSCLEROTIC HEART DISEASE): ICD-10-CM

## 2025-06-11 DIAGNOSIS — Z87.09 HISTORY OF PLEURAL EFFUSION: ICD-10-CM

## 2025-06-11 DIAGNOSIS — I82.499 DEEP VEIN THROMBOSIS (DVT) OF OTHER VEIN OF LOWER EXTREMITY, UNSPECIFIED CHRONICITY, UNSPECIFIED LATERALITY: Primary | ICD-10-CM

## 2025-06-11 DIAGNOSIS — M86.9 OSTEOMYELITIS OF LEFT FOOT, UNSPECIFIED TYPE (MULTI): ICD-10-CM

## 2025-06-11 DIAGNOSIS — D32.9 MENINGIOMA (MULTI): ICD-10-CM

## 2025-06-11 DIAGNOSIS — R53.1 WEAKNESS: ICD-10-CM

## 2025-06-11 DIAGNOSIS — G62.9 NEUROPATHY: ICD-10-CM

## 2025-06-11 DIAGNOSIS — E11.9 TYPE 2 DIABETES MELLITUS WITHOUT COMPLICATION, UNSPECIFIED WHETHER LONG TERM INSULIN USE: ICD-10-CM

## 2025-06-11 DIAGNOSIS — I10 HYPERTENSION, UNSPECIFIED TYPE: ICD-10-CM

## 2025-06-11 LAB
KAPPA LC SERPL-MCNC: 3.32 MG/DL (ref 0.33–1.94)
KAPPA LC/LAMBDA SER: 1.02 {RATIO} (ref 0.26–1.65)
LAMBDA LC SERPL-MCNC: 3.26 MG/DL (ref 0.57–2.63)

## 2025-06-11 PROCEDURE — 99306 1ST NF CARE HIGH MDM 50: CPT | Performed by: INTERNAL MEDICINE

## 2025-06-11 ASSESSMENT — ENCOUNTER SYMPTOMS
CHILLS: 0
FEVER: 0

## 2025-06-11 NOTE — LETTER
Patient: Elliot Partida  : 1950    Encounter Date: 2025    PLACE OF SERVICE:  [facility].    This is a readmission.    Subjective  Patient ID: Elliot Partida is a 74 y.o. male who presents for Follow-up.    Mr. Elliot Partida is a 74-year-old male with history of diabetes with osteomyelitis to his left foot.  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  He is now on Lovenox.    Review of Systems   Constitutional:  Negative for chills and fever.   Cardiovascular:  Negative for chest pain.   All other systems reviewed and are negative.    Objective  /80   Pulse 84   Temp 36.6 °C (97.9 °F)   Resp 16     Physical Exam  Vitals reviewed.   Constitutional:       Comments: This is a well developed, well-nourished male, lying in bed, appearing weak.   HENT:      Right Ear: Tympanic membrane, ear canal and external ear normal.      Left Ear: Tympanic membrane, ear canal and external ear normal.   Eyes:      General: No scleral icterus.     Pupils: Pupils are equal, round, and reactive to light.   Neck:      Vascular: No carotid bruit.   Cardiovascular:      Heart sounds: Normal heart sounds, S1 normal and S2 normal. No murmur heard.     No friction rub.   Pulmonary:      Effort: Pulmonary effort is normal.      Breath sounds: Normal breath sounds and air entry.   Abdominal:      Palpations: There is no hepatomegaly, splenomegaly or mass.   Musculoskeletal:         General: No swelling or deformity. Normal range of motion.      Cervical back: Neck supple.      Right lower leg: No edema.      Left lower leg: No edema.   Lymphadenopathy:      Cervical: No cervical adenopathy.      Upper Body:      Right upper body: No axillary adenopathy.      Left upper body: No axillary adenopathy.      Lower Body: No right inguinal adenopathy. No left inguinal adenopathy.   Skin:     Comments: The patient's left foot is currently dressed.  There is no foul odor.  There is no color drainage.    Neurological:      Mental Status: He is oriented to person, place, and time. He is lethargic.      Cranial Nerves: Cranial nerves 2-12 are intact. No cranial nerve deficit.      Sensory: No sensory deficit.      Motor: Motor function is intact. No weakness.      Gait: Gait is intact.      Deep Tendon Reflexes: Reflexes normal.   Psychiatric:         Mood and Affect: Mood normal. Mood is not anxious or depressed. Affect is not angry.         Behavior: Behavior is not agitated.         Thought Content: Thought content normal.         Judgment: Judgment normal.     LAB WORK: Laboratory studies reviewed.    Assessment/Plan  Problem List Items Addressed This Visit           ICD-10-CM       Hematology and Neoplasia    Meningioma (Multi) D32.9       Infectious Diseases    Osteomyelitis of left foot, unspecified type (Multi) M86.9       Musculoskeletal and Injuries    Osteoarthritis M19.90       Symptoms and Signs    Weakness R53.1     Other Visit Diagnoses         Codes      Deep vein thrombosis (DVT) of other vein of lower extremity, unspecified chronicity, unspecified laterality    -  Primary I82.499      Type 2 diabetes mellitus without complication, unspecified whether long term insulin use     E11.9      Neuropathy     G62.9      Hypertension, unspecified type     I10      ASHD (arteriosclerotic heart disease)     I25.10      History of pleural effusion     Z87.09      At risk for falling     Z91.81        1. DVT, begin Lovenox.  2. Diabetes, on insulin.  3. Osteomyelitis left foot, continue wound care.  Follow with Podiatry.  4. Neuropathy, on gabapentin.  5. Hypertension, med controlled.  6. Osteoarthritis, on Tylenol.  7. ASHD, on aspirin.  8. Pleural effusion, follow with Pulmonology.  9. Meningioma, follow with Neurosurgery.  10. Weakness, on PT/OT.  11. Fall risk, on fall precautions.    Scribe Attestation  By signing my name below, IJohnna, Scribcecilia attest that this documentation has been prepared under  the direction and in the presence of Marium Singh MD.     All medical record entries made by the scribe were personally dictated by me I have reviewed the chart and agree the record accurately reflects my personal performance of his history physical examination and management      Electronically Signed By: Marium Singh MD   6/16/25 11:54 PM

## 2025-06-11 NOTE — PROGRESS NOTES
[facility] Hospitalist Progress Note    Date of admission: 10/6/2024    Subjective: The patient is complaining of mild abdominal pain.  He does tell me that he is lost around 30 pounds in the last year.    Medications Prior to Admission   Medication Sig Dispense Refill    glipiZIDE (GLUCOTROL) 10 MG tablet Take 10 mg by mouth in the morning and 10 mg in the evening. Take before meals.      losartan (COZAAR) 50 MG tablet Take 50 mg by mouth daily.      Omega-3 Fatty Acids (FISH OIL OMEGA-3 PO) Take 1 capsule by mouth daily.      metformin (GLUCOPHAGE) 1000 MG tablet Take 1 tablet by mouth in the morning and 1 tablet in the evening. Take with meals. 60 tablet 0    lactulose (CHRONULAC) 10 GM/15ML solution Take 15 mLs by mouth in the morning and 15 mLs at noon and 15 mLs in the evening. 946 mL 0    trazodone (DESYREL) 150 MG tablet Take 1 tablet by mouth nightly. 30 tablet 0    Multiple Vitamin (MULTIVITAMIN ADULT PO) Take 1 tablet by mouth daily.        Current Facility-Administered Medications   Medication Dose Route Frequency Provider Last Rate Last Admin    dextrose 50 % injection 25 g  25 g Intravenous PRN Marti Rodriguez MD        dextrose 50 % injection 12.5 g  12.5 g Intravenous PRN Marti Rodriguez MD        glucagon (GLUCAGEN) injection 1 mg  1 mg Intramuscular PRN Marti Rodriguez MD        dextrose (GLUTOSE) 40 % gel 15 g  15 g Oral PRN Marti Rodriguez MD        dextrose (GLUTOSE) 40 % gel 30 g  30 g Oral PRN Marti Rodriguez MD        sodium chloride 0.9 % flush bag 25 mL  25 mL Intravenous PRN Marti Rodriguez MD        sodium chloride 0.9 % injection 2 mL  2 mL Intracatheter 2 times per day Marti Rodriguez MD   2 mL at 10/06/24 2024    insulin glargine (LANTUS) injection 10 Units  10 Units Subcutaneous Nightly Marti Rodriguez MD   10 Units at 10/06/24 2024    insulin lispro (ADMELOG,HumaLOG) - Correction Dose   Subcutaneous TID WC Marti Rodriguez MD   1 Units at 10/07/24 0853    polyethylene glycol (MIRALAX) packet 17 g  17  PLACE OF SERVICE:  [facility]    This is a subsequent visit.    Subjective   Patient ID: Elliot Partida is a 74 y.o. male who presents for Follow-up.    Mr. Elliot Partida is a 74-year-old diabetic male with history of osteomyelitis to his left foot.  He continues to undergo wound care and is followed by Podiatry.    Review of Systems   Constitutional:  Negative for chills and fever.   Cardiovascular:  Negative for chest pain.   All other systems reviewed and are negative.    Objective   /84   Pulse 80   Temp 36.7 °C (98 °F)   Resp 16     Physical Exam  Vitals reviewed.   Constitutional:       Comments: This is a well developed, well-nourished male, lying in bed, appearing weak.   HENT:      Right Ear: Tympanic membrane, ear canal and external ear normal.      Left Ear: Tympanic membrane, ear canal and external ear normal.   Eyes:      General: No scleral icterus.     Pupils: Pupils are equal, round, and reactive to light.   Neck:      Vascular: No carotid bruit.   Cardiovascular:      Heart sounds: Normal heart sounds, S1 normal and S2 normal. No murmur heard.     No friction rub.   Pulmonary:      Effort: Pulmonary effort is normal.      Breath sounds: Normal breath sounds and air entry.   Abdominal:      Palpations: There is no hepatomegaly, splenomegaly or mass.   Musculoskeletal:         General: No swelling or deformity. Normal range of motion.      Cervical back: Neck supple.      Right lower leg: No edema.      Left lower leg: No edema.      Comments: There is dressing applied to the patient's left lower extremity.  There is no foul odor.  There is no color drainage.   Lymphadenopathy:      Cervical: No cervical adenopathy.      Upper Body:      Right upper body: No axillary adenopathy.      Left upper body: No axillary adenopathy.      Lower Body: No right inguinal adenopathy. No left inguinal adenopathy.   Neurological:      Mental Status: He is oriented to person, place, and time. He is  g Oral Daily PRN Marti Rodriguez MD        docusate sodium-sennosides (SENOKOT S) 50-8.6 MG 2 tablet  2 tablet Oral BID PRN Marti Rodriguez MD        bisacodyl (DULCOLAX) suppository 10 mg  10 mg Rectal Daily PRN Marti Rodriguez MD        magnesium hydroxide (MILK OF MAGNESIA) 400 MG/5ML suspension 30 mL  30 mL Oral Daily PRN Marti Rodriguez MD        lactulose (CHRONULAC) 10 GM/15ML solution 10 g  10 g Oral TID Marti Rodriguez MD   10 g at 10/07/24 0815    losartan (COZAAR) tablet 50 mg  50 mg Oral Daily Marti Rodriguez MD   50 mg at 10/07/24 0815    traZODone (DESYREL) tablet 150 mg  150 mg Oral Nightly Marti Rodriguez MD   150 mg at 10/06/24 2023    folic acid (FOLATE) tablet 1 mg  1 mg Oral Daily Marti Rodriguez MD   1 mg at 10/07/24 0815    thiamine (VITAMIN B1) tablet 100 mg  100 mg Oral Daily Marti Rodriguez MD   100 mg at 10/07/24 0815    LORazepam (ATIVAN) tablet 2 mg  2 mg Oral Q1H PRN Marti Rodriguez MD   2 mg at 10/07/24 0815    Or    LORazepam (ATIVAN) tablet 3 mg  3 mg Oral Q1H PRN Marti Rodriguez MD        Or    LORazepam (ATIVAN) tablet 4 mg  4 mg Oral Q1H PRN Marti Rodriguez MD        Or    LORazepam (ATIVAN) injection 2 mg  2 mg Intravenous Q1H PRN Marti Rodriguez MD        Or    LORazepam (ATIVAN) injection 3 mg  3 mg Intravenous Q1H PRN Marti Rodriguez MD        Or    LORazepam (ATIVAN) injection 4 mg  4 mg Intravenous Q1H PRN Marti Rodriguez MD        ibuprofen (MOTRIN) tablet 400 mg  400 mg Oral Q6H PRN Karla Leonard MD           Objective:  Visit Vitals  /66 (BP Location: LUE - Left upper extremity, Patient Position: Supine)   Pulse 72   Temp 99.5 °F (37.5 °C) (Oral)   Resp 17   Ht 5' 5\" (1.651 m)   Wt 71 kg (156 lb 8.4 oz)   SpO2 94%   BMI 26.05 kg/m²       I/O's    Intake/Output Summary (Last 24 hours) at 10/7/2024 0942  Last data filed at 10/6/2024 2200  Gross per 24 hour   Intake 250 ml   Output --   Net 250 ml     Physical Exam  Constitutional:       Appearance: Normal appearance.   Cardiovascular:  lethargic.      Cranial Nerves: Cranial nerves 2-12 are intact. No cranial nerve deficit.      Sensory: No sensory deficit.      Motor: Motor function is intact. No weakness.      Gait: Gait is intact.      Deep Tendon Reflexes: Reflexes normal.   Psychiatric:         Mood and Affect: Mood normal. Mood is not anxious or depressed. Affect is not angry.         Behavior: Behavior is not agitated.         Thought Content: Thought content normal.         Judgment: Judgment normal.     LAB WORK:  Laboratory studies were reviewed.    Assessment/Plan   Problem List Items Addressed This Visit           ICD-10-CM    Osteomyelitis of left foot, unspecified type (Multi) M86.9    Weakness R53.1    Osteoarthritis M19.90    Meningioma (Multi) D32.9     Other Visit Diagnoses         Codes      Type 2 diabetes mellitus without complication, unspecified whether long term insulin use    -  Primary E11.9      Hypertension, unspecified type     I10      Deep vein thrombosis (DVT) of other vein of lower extremity, unspecified chronicity, unspecified laterality     I82.499      Neuropathy     G62.9      ASHD (arteriosclerotic heart disease)     I25.10      History of pleural effusion     Z87.09      At risk for falling     Z91.81        1. Diabetes, on insulin.  2. Osteomyelitis to the left foot.  Continue wound care.  Follow with Podiatry.  3. Hypertension, medically controlled.  4. DVT, anticoagulated.  5. Neuropathy, on gabapentin.  6. ASHD, on aspirin.  7. Pleural effusion.  Follow with Pulmonology.  8. Osteoarthritis, on Tylenol.  9. Meningioma.  Follow with Neurosurgery.  10. Weakness, on PT/OT.  11. Fall risk, on fall precautions.    Scribe Attestation  By signing my name below, IHanna Scribe attest that this documentation has been prepared under the direction and in the presence of Marium Singh MD.     All medical record entries made by the scribcecilia were personally dictated by me I have reviewed the chart and agree the       Rate and Rhythm: Normal rate and regular rhythm.      Pulses: Normal pulses.      Heart sounds: Normal heart sounds. No murmur heard.     No friction rub. No gallop.   Pulmonary:      Breath sounds: Normal breath sounds. No wheezing, rhonchi or rales.   Abdominal:      General: Abdomen is flat. Bowel sounds are normal. There is no distension.      Palpations: Abdomen is soft.      Tenderness: There is no abdominal tenderness. There is no guarding.   Musculoskeletal:         General: No tenderness or deformity. Normal range of motion.   Skin:     General: Skin is warm and dry.      Capillary Refill: Capillary refill takes less than 2 seconds.      Findings: No rash.   Neurological:      General: No focal deficit present.      Mental Status: He is alert and oriented to person, place, and time.      Sensory: No sensory deficit.      Motor: No weakness.   Psychiatric:         Mood and Affect: Mood normal.         Behavior: Behavior normal.         Thought Content: Thought content normal.         Judgment: Judgment normal.         Labs   Recent Labs   Lab 10/07/24  0659 10/06/24  1333   WBC 1.4* 3.1*   RBC 2.95* 3.26*   HGB 9.8* 10.7*   HCT 28.8* 30.9*   MCV 97.6 94.8   PLT 34* 64*     Recent Labs   Lab 10/07/24  0659 10/06/24  1333   SODIUM 141 140   POTASSIUM 3.6 3.7   CHLORIDE 107 105   CO2 27 24   BUN 8 5*   CREATININE 0.69 0.60*   CALCIUM 8.0* 8.3*   ALBUMIN 2.0* 2.6*   BILIRUBIN 1.8* 1.4*   ALKPT 93 123*   GPT 42 61   AST 48* 68*   GLUCOSE 167* 319*     Recent Labs   Lab 10/07/24  0659   PT 12.6*   INR 1.2     TSH (mcunit/mL)   Date Value   02/21/2018 2.623     Last Lab A1C:  Hemoglobin A1C (%)   Date Value   01/15/2024 6.5 (H)       Last Point of Care A1C:  No results found for: \"GDLXPMEP2I\", \"5GLYH\"  No results found  No results for input(s): \"NTPROB\" in the last 8765 hours.  TROPONIN: No results found    Imaging (individually visualized by me)  MRI ABDOMEN W WO CONTRAST  Result Date: 10/6/2024  * * * * * *  record accurately reflects my personal performance of his history physical examination and management     * * * * * * * * PRELIMINARY REPORT * * * * * * * * * * * * * * Examination: MRI ABDOMEN W WO CONTRAST Clinical Information: Liver masses Comparison: CT abdomen/pelvis with contrast 10/6/2024. Technique: Multiplanar multi-sequential MRI of the abdomen was performed before and after administration of intravenous contrast. The dose and type of IV contrast utilized for this examination are recorded in the imaging encounter of the patient's medical record. Findings:     Impression: Probable multifocal hepatocellular carcinoma (LI-RADS 4). Dictated by: BRIDGET KULKARNI M.D. Dictated on: 10/6/2024 10:56 PM * * * * * * * * * * * * * * PRELIMINARY REPORT * * * * * * * * * * * * * *         CT ABDOMEN PELVIS W CONTRAST  Result Date: 10/6/2024  EXAM: CT ABDOMEN PELVIS W CONTRAST CLINICAL INDICATION: abdominal pain constipation? COMPARISON: CT chest pulmonary embolism with contrast 2/20/2018 TECHNIQUE: Helical CT was performed of the abdomen and pelvis, with axial, coronal, and sagittal reconstructions performed and provided for review. A total of 25 ml Omnipaque 350 was administered intravenously during the study. Automated exposure control was utilized. FINDINGS: LOWER CHEST: Coronary artery calcifications. Heart is normal in size. Lung bases are clear. LIVER: Diffusely nodular hepatic surface contour with hypertrophy of the left hepatic lobe. Liver lesions as follows: - Left hepatic lobe 5.5 x 5.0 cm hypodensity (series 301, image 34) with peripheral hyperdensity. - Right hepatic heterogeneous density 5.4 x 5.9 x 7.2 cm lesion (series 601, image 73) 11 hepatic segments 4, 5 and 8. - Segment 8 hypodense 1.2 cm lesion (series 301, image 29). - Few additional subcentimeter hypodensities in the right hepatic dome, which could represent cysts. BILIARY TREE AND GALLBLADDER: Gallbladder is under distended with mild wall thickening/edema; no pericholecystic fluid or radiodense gallstones. No intrahepatic or extrahepatic biliary  dilatation. PANCREAS: Normal. SPLEEN: Mildly enlarged measuring 15.1 cm in greatest dimension ADRENAL GLANDS: Normal. KIDNEYS AND URETERS: No hydroureteronephrosis. Left 1.3 cm simple cyst. Subcentimeter hypodensities in the bilateral kidneys, too small to characterize, statistically cysts. BLADDER: Normal. REPRODUCTIVE ORGANS: Mild prostatomegaly. 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 Bowel is normal caliber bowel without obstruction or wall thickening. Normal appendix. There is mild rectal wall thickening with mild adjacent inflammatory change/fat stranding. PERITONEUM AND RETROPERITONEUM: Trace mesenteric edema. No free air or free fluid. VESSELS: Mild atherosclerotic calcification of the normal caliber abdominal aorta and its major branches. Numerous varices and collaterals in the upper abdomen including large splenic and left renal venous varices/splenorenal shunting, similar to 2/20/2018. LYMPH NODES: No lymphadenopathy. BODY WALL: No acute body wall abnormalities. BONES: No acute osseous lesions. Moderate degenerative changes of the spine.   Impression:  1.   Multiple new hepatic lesions as described measuring up to 7.2 cm suspicious for multifocal hepatocellular carcinoma. Further evaluation with MRI abdomen with without contrast is suggested. 2.   Hepatic cirrhosis with evidence of portal hypertension including upper abdominal varices and splenomegaly, similar to 2/20/2018. 3.   Rectal wall thickening and mild inflammatory change, correlate for proctitis. 4.   Nonspecific borderline gallbladder wall thickening/edema which probably relates to underdistention or underlying liver disease, clinical correlation is suggested. Dictated by: AMANDA URBANO MD Dictated on: 10/6/2024 2:39 PM ANIKA GRANGER M.D., have reviewed the images and report and  concur with these findings interpreted by AMANDA URBANO MD. Electronically Signed by: ANIKA LAZARO M.D. Signed on: 10/6/2024 3:49 PM Workstation ID: [id number]       Urinary Catheter and Central Lines:   None        Assessment/Plan:  Patient is a 68-year-old male with a history of alcoholic cirrhosis with varices and pancytopenia, type 2 diabetes, hypertension, and history of subdural hematoma who presents complaining of painful defecation, with imaging showing multiple hepatic masses felt to be most consistent with hepatocellular carcinoma.  #.  Liver masses.  These were largely an incidental finding, but in a patient with an underlying history of alcoholic cirrhosis, this is concerning for possible hepatocellular carcinoma.  The read of both the abdominal CT and MRI also state hepatocellular carcinoma is the most likely diagnosis.  Will send tumor markers.  GI has been consulted.  #.  Painful defecation.  The patient presents with 2 to 3 days of constipation.  CT abdomen shows distal rectal rectal thickening which I suspect is most likely due to inflammation, though mass cannot be excluded.  GI has been consulted.  CEA was normal.  #.  Alcoholic cirrhosis with esophageal varices.  The patient still drinks beer.  Currently compensated.  Monitor for signs of withdrawal.  #.  Pancytopenia.  Likely due to liver disease.  Stable.  #.  Type 2 diabetes on oral hypoglycemics.  Holding oral medications while hospitalized.  Treat with SSI.  #.  Hypertension.  Can resume losartan.    Nutrition status: Does Not Meet Criteria for Malnutrition       DVT Prophylaxis  SCDs due to thrombocytopenia    CODE STATUS:  Full code    Consultants  IP Consult Orders (From admission, onward)       Start     Ordered    10/06/24 1739  Inpatient consult to GI  ONE TIME        Provider:  Day Courtney MD    10/06/24 1739                    Primary Care Physician:  Jose Green MD D. Lee Garrison, MD  [facility] Hospitalist

## 2025-06-12 LAB
ALBUMIN: 2.9 G/DL (ref 3.4–5)
ALPHA 1 GLOBULIN: 0.4 G/DL (ref 0.2–0.6)
ALPHA 2 GLOBULIN: 0.9 G/DL (ref 0.4–1.1)
BETA GLOBULIN: 0.7 G/DL (ref 0.5–1.2)
GAMMA GLOBULIN: 0.7 G/DL (ref 0.5–1.4)
IMMUNOFIXATION COMMENT: ABNORMAL
PATH REVIEW - SERUM IMMUNOFIXATION: ABNORMAL
PATH REVIEW-SERUM PROTEIN ELECTROPHORESIS: ABNORMAL
PROTEIN ELECTROPHORESIS COMMENT: ABNORMAL

## 2025-06-14 VITALS
RESPIRATION RATE: 16 BRPM | TEMPERATURE: 97.9 F | SYSTOLIC BLOOD PRESSURE: 130 MMHG | HEART RATE: 84 BPM | DIASTOLIC BLOOD PRESSURE: 80 MMHG

## 2025-06-14 LAB
ATRIAL RATE: 72 BPM
P AXIS: 29 DEGREES
P OFFSET: 138 MS
P ONSET: 82 MS
PR INTERVAL: 272 MS
Q ONSET: 218 MS
QRS COUNT: 12 BEATS
QRS DURATION: 136 MS
QT INTERVAL: 418 MS
QTC CALCULATION(BAZETT): 457 MS
QTC FREDERICIA: 444 MS
R AXIS: -56 DEGREES
T AXIS: 94 DEGREES
T OFFSET: 427 MS
VENTRICULAR RATE: 72 BPM

## 2025-06-14 ASSESSMENT — ENCOUNTER SYMPTOMS
CHILLS: 0
FEVER: 0

## 2025-06-14 NOTE — PROGRESS NOTES
PLACE OF SERVICE:  [facility].    This is a readmission.    Subjective   Patient ID: Elliot Partida is a 74 y.o. male who presents for Follow-up.    Mr. Elliot Partida is a 74-year-old male with history of diabetes with osteomyelitis to his left foot.  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  He is now on Lovenox.    Review of Systems   Constitutional:  Negative for chills and fever.   Cardiovascular:  Negative for chest pain.   All other systems reviewed and are negative.    Objective   /80   Pulse 84   Temp 36.6 °C (97.9 °F)   Resp 16     Physical Exam  Vitals reviewed.   Constitutional:       Comments: This is a well developed, well-nourished male, lying in bed, appearing weak.   HENT:      Right Ear: Tympanic membrane, ear canal and external ear normal.      Left Ear: Tympanic membrane, ear canal and external ear normal.   Eyes:      General: No scleral icterus.     Pupils: Pupils are equal, round, and reactive to light.   Neck:      Vascular: No carotid bruit.   Cardiovascular:      Heart sounds: Normal heart sounds, S1 normal and S2 normal. No murmur heard.     No friction rub.   Pulmonary:      Effort: Pulmonary effort is normal.      Breath sounds: Normal breath sounds and air entry.   Abdominal:      Palpations: There is no hepatomegaly, splenomegaly or mass.   Musculoskeletal:         General: No swelling or deformity. Normal range of motion.      Cervical back: Neck supple.      Right lower leg: No edema.      Left lower leg: No edema.   Lymphadenopathy:      Cervical: No cervical adenopathy.      Upper Body:      Right upper body: No axillary adenopathy.      Left upper body: No axillary adenopathy.      Lower Body: No right inguinal adenopathy. No left inguinal adenopathy.   Skin:     Comments: The patient's left foot is currently dressed.  There is no foul odor.  There is no color drainage.   Neurological:      Mental Status: He is oriented to person, place, and  time. He is lethargic.      Cranial Nerves: Cranial nerves 2-12 are intact. No cranial nerve deficit.      Sensory: No sensory deficit.      Motor: Motor function is intact. No weakness.      Gait: Gait is intact.      Deep Tendon Reflexes: Reflexes normal.   Psychiatric:         Mood and Affect: Mood normal. Mood is not anxious or depressed. Affect is not angry.         Behavior: Behavior is not agitated.         Thought Content: Thought content normal.         Judgment: Judgment normal.     LAB WORK: Laboratory studies reviewed.    Assessment/Plan   Problem List Items Addressed This Visit           ICD-10-CM       Hematology and Neoplasia    Meningioma (Multi) D32.9       Infectious Diseases    Osteomyelitis of left foot, unspecified type (Multi) M86.9       Musculoskeletal and Injuries    Osteoarthritis M19.90       Symptoms and Signs    Weakness R53.1     Other Visit Diagnoses         Codes      Deep vein thrombosis (DVT) of other vein of lower extremity, unspecified chronicity, unspecified laterality    -  Primary I82.499      Type 2 diabetes mellitus without complication, unspecified whether long term insulin use     E11.9      Neuropathy     G62.9      Hypertension, unspecified type     I10      ASHD (arteriosclerotic heart disease)     I25.10      History of pleural effusion     Z87.09      At risk for falling     Z91.81        1. DVT, begin Lovenox.  2. Diabetes, on insulin.  3. Osteomyelitis left foot, continue wound care.  Follow with Podiatry.  4. Neuropathy, on gabapentin.  5. Hypertension, med controlled.  6. Osteoarthritis, on Tylenol.  7. ASHD, on aspirin.  8. Pleural effusion, follow with Pulmonology.  9. Meningioma, follow with Neurosurgery.  10. Weakness, on PT/OT.  11. Fall risk, on fall precautions.    Scribe Attestation  By signing my name below, IJohnna Scribe attest that this documentation has been prepared under the direction and in the presence of Marium Singh MD.     All medical  record entries made by the scribe were personally dictated by me I have reviewed the chart and agree the record accurately reflects my personal performance of his history physical examination and management

## 2025-06-15 ENCOUNTER — NURSING HOME VISIT (OUTPATIENT)
Dept: POST ACUTE CARE | Facility: EXTERNAL LOCATION | Age: 75
End: 2025-06-15
Payer: MEDICARE

## 2025-06-15 DIAGNOSIS — G62.9 NEUROPATHY: ICD-10-CM

## 2025-06-15 DIAGNOSIS — Z91.81 AT RISK FOR FALLING: ICD-10-CM

## 2025-06-15 DIAGNOSIS — M19.90 OSTEOARTHRITIS, UNSPECIFIED OSTEOARTHRITIS TYPE, UNSPECIFIED SITE: ICD-10-CM

## 2025-06-15 DIAGNOSIS — M86.9 OSTEOMYELITIS OF LEFT FOOT, UNSPECIFIED TYPE (MULTI): ICD-10-CM

## 2025-06-15 DIAGNOSIS — Z87.09 HISTORY OF PLEURAL EFFUSION: ICD-10-CM

## 2025-06-15 DIAGNOSIS — I10 HYPERTENSION, UNSPECIFIED TYPE: ICD-10-CM

## 2025-06-15 DIAGNOSIS — I82.409 DEEP VEIN THROMBOSIS (DVT) OF LOWER EXTREMITY, UNSPECIFIED CHRONICITY, UNSPECIFIED LATERALITY, UNSPECIFIED VEIN: ICD-10-CM

## 2025-06-15 DIAGNOSIS — D32.9 MENINGIOMA (MULTI): ICD-10-CM

## 2025-06-15 DIAGNOSIS — I25.10 ASHD (ARTERIOSCLEROTIC HEART DISEASE): ICD-10-CM

## 2025-06-15 DIAGNOSIS — E11.9 TYPE 2 DIABETES MELLITUS WITHOUT COMPLICATION, UNSPECIFIED WHETHER LONG TERM INSULIN USE: Primary | ICD-10-CM

## 2025-06-15 DIAGNOSIS — R53.1 WEAKNESS: ICD-10-CM

## 2025-06-15 PROCEDURE — 99309 SBSQ NF CARE MODERATE MDM 30: CPT | Performed by: INTERNAL MEDICINE

## 2025-06-15 NOTE — LETTER
Patient: Elliot Partida  : 1950    Encounter Date: 06/15/2025    PLACE OF SERVICE:  [facility]    This is a subsequent visit.    Subjective  Patient ID: Elliot Partida is a 74 y.o. male who presents for Follow-up.    Mr. Elliot Partida is a 74-year-old diabetic male with history of osteomyelitis to his left foot.  He  does also suffer from a DVT and is currently on Lovenox.  He requires supportive care.    Review of Systems   Constitutional:  Negative for chills and fever.   Cardiovascular:  Negative for chest pain.   All other systems reviewed and are negative.    Objective  /80   Pulse 80   Temp 36.8 °C (98.2 °F)   Resp 16     Physical Exam  Vitals reviewed.   Constitutional:       Comments: This is a well developed, well-nourished male, lying in bed, appearing weak and lethargic.   HENT:      Right Ear: Tympanic membrane, ear canal and external ear normal.      Left Ear: Tympanic membrane, ear canal and external ear normal.   Eyes:      General: No scleral icterus.     Pupils: Pupils are equal, round, and reactive to light.   Neck:      Vascular: No carotid bruit.   Cardiovascular:      Heart sounds: Normal heart sounds, S1 normal and S2 normal. No murmur heard.     No friction rub.   Pulmonary:      Effort: Pulmonary effort is normal.      Breath sounds: Normal breath sounds and air entry.   Abdominal:      Palpations: There is no hepatomegaly, splenomegaly or mass.   Musculoskeletal:         General: No swelling or deformity. Normal range of motion.      Cervical back: Neck supple.      Right lower leg: No edema.      Left lower leg: No edema.      Comments: The patient's left foot is currently dressed.  There is no foul odor.  There is no color drainage.   Lymphadenopathy:      Cervical: No cervical adenopathy.      Upper Body:      Right upper body: No axillary adenopathy.      Left upper body: No axillary adenopathy.      Lower Body: No right inguinal adenopathy. No left inguinal  adenopathy.   Neurological:      Mental Status: He is oriented to person, place, and time.      Cranial Nerves: Cranial nerves 2-12 are intact. No cranial nerve deficit.      Sensory: No sensory deficit.      Motor: Motor function is intact. No weakness.      Gait: Gait is intact.      Deep Tendon Reflexes: Reflexes normal.   Psychiatric:         Mood and Affect: Mood normal. Mood is not anxious or depressed. Affect is not angry.         Behavior: Behavior is not agitated.         Thought Content: Thought content normal.         Judgment: Judgment normal.     LAB WORK: Laboratory studies reviewed.    Assessment/Plan  Problem List Items Addressed This Visit           ICD-10-CM    Osteomyelitis of left foot, unspecified type (Multi) M86.9    Weakness R53.1    Osteoarthritis M19.90    Meningioma (Multi) D32.9     Other Visit Diagnoses         Codes      Type 2 diabetes mellitus without complication, unspecified whether long term insulin use    -  Primary E11.9      Neuropathy     G62.9      Deep vein thrombosis (DVT) of lower extremity, unspecified chronicity, unspecified laterality, unspecified vein     I82.409      Hypertension, unspecified type     I10      ASHD (arteriosclerotic heart disease)     I25.10      History of pleural effusion     Z87.09      At risk for falling     Z91.81        1. Diabetes, on insulin.  2. Osteomyelitis left foot, continue wound care.  Follow with Podiatry.  3. Neuropathy, on gabapentin.  4. DVT, anticoagulated.  5. Hypertension, med controlled.  6. ASHD, on aspirin.  7. Osteoarthritis, on Tylenol.  8. Meningioma, follow with Neurosurgery.  9. Pleural effusion, follow with Pulmonology.  10. Weakness, on PT/OT.  11. Fall risk, on fall precautions.    Scribe Attestation  By signing my name below, IHanna Scribe attest that this documentation has been prepared under the direction and in the presence of Marium Singh MD.     All medical record entries made by the scribe were  personally dictated by me I have reviewed the chart and agree the record accurately reflects my personal performance of his history physical examination and management      Electronically Signed By: Marium Singh MD   6/21/25 12:18 AM

## 2025-06-19 VITALS
RESPIRATION RATE: 16 BRPM | DIASTOLIC BLOOD PRESSURE: 80 MMHG | TEMPERATURE: 98.2 F | HEART RATE: 80 BPM | SYSTOLIC BLOOD PRESSURE: 128 MMHG

## 2025-06-19 ASSESSMENT — ENCOUNTER SYMPTOMS
CHILLS: 0
FEVER: 0

## 2025-06-19 NOTE — PROGRESS NOTES
PLACE OF SERVICE:  [facility]    This is a subsequent visit.    Subjective   Patient ID: Elliot Partida is a 74 y.o. male who presents for Follow-up.    Mr. Elliot Partida is a 74-year-old diabetic male with history of osteomyelitis to his left foot.  He  does also suffer from a DVT and is currently on Lovenox.  He requires supportive care.    Review of Systems   Constitutional:  Negative for chills and fever.   Cardiovascular:  Negative for chest pain.   All other systems reviewed and are negative.    Objective   /80   Pulse 80   Temp 36.8 °C (98.2 °F)   Resp 16     Physical Exam  Vitals reviewed.   Constitutional:       Comments: This is a well developed, well-nourished male, lying in bed, appearing weak and lethargic.   HENT:      Right Ear: Tympanic membrane, ear canal and external ear normal.      Left Ear: Tympanic membrane, ear canal and external ear normal.   Eyes:      General: No scleral icterus.     Pupils: Pupils are equal, round, and reactive to light.   Neck:      Vascular: No carotid bruit.   Cardiovascular:      Heart sounds: Normal heart sounds, S1 normal and S2 normal. No murmur heard.     No friction rub.   Pulmonary:      Effort: Pulmonary effort is normal.      Breath sounds: Normal breath sounds and air entry.   Abdominal:      Palpations: There is no hepatomegaly, splenomegaly or mass.   Musculoskeletal:         General: No swelling or deformity. Normal range of motion.      Cervical back: Neck supple.      Right lower leg: No edema.      Left lower leg: No edema.      Comments: The patient's left foot is currently dressed.  There is no foul odor.  There is no color drainage.   Lymphadenopathy:      Cervical: No cervical adenopathy.      Upper Body:      Right upper body: No axillary adenopathy.      Left upper body: No axillary adenopathy.      Lower Body: No right inguinal adenopathy. No left inguinal adenopathy.   Neurological:      Mental Status: He is oriented to  person, place, and time.      Cranial Nerves: Cranial nerves 2-12 are intact. No cranial nerve deficit.      Sensory: No sensory deficit.      Motor: Motor function is intact. No weakness.      Gait: Gait is intact.      Deep Tendon Reflexes: Reflexes normal.   Psychiatric:         Mood and Affect: Mood normal. Mood is not anxious or depressed. Affect is not angry.         Behavior: Behavior is not agitated.         Thought Content: Thought content normal.         Judgment: Judgment normal.     LAB WORK: Laboratory studies reviewed.    Assessment/Plan   Problem List Items Addressed This Visit           ICD-10-CM    Osteomyelitis of left foot, unspecified type (Multi) M86.9    Weakness R53.1    Osteoarthritis M19.90    Meningioma (Multi) D32.9     Other Visit Diagnoses         Codes      Type 2 diabetes mellitus without complication, unspecified whether long term insulin use    -  Primary E11.9      Neuropathy     G62.9      Deep vein thrombosis (DVT) of lower extremity, unspecified chronicity, unspecified laterality, unspecified vein     I82.409      Hypertension, unspecified type     I10      ASHD (arteriosclerotic heart disease)     I25.10      History of pleural effusion     Z87.09      At risk for falling     Z91.81        1. Diabetes, on insulin.  2. Osteomyelitis left foot, continue wound care.  Follow with Podiatry.  3. Neuropathy, on gabapentin.  4. DVT, anticoagulated.  5. Hypertension, med controlled.  6. ASHD, on aspirin.  7. Osteoarthritis, on Tylenol.  8. Meningioma, follow with Neurosurgery.  9. Pleural effusion, follow with Pulmonology.  10. Weakness, on PT/OT.  11. Fall risk, on fall precautions.    Scribe Attestation  By signing my name below, IHanna Scribe attest that this documentation has been prepared under the direction and in the presence of Marium Singh MD.     All medical record entries made by the scribcecilia were personally dictated by me I have reviewed the chart and agree the  record accurately reflects my personal performance of his history physical examination and management

## 2025-06-22 ENCOUNTER — APPOINTMENT (OUTPATIENT)
Dept: RADIOLOGY | Facility: HOSPITAL | Age: 75
DRG: 690 | End: 2025-06-22
Payer: MEDICARE

## 2025-06-22 ENCOUNTER — APPOINTMENT (OUTPATIENT)
Dept: CARDIOLOGY | Facility: HOSPITAL | Age: 75
DRG: 690 | End: 2025-06-22
Payer: MEDICARE

## 2025-06-22 ENCOUNTER — HOSPITAL ENCOUNTER (INPATIENT)
Facility: HOSPITAL | Age: 75
DRG: 690 | End: 2025-06-22
Attending: FAMILY MEDICINE | Admitting: INTERNAL MEDICINE
Payer: MEDICARE

## 2025-06-22 DIAGNOSIS — N39.0 URINARY TRACT INFECTION WITHOUT HEMATURIA, SITE UNSPECIFIED: Primary | ICD-10-CM

## 2025-06-22 DIAGNOSIS — R42 LIGHTHEADEDNESS: ICD-10-CM

## 2025-06-22 DIAGNOSIS — R23.4 WOUND ESCHAR OF FOOT: ICD-10-CM

## 2025-06-22 DIAGNOSIS — L97.524 NON-PRESSURE CHRONIC ULCER OF OTHER PART OF LEFT FOOT WITH NECROSIS OF BONE: ICD-10-CM

## 2025-06-22 DIAGNOSIS — E87.6 HYPOKALEMIA: ICD-10-CM

## 2025-06-22 DIAGNOSIS — E11.9 DIABETES MELLITUS TYPE 2 WITHOUT RETINOPATHY (MULTI): ICD-10-CM

## 2025-06-22 DIAGNOSIS — R42 DIZZINESS: ICD-10-CM

## 2025-06-22 LAB
ALBUMIN SERPL BCP-MCNC: 3.2 G/DL (ref 3.4–5)
ALP SERPL-CCNC: 102 U/L (ref 33–136)
ALT SERPL W P-5'-P-CCNC: 16 U/L (ref 10–52)
ANION GAP SERPL CALC-SCNC: 11 MMOL/L (ref 10–20)
APPEARANCE UR: CLEAR
APTT PPP: 36 SECONDS (ref 26–36)
AST SERPL W P-5'-P-CCNC: 26 U/L (ref 9–39)
BACTERIA #/AREA URNS AUTO: ABNORMAL /HPF
BASOPHILS # BLD AUTO: 0.02 X10*3/UL (ref 0–0.1)
BASOPHILS NFR BLD AUTO: 0.3 %
BILIRUB SERPL-MCNC: 0.3 MG/DL (ref 0–1.2)
BILIRUB UR STRIP.AUTO-MCNC: NEGATIVE MG/DL
BNP SERPL-MCNC: 84 PG/ML (ref 0–99)
BUN SERPL-MCNC: 18 MG/DL (ref 6–23)
CALCIUM SERPL-MCNC: 8.7 MG/DL (ref 8.6–10.3)
CARDIAC TROPONIN I PNL SERPL HS: 11 NG/L (ref 0–20)
CARDIAC TROPONIN I PNL SERPL HS: 11 NG/L (ref 0–20)
CHLORIDE SERPL-SCNC: 102 MMOL/L (ref 98–107)
CO2 SERPL-SCNC: 32 MMOL/L (ref 21–32)
COLOR UR: ABNORMAL
CREAT SERPL-MCNC: 1.01 MG/DL (ref 0.5–1.3)
EGFRCR SERPLBLD CKD-EPI 2021: 79 ML/MIN/1.73M*2
EOSINOPHIL # BLD AUTO: 0.16 X10*3/UL (ref 0–0.4)
EOSINOPHIL NFR BLD AUTO: 2.4 %
ERYTHROCYTE [DISTWIDTH] IN BLOOD BY AUTOMATED COUNT: 16.9 % (ref 11.5–14.5)
GLUCOSE BLD MANUAL STRIP-MCNC: 143 MG/DL (ref 74–99)
GLUCOSE SERPL-MCNC: 136 MG/DL (ref 74–99)
GLUCOSE UR STRIP.AUTO-MCNC: NORMAL MG/DL
HCT VFR BLD AUTO: 28.3 % (ref 41–52)
HGB BLD-MCNC: 8.9 G/DL (ref 13.5–17.5)
HYALINE CASTS #/AREA URNS AUTO: ABNORMAL /LPF
IMM GRANULOCYTES # BLD AUTO: 0.02 X10*3/UL (ref 0–0.5)
IMM GRANULOCYTES NFR BLD AUTO: 0.3 % (ref 0–0.9)
INR PPP: 1.1 (ref 0.9–1.1)
KETONES UR STRIP.AUTO-MCNC: NEGATIVE MG/DL
LACTATE SERPL-SCNC: 1.3 MMOL/L (ref 0.4–2)
LEUKOCYTE ESTERASE UR QL STRIP.AUTO: ABNORMAL
LYMPHOCYTES # BLD AUTO: 1.69 X10*3/UL (ref 0.8–3)
LYMPHOCYTES NFR BLD AUTO: 25.4 %
MAGNESIUM SERPL-MCNC: 2.06 MG/DL (ref 1.6–2.4)
MCH RBC QN AUTO: 27.2 PG (ref 26–34)
MCHC RBC AUTO-ENTMCNC: 31.4 G/DL (ref 32–36)
MCV RBC AUTO: 87 FL (ref 80–100)
MONOCYTES # BLD AUTO: 0.71 X10*3/UL (ref 0.05–0.8)
MONOCYTES NFR BLD AUTO: 10.7 %
NEUTROPHILS # BLD AUTO: 4.05 X10*3/UL (ref 1.6–5.5)
NEUTROPHILS NFR BLD AUTO: 60.9 %
NITRITE UR QL STRIP.AUTO: NEGATIVE
NRBC BLD-RTO: 0 /100 WBCS (ref 0–0)
PH UR STRIP.AUTO: 6.5 [PH]
PLATELET # BLD AUTO: 231 X10*3/UL (ref 150–450)
POTASSIUM SERPL-SCNC: 3.2 MMOL/L (ref 3.5–5.3)
PROT SERPL-MCNC: 5.8 G/DL (ref 6.4–8.2)
PROT UR STRIP.AUTO-MCNC: NEGATIVE MG/DL
PROTHROMBIN TIME: 11.8 SECONDS (ref 9.8–12.4)
RBC # BLD AUTO: 3.27 X10*6/UL (ref 4.5–5.9)
RBC # UR STRIP.AUTO: NEGATIVE MG/DL
RBC #/AREA URNS AUTO: ABNORMAL /HPF
SODIUM SERPL-SCNC: 142 MMOL/L (ref 136–145)
SP GR UR STRIP.AUTO: 1.01
SQUAMOUS #/AREA URNS AUTO: ABNORMAL /HPF
UROBILINOGEN UR STRIP.AUTO-MCNC: NORMAL MG/DL
WBC # BLD AUTO: 6.7 X10*3/UL (ref 4.4–11.3)
WBC #/AREA URNS AUTO: ABNORMAL /HPF

## 2025-06-22 PROCEDURE — 83880 ASSAY OF NATRIURETIC PEPTIDE: CPT | Performed by: FAMILY MEDICINE

## 2025-06-22 PROCEDURE — 83605 ASSAY OF LACTIC ACID: CPT | Performed by: FAMILY MEDICINE

## 2025-06-22 PROCEDURE — 93005 ELECTROCARDIOGRAM TRACING: CPT

## 2025-06-22 PROCEDURE — 2500000004 HC RX 250 GENERAL PHARMACY W/ HCPCS (ALT 636 FOR OP/ED): Performed by: FAMILY MEDICINE

## 2025-06-22 PROCEDURE — 99285 EMERGENCY DEPT VISIT HI MDM: CPT | Mod: 25 | Performed by: FAMILY MEDICINE

## 2025-06-22 PROCEDURE — 81001 URINALYSIS AUTO W/SCOPE: CPT | Performed by: FAMILY MEDICINE

## 2025-06-22 PROCEDURE — 84484 ASSAY OF TROPONIN QUANT: CPT | Performed by: FAMILY MEDICINE

## 2025-06-22 PROCEDURE — 87086 URINE CULTURE/COLONY COUNT: CPT | Mod: GENLAB | Performed by: FAMILY MEDICINE

## 2025-06-22 PROCEDURE — 70450 CT HEAD/BRAIN W/O DYE: CPT | Performed by: STUDENT IN AN ORGANIZED HEALTH CARE EDUCATION/TRAINING PROGRAM

## 2025-06-22 PROCEDURE — 85025 COMPLETE CBC W/AUTO DIFF WBC: CPT | Performed by: FAMILY MEDICINE

## 2025-06-22 PROCEDURE — 85610 PROTHROMBIN TIME: CPT | Performed by: FAMILY MEDICINE

## 2025-06-22 PROCEDURE — 70450 CT HEAD/BRAIN W/O DYE: CPT

## 2025-06-22 PROCEDURE — 71250 CT THORAX DX C-: CPT

## 2025-06-22 PROCEDURE — 71250 CT THORAX DX C-: CPT | Performed by: STUDENT IN AN ORGANIZED HEALTH CARE EDUCATION/TRAINING PROGRAM

## 2025-06-22 PROCEDURE — 84075 ASSAY ALKALINE PHOSPHATASE: CPT | Performed by: FAMILY MEDICINE

## 2025-06-22 PROCEDURE — 82947 ASSAY GLUCOSE BLOOD QUANT: CPT | Mod: 59

## 2025-06-22 PROCEDURE — 85730 THROMBOPLASTIN TIME PARTIAL: CPT | Performed by: FAMILY MEDICINE

## 2025-06-22 PROCEDURE — 96361 HYDRATE IV INFUSION ADD-ON: CPT

## 2025-06-22 PROCEDURE — 74176 CT ABD & PELVIS W/O CONTRAST: CPT | Performed by: STUDENT IN AN ORGANIZED HEALTH CARE EDUCATION/TRAINING PROGRAM

## 2025-06-22 PROCEDURE — 82947 ASSAY GLUCOSE BLOOD QUANT: CPT

## 2025-06-22 PROCEDURE — 83735 ASSAY OF MAGNESIUM: CPT | Performed by: FAMILY MEDICINE

## 2025-06-22 RX ORDER — SODIUM CHLORIDE 9 MG/ML
125 INJECTION, SOLUTION INTRAVENOUS CONTINUOUS
Status: DISPENSED | OUTPATIENT
Start: 2025-06-23 | End: 2025-06-23

## 2025-06-22 RX ORDER — ONDANSETRON HYDROCHLORIDE 2 MG/ML
4 INJECTION, SOLUTION INTRAVENOUS EVERY 4 HOURS PRN
Status: DISCONTINUED | OUTPATIENT
Start: 2025-06-22 | End: 2025-07-01 | Stop reason: HOSPADM

## 2025-06-22 RX ORDER — POLYETHYLENE GLYCOL 3350 17 G/17G
17 POWDER, FOR SOLUTION ORAL 2 TIMES DAILY PRN
Status: DISCONTINUED | OUTPATIENT
Start: 2025-06-22 | End: 2025-07-01 | Stop reason: HOSPADM

## 2025-06-22 RX ORDER — MECLIZINE HCL 12.5 MG 12.5 MG/1
25 TABLET ORAL EVERY 6 HOURS PRN
Status: DISCONTINUED | OUTPATIENT
Start: 2025-06-22 | End: 2025-07-01 | Stop reason: HOSPADM

## 2025-06-22 RX ORDER — CEFTRIAXONE 1 G/50ML
1 INJECTION, SOLUTION INTRAVENOUS ONCE
Status: COMPLETED | OUTPATIENT
Start: 2025-06-22 | End: 2025-06-23

## 2025-06-22 RX ORDER — POTASSIUM CHLORIDE 20 MEQ/1
20 TABLET, EXTENDED RELEASE ORAL ONCE
Status: COMPLETED | OUTPATIENT
Start: 2025-06-22 | End: 2025-06-23

## 2025-06-22 RX ORDER — ACETAMINOPHEN 325 MG/1
650 TABLET ORAL EVERY 4 HOURS PRN
Status: DISCONTINUED | OUTPATIENT
Start: 2025-06-22 | End: 2025-07-01 | Stop reason: HOSPADM

## 2025-06-22 RX ORDER — ENOXAPARIN SODIUM 100 MG/ML
90 INJECTION SUBCUTANEOUS EVERY 12 HOURS
Status: DISCONTINUED | OUTPATIENT
Start: 2025-06-23 | End: 2025-07-01 | Stop reason: HOSPADM

## 2025-06-22 RX ORDER — TRAZODONE HYDROCHLORIDE 50 MG/1
50 TABLET ORAL NIGHTLY
Status: DISCONTINUED | OUTPATIENT
Start: 2025-06-23 | End: 2025-07-01 | Stop reason: HOSPADM

## 2025-06-22 RX ORDER — HYDRALAZINE HYDROCHLORIDE 50 MG/1
50 TABLET, FILM COATED ORAL 3 TIMES DAILY
Status: DISCONTINUED | OUTPATIENT
Start: 2025-06-23 | End: 2025-07-01 | Stop reason: HOSPADM

## 2025-06-22 RX ORDER — GABAPENTIN 300 MG/1
300 CAPSULE ORAL 3 TIMES DAILY
Status: DISCONTINUED | OUTPATIENT
Start: 2025-06-23 | End: 2025-07-01 | Stop reason: HOSPADM

## 2025-06-22 RX ORDER — ACETAMINOPHEN 500 MG
10 TABLET ORAL DAILY PRN
Status: DISCONTINUED | OUTPATIENT
Start: 2025-06-22 | End: 2025-07-01 | Stop reason: HOSPADM

## 2025-06-22 RX ORDER — ALUMINUM HYDROXIDE, MAGNESIUM HYDROXIDE, AND SIMETHICONE 1200; 120; 1200 MG/30ML; MG/30ML; MG/30ML
20 SUSPENSION ORAL EVERY 4 HOURS PRN
Status: DISCONTINUED | OUTPATIENT
Start: 2025-06-22 | End: 2025-07-01 | Stop reason: HOSPADM

## 2025-06-22 RX ORDER — TRAMADOL HYDROCHLORIDE 50 MG/1
25 TABLET, FILM COATED ORAL EVERY 4 HOURS PRN
Status: DISCONTINUED | OUTPATIENT
Start: 2025-06-22 | End: 2025-07-01 | Stop reason: HOSPADM

## 2025-06-22 RX ORDER — AMLODIPINE BESYLATE 2.5 MG/1
2.5 TABLET ORAL DAILY
Status: DISCONTINUED | OUTPATIENT
Start: 2025-06-23 | End: 2025-07-01 | Stop reason: HOSPADM

## 2025-06-22 RX ORDER — AMOXICILLIN 250 MG
1 CAPSULE ORAL 2 TIMES DAILY PRN
Status: DISCONTINUED | OUTPATIENT
Start: 2025-06-22 | End: 2025-07-01 | Stop reason: HOSPADM

## 2025-06-22 RX ORDER — SODIUM CHLORIDE 9 MG/ML
125 INJECTION, SOLUTION INTRAVENOUS CONTINUOUS
Status: DISCONTINUED | OUTPATIENT
Start: 2025-06-22 | End: 2025-06-22

## 2025-06-22 RX ADMIN — SODIUM CHLORIDE 125 ML/HR: 9 INJECTION, SOLUTION INTRAVENOUS at 20:13

## 2025-06-22 ASSESSMENT — HEART SCORE
HISTORY: HIGHLY SUSPICIOUS
HEART SCORE: 8
ECG: SIGNIFICANT ST-DEPRESSION
RISK FACTORS: >2 RISK FACTORS OR HX OF ATHEROSCLEROTIC DISEASE
TROPONIN: LESS THAN OR EQUAL TO NORMAL LIMIT
AGE: 65+

## 2025-06-22 ASSESSMENT — PAIN DESCRIPTION - LOCATION: LOCATION: ABDOMEN

## 2025-06-22 ASSESSMENT — PAIN SCALES - GENERAL: PAINLEVEL_OUTOF10: 4

## 2025-06-22 ASSESSMENT — PAIN DESCRIPTION - PAIN TYPE: TYPE: ACUTE PAIN

## 2025-06-22 ASSESSMENT — PAIN - FUNCTIONAL ASSESSMENT: PAIN_FUNCTIONAL_ASSESSMENT: 0-10

## 2025-06-22 NOTE — ED TRIAGE NOTES
"Patient BIBA c/o dizziness that started last night and has gradually gotten worse. Patient stating \"the room feels like its spinning and wont stop\". Facility stated patient hag high glucose, pt glucose 143 when taken on arrival.  "

## 2025-06-22 NOTE — ED PROVIDER NOTES
Emergency Department Provider Note       History of Present Illness     History provided by: Patient  Limitations to History: None  External Records Reviewed with Brief Summary: None    HPI:  Elliot Partida is a 74 y.o. male with history of diabetes peripheral vascular disease bilateral feet ulcers are sent to the emergency room from nursing home due to weakness dizziness and low blood sugar but his blood sugar was 175.  Patient states that he was dizzy and lightheaded but did not blackout or pass out did not any speech trouble vision trouble no chest pain or abdominal pain.  Denies any vomiting or diarrhea.  Denies blacked out or pass out.  Denies any difficulty moving arms or legs    Physical Exam   Triage vitals:  T 36.8 °C (98.3 °F)  HR 78  /67  RR 18  O2 98 % None (Room air)    General: Awake, alert, in no acute distress, chronically sick male patient  with wound dressing bilateral feet awake alert, still not feeling well no tachypnea hypoxemia respiratory stress breathing without acute distress  Eyes: Gaze conjugate.  No scleral icterus or injection  HENT: Normo-cephalic, atraumatic. No stridor  CV: Regular rate, regular rhythm. Radial pulses 2+ bilaterally  Resp: Breathing non-labored, speaking in full sentences.  Clear to auscultation bilaterally  GI: Soft, obese send there was diffuse abdominal tenderness some epigastric area.  : Deferred  MSK/Extremities: No gross bony deformities. Moving all extremities  Skin: Warm. Appropriate color  Neuro: Alert. Oriented. Face symmetric. Speech is fluent.  Gross strength and sensation intact in b/l UE and LEs bilateral feet also with peripheral edema.  Dressing wound wound dressing bilateral feet  Psych: Appropriate mood and affect      Medical Decision Making & ED Course   Medical Decision Makin y.o. male ***  ----  {Scoring Tools Utilized:07694}    Differential diagnoses considered include but are not limited to: ***    Social Determinants of  "Health which Significantly Impact Care: {Social Determinants of Health which Significantly Impact Care:54129} {The following actions were taken to address these social determinants (Optional):00706}    EKG Independent Interpretation: {EKG Independent Interpretation:56339}    Independent Result Review and Interpretation: {Independent Result Review and Interpretation:63699::\"Relevant laboratory and radiographic results were reviewed and independently interpreted by myself.  As necessary, they are commented on in the ED Course.\"}    Chronic conditions affecting the patient's care: {Chronic conditions affecting the patient's care:93445::\"As documented above in [facility]\"}    The patient was discussed with the following consultants/services: {The patient was discussed with the following consultants/services:34183}    Care Considerations: {Care Considerations:15604::\"As documented above in [facility]\"}    ED Course:  Diagnoses as of 06/22/25 2357   Urinary tract infection without hematuria, site unspecified   Lightheadedness   Dizziness   Diabetes mellitus type 2 without retinopathy (Multi)   Hypokalemia   EKG obtained at 1920 hrs. showed sinus rhythm first-degree AV block.  Left axis deviation.  Nonspecific interventricular ventricular conduction delay.  Minimal voltage criteria for LVH may be a normal variant.  Cannot rule out septal infarct age undetermined.  No STEMI.  Abnormal EKG.  Read this EKG.    Second EKG obtained at 2108 hrs. shows sinus rhythm with first-degree AV block, nonspecific intraventricular conduction block minimal clear criteria for LVH.  Cannot rule out septal infarct.  No ST-T evaluation.  No STEMI.  Lateral infarct age undetermined.  Abnormal EKG.  I read this EKG.  Disposition   {ED Disposition:03909}    Procedures   Procedures    {ED Provider Level (Optional):68056}    Nikia Blanton MD  Emergency Medicine                                                    " hospitalization.    Care Considerations: As described MDM admitted for further evaluation treatment    ED Course:  Diagnoses as of 06/22/25 9187   Urinary tract infection without hematuria, site unspecified   Lightheadedness   Dizziness   Diabetes mellitus type 2 without retinopathy (Multi)   Hypokalemia   EKG obtained at 1920 hrs. showed sinus rhythm first-degree AV block.  Left axis deviation.  Nonspecific interventricular ventricular conduction delay.  Minimal voltage criteria for LVH may be a normal variant.  Cannot rule out septal infarct age undetermined.  No STEMI.  Abnormal EKG.  Read this EKG.    Second EKG obtained at 2108 hrs. shows sinus rhythm with first-degree AV block, nonspecific intraventricular conduction block minimal clear criteria for LVH.  Cannot rule out septal infarct.  No ST-T evaluation.  No STEMI.  Lateral infarct age undetermined.  Abnormal EKG.  I read this EKG.    Due to underlying diabetes weakness and multiple other findings patient admitted for further evaluation and treatment.  CT was consistent with pulmonary suggestive of cystitis suggestive of cystitis, there were atelectasis in lung bases mild hypokalemia other finding as result patient admitted for further evaluation and treatment.  Patient also has abnormal EKG and high heart score  Disposition   As a result of the work-up, the patient was discharged home.  he was informed of his diagnosis and instructed to come back with any concerns or worsening of condition.  he and was agreeable to the plan as discussed above.  he was given the opportunity to ask questions.  All of the patient's questions were answered.    Procedures   Procedures        Nikia Blanton MD  Emergency Medicine                                                       Nikia Blanton MD  07/21/25 0141       Nikia Blanton MD  07/21/25 0142

## 2025-06-23 ENCOUNTER — APPOINTMENT (OUTPATIENT)
Dept: RADIOLOGY | Facility: HOSPITAL | Age: 75
DRG: 690 | End: 2025-06-23
Payer: MEDICARE

## 2025-06-23 PROBLEM — R42 DIZZINESS: Status: ACTIVE | Noted: 2025-06-23

## 2025-06-23 PROBLEM — N39.0 URINARY TRACT INFECTION WITHOUT HEMATURIA, SITE UNSPECIFIED: Status: ACTIVE | Noted: 2025-06-23

## 2025-06-23 LAB
ANION GAP SERPL CALC-SCNC: 8 MMOL/L (ref 10–20)
BASOPHILS # BLD AUTO: 0.02 X10*3/UL (ref 0–0.1)
BASOPHILS NFR BLD AUTO: 0.4 %
BUN SERPL-MCNC: 17 MG/DL (ref 6–23)
CALCIUM SERPL-MCNC: 8.1 MG/DL (ref 8.6–10.3)
CHLORIDE SERPL-SCNC: 107 MMOL/L (ref 98–107)
CO2 SERPL-SCNC: 32 MMOL/L (ref 21–32)
CREAT SERPL-MCNC: 1.08 MG/DL (ref 0.5–1.3)
EGFRCR SERPLBLD CKD-EPI 2021: 72 ML/MIN/1.73M*2
EOSINOPHIL # BLD AUTO: 0.14 X10*3/UL (ref 0–0.4)
EOSINOPHIL NFR BLD AUTO: 2.5 %
ERYTHROCYTE [DISTWIDTH] IN BLOOD BY AUTOMATED COUNT: 17 % (ref 11.5–14.5)
GLUCOSE BLD MANUAL STRIP-MCNC: 113 MG/DL (ref 74–99)
GLUCOSE BLD MANUAL STRIP-MCNC: 115 MG/DL (ref 74–99)
GLUCOSE BLD MANUAL STRIP-MCNC: 121 MG/DL (ref 74–99)
GLUCOSE BLD MANUAL STRIP-MCNC: 129 MG/DL (ref 74–99)
GLUCOSE SERPL-MCNC: 119 MG/DL (ref 74–99)
HCT VFR BLD AUTO: 26 % (ref 41–52)
HGB BLD-MCNC: 8 G/DL (ref 13.5–17.5)
IMM GRANULOCYTES # BLD AUTO: 0.02 X10*3/UL (ref 0–0.5)
IMM GRANULOCYTES NFR BLD AUTO: 0.4 % (ref 0–0.9)
LYMPHOCYTES # BLD AUTO: 1.55 X10*3/UL (ref 0.8–3)
LYMPHOCYTES NFR BLD AUTO: 27.4 %
MAGNESIUM SERPL-MCNC: 2.02 MG/DL (ref 1.6–2.4)
MCH RBC QN AUTO: 27.2 PG (ref 26–34)
MCHC RBC AUTO-ENTMCNC: 30.8 G/DL (ref 32–36)
MCV RBC AUTO: 88 FL (ref 80–100)
MONOCYTES # BLD AUTO: 0.67 X10*3/UL (ref 0.05–0.8)
MONOCYTES NFR BLD AUTO: 11.8 %
NEUTROPHILS # BLD AUTO: 3.26 X10*3/UL (ref 1.6–5.5)
NEUTROPHILS NFR BLD AUTO: 57.5 %
NRBC BLD-RTO: 0 /100 WBCS (ref 0–0)
PLATELET # BLD AUTO: 196 X10*3/UL (ref 150–450)
POTASSIUM SERPL-SCNC: 3.4 MMOL/L (ref 3.5–5.3)
RBC # BLD AUTO: 2.94 X10*6/UL (ref 4.5–5.9)
SODIUM SERPL-SCNC: 144 MMOL/L (ref 136–145)
WBC # BLD AUTO: 5.7 X10*3/UL (ref 4.4–11.3)

## 2025-06-23 PROCEDURE — 2500000004 HC RX 250 GENERAL PHARMACY W/ HCPCS (ALT 636 FOR OP/ED): Mod: IPSPLIT | Performed by: NURSE PRACTITIONER

## 2025-06-23 PROCEDURE — 87205 SMEAR GRAM STAIN: CPT | Mod: GENLAB | Performed by: NURSE PRACTITIONER

## 2025-06-23 PROCEDURE — 96372 THER/PROPH/DIAG INJ SC/IM: CPT | Performed by: HOSPITALIST

## 2025-06-23 PROCEDURE — 97530 THERAPEUTIC ACTIVITIES: CPT | Mod: GP,IPSPLIT | Performed by: PHYSICAL THERAPIST

## 2025-06-23 PROCEDURE — 96375 TX/PRO/DX INJ NEW DRUG ADDON: CPT

## 2025-06-23 PROCEDURE — 2500000004 HC RX 250 GENERAL PHARMACY W/ HCPCS (ALT 636 FOR OP/ED): Performed by: FAMILY MEDICINE

## 2025-06-23 PROCEDURE — 87077 CULTURE AEROBIC IDENTIFY: CPT | Mod: GENLAB | Performed by: FAMILY MEDICINE

## 2025-06-23 PROCEDURE — 97165 OT EVAL LOW COMPLEX 30 MIN: CPT | Mod: GO,IPSPLIT

## 2025-06-23 PROCEDURE — 82947 ASSAY GLUCOSE BLOOD QUANT: CPT | Mod: IPSPLIT

## 2025-06-23 PROCEDURE — 1200000002 HC GENERAL ROOM WITH TELEMETRY DAILY: Mod: IPSPLIT

## 2025-06-23 PROCEDURE — 71045 X-RAY EXAM CHEST 1 VIEW: CPT

## 2025-06-23 PROCEDURE — 96365 THER/PROPH/DIAG IV INF INIT: CPT

## 2025-06-23 PROCEDURE — 2500000005 HC RX 250 GENERAL PHARMACY W/O HCPCS: Mod: IPSPLIT | Performed by: NURSE PRACTITIONER

## 2025-06-23 PROCEDURE — 80048 BASIC METABOLIC PNL TOTAL CA: CPT | Performed by: HOSPITALIST

## 2025-06-23 PROCEDURE — 83735 ASSAY OF MAGNESIUM: CPT | Performed by: INTERNAL MEDICINE

## 2025-06-23 PROCEDURE — 93880 EXTRACRANIAL BILAT STUDY: CPT | Mod: IPSPLIT

## 2025-06-23 PROCEDURE — 2500000002 HC RX 250 W HCPCS SELF ADMINISTERED DRUGS (ALT 637 FOR MEDICARE OP, ALT 636 FOR OP/ED): Performed by: HOSPITALIST

## 2025-06-23 PROCEDURE — 97162 PT EVAL MOD COMPLEX 30 MIN: CPT | Mod: GP,IPSPLIT | Performed by: PHYSICAL THERAPIST

## 2025-06-23 PROCEDURE — 2500000004 HC RX 250 GENERAL PHARMACY W/ HCPCS (ALT 636 FOR OP/ED): Mod: JW | Performed by: HOSPITALIST

## 2025-06-23 PROCEDURE — 82947 ASSAY GLUCOSE BLOOD QUANT: CPT | Mod: 59

## 2025-06-23 PROCEDURE — 71045 X-RAY EXAM CHEST 1 VIEW: CPT | Performed by: RADIOLOGY

## 2025-06-23 PROCEDURE — 94760 N-INVAS EAR/PLS OXIMETRY 1: CPT | Mod: IPSPLIT

## 2025-06-23 PROCEDURE — 85025 COMPLETE CBC W/AUTO DIFF WBC: CPT | Performed by: HOSPITALIST

## 2025-06-23 PROCEDURE — 36415 COLL VENOUS BLD VENIPUNCTURE: CPT | Performed by: FAMILY MEDICINE

## 2025-06-23 PROCEDURE — 93880 EXTRACRANIAL BILAT STUDY: CPT | Performed by: STUDENT IN AN ORGANIZED HEALTH CARE EDUCATION/TRAINING PROGRAM

## 2025-06-23 PROCEDURE — 96361 HYDRATE IV INFUSION ADD-ON: CPT

## 2025-06-23 PROCEDURE — 2500000001 HC RX 250 WO HCPCS SELF ADMINISTERED DRUGS (ALT 637 FOR MEDICARE OP): Performed by: HOSPITALIST

## 2025-06-23 PROCEDURE — 99223 1ST HOSP IP/OBS HIGH 75: CPT | Performed by: NURSE PRACTITIONER

## 2025-06-23 RX ORDER — SODIUM CHLORIDE 9 MG/ML
10 INJECTION, SOLUTION INTRAVENOUS CONTINUOUS PRN
Status: DISCONTINUED | OUTPATIENT
Start: 2025-06-23 | End: 2025-07-01 | Stop reason: HOSPADM

## 2025-06-23 RX ORDER — HEPARIN 100 UNIT/ML
5 SYRINGE INTRAVENOUS EVERY 8 HOURS PRN
Status: DISCONTINUED | OUTPATIENT
Start: 2025-06-23 | End: 2025-07-01 | Stop reason: HOSPADM

## 2025-06-23 RX ORDER — GLUCAGON HCL 1 MG
VIAL (EA) INJECTION
COMMUNITY
End: 2025-07-09

## 2025-06-23 RX ORDER — CEFTRIAXONE 1 G/50ML
1 INJECTION, SOLUTION INTRAVENOUS EVERY 24 HOURS
Status: DISCONTINUED | OUTPATIENT
Start: 2025-06-23 | End: 2025-06-24

## 2025-06-23 RX ORDER — SODIUM CHLORIDE 0.9 % (FLUSH) 0.9 %
5 SYRINGE (ML) INJECTION 4 TIMES DAILY PRN
Status: DISCONTINUED | OUTPATIENT
Start: 2025-06-23 | End: 2025-07-01 | Stop reason: HOSPADM

## 2025-06-23 RX ORDER — IPRATROPIUM BROMIDE AND ALBUTEROL SULFATE 2.5; .5 MG/3ML; MG/3ML
3 SOLUTION RESPIRATORY (INHALATION) EVERY 2 HOUR PRN
Status: DISCONTINUED | OUTPATIENT
Start: 2025-06-23 | End: 2025-07-01 | Stop reason: HOSPADM

## 2025-06-23 RX ADMIN — GABAPENTIN 300 MG: 300 CAPSULE ORAL at 21:25

## 2025-06-23 RX ADMIN — TRAMADOL HYDROCHLORIDE 25 MG: 50 TABLET, COATED ORAL at 05:45

## 2025-06-23 RX ADMIN — HYDRALAZINE HYDROCHLORIDE 50 MG: 50 TABLET ORAL at 21:25

## 2025-06-23 RX ADMIN — GABAPENTIN 300 MG: 300 CAPSULE ORAL at 15:47

## 2025-06-23 RX ADMIN — TRAMADOL HYDROCHLORIDE 25 MG: 50 TABLET, COATED ORAL at 01:12

## 2025-06-23 RX ADMIN — ONDANSETRON 4 MG: 2 INJECTION INTRAMUSCULAR; INTRAVENOUS at 01:12

## 2025-06-23 RX ADMIN — AMLODIPINE BESYLATE 2.5 MG: 2.5 TABLET ORAL at 08:48

## 2025-06-23 RX ADMIN — HYDRALAZINE HYDROCHLORIDE 50 MG: 50 TABLET ORAL at 01:12

## 2025-06-23 RX ADMIN — CEFTRIAXONE 1 G: 1 INJECTION, SOLUTION INTRAVENOUS at 21:25

## 2025-06-23 RX ADMIN — HYDRALAZINE HYDROCHLORIDE 50 MG: 50 TABLET ORAL at 15:47

## 2025-06-23 RX ADMIN — ACETAMINOPHEN 650 MG: 325 TABLET, FILM COATED ORAL at 05:45

## 2025-06-23 RX ADMIN — ACETAMINOPHEN 650 MG: 325 TABLET, FILM COATED ORAL at 18:21

## 2025-06-23 RX ADMIN — CEFTRIAXONE 1 G: 1 INJECTION, SOLUTION INTRAVENOUS at 00:01

## 2025-06-23 RX ADMIN — ACETAMINOPHEN 650 MG: 325 TABLET, FILM COATED ORAL at 00:33

## 2025-06-23 RX ADMIN — GABAPENTIN 300 MG: 300 CAPSULE ORAL at 08:48

## 2025-06-23 RX ADMIN — MECLIZINE HYDROCHLORIDE 25 MG: 12.5 TABLET ORAL at 01:12

## 2025-06-23 RX ADMIN — ENOXAPARIN SODIUM 90 MG: 100 INJECTION SUBCUTANEOUS at 17:41

## 2025-06-23 RX ADMIN — Medication 2 L/MIN: at 23:54

## 2025-06-23 RX ADMIN — ENOXAPARIN SODIUM 90 MG: 100 INJECTION SUBCUTANEOUS at 05:46

## 2025-06-23 RX ADMIN — HYDRALAZINE HYDROCHLORIDE 50 MG: 50 TABLET ORAL at 08:48

## 2025-06-23 RX ADMIN — TRAZODONE HYDROCHLORIDE 50 MG: 50 TABLET ORAL at 21:25

## 2025-06-23 RX ADMIN — Medication 3 L/MIN: at 17:04

## 2025-06-23 RX ADMIN — TRAZODONE HYDROCHLORIDE 50 MG: 50 TABLET ORAL at 01:13

## 2025-06-23 RX ADMIN — SODIUM CHLORIDE 125 ML/HR: 9 INJECTION, SOLUTION INTRAVENOUS at 02:00

## 2025-06-23 RX ADMIN — ENOXAPARIN SODIUM 90 MG: 100 INJECTION SUBCUTANEOUS at 23:00

## 2025-06-23 RX ADMIN — GABAPENTIN 300 MG: 300 CAPSULE ORAL at 01:12

## 2025-06-23 RX ADMIN — POTASSIUM CHLORIDE 20 MEQ: 1500 TABLET, EXTENDED RELEASE ORAL at 00:01

## 2025-06-23 SDOH — HEALTH STABILITY: PHYSICAL HEALTH: ON AVERAGE, HOW MANY MINUTES DO YOU ENGAGE IN EXERCISE AT THIS LEVEL?: 0 MIN

## 2025-06-23 SDOH — SOCIAL STABILITY: SOCIAL NETWORK: HOW OFTEN DO YOU ATTEND CHURCH OR RELIGIOUS SERVICES?: NEVER

## 2025-06-23 SDOH — ECONOMIC STABILITY: FOOD INSECURITY: WITHIN THE PAST 12 MONTHS, THE FOOD YOU BOUGHT JUST DIDN'T LAST AND YOU DIDN'T HAVE MONEY TO GET MORE.: NEVER TRUE

## 2025-06-23 SDOH — SOCIAL STABILITY: SOCIAL INSECURITY: WITHIN THE LAST YEAR, HAVE YOU BEEN AFRAID OF YOUR PARTNER OR EX-PARTNER?: NO

## 2025-06-23 SDOH — SOCIAL STABILITY: SOCIAL NETWORK: IN A TYPICAL WEEK, HOW MANY TIMES DO YOU TALK ON THE PHONE WITH FAMILY, FRIENDS, OR NEIGHBORS?: ONCE A WEEK

## 2025-06-23 SDOH — SOCIAL STABILITY: SOCIAL INSECURITY: ARE THERE ANY APPARENT SIGNS OF INJURIES/BEHAVIORS THAT COULD BE RELATED TO ABUSE/NEGLECT?: NO

## 2025-06-23 SDOH — SOCIAL STABILITY: SOCIAL NETWORK
DO YOU BELONG TO ANY CLUBS OR ORGANIZATIONS SUCH AS CHURCH GROUPS, UNIONS, FRATERNAL OR ATHLETIC GROUPS, OR SCHOOL GROUPS?: NO

## 2025-06-23 SDOH — HEALTH STABILITY: MENTAL HEALTH
DO YOU FEEL STRESS - TENSE, RESTLESS, NERVOUS, OR ANXIOUS, OR UNABLE TO SLEEP AT NIGHT BECAUSE YOUR MIND IS TROUBLED ALL THE TIME - THESE DAYS?: NOT AT ALL

## 2025-06-23 SDOH — ECONOMIC STABILITY: FOOD INSECURITY: WITHIN THE PAST 12 MONTHS, YOU WORRIED THAT YOUR FOOD WOULD RUN OUT BEFORE YOU GOT THE MONEY TO BUY MORE.: NEVER TRUE

## 2025-06-23 SDOH — SOCIAL STABILITY: SOCIAL INSECURITY: DOES ANYONE TRY TO KEEP YOU FROM HAVING/CONTACTING OTHER FRIENDS OR DOING THINGS OUTSIDE YOUR HOME?: NO

## 2025-06-23 SDOH — SOCIAL STABILITY: SOCIAL INSECURITY: WITHIN THE LAST YEAR, HAVE YOU BEEN HUMILIATED OR EMOTIONALLY ABUSED IN OTHER WAYS BY YOUR PARTNER OR EX-PARTNER?: NO

## 2025-06-23 SDOH — SOCIAL STABILITY: SOCIAL INSECURITY: WERE YOU ABLE TO COMPLETE ALL THE BEHAVIORAL HEALTH SCREENINGS?: YES

## 2025-06-23 SDOH — SOCIAL STABILITY: SOCIAL INSECURITY: HAVE YOU HAD ANY THOUGHTS OF HARMING ANYONE ELSE?: NO

## 2025-06-23 SDOH — ECONOMIC STABILITY: INCOME INSECURITY: IN THE PAST 12 MONTHS HAS THE ELECTRIC, GAS, OIL, OR WATER COMPANY THREATENED TO SHUT OFF SERVICES IN YOUR HOME?: NO

## 2025-06-23 SDOH — SOCIAL STABILITY: SOCIAL INSECURITY: ARE YOU MARRIED, WIDOWED, DIVORCED, SEPARATED, NEVER MARRIED, OR LIVING WITH A PARTNER?: NEVER MARRIED

## 2025-06-23 SDOH — HEALTH STABILITY: PHYSICAL HEALTH
HOW OFTEN DO YOU NEED TO HAVE SOMEONE HELP YOU WHEN YOU READ INSTRUCTIONS, PAMPHLETS, OR OTHER WRITTEN MATERIAL FROM YOUR DOCTOR OR PHARMACY?: SOMETIMES

## 2025-06-23 SDOH — SOCIAL STABILITY: SOCIAL NETWORK: HOW OFTEN DO YOU GET TOGETHER WITH FRIENDS OR RELATIVES?: NEVER

## 2025-06-23 SDOH — SOCIAL STABILITY: SOCIAL INSECURITY: DO YOU FEEL UNSAFE GOING BACK TO THE PLACE WHERE YOU ARE LIVING?: NO

## 2025-06-23 SDOH — SOCIAL STABILITY: SOCIAL NETWORK: HOW OFTEN DO YOU ATTEND MEETINGS OF THE CLUBS OR ORGANIZATIONS YOU BELONG TO?: NEVER

## 2025-06-23 SDOH — HEALTH STABILITY: PHYSICAL HEALTH: ON AVERAGE, HOW MANY DAYS PER WEEK DO YOU ENGAGE IN MODERATE TO STRENUOUS EXERCISE (LIKE A BRISK WALK)?: 0 DAYS

## 2025-06-23 SDOH — SOCIAL STABILITY: SOCIAL INSECURITY: ARE YOU OR HAVE YOU BEEN THREATENED OR ABUSED PHYSICALLY, EMOTIONALLY, OR SEXUALLY BY ANYONE?: NO

## 2025-06-23 SDOH — SOCIAL STABILITY: SOCIAL INSECURITY: DO YOU FEEL ANYONE HAS EXPLOITED OR TAKEN ADVANTAGE OF YOU FINANCIALLY OR OF YOUR PERSONAL PROPERTY?: NO

## 2025-06-23 SDOH — SOCIAL STABILITY: SOCIAL INSECURITY: HAVE YOU HAD THOUGHTS OF HARMING ANYONE ELSE?: NO

## 2025-06-23 SDOH — SOCIAL STABILITY: SOCIAL INSECURITY: ABUSE: ADULT

## 2025-06-23 SDOH — SOCIAL STABILITY: SOCIAL INSECURITY: HAS ANYONE EVER THREATENED TO HURT YOUR FAMILY OR YOUR PETS?: NO

## 2025-06-23 ASSESSMENT — ACTIVITIES OF DAILY LIVING (ADL)
HEARING - LEFT EAR: FUNCTIONAL
DRESSING YOURSELF: NEEDS ASSISTANCE
ADL_ASSISTANCE: INDEPENDENT
ASSISTIVE_DEVICE: WALKER
PATIENT'S MEMORY ADEQUATE TO SAFELY COMPLETE DAILY ACTIVITIES?: YES
LACK_OF_TRANSPORTATION: NO
TOILETING: NEEDS ASSISTANCE
BATHING: NEEDS ASSISTANCE
FEEDING YOURSELF: NEEDS ASSISTANCE
GROOMING: NEEDS ASSISTANCE
WALKS IN HOME: NEEDS ASSISTANCE
ADEQUATE_TO_COMPLETE_ADL: YES
BATHING_ASSISTANCE: MODERATE
LACK_OF_TRANSPORTATION: NO
JUDGMENT_ADEQUATE_SAFELY_COMPLETE_DAILY_ACTIVITIES: YES
HEARING - RIGHT EAR: FUNCTIONAL

## 2025-06-23 ASSESSMENT — COGNITIVE AND FUNCTIONAL STATUS - GENERAL
DAILY ACTIVITIY SCORE: 16
DRESSING REGULAR UPPER BODY CLOTHING: A LITTLE
CLIMB 3 TO 5 STEPS WITH RAILING: A LOT
DAILY ACTIVITIY SCORE: 18
PATIENT BASELINE BEDBOUND: NO
MOVING FROM LYING ON BACK TO SITTING ON SIDE OF FLAT BED WITH BEDRAILS: A LITTLE
MOBILITY SCORE: 16
STANDING UP FROM CHAIR USING ARMS: A LITTLE
TOILETING: A LITTLE
HELP NEEDED FOR BATHING: A LOT
HELP NEEDED FOR BATHING: A LITTLE
CLIMB 3 TO 5 STEPS WITH RAILING: A LOT
MOVING TO AND FROM BED TO CHAIR: A LITTLE
MOVING FROM LYING ON BACK TO SITTING ON SIDE OF FLAT BED WITH BEDRAILS: A LITTLE
TURNING FROM BACK TO SIDE WHILE IN FLAT BAD: A LITTLE
WALKING IN HOSPITAL ROOM: A LITTLE
DRESSING REGULAR LOWER BODY CLOTHING: TOTAL
TURNING FROM BACK TO SIDE WHILE IN FLAT BAD: A LITTLE
DRESSING REGULAR LOWER BODY CLOTHING: A LITTLE
PERSONAL GROOMING: A LITTLE
TOILETING: A LITTLE
MOVING TO AND FROM BED TO CHAIR: A LITTLE
STANDING UP FROM CHAIR USING ARMS: A LITTLE
WALKING IN HOSPITAL ROOM: A LOT
PERSONAL GROOMING: A LITTLE
DRESSING REGULAR UPPER BODY CLOTHING: A LITTLE
MOBILITY SCORE: 17
EATING MEALS: A LITTLE

## 2025-06-23 ASSESSMENT — LIFESTYLE VARIABLES
PRESCIPTION_ABUSE_PAST_12_MONTHS: NO
SUBSTANCE_ABUSE_PAST_12_MONTHS: NO
AUDIT-C TOTAL SCORE: 0
HOW OFTEN DO YOU HAVE A DRINK CONTAINING ALCOHOL: NEVER
SKIP TO QUESTIONS 9-10: 1
HOW MANY STANDARD DRINKS CONTAINING ALCOHOL DO YOU HAVE ON A TYPICAL DAY: PATIENT DOES NOT DRINK
AUDIT-C TOTAL SCORE: 0
HOW OFTEN DO YOU HAVE 6 OR MORE DRINKS ON ONE OCCASION: NEVER

## 2025-06-23 ASSESSMENT — ENCOUNTER SYMPTOMS
PALPITATIONS: 0
CARDIOVASCULAR NEGATIVE: 1
ENDOCRINE NEGATIVE: 1
EYE DISCHARGE: 0
ARTHRALGIAS: 0
FEVER: 0
SPEECH DIFFICULTY: 0
COLOR CHANGE: 0
ABDOMINAL DISTENTION: 0
CHILLS: 0
AGITATION: 0
VOMITING: 0
EYES NEGATIVE: 1
WOUND: 1
SLEEP DISTURBANCE: 0
NAUSEA: 0
DYSURIA: 0
ACTIVITY CHANGE: 1
FACIAL ASYMMETRY: 0
LIGHT-HEADEDNESS: 1
PHOTOPHOBIA: 0
CONSTITUTIONAL NEGATIVE: 1
APNEA: 0
NEUROLOGICAL NEGATIVE: 1
WEAKNESS: 1
CONFUSION: 0
WHEEZING: 0
CHEST TIGHTNESS: 0
MUSCULOSKELETAL NEGATIVE: 1
SHORTNESS OF BREATH: 0
DIZZINESS: 1
GASTROINTESTINAL NEGATIVE: 1
DIARRHEA: 0
RESPIRATORY NEGATIVE: 1
PSYCHIATRIC NEGATIVE: 1

## 2025-06-23 ASSESSMENT — PAIN SCALES - GENERAL
PAINLEVEL_OUTOF10: 0 - NO PAIN
PAINLEVEL_OUTOF10: 9

## 2025-06-23 ASSESSMENT — PAIN - FUNCTIONAL ASSESSMENT
PAIN_FUNCTIONAL_ASSESSMENT: 0-10

## 2025-06-23 ASSESSMENT — PAIN DESCRIPTION - LOCATION: LOCATION: BACK

## 2025-06-23 NOTE — H&P
History Of Present Illness  Elliot Partida is a 74 y.o. male presenting with Urinary tract infection.    Patient is a 74-year-old male with a history of moderate aortic stenosis, osteomyelitis, DM, neuropathy, DVT, trigeminal neuralgia, and HTN.  Presented to the ED with complaints of dizziness, lightheadedness, weakness, described as the room spinning. reports that while watching television.  Patient denies shortness of breath, chest pain, blurred vision, or passing/ blacking out during episode.      ED course:  Vital Signs: T 36.8 °C (98.3 °F)  HR 78  /67  RR 18  O2 98 % None (Room air)     Labs: Glucose 136, sodium 142, potassium 3.2, BUN 18, creatinine 1.01, GFR 79, WBC 6.7, H/H8.9/28.3,  UA: 250 leukocyte esterase, 6-10 WBC, 1+ bacteria    Imaging:  CT head without IV contrast:No acute intracranial abnormality. Unchanged burden of moderate supratentorial chronic ischemic changes in left cerebellar hemisphere chronic lacunar infarct.  CT chest abdomen pelvis without IV contrast:Perivesical fat stranding and mild wall thickening of the urinary  bladder wall. Recommend correlation for cystitis.Unchanged mild bilateral hydronephrosis without hydroureter  suggesting a ureteropelvic junction obstruction. Small bilateral pleural effusions (left > right) which is unchanged  on the right and slightly smaller on the left compared to 06/05/2025. Scattered mild atelectasis without evidence of pneumonia.  CXR: Left lower lobe and possibly lingular subsegmental atelectasis.     Received Rocephin 1 gram and NS infusion 125ml/hr         Past Medical History  Past Medical History:   Diagnosis Date    Acute osteomyelitis of ankle and foot, left (Multi)     AMI (acute myocardial infarction) (Multi)     ASHD (arteriosclerotic heart disease)     At risk for falls     Cellulitis     left foot and ankle    COVID-19     Diabetes mellitus (Multi)     DVT (deep vein thrombosis) in pregnancy ([facility])     Fall risk  care plan declined     Hypertension     Meningioma (Multi)     Myocardial infarction (Multi)     Neuritis     Neuropathy     Osteoarthritis     Osteomyelitis     left foot    Pleural effusion     PVD (peripheral vascular disease)     Sprain of right knee 06/25/2015    Stroke (Multi)     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      FOOT RAY RESECTION Left 09/23/2024    L partial 5th ray resection (Dr. Spears, DPM)    FOOT SURGERY Left 09/27/2024    Delayed closure w/ graft application (Dr. Huynh, DPM)    INVASIVE VASCULAR PROCEDURE Bilateral 09/18/2024    Procedure: Lower Extremity Angiogram;  Surgeon: Teofilo Stoddard MD;  Location: [facility];  Service: Cardiovascular;  Laterality: Bilateral;    IRIDOTOMY / IRIDECTOMY Bilateral         Social History  He reports that he has never smoked. He has never been exposed to tobacco smoke. He has never used smokeless tobacco. He reports that he does not drink alcohol and does not use drugs.    Family History  Family History   Problem Relation Name Age of Onset    Heart disease Father      Colon cancer Other      Heart attack Other      Diabetes Other      Hypertension Other          Allergies  Patient has no known allergies.    Review of Systems   Constitutional:  Positive for activity change. Negative for chills and fever.   HENT:  Negative for congestion.    Eyes:  Negative for photophobia, discharge and visual disturbance.   Respiratory:  Negative for apnea, chest tightness, shortness of breath and wheezing.    Cardiovascular:  Negative for chest pain, palpitations and leg swelling.   Gastrointestinal:  Negative for abdominal distention, diarrhea, nausea and vomiting.   Genitourinary:   "Negative for dysuria.   Musculoskeletal:  Negative for arthralgias.   Skin:  Positive for wound. Negative for color change.        Anterior left ankle   Right heel  Coccyx    Neurological:  Positive for dizziness, weakness and light-headedness. Negative for syncope, facial asymmetry and speech difficulty.   Psychiatric/Behavioral:  Negative for agitation, confusion, sleep disturbance and suicidal ideas.         Physical Exam  Constitutional:       Appearance: He is ill-appearing.   HENT:      Head: Normocephalic and atraumatic.      Mouth/Throat:      Mouth: Mucous membranes are dry.   Eyes:      General: No scleral icterus.        Right eye: No discharge.         Left eye: No discharge.      Conjunctiva/sclera: Conjunctivae normal.      Pupils: Pupils are equal, round, and reactive to light.   Neck:      Vascular: Carotid bruit present.      Comments: Right carotid bruit  Cardiovascular:      Rate and Rhythm: Normal rate and regular rhythm.      Pulses: Normal pulses.      Heart sounds: Murmur heard.   Pulmonary:      Effort: Pulmonary effort is normal.      Breath sounds: Normal breath sounds.   Abdominal:      General: Abdomen is flat. There is no distension.      Palpations: Abdomen is soft. There is no mass.      Tenderness: There is no abdominal tenderness.   Musculoskeletal:         General: No swelling or tenderness.   Skin:     General: Skin is warm and dry.      Capillary Refill: Capillary refill takes 2 to 3 seconds.      Comments: Left foot missing 5th toe, skin intact  Left lower leg wound, see nursing images     Neurological:      Mental Status: He is alert.          Last Recorded Vitals  Blood pressure (!) 120/46, pulse 65, temperature 37.1 °C (98.8 °F), temperature source Temporal, resp. rate 18, height 1.753 m (5' 9\"), weight 88.4 kg (194 lb 14.2 oz), SpO2 94%.    Relevant Results  Scheduled medications  amLODIPine, 2.5 mg, oral, Daily  cefTRIAXone, 1 g, intravenous, q24h  enoxaparin, 90 mg, " subcutaneous, q12h  gabapentin, 300 mg, oral, TID  hydrALAZINE, 50 mg, oral, TID  traZODone, 50 mg, oral, Nightly      Continuous medications  sodium chloride 0.9%, 10 mL/hr      PRN medications  PRN medications: acetaminophen, alum-mag hydroxide-simeth, benzocaine-menthol, heparin flush, meclizine, melatonin, ondansetron, polyethylene glycol, sennosides-docusate sodium, sodium chloride 0.9%, sodium chloride 0.9%, traMADol   Latest Reference Range & Units 06/23/25 07:00   GLUCOSE 74 - 99 mg/dL 119 (H)   SODIUM 136 - 145 mmol/L 144   POTASSIUM 3.5 - 5.3 mmol/L 3.4 (L)   CHLORIDE 98 - 107 mmol/L 107   Bicarbonate 21 - 32 mmol/L 32   Anion Gap 10 - 20 mmol/L 8 (L)   Blood Urea Nitrogen 6 - 23 mg/dL 17   Creatinine 0.50 - 1.30 mg/dL 1.08   EGFR >60 mL/min/1.73m*2 72   Calcium 8.6 - 10.3 mg/dL 8.1 (L)   WBC 4.4 - 11.3 x10*3/uL 5.7   nRBC 0.0 - 0.0 /100 WBCs 0.0   RBC 4.50 - 5.90 x10*6/uL 2.94 (L)   HEMOGLOBIN 13.5 - 17.5 g/dL 8.0 (L)   HEMATOCRIT 41.0 - 52.0 % 26.0 (L)   MCV 80 - 100 fL 88   MCH 26.0 - 34.0 pg 27.2   MCHC 32.0 - 36.0 g/dL 30.8 (L)   RED CELL DISTRIBUTION WIDTH 11.5 - 14.5 % 17.0 (H)   Platelets 150 - 450 x10*3/uL 196   Neutrophils % 40.0 - 80.0 % 57.5   Immature Granulocytes %, Automated 0.0 - 0.9 % 0.4   Lymphocytes % 13.0 - 44.0 % 27.4   Monocytes % 2.0 - 10.0 % 11.8   Eosinophils % 0.0 - 6.0 % 2.5   Basophils % 0.0 - 2.0 % 0.4   Neutrophils Absolute 1.60 - 5.50 x10*3/uL 3.26   Immature Granulocytes Absolute, Automated 0.00 - 0.50 x10*3/uL 0.02   Lymphocytes Absolute 0.80 - 3.00 x10*3/uL 1.55   Monocytes Absolute 0.05 - 0.80 x10*3/uL 0.67   Eosinophils Absolute 0.00 - 0.40 x10*3/uL 0.14   Basophils Absolute 0.00 - 0.10 x10*3/uL 0.02   (H): Data is abnormally high  (L): Data is abnormally low  XR chest 1 view  Result Date: 6/23/2025  Left lower lobe and possibly lingular subsegmental atelectasis.   MACRO: None   Signed by: Brian Morris 6/23/2025 9:26 AM Dictation workstation:   [id number]    CT  chest abdomen pelvis wo IV contrast  Result Date: 6/22/2025  Perivesical fat stranding and mild wall thickening of the urinary bladder wall. Recommend correlation for cystitis.   Unchanged mild bilateral hydronephrosis without hydroureter suggesting a ureteropelvic junction obstruction.   Small bilateral pleural effusions (left > right) which is unchanged on the right and slightly smaller on the left compared to 06/05/2025. Scattered mild atelectasis without evidence of pneumonia.   MACRO: None.   Signed by: Jemal Driscoll 6/22/2025 9:41 PM Dictation workstation:   [id number]    CT head wo IV contrast  Result Date: 6/22/2025  No acute intracranial abnormality.   Unchanged burden of moderate supratentorial chronic ischemic changes in left cerebellar hemisphere chronic lacunar infarct.   MACRO: None.   Signed by: Jemal Driscoll 6/22/2025 9:32 PM Dictation workstation:   [id number]                Assessment & Plan  UTI (urinary tract infection)    Acute deep vein thrombosis (DVT) of femoral vein of left lower extremity    Ambulatory dysfunction    Benign essential HTN    DM type 2 with diabetic peripheral neuropathy    Generalized weakness    Hyperlipidemia    Dizziness    Wound infection      UTI  -UA: 250 leukocyte esterase, 6-10 WBC, 1+ bacteria,  -cefTRIAXone (Rocephin) 1 g in dextrose (iso) IV 50 mL   -UA culture pending  -Monitor for fever  -Monitor WBC  -Daily CBC    Ambulatory dysfunction  Weakness  Dizziness  -CXR: Left lower lobe and possibly lingular subsegmental atelectasis   -CTH: No acute intracranial abnormality. Unchanged burden of moderate supratentorial chronic ischemic changes in left cerebellar hemisphere chronic lacunar infarct.  -CT chest abdomen pelvis wo IV contrast Perivesical fat stranding and mild wall thickening of the urinary bladder wall. Recommend correlation for cystitis. Unchanged mild bilateral hydronephrosis without hydroureter suggesting a ureteropelvic junction obstruction.  Small bilateral pleural effusions (left > right) which is unchanged on the right and slightly smaller on the left compared to 06/05/2025.  Scattered mild atelectasis without evidence of pneumonia.  -Vascular US Carotid Artery duplex pending  -meclizine PRN  -sodium chloride 0.9% infusion 125/hr completed  -PT/OT evaluation, appreciate recommendations  -Orthostatic pressure  -Telemetry    HTN  DM type 2  Hyperlipidemia  -amLODIPine (Norvasc) tablet 2.5 mg   -gabapentin (Neurontin) capsule 300 mg   -hydrALAZINE (Apresoline) tablet 50 mg   -Monitor glucose  -Daily BMP      DVT  -enoxaparin (Lovenox) syringe 90 mg     Wound  -ID consult, appreciate recommendations  -Podiatry consulted appreciate recommendations  -Completed OP Vancomycin course   -Wound culture pending    GI ppx: PPI  DVT ppx: Lovenox   Fluids: As needed   Electrolytes: replace as needed  Nutrition: Regular   Adjuncts: PICC    Code Status:  Pt requires inpatient stay at this time.         JUDITH Landaverde  Attending Attestation:    Patient was seen and examined face to face, history and physical was taken personally at bedside the APRN-CNP, was present for the whole duration of the exam who participated in the documentation of this note. I performed the medical decision-making components (assessment and plan of care). I have reviewed the documentation and verified the findings in the note as written with additions or exceptions as stated in the body of this note.  Patient has history of bilateral hydroureter, hydronephrosis, has been stable however he was brought to emergency room secondary to dizziness and vertigo CT scan of brain negative for any acute finding.  Patient did have mild pyuria and CT scan of abdomen pelvis showing that he has stranding of fat tissue around the urinary bladder.  He was seen this morning laying in bed he feels better no dizziness no lightheadedness no vertigo, he has been having slight cough but no production but  denies any dysuria.  On exam there is mild to moderate bruit of the right carotid area, heart regular with systolic murmur, lungs some rhonchi good air movement, abdomen soft bowel sounds are present there is no tenderness over suprapubic area, no CVA tenderness.  Lower extremity no edema.  Patient dizziness is resolving, he does have urinary tract infection wound in the left lower extremity.  Continue with IV antibiotics, IV fluid, ultrasound of carotid bilaterally    Dr. Dickson Thakkar MD  Internal Medicine

## 2025-06-23 NOTE — CONSULTS
PODIATRY CONSULT NOTE    SERVICE DATE: 6/23/2025   SERVICE TIME:  1700     REASON FOR CONSULT: Left leg wound  REQUESTING PHYSICIAN: Dickson Thakkar MD  PRIMARY CARE PHYSICIAN: Marium Singh MD    Subjective   HPI:  Elliot Partida is a 74 y.o. male presenting with Urinary tract infection.     Patient is a 74-year-old male with a history of moderate aortic stenosis, osteomyelitis, DM, neuropathy, DVT, trigeminal neuralgia, and HTN.  Presented to the ED with complaints of dizziness, lightheadedness, weakness, described as the room spinning. reports that while watching television.  Patient denies shortness of breath, chest pain, blurred vision, or passing/ blacking out during episode.     Podiatry was consulted for left leg wound management. Patient has a long-standing history with podiatry (previous admission with liquefactive necrosis of the same wound.  Patient states that [facility] facility. He relates significant improvement to the size and appearance of the foot.    ROS: 10-point review of systems was performed and is otherwise negative except as noted in HPI.  PMH: Reviewed/documented below.  PSH:  Noncontributory except per HPI   FH: Reviewed and noncontributory   SOCIAL:  Reviewed/documented below.  ALLERGIES: Reviewed/documented below.  MEDS: Reviewed/documented below.  VS: Reviewed/documented below.    Medical History[1]  Surgical History[2]  Family History[3]  Social History[4]   Prescriptions Prior to Admission[5]     Medications:  Scheduled Meds: Scheduled Medications[6]  Continuous Infusions: Continuous Medications[7]  PRN Meds: PRN Medications[8]    Allergies as of 06/22/2025    (No Known Allergies)            Objective   PHYSICAL EXAM:  Physical Exam Performed:  Vitals:    06/23/25 1202   BP: 147/68   Pulse: 68   Resp: 17   Temp: 36.7 °C (98.1 °F)   SpO2: 95%     Body mass index is 28.78 kg/m².    Patient is AOx3 and in no acute distress.  Patient is alert and cooperative. Sitting comfortably in bed with dressing clean, dry and intact. Heel off-loading boots in place B/L.     Vascular: Non-palpable DP/PT pulses B/L. Mld pitting edema noted B/L. Hair growth absent B/L. CFT<5 to B/L hallux. Temperature is warm to warm from tibial tuberosity to distal digits B/L. No lymphatic streaking noted B/L.     Musculoskeletal: Gross active and passive ROM diminished to age and activity level. Moves all extremities spontaneously. No pain to palpation at feet B/L.     Neurological: Absent light touch sensation B/L. Pain stimuli absent B/L. Denies any numbness, burning or tingling.     Dermatologic: Nails 1-5 are thickened, elongated, discolored with subungual debris B/L. Skin appears diffusely xerotic B/L. Web spaces 1-4 B/L are clean, dry and intact. No rashes or nodules noted B/L. No hyperkeratotic tissue noted B/L.     Wound:  Measurements: 5.7 cm x 4.0 cm x 0.2 cm  Mixed wound base of fibrogranular tissue.  Minimal serosanguineous drainage today with regular borders.  Minimal jennyfer-wound maceration.  Mild jennyfer-wound erythema.  Evidence of mild ascending cellulitis or lymphangitis.  No palpable fluctuance. No malodor. Mild increased warmth.  No probe to bone or deep structures today  No tunneling or tracking.  No undermining. Skin edges irregular but intact.    LABS:   Results for orders placed or performed during the hospital encounter of 06/22/25 (from the past 24 hours)   POCT GLUCOSE   Result Value Ref Range    POCT Glucose 143 (H) 74 - 99 mg/dL   CBC and Auto Differential   Result Value Ref Range    WBC 6.7 4.4 - 11.3 x10*3/uL    nRBC 0.0 0.0 - 0.0 /100 WBCs    RBC 3.27 (L) 4.50 - 5.90 x10*6/uL    Hemoglobin 8.9 (L) 13.5 - 17.5 g/dL    Hematocrit 28.3 (L) 41.0 - 52.0 %    MCV 87 80 - 100 fL    MCH 27.2 26.0 - 34.0 pg    MCHC 31.4 (L) 32.0 - 36.0 g/dL    RDW 16.9 (H) 11.5 - 14.5 %    Platelets 231 150 - 450 x10*3/uL    Neutrophils % 60.9 40.0 - 80.0 %     Immature Granulocytes %, Automated 0.3 0.0 - 0.9 %    Lymphocytes % 25.4 13.0 - 44.0 %    Monocytes % 10.7 2.0 - 10.0 %    Eosinophils % 2.4 0.0 - 6.0 %    Basophils % 0.3 0.0 - 2.0 %    Neutrophils Absolute 4.05 1.60 - 5.50 x10*3/uL    Immature Granulocytes Absolute, Automated 0.02 0.00 - 0.50 x10*3/uL    Lymphocytes Absolute 1.69 0.80 - 3.00 x10*3/uL    Monocytes Absolute 0.71 0.05 - 0.80 x10*3/uL    Eosinophils Absolute 0.16 0.00 - 0.40 x10*3/uL    Basophils Absolute 0.02 0.00 - 0.10 x10*3/uL   Comprehensive Metabolic Panel   Result Value Ref Range    Glucose 136 (H) 74 - 99 mg/dL    Sodium 142 136 - 145 mmol/L    Potassium 3.2 (L) 3.5 - 5.3 mmol/L    Chloride 102 98 - 107 mmol/L    Bicarbonate 32 21 - 32 mmol/L    Anion Gap 11 10 - 20 mmol/L    Urea Nitrogen 18 6 - 23 mg/dL    Creatinine 1.01 0.50 - 1.30 mg/dL    eGFR 79 >60 mL/min/1.73m*2    Calcium 8.7 8.6 - 10.3 mg/dL    Albumin 3.2 (L) 3.4 - 5.0 g/dL    Alkaline Phosphatase 102 33 - 136 U/L    Total Protein 5.8 (L) 6.4 - 8.2 g/dL    AST 26 9 - 39 U/L    Bilirubin, Total 0.3 0.0 - 1.2 mg/dL    ALT 16 10 - 52 U/L   Magnesium   Result Value Ref Range    Magnesium 2.06 1.60 - 2.40 mg/dL   Troponin I, High Sensitivity, Initial   Result Value Ref Range    Troponin I, High Sensitivity 11 0 - 20 ng/L   B-Type Natriuretic Peptide   Result Value Ref Range    BNP 84 0 - 99 pg/mL   Lactate   Result Value Ref Range    Lactate 1.3 0.4 - 2.0 mmol/L   Protime-INR   Result Value Ref Range    Protime 11.8 9.8 - 12.4 seconds    INR 1.1 0.9 - 1.1   aPTT   Result Value Ref Range    aPTT 36 26 - 36 seconds   Troponin, High Sensitivity, 1 Hour   Result Value Ref Range    Troponin I, High Sensitivity 11 0 - 20 ng/L   Urinalysis with Reflex Culture and Microscopic   Result Value Ref Range    Color, Urine Light-Yellow Light-Yellow, Yellow, Dark-Yellow    Appearance, Urine Clear Clear    Specific Gravity, Urine 1.007 1.005 - 1.035    pH, Urine 6.5 5.0, 5.5, 6.0, 6.5, 7.0, 7.5, 8.0     Protein, Urine NEGATIVE NEGATIVE, 10 (TRACE), 20 (TRACE) mg/dL    Glucose, Urine Normal Normal mg/dL    Blood, Urine NEGATIVE NEGATIVE mg/dL    Ketones, Urine NEGATIVE NEGATIVE mg/dL    Bilirubin, Urine NEGATIVE NEGATIVE mg/dL    Urobilinogen, Urine Normal Normal mg/dL    Nitrite, Urine NEGATIVE NEGATIVE    Leukocyte Esterase, Urine 250 Tiffanie/uL (A) NEGATIVE   Microscopic Only, Urine   Result Value Ref Range    WBC, Urine 6-10 (A) 1-5, NONE /HPF    RBC, Urine 1-2 NONE, 1-2, 3-5 /HPF    Squamous Epithelial Cells, Urine 1-9 (SPARSE) Reference range not established. /HPF    Bacteria, Urine 1+ (A) NONE SEEN /HPF    Hyaline Casts, Urine OCCASIONAL (A) NONE /LPF   Blood Culture    Specimen: Peripheral Venipuncture; Blood culture   Result Value Ref Range    Blood Culture Loaded on Instrument - Culture in progress    Blood Culture    Specimen: Peripheral Venipuncture; Blood culture   Result Value Ref Range    Blood Culture Loaded on Instrument - Culture in progress    POCT GLUCOSE   Result Value Ref Range    POCT Glucose 113 (H) 74 - 99 mg/dL   Basic Metabolic Panel   Result Value Ref Range    Glucose 119 (H) 74 - 99 mg/dL    Sodium 144 136 - 145 mmol/L    Potassium 3.4 (L) 3.5 - 5.3 mmol/L    Chloride 107 98 - 107 mmol/L    Bicarbonate 32 21 - 32 mmol/L    Anion Gap 8 (L) 10 - 20 mmol/L    Urea Nitrogen 17 6 - 23 mg/dL    Creatinine 1.08 0.50 - 1.30 mg/dL    eGFR 72 >60 mL/min/1.73m*2    Calcium 8.1 (L) 8.6 - 10.3 mg/dL   CBC and Auto Differential   Result Value Ref Range    WBC 5.7 4.4 - 11.3 x10*3/uL    nRBC 0.0 0.0 - 0.0 /100 WBCs    RBC 2.94 (L) 4.50 - 5.90 x10*6/uL    Hemoglobin 8.0 (L) 13.5 - 17.5 g/dL    Hematocrit 26.0 (L) 41.0 - 52.0 %    MCV 88 80 - 100 fL    MCH 27.2 26.0 - 34.0 pg    MCHC 30.8 (L) 32.0 - 36.0 g/dL    RDW 17.0 (H) 11.5 - 14.5 %    Platelets 196 150 - 450 x10*3/uL    Neutrophils % 57.5 40.0 - 80.0 %    Immature Granulocytes %, Automated 0.4 0.0 - 0.9 %    Lymphocytes % 27.4 13.0 - 44.0 %     Monocytes % 11.8 2.0 - 10.0 %    Eosinophils % 2.5 0.0 - 6.0 %    Basophils % 0.4 0.0 - 2.0 %    Neutrophils Absolute 3.26 1.60 - 5.50 x10*3/uL    Immature Granulocytes Absolute, Automated 0.02 0.00 - 0.50 x10*3/uL    Lymphocytes Absolute 1.55 0.80 - 3.00 x10*3/uL    Monocytes Absolute 0.67 0.05 - 0.80 x10*3/uL    Eosinophils Absolute 0.14 0.00 - 0.40 x10*3/uL    Basophils Absolute 0.02 0.00 - 0.10 x10*3/uL   POCT GLUCOSE   Result Value Ref Range    POCT Glucose 115 (H) 74 - 99 mg/dL   Carotid duplex bilateral   Result Value Ref Range    BSA 2.07 m2   POCT GLUCOSE   Result Value Ref Range    POCT Glucose 121 (H) 74 - 99 mg/dL      Lab Results   Component Value Date    HGBA1C 5.8 (H) 06/06/2025      Lab Results   Component Value Date    CRP 3.85 (H) 05/20/2025      Lab Results   Component Value Date    SEDRATE 12 05/11/2025        Results from last 7 days   Lab Units 06/23/25  0700   WBC AUTO x10*3/uL 5.7   RBC AUTO x10*6/uL 2.94*   HEMOGLOBIN g/dL 8.0*   HEMATOCRIT % 26.0*     Results from last 7 days   Lab Units 06/23/25  0700 06/22/25  1949   SODIUM mmol/L 144 142   POTASSIUM mmol/L 3.4* 3.2*   CHLORIDE mmol/L 107 102   CO2 mmol/L 32 32   BUN mg/dL 17 18   CREATININE mg/dL 1.08 1.01   CALCIUM mg/dL 8.1* 8.7   MAGNESIUM mg/dL  --  2.06   BILIRUBIN TOTAL mg/dL  --  0.3   ALT U/L  --  16   AST U/L  --  26     Results from last 7 days   Lab Units 06/22/25  2228   COLOR U  Light-Yellow   APPEARANCE U  Clear   PH U  6.5   SPEC GRAV UR  1.007   PROTEIN U mg/dL NEGATIVE   BLOOD UR mg/dL NEGATIVE   NITRITE U  NEGATIVE   WBC UR /HPF 6-10*   BACTERIA UR /HPF 1+*       IMAGING REVIEW:  Imaging  XR chest 1 view  Result Date: 6/23/2025  Left lower lobe and possibly lingular subsegmental atelectasis.   MACRO: None   Signed by: Brian Morris 6/23/2025 9:26 AM Dictation workstation:   [id number]    CT chest abdomen pelvis wo IV contrast  Result Date: 6/22/2025  Perivesical fat stranding and mild wall thickening of the  urinary bladder wall. Recommend correlation for cystitis.   Unchanged mild bilateral hydronephrosis without hydroureter suggesting a ureteropelvic junction obstruction.   Small bilateral pleural effusions (left > right) which is unchanged on the right and slightly smaller on the left compared to 06/05/2025. Scattered mild atelectasis without evidence of pneumonia.   MACRO: None.   Signed by: Jemal Driscoll 6/22/2025 9:41 PM Dictation workstation:   [id number]    CT head wo IV contrast  Result Date: 6/22/2025  No acute intracranial abnormality.   Unchanged burden of moderate supratentorial chronic ischemic changes in left cerebellar hemisphere chronic lacunar infarct.   MACRO: None.   Signed by: Jemal Driscoll 6/22/2025 9:32 PM Dictation workstation:   [id number]      Cardiology, Vascular, and Other Imaging  Carotid duplex bilateral  Result Date: 6/23/2025  Preliminary Cardiology Report           [place] [address]  Tel [phone] and Fax [phone]      Preliminary Vascular Lab Report  [facility] CAROTID ARTERY DUPLEX BILATERAL  Patient Name:      SHEABRAYAN SEGAL Reading Physician:  [id number] Eli De La Vega MD Study Date:        6/23/2025        Ordering Physician: [id number] DAMEON ALVAREZ MRN/PID:           [id number]         Technologist:       Oumou Valdez RDMS, RVT Accession#:        [id number]     Technologist 2: Date of Birth/Age: 1950         Encounter#:         [id number] Gender:            M Admission Status:  Inpatient        Location Performed: [facility]  Diagnosis/ICD: Dizziness and giddiness-R42  Patient History: Syncope, HTN and CVA. Smoker:          Never. Diabetes:        Yes. >20 years.  PRELIMINARY CONCLUSIONS: Right Carotid: Today's exam was limited due to the body habitus of the patient. Findings are consistent with less than 50% stenosis of the right proximal internal  carotid artery. Laminar flow seen by color Doppler. Right external carotid artery appears patent with no evidence of stenosis. No evidence of hemodynamically significant stenosis of the right common carotid artery. The right vertebral artery is patent with antegrade flow. No evidence of hemodynamically significant stenosis in the right subclavian artery. Left Carotid: Today's exam was limited due to the body habitus of the patient and unable to turn head to right. Findings are consistent with less than 50% stenosis of the left proximal internal carotid artery. Left external carotid artery appears patent with no evidence of stenosis. No evidence of hemodynamically significant stenosis of the left common carotid artery. The left vertebral artery is patent with antegrade flow. No evidence of hemodynamically significant stenosis in the left subclavian artery.  Imaging & Doppler Findings: Right Plaque Morph: The proximal right internal carotid artery demonstrates calcified plaque. Left Plaque Morph: The proximal left internal carotid artery demonstrates calcified plaque. The distal left common carotid artery demonstrates heterogenous plaque.   Right                        Left   PSV      EDV                PSV       cm/s 25 cm/s   CCA P    118 cm/s 18 cm/s 99 cm/s  19 cm/s   CCA D    110 cm/s 19 cm/s 107 cm/s 19 cm/s   ICA P    106 cm/s 18 cm/s 103 cm/s 35 cm/s   ICA M    81 cm/s  18 cm/s 101 cm/s 39 cm/s   ICA D    40 cm/s  10 cm/s 148 cm/s 18 cm/s    ECA     107 cm/s 13 cm/s 78 cm/s  31 cm/s Vertebral  49 cm/s  10 cm/s 129 cm/s 0 cm/s  Subclavian 168 cm/s 12 cm/s  Right                                  Left   PSV    Waveform                       PSV    Waveform 129 cm/s          Subclavian Proximal 168 cm/s                Right Left ICA/CCA Ratio  1.1  1.0   VASCULAR PRELIMINARY REPORT completed by Oumou Valdez RDMS, RVT on 6/23/2025 at 11:34:39 AM  ** Final **               Assessment/Plan   ASSESSMENT &  PLAN:  #Chronic osteomyelitis, left tibia [M86.662]  #Chronic non-pressure ulceration, left leg, down to subcutaneous tissue [L97.822]  #Left lower extremity cellulitis [L03.116]  #Generalized edema [R60.0]  #Diabetes Mellitus, Type II, with polyneuropathy [E11.42]  - Patient was seen and evaluated; all findings were discussed and all questions were answered to patient's satisfaction.  - Charts, labs, vitals and imaging all reviewed.  - Imaging: MRI - no evidence of active osteomyelitic process  - Labs: WBC 5.7, Glucose 119  - Wound culture: pending  - Blood culture: pending     Plan:  - Abx: IV Rocephin  - No plans for surgical intervention during this visit. Patient is being treated with local wound care as an outpatient. Wound appearance has substantially improved since initial presentation. Wound borders are now regular with significantly less drainage.  - Upon obtaining verbal consent, excisional debridement of left leg wound was carried out down to and including the level of subcutaneous tissue without incident utilizing a #15 blade (post-debridement measurements: 5.8 cm x 4.1 cm x 0.2 cm)  - Dressings: Xeroform to wound base, covered with dry gauze, Abd pads, Kerlix and tape. May change xeroform to betadine-soaked 4x4 if drainage increases.  - Nursing staff is able to change/reinforce dressing if & as necessary until next day’s dressing change. Thank you.  - Podiatry will continue to follow while in house.     Weightbearing: WBAT to the left lower extremity.  Discharge: Pt to follow up 1 week after discharge. Patient will need to call in order to arrange transport from SNF.     Ronald Christianson DPM  Podiatric Medicine & Surgery  Contact via Haiku with any questions/concerns         SIGNATURE: Ronald Christianson DPM PATIENT NAME: Elliot Partida   DATE: June 23, 2025 MRN: [id number]   TIME: 5:32 PM CONTACT: Haiku message           [1]   Past Medical History:  Diagnosis Date    Acute osteomyelitis of ankle and foot,  left (Multi)     AMI (acute myocardial infarction) (Multi)     ASHD (arteriosclerotic heart disease)     At risk for falls     Cellulitis     left foot and ankle    COVID-19     Diabetes mellitus (Multi)     DVT (deep vein thrombosis) in pregnancy ([facility])     Fall risk care plan declined     Hypertension     Meningioma (Multi)     Myocardial infarction (Multi)     Neuritis     Neuropathy     Osteoarthritis     Osteomyelitis     left foot    Pleural effusion     PVD (peripheral vascular disease)     Sprain of right knee 06/25/2015    Stroke (Multi)     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      FOOT RAY RESECTION Left 09/23/2024    L partial 5th ray resection (Dr. Spears DPBRAYAN)    FOOT SURGERY Left 09/27/2024    Delayed closure w/ graft application (Dr. Huynh, DPM)    INVASIVE VASCULAR PROCEDURE Bilateral 09/18/2024    Procedure: Lower Extremity Angiogram;  Surgeon: Teofilo Stoddard MD;  Location: [facility];  Service: Cardiovascular;  Laterality: Bilateral;    IRIDOTOMY / IRIDECTOMY Bilateral    [3]   Family History  Problem Relation Name Age of Onset    Heart disease Father      Colon cancer Other      Heart attack Other      Diabetes Other      Hypertension Other     [4]   Social History  Tobacco Use    Smoking status: Never     Passive exposure: Never    Smokeless tobacco: Never   Vaping Use    Vaping status: Never Used   Substance Use Topics    Alcohol use: Never     Comment: former    Drug use: Never   [5]   Medications Prior to Admission   Medication Sig Dispense Refill Last Dose/Taking    acetaminophen (Tylenol) 325 mg tablet Take 2 tablets (650 mg) by mouth every 4 hours if needed for mild pain (1 - 3), moderate pain (4 - 6), headaches  or fever (temp greater than 38.0 C) (greater than or equal to 38 C).   Past Week    amLODIPine (Norvasc) 2.5 mg tablet Take 1 tablet (2.5 mg) by mouth once daily.   2025 Morning    enoxaparin (Lovenox) 80 mg/0.8 mL syringe Inject 0.9 mL (90 mg) under the skin every 12 hours. (Patient taking differently: Inject 0.8 mL (80 mg) under the skin every 12 hours.)   2025 Morning    gabapentin (Neurontin) 300 mg capsule Take 1 capsule (300 mg) by mouth 3 times a day.   2025 Noon    hydrALAZINE (Apresoline) 50 mg tablet Take 1 tablet (50 mg) by mouth 3 times a day.   2025 Noon    meclizine (Antivert) 25 mg tablet Take 1 tablet (25 mg) by mouth every 6 hours if needed for dizziness.   Past Month    ondansetron ODT (Zofran-ODT) 4 mg disintegrating tablet Dissolve 1 tablet (4 mg) in the mouth every 8 hours if needed for nausea or vomiting. 15 tablet 0 Past Month    oxygen (O2) gas therapy Inhale 2 L/min at 120,000 mL/hr if needed (desaturation at hs).   2025 Morning    traZODone (Desyrel) 50 mg tablet Take 1 tablet (50 mg) by mouth once daily at bedtime.   Past Week    Blood glucose monitoring meter To check blood sugar every morning before breakfast 1 each 0 Unknown    glucagon HCL (Glucagon, HCl, Emergency Kit) 1 mg recon soln Inject as directed.   Unknown    sennosides-docusate sodium (Ann-Colace) 8.6-50 mg tablet Take 1 tablet by mouth as needed at bedtime for constipation. (Patient taking differently: Take 1 tablet by mouth 2 times a day as needed for constipation.)   Unknown    [] vancomycin (Xellia) IVPB Infuse 350 mL (1.75 g) at 200 mL/hr over 105 minutes into a venous catheter once every 24 hours for 26 days. (Patient not taking: Reported on 2025) 9100 mL 0 Not Taking   [6] amLODIPine, 2.5 mg, oral, Daily  cefTRIAXone, 1 g, intravenous, q24h  enoxaparin, 90 mg, subcutaneous, q12h  gabapentin, 300 mg, oral, TID  hydrALAZINE, 50 mg, oral, TID  traZODone, 50 mg, oral, Nightly  [7]  sodium chloride 0.9%, 10 mL/hr  [8] PRN medications: acetaminophen, alum-mag hydroxide-simeth, benzocaine-menthol, heparin flush, meclizine, melatonin, ondansetron, oxygen, polyethylene glycol, sennosides-docusate sodium, sodium chloride 0.9%, sodium chloride 0.9%, traMADol

## 2025-06-23 NOTE — PROGRESS NOTES
Physical Therapy    Physical Therapy Evaluation & Treatment    Patient Name: Elliot Partida  MRN: [id number]  Department: [facility]  Room: [address] [place], [place] [address]  Today's Date: 6/23/2025   Time Calculation  Start Time: 1138  Stop Time: 1215  Time Calculation (min): 37 min    Assessment/Plan   PT Assessment  PT Assessment Results: Decreased endurance, Decreased mobility  Rehab Prognosis: Good  Barriers to Discharge Home: No anticipated barriers  Evaluation/Treatment Tolerance: Patient tolerated treatment well  Medical Staff Made Aware: Yes  Strengths: Ability to acquire knowledge, Premorbid level of function  Barriers to Participation: Comorbidities  End of Session Communication: Bedside nurse  Assessment Comment: 74 M admitted with dizziness.  Has a UTI.  Pt reluctant to participate, then agreed.  Currently reqires mod level intensity therapy.  Expect him to return to NH and continue with PT there.  End of Session Patient Position: Bed, 2 rail up, Alarm on   IP OR SWING BED PT PLAN  Inpatient or Swing Bed: Inpatient  PT Plan  Treatment/Interventions: Bed mobility, Transfer training, Gait training, Balance training, Strengthening, Endurance training, Therapeutic exercise, Therapeutic activity, Home exercise program  PT Plan: Ongoing PT  PT Frequency: 3 times per week  PT Discharge Recommendations: Moderate intensity level of continued care  PT Recommended Transfer Status: Assist x1  PT - OK to Discharge: Yes (Once medically stable)    PT Visit Info:  PT Received On: 06/23/25  General Visit Information:  General  Reason for Referral: Impaired Mobility. Dizzy.  Referred By: Chuy Muniz D.O.  Past Medical History Relevant to Rehab: Diagnosis Date Comment Source  Acute osteomyelitis of ankle and foot, left (Multi)     AMI (acute myocardial infarction) (Multi)     ASHD (arteriosclerotic heart disease)     At risk for falls     Cellulitis  left foot and ankle   COVID-19     Diabetes mellitus (Multi)     DVT (deep vein thrombosis) in  pregnancy ([facility])     Fall risk care plan declined     Hypertension     Meningioma (Multi)     Myocardial infarction (Multi)     Neuritis     Neuropathy     Osteoarthritis     Osteomyelitis  left foot   Pleural effusion     PVD (peripheral vascular disease)     Sprain of right knee 06/25/2015    Stroke (Multi)     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[facility])     TIA (transient ischemic attack)     Tinea pedis of both feet     Trigeminal neuralgia     Weakness  Family/Caregiver Present: No  Prior to Session Communication: Bedside nurse (Pt going to have ultrasound test on the neck in the room.)  Patient Position Received: Bed, 2 rail up  Preferred Learning Style: verbal  General Comment: 74 M  Home Living:  Home Living  Type of Home: Skilled Nursing facility ([facility])  Home Adaptive Equipment: Walker rolling or standard  Home Layout: One level  Home Access: No concerns  Home Living Comments: Pt currently living at [facility].  Prior Level of Function:  Prior Function Per Pt/Caregiver Report  Level of [place]: Independent with ADLs and functional transfers, Independent with homemaking with ambulation  Receives Help From: Other (Comment) (Staff)  Ambulatory Assistance: Independent  Vocational: Retired  Leisure: TV  Prior Function Comments: Reports [place]  Precautions:  Precautions  Hearing/Visual Limitations: WFL  Medical Precautions: Fall precautions  Precautions Comment: IV, PICC,    Objective   Pain:  Pain Assessment  Pain Assessment: 0-10  0-10 (Numeric) Pain Score: 0 - No pain  Cognition:  Cognition  Overall Cognitive Status: Within Functional Limits  Orientation Level: Oriented X4    General Assessments:  General Observation  General Observation: 74 M pleasant and cooperative.  He is having dizziness that does not change with postion changes or head position changes.  He only required min a with legs to ge tback into bed.  Pt did not want to sit up in chair     Sensation  Light Touch: No apparent  deficits    Strength  Strength Comments: 4/5 B UE and B LE  Strength  Strength Comments: 4/5 B UE and B LE    Perception  Inattention/Neglect: Appears intact    Coordination  Movements are Fluid and Coordinated: Yes  Finger to Nose: Intact  Rapid Alternating Movements: Intact  Heel to Shin: Intact  Finger to Target: Intact (Each finger to thumb)    Postural Control  Postural Control: Within Functional Limits    Static Sitting Balance  Static Sitting-Balance Support: Bilateral upper extremity supported, Feet supported  Static Sitting-Level of Assistance: Close supervision    Static Standing Balance  Static Standing-Balance Support: Bilateral upper extremity supported (Rolling walker)  Static Standing-Level of Assistance: Contact guard, Minimum assistance  Static Standing-Comment/Number of Minutes: No loss of balance  Dynamic Standing Balance  Dynamic Standing-Balance Support: Bilateral upper extremity supported  Dynamic Standing-Level of Assistance: Contact guard  Dynamic Standing-Balance:  (Side stepping with walker.  Verbal instructions given)  Dynamic Standing-Comments: No loss of balance  Functional Assessments:  Bed Mobility  Bed Mobility: Yes  Bed Mobility 1  Bed Mobility 1: Supine to sitting  Level of Assistance 1: Close supervision  Bed Mobility 2  Bed Mobility  2: Sitting to supine  Level of Assistance 2: Minimum assistance (With legs)  Bed Mobility 3  Bed Mobility 3: Scooting  Level of Assistance 3: Close supervision  Bed Mobility Comments 3: No nystagmus with transfers.,  Dizziness the same    Transfers  Transfer: Yes  Transfer 1  Transfer From 1: Sit to  Transfer to 1: Stand  Technique 1: Sit to stand  Transfer Device 1: Gait belt, Walker  Transfer Level of Assistance 1: Minimum assistance  Trials/Comments 1: No loss of balance  Transfers 2  Transfer From 2: Stand to  Transfer to 2: Sit  Technique 2: Stand to sit  Transfer Device 2: Gait belt  Transfer Level of Assistance 2: Minimum  assistance  Trials/Comments 2: dizziness persists.  No loss of  balance,  No nystagmus with transfer    Ambulation/Gait Training  Ambulation/Gait Training Performed: Yes  Ambulation/Gait Training 1  Surface 1: Level tile  Device 1: Rolling walker  Gait Support Devices: Gait belt  Assistance 1: Minimum assistance  Quality of Gait 1: Decreased step length  Comments/Distance (ft) 1: side stepping up toward heaed of bed  Extremity/Trunk Assessments:  RUE   RUE : Within Functional Limits  LUE   LUE: Within Functional Limits  RLE   RLE : Within Functional Limits  LLE   LLE : Within Functional Limits  Treatments:  Bed Mobility  Bed Mobility: Yes  Bed Mobility 1  Bed Mobility 1: Supine to sitting  Level of Assistance 1: Close supervision  Bed Mobility 2  Bed Mobility  2: Sitting to supine  Level of Assistance 2: Minimum assistance (With legs)  Bed Mobility 3  Bed Mobility 3: Scooting  Level of Assistance 3: Close supervision  Bed Mobility Comments 3: No nystagmus with transfers.,  Dizziness the same    Ambulation/Gait Training  Ambulation/Gait Training Performed: Yes  Ambulation/Gait Training 1  Surface 1: Level tile  Device 1: Rolling walker  Gait Support Devices: Gait belt  Assistance 1: Minimum assistance  Quality of Gait 1: Decreased step length  Comments/Distance (ft) 1: side stepping up toward heaed of bed  Transfers  Transfer: Yes  Transfer 1  Transfer From 1: Sit to  Transfer to 1: Stand  Technique 1: Sit to stand  Transfer Device 1: Gait belt, Walker  Transfer Level of Assistance 1: Minimum assistance  Trials/Comments 1: No loss of balance  Transfers 2  Transfer From 2: Stand to  Transfer to 2: Sit  Technique 2: Stand to sit  Transfer Device 2: Gait belt  Transfer Level of Assistance 2: Minimum assistance  Trials/Comments 2: dizziness persists.  No loss of  balance,  No nystagmus with transfer  Outcome Measures:  [facility] Basic Mobility  Turning from your back to your side while in a flat bed without using bedrails: A  little  Moving from lying on your back to sitting on the side of a flat bed without using bedrails: A little  Moving to and from bed to chair (including a wheelchair): A little  Standing up from a chair using your arms (e.g. wheelchair or bedside chair): A little  To walk in hospital room: A little  Climbing 3-5 steps with railing: A lot  Basic Mobility - Total Score: 17    Encounter Problems       Encounter Problems (Active)       Balance       LTG - Patient will maintain balance to allow for safe mobility with close supervision       Start:  06/23/25    Expected End:  07/07/25               Mobility       STG - Patient will ambulate with approp a device  feet with close supervision       Start:  06/23/25    Expected End:  07/07/25               PT Transfers       STG - Patient will perform bed mobility independently       Start:  06/23/25    Expected End:  07/07/25            STG - Patient will transfer sit to and from stand with close supervision       Start:  06/23/25    Expected End:  07/07/25               Pain - Adult              Education Documentation  Body Mechanics, taught by Jacinta Schwarz PT at 6/23/2025  2:15 PM.  Learner: Patient  Readiness: Acceptance  Method: Explanation, Demonstration  Response: Demonstrated Understanding, Verbalizes Understanding    Home Exercise Program, taught by Jacinta Schwarz PT at 6/23/2025  2:15 PM.  Learner: Patient  Readiness: Acceptance  Method: Explanation, Demonstration  Response: Demonstrated Understanding, Verbalizes Understanding    Mobility Training, taught by Jacinta Schwarz PT at 6/23/2025  2:15 PM.  Learner: Patient  Readiness: Acceptance  Method: Explanation, Demonstration  Response: Demonstrated Understanding, Verbalizes Understanding    Precautions, taught by Jacinta Schwarz PT at 6/23/2025  2:15 PM.  Learner: Patient  Readiness: Acceptance  Method: Explanation, Demonstration  Response: Demonstrated Understanding, Verbalizes  Understanding    Body Mechanics, taught by Jacinta Schwarz, PT at 6/23/2025  2:15 PM.  Learner: Patient  Readiness: Acceptance  Method: Explanation, Demonstration  Response: Demonstrated Understanding, Verbalizes Understanding    ADL Training, taught by Jacinta Schwarz, PT at 6/23/2025  2:15 PM.  Learner: Patient  Readiness: Acceptance  Method: Explanation, Demonstration  Response: Demonstrated Understanding, Verbalizes Understanding    Education Comments  No comments found.

## 2025-06-23 NOTE — CONSULTS
"Nutrition Initial Assessment:   Nutrition Assessment    Reason for Assessment: Admission nursing screening (MST=3 and Stage 3 or 4 Pressure Injury or Multiple Non-Healing Wounds)    Patient is a 74 y.o. male presenting with UTI (urinary tract infection).    PMH: T2DM, generalized weakness, HPL, wound infection, HTN, DVT.    Nutrition History:  Energy Intake: Good > 75 %  Pain affecting nutrition status: N/A  Food and Nutrient History: Attempted to see patient at bedside. Patient was sleeping, and nursing staff reported he had a rough morning. Per nursing, the patient is currently eating well. This dietitian has previously spoken with the patient, who reported not liking oral nutrition supplements (ONS). Will suggest a multivitamin and/or zinc and vitamin C supplements to support wound healing as appropriate.  Food Allergy:  (NKFA)       Anthropometrics:  Height: 175.3 cm (5' 9\")   Weight: 88.4 kg (194 lb 14.2 oz)   BMI (Calculated): 28.77  IBW/kg (Dietitian Calculated): 73 kg  Percent of IBW: 122 %       Weight History:   Wt Readings from Last 15 Encounters:   06/23/25 88.4 kg (194 lb 14.2 oz)   06/08/25 90.6 kg (199 lb 11.8 oz)   06/06/25 86.6 kg (191 lb)   06/05/25 86.6 kg (191 lb)   06/03/25 89.5 kg (197 lb 5 oz)   05/31/25 89.4 kg (197 lb)   05/30/25 89.4 kg (197 lb)   05/23/25 91.6 kg (201 lb 15.1 oz)   05/16/25 94.4 kg (208 lb 1.8 oz)   05/05/25 89.4 kg (197 lb)   04/01/25 89.4 kg (197 lb)   02/27/25 89.4 kg (197 lb)   02/11/25 89.1 kg (196 lb 6.9 oz)   01/17/25 89.8 kg (198 lb)   12/23/24 91.2 kg (201 lb 1 oz)       Weight Change %:  Weight History / % Weight Change: 06/23/25 (88.4 kg) to 06/08/25 (90.6 kg), -2.4% loss in ~2 weeks.  06/23/25 (88.4 kg) to 05/23/25 (91.6 kg), -3.5% loss in ~1 month.  06/23/25 (88.4 kg) to 04/01/25 (89.4 kg), -1.1% loss in ~3 months. 06/23/25 (88.4 kg) to 12/23/24 (91.2 kg), -3.1% loss in ~6 months.    Nutrition Focused Physical Exam Findings:    Subcutaneous Fat Loss:   Defer " Subcutaneous Fat Loss Assessment: Defer all  Defer All Reason: Patient sleeping  Muscle Wasting:  Defer Muscle Wasting Assessment: Defer all  Edema:  Edema Location: LUE Edema: Deep pitting, indentation remains for a short time;  LLE Edema: Mild pitting, slight indentation  Physical Findings:  Skin: Positive (Pressure injury to the coccyx and traumatic skin tear to the posterior left long finger (D3).)    Nutrition Significant Labs:  CBC Trend:   Results from last 7 days   Lab Units 06/24/25  0806 06/24/25  0105 06/23/25  0700 06/22/25 1949   WBC AUTO x10*3/uL 9.7 8.5 5.7 6.7   RBC AUTO x10*6/uL 2.91* 3.09* 2.94* 3.27*   HEMOGLOBIN g/dL 8.0* 8.5* 8.0* 8.9*   HEMATOCRIT % 26.1* 27.1* 26.0* 28.3*   MCV fL 90 88 88 87   PLATELETS AUTO x10*3/uL 207 217 196 231    , BMP Trend:   Results from last 7 days   Lab Units 06/24/25  0806 06/24/25 0105 06/23/25  0700 06/22/25 1949   GLUCOSE mg/dL 97 111* 119* 136*   CALCIUM mg/dL 8.1* 8.3* 8.1* 8.7   SODIUM mmol/L 142 141 144 142   POTASSIUM mmol/L 3.7 3.8 3.4* 3.2*   CO2 mmol/L 30 30 32 32   CHLORIDE mmol/L 107 105 107 102   BUN mg/dL 20 18 17 18   CREATININE mg/dL 1.31* 1.24 1.08 1.01    , A1C:  Lab Results   Component Value Date    HGBA1C 5.8 (H) 06/06/2025   , BG POCT trend:   Results from last 7 days   Lab Units 06/24/25  0752 06/23/25  1931 06/23/25  1629 06/23/25  0703 06/23/25  0101   POCT GLUCOSE mg/dL 95 129* 121* 115* 113*     Nutrition Specific Medications:  Scheduled medications  Scheduled Medications[1]  Continuous medications  Continuous Medications[2]    I/O:    ;      Dietary Orders (From admission, onward)       Start     Ordered    06/23/25 0116  May Participate in Room Service  ( ROOM SERVICE MAY PARTICIPATE)  Once        Question:  .  Answer:  Yes    06/23/25 0115    06/23/25 0007  Adult diet Regular  Diet effective now        Question:  Diet type  Answer:  Regular    06/23/25 0006                Estimated Needs:   Total Energy Estimated Needs in 24  hours (kCal): 2190 kCal  Method for Estimating Needs: IBW / 30 kcal  Total Protein Estimated Needs in 24 Hours (g):  ()  Method for Estimating 24 Hour Protein Needs: IBW / 1.3-1.5 g  Total Fluid Estimated Needs in 24 Hours (mL): 2200 mL  Method for Estimating 24 Hour Fluid Needs: 1 ml/kcal or per medical team. Hx of fluid restriction  Patient on Order Fluid Restriction: No        Nutrition Diagnosis        Nutrition Diagnosis  Patient has Nutrition Diagnosis: Yes  Diagnosis Status (1): New  Nutrition Diagnosis 1: Increased nutrient needs  Related to (1): Increased metabolic demand  As Evidenced by (1): coccyx/gluteal cleft P.I. and L ankle wound  Additional Assessment Information (1): Protein  Additional Nutrition Diagnosis: Diagnosis 2       Nutrition Interventions/Recommendations   Nutrition prescription for oral nutrition    Nutrition Recommendations:  Individualized Nutrition Prescription Provided for : Regular diet and Vitamin supplament (zinc, Vitamin C, and MVI)    Nutrition Interventions/Goals:   Meals and Snacks: General healthful diet  Vitamin and Mineral Supplement Therapy: Vitamin C supplement therapy, Zinc supplement therapy, Multivitamin multimineral supplement therapy  Coordination of Care with Providers: Provider, Nursing (SARAH Landaverde-CNP via secure chat; Hilaria Foster RN)      Education Documentation  Pt sleeping.        Nutrition Monitoring and Evaluation   Intake / Amount of food: Consumes at least 75% or more of meals/snacks/supplements    Body Weight: Body weight - Maintain stable weight         Skin Finding: Impaired wound healing - Improved wound healing    Goal Status: New goal(s) identified    Time Spent (min): 75 minutes              [1] amLODIPine, 2.5 mg, oral, Daily  cefTRIAXone, 1 g, intravenous, q24h  enoxaparin, 90 mg, subcutaneous, q12h  gabapentin, 300 mg, oral, TID  hydrALAZINE, 50 mg, oral, TID  traZODone, 50 mg, oral, Nightly  vancomycin, 1,500 mg, intravenous,  q24h     [2] sodium chloride 0.9%, 10 mL/hr, Last Rate: 10 mL/hr (06/24/25 0605)

## 2025-06-23 NOTE — PROGRESS NOTES
06/23/25 1306   Discharge Planning   Living Arrangements Other (Comment)  ([facility] SNF (PT/OT))   Support Systems Family members   Assistance Needed Patient currently at [facility] SNF for PT/OT. Patient and family would like to return to [facility]. Prior to [facility], patient was at home with his sister and niece. Per staff at [facility], patient is dependent on ADL's, does not get out of bed.   Type of Residence Skilled nursing facility   Who is requesting discharge planning? Provider   Home or Post Acute Services Post acute facilities (Rehab/SNF/etc)   Type of Post Acute Facility Services Skilled nursing   Expected Discharge Disposition SNF  (Return to [facility].)   Does the patient need discharge transport arranged? Yes   RoundTrip coordination needed? Yes   Has discharge transport been arranged? No   Financial Resource Strain   How hard is it for you to pay for the very basics like food, housing, medical care, and heating? Not hard   Housing Stability   In the last 12 months, was there a time when you were not able to pay the mortgage or rent on time? N   In the past 12 months, how many times have you moved where you were living? 0   At any time in the past 12 months, were you homeless or living in a shelter (including now)? N   Transportation Needs   In the past 12 months, has lack of transportation kept you from medical appointments or from getting medications? no   In the past 12 months, has lack of transportation kept you from meetings, work, or from getting things needed for daily living? No   Patient Choice   Provider Choice list and CMS website (https://medicare.gov/care-compare#search) for post-acute Quality and Resource Measure Data were provided and reviewed with: Family;Patient   Patient / Family choosing to utilize agency / facility established prior to hospitalization Yes   Stroke Family Assessment   Stroke Family Assessment Needed No    Intensity of Service   Intensity of Service 0-30 min

## 2025-06-23 NOTE — PROGRESS NOTES
Occupational Therapy                 Therapy Communication Note    Patient Name: Elliot Partida  MRN: [id number]  Department: [facility]  Room: [address] [place] [address]-A  Today's Date: 6/23/2025     Discipline: Occupational Therapy    Missed Visit: OT Missed Visit: Yes     Missed Visit Reason: Missed Visit Reason: Patient in a medical procedure    Missed Time: Attempt    Comment: RN states pt appropriate to see for OT eval however testing being completed in room at this time. Will return and follow-up as schedule allows and pt is appropriate. RN notified.

## 2025-06-23 NOTE — CARE PLAN
The patient's goals for the shift include      The clinical goals for the shift include no falls or injuries tonight      Problem: Pain - Adult  Goal: Verbalizes/displays adequate comfort level or baseline comfort level  Outcome: Progressing     Problem: Safety - Adult  Goal: Free from fall injury  Outcome: Progressing     Problem: Discharge Planning  Goal: Discharge to home or other facility with appropriate resources  Outcome: Progressing

## 2025-06-23 NOTE — PROGRESS NOTES
Occupational Therapy    Evaluation    Patient Name: Elliot Partida  MRN: [id number]  Department: [facility]  Room: [address] [place], [place] [address]  Today's Date: 6/23/2025  Time Calculation  Start Time: 1144  Stop Time: 1159  Time Calculation (min): 15 min    Assessment  IP OT Assessment  OT Assessment: RN cleared. OT orders received and occupational profile established via interview method, data gathering, and review of medical records to determine moderate complexity and establish OT POC. At this time, pt displays a decline from PLOF including- diminished ADL independence (LB>UB), functional mobility, and task tolerance with new onset of lightheaded s/s impacting participation. Pt would benefit from OT services to address deficit areas to restore QOL and improve safety prior to d/c. At end, pt positioned back into bed per request- needs met, alarm on. RN notified.  Prognosis: Good  Barriers to Discharge Home: Caregiver assistance, Physical needs  Caregiver Assistance: Caregiver assistance needed per identified barriers - however, level of patient's required assistance exceeds assistance available at home  Physical Needs: Stair navigation into home limited by function/safety, Intermittent mobility assistance needed, Intermittent ADL assistance needed  Evaluation/Treatment Tolerance: Patient tolerated treatment well, Other (Comment) (limited by lightheaded s/s`)  Medical Staff Made Aware: Yes  End of Session Communication: Bedside nurse  End of Session Patient Position: Bed, 2 rail up, Alarm on    Plan:  Treatment Interventions: ADL retraining, Functional transfer training, UE strengthening/ROM, Endurance training, Patient/family training, Neuromuscular reeducation  OT Frequency: 3 times per week  OT Discharge Recommendations: Moderate intensity level of continued care  OT Recommended Transfer Status: Stand by assist, Assist of 1  OT - OK to Discharge: Yes    Subjective   Current Problem:  1. Urinary tract infection without hematuria, site  unspecified        2. Lightheadedness        3. Dizziness  Carotid duplex bilateral    Carotid duplex bilateral      4. Diabetes mellitus type 2 without retinopathy (Multi)        5. Hypokalemia          OT Visit Info:  OT Received On: 06/23/25  General Visit Info:  General  Reason for Referral: Impaired ADLs/safety assess  Referred By: Chuy Muniz D.O.  Past Medical History Relevant to Rehab: Diagnosis Date Comment Source  Acute osteomyelitis of ankle and foot, left (Multi)     AMI (acute myocardial infarction) (Multi)     ASHD (arteriosclerotic heart disease)     At risk for falls     Cellulitis  left foot and ankle   COVID-19     Diabetes mellitus (Multi)     DVT (deep vein thrombosis) in pregnancy (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)     TIA (transient ischemic attack)     Tinea pedis of both feet     Trigeminal neuralgia     Weakness  Family/Caregiver Present: No  Prior to Session Communication: Bedside nurse  Patient Position Received: Bed, 2 rail up, Alarm on  Preferred Learning Style: auditory, verbal  General Comment: Pt supine in bed, c/c of dizziness but willing to work with therapy. Cooperative and pleasant throughout.    Precautions:  Medical Precautions: Fall precautions  Precautions Comment: IV, PICC, (RUE limb alert)    Pain:  Pain Assessment  Pain Assessment: 0-10  0-10 (Numeric) Pain Score: 0 - No pain    Objective   Cognition:  Overall Cognitive Status: Within Functional Limits  Orientation Level: Oriented X4    Home Living:  Type of Home: House  Lives With: Siblings  Home Adaptive Equipment: Walker rolling or standard, Cane, Wheelchair-manual  Home Layout: One level, Laundry in basement  Home Access:  Stairs to enter with rails  Entrance Stairs-Rails: None  Entrance Stairs-Number of Steps: 5  Bathroom Shower/Tub: Walk-in tub  Bathroom Toilet: Standard  Bathroom Equipment: Grab bars in shower, Shower chair with back, Hand-held shower hose  Bathroom Accessibility: No concerns.  Home Living Comments: Sleeps in lift chair. Laundry in basement, niece does it for him.     Prior Function:  Level of [place]: Independent with ADLs and functional transfers, Needs assistance with homemaking  Receives Help From: Family  ADL Assistance: Independent  Homemaking Assistance: Needs assistance  Meal Prep: Total  Laundry: Total  Vacuuming: Total  Cleaning: Total  Driving/Transportation: Total (Niece)  Shopping: Total (Niece)  Homemaking Assistance Comments: Niece/family completes IADLs  Ambulatory Assistance: Independent  Vocational: Retired  Leisure: TV  Hand Dominance: Right  Prior Function Comments: Prior to recent hospitalization, pt admitted to [facility] for skilled- IV meds- living at home prior. Independent with ADLs and ambulation with AD, dependent for IADLs. No falls to report. Does not drive.    IADL History:  Homemaking Responsibilities: No  Current License: No  Mode of Transportation: Family (Niece)  Leisure and Hobbies: TV    ADL:  Eating Assistance: Independent (anticipated)  Grooming Assistance: Stand by (anticipated)  Grooming Deficit: Setup, Supervision/safety  Bathing Assistance: Moderate (anticipated)  Bathing Deficit: Setup, Steadying, Supervision/safety, Increased time to complete , Right lower leg including foot, Left lower leg including foot, Use of adaptive equipment  UE Dressing Assistance: Minimal  UE Dressing Deficit: Fasteners  LE Dressing Assistance: Total (doff/loreto socks)  LE Dressing Deficit: Don/doff R sock, Don/doff L sock, Setup, Supervision/safety  Toileting Assistance with Device: Stand by  Toileting Deficit: Setup, Supervison/safety  Functional Assistance: Stand by  Functional Deficit:  Supervision/safety    Activity Tolerance:  Endurance: Decreased tolerance for upright activites    Bed Mobility/Transfers:   Bed Mobility  Bed Mobility: Yes  Bed Mobility 1  Bed Mobility 1: Supine to sitting  Level of Assistance 1: Close supervision  Bed Mobility Comments 1: BR use- to L side  Bed Mobility 2  Bed Mobility  2: Sitting to supine  Level of Assistance 2: Moderate assistance  Bed Mobility Comments 2: BLE elevation into bed, UB/trunk control by pt with BR use  Bed Mobility 3  Bed Mobility 3: Scooting  Level of Assistance 3: Close supervision  Bed Mobility Comments 3: 7/10 dizzy (no significant increase from start in supine position)    Transfers  Transfer: Yes  Transfer 1  Transfer From 1: Sit to  Transfer to 1: Stand  Technique 1: Sit to stand, Stand to sit  Transfer Device 1: Walker, Gait belt  Transfer Level of Assistance 1: Contact guard  Trials/Comments 1: mult trials from varied surfaces (EOB, toilet) with consistent TASHA- no LOB throughout.    Functional Mobility:  Functional Mobility  Functional Mobility Performed: Yes  Functional Mobility 1  Surface 1: Level tile  Device 1: Rolling walker  Functional Mobility Support Devices: Gait belt  Assistance 1: Contact guard  Comments 1: From EOB into bathroom to use toilet    Sitting Balance:  Static Sitting Balance  Static Sitting-Balance Support: Feet supported  Static Sitting-Level of Assistance: Modified Independent  Dynamic Sitting Balance  Dynamic Sitting-Balance Support: Feet supported  Dynamic Sitting-Level of Assistance: Close supervision    Standing Balance:  Static Standing Balance  Static Standing-Balance Support: Bilateral upper extremity supported  Static Standing-Level of Assistance: Close supervision  Dynamic Standing Balance  Dynamic Standing-Balance Support: Bilateral upper extremity supported  Dynamic Standing-Level of Assistance: Contact guard    IADL's:   Homemaking Responsibilities: No  Current License: No  Mode of Transportation:  Family (Niece)  Leisure and Hobbies: TV    Vision:   Vision - Basic Assessment  Current Vision: Wears glasses only for reading    Sensation:  Light Touch: No apparent deficits    Strength:  Strength Comments: Grossly 4/5 BUE strength, Fair B .    Coordination:  Movements are Fluid and Coordinated: Yes     Hand Function:  Hand Function  Gross Grasp: Functional  Coordination: Functional    Extremities:   RUE   RUE : Within Functional Limits and LUE   LUE: Within Functional Limits    Outcome Measures:   [facility] Daily Activity  Putting on and taking off regular lower body clothing: Total  Bathing (including washing, rinsing, drying): A lot  Putting on and taking off regular upper body clothing: A little  Toileting, which includes using toilet, bedpan or urinal: A little  Taking care of personal grooming such as brushing teeth: A little  Eating Meals: None  Daily Activity - Total Score: 16    Education Documentation  No documentation found.  Education Comments  No comments found.      Goals:   Encounter Problems       Encounter Problems (Active)       ADLs       Patient with complete upper body dressing with set-up level of assistance donning and doffing all UE clothes with PRN adaptive equipment while supported sitting       Start:  06/23/25    Expected End:  07/07/25            Patient with complete lower body dressing with minimal level of assistance donning and doffing all LE clothes  with PRN adaptive equipment while supported sitting       Start:  06/23/25    Expected End:  07/07/25            Patient will complete toileting including hygiene clothing management/hygiene with modified independent level of assistance and raised toilet seat and grab bars.       Start:  06/23/25    Expected End:  07/07/25               BALANCE       Pt will maintain dynamic standing balance during ADL task with modified independent level of assistance in order to demonstrate decreased risk of falling and improved postural control.        Start:  06/23/25    Expected End:  07/07/25               MOBILITY       Patient will perform Functional mobility min Household distances/Community Distances with modified independent level of assistance and least restrictive device in order to improve safety and functional mobility.       Start:  06/23/25    Expected End:  07/07/25               TRANSFERS       Patient will perform bed mobility modified independent level of assistance and bed rails in order to improve safety and independence with mobility       Start:  06/23/25    Expected End:  07/07/25            Patient will complete sit to stand transfer with modified independent level of assistance and least restrictive device in order to improve safety and prepare for out of bed mobility.       Start:  06/23/25    Expected End:  07/07/25

## 2025-06-23 NOTE — PROGRESS NOTES
Pharmacy Medication History Review    Elliot Partida is a 74 y.o. male admitted for UTI (urinary tract infection). Pharmacy reviewed the patient's [id number] medications and allergies for accuracy.    The list below reflects the updated PTA list.   Prior to Admission Medications   Prescriptions Last Dose Informant   Blood glucose monitoring meter  Other   Sig: To check blood sugar every morning before breakfast   acetaminophen (Tylenol) 325 mg tablet Unknown Other   Sig: Take 2 tablets (650 mg) by mouth every 4 hours if needed for mild pain (1 - 3), moderate pain (4 - 6), headaches or fever (temp greater than 38.0 C) (greater than or equal to 38 C).   amLODIPine (Norvasc) 2.5 mg tablet Unknown Other   Sig: Take 1 tablet (2.5 mg) by mouth once daily.   enoxaparin (Lovenox) 80 mg/0.8 mL syringe Unknown Other   Sig: Inject 0.9 mL (90 mg) under the skin every 12 hours.   gabapentin (Neurontin) 300 mg capsule Unknown Other   Sig: Take 1 capsule (300 mg) by mouth 3 times a day.   hydrALAZINE (Apresoline) 50 mg tablet Unknown Other   Sig: Take 1 tablet (50 mg) by mouth 3 times a day.   meclizine (Antivert) 25 mg tablet Unknown Other   Sig: Take 1 tablet (25 mg) by mouth every 6 hours if needed for dizziness.   ondansetron ODT (Zofran-ODT) 4 mg disintegrating tablet Unknown Other   Sig: Dissolve 1 tablet (4 mg) in the mouth every 8 hours if needed for nausea or vomiting.   oxygen (O2) gas therapy  Other   Sig: Inhale 2 L/min at 120,000 mL/hr if needed (desaturation at hs).   sennosides-docusate sodium (Ann-Colace) 8.6-50 mg tablet Unknown Other   Sig: Take 1 tablet by mouth as needed at bedtime for constipation.   Patient taking differently: Take 1 tablet by mouth 2 times a day as needed for constipation.   traZODone (Desyrel) 50 mg tablet Unknown Other   Sig: Take 1 tablet (50 mg) by mouth once daily at bedtime.   vancomycin (Xellia) IVPB Not Taking Self   Sig: Infuse 350 mL (1.75 g) at 200 mL/hr over 105  "minutes into a venous catheter once every 24 hours for 26 days.   Patient not taking: Reported on 6/23/2025      Facility-Administered Medications: None        The list below reflects the updated allergy list. Please review each documented allergy for additional clarification and justification.  Allergies  Reviewed by Derek Barraza RN on 6/22/2025   No Known Allergies         Patient was unable to be assessed for M2B at discharge.     Sources:   [facility] Medication Review Report printed at facility 6/22/23 18:46:21 ET, faxed from [facility] ED; uploaded to Media tab    Medications ADDED:  None  Medications CHANGED:  None  Medications REMOVED/MARKED NOT TAKING:   Tramadol 25mg - not on SNF active medication list     Additional Comments:  None    Hailey Camacho, PharmD  Transitions of Care Pharmacist  06/23/25     Secure Chat preferred   If no response call [phone] or [email or web address] \"Med Rec\"    "

## 2025-06-23 NOTE — CONSULTS
Consults  Referred by SARAH Madison-CNP    Primary MD: Marium Singh MD    Reason For Consult  Multiple area of infection     History Of Present Illness  Elliot Partida is a 74 y.o. male with past medical history of aortic stenosis, diabetes mellitus, left foot osteomyelitis, status post left fifth toe amputation-completed IV vancomycin on 6/18/2025, Left lower extremity Deep vein thrombosis .  He presented to the emergency room with dizziness, lightheadedness, generalized weakness.  Patient was seen and examined.  Pictures in EPIC reviewed-left anterior lower leg , dorsal foot wound with moderate amount granulation tissue.  He is afebrile, denies chills or sweats.  He denies shortness of breath or chest pain.  He does report nonproductive cough.  He is on room air saturating 95 to 96%.  He denies abdominal pain nausea vomiting or diarrhea.  He reports buttock discomfort.  Pictures in EPIC reviewed-left anterior lower leg , dorsal foot wound with small amount granulation tissue.  Labs with no leukocytosis.  Urinalysis with pyuria.  Urine, wound, blood cultures pending.  Chest x-ray shows atelectasis.  CT of chest shows small bilateral pleural effusions unchanged from previous.  Scattered mild atelectasis.  CT of abdomen pelvis shows perivesical fat stranding and mild wall thickening of urinary bladder.  Unchanged mild bilateral hydronephrosis without hydroureter or obstruction.  CT of head with no acute findings.  He has history of wound culture positive MRSA resistant to tetracyclines and Enterococcus faecalis susceptible to ampicillin and vancomycin.  He is on IV ceftriaxone.     Past Medical History  He has a past medical history of Acute osteomyelitis of ankle and foot, left (Multi), AMI (acute myocardial infarction) (Multi), ASHD (arteriosclerotic heart disease), At risk for falls, Cellulitis, COVID-19, Diabetes mellitus (Multi), DVT (deep vein thrombosis) in pregnancy ([facility]), Fall risk care plan  declined, Hypertension, Meningioma (Multi), Myocardial infarction (Multi), Neuritis, Neuropathy, Osteoarthritis, Osteomyelitis, Pleural effusion, PVD (peripheral vascular disease), Sprain of right knee (06/25/2015), Stroke (Multi), 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[facility]), TIA (transient ischemic attack), Tinea pedis of both feet, Trigeminal neuralgia, and Weakness.    Surgical History  He has a past surgical history that includes Carpal tunnel release (10/10/2014); Eye surgery; FL arthrocentesis ASP INJ joint.; Cardiac catheterization; Iridotomy / iridectomy (Bilateral); Invasive Vascular Procedure (Bilateral, 09/18/2024); Foot ray resection (Left, 09/23/2024); and Foot surgery (Left, 09/27/2024).     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     Immunization History   Administered Date(s) Administered    Moderna SARS-CoV-2 Vaccination 04/14/2021, 05/13/2021     Medications  Home medications:  Prescriptions Prior to Admission[2]  Current medications:  Scheduled medications  Scheduled Medications[3]  Continuous medications  Continuous Medications[4]  PRN medications  PRN Medications[5]    Review of Systems   Constitutional: Negative.    HENT: Negative.     Eyes: Negative.    Respiratory: Negative.     Cardiovascular: Negative.    Gastrointestinal: Negative.    Endocrine: Negative.    Genitourinary: Negative.    Musculoskeletal: Negative.    Skin:  Positive for wound.   Neurological: Negative.    Psychiatric/Behavioral:  "Negative.          Objective  Range of Vitals (last 24 hours)  Heart Rate:  [65-81]   Temp:  [36.7 °C (98.1 °F)-37.9 °C (100.2 °F)]   Resp:  [15-18]   BP: ()/(41-76)   Height:  [175.3 cm (5' 9\")-175.3 cm (5' 9.02\")]   Weight:  [88.4 kg (194 lb 14.2 oz)-90.6 kg (199 lb 11.8 oz)]   SpO2:  [94 %-98 %]   Daily Weight  06/23/25 : 88.4 kg (194 lb 14.2 oz)    Body mass index is 28.78 kg/m².     Physical Exam  Constitutional:       Appearance: Normal appearance.   HENT:      Head: Normocephalic and atraumatic.      Nose: Nose normal.      Mouth/Throat:      Mouth: Mucous membranes are moist.      Pharynx: Oropharynx is clear.   Eyes:      General: No scleral icterus.  Cardiovascular:      Rate and Rhythm: Normal rate and regular rhythm.   Pulmonary:      Effort: Pulmonary effort is normal.      Breath sounds: Normal breath sounds.   Abdominal:      General: Bowel sounds are normal.      Palpations: Abdomen is soft.   Musculoskeletal:         General: Normal range of motion.      Cervical back: Normal range of motion and neck supple.      Comments: Left fifth toe amputation    Skin:     General: Skin is warm and dry.      Comments: Pictures in EPIC reviewed-left anterior lower leg , dorsal foot wound with small amount granulation tissue.   Neurological:      Mental Status: He is alert.      Comments: Awake, alert   Psychiatric:         Mood and Affect: Mood normal.         Behavior: Behavior normal.        Relevant Results  Outside Hospital Results  No  Labs  Results from last 72 hours   Lab Units 06/23/25  0700 06/22/25 1949   WBC AUTO x10*3/uL 5.7 6.7   HEMOGLOBIN g/dL 8.0* 8.9*   HEMATOCRIT % 26.0* 28.3*   PLATELETS AUTO x10*3/uL 196 231   NEUTROS PCT AUTO % 57.5 60.9   LYMPHS PCT AUTO % 27.4 25.4   MONOS PCT AUTO % 11.8 10.7   EOS PCT AUTO % 2.5 2.4     Results from last 72 hours   Lab Units 06/23/25  0700 06/22/25 1949   SODIUM mmol/L 144 142   POTASSIUM mmol/L 3.4* 3.2*   CHLORIDE mmol/L 107 102   CO2 mmol/L 32 " "32   BUN mg/dL 17 18   CREATININE mg/dL 1.08 1.01   GLUCOSE mg/dL 119* 136*   CALCIUM mg/dL 8.1* 8.7   ANION GAP mmol/L 8* 11   EGFR mL/min/1.73m*2 72 79     Results from last 72 hours   Lab Units 06/22/25 1949   ALK PHOS U/L 102   BILIRUBIN TOTAL mg/dL 0.3   PROTEIN TOTAL g/dL 5.8*   ALT U/L 16   AST U/L 26   ALBUMIN g/dL 3.2*     Estimated Creatinine Clearance: 67 mL/min (by C-G formula based on SCr of 1.08 mg/dL).  C-Reactive Protein   Date Value Ref Range Status   05/20/2025 3.85 (H) <1.00 mg/dL Final   05/18/2025 4.28 (H) <1.00 mg/dL Final   05/17/2025 5.97 (H) <1.00 mg/dL Final     Sedimentation Rate   Date Value Ref Range Status   05/11/2025 12 0 - 20 mm/h Final   05/08/2025 14 0 - 20 mm/h Final   02/11/2025 51 (H) 0 - 20 mm/h Final     No results found for: \"HIV1X2\", \"HIVCONF\", \"AFUMKY5ER\"  No results found for: \"HEPCABINIT\", \"HEPCAB\", \"HCVPCRQUANT\"  Microbiology  Susceptibility data from last 90 days.  Collected Specimen Info Organism Clindamycin Erythromycin Oxacillin Tetracycline Trimethoprim/Sulfamethoxazole Vancomycin   05/09/25 Swab from Anterior Nares Methicillin Susceptible Staphylococcus aureus (MSSA)         05/08/25 Tissue/Biopsy from Wound/Tissue Methicillin Resistant Staphylococcus aureus (MRSA)  R  R  R  R  S  S     Mixed Aerobic and Anaerobic Bacteria                Imaging  Carotid duplex bilateral  Result Date: 6/23/2025  Preliminary Cardiology Report           [place] [id number] [place] [address]  Tel [phone] and Fax [phone]      Preliminary Vascular Lab Report  [facility] CAROTID ARTERY DUPLEX BILATERAL  Patient Name:      SUBHASH SEGAL Reading Physician:  [id number] Eli De La Vega MD Study Date:        6/23/2025        Ordering Physician: [id number] DAMEON ALVAREZ MRN/PID:           [id number]         Technologist:       Oumou Valdez RDMS, ALLYSONT Accession#:        [id number]     Technologist 2: " Date of Birth/Age: 1950         Encounter#:         [id number] Gender:            M Admission Status:  Inpatient        Location Performed: [facility]  Diagnosis/ICD: Dizziness and giddiness-R42  Patient History: Syncope, HTN and CVA. Smoker:          Never. Diabetes:        Yes. >20 years.  PRELIMINARY CONCLUSIONS: Right Carotid: Today's exam was limited due to the body habitus of the patient. Findings are consistent with less than 50% stenosis of the right proximal internal carotid artery. Laminar flow seen by color Doppler. Right external carotid artery appears patent with no evidence of stenosis. No evidence of hemodynamically significant stenosis of the right common carotid artery. The right vertebral artery is patent with antegrade flow. No evidence of hemodynamically significant stenosis in the right subclavian artery. Left Carotid: Today's exam was limited due to the body habitus of the patient and unable to turn head to right. Findings are consistent with less than 50% stenosis of the left proximal internal carotid artery. Left external carotid artery appears patent with no evidence of stenosis. No evidence of hemodynamically significant stenosis of the left common carotid artery. The left vertebral artery is patent with antegrade flow. No evidence of hemodynamically significant stenosis in the left subclavian artery.  Imaging & Doppler Findings: Right Plaque Morph: The proximal right internal carotid artery demonstrates calcified plaque. Left Plaque Morph: The proximal left internal carotid artery demonstrates calcified plaque. The distal left common carotid artery demonstrates heterogenous plaque.   Right                        Left   PSV      EDV                PSV       cm/s 25 cm/s   CCA P    118 cm/s 18 cm/s 99 cm/s  19 cm/s   CCA D    110 cm/s 19 cm/s 107 cm/s 19 cm/s   ICA P    106 cm/s 18 cm/s 103 cm/s 35 cm/s   ICA M    81 cm/s  18 cm/s 101 cm/s 39 cm/s   ICA D    40 cm/s  10  cm/s 148 cm/s 18 cm/s    ECA     107 cm/s 13 cm/s 78 cm/s  31 cm/s Vertebral  49 cm/s  10 cm/s 129 cm/s 0 cm/s  Subclavian 168 cm/s 12 cm/s  Right                                  Left   PSV    Waveform                       PSV    Waveform 129 cm/s          Subclavian Proximal 168 cm/s                Right Left ICA/CCA Ratio  1.1  1.0   VASCULAR PRELIMINARY REPORT completed by Oumou Valdez RDMS, RVT on 6/23/2025 at 11:34:39 AM  ** Final **     XR chest 1 view  Result Date: 6/23/2025  Interpreted By:  Brian Morris, STUDY: XR CHEST 1 VIEW;  6/23/2025 8:57 am   INDICATION: Signs/Symptoms: SOB.   COMPARISON: 06/03/2025 AP chest x-ray and yesterday's unenhanced thoracic CT.   ACCESSION NUMBER(S): [id number]   ORDERING CLINICIAN: DAMEON ALVAREZ   FINDINGS: Portable AP erect chest x-ray 06/23/2025 8:50 a.m.:   CARDIOMEDIASTINAL SILHOUETTE: Cardiomediastinal silhouette is normal in size and configuration. Marked vascular calcification is noted. Severe aortic valve and coronary artery calcification reported on yesterday's CT is difficult to appreciate.   LUNGS: Normally inflated and appear clear aside from moderate lower left lung subsegmental atelectasis not apparent on 06/03/2025. The lateral costophrenic angles are sharp, but small bilateral pleural effusions seen on yesterday's CT would probably go undetected without a lateral view.   ABDOMEN: No remarkable upper abdominal findings.   BONES: Extensive spinal degenerative changes and generalized osteosclerosis are noted. The latter can be associated with various conditions including but not limited to myelosclerosis, renal osteodystrophy, hyperthyroidism, hypoparathyroidism, fluorosis, osteoblastic metastases, lymphoma, hepatitis C and mastocytosis and should be correlated clinically.       Left lower lobe and possibly lingular subsegmental atelectasis.   MACRO: None   Signed by: Brian Morris 6/23/2025 9:26 AM Dictation workstation:   [id number]    CT  chest abdomen pelvis wo IV contrast  Result Date: 6/22/2025  Interpreted By:  Jemal Driscoll, STUDY: CT CHEST ABDOMEN PELVIS WO CONTRAST;  6/22/2025 9:00 pm   INDICATION: Signs/Symptoms:Diuretic abdominal pain nausea not feeling.     COMPARISON: 06/05/2025 CT chest, 02/27/2025 CT abdomen/pelvis   ACCESSION NUMBER(S): [id number]   ORDERING CLINICIAN: ADDI ALBERTO   TECHNIQUE: Contiguous axial images of the chest, abdomen, and pelvis were obtained without contrast. Coronal and sagittal reformatted images were reconstructed from the axial data.   FINDINGS:   CT CHEST:   MEDIASTINUM AND LYMPH NODES:  The esophageal wall appears within normal limits.  No enlarged intrathoracic or axillary lymph nodes by imaging criteria. No pneumomediastinum.   VESSELS:  Normal caliber thoracic aorta. Mild calcific aortic atherosclerosis.   HEART: Normal size. Severe aortic valvular calcification. Mitral annular calcifications. Severe coronary artery calcifications. No significant pericardial effusion.   LUNG, AIRWAYS, PLEURA: No pneumothorax. Small bilateral pleural effusions (left > right), unchanged on the right and slightly decreased on the left, compared to 06/05/2025. Mild passive bibasilar atelectasis, similar to prior study. Scattered linear opacities elsewhere likely represent atelectasis. Linear triangular nodule in the right upper lobe likely represents focal atelectasis given new from prior study.   OSSEOUS STRUCTURES: No acute osseous abnormality. Diffuse idiopathic skeletal hyperostosis with flowing ossification along the anterior disc margins and maintained disc heights in the thoracic spine. No significant canal stenosis.   CHEST WALL SOFT TISSUES: Mild anasarca.     ABDOMEN/PELVIS:   ABDOMINAL WALL: Mild anasarca. Small fat-containing umbilical hernia.Subcutaneous injection sites in the left abdominal wall.   LIVER: No significant parenchymal abnormality.   BILE DUCTS: No significant intrahepatic or extrahepatic  dilatation.   GALLBLADDER: No significant abnormality.   PANCREAS: No significant abnormality.   SPLEEN: No significant abnormality.   ADRENALS: No significant abnormality.   KIDNEYS, URETERS, BLADDER: Unchanged mild bilateral hydronephrosis without hydroureter. No renal or ureteral calculi. The urinary bladder wall is thickened. There is perivesical fat stranding.   REPRODUCTIVE ORGANS: The prostate gland is enlarged, measuring 5.5 cm in transverse dimension.   VESSELS: Mild aortic atherosclerosis without AAA. Unchanged multiple mildly enlarged external iliac lymph node for sample no other enlarged lymph nodes. Measuring 1.3 cm on the right, 1 cm on the left   RETROPERITONEUM/LYMPH NODES: No acute retroperitoneal abnormality. No enlarged lymph nodes.   BOWEL/PERITONEUM: Tiny gastric diverticulum noted. No inflammatory bowel wall thickening or dilatation. Normal appendix.   No ascites, free air, or fluid collection.     MUSCULOSKELETAL: Multilevel bulky anterior endplate osteophytes with maintained disc spaces reflect diffuse idiopathic skeletal hyperostosis.  No suspicious osseous lesion.       Perivesical fat stranding and mild wall thickening of the urinary bladder wall. Recommend correlation for cystitis.   Unchanged mild bilateral hydronephrosis without hydroureter suggesting a ureteropelvic junction obstruction.   Small bilateral pleural effusions (left > right) which is unchanged on the right and slightly smaller on the left compared to 06/05/2025. Scattered mild atelectasis without evidence of pneumonia.   MACRO: None.   Signed by: Jemal Driscoll 6/22/2025 9:41 PM Dictation workstation:   [id number]    CT head wo IV contrast  Result Date: 6/22/2025  Interpreted By:  Jemal Driscoll, STUDY: CT HEAD WO IV CONTRAST;  6/22/2025 8:50 pm   INDICATION: Signs/Symptoms:Left eye pain  on blood thinner h/o stroke.     COMPARISON: 06/03/2025   ACCESSION NUMBER(S): [id number]   ORDERING CLINICIAN: ADDI ALBERTO    TECHNIQUE: Noncontrast axial CT images of head were obtained with coronal and sagittal reconstructed images.   FINDINGS: BRAIN PARENCHYMA: Moderate periventricular and subcortical hemispheric white matter hypodensities are most compatible with chronic small vessel ischemic disease.Chronic lacunar infarct in the left cerebellar hemisphere. No acute intraparenchymal hemorrhage or parenchymal evidence of acute large territory ischemic infarct. Gray-white matter distinction is preserved. No mass-effect.   VENTRICLES and EXTRA-AXIAL SPACES:  No acute extra-axial or intraventricular hemorrhage. No effacement of cerebral sulci. The ventricles and sulci are age-concordant.   PARANASAL SINUSES/MASTOIDS:  No hemorrhage or air-fluid levels within the visualized paranasal sinuses. The mastoids are well aerated.   CALVARIUM/ORBITS:  No acute skull fracture.  The orbits and globes are intact to the extent visualized.   EXTRACRANIAL SOFT TISSUES: No discernible acute abnormality.       No acute intracranial abnormality.   Unchanged burden of moderate supratentorial chronic ischemic changes in left cerebellar hemisphere chronic lacunar infarct.   MACRO: None.   Signed by: Jemal Driscoll 6/22/2025 9:32 PM Dictation workstation:   [id number]       Assessment/Plan   Pyuria versus urinary tract infection  Abnormal CT-Perivesical fat stranding and mild wall thickening of the urinary   bladder wall and atelectasis   Small bilateral pleural effusion  History of left foot fifth toe osteomyelitis -status post amputation and completed IV vancomycin 6/18/2025  He has history of wound culture positive MRSA resistant to tetracyclines and Enterococcus faecalis susceptible to ampicillin and vancomycin.      IV ceftriaxone  Monitor temperature and WBC  Follow-up urine culture  Follow-up wound culture  Follow-up blood culture  Local care  Supportive care  Further recommendations based on pending workup-culture results      Sonam Zhu,  APRN-CNP       [1]   Family History  Problem Relation Name Age of Onset    Heart disease Father      Colon cancer Other      Heart attack Other      Diabetes Other      Hypertension Other     [2]   Medications Prior to Admission   Medication Sig Dispense Refill Last Dose/Taking    acetaminophen (Tylenol) 325 mg tablet Take 2 tablets (650 mg) by mouth every 4 hours if needed for mild pain (1 - 3), moderate pain (4 - 6), headaches or fever (temp greater than 38.0 C) (greater than or equal to 38 C).   Past Week    amLODIPine (Norvasc) 2.5 mg tablet Take 1 tablet (2.5 mg) by mouth once daily.   6/22/2025 Morning    enoxaparin (Lovenox) 80 mg/0.8 mL syringe Inject 0.9 mL (90 mg) under the skin every 12 hours. (Patient taking differently: Inject 0.8 mL (80 mg) under the skin every 12 hours.)   6/22/2025 Morning    gabapentin (Neurontin) 300 mg capsule Take 1 capsule (300 mg) by mouth 3 times a day.   6/22/2025 Noon    hydrALAZINE (Apresoline) 50 mg tablet Take 1 tablet (50 mg) by mouth 3 times a day.   6/22/2025 Noon    meclizine (Antivert) 25 mg tablet Take 1 tablet (25 mg) by mouth every 6 hours if needed for dizziness.   Past Month    ondansetron ODT (Zofran-ODT) 4 mg disintegrating tablet Dissolve 1 tablet (4 mg) in the mouth every 8 hours if needed for nausea or vomiting. 15 tablet 0 Past Month    oxygen (O2) gas therapy Inhale 2 L/min at 120,000 mL/hr if needed (desaturation at hs).   6/23/2025 Morning    traZODone (Desyrel) 50 mg tablet Take 1 tablet (50 mg) by mouth once daily at bedtime.   Past Week    Blood glucose monitoring meter To check blood sugar every morning before breakfast 1 each 0 Unknown    glucagon HCL (Glucagon, HCl, Emergency Kit) 1 mg recon soln Inject as directed.   Unknown    sennosides-docusate sodium (Ann-Colace) 8.6-50 mg tablet Take 1 tablet by mouth as needed at bedtime for constipation. (Patient taking differently: Take 1 tablet by mouth 2 times a day as needed for constipation.)    Unknown    [] vancomycin (Xellia) IVPB Infuse 350 mL (1.75 g) at 200 mL/hr over 105 minutes into a venous catheter once every 24 hours for 26 days. (Patient not taking: Reported on 2025) 9100 mL 0 Not Taking   [3] amLODIPine, 2.5 mg, oral, Daily  cefTRIAXone, 1 g, intravenous, q24h  enoxaparin, 90 mg, subcutaneous, q12h  gabapentin, 300 mg, oral, TID  hydrALAZINE, 50 mg, oral, TID  traZODone, 50 mg, oral, Nightly    [4] sodium chloride 0.9%, 10 mL/hr    [5] PRN medications: acetaminophen, alum-mag hydroxide-simeth, benzocaine-menthol, heparin flush, meclizine, melatonin, ondansetron, polyethylene glycol, sennosides-docusate sodium, sodium chloride 0.9%, sodium chloride 0.9%, traMADol

## 2025-06-24 PROBLEM — I47.20 VENTRICULAR TACHYCARDIA SEEN ON CARDIAC MONITOR: Status: ACTIVE | Noted: 2025-06-24

## 2025-06-24 LAB
ANION GAP SERPL CALC-SCNC: 10 MMOL/L (ref 10–20)
ANION GAP SERPL CALC-SCNC: 9 MMOL/L (ref 10–20)
ATRIAL RATE: 72 BPM
BACTERIA UR CULT: NORMAL
BUN SERPL-MCNC: 18 MG/DL (ref 6–23)
BUN SERPL-MCNC: 20 MG/DL (ref 6–23)
CALCIUM SERPL-MCNC: 8.1 MG/DL (ref 8.6–10.3)
CALCIUM SERPL-MCNC: 8.3 MG/DL (ref 8.6–10.3)
CHLORIDE SERPL-SCNC: 105 MMOL/L (ref 98–107)
CHLORIDE SERPL-SCNC: 107 MMOL/L (ref 98–107)
CO2 SERPL-SCNC: 30 MMOL/L (ref 21–32)
CO2 SERPL-SCNC: 30 MMOL/L (ref 21–32)
CREAT SERPL-MCNC: 1.24 MG/DL (ref 0.5–1.3)
CREAT SERPL-MCNC: 1.31 MG/DL (ref 0.5–1.3)
EGFRCR SERPLBLD CKD-EPI 2021: 57 ML/MIN/1.73M*2
EGFRCR SERPLBLD CKD-EPI 2021: 61 ML/MIN/1.73M*2
ERYTHROCYTE [DISTWIDTH] IN BLOOD BY AUTOMATED COUNT: 17 % (ref 11.5–14.5)
ERYTHROCYTE [DISTWIDTH] IN BLOOD BY AUTOMATED COUNT: 17 % (ref 11.5–14.5)
FLUAV RNA RESP QL NAA+PROBE: NOT DETECTED
FLUBV RNA RESP QL NAA+PROBE: NOT DETECTED
GLUCOSE BLD MANUAL STRIP-MCNC: 90 MG/DL (ref 74–99)
GLUCOSE BLD MANUAL STRIP-MCNC: 95 MG/DL (ref 74–99)
GLUCOSE SERPL-MCNC: 111 MG/DL (ref 74–99)
GLUCOSE SERPL-MCNC: 97 MG/DL (ref 74–99)
HCT VFR BLD AUTO: 26.1 % (ref 41–52)
HCT VFR BLD AUTO: 27.1 % (ref 41–52)
HGB BLD-MCNC: 8 G/DL (ref 13.5–17.5)
HGB BLD-MCNC: 8.5 G/DL (ref 13.5–17.5)
LACTATE SERPL-SCNC: 0.4 MMOL/L (ref 0.4–2)
MAGNESIUM SERPL-MCNC: 2.04 MG/DL (ref 1.6–2.4)
MAGNESIUM SERPL-MCNC: 2.13 MG/DL (ref 1.6–2.4)
MCH RBC QN AUTO: 27.5 PG (ref 26–34)
MCH RBC QN AUTO: 27.5 PG (ref 26–34)
MCHC RBC AUTO-ENTMCNC: 30.7 G/DL (ref 32–36)
MCHC RBC AUTO-ENTMCNC: 31.4 G/DL (ref 32–36)
MCV RBC AUTO: 88 FL (ref 80–100)
MCV RBC AUTO: 90 FL (ref 80–100)
NRBC BLD-RTO: 0 /100 WBCS (ref 0–0)
NRBC BLD-RTO: 0 /100 WBCS (ref 0–0)
P AXIS: 43 DEGREES
P OFFSET: 135 MS
P ONSET: 73 MS
PLATELET # BLD AUTO: 207 X10*3/UL (ref 150–450)
PLATELET # BLD AUTO: 217 X10*3/UL (ref 150–450)
POTASSIUM SERPL-SCNC: 3.7 MMOL/L (ref 3.5–5.3)
POTASSIUM SERPL-SCNC: 3.8 MMOL/L (ref 3.5–5.3)
PR INTERVAL: 294 MS
Q ONSET: 220 MS
QRS COUNT: 12 BEATS
QRS DURATION: 142 MS
QT INTERVAL: 418 MS
QTC CALCULATION(BAZETT): 457 MS
QTC FREDERICIA: 444 MS
R AXIS: 90 DEGREES
RBC # BLD AUTO: 2.91 X10*6/UL (ref 4.5–5.9)
RBC # BLD AUTO: 3.09 X10*6/UL (ref 4.5–5.9)
RSV RNA RESP QL NAA+PROBE: NOT DETECTED
SARS-COV-2 RNA RESP QL NAA+PROBE: NOT DETECTED
SODIUM SERPL-SCNC: 141 MMOL/L (ref 136–145)
SODIUM SERPL-SCNC: 142 MMOL/L (ref 136–145)
T AXIS: 57 DEGREES
T OFFSET: 429 MS
VENTRICULAR RATE: 72 BPM
WBC # BLD AUTO: 8.5 X10*3/UL (ref 4.4–11.3)
WBC # BLD AUTO: 9.7 X10*3/UL (ref 4.4–11.3)

## 2025-06-24 PROCEDURE — 2500000004 HC RX 250 GENERAL PHARMACY W/ HCPCS (ALT 636 FOR OP/ED): Mod: IPSPLIT | Performed by: INTERNAL MEDICINE

## 2025-06-24 PROCEDURE — 83735 ASSAY OF MAGNESIUM: CPT | Mod: IPSPLIT | Performed by: INTERNAL MEDICINE

## 2025-06-24 PROCEDURE — 85027 COMPLETE CBC AUTOMATED: CPT | Mod: IPSPLIT | Performed by: NURSE PRACTITIONER

## 2025-06-24 PROCEDURE — 83605 ASSAY OF LACTIC ACID: CPT | Mod: IPSPLIT | Performed by: INTERNAL MEDICINE

## 2025-06-24 PROCEDURE — 85027 COMPLETE CBC AUTOMATED: CPT | Mod: IPSPLIT | Performed by: INTERNAL MEDICINE

## 2025-06-24 PROCEDURE — 82947 ASSAY GLUCOSE BLOOD QUANT: CPT | Mod: IPSPLIT

## 2025-06-24 PROCEDURE — 87040 BLOOD CULTURE FOR BACTERIA: CPT | Mod: GENLAB | Performed by: NURSE PRACTITIONER

## 2025-06-24 PROCEDURE — 80048 BASIC METABOLIC PNL TOTAL CA: CPT | Mod: IPSPLIT | Performed by: INTERNAL MEDICINE

## 2025-06-24 PROCEDURE — 36415 COLL VENOUS BLD VENIPUNCTURE: CPT | Mod: IPSPLIT | Performed by: NURSE PRACTITIONER

## 2025-06-24 PROCEDURE — 2500000004 HC RX 250 GENERAL PHARMACY W/ HCPCS (ALT 636 FOR OP/ED): Mod: IPSPLIT | Performed by: NURSE PRACTITIONER

## 2025-06-24 PROCEDURE — 2500000005 HC RX 250 GENERAL PHARMACY W/O HCPCS: Mod: IPSPLIT | Performed by: NURSE PRACTITIONER

## 2025-06-24 PROCEDURE — 2500000001 HC RX 250 WO HCPCS SELF ADMINISTERED DRUGS (ALT 637 FOR MEDICARE OP): Mod: IPSPLIT | Performed by: HOSPITALIST

## 2025-06-24 PROCEDURE — 2500000004 HC RX 250 GENERAL PHARMACY W/ HCPCS (ALT 636 FOR OP/ED): Mod: IPSPLIT

## 2025-06-24 PROCEDURE — 2500000001 HC RX 250 WO HCPCS SELF ADMINISTERED DRUGS (ALT 637 FOR MEDICARE OP): Mod: IPSPLIT | Performed by: NURSE PRACTITIONER

## 2025-06-24 PROCEDURE — 2500000004 HC RX 250 GENERAL PHARMACY W/ HCPCS (ALT 636 FOR OP/ED): Mod: JW,IPSPLIT | Performed by: NURSE PRACTITIONER

## 2025-06-24 PROCEDURE — 87637 SARSCOV2&INF A&B&RSV AMP PRB: CPT | Mod: IPSPLIT | Performed by: INTERNAL MEDICINE

## 2025-06-24 PROCEDURE — 99233 SBSQ HOSP IP/OBS HIGH 50: CPT | Performed by: NURSE PRACTITIONER

## 2025-06-24 PROCEDURE — 83735 ASSAY OF MAGNESIUM: CPT | Mod: IPSPLIT | Performed by: NURSE PRACTITIONER

## 2025-06-24 PROCEDURE — 94762 N-INVAS EAR/PLS OXIMTRY CONT: CPT | Mod: IPSPLIT

## 2025-06-24 PROCEDURE — 2020000001 HC ICU ROOM DAILY: Mod: IPSPLIT

## 2025-06-24 PROCEDURE — 94760 N-INVAS EAR/PLS OXIMETRY 1: CPT | Mod: IPSPLIT

## 2025-06-24 PROCEDURE — 2500000004 HC RX 250 GENERAL PHARMACY W/ HCPCS (ALT 636 FOR OP/ED): Mod: IPSPLIT | Performed by: HOSPITALIST

## 2025-06-24 PROCEDURE — 80048 BASIC METABOLIC PNL TOTAL CA: CPT | Mod: IPSPLIT | Performed by: NURSE PRACTITIONER

## 2025-06-24 RX ORDER — VANCOMYCIN HYDROCHLORIDE 1 G/20ML
INJECTION, POWDER, LYOPHILIZED, FOR SOLUTION INTRAVENOUS DAILY PRN
Status: DISCONTINUED | OUTPATIENT
Start: 2025-06-24 | End: 2025-06-29

## 2025-06-24 RX ORDER — CEFTRIAXONE 1 G/50ML
1 INJECTION, SOLUTION INTRAVENOUS EVERY 24 HOURS
Status: DISCONTINUED | OUTPATIENT
Start: 2025-06-24 | End: 2025-06-24

## 2025-06-24 RX ORDER — VANCOMYCIN 1.5 G/300ML
1500 INJECTION, SOLUTION INTRAVENOUS EVERY 24 HOURS
Status: DISCONTINUED | OUTPATIENT
Start: 2025-06-24 | End: 2025-06-29

## 2025-06-24 RX ORDER — SODIUM CHLORIDE 9 MG/ML
INJECTION, SOLUTION INTRAVENOUS
Status: COMPLETED
Start: 2025-06-24 | End: 2025-06-24

## 2025-06-24 RX ORDER — SODIUM CHLORIDE, SODIUM LACTATE, POTASSIUM CHLORIDE, CALCIUM CHLORIDE 600; 310; 30; 20 MG/100ML; MG/100ML; MG/100ML; MG/100ML
75 INJECTION, SOLUTION INTRAVENOUS CONTINUOUS
Status: DISCONTINUED | OUTPATIENT
Start: 2025-06-24 | End: 2025-06-25

## 2025-06-24 RX ORDER — VANCOMYCIN HYDROCHLORIDE 1.5 G/30ML
INJECTION, POWDER, LYOPHILIZED, FOR SOLUTION INTRAVENOUS
Status: COMPLETED
Start: 2025-06-24 | End: 2025-06-24

## 2025-06-24 RX ADMIN — ACETAMINOPHEN 650 MG: 325 TABLET, FILM COATED ORAL at 00:13

## 2025-06-24 RX ADMIN — GABAPENTIN 300 MG: 300 CAPSULE ORAL at 21:19

## 2025-06-24 RX ADMIN — TRAMADOL HYDROCHLORIDE 25 MG: 50 TABLET, COATED ORAL at 18:55

## 2025-06-24 RX ADMIN — PIPERACILLIN SODIUM AND TAZOBACTAM SODIUM 4.5 G: 4; .5 INJECTION, SOLUTION INTRAVENOUS at 18:55

## 2025-06-24 RX ADMIN — PIPERACILLIN SODIUM AND TAZOBACTAM SODIUM 4.5 G: 4; .5 INJECTION, SOLUTION INTRAVENOUS at 01:34

## 2025-06-24 RX ADMIN — ACETAMINOPHEN 650 MG: 325 TABLET, FILM COATED ORAL at 09:22

## 2025-06-24 RX ADMIN — SODIUM CHLORIDE 10 ML/HR: 0.9 INJECTION, SOLUTION INTRAVENOUS at 01:35

## 2025-06-24 RX ADMIN — TRAZODONE HYDROCHLORIDE 50 MG: 50 TABLET ORAL at 21:19

## 2025-06-24 RX ADMIN — SODIUM CHLORIDE, POTASSIUM CHLORIDE, SODIUM LACTATE AND CALCIUM CHLORIDE 75 ML/HR: 600; 310; 30; 20 INJECTION, SOLUTION INTRAVENOUS at 16:40

## 2025-06-24 RX ADMIN — ENOXAPARIN SODIUM 90 MG: 100 INJECTION SUBCUTANEOUS at 23:28

## 2025-06-24 RX ADMIN — Medication 2 L/MIN: at 20:25

## 2025-06-24 RX ADMIN — ENOXAPARIN SODIUM 90 MG: 100 INJECTION SUBCUTANEOUS at 12:05

## 2025-06-24 RX ADMIN — GABAPENTIN 300 MG: 300 CAPSULE ORAL at 15:00

## 2025-06-24 RX ADMIN — HYDRALAZINE HYDROCHLORIDE 50 MG: 50 TABLET ORAL at 21:19

## 2025-06-24 RX ADMIN — VANCOMYCIN HYDROCHLORIDE 1.5 G: 1.5 INJECTION, POWDER, LYOPHILIZED, FOR SOLUTION INTRAVENOUS at 02:23

## 2025-06-24 RX ADMIN — SODIUM CHLORIDE 500 ML: 0.9 INJECTION, SOLUTION INTRAVENOUS at 06:33

## 2025-06-24 RX ADMIN — VANCOMYCIN 1.5 G: 1.5 INJECTION, SOLUTION INTRAVENOUS at 23:28

## 2025-06-24 RX ADMIN — PIPERACILLIN SODIUM AND TAZOBACTAM SODIUM 4.5 G: 4; .5 INJECTION, SOLUTION INTRAVENOUS at 08:16

## 2025-06-24 ASSESSMENT — PAIN - FUNCTIONAL ASSESSMENT
PAIN_FUNCTIONAL_ASSESSMENT: 0-10
PAIN_FUNCTIONAL_ASSESSMENT: 0-10

## 2025-06-24 ASSESSMENT — PAIN SCALES - GENERAL
PAINLEVEL_OUTOF10: 0 - NO PAIN
PAINLEVEL_OUTOF10: 0 - NO PAIN

## 2025-06-24 NOTE — PROGRESS NOTES
"Elliot Partida is a 74 y.o. male on day 1 of admission presenting with UTI (urinary tract infection).    Subjective   Patient lying in bed, no distress noted. Patient tolerating blood draw.  Patient drowsy, but participates in care and assessment. Patient denies headache, dizziness, chest pain, shortness of breath, or abdominal pain. Overnight, patient had a 15 beat run of V. tach, he remained asymptomatic, magnesium level and electrolytes were monitored.  Vital signs /74 (BP Location: Left arm, Patient Position: Lying)  Pulse 78  Temp 37.6 °C (99.7 °F) (Temporal)  Resp 18  Ht 1.753 m (5' 9\")  Wt 88.4 kg (194 lb 14.2 oz)  SpO2 97%  BMI 28.78 kg/m at time of arrhythmia.  Patient transferred to the intensive care unit for further treatment evaluation and monitoring of hemodynamic status.         Objective     Physical Exam  Constitutional:       General: He is not in acute distress.     Appearance: He is obese. He is ill-appearing.   HENT:      Head: Normocephalic.      Mouth/Throat:      Mouth: Mucous membranes are moist.   Eyes:      General: No scleral icterus.        Right eye: No discharge.         Left eye: No discharge.      Pupils: Pupils are equal, round, and reactive to light.   Neck:      Vascular: Carotid bruit present.   Cardiovascular:      Rate and Rhythm: Normal rate and regular rhythm.      Pulses: Normal pulses.      Heart sounds: Murmur heard.   Pulmonary:      Effort: Pulmonary effort is normal. No respiratory distress.      Breath sounds: No wheezing or rhonchi.      Comments: Rhonchi, nonproductive cough  Abdominal:      General: Abdomen is flat. Bowel sounds are normal. There is no distension.      Palpations: Abdomen is soft. There is no mass.      Tenderness: There is no abdominal tenderness.   Musculoskeletal:         General: Swelling present. No tenderness.      Cervical back: No rigidity or tenderness.      Comments: Right upper arm PICC   Bilateral arm swelling    Skin:     " "General: Skin is warm and dry.      Capillary Refill: Capillary refill takes 2 to 3 seconds.      Coloration: Skin is pale.      Findings: Bruising present.      Comments: Left lower leg wound, dressing intact see imaging   Neurological:      General: No focal deficit present.      Mental Status: He is alert and oriented to person, place, and time.         Last Recorded Vitals  Blood pressure 117/51, pulse 64, temperature 37.2 °C (99 °F), resp. rate (!) 5, height 1.753 m (5' 9\"), weight 88.4 kg (194 lb 14.2 oz), SpO2 99%.  Intake/Output last 3 Shifts:  I/O last 3 completed shifts:  In: 3067.1 (34.7 mL/kg) [P.O.:1320; I.V.:1547.1 (17.5 mL/kg); IV Piggyback:200]  Out: - (0 mL/kg)   Weight: 88.4 kg     Relevant Results  Scheduled medications  amLODIPine, 2.5 mg, oral, Daily  cefTRIAXone, 1 g, intravenous, q24h  enoxaparin, 90 mg, subcutaneous, q12h  gabapentin, 300 mg, oral, TID  hydrALAZINE, 50 mg, oral, TID  traZODone, 50 mg, oral, Nightly  vancomycin, 1,500 mg, intravenous, q24h      Continuous medications  lactated Ringer's, 75 mL/hr  sodium chloride 0.9%, 10 mL/hr, Last Rate: 10 mL/hr (06/24/25 0605)      PRN medications  PRN medications: acetaminophen, alum-mag hydroxide-simeth, benzocaine-menthol, heparin flush, ipratropium-albuteroL, meclizine, melatonin, ondansetron, oxygen, polyethylene glycol, sennosides-docusate sodium, sodium chloride 0.9%, sodium chloride 0.9%, traMADol, vancomycin     Latest Reference Range & Units 06/24/25 01:05 06/24/25 08:06   GLUCOSE 74 - 99 mg/dL 111 (H) 97   SODIUM 136 - 145 mmol/L 141 142   POTASSIUM 3.5 - 5.3 mmol/L 3.8 3.7   CHLORIDE 98 - 107 mmol/L 105 107   Bicarbonate 21 - 32 mmol/L 30 30   Anion Gap 10 - 20 mmol/L 10 9 (L)   Blood Urea Nitrogen 6 - 23 mg/dL 18 20   Creatinine 0.50 - 1.30 mg/dL 1.24 1.31 (H)   EGFR >60 mL/min/1.73m*2 61 57 (L)   Calcium 8.6 - 10.3 mg/dL 8.3 (L) 8.1 (L)   MAGNESIUM 1.60 - 2.40 mg/dL 2.04 2.13   Lactate 0.4 - 2.0 mmol/L 0.4    WBC 4.4 - 11.3 " x10*3/uL 8.5 9.7   nRBC 0.0 - 0.0 /100 WBCs 0.0 0.0   RBC 4.50 - 5.90 x10*6/uL 3.09 (L) 2.91 (L)   HEMOGLOBIN 13.5 - 17.5 g/dL 8.5 (L) 8.0 (L)   HEMATOCRIT 41.0 - 52.0 % 27.1 (L) 26.1 (L)   MCV 80 - 100 fL 88 90   MCH 26.0 - 34.0 pg 27.5 27.5   MCHC 32.0 - 36.0 g/dL 31.4 (L) 30.7 (L)   RED CELL DISTRIBUTION WIDTH 11.5 - 14.5 % 17.0 (H) 17.0 (H)   Platelets 150 - 450 x10*3/uL 217 207   (H): Data is abnormally high  (L): Data is abnormally low               This patient currently has cardiac telemetry ordered; if you would like to modify or discontinue the telemetry order, click here to go to the orders activity to modify/discontinue the order.  This patient has a central line   Reason for the central line remaining today? Parenteral medication                 Assessment & Plan  UTI (urinary tract infection)    Acute deep vein thrombosis (DVT) of femoral vein of left lower extremity    Ambulatory dysfunction    Benign essential HTN    DM type 2 with diabetic peripheral neuropathy    Generalized weakness    Hyperlipidemia    Dizziness    Wound infection    Fever    Ventricular tachycardia seen on cardiac monitor    UTI  Fever  -UA: 250 leukocyte esterase, 6-10 WBC, 1+ bacteria,   -UA culture clinically insignificant growth based on current clinical standards  -Blood culture gram-positive cocci, clusters, Staphylococcus epidermidis  -cefTRIAXone (Rocephin) 1 g in dextrose (iso) IV 50 mL  -Add vancomycin (Vancocin) 1,500 mg in sodium chloride 0.9% 500 mL IV daily  -Received NS 500ml bolus  -Received   -WBC 6.7 > 5.7 > 8.5 > 9.7  -Monitor WBC   -Daily CBC  -Monitor for fever- febrile overnight  -acetaminophen (Tylenol) tablet 650 mg PRN pain/fever  -LR 75 mL/hr, intravenous for 11 hours   -BUN/Cr/GFR 20/1.31/57     Ambulatory dysfunction  Weakness  Dizziness  -CXR: Left lower lobe and possibly lingular subsegmental atelectasis   -CTH: No acute intracranial abnormality. Unchanged burden of moderate supratentorial chronic  ischemic changes in left cerebellar hemisphere chronic lacunar infarct.  -CT chest abdomen pelvis wo IV contrast Perivesical fat stranding and mild wall thickening of the urinary bladder wall. Recommend correlation for cystitis. Unchanged mild bilateral hydronephrosis without hydroureter suggesting a ureteropelvic junction obstruction. Small bilateral pleural effusions (left > right) which is unchanged on the right and slightly smaller on the left compared to 06/05/2025. Scattered mild atelectasis without evidence of pneumonia.  -Vascular US Carotid Artery duplex: Right Carotid: Today's exam was limited due to the body habitus of the patient. Findings are consistent with less than 50% stenosis of the right proximal internal carotid artery. Laminar flow seen by color Doppler. Right external carotid artery appears patent with no evidence of stenosis. No evidence of hemodynamically significant stenosis of the right common carotid artery. The right vertebral artery is patent with antegrade flow. No evidence of hemodynamically significant stenosis in the right subclavian artery.  Left Carotid: Today's exam was limited due to the body habitus of the patient and unable to turn head to right. Findings are consistent with less than 50% stenosis of the left proximal internal carotid artery. Left external carotid artery appears patent with no evidence of stenosis. No evidence of hemodynamically significant stenosis of the left common carotid artery. The left vertebral artery is patent with antegrade flow. No evidence of hemodynamically significant stenosis in the left subclavian artery.  -meclizine PRN  -sodium chloride 0.9% infusion 125/hr completed  -PT/OT evaluation, appreciate recommendations  -Orthostatic pressure  -Telemetry     HTN  DM type 2  Hyperlipidemia  -amLODIPine (Norvasc) tablet 2.5 mg   -gabapentin (Neurontin) capsule 300 mg   -hydrALAZINE (Apresoline) tablet 50 mg  -Blood sugar controlled at this time without  medication A1C 5.8 6/6  -Monitor glucose  -Daily BMP          DVT  -enoxaparin (Lovenox) syringe 90 mg      Wound  -ID consult, appreciate recommendations  -Podiatry consulted appreciate recommendations, debridement and dressing 6/23  -PICC line- Completed OP Vancomycin course 6/18  -Wound culture no polymorphonuclear leukocytes seen    Ventricular tachycardia seen on cardiac monitor  - Monitor electrolytes  - K 3.8, , calcium 8.3,   - Daily BMP  - Magnesium 2.04  - Telemetry       GI ppx: PPI  DVT ppx: Lovenox   Fluids: As needed   Electrolytes: replace as needed  Nutrition: Regular   Adjuncts: PICC          SARAH Landaverde-CNP

## 2025-06-24 NOTE — CONSULTS
Vancomycin Dosing by Pharmacy- INITIAL    Elliot Partida is a 74 y.o. year old male who Pharmacy has been consulted for vancomycin dosing for cellulitis, skin and soft tissue. Based on the patient's indication and renal status this patient will be dosed based on a goal AUC of 400-600.     Renal function is currently stable.    Visit Vitals  /52 (BP Location: Right arm, Patient Position: Lying)   Pulse 63   Temp 36.5 °C (97.7 °F)   Resp 18        Lab Results   Component Value Date    CREATININE 1.24 2025    CREATININE 1.08 2025    CREATININE 1.01 2025    CREATININE 0.95 06/10/2025        Patient weight is as follows:   Vitals:    25 0124   Weight: 88.4 kg (194 lb 14.2 oz)       Cultures:  No results found for the encounter in last 14 days.        I/O last 3 completed shifts:  In: 3067.1 (34.7 mL/kg) [P.O.:1320; I.V.:1547.1 (17.5 mL/kg); IV Piggyback:200]  Out: - (0 mL/kg)   Weight: 88.4 kg   I/O during current shift:  No intake/output data recorded.    Temp (24hrs), Av.7 °C (99.8 °F), Min:36.5 °C (97.7 °F), Max:38.5 °C (101.3 °F)         Assessment/Plan     Patient has already been given a loading dose of 1500 mg.  Will initiate vancomycin maintenance, 1500 mg every 24 hours.    This dosing regimen is predicted by [employer] to result in the following pharmacokinetic parameters:  Regimen: 1500 mg IV every 24 hours.  Start time: 08:15 on 2025  Exposure target: AUC24 (range) 400-600 mg/L.hr   [id number]: 373 mg/L.hr  AUC24,ss: 531 mg/L.hr  Probability of AUC24 > 400: 79 %  Ctrough,ss: 15.8 mg/L  Probability of Ctrough,ss > 20: 31 %    Follow-up level will be ordered on 25 at 0500 unless clinically indicated sooner.  Will continue to monitor renal function daily while on vancomycin and order serum creatinine at least every 48 hours if not already ordered.  Follow for continued vancomycin needs, clinical response, and signs/symptoms of toxicity.       Romulo Wells, PharmD

## 2025-06-24 NOTE — SIGNIFICANT EVENT
"15 beat runs of V. Tach.  Asymptomatic.   Follow-up magnesium level.  Monitor electrolyte.  Continue supportive care. /74 (BP Location: Left arm, Patient Position: Lying)   Pulse 78   Temp 37.6 °C (99.7 °F) (Temporal)   Resp 18   Ht 1.753 m (5' 9\")   Wt 88.4 kg (194 lb 14.2 oz)   SpO2 97%   BMI 28.78 kg/m²     "

## 2025-06-24 NOTE — PROGRESS NOTES
Elliot Partida is a 74 y.o. male on day 1 of admission presenting with UTI (urinary tract infection).    Subjective   Interval History:   Transferred to the ICU    Hypotensive-responding to IV fluids   Febrile, max temp 38.2 C this morning  Sitting up in bed-eating   Feeling better   Discussed with nursing-discussed updated sacral pictures   Reports buttock discomfort  Interval positive blood culture -staph epidermidis     Objective   Range of Vitals (last 24 hours)  Heart Rate:  []   Temp:  [36.5 °C (97.7 °F)-38.5 °C (101.3 °F)]   Resp:  [5-20]   BP: ()/(38-74)   SpO2:  [84 %-100 %]   Daily Weight  06/23/25 : 88.4 kg (194 lb 14.2 oz)    Body mass index is 28.78 kg/m².    Physical Exam  Constitutional:       Appearance: Normal appearance.   HENT:      Head: Normocephalic and atraumatic.      Nose: Nose normal.      Mouth/Throat:      Mouth: Mucous membranes are moist.      Pharynx: Oropharynx is clear.   Eyes:      General: No scleral icterus.  Cardiovascular:      Rate and Rhythm: Normal rate and regular rhythm.   Pulmonary:      Effort: Pulmonary effort is normal.      Breath sounds: Normal breath sounds.   Abdominal:      General: Bowel sounds are normal.      Palpations: Abdomen is soft.   Musculoskeletal:    Bilateral upper extremity edema, RUE PICC line     General: Normal range of motion.      Cervical back: Normal range of motion and neck supple.      Comments: Left fifth toe amputation    Skin:     General: Skin is warm and dry.      Comments: Pictures in EPIC reviewed-left anterior lower leg , dorsal foot wound with small amount granulation tissue.   Neurological:      Mental Status: He is alert.      Comments: Awake, alert   Psychiatric:         Mood and Affect: Mood normal.         Behavior: Behavior normal.     Antibiotics  cefTRIAXone - 1 gram/50 mL  vancomycin - 1.5 gram/300 mL    Relevant Results  Labs  Results from last 72 hours   Lab Units 06/24/25  0806 06/24/25  0105 06/23/25  0700  06/22/25 1949   WBC AUTO x10*3/uL 9.7 8.5 5.7 6.7   HEMOGLOBIN g/dL 8.0* 8.5* 8.0* 8.9*   HEMATOCRIT % 26.1* 27.1* 26.0* 28.3*   PLATELETS AUTO x10*3/uL 207 217 196 231   NEUTROS PCT AUTO %  --   --  57.5 60.9   LYMPHS PCT AUTO %  --   --  27.4 25.4   MONOS PCT AUTO %  --   --  11.8 10.7   EOS PCT AUTO %  --   --  2.5 2.4     Results from last 72 hours   Lab Units 06/24/25  0806 06/24/25  0105 06/23/25  0700   SODIUM mmol/L 142 141 144   POTASSIUM mmol/L 3.7 3.8 3.4*   CHLORIDE mmol/L 107 105 107   CO2 mmol/L 30 30 32   BUN mg/dL 20 18 17   CREATININE mg/dL 1.31* 1.24 1.08   GLUCOSE mg/dL 97 111* 119*   CALCIUM mg/dL 8.1* 8.3* 8.1*   ANION GAP mmol/L 9* 10 8*   EGFR mL/min/1.73m*2 57* 61 72     Results from last 72 hours   Lab Units 06/22/25 1949   ALK PHOS U/L 102   BILIRUBIN TOTAL mg/dL 0.3   PROTEIN TOTAL g/dL 5.8*   ALT U/L 16   AST U/L 26   ALBUMIN g/dL 3.2*     Estimated Creatinine Clearance: 55.3 mL/min (A) (by C-G formula based on SCr of 1.31 mg/dL (H)).  C-Reactive Protein   Date Value Ref Range Status   05/20/2025 3.85 (H) <1.00 mg/dL Final   05/18/2025 4.28 (H) <1.00 mg/dL Final   05/17/2025 5.97 (H) <1.00 mg/dL Final     Microbiology  Susceptibility data from last 14 days.  Collected Specimen Info Organism   06/23/25 Blood culture from Peripheral Venipuncture Staphylococcus epidermidis       Imaging  Carotid duplex bilateral  Result Date: 6/24/2025            [place] [id number]  Tel [phone] and Fax [phone]  Vascular Lab Report [facility] US CAROTID ARTERY DUPLEX BILATERAL  Patient Name:      SUBHASH SEGAL    Reading Physician:  [id number] Eli De La Vega MD Study Date:        6/23/2025           Ordering Physician: [id number] DAMEON ALVAREZ MRN/PID:           [id number]            Technologist:       Oumou Valdez RDMS,                                                            RVT Accession#:         [id number]        Technologist 2: Date of Birth/Age: 1950 / 74 years Encounter#:         [id number] Gender:            M Admission Status:  Inpatient           Location Performed: [facility]  Diagnosis/ICD: Dizziness and giddiness-R42  Patient History Syncope, HTN and CVA. Smoker:         Never. Diabetes:       Yes. >20 years.  CONCLUSIONS: Right Carotid: Today's exam was limited due to the body habitus of the patient. Findings are consistent with less than 50% stenosis of the right proximal internal carotid artery. Laminar flow seen by color Doppler. Right external carotid artery appears patent with no evidence of stenosis. No evidence of hemodynamically significant stenosis of the right common carotid artery. The right vertebral artery is patent with antegrade flow. No evidence of hemodynamically significant stenosis in the right subclavian artery. Left Carotid: Today's exam was limited due to the body habitus of the patient and unable to turn head to right. Findings are consistent with less than 50% stenosis of the left proximal internal carotid artery. Left external carotid artery appears patent with no evidence of stenosis. No evidence of hemodynamically significant stenosis of the left common carotid artery. The left vertebral artery is patent with antegrade flow. No evidence of hemodynamically significant stenosis in the left subclavian artery.  Imaging & Doppler Findings: Right Plaque Morph: The proximal right internal carotid artery demonstrates calcified plaque. Left Plaque Morph: The proximal left internal carotid artery demonstrates calcified plaque. The distal left common carotid artery demonstrates heterogenous plaque.   Right                        Left   PSV      EDV                PSV       cm/s 25 cm/s   CCA P    118 cm/s 18 cm/s 99 cm/s  19 cm/s   CCA D    110 cm/s 19 cm/s 107 cm/s 19 cm/s   ICA P    106 cm/s 18 cm/s 103 cm/s 35 cm/s   ICA M    81 cm/s  18 cm/s 101 cm/s  39 cm/s   ICA D    40 cm/s  10 cm/s 148 cm/s 18 cm/s    ECA     107 cm/s 13 cm/s 78 cm/s  31 cm/s Vertebral  49 cm/s  10 cm/s 129 cm/s 0 cm/s  Subclavian 168 cm/s 12 cm/s  Right                                  Left   PSV    Waveform                       PSV    Waveform 129 cm/s          Subclavian Proximal 168 cm/s               Right Left ICA/CCA Ratio  1.1  1.0   [id number] Eli De La Vega MD Electronically signed by [id number] Eli De La Vega MD on 6/24/2025 at 11:03:17 AM  ** Final **     ECG 12 lead  Result Date: 6/24/2025  Sinus rhythm with 1st degree AV block Nonspecific intraventricular block Minimal voltage criteria for LVH, may be normal variant ( [place] product ) Cannot rule out Septal infarct (cited on or before 09-MAY-2025) Lateral infarct , age undetermined Abnormal ECG When compared with ECG of 22-JUN-2025 19:20, (unconfirmed) QRS axis Shifted right Lateral infarct is now Present Serial changes of Septal infarct Present    XR chest 1 view  Result Date: 6/23/2025  Interpreted By:  Brian Morris, STUDY: XR CHEST 1 VIEW;  6/23/2025 8:57 am   INDICATION: Signs/Symptoms: SOB.   COMPARISON: 06/03/2025 AP chest x-ray and yesterday's unenhanced thoracic CT.   ACCESSION NUMBER(S): [id number]   ORDERING CLINICIAN: DAMEON ALVAREZ   FINDINGS: Portable AP erect chest x-ray 06/23/2025 8:50 a.m.:   CARDIOMEDIASTINAL SILHOUETTE: Cardiomediastinal silhouette is normal in size and configuration. Marked vascular calcification is noted. Severe aortic valve and coronary artery calcification reported on yesterday's CT is difficult to appreciate.   LUNGS: Normally inflated and appear clear aside from moderate lower left lung subsegmental atelectasis not apparent on 06/03/2025. The lateral costophrenic angles are sharp, but small bilateral pleural effusions seen on yesterday's CT would probably go undetected without a lateral view.   ABDOMEN: No remarkable upper abdominal findings.   BONES: Extensive spinal  degenerative changes and generalized osteosclerosis are noted. The latter can be associated with various conditions including but not limited to myelosclerosis, renal osteodystrophy, hyperthyroidism, hypoparathyroidism, fluorosis, osteoblastic metastases, lymphoma, hepatitis C and mastocytosis and should be correlated clinically.       Left lower lobe and possibly lingular subsegmental atelectasis.   MACRO: None   Signed by: Brian Morris 6/23/2025 9:26 AM Dictation workstation:   [id number]    CT chest abdomen pelvis wo IV contrast  Result Date: 6/22/2025  Interpreted By:  Jemal Driscoll, STUDY: CT CHEST ABDOMEN PELVIS WO CONTRAST;  6/22/2025 9:00 pm   INDICATION: Signs/Symptoms:Diuretic abdominal pain nausea not feeling.     COMPARISON: 06/05/2025 CT chest, 02/27/2025 CT abdomen/pelvis   ACCESSION NUMBER(S): [id number]   ORDERING CLINICIAN: ADDI ALBERTO   TECHNIQUE: Contiguous axial images of the chest, abdomen, and pelvis were obtained without contrast. Coronal and sagittal reformatted images were reconstructed from the axial data.   FINDINGS:   CT CHEST:   MEDIASTINUM AND LYMPH NODES:  The esophageal wall appears within normal limits.  No enlarged intrathoracic or axillary lymph nodes by imaging criteria. No pneumomediastinum.   VESSELS:  Normal caliber thoracic aorta. Mild calcific aortic atherosclerosis.   HEART: Normal size. Severe aortic valvular calcification. Mitral annular calcifications. Severe coronary artery calcifications. No significant pericardial effusion.   LUNG, AIRWAYS, PLEURA: No pneumothorax. Small bilateral pleural effusions (left > right), unchanged on the right and slightly decreased on the left, compared to 06/05/2025. Mild passive bibasilar atelectasis, similar to prior study. Scattered linear opacities elsewhere likely represent atelectasis. Linear triangular nodule in the right upper lobe likely represents focal atelectasis given new from prior study.   OSSEOUS STRUCTURES:  No acute osseous abnormality. Diffuse idiopathic skeletal hyperostosis with flowing ossification along the anterior disc margins and maintained disc heights in the thoracic spine. No significant canal stenosis.   CHEST WALL SOFT TISSUES: Mild anasarca.     ABDOMEN/PELVIS:   ABDOMINAL WALL: Mild anasarca. Small fat-containing umbilical hernia.Subcutaneous injection sites in the left abdominal wall.   LIVER: No significant parenchymal abnormality.   BILE DUCTS: No significant intrahepatic or extrahepatic dilatation.   GALLBLADDER: No significant abnormality.   PANCREAS: No significant abnormality.   SPLEEN: No significant abnormality.   ADRENALS: No significant abnormality.   KIDNEYS, URETERS, BLADDER: Unchanged mild bilateral hydronephrosis without hydroureter. No renal or ureteral calculi. The urinary bladder wall is thickened. There is perivesical fat stranding.   REPRODUCTIVE ORGANS: The prostate gland is enlarged, measuring 5.5 cm in transverse dimension.   VESSELS: Mild aortic atherosclerosis without AAA. Unchanged multiple mildly enlarged external iliac lymph node for sample no other enlarged lymph nodes. Measuring 1.3 cm on the right, 1 cm on the left   RETROPERITONEUM/LYMPH NODES: No acute retroperitoneal abnormality. No enlarged lymph nodes.   BOWEL/PERITONEUM: Tiny gastric diverticulum noted. No inflammatory bowel wall thickening or dilatation. Normal appendix.   No ascites, free air, or fluid collection.     MUSCULOSKELETAL: Multilevel bulky anterior endplate osteophytes with maintained disc spaces reflect diffuse idiopathic skeletal hyperostosis.  No suspicious osseous lesion.       Perivesical fat stranding and mild wall thickening of the urinary bladder wall. Recommend correlation for cystitis.   Unchanged mild bilateral hydronephrosis without hydroureter suggesting a ureteropelvic junction obstruction.   Small bilateral pleural effusions (left > right) which is unchanged on the right and slightly  smaller on the left compared to 06/05/2025. Scattered mild atelectasis without evidence of pneumonia.   MACRO: None.   Signed by: Jemal Driscoll 6/22/2025 9:41 PM Dictation workstation:   [id number]    CT head wo IV contrast  Result Date: 6/22/2025  Interpreted By:  Jemal Driscoll, STUDY: CT HEAD WO IV CONTRAST;  6/22/2025 8:50 pm   INDICATION: Signs/Symptoms:Left eye pain  on blood thinner h/o stroke.     COMPARISON: 06/03/2025   ACCESSION NUMBER(S): [id number]   ORDERING CLINICIAN: ADDI ALBERTO   TECHNIQUE: Noncontrast axial CT images of head were obtained with coronal and sagittal reconstructed images.   FINDINGS: BRAIN PARENCHYMA: Moderate periventricular and subcortical hemispheric white matter hypodensities are most compatible with chronic small vessel ischemic disease.Chronic lacunar infarct in the left cerebellar hemisphere. No acute intraparenchymal hemorrhage or parenchymal evidence of acute large territory ischemic infarct. Gray-white matter distinction is preserved. No mass-effect.   VENTRICLES and EXTRA-AXIAL SPACES:  No acute extra-axial or intraventricular hemorrhage. No effacement of cerebral sulci. The ventricles and sulci are age-concordant.   PARANASAL SINUSES/MASTOIDS:  No hemorrhage or air-fluid levels within the visualized paranasal sinuses. The mastoids are well aerated.   CALVARIUM/ORBITS:  No acute skull fracture.  The orbits and globes are intact to the extent visualized.   EXTRACRANIAL SOFT TISSUES: No discernible acute abnormality.       No acute intracranial abnormality.   Unchanged burden of moderate supratentorial chronic ischemic changes in left cerebellar hemisphere chronic lacunar infarct.   MACRO: None.   Signed by: Jemal Driscoll 6/22/2025 9:32 PM Dictation workstation:   [id number]    ECG 12 lead  Result Date: 6/14/2025  Sinus rhythm with 1st degree AV block Left axis deviation Nonspecific intraventricular block Minimal voltage criteria for LVH, may be normal  variant ( [place] product ) Cannot rule out Anterior infarct (cited on or before 09-MAY-2025) Abnormal ECG When compared with ECG of 03-JUN-2025 14:26, No significant change was found See ED provider note for full interpretation and clinical correlation Confirmed by Flower Shaikh ([id number]) on 6/14/2025 8:57:23 PM    Lower extremity venous duplex right  Result Date: 6/6/2025  Interpreted By:  Nnamdi Dodson, STUDY: [facility] US LOWER EXTREMITY VENOUS DUPLEX RIGHT;  6/6/2025 4:45 pm   INDICATION: Signs/Symptoms:eval dvt , pain swelling.   COMPARISON: None.   ACCESSION NUMBER(S): [id number]   ORDERING CLINICIAN: MIGNON PURI   TECHNIQUE: Grayscale, color and spectral Doppler sonographic images of the right lower extremity deep venous system. The left common femoral vein was imaged for comparison.   FINDINGS: There is normal compressibility of the right common femoral vein, saphenous femoral junction, femoral vein and popliteal vein. The posterior tibial and peroneal veins are suboptimally visualized. There is normal spontaneous and phasic variation throughout the leg by spectral doppler.   The left common femoral vein is patent.   OTHER FINDINGS: None.       Suboptimal visualization of the calf veins. No sonographic evidence of acute DVT in the visualized vessels of the right lower extremity.   MACRO: None   Signed by: Nnamdi Dodson 6/6/2025 4:53 PM Dictation workstation:   [id number]    Vascular US Upper Extremity Venous Duplex Right  Result Date: 6/6/2025  Interpreted By:  Nnamdi Dodson, STUDY: [facility] US UPPER EXTREMITY VENOUS DUPLEX RIGHT;  6/6/2025 4:43 pm   INDICATION: Signs/Symptoms:eval dvt  pain.   COMPARISON: Upper extremity Doppler ultrasound 05/30/2025.   ACCESSION NUMBER(S): [id number]   ORDERING CLINICIAN: MIGNON PURI   TECHNIQUE: Grayscale, color and spectral Doppler sonographic imaging of the right upper extremity deep venous system.   FINDINGS: The right cephalic vein is not well visualized.  Evaluation of the brachial vein is also solid limited due to placement of PICC line. Segmental visualization of the right internal jugular vein, subclavian vein, axillary vein, basilic vein and brachial vein demonstrate normal gray scale appearance, compressibility, color flow and venous waveforms. The radial and ulnar veins are not visualized.   Redemonstration of occlusive thrombus in the left subclavian vein.       No sonographic evidence of right upper extremity DVT.   Partial visualization of occlusive thrombus in the left subclavian vein as noted on prior ultrasound. Please refer to separate or of left upper extremity DVT for additional detail   MACRO: None   Signed by: Nnamdi Dodson 6/6/2025 4:51 PM Dictation workstation:   [id number]    CT angio chest for pulmonary embolism  Result Date: 6/5/2025  Interpreted By:  Timmy Castro, STUDY: CT ANGIO CHEST FOR PULMONARY EMBOLISM;  6/5/2025 3:00 pm   INDICATION: Signs/Symptoms:rule out PE.     COMPARISON: CT chest with contrast 21 May 2025   ACCESSION NUMBER(S): [id number]   ORDERING CLINICIAN: ASAF CORONA   TECHNIQUE: Pulmonary arterial phase CT chest after the uneventful administration of 75 mL IV contrast (Omnipaque 350).   Three dimensional maximum intensity projection (3-D MIPs) image/s were created on a separate dedicated workstation, reviewed and saved   FINDINGS: CARDIOVASCULAR:   Acute pulmonary embolism: Negative through the  lobar (second order) branch level. Substantial sized branches from segmental (third order) branch and distally cannot be evaluated Acute right heart strain: Negative. No CT evidence of acute right heart strain Cardiac thrombus:  Negative; no obvious right heart or other cardiac thrombus is seen Pulmonary arteries ectasia:  Unchanged borderline to mild   Heart size:  Borderline but unchanged Pericardial effusion:  Negative   Thoracic aortic aneurysm:  Negative Aortic dissection:  Negative   Heart failure change:  Negative.  No  sign of interstitial or alveolar edema. Other:  n/a   NONVASCULAR MEDIASTINUM: Esophagus:  Grossly normal by CT Mediastinal Mass:  Negative Hiatal hernia:  None Other:  n/a   LYMPH NODES: No thoracic adenopathy   LUNGS / AIRSPACES / AIRWAYS:   LARGE AIRWAYS Filling defect: Negative Wall thickening: Negative Bronchiectasis: Negative Other: N/A   AIRSPACES Fibrosis: Negative Emphysema: Negative Consolidation: Negative Ground glass airspace disease: Negative Edema: Mild bibasilar Nodule / Mass: Negative Other: Motion artifact   PLEURA: Effusion:  Right pleural effusion has decreased in size, now trace quantity, previously at least small if not moderate. Left effusion has perhaps marginally decreased in size, still small Pneumothorax: Both sides negative Other:  n/a   CHEST WALL: Soft tissues of the chest wall are unremarkable   SKELETON: No acute or contributory abnormality   UPPER ABDOMEN: Included subdiaphragmatic structures have no acute or contributory abnormality       MILD BIBASILAR INTERSTITIAL EDEMA   THE RIGHT PLEURAL EFFUSION HAS DECREASED IN SIZE SINCE 21 MAY 2025   LEFT PLEURAL EFFUSION HAS MARGINALLY DECREASED IN SIZE AS WELL   NO ACUTE PULMONARY EMBOLISM THROUGH THE LOBAR BRANCH LEVEL. SEGMENTAL AND SUBSEGMENTAL LEVELS OF THE PULMONARY ARTERY CANNOT BE EVALUATED   NO AORTIC DISSECTION OR OTHER ACUTE THORACIC AORTIC FINDINGS   NO AIRSPACE CONSOLIDATION, GROUND-GLASS AIRSPACE DISEASE OR ANY OTHER SIGN OF ACTIVE INFECTION   NO PERICARDIAL EFFUSION   NO PNEUMOTHORAX   MACRO: None   Signed by: Timmy Castro 6/5/2025 3:21 PM Dictation workstation:   [id number]    Lower extremity venous duplex left  Result Date: 6/5/2025  STUDY: Left Lower Extremity Venous Doppler Ultrasound; 6/5/2025 12:59 PM INDICATION: Positive DVT on multiple ultrasound scan done in routine facility. Currently on a Eliquis. COMPARISON: US LE 02/11/2025, 09/12/2024. ACCESSION NUMBER(S): [id number] ORDERING CLINICIAN: ASAF CORONA  TECHNIQUE:  Real-time grayscale, color, and spectral doppler ultrasound imaging of the left lower extremity veins was performed. FINDINGS: There is acute occlusive DVT demonstrated within the left deep femoral, femoral, and popliteal veins. The left external iliac and common femoral veins demonstrated normal compressibility, normal phasic venous flow and normal response to augmentation.  The visualized deep calf veins are patent.  The contralateral common femoral vein is free of thrombosis.    Evidence for acute occlusive DVT within the left deep femoral, femoral, and popliteal veins. There is no significant change when compared to prior ultrasound from February 2025. Signed by Sherice Mejia MD    ECG 12 lead  Result Date: 6/5/2025  Sinus rhythm with 1st degree AV block Left axis deviation Nonspecific intraventricular block Minimal voltage criteria for LVH, may be normal variant ( [place] product ) Cannot rule out Septal infarct (cited on or before 09-MAY-2025) Abnormal ECG When compared with ECG of 21-MAY-2025 16:33, No significant change was found Confirmed by Long Min ([id number]) on 6/5/2025 12:09:34 PM    XR chest 1 view  Result Date: 6/3/2025  Interpreted By:  Pepe Huang, STUDY: XR CHEST 1 VIEW  6/3/2025 2:50 pm   INDICATION: Signs/Symptoms:Malaise and fatigue   COMPARISON: 05/12/2025   ACCESSION NUMBER(S): [id number]   ORDERING CLINICIAN: SHAI BREAUX   TECHNIQUE: A single AP portable radiograph of the chest was obtained.   FINDINGS: Multiple cardiac monitoring leads are seen over the chest.   No focal infiltrate, pleural effusion or pneumothorax is identified. The cardiac silhouette is within normal limits for size.       No focal infiltrate or pneumothorax is identified.   MACRO: None.   Signed by: Pepe Huang 6/3/2025 2:59 PM Dictation workstation:   [id number]    XR shoulder right 2+ views  Result Date: 6/3/2025  Interpreted By:  Pepe Huang, STUDY: XR SHOULDER RIGHT 2+ VIEWS 6/3/2025 2:50 pm    INDICATION: Signs/Symptoms:Right shoulder pain   COMPARISON: None.   ACCESSION NUMBER(S): [id number]   ORDERING CLINICIAN: ELMO OLIVAS   TECHNIQUE: 3 views of the right shoulder including AP , axillary and scapular Y-views were obtained.   FINDINGS: There is no radiographic evidence of acute fracture or dislocation identified.  Mild hypertrophic degenerative changes are seen in the right shoulder.       1. No evidence of acute fracture or dislocation. 2. Degenerative changes, as described above.   MACRO: None.   Signed by: Pepe Huang 6/3/2025 2:59 PM Dictation workstation:   [id number]    CT head wo IV contrast  Result Date: 6/3/2025  Interpreted By:  Julien Romeo, STUDY: CT HEAD WO IV CONTRAST;  6/3/2025 2:40 pm   INDICATION: Signs/Symptoms:Headache at the base of the skull, recently started on Xarelto.     COMPARISON: 05/12/2025.   ACCESSION NUMBER(S): [id number]   ORDERING CLINICIAN: ELMO OLIVAS   TECHNIQUE: Contiguous unenhanced axial images were obtained through the brain.   FINDINGS: INTRACRANIAL: Mild generalized atrophy is again seen.  Nonspecific irregular low-attenuation changes throughout the periventricular and subcortical white matter are similar to prior, most likely related to chronic microvascular disease. There is a stable small remote lacunar infarct of the right basal ganglia and internal capsule anterior limb. No acute intracranial bleed, midline shift, or mass effect is seen. Gray-white differentiation is maintained. No extra-axial fluid collection or hydrocephalus. Irregular atherosclerotic calcifications again seen in the internal carotid artery and vertebral artery segments   Bones are intact.   EXTRACRANIAL: Paranasal sinus mild peripheral mucosal thickening is most prominent in the ethmoid air cells. No paranasal sinus air-fluid level. Mastoid air cells are clear.       Age-related/chronic changes similar to prior. No acute intracranial process.       MACRO: None.   Signed  by: Julien Sfiligoj 6/3/2025 2:46 PM Dictation workstation:   [id number]    Vascular US upper extremity venous duplex left  Result Date: 5/30/2025  STUDY: Left upper extremity venous ultrasound; Completed Time: 5/30/2025 13:35 INDICATION: LUE swelling.  Recent PICC line placement. COMPARISON: None available. ACCESSION NUMBER(S): [id number] ORDERING CLINICIAN: RAJINDER CONNER TECHNIQUE: Ultrasound of the left upper extremity veins.  Multiple images were obtained. FINDINGS: There is acute partially occlusive DVT demonstrated within the left subclavian, axillary and brachial veins.  Superficial thrombus is visualized within the basilic vein, surrounding the PICC line. The left internal jugular vein demonstrated normal compressibility, normal phasic venous flow, and normal response to augmentation.  There is no evidence for echogenic thrombi.  The cephalic vein is patent.  The radial and ulnar veins are not evaluated.    Evidence for acute partially occlusive DVT within the left subclavian, axillary and brachial veins. Superficial thrombus within the basilic vein, surrounding the PICC line. 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        IV ceftriaxone-expand to IV zosyn  IV vancomycin-monitor levels  Repeat blood culture   Monitor blood pressure   Monitor temperature and WBC  Follow-up wound culture  Follow-up blood culture  Repeat urine culture   Local care  Supportive care  Establish peripheral  IV access so PICC line can be removed   Further recommendations based on pending workup-culture results       Sonam Zhu, APRN-CNP

## 2025-06-24 NOTE — CARE PLAN
The patient's goals for the shift include      The clinical goals for the shift include Pt will be afebrile by 6/25/25   Patient transferred to ICU for lethargy  and overall decline, patient more alert and oriented this afternoon attempting peripheral IV access to remove PICC line for positive blood cultures

## 2025-06-24 NOTE — SIGNIFICANT EVENT
Transferred to ICU via bed. Pt lethargic. Has not voided yet. Bladder scan was 340cc. BP 99/49---CNP aware

## 2025-06-24 NOTE — PROGRESS NOTES
Occupational Therapy                 Therapy Communication Note    Patient Name: Elliot Partida  MRN: [id number]  Department: [facility]  Room: 01/01-A  Today's Date: 6/24/2025     Discipline: Occupational Therapy    Missed Visit:   Yes    Missed Visit Reason:  Patient placed on medical hold. Pt asleep upon arrival, refusing therapy 2/2 not feeling well. Attempt at 1345 - Spoke w/ pt's nurse, hold at this time 2/2 low BP as well as transferring to ICU at this time. Will follow up per pt status and as schedule allows.     Missed Time: Attempt 940, 1345

## 2025-06-24 NOTE — CARE PLAN
Problem: Safety - Adult  Goal: Free from fall injury  Outcome: Progressing     Problem: Discharge Planning  Goal: Discharge to home or other facility with appropriate resources  Outcome: Progressing   The patient's goals for the shift include      The clinical goals for the shift include Maintain pt safety/comfort, remain free from falls/injury; monitor labs/vitals; pain management

## 2025-06-24 NOTE — CARE PLAN
RT has completed the requested/ordered overnight trend on room air and patient does qualify for 2L NC of O2 with his discharge.    Total Time Below: 48min 18sec  Longest Duration: 7min 9sec  Lowest SpO2: 80% at 06/23/25 @ 11:43:00 PM  Number of Events: 30

## 2025-06-25 LAB
ANION GAP SERPL CALC-SCNC: 13 MMOL/L (ref 10–20)
ATRIAL RATE: 77 BPM
BACTERIA SPEC CULT: NORMAL
BUN SERPL-MCNC: 19 MG/DL (ref 6–23)
CALCIUM SERPL-MCNC: 8.4 MG/DL (ref 8.6–10.3)
CHLORIDE SERPL-SCNC: 107 MMOL/L (ref 98–107)
CO2 SERPL-SCNC: 25 MMOL/L (ref 21–32)
CREAT SERPL-MCNC: 1.02 MG/DL (ref 0.5–1.3)
EGFRCR SERPLBLD CKD-EPI 2021: 78 ML/MIN/1.73M*2
ERYTHROCYTE [DISTWIDTH] IN BLOOD BY AUTOMATED COUNT: 16.3 % (ref 11.5–14.5)
GLUCOSE BLD MANUAL STRIP-MCNC: 129 MG/DL (ref 74–99)
GLUCOSE SERPL-MCNC: 88 MG/DL (ref 74–99)
GRAM STN SPEC: NORMAL
GRAM STN SPEC: NORMAL
HCT VFR BLD AUTO: 28.9 % (ref 41–52)
HGB BLD-MCNC: 8.9 G/DL (ref 13.5–17.5)
MCH RBC QN AUTO: 28 PG (ref 26–34)
MCHC RBC AUTO-ENTMCNC: 30.8 G/DL (ref 32–36)
MCV RBC AUTO: 91 FL (ref 80–100)
NRBC BLD-RTO: 0 /100 WBCS (ref 0–0)
P AXIS: 32 DEGREES
P OFFSET: 135 MS
P ONSET: 86 MS
PLATELET # BLD AUTO: 175 X10*3/UL (ref 150–450)
POTASSIUM SERPL-SCNC: 3.7 MMOL/L (ref 3.5–5.3)
PR INTERVAL: 272 MS
Q ONSET: 222 MS
QRS COUNT: 13 BEATS
QRS DURATION: 142 MS
QT INTERVAL: 404 MS
QTC CALCULATION(BAZETT): 457 MS
QTC FREDERICIA: 439 MS
R AXIS: -57 DEGREES
RBC # BLD AUTO: 3.18 X10*6/UL (ref 4.5–5.9)
SODIUM SERPL-SCNC: 141 MMOL/L (ref 136–145)
T AXIS: 97 DEGREES
T OFFSET: 424 MS
VANCOMYCIN SERPL-MCNC: 20.5 UG/ML (ref 5–20)
VENTRICULAR RATE: 77 BPM
WBC # BLD AUTO: 7.2 X10*3/UL (ref 4.4–11.3)

## 2025-06-25 PROCEDURE — 80048 BASIC METABOLIC PNL TOTAL CA: CPT | Mod: IPSPLIT | Performed by: NURSE PRACTITIONER

## 2025-06-25 PROCEDURE — 87075 CULTR BACTERIA EXCEPT BLOOD: CPT | Mod: GENLAB | Performed by: INTERNAL MEDICINE

## 2025-06-25 PROCEDURE — 82947 ASSAY GLUCOSE BLOOD QUANT: CPT | Mod: IPSPLIT

## 2025-06-25 PROCEDURE — 85027 COMPLETE CBC AUTOMATED: CPT | Mod: IPSPLIT | Performed by: NURSE PRACTITIONER

## 2025-06-25 PROCEDURE — 94760 N-INVAS EAR/PLS OXIMETRY 1: CPT | Mod: IPSPLIT

## 2025-06-25 PROCEDURE — 36415 COLL VENOUS BLD VENIPUNCTURE: CPT | Mod: IPSPLIT | Performed by: INTERNAL MEDICINE

## 2025-06-25 PROCEDURE — 2500000005 HC RX 250 GENERAL PHARMACY W/O HCPCS: Mod: IPSPLIT | Performed by: NURSE PRACTITIONER

## 2025-06-25 PROCEDURE — 2500000001 HC RX 250 WO HCPCS SELF ADMINISTERED DRUGS (ALT 637 FOR MEDICARE OP): Mod: IPSPLIT | Performed by: NURSE PRACTITIONER

## 2025-06-25 PROCEDURE — 87077 CULTURE AEROBIC IDENTIFY: CPT | Mod: GENLAB | Performed by: NURSE PRACTITIONER

## 2025-06-25 PROCEDURE — 99233 SBSQ HOSP IP/OBS HIGH 50: CPT | Performed by: NURSE PRACTITIONER

## 2025-06-25 PROCEDURE — 87086 URINE CULTURE/COLONY COUNT: CPT | Mod: GENLAB | Performed by: NURSE PRACTITIONER

## 2025-06-25 PROCEDURE — 1200000002 HC GENERAL ROOM WITH TELEMETRY DAILY: Mod: IPSPLIT

## 2025-06-25 PROCEDURE — 2500000004 HC RX 250 GENERAL PHARMACY W/ HCPCS (ALT 636 FOR OP/ED): Mod: JW,IPSPLIT | Performed by: NURSE PRACTITIONER

## 2025-06-25 PROCEDURE — 36415 COLL VENOUS BLD VENIPUNCTURE: CPT | Mod: IPSPLIT | Performed by: NURSE PRACTITIONER

## 2025-06-25 PROCEDURE — 2500000004 HC RX 250 GENERAL PHARMACY W/ HCPCS (ALT 636 FOR OP/ED): Mod: IPSPLIT | Performed by: NURSE PRACTITIONER

## 2025-06-25 PROCEDURE — 80202 ASSAY OF VANCOMYCIN: CPT | Mod: IPSPLIT | Performed by: NURSE PRACTITIONER

## 2025-06-25 PROCEDURE — 97530 THERAPEUTIC ACTIVITIES: CPT | Mod: GP,CQ,IPSPLIT

## 2025-06-25 RX ORDER — ZINC OXIDE 20 G/100G
1 OINTMENT TOPICAL
Status: DISCONTINUED | OUTPATIENT
Start: 2025-06-25 | End: 2025-06-27

## 2025-06-25 RX ADMIN — PIPERACILLIN SODIUM AND TAZOBACTAM SODIUM 4.5 G: 4; .5 INJECTION, SOLUTION INTRAVENOUS at 20:59

## 2025-06-25 RX ADMIN — PIPERACILLIN SODIUM AND TAZOBACTAM SODIUM 4.5 G: 4; .5 INJECTION, SOLUTION INTRAVENOUS at 12:18

## 2025-06-25 RX ADMIN — GABAPENTIN 300 MG: 300 CAPSULE ORAL at 14:16

## 2025-06-25 RX ADMIN — TRAMADOL HYDROCHLORIDE 25 MG: 50 TABLET, COATED ORAL at 23:45

## 2025-06-25 RX ADMIN — GABAPENTIN 300 MG: 300 CAPSULE ORAL at 08:30

## 2025-06-25 RX ADMIN — HYDRALAZINE HYDROCHLORIDE 50 MG: 50 TABLET ORAL at 08:30

## 2025-06-25 RX ADMIN — HYDRALAZINE HYDROCHLORIDE 50 MG: 50 TABLET ORAL at 14:16

## 2025-06-25 RX ADMIN — HYDRALAZINE HYDROCHLORIDE 50 MG: 50 TABLET ORAL at 21:04

## 2025-06-25 RX ADMIN — Medication 2 L/MIN: at 21:42

## 2025-06-25 RX ADMIN — VANCOMYCIN 1.5 G: 1.5 INJECTION, SOLUTION INTRAVENOUS at 23:32

## 2025-06-25 RX ADMIN — TRAZODONE HYDROCHLORIDE 50 MG: 50 TABLET ORAL at 21:05

## 2025-06-25 RX ADMIN — AMLODIPINE BESYLATE 2.5 MG: 2.5 TABLET ORAL at 08:30

## 2025-06-25 RX ADMIN — ENOXAPARIN SODIUM 90 MG: 100 INJECTION SUBCUTANEOUS at 23:31

## 2025-06-25 RX ADMIN — ONDANSETRON 4 MG: 2 INJECTION INTRAMUSCULAR; INTRAVENOUS at 13:27

## 2025-06-25 RX ADMIN — Medication 21 PERCENT: at 22:44

## 2025-06-25 RX ADMIN — TRAMADOL HYDROCHLORIDE 25 MG: 50 TABLET, COATED ORAL at 14:16

## 2025-06-25 RX ADMIN — GABAPENTIN 300 MG: 300 CAPSULE ORAL at 21:04

## 2025-06-25 RX ADMIN — PIPERACILLIN SODIUM AND TAZOBACTAM SODIUM 4.5 G: 4; .5 INJECTION, SOLUTION INTRAVENOUS at 06:22

## 2025-06-25 RX ADMIN — PIPERACILLIN SODIUM AND TAZOBACTAM SODIUM 4.5 G: 4; .5 INJECTION, SOLUTION INTRAVENOUS at 01:22

## 2025-06-25 RX ADMIN — ENOXAPARIN SODIUM 90 MG: 100 INJECTION SUBCUTANEOUS at 12:18

## 2025-06-25 ASSESSMENT — PAIN DESCRIPTION - DESCRIPTORS
DESCRIPTORS: SORE
DESCRIPTORS: ACHING

## 2025-06-25 ASSESSMENT — COGNITIVE AND FUNCTIONAL STATUS - GENERAL
MOVING FROM LYING ON BACK TO SITTING ON SIDE OF FLAT BED WITH BEDRAILS: A LITTLE
TURNING FROM BACK TO SIDE WHILE IN FLAT BAD: A LITTLE
PERSONAL GROOMING: A LITTLE
STANDING UP FROM CHAIR USING ARMS: A LITTLE
MOVING TO AND FROM BED TO CHAIR: A LITTLE
MOVING FROM LYING ON BACK TO SITTING ON SIDE OF FLAT BED WITH BEDRAILS: A LITTLE
TOILETING: A LITTLE
HELP NEEDED FOR BATHING: A LITTLE
STANDING UP FROM CHAIR USING ARMS: A LITTLE
CLIMB 3 TO 5 STEPS WITH RAILING: A LOT
DRESSING REGULAR LOWER BODY CLOTHING: A LITTLE
EATING MEALS: A LITTLE
DRESSING REGULAR UPPER BODY CLOTHING: A LITTLE
TURNING FROM BACK TO SIDE WHILE IN FLAT BAD: A LITTLE
MOBILITY SCORE: 17
WALKING IN HOSPITAL ROOM: A LITTLE
DAILY ACTIVITIY SCORE: 18
MOBILITY SCORE: 16
MOVING TO AND FROM BED TO CHAIR: A LITTLE
CLIMB 3 TO 5 STEPS WITH RAILING: A LOT
WALKING IN HOSPITAL ROOM: A LOT

## 2025-06-25 ASSESSMENT — PAIN SCALES - GENERAL
PAINLEVEL_OUTOF10: 0 - NO PAIN
PAINLEVEL_OUTOF10: 3
PAINLEVEL_OUTOF10: 6
PAINLEVEL_OUTOF10: 0 - NO PAIN
PAINLEVEL_OUTOF10: 9
PAINLEVEL_OUTOF10: 4
PAINLEVEL_OUTOF10: 0 - NO PAIN
PAINLEVEL_OUTOF10: 0 - NO PAIN

## 2025-06-25 ASSESSMENT — PAIN DESCRIPTION - LOCATION: LOCATION: FOOT

## 2025-06-25 ASSESSMENT — PAIN - FUNCTIONAL ASSESSMENT
PAIN_FUNCTIONAL_ASSESSMENT: 0-10

## 2025-06-25 ASSESSMENT — PAIN DESCRIPTION - ORIENTATION: ORIENTATION: LEFT

## 2025-06-25 NOTE — PROGRESS NOTES
Physical Therapy    Physical Therapy Treatment    Patient Name: Elliot Partida  MRN: [id number]  Department: [facility]  Room: 01/01-A  Today's Date: 6/25/2025  Time Calculation  Start Time: 1005  Stop Time: 1020  Time Calculation (min): 15 min    Assessment/Plan   PT Assessment  PT Assessment Results: Decreased endurance, Decreased mobility  Rehab Prognosis: Good  Barriers to Discharge Home: No anticipated barriers  Evaluation/Treatment Tolerance: Patient limited by fatigue  Medical Staff Made Aware: Yes  Strengths: Ability to acquire knowledge, Premorbid level of function  Barriers to Participation: Comorbidities  End of Session Communication: Bedside nurse  Assessment Comment: Pt performed bed mobility with Zainab for LLE management. Pt performed STS with Zainab from EOB. Pt was able to stand for 5 minutes using FWW as UE support and CGA for safety; pt maintained static standing for 90% of time and performed standing marches x 10. Pt demonstrated fatigue with marches and wanted to end session. Pt would continue to benefit from skilled therapy to improve overall strength and endurance for functional activities.  End of Session Patient Position: Bed, 2 rail up, Alarm on  PT Plan  Inpatient/Swing Bed or Outpatient: Inpatient  PT Plan  Treatment/Interventions: Bed mobility, Transfer training, Neuromuscular re-education  PT Plan: Ongoing PT  PT Frequency: 3 times per week  PT Discharge Recommendations: Moderate intensity level of continued care  PT Recommended Transfer Status: Assist x1  PT - OK to Discharge: Yes (Once medically stable)    PT Visit Info:  PT Received On: 06/25/25     General Visit Information:   General  Reason for Referral: Impaired Mobility. Dizzy.  Referred By: Chuy Muniz D.O.  Past Medical History Relevant to Rehab: Diagnosis Date Comment Source  Acute osteomyelitis of ankle and foot, left (Multi)     AMI (acute myocardial infarction) (Multi)     ASHD (arteriosclerotic heart disease)     At risk for  falls     Cellulitis  left foot and ankle   COVID-19     Diabetes mellitus (Multi)     DVT (deep vein thrombosis) in pregnancy ([facility])     Fall risk care plan declined     Hypertension     Meningioma (Multi)     Myocardial infarction (Multi)     Neuritis     Neuropathy     Osteoarthritis     Osteomyelitis  left foot   Pleural effusion     PVD (peripheral vascular disease)     Sprain of right knee 06/25/2015    Stroke (Multi)     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    Subjective   Precautions:  Precautions  Medical Precautions: Fall precautions  Precautions Comment: IV, PICC, (RUE limb alert)     Date/Time Vitals Session Patient Position Pulse Resp SpO2 BP MAP (mmHg)    06/25/25 1000 --  --  70  12  94 %  133/54  76     06/25/25 1100 --  --  68  0  96 %  --  --           Objective   Pain:  Pain Assessment  Pain Assessment: 0-10  0-10 (Numeric) Pain Score: 4  Pain Type: Acute pain  Pain Location: Hand  Pain Orientation: Left  Cognition:  Cognition  Overall Cognitive Status: Within Functional Limits  Orientation Level: Disoriented to situation  Coordination:  Movements are Fluid and Coordinated: Yes  Activity Tolerance:  Activity Tolerance  Endurance: Decreased tolerance for upright activites    Treatments:  Balance/Neuromuscular Re-Education  Balance/Neuromuscular Re-Education Activity Performed: Yes  Balance/Neuromuscular Re-Education Activity 1: Static and Dynamic Standing (Pt was able to stand for 5 minutes using FWW as UE support and CGA for safety; pt maintained static standing for 90% of time and performed standing marches x 10.)    Bed Mobility  Bed Mobility:  Yes  Bed Mobility 1  Bed Mobility 1: Supine to sitting  Level of Assistance 1: Minimum assistance  Bed Mobility Comments 1: pt required assistance with LLE  Bed Mobility 2  Bed Mobility  2: Sitting to supine  Level of Assistance 2: Minimum assistance  Bed Mobility Comments 2: pt required assistance with LLE  Bed Mobility 3  Bed Mobility 3: Rolling left, Rolling right  Level of Assistance 3: Close supervision, Minimal verbal cues  Bed Mobility Comments 3: cues for hand placement    Transfers  Transfer: Yes  Transfer 1  Transfer From 1: Bed to  Transfer to 1: Stand  Technique 1: Sit to stand, Stand to sit  Transfer Device 1: Walker, Gait belt  Transfer Level of Assistance 1: Minimum assistance  Trials/Comments 1: x1    Outcome Measures:  [facility] Basic Mobility  Turning from your back to your side while in a flat bed without using bedrails: A little  Moving from lying on your back to sitting on the side of a flat bed without using bedrails: A little  Moving to and from bed to chair (including a wheelchair): A little  Standing up from a chair using your arms (e.g. wheelchair or bedside chair): A little  To walk in hospital room: A little  Climbing 3-5 steps with railing: A lot  Basic Mobility - Total Score: 17    FSS-ICU  Ambulation: Walks <50 feet with any assistance x1 or walks any distance with assistance x2 people  Rolling: Minimal assistance (performs 75% or more of task)  Sitting: Minimal assistance (performs 75% or more of task)  Transfer Sit-to-Stand: Minimal assistance (performs 75% or more of task)  Transfer Supine-to-Sit: Minimal assistance (performs 75% or more of task)  Total Score: 17    Education Documentation  Mobility Training, taught by Josi Boston PTA at 6/25/2025 11:19 AM.  Learner: Patient  Readiness: Acceptance  Method: Explanation  Response: Verbalizes Understanding  Comment: hand placement for bed mobility and transfers    Encounter Problems       Encounter Problems (Active)       Balance        LTG - Patient will maintain balance to allow for safe mobility with close supervision (Progressing)       Start:  06/23/25    Expected End:  07/07/25               Mobility       STG - Patient will ambulate with approp a device  feet with close supervision (Progressing)       Start:  06/23/25    Expected End:  07/07/25               PT Transfers       STG - Patient will perform bed mobility independently (Progressing)       Start:  06/23/25    Expected End:  07/07/25            STG - Patient will transfer sit to and from stand with close supervision (Progressing)       Start:  06/23/25    Expected End:  07/07/25               Pain - Adult

## 2025-06-25 NOTE — PROGRESS NOTES
Elliot Partida is a 74 y.o. male on day 2 of admission presenting with UTI (urinary tract infection).    Subjective   Interval History:   Afebrile, no chills  On room air  No cough, chest pain or shortness of breath  No nausea vomiting or diarrhea    Review of Systems   All other systems reviewed and are negative.      Objective   Range of Vitals (last 24 hours)  Heart Rate:  [55-75]   Temp:  [36.6 °C (97.9 °F)-37.2 °C (99 °F)]   Resp:  [0-24]   BP: (107-177)/(43-67)   Weight:  [91.4 kg (201 lb 8 oz)]   SpO2:  [93 %-100 %]   Daily Weight  06/25/25 : 91.4 kg (201 lb 8 oz)    Body mass index is 29.76 kg/m².    Physical Exam  Constitutional:       Appearance: Normal appearance.   HENT:      Head: Normocephalic and atraumatic.      Nose: Nose normal.      Mouth/Throat:      Mouth: Mucous membranes are moist.      Pharynx: Oropharynx is clear.   Eyes:      General: No scleral icterus.  Cardiovascular:      Rate and Rhythm: Normal rate and regular rhythm.   Pulmonary:      Effort: Pulmonary effort is normal.      Breath sounds: Normal breath sounds.   Abdominal:      General: Bowel sounds are normal.      Palpations: Abdomen is soft.   Musculoskeletal:    Bilateral upper extremity edema, RUE PICC line     General: Normal range of motion.      Cervical back: Normal range of motion and neck supple.      Comments: Left fifth toe amputation    Skin:     General: Skin is warm and dry.      Comments: Pictures in EPIC reviewed-left anterior lower leg , dorsal foot wound with small amount granulation tissue.   Neurological:      Mental Status: He is alert.      Comments: Awake, alert   Psychiatric:         Mood and Affect: Mood normal.         Behavior: Behavior normal.     Antibiotics  piperacillin-tazobactam - 4.5 gram/100 mL  vancomycin - 1.5 gram/300 mL    Relevant Results  Labs  Results from last 72 hours   Lab Units 06/25/25  0536 06/24/25  0806 06/24/25  0105 06/23/25  0700 06/22/25  1949   WBC AUTO x10*3/uL 7.2 9.7 8.5  5.7 6.7   HEMOGLOBIN g/dL 8.9* 8.0* 8.5* 8.0* 8.9*   HEMATOCRIT % 28.9* 26.1* 27.1* 26.0* 28.3*   PLATELETS AUTO x10*3/uL 175 207 217 196 231   NEUTROS PCT AUTO %  --   --   --  57.5 60.9   LYMPHS PCT AUTO %  --   --   --  27.4 25.4   MONOS PCT AUTO %  --   --   --  11.8 10.7   EOS PCT AUTO %  --   --   --  2.5 2.4     Results from last 72 hours   Lab Units 06/25/25  0536 06/24/25  0806 06/24/25  0105   SODIUM mmol/L 141 142 141   POTASSIUM mmol/L 3.7 3.7 3.8   CHLORIDE mmol/L 107 107 105   CO2 mmol/L 25 30 30   BUN mg/dL 19 20 18   CREATININE mg/dL 1.02 1.31* 1.24   GLUCOSE mg/dL 88 97 111*   CALCIUM mg/dL 8.4* 8.1* 8.3*   ANION GAP mmol/L 13 9* 10   EGFR mL/min/1.73m*2 78 57* 61     Results from last 72 hours   Lab Units 06/22/25  1949   ALK PHOS U/L 102   BILIRUBIN TOTAL mg/dL 0.3   PROTEIN TOTAL g/dL 5.8*   ALT U/L 16   AST U/L 26   ALBUMIN g/dL 3.2*     Estimated Creatinine Clearance: 72.1 mL/min (by C-G formula based on SCr of 1.02 mg/dL).  C-Reactive Protein   Date Value Ref Range Status   05/20/2025 3.85 (H) <1.00 mg/dL Final   05/18/2025 4.28 (H) <1.00 mg/dL Final   05/17/2025 5.97 (H) <1.00 mg/dL Final     Microbiology  Susceptibility data from last 14 days.  Collected Specimen Info Organism   06/23/25 Tissue/Biopsy from Wound/Tissue Mixed Skin Microorganisms    06/23/25 Blood culture from Peripheral Venipuncture Staphylococcus epidermidis     Staphylococcus pettenkoferi/argensis   06/23/25 Blood culture from Peripheral Venipuncture Staphylococcus epidermidis       Imaging  Carotid duplex bilateral  Result Date: 6/24/2025            [address] [address]  Tel [phone] and Fax [phone]  Vascular Lab Report VASC US CAROTID ARTERY DUPLEX BILATERAL  Patient Name:      SUBHASH Cain Physician:  [id number] Eli De La Vega MD Study Date:        6/23/2025           Ordering Physician: [id number]  DAMEON ALVAREZ MRN/PID:           [id number]            Technologist:       Oumou Valdez RDMS,                                                            T Accession#:        [id number]        Technologist 2: Date of Birth/Age: 1950 / 74 years Encounter#:         [id number] Gender:            M Admission Status:  Inpatient           Location Performed: [facility]  Diagnosis/ICD: Dizziness and giddiness-R42  Patient History Syncope, HTN and CVA. Smoker:         Never. Diabetes:       Yes. >20 years.  CONCLUSIONS: Right Carotid: Today's exam was limited due to the body habitus of the patient. Findings are consistent with less than 50% stenosis of the right proximal internal carotid artery. Laminar flow seen by color Doppler. Right external carotid artery appears patent with no evidence of stenosis. No evidence of hemodynamically significant stenosis of the right common carotid artery. The right vertebral artery is patent with antegrade flow. No evidence of hemodynamically significant stenosis in the right subclavian artery. Left Carotid: Today's exam was limited due to the body habitus of the patient and unable to turn head to right. Findings are consistent with less than 50% stenosis of the left proximal internal carotid artery. Left external carotid artery appears patent with no evidence of stenosis. No evidence of hemodynamically significant stenosis of the left common carotid artery. The left vertebral artery is patent with antegrade flow. No evidence of hemodynamically significant stenosis in the left subclavian artery.  Imaging & Doppler Findings: Right Plaque Morph: The proximal right internal carotid artery demonstrates calcified plaque. Left Plaque Morph: The proximal left internal carotid artery demonstrates calcified plaque. The distal left common carotid artery demonstrates heterogenous plaque.   Right                        Left   PSV      EDV                PSV       cm/s 25 cm/s    CCA P    118 cm/s 18 cm/s 99 cm/s  19 cm/s   CCA D    110 cm/s 19 cm/s 107 cm/s 19 cm/s   ICA P    106 cm/s 18 cm/s 103 cm/s 35 cm/s   ICA M    81 cm/s  18 cm/s 101 cm/s 39 cm/s   ICA D    40 cm/s  10 cm/s 148 cm/s 18 cm/s    ECA     107 cm/s 13 cm/s 78 cm/s  31 cm/s Vertebral  49 cm/s  10 cm/s 129 cm/s 0 cm/s  Subclavian 168 cm/s 12 cm/s  Right                                  Left   PSV    Waveform                       PSV    Waveform 129 cm/s          Subclavian Proximal 168 cm/s               Right Left ICA/CCA Ratio  1.1  1.0   [id number] Eli De La Vega MD Electronically signed by [id number] Eli De La Vega MD on 6/24/2025 at 11:03:17 AM  ** Final **     ECG 12 lead  Result Date: 6/24/2025  Sinus rhythm with 1st degree AV block Nonspecific intraventricular block Minimal voltage criteria for LVH, may be normal variant ( [place] product ) Cannot rule out Septal infarct (cited on or before 09-MAY-2025) Lateral infarct , age undetermined Abnormal ECG When compared with ECG of 22-JUN-2025 19:20, (unconfirmed) QRS axis Shifted right Lateral infarct is now Present Serial changes of Septal infarct Present    XR chest 1 view  Result Date: 6/23/2025  Interpreted By:  Brian Morris, STUDY: XR CHEST 1 VIEW;  6/23/2025 8:57 am   INDICATION: Signs/Symptoms: SOB.   COMPARISON: 06/03/2025 AP chest x-ray and yesterday's unenhanced thoracic CT.   ACCESSION NUMBER(S): [id number]   ORDERING CLINICIAN: DAMEON ALVAREZ   FINDINGS: Portable AP erect chest x-ray 06/23/2025 8:50 a.m.:   CARDIOMEDIASTINAL SILHOUETTE: Cardiomediastinal silhouette is normal in size and configuration. Marked vascular calcification is noted. Severe aortic valve and coronary artery calcification reported on yesterday's CT is difficult to appreciate.   LUNGS: Normally inflated and appear clear aside from moderate lower left lung subsegmental atelectasis not apparent on 06/03/2025. The lateral costophrenic angles are sharp, but small  bilateral pleural effusions seen on yesterday's CT would probably go undetected without a lateral view.   ABDOMEN: No remarkable upper abdominal findings.   BONES: Extensive spinal degenerative changes and generalized osteosclerosis are noted. The latter can be associated with various conditions including but not limited to myelosclerosis, renal osteodystrophy, hyperthyroidism, hypoparathyroidism, fluorosis, osteoblastic metastases, lymphoma, hepatitis C and mastocytosis and should be correlated clinically.       Left lower lobe and possibly lingular subsegmental atelectasis.   MACRO: None   Signed by: Brian Morris 6/23/2025 9:26 AM Dictation workstation:   [id number]    CT chest abdomen pelvis wo IV contrast  Result Date: 6/22/2025  Interpreted By:  Jemal Driscoll, STUDY: CT CHEST ABDOMEN PELVIS WO CONTRAST;  6/22/2025 9:00 pm   INDICATION: Signs/Symptoms:Diuretic abdominal pain nausea not feeling.     COMPARISON: 06/05/2025 CT chest, 02/27/2025 CT abdomen/pelvis   ACCESSION NUMBER(S): [id number]   ORDERING CLINICIAN: ADDI ALBERTO   TECHNIQUE: Contiguous axial images of the chest, abdomen, and pelvis were obtained without contrast. Coronal and sagittal reformatted images were reconstructed from the axial data.   FINDINGS:   CT CHEST:   MEDIASTINUM AND LYMPH NODES:  The esophageal wall appears within normal limits.  No enlarged intrathoracic or axillary lymph nodes by imaging criteria. No pneumomediastinum.   VESSELS:  Normal caliber thoracic aorta. Mild calcific aortic atherosclerosis.   HEART: Normal size. Severe aortic valvular calcification. Mitral annular calcifications. Severe coronary artery calcifications. No significant pericardial effusion.   LUNG, AIRWAYS, PLEURA: No pneumothorax. Small bilateral pleural effusions (left > right), unchanged on the right and slightly decreased on the left, compared to 06/05/2025. Mild passive bibasilar atelectasis, similar to prior study. Scattered linear  opacities elsewhere likely represent atelectasis. Linear triangular nodule in the right upper lobe likely represents focal atelectasis given new from prior study.   OSSEOUS STRUCTURES: No acute osseous abnormality. Diffuse idiopathic skeletal hyperostosis with flowing ossification along the anterior disc margins and maintained disc heights in the thoracic spine. No significant canal stenosis.   CHEST WALL SOFT TISSUES: Mild anasarca.     ABDOMEN/PELVIS:   ABDOMINAL WALL: Mild anasarca. Small fat-containing umbilical hernia.Subcutaneous injection sites in the left abdominal wall.   LIVER: No significant parenchymal abnormality.   BILE DUCTS: No significant intrahepatic or extrahepatic dilatation.   GALLBLADDER: No significant abnormality.   PANCREAS: No significant abnormality.   SPLEEN: No significant abnormality.   ADRENALS: No significant abnormality.   KIDNEYS, URETERS, BLADDER: Unchanged mild bilateral hydronephrosis without hydroureter. No renal or ureteral calculi. The urinary bladder wall is thickened. There is perivesical fat stranding.   REPRODUCTIVE ORGANS: The prostate gland is enlarged, measuring 5.5 cm in transverse dimension.   VESSELS: Mild aortic atherosclerosis without AAA. Unchanged multiple mildly enlarged external iliac lymph node for sample no other enlarged lymph nodes. Measuring 1.3 cm on the right, 1 cm on the left   RETROPERITONEUM/LYMPH NODES: No acute retroperitoneal abnormality. No enlarged lymph nodes.   BOWEL/PERITONEUM: Tiny gastric diverticulum noted. No inflammatory bowel wall thickening or dilatation. Normal appendix.   No ascites, free air, or fluid collection.     MUSCULOSKELETAL: Multilevel bulky anterior endplate osteophytes with maintained disc spaces reflect diffuse idiopathic skeletal hyperostosis.  No suspicious osseous lesion.       Perivesical fat stranding and mild wall thickening of the urinary bladder wall. Recommend correlation for cystitis.   Unchanged mild bilateral  hydronephrosis without hydroureter suggesting a ureteropelvic junction obstruction.   Small bilateral pleural effusions (left > right) which is unchanged on the right and slightly smaller on the left compared to 06/05/2025. Scattered mild atelectasis without evidence of pneumonia.   MACRO: None.   Signed by: Jemal Driscoll 6/22/2025 9:41 PM Dictation workstation:   [id number]    CT head wo IV contrast  Result Date: 6/22/2025  Interpreted By:  Jemal Driscoll, STUDY: CT HEAD WO IV CONTRAST;  6/22/2025 8:50 pm   INDICATION: Signs/Symptoms:Left eye pain  on blood thinner h/o stroke.     COMPARISON: 06/03/2025   ACCESSION NUMBER(S): [id number]   ORDERING CLINICIAN: ADDI ALBERTO   TECHNIQUE: Noncontrast axial CT images of head were obtained with coronal and sagittal reconstructed images.   FINDINGS: BRAIN PARENCHYMA: Moderate periventricular and subcortical hemispheric white matter hypodensities are most compatible with chronic small vessel ischemic disease.Chronic lacunar infarct in the left cerebellar hemisphere. No acute intraparenchymal hemorrhage or parenchymal evidence of acute large territory ischemic infarct. Gray-white matter distinction is preserved. No mass-effect.   VENTRICLES and EXTRA-AXIAL SPACES:  No acute extra-axial or intraventricular hemorrhage. No effacement of cerebral sulci. The ventricles and sulci are age-concordant.   PARANASAL SINUSES/MASTOIDS:  No hemorrhage or air-fluid levels within the visualized paranasal sinuses. The mastoids are well aerated.   CALVARIUM/ORBITS:  No acute skull fracture.  The orbits and globes are intact to the extent visualized.   EXTRACRANIAL SOFT TISSUES: No discernible acute abnormality.       No acute intracranial abnormality.   Unchanged burden of moderate supratentorial chronic ischemic changes in left cerebellar hemisphere chronic lacunar infarct.   MACRO: None.   Signed by: Jemal Driscoll 6/22/2025 9:32 PM Dictation workstation:   [id number]    ECG  12 lead  Result Date: 6/14/2025  Sinus rhythm with 1st degree AV block Left axis deviation Nonspecific intraventricular block Minimal voltage criteria for LVH, may be normal variant ( [place] product ) Cannot rule out Anterior infarct (cited on or before 09-MAY-2025) Abnormal ECG When compared with ECG of 03-JUN-2025 14:26, No significant change was found See ED provider note for full interpretation and clinical correlation Confirmed by Flower Shaikh ([id number]) on 6/14/2025 8:57:23 PM    Lower extremity venous duplex right  Result Date: 6/6/2025  Interpreted By:  Nnamdi Dodson, STUDY: [facility] US LOWER EXTREMITY VENOUS DUPLEX RIGHT;  6/6/2025 4:45 pm   INDICATION: Signs/Symptoms:eval dvt , pain swelling.   COMPARISON: None.   ACCESSION NUMBER(S): [id number]   ORDERING CLINICIAN: MIGNON PURI   TECHNIQUE: Grayscale, color and spectral Doppler sonographic images of the right lower extremity deep venous system. The left common femoral vein was imaged for comparison.   FINDINGS: There is normal compressibility of the right common femoral vein, saphenous femoral junction, femoral vein and popliteal vein. The posterior tibial and peroneal veins are suboptimally visualized. There is normal spontaneous and phasic variation throughout the leg by spectral doppler.   The left common femoral vein is patent.   OTHER FINDINGS: None.       Suboptimal visualization of the calf veins. No sonographic evidence of acute DVT in the visualized vessels of the right lower extremity.   MACRO: None   Signed by: Nnamdi Dodson 6/6/2025 4:53 PM Dictation workstation:   [id number]    Vascular US Upper Extremity Venous Duplex Right  Result Date: 6/6/2025  Interpreted By:  Nnamdi Dodson, STUDY: [facility] US UPPER EXTREMITY VENOUS DUPLEX RIGHT;  6/6/2025 4:43 pm   INDICATION: Signs/Symptoms:eval dvt  pain.   COMPARISON: Upper extremity Doppler ultrasound 05/30/2025.   ACCESSION NUMBER(S): [id number]   ORDERING CLINICIAN: MIGNON PURI   TECHNIQUE:  Grayscale, color and spectral Doppler sonographic imaging of the right upper extremity deep venous system.   FINDINGS: The right cephalic vein is not well visualized. Evaluation of the brachial vein is also solid limited due to placement of PICC line. Segmental visualization of the right internal jugular vein, subclavian vein, axillary vein, basilic vein and brachial vein demonstrate normal gray scale appearance, compressibility, color flow and venous waveforms. The radial and ulnar veins are not visualized.   Redemonstration of occlusive thrombus in the left subclavian vein.       No sonographic evidence of right upper extremity DVT.   Partial visualization of occlusive thrombus in the left subclavian vein as noted on prior ultrasound. Please refer to separate or of left upper extremity DVT for additional detail   MACRO: None   Signed by: Nnamdi Dodson 6/6/2025 4:51 PM Dictation workstation:   [id number]    CT angio chest for pulmonary embolism  Result Date: 6/5/2025  Interpreted By:  Timmy Castro, STUDY: CT ANGIO CHEST FOR PULMONARY EMBOLISM;  6/5/2025 3:00 pm   INDICATION: Signs/Symptoms:rule out PE.     COMPARISON: CT chest with contrast 21 May 2025   ACCESSION NUMBER(S): [id number]   ORDERING CLINICIAN: ASAF CORONA   TECHNIQUE: Pulmonary arterial phase CT chest after the uneventful administration of 75 mL IV contrast (Omnipaque 350).   Three dimensional maximum intensity projection (3-D MIPs) image/s were created on a separate dedicated workstation, reviewed and saved   FINDINGS: CARDIOVASCULAR:   Acute pulmonary embolism: Negative through the  lobar (second order) branch level. Substantial sized branches from segmental (third order) branch and distally cannot be evaluated Acute right heart strain: Negative. No CT evidence of acute right heart strain Cardiac thrombus:  Negative; no obvious right heart or other cardiac thrombus is seen Pulmonary arteries ectasia:  Unchanged borderline to mild   Heart size:   Borderline but unchanged Pericardial effusion:  Negative   Thoracic aortic aneurysm:  Negative Aortic dissection:  Negative   Heart failure change:  Negative.  No sign of interstitial or alveolar edema. Other:  n/a   NONVASCULAR MEDIASTINUM: Esophagus:  Grossly normal by CT Mediastinal Mass:  Negative Hiatal hernia:  None Other:  n/a   LYMPH NODES: No thoracic adenopathy   LUNGS / AIRSPACES / AIRWAYS:   LARGE AIRWAYS Filling defect: Negative Wall thickening: Negative Bronchiectasis: Negative Other: N/A   AIRSPACES Fibrosis: Negative Emphysema: Negative Consolidation: Negative Ground glass airspace disease: Negative Edema: Mild bibasilar Nodule / Mass: Negative Other: Motion artifact   PLEURA: Effusion:  Right pleural effusion has decreased in size, now trace quantity, previously at least small if not moderate. Left effusion has perhaps marginally decreased in size, still small Pneumothorax: Both sides negative Other:  n/a   CHEST WALL: Soft tissues of the chest wall are unremarkable   SKELETON: No acute or contributory abnormality   UPPER ABDOMEN: Included subdiaphragmatic structures have no acute or contributory abnormality       MILD BIBASILAR INTERSTITIAL EDEMA   THE RIGHT PLEURAL EFFUSION HAS DECREASED IN SIZE SINCE 21 MAY 2025   LEFT PLEURAL EFFUSION HAS MARGINALLY DECREASED IN SIZE AS WELL   NO ACUTE PULMONARY EMBOLISM THROUGH THE LOBAR BRANCH LEVEL. SEGMENTAL AND SUBSEGMENTAL LEVELS OF THE PULMONARY ARTERY CANNOT BE EVALUATED   NO AORTIC DISSECTION OR OTHER ACUTE THORACIC AORTIC FINDINGS   NO AIRSPACE CONSOLIDATION, GROUND-GLASS AIRSPACE DISEASE OR ANY OTHER SIGN OF ACTIVE INFECTION   NO PERICARDIAL EFFUSION   NO PNEUMOTHORAX   MACRO: None   Signed by: Timmy Castro 6/5/2025 3:21 PM Dictation workstation:   [id number]    Lower extremity venous duplex left  Result Date: 6/5/2025  STUDY: Left Lower Extremity Venous Doppler Ultrasound; 6/5/2025 12:59 PM INDICATION: Positive DVT on multiple ultrasound scan done  in routine facility. Currently on a Eliquis. COMPARISON: US LE 02/11/2025, 09/12/2024. ACCESSION NUMBER(S): [id number] ORDERING CLINICIAN: ASAF CORONA TECHNIQUE:  Real-time grayscale, color, and spectral doppler ultrasound imaging of the left lower extremity veins was performed. FINDINGS: There is acute occlusive DVT demonstrated within the left deep femoral, femoral, and popliteal veins. The left external iliac and common femoral veins demonstrated normal compressibility, normal phasic venous flow and normal response to augmentation.  The visualized deep calf veins are patent.  The contralateral common femoral vein is free of thrombosis.    Evidence for acute occlusive DVT within the left deep femoral, femoral, and popliteal veins. There is no significant change when compared to prior ultrasound from February 2025. Signed by Sherice Mejia MD    ECG 12 lead  Result Date: 6/5/2025  Sinus rhythm with 1st degree AV block Left axis deviation Nonspecific intraventricular block Minimal voltage criteria for LVH, may be normal variant ( [place] product ) Cannot rule out Septal infarct (cited on or before 09-MAY-2025) Abnormal ECG When compared with ECG of 21-MAY-2025 16:33, No significant change was found Confirmed by oLng Min ([id number]) on 6/5/2025 12:09:34 PM    XR chest 1 view  Result Date: 6/3/2025  Interpreted By:  Pepe Huang, STUDY: XR CHEST 1 VIEW  6/3/2025 2:50 pm   INDICATION: Signs/Symptoms:Malaise and fatigue   COMPARISON: 05/12/2025   ACCESSION NUMBER(S): [id number]   ORDERING CLINICIAN: SHAI BREAUX   TECHNIQUE: A single AP portable radiograph of the chest was obtained.   FINDINGS: Multiple cardiac monitoring leads are seen over the chest.   No focal infiltrate, pleural effusion or pneumothorax is identified. The cardiac silhouette is within normal limits for size.       No focal infiltrate or pneumothorax is identified.   MACRO: None.   Signed by: Pepe Huang 6/3/2025 2:59 PM Dictation workstation:    [id number]    XR shoulder right 2+ views  Result Date: 6/3/2025  Interpreted By:  Pepe Huang, STUDY: XR SHOULDER RIGHT 2+ VIEWS 6/3/2025 2:50 pm   INDICATION: Signs/Symptoms:Right shoulder pain   COMPARISON: None.   ACCESSION NUMBER(S): [id number]   ORDERING CLINICIAN: ELMO OLIVAS   TECHNIQUE: 3 views of the right shoulder including AP , axillary and scapular Y-views were obtained.   FINDINGS: There is no radiographic evidence of acute fracture or dislocation identified.  Mild hypertrophic degenerative changes are seen in the right shoulder.       1. No evidence of acute fracture or dislocation. 2. Degenerative changes, as described above.   MACRO: None.   Signed by: Pepe Huang 6/3/2025 2:59 PM Dictation workstation:   [id number]    CT head wo IV contrast  Result Date: 6/3/2025  Interpreted By:  Julien Romeo, STUDY: CT HEAD WO IV CONTRAST;  6/3/2025 2:40 pm   INDICATION: Signs/Symptoms:Headache at the base of the skull, recently started on Xarelto.     COMPARISON: 05/12/2025.   ACCESSION NUMBER(S): [id number]   ORDERING CLINICIAN: ELMO OLIVAS   TECHNIQUE: Contiguous unenhanced axial images were obtained through the brain.   FINDINGS: INTRACRANIAL: Mild generalized atrophy is again seen.  Nonspecific irregular low-attenuation changes throughout the periventricular and subcortical white matter are similar to prior, most likely related to chronic microvascular disease. There is a stable small remote lacunar infarct of the right basal ganglia and internal capsule anterior limb. No acute intracranial bleed, midline shift, or mass effect is seen. Gray-white differentiation is maintained. No extra-axial fluid collection or hydrocephalus. Irregular atherosclerotic calcifications again seen in the internal carotid artery and vertebral artery segments   Bones are intact.   EXTRACRANIAL: Paranasal sinus mild peripheral mucosal thickening is most prominent in the ethmoid air cells. No paranasal sinus  air-fluid level. Mastoid air cells are clear.       Age-related/chronic changes similar to prior. No acute intracranial process.       MACRO: None.   Signed by: Julien Romeo 6/3/2025 2:46 PM Dictation workstation:   [id number]    Vascular US upper extremity venous duplex left  Result Date: 5/30/2025  STUDY: Left upper extremity venous ultrasound; Completed Time: 5/30/2025 13:35 INDICATION: LUE swelling.  Recent PICC line placement. COMPARISON: None available. ACCESSION NUMBER(S): [id number] ORDERING CLINICIAN: RAJINDER CONNER TECHNIQUE: Ultrasound of the left upper extremity veins.  Multiple images were obtained. FINDINGS: There is acute partially occlusive DVT demonstrated within the left subclavian, axillary and brachial veins.  Superficial thrombus is visualized within the basilic vein, surrounding the PICC line. The left internal jugular vein demonstrated normal compressibility, normal phasic venous flow, and normal response to augmentation.  There is no evidence for echogenic thrombi.  The cephalic vein is patent.  The radial and ulnar veins are not evaluated.    Evidence for acute partially occlusive DVT within the left subclavian, axillary and brachial veins. Superficial thrombus within the basilic vein, surrounding the PICC line. 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  Bilateral buttock ulcers, stage III, rule out infection     Continue Zosyn  IV  vancomycin-monitor levels  Repeat blood culture   Follow-up wound culture  Monitor temperature and WBC  Follow-up wound culture  Repeat blood cultures-6/25/2025  Repeat urine culture   Local care  Supportive care  Further recommendations based on pending workup-culture results    This is a complex infectious disease issue and the following was performed today (for more details please see the above note): Management decisions reflecting the added complexity (e.g., changes in antimicrobial therapy, infection control strategies).     Baljit Velazco MD

## 2025-06-25 NOTE — CARE PLAN
The patient's goals for the shift include  to go to new room     The clinical goals for the shift include patient  will remain afebrile this shift    Over the shift, the patient had an uneventful day. Patient took all medications per orders, vitals have been stable. Dressing change completed to left foot. Patient treated for nausea an pain this shift.

## 2025-06-25 NOTE — PROGRESS NOTES
Occupational Therapy  Therapy Communication Note    Patient Name: Elliot Partida  MRN: [id number]  Department: [facility]  Room: 01/01-A  Today's Date: 6/25/2025     Discipline: Occupational Therapy    Missed Visit: OT Missed Visit: Yes     Missed Visit Reason: Missed Visit Reason: Patient refused (Pt. declined OT session today as he had already worked with PT. OT offered to return at a later time however, pt. continued to decline treatment. Attempted to encourage pt. to get up to the chair for lunch however, pt. still decliened. RN aware.)    Missed Time: Attempt 1207

## 2025-06-25 NOTE — PROGRESS NOTES
06/25/25 1153   Discharge Planning   Assistance Needed Patient currently at [facility] for PT/OT. Patient and family would like to return to [facility]. Prior to [facility], patient was at home with his sister and niece. Per staff at [facility], patient is dependent on ADL's, does not get out of bed.   Who is requesting discharge planning? Provider   Home or Post Acute Services Post acute facilities (Rehab/SNF/etc)   Type of Post Acute Facility Services Skilled nursing   Expected Discharge Disposition SNF  (Patient to return to [facility] SNF. Pending repeat blood cultures and repeat urine cultures. PICC line removed due to blood clot. Potential for needing IV ATB at DC- will need access.)   Does the patient need discharge transport arranged? Yes   RoundTrip coordination needed? Yes   Has discharge transport been arranged? No   Patient Choice   Provider Choice list and CMS website (https://medicare.gov/care-compare#search) for post-acute Quality and Resource Measure Data were provided and reviewed with: Patient;Family   Patient / Family choosing to utilize agency / facility established prior to hospitalization Yes

## 2025-06-25 NOTE — NURSING NOTE
2224-This nurse tried multiple attempts to get IV access for the patient to remove the PICC per order. This nurse was unsuccessful.      2245- Other nurse on unit tried multiple attempts to get IV access and was unsuccessful.    0015- IV access established. PICC removed per order.

## 2025-06-25 NOTE — NURSING NOTE
1225: assumed care of patient   1251: patient refusing repositioning   1600: Patient to be transferred to [facility] for overnight study this shift   1800: Nurse to Nurse report given to ALEXANDRA Macario of [facility]  1805: Patient transferred to [facility] room 227 at this time.

## 2025-06-25 NOTE — PROGRESS NOTES
Elliot Partida is a 74 y.o. male on day 2 of admission presenting with UTI (urinary tract infection).    Subjective   Patient lying in bed, no distress noted.  Patient alert and oriented x 3.  Does not have any complaints at this time, denies pain at this time.  Limited exam due to patient's cooperativeness.  No overnight events.  IV access was obtained.  PICC line pulled.  Due to patient's improved mental status back to baseline he will be downgraded to MedSurg/telemetry.         Objective     Physical Exam  Constitutional:       General: He is not in acute distress.     Appearance: He is obese. He is ill-appearing.   HENT:      Head: Normocephalic.      Mouth/Throat:      Mouth: Mucous membranes are moist.   Eyes:      General: No scleral icterus.        Right eye: No discharge.         Left eye: No discharge.      Pupils: Pupils are equal, round, and reactive to light.   Neck:      Vascular: Carotid bruit present.   Cardiovascular:      Rate and Rhythm: Normal rate and regular rhythm.      Pulses: Normal pulses.      Heart sounds: Murmur heard.   Pulmonary:      Effort: Pulmonary effort is normal. No respiratory distress.      Breath sounds: No wheezing or rhonchi.      Comments: Limited assessment.  Diminished, Rhonchi,   Abdominal:      General: Abdomen is flat. Bowel sounds are normal. There is no distension.      Palpations: Abdomen is soft. There is no mass.      Tenderness: There is no abdominal tenderness.   Musculoskeletal:         General: Swelling present. No tenderness.      Cervical back: No rigidity or tenderness.      Comments:   Bilateral arm swelling    Skin:     General: Skin is warm and dry.      Capillary Refill: Capillary refill takes 2 to 3 seconds.      Coloration: Skin is pale.      Findings: Bruising present.      Comments: Limited assessment for buttock wound, patient declined to roll to side.   Left lower leg wound, dressing intact see imaging   Neurological:      General: No focal  "deficit present.      Mental Status: He is alert and oriented to person, place, and time.         Last Recorded Vitals  Blood pressure 133/54, pulse 66, temperature 36.8 °C (98.2 °F), temperature source Temporal, resp. rate 16, height 1.753 m (5' 9\"), weight 91.4 kg (201 lb 8 oz), SpO2 94%.  Intake/Output last 3 Shifts:  I/O last 3 completed shifts:  In: 2123.9 (23.2 mL/kg) [P.O.:420; I.V.:953.9 (10.4 mL/kg); IV Piggyback:750]  Out: 450 (4.9 mL/kg) [Urine:450 (0.1 mL/kg/hr)]  Weight: 91.4 kg     Relevant Results  Scheduled medications  amLODIPine, 2.5 mg, oral, Daily  enoxaparin, 90 mg, subcutaneous, q12h  gabapentin, 300 mg, oral, TID  hydrALAZINE, 50 mg, oral, TID  piperacillin-tazobactam, 4.5 g, intravenous, q6h  traZODone, 50 mg, oral, Nightly  vancomycin, 1,500 mg, intravenous, q24h      Continuous medications  sodium chloride 0.9%, 10 mL/hr, Last Rate: 10 mL/hr (06/25/25 1153)      PRN medications  PRN medications: acetaminophen, alum-mag hydroxide-simeth, benzocaine-menthol, heparin flush, ipratropium-albuteroL, meclizine, melatonin, ondansetron, oxygen, polyethylene glycol, sennosides-docusate sodium, sodium chloride 0.9%, sodium chloride 0.9%, traMADol, vancomycin, zinc oxide    (H): Data is abnormally high  (L): Data is abnormally low    Scheduled medications  amLODIPine, 2.5 mg, oral, Daily  enoxaparin, 90 mg, subcutaneous, q12h  gabapentin, 300 mg, oral, TID  hydrALAZINE, 50 mg, oral, TID  piperacillin-tazobactam, 4.5 g, intravenous, q6h  traZODone, 50 mg, oral, Nightly  vancomycin, 1,500 mg, intravenous, q24h      PRN medications  PRN medications: acetaminophen, alum-mag hydroxide-simeth, benzocaine-menthol, heparin flush, ipratropium-albuteroL, meclizine, melatonin, ondansetron, oxygen, polyethylene glycol, sennosides-docusate sodium, sodium chloride 0.9%, sodium chloride 0.9%, traMADol, vancomycin, zinc oxide     Latest Reference Range & Units 06/24/25 08:06 06/25/25 05:36   GLUCOSE 74 - 99 mg/dL 97 " 88   SODIUM 136 - 145 mmol/L 142 141   POTASSIUM 3.5 - 5.3 mmol/L 3.7 3.7   CHLORIDE 98 - 107 mmol/L 107 107   Bicarbonate 21 - 32 mmol/L 30 25   Anion Gap 10 - 20 mmol/L 9 (L) 13   Blood Urea Nitrogen 6 - 23 mg/dL 20 19   Creatinine 0.50 - 1.30 mg/dL 1.31 (H) 1.02   EGFR >60 mL/min/1.73m*2 57 (L) 78   Calcium 8.6 - 10.3 mg/dL 8.1 (L) 8.4 (L)   MAGNESIUM 1.60 - 2.40 mg/dL 2.13    WBC 4.4 - 11.3 x10*3/uL 9.7 7.2   nRBC 0.0 - 0.0 /100 WBCs 0.0 0.0   RBC 4.50 - 5.90 x10*6/uL 2.91 (L) 3.18 (L)   HEMOGLOBIN 13.5 - 17.5 g/dL 8.0 (L) 8.9 (L)   HEMATOCRIT 41.0 - 52.0 % 26.1 (L) 28.9 (L)   MCV 80 - 100 fL 90 91   MCH 26.0 - 34.0 pg 27.5 28.0   MCHC 32.0 - 36.0 g/dL 30.7 (L) 30.8 (L)   RED CELL DISTRIBUTION WIDTH 11.5 - 14.5 % 17.0 (H) 16.3 (H)   Platelets 150 - 450 x10*3/uL 207 175   (L): Data is abnormally low  (H): Data is abnormally high  XR chest 1 view  Result Date: 6/23/2025  Left lower lobe and possibly lingular subsegmental atelectasis.   MACRO: None   Signed by: Brian Morris 6/23/2025 9:26 AM Dictation workstation:   [id number]    CT chest abdomen pelvis wo IV contrast  Result Date: 6/22/2025  Perivesical fat stranding and mild wall thickening of the urinary bladder wall. Recommend correlation for cystitis.   Unchanged mild bilateral hydronephrosis without hydroureter suggesting a ureteropelvic junction obstruction.   Small bilateral pleural effusions (left > right) which is unchanged on the right and slightly smaller on the left compared to 06/05/2025. Scattered mild atelectasis without evidence of pneumonia.   MACRO: None.   Signed by: Jemal Driscoll 6/22/2025 9:41 PM Dictation workstation:   [id number]    CT head wo IV contrast  Result Date: 6/22/2025  No acute intracranial abnormality.   Unchanged burden of moderate supratentorial chronic ischemic changes in left cerebellar hemisphere chronic lacunar infarct.   MACRO: None.   Signed by: Jemal Driscoll 6/22/2025 9:32 PM Dictation workstation:    [id number]                This patient currently has cardiac telemetry ordered; if you would like to modify or discontinue the telemetry order, click here to go to the orders activity to modify/discontinue the order.                Latest Reference Range & Units 06/24/25 08:06 06/25/25 05:36   GLUCOSE 74 - 99 mg/dL 97 88   SODIUM 136 - 145 mmol/L 142 141   POTASSIUM 3.5 - 5.3 mmol/L 3.7 3.7   CHLORIDE 98 - 107 mmol/L 107 107   Bicarbonate 21 - 32 mmol/L 30 25   Anion Gap 10 - 20 mmol/L 9 (L) 13   Blood Urea Nitrogen 6 - 23 mg/dL 20 19   Creatinine 0.50 - 1.30 mg/dL 1.31 (H) 1.02   EGFR >60 mL/min/1.73m*2 57 (L) 78   Calcium 8.6 - 10.3 mg/dL 8.1 (L) 8.4 (L)   MAGNESIUM 1.60 - 2.40 mg/dL 2.13    WBC 4.4 - 11.3 x10*3/uL 9.7 7.2   nRBC 0.0 - 0.0 /100 WBCs 0.0 0.0   RBC 4.50 - 5.90 x10*6/uL 2.91 (L) 3.18 (L)   HEMOGLOBIN 13.5 - 17.5 g/dL 8.0 (L) 8.9 (L)   HEMATOCRIT 41.0 - 52.0 % 26.1 (L) 28.9 (L)   MCV 80 - 100 fL 90 91   MCH 26.0 - 34.0 pg 27.5 28.0   MCHC 32.0 - 36.0 g/dL 30.7 (L) 30.8 (L)   RED CELL DISTRIBUTION WIDTH 11.5 - 14.5 % 17.0 (H) 16.3 (H)   Platelets 150 - 450 x10*3/uL 207 175   (L): Data is abnormally low  (H): Data is abnormally high        Assessment & Plan  UTI (urinary tract infection)    Acute deep vein thrombosis (DVT) of femoral vein of left lower extremity    Ambulatory dysfunction    Benign essential HTN    DM type 2 with diabetic peripheral neuropathy    Generalized weakness    Hyperlipidemia    Dizziness    Wound infection    Fever    Ventricular tachycardia seen on cardiac monitor    UTI  Fever  -UA: 250 leukocyte esterase, 6-10 WBC, 1+ bacteria,   -UA culture clinically insignificant growth based on current clinical standards  -Blood culture gram-positive cocci, clusters, Staphylococcus epidermidis  -PICC discontinued and removed for possible source per ID   -cefTRIAXone (Rocephin) 1 g in dextrose (iso) IV 50 mL discontinued  -Add piperacillin-tazobactam (Zosyn) 4.5 g in dextrose (iso) IV  100 mL  q 6 hours started  -Add vancomycin (Vancocin) 1,500 mg in sodium chloride 0.9% 500 mL IV daily  -Received NS 500ml bolus  -WBC 6.7 > 5.7 > 8.5 > 9.7 > 7.2  -Monitor WBC   -Daily CBC  -Monitor for fever- afebrile overnight  -acetaminophen (Tylenol) tablet 650 mg PRN pain/fever  -LR 75 mL/hr, intravenous for 11 hours completed  -BUN/Cr/GFR 20/1.31/57> 19/1.02/78  -Repeat blood cultures pending  -Repeat urine culture pending     Ambulatory dysfunction  Weakness  Dizziness  -CXR: Left lower lobe and possibly lingular subsegmental atelectasis   -CTH: No acute intracranial abnormality. Unchanged burden of moderate supratentorial chronic ischemic changes in left cerebellar hemisphere chronic lacunar infarct.  -CT chest abdomen pelvis wo IV contrast Perivesical fat stranding and mild wall thickening of the urinary bladder wall. Recommend correlation for cystitis. Unchanged mild bilateral hydronephrosis without hydroureter suggesting a ureteropelvic junction obstruction. Small bilateral pleural effusions (left > right) which is unchanged on the right and slightly smaller on the left compared to 06/05/2025. Scattered mild atelectasis without evidence of pneumonia.  -Vascular US Carotid Artery duplex: Right Carotid: Today's exam was limited due to the body habitus of the patient. Findings are consistent with less than 50% stenosis of the right proximal internal carotid artery. Laminar flow seen by color Doppler. Right external carotid artery appears patent with no evidence of stenosis. No evidence of hemodynamically significant stenosis of the right common carotid artery. The right vertebral artery is patent with antegrade flow. No evidence of hemodynamically significant stenosis in the right subclavian artery.  Left Carotid: Today's exam was limited due to the body habitus of the patient and unable to turn head to right. Findings are consistent with less than 50% stenosis of the left proximal internal carotid artery.  Left external carotid artery appears patent with no evidence of stenosis. No evidence of hemodynamically significant stenosis of the left common carotid artery. The left vertebral artery is patent with antegrade flow. No evidence of hemodynamically significant stenosis in the left subclavian artery.  -meclizine PRN  -sodium chloride 0.9% infusion 125/hr completed  -PT/OT evaluation, appreciate recommendations  -Orthostatic pressure  -Telemetry     HTN  DM type 2  Hyperlipidemia  -amLODIPine (Norvasc) tablet 2.5 mg   -gabapentin (Neurontin) capsule 300 mg   -hydrALAZINE (Apresoline) tablet 50 mg  -Blood sugar controlled at this time without medication A1C 5.8 6/6  -Monitor glucose  -Daily BMP    DVT  -enoxaparin (Lovenox) syringe 90 mg  -6/6 Upper extremity US: No sonographic evidence of right upper extremity DVT. Partial visualization of occlusive thrombus in the left subclavian vein as noted on prior ultrasound. Please refer to separate or of left upper extremity DVT for additional detail   -left arm restriction       Wound  -ID consult- Case discussed with SARAH Kothari-CNP , appreciate recommendations  -Podiatry consulted appreciate recommendations, debridement and dressing 6/23  -PICC line- Completed OP Vancomycin course 6/18  -Wound culture  1+ rare mixed skin microorganisms  -Repeat wound culture pending  -PICC line pulled      Ventricular tachycardia seen on cardiac monitor  - Monitor electrolytes  - K 3.8, , calcium 8.3,  > K3.7, , calcium 8.4  - Daily BMP  - Magnesium 2.04  - Telemetry  - No overnight events       GI ppx: PPI  DVT ppx: Lovenox   Fluids: As needed   Electrolytes: replace as needed  Nutrition: Regular   Adjuncts: SARAH Latif-CNP

## 2025-06-25 NOTE — CARE PLAN
The patient's goals for the shift include      The clinical goals for the shift include Pt will remain afebrile throughout this shift.    Pt has remained afebrile throughout this shift. Patient is alert and oriented x2 and tends to be more on the agitated side.

## 2025-06-26 ENCOUNTER — APPOINTMENT (OUTPATIENT)
Dept: RADIOLOGY | Facility: HOSPITAL | Age: 75
DRG: 690 | End: 2025-06-26
Payer: MEDICARE

## 2025-06-26 LAB
ANION GAP SERPL CALC-SCNC: 12 MMOL/L (ref 10–20)
BACTERIA UR CULT: NO GROWTH
BUN SERPL-MCNC: 16 MG/DL (ref 6–23)
CALCIUM SERPL-MCNC: 8.4 MG/DL (ref 8.6–10.3)
CHLORIDE SERPL-SCNC: 105 MMOL/L (ref 98–107)
CO2 SERPL-SCNC: 27 MMOL/L (ref 21–32)
CREAT SERPL-MCNC: 1.04 MG/DL (ref 0.5–1.3)
EGFRCR SERPLBLD CKD-EPI 2021: 76 ML/MIN/1.73M*2
ERYTHROCYTE [DISTWIDTH] IN BLOOD BY AUTOMATED COUNT: 16.5 % (ref 11.5–14.5)
GLUCOSE BLD MANUAL STRIP-MCNC: 106 MG/DL (ref 74–99)
GLUCOSE SERPL-MCNC: 110 MG/DL (ref 74–99)
HCT VFR BLD AUTO: 26.2 % (ref 41–52)
HGB BLD-MCNC: 8 G/DL (ref 13.5–17.5)
HOLD SPECIMEN: NORMAL
MCH RBC QN AUTO: 27.1 PG (ref 26–34)
MCHC RBC AUTO-ENTMCNC: 30.5 G/DL (ref 32–36)
MCV RBC AUTO: 89 FL (ref 80–100)
NRBC BLD-RTO: 0 /100 WBCS (ref 0–0)
PLATELET # BLD AUTO: 185 X10*3/UL (ref 150–450)
POTASSIUM SERPL-SCNC: 3.8 MMOL/L (ref 3.5–5.3)
RBC # BLD AUTO: 2.95 X10*6/UL (ref 4.5–5.9)
SODIUM SERPL-SCNC: 140 MMOL/L (ref 136–145)
WBC # BLD AUTO: 6.2 X10*3/UL (ref 4.4–11.3)

## 2025-06-26 PROCEDURE — 85027 COMPLETE CBC AUTOMATED: CPT | Mod: IPSPLIT | Performed by: NURSE PRACTITIONER

## 2025-06-26 PROCEDURE — 2500000004 HC RX 250 GENERAL PHARMACY W/ HCPCS (ALT 636 FOR OP/ED): Mod: IPSPLIT | Performed by: NURSE PRACTITIONER

## 2025-06-26 PROCEDURE — 99232 SBSQ HOSP IP/OBS MODERATE 35: CPT | Performed by: NURSE PRACTITIONER

## 2025-06-26 PROCEDURE — 2500000001 HC RX 250 WO HCPCS SELF ADMINISTERED DRUGS (ALT 637 FOR MEDICARE OP): Mod: IPSPLIT | Performed by: NURSE PRACTITIONER

## 2025-06-26 PROCEDURE — 2500000005 HC RX 250 GENERAL PHARMACY W/O HCPCS: Mod: IPSPLIT | Performed by: NURSE PRACTITIONER

## 2025-06-26 PROCEDURE — 1200000002 HC GENERAL ROOM WITH TELEMETRY DAILY: Mod: IPSPLIT

## 2025-06-26 PROCEDURE — 36415 COLL VENOUS BLD VENIPUNCTURE: CPT | Mod: IPSPLIT | Performed by: NURSE PRACTITIONER

## 2025-06-26 PROCEDURE — 80048 BASIC METABOLIC PNL TOTAL CA: CPT | Mod: IPSPLIT | Performed by: NURSE PRACTITIONER

## 2025-06-26 PROCEDURE — 94762 N-INVAS EAR/PLS OXIMTRY CONT: CPT | Mod: IPSPLIT

## 2025-06-26 PROCEDURE — 76770 US EXAM ABDO BACK WALL COMP: CPT | Mod: IPSPLIT

## 2025-06-26 PROCEDURE — 76770 US EXAM ABDO BACK WALL COMP: CPT | Performed by: STUDENT IN AN ORGANIZED HEALTH CARE EDUCATION/TRAINING PROGRAM

## 2025-06-26 PROCEDURE — 82947 ASSAY GLUCOSE BLOOD QUANT: CPT | Mod: IPSPLIT

## 2025-06-26 PROCEDURE — 94760 N-INVAS EAR/PLS OXIMETRY 1: CPT | Mod: IPSPLIT

## 2025-06-26 RX ADMIN — SENNOSIDES AND DOCUSATE SODIUM 1 TABLET: 50; 8.6 TABLET ORAL at 20:26

## 2025-06-26 RX ADMIN — PIPERACILLIN SODIUM AND TAZOBACTAM SODIUM 4.5 G: 4; .5 INJECTION, SOLUTION INTRAVENOUS at 13:28

## 2025-06-26 RX ADMIN — VANCOMYCIN 1.5 G: 1.5 INJECTION, SOLUTION INTRAVENOUS at 23:59

## 2025-06-26 RX ADMIN — ENOXAPARIN SODIUM 90 MG: 100 INJECTION SUBCUTANEOUS at 13:28

## 2025-06-26 RX ADMIN — TRAZODONE HYDROCHLORIDE 50 MG: 50 TABLET ORAL at 20:26

## 2025-06-26 RX ADMIN — GABAPENTIN 300 MG: 300 CAPSULE ORAL at 20:26

## 2025-06-26 RX ADMIN — Medication 2 L/MIN: at 09:31

## 2025-06-26 RX ADMIN — HYDRALAZINE HYDROCHLORIDE 50 MG: 50 TABLET ORAL at 20:26

## 2025-06-26 RX ADMIN — PIPERACILLIN SODIUM AND TAZOBACTAM SODIUM 4.5 G: 4; .5 INJECTION, SOLUTION INTRAVENOUS at 01:34

## 2025-06-26 RX ADMIN — ENOXAPARIN SODIUM 90 MG: 100 INJECTION SUBCUTANEOUS at 23:59

## 2025-06-26 RX ADMIN — HYDRALAZINE HYDROCHLORIDE 50 MG: 50 TABLET ORAL at 09:51

## 2025-06-26 RX ADMIN — Medication 2 L/MIN: at 03:18

## 2025-06-26 RX ADMIN — GABAPENTIN 300 MG: 300 CAPSULE ORAL at 15:17

## 2025-06-26 RX ADMIN — ACETAMINOPHEN 650 MG: 325 TABLET, FILM COATED ORAL at 20:59

## 2025-06-26 RX ADMIN — ONDANSETRON 4 MG: 2 INJECTION INTRAMUSCULAR; INTRAVENOUS at 22:59

## 2025-06-26 RX ADMIN — PIPERACILLIN SODIUM AND TAZOBACTAM SODIUM 4.5 G: 4; .5 INJECTION, SOLUTION INTRAVENOUS at 20:29

## 2025-06-26 RX ADMIN — PIPERACILLIN SODIUM AND TAZOBACTAM SODIUM 4.5 G: 4; .5 INJECTION, SOLUTION INTRAVENOUS at 06:26

## 2025-06-26 RX ADMIN — AMLODIPINE BESYLATE 2.5 MG: 2.5 TABLET ORAL at 09:51

## 2025-06-26 RX ADMIN — HYDRALAZINE HYDROCHLORIDE 50 MG: 50 TABLET ORAL at 15:17

## 2025-06-26 RX ADMIN — GABAPENTIN 300 MG: 300 CAPSULE ORAL at 09:51

## 2025-06-26 ASSESSMENT — PAIN SCALES - GENERAL
PAINLEVEL_OUTOF10: 2
PAINLEVEL_OUTOF10: 3
PAINLEVEL_OUTOF10: 1
PAINLEVEL_OUTOF10: 0 - NO PAIN

## 2025-06-26 ASSESSMENT — PAIN - FUNCTIONAL ASSESSMENT
PAIN_FUNCTIONAL_ASSESSMENT: 0-10

## 2025-06-26 ASSESSMENT — PAIN DESCRIPTION - LOCATION: LOCATION: HEAD

## 2025-06-26 NOTE — PROGRESS NOTES
Elliot Partida is a 74 y.o. male on day 3 of admission presenting with UTI (urinary tract infection).    Subjective   Interval History:   Afebrile, no chills  Reports left arm swelling same  Denies pain   No cough, chest pain or shortness of breath  No nausea vomiting or diarrhea      Objective   Range of Vitals (last 24 hours)  Heart Rate:  [59-94]   Temp:  [36.5 °C (97.7 °F)-36.7 °C (98.1 °F)]   Resp:  [16-21]   BP: (115-137)/(52-67)   SpO2:  [88 %-99 %]   Daily Weight  06/25/25 : 91.4 kg (201 lb 8 oz)    Body mass index is 29.76 kg/m².    Physical Exam  Constitutional:       Appearance: Normal appearance.   HENT:      Head: Normocephalic and atraumatic.      Nose: Nose normal.      Mouth: Mucous membranes are moist.      Pharynx: Oropharynx is clear.   Eyes:      General: No scleral icterus.  Cardiovascular:      Rate and Rhythm: Normal rate and regular rhythm.   Pulmonary:      Effort: Pulmonary effort is normal.      Breath sounds: Normal breath sounds.   Abdominal:      General: Bowel sounds are normal.      Palpations: Abdomen is soft.   Musculoskeletal:    Bilateral upper extremity edema-left worse than right     General: Normal range of motion.      Cervical back: Normal range of motion and neck supple.      Comments: Left fifth toe amputation    Skin:     General: Skin is warm and dry.      Comments: Pictures in EPIC reviewed-left anterior lower leg , dorsal foot wound with small amount granulation tissue.   Neurological:      Mental Status: He is alert.      Comments: Awake, alert   Psychiatric:         Mood and Affect: Mood normal.         Behavior: Behavior normal.     Antibiotics  piperacillin-tazobactam - 4.5 gram/100 mL  vancomycin - 1.5 gram/300 mL    Relevant Results  Labs  Results from last 72 hours   Lab Units 06/26/25  0655 06/25/25  0536 06/24/25  0806   WBC AUTO x10*3/uL 6.2 7.2 9.7   HEMOGLOBIN g/dL 8.0* 8.9* 8.0*   HEMATOCRIT % 26.2* 28.9* 26.1*   PLATELETS AUTO x10*3/uL 185 175 207      Results from last 72 hours   Lab Units 06/26/25  0655 06/25/25  0536 06/24/25  0806   SODIUM mmol/L 140 141 142   POTASSIUM mmol/L 3.8 3.7 3.7   CHLORIDE mmol/L 105 107 107   CO2 mmol/L 27 25 30   BUN mg/dL 16 19 20   CREATININE mg/dL 1.04 1.02 1.31*   GLUCOSE mg/dL 110* 88 97   CALCIUM mg/dL 8.4* 8.4* 8.1*   ANION GAP mmol/L 12 13 9*   EGFR mL/min/1.73m*2 76 78 57*           Estimated Creatinine Clearance: 70.7 mL/min (by C-G formula based on SCr of 1.04 mg/dL).  C-Reactive Protein   Date Value Ref Range Status   05/20/2025 3.85 (H) <1.00 mg/dL Final   05/18/2025 4.28 (H) <1.00 mg/dL Final   05/17/2025 5.97 (H) <1.00 mg/dL Final     Microbiology  Susceptibility data from last 14 days.  Collected Specimen Info Organism   06/23/25 Tissue/Biopsy from Wound/Tissue Mixed Skin Microorganisms    06/23/25 Blood culture from Peripheral Venipuncture Staphylococcus epidermidis     Staphylococcus pettenkoferi/argensis   06/23/25 Blood culture from Peripheral Venipuncture Staphylococcus epidermidis       Imaging  Carotid duplex bilateral  Result Date: 6/24/2025            [place] [address]  Tel [phone] and Fax [phone]  Vascular Lab Report [facility] US CAROTID ARTERY DUPLEX BILATERAL  Patient Name:      SUBHASH SEGAL    Reading Physician:  [id number] Eli De La Vega MD Study Date:        6/23/2025           Ordering Physician: [id number] DAMEON ALVAREZ MRN/PID:           [id number]            Technologist:       Oumou Valdez RDMS,                                                            T Accession#:        [id number]        Technologist 2: Date of Birth/Age: 1950 / 74 years Encounter#:         [id number] Gender:            M Admission Status:  Inpatient           Location Performed: [facility]  Diagnosis/ICD: Dizziness and giddiness-R42  Patient History Syncope, HTN and CVA. Smoker:          Never. Diabetes:       Yes. >20 years.  CONCLUSIONS: Right Carotid: Today's exam was limited due to the body habitus of the patient. Findings are consistent with less than 50% stenosis of the right proximal internal carotid artery. Laminar flow seen by color Doppler. Right external carotid artery appears patent with no evidence of stenosis. No evidence of hemodynamically significant stenosis of the right common carotid artery. The right vertebral artery is patent with antegrade flow. No evidence of hemodynamically significant stenosis in the right subclavian artery. Left Carotid: Today's exam was limited due to the body habitus of the patient and unable to turn head to right. Findings are consistent with less than 50% stenosis of the left proximal internal carotid artery. Left external carotid artery appears patent with no evidence of stenosis. No evidence of hemodynamically significant stenosis of the left common carotid artery. The left vertebral artery is patent with antegrade flow. No evidence of hemodynamically significant stenosis in the left subclavian artery.  Imaging & Doppler Findings: Right Plaque Morph: The proximal right internal carotid artery demonstrates calcified plaque. Left Plaque Morph: The proximal left internal carotid artery demonstrates calcified plaque. The distal left common carotid artery demonstrates heterogenous plaque.   Right                        Left   PSV      EDV                PSV       cm/s 25 cm/s   CCA P    118 cm/s 18 cm/s 99 cm/s  19 cm/s   CCA D    110 cm/s 19 cm/s 107 cm/s 19 cm/s   ICA P    106 cm/s 18 cm/s 103 cm/s 35 cm/s   ICA M    81 cm/s  18 cm/s 101 cm/s 39 cm/s   ICA D    40 cm/s  10 cm/s 148 cm/s 18 cm/s    ECA     107 cm/s 13 cm/s 78 cm/s  31 cm/s Vertebral  49 cm/s  10 cm/s 129 cm/s 0 cm/s  Subclavian 168 cm/s 12 cm/s  Right                                  Left   PSV    Waveform                       PSV    Waveform 129 cm/s          Subclavian Proximal  168 cm/s               Right Left ICA/CCA Ratio  1.1  1.0   [id number] Eli De La Vega MD Electronically signed by [id number] Eli De La Vega MD on 6/24/2025 at 11:03:17 AM  ** Final **     ECG 12 lead  Result Date: 6/24/2025  Sinus rhythm with 1st degree AV block Nonspecific intraventricular block Minimal voltage criteria for LVH, may be normal variant ( [place] product ) Cannot rule out Septal infarct (cited on or before 09-MAY-2025) Lateral infarct , age undetermined Abnormal ECG When compared with ECG of 22-JUN-2025 19:20, (unconfirmed) QRS axis Shifted right Lateral infarct is now Present Serial changes of Septal infarct Present    XR chest 1 view  Result Date: 6/23/2025  Interpreted By:  Brian Morris, STUDY: XR CHEST 1 VIEW;  6/23/2025 8:57 am   INDICATION: Signs/Symptoms: SOB.   COMPARISON: 06/03/2025 AP chest x-ray and yesterday's unenhanced thoracic CT.   ACCESSION NUMBER(S): [id number]   ORDERING CLINICIAN: DAMEON ALVAREZ   FINDINGS: Portable AP erect chest x-ray 06/23/2025 8:50 a.m.:   CARDIOMEDIASTINAL SILHOUETTE: Cardiomediastinal silhouette is normal in size and configuration. Marked vascular calcification is noted. Severe aortic valve and coronary artery calcification reported on yesterday's CT is difficult to appreciate.   LUNGS: Normally inflated and appear clear aside from moderate lower left lung subsegmental atelectasis not apparent on 06/03/2025. The lateral costophrenic angles are sharp, but small bilateral pleural effusions seen on yesterday's CT would probably go undetected without a lateral view.   ABDOMEN: No remarkable upper abdominal findings.   BONES: Extensive spinal degenerative changes and generalized osteosclerosis are noted. The latter can be associated with various conditions including but not limited to myelosclerosis, renal osteodystrophy, hyperthyroidism, hypoparathyroidism, fluorosis, osteoblastic metastases, lymphoma, hepatitis C and mastocytosis and should be  correlated clinically.       Left lower lobe and possibly lingular subsegmental atelectasis.   MACRO: None   Signed by: Brian Morris 6/23/2025 9:26 AM Dictation workstation:   [id number]    CT chest abdomen pelvis wo IV contrast  Result Date: 6/22/2025  Interpreted By:  Jemal Driscoll, STUDY: CT CHEST ABDOMEN PELVIS WO CONTRAST;  6/22/2025 9:00 pm   INDICATION: Signs/Symptoms:Diuretic abdominal pain nausea not feeling.     COMPARISON: 06/05/2025 CT chest, 02/27/2025 CT abdomen/pelvis   ACCESSION NUMBER(S): [id number]   ORDERING CLINICIAN: ADDI ALBERTO   TECHNIQUE: Contiguous axial images of the chest, abdomen, and pelvis were obtained without contrast. Coronal and sagittal reformatted images were reconstructed from the axial data.   FINDINGS:   CT CHEST:   MEDIASTINUM AND LYMPH NODES:  The esophageal wall appears within normal limits.  No enlarged intrathoracic or axillary lymph nodes by imaging criteria. No pneumomediastinum.   VESSELS:  Normal caliber thoracic aorta. Mild calcific aortic atherosclerosis.   HEART: Normal size. Severe aortic valvular calcification. Mitral annular calcifications. Severe coronary artery calcifications. No significant pericardial effusion.   LUNG, AIRWAYS, PLEURA: No pneumothorax. Small bilateral pleural effusions (left > right), unchanged on the right and slightly decreased on the left, compared to 06/05/2025. Mild passive bibasilar atelectasis, similar to prior study. Scattered linear opacities elsewhere likely represent atelectasis. Linear triangular nodule in the right upper lobe likely represents focal atelectasis given new from prior study.   OSSEOUS STRUCTURES: No acute osseous abnormality. Diffuse idiopathic skeletal hyperostosis with flowing ossification along the anterior disc margins and maintained disc heights in the thoracic spine. No significant canal stenosis.   CHEST WALL SOFT TISSUES: Mild anasarca.     ABDOMEN/PELVIS:   ABDOMINAL WALL: Mild anasarca.  Small fat-containing umbilical hernia.Subcutaneous injection sites in the left abdominal wall.   LIVER: No significant parenchymal abnormality.   BILE DUCTS: No significant intrahepatic or extrahepatic dilatation.   GALLBLADDER: No significant abnormality.   PANCREAS: No significant abnormality.   SPLEEN: No significant abnormality.   ADRENALS: No significant abnormality.   KIDNEYS, URETERS, BLADDER: Unchanged mild bilateral hydronephrosis without hydroureter. No renal or ureteral calculi. The urinary bladder wall is thickened. There is perivesical fat stranding.   REPRODUCTIVE ORGANS: The prostate gland is enlarged, measuring 5.5 cm in transverse dimension.   VESSELS: Mild aortic atherosclerosis without AAA. Unchanged multiple mildly enlarged external iliac lymph node for sample no other enlarged lymph nodes. Measuring 1.3 cm on the right, 1 cm on the left   RETROPERITONEUM/LYMPH NODES: No acute retroperitoneal abnormality. No enlarged lymph nodes.   BOWEL/PERITONEUM: Tiny gastric diverticulum noted. No inflammatory bowel wall thickening or dilatation. Normal appendix.   No ascites, free air, or fluid collection.     MUSCULOSKELETAL: Multilevel bulky anterior endplate osteophytes with maintained disc spaces reflect diffuse idiopathic skeletal hyperostosis.  No suspicious osseous lesion.       Perivesical fat stranding and mild wall thickening of the urinary bladder wall. Recommend correlation for cystitis.   Unchanged mild bilateral hydronephrosis without hydroureter suggesting a ureteropelvic junction obstruction.   Small bilateral pleural effusions (left > right) which is unchanged on the right and slightly smaller on the left compared to 06/05/2025. Scattered mild atelectasis without evidence of pneumonia.   MACRO: None.   Signed by: Jemal Driscoll 6/22/2025 9:41 PM Dictation workstation:   [id number]    CT head wo IV contrast  Result Date: 6/22/2025  Interpreted By:  Jemal Driscoll, STUDY: CT HEAD WO IV  CONTRAST;  6/22/2025 8:50 pm   INDICATION: Signs/Symptoms:Left eye pain  on blood thinner h/o stroke.     COMPARISON: 06/03/2025   ACCESSION NUMBER(S): [id number]   ORDERING CLINICIAN: ADDI ALBERTO   TECHNIQUE: Noncontrast axial CT images of head were obtained with coronal and sagittal reconstructed images.   FINDINGS: BRAIN PARENCHYMA: Moderate periventricular and subcortical hemispheric white matter hypodensities are most compatible with chronic small vessel ischemic disease.Chronic lacunar infarct in the left cerebellar hemisphere. No acute intraparenchymal hemorrhage or parenchymal evidence of acute large territory ischemic infarct. Gray-white matter distinction is preserved. No mass-effect.   VENTRICLES and EXTRA-AXIAL SPACES:  No acute extra-axial or intraventricular hemorrhage. No effacement of cerebral sulci. The ventricles and sulci are age-concordant.   PARANASAL SINUSES/MASTOIDS:  No hemorrhage or air-fluid levels within the visualized paranasal sinuses. The mastoids are well aerated.   CALVARIUM/ORBITS:  No acute skull fracture.  The orbits and globes are intact to the extent visualized.   EXTRACRANIAL SOFT TISSUES: No discernible acute abnormality.       No acute intracranial abnormality.   Unchanged burden of moderate supratentorial chronic ischemic changes in left cerebellar hemisphere chronic lacunar infarct.   MACRO: None.   Signed by: Jemal Driscoll 6/22/2025 9:32 PM Dictation workstation:   [id number]    ECG 12 lead  Result Date: 6/14/2025  Sinus rhythm with 1st degree AV block Left axis deviation Nonspecific intraventricular block Minimal voltage criteria for LVH, may be normal variant ( [place] product ) Cannot rule out Anterior infarct (cited on or before 09-MAY-2025) Abnormal ECG When compared with ECG of 03-JUN-2025 14:26, No significant change was found See ED provider note for full interpretation and clinical correlation Confirmed by Flower Shaikh ([id number]) on 6/14/2025 8:57:23  PM    Lower extremity venous duplex right  Result Date: 6/6/2025  Interpreted By:  Nnamdi Dodson, STUDY: [facility] US LOWER EXTREMITY VENOUS DUPLEX RIGHT;  6/6/2025 4:45 pm   INDICATION: Signs/Symptoms:eval dvt , pain swelling.   COMPARISON: None.   ACCESSION NUMBER(S): [id number]   ORDERING CLINICIAN: MIGNON PURI   TECHNIQUE: Grayscale, color and spectral Doppler sonographic images of the right lower extremity deep venous system. The left common femoral vein was imaged for comparison.   FINDINGS: There is normal compressibility of the right common femoral vein, saphenous femoral junction, femoral vein and popliteal vein. The posterior tibial and peroneal veins are suboptimally visualized. There is normal spontaneous and phasic variation throughout the leg by spectral doppler.   The left common femoral vein is patent.   OTHER FINDINGS: None.       Suboptimal visualization of the calf veins. No sonographic evidence of acute DVT in the visualized vessels of the right lower extremity.   MACRO: None   Signed by: Nnamdi Dodson 6/6/2025 4:53 PM Dictation workstation:   [id number]    Vascular US Upper Extremity Venous Duplex Right  Result Date: 6/6/2025  Interpreted By:  Nnamdi Dodson, STUDY: [facility] US UPPER EXTREMITY VENOUS DUPLEX RIGHT;  6/6/2025 4:43 pm   INDICATION: Signs/Symptoms:eval dvt  pain.   COMPARISON: Upper extremity Doppler ultrasound 05/30/2025.   ACCESSION NUMBER(S): [id number]   ORDERING CLINICIAN: MIGNON PURI   TECHNIQUE: Grayscale, color and spectral Doppler sonographic imaging of the right upper extremity deep venous system.   FINDINGS: The right cephalic vein is not well visualized. Evaluation of the brachial vein is also solid limited due to placement of PICC line. Segmental visualization of the right internal jugular vein, subclavian vein, axillary vein, basilic vein and brachial vein demonstrate normal gray scale appearance, compressibility, color flow and venous waveforms. The radial and ulnar  veins are not visualized.   Redemonstration of occlusive thrombus in the left subclavian vein.       No sonographic evidence of right upper extremity DVT.   Partial visualization of occlusive thrombus in the left subclavian vein as noted on prior ultrasound. Please refer to separate or of left upper extremity DVT for additional detail   MACRO: None   Signed by: Nnamdi Dodson 6/6/2025 4:51 PM Dictation workstation:   [id number]    CT angio chest for pulmonary embolism  Result Date: 6/5/2025  Interpreted By:  Timmy Castro, STUDY: CT ANGIO CHEST FOR PULMONARY EMBOLISM;  6/5/2025 3:00 pm   INDICATION: Signs/Symptoms:rule out PE.     COMPARISON: CT chest with contrast 21 May 2025   ACCESSION NUMBER(S): [id number]   ORDERING CLINICIAN: ASAF CORONA   TECHNIQUE: Pulmonary arterial phase CT chest after the uneventful administration of 75 mL IV contrast (Omnipaque 350).   Three dimensional maximum intensity projection (3-D MIPs) image/s were created on a separate dedicated workstation, reviewed and saved   FINDINGS: CARDIOVASCULAR:   Acute pulmonary embolism: Negative through the  lobar (second order) branch level. Substantial sized branches from segmental (third order) branch and distally cannot be evaluated Acute right heart strain: Negative. No CT evidence of acute right heart strain Cardiac thrombus:  Negative; no obvious right heart or other cardiac thrombus is seen Pulmonary arteries ectasia:  Unchanged borderline to mild   Heart size:  Borderline but unchanged Pericardial effusion:  Negative   Thoracic aortic aneurysm:  Negative Aortic dissection:  Negative   Heart failure change:  Negative.  No sign of interstitial or alveolar edema. Other:  n/a   NONVASCULAR MEDIASTINUM: Esophagus:  Grossly normal by CT Mediastinal Mass:  Negative Hiatal hernia:  None Other:  n/a   LYMPH NODES: No thoracic adenopathy   LUNGS / AIRSPACES / AIRWAYS:   LARGE AIRWAYS Filling defect: Negative Wall thickening: Negative Bronchiectasis:  Negative Other: N/A   AIRSPACES Fibrosis: Negative Emphysema: Negative Consolidation: Negative Ground glass airspace disease: Negative Edema: Mild bibasilar Nodule / Mass: Negative Other: Motion artifact   PLEURA: Effusion:  Right pleural effusion has decreased in size, now trace quantity, previously at least small if not moderate. Left effusion has perhaps marginally decreased in size, still small Pneumothorax: Both sides negative Other:  n/a   CHEST WALL: Soft tissues of the chest wall are unremarkable   SKELETON: No acute or contributory abnormality   UPPER ABDOMEN: Included subdiaphragmatic structures have no acute or contributory abnormality       MILD BIBASILAR INTERSTITIAL EDEMA   THE RIGHT PLEURAL EFFUSION HAS DECREASED IN SIZE SINCE 21 MAY 2025   LEFT PLEURAL EFFUSION HAS MARGINALLY DECREASED IN SIZE AS WELL   NO ACUTE PULMONARY EMBOLISM THROUGH THE LOBAR BRANCH LEVEL. SEGMENTAL AND SUBSEGMENTAL LEVELS OF THE PULMONARY ARTERY CANNOT BE EVALUATED   NO AORTIC DISSECTION OR OTHER ACUTE THORACIC AORTIC FINDINGS   NO AIRSPACE CONSOLIDATION, GROUND-GLASS AIRSPACE DISEASE OR ANY OTHER SIGN OF ACTIVE INFECTION   NO PERICARDIAL EFFUSION   NO PNEUMOTHORAX   MACRO: None   Signed by: Timmy Castro 6/5/2025 3:21 PM Dictation workstation:   [id number]    Lower extremity venous duplex left  Result Date: 6/5/2025  STUDY: Left Lower Extremity Venous Doppler Ultrasound; 6/5/2025 12:59 PM INDICATION: Positive DVT on multiple ultrasound scan done in routine facility. Currently on a Eliquis. COMPARISON:  LE 02/11/2025, 09/12/2024. ACCESSION NUMBER(S): [id number] ORDERING CLINICIAN: ASAF CORONA TECHNIQUE:  Real-time grayscale, color, and spectral doppler ultrasound imaging of the left lower extremity veins was performed. FINDINGS: There is acute occlusive DVT demonstrated within the left deep femoral, femoral, and popliteal veins. The left external iliac and common femoral veins demonstrated normal compressibility, normal  phasic venous flow and normal response to augmentation.  The visualized deep calf veins are patent.  The contralateral common femoral vein is free of thrombosis.    Evidence for acute occlusive DVT within the left deep femoral, femoral, and popliteal veins. There is no significant change when compared to prior ultrasound from February 2025. Signed by Sherice Mejia MD    ECG 12 lead  Result Date: 6/5/2025  Sinus rhythm with 1st degree AV block Left axis deviation Nonspecific intraventricular block Minimal voltage criteria for LVH, may be normal variant ( [place] product ) Cannot rule out Septal infarct (cited on or before 09-MAY-2025) Abnormal ECG When compared with ECG of 21-MAY-2025 16:33, No significant change was found Confirmed by Long Min ([id number]) on 6/5/2025 12:09:34 PM    XR chest 1 view  Result Date: 6/3/2025  Interpreted By:  Pepe Huang, STUDY: XR CHEST 1 VIEW  6/3/2025 2:50 pm   INDICATION: Signs/Symptoms:Malaise and fatigue   COMPARISON: 05/12/2025   ACCESSION NUMBER(S): [id number]   ORDERING CLINICIAN: SHAI BREAUX   TECHNIQUE: A single AP portable radiograph of the chest was obtained.   FINDINGS: Multiple cardiac monitoring leads are seen over the chest.   No focal infiltrate, pleural effusion or pneumothorax is identified. The cardiac silhouette is within normal limits for size.       No focal infiltrate or pneumothorax is identified.   MACRO: None.   Signed by: Pepe Huang 6/3/2025 2:59 PM Dictation workstation:   [id number]    XR shoulder right 2+ views  Result Date: 6/3/2025  Interpreted By:  Pepe Huang, STUDY: XR SHOULDER RIGHT 2+ VIEWS 6/3/2025 2:50 pm   INDICATION: Signs/Symptoms:Right shoulder pain   COMPARISON: None.   ACCESSION NUMBER(S): [id number]   ORDERING CLINICIAN: ELMO OLIVAS   TECHNIQUE: 3 views of the right shoulder including AP , axillary and scapular Y-views were obtained.   FINDINGS: There is no radiographic evidence of acute fracture or dislocation identified.  Mild  hypertrophic degenerative changes are seen in the right shoulder.       1. No evidence of acute fracture or dislocation. 2. Degenerative changes, as described above.   MACRO: None.   Signed by: Pepe Huang 6/3/2025 2:59 PM Dictation workstation:   [id number]    CT head wo IV contrast  Result Date: 6/3/2025  Interpreted By:  Julien Romeo, STUDY: CT HEAD WO IV CONTRAST;  6/3/2025 2:40 pm   INDICATION: Signs/Symptoms:Headache at the base of the skull, recently started on Xarelto.     COMPARISON: 05/12/2025.   ACCESSION NUMBER(S): [id number]   ORDERING CLINICIAN: ELMO OLIVAS   TECHNIQUE: Contiguous unenhanced axial images were obtained through the brain.   FINDINGS: INTRACRANIAL: Mild generalized atrophy is again seen.  Nonspecific irregular low-attenuation changes throughout the periventricular and subcortical white matter are similar to prior, most likely related to chronic microvascular disease. There is a stable small remote lacunar infarct of the right basal ganglia and internal capsule anterior limb. No acute intracranial bleed, midline shift, or mass effect is seen. Gray-white differentiation is maintained. No extra-axial fluid collection or hydrocephalus. Irregular atherosclerotic calcifications again seen in the internal carotid artery and vertebral artery segments   Bones are intact.   EXTRACRANIAL: Paranasal sinus mild peripheral mucosal thickening is most prominent in the ethmoid air cells. No paranasal sinus air-fluid level. Mastoid air cells are clear.       Age-related/chronic changes similar to prior. No acute intracranial process.       MACRO: None.   Signed by: Julien Romeo 6/3/2025 2:46 PM Dictation workstation:   [id number]    Vascular US upper extremity venous duplex left  Result Date: 5/30/2025  STUDY: Left upper extremity venous ultrasound; Completed Time: 5/30/2025 13:35 INDICATION: LUE swelling.  Recent PICC line placement. COMPARISON: None available. ACCESSION NUMBER(S):  [id number] ORDERING CLINICIAN: RAJINDER CONNER TECHNIQUE: Ultrasound of the left upper extremity veins.  Multiple images were obtained. FINDINGS: There is acute partially occlusive DVT demonstrated within the left subclavian, axillary and brachial veins.  Superficial thrombus is visualized within the basilic vein, surrounding the PICC line. The left internal jugular vein demonstrated normal compressibility, normal phasic venous flow, and normal response to augmentation.  There is no evidence for echogenic thrombi.  The cephalic vein is patent.  The radial and ulnar veins are not evaluated.    Evidence for acute partially occlusive DVT within the left subclavian, axillary and brachial veins. Superficial thrombus within the basilic vein, surrounding the PICC line. 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  Bilateral buttock ulcers, stage III, rule out infection     Continue Zosyn  IV vancomycin-monitor levels  Monitor temperature and WBC  Follow-up buttock wound culture  Follow up Repeat blood cultures-6/25/2025  Local care  Supportive care  Further recommendations based on pending workup-culture results    This is a complex infectious disease issue and the following was performed today (for more details please see the above note): Management decisions reflecting the added complexity (e.g., changes in antimicrobial therapy, infection control strategies).     Sonam  SHIKHA Zhu, APRN-CNP

## 2025-06-26 NOTE — NURSING NOTE
Vss. Patient denies pain. Therapy worked with patient. Patient has positive blood culture. Positive tissue culture as well. IV ATB therapy continues.

## 2025-06-26 NOTE — PROCEDURES
Pt completed an overnight pulse ox trend on room air, on the night of 6/25/25.  Report start date: 6/25/25 10:45:00 PM.  Report end date: 6/26/25 05:30:00 AM.  Total time below spo2 88%: 2 hr 33 min 54 sec.  Longest duration: 25 min 23 sec.  Lowest spo2: 64 at 06/26/25 01:01:21 AM.  Number of events: 224. Pt placed back on 2 liter nasal cannula at 03:18:00 AM.

## 2025-06-26 NOTE — PROGRESS NOTES
"Elliot Partida is a 74 y.o. male on day 3 of admission presenting with UTI (urinary tract infection).      Subjective   Pt states   \"Im ok for the shape Im in\" states he got up to the rest rm and is working with therapy - denies chest pain or sob - denies n/v/c/d.       Objective     Last Recorded Vitals  /52 (BP Location: Left arm, Patient Position: Lying)   Pulse 59   Temp 36.7 °C (98.1 °F) (Temporal)   Resp 16   Wt 91.4 kg (201 lb 8 oz)   SpO2 99%   Intake/Output last 3 Shifts:    Intake/Output Summary (Last 24 hours) at 6/26/2025 1012  Last data filed at 6/26/2025 0204  Gross per 24 hour   Intake 2070 ml   Output 500 ml   Net 1570 ml       Admission Weight  Weight: 90.6 kg (199 lb 11.8 oz) (06/22/25 1921)    Daily Weight  06/25/25 : 91.4 kg (201 lb 8 oz)    Image Results  ECG 12 Lead  Sinus rhythm with 1st degree AV block  Left axis deviation  Nonspecific intraventricular block  Minimal voltage criteria for LVH, may be normal variant ( Mukul product )  Cannot rule out Septal infarct (cited on or before 09-MAY-2025)  T wave abnormality, consider lateral ischemia  Abnormal ECG  When compared with ECG of 06-JUN-2025 15:38,  No significant change was found  Confirmed by Berta Rooney ([id number]) on 6/25/2025 1:11:22 PM      Physical Exam  Constitutional:       Appearance: He is obese.   HENT:      Head: Normocephalic.      Mouth/Throat:      Mouth: Mucous membranes are moist.   Eyes:      Extraocular Movements: Extraocular movements intact.   Cardiovascular:      Rate and Rhythm: Normal rate and regular rhythm.      Pulses: Normal pulses.      Heart sounds: Normal heart sounds.   Pulmonary:      Effort: Pulmonary effort is normal.      Breath sounds: Normal breath sounds.   Abdominal:      General: Bowel sounds are normal.      Palpations: Abdomen is soft.   Musculoskeletal:         General: Swelling present.      Cervical back: Normal range of motion.      Comments: Guillermo upper extremity edema  "   Skin:     General: Skin is warm and dry.      Capillary Refill: Capillary refill takes less than 2 seconds.      Comments: See images regarding wounds - multiple pressure injury sites - heels and coccyx     Neurological:      General: No focal deficit present.      Mental Status: He is alert and oriented to person, place, and time.   Psychiatric:         Mood and Affect: Mood normal.         Behavior: Behavior normal.         Relevant Results           Scheduled medications  Scheduled Medications[1]  Continuous medications  Continuous Medications[2]  PRN medications  PRN Medications[3]  Results for orders placed or performed during the hospital encounter of 06/22/25 (from the past 24 hours)   Blood Culture    Specimen: Peripheral Venipuncture; Blood culture   Result Value Ref Range    Blood Culture Loaded on Instrument - Culture in progress    SST TOP   Result Value Ref Range    Extra Tube Hold for add-ons.    POCT GLUCOSE   Result Value Ref Range    POCT Glucose 129 (H) 74 - 99 mg/dL   CBC   Result Value Ref Range    WBC 6.2 4.4 - 11.3 x10*3/uL    nRBC 0.0 0.0 - 0.0 /100 WBCs    RBC 2.95 (L) 4.50 - 5.90 x10*6/uL    Hemoglobin 8.0 (L) 13.5 - 17.5 g/dL    Hematocrit 26.2 (L) 41.0 - 52.0 %    MCV 89 80 - 100 fL    MCH 27.1 26.0 - 34.0 pg    MCHC 30.5 (L) 32.0 - 36.0 g/dL    RDW 16.5 (H) 11.5 - 14.5 %    Platelets 185 150 - 450 x10*3/uL   Basic metabolic panel   Result Value Ref Range    Glucose 110 (H) 74 - 99 mg/dL    Sodium 140 136 - 145 mmol/L    Potassium 3.8 3.5 - 5.3 mmol/L    Chloride 105 98 - 107 mmol/L    Bicarbonate 27 21 - 32 mmol/L    Anion Gap 12 10 - 20 mmol/L    Urea Nitrogen 16 6 - 23 mg/dL    Creatinine 1.04 0.50 - 1.30 mg/dL    eGFR 76 >60 mL/min/1.73m*2    Calcium 8.4 (L) 8.6 - 10.3 mg/dL   POCT GLUCOSE   Result Value Ref Range    POCT Glucose 106 (H) 74 - 99 mg/dL     Carotid duplex bilateral  Result Date: 6/24/2025            [place] [address]  Tel  [phone] and Fax [phone]  Vascular Lab Report [facility] US CAROTID ARTERY DUPLEX BILATERAL  Patient Name:      SUBHASH SCHMIDT LUZMA    Reading Physician:  [id number] Eli De La Vega MD Study Date:        6/23/2025           Ordering Physician: [id number] DAMEON ALVAREZ MRN/PID:           [id number]            Technologist:       Oumou Valdez RDMS,                                                            T Accession#:        [id number]        Technologist 2: Date of Birth/Age: 1950 / 74 years Encounter#:         [id number] Gender:            M Admission Status:  Inpatient           Location Performed: [facility]  Diagnosis/ICD: Dizziness and giddiness-R42  Patient History Syncope, HTN and CVA. Smoker:         Never. Diabetes:       Yes. >20 years.  CONCLUSIONS: Right Carotid: Today's exam was limited due to the body habitus of the patient. Findings are consistent with less than 50% stenosis of the right proximal internal carotid artery. Laminar flow seen by color Doppler. Right external carotid artery appears patent with no evidence of stenosis. No evidence of hemodynamically significant stenosis of the right common carotid artery. The right vertebral artery is patent with antegrade flow. No evidence of hemodynamically significant stenosis in the right subclavian artery. Left Carotid: Today's exam was limited due to the body habitus of the patient and unable to turn head to right. Findings are consistent with less than 50% stenosis of the left proximal internal carotid artery. Left external carotid artery appears patent with no evidence of stenosis. No evidence of hemodynamically significant stenosis of the left common carotid artery. The left vertebral artery is patent with antegrade flow. No evidence of hemodynamically significant stenosis in the left subclavian artery.  Imaging & Doppler Findings: Right Plaque Morph: The proximal right  internal carotid artery demonstrates calcified plaque. Left Plaque Morph: The proximal left internal carotid artery demonstrates calcified plaque. The distal left common carotid artery demonstrates heterogenous plaque.   Right                        Left   PSV      EDV                PSV       cm/s 25 cm/s   CCA P    118 cm/s 18 cm/s 99 cm/s  19 cm/s   CCA D    110 cm/s 19 cm/s 107 cm/s 19 cm/s   ICA P    106 cm/s 18 cm/s 103 cm/s 35 cm/s   ICA M    81 cm/s  18 cm/s 101 cm/s 39 cm/s   ICA D    40 cm/s  10 cm/s 148 cm/s 18 cm/s    ECA     107 cm/s 13 cm/s 78 cm/s  31 cm/s Vertebral  49 cm/s  10 cm/s 129 cm/s 0 cm/s  Subclavian 168 cm/s 12 cm/s  Right                                  Left   PSV    Waveform                       PSV    Waveform 129 cm/s          Subclavian Proximal 168 cm/s               Right Left ICA/CCA Ratio  1.1  1.0   [id number] Eli De La Vega MD Electronically signed by [id number] Eli De La Vega MD on 6/24/2025 at 11:03:17 AM  ** Final **     ECG 12 lead  Result Date: 6/24/2025  Sinus rhythm with 1st degree AV block Nonspecific intraventricular block Minimal voltage criteria for LVH, may be normal variant ( Mukul product ) Cannot rule out Septal infarct (cited on or before 09-MAY-2025) Lateral infarct , age undetermined Abnormal ECG When compared with ECG of 22-JUN-2025 19:20, (unconfirmed) QRS axis Shifted right Lateral infarct is now Present Serial changes of Septal infarct Present    XR chest 1 view  Result Date: 6/23/2025  Interpreted By:  Brian Morris, STUDY: XR CHEST 1 VIEW;  6/23/2025 8:57 am   INDICATION: Signs/Symptoms: SOB.   COMPARISON: 06/03/2025 AP chest x-ray and yesterday's unenhanced thoracic CT.   ACCESSION NUMBER(S): [id number]   ORDERING CLINICIAN: DAMEON ALVAREZ   FINDINGS: Portable AP erect chest x-ray 06/23/2025 8:50 a.m.:   CARDIOMEDIASTINAL SILHOUETTE: Cardiomediastinal silhouette is normal in size and configuration. Marked vascular  calcification is noted. Severe aortic valve and coronary artery calcification reported on yesterday's CT is difficult to appreciate.   LUNGS: Normally inflated and appear clear aside from moderate lower left lung subsegmental atelectasis not apparent on 06/03/2025. The lateral costophrenic angles are sharp, but small bilateral pleural effusions seen on yesterday's CT would probably go undetected without a lateral view.   ABDOMEN: No remarkable upper abdominal findings.   BONES: Extensive spinal degenerative changes and generalized osteosclerosis are noted. The latter can be associated with various conditions including but not limited to myelosclerosis, renal osteodystrophy, hyperthyroidism, hypoparathyroidism, fluorosis, osteoblastic metastases, lymphoma, hepatitis C and mastocytosis and should be correlated clinically.       Left lower lobe and possibly lingular subsegmental atelectasis.   MACRO: None   Signed by: Brian Morris 6/23/2025 9:26 AM Dictation workstation:   [id number]    CT chest abdomen pelvis wo IV contrast  Result Date: 6/22/2025  Interpreted By:  Jemal Driscoll, STUDY: CT CHEST ABDOMEN PELVIS WO CONTRAST;  6/22/2025 9:00 pm   INDICATION: Signs/Symptoms:Diuretic abdominal pain nausea not feeling.     COMPARISON: 06/05/2025 CT chest, 02/27/2025 CT abdomen/pelvis   ACCESSION NUMBER(S): [id number]   ORDERING CLINICIAN: ADDI ALBERTO   TECHNIQUE: Contiguous axial images of the chest, abdomen, and pelvis were obtained without contrast. Coronal and sagittal reformatted images were reconstructed from the axial data.   FINDINGS:   CT CHEST:   MEDIASTINUM AND LYMPH NODES:  The esophageal wall appears within normal limits.  No enlarged intrathoracic or axillary lymph nodes by imaging criteria. No pneumomediastinum.   VESSELS:  Normal caliber thoracic aorta. Mild calcific aortic atherosclerosis.   HEART: Normal size. Severe aortic valvular calcification. Mitral annular calcifications. Severe  coronary artery calcifications. No significant pericardial effusion.   LUNG, AIRWAYS, PLEURA: No pneumothorax. Small bilateral pleural effusions (left > right), unchanged on the right and slightly decreased on the left, compared to 06/05/2025. Mild passive bibasilar atelectasis, similar to prior study. Scattered linear opacities elsewhere likely represent atelectasis. Linear triangular nodule in the right upper lobe likely represents focal atelectasis given new from prior study.   OSSEOUS STRUCTURES: No acute osseous abnormality. Diffuse idiopathic skeletal hyperostosis with flowing ossification along the anterior disc margins and maintained disc heights in the thoracic spine. No significant canal stenosis.   CHEST WALL SOFT TISSUES: Mild anasarca.     ABDOMEN/PELVIS:   ABDOMINAL WALL: Mild anasarca. Small fat-containing umbilical hernia.Subcutaneous injection sites in the left abdominal wall.   LIVER: No significant parenchymal abnormality.   BILE DUCTS: No significant intrahepatic or extrahepatic dilatation.   GALLBLADDER: No significant abnormality.   PANCREAS: No significant abnormality.   SPLEEN: No significant abnormality.   ADRENALS: No significant abnormality.   KIDNEYS, URETERS, BLADDER: Unchanged mild bilateral hydronephrosis without hydroureter. No renal or ureteral calculi. The urinary bladder wall is thickened. There is perivesical fat stranding.   REPRODUCTIVE ORGANS: The prostate gland is enlarged, measuring 5.5 cm in transverse dimension.   VESSELS: Mild aortic atherosclerosis without AAA. Unchanged multiple mildly enlarged external iliac lymph node for sample no other enlarged lymph nodes. Measuring 1.3 cm on the right, 1 cm on the left   RETROPERITONEUM/LYMPH NODES: No acute retroperitoneal abnormality. No enlarged lymph nodes.   BOWEL/PERITONEUM: Tiny gastric diverticulum noted. No inflammatory bowel wall thickening or dilatation. Normal appendix.   No ascites, free air, or fluid collection.      MUSCULOSKELETAL: Multilevel bulky anterior endplate osteophytes with maintained disc spaces reflect diffuse idiopathic skeletal hyperostosis.  No suspicious osseous lesion.       Perivesical fat stranding and mild wall thickening of the urinary bladder wall. Recommend correlation for cystitis.   Unchanged mild bilateral hydronephrosis without hydroureter suggesting a ureteropelvic junction obstruction.   Small bilateral pleural effusions (left > right) which is unchanged on the right and slightly smaller on the left compared to 06/05/2025. Scattered mild atelectasis without evidence of pneumonia.   MACRO: None.   Signed by: Jemal Driscoll 6/22/2025 9:41 PM Dictation workstation:   [id number]    CT head wo IV contrast  Result Date: 6/22/2025  Interpreted By:  Jemal Driscoll, STUDY: CT HEAD WO IV CONTRAST;  6/22/2025 8:50 pm   INDICATION: Signs/Symptoms:Left eye pain  on blood thinner h/o stroke.     COMPARISON: 06/03/2025   ACCESSION NUMBER(S): [id number]   ORDERING CLINICIAN: ADDI ALBERTO   TECHNIQUE: Noncontrast axial CT images of head were obtained with coronal and sagittal reconstructed images.   FINDINGS: BRAIN PARENCHYMA: Moderate periventricular and subcortical hemispheric white matter hypodensities are most compatible with chronic small vessel ischemic disease.Chronic lacunar infarct in the left cerebellar hemisphere. No acute intraparenchymal hemorrhage or parenchymal evidence of acute large territory ischemic infarct. Gray-white matter distinction is preserved. No mass-effect.   VENTRICLES and EXTRA-AXIAL SPACES:  No acute extra-axial or intraventricular hemorrhage. No effacement of cerebral sulci. The ventricles and sulci are age-concordant.   PARANASAL SINUSES/MASTOIDS:  No hemorrhage or air-fluid levels within the visualized paranasal sinuses. The mastoids are well aerated.   CALVARIUM/ORBITS:  No acute skull fracture.  The orbits and globes are intact to the extent visualized.   EXTRACRANIAL  SOFT TISSUES: No discernible acute abnormality.       No acute intracranial abnormality.   Unchanged burden of moderate supratentorial chronic ischemic changes in left cerebellar hemisphere chronic lacunar infarct.   MACRO: None.   Signed by: Jemal Driscoll 6/22/2025 9:32 PM Dictation workstation:   [id number]    ECG 12 lead  Result Date: 6/14/2025  Sinus rhythm with 1st degree AV block Left axis deviation Nonspecific intraventricular block Minimal voltage criteria for LVH, may be normal variant ( Hyde product ) Cannot rule out Anterior infarct (cited on or before 09-MAY-2025) Abnormal ECG When compared with ECG of 03-JUN-2025 14:26, No significant change was found See ED provider note for full interpretation and clinical correlation Confirmed by Flower Shaikh ([id number]) on 6/14/2025 8:57:23 PM    Lower extremity venous duplex right  Result Date: 6/6/2025  Interpreted By:  Nnamdi Dodson, STUDY: [facility] US LOWER EXTREMITY VENOUS DUPLEX RIGHT;  6/6/2025 4:45 pm   INDICATION: Signs/Symptoms:eval dvt , pain swelling.   COMPARISON: None.   ACCESSION NUMBER(S): [id number]   ORDERING CLINICIAN: MIGNON PURI   TECHNIQUE: Grayscale, color and spectral Doppler sonographic images of the right lower extremity deep venous system. The left common femoral vein was imaged for comparison.   FINDINGS: There is normal compressibility of the right common femoral vein, saphenous femoral junction, femoral vein and popliteal vein. The posterior tibial and peroneal veins are suboptimally visualized. There is normal spontaneous and phasic variation throughout the leg by spectral doppler.   The left common femoral vein is patent.   OTHER FINDINGS: None.       Suboptimal visualization of the calf veins. No sonographic evidence of acute DVT in the visualized vessels of the right lower extremity.   MACRO: None   Signed by: Nnamdi Dodson 6/6/2025 4:53 PM Dictation workstation:   [id number]    Vascular US Upper Extremity Venous Duplex  Right  Result Date: 6/6/2025  Interpreted By:  Nnamdi Dodson, STUDY: VASC US UPPER EXTREMITY VENOUS DUPLEX RIGHT;  6/6/2025 4:43 pm   INDICATION: Signs/Symptoms:eval dvt  pain.   COMPARISON: Upper extremity Doppler ultrasound 05/30/2025.   ACCESSION NUMBER(S): [id number]   ORDERING CLINICIAN: MIGNON PURI   TECHNIQUE: Grayscale, color and spectral Doppler sonographic imaging of the right upper extremity deep venous system.   FINDINGS: The right cephalic vein is not well visualized. Evaluation of the brachial vein is also solid limited due to placement of PICC line. Segmental visualization of the right internal jugular vein, subclavian vein, axillary vein, basilic vein and brachial vein demonstrate normal gray scale appearance, compressibility, color flow and venous waveforms. The radial and ulnar veins are not visualized.   Redemonstration of occlusive thrombus in the left subclavian vein.       No sonographic evidence of right upper extremity DVT.   Partial visualization of occlusive thrombus in the left subclavian vein as noted on prior ultrasound. Please refer to separate or of left upper extremity DVT for additional detail   MACRO: None   Signed by: Nnamdi Dodson 6/6/2025 4:51 PM Dictation workstation:   [id number]    CT angio chest for pulmonary embolism  Result Date: 6/5/2025  Interpreted By:  Timmy Castro, STUDY: CT ANGIO CHEST FOR PULMONARY EMBOLISM;  6/5/2025 3:00 pm   INDICATION: Signs/Symptoms:rule out PE.     COMPARISON: CT chest with contrast 21 May 2025   ACCESSION NUMBER(S): [id number]   ORDERING CLINICIAN: ASAF CORONA   TECHNIQUE: Pulmonary arterial phase CT chest after the uneventful administration of 75 mL IV contrast (Omnipaque 350).   Three dimensional maximum intensity projection (3-D MIPs) image/s were created on a separate dedicated workstation, reviewed and saved   FINDINGS: CARDIOVASCULAR:   Acute pulmonary embolism: Negative through the  lobar (second order) branch level. Substantial  sized branches from segmental (third order) branch and distally cannot be evaluated Acute right heart strain: Negative. No CT evidence of acute right heart strain Cardiac thrombus:  Negative; no obvious right heart or other cardiac thrombus is seen Pulmonary arteries ectasia:  Unchanged borderline to mild   Heart size:  Borderline but unchanged Pericardial effusion:  Negative   Thoracic aortic aneurysm:  Negative Aortic dissection:  Negative   Heart failure change:  Negative.  No sign of interstitial or alveolar edema. Other:  n/a   NONVASCULAR MEDIASTINUM: Esophagus:  Grossly normal by CT Mediastinal Mass:  Negative Hiatal hernia:  None Other:  n/a   LYMPH NODES: No thoracic adenopathy   LUNGS / AIRSPACES / AIRWAYS:   LARGE AIRWAYS Filling defect: Negative Wall thickening: Negative Bronchiectasis: Negative Other: N/A   AIRSPACES Fibrosis: Negative Emphysema: Negative Consolidation: Negative Ground glass airspace disease: Negative Edema: Mild bibasilar Nodule / Mass: Negative Other: Motion artifact   PLEURA: Effusion:  Right pleural effusion has decreased in size, now trace quantity, previously at least small if not moderate. Left effusion has perhaps marginally decreased in size, still small Pneumothorax: Both sides negative Other:  n/a   CHEST WALL: Soft tissues of the chest wall are unremarkable   SKELETON: No acute or contributory abnormality   UPPER ABDOMEN: Included subdiaphragmatic structures have no acute or contributory abnormality       MILD BIBASILAR INTERSTITIAL EDEMA   THE RIGHT PLEURAL EFFUSION HAS DECREASED IN SIZE SINCE 21 MAY 2025   LEFT PLEURAL EFFUSION HAS MARGINALLY DECREASED IN SIZE AS WELL   NO ACUTE PULMONARY EMBOLISM THROUGH THE LOBAR BRANCH LEVEL. SEGMENTAL AND SUBSEGMENTAL LEVELS OF THE PULMONARY ARTERY CANNOT BE EVALUATED   NO AORTIC DISSECTION OR OTHER ACUTE THORACIC AORTIC FINDINGS   NO AIRSPACE CONSOLIDATION, GROUND-GLASS AIRSPACE DISEASE OR ANY OTHER SIGN OF ACTIVE INFECTION   NO  PERICARDIAL EFFUSION   NO PNEUMOTHORAX   MACRO: None   Signed by: Timmy Castro 6/5/2025 3:21 PM Dictation workstation:   [id number]    Lower extremity venous duplex left  Result Date: 6/5/2025  STUDY: Left Lower Extremity Venous Doppler Ultrasound; 6/5/2025 12:59 PM INDICATION: Positive DVT on multiple ultrasound scan done in routine facility. Currently on a Eliquis. COMPARISON: US LE 02/11/2025, 09/12/2024. ACCESSION NUMBER(S): [id number] ORDERING CLINICIAN: ASFA CORONA TECHNIQUE:  Real-time grayscale, color, and spectral doppler ultrasound imaging of the left lower extremity veins was performed. FINDINGS: There is acute occlusive DVT demonstrated within the left deep femoral, femoral, and popliteal veins. The left external iliac and common femoral veins demonstrated normal compressibility, normal phasic venous flow and normal response to augmentation.  The visualized deep calf veins are patent.  The contralateral common femoral vein is free of thrombosis.    Evidence for acute occlusive DVT within the left deep femoral, femoral, and popliteal veins. There is no significant change when compared to prior ultrasound from February 2025. Signed by Sherice Mejia MD    ECG 12 lead  Result Date: 6/5/2025  Sinus rhythm with 1st degree AV block Left axis deviation Nonspecific intraventricular block Minimal voltage criteria for LVH, may be normal variant ( Franklin product ) Cannot rule out Septal infarct (cited on or before 09-MAY-2025) Abnormal ECG When compared with ECG of 21-MAY-2025 16:33, No significant change was found Confirmed by Long Min ([id number]) on 6/5/2025 12:09:34 PM    XR chest 1 view  Result Date: 6/3/2025  Interpreted By:  Pepe Huang, STUDY: XR CHEST 1 VIEW  6/3/2025 2:50 pm   INDICATION: Signs/Symptoms:Malaise and fatigue   COMPARISON: 05/12/2025   ACCESSION NUMBER(S): [id number]   ORDERING CLINICIAN: SHAI BREAUX   TECHNIQUE: A single AP portable radiograph of the chest was obtained.   FINDINGS:  Multiple cardiac monitoring leads are seen over the chest.   No focal infiltrate, pleural effusion or pneumothorax is identified. The cardiac silhouette is within normal limits for size.       No focal infiltrate or pneumothorax is identified.   MACRO: None.   Signed by: Pepe Huang 6/3/2025 2:59 PM Dictation workstation:   [id number]    XR shoulder right 2+ views  Result Date: 6/3/2025  Interpreted By:  Pepe Huang, STUDY: XR SHOULDER RIGHT 2+ VIEWS 6/3/2025 2:50 pm   INDICATION: Signs/Symptoms:Right shoulder pain   COMPARISON: None.   ACCESSION NUMBER(S): [id number]   ORDERING CLINICIAN: ELMO OLIVAS   TECHNIQUE: 3 views of the right shoulder including AP , axillary and scapular Y-views were obtained.   FINDINGS: There is no radiographic evidence of acute fracture or dislocation identified.  Mild hypertrophic degenerative changes are seen in the right shoulder.       1. No evidence of acute fracture or dislocation. 2. Degenerative changes, as described above.   MACRO: None.   Signed by: Pepe Huang 6/3/2025 2:59 PM Dictation workstation:   [id number]    CT head wo IV contrast  Result Date: 6/3/2025  Interpreted By:  Julien Romeo, STUDY: CT HEAD WO IV CONTRAST;  6/3/2025 2:40 pm   INDICATION: Signs/Symptoms:Headache at the base of the skull, recently started on Xarelto.     COMPARISON: 05/12/2025.   ACCESSION NUMBER(S): [id number]   ORDERING CLINICIAN: ELMO OLIVAS   TECHNIQUE: Contiguous unenhanced axial images were obtained through the brain.   FINDINGS: INTRACRANIAL: Mild generalized atrophy is again seen.  Nonspecific irregular low-attenuation changes throughout the periventricular and subcortical white matter are similar to prior, most likely related to chronic microvascular disease. There is a stable small remote lacunar infarct of the right basal ganglia and internal capsule anterior limb. No acute intracranial bleed, midline shift, or mass effect is seen. Gray-white differentiation is  maintained. No extra-axial fluid collection or hydrocephalus. Irregular atherosclerotic calcifications again seen in the internal carotid artery and vertebral artery segments   Bones are intact.   EXTRACRANIAL: Paranasal sinus mild peripheral mucosal thickening is most prominent in the ethmoid air cells. No paranasal sinus air-fluid level. Mastoid air cells are clear.       Age-related/chronic changes similar to prior. No acute intracranial process.       MACRO: None.   Signed by: Julien Romeo 6/3/2025 2:46 PM Dictation workstation:   [id number]    Vascular US upper extremity venous duplex left  Result Date: 5/30/2025  STUDY: Left upper extremity venous ultrasound; Completed Time: 5/30/2025 13:35 INDICATION: LUE swelling.  Recent PICC line placement. COMPARISON: None available. ACCESSION NUMBER(S): [id number] ORDERING CLINICIAN: RAJINDER CONNER TECHNIQUE: Ultrasound of the left upper extremity veins.  Multiple images were obtained. FINDINGS: There is acute partially occlusive DVT demonstrated within the left subclavian, axillary and brachial veins.  Superficial thrombus is visualized within the basilic vein, surrounding the PICC line. The left internal jugular vein demonstrated normal compressibility, normal phasic venous flow, and normal response to augmentation.  There is no evidence for echogenic thrombi.  The cephalic vein is patent.  The radial and ulnar veins are not evaluated.    Evidence for acute partially occlusive DVT within the left subclavian, axillary and brachial veins. Superficial thrombus within the basilic vein, surrounding the PICC line. Signed by Trent Sher MD           Assessment & Plan  UTI (urinary tract infection)    Acute deep vein thrombosis (DVT) of femoral vein of left lower extremity    Ambulatory dysfunction    Benign essential HTN    DM type 2 with diabetic peripheral neuropathy    Generalized weakness    Hyperlipidemia    Dizziness    Wound infection    Fever    Ventricular  tachycardia seen on cardiac monitor    UTI  Fever  -UA: 250 leukocyte esterase, 6-10 WBC, 1+ bacteria,   -UA culture clinically insignificant growth based on current clinical standards  - has coccyx wound, dorsal left ankle wound, left hand finger skin tear and right heel pressure injury  -Blood culture gram-positive cocci, clusters, Staphylococcus epidermidis  -PICC discontinued and removed for possible source per ID   -cefTRIAXone (Rocephin) 1 g in dextrose (iso) IV 50 mL discontinued  -Add piperacillin-tazobactam (Zosyn) 4.5 g in dextrose (iso)  mL  q 6 hours started  -Add vancomycin (Vancocin) 1,500 mg in sodium chloride 0.9% 500 mL IV daily  -Received NS 500ml bolus  -WBC 6.7 > 5.7 > 8.5 > 9.7 > 7.2  -Monitor WBC   -Daily CBC  -Monitor for fever- afebrile overnight  -acetaminophen (Tylenol) tablet 650 mg PRN pain/fever  -LR 75 mL/hr, intravenous for 11 hours completed  -BUN/Cr/GFR 20/1.31/57> 19/1.02/78  -Repeat blood cultures pending - if neg can consider a line if needed for duration of abx  -Repeat urine culture pending  - tissue culture pending of coccyx      Ambulatory dysfunction  Weakness  Dizziness  -CXR: Left lower lobe and possibly lingular subsegmental atelectasis   -CTH: No acute intracranial abnormality. Unchanged burden of moderate supratentorial chronic ischemic changes in left cerebellar hemisphere chronic lacunar infarct.  -CT chest abdomen pelvis wo IV contrast Perivesical fat stranding and mild wall thickening of the urinary bladder wall. Recommend correlation for cystitis. Unchanged mild bilateral hydronephrosis without hydroureter suggesting a ureteropelvic junction obstruction. Small bilateral pleural effusions (left > right) which is unchanged on the right and slightly smaller on the left compared to 06/05/2025. Scattered mild atelectasis without evidence of pneumonia.  -Vascular US Carotid Artery duplex: Right Carotid: Today's exam was limited due to the body habitus of the  patient. Findings are consistent with less than 50% stenosis of the right proximal internal carotid artery. Laminar flow seen by color Doppler. Right external carotid artery appears patent with no evidence of stenosis. No evidence of hemodynamically significant stenosis of the right common carotid artery. The right vertebral artery is patent with antegrade flow. No evidence of hemodynamically significant stenosis in the right subclavian artery.  Left Carotid: Today's exam was limited due to the body habitus of the patient and unable to turn head to right. Findings are consistent with less than 50% stenosis of the left proximal internal carotid artery. Left external carotid artery appears patent with no evidence of stenosis. No evidence of hemodynamically significant stenosis of the left common carotid artery. The left vertebral artery is patent with antegrade flow. No evidence of hemodynamically significant stenosis in the left subclavian artery.  -meclizine PRN  -sodium chloride 0.9% infusion 125/hr completed  -PT/OT evaluation, appreciate recommendations  -Orthostatic pressures resolved   -Telemetry     HTN  DM type 2  Hyperlipidemia  -amLODIPine (Norvasc) tablet 2.5 mg   -gabapentin (Neurontin) capsule 300 mg   -hydrALAZINE (Apresoline) tablet 50 mg  -Blood sugar controlled at this time without medication A1C 5.8 6/6  -Monitor glucose  -Daily BMP     DVT  -enoxaparin (Lovenox) syringe 90 mg  -6/6 Upper extremity US: No sonographic evidence of right upper extremity DVT. Partial visualization of occlusive thrombus in the left subclavian vein as noted on prior ultrasound. Please refer to separate or of left upper extremity DVT for additional detail   -left arm restriction        Wound  -ID consult- Case discussed with SARAH Kothari-CNP , appreciate recommendations  -Podiatry consulted appreciate recommendations, debridement and dressing 6/23 (left dorsal ankle)  -PICC line- Completed OP Vancomycin course  6/18  -Wound culture  1+ rare mixed skin microorganisms  -Repeat wound culture pending  -PICC line pulled       Ventricular tachycardia seen on cardiac monitor  - Monitor electrolytes  - K 3.8, , calcium 8.3,  > K3.7, , calcium 8.4  - Daily BMP  - Magnesium 2.04  - Telemetry  - No overnight events        GI ppx: PPI  DVT ppx: Lovenox   Fluids: As needed   Electrolytes: replace as needed  Nutrition: Regular   Adjuncts: SARAH Connors-CNP           [1] amLODIPine, 2.5 mg, oral, Daily  enoxaparin, 90 mg, subcutaneous, q12h  gabapentin, 300 mg, oral, TID  hydrALAZINE, 50 mg, oral, TID  piperacillin-tazobactam, 4.5 g, intravenous, q6h  traZODone, 50 mg, oral, Nightly  vancomycin, 1,500 mg, intravenous, q24h  [2] sodium chloride 0.9%, 10 mL/hr, Last Rate: 10 mL/hr (06/25/25 1153)  [3] PRN medications: acetaminophen, alum-mag hydroxide-simeth, benzocaine-menthol, heparin flush, ipratropium-albuteroL, meclizine, melatonin, ondansetron, oxygen, polyethylene glycol, sennosides-docusate sodium, sodium chloride 0.9%, sodium chloride 0.9%, traMADol, vancomycin, zinc oxide

## 2025-06-26 NOTE — PROGRESS NOTES
Occupational Therapy                 Therapy Communication Note    Patient Name: Elliot Partida  MRN: [id number]  Department: [facility]  Room: [address] [place] [address]A  Today's Date: 6/26/2025     Discipline: Occupational Therapy    Missed Visit: OT Missed Visit: Yes     Missed Visit Reason: Missed Visit Reason: Patient refused (Pt refused working wtih OT this date stating he already got up to the bathroom this morning. OT offered to come back this afternoon to work with patient, pt continued to refuse and asked OT to come back tomorrow. Pt encouraged to get into chair for lunch - pt refused. Will follow up per pt status and as schedule allows.)    Missed Time: Attempt 1207

## 2025-06-26 NOTE — CARE PLAN
The patient's goals for the shift include      The clinical goals for the shift include Tolerate IV Abx.  Maintain safety    Alert and oriented.  Resting in bed.  Tolerating po intake well without c/o n/v.  HS medications given without difficulty.  Voiding without difficulty.  Tolerating IV antibiotics without difficulty.  Medicated x 1 with tramadol for pain.  Dressing intact to left foot.  Sleeping intermittently.  Safety maintained.  Bed alarms on.

## 2025-06-27 LAB
B-LACTAMASE ORGANISM ISLT: POSITIVE
BACTERIA BLD AEROBE CULT: ABNORMAL
BACTERIA BLD CULT: ABNORMAL
BACTERIA SPEC CULT: ABNORMAL
BACTERIA SPEC CULT: ABNORMAL
GLUCOSE BLD MANUAL STRIP-MCNC: 101 MG/DL (ref 74–99)
GLUCOSE BLD MANUAL STRIP-MCNC: 154 MG/DL (ref 74–99)
GLUCOSE BLD MANUAL STRIP-MCNC: 158 MG/DL (ref 74–99)
GLUCOSE BLD MANUAL STRIP-MCNC: 161 MG/DL (ref 74–99)
GLUCOSE BLD MANUAL STRIP-MCNC: 96 MG/DL (ref 74–99)
GRAM STN SPEC: ABNORMAL
LABORATORY COMMENT REPORT: ABNORMAL

## 2025-06-27 PROCEDURE — 94760 N-INVAS EAR/PLS OXIMETRY 1: CPT | Mod: IPSPLIT

## 2025-06-27 PROCEDURE — 99232 SBSQ HOSP IP/OBS MODERATE 35: CPT | Performed by: NURSE PRACTITIONER

## 2025-06-27 PROCEDURE — 2500000004 HC RX 250 GENERAL PHARMACY W/ HCPCS (ALT 636 FOR OP/ED): Mod: JW,IPSPLIT | Performed by: NURSE PRACTITIONER

## 2025-06-27 PROCEDURE — 82947 ASSAY GLUCOSE BLOOD QUANT: CPT | Mod: IPSPLIT

## 2025-06-27 PROCEDURE — 2500000001 HC RX 250 WO HCPCS SELF ADMINISTERED DRUGS (ALT 637 FOR MEDICARE OP): Mod: IPSPLIT

## 2025-06-27 PROCEDURE — 2500000004 HC RX 250 GENERAL PHARMACY W/ HCPCS (ALT 636 FOR OP/ED): Mod: IPSPLIT | Performed by: NURSE PRACTITIONER

## 2025-06-27 PROCEDURE — 1200000002 HC GENERAL ROOM WITH TELEMETRY DAILY: Mod: IPSPLIT

## 2025-06-27 PROCEDURE — 2500000001 HC RX 250 WO HCPCS SELF ADMINISTERED DRUGS (ALT 637 FOR MEDICARE OP): Mod: IPSPLIT | Performed by: NURSE PRACTITIONER

## 2025-06-27 PROCEDURE — 36415 COLL VENOUS BLD VENIPUNCTURE: CPT | Mod: IPSPLIT | Performed by: NURSE PRACTITIONER

## 2025-06-27 PROCEDURE — 2500000005 HC RX 250 GENERAL PHARMACY W/O HCPCS: Mod: IPSPLIT | Performed by: NURSE PRACTITIONER

## 2025-06-27 RX ORDER — ZINC OXIDE 20 G/100G
1 OINTMENT TOPICAL 3 TIMES DAILY
Status: DISCONTINUED | OUTPATIENT
Start: 2025-06-27 | End: 2025-07-01 | Stop reason: HOSPADM

## 2025-06-27 RX ORDER — ZINC OXIDE 20 G/100G
1 OINTMENT TOPICAL 3 TIMES DAILY
Status: DISCONTINUED | OUTPATIENT
Start: 2025-06-27 | End: 2025-06-27

## 2025-06-27 RX ORDER — LIDOCAINE HYDROCHLORIDE 10 MG/ML
5 INJECTION, SOLUTION INFILTRATION; PERINEURAL ONCE
Status: DISCONTINUED | OUTPATIENT
Start: 2025-06-27 | End: 2025-07-01 | Stop reason: HOSPADM

## 2025-06-27 RX ADMIN — HYDRALAZINE HYDROCHLORIDE 50 MG: 50 TABLET ORAL at 10:40

## 2025-06-27 RX ADMIN — AMLODIPINE BESYLATE 2.5 MG: 2.5 TABLET ORAL at 10:40

## 2025-06-27 RX ADMIN — ENOXAPARIN SODIUM 90 MG: 100 INJECTION SUBCUTANEOUS at 14:25

## 2025-06-27 RX ADMIN — ZINC OXIDE 1 APPLICATION: 200 OINTMENT TOPICAL at 13:37

## 2025-06-27 RX ADMIN — HYDRALAZINE HYDROCHLORIDE 50 MG: 50 TABLET ORAL at 14:25

## 2025-06-27 RX ADMIN — HYDRALAZINE HYDROCHLORIDE 50 MG: 50 TABLET ORAL at 20:55

## 2025-06-27 RX ADMIN — VANCOMYCIN 1.5 G: 1.5 INJECTION, SOLUTION INTRAVENOUS at 22:46

## 2025-06-27 RX ADMIN — ZINC OXIDE 1 APPLICATION: 200 OINTMENT TOPICAL at 20:58

## 2025-06-27 RX ADMIN — TRAMADOL HYDROCHLORIDE 25 MG: 50 TABLET, COATED ORAL at 23:03

## 2025-06-27 RX ADMIN — GABAPENTIN 300 MG: 300 CAPSULE ORAL at 10:39

## 2025-06-27 RX ADMIN — PIPERACILLIN SODIUM AND TAZOBACTAM SODIUM 4.5 G: 4; .5 INJECTION, SOLUTION INTRAVENOUS at 06:57

## 2025-06-27 RX ADMIN — PIPERACILLIN SODIUM AND TAZOBACTAM SODIUM 4.5 G: 4; .5 INJECTION, SOLUTION INTRAVENOUS at 01:52

## 2025-06-27 RX ADMIN — SENNOSIDES AND DOCUSATE SODIUM 1 TABLET: 50; 8.6 TABLET ORAL at 10:40

## 2025-06-27 RX ADMIN — GABAPENTIN 300 MG: 300 CAPSULE ORAL at 20:55

## 2025-06-27 RX ADMIN — TRAZODONE HYDROCHLORIDE 50 MG: 50 TABLET ORAL at 20:55

## 2025-06-27 RX ADMIN — PIPERACILLIN SODIUM AND TAZOBACTAM SODIUM 4.5 G: 4; .5 INJECTION, SOLUTION INTRAVENOUS at 14:25

## 2025-06-27 RX ADMIN — PIPERACILLIN SODIUM AND TAZOBACTAM SODIUM 4.5 G: 4; .5 INJECTION, SOLUTION INTRAVENOUS at 20:58

## 2025-06-27 RX ADMIN — Medication 2 L/MIN: at 00:56

## 2025-06-27 RX ADMIN — Medication 2 L/MIN: at 22:19

## 2025-06-27 RX ADMIN — GABAPENTIN 300 MG: 300 CAPSULE ORAL at 14:25

## 2025-06-27 ASSESSMENT — COGNITIVE AND FUNCTIONAL STATUS - GENERAL
DRESSING REGULAR LOWER BODY CLOTHING: A LITTLE
TURNING FROM BACK TO SIDE WHILE IN FLAT BAD: A LITTLE
MOVING TO AND FROM BED TO CHAIR: A LITTLE
DAILY ACTIVITIY SCORE: 18
TOILETING: A LITTLE
DRESSING REGULAR UPPER BODY CLOTHING: A LITTLE
MOVING FROM LYING ON BACK TO SITTING ON SIDE OF FLAT BED WITH BEDRAILS: A LITTLE
WALKING IN HOSPITAL ROOM: A LOT
CLIMB 3 TO 5 STEPS WITH RAILING: A LOT
HELP NEEDED FOR BATHING: A LITTLE
MOBILITY SCORE: 16
EATING MEALS: A LITTLE
PERSONAL GROOMING: A LITTLE
STANDING UP FROM CHAIR USING ARMS: A LITTLE

## 2025-06-27 ASSESSMENT — PAIN SCALES - GENERAL
PAINLEVEL_OUTOF10: 0 - NO PAIN
PAINLEVEL_OUTOF10: 7
PAINLEVEL_OUTOF10: 0 - NO PAIN

## 2025-06-27 ASSESSMENT — PAIN - FUNCTIONAL ASSESSMENT
PAIN_FUNCTIONAL_ASSESSMENT: 0-10

## 2025-06-27 NOTE — PROGRESS NOTES
Elliot Partida is a 74 y.o. male on day 4 of admission presenting with UTI (urinary tract infection).    Subjective   Interval History:   Afebrile, no chills  Interval positive buttock wound grew pseudomonas   Denies pain   No cough, chest pain or shortness of breath  No nausea vomiting or diarrhea      Objective   Range of Vitals (last 24 hours)  Heart Rate:  [58-85]   Temp:  [36.5 °C (97.7 °F)-36.9 °C (98.4 °F)]   Resp:  [17-18]   BP: (124-176)/(52-77)   SpO2:  [86 %-99 %]   Daily Weight  06/25/25 : 91.4 kg (201 lb 8 oz)    Body mass index is 29.76 kg/m².    Physical Exam  Constitutional:       Appearance: Normal appearance.   HENT:      Head: Normocephalic and atraumatic.      Nose: Nose normal.      Mouth: Mucous membranes are moist.      Pharynx: Oropharynx is clear.   Eyes:      General: No scleral icterus.  Cardiovascular:      Rate and Rhythm: Normal rate and regular rhythm.   Pulmonary:      Effort: Pulmonary effort is normal.      Breath sounds: Normal breath sounds.   Abdominal:      General: Bowel sounds are normal.      Palpations: Abdomen is soft.   Musculoskeletal:    Bilateral upper extremity edema-left worse than right     General: Normal range of motion.      Cervical back: Normal range of motion and neck supple.      Comments: Left fifth toe amputation    Skin:     General: Skin is warm and dry.      Comments: Pictures in EPIC reviewed-left anterior lower leg , dorsal foot wound with small amount granulation tissue.   Neurological:      Mental Status: He is alert.      Comments: Awake, alert   Psychiatric:         Mood and Affect: Mood normal.         Behavior: Behavior normal.     Antibiotics  piperacillin-tazobactam - 4.5 gram/100 mL  vancomycin - 1.5 gram/300 mL    Relevant Results  Labs  Results from last 72 hours   Lab Units 06/26/25  0655 06/25/25  0536   WBC AUTO x10*3/uL 6.2 7.2   HEMOGLOBIN g/dL 8.0* 8.9*   HEMATOCRIT % 26.2* 28.9*   PLATELETS AUTO x10*3/uL 185 175     Results from last  72 hours   Lab Units 06/26/25  0655 06/25/25  0536   SODIUM mmol/L 140 141   POTASSIUM mmol/L 3.8 3.7   CHLORIDE mmol/L 105 107   CO2 mmol/L 27 25   BUN mg/dL 16 19   CREATININE mg/dL 1.04 1.02   GLUCOSE mg/dL 110* 88   CALCIUM mg/dL 8.4* 8.4*   ANION GAP mmol/L 12 13   EGFR mL/min/1.73m*2 76 78           Estimated Creatinine Clearance: 70.7 mL/min (by C-G formula based on SCr of 1.04 mg/dL).  C-Reactive Protein   Date Value Ref Range Status   05/20/2025 3.85 (H) <1.00 mg/dL Final   05/18/2025 4.28 (H) <1.00 mg/dL Final   05/17/2025 5.97 (H) <1.00 mg/dL Final     Microbiology  Susceptibility data from last 14 days.  Collected Specimen Info Organism Aztreonam Cefepime Ceftazidime Ciprofloxacin Clindamycin Erythromycin Levofloxacin Oxacillin Piperacillin/Tazobactam Tetracycline Tobramycin Trimethoprim/Sulfamethoxazole Vancomycin   06/25/25 Tissue/Biopsy from Wound/Tissue Pseudomonas aeruginosa  S  S  S  S    S   S   S       Mixed Aerobic and Anaerobic Bacteria                 06/23/25 Tissue/Biopsy from Wound/Tissue Mixed Skin Microorganisms                 06/23/25 Blood culture from Peripheral Venipuncture Staphylococcus epidermidis      R  I   R   S   S  S     Staphylococcus pettenkoferi/argensis                06/23/25 Blood culture from Peripheral Venipuncture Staphylococcus epidermidis                    Imaging  Carotid duplex bilateral  Result Date: 6/24/2025            [place] [address]  Tel [phone] and Fax [phone]  Vascular Lab Report [facility] US CAROTID ARTERY DUPLEX BILATERAL  Patient Name:      SUBHASH Cain Physician:  [id number] Eli De La Vega MD Study Date:        6/23/2025           Ordering Physician: [id number] DAMEON ALVAREZ MRN/PID:           [id number]            Technologist:       Oumou Valdez RDMS,                                                            RVT Accession#:         [id number]        Technologist 2: Date of Birth/Age: 1950 / 74 years Encounter#:         [id number] Gender:            M Admission Status:  Inpatient           Location Performed: [facility]  Diagnosis/ICD: Dizziness and giddiness-R42  Patient History Syncope, HTN and CVA. Smoker:         Never. Diabetes:       Yes. >20 years.  CONCLUSIONS: Right Carotid: Today's exam was limited due to the body habitus of the patient. Findings are consistent with less than 50% stenosis of the right proximal internal carotid artery. Laminar flow seen by color Doppler. Right external carotid artery appears patent with no evidence of stenosis. No evidence of hemodynamically significant stenosis of the right common carotid artery. The right vertebral artery is patent with antegrade flow. No evidence of hemodynamically significant stenosis in the right subclavian artery. Left Carotid: Today's exam was limited due to the body habitus of the patient and unable to turn head to right. Findings are consistent with less than 50% stenosis of the left proximal internal carotid artery. Left external carotid artery appears patent with no evidence of stenosis. No evidence of hemodynamically significant stenosis of the left common carotid artery. The left vertebral artery is patent with antegrade flow. No evidence of hemodynamically significant stenosis in the left subclavian artery.  Imaging & Doppler Findings: Right Plaque Morph: The proximal right internal carotid artery demonstrates calcified plaque. Left Plaque Morph: The proximal left internal carotid artery demonstrates calcified plaque. The distal left common carotid artery demonstrates heterogenous plaque.   Right                        Left   PSV      EDV                PSV       cm/s 25 cm/s   CCA P    118 cm/s 18 cm/s 99 cm/s  19 cm/s   CCA D    110 cm/s 19 cm/s 107 cm/s 19 cm/s   ICA P    106 cm/s 18 cm/s 103 cm/s 35 cm/s   ICA M    81 cm/s  18 cm/s 101  cm/s 39 cm/s   ICA D    40 cm/s  10 cm/s 148 cm/s 18 cm/s    ECA     107 cm/s 13 cm/s 78 cm/s  31 cm/s Vertebral  49 cm/s  10 cm/s 129 cm/s 0 cm/s  Subclavian 168 cm/s 12 cm/s  Right                                  Left   PSV    Waveform                       PSV    Waveform 129 cm/s          Subclavian Proximal 168 cm/s               Right Left ICA/CCA Ratio  1.1  1.0   [id number] Eli De La Vega MD Electronically signed by [id number] Eli De La Vega MD on 6/24/2025 at 11:03:17 AM  ** Final **     ECG 12 lead  Result Date: 6/24/2025  Sinus rhythm with 1st degree AV block Nonspecific intraventricular block Minimal voltage criteria for LVH, may be normal variant ( [place] product ) Cannot rule out Septal infarct (cited on or before 09-MAY-2025) Lateral infarct , age undetermined Abnormal ECG When compared with ECG of 22-JUN-2025 19:20, (unconfirmed) QRS axis Shifted right Lateral infarct is now Present Serial changes of Septal infarct Present    XR chest 1 view  Result Date: 6/23/2025  Interpreted By:  Brian Morris, STUDY: XR CHEST 1 VIEW;  6/23/2025 8:57 am   INDICATION: Signs/Symptoms: SOB.   COMPARISON: 06/03/2025 AP chest x-ray and yesterday's unenhanced thoracic CT.   ACCESSION NUMBER(S): [id number]   ORDERING CLINICIAN: DAMEON ALVAREZ   FINDINGS: Portable AP erect chest x-ray 06/23/2025 8:50 a.m.:   CARDIOMEDIASTINAL SILHOUETTE: Cardiomediastinal silhouette is normal in size and configuration. Marked vascular calcification is noted. Severe aortic valve and coronary artery calcification reported on yesterday's CT is difficult to appreciate.   LUNGS: Normally inflated and appear clear aside from moderate lower left lung subsegmental atelectasis not apparent on 06/03/2025. The lateral costophrenic angles are sharp, but small bilateral pleural effusions seen on yesterday's CT would probably go undetected without a lateral view.   ABDOMEN: No remarkable upper abdominal findings.   BONES: Extensive  spinal degenerative changes and generalized osteosclerosis are noted. The latter can be associated with various conditions including but not limited to myelosclerosis, renal osteodystrophy, hyperthyroidism, hypoparathyroidism, fluorosis, osteoblastic metastases, lymphoma, hepatitis C and mastocytosis and should be correlated clinically.       Left lower lobe and possibly lingular subsegmental atelectasis.   MACRO: None   Signed by: Brian Morris 6/23/2025 9:26 AM Dictation workstation:   [id number]    CT chest abdomen pelvis wo IV contrast  Result Date: 6/22/2025  Interpreted By:  Jemal Driscoll, STUDY: CT CHEST ABDOMEN PELVIS WO CONTRAST;  6/22/2025 9:00 pm   INDICATION: Signs/Symptoms:Diuretic abdominal pain nausea not feeling.     COMPARISON: 06/05/2025 CT chest, 02/27/2025 CT abdomen/pelvis   ACCESSION NUMBER(S): [id number]   ORDERING CLINICIAN: ADDI ALBERTO   TECHNIQUE: Contiguous axial images of the chest, abdomen, and pelvis were obtained without contrast. Coronal and sagittal reformatted images were reconstructed from the axial data.   FINDINGS:   CT CHEST:   MEDIASTINUM AND LYMPH NODES:  The esophageal wall appears within normal limits.  No enlarged intrathoracic or axillary lymph nodes by imaging criteria. No pneumomediastinum.   VESSELS:  Normal caliber thoracic aorta. Mild calcific aortic atherosclerosis.   HEART: Normal size. Severe aortic valvular calcification. Mitral annular calcifications. Severe coronary artery calcifications. No significant pericardial effusion.   LUNG, AIRWAYS, PLEURA: No pneumothorax. Small bilateral pleural effusions (left > right), unchanged on the right and slightly decreased on the left, compared to 06/05/2025. Mild passive bibasilar atelectasis, similar to prior study. Scattered linear opacities elsewhere likely represent atelectasis. Linear triangular nodule in the right upper lobe likely represents focal atelectasis given new from prior study.   OSSEOUS  STRUCTURES: No acute osseous abnormality. Diffuse idiopathic skeletal hyperostosis with flowing ossification along the anterior disc margins and maintained disc heights in the thoracic spine. No significant canal stenosis.   CHEST WALL SOFT TISSUES: Mild anasarca.     ABDOMEN/PELVIS:   ABDOMINAL WALL: Mild anasarca. Small fat-containing umbilical hernia.Subcutaneous injection sites in the left abdominal wall.   LIVER: No significant parenchymal abnormality.   BILE DUCTS: No significant intrahepatic or extrahepatic dilatation.   GALLBLADDER: No significant abnormality.   PANCREAS: No significant abnormality.   SPLEEN: No significant abnormality.   ADRENALS: No significant abnormality.   KIDNEYS, URETERS, BLADDER: Unchanged mild bilateral hydronephrosis without hydroureter. No renal or ureteral calculi. The urinary bladder wall is thickened. There is perivesical fat stranding.   REPRODUCTIVE ORGANS: The prostate gland is enlarged, measuring 5.5 cm in transverse dimension.   VESSELS: Mild aortic atherosclerosis without AAA. Unchanged multiple mildly enlarged external iliac lymph node for sample no other enlarged lymph nodes. Measuring 1.3 cm on the right, 1 cm on the left   RETROPERITONEUM/LYMPH NODES: No acute retroperitoneal abnormality. No enlarged lymph nodes.   BOWEL/PERITONEUM: Tiny gastric diverticulum noted. No inflammatory bowel wall thickening or dilatation. Normal appendix.   No ascites, free air, or fluid collection.     MUSCULOSKELETAL: Multilevel bulky anterior endplate osteophytes with maintained disc spaces reflect diffuse idiopathic skeletal hyperostosis.  No suspicious osseous lesion.       Perivesical fat stranding and mild wall thickening of the urinary bladder wall. Recommend correlation for cystitis.   Unchanged mild bilateral hydronephrosis without hydroureter suggesting a ureteropelvic junction obstruction.   Small bilateral pleural effusions (left > right) which is unchanged on the right and  slightly smaller on the left compared to 06/05/2025. Scattered mild atelectasis without evidence of pneumonia.   MACRO: None.   Signed by: Jemal Driscoll 6/22/2025 9:41 PM Dictation workstation:   [id number]    CT head wo IV contrast  Result Date: 6/22/2025  Interpreted By:  Jemal Driscoll, STUDY: CT HEAD WO IV CONTRAST;  6/22/2025 8:50 pm   INDICATION: Signs/Symptoms:Left eye pain  on blood thinner h/o stroke.     COMPARISON: 06/03/2025   ACCESSION NUMBER(S): [id number]   ORDERING CLINICIAN: ADDI ALBERTO   TECHNIQUE: Noncontrast axial CT images of head were obtained with coronal and sagittal reconstructed images.   FINDINGS: BRAIN PARENCHYMA: Moderate periventricular and subcortical hemispheric white matter hypodensities are most compatible with chronic small vessel ischemic disease.Chronic lacunar infarct in the left cerebellar hemisphere. No acute intraparenchymal hemorrhage or parenchymal evidence of acute large territory ischemic infarct. Gray-white matter distinction is preserved. No mass-effect.   VENTRICLES and EXTRA-AXIAL SPACES:  No acute extra-axial or intraventricular hemorrhage. No effacement of cerebral sulci. The ventricles and sulci are age-concordant.   PARANASAL SINUSES/MASTOIDS:  No hemorrhage or air-fluid levels within the visualized paranasal sinuses. The mastoids are well aerated.   CALVARIUM/ORBITS:  No acute skull fracture.  The orbits and globes are intact to the extent visualized.   EXTRACRANIAL SOFT TISSUES: No discernible acute abnormality.       No acute intracranial abnormality.   Unchanged burden of moderate supratentorial chronic ischemic changes in left cerebellar hemisphere chronic lacunar infarct.   MACRO: None.   Signed by: Jemal Driscoll 6/22/2025 9:32 PM Dictation workstation:   [id number]    ECG 12 lead  Result Date: 6/14/2025  Sinus rhythm with 1st degree AV block Left axis deviation Nonspecific intraventricular block Minimal voltage criteria for LVH, may be  normal variant ( [place] product ) Cannot rule out Anterior infarct (cited on or before 09-MAY-2025) Abnormal ECG When compared with ECG of 03-JUN-2025 14:26, No significant change was found See ED provider note for full interpretation and clinical correlation Confirmed by Flower Shaikh ([id number]) on 6/14/2025 8:57:23 PM    Lower extremity venous duplex right  Result Date: 6/6/2025  Interpreted By:  Nnamdi Dodson, STUDY: [facility] US LOWER EXTREMITY VENOUS DUPLEX RIGHT;  6/6/2025 4:45 pm   INDICATION: Signs/Symptoms:eval dvt , pain swelling.   COMPARISON: None.   ACCESSION NUMBER(S): [id number]   ORDERING CLINICIAN: MIGNON PURI   TECHNIQUE: Grayscale, color and spectral Doppler sonographic images of the right lower extremity deep venous system. The left common femoral vein was imaged for comparison.   FINDINGS: There is normal compressibility of the right common femoral vein, saphenous femoral junction, femoral vein and popliteal vein. The posterior tibial and peroneal veins are suboptimally visualized. There is normal spontaneous and phasic variation throughout the leg by spectral doppler.   The left common femoral vein is patent.   OTHER FINDINGS: None.       Suboptimal visualization of the calf veins. No sonographic evidence of acute DVT in the visualized vessels of the right lower extremity.   MACRO: None   Signed by: Nnamdi Dodson 6/6/2025 4:53 PM Dictation workstation:   [id number]    Vascular US Upper Extremity Venous Duplex Right  Result Date: 6/6/2025  Interpreted By:  Nnamdi Dodson, STUDY: [facility] US UPPER EXTREMITY VENOUS DUPLEX RIGHT;  6/6/2025 4:43 pm   INDICATION: Signs/Symptoms:eval dvt  pain.   COMPARISON: Upper extremity Doppler ultrasound 05/30/2025.   ACCESSION NUMBER(S): [id number]   ORDERING CLINICIAN: MIGNON PURI   TECHNIQUE: Grayscale, color and spectral Doppler sonographic imaging of the right upper extremity deep venous system.   FINDINGS: The right cephalic vein is not well visualized.  Evaluation of the brachial vein is also solid limited due to placement of PICC line. Segmental visualization of the right internal jugular vein, subclavian vein, axillary vein, basilic vein and brachial vein demonstrate normal gray scale appearance, compressibility, color flow and venous waveforms. The radial and ulnar veins are not visualized.   Redemonstration of occlusive thrombus in the left subclavian vein.       No sonographic evidence of right upper extremity DVT.   Partial visualization of occlusive thrombus in the left subclavian vein as noted on prior ultrasound. Please refer to separate or of left upper extremity DVT for additional detail   MACRO: None   Signed by: Nnamdi Dodson 6/6/2025 4:51 PM Dictation workstation:   [id number]    CT angio chest for pulmonary embolism  Result Date: 6/5/2025  Interpreted By:  Timmy Castro, STUDY: CT ANGIO CHEST FOR PULMONARY EMBOLISM;  6/5/2025 3:00 pm   INDICATION: Signs/Symptoms:rule out PE.     COMPARISON: CT chest with contrast 21 May 2025   ACCESSION NUMBER(S): [id number]   ORDERING CLINICIAN: ASAF CORONA   TECHNIQUE: Pulmonary arterial phase CT chest after the uneventful administration of 75 mL IV contrast (Omnipaque 350).   Three dimensional maximum intensity projection (3-D MIPs) image/s were created on a separate dedicated workstation, reviewed and saved   FINDINGS: CARDIOVASCULAR:   Acute pulmonary embolism: Negative through the  lobar (second order) branch level. Substantial sized branches from segmental (third order) branch and distally cannot be evaluated Acute right heart strain: Negative. No CT evidence of acute right heart strain Cardiac thrombus:  Negative; no obvious right heart or other cardiac thrombus is seen Pulmonary arteries ectasia:  Unchanged borderline to mild   Heart size:  Borderline but unchanged Pericardial effusion:  Negative   Thoracic aortic aneurysm:  Negative Aortic dissection:  Negative   Heart failure change:  Negative.  No  sign of interstitial or alveolar edema. Other:  n/a   NONVASCULAR MEDIASTINUM: Esophagus:  Grossly normal by CT Mediastinal Mass:  Negative Hiatal hernia:  None Other:  n/a   LYMPH NODES: No thoracic adenopathy   LUNGS / AIRSPACES / AIRWAYS:   LARGE AIRWAYS Filling defect: Negative Wall thickening: Negative Bronchiectasis: Negative Other: N/A   AIRSPACES Fibrosis: Negative Emphysema: Negative Consolidation: Negative Ground glass airspace disease: Negative Edema: Mild bibasilar Nodule / Mass: Negative Other: Motion artifact   PLEURA: Effusion:  Right pleural effusion has decreased in size, now trace quantity, previously at least small if not moderate. Left effusion has perhaps marginally decreased in size, still small Pneumothorax: Both sides negative Other:  n/a   CHEST WALL: Soft tissues of the chest wall are unremarkable   SKELETON: No acute or contributory abnormality   UPPER ABDOMEN: Included subdiaphragmatic structures have no acute or contributory abnormality       MILD BIBASILAR INTERSTITIAL EDEMA   THE RIGHT PLEURAL EFFUSION HAS DECREASED IN SIZE SINCE 21 MAY 2025   LEFT PLEURAL EFFUSION HAS MARGINALLY DECREASED IN SIZE AS WELL   NO ACUTE PULMONARY EMBOLISM THROUGH THE LOBAR BRANCH LEVEL. SEGMENTAL AND SUBSEGMENTAL LEVELS OF THE PULMONARY ARTERY CANNOT BE EVALUATED   NO AORTIC DISSECTION OR OTHER ACUTE THORACIC AORTIC FINDINGS   NO AIRSPACE CONSOLIDATION, GROUND-GLASS AIRSPACE DISEASE OR ANY OTHER SIGN OF ACTIVE INFECTION   NO PERICARDIAL EFFUSION   NO PNEUMOTHORAX   MACRO: None   Signed by: Timmy Castro 6/5/2025 3:21 PM Dictation workstation:   [id number]    Lower extremity venous duplex left  Result Date: 6/5/2025  STUDY: Left Lower Extremity Venous Doppler Ultrasound; 6/5/2025 12:59 PM INDICATION: Positive DVT on multiple ultrasound scan done in routine facility. Currently on a Eliquis. COMPARISON: US LE 02/11/2025, 09/12/2024. ACCESSION NUMBER(S): [id number] ORDERING CLINICIAN: ASAF CORONA  TECHNIQUE:  Real-time grayscale, color, and spectral doppler ultrasound imaging of the left lower extremity veins was performed. FINDINGS: There is acute occlusive DVT demonstrated within the left deep femoral, femoral, and popliteal veins. The left external iliac and common femoral veins demonstrated normal compressibility, normal phasic venous flow and normal response to augmentation.  The visualized deep calf veins are patent.  The contralateral common femoral vein is free of thrombosis.    Evidence for acute occlusive DVT within the left deep femoral, femoral, and popliteal veins. There is no significant change when compared to prior ultrasound from February 2025. Signed by Sherice Mejia MD    ECG 12 lead  Result Date: 6/5/2025  Sinus rhythm with 1st degree AV block Left axis deviation Nonspecific intraventricular block Minimal voltage criteria for LVH, may be normal variant ( [place] product ) Cannot rule out Septal infarct (cited on or before 09-MAY-2025) Abnormal ECG When compared with ECG of 21-MAY-2025 16:33, No significant change was found Confirmed by Long Min ([id number]) on 6/5/2025 12:09:34 PM    XR chest 1 view  Result Date: 6/3/2025  Interpreted By:  Pepe Huang, STUDY: XR CHEST 1 VIEW  6/3/2025 2:50 pm   INDICATION: Signs/Symptoms:Malaise and fatigue   COMPARISON: 05/12/2025   ACCESSION NUMBER(S): [id number]   ORDERING CLINICIAN: SHAI BREAUX   TECHNIQUE: A single AP portable radiograph of the chest was obtained.   FINDINGS: Multiple cardiac monitoring leads are seen over the chest.   No focal infiltrate, pleural effusion or pneumothorax is identified. The cardiac silhouette is within normal limits for size.       No focal infiltrate or pneumothorax is identified.   MACRO: None.   Signed by: Pepe Huang 6/3/2025 2:59 PM Dictation workstation:   [id number]    XR shoulder right 2+ views  Result Date: 6/3/2025  Interpreted By:  Pepe Huang, STUDY: XR SHOULDER RIGHT 2+ VIEWS 6/3/2025 2:50 pm    INDICATION: Signs/Symptoms:Right shoulder pain   COMPARISON: None.   ACCESSION NUMBER(S): [id number]   ORDERING CLINICIAN: ELMO OLIVAS   TECHNIQUE: 3 views of the right shoulder including AP , axillary and scapular Y-views were obtained.   FINDINGS: There is no radiographic evidence of acute fracture or dislocation identified.  Mild hypertrophic degenerative changes are seen in the right shoulder.       1. No evidence of acute fracture or dislocation. 2. Degenerative changes, as described above.   MACRO: None.   Signed by: Pepe Huang 6/3/2025 2:59 PM Dictation workstation:   [id number]    CT head wo IV contrast  Result Date: 6/3/2025  Interpreted By:  Julien Romeo, STUDY: CT HEAD WO IV CONTRAST;  6/3/2025 2:40 pm   INDICATION: Signs/Symptoms:Headache at the base of the skull, recently started on Xarelto.     COMPARISON: 05/12/2025.   ACCESSION NUMBER(S): [id number]   ORDERING CLINICIAN: ELMO OLIVAS   TECHNIQUE: Contiguous unenhanced axial images were obtained through the brain.   FINDINGS: INTRACRANIAL: Mild generalized atrophy is again seen.  Nonspecific irregular low-attenuation changes throughout the periventricular and subcortical white matter are similar to prior, most likely related to chronic microvascular disease. There is a stable small remote lacunar infarct of the right basal ganglia and internal capsule anterior limb. No acute intracranial bleed, midline shift, or mass effect is seen. Gray-white differentiation is maintained. No extra-axial fluid collection or hydrocephalus. Irregular atherosclerotic calcifications again seen in the internal carotid artery and vertebral artery segments   Bones are intact.   EXTRACRANIAL: Paranasal sinus mild peripheral mucosal thickening is most prominent in the ethmoid air cells. No paranasal sinus air-fluid level. Mastoid air cells are clear.       Age-related/chronic changes similar to prior. No acute intracranial process.       MACRO: None.   Signed  by: Julien Sfiligoj 6/3/2025 2:46 PM Dictation workstation:   [id number]    Vascular US upper extremity venous duplex left  Result Date: 5/30/2025  STUDY: Left upper extremity venous ultrasound; Completed Time: 5/30/2025 13:35 INDICATION: LUE swelling.  Recent PICC line placement. COMPARISON: None available. ACCESSION NUMBER(S): [id number] ORDERING CLINICIAN: RAJINDER CONNER TECHNIQUE: Ultrasound of the left upper extremity veins.  Multiple images were obtained. FINDINGS: There is acute partially occlusive DVT demonstrated within the left subclavian, axillary and brachial veins.  Superficial thrombus is visualized within the basilic vein, surrounding the PICC line. The left internal jugular vein demonstrated normal compressibility, normal phasic venous flow, and normal response to augmentation.  There is no evidence for echogenic thrombi.  The cephalic vein is patent.  The radial and ulnar veins are not evaluated.    Evidence for acute partially occlusive DVT within the left subclavian, axillary and brachial veins. Superficial thrombus within the basilic vein, surrounding the PICC line. 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  Bilateral buttock ulcers, stage III, rule out infection       Continue IV  Zosyn 4.5 grams every 6 hours -for total 14 days till 7/8/2025  IV vancomycin-monitor levels-discontinue if repeat  blood culture remains negative 48 hours   Monitor temperature and WBC  Follow up Repeat blood cultures-6/25/2025  Local care  Supportive care  PICC line placement  Discussed with Dr. Velazco      This is a complex infectious disease issue and the following was performed today (for more details please see the above note): Management decisions reflecting the added complexity (e.g., changes in antimicrobial therapy, infection control strategies).     Sonam Zhu, APRN-CNP

## 2025-06-27 NOTE — PROGRESS NOTES
Occupational Therapy                 Therapy Communication Note    Patient Name: Elliot Partida  MRN: [id number]  Department: [facility]  Room: [address] [place] [address]A  Today's Date: 6/27/2025     Discipline: Occupational Therapy    Missed Visit: OT Missed Visit: Yes     Missed Visit Reason: Missed Visit Reason: Patient refused (Pt refused working wtih OT this date stating he is tired and does not want to participate. OT offered to come back this afternoon to work with patient, pt continued to refuse and asked OT to come back next week instead. Pt educated on importance of completing OOB mobility and ADLs, pt continued to refuse. Will follow up per pt status and as schedule allows.)    Missed Time: Attempt 1058

## 2025-06-27 NOTE — CARE PLAN
The patient's goals for the shift include      The clinical goals for the shift include will tolerate all patient care today    Partially met. Am hours patient wanted to be left alone. Patient wanted to sleep. Patient did not eat breakfast or lunch. Late afternoon patient excepted patient care and let staff do what needed to be done. Refused therapy today.

## 2025-06-27 NOTE — CARE PLAN
The patient's goals for the shift include      The clinical goals for the shift include patient will tolerate iv antibiotics and not have any falls or injury this shift    Over the shift, the patient did make progress toward the following goals.     Problem: Safety - Adult  Goal: Free from fall injury  Outcome: Progressing     Problem: Pain - Adult  Goal: Verbalizes/displays adequate comfort level or baseline comfort level  Outcome: Progressing     Problem: Skin  Goal: Participates in plan/prevention/treatment measures  Outcome: Progressing

## 2025-06-27 NOTE — PROGRESS NOTES
"Elliot Partida is a 74 y.o. male on day 4 of admission presenting with UTI (urinary tract infection).      Subjective   \"What do you want?! I just want to sleep!\"  Pt denies difficulty with eating, constipation sob or chest pain.       Objective     Last Recorded Vitals  /52 (BP Location: Left arm, Patient Position: Lying)   Pulse 58   Temp 36.6 °C (97.9 °F) (Temporal)   Resp 18   Wt 91.4 kg (201 lb 8 oz)   SpO2 93%   Intake/Output last 3 Shifts:    Intake/Output Summary (Last 24 hours) at 6/27/2025 1124  Last data filed at 6/27/2025 0624  Gross per 24 hour   Intake --   Output 750 ml   Net -750 ml       Admission Weight  Weight: 90.6 kg (199 lb 11.8 oz) (06/22/25 1921)    Daily Weight  06/25/25 : 91.4 kg (201 lb 8 oz)    Image Results  US renal complete  Narrative: Interpreted By:  Amara Dowell,   STUDY:  US RENAL COMPLETE;  6/26/2025 7:43 pm      INDICATION:  Signs/Symptoms:hydronephrosis with concern for obstruction.          COMPARISON:  CT chest abdomen pelvis dated 06/22/2025 and CT angio abdomen pelvis  dated 09/13/2024.      ACCESSION NUMBER(S):  [id number]      ORDERING CLINICIAN:  BONG TANG      TECHNIQUE:  Multiple images of the kidneys were obtained  with grayscale and  color Doppler.      FINDINGS:  RIGHT KIDNEY:  The right kidney measures 13.3 cm in length. The renal cortical  echogenicity and thickness are within normal limits. There is  prominence of right renal pelvis measuring up to 1.6 cm with mild  prominence of right renal calices. These findings are similar  compared to prior CT dated 09/13/2024. No evidence of nephrolithiasis.      LEFT KIDNEY:  The left kidney measures 13.1 cm in length. The renal cortical  echogenicity and thickness are within normal limits. There is  prominence of left renal pelvis measuring up to 2.25 cm with mild  prominence of left renal calices. These findings are similar compared  to prior CT dated 09/13/2024. No leighton hydronephrosis is noted. " No  evidence of nephrolithiasis. An exophytic hypoechoic to anechoic  lesion in the inferior pole of left kidney measuring up to 1.2 x 1.1  x 0.9 cm. There is through transmission without evidence of internal  vascularity or internal complexity. This is likely favored to  represent a simple cyst.      BLADDER:  Urinary bladder is moderately distended and demonstrates diffuse  circumferential bladder wall thickening. Bilateral ureteral jets are  visualized. There is a 1.8 cm echogenic structure along the posterior  bladder wall demonstrating twinkling artifact. This could be related  to calcifications seen in the prostate gland or adjacent phleboliths  as seen on prior CT abdomen pelvis.      Impression: 1. Prominence of bilateral renal pelvis and bilateral renal  collecting system, similar compared to prior CT examination dating  back to 2024. These findings could be developmental related to  extrarenal pelvis, however possibility of age-indeterminate stenosis  of the ureteropelvic junction can be considered in the differential.  Otherwise normal sonographic appearance of bilateral kidneys.  2. Incidental note of left renal cyst.  3. Diffuse circumferential thickening of the urinary bladder wall,  likely favored to be related to chronic bladder wall obstruction.  Cystitis can also be considered in the differential in appropriate  clinical setting.      MACRO:  None      Signed by: Amara Dowell 6/27/2025 10:36 AM  Dictation workstation:   [id number]      Physical Exam  Constitutional:       Appearance: He is obese.   HENT:      Head: Normocephalic.      Nose: Nose normal.      Mouth/Throat:      Mouth: Mucous membranes are moist.   Eyes:      Extraocular Movements: Extraocular movements intact.   Cardiovascular:      Rate and Rhythm: Normal rate.      Pulses: Normal pulses.      Heart sounds: Normal heart sounds.   Pulmonary:      Effort: Pulmonary effort is normal.      Breath sounds: Normal breath sounds.    Abdominal:      General: Bowel sounds are normal.      Palpations: Abdomen is soft.   Musculoskeletal:         General: Normal range of motion.      Cervical back: Normal range of motion.      Right lower leg: Edema present.      Left lower leg: Edema present.   Skin:     General: Skin is warm and dry.      Capillary Refill: Capillary refill takes less than 2 seconds.   Neurological:      General: No focal deficit present.      Mental Status: He is oriented to person, place, and time.   Psychiatric:         Mood and Affect: Mood normal.         Behavior: Behavior normal.         Relevant Results             Scheduled medications  Scheduled Medications[1]  Continuous medications  Continuous Medications[2]  PRN medications  PRN Medications[3]  Results for orders placed or performed during the hospital encounter of 06/22/25 (from the past 24 hours)   POCT GLUCOSE   Result Value Ref Range    POCT Glucose 158 (H) 74 - 99 mg/dL   POCT GLUCOSE   Result Value Ref Range    POCT Glucose 96 74 - 99 mg/dL   POCT GLUCOSE   Result Value Ref Range    POCT Glucose 101 (H) 74 - 99 mg/dL     US renal complete  Result Date: 6/27/2025  Interpreted By:  Amara Dowell, STUDY: US RENAL COMPLETE;  6/26/2025 7:43 pm   INDICATION: Signs/Symptoms:hydronephrosis with concern for obstruction.     COMPARISON: CT chest abdomen pelvis dated 06/22/2025 and CT angio abdomen pelvis dated 09/13/2024.   ACCESSION NUMBER(S): [id number]   ORDERING CLINICIAN: BONG TANG   TECHNIQUE: Multiple images of the kidneys were obtained  with grayscale and color Doppler.   FINDINGS: RIGHT KIDNEY: The right kidney measures 13.3 cm in length. The renal cortical echogenicity and thickness are within normal limits. There is prominence of right renal pelvis measuring up to 1.6 cm with mild prominence of right renal calices. These findings are similar compared to prior CT dated 09/13/2024. No evidence of nephrolithiasis.   LEFT KIDNEY: The left kidney measures  13.1 cm in length. The renal cortical echogenicity and thickness are within normal limits. There is prominence of left renal pelvis measuring up to 2.25 cm with mild prominence of left renal calices. These findings are similar compared to prior CT dated 09/13/2024. No leighton hydronephrosis is noted. No evidence of nephrolithiasis. An exophytic hypoechoic to anechoic lesion in the inferior pole of left kidney measuring up to 1.2 x 1.1 x 0.9 cm. There is through transmission without evidence of internal vascularity or internal complexity. This is likely favored to represent a simple cyst.   BLADDER: Urinary bladder is moderately distended and demonstrates diffuse circumferential bladder wall thickening. Bilateral ureteral jets are visualized. There is a 1.8 cm echogenic structure along the posterior bladder wall demonstrating twinkling artifact. This could be related to calcifications seen in the prostate gland or adjacent phleboliths as seen on prior CT abdomen pelvis.       1. Prominence of bilateral renal pelvis and bilateral renal collecting system, similar compared to prior CT examination dating back to 2024. These findings could be developmental related to extrarenal pelvis, however possibility of age-indeterminate stenosis of the ureteropelvic junction can be considered in the differential. Otherwise normal sonographic appearance of bilateral kidneys. 2. Incidental note of left renal cyst. 3. Diffuse circumferential thickening of the urinary bladder wall, likely favored to be related to chronic bladder wall obstruction. Cystitis can also be considered in the differential in appropriate clinical setting.   MACRO: None   Signed by: Amara Dowell 6/27/2025 10:36 AM Dictation workstation:   [id number]    Carotid duplex bilateral  Result Date: 6/24/2025            [place] [address]  Tel [phone] and Fax [phone]  Vascular Lab Report VASC US CAROTID ARTERY  DUPLEX BILATERAL  Patient Name:      SUBHASH SEGAL    Reading Physician:  [id number] Eli De La Vega MD Study Date:        6/23/2025           Ordering Physician: [id number] DAMEON ALVAREZ MRN/PID:           [id number]            Technologist:       Oumou Valdez RDMS,                                                            T Accession#:        [id number]        Technologist 2: Date of Birth/Age: 1950 / 74 years Encounter#:         [id number] Gender:            M Admission Status:  Inpatient           Location Performed: [facility]  Diagnosis/ICD: Dizziness and giddiness-R42  Patient History Syncope, HTN and CVA. Smoker:         Never. Diabetes:       Yes. >20 years.  CONCLUSIONS: Right Carotid: Today's exam was limited due to the body habitus of the patient. Findings are consistent with less than 50% stenosis of the right proximal internal carotid artery. Laminar flow seen by color Doppler. Right external carotid artery appears patent with no evidence of stenosis. No evidence of hemodynamically significant stenosis of the right common carotid artery. The right vertebral artery is patent with antegrade flow. No evidence of hemodynamically significant stenosis in the right subclavian artery. Left Carotid: Today's exam was limited due to the body habitus of the patient and unable to turn head to right. Findings are consistent with less than 50% stenosis of the left proximal internal carotid artery. Left external carotid artery appears patent with no evidence of stenosis. No evidence of hemodynamically significant stenosis of the left common carotid artery. The left vertebral artery is patent with antegrade flow. No evidence of hemodynamically significant stenosis in the left subclavian artery.  Imaging & Doppler Findings: Right Plaque Morph: The proximal right internal carotid artery demonstrates calcified plaque. Left Plaque Morph: The  proximal left internal carotid artery demonstrates calcified plaque. The distal left common carotid artery demonstrates heterogenous plaque.   Right                        Left   PSV      EDV                PSV       cm/s 25 cm/s   CCA P    118 cm/s 18 cm/s 99 cm/s  19 cm/s   CCA D    110 cm/s 19 cm/s 107 cm/s 19 cm/s   ICA P    106 cm/s 18 cm/s 103 cm/s 35 cm/s   ICA M    81 cm/s  18 cm/s 101 cm/s 39 cm/s   ICA D    40 cm/s  10 cm/s 148 cm/s 18 cm/s    ECA     107 cm/s 13 cm/s 78 cm/s  31 cm/s Vertebral  49 cm/s  10 cm/s 129 cm/s 0 cm/s  Subclavian 168 cm/s 12 cm/s  Right                                  Left   PSV    Waveform                       PSV    Waveform 129 cm/s          Subclavian Proximal 168 cm/s               Right Left ICA/CCA Ratio  1.1  1.0   [id number] Eli De La Vega MD Electronically signed by [id number] Eli De La Vega MD on 6/24/2025 at 11:03:17 AM  ** Final **     ECG 12 lead  Result Date: 6/24/2025  Sinus rhythm with 1st degree AV block Nonspecific intraventricular block Minimal voltage criteria for LVH, may be normal variant ( [place] product ) Cannot rule out Septal infarct (cited on or before 09-MAY-2025) Lateral infarct , age undetermined Abnormal ECG When compared with ECG of 22-JUN-2025 19:20, (unconfirmed) QRS axis Shifted right Lateral infarct is now Present Serial changes of Septal infarct Present    XR chest 1 view  Result Date: 6/23/2025  Interpreted By:  Brian Morris, STUDY: XR CHEST 1 VIEW;  6/23/2025 8:57 am   INDICATION: Signs/Symptoms: SOB.   COMPARISON: 06/03/2025 AP chest x-ray and yesterday's unenhanced thoracic CT.   ACCESSION NUMBER(S): [id number]   ORDERING CLINICIAN: DAMEON ALVAREZ   FINDINGS: Portable AP erect chest x-ray 06/23/2025 8:50 a.m.:   CARDIOMEDIASTINAL SILHOUETTE: Cardiomediastinal silhouette is normal in size and configuration. Marked vascular calcification is noted. Severe aortic valve and coronary artery calcification reported on  yesterday's CT is difficult to appreciate.   LUNGS: Normally inflated and appear clear aside from moderate lower left lung subsegmental atelectasis not apparent on 06/03/2025. The lateral costophrenic angles are sharp, but small bilateral pleural effusions seen on yesterday's CT would probably go undetected without a lateral view.   ABDOMEN: No remarkable upper abdominal findings.   BONES: Extensive spinal degenerative changes and generalized osteosclerosis are noted. The latter can be associated with various conditions including but not limited to myelosclerosis, renal osteodystrophy, hyperthyroidism, hypoparathyroidism, fluorosis, osteoblastic metastases, lymphoma, hepatitis C and mastocytosis and should be correlated clinically.       Left lower lobe and possibly lingular subsegmental atelectasis.   MACRO: None   Signed by: Brian Morris 6/23/2025 9:26 AM Dictation workstation:   [id number]    CT chest abdomen pelvis wo IV contrast  Result Date: 6/22/2025  Interpreted By:  Jemal Driscoll, STUDY: CT CHEST ABDOMEN PELVIS WO CONTRAST;  6/22/2025 9:00 pm   INDICATION: Signs/Symptoms:Diuretic abdominal pain nausea not feeling.     COMPARISON: 06/05/2025 CT chest, 02/27/2025 CT abdomen/pelvis   ACCESSION NUMBER(S): [id number]   ORDERING CLINICIAN: ADDI ALBERTO   TECHNIQUE: Contiguous axial images of the chest, abdomen, and pelvis were obtained without contrast. Coronal and sagittal reformatted images were reconstructed from the axial data.   FINDINGS:   CT CHEST:   MEDIASTINUM AND LYMPH NODES:  The esophageal wall appears within normal limits.  No enlarged intrathoracic or axillary lymph nodes by imaging criteria. No pneumomediastinum.   VESSELS:  Normal caliber thoracic aorta. Mild calcific aortic atherosclerosis.   HEART: Normal size. Severe aortic valvular calcification. Mitral annular calcifications. Severe coronary artery calcifications. No significant pericardial effusion.   LUNG, AIRWAYS, PLEURA: No  pneumothorax. Small bilateral pleural effusions (left > right), unchanged on the right and slightly decreased on the left, compared to 06/05/2025. Mild passive bibasilar atelectasis, similar to prior study. Scattered linear opacities elsewhere likely represent atelectasis. Linear triangular nodule in the right upper lobe likely represents focal atelectasis given new from prior study.   OSSEOUS STRUCTURES: No acute osseous abnormality. Diffuse idiopathic skeletal hyperostosis with flowing ossification along the anterior disc margins and maintained disc heights in the thoracic spine. No significant canal stenosis.   CHEST WALL SOFT TISSUES: Mild anasarca.     ABDOMEN/PELVIS:   ABDOMINAL WALL: Mild anasarca. Small fat-containing umbilical hernia.Subcutaneous injection sites in the left abdominal wall.   LIVER: No significant parenchymal abnormality.   BILE DUCTS: No significant intrahepatic or extrahepatic dilatation.   GALLBLADDER: No significant abnormality.   PANCREAS: No significant abnormality.   SPLEEN: No significant abnormality.   ADRENALS: No significant abnormality.   KIDNEYS, URETERS, BLADDER: Unchanged mild bilateral hydronephrosis without hydroureter. No renal or ureteral calculi. The urinary bladder wall is thickened. There is perivesical fat stranding.   REPRODUCTIVE ORGANS: The prostate gland is enlarged, measuring 5.5 cm in transverse dimension.   VESSELS: Mild aortic atherosclerosis without AAA. Unchanged multiple mildly enlarged external iliac lymph node for sample no other enlarged lymph nodes. Measuring 1.3 cm on the right, 1 cm on the left   RETROPERITONEUM/LYMPH NODES: No acute retroperitoneal abnormality. No enlarged lymph nodes.   BOWEL/PERITONEUM: Tiny gastric diverticulum noted. No inflammatory bowel wall thickening or dilatation. Normal appendix.   No ascites, free air, or fluid collection.     MUSCULOSKELETAL: Multilevel bulky anterior endplate osteophytes with maintained disc spaces  reflect diffuse idiopathic skeletal hyperostosis.  No suspicious osseous lesion.       Perivesical fat stranding and mild wall thickening of the urinary bladder wall. Recommend correlation for cystitis.   Unchanged mild bilateral hydronephrosis without hydroureter suggesting a ureteropelvic junction obstruction.   Small bilateral pleural effusions (left > right) which is unchanged on the right and slightly smaller on the left compared to 06/05/2025. Scattered mild atelectasis without evidence of pneumonia.   MACRO: None.   Signed by: Jemal Driscoll 6/22/2025 9:41 PM Dictation workstation:   [id number]    CT head wo IV contrast  Result Date: 6/22/2025  Interpreted By:  Jemal Driscoll, STUDY: CT HEAD WO IV CONTRAST;  6/22/2025 8:50 pm   INDICATION: Signs/Symptoms:Left eye pain  on blood thinner h/o stroke.     COMPARISON: 06/03/2025   ACCESSION NUMBER(S): [id number]   ORDERING CLINICIAN: ADDI ALBERTO   TECHNIQUE: Noncontrast axial CT images of head were obtained with coronal and sagittal reconstructed images.   FINDINGS: BRAIN PARENCHYMA: Moderate periventricular and subcortical hemispheric white matter hypodensities are most compatible with chronic small vessel ischemic disease.Chronic lacunar infarct in the left cerebellar hemisphere. No acute intraparenchymal hemorrhage or parenchymal evidence of acute large territory ischemic infarct. Gray-white matter distinction is preserved. No mass-effect.   VENTRICLES and EXTRA-AXIAL SPACES:  No acute extra-axial or intraventricular hemorrhage. No effacement of cerebral sulci. The ventricles and sulci are age-concordant.   PARANASAL SINUSES/MASTOIDS:  No hemorrhage or air-fluid levels within the visualized paranasal sinuses. The mastoids are well aerated.   CALVARIUM/ORBITS:  No acute skull fracture.  The orbits and globes are intact to the extent visualized.   EXTRACRANIAL SOFT TISSUES: No discernible acute abnormality.       No acute intracranial abnormality.    Unchanged burden of moderate supratentorial chronic ischemic changes in left cerebellar hemisphere chronic lacunar infarct.   MACRO: None.   Signed by: Jemal Driscoll 6/22/2025 9:32 PM Dictation workstation:   [id number]    ECG 12 lead  Result Date: 6/14/2025  Sinus rhythm with 1st degree AV block Left axis deviation Nonspecific intraventricular block Minimal voltage criteria for LVH, may be normal variant ( [place] product ) Cannot rule out Anterior infarct (cited on or before 09-MAY-2025) Abnormal ECG When compared with ECG of 03-JUN-2025 14:26, No significant change was found See ED provider note for full interpretation and clinical correlation Confirmed by Flower Shaikh ([id number]) on 6/14/2025 8:57:23 PM    Lower extremity venous duplex right  Result Date: 6/6/2025  Interpreted By:  Nnamdi Dodson, STUDY: [facility] LOWER EXTREMITY VENOUS DUPLEX RIGHT;  6/6/2025 4:45 pm   INDICATION: Signs/Symptoms:eval dvt , pain swelling.   COMPARISON: None.   ACCESSION NUMBER(S): [id number]   ORDERING CLINICIAN: MIGNON PURI   TECHNIQUE: Grayscale, color and spectral Doppler sonographic images of the right lower extremity deep venous system. The left common femoral vein was imaged for comparison.   FINDINGS: There is normal compressibility of the right common femoral vein, saphenous femoral junction, femoral vein and popliteal vein. The posterior tibial and peroneal veins are suboptimally visualized. There is normal spontaneous and phasic variation throughout the leg by spectral doppler.   The left common femoral vein is patent.   OTHER FINDINGS: None.       Suboptimal visualization of the calf veins. No sonographic evidence of acute DVT in the visualized vessels of the right lower extremity.   MACRO: None   Signed by: Nnamdi Dodson 6/6/2025 4:53 PM Dictation workstation:   [id number]    Vascular US Upper Extremity Venous Duplex Right  Result Date: 6/6/2025  Interpreted By:  Nnamdi Dodson, STUDY: [facility] US UPPER  EXTREMITY VENOUS DUPLEX RIGHT;  6/6/2025 4:43 pm   INDICATION: Signs/Symptoms:eval dvt  pain.   COMPARISON: Upper extremity Doppler ultrasound 05/30/2025.   ACCESSION NUMBER(S): [id number]   ORDERING CLINICIAN: MIGNON PURI   TECHNIQUE: Grayscale, color and spectral Doppler sonographic imaging of the right upper extremity deep venous system.   FINDINGS: The right cephalic vein is not well visualized. Evaluation of the brachial vein is also solid limited due to placement of PICC line. Segmental visualization of the right internal jugular vein, subclavian vein, axillary vein, basilic vein and brachial vein demonstrate normal gray scale appearance, compressibility, color flow and venous waveforms. The radial and ulnar veins are not visualized.   Redemonstration of occlusive thrombus in the left subclavian vein.       No sonographic evidence of right upper extremity DVT.   Partial visualization of occlusive thrombus in the left subclavian vein as noted on prior ultrasound. Please refer to separate or of left upper extremity DVT for additional detail   MACRO: None   Signed by: Nnamdi Dodson 6/6/2025 4:51 PM Dictation workstation:   [id number]    CT angio chest for pulmonary embolism  Result Date: 6/5/2025  Interpreted By:  Timmy Castro, STUDY: CT ANGIO CHEST FOR PULMONARY EMBOLISM;  6/5/2025 3:00 pm   INDICATION: Signs/Symptoms:rule out PE.     COMPARISON: CT chest with contrast 21 May 2025   ACCESSION NUMBER(S): [id number]   ORDERING CLINICIAN: ASAF CORONA   TECHNIQUE: Pulmonary arterial phase CT chest after the uneventful administration of 75 mL IV contrast (Omnipaque 350).   Three dimensional maximum intensity projection (3-D MIPs) image/s were created on a separate dedicated workstation, reviewed and saved   FINDINGS: CARDIOVASCULAR:   Acute pulmonary embolism: Negative through the  lobar (second order) branch level. Substantial sized branches from segmental (third order) branch and distally cannot be evaluated  Acute right heart strain: Negative. No CT evidence of acute right heart strain Cardiac thrombus:  Negative; no obvious right heart or other cardiac thrombus is seen Pulmonary arteries ectasia:  Unchanged borderline to mild   Heart size:  Borderline but unchanged Pericardial effusion:  Negative   Thoracic aortic aneurysm:  Negative Aortic dissection:  Negative   Heart failure change:  Negative.  No sign of interstitial or alveolar edema. Other:  n/a   NONVASCULAR MEDIASTINUM: Esophagus:  Grossly normal by CT Mediastinal Mass:  Negative Hiatal hernia:  None Other:  n/a   LYMPH NODES: No thoracic adenopathy   LUNGS / AIRSPACES / AIRWAYS:   LARGE AIRWAYS Filling defect: Negative Wall thickening: Negative Bronchiectasis: Negative Other: N/A   AIRSPACES Fibrosis: Negative Emphysema: Negative Consolidation: Negative Ground glass airspace disease: Negative Edema: Mild bibasilar Nodule / Mass: Negative Other: Motion artifact   PLEURA: Effusion:  Right pleural effusion has decreased in size, now trace quantity, previously at least small if not moderate. Left effusion has perhaps marginally decreased in size, still small Pneumothorax: Both sides negative Other:  n/a   CHEST WALL: Soft tissues of the chest wall are unremarkable   SKELETON: No acute or contributory abnormality   UPPER ABDOMEN: Included subdiaphragmatic structures have no acute or contributory abnormality       MILD BIBASILAR INTERSTITIAL EDEMA   THE RIGHT PLEURAL EFFUSION HAS DECREASED IN SIZE SINCE 21 MAY 2025   LEFT PLEURAL EFFUSION HAS MARGINALLY DECREASED IN SIZE AS WELL   NO ACUTE PULMONARY EMBOLISM THROUGH THE LOBAR BRANCH LEVEL. SEGMENTAL AND SUBSEGMENTAL LEVELS OF THE PULMONARY ARTERY CANNOT BE EVALUATED   NO AORTIC DISSECTION OR OTHER ACUTE THORACIC AORTIC FINDINGS   NO AIRSPACE CONSOLIDATION, GROUND-GLASS AIRSPACE DISEASE OR ANY OTHER SIGN OF ACTIVE INFECTION   NO PERICARDIAL EFFUSION   NO PNEUMOTHORAX   MACRO: None   Signed by: Timmy Castro 6/5/2025  3:21 PM Dictation workstation:   [id number]    Lower extremity venous duplex left  Result Date: 6/5/2025  STUDY: Left Lower Extremity Venous Doppler Ultrasound; 6/5/2025 12:59 PM INDICATION: Positive DVT on multiple ultrasound scan done in routine facility. Currently on a Eliquis. COMPARISON: US LE 02/11/2025, 09/12/2024. ACCESSION NUMBER(S): [id number] ORDERING CLINICIAN: ASAF CORONA TECHNIQUE:  Real-time grayscale, color, and spectral doppler ultrasound imaging of the left lower extremity veins was performed. FINDINGS: There is acute occlusive DVT demonstrated within the left deep femoral, femoral, and popliteal veins. The left external iliac and common femoral veins demonstrated normal compressibility, normal phasic venous flow and normal response to augmentation.  The visualized deep calf veins are patent.  The contralateral common femoral vein is free of thrombosis.    Evidence for acute occlusive DVT within the left deep femoral, femoral, and popliteal veins. There is no significant change when compared to prior ultrasound from February 2025. Signed by Sherice Mejia MD    ECG 12 lead  Result Date: 6/5/2025  Sinus rhythm with 1st degree AV block Left axis deviation Nonspecific intraventricular block Minimal voltage criteria for LVH, may be normal variant ( Mukul product ) Cannot rule out Septal infarct (cited on or before 09-MAY-2025) Abnormal ECG When compared with ECG of 21-MAY-2025 16:33, No significant change was found Confirmed by Long Min ([id number]) on 6/5/2025 12:09:34 PM    XR chest 1 view  Result Date: 6/3/2025  Interpreted By:  Pepe Huang, STUDY: XR CHEST 1 VIEW  6/3/2025 2:50 pm   INDICATION: Signs/Symptoms:Malaise and fatigue   COMPARISON: 05/12/2025   ACCESSION NUMBER(S): [id number]   ORDERING CLINICIAN: SHAI BREAUX   TECHNIQUE: A single AP portable radiograph of the chest was obtained.   FINDINGS: Multiple cardiac monitoring leads are seen over the chest.   No focal infiltrate, pleural  effusion or pneumothorax is identified. The cardiac silhouette is within normal limits for size.       No focal infiltrate or pneumothorax is identified.   MACRO: None.   Signed by: Pepe Huang 6/3/2025 2:59 PM Dictation workstation:   [id number]    XR shoulder right 2+ views  Result Date: 6/3/2025  Interpreted By:  Pepe Huang, STUDY: XR SHOULDER RIGHT 2+ VIEWS 6/3/2025 2:50 pm   INDICATION: Signs/Symptoms:Right shoulder pain   COMPARISON: None.   ACCESSION NUMBER(S): [id number]   ORDERING CLINICIAN: ELMO OLIVAS   TECHNIQUE: 3 views of the right shoulder including AP , axillary and scapular Y-views were obtained.   FINDINGS: There is no radiographic evidence of acute fracture or dislocation identified.  Mild hypertrophic degenerative changes are seen in the right shoulder.       1. No evidence of acute fracture or dislocation. 2. Degenerative changes, as described above.   MACRO: None.   Signed by: Pepe Huang 6/3/2025 2:59 PM Dictation workstation:   [id number]    CT head wo IV contrast  Result Date: 6/3/2025  Interpreted By:  Julien Romeo, STUDY: CT HEAD WO IV CONTRAST;  6/3/2025 2:40 pm   INDICATION: Signs/Symptoms:Headache at the base of the skull, recently started on Xarelto.     COMPARISON: 05/12/2025.   ACCESSION NUMBER(S): [id number]   ORDERING CLINICIAN: ELMO OLIVAS   TECHNIQUE: Contiguous unenhanced axial images were obtained through the brain.   FINDINGS: INTRACRANIAL: Mild generalized atrophy is again seen.  Nonspecific irregular low-attenuation changes throughout the periventricular and subcortical white matter are similar to prior, most likely related to chronic microvascular disease. There is a stable small remote lacunar infarct of the right basal ganglia and internal capsule anterior limb. No acute intracranial bleed, midline shift, or mass effect is seen. Gray-white differentiation is maintained. No extra-axial fluid collection or hydrocephalus. Irregular atherosclerotic  calcifications again seen in the internal carotid artery and vertebral artery segments   Bones are intact.   EXTRACRANIAL: Paranasal sinus mild peripheral mucosal thickening is most prominent in the ethmoid air cells. No paranasal sinus air-fluid level. Mastoid air cells are clear.       Age-related/chronic changes similar to prior. No acute intracranial process.       MACRO: None.   Signed by: Julien Romeo 6/3/2025 2:46 PM Dictation workstation:   [id number]    Vascular US upper extremity venous duplex left  Result Date: 5/30/2025  STUDY: Left upper extremity venous ultrasound; Completed Time: 5/30/2025 13:35 INDICATION: LUE swelling.  Recent PICC line placement. COMPARISON: None available. ACCESSION NUMBER(S): [id number] ORDERING CLINICIAN: RAJINDER CONNER TECHNIQUE: Ultrasound of the left upper extremity veins.  Multiple images were obtained. FINDINGS: There is acute partially occlusive DVT demonstrated within the left subclavian, axillary and brachial veins.  Superficial thrombus is visualized within the basilic vein, surrounding the PICC line. The left internal jugular vein demonstrated normal compressibility, normal phasic venous flow, and normal response to augmentation.  There is no evidence for echogenic thrombi.  The cephalic vein is patent.  The radial and ulnar veins are not evaluated.    Evidence for acute partially occlusive DVT within the left subclavian, axillary and brachial veins. Superficial thrombus within the basilic vein, surrounding the PICC line. Signed by Trent Sher MD         Assessment & Plan  UTI (urinary tract infection)    Acute deep vein thrombosis (DVT) of femoral vein of left lower extremity    Ambulatory dysfunction    Benign essential HTN    DM type 2 with diabetic peripheral neuropathy    Generalized weakness    Hyperlipidemia    Dizziness    Wound infection    Fever    Ventricular tachycardia seen on cardiac monitor    UTI  Fever  -UA: 250 leukocyte esterase, 6-10 WBC,  1+ bacteria,   -UA culture clinically insignificant growth based on current clinical standards  - has coccyx wound, dorsal left ankle wound, left hand finger skin tear and right heel pressure injury  -Blood culture gram-positive cocci, clusters, Staphylococcus epidermidis  -PICC discontinued and removed for possible source per ID   -cefTRIAXone (Rocephin) 1 g in dextrose (iso) IV 50 mL discontinued  -Add piperacillin-tazobactam (Zosyn) 4.5 g in dextrose (iso)  mL  q 6 hours started  -Add vancomycin (Vancocin) 1,500 mg in sodium chloride 0.9% 500 mL IV daily  -Received NS 500ml bolus  -WBC 6.7 > 5.7 > 8.5 > 9.7 > 7.2>6.2  -Monitor WBC   -Daily CBC  -Monitor for fever- afebrile overnight  -acetaminophen (Tylenol) tablet 650 mg PRN pain/fever  -LR 75 mL/hr, intravenous for 11 hours completed  -BUN/Cr/GFR 20/1.31/57> 19/1.02/78  -Repeat blood cultures pending - if neg can consider a line if needed for duration of abx  -Repeat urine culture negative  - tissue culture pending of coccyx: Pseudomonas aeruginosa     Ambulatory dysfunction  Weakness  Dizziness  -CXR: Left lower lobe and possibly lingular subsegmental atelectasis   -CTH: No acute intracranial abnormality. Unchanged burden of moderate supratentorial chronic ischemic changes in left cerebellar hemisphere chronic lacunar infarct.  -CT chest abdomen pelvis wo IV contrast Perivesical fat stranding and mild wall thickening of the urinary bladder wall. Recommend correlation for cystitis. Unchanged mild bilateral hydronephrosis without hydroureter suggesting a ureteropelvic junction obstruction. Small bilateral pleural effusions (left > right) which is unchanged on the right and slightly smaller on the left compared to 06/05/2025. Scattered mild atelectasis without evidence of pneumonia.  -Vascular US Carotid Artery duplex: Right Carotid: Today's exam was limited due to the body habitus of the patient. Findings are consistent with less than 50% stenosis of the  right proximal internal carotid artery. Laminar flow seen by color Doppler. Right external carotid artery appears patent with no evidence of stenosis. No evidence of hemodynamically significant stenosis of the right common carotid artery. The right vertebral artery is patent with antegrade flow. No evidence of hemodynamically significant stenosis in the right subclavian artery.  Left Carotid: Today's exam was limited due to the body habitus of the patient and unable to turn head to right. Findings are consistent with less than 50% stenosis of the left proximal internal carotid artery. Left external carotid artery appears patent with no evidence of stenosis. No evidence of hemodynamically significant stenosis of the left common carotid artery. The left vertebral artery is patent with antegrade flow. No evidence of hemodynamically significant stenosis in the left subclavian artery.  -meclizine PRN  -sodium chloride 0.9% infusion 125/hr completed  -PT/OT evaluation, appreciate recommendations  -Orthostatic pressures resolved   -Telemetry     HTN  DM type 2  Hyperlipidemia  -amLODIPine (Norvasc) tablet 2.5 mg   -gabapentin (Neurontin) capsule 300 mg   -hydrALAZINE (Apresoline) tablet 50 mg  -Blood sugar controlled at this time without medication A1C 5.8 6/6  -Monitor glucose  -Daily BMP     DVT  -enoxaparin (Lovenox) syringe 90 mg  -6/6 Upper extremity US: No sonographic evidence of right upper extremity DVT. Partial visualization of occlusive thrombus in the left subclavian vein as noted on prior ultrasound. Please refer to separate or of left upper extremity DVT for additional detail   -left arm restriction        Wound  -ID consult- Case discussed with SARAH Kothari-CNP , appreciate recommendations  -Podiatry consulted appreciate recommendations, debridement and dressing 6/23 (left dorsal ankle)  -PICC line- Completed OP Vancomycin course 6/18  -Wound culture  1+ rare mixed skin microorganisms  -Repeat  wound culture pending  -PICC line pulled       Ventricular tachycardia seen on cardiac monitor  - Monitor electrolytes  - K 3.8, , calcium 8.3,  > K3.7, , calcium 8.4  - Daily BMP  - Magnesium 2.04  - Telemetry  - No overnight events        GI ppx: PPI  DVT ppx: Lovenox   Fluids: As needed   Electrolytes: replace as needed  Nutrition: Regular   Adjuncts: PIV           SARAH Jamison-CNP           [1] amLODIPine, 2.5 mg, oral, Daily  enoxaparin, 90 mg, subcutaneous, q12h  gabapentin, 300 mg, oral, TID  hydrALAZINE, 50 mg, oral, TID  piperacillin-tazobactam, 4.5 g, intravenous, q6h  traZODone, 50 mg, oral, Nightly  vancomycin, 1,500 mg, intravenous, q24h  [2] sodium chloride 0.9%, 10 mL/hr, Last Rate: 10 mL/hr (06/25/25 1153)  [3] PRN medications: acetaminophen, alum-mag hydroxide-simeth, benzocaine-menthol, heparin flush, ipratropium-albuteroL, meclizine, melatonin, ondansetron, oxygen, polyethylene glycol, sennosides-docusate sodium, sodium chloride 0.9%, sodium chloride 0.9%, traMADol, vancomycin, zinc oxide

## 2025-06-28 LAB
ANION GAP SERPL CALC-SCNC: 11 MMOL/L (ref 10–20)
BUN SERPL-MCNC: 11 MG/DL (ref 6–23)
CALCIUM SERPL-MCNC: 8.6 MG/DL (ref 8.6–10.3)
CHLORIDE SERPL-SCNC: 106 MMOL/L (ref 98–107)
CO2 SERPL-SCNC: 29 MMOL/L (ref 21–32)
CREAT SERPL-MCNC: 0.99 MG/DL (ref 0.5–1.3)
EGFRCR SERPLBLD CKD-EPI 2021: 80 ML/MIN/1.73M*2
ERYTHROCYTE [DISTWIDTH] IN BLOOD BY AUTOMATED COUNT: 16.5 % (ref 11.5–14.5)
GLUCOSE BLD MANUAL STRIP-MCNC: 144 MG/DL (ref 74–99)
GLUCOSE BLD MANUAL STRIP-MCNC: 87 MG/DL (ref 74–99)
GLUCOSE SERPL-MCNC: 91 MG/DL (ref 74–99)
HCT VFR BLD AUTO: 27.2 % (ref 41–52)
HGB BLD-MCNC: 8.3 G/DL (ref 13.5–17.5)
HOLD SPECIMEN: NORMAL
MCH RBC QN AUTO: 27.3 PG (ref 26–34)
MCHC RBC AUTO-ENTMCNC: 30.5 G/DL (ref 32–36)
MCV RBC AUTO: 90 FL (ref 80–100)
NRBC BLD-RTO: 0 /100 WBCS (ref 0–0)
PLATELET # BLD AUTO: 193 X10*3/UL (ref 150–450)
POTASSIUM SERPL-SCNC: 3.8 MMOL/L (ref 3.5–5.3)
RBC # BLD AUTO: 3.04 X10*6/UL (ref 4.5–5.9)
SODIUM SERPL-SCNC: 142 MMOL/L (ref 136–145)
VANCOMYCIN SERPL-MCNC: 26.4 UG/ML (ref 5–20)
WBC # BLD AUTO: 5.8 X10*3/UL (ref 4.4–11.3)

## 2025-06-28 PROCEDURE — 80048 BASIC METABOLIC PNL TOTAL CA: CPT | Mod: IPSPLIT | Performed by: NURSE PRACTITIONER

## 2025-06-28 PROCEDURE — 94640 AIRWAY INHALATION TREATMENT: CPT | Mod: IPSPLIT

## 2025-06-28 PROCEDURE — 1200000002 HC GENERAL ROOM WITH TELEMETRY DAILY: Mod: IPSPLIT

## 2025-06-28 PROCEDURE — 94664 DEMO&/EVAL PT USE INHALER: CPT | Mod: IPSPLIT

## 2025-06-28 PROCEDURE — 2500000004 HC RX 250 GENERAL PHARMACY W/ HCPCS (ALT 636 FOR OP/ED): Mod: IPSPLIT | Performed by: NURSE PRACTITIONER

## 2025-06-28 PROCEDURE — 80202 ASSAY OF VANCOMYCIN: CPT | Mod: IPSPLIT | Performed by: NURSE PRACTITIONER

## 2025-06-28 PROCEDURE — 85027 COMPLETE CBC AUTOMATED: CPT | Mod: IPSPLIT | Performed by: NURSE PRACTITIONER

## 2025-06-28 PROCEDURE — 94760 N-INVAS EAR/PLS OXIMETRY 1: CPT | Mod: IPSPLIT

## 2025-06-28 PROCEDURE — 36415 COLL VENOUS BLD VENIPUNCTURE: CPT | Mod: IPSPLIT | Performed by: NURSE PRACTITIONER

## 2025-06-28 PROCEDURE — 99233 SBSQ HOSP IP/OBS HIGH 50: CPT

## 2025-06-28 PROCEDURE — 82947 ASSAY GLUCOSE BLOOD QUANT: CPT | Mod: IPSPLIT

## 2025-06-28 PROCEDURE — 2500000002 HC RX 250 W HCPCS SELF ADMINISTERED DRUGS (ALT 637 FOR MEDICARE OP, ALT 636 FOR OP/ED): Mod: IPSPLIT | Performed by: NURSE PRACTITIONER

## 2025-06-28 PROCEDURE — 2500000001 HC RX 250 WO HCPCS SELF ADMINISTERED DRUGS (ALT 637 FOR MEDICARE OP): Mod: IPSPLIT | Performed by: NURSE PRACTITIONER

## 2025-06-28 RX ADMIN — ZINC OXIDE 1 APPLICATION: 200 OINTMENT TOPICAL at 14:31

## 2025-06-28 RX ADMIN — GABAPENTIN 300 MG: 300 CAPSULE ORAL at 08:38

## 2025-06-28 RX ADMIN — HYDRALAZINE HYDROCHLORIDE 50 MG: 50 TABLET ORAL at 08:38

## 2025-06-28 RX ADMIN — PIPERACILLIN SODIUM AND TAZOBACTAM SODIUM 4.5 G: 4; .5 INJECTION, SOLUTION INTRAVENOUS at 02:52

## 2025-06-28 RX ADMIN — ENOXAPARIN SODIUM 90 MG: 100 INJECTION SUBCUTANEOUS at 17:11

## 2025-06-28 RX ADMIN — VANCOMYCIN 1.5 G: 1.5 INJECTION, SOLUTION INTRAVENOUS at 23:19

## 2025-06-28 RX ADMIN — HYDRALAZINE HYDROCHLORIDE 50 MG: 50 TABLET ORAL at 21:12

## 2025-06-28 RX ADMIN — GABAPENTIN 300 MG: 300 CAPSULE ORAL at 14:05

## 2025-06-28 RX ADMIN — PIPERACILLIN SODIUM AND TAZOBACTAM SODIUM 4.5 G: 4; .5 INJECTION, SOLUTION INTRAVENOUS at 19:58

## 2025-06-28 RX ADMIN — PIPERACILLIN SODIUM AND TAZOBACTAM SODIUM 4.5 G: 4; .5 INJECTION, SOLUTION INTRAVENOUS at 14:05

## 2025-06-28 RX ADMIN — TRAZODONE HYDROCHLORIDE 50 MG: 50 TABLET ORAL at 21:12

## 2025-06-28 RX ADMIN — GABAPENTIN 300 MG: 300 CAPSULE ORAL at 21:12

## 2025-06-28 RX ADMIN — ENOXAPARIN SODIUM 90 MG: 100 INJECTION SUBCUTANEOUS at 06:01

## 2025-06-28 RX ADMIN — ENOXAPARIN SODIUM 90 MG: 100 INJECTION SUBCUTANEOUS at 23:19

## 2025-06-28 RX ADMIN — IPRATROPIUM BROMIDE AND ALBUTEROL SULFATE 3 ML: .5; 3 SOLUTION RESPIRATORY (INHALATION) at 16:21

## 2025-06-28 RX ADMIN — PIPERACILLIN SODIUM AND TAZOBACTAM SODIUM 4.5 G: 4; .5 INJECTION, SOLUTION INTRAVENOUS at 09:19

## 2025-06-28 RX ADMIN — AMLODIPINE BESYLATE 2.5 MG: 2.5 TABLET ORAL at 08:38

## 2025-06-28 RX ADMIN — ZINC OXIDE 1 APPLICATION: 200 OINTMENT TOPICAL at 08:38

## 2025-06-28 RX ADMIN — HYDRALAZINE HYDROCHLORIDE 50 MG: 50 TABLET ORAL at 14:05

## 2025-06-28 ASSESSMENT — COGNITIVE AND FUNCTIONAL STATUS - GENERAL
EATING MEALS: A LITTLE
MOBILITY SCORE: 20
PERSONAL GROOMING: A LITTLE
DAILY ACTIVITIY SCORE: 18
WALKING IN HOSPITAL ROOM: A LITTLE
HELP NEEDED FOR BATHING: A LITTLE
DRESSING REGULAR LOWER BODY CLOTHING: A LITTLE
STANDING UP FROM CHAIR USING ARMS: A LITTLE
MOVING TO AND FROM BED TO CHAIR: A LITTLE
DRESSING REGULAR UPPER BODY CLOTHING: A LITTLE
CLIMB 3 TO 5 STEPS WITH RAILING: A LITTLE
TOILETING: A LITTLE

## 2025-06-28 ASSESSMENT — PAIN SCALES - GENERAL
PAINLEVEL_OUTOF10: 0 - NO PAIN
PAINLEVEL_OUTOF10: 0 - NO PAIN

## 2025-06-28 ASSESSMENT — PAIN - FUNCTIONAL ASSESSMENT: PAIN_FUNCTIONAL_ASSESSMENT: 0-10

## 2025-06-28 NOTE — PROGRESS NOTES
Elliot Partida is a 74 y.o. male on day 5 of admission presenting with UTI (urinary tract infection).    Subjective   Interval History:   Afebrile, no chills  No left foot pain  No buttock pain  No chest pain or shortness of breath  No nausea vomiting or diarrhea    Review of Systems   Skin:  Positive for wound.   All other systems reviewed and are negative.      Objective   Range of Vitals (last 24 hours)  Heart Rate:  [58-78]   Temp:  [36.5 °C (97.7 °F)]   Resp:  [16]   BP: (132-143)/(56-57)   SpO2:  [90 %-99 %]   Daily Weight  06/25/25 : 91.4 kg (201 lb 8 oz)    Body mass index is 29.76 kg/m².    Physical Exam  Constitutional:       Appearance: Normal appearance.   HENT:      Head: Normocephalic and atraumatic.      Nose: Nose normal.      Mouth: Mucous membranes are moist.      Pharynx: Oropharynx is clear.   Eyes:      General: No scleral icterus.  Cardiovascular:      Rate and Rhythm: Normal rate and regular rhythm.   Pulmonary:      Effort: Pulmonary effort is normal.      Breath sounds: Normal breath sounds.   Abdominal:      General: Bowel sounds are normal.      Palpations: Abdomen is soft.   Musculoskeletal:    Bilateral upper extremity edema-left worse than right     General: Normal range of motion.      Cervical back: Normal range of motion and neck supple.      Comments: Left fifth toe amputation    Skin:     General: Skin is warm and dry.      Comments: Pictures in EPIC reviewed-left anterior lower leg , dorsal foot wound with small amount granulation tissue.   Neurological:      Mental Status: He is alert.      Comments: Awake, alert   Psychiatric:         Mood and Affect: Mood normal.         Behavior: Behavior normal.        Antibiotics  piperacillin-tazobactam - 4.5 gram/100 mL  vancomycin - 1.5 gram/300 mL    Relevant Results  Labs  Results from last 72 hours   Lab Units 06/28/25  0841 06/26/25  0655   WBC AUTO x10*3/uL 5.8 6.2   HEMOGLOBIN g/dL 8.3* 8.0*   HEMATOCRIT % 27.2* 26.2*   PLATELETS  AUTO x10*3/uL 193 185     Results from last 72 hours   Lab Units 06/28/25  0841 06/26/25  0655   SODIUM mmol/L 142 140   POTASSIUM mmol/L 3.8 3.8   CHLORIDE mmol/L 106 105   CO2 mmol/L 29 27   BUN mg/dL 11 16   CREATININE mg/dL 0.99 1.04   GLUCOSE mg/dL 91 110*   CALCIUM mg/dL 8.6 8.4*   ANION GAP mmol/L 11 12   EGFR mL/min/1.73m*2 80 76         Estimated Creatinine Clearance: 74.3 mL/min (by C-G formula based on SCr of 0.99 mg/dL).  C-Reactive Protein   Date Value Ref Range Status   05/20/2025 3.85 (H) <1.00 mg/dL Final   05/18/2025 4.28 (H) <1.00 mg/dL Final   05/17/2025 5.97 (H) <1.00 mg/dL Final     Microbiology  Susceptibility data from last 14 days.  Collected Specimen Info Organism Aztreonam Cefepime Ceftazidime Ciprofloxacin Clindamycin Erythromycin Levofloxacin Oxacillin Piperacillin/Tazobactam Tetracycline Tobramycin Trimethoprim/Sulfamethoxazole Vancomycin   06/25/25 Tissue/Biopsy from Wound/Tissue Pseudomonas aeruginosa  S  S  S  S    S   S   S       Mixed Aerobic and Anaerobic Bacteria                 06/23/25 Tissue/Biopsy from Wound/Tissue Mixed Skin Microorganisms                 06/23/25 Blood culture from Peripheral Venipuncture Staphylococcus epidermidis      R  I   R   S   S  S     Staphylococcus pettenkoferi/argensis                06/23/25 Blood culture from Peripheral Venipuncture Staphylococcus epidermidis                  Imaging  US renal complete  Result Date: 6/27/2025  Interpreted By:  Amara Dowell, STUDY: US RENAL COMPLETE;  6/26/2025 7:43 pm   INDICATION: Signs/Symptoms:hydronephrosis with concern for obstruction.     COMPARISON: CT chest abdomen pelvis dated 06/22/2025 and CT angio abdomen pelvis dated 09/13/2024.   ACCESSION NUMBER(S): [id number]   ORDERING CLINICIAN: BONG TANG   TECHNIQUE: Multiple images of the kidneys were obtained  with grayscale and color Doppler.   FINDINGS: RIGHT KIDNEY: The right kidney measures 13.3 cm in length. The renal cortical echogenicity and  thickness are within normal limits. There is prominence of right renal pelvis measuring up to 1.6 cm with mild prominence of right renal calices. These findings are similar compared to prior CT dated 09/13/2024. No evidence of nephrolithiasis.   LEFT KIDNEY: The left kidney measures 13.1 cm in length. The renal cortical echogenicity and thickness are within normal limits. There is prominence of left renal pelvis measuring up to 2.25 cm with mild prominence of left renal calices. These findings are similar compared to prior CT dated 09/13/2024. No leighton hydronephrosis is noted. No evidence of nephrolithiasis. An exophytic hypoechoic to anechoic lesion in the inferior pole of left kidney measuring up to 1.2 x 1.1 x 0.9 cm. There is through transmission without evidence of internal vascularity or internal complexity. This is likely favored to represent a simple cyst.   BLADDER: Urinary bladder is moderately distended and demonstrates diffuse circumferential bladder wall thickening. Bilateral ureteral jets are visualized. There is a 1.8 cm echogenic structure along the posterior bladder wall demonstrating twinkling artifact. This could be related to calcifications seen in the prostate gland or adjacent phleboliths as seen on prior CT abdomen pelvis.       1. Prominence of bilateral renal pelvis and bilateral renal collecting system, similar compared to prior CT examination dating back to 2024. These findings could be developmental related to extrarenal pelvis, however possibility of age-indeterminate stenosis of the ureteropelvic junction can be considered in the differential. Otherwise normal sonographic appearance of bilateral kidneys. 2. Incidental note of left renal cyst. 3. Diffuse circumferential thickening of the urinary bladder wall, likely favored to be related to chronic bladder wall obstruction. Cystitis can also be considered in the differential in appropriate clinical setting.   MACRO: None   Signed by:  Amaramargaret Dowell 6/27/2025 10:36 AM Dictation workstation:   [id number]    Carotid duplex bilateral  Result Date: 6/24/2025            [place] [address] [place] [address]  Tel [phone] and Fax [phone]  Vascular Lab Report [facility] US CAROTID ARTERY DUPLEX BILATERAL  Patient Name:      SUBHASH SCHMIDT LUZMA    Reading Physician:  [id number] Eli De La Vega MD Study Date:        6/23/2025           Ordering Physician: [id number] DAMEON ALVAREZ MRN/PID:           [id number]            Technologist:       Oumou Valdez RDMS,                                                            T Accession#:        [id number]        Technologist 2: Date of Birth/Age: 1950 / 74 years Encounter#:         [id number] Gender:            M Admission Status:  Inpatient           Location Performed: [facility]  Diagnosis/ICD: Dizziness and giddiness-R42  Patient History Syncope, HTN and CVA. Smoker:         Never. Diabetes:       Yes. >20 years.  CONCLUSIONS: Right Carotid: Today's exam was limited due to the body habitus of the patient. Findings are consistent with less than 50% stenosis of the right proximal internal carotid artery. Laminar flow seen by color Doppler. Right external carotid artery appears patent with no evidence of stenosis. No evidence of hemodynamically significant stenosis of the right common carotid artery. The right vertebral artery is patent with antegrade flow. No evidence of hemodynamically significant stenosis in the right subclavian artery. Left Carotid: Today's exam was limited due to the body habitus of the patient and unable to turn head to right. Findings are consistent with less than 50% stenosis of the left proximal internal carotid artery. Left external carotid artery appears patent with no evidence of stenosis. No evidence of hemodynamically significant stenosis of the left common carotid artery. The  left vertebral artery is patent with antegrade flow. No evidence of hemodynamically significant stenosis in the left subclavian artery.  Imaging & Doppler Findings: Right Plaque Morph: The proximal right internal carotid artery demonstrates calcified plaque. Left Plaque Morph: The proximal left internal carotid artery demonstrates calcified plaque. The distal left common carotid artery demonstrates heterogenous plaque.   Right                        Left   PSV      EDV                PSV       cm/s 25 cm/s   CCA P    118 cm/s 18 cm/s 99 cm/s  19 cm/s   CCA D    110 cm/s 19 cm/s 107 cm/s 19 cm/s   ICA P    106 cm/s 18 cm/s 103 cm/s 35 cm/s   ICA M    81 cm/s  18 cm/s 101 cm/s 39 cm/s   ICA D    40 cm/s  10 cm/s 148 cm/s 18 cm/s    ECA     107 cm/s 13 cm/s 78 cm/s  31 cm/s Vertebral  49 cm/s  10 cm/s 129 cm/s 0 cm/s  Subclavian 168 cm/s 12 cm/s  Right                                  Left   PSV    Waveform                       PSV    Waveform 129 cm/s          Subclavian Proximal 168 cm/s               Right Left ICA/CCA Ratio  1.1  1.0   [id number] Eli De La Vega MD Electronically signed by [id number] Eli De La Vega MD on 6/24/2025 at 11:03:17 AM  ** Final **     ECG 12 lead  Result Date: 6/24/2025  Sinus rhythm with 1st degree AV block Nonspecific intraventricular block Minimal voltage criteria for LVH, may be normal variant ( [place] product ) Cannot rule out Septal infarct (cited on or before 09-MAY-2025) Lateral infarct , age undetermined Abnormal ECG When compared with ECG of 22-JUN-2025 19:20, (unconfirmed) QRS axis Shifted right Lateral infarct is now Present Serial changes of Septal infarct Present    XR chest 1 view  Result Date: 6/23/2025  Interpreted By:  Brian Morris, STUDY: XR CHEST 1 VIEW;  6/23/2025 8:57 am   INDICATION: Signs/Symptoms: SOB.   COMPARISON: 06/03/2025 AP chest x-ray and yesterday's unenhanced thoracic CT.   ACCESSION NUMBER(S): [id number]   ORDERING CLINICIAN:  DAMEON ALVAREZ   FINDINGS: Portable AP erect chest x-ray 06/23/2025 8:50 a.m.:   CARDIOMEDIASTINAL SILHOUETTE: Cardiomediastinal silhouette is normal in size and configuration. Marked vascular calcification is noted. Severe aortic valve and coronary artery calcification reported on yesterday's CT is difficult to appreciate.   LUNGS: Normally inflated and appear clear aside from moderate lower left lung subsegmental atelectasis not apparent on 06/03/2025. The lateral costophrenic angles are sharp, but small bilateral pleural effusions seen on yesterday's CT would probably go undetected without a lateral view.   ABDOMEN: No remarkable upper abdominal findings.   BONES: Extensive spinal degenerative changes and generalized osteosclerosis are noted. The latter can be associated with various conditions including but not limited to myelosclerosis, renal osteodystrophy, hyperthyroidism, hypoparathyroidism, fluorosis, osteoblastic metastases, lymphoma, hepatitis C and mastocytosis and should be correlated clinically.       Left lower lobe and possibly lingular subsegmental atelectasis.   MACRO: None   Signed by: Brian Morris 6/23/2025 9:26 AM Dictation workstation:   [id number]    CT chest abdomen pelvis wo IV contrast  Result Date: 6/22/2025  Interpreted By:  Jemal Driscoll, STUDY: CT CHEST ABDOMEN PELVIS WO CONTRAST;  6/22/2025 9:00 pm   INDICATION: Signs/Symptoms:Diuretic abdominal pain nausea not feeling.     COMPARISON: 06/05/2025 CT chest, 02/27/2025 CT abdomen/pelvis   ACCESSION NUMBER(S): [id number]   ORDERING CLINICIAN: ADDI ALBERTO   TECHNIQUE: Contiguous axial images of the chest, abdomen, and pelvis were obtained without contrast. Coronal and sagittal reformatted images were reconstructed from the axial data.   FINDINGS:   CT CHEST:   MEDIASTINUM AND LYMPH NODES:  The esophageal wall appears within normal limits.  No enlarged intrathoracic or axillary lymph nodes by imaging criteria. No  pneumomediastinum.   VESSELS:  Normal caliber thoracic aorta. Mild calcific aortic atherosclerosis.   HEART: Normal size. Severe aortic valvular calcification. Mitral annular calcifications. Severe coronary artery calcifications. No significant pericardial effusion.   LUNG, AIRWAYS, PLEURA: No pneumothorax. Small bilateral pleural effusions (left > right), unchanged on the right and slightly decreased on the left, compared to 06/05/2025. Mild passive bibasilar atelectasis, similar to prior study. Scattered linear opacities elsewhere likely represent atelectasis. Linear triangular nodule in the right upper lobe likely represents focal atelectasis given new from prior study.   OSSEOUS STRUCTURES: No acute osseous abnormality. Diffuse idiopathic skeletal hyperostosis with flowing ossification along the anterior disc margins and maintained disc heights in the thoracic spine. No significant canal stenosis.   CHEST WALL SOFT TISSUES: Mild anasarca.     ABDOMEN/PELVIS:   ABDOMINAL WALL: Mild anasarca. Small fat-containing umbilical hernia.Subcutaneous injection sites in the left abdominal wall.   LIVER: No significant parenchymal abnormality.   BILE DUCTS: No significant intrahepatic or extrahepatic dilatation.   GALLBLADDER: No significant abnormality.   PANCREAS: No significant abnormality.   SPLEEN: No significant abnormality.   ADRENALS: No significant abnormality.   KIDNEYS, URETERS, BLADDER: Unchanged mild bilateral hydronephrosis without hydroureter. No renal or ureteral calculi. The urinary bladder wall is thickened. There is perivesical fat stranding.   REPRODUCTIVE ORGANS: The prostate gland is enlarged, measuring 5.5 cm in transverse dimension.   VESSELS: Mild aortic atherosclerosis without AAA. Unchanged multiple mildly enlarged external iliac lymph node for sample no other enlarged lymph nodes. Measuring 1.3 cm on the right, 1 cm on the left   RETROPERITONEUM/LYMPH NODES: No acute retroperitoneal abnormality.  No enlarged lymph nodes.   BOWEL/PERITONEUM: Tiny gastric diverticulum noted. No inflammatory bowel wall thickening or dilatation. Normal appendix.   No ascites, free air, or fluid collection.     MUSCULOSKELETAL: Multilevel bulky anterior endplate osteophytes with maintained disc spaces reflect diffuse idiopathic skeletal hyperostosis.  No suspicious osseous lesion.       Perivesical fat stranding and mild wall thickening of the urinary bladder wall. Recommend correlation for cystitis.   Unchanged mild bilateral hydronephrosis without hydroureter suggesting a ureteropelvic junction obstruction.   Small bilateral pleural effusions (left > right) which is unchanged on the right and slightly smaller on the left compared to 06/05/2025. Scattered mild atelectasis without evidence of pneumonia.   MACRO: None.   Signed by: Jemal Driscoll 6/22/2025 9:41 PM Dictation workstation:   [id number]    CT head wo IV contrast  Result Date: 6/22/2025  Interpreted By:  Jemal Driscoll, STUDY: CT HEAD WO IV CONTRAST;  6/22/2025 8:50 pm   INDICATION: Signs/Symptoms:Left eye pain  on blood thinner h/o stroke.     COMPARISON: 06/03/2025   ACCESSION NUMBER(S): [id number]   ORDERING CLINICIAN: ADDI ALBERTO   TECHNIQUE: Noncontrast axial CT images of head were obtained with coronal and sagittal reconstructed images.   FINDINGS: BRAIN PARENCHYMA: Moderate periventricular and subcortical hemispheric white matter hypodensities are most compatible with chronic small vessel ischemic disease.Chronic lacunar infarct in the left cerebellar hemisphere. No acute intraparenchymal hemorrhage or parenchymal evidence of acute large territory ischemic infarct. Gray-white matter distinction is preserved. No mass-effect.   VENTRICLES and EXTRA-AXIAL SPACES:  No acute extra-axial or intraventricular hemorrhage. No effacement of cerebral sulci. The ventricles and sulci are age-concordant.   PARANASAL SINUSES/MASTOIDS:  No hemorrhage or air-fluid  levels within the visualized paranasal sinuses. The mastoids are well aerated.   CALVARIUM/ORBITS:  No acute skull fracture.  The orbits and globes are intact to the extent visualized.   EXTRACRANIAL SOFT TISSUES: No discernible acute abnormality.       No acute intracranial abnormality.   Unchanged burden of moderate supratentorial chronic ischemic changes in left cerebellar hemisphere chronic lacunar infarct.   MACRO: None.   Signed by: Jemal Driscoll 6/22/2025 9:32 PM Dictation workstation:   [id number]    ECG 12 lead  Result Date: 6/14/2025  Sinus rhythm with 1st degree AV block Left axis deviation Nonspecific intraventricular block Minimal voltage criteria for LVH, may be normal variant ( [place] product ) Cannot rule out Anterior infarct (cited on or before 09-MAY-2025) Abnormal ECG When compared with ECG of 03-JUN-2025 14:26, No significant change was found See ED provider note for full interpretation and clinical correlation Confirmed by Flower Shaikh ([id number]) on 6/14/2025 8:57:23 PM    Lower extremity venous duplex right  Result Date: 6/6/2025  Interpreted By:  Nnamdi Dodson, STUDY: [facility] LOWER EXTREMITY VENOUS DUPLEX RIGHT;  6/6/2025 4:45 pm   INDICATION: Signs/Symptoms:eval dvt , pain swelling.   COMPARISON: None.   ACCESSION NUMBER(S): [id number]   ORDERING CLINICIAN: MIGNON PURI   TECHNIQUE: Grayscale, color and spectral Doppler sonographic images of the right lower extremity deep venous system. The left common femoral vein was imaged for comparison.   FINDINGS: There is normal compressibility of the right common femoral vein, saphenous femoral junction, femoral vein and popliteal vein. The posterior tibial and peroneal veins are suboptimally visualized. There is normal spontaneous and phasic variation throughout the leg by spectral doppler.   The left common femoral vein is patent.   OTHER FINDINGS: None.       Suboptimal visualization of the calf veins. No sonographic evidence of acute  DVT in the visualized vessels of the right lower extremity.   MACRO: None   Signed by: Nnamdi Dodson 6/6/2025 4:53 PM Dictation workstation:   [id number]    Vascular US Upper Extremity Venous Duplex Right  Result Date: 6/6/2025  Interpreted By:  Nnamdi Dodson, STUDY: VASC US UPPER EXTREMITY VENOUS DUPLEX RIGHT;  6/6/2025 4:43 pm   INDICATION: Signs/Symptoms:eval dvt  pain.   COMPARISON: Upper extremity Doppler ultrasound 05/30/2025.   ACCESSION NUMBER(S): [id number]   ORDERING CLINICIAN: MIGNON PURI   TECHNIQUE: Grayscale, color and spectral Doppler sonographic imaging of the right upper extremity deep venous system.   FINDINGS: The right cephalic vein is not well visualized. Evaluation of the brachial vein is also solid limited due to placement of PICC line. Segmental visualization of the right internal jugular vein, subclavian vein, axillary vein, basilic vein and brachial vein demonstrate normal gray scale appearance, compressibility, color flow and venous waveforms. The radial and ulnar veins are not visualized.   Redemonstration of occlusive thrombus in the left subclavian vein.       No sonographic evidence of right upper extremity DVT.   Partial visualization of occlusive thrombus in the left subclavian vein as noted on prior ultrasound. Please refer to separate or of left upper extremity DVT for additional detail   MACRO: None   Signed by: Nnamdi Dodson 6/6/2025 4:51 PM Dictation workstation:   [id number]    CT angio chest for pulmonary embolism  Result Date: 6/5/2025  Interpreted By:  Timmy Castro, STUDY: CT ANGIO CHEST FOR PULMONARY EMBOLISM;  6/5/2025 3:00 pm   INDICATION: Signs/Symptoms:rule out PE.     COMPARISON: CT chest with contrast 21 May 2025   ACCESSION NUMBER(S): [id number]   ORDERING CLINICIAN: ASAF CORONA   TECHNIQUE: Pulmonary arterial phase CT chest after the uneventful administration of 75 mL IV contrast (Omnipaque 350).   Three dimensional maximum intensity projection (3-D MIPs)  image/s were created on a separate dedicated workstation, reviewed and saved   FINDINGS: CARDIOVASCULAR:   Acute pulmonary embolism: Negative through the  lobar (second order) branch level. Substantial sized branches from segmental (third order) branch and distally cannot be evaluated Acute right heart strain: Negative. No CT evidence of acute right heart strain Cardiac thrombus:  Negative; no obvious right heart or other cardiac thrombus is seen Pulmonary arteries ectasia:  Unchanged borderline to mild   Heart size:  Borderline but unchanged Pericardial effusion:  Negative   Thoracic aortic aneurysm:  Negative Aortic dissection:  Negative   Heart failure change:  Negative.  No sign of interstitial or alveolar edema. Other:  n/a   NONVASCULAR MEDIASTINUM: Esophagus:  Grossly normal by CT Mediastinal Mass:  Negative Hiatal hernia:  None Other:  n/a   LYMPH NODES: No thoracic adenopathy   LUNGS / AIRSPACES / AIRWAYS:   LARGE AIRWAYS Filling defect: Negative Wall thickening: Negative Bronchiectasis: Negative Other: N/A   AIRSPACES Fibrosis: Negative Emphysema: Negative Consolidation: Negative Ground glass airspace disease: Negative Edema: Mild bibasilar Nodule / Mass: Negative Other: Motion artifact   PLEURA: Effusion:  Right pleural effusion has decreased in size, now trace quantity, previously at least small if not moderate. Left effusion has perhaps marginally decreased in size, still small Pneumothorax: Both sides negative Other:  n/a   CHEST WALL: Soft tissues of the chest wall are unremarkable   SKELETON: No acute or contributory abnormality   UPPER ABDOMEN: Included subdiaphragmatic structures have no acute or contributory abnormality       MILD BIBASILAR INTERSTITIAL EDEMA   THE RIGHT PLEURAL EFFUSION HAS DECREASED IN SIZE SINCE 21 MAY 2025   LEFT PLEURAL EFFUSION HAS MARGINALLY DECREASED IN SIZE AS WELL   NO ACUTE PULMONARY EMBOLISM THROUGH THE LOBAR BRANCH LEVEL. SEGMENTAL AND SUBSEGMENTAL LEVELS OF THE  PULMONARY ARTERY CANNOT BE EVALUATED   NO AORTIC DISSECTION OR OTHER ACUTE THORACIC AORTIC FINDINGS   NO AIRSPACE CONSOLIDATION, GROUND-GLASS AIRSPACE DISEASE OR ANY OTHER SIGN OF ACTIVE INFECTION   NO PERICARDIAL EFFUSION   NO PNEUMOTHORAX   MACRO: None   Signed by: Timmy Castro 6/5/2025 3:21 PM Dictation workstation:   [id number]    Lower extremity venous duplex left  Result Date: 6/5/2025  STUDY: Left Lower Extremity Venous Doppler Ultrasound; 6/5/2025 12:59 PM INDICATION: Positive DVT on multiple ultrasound scan done in routine facility. Currently on a Eliquis. COMPARISON: US LE 02/11/2025, 09/12/2024. ACCESSION NUMBER(S): [id number] ORDERING CLINICIAN: ASAF CORONA TECHNIQUE:  Real-time grayscale, color, and spectral doppler ultrasound imaging of the left lower extremity veins was performed. FINDINGS: There is acute occlusive DVT demonstrated within the left deep femoral, femoral, and popliteal veins. The left external iliac and common femoral veins demonstrated normal compressibility, normal phasic venous flow and normal response to augmentation.  The visualized deep calf veins are patent.  The contralateral common femoral vein is free of thrombosis.    Evidence for acute occlusive DVT within the left deep femoral, femoral, and popliteal veins. There is no significant change when compared to prior ultrasound from February 2025. Signed by Sherice Mejia MD    ECG 12 lead  Result Date: 6/5/2025  Sinus rhythm with 1st degree AV block Left axis deviation Nonspecific intraventricular block Minimal voltage criteria for LVH, may be normal variant ( [place] product ) Cannot rule out Septal infarct (cited on or before 09-MAY-2025) Abnormal ECG When compared with ECG of 21-MAY-2025 16:33, No significant change was found Confirmed by Long Min ([id number]) on 6/5/2025 12:09:34 PM    XR chest 1 view  Result Date: 6/3/2025  Interpreted By:  Pepe Huang, STUDY: XR CHEST 1 VIEW  6/3/2025 2:50 pm   INDICATION:  Signs/Symptoms:Malaise and fatigue   COMPARISON: 05/12/2025   ACCESSION NUMBER(S): [id number]   ORDERING CLINICIAN: SHAI BREAUX   TECHNIQUE: A single AP portable radiograph of the chest was obtained.   FINDINGS: Multiple cardiac monitoring leads are seen over the chest.   No focal infiltrate, pleural effusion or pneumothorax is identified. The cardiac silhouette is within normal limits for size.       No focal infiltrate or pneumothorax is identified.   MACRO: None.   Signed by: Pepe Huang 6/3/2025 2:59 PM Dictation workstation:   [id number]    XR shoulder right 2+ views  Result Date: 6/3/2025  Interpreted By:  Pepe Huang, STUDY: XR SHOULDER RIGHT 2+ VIEWS 6/3/2025 2:50 pm   INDICATION: Signs/Symptoms:Right shoulder pain   COMPARISON: None.   ACCESSION NUMBER(S): [id number]   ORDERING CLINICIAN: ELMO OLIVAS   TECHNIQUE: 3 views of the right shoulder including AP , axillary and scapular Y-views were obtained.   FINDINGS: There is no radiographic evidence of acute fracture or dislocation identified.  Mild hypertrophic degenerative changes are seen in the right shoulder.       1. No evidence of acute fracture or dislocation. 2. Degenerative changes, as described above.   MACRO: None.   Signed by: Pepe Huang 6/3/2025 2:59 PM Dictation workstation:   [id number]    CT head wo IV contrast  Result Date: 6/3/2025  Interpreted By:  Julien Romeo, STUDY: CT HEAD WO IV CONTRAST;  6/3/2025 2:40 pm   INDICATION: Signs/Symptoms:Headache at the base of the skull, recently started on Xarelto.     COMPARISON: 05/12/2025.   ACCESSION NUMBER(S): [id number]   ORDERING CLINICIAN: ELMO OLIVAS   TECHNIQUE: Contiguous unenhanced axial images were obtained through the brain.   FINDINGS: INTRACRANIAL: Mild generalized atrophy is again seen.  Nonspecific irregular low-attenuation changes throughout the periventricular and subcortical white matter are similar to prior, most likely related to chronic microvascular disease.  There is a stable small remote lacunar infarct of the right basal ganglia and internal capsule anterior limb. No acute intracranial bleed, midline shift, or mass effect is seen. Gray-white differentiation is maintained. No extra-axial fluid collection or hydrocephalus. Irregular atherosclerotic calcifications again seen in the internal carotid artery and vertebral artery segments   Bones are intact.   EXTRACRANIAL: Paranasal sinus mild peripheral mucosal thickening is most prominent in the ethmoid air cells. No paranasal sinus air-fluid level. Mastoid air cells are clear.       Age-related/chronic changes similar to prior. No acute intracranial process.       MACRO: None.   Signed by: Julien Romeo 6/3/2025 2:46 PM Dictation workstation:   [id number]    Vascular US upper extremity venous duplex left  Result Date: 5/30/2025  STUDY: Left upper extremity venous ultrasound; Completed Time: 5/30/2025 13:35 INDICATION: LUE swelling.  Recent PICC line placement. COMPARISON: None available. ACCESSION NUMBER(S): [id number] ORDERING CLINICIAN: RAJINDER CONNER TECHNIQUE: Ultrasound of the left upper extremity veins.  Multiple images were obtained. FINDINGS: There is acute partially occlusive DVT demonstrated within the left subclavian, axillary and brachial veins.  Superficial thrombus is visualized within the basilic vein, surrounding the PICC line. The left internal jugular vein demonstrated normal compressibility, normal phasic venous flow, and normal response to augmentation.  There is no evidence for echogenic thrombi.  The cephalic vein is patent.  The radial and ulnar veins are not evaluated.    Evidence for acute partially occlusive DVT within the left subclavian, axillary and brachial veins. Superficial thrombus within the basilic vein, surrounding the PICC line. 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  Bilateral buttock ulcers, stage III, positive wound culture for Pseudomonas        Continue IV  Zosyn 4.5 grams every 6 hours -for total 14 days till 7/8/2025  Discontinue vancomycin-negative repeat blood culture for over 48 hours from 6/25/2025  Monitor temperature and WBC  Follow up Repeat blood cultures-6/25/2025  Local care  Supportive care  PICC line placement       This is a complex infectious disease issue and the following was performed today (for more details please see the above note): Management decisions reflecting the added complexity (e.g., changes in antimicrobial therapy, infection control strategies).     Baljit Velazco MD

## 2025-06-28 NOTE — PROGRESS NOTES
Physical Therapy                 Therapy Communication Note    Patient Name: Elliot Partida  MRN: [id number]  Department: [facility]  Room: [address] [place] [address]  Today's Date: 6/28/2025     Discipline: Physical Therapy    Missed Visit: PT Missed Visit: Yes     Missed Visit Reason: Missed Visit Reason: Patient refused (Pt. stated that he wanted to be left alone , and he just wanted to sleep)    Missed Time: Attempt    Comment:

## 2025-06-28 NOTE — PROGRESS NOTES
"Occupational Therapy                 Therapy Communication Note    Patient Name: Elliot Partida  MRN: [id number]  Department: [facility]  Room: [address] [place], [place] [address]  Today's Date: 6/28/2025     Discipline: Occupational Therapy    Missed Visit:   OT missed Visit:Yes    Missed Visit Reason:Attempted to see the pt. for OT.  Despite encouragement provided, the pt. declined saying, \"I just want to sleep. Everyone keeps coming in here.\" Asked the pt. if this therapist could return later after he sleeps for a while & the pt. responded with \"No.  I want to sleep.\"    Missed Time: Attempt at 8:46AM          "

## 2025-06-28 NOTE — PROGRESS NOTES
"Elliot Partida is a 74 y.o. male on day 5 of admission presenting with UTI (urinary tract infection).    Subjective   Patient resting in bed, reports he continues to be fatigued.  Patient awaiting PICC line placement prior to discharge on IV antibiotics. Discussed ongoing treatment plan, patient states understanding, and agrees.  All questions answered.        Objective     Physical Exam  Constitutional:       Appearance: He is obese.   HENT:      Head: Normocephalic and atraumatic.      Nose: Nose normal.      Mouth/Throat:      Mouth: Mucous membranes are moist.   Eyes:      Extraocular Movements: Extraocular movements intact.      Pupils: Pupils are equal, round, and reactive to light.   Cardiovascular:      Rate and Rhythm: Normal rate and regular rhythm.      Pulses: Normal pulses.      Heart sounds: Normal heart sounds.   Pulmonary:      Effort: Pulmonary effort is normal.      Breath sounds: Normal breath sounds.   Abdominal:      General: Bowel sounds are normal.      Palpations: Abdomen is soft.   Musculoskeletal:         General: Normal range of motion.      Cervical back: Normal range of motion.      Right lower leg: Edema present.      Left lower leg: Edema present.   Skin:     General: Skin is warm and dry.      Capillary Refill: Capillary refill takes less than 2 seconds.      Comments: Multiple Wounds   Neurological:      Mental Status: He is alert. Mental status is at baseline.      Motor: Weakness present.      Gait: Gait abnormal.   Psychiatric:         Mood and Affect: Mood normal.         Behavior: Behavior normal.         Last Recorded Vitals  Blood pressure 139/56, pulse 62, temperature 36.5 °C (97.7 °F), temperature source Temporal, resp. rate 16, height 1.753 m (5' 9\"), weight 91.4 kg (201 lb 8 oz), SpO2 98%.  Intake/Output last 3 Shifts:  I/O last 3 completed shifts:  In: 2470 (27 mL/kg) [P.O.:2470]  Out: 750 (8.2 mL/kg) [Urine:750 (0.2 mL/kg/hr)]  Weight: 91.4 kg     Relevant " Results  Scheduled medications  Scheduled Medications[1]  Continuous medications  Continuous Medications[2]  PRN medications  PRN Medications[3]    US renal complete  Result Date: 6/27/2025  Interpreted By:  Amara Dowell, STUDY: US RENAL COMPLETE;  6/26/2025 7:43 pm   INDICATION: Signs/Symptoms:hydronephrosis with concern for obstruction.     COMPARISON: CT chest abdomen pelvis dated 06/22/2025 and CT angio abdomen pelvis dated 09/13/2024.   ACCESSION NUMBER(S): [id number]   ORDERING CLINICIAN: BONG TANG   TECHNIQUE: Multiple images of the kidneys were obtained  with grayscale and color Doppler.   FINDINGS: RIGHT KIDNEY: The right kidney measures 13.3 cm in length. The renal cortical echogenicity and thickness are within normal limits. There is prominence of right renal pelvis measuring up to 1.6 cm with mild prominence of right renal calices. These findings are similar compared to prior CT dated 09/13/2024. No evidence of nephrolithiasis.   LEFT KIDNEY: The left kidney measures 13.1 cm in length. The renal cortical echogenicity and thickness are within normal limits. There is prominence of left renal pelvis measuring up to 2.25 cm with mild prominence of left renal calices. These findings are similar compared to prior CT dated 09/13/2024. No leighton hydronephrosis is noted. No evidence of nephrolithiasis. An exophytic hypoechoic to anechoic lesion in the inferior pole of left kidney measuring up to 1.2 x 1.1 x 0.9 cm. There is through transmission without evidence of internal vascularity or internal complexity. This is likely favored to represent a simple cyst.   BLADDER: Urinary bladder is moderately distended and demonstrates diffuse circumferential bladder wall thickening. Bilateral ureteral jets are visualized. There is a 1.8 cm echogenic structure along the posterior bladder wall demonstrating twinkling artifact. This could be related to calcifications seen in the prostate gland or adjacent phleboliths as  seen on prior CT abdomen pelvis.       1. Prominence of bilateral renal pelvis and bilateral renal collecting system, similar compared to prior CT examination dating back to 2024. These findings could be developmental related to extrarenal pelvis, however possibility of age-indeterminate stenosis of the ureteropelvic junction can be considered in the differential. Otherwise normal sonographic appearance of bilateral kidneys. 2. Incidental note of left renal cyst. 3. Diffuse circumferential thickening of the urinary bladder wall, likely favored to be related to chronic bladder wall obstruction. Cystitis can also be considered in the differential in appropriate clinical setting.   MACRO: None   Signed by: Amara Dowell 6/27/2025 10:36 AM Dictation workstation:   [id number]    Carotid duplex bilateral  Result Date: 6/24/2025            [place] [address]  Tel [phone] and Fax [phone]  Vascular Lab Report VASC US CAROTID ARTERY DUPLEX BILATERAL  Patient Name:      SUBHASH SEGAL    Reading Physician:  [id number] Eli De La Vega MD Study Date:        6/23/2025           Ordering Physician: [id number] DAMEON ALVAREZ MRN/PID:           [id number]            Technologist:       Oumou Valdez RDMS                                                            CARLOS ALBERTO Accession#:        [id number]        Technologist 2: Date of Birth/Age: 1950 / 74 years Encounter#:         [id number] Gender:            M Admission Status:  Inpatient           Location Performed: [facility]  Diagnosis/ICD: Dizziness and giddiness-R42  Patient History Syncope, HTN and CVA. Smoker:         Never. Diabetes:       Yes. >20 years.  CONCLUSIONS: Right Carotid: Today's exam was limited due to the body habitus of the patient. Findings are consistent with less than 50% stenosis of the right proximal internal carotid artery.  Laminar flow seen by color Doppler. Right external carotid artery appears patent with no evidence of stenosis. No evidence of hemodynamically significant stenosis of the right common carotid artery. The right vertebral artery is patent with antegrade flow. No evidence of hemodynamically significant stenosis in the right subclavian artery. Left Carotid: Today's exam was limited due to the body habitus of the patient and unable to turn head to right. Findings are consistent with less than 50% stenosis of the left proximal internal carotid artery. Left external carotid artery appears patent with no evidence of stenosis. No evidence of hemodynamically significant stenosis of the left common carotid artery. The left vertebral artery is patent with antegrade flow. No evidence of hemodynamically significant stenosis in the left subclavian artery.  Imaging & Doppler Findings: Right Plaque Morph: The proximal right internal carotid artery demonstrates calcified plaque. Left Plaque Morph: The proximal left internal carotid artery demonstrates calcified plaque. The distal left common carotid artery demonstrates heterogenous plaque.   Right                        Left   PSV      EDV                PSV       cm/s 25 cm/s   CCA P    118 cm/s 18 cm/s 99 cm/s  19 cm/s   CCA D    110 cm/s 19 cm/s 107 cm/s 19 cm/s   ICA P    106 cm/s 18 cm/s 103 cm/s 35 cm/s   ICA M    81 cm/s  18 cm/s 101 cm/s 39 cm/s   ICA D    40 cm/s  10 cm/s 148 cm/s 18 cm/s    ECA     107 cm/s 13 cm/s 78 cm/s  31 cm/s Vertebral  49 cm/s  10 cm/s 129 cm/s 0 cm/s  Subclavian 168 cm/s 12 cm/s  Right                                  Left   PSV    Waveform                       PSV    Waveform 129 cm/s          Subclavian Proximal 168 cm/s               Right Left ICA/CCA Ratio  1.1  1.0   [id number] Eli De La Vega MD Electronically signed by [id number] Eli De La Vega MD on 6/24/2025 at 11:03:17 AM  ** Final **     ECG 12 lead  Result Date:  6/24/2025  Sinus rhythm with 1st degree AV block Nonspecific intraventricular block Minimal voltage criteria for LVH, may be normal variant ( [place] product ) Cannot rule out Septal infarct (cited on or before 09-MAY-2025) Lateral infarct , age undetermined Abnormal ECG When compared with ECG of 22-JUN-2025 19:20, (unconfirmed) QRS axis Shifted right Lateral infarct is now Present Serial changes of Septal infarct Present    XR chest 1 view  Result Date: 6/23/2025  Interpreted By:  Brian Morris, STUDY: XR CHEST 1 VIEW;  6/23/2025 8:57 am   INDICATION: Signs/Symptoms: SOB.   COMPARISON: 06/03/2025 AP chest x-ray and yesterday's unenhanced thoracic CT.   ACCESSION NUMBER(S): [id number]   ORDERING CLINICIAN: DAMEON ALVAREZ   FINDINGS: Portable AP erect chest x-ray 06/23/2025 8:50 a.m.:   CARDIOMEDIASTINAL SILHOUETTE: Cardiomediastinal silhouette is normal in size and configuration. Marked vascular calcification is noted. Severe aortic valve and coronary artery calcification reported on yesterday's CT is difficult to appreciate.   LUNGS: Normally inflated and appear clear aside from moderate lower left lung subsegmental atelectasis not apparent on 06/03/2025. The lateral costophrenic angles are sharp, but small bilateral pleural effusions seen on yesterday's CT would probably go undetected without a lateral view.   ABDOMEN: No remarkable upper abdominal findings.   BONES: Extensive spinal degenerative changes and generalized osteosclerosis are noted. The latter can be associated with various conditions including but not limited to myelosclerosis, renal osteodystrophy, hyperthyroidism, hypoparathyroidism, fluorosis, osteoblastic metastases, lymphoma, hepatitis C and mastocytosis and should be correlated clinically.       Left lower lobe and possibly lingular subsegmental atelectasis.   MACRO: None   Signed by: Brian Morris 6/23/2025 9:26 AM Dictation workstation:   [id number]    CT chest abdomen pelvis wo  IV contrast  Result Date: 6/22/2025  Interpreted By:  Jemal Driscoll, STUDY: CT CHEST ABDOMEN PELVIS WO CONTRAST;  6/22/2025 9:00 pm   INDICATION: Signs/Symptoms:Diuretic abdominal pain nausea not feeling.     COMPARISON: 06/05/2025 CT chest, 02/27/2025 CT abdomen/pelvis   ACCESSION NUMBER(S): [id number]   ORDERING CLINICIAN: ADDI ALBERTO   TECHNIQUE: Contiguous axial images of the chest, abdomen, and pelvis were obtained without contrast. Coronal and sagittal reformatted images were reconstructed from the axial data.   FINDINGS:   CT CHEST:   MEDIASTINUM AND LYMPH NODES:  The esophageal wall appears within normal limits.  No enlarged intrathoracic or axillary lymph nodes by imaging criteria. No pneumomediastinum.   VESSELS:  Normal caliber thoracic aorta. Mild calcific aortic atherosclerosis.   HEART: Normal size. Severe aortic valvular calcification. Mitral annular calcifications. Severe coronary artery calcifications. No significant pericardial effusion.   LUNG, AIRWAYS, PLEURA: No pneumothorax. Small bilateral pleural effusions (left > right), unchanged on the right and slightly decreased on the left, compared to 06/05/2025. Mild passive bibasilar atelectasis, similar to prior study. Scattered linear opacities elsewhere likely represent atelectasis. Linear triangular nodule in the right upper lobe likely represents focal atelectasis given new from prior study.   OSSEOUS STRUCTURES: No acute osseous abnormality. Diffuse idiopathic skeletal hyperostosis with flowing ossification along the anterior disc margins and maintained disc heights in the thoracic spine. No significant canal stenosis.   CHEST WALL SOFT TISSUES: Mild anasarca.     ABDOMEN/PELVIS:   ABDOMINAL WALL: Mild anasarca. Small fat-containing umbilical hernia.Subcutaneous injection sites in the left abdominal wall.   LIVER: No significant parenchymal abnormality.   BILE DUCTS: No significant intrahepatic or extrahepatic dilatation.   GALLBLADDER:  No significant abnormality.   PANCREAS: No significant abnormality.   SPLEEN: No significant abnormality.   ADRENALS: No significant abnormality.   KIDNEYS, URETERS, BLADDER: Unchanged mild bilateral hydronephrosis without hydroureter. No renal or ureteral calculi. The urinary bladder wall is thickened. There is perivesical fat stranding.   REPRODUCTIVE ORGANS: The prostate gland is enlarged, measuring 5.5 cm in transverse dimension.   VESSELS: Mild aortic atherosclerosis without AAA. Unchanged multiple mildly enlarged external iliac lymph node for sample no other enlarged lymph nodes. Measuring 1.3 cm on the right, 1 cm on the left   RETROPERITONEUM/LYMPH NODES: No acute retroperitoneal abnormality. No enlarged lymph nodes.   BOWEL/PERITONEUM: Tiny gastric diverticulum noted. No inflammatory bowel wall thickening or dilatation. Normal appendix.   No ascites, free air, or fluid collection.     MUSCULOSKELETAL: Multilevel bulky anterior endplate osteophytes with maintained disc spaces reflect diffuse idiopathic skeletal hyperostosis.  No suspicious osseous lesion.       Perivesical fat stranding and mild wall thickening of the urinary bladder wall. Recommend correlation for cystitis.   Unchanged mild bilateral hydronephrosis without hydroureter suggesting a ureteropelvic junction obstruction.   Small bilateral pleural effusions (left > right) which is unchanged on the right and slightly smaller on the left compared to 06/05/2025. Scattered mild atelectasis without evidence of pneumonia.   MACRO: None.   Signed by: Jemal Driscoll 6/22/2025 9:41 PM Dictation workstation:   [id number]    CT head wo IV contrast  Result Date: 6/22/2025  Interpreted By:  Jemal Driscoll, STUDY: CT HEAD WO IV CONTRAST;  6/22/2025 8:50 pm   INDICATION: Signs/Symptoms:Left eye pain  on blood thinner h/o stroke.     COMPARISON: 06/03/2025   ACCESSION NUMBER(S): [id number]   ORDERING CLINICIAN: ADDI ALBERTO   TECHNIQUE: Noncontrast  axial CT images of head were obtained with coronal and sagittal reconstructed images.   FINDINGS: BRAIN PARENCHYMA: Moderate periventricular and subcortical hemispheric white matter hypodensities are most compatible with chronic small vessel ischemic disease.Chronic lacunar infarct in the left cerebellar hemisphere. No acute intraparenchymal hemorrhage or parenchymal evidence of acute large territory ischemic infarct. Gray-white matter distinction is preserved. No mass-effect.   VENTRICLES and EXTRA-AXIAL SPACES:  No acute extra-axial or intraventricular hemorrhage. No effacement of cerebral sulci. The ventricles and sulci are age-concordant.   PARANASAL SINUSES/MASTOIDS:  No hemorrhage or air-fluid levels within the visualized paranasal sinuses. The mastoids are well aerated.   CALVARIUM/ORBITS:  No acute skull fracture.  The orbits and globes are intact to the extent visualized.   EXTRACRANIAL SOFT TISSUES: No discernible acute abnormality.       No acute intracranial abnormality.   Unchanged burden of moderate supratentorial chronic ischemic changes in left cerebellar hemisphere chronic lacunar infarct.   MACRO: None.   Signed by: Jemal Driscoll 6/22/2025 9:32 PM Dictation workstation:   [id number]    ECG 12 lead  Result Date: 6/14/2025  Sinus rhythm with 1st degree AV block Left axis deviation Nonspecific intraventricular block Minimal voltage criteria for LVH, may be normal variant ( [place] product ) Cannot rule out Anterior infarct (cited on or before 09-MAY-2025) Abnormal ECG When compared with ECG of 03-JUN-2025 14:26, No significant change was found See ED provider note for full interpretation and clinical correlation Confirmed by Flower Shaikh ([id number]) on 6/14/2025 8:57:23 PM    Lower extremity venous duplex right  Result Date: 6/6/2025  Interpreted By:  Nnamdi Dodson, STUDY: [facility] LOWER EXTREMITY VENOUS DUPLEX RIGHT;  6/6/2025 4:45 pm   INDICATION: Signs/Symptoms:eval dvt , pain swelling.    COMPARISON: None.   ACCESSION NUMBER(S): [id number]   ORDERING CLINICIAN: MIGNON PURI   TECHNIQUE: Grayscale, color and spectral Doppler sonographic images of the right lower extremity deep venous system. The left common femoral vein was imaged for comparison.   FINDINGS: There is normal compressibility of the right common femoral vein, saphenous femoral junction, femoral vein and popliteal vein. The posterior tibial and peroneal veins are suboptimally visualized. There is normal spontaneous and phasic variation throughout the leg by spectral doppler.   The left common femoral vein is patent.   OTHER FINDINGS: None.       Suboptimal visualization of the calf veins. No sonographic evidence of acute DVT in the visualized vessels of the right lower extremity.   MACRO: None   Signed by: Nnamdi Dodson 6/6/2025 4:53 PM Dictation workstation:   [id number]    Vascular US Upper Extremity Venous Duplex Right  Result Date: 6/6/2025  Interpreted By:  Nnamdi Dodson, STUDY: VASC US UPPER EXTREMITY VENOUS DUPLEX RIGHT;  6/6/2025 4:43 pm   INDICATION: Signs/Symptoms:eval dvt  pain.   COMPARISON: Upper extremity Doppler ultrasound 05/30/2025.   ACCESSION NUMBER(S): [id number]   ORDERING CLINICIAN: MIGNON PURI   TECHNIQUE: Grayscale, color and spectral Doppler sonographic imaging of the right upper extremity deep venous system.   FINDINGS: The right cephalic vein is not well visualized. Evaluation of the brachial vein is also solid limited due to placement of PICC line. Segmental visualization of the right internal jugular vein, subclavian vein, axillary vein, basilic vein and brachial vein demonstrate normal gray scale appearance, compressibility, color flow and venous waveforms. The radial and ulnar veins are not visualized.   Redemonstration of occlusive thrombus in the left subclavian vein.       No sonographic evidence of right upper extremity DVT.   Partial visualization of occlusive thrombus in the left subclavian vein  as noted on prior ultrasound. Please refer to separate or of left upper extremity DVT for additional detail   MACRO: None   Signed by: Nnamdi Dosdon 6/6/2025 4:51 PM Dictation workstation:   [id number]    CT angio chest for pulmonary embolism  Result Date: 6/5/2025  Interpreted By:  Timmy Castro, STUDY: CT ANGIO CHEST FOR PULMONARY EMBOLISM;  6/5/2025 3:00 pm   INDICATION: Signs/Symptoms:rule out PE.     COMPARISON: CT chest with contrast 21 May 2025   ACCESSION NUMBER(S): [id number]   ORDERING CLINICIAN: ASAF CORONA   TECHNIQUE: Pulmonary arterial phase CT chest after the uneventful administration of 75 mL IV contrast (Omnipaque 350).   Three dimensional maximum intensity projection (3-D MIPs) image/s were created on a separate dedicated workstation, reviewed and saved   FINDINGS: CARDIOVASCULAR:   Acute pulmonary embolism: Negative through the  lobar (second order) branch level. Substantial sized branches from segmental (third order) branch and distally cannot be evaluated Acute right heart strain: Negative. No CT evidence of acute right heart strain Cardiac thrombus:  Negative; no obvious right heart or other cardiac thrombus is seen Pulmonary arteries ectasia:  Unchanged borderline to mild   Heart size:  Borderline but unchanged Pericardial effusion:  Negative   Thoracic aortic aneurysm:  Negative Aortic dissection:  Negative   Heart failure change:  Negative.  No sign of interstitial or alveolar edema. Other:  n/a   NONVASCULAR MEDIASTINUM: Esophagus:  Grossly normal by CT Mediastinal Mass:  Negative Hiatal hernia:  None Other:  n/a   LYMPH NODES: No thoracic adenopathy   LUNGS / AIRSPACES / AIRWAYS:   LARGE AIRWAYS Filling defect: Negative Wall thickening: Negative Bronchiectasis: Negative Other: N/A   AIRSPACES Fibrosis: Negative Emphysema: Negative Consolidation: Negative Ground glass airspace disease: Negative Edema: Mild bibasilar Nodule / Mass: Negative Other: Motion artifact   PLEURA: Effusion:   Right pleural effusion has decreased in size, now trace quantity, previously at least small if not moderate. Left effusion has perhaps marginally decreased in size, still small Pneumothorax: Both sides negative Other:  n/a   CHEST WALL: Soft tissues of the chest wall are unremarkable   SKELETON: No acute or contributory abnormality   UPPER ABDOMEN: Included subdiaphragmatic structures have no acute or contributory abnormality       MILD BIBASILAR INTERSTITIAL EDEMA   THE RIGHT PLEURAL EFFUSION HAS DECREASED IN SIZE SINCE 21 MAY 2025   LEFT PLEURAL EFFUSION HAS MARGINALLY DECREASED IN SIZE AS WELL   NO ACUTE PULMONARY EMBOLISM THROUGH THE LOBAR BRANCH LEVEL. SEGMENTAL AND SUBSEGMENTAL LEVELS OF THE PULMONARY ARTERY CANNOT BE EVALUATED   NO AORTIC DISSECTION OR OTHER ACUTE THORACIC AORTIC FINDINGS   NO AIRSPACE CONSOLIDATION, GROUND-GLASS AIRSPACE DISEASE OR ANY OTHER SIGN OF ACTIVE INFECTION   NO PERICARDIAL EFFUSION   NO PNEUMOTHORAX   MACRO: None   Signed by: Timmy Castro 6/5/2025 3:21 PM Dictation workstation:   [id number]    Lower extremity venous duplex left  Result Date: 6/5/2025  STUDY: Left Lower Extremity Venous Doppler Ultrasound; 6/5/2025 12:59 PM INDICATION: Positive DVT on multiple ultrasound scan done in routine facility. Currently on a Eliquis. COMPARISON: US LE 02/11/2025, 09/12/2024. ACCESSION NUMBER(S): [id number] ORDERING CLINICIAN: ASAF CORONA TECHNIQUE:  Real-time grayscale, color, and spectral doppler ultrasound imaging of the left lower extremity veins was performed. FINDINGS: There is acute occlusive DVT demonstrated within the left deep femoral, femoral, and popliteal veins. The left external iliac and common femoral veins demonstrated normal compressibility, normal phasic venous flow and normal response to augmentation.  The visualized deep calf veins are patent.  The contralateral common femoral vein is free of thrombosis.    Evidence for acute occlusive DVT within the left deep  femoral, femoral, and popliteal veins. There is no significant change when compared to prior ultrasound from February 2025. Signed by Sherice Mejia MD    ECG 12 lead  Result Date: 6/5/2025  Sinus rhythm with 1st degree AV block Left axis deviation Nonspecific intraventricular block Minimal voltage criteria for LVH, may be normal variant ( [place] product ) Cannot rule out Septal infarct (cited on or before 09-MAY-2025) Abnormal ECG When compared with ECG of 21-MAY-2025 16:33, No significant change was found Confirmed by Long Min ([id number]) on 6/5/2025 12:09:34 PM    XR chest 1 view  Result Date: 6/3/2025  Interpreted By:  Pepe Huang, STUDY: XR CHEST 1 VIEW  6/3/2025 2:50 pm   INDICATION: Signs/Symptoms:Malaise and fatigue   COMPARISON: 05/12/2025   ACCESSION NUMBER(S): [id number]   ORDERING CLINICIAN: SHAI BREAUX   TECHNIQUE: A single AP portable radiograph of the chest was obtained.   FINDINGS: Multiple cardiac monitoring leads are seen over the chest.   No focal infiltrate, pleural effusion or pneumothorax is identified. The cardiac silhouette is within normal limits for size.       No focal infiltrate or pneumothorax is identified.   MACRO: None.   Signed by: Pepe Huang 6/3/2025 2:59 PM Dictation workstation:   [id number]    XR shoulder right 2+ views  Result Date: 6/3/2025  Interpreted By:  Pepe Huang, STUDY: XR SHOULDER RIGHT 2+ VIEWS 6/3/2025 2:50 pm   INDICATION: Signs/Symptoms:Right shoulder pain   COMPARISON: None.   ACCESSION NUMBER(S): [id number]   ORDERING CLINICIAN: ELMO OLIVAS   TECHNIQUE: 3 views of the right shoulder including AP , axillary and scapular Y-views were obtained.   FINDINGS: There is no radiographic evidence of acute fracture or dislocation identified.  Mild hypertrophic degenerative changes are seen in the right shoulder.       1. No evidence of acute fracture or dislocation. 2. Degenerative changes, as described above.   MACRO: None.   Signed by: Pepe Huang 6/3/2025 2:59  PM Dictation workstation:   [id number]    CT head wo IV contrast  Result Date: 6/3/2025  Interpreted By:  Julien Romeo, STUDY: CT HEAD WO IV CONTRAST;  6/3/2025 2:40 pm   INDICATION: Signs/Symptoms:Headache at the base of the skull, recently started on Xarelto.     COMPARISON: 05/12/2025.   ACCESSION NUMBER(S): [id number]   ORDERING CLINICIAN: ELMO OLIVAS   TECHNIQUE: Contiguous unenhanced axial images were obtained through the brain.   FINDINGS: INTRACRANIAL: Mild generalized atrophy is again seen.  Nonspecific irregular low-attenuation changes throughout the periventricular and subcortical white matter are similar to prior, most likely related to chronic microvascular disease. There is a stable small remote lacunar infarct of the right basal ganglia and internal capsule anterior limb. No acute intracranial bleed, midline shift, or mass effect is seen. Gray-white differentiation is maintained. No extra-axial fluid collection or hydrocephalus. Irregular atherosclerotic calcifications again seen in the internal carotid artery and vertebral artery segments   Bones are intact.   EXTRACRANIAL: Paranasal sinus mild peripheral mucosal thickening is most prominent in the ethmoid air cells. No paranasal sinus air-fluid level. Mastoid air cells are clear.       Age-related/chronic changes similar to prior. No acute intracranial process.       MACRO: None.   Signed by: Julien Romeo 6/3/2025 2:46 PM Dictation workstation:   [id number]    Vascular US upper extremity venous duplex left  Result Date: 5/30/2025  STUDY: Left upper extremity venous ultrasound; Completed Time: 5/30/2025 13:35 INDICATION: LUE swelling.  Recent PICC line placement. COMPARISON: None available. ACCESSION NUMBER(S): [id number] ORDERING CLINICIAN: RAJINDER CONNER TECHNIQUE: Ultrasound of the left upper extremity veins.  Multiple images were obtained. FINDINGS: There is acute partially occlusive DVT demonstrated within the left subclavian,  axillary and brachial veins.  Superficial thrombus is visualized within the basilic vein, surrounding the PICC line. The left internal jugular vein demonstrated normal compressibility, normal phasic venous flow, and normal response to augmentation.  There is no evidence for echogenic thrombi.  The cephalic vein is patent.  The radial and ulnar veins are not evaluated.    Evidence for acute partially occlusive DVT within the left subclavian, axillary and brachial veins. Superficial thrombus within the basilic vein, surrounding the PICC line. Signed by Trent Sher MD    Results for orders placed or performed during the hospital encounter of 06/22/25 (from the past 24 hours)   POCT GLUCOSE   Result Value Ref Range    POCT Glucose 96 74 - 99 mg/dL   POCT GLUCOSE   Result Value Ref Range    POCT Glucose 101 (H) 74 - 99 mg/dL   POCT GLUCOSE   Result Value Ref Range    POCT Glucose 161 (H) 74 - 99 mg/dL   POCT GLUCOSE   Result Value Ref Range    POCT Glucose 154 (H) 74 - 99 mg/dL     Assessment & Plan  UTI (urinary tract infection)    Acute deep vein thrombosis (DVT) of femoral vein of left lower extremity    Ambulatory dysfunction    Benign essential HTN    DM type 2 with diabetic peripheral neuropathy    Generalized weakness    Hyperlipidemia    Dizziness    Wound infection    Fever    Ventricular tachycardia seen on cardiac monitor    UTI  Fever  -UA: 250 leukocyte esterase, 6-10 WBC, 1+ bacteria,   -UA culture clinically insignificant growth based on current clinical standards  - has coccyx wound, dorsal left ankle wound, left hand finger skin tear and right heel pressure injury  -Blood culture gram-positive cocci, clusters, Staphylococcus epidermidis  -PICC discontinued and removed for possible source per ID   -cefTRIAXone (Rocephin) 1 g in dextrose (iso) IV 50 mL discontinued  -Add piperacillin-tazobactam (Zosyn) 4.5 g in dextrose (iso)  mL  q 6 hours started (Day 5)  -Add vancomycin (Vancocin) 1,500 mg in  sodium chloride 0.9% 500 mL IV daily (Day 5)  -Received NS 500ml bolus  -WBC 6.7 > 5.7 > 8.5 > 9.7 > 7.2>6.2>5.8  -Monitor WBC   -Daily CBC  -Monitor for fever- afebrile overnight  -acetaminophen (Tylenol) tablet 650 mg PRN pain/fever  -BUN/Cr/GFR 20/1.31/57> 19/1.02/78 > 11/0.99/80  -Repeat blood cultures negative to date  -Repeat urine culture negative  - tissue culture pending of coccyx: Pseudomonas aeruginosa  -pending PICC line placement    Ambulatory dysfunction  Weakness  Dizziness  -CXR: Left lower lobe and possibly lingular subsegmental atelectasis   -CTH: No acute intracranial abnormality. Unchanged burden of moderate supratentorial chronic ischemic changes in left cerebellar hemisphere chronic lacunar infarct.  -CT chest abdomen pelvis wo IV contrast Perivesical fat stranding and mild wall thickening of the urinary bladder wall. Recommend correlation for cystitis. Unchanged mild bilateral hydronephrosis without hydroureter suggesting a ureteropelvic junction obstruction. Small bilateral pleural effusions (left > right) which is unchanged on the right and slightly smaller on the left compared to 06/05/2025. Scattered mild atelectasis without evidence of pneumonia.  -Vascular US Carotid Artery duplex: Right Carotid: Today's exam was limited due to the body habitus of the patient. Findings are consistent with less than 50% stenosis of the right proximal internal carotid artery. Laminar flow seen by color Doppler. Right external carotid artery appears patent with no evidence of stenosis. No evidence of hemodynamically significant stenosis of the right common carotid artery. The right vertebral artery is patent with antegrade flow. No evidence of hemodynamically significant stenosis in the right subclavian artery.  Left Carotid: Today's exam was limited due to the body habitus of the patient and unable to turn head to right. Findings are consistent with less than 50% stenosis of the left proximal internal  carotid artery. Left external carotid artery appears patent with no evidence of stenosis. No evidence of hemodynamically significant stenosis of the left common carotid artery. The left vertebral artery is patent with antegrade flow. No evidence of hemodynamically significant stenosis in the left subclavian artery.  -meclizine PRN  -PT/OT evaluation, appreciate recommendations  -Orthostatic pressures resolved   -Telemetry     HTN  DM type 2  Hyperlipidemia  -amLODIPine (Norvasc) tablet 2.5 mg   -gabapentin (Neurontin) capsule 300 mg   -hydrALAZINE (Apresoline) tablet 50 mg  -Blood sugar controlled at this time without medication A1C 5.8 6/6  -Monitor glucose  -Daily BMP     DVT  -enoxaparin (Lovenox) syringe 90 mg  -6/6 Upper extremity US: No sonographic evidence of right upper extremity DVT. Partial visualization of occlusive thrombus in the left subclavian vein as noted on prior ultrasound. Please refer to separate or of left upper extremity DVT for additional detail   -left arm restriction        Wound  -ID consult- Case discussed with SARAH Kothari-CNP , appreciate recommendations  -Podiatry consulted appreciate recommendations, debridement and dressing 6/23 (left dorsal ankle)  -PICC line- Completed OP Vancomycin course 6/18  -Wound culture  1+ rare mixed skin microorganisms  -Repeat wound culture pending  -PICC line pulled    -awaiting new picc line placement     Ventricular tachycardia seen on cardiac monitor  - Monitor electrolytes  - K 3.8, , calcium 8.6,    - Daily BMP  - Magnesium 2.13  - Telemetry  - No overnight events     DVT ppx: Lovenox   Fluids: As needed   Electrolytes: replace as needed  Nutrition: Regular   Adjuncts: PIV     Code Status: Full Code    Pt requires inpatient stay at this time.  Total accumulated time spent face to face and not face to face preparing to see the patient, obtaining and reviewing separately obtained history; performing a medically appropriate  examination and/or evaluation; counseling and educating the patient, family; ordering medications, tests, or procedures; referring and communicating with other health care professionals; documenting clinical information in the patient's medical record; independently interpreting results and communicating the results to the patient, family; and care coordination was 45 minutes.      SARAH Peng-CNP         [1] amLODIPine, 2.5 mg, oral, Daily  enoxaparin, 90 mg, subcutaneous, q12h  gabapentin, 300 mg, oral, TID  hydrALAZINE, 50 mg, oral, TID  lidocaine, 5 mL, infiltration, Once  piperacillin-tazobactam, 4.5 g, intravenous, q6h  traZODone, 50 mg, oral, Nightly  vancomycin, 1,500 mg, intravenous, q24h  zinc oxide, 1 Application, Topical, TID  [2] sodium chloride 0.9%, 10 mL/hr, Last Rate: 10 mL/hr (06/25/25 1153)  [3] PRN medications: acetaminophen, alteplase, alum-mag hydroxide-simeth, benzocaine-menthol, heparin flush, ipratropium-albuteroL, meclizine, melatonin, ondansetron, oxygen, polyethylene glycol, sennosides-docusate sodium, sodium chloride 0.9%, sodium chloride 0.9%, traMADol, vancomycin

## 2025-06-28 NOTE — PROGRESS NOTES
Vancomycin Dosing by Pharmacy- FOLLOW UP    Elliot Partida is a 74 y.o. year old male who Pharmacy has been consulted for vancomycin dosing     Renal function is currently stable.    Current vancomycin dose: 1500 mg given every 24 hours    Estimated vancomycin AUC on current dose: 589 mg/L.hr     Visit Vitals  /56 (BP Location: Left arm, Patient Position: Lying)   Pulse 58   Temp 36.5 °C (97.7 °F) (Temporal)   Resp 16        Lab Results   Component Value Date    CREATININE 0.99 2025    CREATININE 1.04 2025    CREATININE 1.02 2025    CREATININE 1.31 (H) 2025        Patient weight is as follows:   Vitals:    25 0716   Weight: 91.4 kg (201 lb 8 oz)       Cultures:  Susceptibility data for the encounter in last 14 days.  Collected Specimen Info Organism Aztreonam Cefepime Ceftazidime Ciprofloxacin Clindamycin Erythromycin Levofloxacin Oxacillin Piperacillin/Tazobactam Tetracycline Tobramycin Trimethoprim/Sulfamethoxazole Vancomycin   25 Tissue/Biopsy from Wound/Tissue Pseudomonas aeruginosa  S  S  S  S    S   S   S       Mixed Aerobic and Anaerobic Bacteria                 25 Tissue/Biopsy from Wound/Tissue Mixed Skin Microorganisms                 25 Blood culture from Peripheral Venipuncture Staphylococcus epidermidis      R  I   R   S   S  S     Staphylococcus pettenkoferi/argensis                25 Blood culture from Peripheral Venipuncture Staphylococcus epidermidis                     I/O last 3 completed shifts:  In: 2710 (29.7 mL/kg) [P.O.:2710]  Out: 550 (6 mL/kg) [Urine:550 (0.2 mL/kg/hr)]  Weight: 91.4 kg   I/O during current shift:  No intake/output data recorded.    Temp (24hrs), Av.5 °C (97.7 °F), Min:36.5 °C (97.7 °F), Max:36.5 °C (97.7 °F)      Assessment/Plan    Within goal AUC range. Continue current vancomycin regimen.    This dosing regimen is predicted by [employer] to result in the following pharmacokinetic parameters:  Regimen: 1500  mg IV every 24 hours.  Start time: 22:46 on 06/28/2025  Exposure target: AUC24 (range) 400-600 mg/L.hr   [id number]: 584 mg/L.hr  AUC24,ss: 589 mg/L.hr  Probability of AUC24 > 400: 100 %  Ctrough,ss: 16.2 mg/L  Probability of Ctrough,ss > 20: 17 %      /The next level will be obtained on 7/1 at 0500. May be obtained sooner if clinically indicated.   Will continue to monitor renal function daily while on vancomycin and order serum creatinine at least every 48 hours if not already ordered.  Follow for continued vancomycin needs, clinical response, and signs/symptoms of toxicity.       Kirsten Enamorado, RP

## 2025-06-29 VITALS
OXYGEN SATURATION: 87 % | SYSTOLIC BLOOD PRESSURE: 114 MMHG | HEIGHT: 69 IN | HEART RATE: 66 BPM | DIASTOLIC BLOOD PRESSURE: 57 MMHG | BODY MASS INDEX: 29.84 KG/M2 | RESPIRATION RATE: 16 BRPM | TEMPERATURE: 97.9 F | WEIGHT: 201.5 LBS

## 2025-06-29 LAB
ANION GAP SERPL CALC-SCNC: 11 MMOL/L (ref 10–20)
BACTERIA BLD CULT: NORMAL
BACTERIA BLD CULT: NORMAL
BUN SERPL-MCNC: 12 MG/DL (ref 6–23)
CALCIUM SERPL-MCNC: 8.8 MG/DL (ref 8.6–10.3)
CHLORIDE SERPL-SCNC: 104 MMOL/L (ref 98–107)
CO2 SERPL-SCNC: 29 MMOL/L (ref 21–32)
CREAT SERPL-MCNC: 1.14 MG/DL (ref 0.5–1.3)
EGFRCR SERPLBLD CKD-EPI 2021: 68 ML/MIN/1.73M*2
ERYTHROCYTE [DISTWIDTH] IN BLOOD BY AUTOMATED COUNT: 16.9 % (ref 11.5–14.5)
GLUCOSE SERPL-MCNC: 158 MG/DL (ref 74–99)
HCT VFR BLD AUTO: 27.9 % (ref 41–52)
HGB BLD-MCNC: 8.8 G/DL (ref 13.5–17.5)
MAGNESIUM SERPL-MCNC: 2.08 MG/DL (ref 1.6–2.4)
MCH RBC QN AUTO: 27.9 PG (ref 26–34)
MCHC RBC AUTO-ENTMCNC: 31.5 G/DL (ref 32–36)
MCV RBC AUTO: 89 FL (ref 80–100)
NRBC BLD-RTO: 0 /100 WBCS (ref 0–0)
PLATELET # BLD AUTO: 217 X10*3/UL (ref 150–450)
POTASSIUM SERPL-SCNC: 3.9 MMOL/L (ref 3.5–5.3)
RBC # BLD AUTO: 3.15 X10*6/UL (ref 4.5–5.9)
SODIUM SERPL-SCNC: 140 MMOL/L (ref 136–145)
WBC # BLD AUTO: 6.1 X10*3/UL (ref 4.4–11.3)

## 2025-06-29 PROCEDURE — 99233 SBSQ HOSP IP/OBS HIGH 50: CPT

## 2025-06-29 PROCEDURE — 94760 N-INVAS EAR/PLS OXIMETRY 1: CPT | Mod: IPSPLIT

## 2025-06-29 PROCEDURE — 2500000004 HC RX 250 GENERAL PHARMACY W/ HCPCS (ALT 636 FOR OP/ED): Mod: IPSPLIT | Performed by: NURSE PRACTITIONER

## 2025-06-29 PROCEDURE — 2500000001 HC RX 250 WO HCPCS SELF ADMINISTERED DRUGS (ALT 637 FOR MEDICARE OP): Mod: IPSPLIT | Performed by: NURSE PRACTITIONER

## 2025-06-29 PROCEDURE — 2500000005 HC RX 250 GENERAL PHARMACY W/O HCPCS: Mod: IPSPLIT | Performed by: NURSE PRACTITIONER

## 2025-06-29 PROCEDURE — 1200000002 HC GENERAL ROOM WITH TELEMETRY DAILY: Mod: IPSPLIT

## 2025-06-29 PROCEDURE — 36415 COLL VENOUS BLD VENIPUNCTURE: CPT | Mod: IPSPLIT

## 2025-06-29 PROCEDURE — 80048 BASIC METABOLIC PNL TOTAL CA: CPT | Mod: IPSPLIT

## 2025-06-29 PROCEDURE — 83735 ASSAY OF MAGNESIUM: CPT | Mod: IPSPLIT

## 2025-06-29 PROCEDURE — 85027 COMPLETE CBC AUTOMATED: CPT | Mod: IPSPLIT

## 2025-06-29 RX ADMIN — TRAZODONE HYDROCHLORIDE 50 MG: 50 TABLET ORAL at 20:18

## 2025-06-29 RX ADMIN — GABAPENTIN 300 MG: 300 CAPSULE ORAL at 20:18

## 2025-06-29 RX ADMIN — ZINC OXIDE 1 APPLICATION: 200 OINTMENT TOPICAL at 14:02

## 2025-06-29 RX ADMIN — ACETAMINOPHEN 650 MG: 325 TABLET, FILM COATED ORAL at 17:55

## 2025-06-29 RX ADMIN — PIPERACILLIN SODIUM AND TAZOBACTAM SODIUM 4.5 G: 4; .5 INJECTION, SOLUTION INTRAVENOUS at 00:50

## 2025-06-29 RX ADMIN — ENOXAPARIN SODIUM 90 MG: 100 INJECTION SUBCUTANEOUS at 11:14

## 2025-06-29 RX ADMIN — ONDANSETRON 4 MG: 2 INJECTION INTRAMUSCULAR; INTRAVENOUS at 16:49

## 2025-06-29 RX ADMIN — HYDRALAZINE HYDROCHLORIDE 50 MG: 50 TABLET ORAL at 20:18

## 2025-06-29 RX ADMIN — AMLODIPINE BESYLATE 2.5 MG: 2.5 TABLET ORAL at 08:20

## 2025-06-29 RX ADMIN — HYDRALAZINE HYDROCHLORIDE 50 MG: 50 TABLET ORAL at 14:02

## 2025-06-29 RX ADMIN — PIPERACILLIN SODIUM AND TAZOBACTAM SODIUM 4.5 G: 4; .5 INJECTION, SOLUTION INTRAVENOUS at 08:20

## 2025-06-29 RX ADMIN — PIPERACILLIN SODIUM AND TAZOBACTAM SODIUM 4.5 G: 4; .5 INJECTION, SOLUTION INTRAVENOUS at 14:02

## 2025-06-29 RX ADMIN — ZINC OXIDE 1 APPLICATION: 200 OINTMENT TOPICAL at 08:22

## 2025-06-29 RX ADMIN — HYDRALAZINE HYDROCHLORIDE 50 MG: 50 TABLET ORAL at 08:20

## 2025-06-29 RX ADMIN — TRAMADOL HYDROCHLORIDE 25 MG: 50 TABLET, COATED ORAL at 15:13

## 2025-06-29 RX ADMIN — PIPERACILLIN SODIUM AND TAZOBACTAM SODIUM 4.5 G: 4; .5 INJECTION, SOLUTION INTRAVENOUS at 20:18

## 2025-06-29 RX ADMIN — GABAPENTIN 300 MG: 300 CAPSULE ORAL at 14:02

## 2025-06-29 RX ADMIN — Medication 2 L/MIN: at 22:02

## 2025-06-29 RX ADMIN — GABAPENTIN 300 MG: 300 CAPSULE ORAL at 08:20

## 2025-06-29 ASSESSMENT — COGNITIVE AND FUNCTIONAL STATUS - GENERAL
STANDING UP FROM CHAIR USING ARMS: A LITTLE
DAILY ACTIVITIY SCORE: 18
MOBILITY SCORE: 20
WALKING IN HOSPITAL ROOM: A LITTLE
MOVING TO AND FROM BED TO CHAIR: A LITTLE
EATING MEALS: A LITTLE
CLIMB 3 TO 5 STEPS WITH RAILING: A LITTLE
TOILETING: A LITTLE
DRESSING REGULAR UPPER BODY CLOTHING: A LITTLE
HELP NEEDED FOR BATHING: A LITTLE
DRESSING REGULAR LOWER BODY CLOTHING: A LITTLE
PERSONAL GROOMING: A LITTLE

## 2025-06-29 ASSESSMENT — PAIN SCALES - GENERAL
PAINLEVEL_OUTOF10: 0 - NO PAIN
PAINLEVEL_OUTOF10: 0 - NO PAIN

## 2025-06-29 ASSESSMENT — PAIN - FUNCTIONAL ASSESSMENT: PAIN_FUNCTIONAL_ASSESSMENT: 0-10

## 2025-06-29 ASSESSMENT — ENCOUNTER SYMPTOMS: WOUND: 1

## 2025-06-29 NOTE — PROGRESS NOTES
Vancomycin Dosing by Pharmacy- Cessation of Therapy    Consult to pharmacy for vancomycin dosing has been discontinued by the prescriber, pharmacy will sign off at this time.    Please call pharmacy if there are further questions or re-enter a consult if vancomycin is resumed.     Valeriano Cantu, PharmD

## 2025-06-29 NOTE — PROGRESS NOTES
"Elliot Partida is a 74 y.o. male on day 6 of admission presenting with UTI (urinary tract infection).    Subjective   Patient resting in bed, has no complaints today.  Patient awaiting PICC line placement for continued IV antibiotics.  Discussed ongoing treatment plan, states understanding, and agrees.  All questions answered.         Objective     Physical Exam  Constitutional:       Appearance: He is obese.   HENT:      Head: Normocephalic and atraumatic.      Nose: Nose normal.      Mouth/Throat:      Mouth: Mucous membranes are moist.   Eyes:      Extraocular Movements: Extraocular movements intact.      Pupils: Pupils are equal, round, and reactive to light.   Cardiovascular:      Rate and Rhythm: Normal rate and regular rhythm.      Pulses: Normal pulses.      Heart sounds: Normal heart sounds.   Pulmonary:      Effort: Pulmonary effort is normal.      Breath sounds: Normal breath sounds.      Comments: Diminished Bases  Abdominal:      General: Bowel sounds are normal.      Palpations: Abdomen is soft.   Musculoskeletal:         General: Normal range of motion.      Cervical back: Normal range of motion.      Right lower leg: Edema present.      Left lower leg: Edema present.   Skin:     General: Skin is warm and dry.      Capillary Refill: Capillary refill takes less than 2 seconds.      Comments: Wound LLE   Neurological:      Mental Status: He is alert. Mental status is at baseline.      Motor: Weakness present.      Gait: Gait abnormal.   Psychiatric:         Mood and Affect: Mood normal.         Behavior: Behavior normal.         Last Recorded Vitals  Blood pressure 146/62, pulse 82, temperature 36.7 °C (98.1 °F), temperature source Temporal, resp. rate 18, height 1.753 m (5' 9\"), weight 91.4 kg (201 lb 8 oz), SpO2 93%.  Intake/Output last 3 Shifts:  I/O last 3 completed shifts:  In: 800 (8.8 mL/kg) [P.O.:800]  Out: 600 (6.6 mL/kg) [Urine:600 (0.2 mL/kg/hr)]  Weight: 91.4 kg     Relevant " Results  Scheduled medications  Scheduled Medications[1]  Continuous medications  Continuous Medications[2]  PRN medications  PRN Medications[3]    US renal complete  Result Date: 6/27/2025  Interpreted By:  Amara Dowell, STUDY: US RENAL COMPLETE;  6/26/2025 7:43 pm   INDICATION: Signs/Symptoms:hydronephrosis with concern for obstruction.     COMPARISON: CT chest abdomen pelvis dated 06/22/2025 and CT angio abdomen pelvis dated 09/13/2024.   ACCESSION NUMBER(S): [id number]   ORDERING CLINICIAN: BONG TANG   TECHNIQUE: Multiple images of the kidneys were obtained  with grayscale and color Doppler.   FINDINGS: RIGHT KIDNEY: The right kidney measures 13.3 cm in length. The renal cortical echogenicity and thickness are within normal limits. There is prominence of right renal pelvis measuring up to 1.6 cm with mild prominence of right renal calices. These findings are similar compared to prior CT dated 09/13/2024. No evidence of nephrolithiasis.   LEFT KIDNEY: The left kidney measures 13.1 cm in length. The renal cortical echogenicity and thickness are within normal limits. There is prominence of left renal pelvis measuring up to 2.25 cm with mild prominence of left renal calices. These findings are similar compared to prior CT dated 09/13/2024. No leighton hydronephrosis is noted. No evidence of nephrolithiasis. An exophytic hypoechoic to anechoic lesion in the inferior pole of left kidney measuring up to 1.2 x 1.1 x 0.9 cm. There is through transmission without evidence of internal vascularity or internal complexity. This is likely favored to represent a simple cyst.   BLADDER: Urinary bladder is moderately distended and demonstrates diffuse circumferential bladder wall thickening. Bilateral ureteral jets are visualized. There is a 1.8 cm echogenic structure along the posterior bladder wall demonstrating twinkling artifact. This could be related to calcifications seen in the prostate gland or adjacent phleboliths as  seen on prior CT abdomen pelvis.       1. Prominence of bilateral renal pelvis and bilateral renal collecting system, similar compared to prior CT examination dating back to 2024. These findings could be developmental related to extrarenal pelvis, however possibility of age-indeterminate stenosis of the ureteropelvic junction can be considered in the differential. Otherwise normal sonographic appearance of bilateral kidneys. 2. Incidental note of left renal cyst. 3. Diffuse circumferential thickening of the urinary bladder wall, likely favored to be related to chronic bladder wall obstruction. Cystitis can also be considered in the differential in appropriate clinical setting.   MACRO: None   Signed by: Amara Dowell 6/27/2025 10:36 AM Dictation workstation:   [id number]    Carotid duplex bilateral  Result Date: 6/24/2025            [place] [id number]  Tel [phone] and Fax [phone]  Vascular Lab Report VASC US CAROTID ARTERY DUPLEX BILATERAL  Patient Name:      SUBHASH SEGAL    Reading Physician:  [id number] Eli De La Vega MD Study Date:        6/23/2025           Ordering Physician: [id number] DAMEON ALVAREZ MRN/PID:           [id number]            Technologist:       Oumou Valdez RDMS                                                            CARLOS ALBERTO Accession#:        [id number]        Technologist 2: Date of Birth/Age: 1950 / 74 years Encounter#:         [id number] Gender:            M Admission Status:  Inpatient           Location Performed: [facility]  Diagnosis/ICD: Dizziness and giddiness-R42  Patient History Syncope, HTN and CVA. Smoker:         Never. Diabetes:       Yes. >20 years.  CONCLUSIONS: Right Carotid: Today's exam was limited due to the body habitus of the patient. Findings are consistent with less than 50% stenosis of the right proximal internal carotid artery.  Laminar flow seen by color Doppler. Right external carotid artery appears patent with no evidence of stenosis. No evidence of hemodynamically significant stenosis of the right common carotid artery. The right vertebral artery is patent with antegrade flow. No evidence of hemodynamically significant stenosis in the right subclavian artery. Left Carotid: Today's exam was limited due to the body habitus of the patient and unable to turn head to right. Findings are consistent with less than 50% stenosis of the left proximal internal carotid artery. Left external carotid artery appears patent with no evidence of stenosis. No evidence of hemodynamically significant stenosis of the left common carotid artery. The left vertebral artery is patent with antegrade flow. No evidence of hemodynamically significant stenosis in the left subclavian artery.  Imaging & Doppler Findings: Right Plaque Morph: The proximal right internal carotid artery demonstrates calcified plaque. Left Plaque Morph: The proximal left internal carotid artery demonstrates calcified plaque. The distal left common carotid artery demonstrates heterogenous plaque.   Right                        Left   PSV      EDV                PSV       cm/s 25 cm/s   CCA P    118 cm/s 18 cm/s 99 cm/s  19 cm/s   CCA D    110 cm/s 19 cm/s 107 cm/s 19 cm/s   ICA P    106 cm/s 18 cm/s 103 cm/s 35 cm/s   ICA M    81 cm/s  18 cm/s 101 cm/s 39 cm/s   ICA D    40 cm/s  10 cm/s 148 cm/s 18 cm/s    ECA     107 cm/s 13 cm/s 78 cm/s  31 cm/s Vertebral  49 cm/s  10 cm/s 129 cm/s 0 cm/s  Subclavian 168 cm/s 12 cm/s  Right                                  Left   PSV    Waveform                       PSV    Waveform 129 cm/s          Subclavian Proximal 168 cm/s               Right Left ICA/CCA Ratio  1.1  1.0   [id number] Eli De La Vega MD Electronically signed by [id number] Eli De La Vega MD on 6/24/2025 at 11:03:17 AM  ** Final **     ECG 12 lead  Result Date:  6/24/2025  Sinus rhythm with 1st degree AV block Nonspecific intraventricular block Minimal voltage criteria for LVH, may be normal variant ( [place] product ) Cannot rule out Septal infarct (cited on or before 09-MAY-2025) Lateral infarct , age undetermined Abnormal ECG When compared with ECG of 22-JUN-2025 19:20, (unconfirmed) QRS axis Shifted right Lateral infarct is now Present Serial changes of Septal infarct Present    XR chest 1 view  Result Date: 6/23/2025  Interpreted By:  Brian Morris, STUDY: XR CHEST 1 VIEW;  6/23/2025 8:57 am   INDICATION: Signs/Symptoms: SOB.   COMPARISON: 06/03/2025 AP chest x-ray and yesterday's unenhanced thoracic CT.   ACCESSION NUMBER(S): [id number]   ORDERING CLINICIAN: DAMEON ALVAREZ   FINDINGS: Portable AP erect chest x-ray 06/23/2025 8:50 a.m.:   CARDIOMEDIASTINAL SILHOUETTE: Cardiomediastinal silhouette is normal in size and configuration. Marked vascular calcification is noted. Severe aortic valve and coronary artery calcification reported on yesterday's CT is difficult to appreciate.   LUNGS: Normally inflated and appear clear aside from moderate lower left lung subsegmental atelectasis not apparent on 06/03/2025. The lateral costophrenic angles are sharp, but small bilateral pleural effusions seen on yesterday's CT would probably go undetected without a lateral view.   ABDOMEN: No remarkable upper abdominal findings.   BONES: Extensive spinal degenerative changes and generalized osteosclerosis are noted. The latter can be associated with various conditions including but not limited to myelosclerosis, renal osteodystrophy, hyperthyroidism, hypoparathyroidism, fluorosis, osteoblastic metastases, lymphoma, hepatitis C and mastocytosis and should be correlated clinically.       Left lower lobe and possibly lingular subsegmental atelectasis.   MACRO: None   Signed by: Brian Morris 6/23/2025 9:26 AM Dictation workstation:   [id number]    CT chest abdomen pelvis wo  IV contrast  Result Date: 6/22/2025  Interpreted By:  Jemal Driscoll, STUDY: CT CHEST ABDOMEN PELVIS WO CONTRAST;  6/22/2025 9:00 pm   INDICATION: Signs/Symptoms:Diuretic abdominal pain nausea not feeling.     COMPARISON: 06/05/2025 CT chest, 02/27/2025 CT abdomen/pelvis   ACCESSION NUMBER(S): [id number]   ORDERING CLINICIAN: ADDI ALBERTO   TECHNIQUE: Contiguous axial images of the chest, abdomen, and pelvis were obtained without contrast. Coronal and sagittal reformatted images were reconstructed from the axial data.   FINDINGS:   CT CHEST:   MEDIASTINUM AND LYMPH NODES:  The esophageal wall appears within normal limits.  No enlarged intrathoracic or axillary lymph nodes by imaging criteria. No pneumomediastinum.   VESSELS:  Normal caliber thoracic aorta. Mild calcific aortic atherosclerosis.   HEART: Normal size. Severe aortic valvular calcification. Mitral annular calcifications. Severe coronary artery calcifications. No significant pericardial effusion.   LUNG, AIRWAYS, PLEURA: No pneumothorax. Small bilateral pleural effusions (left > right), unchanged on the right and slightly decreased on the left, compared to 06/05/2025. Mild passive bibasilar atelectasis, similar to prior study. Scattered linear opacities elsewhere likely represent atelectasis. Linear triangular nodule in the right upper lobe likely represents focal atelectasis given new from prior study.   OSSEOUS STRUCTURES: No acute osseous abnormality. Diffuse idiopathic skeletal hyperostosis with flowing ossification along the anterior disc margins and maintained disc heights in the thoracic spine. No significant canal stenosis.   CHEST WALL SOFT TISSUES: Mild anasarca.     ABDOMEN/PELVIS:   ABDOMINAL WALL: Mild anasarca. Small fat-containing umbilical hernia.Subcutaneous injection sites in the left abdominal wall.   LIVER: No significant parenchymal abnormality.   BILE DUCTS: No significant intrahepatic or extrahepatic dilatation.   GALLBLADDER:  No significant abnormality.   PANCREAS: No significant abnormality.   SPLEEN: No significant abnormality.   ADRENALS: No significant abnormality.   KIDNEYS, URETERS, BLADDER: Unchanged mild bilateral hydronephrosis without hydroureter. No renal or ureteral calculi. The urinary bladder wall is thickened. There is perivesical fat stranding.   REPRODUCTIVE ORGANS: The prostate gland is enlarged, measuring 5.5 cm in transverse dimension.   VESSELS: Mild aortic atherosclerosis without AAA. Unchanged multiple mildly enlarged external iliac lymph node for sample no other enlarged lymph nodes. Measuring 1.3 cm on the right, 1 cm on the left   RETROPERITONEUM/LYMPH NODES: No acute retroperitoneal abnormality. No enlarged lymph nodes.   BOWEL/PERITONEUM: Tiny gastric diverticulum noted. No inflammatory bowel wall thickening or dilatation. Normal appendix.   No ascites, free air, or fluid collection.     MUSCULOSKELETAL: Multilevel bulky anterior endplate osteophytes with maintained disc spaces reflect diffuse idiopathic skeletal hyperostosis.  No suspicious osseous lesion.       Perivesical fat stranding and mild wall thickening of the urinary bladder wall. Recommend correlation for cystitis.   Unchanged mild bilateral hydronephrosis without hydroureter suggesting a ureteropelvic junction obstruction.   Small bilateral pleural effusions (left > right) which is unchanged on the right and slightly smaller on the left compared to 06/05/2025. Scattered mild atelectasis without evidence of pneumonia.   MACRO: None.   Signed by: Jemal Driscoll 6/22/2025 9:41 PM Dictation workstation:   [id number]    CT head wo IV contrast  Result Date: 6/22/2025  Interpreted By:  Jemal Driscoll, STUDY: CT HEAD WO IV CONTRAST;  6/22/2025 8:50 pm   INDICATION: Signs/Symptoms:Left eye pain  on blood thinner h/o stroke.     COMPARISON: 06/03/2025   ACCESSION NUMBER(S): [id number]   ORDERING CLINICIAN: ADDI ALBERTO   TECHNIQUE: Noncontrast  axial CT images of head were obtained with coronal and sagittal reconstructed images.   FINDINGS: BRAIN PARENCHYMA: Moderate periventricular and subcortical hemispheric white matter hypodensities are most compatible with chronic small vessel ischemic disease.Chronic lacunar infarct in the left cerebellar hemisphere. No acute intraparenchymal hemorrhage or parenchymal evidence of acute large territory ischemic infarct. Gray-white matter distinction is preserved. No mass-effect.   VENTRICLES and EXTRA-AXIAL SPACES:  No acute extra-axial or intraventricular hemorrhage. No effacement of cerebral sulci. The ventricles and sulci are age-concordant.   PARANASAL SINUSES/MASTOIDS:  No hemorrhage or air-fluid levels within the visualized paranasal sinuses. The mastoids are well aerated.   CALVARIUM/ORBITS:  No acute skull fracture.  The orbits and globes are intact to the extent visualized.   EXTRACRANIAL SOFT TISSUES: No discernible acute abnormality.       No acute intracranial abnormality.   Unchanged burden of moderate supratentorial chronic ischemic changes in left cerebellar hemisphere chronic lacunar infarct.   MACRO: None.   Signed by: Jemal Driscoll 6/22/2025 9:32 PM Dictation workstation:   [id number]    ECG 12 lead  Result Date: 6/14/2025  Sinus rhythm with 1st degree AV block Left axis deviation Nonspecific intraventricular block Minimal voltage criteria for LVH, may be normal variant ( [place] product ) Cannot rule out Anterior infarct (cited on or before 09-MAY-2025) Abnormal ECG When compared with ECG of 03-JUN-2025 14:26, No significant change was found See ED provider note for full interpretation and clinical correlation Confirmed by Flower Shaikh ([id number]) on 6/14/2025 8:57:23 PM    Lower extremity venous duplex right  Result Date: 6/6/2025  Interpreted By:  Nnamdi Dodson, STUDY: [facility] LOWER EXTREMITY VENOUS DUPLEX RIGHT;  6/6/2025 4:45 pm   INDICATION: Signs/Symptoms:eval dvt , pain swelling.    COMPARISON: None.   ACCESSION NUMBER(S): [id number]   ORDERING CLINICIAN: MIGNON PURI   TECHNIQUE: Grayscale, color and spectral Doppler sonographic images of the right lower extremity deep venous system. The left common femoral vein was imaged for comparison.   FINDINGS: There is normal compressibility of the right common femoral vein, saphenous femoral junction, femoral vein and popliteal vein. The posterior tibial and peroneal veins are suboptimally visualized. There is normal spontaneous and phasic variation throughout the leg by spectral doppler.   The left common femoral vein is patent.   OTHER FINDINGS: None.       Suboptimal visualization of the calf veins. No sonographic evidence of acute DVT in the visualized vessels of the right lower extremity.   MACRO: None   Signed by: Nnamdi Dodson 6/6/2025 4:53 PM Dictation workstation:   [id number]    Vascular US Upper Extremity Venous Duplex Right  Result Date: 6/6/2025  Interpreted By:  Nnamdi Dodson, STUDY: VASC US UPPER EXTREMITY VENOUS DUPLEX RIGHT;  6/6/2025 4:43 pm   INDICATION: Signs/Symptoms:eval dvt  pain.   COMPARISON: Upper extremity Doppler ultrasound 05/30/2025.   ACCESSION NUMBER(S): [id number]   ORDERING CLINICIAN: MIGNON PURI   TECHNIQUE: Grayscale, color and spectral Doppler sonographic imaging of the right upper extremity deep venous system.   FINDINGS: The right cephalic vein is not well visualized. Evaluation of the brachial vein is also solid limited due to placement of PICC line. Segmental visualization of the right internal jugular vein, subclavian vein, axillary vein, basilic vein and brachial vein demonstrate normal gray scale appearance, compressibility, color flow and venous waveforms. The radial and ulnar veins are not visualized.   Redemonstration of occlusive thrombus in the left subclavian vein.       No sonographic evidence of right upper extremity DVT.   Partial visualization of occlusive thrombus in the left subclavian vein  as noted on prior ultrasound. Please refer to separate or of left upper extremity DVT for additional detail   MACRO: None   Signed by: Nnamdi Dodson 6/6/2025 4:51 PM Dictation workstation:   [id number]    CT angio chest for pulmonary embolism  Result Date: 6/5/2025  Interpreted By:  Timmy Castro, STUDY: CT ANGIO CHEST FOR PULMONARY EMBOLISM;  6/5/2025 3:00 pm   INDICATION: Signs/Symptoms:rule out PE.     COMPARISON: CT chest with contrast 21 May 2025   ACCESSION NUMBER(S): [id number]   ORDERING CLINICIAN: ASAF CORONA   TECHNIQUE: Pulmonary arterial phase CT chest after the uneventful administration of 75 mL IV contrast (Omnipaque 350).   Three dimensional maximum intensity projection (3-D MIPs) image/s were created on a separate dedicated workstation, reviewed and saved   FINDINGS: CARDIOVASCULAR:   Acute pulmonary embolism: Negative through the  lobar (second order) branch level. Substantial sized branches from segmental (third order) branch and distally cannot be evaluated Acute right heart strain: Negative. No CT evidence of acute right heart strain Cardiac thrombus:  Negative; no obvious right heart or other cardiac thrombus is seen Pulmonary arteries ectasia:  Unchanged borderline to mild   Heart size:  Borderline but unchanged Pericardial effusion:  Negative   Thoracic aortic aneurysm:  Negative Aortic dissection:  Negative   Heart failure change:  Negative.  No sign of interstitial or alveolar edema. Other:  n/a   NONVASCULAR MEDIASTINUM: Esophagus:  Grossly normal by CT Mediastinal Mass:  Negative Hiatal hernia:  None Other:  n/a   LYMPH NODES: No thoracic adenopathy   LUNGS / AIRSPACES / AIRWAYS:   LARGE AIRWAYS Filling defect: Negative Wall thickening: Negative Bronchiectasis: Negative Other: N/A   AIRSPACES Fibrosis: Negative Emphysema: Negative Consolidation: Negative Ground glass airspace disease: Negative Edema: Mild bibasilar Nodule / Mass: Negative Other: Motion artifact   PLEURA: Effusion:   Right pleural effusion has decreased in size, now trace quantity, previously at least small if not moderate. Left effusion has perhaps marginally decreased in size, still small Pneumothorax: Both sides negative Other:  n/a   CHEST WALL: Soft tissues of the chest wall are unremarkable   SKELETON: No acute or contributory abnormality   UPPER ABDOMEN: Included subdiaphragmatic structures have no acute or contributory abnormality       MILD BIBASILAR INTERSTITIAL EDEMA   THE RIGHT PLEURAL EFFUSION HAS DECREASED IN SIZE SINCE 21 MAY 2025   LEFT PLEURAL EFFUSION HAS MARGINALLY DECREASED IN SIZE AS WELL   NO ACUTE PULMONARY EMBOLISM THROUGH THE LOBAR BRANCH LEVEL. SEGMENTAL AND SUBSEGMENTAL LEVELS OF THE PULMONARY ARTERY CANNOT BE EVALUATED   NO AORTIC DISSECTION OR OTHER ACUTE THORACIC AORTIC FINDINGS   NO AIRSPACE CONSOLIDATION, GROUND-GLASS AIRSPACE DISEASE OR ANY OTHER SIGN OF ACTIVE INFECTION   NO PERICARDIAL EFFUSION   NO PNEUMOTHORAX   MACRO: None   Signed by: Timmy Castro 6/5/2025 3:21 PM Dictation workstation:   [id number]    Lower extremity venous duplex left  Result Date: 6/5/2025  STUDY: Left Lower Extremity Venous Doppler Ultrasound; 6/5/2025 12:59 PM INDICATION: Positive DVT on multiple ultrasound scan done in routine facility. Currently on a Eliquis. COMPARISON: US LE 02/11/2025, 09/12/2024. ACCESSION NUMBER(S): [id number] ORDERING CLINICIAN: ASAF CORONA TECHNIQUE:  Real-time grayscale, color, and spectral doppler ultrasound imaging of the left lower extremity veins was performed. FINDINGS: There is acute occlusive DVT demonstrated within the left deep femoral, femoral, and popliteal veins. The left external iliac and common femoral veins demonstrated normal compressibility, normal phasic venous flow and normal response to augmentation.  The visualized deep calf veins are patent.  The contralateral common femoral vein is free of thrombosis.    Evidence for acute occlusive DVT within the left deep  femoral, femoral, and popliteal veins. There is no significant change when compared to prior ultrasound from February 2025. Signed by Sherice Mejia MD    ECG 12 lead  Result Date: 6/5/2025  Sinus rhythm with 1st degree AV block Left axis deviation Nonspecific intraventricular block Minimal voltage criteria for LVH, may be normal variant ( [place] product ) Cannot rule out Septal infarct (cited on or before 09-MAY-2025) Abnormal ECG When compared with ECG of 21-MAY-2025 16:33, No significant change was found Confirmed by Long Min ([id number]) on 6/5/2025 12:09:34 PM    XR chest 1 view  Result Date: 6/3/2025  Interpreted By:  Pepe Huang, STUDY: XR CHEST 1 VIEW  6/3/2025 2:50 pm   INDICATION: Signs/Symptoms:Malaise and fatigue   COMPARISON: 05/12/2025   ACCESSION NUMBER(S): [id number]   ORDERING CLINICIAN: SHAI BREAUX   TECHNIQUE: A single AP portable radiograph of the chest was obtained.   FINDINGS: Multiple cardiac monitoring leads are seen over the chest.   No focal infiltrate, pleural effusion or pneumothorax is identified. The cardiac silhouette is within normal limits for size.       No focal infiltrate or pneumothorax is identified.   MACRO: None.   Signed by: Pepe Huang 6/3/2025 2:59 PM Dictation workstation:   [id number]    XR shoulder right 2+ views  Result Date: 6/3/2025  Interpreted By:  Pepe Huang, STUDY: XR SHOULDER RIGHT 2+ VIEWS 6/3/2025 2:50 pm   INDICATION: Signs/Symptoms:Right shoulder pain   COMPARISON: None.   ACCESSION NUMBER(S): [id number]   ORDERING CLINICIAN: ELMO OLIVAS   TECHNIQUE: 3 views of the right shoulder including AP , axillary and scapular Y-views were obtained.   FINDINGS: There is no radiographic evidence of acute fracture or dislocation identified.  Mild hypertrophic degenerative changes are seen in the right shoulder.       1. No evidence of acute fracture or dislocation. 2. Degenerative changes, as described above.   MACRO: None.   Signed by: Pepe Huang 6/3/2025 2:59  PM Dictation workstation:   [id number]    CT head wo IV contrast  Result Date: 6/3/2025  Interpreted By:  Julien Romeo, STUDY: CT HEAD WO IV CONTRAST;  6/3/2025 2:40 pm   INDICATION: Signs/Symptoms:Headache at the base of the skull, recently started on Xarelto.     COMPARISON: 05/12/2025.   ACCESSION NUMBER(S): [id number]   ORDERING CLINICIAN: ELMO OLIVAS   TECHNIQUE: Contiguous unenhanced axial images were obtained through the brain.   FINDINGS: INTRACRANIAL: Mild generalized atrophy is again seen.  Nonspecific irregular low-attenuation changes throughout the periventricular and subcortical white matter are similar to prior, most likely related to chronic microvascular disease. There is a stable small remote lacunar infarct of the right basal ganglia and internal capsule anterior limb. No acute intracranial bleed, midline shift, or mass effect is seen. Gray-white differentiation is maintained. No extra-axial fluid collection or hydrocephalus. Irregular atherosclerotic calcifications again seen in the internal carotid artery and vertebral artery segments   Bones are intact.   EXTRACRANIAL: Paranasal sinus mild peripheral mucosal thickening is most prominent in the ethmoid air cells. No paranasal sinus air-fluid level. Mastoid air cells are clear.       Age-related/chronic changes similar to prior. No acute intracranial process.       MACRO: None.   Signed by: Julien Romeo 6/3/2025 2:46 PM Dictation workstation:   [id number]    Vascular US upper extremity venous duplex left  Result Date: 5/30/2025  STUDY: Left upper extremity venous ultrasound; Completed Time: 5/30/2025 13:35 INDICATION: LUE swelling.  Recent PICC line placement. COMPARISON: None available. ACCESSION NUMBER(S): [id number] ORDERING CLINICIAN: RAJINDER CONNER TECHNIQUE: Ultrasound of the left upper extremity veins.  Multiple images were obtained. FINDINGS: There is acute partially occlusive DVT demonstrated within the left subclavian,  axillary and brachial veins.  Superficial thrombus is visualized within the basilic vein, surrounding the PICC line. The left internal jugular vein demonstrated normal compressibility, normal phasic venous flow, and normal response to augmentation.  There is no evidence for echogenic thrombi.  The cephalic vein is patent.  The radial and ulnar veins are not evaluated.    Evidence for acute partially occlusive DVT within the left subclavian, axillary and brachial veins. Superficial thrombus within the basilic vein, surrounding the PICC line. Signed by Trent Sher MD    Results for orders placed or performed during the hospital encounter of 06/22/25 (from the past 24 hours)   POCT GLUCOSE   Result Value Ref Range    POCT Glucose 144 (H) 74 - 99 mg/dL     Assessment & Plan  UTI (urinary tract infection)    Acute deep vein thrombosis (DVT) of femoral vein of left lower extremity    Ambulatory dysfunction    Benign essential HTN    DM type 2 with diabetic peripheral neuropathy    Generalized weakness    Hyperlipidemia    Dizziness    Wound infection    Fever    Ventricular tachycardia seen on cardiac monitor    UTI  Fever  -UA: 250 leukocyte esterase, 6-10 WBC, 1+ bacteria,   -UA culture clinically insignificant growth based on current clinical standards  - has coccyx wound, dorsal left ankle wound, left hand finger skin tear and right heel pressure injury  -Blood culture gram-positive cocci, clusters, Staphylococcus epidermidis  -PICC discontinued and removed for possible source per ID   -cefTRIAXone (Rocephin) 1 g in dextrose (iso) IV 50 mL discontinued  -Add piperacillin-tazobactam (Zosyn) 4.5 g in dextrose (iso)  mL  q 6 hours started (Day 5)  -Add vancomycin (Vancocin) 1,500 mg in sodium chloride 0.9% 500 mL IV daily (Day 6)  -Received NS 500ml bolus  -WBC 6.7 > 5.7 > 8.5 > 9.7 > 7.2>6.2>5.8  -Monitor WBC   -Daily CBC  -Monitor for fever- afebrile overnight  -acetaminophen (Tylenol) tablet 650 mg PRN  pain/fever  -BUN/Cr/GFR 20/1.31/57> 19/1.02/78 > 11/0.99/80  -Repeat blood cultures negative to date  -Repeat urine culture negative  - tissue culture pending of coccyx: Pseudomonas aeruginosa  -pending PICC line placement     Ambulatory dysfunction  Weakness  Dizziness  -CXR: Left lower lobe and possibly lingular subsegmental atelectasis   -CTH: No acute intracranial abnormality. Unchanged burden of moderate supratentorial chronic ischemic changes in left cerebellar hemisphere chronic lacunar infarct.  -CT chest abdomen pelvis wo IV contrast Perivesical fat stranding and mild wall thickening of the urinary bladder wall. Recommend correlation for cystitis. Unchanged mild bilateral hydronephrosis without hydroureter suggesting a ureteropelvic junction obstruction. Small bilateral pleural effusions (left > right) which is unchanged on the right and slightly smaller on the left compared to 06/05/2025. Scattered mild atelectasis without evidence of pneumonia.  -Vascular US Carotid Artery duplex: Right Carotid: Today's exam was limited due to the body habitus of the patient. Findings are consistent with less than 50% stenosis of the right proximal internal carotid artery. Laminar flow seen by color Doppler. Right external carotid artery appears patent with no evidence of stenosis. No evidence of hemodynamically significant stenosis of the right common carotid artery. The right vertebral artery is patent with antegrade flow. No evidence of hemodynamically significant stenosis in the right subclavian artery.  Left Carotid: Today's exam was limited due to the body habitus of the patient and unable to turn head to right. Findings are consistent with less than 50% stenosis of the left proximal internal carotid artery. Left external carotid artery appears patent with no evidence of stenosis. No evidence of hemodynamically significant stenosis of the left common carotid artery. The left vertebral artery is patent with antegrade  flow. No evidence of hemodynamically significant stenosis in the left subclavian artery.  -meclizine PRN  -PT/OT evaluation, appreciate recommendations  -Orthostatic pressures resolved   -Telemetry     HTN  DM type 2  Hyperlipidemia  -amLODIPine (Norvasc) tablet 2.5 mg   -gabapentin (Neurontin) capsule 300 mg   -hydrALAZINE (Apresoline) tablet 50 mg  -Blood sugar controlled at this time without medication A1C 5.8 6/6  -Monitor glucose  -Daily BMP     DVT  -enoxaparin (Lovenox) syringe 90 mg  -6/6 Upper extremity US: No sonographic evidence of right upper extremity DVT. Partial visualization of occlusive thrombus in the left subclavian vein as noted on prior ultrasound. Please refer to separate or of left upper extremity DVT for additional detail   -left arm restriction        Wound  -ID consult- Case discussed with SARAH Kothari-CNP , appreciate recommendations  -Podiatry consulted appreciate recommendations, debridement and dressing 6/23 (left dorsal ankle)  -PICC line- Completed OP Vancomycin course 6/18  -Wound culture  1+ rare mixed skin microorganisms  -Repeat wound culture pending  -PICC line pulled    -awaiting new picc line placement     Ventricular tachycardia seen on cardiac monitor  - Monitor electrolytes  - K 3.8, , calcium 8.6,    - Daily BMP  - Magnesium 2.13  - Telemetry  - No overnight events     DVT ppx: Lovenox   Fluids: As needed   Electrolytes: replace as needed  Nutrition: Regular   Adjuncts: PIV     Code Status: Full Code     Pt requires inpatient stay at this time.  Total accumulated time spent face to face and not face to face preparing to see the patient, obtaining and reviewing separately obtained history; performing a medically appropriate examination and/or evaluation; counseling and educating the patient, family; ordering medications, tests, or procedures; referring and communicating with other health care professionals; documenting clinical information in the  patient's medical record; independently interpreting results and communicating the results to the patient, family; and care coordination was 45 minutes.      SARAH Peng-CNP         [1] amLODIPine, 2.5 mg, oral, Daily  enoxaparin, 90 mg, subcutaneous, q12h  gabapentin, 300 mg, oral, TID  hydrALAZINE, 50 mg, oral, TID  lidocaine, 5 mL, infiltration, Once  piperacillin-tazobactam, 4.5 g, intravenous, q6h  traZODone, 50 mg, oral, Nightly  zinc oxide, 1 Application, Topical, TID  [2] sodium chloride 0.9%, 10 mL/hr, Last Rate: 10 mL/hr (06/25/25 1153)  [3] PRN medications: acetaminophen, alteplase, alum-mag hydroxide-simeth, benzocaine-menthol, heparin flush, ipratropium-albuteroL, meclizine, melatonin, ondansetron, oxygen, polyethylene glycol, sennosides-docusate sodium, sodium chloride 0.9%, sodium chloride 0.9%, traMADol

## 2025-06-29 NOTE — CARE PLAN
The patient's goals for the shift include      The clinical goals for the shift include pt will use call bell for assist this shift    Over the shift, the patient did  make progress toward the following goals. Bed alarm on per policy. Pt used call bell for assist this shift

## 2025-06-30 VITALS
OXYGEN SATURATION: 94 % | HEART RATE: 76 BPM | DIASTOLIC BLOOD PRESSURE: 70 MMHG | HEIGHT: 69 IN | SYSTOLIC BLOOD PRESSURE: 151 MMHG | RESPIRATION RATE: 20 BRPM | BODY MASS INDEX: 29.84 KG/M2 | TEMPERATURE: 97.9 F | WEIGHT: 201.5 LBS

## 2025-06-30 LAB
ANION GAP SERPL CALC-SCNC: 10 MMOL/L (ref 10–20)
BUN SERPL-MCNC: 13 MG/DL (ref 6–23)
CALCIUM SERPL-MCNC: 8.7 MG/DL (ref 8.6–10.3)
CHLORIDE SERPL-SCNC: 103 MMOL/L (ref 98–107)
CO2 SERPL-SCNC: 32 MMOL/L (ref 21–32)
CREAT SERPL-MCNC: 1.17 MG/DL (ref 0.5–1.3)
EGFRCR SERPLBLD CKD-EPI 2021: 66 ML/MIN/1.73M*2
ERYTHROCYTE [DISTWIDTH] IN BLOOD BY AUTOMATED COUNT: 16.8 % (ref 11.5–14.5)
GLUCOSE BLD MANUAL STRIP-MCNC: 116 MG/DL (ref 74–99)
GLUCOSE BLD MANUAL STRIP-MCNC: 142 MG/DL (ref 74–99)
GLUCOSE BLD MANUAL STRIP-MCNC: 86 MG/DL (ref 74–99)
GLUCOSE SERPL-MCNC: 146 MG/DL (ref 74–99)
HCT VFR BLD AUTO: 28.1 % (ref 41–52)
HGB BLD-MCNC: 8.5 G/DL (ref 13.5–17.5)
HOLD SPECIMEN: NORMAL
MCH RBC QN AUTO: 27.5 PG (ref 26–34)
MCHC RBC AUTO-ENTMCNC: 30.2 G/DL (ref 32–36)
MCV RBC AUTO: 91 FL (ref 80–100)
NRBC BLD-RTO: 0 /100 WBCS (ref 0–0)
PLATELET # BLD AUTO: 216 X10*3/UL (ref 150–450)
POTASSIUM SERPL-SCNC: 3.7 MMOL/L (ref 3.5–5.3)
RBC # BLD AUTO: 3.09 X10*6/UL (ref 4.5–5.9)
SODIUM SERPL-SCNC: 141 MMOL/L (ref 136–145)
WBC # BLD AUTO: 7 X10*3/UL (ref 4.4–11.3)

## 2025-06-30 PROCEDURE — 36415 COLL VENOUS BLD VENIPUNCTURE: CPT | Mod: IPSPLIT

## 2025-06-30 PROCEDURE — 82947 ASSAY GLUCOSE BLOOD QUANT: CPT | Mod: IPSPLIT

## 2025-06-30 PROCEDURE — 2500000004 HC RX 250 GENERAL PHARMACY W/ HCPCS (ALT 636 FOR OP/ED): Mod: IPSPLIT | Performed by: NURSE PRACTITIONER

## 2025-06-30 PROCEDURE — 94760 N-INVAS EAR/PLS OXIMETRY 1: CPT | Mod: IPSPLIT

## 2025-06-30 PROCEDURE — 80048 BASIC METABOLIC PNL TOTAL CA: CPT | Mod: IPSPLIT

## 2025-06-30 PROCEDURE — 2500000001 HC RX 250 WO HCPCS SELF ADMINISTERED DRUGS (ALT 637 FOR MEDICARE OP): Mod: IPSPLIT | Performed by: NURSE PRACTITIONER

## 2025-06-30 PROCEDURE — 99239 HOSP IP/OBS DSCHRG MGMT >30: CPT | Performed by: NURSE PRACTITIONER

## 2025-06-30 PROCEDURE — 97116 GAIT TRAINING THERAPY: CPT | Mod: GP,CQ,IPSPLIT

## 2025-06-30 PROCEDURE — 2500000004 HC RX 250 GENERAL PHARMACY W/ HCPCS (ALT 636 FOR OP/ED): Mod: JW,IPSPLIT | Performed by: NURSE PRACTITIONER

## 2025-06-30 PROCEDURE — 85027 COMPLETE CBC AUTOMATED: CPT | Mod: IPSPLIT

## 2025-06-30 RX ORDER — AMOXICILLIN 250 MG
1 CAPSULE ORAL 2 TIMES DAILY PRN
Qty: 60 TABLET | Refills: 0 | Status: SHIPPED | OUTPATIENT
Start: 2025-06-30 | End: 2025-07-09

## 2025-06-30 RX ADMIN — HYDRALAZINE HYDROCHLORIDE 50 MG: 50 TABLET ORAL at 20:00

## 2025-06-30 RX ADMIN — SODIUM CHLORIDE 10 ML/HR: 0.9 INJECTION, SOLUTION INTRAVENOUS at 09:31

## 2025-06-30 RX ADMIN — HYDRALAZINE HYDROCHLORIDE 50 MG: 50 TABLET ORAL at 15:02

## 2025-06-30 RX ADMIN — TRAMADOL HYDROCHLORIDE 25 MG: 50 TABLET, COATED ORAL at 19:02

## 2025-06-30 RX ADMIN — PIPERACILLIN SODIUM AND TAZOBACTAM SODIUM 4.5 G: 4; .5 INJECTION, SOLUTION INTRAVENOUS at 02:05

## 2025-06-30 RX ADMIN — TRAMADOL HYDROCHLORIDE 25 MG: 50 TABLET, COATED ORAL at 13:59

## 2025-06-30 RX ADMIN — PIPERACILLIN SODIUM AND TAZOBACTAM SODIUM 4.5 G: 4; .5 INJECTION, SOLUTION INTRAVENOUS at 09:27

## 2025-06-30 RX ADMIN — PIPERACILLIN SODIUM AND TAZOBACTAM SODIUM 4.5 G: 4; .5 INJECTION, SOLUTION INTRAVENOUS at 13:59

## 2025-06-30 RX ADMIN — ZINC OXIDE 1 APPLICATION: 200 OINTMENT TOPICAL at 15:03

## 2025-06-30 RX ADMIN — ZINC OXIDE 1 APPLICATION: 200 OINTMENT TOPICAL at 09:28

## 2025-06-30 RX ADMIN — PIPERACILLIN SODIUM AND TAZOBACTAM SODIUM 4.5 G: 4; .5 INJECTION, SOLUTION INTRAVENOUS at 19:46

## 2025-06-30 RX ADMIN — AMLODIPINE BESYLATE 2.5 MG: 2.5 TABLET ORAL at 09:27

## 2025-06-30 RX ADMIN — TRAZODONE HYDROCHLORIDE 50 MG: 50 TABLET ORAL at 20:00

## 2025-06-30 RX ADMIN — GABAPENTIN 300 MG: 300 CAPSULE ORAL at 09:27

## 2025-06-30 RX ADMIN — ENOXAPARIN SODIUM 90 MG: 100 INJECTION SUBCUTANEOUS at 12:02

## 2025-06-30 RX ADMIN — HYDRALAZINE HYDROCHLORIDE 50 MG: 50 TABLET ORAL at 09:27

## 2025-06-30 RX ADMIN — GABAPENTIN 300 MG: 300 CAPSULE ORAL at 15:02

## 2025-06-30 RX ADMIN — GABAPENTIN 300 MG: 300 CAPSULE ORAL at 20:00

## 2025-06-30 RX ADMIN — ENOXAPARIN SODIUM 90 MG: 100 INJECTION SUBCUTANEOUS at 00:13

## 2025-06-30 ASSESSMENT — PAIN - FUNCTIONAL ASSESSMENT
PAIN_FUNCTIONAL_ASSESSMENT: 0-10

## 2025-06-30 ASSESSMENT — PAIN SCALES - GENERAL
PAINLEVEL_OUTOF10: 0 - NO PAIN
PAINLEVEL_OUTOF10: 4
PAINLEVEL_OUTOF10: 5 - MODERATE PAIN

## 2025-06-30 ASSESSMENT — COGNITIVE AND FUNCTIONAL STATUS - GENERAL
MOBILITY SCORE: 15
MOVING TO AND FROM BED TO CHAIR: A LOT
MOVING FROM LYING ON BACK TO SITTING ON SIDE OF FLAT BED WITH BEDRAILS: A LITTLE
CLIMB 3 TO 5 STEPS WITH RAILING: A LOT
TURNING FROM BACK TO SIDE WHILE IN FLAT BAD: A LITTLE
STANDING UP FROM CHAIR USING ARMS: A LOT
WALKING IN HOSPITAL ROOM: A LITTLE

## 2025-06-30 NOTE — PROGRESS NOTES
Elliot Partida is a 74 y.o. male on day 7 of admission presenting with UTI (urinary tract infection).    Subjective   Interval History:   PICC line placed  Afebrile, no chills  No chest pain or shortness of breath  No nausea vomiting or diarrhea        Objective   Range of Vitals (last 24 hours)  Heart Rate:  [54-78]   Temp:  [36.5 °C (97.7 °F)-36.9 °C (98.4 °F)]   Resp:  [16-20]   BP: (110-131)/(53-69)   SpO2:  [87 %-100 %]   Daily Weight  06/25/25 : 91.4 kg (201 lb 8 oz)    Body mass index is 29.76 kg/m².    Physical Exam  Constitutional:       Appearance: Normal appearance.   HENT:      Head: Normocephalic and atraumatic.      Nose: Nose normal.      Mouth: Mucous membranes are moist.      Pharynx: Oropharynx is clear.   Eyes:      General: No scleral icterus.  Cardiovascular:      Rate and Rhythm: Normal rate and regular rhythm.   Pulmonary:      Effort: Pulmonary effort is normal.      Breath sounds: Normal breath sounds.   Abdominal:      General: Bowel sounds are normal.      Palpations: Abdomen is soft.   Musculoskeletal:    Bilateral upper extremity edema-left worse than right     General: Normal range of motion.      Cervical back: Normal range of motion and neck supple.      Comments: Left fifth toe amputation    Skin:     General: Skin is warm and dry.      Comments: Pictures in EPIC reviewed-left anterior lower leg , dorsal foot wound with small amount granulation tissue.   Neurological:      Mental Status: He is alert.      Comments: Awake, alert   Psychiatric:         Mood and Affect: Mood normal.         Behavior: Behavior normal.        Antibiotics  piperacillin-tazobactam - 4.5 gram/100 mL, 4.5 gram/100 mL    Relevant Results  Labs  Results from last 72 hours   Lab Units 06/30/25  1136 06/29/25  1042 06/28/25  0841   WBC AUTO x10*3/uL 7.0 6.1 5.8   HEMOGLOBIN g/dL 8.5* 8.8* 8.3*   HEMATOCRIT % 28.1* 27.9* 27.2*   PLATELETS AUTO x10*3/uL 216 217 193     Results from last 72 hours   Lab Units  06/30/25  1136 06/29/25  1042 06/28/25  0841   SODIUM mmol/L 141 140 142   POTASSIUM mmol/L 3.7 3.9 3.8   CHLORIDE mmol/L 103 104 106   CO2 mmol/L 32 29 29   BUN mg/dL 13 12 11   CREATININE mg/dL 1.17 1.14 0.99   GLUCOSE mg/dL 146* 158* 91   CALCIUM mg/dL 8.7 8.8 8.6   ANION GAP mmol/L 10 11 11   EGFR mL/min/1.73m*2 66 68 80         Estimated Creatinine Clearance: 62.8 mL/min (by C-G formula based on SCr of 1.17 mg/dL).  C-Reactive Protein   Date Value Ref Range Status   05/20/2025 3.85 (H) <1.00 mg/dL Final   05/18/2025 4.28 (H) <1.00 mg/dL Final   05/17/2025 5.97 (H) <1.00 mg/dL Final     Microbiology  Susceptibility data from last 14 days.  Collected Specimen Info Organism Aztreonam Cefepime Ceftazidime Ciprofloxacin Clindamycin Erythromycin Levofloxacin Oxacillin Piperacillin/Tazobactam Tetracycline Tobramycin Trimethoprim/Sulfamethoxazole Vancomycin   06/25/25 Tissue/Biopsy from Wound/Tissue Pseudomonas aeruginosa  S  S  S  S    S   S   S       Mixed Aerobic and Anaerobic Bacteria                 06/23/25 Tissue/Biopsy from Wound/Tissue Mixed Skin Microorganisms                 06/23/25 Blood culture from Peripheral Venipuncture Staphylococcus epidermidis      R  I   R   S   S  S     Staphylococcus pettenkoferi/argensis                06/23/25 Blood culture from Peripheral Venipuncture Staphylococcus epidermidis                  Imaging  US renal complete  Result Date: 6/27/2025  Interpreted By:  Amara Dowell, STUDY: US RENAL COMPLETE;  6/26/2025 7:43 pm   INDICATION: Signs/Symptoms:hydronephrosis with concern for obstruction.     COMPARISON: CT chest abdomen pelvis dated 06/22/2025 and CT angio abdomen pelvis dated 09/13/2024.   ACCESSION NUMBER(S): [id number]   ORDERING CLINICIAN: BONG TANG   TECHNIQUE: Multiple images of the kidneys were obtained  with grayscale and color Doppler.   FINDINGS: RIGHT KIDNEY: The right kidney measures 13.3 cm in length. The renal cortical echogenicity and thickness are  within normal limits. There is prominence of right renal pelvis measuring up to 1.6 cm with mild prominence of right renal calices. These findings are similar compared to prior CT dated 09/13/2024. No evidence of nephrolithiasis.   LEFT KIDNEY: The left kidney measures 13.1 cm in length. The renal cortical echogenicity and thickness are within normal limits. There is prominence of left renal pelvis measuring up to 2.25 cm with mild prominence of left renal calices. These findings are similar compared to prior CT dated 09/13/2024. No leighton hydronephrosis is noted. No evidence of nephrolithiasis. An exophytic hypoechoic to anechoic lesion in the inferior pole of left kidney measuring up to 1.2 x 1.1 x 0.9 cm. There is through transmission without evidence of internal vascularity or internal complexity. This is likely favored to represent a simple cyst.   BLADDER: Urinary bladder is moderately distended and demonstrates diffuse circumferential bladder wall thickening. Bilateral ureteral jets are visualized. There is a 1.8 cm echogenic structure along the posterior bladder wall demonstrating twinkling artifact. This could be related to calcifications seen in the prostate gland or adjacent phleboliths as seen on prior CT abdomen pelvis.       1. Prominence of bilateral renal pelvis and bilateral renal collecting system, similar compared to prior CT examination dating back to 2024. These findings could be developmental related to extrarenal pelvis, however possibility of age-indeterminate stenosis of the ureteropelvic junction can be considered in the differential. Otherwise normal sonographic appearance of bilateral kidneys. 2. Incidental note of left renal cyst. 3. Diffuse circumferential thickening of the urinary bladder wall, likely favored to be related to chronic bladder wall obstruction. Cystitis can also be considered in the differential in appropriate clinical setting.   MACRO: None   Signed by: Amara Dowell  6/27/2025 10:36 AM Dictation workstation:   [id number]    Carotid duplex bilateral  Result Date: 6/24/2025            [place] [address] [place] [address]  Tel [phone] and Fax [phone]  Vascular Lab Report [facility] US CAROTID ARTERY DUPLEX BILATERAL  Patient Name:      SUBHASH SCHMIDT LUZMA    Reading Physician:  [id number] Eli De La Vega MD Study Date:        6/23/2025           Ordering Physician: [id number] DAMEON ALVAREZ MRN/PID:           [id number]            Technologist:       Oumou Valdez RDMS,                                                            T Accession#:        [id number]        Technologist 2: Date of Birth/Age: 1950 / 74 years Encounter#:         [id number] Gender:            M Admission Status:  Inpatient           Location Performed: [facility]  Diagnosis/ICD: Dizziness and giddiness-R42  Patient History Syncope, HTN and CVA. Smoker:         Never. Diabetes:       Yes. >20 years.  CONCLUSIONS: Right Carotid: Today's exam was limited due to the body habitus of the patient. Findings are consistent with less than 50% stenosis of the right proximal internal carotid artery. Laminar flow seen by color Doppler. Right external carotid artery appears patent with no evidence of stenosis. No evidence of hemodynamically significant stenosis of the right common carotid artery. The right vertebral artery is patent with antegrade flow. No evidence of hemodynamically significant stenosis in the right subclavian artery. Left Carotid: Today's exam was limited due to the body habitus of the patient and unable to turn head to right. Findings are consistent with less than 50% stenosis of the left proximal internal carotid artery. Left external carotid artery appears patent with no evidence of stenosis. No evidence of hemodynamically significant stenosis of the left common carotid artery. The left vertebral  artery is patent with antegrade flow. No evidence of hemodynamically significant stenosis in the left subclavian artery.  Imaging & Doppler Findings: Right Plaque Morph: The proximal right internal carotid artery demonstrates calcified plaque. Left Plaque Morph: The proximal left internal carotid artery demonstrates calcified plaque. The distal left common carotid artery demonstrates heterogenous plaque.   Right                        Left   PSV      EDV                PSV       cm/s 25 cm/s   CCA P    118 cm/s 18 cm/s 99 cm/s  19 cm/s   CCA D    110 cm/s 19 cm/s 107 cm/s 19 cm/s   ICA P    106 cm/s 18 cm/s 103 cm/s 35 cm/s   ICA M    81 cm/s  18 cm/s 101 cm/s 39 cm/s   ICA D    40 cm/s  10 cm/s 148 cm/s 18 cm/s    ECA     107 cm/s 13 cm/s 78 cm/s  31 cm/s Vertebral  49 cm/s  10 cm/s 129 cm/s 0 cm/s  Subclavian 168 cm/s 12 cm/s  Right                                  Left   PSV    Waveform                       PSV    Waveform 129 cm/s          Subclavian Proximal 168 cm/s               Right Left ICA/CCA Ratio  1.1  1.0   [id number] Eli De La Vega MD Electronically signed by [id number] Eli De La Vega MD on 6/24/2025 at 11:03:17 AM  ** Final **     ECG 12 lead  Result Date: 6/24/2025  Sinus rhythm with 1st degree AV block Nonspecific intraventricular block Minimal voltage criteria for LVH, may be normal variant ( Snyder product ) Cannot rule out Septal infarct (cited on or before 09-MAY-2025) Lateral infarct , age undetermined Abnormal ECG When compared with ECG of 22-JUN-2025 19:20, (unconfirmed) QRS axis Shifted right Lateral infarct is now Present Serial changes of Septal infarct Present    XR chest 1 view  Result Date: 6/23/2025  Interpreted By:  Brian Morris, STUDY: XR CHEST 1 VIEW;  6/23/2025 8:57 am   INDICATION: Signs/Symptoms: SOB.   COMPARISON: 06/03/2025 AP chest x-ray and yesterday's unenhanced thoracic CT.   ACCESSION NUMBER(S): [id number]   ORDERING CLINICIAN: DAMEON ALVAREZ    FINDINGS: Portable AP erect chest x-ray 06/23/2025 8:50 a.m.:   CARDIOMEDIASTINAL SILHOUETTE: Cardiomediastinal silhouette is normal in size and configuration. Marked vascular calcification is noted. Severe aortic valve and coronary artery calcification reported on yesterday's CT is difficult to appreciate.   LUNGS: Normally inflated and appear clear aside from moderate lower left lung subsegmental atelectasis not apparent on 06/03/2025. The lateral costophrenic angles are sharp, but small bilateral pleural effusions seen on yesterday's CT would probably go undetected without a lateral view.   ABDOMEN: No remarkable upper abdominal findings.   BONES: Extensive spinal degenerative changes and generalized osteosclerosis are noted. The latter can be associated with various conditions including but not limited to myelosclerosis, renal osteodystrophy, hyperthyroidism, hypoparathyroidism, fluorosis, osteoblastic metastases, lymphoma, hepatitis C and mastocytosis and should be correlated clinically.       Left lower lobe and possibly lingular subsegmental atelectasis.   MACRO: None   Signed by: Brian Morris 6/23/2025 9:26 AM Dictation workstation:   [id number]    CT chest abdomen pelvis wo IV contrast  Result Date: 6/22/2025  Interpreted By:  Jemal Driscoll, STUDY: CT CHEST ABDOMEN PELVIS WO CONTRAST;  6/22/2025 9:00 pm   INDICATION: Signs/Symptoms:Diuretic abdominal pain nausea not feeling.     COMPARISON: 06/05/2025 CT chest, 02/27/2025 CT abdomen/pelvis   ACCESSION NUMBER(S): [id number]   ORDERING CLINICIAN: ADDI ALBERTO   TECHNIQUE: Contiguous axial images of the chest, abdomen, and pelvis were obtained without contrast. Coronal and sagittal reformatted images were reconstructed from the axial data.   FINDINGS:   CT CHEST:   MEDIASTINUM AND LYMPH NODES:  The esophageal wall appears within normal limits.  No enlarged intrathoracic or axillary lymph nodes by imaging criteria. No pneumomediastinum.    VESSELS:  Normal caliber thoracic aorta. Mild calcific aortic atherosclerosis.   HEART: Normal size. Severe aortic valvular calcification. Mitral annular calcifications. Severe coronary artery calcifications. No significant pericardial effusion.   LUNG, AIRWAYS, PLEURA: No pneumothorax. Small bilateral pleural effusions (left > right), unchanged on the right and slightly decreased on the left, compared to 06/05/2025. Mild passive bibasilar atelectasis, similar to prior study. Scattered linear opacities elsewhere likely represent atelectasis. Linear triangular nodule in the right upper lobe likely represents focal atelectasis given new from prior study.   OSSEOUS STRUCTURES: No acute osseous abnormality. Diffuse idiopathic skeletal hyperostosis with flowing ossification along the anterior disc margins and maintained disc heights in the thoracic spine. No significant canal stenosis.   CHEST WALL SOFT TISSUES: Mild anasarca.     ABDOMEN/PELVIS:   ABDOMINAL WALL: Mild anasarca. Small fat-containing umbilical hernia.Subcutaneous injection sites in the left abdominal wall.   LIVER: No significant parenchymal abnormality.   BILE DUCTS: No significant intrahepatic or extrahepatic dilatation.   GALLBLADDER: No significant abnormality.   PANCREAS: No significant abnormality.   SPLEEN: No significant abnormality.   ADRENALS: No significant abnormality.   KIDNEYS, URETERS, BLADDER: Unchanged mild bilateral hydronephrosis without hydroureter. No renal or ureteral calculi. The urinary bladder wall is thickened. There is perivesical fat stranding.   REPRODUCTIVE ORGANS: The prostate gland is enlarged, measuring 5.5 cm in transverse dimension.   VESSELS: Mild aortic atherosclerosis without AAA. Unchanged multiple mildly enlarged external iliac lymph node for sample no other enlarged lymph nodes. Measuring 1.3 cm on the right, 1 cm on the left   RETROPERITONEUM/LYMPH NODES: No acute retroperitoneal abnormality. No enlarged lymph  nodes.   BOWEL/PERITONEUM: Tiny gastric diverticulum noted. No inflammatory bowel wall thickening or dilatation. Normal appendix.   No ascites, free air, or fluid collection.     MUSCULOSKELETAL: Multilevel bulky anterior endplate osteophytes with maintained disc spaces reflect diffuse idiopathic skeletal hyperostosis.  No suspicious osseous lesion.       Perivesical fat stranding and mild wall thickening of the urinary bladder wall. Recommend correlation for cystitis.   Unchanged mild bilateral hydronephrosis without hydroureter suggesting a ureteropelvic junction obstruction.   Small bilateral pleural effusions (left > right) which is unchanged on the right and slightly smaller on the left compared to 06/05/2025. Scattered mild atelectasis without evidence of pneumonia.   MACRO: None.   Signed by: Jemal Driscoll 6/22/2025 9:41 PM Dictation workstation:   [id number]    CT head wo IV contrast  Result Date: 6/22/2025  Interpreted By:  Jemal Driscoll, STUDY: CT HEAD WO IV CONTRAST;  6/22/2025 8:50 pm   INDICATION: Signs/Symptoms:Left eye pain  on blood thinner h/o stroke.     COMPARISON: 06/03/2025   ACCESSION NUMBER(S): [id number]   ORDERING CLINICIAN: ADDI ALBERTO   TECHNIQUE: Noncontrast axial CT images of head were obtained with coronal and sagittal reconstructed images.   FINDINGS: BRAIN PARENCHYMA: Moderate periventricular and subcortical hemispheric white matter hypodensities are most compatible with chronic small vessel ischemic disease.Chronic lacunar infarct in the left cerebellar hemisphere. No acute intraparenchymal hemorrhage or parenchymal evidence of acute large territory ischemic infarct. Gray-white matter distinction is preserved. No mass-effect.   VENTRICLES and EXTRA-AXIAL SPACES:  No acute extra-axial or intraventricular hemorrhage. No effacement of cerebral sulci. The ventricles and sulci are age-concordant.   PARANASAL SINUSES/MASTOIDS:  No hemorrhage or air-fluid levels within the  visualized paranasal sinuses. The mastoids are well aerated.   CALVARIUM/ORBITS:  No acute skull fracture.  The orbits and globes are intact to the extent visualized.   EXTRACRANIAL SOFT TISSUES: No discernible acute abnormality.       No acute intracranial abnormality.   Unchanged burden of moderate supratentorial chronic ischemic changes in left cerebellar hemisphere chronic lacunar infarct.   MACRO: None.   Signed by: Jemal Driscoll 6/22/2025 9:32 PM Dictation workstation:   [id number]    ECG 12 lead  Result Date: 6/14/2025  Sinus rhythm with 1st degree AV block Left axis deviation Nonspecific intraventricular block Minimal voltage criteria for LVH, may be normal variant ( [place]ul product ) Cannot rule out Anterior infarct (cited on or before 09-MAY-2025) Abnormal ECG When compared with ECG of 03-JUN-2025 14:26, No significant change was found See ED provider note for full interpretation and clinical correlation Confirmed by Flower Shaikh ([id number]) on 6/14/2025 8:57:23 PM    Lower extremity venous duplex right  Result Date: 6/6/2025  Interpreted By:  Nnamdi Dodson, STUDY: [facility] LOWER EXTREMITY VENOUS DUPLEX RIGHT;  6/6/2025 4:45 pm   INDICATION: Signs/Symptoms:eval dvt , pain swelling.   COMPARISON: None.   ACCESSION NUMBER(S): [id number]   ORDERING CLINICIAN: MIGNON PURI   TECHNIQUE: Grayscale, color and spectral Doppler sonographic images of the right lower extremity deep venous system. The left common femoral vein was imaged for comparison.   FINDINGS: There is normal compressibility of the right common femoral vein, saphenous femoral junction, femoral vein and popliteal vein. The posterior tibial and peroneal veins are suboptimally visualized. There is normal spontaneous and phasic variation throughout the leg by spectral doppler.   The left common femoral vein is patent.   OTHER FINDINGS: None.       Suboptimal visualization of the calf veins. No sonographic evidence of acute DVT in the  visualized vessels of the right lower extremity.   MACRO: None   Signed by: Nnamdi Dodson 6/6/2025 4:53 PM Dictation workstation:   [id number]    Vascular US Upper Extremity Venous Duplex Right  Result Date: 6/6/2025  Interpreted By:  Nnamdi Dodson, STUDY: VASC US UPPER EXTREMITY VENOUS DUPLEX RIGHT;  6/6/2025 4:43 pm   INDICATION: Signs/Symptoms:eval dvt  pain.   COMPARISON: Upper extremity Doppler ultrasound 05/30/2025.   ACCESSION NUMBER(S): [id number]   ORDERING CLINICIAN: MIGNON PURI   TECHNIQUE: Grayscale, color and spectral Doppler sonographic imaging of the right upper extremity deep venous system.   FINDINGS: The right cephalic vein is not well visualized. Evaluation of the brachial vein is also solid limited due to placement of PICC line. Segmental visualization of the right internal jugular vein, subclavian vein, axillary vein, basilic vein and brachial vein demonstrate normal gray scale appearance, compressibility, color flow and venous waveforms. The radial and ulnar veins are not visualized.   Redemonstration of occlusive thrombus in the left subclavian vein.       No sonographic evidence of right upper extremity DVT.   Partial visualization of occlusive thrombus in the left subclavian vein as noted on prior ultrasound. Please refer to separate or of left upper extremity DVT for additional detail   MACRO: None   Signed by: Nnamdi Dodson 6/6/2025 4:51 PM Dictation workstation:   [id number]    CT angio chest for pulmonary embolism  Result Date: 6/5/2025  Interpreted By:  Timmy Castro, STUDY: CT ANGIO CHEST FOR PULMONARY EMBOLISM;  6/5/2025 3:00 pm   INDICATION: Signs/Symptoms:rule out PE.     COMPARISON: CT chest with contrast 21 May 2025   ACCESSION NUMBER(S): [id number]   ORDERING CLINICIAN: ASAF CORONA   TECHNIQUE: Pulmonary arterial phase CT chest after the uneventful administration of 75 mL IV contrast (Omnipaque 350).   Three dimensional maximum intensity projection (3-D MIPs) image/s  were created on a separate dedicated workstation, reviewed and saved   FINDINGS: CARDIOVASCULAR:   Acute pulmonary embolism: Negative through the  lobar (second order) branch level. Substantial sized branches from segmental (third order) branch and distally cannot be evaluated Acute right heart strain: Negative. No CT evidence of acute right heart strain Cardiac thrombus:  Negative; no obvious right heart or other cardiac thrombus is seen Pulmonary arteries ectasia:  Unchanged borderline to mild   Heart size:  Borderline but unchanged Pericardial effusion:  Negative   Thoracic aortic aneurysm:  Negative Aortic dissection:  Negative   Heart failure change:  Negative.  No sign of interstitial or alveolar edema. Other:  n/a   NONVASCULAR MEDIASTINUM: Esophagus:  Grossly normal by CT Mediastinal Mass:  Negative Hiatal hernia:  None Other:  n/a   LYMPH NODES: No thoracic adenopathy   LUNGS / AIRSPACES / AIRWAYS:   LARGE AIRWAYS Filling defect: Negative Wall thickening: Negative Bronchiectasis: Negative Other: N/A   AIRSPACES Fibrosis: Negative Emphysema: Negative Consolidation: Negative Ground glass airspace disease: Negative Edema: Mild bibasilar Nodule / Mass: Negative Other: Motion artifact   PLEURA: Effusion:  Right pleural effusion has decreased in size, now trace quantity, previously at least small if not moderate. Left effusion has perhaps marginally decreased in size, still small Pneumothorax: Both sides negative Other:  n/a   CHEST WALL: Soft tissues of the chest wall are unremarkable   SKELETON: No acute or contributory abnormality   UPPER ABDOMEN: Included subdiaphragmatic structures have no acute or contributory abnormality       MILD BIBASILAR INTERSTITIAL EDEMA   THE RIGHT PLEURAL EFFUSION HAS DECREASED IN SIZE SINCE 21 MAY 2025   LEFT PLEURAL EFFUSION HAS MARGINALLY DECREASED IN SIZE AS WELL   NO ACUTE PULMONARY EMBOLISM THROUGH THE LOBAR BRANCH LEVEL. SEGMENTAL AND SUBSEGMENTAL LEVELS OF THE PULMONARY  ARTERY CANNOT BE EVALUATED   NO AORTIC DISSECTION OR OTHER ACUTE THORACIC AORTIC FINDINGS   NO AIRSPACE CONSOLIDATION, GROUND-GLASS AIRSPACE DISEASE OR ANY OTHER SIGN OF ACTIVE INFECTION   NO PERICARDIAL EFFUSION   NO PNEUMOTHORAX   MACRO: None   Signed by: Timmy Castro 6/5/2025 3:21 PM Dictation workstation:   [id number]    Lower extremity venous duplex left  Result Date: 6/5/2025  STUDY: Left Lower Extremity Venous Doppler Ultrasound; 6/5/2025 12:59 PM INDICATION: Positive DVT on multiple ultrasound scan done in routine facility. Currently on a Eliquis. COMPARISON: US LE 02/11/2025, 09/12/2024. ACCESSION NUMBER(S): [id number] ORDERING CLINICIAN: ASAF CORONA TECHNIQUE:  Real-time grayscale, color, and spectral doppler ultrasound imaging of the left lower extremity veins was performed. FINDINGS: There is acute occlusive DVT demonstrated within the left deep femoral, femoral, and popliteal veins. The left external iliac and common femoral veins demonstrated normal compressibility, normal phasic venous flow and normal response to augmentation.  The visualized deep calf veins are patent.  The contralateral common femoral vein is free of thrombosis.    Evidence for acute occlusive DVT within the left deep femoral, femoral, and popliteal veins. There is no significant change when compared to prior ultrasound from February 2025. Signed by Sherice Mejia MD    ECG 12 lead  Result Date: 6/5/2025  Sinus rhythm with 1st degree AV block Left axis deviation Nonspecific intraventricular block Minimal voltage criteria for LVH, may be normal variant ( [place] product ) Cannot rule out Septal infarct (cited on or before 09-MAY-2025) Abnormal ECG When compared with ECG of 21-MAY-2025 16:33, No significant change was found Confirmed by Long Min ([id number]) on 6/5/2025 12:09:34 PM    XR chest 1 view  Result Date: 6/3/2025  Interpreted By:  Pepe Huang, STUDY: XR CHEST 1 VIEW  6/3/2025 2:50 pm   INDICATION: Signs/Symptoms:Malaise  and fatigue   COMPARISON: 05/12/2025   ACCESSION NUMBER(S): [id number]   ORDERING CLINICIAN: SHAI BREAUX   TECHNIQUE: A single AP portable radiograph of the chest was obtained.   FINDINGS: Multiple cardiac monitoring leads are seen over the chest.   No focal infiltrate, pleural effusion or pneumothorax is identified. The cardiac silhouette is within normal limits for size.       No focal infiltrate or pneumothorax is identified.   MACRO: None.   Signed by: Pepe Huang 6/3/2025 2:59 PM Dictation workstation:   [id number]    XR shoulder right 2+ views  Result Date: 6/3/2025  Interpreted By:  Pepe Huang, STUDY: XR SHOULDER RIGHT 2+ VIEWS 6/3/2025 2:50 pm   INDICATION: Signs/Symptoms:Right shoulder pain   COMPARISON: None.   ACCESSION NUMBER(S): [id number]   ORDERING CLINICIAN: ELMO OLIVAS   TECHNIQUE: 3 views of the right shoulder including AP , axillary and scapular Y-views were obtained.   FINDINGS: There is no radiographic evidence of acute fracture or dislocation identified.  Mild hypertrophic degenerative changes are seen in the right shoulder.       1. No evidence of acute fracture or dislocation. 2. Degenerative changes, as described above.   MACRO: None.   Signed by: Pepe Huang 6/3/2025 2:59 PM Dictation workstation:   [id number]    CT head wo IV contrast  Result Date: 6/3/2025  Interpreted By:  Julien Romeo, STUDY: CT HEAD WO IV CONTRAST;  6/3/2025 2:40 pm   INDICATION: Signs/Symptoms:Headache at the base of the skull, recently started on Xarelto.     COMPARISON: 05/12/2025.   ACCESSION NUMBER(S): [id number]   ORDERING CLINICIAN: ELMO OLIVAS   TECHNIQUE: Contiguous unenhanced axial images were obtained through the brain.   FINDINGS: INTRACRANIAL: Mild generalized atrophy is again seen.  Nonspecific irregular low-attenuation changes throughout the periventricular and subcortical white matter are similar to prior, most likely related to chronic microvascular disease. There is a stable  small remote lacunar infarct of the right basal ganglia and internal capsule anterior limb. No acute intracranial bleed, midline shift, or mass effect is seen. Gray-white differentiation is maintained. No extra-axial fluid collection or hydrocephalus. Irregular atherosclerotic calcifications again seen in the internal carotid artery and vertebral artery segments   Bones are intact.   EXTRACRANIAL: Paranasal sinus mild peripheral mucosal thickening is most prominent in the ethmoid air cells. No paranasal sinus air-fluid level. Mastoid air cells are clear.       Age-related/chronic changes similar to prior. No acute intracranial process.       MACRO: None.   Signed by: Julien Romeo 6/3/2025 2:46 PM Dictation workstation:   [id number]    Vascular US upper extremity venous duplex left  Result Date: 5/30/2025  STUDY: Left upper extremity venous ultrasound; Completed Time: 5/30/2025 13:35 INDICATION: LUE swelling.  Recent PICC line placement. COMPARISON: None available. ACCESSION NUMBER(S): [id number] ORDERING CLINICIAN: RAJINDER CONNER TECHNIQUE: Ultrasound of the left upper extremity veins.  Multiple images were obtained. FINDINGS: There is acute partially occlusive DVT demonstrated within the left subclavian, axillary and brachial veins.  Superficial thrombus is visualized within the basilic vein, surrounding the PICC line. The left internal jugular vein demonstrated normal compressibility, normal phasic venous flow, and normal response to augmentation.  There is no evidence for echogenic thrombi.  The cephalic vein is patent.  The radial and ulnar veins are not evaluated.    Evidence for acute partially occlusive DVT within the left subclavian, axillary and brachial veins. Superficial thrombus within the basilic vein, surrounding the PICC line. Signed by Trent Sher MD      Assessment/Plan   Positive blood culture-Staphylococcus epidermidis, 2/2, positive blood culture for Staphylococcus  neemai/argensis-1/2  Pyuria-culture insignificant growth  Right buttock-culture grew pseudomonas   Abnormal CT-Perivesical fat stranding and mild wall thickening of the urinary   bladder wall and atelectasis   Small bilateral pleural effusion  History of left foot fifth toe osteomyelitis -status post amputation and completed IV vancomycin 6/18/2025  He has history of wound culture positive MRSA resistant to tetracyclines and Enterococcus faecalis susceptible to ampicillin and vancomycin.  Bilateral buttock ulcers, stage III, positive wound culture for Pseudomonas        Continue IV  Zosyn 4.5 grams every 6 hours -for total 14 days till 7/8/2025  Monitor temperature and WBC  Local care  Supportive care  PICC line placed       This is a complex infectious disease issue and the following was performed today (for more details please see the above note): Management decisions reflecting the added complexity (e.g., changes in antimicrobial therapy, infection control strategies).     Sonam Zhu, SARAH-CNP

## 2025-06-30 NOTE — DISCHARGE SUMMARY
Discharge Diagnosis  UTI (urinary tract infection)           Issues Requiring Follow-Up  Foot wound status postdebridement on 6/23  Pseudomonas infection of buttocks wound  UTI  Discharge Meds     Medication List      START taking these medications     piperacillin-tazobactam 4.5 gram/100 mL IV; Commonly known as: Zosyn;   Infuse 100 mL (4.5 g) over 0.5 hours into a venous catheter every 6 hours   for 34 doses.     CHANGE how you take these medications     enoxaparin 80 mg/0.8 mL syringe; Commonly known as: Lovenox; Inject 0.9   mL (90 mg) under the skin every 12 hours.; What changed: how much to take     CONTINUE taking these medications     acetaminophen 325 mg tablet; Commonly known as: Tylenol; Take 2 tablets   (650 mg) by mouth every 4 hours if needed for mild pain (1 - 3), moderate   pain (4 - 6), headaches or fever (temp greater than 38.0 C) (greater than   or equal to 38 C).   amLODIPine 2.5 mg tablet; Commonly known as: Norvasc; Take 1 tablet (2.5   mg) by mouth once daily.   Blood glucose monitoring meter; To check blood sugar every morning   before breakfast   gabapentin 300 mg capsule; Commonly known as: Neurontin   Glucagon (HCl) Emergency Kit 1 mg recon soln; Generic drug: glucagon HCL   hydrALAZINE 50 mg tablet; Commonly known as: Apresoline; Take 1 tablet   (50 mg) by mouth 3 times a day.   meclizine 25 mg tablet; Commonly known as: Antivert   ondansetron ODT 4 mg disintegrating tablet; Commonly known as:   Zofran-ODT; Dissolve 1 tablet (4 mg) in the mouth every 8 hours if needed   for nausea or vomiting.   oxygen gas therapy; Commonly known as: O2; Inhale 2 L/min at 120,000   mL/hr if needed (desaturation at hs).   sennosides-docusate sodium 8.6-50 mg tablet; Commonly known as:   Ann-Colace; Take 1 tablet by mouth 2 times a day as needed for   constipation.   traZODone 50 mg tablet; Commonly known as: Desyrel     STOP taking these medications     vancomycin IVPB; Commonly known as: Xellia        Test Results Pending At Discharge  Pending Labs       Order Current Status    Extra Urine Gray Tube Collected (06/22/25 0840)    Urinalysis with Reflex Culture and Microscopic In process            Hospital Course     Patient from [facility] with complaints of dizziness while watching television felt very lightheaded denied any other symptoms patient has positive cultures in place wounds positive MSSA currently urine culture is negative consulted infectious disease, carotid duplex negative wound culture grew Pseudomonas on Rocephin for the UTI initially, coverage was expanded with vancomycin, patient was ultimately switched to Zosyn for pseudomonal coverage, repeat urine and blood cultures were negative.  Patient had IV midline placed with recommendation for IV Zosyn through 7/8.      Pertinent Physical Exam At Time of Discharge  Physical Exam  Constitutional:       Appearance: He is obese.   HENT:      Head: Normocephalic and atraumatic.      Nose: Nose normal.      Mouth/Throat:      Mouth: Mucous membranes are moist.   Eyes:      Extraocular Movements: Extraocular movements intact.      Pupils: Pupils are equal, round, and reactive to light.   Cardiovascular:      Rate and Rhythm: Normal rate and regular rhythm.      Pulses: Normal pulses.      Heart sounds: Normal heart sounds.   Pulmonary:      Effort: Pulmonary effort is normal.      Breath sounds: Normal breath sounds.      Comments: Diminished Bases  Abdominal:      General: Bowel sounds are normal.      Palpations: Abdomen is soft.   Musculoskeletal:         General: Normal range of motion.      Cervical back: Normal range of motion.      Right lower leg: Edema present.      Left lower leg: Edema present.   Skin:     General: Skin is warm and dry.      Capillary Refill: Capillary refill takes less than 2 seconds.      Comments: Wound LLE   Neurological:      Mental Status: He is alert. Mental status is at baseline.      Motor: Weakness present.       Gait: Gait abnormal.   Psychiatric:         Mood and Affect: Mood normal.         Behavior: Behavior normal.   Outpatient Follow-Up  Future Appointments   Date Time Provider Department Center   7/17/2025 12:00 PM Felix Aguero MD [id number] [facility]         SARAH Branch-CNP

## 2025-06-30 NOTE — NURSING NOTE
1955- Report called to nurse Beltran at [facility] at this time.   2253- Patient received by [employer] transport at this time to return to [facility].

## 2025-06-30 NOTE — PROGRESS NOTES
Physical Therapy                 Therapy Communication Note    Patient Name: Elliot Partida  MRN: [id number]  Department: [facility]  Room: [address] [place], [place] [address]  Today's Date: 6/30/2025     Discipline: Physical Therapy    Missed Visit: PT Missed Visit: Yes     Missed Visit Reason: Missed Visit Reason: Patient refused (Pt refused PT at this time. Pt reports just waking up, agreed to let PTA try back later this morning.)    Missed Time: Attempt    Comment:

## 2025-06-30 NOTE — PROGRESS NOTES
Physical Therapy    Physical Therapy Treatment    Patient Name: Elliot Partida  MRN: [id number]  Department: [facility]  Room: [address] [place] [address]  Today's Date: 6/30/2025  Time Calculation  Start Time: 1018  Stop Time: 1032  Time Calculation (min): 14 min         Assessment/Plan   PT Assessment  PT Assessment Results: Decreased endurance, Decreased mobility  Rehab Prognosis: Good  Barriers to Discharge Home: Caregiver assistance, Physical needs  Caregiver Assistance: Caregiver assistance needed per identified barriers - however, level of patient's required assistance exceeds assistance available at home  Physical Needs: 24hr mobility assistance needed  Evaluation/Treatment Tolerance: Patient limited by pain, Patient limited by fatigue  Medical Staff Made Aware: Yes  Strengths: Rehab experience  Barriers to Participation: Ability to acquire knowledge, Attitude of self, Coping skills, Insight into problems  End of Session Communication: Bedside nurse  Assessment Comment: Pt cont to require max encouragement cues for participation with therapy including transfers and gait tolerated this date but refused TE participation. Pt required min a to stand from surfaces and CGA for gait with shuffling steps and slapping L LE noted with CGA during turns and good stride lengths noted toward end of gait. Pt cont to require skilled PT to improve and increase endurnace out of bed for breathing and skin integrity concerns, improved b le to aid in safer functional transfers and gait training to reduce risk for falls and prevent further decline while here in hospital. Pt left up in chair, alarm on, call bell within reach and all needs addressed.  End of Session Patient Position: Up in chair, Alarm on  PT Plan  Inpatient/Swing Bed or Outpatient: Inpatient  PT Plan  Treatment/Interventions: Transfer training, Gait training, Therapeutic activity  PT Plan: Ongoing PT  PT Frequency: 3 times per week  PT Discharge Recommendations: Moderate intensity level  of continued care  Equipment Recommended upon Discharge: Wheeled walker  PT Recommended Transfer Status: Assist x1  PT - OK to Discharge: Yes (Once medically stable)    PT Visit Info:  PT Received On: 06/30/25  Response to Previous Treatment: Patient reporting fatigue but able to participate.     General Visit Information:   General  Reason for Referral: Impaired Mobility. Dizzy.  Referred By: Chuy Muniz D.O.  Past Medical History Relevant to Rehab: Diagnosis Date Comment Source  Acute osteomyelitis of ankle and foot, left (Multi)     AMI (acute myocardial infarction) (Multi)     ASHD (arteriosclerotic heart disease)     At risk for falls     Cellulitis  left foot and ankle   COVID-19     Diabetes mellitus (Multi)     DVT (deep vein thrombosis) in pregnancy ([facility])     Fall risk care plan declined     Hypertension     Meningioma (Multi)     Myocardial infarction (Multi)     Neuritis     Neuropathy     Osteoarthritis     Osteomyelitis  left foot   Pleural effusion     PVD (peripheral vascular disease)     Sprain of right knee 06/25/2015    Stroke (Multi)     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  Pt agreeable to session with max encouragement.    Subjective I dont want to move.   Precautions:  Precautions  Medical Precautions: Fall precautions  Precautions Comment: IV, PICC,        Objective   Pain:  Pain Assessment  Pain Assessment: 0-10  0-10 (Numeric) Pain Score: 0 - No pain  Cognition:  Cognition  Overall Cognitive Status: Within Functional Limits  Orientation Level: Oriented X4  Coordination:  Movements are Fluid and Coordinated: Yes    Activity Tolerance:  Activity Tolerance  Endurance: Decreased tolerance for upright  activites  Treatments:       Ambulation/Gait Training  Ambulation/Gait Training Performed: Yes  Ambulation/Gait Training 1  Surface 1: Level tile  Device 1: Rolling walker  Gait Support Devices: Gait belt  Assistance 1: Contact guard  Quality of Gait 1: Wide base of support, Inconsistent stride length, Shuffling gait, Foot slap  Comments/Distance (ft) 1: 14 ft x 1 with FWW, excessive L LE foot clearing step and foot slapping noted with CGA needed during gait and turns to reduce risk for falls.  Poor IV line awareness noted and max a for line mgmt.  Transfers  Transfer: Yes  Transfer 1  Transfer From 1: Sit to  Transfer to 1: Stand, Chair with arms  Technique 1: Sit to stand, Stand to sit  Transfer Device 1: Walker, Gait belt  Transfer Level of Assistance 1: Minimum assistance  Trials/Comments 1: min a to stand from lower surface with increased ue reliance noted and poor use of L LE.    Outcome Measures:  [facility] Basic Mobility  Turning from your back to your side while in a flat bed without using bedrails: A little  Moving from lying on your back to sitting on the side of a flat bed without using bedrails: A little  Moving to and from bed to chair (including a wheelchair): A lot  Standing up from a chair using your arms (e.g. wheelchair or bedside chair): A lot  To walk in hospital room: A little  Climbing 3-5 steps with railing: A lot  Basic Mobility - Total Score: 15    Other Measures  5x Sit to Stand: unable to perform without UE    Education Documentation  Body Mechanics, taught by Kimberly Casillas PTA at 6/30/2025 11:57 AM.  Learner: Patient  Readiness: Acceptance  Method: Explanation  Response: Needs Reinforcement  Comment: Attempted to cue and educate for safer posture and gait mechanics, refused with unkind words in response.    Mobility Training, taught by Kimberly Casillas PTA at 6/30/2025 11:57 AM.  Learner: Patient  Readiness: Acceptance  Method: Explanation  Response: Needs  Reinforcement  Comment: Attempted to cue and educate for safer posture and gait mechanics, refused with unkind words in response.    Education Comments  No comments found.        OP EDUCATION:       Encounter Problems       Encounter Problems (Active)       Balance       LTG - Patient will maintain balance to allow for safe mobility with close supervision (Progressing)       Start:  06/23/25    Expected End:  07/07/25               Mobility       STG - Patient will ambulate with approp a device  feet with close supervision (Progressing)       Start:  06/23/25    Expected End:  07/07/25               PT Transfers       STG - Patient will perform bed mobility independently (Progressing)       Start:  06/23/25    Expected End:  07/07/25            STG - Patient will transfer sit to and from stand with close supervision (Progressing)       Start:  06/23/25    Expected End:  07/07/25               Pain - Adult

## 2025-06-30 NOTE — PROGRESS NOTES
06/30/25 1306   Discharge Planning   Assistance Needed Patient currently at [facility] SNF for PT/OT. Patient and family would like to return to [facility]. Prior to [facility], patient was at home with his sister and niece. Per staff at [facility], patient is dependent on ADL's, does not get out of bed.   Type of Residence Skilled nursing facility   Who is requesting discharge planning? Provider   Home or Post Acute Services Post acute facilities (Rehab/SNF/etc)   Type of Post Acute Facility Services Skilled nursing   Expected Discharge Disposition SNF  (Patient will return to [facility]. Midline to right upper arm placed today. Will need IV Zosyn until 7/8/25.)   Does the patient need discharge transport arranged? Yes   RoundTrip coordination needed? Yes   Has discharge transport been arranged? No     6/30/25 1456 Transportation is set for 6:30pm for today. Message left with Rubén to make aware.

## 2025-06-30 NOTE — CARE PLAN
The patient's goals for the shift include      The clinical goals for the shift include pt will agree to take all meds/ treatments this shift    Over the shift, the patient did  make progress toward the following goals. Pt took po meds but refused tx to coccyx this shift

## 2025-06-30 NOTE — PROGRESS NOTES
Occupational Therapy                 Therapy Communication Note    Patient Name: Elliot Partida  MRN: [id number]  Department: [facility]  Room: [address] [place], [place] [address]  Today's Date: 6/30/2025     Discipline: Occupational Therapy    Missed Visit: OT Missed Visit: Yes     Missed Visit Reason: Missed Visit Reason: Patient refused    Missed Time: Attempt    Comment: Pt supine in bed, just waking up. Not agreeable to work with therapy- does not feel it is necessary. States does not want therapy. Therapist discussed and educated pt d/t multiple refusals, including today, pt will be discharge from OT caseload- no participation. RN notified.

## 2025-06-30 NOTE — CARE PLAN
The clinical goals for the shift include Void without urinary tract symptoms      Problem: Skin  Goal: Decreased wound size/increased tissue granulation at next dressing change  Outcome: Progressing  Flowsheets (Taken 6/30/2025 1427)  Decreased wound size/increased tissue granulation at next dressing change: Promote sleep for wound healing  Goal: Participates in plan/prevention/treatment measures  Outcome: Progressing  Flowsheets (Taken 6/29/2025 2307 by Julee Gardner RN)  Participates in plan/prevention/treatment measures: Increase activity/out of bed for meals  Goal: Prevent/manage excess moisture  Outcome: Progressing  Flowsheets (Taken 6/30/2025 1427)  Prevent/manage excess moisture: Cleanse incontinence/protect with barrier cream  Goal: Prevent/minimize sheer/friction injuries  Outcome: Progressing  Flowsheets (Taken 6/30/2025 1427)  Prevent/minimize sheer/friction injuries:   Increase activity/out of bed for meals   HOB 30 degrees or less   Turn/reposition every 2 hours/use positioning/transfer devices  Goal: Promote/optimize nutrition  Outcome: Progressing  Flowsheets (Taken 6/29/2025 2307 by Julee Gardner RN)  Promote/optimize nutrition: Consume > 50% meals/supplements  Goal: Promote skin healing  Outcome: Progressing  Flowsheets (Taken 6/30/2025 1427)  Promote skin healing: Turn/reposition every 2 hours/use positioning/transfer devices

## 2025-06-30 NOTE — PROCEDURES
Insert Midline    Date/Time: 6/30/2025 12:34 PM    Performed by: JUDITH Malik  Authorized by: JUDITH Jamison    Consent:     Consent obtained:  Verbal    Consent given by:  Patient    Risks, benefits, and alternatives were discussed: yes      Risks discussed:  Bleeding, infection, incomplete drainage and pain    Alternatives discussed:  No treatment  Universal protocol:     Procedure explained and questions answered to patient or proxy's satisfaction: yes      Relevant documents present and verified: yes      Test results available: yes      Imaging studies available: yes      Required blood products, implants, devices, and special equipment available: yes      Site/side marked: yes      Immediately prior to procedure, a time out was called: yes      Patient identity confirmed:  Verbally with patient, hospital-assigned identification number and arm band  Indications:     Indications:  IV antibiotic  Pre-procedure details:     Skin preparation:  Chlorhexidine  Sedation:     Sedation type:  None  Anesthesia:     Anesthesia method:  Local infiltration    Local anesthetic:  Lidocaine 1% w/o epi  Procedure specific details:      Site cleansed with chlorhexidine and marked. Guided by ultra sound; inserted the needle into a non pulsating right brachial vein. Guidewire was inserted through the needle into the vein. Needle was removed; tourniquet was removed. A 4.5 Fr transducer was inserted over the guidewire; the guidewire was removed. A 4 Fr catheter was trimmed to manufacturers specifications and was inserted into the transducer and into the vein. The transducer was peeled away. The catheter had good back flow of blood and was confirmed by ECG. It was flushed with 0.9% NS 10 ml. The catheter was 20 cm long; 0 cm out. Arm circumference was 30 cm. Patient tolerated well. BARD LOT# [id number]

## 2025-07-01 ENCOUNTER — NURSING HOME VISIT (OUTPATIENT)
Dept: POST ACUTE CARE | Facility: EXTERNAL LOCATION | Age: 75
End: 2025-07-01
Payer: MEDICARE

## 2025-07-01 DIAGNOSIS — D32.9 MENINGIOMA (MULTI): ICD-10-CM

## 2025-07-01 DIAGNOSIS — R53.1 WEAKNESS: ICD-10-CM

## 2025-07-01 DIAGNOSIS — G62.9 NEUROPATHY: ICD-10-CM

## 2025-07-01 DIAGNOSIS — E11.9 TYPE 2 DIABETES MELLITUS WITHOUT COMPLICATION, UNSPECIFIED WHETHER LONG TERM INSULIN USE: ICD-10-CM

## 2025-07-01 DIAGNOSIS — Z87.09 HISTORY OF PLEURAL EFFUSION: ICD-10-CM

## 2025-07-01 DIAGNOSIS — Z91.81 AT RISK FOR FALLING: ICD-10-CM

## 2025-07-01 DIAGNOSIS — N39.0 URINARY TRACT INFECTION WITHOUT HEMATURIA, SITE UNSPECIFIED: Primary | ICD-10-CM

## 2025-07-01 DIAGNOSIS — I82.409 DEEP VEIN THROMBOSIS (DVT) OF LOWER EXTREMITY, UNSPECIFIED CHRONICITY, UNSPECIFIED LATERALITY, UNSPECIFIED VEIN: ICD-10-CM

## 2025-07-01 DIAGNOSIS — M19.90 OSTEOARTHRITIS, UNSPECIFIED OSTEOARTHRITIS TYPE, UNSPECIFIED SITE: ICD-10-CM

## 2025-07-01 DIAGNOSIS — I10 HYPERTENSION, UNSPECIFIED TYPE: ICD-10-CM

## 2025-07-01 DIAGNOSIS — M86.9 OSTEOMYELITIS OF LEFT FOOT, UNSPECIFIED TYPE (MULTI): ICD-10-CM

## 2025-07-01 PROCEDURE — 99309 SBSQ NF CARE MODERATE MDM 30: CPT | Performed by: INTERNAL MEDICINE

## 2025-07-01 NOTE — LETTER
Patient: Elliot Partida  : 1950    Encounter Date: 2025    PLACE OF SERVICE:  [facility]    This is a subsequent visit.    Subjective  Patient ID: Elliot Partida is a 74 y.o. male who presents for Follow-up.    Mr. Elliot Partida is a 74-year-old diabetic male with recent UTI.  He has undergone debridement to his left foot secondary to osteomyelitis.  He is unable to care for himself and requires supportive care.    Review of Systems   Constitutional:  Negative for chills and fever.   Cardiovascular:  Negative for chest pain.   All other systems reviewed and are negative.    Objective  /82   Pulse 84   Temp 36.7 °C (98 °F)   Resp 16     Physical Exam  Vitals reviewed.   Constitutional:       Comments: This is a well-developed, well-nourished male, lying in bed, appearing weak.   HENT:      Right Ear: Tympanic membrane, ear canal and external ear normal.      Left Ear: Tympanic membrane, ear canal and external ear normal.   Eyes:      General: No scleral icterus.     Pupils: Pupils are equal, round, and reactive to light.   Neck:      Vascular: No carotid bruit.   Cardiovascular:      Heart sounds: Normal heart sounds, S1 normal and S2 normal. No murmur heard.     No friction rub.   Pulmonary:      Effort: Pulmonary effort is normal.      Breath sounds: Normal breath sounds and air entry.   Abdominal:      Palpations: There is no hepatomegaly, splenomegaly or mass.   Musculoskeletal:         General: No swelling or deformity. Normal range of motion.      Cervical back: Neck supple.      Right lower leg: No edema.      Left lower leg: No edema.      Comments: The patient's left foot is currently dressed.  There is no foul odor.  There is no color drainage.   Lymphadenopathy:      Cervical: No cervical adenopathy.      Upper Body:      Right upper body: No axillary adenopathy.      Left upper body: No axillary adenopathy.      Lower Body: No right inguinal adenopathy. No left inguinal  adenopathy.   Neurological:      Mental Status: He is oriented to person, place, and time. He is lethargic.      Cranial Nerves: Cranial nerves 2-12 are intact. No cranial nerve deficit.      Sensory: No sensory deficit.      Motor: Motor function is intact. No weakness.      Gait: Gait is intact.      Deep Tendon Reflexes: Reflexes normal.   Psychiatric:         Mood and Affect: Mood normal. Mood is not anxious or depressed. Affect is not angry.         Behavior: Behavior is not agitated.         Thought Content: Thought content normal.         Judgment: Judgment normal.     LAB WORK:  Laboratory studies were reviewed.    Assessment/Plan  Problem List Items Addressed This Visit           ICD-10-CM    Osteomyelitis of left foot, unspecified type (Multi) M86.9    Weakness R53.1    Osteoarthritis M19.90    Meningioma (Multi) D32.9    Urinary tract infection without hematuria, site unspecified - Primary N39.0    Diabetes (Multi) E11.9     Other Visit Diagnoses         Codes      Deep vein thrombosis (DVT) of lower extremity, unspecified chronicity, unspecified laterality, unspecified vein     I82.409      Hypertension, unspecified type     I10      Neuropathy     G62.9      History of pleural effusion     Z87.09      At risk for falling     Z91.81        1. Urinary tract infection, on antibiotic.  2. Osteomyelitis left foot.  Continue wound care.  Follow with Podiatry.  3. Diabetes, on insulin.  4. DVT, anticoagulated.  5. Hypertension, medically controlled.  6. Neuropathy, on gabapentin.  7. Osteoarthritis, on Tylenol.  8. Pleural effusion.  Follow with Pulmonology.  9. Meningioma: Follow with Neurosurgery.  10. Weakness, on PT/OT.  11. Fall risk, on fall precautions.    Scribe Attestation  By signing my name below, IHanna Scribe attest that this documentation has been prepared under the direction and in the presence of Marium Singh MD.     All medical record entries made by the scribe were personally  dictated by me I have reviewed the chart and agree the record accurately reflects my personal performance of his history physical examination and management      Electronically Signed By: Marium Singh MD   7/11/25 12:03 AM

## 2025-07-06 ENCOUNTER — NURSING HOME VISIT (OUTPATIENT)
Dept: POST ACUTE CARE | Facility: EXTERNAL LOCATION | Age: 75
End: 2025-07-06
Payer: MEDICARE

## 2025-07-06 DIAGNOSIS — N39.0 URINARY TRACT INFECTION WITHOUT HEMATURIA, SITE UNSPECIFIED: Primary | ICD-10-CM

## 2025-07-06 DIAGNOSIS — I10 HYPERTENSION, UNSPECIFIED TYPE: ICD-10-CM

## 2025-07-06 DIAGNOSIS — I25.10 ASHD (ARTERIOSCLEROTIC HEART DISEASE): ICD-10-CM

## 2025-07-06 DIAGNOSIS — Z91.81 AT RISK FOR FALLING: ICD-10-CM

## 2025-07-06 DIAGNOSIS — G62.9 NEUROPATHY: ICD-10-CM

## 2025-07-06 DIAGNOSIS — E11.9 TYPE 2 DIABETES MELLITUS WITHOUT COMPLICATION, UNSPECIFIED WHETHER LONG TERM INSULIN USE: ICD-10-CM

## 2025-07-06 DIAGNOSIS — D32.9 MENINGIOMA (MULTI): ICD-10-CM

## 2025-07-06 DIAGNOSIS — M86.9 OSTEOMYELITIS OF LEFT FOOT, UNSPECIFIED TYPE (MULTI): ICD-10-CM

## 2025-07-06 DIAGNOSIS — I82.409 DEEP VEIN THROMBOSIS (DVT) OF LOWER EXTREMITY, UNSPECIFIED CHRONICITY, UNSPECIFIED LATERALITY, UNSPECIFIED VEIN: ICD-10-CM

## 2025-07-06 DIAGNOSIS — R53.1 WEAKNESS: ICD-10-CM

## 2025-07-06 DIAGNOSIS — M19.90 OSTEOARTHRITIS, UNSPECIFIED OSTEOARTHRITIS TYPE, UNSPECIFIED SITE: ICD-10-CM

## 2025-07-06 DIAGNOSIS — Z87.09 HISTORY OF PLEURAL EFFUSION: ICD-10-CM

## 2025-07-06 PROCEDURE — 99309 SBSQ NF CARE MODERATE MDM 30: CPT | Performed by: INTERNAL MEDICINE

## 2025-07-06 NOTE — LETTER
Patient: Elloit Partida  : 1950    Encounter Date: 2025    PLACE OF SERVICE:  [facility]    This is a subsequent visit.    Subjective  Patient ID: Elliot Pratida is a 74 y.o. male who presents for Follow-up.    Mr. Elliot Partida is a 74-year-old diabetic male with recent history of UTI, requiring inpatient antibiotics.  He suffers from weakness and deconditioning.  He is unable to care for himself.  He requires supportive care.    Review of Systems   Constitutional:  Negative for chills and fever.   Cardiovascular:  Negative for chest pain.   All other systems reviewed and are negative.    Objective  /82   Pulse 82   Temp 36.6 °C (97.9 °F)   Resp 16     Physical Exam  Vitals reviewed.   Constitutional:       Comments: This is a well-developed, well-nourished male, lying in bed, appearing weak.   HENT:      Right Ear: Tympanic membrane, ear canal and external ear normal.      Left Ear: Tympanic membrane, ear canal and external ear normal.   Eyes:      General: No scleral icterus.     Pupils: Pupils are equal, round, and reactive to light.   Neck:      Vascular: No carotid bruit.   Cardiovascular:      Heart sounds: Normal heart sounds, S1 normal and S2 normal. No murmur heard.     No friction rub.   Pulmonary:      Effort: Pulmonary effort is normal.      Breath sounds: Normal breath sounds and air entry.   Abdominal:      Palpations: There is no hepatomegaly, splenomegaly or mass.   Musculoskeletal:         General: No swelling or deformity. Normal range of motion.      Cervical back: Neck supple.      Right lower leg: No edema.      Left lower leg: No edema.      Comments: The patient's left foot is currently dressed.  There is no foul odor.  There is no color drainage.   Lymphadenopathy:      Cervical: No cervical adenopathy.      Upper Body:      Right upper body: No axillary adenopathy.      Left upper body: No axillary adenopathy.      Lower Body: No right inguinal adenopathy.  No left inguinal adenopathy.   Neurological:      Mental Status: He is oriented to person, place, and time. He is lethargic.      Cranial Nerves: Cranial nerves 2-12 are intact. No cranial nerve deficit.      Sensory: No sensory deficit.      Motor: Motor function is intact. No weakness.      Gait: Gait is intact.      Deep Tendon Reflexes: Reflexes normal.   Psychiatric:         Mood and Affect: Mood normal. Mood is not anxious or depressed. Affect is not angry.         Behavior: Behavior is not agitated.         Thought Content: Thought content normal.         Judgment: Judgment normal.     LAB WORK:  Laboratory studies were reviewed.    Assessment/Plan  Problem List Items Addressed This Visit           ICD-10-CM    Osteomyelitis of left foot, unspecified type (Multi) M86.9    Weakness R53.1    Osteoarthritis M19.90    Meningioma (Multi) D32.9    Urinary tract infection without hematuria, site unspecified - Primary N39.0    Diabetes (Multi) E11.9     Other Visit Diagnoses         Codes      Hypertension, unspecified type     I10      Deep vein thrombosis (DVT) of lower extremity, unspecified chronicity, unspecified laterality, unspecified vein     I82.409      ASHD (arteriosclerotic heart disease)     I25.10      History of pleural effusion     Z87.09      Neuropathy     G62.9      At risk for falling     Z91.81        1. Urinary tract infection.  Finish antibiotic.  2. Diabetes, on insulin.  3. Osteomyelitis, left foot.  Continue wound care.  Follow with Podiatry.  4. Hypertension, medically controlled.  5. DVT, anticoagulated.  6. Osteoarthritis, on Tylenol.  7. ASHD, on aspirin.  8. Meningioma.  Follow with Neurosurgery.  9. Pleural effusion.  Follow with Pulmonology.  10. Neuropathy, on gabapentin.  11. Weakness, on PT/OT.  12. Fall risk, on fall precautions.    Scribe Attestation  By signing my name below, Hanna OLIVER, Scralfred attest that this documentation has been prepared under the direction and in the  presence of Marium Singh MD.     All medical record entries made by the scribe were personally dictated by me I have reviewed the chart and agree the record accurately reflects my personal performance of his history physical examination and management      Electronically Signed By: Marium Singh MD   7/11/25 12:05 AM

## 2025-07-08 ENCOUNTER — APPOINTMENT (OUTPATIENT)
Dept: RADIOLOGY | Facility: HOSPITAL | Age: 75
End: 2025-07-08
Payer: MEDICARE

## 2025-07-08 ENCOUNTER — HOSPITAL ENCOUNTER (OUTPATIENT)
Facility: HOSPITAL | Age: 75
Setting detail: OBSERVATION
Discharge: NURSING, REHAB OR LONG-TERM CARE | End: 2025-07-10
Attending: EMERGENCY MEDICINE | Admitting: INTERNAL MEDICINE
Payer: MEDICARE

## 2025-07-08 ENCOUNTER — APPOINTMENT (OUTPATIENT)
Dept: CARDIOLOGY | Facility: HOSPITAL | Age: 75
End: 2025-07-08
Payer: MEDICARE

## 2025-07-08 DIAGNOSIS — I63.9 CEREBRAL INFARCTION, UNSPECIFIED: ICD-10-CM

## 2025-07-08 DIAGNOSIS — R23.4 WOUND ESCHAR OF FOOT: ICD-10-CM

## 2025-07-08 DIAGNOSIS — R79.89 ELEVATED BRAIN NATRIURETIC PEPTIDE (BNP) LEVEL: ICD-10-CM

## 2025-07-08 DIAGNOSIS — R40.0 SOMNOLENCE: Primary | ICD-10-CM

## 2025-07-08 DIAGNOSIS — R94.31 ABNORMAL ELECTROCARDIOGRAM (ECG) (EKG): ICD-10-CM

## 2025-07-08 LAB
ALBUMIN SERPL BCP-MCNC: 3.3 G/DL (ref 3.4–5)
ALP SERPL-CCNC: 60 U/L (ref 33–136)
ALT SERPL W P-5'-P-CCNC: 6 U/L (ref 10–52)
ANION GAP SERPL CALC-SCNC: 10 MMOL/L (ref 10–20)
APPEARANCE UR: CLEAR
AST SERPL W P-5'-P-CCNC: 10 U/L (ref 9–39)
BILIRUB SERPL-MCNC: 0.3 MG/DL (ref 0–1.2)
BILIRUB UR STRIP.AUTO-MCNC: NEGATIVE MG/DL
BNP SERPL-MCNC: 262 PG/ML (ref 0–99)
BUN SERPL-MCNC: 10 MG/DL (ref 6–23)
CALCIUM SERPL-MCNC: 8.9 MG/DL (ref 8.6–10.3)
CARDIAC TROPONIN I PNL SERPL HS: 10 NG/L (ref 0–20)
CHLORIDE SERPL-SCNC: 103 MMOL/L (ref 98–107)
CO2 SERPL-SCNC: 31 MMOL/L (ref 21–32)
COLOR UR: NORMAL
CREAT SERPL-MCNC: 0.93 MG/DL (ref 0.5–1.3)
CRP SERPL-MCNC: 1.32 MG/DL
EGFRCR SERPLBLD CKD-EPI 2021: 87 ML/MIN/1.73M*2
ERYTHROCYTE [DISTWIDTH] IN BLOOD BY AUTOMATED COUNT: 15.9 % (ref 11.5–14.5)
ERYTHROCYTE [SEDIMENTATION RATE] IN BLOOD BY WESTERGREN METHOD: 20 MM/H (ref 0–20)
GLUCOSE BLD MANUAL STRIP-MCNC: 114 MG/DL (ref 74–99)
GLUCOSE SERPL-MCNC: 122 MG/DL (ref 74–99)
GLUCOSE UR STRIP.AUTO-MCNC: NORMAL MG/DL
HCT VFR BLD AUTO: 28.3 % (ref 41–52)
HGB BLD-MCNC: 9 G/DL (ref 13.5–17.5)
KETONES UR STRIP.AUTO-MCNC: NEGATIVE MG/DL
LACTATE SERPL-SCNC: 0.7 MMOL/L (ref 0.4–2)
LEUKOCYTE ESTERASE UR QL STRIP.AUTO: NEGATIVE
MCH RBC QN AUTO: 28.3 PG (ref 26–34)
MCHC RBC AUTO-ENTMCNC: 31.8 G/DL (ref 32–36)
MCV RBC AUTO: 89 FL (ref 80–100)
NITRITE UR QL STRIP.AUTO: NEGATIVE
NRBC BLD-RTO: 0 /100 WBCS (ref 0–0)
PH UR STRIP.AUTO: 6 [PH]
PLATELET # BLD AUTO: 204 X10*3/UL (ref 150–450)
POTASSIUM SERPL-SCNC: 3.4 MMOL/L (ref 3.5–5.3)
PROT SERPL-MCNC: 6 G/DL (ref 6.4–8.2)
PROT UR STRIP.AUTO-MCNC: NEGATIVE MG/DL
RBC # BLD AUTO: 3.18 X10*6/UL (ref 4.5–5.9)
RBC # UR STRIP.AUTO: NEGATIVE MG/DL
SODIUM SERPL-SCNC: 141 MMOL/L (ref 136–145)
SP GR UR STRIP.AUTO: 1.01
UROBILINOGEN UR STRIP.AUTO-MCNC: NORMAL MG/DL
WBC # BLD AUTO: 6.2 X10*3/UL (ref 4.4–11.3)

## 2025-07-08 PROCEDURE — 84484 ASSAY OF TROPONIN QUANT: CPT | Performed by: EMERGENCY MEDICINE

## 2025-07-08 PROCEDURE — 70450 CT HEAD/BRAIN W/O DYE: CPT | Performed by: STUDENT IN AN ORGANIZED HEALTH CARE EDUCATION/TRAINING PROGRAM

## 2025-07-08 PROCEDURE — 36415 COLL VENOUS BLD VENIPUNCTURE: CPT | Performed by: EMERGENCY MEDICINE

## 2025-07-08 PROCEDURE — 85027 COMPLETE CBC AUTOMATED: CPT | Performed by: EMERGENCY MEDICINE

## 2025-07-08 PROCEDURE — 70450 CT HEAD/BRAIN W/O DYE: CPT

## 2025-07-08 PROCEDURE — 80307 DRUG TEST PRSMV CHEM ANLYZR: CPT | Performed by: NURSE PRACTITIONER

## 2025-07-08 PROCEDURE — 99285 EMERGENCY DEPT VISIT HI MDM: CPT | Mod: 25 | Performed by: EMERGENCY MEDICINE

## 2025-07-08 PROCEDURE — 71045 X-RAY EXAM CHEST 1 VIEW: CPT

## 2025-07-08 PROCEDURE — 85652 RBC SED RATE AUTOMATED: CPT | Performed by: EMERGENCY MEDICINE

## 2025-07-08 PROCEDURE — 93005 ELECTROCARDIOGRAM TRACING: CPT | Mod: 59

## 2025-07-08 PROCEDURE — 2500000005 HC RX 250 GENERAL PHARMACY W/O HCPCS: Performed by: EMERGENCY MEDICINE

## 2025-07-08 PROCEDURE — 2500000001 HC RX 250 WO HCPCS SELF ADMINISTERED DRUGS (ALT 637 FOR MEDICARE OP): Performed by: EMERGENCY MEDICINE

## 2025-07-08 PROCEDURE — 51702 INSERT TEMP BLADDER CATH: CPT

## 2025-07-08 PROCEDURE — 83880 ASSAY OF NATRIURETIC PEPTIDE: CPT | Performed by: EMERGENCY MEDICINE

## 2025-07-08 PROCEDURE — 83605 ASSAY OF LACTIC ACID: CPT | Performed by: EMERGENCY MEDICINE

## 2025-07-08 PROCEDURE — 86140 C-REACTIVE PROTEIN: CPT | Performed by: EMERGENCY MEDICINE

## 2025-07-08 PROCEDURE — 82947 ASSAY GLUCOSE BLOOD QUANT: CPT | Mod: 59

## 2025-07-08 PROCEDURE — 84075 ASSAY ALKALINE PHOSPHATASE: CPT | Performed by: EMERGENCY MEDICINE

## 2025-07-08 PROCEDURE — 71045 X-RAY EXAM CHEST 1 VIEW: CPT | Performed by: RADIOLOGY

## 2025-07-08 PROCEDURE — 74177 CT ABD & PELVIS W/CONTRAST: CPT

## 2025-07-08 PROCEDURE — 81003 URINALYSIS AUTO W/O SCOPE: CPT | Mod: 59 | Performed by: EMERGENCY MEDICINE

## 2025-07-08 PROCEDURE — 87040 BLOOD CULTURE FOR BACTERIA: CPT | Mod: GENLAB | Performed by: EMERGENCY MEDICINE

## 2025-07-08 RX ORDER — CLONIDINE HYDROCHLORIDE 0.1 MG/1
0.1 TABLET ORAL ONCE
Status: COMPLETED | OUTPATIENT
Start: 2025-07-08 | End: 2025-07-08

## 2025-07-08 RX ORDER — ACETAMINOPHEN 325 MG/1
650 TABLET ORAL ONCE
Status: DISCONTINUED | OUTPATIENT
Start: 2025-07-08 | End: 2025-07-09

## 2025-07-08 RX ORDER — TRAMADOL HYDROCHLORIDE 50 MG/1
50 TABLET, FILM COATED ORAL EVERY 8 HOURS PRN
Status: DISCONTINUED | OUTPATIENT
Start: 2025-07-08 | End: 2025-07-10 | Stop reason: HOSPADM

## 2025-07-08 RX ADMIN — CLONIDINE HYDROCHLORIDE 0.1 MG: 0.1 TABLET ORAL at 23:10

## 2025-07-08 RX ADMIN — Medication 3 L/MIN: at 21:34

## 2025-07-08 RX ADMIN — TRAMADOL HYDROCHLORIDE 50 MG: 50 TABLET, COATED ORAL at 23:10

## 2025-07-08 ASSESSMENT — PAIN SCALES - GENERAL
PAINLEVEL_OUTOF10: 0 - NO PAIN
PAINLEVEL_OUTOF10: 7
PAINLEVEL_OUTOF10: 0 - NO PAIN

## 2025-07-08 ASSESSMENT — PAIN - FUNCTIONAL ASSESSMENT
PAIN_FUNCTIONAL_ASSESSMENT: 0-10
PAIN_FUNCTIONAL_ASSESSMENT: 0-10

## 2025-07-08 ASSESSMENT — PAIN DESCRIPTION - DESCRIPTORS: DESCRIPTORS: ACHING;PRESSURE

## 2025-07-08 ASSESSMENT — PAIN DESCRIPTION - LOCATION: LOCATION: COCCYX

## 2025-07-09 ENCOUNTER — APPOINTMENT (OUTPATIENT)
Dept: RADIOLOGY | Facility: HOSPITAL | Age: 75
End: 2025-07-09
Payer: MEDICARE

## 2025-07-09 ENCOUNTER — APPOINTMENT (OUTPATIENT)
Dept: CARDIOLOGY | Facility: HOSPITAL | Age: 75
End: 2025-07-09
Payer: MEDICARE

## 2025-07-09 PROBLEM — E11.9 DIABETES (MULTI): Status: ACTIVE | Noted: 2025-07-09

## 2025-07-09 PROBLEM — E87.6 HYPOKALEMIA: Status: ACTIVE | Noted: 2025-07-09

## 2025-07-09 PROBLEM — R40.0 SOMNOLENCE: Status: ACTIVE | Noted: 2025-07-09

## 2025-07-09 PROBLEM — G93.40 ENCEPHALOPATHY: Status: ACTIVE | Noted: 2025-07-09

## 2025-07-09 PROBLEM — R79.89 ELEVATED BRAIN NATRIURETIC PEPTIDE (BNP) LEVEL: Status: ACTIVE | Noted: 2025-07-09

## 2025-07-09 LAB
ALBUMIN SERPL BCP-MCNC: 3.1 G/DL (ref 3.4–5)
AMPHETAMINES UR QL SCN: NORMAL
ANION GAP SERPL CALC-SCNC: 7 MMOL/L (ref 10–20)
AORTIC VALVE MEAN GRADIENT: 22 MMHG
AORTIC VALVE PEAK VELOCITY: 3.11 M/S
ATRIAL RATE: 66 BPM
AV PEAK GRADIENT: 39 MMHG
AVA (PEAK VEL): 1.38 CM2
AVA (VTI): 1.34 CM2
BARBITURATES UR QL SCN: NORMAL
BENZODIAZ UR QL SCN: NORMAL
BUN SERPL-MCNC: 10 MG/DL (ref 6–23)
BZE UR QL SCN: NORMAL
CALCIUM SERPL-MCNC: 8.6 MG/DL (ref 8.6–10.3)
CANNABINOIDS UR QL SCN: NORMAL
CHLORIDE SERPL-SCNC: 104 MMOL/L (ref 98–107)
CHOLEST SERPL-MCNC: 160 MG/DL (ref 0–199)
CHOLESTEROL/HDL RATIO: 7.4
CO2 SERPL-SCNC: 34 MMOL/L (ref 21–32)
CREAT SERPL-MCNC: 1 MG/DL (ref 0.5–1.3)
EGFRCR SERPLBLD CKD-EPI 2021: 79 ML/MIN/1.73M*2
EJECTION FRACTION APICAL 4 CHAMBER: 61.6
EJECTION FRACTION: 63 %
ERYTHROCYTE [DISTWIDTH] IN BLOOD BY AUTOMATED COUNT: 15.9 % (ref 11.5–14.5)
FENTANYL+NORFENTANYL UR QL SCN: NORMAL
GLUCOSE BLD MANUAL STRIP-MCNC: 101 MG/DL (ref 74–99)
GLUCOSE BLD MANUAL STRIP-MCNC: 109 MG/DL (ref 74–99)
GLUCOSE BLD MANUAL STRIP-MCNC: 77 MG/DL (ref 74–99)
GLUCOSE SERPL-MCNC: 124 MG/DL (ref 74–99)
HCT VFR BLD AUTO: 25.8 % (ref 41–52)
HDLC SERPL-MCNC: 21.6 MG/DL
HGB BLD-MCNC: 8.2 G/DL (ref 13.5–17.5)
HOLD SPECIMEN: NORMAL
IRON SATN MFR SERPL: 16 % (ref 25–45)
IRON SERPL-MCNC: 40 UG/DL (ref 35–150)
LDLC SERPL CALC-MCNC: 122 MG/DL
LEFT ATRIUM VOLUME AREA LENGTH INDEX BSA: 48.9 ML/M2
LEFT VENTRICLE INTERNAL DIMENSION DIASTOLE: 4.83 CM (ref 3.5–6)
LEFT VENTRICULAR OUTFLOW TRACT DIAMETER: 2.25 CM
MAGNESIUM SERPL-MCNC: 2.03 MG/DL (ref 1.6–2.4)
MCH RBC QN AUTO: 28.3 PG (ref 26–34)
MCHC RBC AUTO-ENTMCNC: 31.8 G/DL (ref 32–36)
MCV RBC AUTO: 89 FL (ref 80–100)
METHADONE UR QL SCN: NORMAL
MITRAL VALVE E/A RATIO: 1.11
NON HDL CHOLESTEROL: 138 MG/DL (ref 0–149)
NRBC BLD-RTO: 0 /100 WBCS (ref 0–0)
OPIATES UR QL SCN: NORMAL
OXYCODONE+OXYMORPHONE UR QL SCN: NORMAL
P AXIS: 19 DEGREES
P OFFSET: 128 MS
P ONSET: 80 MS
PCP UR QL SCN: NORMAL
PHOSPHATE SERPL-MCNC: 3.4 MG/DL (ref 2.5–4.9)
PLATELET # BLD AUTO: 214 X10*3/UL (ref 150–450)
POTASSIUM SERPL-SCNC: 3.1 MMOL/L (ref 3.5–5.3)
PR INTERVAL: 278 MS
Q ONSET: 219 MS
QRS COUNT: 11 BEATS
QRS DURATION: 136 MS
QT INTERVAL: 450 MS
QTC CALCULATION(BAZETT): 471 MS
QTC FREDERICIA: 465 MS
R AXIS: -57 DEGREES
RBC # BLD AUTO: 2.9 X10*6/UL (ref 4.5–5.9)
RIGHT VENTRICLE FREE WALL PEAK S': 9.53 CM/S
SODIUM SERPL-SCNC: 142 MMOL/L (ref 136–145)
T AXIS: 85 DEGREES
T OFFSET: 444 MS
TIBC SERPL-MCNC: 249 UG/DL (ref 240–445)
TRICUSPID ANNULAR PLANE SYSTOLIC EXCURSION: 2.2 CM
TRIGL SERPL-MCNC: 81 MG/DL (ref 0–149)
UIBC SERPL-MCNC: 209 UG/DL (ref 110–370)
VENTRICULAR RATE: 66 BPM
VLDL: 16 MG/DL (ref 0–40)
WBC # BLD AUTO: 5.4 X10*3/UL (ref 4.4–11.3)

## 2025-07-09 PROCEDURE — 51702 INSERT TEMP BLADDER CATH: CPT

## 2025-07-09 PROCEDURE — 2500000004 HC RX 250 GENERAL PHARMACY W/ HCPCS (ALT 636 FOR OP/ED): Performed by: EMERGENCY MEDICINE

## 2025-07-09 PROCEDURE — 82947 ASSAY GLUCOSE BLOOD QUANT: CPT

## 2025-07-09 PROCEDURE — 96372 THER/PROPH/DIAG INJ SC/IM: CPT | Performed by: INTERNAL MEDICINE

## 2025-07-09 PROCEDURE — G0378 HOSPITAL OBSERVATION PER HR: HCPCS

## 2025-07-09 PROCEDURE — 2500000005 HC RX 250 GENERAL PHARMACY W/O HCPCS: Performed by: INTERNAL MEDICINE

## 2025-07-09 PROCEDURE — 96375 TX/PRO/DX INJ NEW DRUG ADDON: CPT | Mod: 59

## 2025-07-09 PROCEDURE — 2500000001 HC RX 250 WO HCPCS SELF ADMINISTERED DRUGS (ALT 637 FOR MEDICARE OP): Performed by: INTERNAL MEDICINE

## 2025-07-09 PROCEDURE — 74177 CT ABD & PELVIS W/CONTRAST: CPT | Performed by: RADIOLOGY

## 2025-07-09 PROCEDURE — 99223 1ST HOSP IP/OBS HIGH 75: CPT | Performed by: NURSE PRACTITIONER

## 2025-07-09 PROCEDURE — 85027 COMPLETE CBC AUTOMATED: CPT | Performed by: INTERNAL MEDICINE

## 2025-07-09 PROCEDURE — 80061 LIPID PANEL: CPT | Performed by: NURSE PRACTITIONER

## 2025-07-09 PROCEDURE — 83735 ASSAY OF MAGNESIUM: CPT | Performed by: INTERNAL MEDICINE

## 2025-07-09 PROCEDURE — 2500000004 HC RX 250 GENERAL PHARMACY W/ HCPCS (ALT 636 FOR OP/ED): Mod: JW | Performed by: INTERNAL MEDICINE

## 2025-07-09 PROCEDURE — 96374 THER/PROPH/DIAG INJ IV PUSH: CPT | Mod: 59

## 2025-07-09 PROCEDURE — 73090 X-RAY EXAM OF FOREARM: CPT | Mod: LT

## 2025-07-09 PROCEDURE — 70498 CT ANGIOGRAPHY NECK: CPT | Performed by: STUDENT IN AN ORGANIZED HEALTH CARE EDUCATION/TRAINING PROGRAM

## 2025-07-09 PROCEDURE — 83540 ASSAY OF IRON: CPT | Performed by: NURSE PRACTITIONER

## 2025-07-09 PROCEDURE — 71260 CT THORAX DX C+: CPT | Performed by: RADIOLOGY

## 2025-07-09 PROCEDURE — 70496 CT ANGIOGRAPHY HEAD: CPT | Performed by: STUDENT IN AN ORGANIZED HEALTH CARE EDUCATION/TRAINING PROGRAM

## 2025-07-09 PROCEDURE — 80069 RENAL FUNCTION PANEL: CPT | Performed by: INTERNAL MEDICINE

## 2025-07-09 PROCEDURE — 2550000001 HC RX 255 CONTRASTS: Performed by: INTERNAL MEDICINE

## 2025-07-09 PROCEDURE — 73090 X-RAY EXAM OF FOREARM: CPT | Mod: LEFT SIDE | Performed by: RADIOLOGY

## 2025-07-09 PROCEDURE — 93306 TTE W/DOPPLER COMPLETE: CPT

## 2025-07-09 PROCEDURE — 2500000004 HC RX 250 GENERAL PHARMACY W/ HCPCS (ALT 636 FOR OP/ED): Performed by: NURSE PRACTITIONER

## 2025-07-09 PROCEDURE — 2550000001 HC RX 255 CONTRASTS: Performed by: EMERGENCY MEDICINE

## 2025-07-09 PROCEDURE — 2500000002 HC RX 250 W HCPCS SELF ADMINISTERED DRUGS (ALT 637 FOR MEDICARE OP, ALT 636 FOR OP/ED): Performed by: NURSE PRACTITIONER

## 2025-07-09 PROCEDURE — 94760 N-INVAS EAR/PLS OXIMETRY 1: CPT

## 2025-07-09 PROCEDURE — 70496 CT ANGIOGRAPHY HEAD: CPT

## 2025-07-09 PROCEDURE — 37799 UNLISTED PX VASCULAR SURGERY: CPT | Performed by: INTERNAL MEDICINE

## 2025-07-09 RX ORDER — FUROSEMIDE 10 MG/ML
40 INJECTION INTRAMUSCULAR; INTRAVENOUS ONCE
Status: COMPLETED | OUTPATIENT
Start: 2025-07-09 | End: 2025-07-09

## 2025-07-09 RX ORDER — ONDANSETRON 4 MG/1
4 TABLET, FILM COATED ORAL EVERY 8 HOURS PRN
Status: ON HOLD | COMMUNITY
End: 2025-07-09

## 2025-07-09 RX ORDER — POLYETHYLENE GLYCOL 3350 17 G/17G
17 POWDER, FOR SOLUTION ORAL DAILY
Status: DISCONTINUED | OUTPATIENT
Start: 2025-07-09 | End: 2025-07-10 | Stop reason: HOSPADM

## 2025-07-09 RX ORDER — ACETAMINOPHEN 325 MG/1
975 TABLET ORAL EVERY 8 HOURS PRN
Status: DISCONTINUED | OUTPATIENT
Start: 2025-07-09 | End: 2025-07-10 | Stop reason: HOSPADM

## 2025-07-09 RX ORDER — DOCUSATE SODIUM 100 MG/1
100 CAPSULE, LIQUID FILLED ORAL DAILY PRN
Status: ON HOLD | COMMUNITY
End: 2025-07-09

## 2025-07-09 RX ORDER — ENOXAPARIN SODIUM 100 MG/ML
90 INJECTION SUBCUTANEOUS EVERY 12 HOURS
Status: DISCONTINUED | OUTPATIENT
Start: 2025-07-09 | End: 2025-07-10 | Stop reason: HOSPADM

## 2025-07-09 RX ORDER — ZINC OXIDE 20 G/100G
1 OINTMENT TOPICAL
Status: DISCONTINUED | OUTPATIENT
Start: 2025-07-09 | End: 2025-07-10 | Stop reason: HOSPADM

## 2025-07-09 RX ORDER — TRAMADOL HYDROCHLORIDE 50 MG/1
50 TABLET, FILM COATED ORAL EVERY 4 HOURS PRN
Status: ON HOLD | COMMUNITY
End: 2025-07-09

## 2025-07-09 RX ORDER — ONDANSETRON HYDROCHLORIDE 2 MG/ML
4 INJECTION, SOLUTION INTRAVENOUS EVERY 6 HOURS PRN
Status: DISCONTINUED | OUTPATIENT
Start: 2025-07-09 | End: 2025-07-10 | Stop reason: HOSPADM

## 2025-07-09 RX ORDER — POTASSIUM CHLORIDE 20 MEQ/1
40 TABLET, EXTENDED RELEASE ORAL ONCE
Status: COMPLETED | OUTPATIENT
Start: 2025-07-09 | End: 2025-07-09

## 2025-07-09 RX ORDER — HYDRALAZINE HYDROCHLORIDE 50 MG/1
50 TABLET, FILM COATED ORAL 3 TIMES DAILY
Status: DISCONTINUED | OUTPATIENT
Start: 2025-07-09 | End: 2025-07-10 | Stop reason: HOSPADM

## 2025-07-09 RX ORDER — IPRATROPIUM BROMIDE AND ALBUTEROL SULFATE 2.5; .5 MG/3ML; MG/3ML
3 SOLUTION RESPIRATORY (INHALATION) EVERY 2 HOUR PRN
Status: DISCONTINUED | OUTPATIENT
Start: 2025-07-09 | End: 2025-07-10 | Stop reason: HOSPADM

## 2025-07-09 RX ORDER — AMOXICILLIN 250 MG
1 CAPSULE ORAL 2 TIMES DAILY PRN
Status: ON HOLD | COMMUNITY
End: 2025-07-09

## 2025-07-09 RX ORDER — TRAZODONE HYDROCHLORIDE 50 MG/1
50 TABLET ORAL NIGHTLY
Status: DISCONTINUED | OUTPATIENT
Start: 2025-07-09 | End: 2025-07-09

## 2025-07-09 RX ORDER — SODIUM CHLORIDE 9 MG/ML
10 INJECTION, SOLUTION INTRAVENOUS CONTINUOUS PRN
Status: DISCONTINUED | OUTPATIENT
Start: 2025-07-09 | End: 2025-07-10 | Stop reason: HOSPADM

## 2025-07-09 RX ORDER — ACETAMINOPHEN 325 MG/1
650 TABLET ORAL EVERY 6 HOURS PRN
Status: ON HOLD | COMMUNITY
End: 2025-07-09

## 2025-07-09 RX ORDER — AMLODIPINE BESYLATE 2.5 MG/1
2.5 TABLET ORAL DAILY
Status: DISCONTINUED | OUTPATIENT
Start: 2025-07-09 | End: 2025-07-10 | Stop reason: HOSPADM

## 2025-07-09 RX ORDER — MECLIZINE HCL 12.5 MG 12.5 MG/1
25 TABLET ORAL EVERY 6 HOURS PRN
Status: DISCONTINUED | OUTPATIENT
Start: 2025-07-09 | End: 2025-07-10 | Stop reason: HOSPADM

## 2025-07-09 RX ORDER — ENOXAPARIN SODIUM 100 MG/ML
40 INJECTION SUBCUTANEOUS EVERY 24 HOURS
Status: DISCONTINUED | OUTPATIENT
Start: 2025-07-09 | End: 2025-07-09

## 2025-07-09 RX ORDER — GABAPENTIN 300 MG/1
300 CAPSULE ORAL 3 TIMES DAILY
Status: DISCONTINUED | OUTPATIENT
Start: 2025-07-09 | End: 2025-07-10 | Stop reason: HOSPADM

## 2025-07-09 RX ADMIN — HYDRALAZINE HYDROCHLORIDE 50 MG: 50 TABLET ORAL at 21:36

## 2025-07-09 RX ADMIN — IOHEXOL 75 ML: 350 INJECTION, SOLUTION INTRAVENOUS at 14:23

## 2025-07-09 RX ADMIN — FUROSEMIDE 40 MG: 10 INJECTION, SOLUTION INTRAMUSCULAR; INTRAVENOUS at 01:43

## 2025-07-09 RX ADMIN — Medication 2 L/MIN: at 03:24

## 2025-07-09 RX ADMIN — HYDRALAZINE HYDROCHLORIDE 50 MG: 50 TABLET ORAL at 15:54

## 2025-07-09 RX ADMIN — IOHEXOL 75 ML: 350 INJECTION, SOLUTION INTRAVENOUS at 00:17

## 2025-07-09 RX ADMIN — TRAMADOL HYDROCHLORIDE 50 MG: 50 TABLET, COATED ORAL at 16:55

## 2025-07-09 RX ADMIN — Medication 2 L/MIN: at 08:28

## 2025-07-09 RX ADMIN — ONDANSETRON 4 MG: 2 INJECTION INTRAMUSCULAR; INTRAVENOUS at 13:46

## 2025-07-09 RX ADMIN — HYDRALAZINE HYDROCHLORIDE 50 MG: 50 TABLET ORAL at 09:35

## 2025-07-09 RX ADMIN — Medication 2 L/MIN: at 22:38

## 2025-07-09 RX ADMIN — POTASSIUM CHLORIDE 40 MEQ: 1500 TABLET, EXTENDED RELEASE ORAL at 12:38

## 2025-07-09 RX ADMIN — AMLODIPINE BESYLATE 2.5 MG: 2.5 TABLET ORAL at 09:35

## 2025-07-09 RX ADMIN — ACETAMINOPHEN 975 MG: 325 TABLET, FILM COATED ORAL at 21:36

## 2025-07-09 RX ADMIN — ENOXAPARIN SODIUM 90 MG: 80 INJECTION SUBCUTANEOUS at 13:46

## 2025-07-09 SDOH — SOCIAL STABILITY: SOCIAL INSECURITY: WITHIN THE LAST YEAR, HAVE YOU BEEN AFRAID OF YOUR PARTNER OR EX-PARTNER?: NO

## 2025-07-09 SDOH — SOCIAL STABILITY: SOCIAL INSECURITY: ARE YOU OR HAVE YOU BEEN THREATENED OR ABUSED PHYSICALLY, EMOTIONALLY, OR SEXUALLY BY ANYONE?: NO

## 2025-07-09 SDOH — SOCIAL STABILITY: SOCIAL INSECURITY: WITHIN THE LAST YEAR, HAVE YOU BEEN HUMILIATED OR EMOTIONALLY ABUSED IN OTHER WAYS BY YOUR PARTNER OR EX-PARTNER?: NO

## 2025-07-09 SDOH — SOCIAL STABILITY: SOCIAL INSECURITY: ABUSE: ADULT

## 2025-07-09 SDOH — ECONOMIC STABILITY: INCOME INSECURITY: IN THE PAST 12 MONTHS HAS THE ELECTRIC, GAS, OIL, OR WATER COMPANY THREATENED TO SHUT OFF SERVICES IN YOUR HOME?: NO

## 2025-07-09 SDOH — ECONOMIC STABILITY: FOOD INSECURITY: WITHIN THE PAST 12 MONTHS, THE FOOD YOU BOUGHT JUST DIDN'T LAST AND YOU DIDN'T HAVE MONEY TO GET MORE.: NEVER TRUE

## 2025-07-09 SDOH — SOCIAL STABILITY: SOCIAL INSECURITY: WERE YOU ABLE TO COMPLETE ALL THE BEHAVIORAL HEALTH SCREENINGS?: YES

## 2025-07-09 SDOH — ECONOMIC STABILITY: FOOD INSECURITY: WITHIN THE PAST 12 MONTHS, YOU WORRIED THAT YOUR FOOD WOULD RUN OUT BEFORE YOU GOT THE MONEY TO BUY MORE.: NEVER TRUE

## 2025-07-09 SDOH — SOCIAL STABILITY: SOCIAL INSECURITY: DOES ANYONE TRY TO KEEP YOU FROM HAVING/CONTACTING OTHER FRIENDS OR DOING THINGS OUTSIDE YOUR HOME?: NO

## 2025-07-09 SDOH — SOCIAL STABILITY: SOCIAL INSECURITY: ARE THERE ANY APPARENT SIGNS OF INJURIES/BEHAVIORS THAT COULD BE RELATED TO ABUSE/NEGLECT?: NO

## 2025-07-09 SDOH — SOCIAL STABILITY: SOCIAL INSECURITY: HAS ANYONE EVER THREATENED TO HURT YOUR FAMILY OR YOUR PETS?: NO

## 2025-07-09 SDOH — SOCIAL STABILITY: SOCIAL INSECURITY: DO YOU FEEL ANYONE HAS EXPLOITED OR TAKEN ADVANTAGE OF YOU FINANCIALLY OR OF YOUR PERSONAL PROPERTY?: NO

## 2025-07-09 SDOH — SOCIAL STABILITY: SOCIAL INSECURITY: HAVE YOU HAD THOUGHTS OF HARMING ANYONE ELSE?: NO

## 2025-07-09 SDOH — SOCIAL STABILITY: SOCIAL INSECURITY: DO YOU FEEL UNSAFE GOING BACK TO THE PLACE WHERE YOU ARE LIVING?: NO

## 2025-07-09 ASSESSMENT — COGNITIVE AND FUNCTIONAL STATUS - GENERAL
CLIMB 3 TO 5 STEPS WITH RAILING: A LOT
MOVING FROM LYING ON BACK TO SITTING ON SIDE OF FLAT BED WITH BEDRAILS: A LITTLE
HELP NEEDED FOR BATHING: A LITTLE
DAILY ACTIVITIY SCORE: 19
TOILETING: A LITTLE
DRESSING REGULAR UPPER BODY CLOTHING: A LITTLE
DRESSING REGULAR UPPER BODY CLOTHING: A LITTLE
TURNING FROM BACK TO SIDE WHILE IN FLAT BAD: A LITTLE
MOBILITY SCORE: 14
MOBILITY SCORE: 14
WALKING IN HOSPITAL ROOM: A LOT
MOVING TO AND FROM BED TO CHAIR: A LOT
PERSONAL GROOMING: A LITTLE
MOVING FROM LYING ON BACK TO SITTING ON SIDE OF FLAT BED WITH BEDRAILS: A LITTLE
DAILY ACTIVITIY SCORE: 19
WALKING IN HOSPITAL ROOM: A LOT
STANDING UP FROM CHAIR USING ARMS: A LOT
CLIMB 3 TO 5 STEPS WITH RAILING: A LOT
TURNING FROM BACK TO SIDE WHILE IN FLAT BAD: A LITTLE
TOILETING: A LITTLE
DRESSING REGULAR LOWER BODY CLOTHING: A LITTLE
DRESSING REGULAR LOWER BODY CLOTHING: A LITTLE
STANDING UP FROM CHAIR USING ARMS: A LOT
PERSONAL GROOMING: A LITTLE
MOVING TO AND FROM BED TO CHAIR: A LOT
PATIENT BASELINE BEDBOUND: NO
HELP NEEDED FOR BATHING: A LITTLE

## 2025-07-09 ASSESSMENT — PAIN - FUNCTIONAL ASSESSMENT
PAIN_FUNCTIONAL_ASSESSMENT: 0-10

## 2025-07-09 ASSESSMENT — ENCOUNTER SYMPTOMS
APNEA: 0
CHEST TIGHTNESS: 0
HEADACHES: 0
TROUBLE SWALLOWING: 0
ABDOMINAL DISTENTION: 0
PHOTOPHOBIA: 0
CHILLS: 0
BACK PAIN: 0
APPETITE CHANGE: 1
FEVER: 0
FATIGUE: 1
DYSURIA: 0
EYE DISCHARGE: 0
COUGH: 0
COLOR CHANGE: 0
SLEEP DISTURBANCE: 1
VOMITING: 0
SHORTNESS OF BREATH: 0
FREQUENCY: 0
PALPITATIONS: 0
ARTHRALGIAS: 0
WOUND: 1
WEAKNESS: 1
ABDOMINAL PAIN: 0
CONFUSION: 0
DIFFICULTY URINATING: 0
DIZZINESS: 0
DIARRHEA: 0
WEAKNESS: 0
NAUSEA: 1

## 2025-07-09 ASSESSMENT — ACTIVITIES OF DAILY LIVING (ADL)
HEARING - RIGHT EAR: FUNCTIONAL
ASSISTIVE_DEVICE: WALKER;OXYGEN
TOILETING: NEEDS ASSISTANCE
ADEQUATE_TO_COMPLETE_ADL: YES
GROOMING: NEEDS ASSISTANCE
PATIENT'S MEMORY ADEQUATE TO SAFELY COMPLETE DAILY ACTIVITIES?: YES
HEARING - LEFT EAR: FUNCTIONAL
DRESSING YOURSELF: NEEDS ASSISTANCE
LACK_OF_TRANSPORTATION: NO
FEEDING YOURSELF: NEEDS ASSISTANCE
JUDGMENT_ADEQUATE_SAFELY_COMPLETE_DAILY_ACTIVITIES: YES
WALKS IN HOME: NEEDS ASSISTANCE
LACK_OF_TRANSPORTATION: NO
BATHING: NEEDS ASSISTANCE

## 2025-07-09 ASSESSMENT — PAIN SCALES - GENERAL
PAINLEVEL_OUTOF10: 0 - NO PAIN
PAINLEVEL_OUTOF10: 9
PAINLEVEL_OUTOF10: 3
PAINLEVEL_OUTOF10: 5 - MODERATE PAIN
PAINLEVEL_OUTOF10: 4

## 2025-07-09 ASSESSMENT — PAIN DESCRIPTION - LOCATION: LOCATION: WRIST

## 2025-07-09 ASSESSMENT — LIFESTYLE VARIABLES
HOW OFTEN DO YOU HAVE A DRINK CONTAINING ALCOHOL: NEVER
SKIP TO QUESTIONS 9-10: 1
HOW OFTEN DO YOU HAVE 6 OR MORE DRINKS ON ONE OCCASION: NEVER
AUDIT-C TOTAL SCORE: 0
AUDIT-C TOTAL SCORE: 0
HOW MANY STANDARD DRINKS CONTAINING ALCOHOL DO YOU HAVE ON A TYPICAL DAY: PATIENT DOES NOT DRINK

## 2025-07-09 ASSESSMENT — PAIN DESCRIPTION - ORIENTATION: ORIENTATION: LEFT

## 2025-07-09 ASSESSMENT — PAIN DESCRIPTION - PAIN TYPE: TYPE: ACUTE PAIN

## 2025-07-09 ASSESSMENT — PAIN DESCRIPTION - DESCRIPTORS: DESCRIPTORS: ACHING

## 2025-07-09 NOTE — CARE PLAN
The patient's goals for the shift include      The clinical goals for the shift include Patient to remain free of neurological deterioration by end of shift.    Patient has remained free of neurological deterioration this shift. Patient is alert and oriented x 4 and is in good spirits. Patient has no complaints of chest pain or SOB at this time.

## 2025-07-09 NOTE — H&P
History Of Present Illness  Elliot Partida is a 74 y.o. male presenting with encephalopathy.  Elliot Partida is a 74-year-old male past medical history of osteomyelitis acute myocardial infarction arteriosclerotic heart disease, diabetes mellitus, deep vein thrombosis, hypertension, and stroke.  Presents to the emergency department from [facility] with altered mental status per staff.  Unknown last well-known time stating that evening around he was less responsive with a possible facial droop.  Patient was worked up as a brain attack.    ED course:  VS: /68 (BP Location: Left arm, Patient Position: Lying)   Pulse 64   Temp 36.2 °C (97.1 °F) (Temporal)   Resp 16   SpO2 100%    EKG: Sinus rhythm with first-degree AV block.   UA:  Negative   Significant labs: , , K 3.4 CRP 1.32, , H/H 9.0/28.3  Diagnostics:   CT brain attack head was negative for hemorrhage,, infarct, or other acute intracranial abnormalities since the prior exam 6/22/2025  CT chest abdomen pelvis showed bilateral pleural effusions and bibasilar atelectasis.  Mild bilateral hydronephrosis, prostatomegaly, colonic diverticulosis and mild pelvic adenopathy.  Received: Clonidine and furosemide       Patient was admitted to Medicine for further treatment and evaluation and close monitoring of hemodynamic status.  This morning the patient is lying in bed states that he did not get any sleep last night.  Patient is alert and oriented x 4.  Endorses weakness.  Denies headache, dizziness, chest pain, shortness of breath, abdominal pain, diarrhea, dysuria.  Discussed plan of care with patient.  Patient states he feels fine.     Past Medical History  Past Medical History:   Diagnosis Date    Acute osteomyelitis of ankle and foot, left (Multi)     AMI (acute myocardial infarction) (Multi)     ASHD (arteriosclerotic heart disease)     At risk for falls     Cellulitis     left foot and ankle    COVID-19     Diabetes  mellitus (Multi)     DVT (deep vein thrombosis) in pregnancy ([facility])     Fall risk care plan declined     Hypertension     Meningioma (Multi)     Myocardial infarction (Multi)     Neuritis     Neuropathy     Osteoarthritis     Osteomyelitis     left foot    Pleural effusion     PVD (peripheral vascular disease)     Sprain of right knee 06/25/2015    Stroke (Multi)     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      FOOT RAY RESECTION Left 09/23/2024    L partial 5th ray resection (Dr. Spears, SREEDHAR)    FOOT SURGERY Left 09/27/2024    Delayed closure w/ graft application (Dr. Amina DPM)    INVASIVE VASCULAR PROCEDURE Bilateral 09/18/2024    Procedure: Lower Extremity Angiogram;  Surgeon: Teofilo Stoddard MD;  Location: [facility];  Service: Cardiovascular;  Laterality: Bilateral;    IRIDOTOMY / IRIDECTOMY Bilateral         Social History  He reports that he has never smoked. He has never been exposed to tobacco smoke. He has never used smokeless tobacco. He reports that he does not drink alcohol and does not use drugs.    Family History  Family History[1]     Allergies  Patient has no known allergies.    Review of Systems   Constitutional:  Positive for appetite change and fatigue. Negative for fever.   HENT:  Negative for congestion and trouble swallowing.    Eyes:  Negative for photophobia, discharge and visual disturbance.   Respiratory:  Negative for apnea, cough, chest tightness and shortness of breath.    Cardiovascular:  Negative for chest pain, palpitations and leg swelling.   Gastrointestinal:  Positive for nausea. Negative for abdominal distention, abdominal pain, diarrhea and vomiting.   Genitourinary:  Negative for  difficulty urinating, dysuria, frequency and urgency.   Musculoskeletal:  Negative for arthralgias and back pain.   Skin:  Negative for color change.   Neurological:  Negative for dizziness, weakness and headaches.   Psychiatric/Behavioral:  Positive for sleep disturbance. Negative for confusion.         Physical Exam  Constitutional:       General: He is not in acute distress.     Appearance: He is ill-appearing.   HENT:      Head: Normocephalic and atraumatic.      Nose: No congestion.      Mouth/Throat:      Mouth: Mucous membranes are moist.   Eyes:      General: No scleral icterus.        Right eye: No discharge.         Left eye: No discharge.      Extraocular Movements: Extraocular movements intact.      Conjunctiva/sclera: Conjunctivae normal.      Pupils: Pupils are equal, round, and reactive to light.   Cardiovascular:      Rate and Rhythm: Normal rate and regular rhythm.      Pulses: Normal pulses.      Heart sounds: Normal heart sounds.   Pulmonary:      Effort: Pulmonary effort is normal.   Abdominal:      General: Abdomen is flat. There is no distension.      Palpations: Abdomen is soft.      Tenderness: There is no abdominal tenderness.   Musculoskeletal:         General: No swelling.      Cervical back: No rigidity or tenderness.   Skin:     General: Skin is warm and dry.      Capillary Refill: Capillary refill takes 2 to 3 seconds.      Comments: Sacral wound, LLE wound see nursing image  Right midline    Neurological:      General: No focal deficit present.      Mental Status: He is alert and oriented to person, place, and time.      Cranial Nerves: No cranial nerve deficit.      Comments: LLE weakness, patient unable to raise leg, but is able to wiggle toes and feels light touch to foot and leg. Patient reports this is residual from his previous stroke with no movement changes or deficits.   Psychiatric:         Behavior: Behavior is agitated.     1A: LOC 0  -0 Alert; Keenly responsive  +1 Arouses  to minor stimulation  +2 Requires stimulation to arouse  +3 Movements to pain  +4 Postures or unresponsive    1B: Ask month and age 0  0 Both questions right   +1 1 question right  +2 0 question right  +1 Dysarthric/intubated/trauma/language barrier  +2 Aphasic    1C: Blink eyes and squeeze hands 0  0 performs both tasks  +1 performs 1 task  +2 Performs 0 tasks    2: Horizontal EOM 0  0 normal  +1 partial gaze palsy; can be overcome  +2 partial gaze palsy; corrects w/ oculocephalic reflex  +3 forced gaze palsy; cannot be overcome    3: Visual Fields 0  0 no visual loss  +1 partial hemianopia  +2 Complete hemianopia   +3 Bilateral hemianopia    4: Facial Palsy 0  0 normal symmetry  +1 minor paralysis (Flat nasolabial fold, smile assymetry)  +2 Partial paralysis (Lower face)  +3 unilateral complete paralysis  +4 B/l complete paralysis    5A: L arm Motor drift 0  0 no drift after 10 seconds  +1 drift but doesn't hit bed  +2 drift, hits bed  +2 some effort against gravity  +3 no effort against gravity  +4 no movement  0 amputation/joint fusion    5B: R Arm motor drift 0  0 no drift after 10 seconds  +1 drift but doesn't hit bed  +2 drift, hits bed  +2 some effort against gravity  +3 no effort against gravity  +4 no movement  0 amputation/joint fusion    6A: L leg motor drift 4  0 no drift after 5 seconds  +1 drift but doesn't hit bed  +2 drift, hits bed  +2 some effort against gravity  +3 no effort against gravity  +4 no movement  0 amputation/joint fusion    6B: R Leg motor drift 0  0 no drift after 5 seconds  +1 drift but doesn't hit bed  +2 drift, hits bed  +2 some effort against gravity  +3 no effort against gravity  +4 no movement  0 amputation/joint fusion    7: Limb ataxia (FNF; heel-shin) 0  0 no ataxia  +1 ataxia in 1 limb  +2 ataxia in 2 limbs  0 does not understand  0 paralyzed  0 amputation/joint fusion    8: Sensation 0  0 Normal no sensory loss  +1 Mild/moderate loss; less sharp/more dull  +1 Mild/moderate  "loss; can sense being touched  +2 complete loss; cannot sense being touched at all  +2 no response and quadriplegic  +2 coma/unresponsive    9: Language/Aphasia  0  0 normal, no aphasia  +1 mild-moderate aphasia; some obvious changes w/o sig limitation  +2 severe aphasia; fragmentary expression, inference needed, cannot identify materials  +3 mute/global aphasia; no usable speech/auditory comprehension  +3 coma/unresponsive    10: Dysarthria 0  0 Normal  +1 mild-moderate dysarthria; slurring but can be understood  +2 Severe dysarthria; unitelligable slurring or out of proportion to dysphasia  +2 Mute/anarthric  0 intubated/unable to test    11: Extinction/Inattention 0  0 No abnormality  +1 Visual/tactile/auditory/spatial/personal inattention  +2 Profound danielle-inattention (ex does not recognize own hand)  +2 Extinction to >1 modality    Total: 4     Last Recorded Vitals  Blood pressure 159/59, pulse 67, temperature 35.8 °C (96.4 °F), temperature source Temporal, resp. rate 13, height 1.753 m (5' 9\"), weight 86.7 kg (191 lb 2.2 oz), SpO2 96%.    Relevant Results    Medications Ordered Prior to Encounter[2]  Scheduled medications  Scheduled Medications[3]  Continuous medications  Continuous Medications[4]  PRN medications  PRN Medications[5]      Latest Reference Range & Units 07/08/25 21:21 07/08/25 21:57 07/09/25 04:42   GLUCOSE 74 - 99 mg/dL 122 (H)  124 (H)   SODIUM 136 - 145 mmol/L 141  142   POTASSIUM 3.5 - 5.3 mmol/L 3.4 (L)  3.1 (L)   CHLORIDE 98 - 107 mmol/L 103  104   Bicarbonate 21 - 32 mmol/L 31  34 (H)   Anion Gap 10 - 20 mmol/L 10  7 (L)   Blood Urea Nitrogen 6 - 23 mg/dL 10  10   Creatinine 0.50 - 1.30 mg/dL 0.93  1.00   EGFR >60 mL/min/1.73m*2 87  79   Calcium 8.6 - 10.3 mg/dL 8.9  8.6   PHOSPHORUS 2.5 - 4.9 mg/dL   3.4   Albumin 3.4 - 5.0 g/dL 3.3 (L)  3.1 (L)   Alkaline Phosphatase 33 - 136 U/L 60     ALT 10 - 52 U/L 6 (L)     AST 9 - 39 U/L 10     Bilirubin Total 0.0 - 1.2 mg/dL 0.3     HDL " CHOLESTEROL mg/dL   21.6   Cholesterol/HDL Ratio    7.4   LDL Calculated <=99 mg/dL   122 (H)   VLDL 0 - 40 mg/dL   16   TRIGLYCERIDES 0 - 149 mg/dL   81   Non HDL Cholesterol 0 - 149 mg/dL   138   Total Protein 6.4 - 8.2 g/dL 6.0 (L)     IRON 35 - 150 ug/dL   40   TIBC 240 - 445 ug/dL   249   % Saturation 25 - 45 %   16 (L)   MAGNESIUM 1.60 - 2.40 mg/dL   2.03   CHOLESTEROL 0 - 199 mg/dL   160   Lactate 0.4 - 2.0 mmol/L  0.7    C-Reactive Protein <1.00 mg/dL 1.32 (H)     BNP 0 - 99 pg/mL 262 (H)     UIBC 110 - 370 ug/dL   209   Troponin I, High Sensitivity 0 - 20 ng/L 10     WBC 4.4 - 11.3 x10*3/uL 6.2  5.4   nRBC 0.0 - 0.0 /100 WBCs 0.0  0.0   RBC 4.50 - 5.90 x10*6/uL 3.18 (L)  2.90 (L)   HEMOGLOBIN 13.5 - 17.5 g/dL 9.0 (L)  8.2 (L)   HEMATOCRIT 41.0 - 52.0 % 28.3 (L)  25.8 (L)   MCV 80 - 100 fL 89  89   MCH 26.0 - 34.0 pg 28.3  28.3   MCHC 32.0 - 36.0 g/dL 31.8 (L)  31.8 (L)   RED CELL DISTRIBUTION WIDTH 11.5 - 14.5 % 15.9 (H)  15.9 (H)   Platelets 150 - 450 x10*3/uL 204  214   Sed Rate 0 - 20 mm/h 20     (H): Data is abnormally high  (L): Data is abnormally low          CT chest abdomen pelvis w IV contrast  Result Date: 7/9/2025  CHEST: 1.  Small right pleural effusion. Small-moderate left pleural effusion. Bibasilar atelectasis. 2. Coronary artery calcifications. Dilated main pulmonary artery, please correlate for pulmonary arterial hypertension.   ABDOMEN-PELVIS: 1.  Circumferential wall thickening of the urinary bladder, may be secondary to underdistention or chronic outlet obstruction. Please correlate with urinalysis to exclude superimposed cystitis. 2. Mild bilateral hydronephrosis. 3. Prostatomegaly. 4. Colonic diverticulosis. 5. Mild pelvic adenopathy.     MACRO: None.   Signed by: Tere De La Paz 7/9/2025 1:10 AM Dictation workstation:   [id number]    XR chest 1 view  Result Date: 7/8/2025  Enlarged cardiac silhouette with mild interstitial edema. Low lung volumes which limits the study     MACRO:  None   Signed by: Everett Johnson 7/8/2025 10:17 PM Dictation workstation:   [id number]    CT brain attack head wo IV contrast  Result Date: 7/8/2025  1. No evidence of hemorrhage, CT apparent transcortical infarct, or other acute intracranial abnormality in the interim since prior exam on 06/22/2025. 2. Stable chronic findings as detailed above, including multifocal areas of diminished attenuation in the subcortical and periventricular white matter of bilateral cerebral hemispheres, likely representing sequela of chronic microvascular disease, although acute small white matter infarct is difficult to entirely exclude. If there is high clinical suspicion for acute neurologic abnormality, MRI of the brain can be considered for additional characterization.   MACRO: Delphine Felton discussed the significance and urgency of this critical finding by EPIC secure chat with  GARRY RICO on 7/8/2025 at 9:09 pm.  (**-RCF-**) Findings:  See findings.     Signed by: Delphine Felton 7/8/2025 9:09 PM Dictation workstation:   [id number]        Assessment & Plan  Anemia    Benign essential HTN    Ambulatory dysfunction    CAD (coronary artery disease)    Generalized weakness    Hyperlipidemia    History of stroke    Somnolence    Uncontrolled type 2 diabetes mellitus with hyperglycemia    Diabetes (Multi)    History of DVT (deep vein thrombosis)    Elevated brain natriuretic peptide (BNP) level    Hypokalemia      Somnolence   History of stroke  -CT brain attack head   1. No evidence of hemorrhage, CT apparent transcortical infarct, or  other acute intracranial abnormality in the interim since prior exam  on 06/22/2025.  2. Stable chronic findings as detailed above, including multifocal  areas of diminished attenuation in the subcortical and  periventricular white matter of bilateral cerebral hemispheres,  likely representing sequela of chronic microvascular disease,  although acute small white matter infarct is  difficult to entirely  exclude. If there is high clinical suspicion for acute neurologic  abnormality, MRI of the brain can be considered for additional  characterization.  -Uanble to tolerate  MR Brain  -Repeat CT Head and neck with and without1. Moderate to severe focal narrowing of the distal P1 segment of the  left PCA.2. Diminutive left vertebral artery with lack of enhancement to the V4 segment as above. Finding may represent chronic atherosclerotic disease with poor flow.3. Patency of the bilateral internal carotid arteries and right vertebral artery in the neck. Recommendation: If clinically concerned for small acute ischemic  infarct, further evaluation with MR of the brain without contrast is  recommended.4. Questionable hyperdense prominence of the extra-axial spaces in the bilateral posterior parietal regions, however can not be confirmed on other sequences and can be artifactual. Attention on short-term follow-up CT head without contrast is recommended.  -Neurology consulted, appreciate recommendations: No new acute findings   -Neuro checks q 4 hours  -Telemetry  -Monitor       Anemia  -H/H 8.2/ 25.8  -Iron 40  -TIBC 249  -%sat 16  -Monitor    Coronary artery disease  Hypertension  Hyperlipidemia  -Clonidine  0.1mg given in ED   -Monitor BP  -Continue amlodipine, hydralazine  -ECHO 1. The left ventricular systolic function is normal with a visually estimated ejection fraction of 60-65%       Elevated BNP  -XR Chest  Enlarged cardiac silhouette with mild interstitial edema. Low lung volumes which limits the study  -  -CT abdomen-chest -pelvis   CHEST:   1.  Small right pleural effusion. Small-moderate left pleural  effusion. Bibasilar atelectasis.  2. Coronary artery calcifications. Dilated main pulmonary artery,  please correlate for pulmonary arterial hypertension.  ABDOMEN-PELVIS:  1.  Circumferential wall thickening of the urinary bladder, may be  secondary to underdistention or chronic outlet  obstruction. Please  correlate with urinalysis to exclude superimposed cystitis.  2. Mild bilateral hydronephrosis.  3. Prostatomegaly.  4. Colonic diverticulosis.  5. Mild pelvic adenopathy.  -Furosemide 40mg IV given in ED     Hypokalemia  -K 3.1  -40 mEq Potassium given to replace    -monitor  -Daily BMP    Ambulatory dysfunction  Generalized weakness  Left arm pain  -Swelling to left forearm  -Left forearm: Moderate to severe soft tissue edema without acute osseous abnormality   -PT/OT evaluation, appreciate recommendations  -Nutrition consult. Appreciate recommendations  -RT treat and evaluate, appreciate recommendations.   -Duo nebs PRN   -Bronchial hygiene    History of DVT  -Lovenox 90mg q 12h    Diabetes  -Monitor blood glucose   -  -BMP daily  -HgbA1C 5.8% 1/2025    GI ppx: PPI  DVT ppx: Enoxaparin  Fluids: As needed  Electrolytes: replace as needed  Nutrition: Regular   Adjuncts: Midline  Code Status: Full    Pt requires inpatient stay at this time.                SARAH Landaverde-CNP         [1]   Family History  Problem Relation Name Age of Onset    Heart disease Father      Colon cancer Other      Heart attack Other      Diabetes Other      Hypertension Other     [2]   No current facility-administered medications on file prior to encounter.     Current Outpatient Medications on File Prior to Encounter   Medication Sig Dispense Refill    amLODIPine (Norvasc) 2.5 mg tablet Take 1 tablet (2.5 mg) by mouth once daily.      Blood glucose monitoring meter To check blood sugar every morning before breakfast 1 each 0    enoxaparin (Lovenox) 80 mg/0.8 mL syringe Inject 0.9 mL (90 mg) under the skin every 12 hours.      gabapentin (Neurontin) 300 mg capsule Take 1 capsule (300 mg) by mouth 3 times a day.      hydrALAZINE (Apresoline) 50 mg tablet Take 1 tablet (50 mg) by mouth 3 times a day.      meclizine (Antivert) 25 mg tablet Take 1 tablet (25 mg) by mouth every 6 hours if needed for dizziness.       oxygen (O2) gas therapy Inhale 2 L/min at 120,000 mL/hr if needed (desaturation at hs).      traZODone (Desyrel) 50 mg tablet Take 1 tablet (50 mg) by mouth once daily at bedtime.      [DISCONTINUED] acetaminophen (Tylenol) 325 mg tablet Take 2 tablets (650 mg) by mouth every 4 hours if needed for mild pain (1 - 3), moderate pain (4 - 6), headaches or fever (temp greater than 38.0 C) (greater than or equal to 38 C).      [DISCONTINUED] acetaminophen (Tylenol) 325 mg tablet Take 2 tablets (650 mg) by mouth every 6 hours if needed for mild pain (1 - 3).      [DISCONTINUED] docusate sodium (Colace) 100 mg capsule Take 1 capsule (100 mg) by mouth once daily as needed for constipation.      [DISCONTINUED] glucagon HCL (Glucagon, HCl, Emergency Kit) 1 mg recon soln Inject as directed.      [DISCONTINUED] ondansetron (Zofran) 4 mg tablet Take 1 tablet (4 mg) by mouth every 8 hours if needed for nausea or vomiting.      [DISCONTINUED] ondansetron ODT (Zofran-ODT) 4 mg disintegrating tablet Dissolve 1 tablet (4 mg) in the mouth every 8 hours if needed for nausea or vomiting. 15 tablet 0    [DISCONTINUED] piperacillin-tazobactam (Zosyn) 4.5 gram/100 mL IV Infuse 100 mL (4.5 g) over 0.5 hours into a venous catheter every 6 hours for 34 doses. 3400 mL 0    [DISCONTINUED] sennosides-docusate sodium (Ann-Colace) 8.6-50 mg tablet Take 1 tablet by mouth 2 times a day as needed for constipation. 60 tablet 0    [DISCONTINUED] sennosides-docusate sodium (Ann-Colace) 8.6-50 mg tablet Take 1 tablet by mouth 2 times a day as needed for constipation.      [DISCONTINUED] traMADol (Ultram) 50 mg tablet Take 1 tablet (50 mg) by mouth every 4 hours if needed for severe pain (7 - 10).     [3] amLODIPine, 2.5 mg, oral, Daily  enoxaparin, 90 mg, subcutaneous, q12h  [Held by provider] gabapentin, 300 mg, oral, TID  hydrALAZINE, 50 mg, oral, TID  polyethylene glycol, 17 g, oral, Daily  [4] sodium chloride 0.9%, 10 mL/hr  [5] PRN medications:  acetaminophen, [Held by provider] meclizine, ondansetron, oxygen, sodium chloride 0.9%, traMADol

## 2025-07-09 NOTE — CARE PLAN
"The patient's goals for the shift include  \"to be left alone\"    The clinical goals for the shift include patient will maintian a normal neurological status throughout shift    Over the shift, the patient refused most care, refused RT, PT, OT. Patient did not tolerate MRI. CTA of head obtained. See new orders for x ray to left wrist for golf ball sized area. Echo completed.     "

## 2025-07-09 NOTE — CONSULTS
"Nutrition Initial Assessment:   Nutrition Assessment    Reason for Assessment: Admission nursing screening (multiple wounds)    Patient is a 74 y.o. male presenting with Somnolence.    PMH: 2DM, generalized weakness, HPL, wound infection, HTN.    Nutrition History:  Energy Intake: Poor < 50 %  Pain affecting nutrition status: N/A  Food and Nutrient History: Visited patient while resting. Per nursing flowsheet, 0% of the most recent meal was consumed. This dietitian is familiar with this patient, who has previously stated he does not like any of the oral nutrition supplements (ONS). Recommended to the provider the addition of vitamin C, zinc, and a multivitamin (MVI) to help support wound healing.  Food Allergy:  (NKFA)       Anthropometrics:  Height: 175.3 cm (5' 9\")   Weight: 86.7 kg (191 lb 2.2 oz)   BMI (Calculated): 28.21  IBW/kg (Dietitian Calculated): 73 kg  Percent of IBW: 119 %       Weight History:   Wt Readings from Last 10 Encounters:   07/09/25 86.7 kg (191 lb 2.2 oz)   06/25/25 91.4 kg (201 lb 8 oz)   06/08/25 90.6 kg (199 lb 11.8 oz)   06/06/25 86.6 kg (191 lb)   06/05/25 86.6 kg (191 lb)   06/03/25 89.5 kg (197 lb 5 oz)   05/31/25 89.4 kg (197 lb)   05/30/25 89.4 kg (197 lb)   05/23/25 91.6 kg (201 lb 15.1 oz)   05/16/25 94.4 kg (208 lb 1.8 oz)       Weight Change %:  Weight History / % Weight Change: 7/9/25 (86.7kg) to 6/25/25 (91.4kg) 5% loss in ~2 weeks  Significant Weight Loss: Yes    Nutrition Focused Physical Exam Findings:    Subcutaneous Fat Loss:   Defer Subcutaneous Fat Loss Assessment: Defer all  Defer All Reason: Patient sleeping  Muscle Wasting:  Defer Muscle Wasting Assessment: Defer all  Edema:  Edema Location: RLE Edema: Mild pitting, slight indentation  LLE Edema: Mild pitting, slight indentation per nursing flowsheet  Physical Findings:  Skin: Positive (Hand Left;Posterior)  Teeth Findings: Edentulous    Nutrition Significant Labs:  CBC Trend:   Results from last 7 days   Lab Units " 07/09/25  0442 07/08/25 2121   WBC AUTO x10*3/uL 5.4 6.2   RBC AUTO x10*6/uL 2.90* 3.18*   HEMOGLOBIN g/dL 8.2* 9.0*   HEMATOCRIT % 25.8* 28.3*   MCV fL 89 89   PLATELETS AUTO x10*3/uL 214 204    , BMP Trend:   Results from last 7 days   Lab Units 07/09/25  0442 07/08/25 2121   GLUCOSE mg/dL 124* 122*   CALCIUM mg/dL 8.6 8.9   SODIUM mmol/L 142 141   POTASSIUM mmol/L 3.1* 3.4*   CO2 mmol/L 34* 31   CHLORIDE mmol/L 104 103   BUN mg/dL 10 10   CREATININE mg/dL 1.00 0.93   BG POCT trend:   Results from last 7 days   Lab Units 07/09/25  1555 07/09/25  1107 07/09/25  0623 07/08/25  2120   POCT GLUCOSE mg/dL 101* 77 109* 114*    , Liver Function Trend:   Results from last 7 days   Lab Units 07/08/25 2121   ALK PHOS U/L 60   AST U/L 10   ALT U/L 6*   BILIRUBIN TOTAL mg/dL 0.3      Nutrition Specific Medications:  Scheduled medications  Scheduled Medications[1]  Continuous medications  Continuous Medications[2]    I/O:    ;      Dietary Orders (From admission, onward)       Start     Ordered    07/09/25 1639  Adult diet Regular  Diet effective now        Question:  Diet type  Answer:  Regular    07/09/25 1638    07/09/25 0308  May Participate in Room Service  ( ROOM SERVICE MAY PARTICIPATE)  Once        Question:  .  Answer:  Yes    07/09/25 0307                Estimated Needs:   Total Energy Estimated Needs in 24 hours (kCal): 2200 kCal  Method for Estimating Needs: ~30 kcal/kg of IBW  Total Protein Estimated Needs in 24 Hours (g):  (95-110g)  Method for Estimating 24 Hour Protein Needs: IBW / 1.3-1.5 g/kg  Total Fluid Estimated Needs in 24 Hours (mL): 2200 mL  Method for Estimating 24 Hour Fluid Needs: 1 mL/kcal or per medical team.        Nutrition Diagnosis   Malnutrition Diagnosis  Patient has Malnutrition Diagnosis:  (At risk, however not enought infomration to assess without time of poor PO or NFPE.)    Nutrition Diagnosis  Patient has Nutrition Diagnosis: Yes  Nutrition Diagnosis 1: Increased nutrient  needs  Related to (1): Increased metabolic demand  As Evidenced by (1): wounds  Additional Assessment Information (1): Protein       Nutrition Interventions/Recommendations   Nutrition prescription for oral nutrition    Nutrition Recommendations:  Individualized Nutrition Prescription Provided for : Regular diet and Vitamin supplament (zinc, Vitamin C, and MVI)    Nutrition Interventions/Goals:   Meals and Snacks: General healthful diet  Vitamin and Mineral Supplement Therapy: Zinc supplement therapy, Vitamin C supplement therapy, Multivitamin multimineral supplement therapy  Coordination of Care with Providers: Provider (JUDITH Landaverde via secure chat)      Education Documentation  Pt sleeping.        Nutrition Monitoring and Evaluation   Intake / Amount of food: Consumes at least 75% or more of meals/snacks/supplements    Body Weight: Body weight - Maintain stable weight         Skin Finding: Impaired wound healing - Improved wound healing    Goal Status: New goal(s) identified    Time Spent (min): 45 minutes              [1] amLODIPine, 2.5 mg, oral, Daily  enoxaparin, 90 mg, subcutaneous, q12h  [Held by provider] gabapentin, 300 mg, oral, TID  hydrALAZINE, 50 mg, oral, TID  polyethylene glycol, 17 g, oral, Daily     [2] sodium chloride 0.9%, 10 mL/hr

## 2025-07-09 NOTE — NURSING NOTE
"0700: assumed care of patient along with nursing student Narayan  0815: MRI form complete   1207: Notified by radiology that patient could not tolerate MRI   1400: Patient at CT   1421: Patient returned  1500: Patient refused turning and repositioning and refused therapy  1615: Wound pictures and care completed at this time. Noted golf ball sized area to left wrist, called Yue Rodriguez CNP to bedside. Patient states it has been there and had xrays \"at [place]\".   1730: radiology at bedside to obtain xray of left wrist   "

## 2025-07-09 NOTE — ED PROVIDER NOTES
HPI   Chief Complaint   Patient presents with    Stroke    Facial Droop    Altered Mental Status       Patient is a chronically ill 74-year-old gentleman from one of the local nursing homes.  Last known well was sometime last night they say.  This evening on rounds the nursing home noticed that he was less responsive than not verbal and thought he might have a facial droop.  He also has a clotting disorder is on heparin twice a day and again last known well last night and not sure exactly how long it had been but certainly not within 6 hours.  He was brought in by EMS as a stroke alert just to be safe and went directly to a CT of head which showed no bleed.  No sign of any acute stroke but several smaller events.  He had a very strong odor of UTI about him.  With IV fluids and repeated not- purposefully- painful procedures, he is much more awake alert and answering questions.  We are running him as a sepsis protocol and will start him on antibiotics to cover that likelihood.            Patient History   Medical History[1]  Surgical History[2]  Family History[3]  Social History[4]    Physical Exam   ED Triage Vitals [07/08/25 2104]   Temperature Heart Rate Respirations BP   36.2 °C (97.1 °F) 64 16 171/68      Pulse Ox Temp Source Heart Rate Source Patient Position   100 % Temporal Monitor Lying      BP Location FiO2 (%)     Left arm --       Physical Exam  Constitutional:       Appearance: He is ill-appearing.   HENT:      Head: Normocephalic.   Eyes:      Extraocular Movements: Extraocular movements intact.   Cardiovascular:      Rate and Rhythm: Normal rate and regular rhythm.      Heart sounds: Murmur heard.   Pulmonary:      Effort: No respiratory distress.      Breath sounds: Normal breath sounds. No wheezing or rales.   Abdominal:      Palpations: Abdomen is soft.   Skin:     General: Skin is warm.   Neurological:      Mental Status: He is alert.      Cranial Nerves: No cranial nerve deficit.           ED  Course & MDM   Diagnoses as of 07/09/25 1615   Somnolence   Elevated brain natriuretic peptide (BNP) level   Abnormal electrocardiogram (ECG) (EKG)                 No data recorded     [place] Coma Scale Score: 15 (07/09/25 0800 : Narayan Rocha)       NIH Stroke Scale: 4 (07/09/25 0800 : Narayan Rocha)                   Medical Decision Making  SEP-1 CORE MEASURE DATA      I suspected infection with no organ dysfunction on  .    /68 (BP Location: Left arm, Patient Position: Lying)   Pulse 64   Temp 36.2 °C (97.1 °F) (Temporal)   Resp 16   SpO2 100%            A targeted fluid bolus of 1000mL (as patient does not meet severe sepsis or septic shock criteria at this time) was given.    Suspected infection source   UTI    Patient recently received an antibiotic (last 24 hours)     None        Medical decision making/complexity  Patient's stroke workup was negative.  With some fluids he is much more awake active and alert and answering questions.  He says he is chronically weak and does have  strength today.  No source of sepsis recurrent, he has no leukocytosis and his lactate is 0.7.  His heart rate is only 64 and respirations 9.  He is no fever is got a blood pressure of 180/60.  When I saw his urinalysis results, finding antibiotics because I do not think that he is  actually septic.  He had no infiltrate on chest x-ray but I did send him back for CT, which may show more since he is of generous girth.  His CT returned showing small right-sided pleural effusion and small to moderate size left pleural effusion but no overt heart failure.  No infiltrate was noted.  Abdominal CT was unremarkable except for mild bilateral hydronephrosis but with no stone.  It does show some thickening of the urinary bladder wall and recommended correlation with possible UTI and this of course was negative since there is no UTI. His parameters don't qualify as sepsis. His BNP was in the 250 range so I have given 40 mg of  Lasix given his normal renal function and we will admit him for his change in mentation (which I do not think was a stroke) and see if he diureses and maintains his current mental status,  which is baseline as far as I can tell.    Critical care time has been 55 minutes excluding any separately billable procedures.      I performed a sepsis reperfusion exam on Elliot Partida on 07/08/25 at 2200.       EKG interpreted by me: Sinus rhythm with first-degree AV   block.  There is an intraventricular conduction delay.    \    Procedure  Procedures         Bobby Rodrigues MD  07/09/25 0146       [1]   Past Medical History:  Diagnosis Date    Acute osteomyelitis of ankle and foot, left (Multi)     AMI (acute myocardial infarction) (Multi)     ASHD (arteriosclerotic heart disease)     At risk for falls     Cellulitis     left foot and ankle    COVID-19     Diabetes mellitus (Multi)     DVT (deep vein thrombosis) in pregnancy ([facility])     Fall risk care plan declined     Hypertension     Meningioma (Multi)     Myocardial infarction (Multi)     Neuritis     Neuropathy     Osteoarthritis     Osteomyelitis     left foot    Pleural effusion     PVD (peripheral vascular disease)     Sprain of right knee 06/25/2015    Stroke (Multi)     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      FOOT RAY RESECTION Left 09/23/2024    L partial 5th ray resection (Dr. Spears, [facility])    FOOT SURGERY Left 09/27/2024    Delayed closure w/ graft application (Dr. Amina DPM)    INVASIVE VASCULAR PROCEDURE Bilateral 09/18/2024    Procedure: Lower Extremity Angiogram;  Surgeon: Teofilo Stoddard MD;  Location: [facility] Cardiac Cath Lab;  Service: Cardiovascular;  Laterality: Bilateral;    IRIDOTOMY  / IRIDECTOMY Bilateral    [3]   Family History  Problem Relation Name Age of Onset    Heart disease Father      Colon cancer Other      Heart attack Other      Diabetes Other      Hypertension Other     [4]   Social History  Tobacco Use    Smoking status: Never     Passive exposure: Never    Smokeless tobacco: Never   Vaping Use    Vaping status: Never Used   Substance Use Topics    Alcohol use: Never     Comment: former    Drug use: Never        Bobby Rodrigues MD  07/09/25 1905

## 2025-07-09 NOTE — CONSULTS
Inpatient consult to Cardiology  Consult performed by: JUDITH Carmona  Consult ordered by: JUDITH Landaverde  Reason for consult: CVA          History Of Present Illness  Elliot Partida is a 74 y.o. male presenting with concerns for a CVA. He does not know why he is in the hospital. According to the medical records, he was less responsive and had facial droop.    Lab work in the ER showed glucose 122, sodium 141, potassium 3.4, BUN/Cr 10/0.93, CRP 1.32, , troponin negative, WBC 6.2, H&H 9.0/28.3, CT brain negative, CXR showed enlarged cardiac silhouette with mild interstitial edema. CT chest/abd/pelvis showed a small right pleural effusion, small mod left pleural effusion, dilated pulmonary artery, circumferential thickening of the urinary bladder, mild bilateral hydronephrosis.     EKG showed SR with IVCD.       Past Medical History  Medical History[1]    Surgical History  Surgical History[2]     Social History  He reports that he has never smoked. He has never been exposed to tobacco smoke. He has never used smokeless tobacco. He reports that he does not drink alcohol and does not use drugs.    Family History  Family History[3]     Allergies  Patient has no known allergies.    Review of Systems  Review of Systems   Constitutional:  Negative for chills and fever.   Respiratory:  Negative for cough and shortness of breath.    Cardiovascular:  Negative for chest pain, palpitations and leg swelling.   Gastrointestinal:  Negative for abdominal distention and abdominal pain.   Musculoskeletal:  Positive for gait problem.   Skin:  Positive for wound.   Neurological:  Positive for weakness. Negative for dizziness.   All other systems reviewed and are negative.         Physical Exam  Constitutional: Well developed, awake/alert/oriented x3, no distress, alert and cooperative  Eyes: PERRL, EOMI, clear sclera  ENMT: mucous membranes moist, no apparent injury, no lesions seen  Head/Neck: Neck  "supple, no apparent injury, thyroid without mass or tenderness, No JVD, trachea midline, no bruits  Respiratory/Thorax: Patent airways, CTAB, normal breath sounds with good chest expansion, thorax symmetric  Cardiovascular: Regular, rate and rhythm, 3/6 holosystolic murmur, 2+ equal pulses of the extremities, normal S 1and S 2  Gastrointestinal: Nondistended, soft, non-tender, no rebound tenderness or guarding, no masses palpable, no organomegaly, +BS, no bruits  Musculoskeletal: ROM intact, no joint swelling, normal strength  Extremities: no edema, dressing to right foot  Neurological: alert and oriented x3, intact senses, motor, response and reflexes, normal strength  Lymphatic: No significant lymphadenopathy  Psychological: Appropriate mood and behavior  Skin: Warm and dry, dressing to left foot       Last Recorded Vitals  Blood pressure 159/59, pulse 67, temperature 35.8 °C (96.4 °F), temperature source Temporal, resp. rate 13, height 1.753 m (5' 9\"), weight 86.7 kg (191 lb 2.2 oz), SpO2 96%.    Medications  Scheduled medications  Scheduled Medications[4]  Continuous medications  Continuous Medications[5]  PRN medications  PRN Medications[6]    Relevant Results  Results for orders placed or performed during the hospital encounter of 07/08/25 (from the past 24 hours)   POCT GLUCOSE   Result Value Ref Range    POCT Glucose 114 (H) 74 - 99 mg/dL   CBC   Result Value Ref Range    WBC 6.2 4.4 - 11.3 x10*3/uL    nRBC 0.0 0.0 - 0.0 /100 WBCs    RBC 3.18 (L) 4.50 - 5.90 x10*6/uL    Hemoglobin 9.0 (L) 13.5 - 17.5 g/dL    Hematocrit 28.3 (L) 41.0 - 52.0 %    MCV 89 80 - 100 fL    MCH 28.3 26.0 - 34.0 pg    MCHC 31.8 (L) 32.0 - 36.0 g/dL    RDW 15.9 (H) 11.5 - 14.5 %    Platelets 204 150 - 450 x10*3/uL   Comprehensive metabolic panel   Result Value Ref Range    Glucose 122 (H) 74 - 99 mg/dL    Sodium 141 136 - 145 mmol/L    Potassium 3.4 (L) 3.5 - 5.3 mmol/L    Chloride 103 98 - 107 mmol/L    Bicarbonate 31 21 - 32 mmol/L "    Anion Gap 10 10 - 20 mmol/L    Urea Nitrogen 10 6 - 23 mg/dL    Creatinine 0.93 0.50 - 1.30 mg/dL    eGFR 87 >60 mL/min/1.73m*2    Calcium 8.9 8.6 - 10.3 mg/dL    Albumin 3.3 (L) 3.4 - 5.0 g/dL    Alkaline Phosphatase 60 33 - 136 U/L    Total Protein 6.0 (L) 6.4 - 8.2 g/dL    AST 10 9 - 39 U/L    Bilirubin, Total 0.3 0.0 - 1.2 mg/dL    ALT 6 (L) 10 - 52 U/L   Blood Culture    Specimen: Peripheral Venipuncture; Blood culture   Result Value Ref Range    Blood Culture Loaded on Instrument - Culture in progress    Blood Culture    Specimen: Peripheral Venipuncture; Blood culture   Result Value Ref Range    Blood Culture Loaded on Instrument - Culture in progress    C-reactive protein   Result Value Ref Range    C-Reactive Protein 1.32 (H) <1.00 mg/dL   Sedimentation rate, automated   Result Value Ref Range    Sedimentation Rate 20 0 - 20 mm/h   Troponin I, High Sensitivity   Result Value Ref Range    Troponin I, High Sensitivity 10 0 - 20 ng/L   B-type natriuretic peptide   Result Value Ref Range     (H) 0 - 99 pg/mL   Urinalysis with Reflex Culture and Microscopic   Result Value Ref Range    Color, Urine Light-Yellow Light-Yellow, Yellow, Dark-Yellow    Appearance, Urine Clear Clear    Specific Gravity, Urine 1.012 1.005 - 1.035    pH, Urine 6.0 5.0, 5.5, 6.0, 6.5, 7.0, 7.5, 8.0    Protein, Urine NEGATIVE NEGATIVE, 10 (TRACE), 20 (TRACE) mg/dL    Glucose, Urine Normal Normal mg/dL    Blood, Urine NEGATIVE NEGATIVE mg/dL    Ketones, Urine NEGATIVE NEGATIVE mg/dL    Bilirubin, Urine NEGATIVE NEGATIVE mg/dL    Urobilinogen, Urine Normal Normal mg/dL    Nitrite, Urine NEGATIVE NEGATIVE    Leukocyte Esterase, Urine NEGATIVE NEGATIVE   Extra Urine Gray Tube   Result Value Ref Range    Extra Tube     ECG 12 lead   Result Value Ref Range    Ventricular Rate 66 BPM    Atrial Rate 66 BPM    GA Interval 278 ms    QRS Duration 136 ms    QT Interval 450 ms    QTC Calculation(Bazett) 471 ms    P Axis 19 degrees    R Axis  -57 degrees    T Axis 85 degrees    QRS Count 11 beats    Q Onset 219 ms    P Onset 80 ms    P Offset 128 ms    T Offset 444 ms    QTC Fredericia 465 ms   Lactate   Result Value Ref Range    Lactate 0.7 0.4 - 2.0 mmol/L   CBC   Result Value Ref Range    WBC 5.4 4.4 - 11.3 x10*3/uL    nRBC 0.0 0.0 - 0.0 /100 WBCs    RBC 2.90 (L) 4.50 - 5.90 x10*6/uL    Hemoglobin 8.2 (L) 13.5 - 17.5 g/dL    Hematocrit 25.8 (L) 41.0 - 52.0 %    MCV 89 80 - 100 fL    MCH 28.3 26.0 - 34.0 pg    MCHC 31.8 (L) 32.0 - 36.0 g/dL    RDW 15.9 (H) 11.5 - 14.5 %    Platelets 214 150 - 450 x10*3/uL   Renal Function Panel   Result Value Ref Range    Glucose 124 (H) 74 - 99 mg/dL    Sodium 142 136 - 145 mmol/L    Potassium 3.1 (L) 3.5 - 5.3 mmol/L    Chloride 104 98 - 107 mmol/L    Bicarbonate 34 (H) 21 - 32 mmol/L    Anion Gap 7 (L) 10 - 20 mmol/L    Urea Nitrogen 10 6 - 23 mg/dL    Creatinine 1.00 0.50 - 1.30 mg/dL    eGFR 79 >60 mL/min/1.73m*2    Calcium 8.6 8.6 - 10.3 mg/dL    Phosphorus 3.4 2.5 - 4.9 mg/dL    Albumin 3.1 (L) 3.4 - 5.0 g/dL   Magnesium   Result Value Ref Range    Magnesium 2.03 1.60 - 2.40 mg/dL   Lipid Panel   Result Value Ref Range    Cholesterol 160 0 - 199 mg/dL    HDL-Cholesterol 21.6 mg/dL    Cholesterol/HDL Ratio 7.4     LDL Calculated 122 (H) <=99 mg/dL    VLDL 16 0 - 40 mg/dL    Triglycerides 81 0 - 149 mg/dL    Non HDL Cholesterol 138 0 - 149 mg/dL   POCT GLUCOSE   Result Value Ref Range    POCT Glucose 109 (H) 74 - 99 mg/dL   Transthoracic Echo Complete   Result Value Ref Range    BSA 2.05 m2   POCT GLUCOSE   Result Value Ref Range    POCT Glucose 77 74 - 99 mg/dL       Transthoracic Echo Complete         CT chest abdomen pelvis w IV contrast   Final Result   CHEST:   1.  Small right pleural effusion. Small-moderate left pleural   effusion. Bibasilar atelectasis.   2. Coronary artery calcifications. Dilated main pulmonary artery,   please correlate for pulmonary arterial hypertension.        ABDOMEN-PELVIS:   1.   Circumferential wall thickening of the urinary bladder, may be   secondary to underdistention or chronic outlet obstruction. Please   correlate with urinalysis to exclude superimposed cystitis.   2. Mild bilateral hydronephrosis.   3. Prostatomegaly.   4. Colonic diverticulosis.   5. Mild pelvic adenopathy.             MACRO:   None.        Signed by: Tere De La Paz 7/9/2025 1:10 AM   Dictation workstation:   [id number]      XR chest 1 view   Final Result   Enlarged cardiac silhouette with mild interstitial edema. Low lung   volumes which limits the study             MACRO:   None        Signed by: Everett Johnson 7/8/2025 10:17 PM   Dictation workstation:   [id number]      CT brain attack head wo IV contrast   Final Result   1. No evidence of hemorrhage, CT apparent transcortical infarct, or   other acute intracranial abnormality in the interim since prior exam   on 06/22/2025.   2. Stable chronic findings as detailed above, including multifocal   areas of diminished attenuation in the subcortical and   periventricular white matter of bilateral cerebral hemispheres,   likely representing sequela of chronic microvascular disease,   although acute small white matter infarct is difficult to entirely   exclude. If there is high clinical suspicion for acute neurologic   abnormality, MRI of the brain can be considered for additional   characterization.        MACRO:   Delphine Felton discussed the significance and urgency of this   critical finding by EPIC secure chat with  GARRY RICO on 7/8/2025   at 9:09 pm.  (**-RCF-**) Findings:  See findings.             Signed by: Delphine Felton 7/8/2025 9:09 PM   Dictation workstation:   [id number]      CT angio head and neck w and wo IV contrast    (Results Pending)       Transthoracic Echo Limited  Result Date: 2/13/2025   [facility], [address] [address]              Tel [phone] and Fax [phone] TRANSTHORACIC  ECHOCARDIOGRAM REPORT  Patient Name:       SUBHASH SCHMIDT LUZMA    Reading Physician:    [id number] Carson Freeman MD Study Date:         2/13/2025           Ordering Provider:    [id number] JUAN F NORRIS MRN/PID:            [id number]            Fellow: Accession#:         [id number]        Nurse: Date of Birth/Age:  1950 / 74 years Sonographer:          Trinh Sung RDCS Gender assigned at  M                   Additional Staff: Birth: Height:             175.26 cm           Admit Date: Weight:             88.91 kg            Admission Status:     Inpatient -                                                               Routine BSA / BMI:          2.05 m2 / 28.94     Encounter#:           [id number]                     kg/m2 Blood Pressure:     127/53 mmHg         Department Location:  [facility] Study Type:    TRANSTHORACIC ECHO (TTE) LIMITED Diagnosis/ICD: Nonrheumatic aortic (valve) stenosis-I35.0 Indication:    Aortic Stenosis CPT Code:      Echo Limited-75007; Doppler Limited-70134; Color Doppler-88811 Patient History: Valve Disorders:   Aortic Stenosis. Diabetes:          Yes Pertinent History: Murmur, CAD, PVD and Hyperlipidemia. Study Detail: The following Echo studies were performed: 2D, M-Mode, Doppler and               color flow. Technically challenging study due to body habitus and               low pain tolerance. The patient was awake.  PHYSICIAN INTERPRETATION: Left Ventricle: The left ventricular systolic function is normal, with a visually estimated ejection fraction of 55-60%. There are no regional left ventricular wall motion abnormalities. The left ventricular cavity size is normal. There is mildly increased septal and mildly increased posterior left ventricular wall thickness. There is left  ventricular concentric remodeling. Spectral Doppler shows a Grade I (impaired relaxation pattern) of left ventricular diastolic filling with normal left atrial filling pressure. Left Atrium: The left atrial size is upper limits of normal. Right Ventricle: The right ventricle is normal in size. There is normal right ventricular global systolic function. Right Atrium: The right atrium is normal in size. Aortic Valve: The aortic valve is probably trileaflet. There is moderate aortic valve cusp calcification. There is evidence of moderate aortic valve stenosis. The aortic valve dimensionless index is 0.31. There is no evidence of aortic valve regurgitation. The peak instantaneous gradient of the aortic valve is 48 mmHg. The mean gradient of the aortic valve is 28 mmHg. Mitral Valve: The mitral valve is normal in structure. There is moderate to severe mitral annular calcification. There is mild mitral valve regurgitation. Tricuspid Valve: The tricuspid valve is structurally normal. There is mild tricuspid regurgitation. Pulmonic Valve: The pulmonic valve is structurally normal. There is physiologic pulmonic valve regurgitation. Pericardium: There is no pericardial effusion noted. Aorta: The aortic root is normal. Systemic Veins: The inferior vena cava appears normal in size.  CONCLUSIONS:  1. The left ventricular systolic function is normal, with a visually estimated ejection fraction of 55-60%.  2. Spectral Doppler shows a Grade I (impaired relaxation pattern) of left ventricular diastolic filling with normal left atrial filling pressure.  3. There is normal right ventricular global systolic function.  4. There is moderate to severe mitral annular calcification.  5. Moderate aortic valve stenosis. Dimensionless index 0.31 : moderate stenosis. Peak and mean gradients around 48 and 28 mm Hg respectively. Estimated aortic valve area around 1 cm2.  6. There is moderate aortic valve cusp calcification. QUANTITATIVE DATA  SUMMARY:  2D MEASUREMENTS:         Normal Ranges: IVSd:            1.05 cm (0.6-1.1cm) LVPWd:           1.09 cm (0.6-1.1cm) LVIDd:           4.60 cm (3.9-5.9cm) LVIDs:           2.96 cm LV Mass Index:   85 g/m2 LV % FS          35.7 %  LV SYSTOLIC FUNCTION:                      Normal Ranges: EF-Visual:      58 % LV EF Reported: 58 %  AORTIC VALVE:                      Normal Ranges: AoV Vmax:                3.46 m/s  (<=1.7m/s) AoV Peak P.9 mmHg (<20mmHg) AoV Mean P.0 mmHg (1.7-11.5mmHg) LVOT Max Jak:            1.10 m/s  (<=1.1m/s) AoV VTI:                 76.60 cm  (18-25cm) LVOT VTI:                23.60 cm LVOT Diameter:           2.50 cm   (1.8-2.4cm) AoV Area, VTI:           1.51 cm2  (2.5-5.5cm2) AoV Area,Vmax:           1.56 cm2  (2.5-4.5cm2) AoV Dimensionless Index: 0.31  TRICUSPID VALVE/RVSP:         Normal Ranges: IVC Diam:             2.10 cm  [id number] Carson Freeman MD Electronically signed on 2025 at 6:58:36 PM  ** Final **     Transthoracic Echo (TTE) Complete  Result Date: 2024   [facility], [address] [place] [address]              Tel [phone] and Fax [phone] TRANSTHORACIC ECHOCARDIOGRAM REPORT  Patient Name:      SUBHASH Cain Physician:    [id number] Carson Freeman MD Study Date:        2024            Ordering Provider:    [id number] ABIEL KENT MRN/PID:           [id number]             Fellow: Accession#:        [id number]         Nurse: Date of Birth/Age: 1950 / 74 years  Sonographer:          Cailin Julian RDCS Gender:            M                    Additional Staff: Height:            175.26 cm            Admit Date:           2024 Weight:            107.05 kg            Admission Status:     Inpatient -                                                                Routine BSA / BMI:         2.22 m2 / 34.85      Encounter#:           [id number]                    kg/m2 Blood Pressure:    137/66 mmHg          Department Location:  [facility] Study Type:    TRANSTHORACIC ECHO (TTE) COMPLETE Diagnosis/ICD: Abnormal electrocardiogram [ECG] [EKG]-R94.31 Indication:    Abnormal EKG CPT Code:      Echo Complete w Full Doppler-93408 Patient History: Pertinent History: Edema, DM, abnomal EKG, wound infection, WMA. Study Detail: The following Echo studies were performed: 2D, M-Mode, Doppler and               color flow. Technically challenging study due to body habitus,               patient lying in supine position, prominent lung artifact and poor               acoustic windows. Definity used as a contrast agent for               endocardial border definition. Total contrast used for this               procedure was 3.0 mL via IV push.  PHYSICIAN INTERPRETATION: Left Ventricle: The left ventricular systolic function is normal, with a visually estimated ejection fraction of 60-65%. There are no regional left ventricular wall motion abnormalities. The left ventricular cavity size is normal. Spectral Doppler shows an impaired relaxation pattern of left ventricular diastolic filling. Left Atrium: The left atrium is normal in size. Right Ventricle: The right ventricle is normal in size. There is normal right ventricular global systolic function. Right Atrium: The right atrium is normal in size. Aortic Valve: The aortic valve is trileaflet. There is moderate aortic valve cusp calcification. There is evidence of mild to moderate aortic valve stenosis. There is no evidence of aortic valve regurgitation. The peak instantaneous gradient of the aortic valve is 26.2 mmHg. Mitral Valve: The mitral valve is normal in structure. There is mild mitral valve regurgitation. Tricuspid Valve: The tricuspid valve is structurally  normal. There is mild tricuspid regurgitation. Pulmonic Valve: The pulmonic valve is structurally normal. There is physiologic pulmonic valve regurgitation. Pericardium: There is no pericardial effusion noted. Aorta: The aortic root is normal. Pulmonary Artery: The estimated pulmonary artery pressure is normal. Systemic Veins: The inferior vena cava appears mildly dilated.  CONCLUSIONS:  1. The left ventricular systolic function is normal, with a visually estimated ejection fraction of 60-65%.  2. Spectral Doppler shows an impaired relaxation pattern of left ventricular diastolic filling.  3. There is normal right ventricular global systolic function.  4. Mild to moderate aortic valve stenosis.  5. There is moderate aortic valve cusp calcification.  6. The inferior vena cava appears mildly dilated. QUANTITATIVE DATA SUMMARY:  2D MEASUREMENTS:         Normal Ranges: Ao Root d:       3.70 cm (2.0-3.7cm)  AORTA MEASUREMENTS:         Normal Ranges: Asc Ao, d:          3.90 cm (2.1-3.4cm)  LV SYSTOLIC FUNCTION BY 2D PLANIMETRY (MOD):                      Normal Ranges: EF-Visual:      63 % LV EF Reported: 63 %  LV DIASTOLIC FUNCTION:           Normal Ranges: MV Peak E:             1.05 m/s  (0.7-1.2 m/s) MV Peak A:             1.50 m/s  (0.42-0.7 m/s) E/A Ratio:             0.70      (1.0-2.2) MV e'                  0.073 m/s (>8.0) MV lateral e'          0.08 m/s MV medial e'           0.07 m/s E/e' Ratio:            14.40     (<8.0)  AORTIC VALVE:            Normal Ranges: AoV Vmax:      2.56 m/s  (<=1.7m/s) AoV Peak P.2 mmHg (<20mmHg) LVOT Max Jak:  1.25 m/s  (<=1.1m/s) LVOT VTI:      23.90 cm LVOT Diameter: 2.20 cm   (1.8-2.4cm) AoV Area,Vmax: 1.86 cm2  (2.5-4.5cm2)  RIGHT VENTRICLE: TAPSE: 21.1 mm RV s'  0.12 m/s  TRICUSPID VALVE/RVSP:         Normal Ranges: IVC Diam:             2.33 cm  PULMONIC VALVE:          Normal Ranges: PV Max Jak:     1.2 m/s  (0.6-0.9m/s) PV Max P.2 mmHg  [id number] Carson  Lydia LUA Electronically signed on 9/13/2024 at 7:18:35 PM  ** Final **          Assessment/Plan   [facility] in 2020 showed mild nonobstructive CAD       CVA  -CT head negative  -Carotid US June 2025 less than 50% bilat  -Echo as above  -Repeat echo with bubble study  -Unable to get MRI due to pt positioning  -CTA head and neck  -Cont therapeutic lovenox    2. Recurrent DVT  -Has a hx of DVT, was on eliquis and stopped taking->changed to Xarelto in Feb 2025 and then stopped in May  -End of May, pt diagnosed with DVT in the left subclavian and partially down the arm with a superficial thrombus near the PICC and also DVT in the left leg->restarted  on Xarelto  -Heme/onc consulted and advised Lovenox 1mg/kg    3. Anemia  -hgb 8.2, appears stable  -Previous iron level low at 12  -Recheck iron studies  -Monitor on AC     4. Diabetes mellitus type 2  -ISS  -Accuchecks  -Hgb A1C Jan 2025-5.8%     5. Hypertension  -stable  -2gm na diet  -cont meds  -monitor    6. Aortic stenosis  -Moderate per echo  -repeat echo    7. Hypokalemia  -Supplement  -Monitor     SARAH Carmona-CNP         [1]   Past Medical History:  Diagnosis Date    Acute osteomyelitis of ankle and foot, left (Multi)     AMI (acute myocardial infarction) (Multi)     ASHD (arteriosclerotic heart disease)     At risk for falls     Cellulitis     left foot and ankle    COVID-19     Diabetes mellitus (Multi)     DVT (deep vein thrombosis) in pregnancy (HHS-HCC)     Fall risk care plan declined     Hypertension     Meningioma (Multi)     Myocardial infarction (Multi)     Neuritis     Neuropathy     Osteoarthritis     Osteomyelitis     left foot    Pleural effusion     PVD (peripheral vascular disease)     Sprain of right knee 06/25/2015    Stroke (Multi)     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HHS-HCC)     TIA (transient ischemic attack)     Tinea pedis of both feet     Trigeminal neuralgia     Weakness    [2]   Past Surgical History:  Procedure Laterality Date    CARDIAC  CATHETERIZATION      CARPAL TUNNEL RELEASE  10/10/2014    EYE SURGERY      FL  ARTHROCENTESIS ASP INJ JOINT.      FOOT RAY RESECTION Left 09/23/2024    L partial 5th ray resection (Dr. Regan DPM)    FOOT SURGERY Left 09/27/2024    Delayed closure w/ graft application (Dr. Huynh, [facility])    INVASIVE VASCULAR PROCEDURE Bilateral 09/18/2024    Procedure: Lower Extremity Angiogram;  Surgeon: Teofilo Stoddard MD;  [facility];  Service: Cardiovascular;  Laterality: Bilateral;    IRIDOTOMY / IRIDECTOMY Bilateral    [3]   Family History  Problem Relation Name Age of Onset    Heart disease Father      Colon cancer Other      Heart attack Other      Diabetes Other      Hypertension Other     [4] amLODIPine, 2.5 mg, oral, Daily  enoxaparin, 90 mg, subcutaneous, q12h  [Held by provider] gabapentin, 300 mg, oral, TID  hydrALAZINE, 50 mg, oral, TID  polyethylene glycol, 17 g, oral, Daily  [5] sodium chloride 0.9%, 10 mL/hr  [6] PRN medications: acetaminophen, [Held by provider] meclizine, ondansetron, oxygen, sodium chloride 0.9%, traMADol

## 2025-07-09 NOTE — PROGRESS NOTES
Pharmacy Medication History Review    Elliot Partida is a 74 y.o. male admitted for Encephalopathy. Pharmacy reviewed the patient's [id number] medications and allergies for accuracy.    The list below reflects the updated PTA list.   Prior to Admission Medications   Prescriptions Last Dose Informant   Blood glucose monitoring meter  Other   Sig: To check blood sugar every morning before breakfast   acetaminophen (Tylenol) 325 mg tablet Unknown Other   Sig: Take 2 tablets (650 mg) by mouth every 6 hours if needed for mild pain (1 - 3).   amLODIPine (Norvasc) 2.5 mg tablet Unknown Other   Sig: Take 1 tablet (2.5 mg) by mouth once daily.   docusate sodium (Colace) 100 mg capsule Unknown Other   Sig: Take 1 capsule (100 mg) by mouth once daily as needed for constipation.   enoxaparin (Lovenox) 80 mg/0.8 mL syringe Unknown Other   Sig: Inject 0.9 mL (90 mg) under the skin every 12 hours.   gabapentin (Neurontin) 300 mg capsule Unknown Other   Sig: Take 1 capsule (300 mg) by mouth 3 times a day.   hydrALAZINE (Apresoline) 50 mg tablet Unknown Other   Sig: Take 1 tablet (50 mg) by mouth 3 times a day.   meclizine (Antivert) 25 mg tablet Unknown Other   Sig: Take 1 tablet (25 mg) by mouth every 6 hours if needed for dizziness.   ondansetron (Zofran) 4 mg tablet Unknown Other   Sig: Take 1 tablet (4 mg) by mouth every 8 hours if needed for nausea or vomiting.   oxygen (O2) gas therapy  Other   Sig: Inhale 2 L/min at 120,000 mL/hr if needed (desaturation at hs).   sennosides-docusate sodium (Ann-Colace) 8.6-50 mg tablet Unknown Other   Sig: Take 1 tablet by mouth 2 times a day as needed for constipation.   traMADol (Ultram) 50 mg tablet Unknown Other   Sig: Take 1 tablet (50 mg) by mouth every 4 hours if needed for severe pain (7 - 10).   traZODone (Desyrel) 50 mg tablet Unknown Other   Sig: Take 1 tablet (50 mg) by mouth once daily at bedtime.      Facility-Administered Medications: None        The list below  "reflects the updated allergy list. Please review each documented allergy for additional clarification and justification.  Allergies  Reviewed by Sun Salazar RN on 7/8/2025   No Known Allergies         Patient was unable to be assessed for M2B at discharge.     Sources:   [facility] Medication Review Report printed at facility 7/8/25 20:14:09 ET; uploaded to Media tab    Medications ADDED:  Docusate 100mg  Senna-docusate 8.6mg-50mg  Tramadol 50mg  Medications CHANGED:  None  Medications REMOVED/MARKED NOT TAKING:   None     Additional Comments:  None     Hailey Camacho, PharmD  Transitions of Care Pharmacist  07/09/25     Secure Chat preferred   If no response call [phone] or Vocera \"Med Rec\"    "

## 2025-07-09 NOTE — PROGRESS NOTES
Physical Therapy                 Therapy Communication Note    Patient Name: Elliot Partida  MRN: [id number]  Department: GEN CR NONV1  Room: 04/04-A  Today's Date: 7/9/2025     Discipline: Physical Therapy    Missed Visit: PT Missed Visit: Yes     Missed Visit Reason: Missed Visit Reason: Patient refused    Missed Time: Attempt 1108

## 2025-07-09 NOTE — ED TRIAGE NOTES
Pt arrives to [facility] via EMS from [facility], presenting with stroke-like symptoms, left sided facial droop and altered mental status. Per SNF RN, patient's last known well is last night. Patient normally A&ox4, patient disoriented x4. Resp easy and unlabored on 3L chronic supplemental O2. Patient makes spontaneous eye contact. Stroke alert paged PTA to ED, pt sent directly to CT.

## 2025-07-09 NOTE — PROGRESS NOTES
"Occupational Therapy   Therapy Communication Note    Patient Name: Elliot Partida  MRN: [id number]  Department: GEN CR NONV1  Room: 04/04-A  Today's Date: 7/9/2025     Discipline: Occupational Therapy    Missed Visit: OT Missed Visit: Yes     Missed Visit Reason: Missed Visit Reason: Patient refused (OT arrived to pt.'s room for evaluation. Pt. was resting. When OT said hello the pt. responded with \"get out.\" OT educated pt. on therapy evaluation however, pt. stated, \"get out\" again. RN aware.)    Missed Time: Attempt 1510      "

## 2025-07-09 NOTE — NURSING NOTE
Wound RN attempted to visit for photo update, evaluation and recommendations. Patient has nausea and actively vomiting. Condition not appropriate for exam. Nursing made aware that wound photos and measurements need updated in flow sheet.   FLROA NassarN RN [facility]

## 2025-07-09 NOTE — PROGRESS NOTES
[facility] will accept patient back on discharge.  No barriers to return.       07/09/25 0925   Discharge Planning   Living Arrangements Other (Comment)  (SNF [facility])   Support Systems Family members  (sister, niece)   Assistance Needed Prior to going to [facility] for rehab, he was living in a 2 story house with basement with his sister and cats.  He was dependent on his family to help with most ADLs/IADLs - DME:  Walker mainly, cane, crutches, surgical shoe, wheelchair, shower chair, grab bars No home oxygen/CPAP/BiPAP He slept in a recliner.      His niece does the shopping   Type of Residence Skilled nursing facility;Private residence  (Lives in a house- currently in rehab skilled)   Number of Stairs to Enter Residence 3  (No railing)   Number of Stairs Within Residence 12  (basement/upstairs - can't navigate the stairs so he doesn't do them)   Do you have animals or pets at home? Yes   Type of Animals or Pets 2 cats   Home or Post Acute Services Post acute facilities (Rehab/SNF/etc)   Type of Post Acute Facility Services Skilled nursing   Expected Discharge Disposition SNF  (He will return to [facility] to continue with rehab)   Financial Resource Strain   How hard is it for you to pay for the very basics like food, housing, medical care, and heating? Not very   Housing Stability   In the last 12 months, was there a time when you were not able to pay the mortgage or rent on time? N   In the past 12 months, how many times have you moved where you were living? 1   At any time in the past 12 months, were you homeless or living in a shelter (including now)? N   Transportation Needs   In the past 12 months, has lack of transportation kept you from medical appointments or from getting medications? no   In the past 12 months, has lack of transportation kept you from meetings, work, or from getting things needed for daily living? No   Patient Choice   Provider Choice list and CMS website (https://medicare.gov/care-compare#search) for  post-acute Quality and Resource Measure Data were provided and reviewed with: Patient   Patient / Family choosing to utilize agency / facility established prior to hospitalization Yes   Stroke Family Assessment   Stroke Family Assessment Needed No  (Per CT scan, patient has a negative brain bleed)   Intensity of Service   Intensity of Service 0-30 min

## 2025-07-10 ENCOUNTER — DOCUMENTATION (OUTPATIENT)
Dept: INTENSIVE CARE | Facility: HOSPITAL | Age: 75
End: 2025-07-10

## 2025-07-10 ENCOUNTER — APPOINTMENT (OUTPATIENT)
Dept: CARDIOLOGY | Facility: HOSPITAL | Age: 75
End: 2025-07-10
Payer: MEDICARE

## 2025-07-10 VITALS
HEART RATE: 82 BPM | RESPIRATION RATE: 16 BRPM | SYSTOLIC BLOOD PRESSURE: 130 MMHG | TEMPERATURE: 97.9 F | DIASTOLIC BLOOD PRESSURE: 82 MMHG

## 2025-07-10 VITALS
HEART RATE: 78 BPM | RESPIRATION RATE: 18 BRPM | SYSTOLIC BLOOD PRESSURE: 150 MMHG | WEIGHT: 191.14 LBS | BODY MASS INDEX: 28.31 KG/M2 | DIASTOLIC BLOOD PRESSURE: 59 MMHG | HEIGHT: 69 IN | TEMPERATURE: 97.9 F | OXYGEN SATURATION: 93 %

## 2025-07-10 VITALS
RESPIRATION RATE: 16 BRPM | TEMPERATURE: 98 F | HEART RATE: 84 BPM | SYSTOLIC BLOOD PRESSURE: 126 MMHG | DIASTOLIC BLOOD PRESSURE: 82 MMHG

## 2025-07-10 LAB
ALBUMIN SERPL BCP-MCNC: 3.4 G/DL (ref 3.4–5)
ALP SERPL-CCNC: 60 U/L (ref 33–136)
ALT SERPL W P-5'-P-CCNC: 5 U/L (ref 10–52)
ANION GAP SERPL CALC-SCNC: 11 MMOL/L (ref 10–20)
ANION GAP SERPL CALC-SCNC: 11 MMOL/L (ref 10–20)
AST SERPL W P-5'-P-CCNC: 11 U/L (ref 9–39)
BILIRUB SERPL-MCNC: 0.3 MG/DL (ref 0–1.2)
BUN SERPL-MCNC: 10 MG/DL (ref 6–23)
BUN SERPL-MCNC: 12 MG/DL (ref 6–23)
CALCIUM SERPL-MCNC: 8.6 MG/DL (ref 8.6–10.3)
CALCIUM SERPL-MCNC: 9 MG/DL (ref 8.6–10.3)
CARDIAC TROPONIN I PNL SERPL HS: 9 NG/L (ref 0–20)
CARDIAC TROPONIN I PNL SERPL HS: 9 NG/L (ref 0–20)
CHLORIDE SERPL-SCNC: 102 MMOL/L (ref 98–107)
CHLORIDE SERPL-SCNC: 105 MMOL/L (ref 98–107)
CO2 SERPL-SCNC: 31 MMOL/L (ref 21–32)
CO2 SERPL-SCNC: 31 MMOL/L (ref 21–32)
CREAT SERPL-MCNC: 0.84 MG/DL (ref 0.5–1.3)
CREAT SERPL-MCNC: 0.88 MG/DL (ref 0.5–1.3)
EGFRCR SERPLBLD CKD-EPI 2021: >90 ML/MIN/1.73M*2
EGFRCR SERPLBLD CKD-EPI 2021: >90 ML/MIN/1.73M*2
ERYTHROCYTE [DISTWIDTH] IN BLOOD BY AUTOMATED COUNT: 15.9 % (ref 11.5–14.5)
GLUCOSE SERPL-MCNC: 100 MG/DL (ref 74–99)
GLUCOSE SERPL-MCNC: 83 MG/DL (ref 74–99)
HCT VFR BLD AUTO: 26.2 % (ref 41–52)
HGB BLD-MCNC: 8.3 G/DL (ref 13.5–17.5)
MCH RBC QN AUTO: 28.3 PG (ref 26–34)
MCHC RBC AUTO-ENTMCNC: 31.7 G/DL (ref 32–36)
MCV RBC AUTO: 89 FL (ref 80–100)
NRBC BLD-RTO: 0 /100 WBCS (ref 0–0)
PLATELET # BLD AUTO: 217 X10*3/UL (ref 150–450)
POTASSIUM SERPL-SCNC: 3.4 MMOL/L (ref 3.5–5.3)
POTASSIUM SERPL-SCNC: 3.5 MMOL/L (ref 3.5–5.3)
PROT SERPL-MCNC: 6.1 G/DL (ref 6.4–8.2)
RBC # BLD AUTO: 2.93 X10*6/UL (ref 4.5–5.9)
SODIUM SERPL-SCNC: 141 MMOL/L (ref 136–145)
SODIUM SERPL-SCNC: 143 MMOL/L (ref 136–145)
WBC # BLD AUTO: 5.1 X10*3/UL (ref 4.4–11.3)

## 2025-07-10 PROCEDURE — 96376 TX/PRO/DX INJ SAME DRUG ADON: CPT

## 2025-07-10 PROCEDURE — 37799 UNLISTED PX VASCULAR SURGERY: CPT | Performed by: NURSE PRACTITIONER

## 2025-07-10 PROCEDURE — 93005 ELECTROCARDIOGRAM TRACING: CPT

## 2025-07-10 PROCEDURE — 2500000004 HC RX 250 GENERAL PHARMACY W/ HCPCS (ALT 636 FOR OP/ED): Performed by: NURSE PRACTITIONER

## 2025-07-10 PROCEDURE — 94760 N-INVAS EAR/PLS OXIMETRY 1: CPT

## 2025-07-10 PROCEDURE — 2500000004 HC RX 250 GENERAL PHARMACY W/ HCPCS (ALT 636 FOR OP/ED): Performed by: INTERNAL MEDICINE

## 2025-07-10 PROCEDURE — 84075 ASSAY ALKALINE PHOSPHATASE: CPT | Performed by: NURSE PRACTITIONER

## 2025-07-10 PROCEDURE — 96372 THER/PROPH/DIAG INJ SC/IM: CPT | Performed by: INTERNAL MEDICINE

## 2025-07-10 PROCEDURE — 84484 ASSAY OF TROPONIN QUANT: CPT | Performed by: NURSE PRACTITIONER

## 2025-07-10 PROCEDURE — 2500000001 HC RX 250 WO HCPCS SELF ADMINISTERED DRUGS (ALT 637 FOR MEDICARE OP): Performed by: NURSE PRACTITIONER

## 2025-07-10 PROCEDURE — 85027 COMPLETE CBC AUTOMATED: CPT | Performed by: NURSE PRACTITIONER

## 2025-07-10 PROCEDURE — 2500000002 HC RX 250 W HCPCS SELF ADMINISTERED DRUGS (ALT 637 FOR MEDICARE OP, ALT 636 FOR OP/ED): Performed by: NURSE PRACTITIONER

## 2025-07-10 PROCEDURE — 2500000001 HC RX 250 WO HCPCS SELF ADMINISTERED DRUGS (ALT 637 FOR MEDICARE OP): Performed by: INTERNAL MEDICINE

## 2025-07-10 PROCEDURE — G0378 HOSPITAL OBSERVATION PER HR: HCPCS

## 2025-07-10 PROCEDURE — 80053 COMPREHEN METABOLIC PANEL: CPT | Performed by: NURSE PRACTITIONER

## 2025-07-10 RX ORDER — BISMUTH SUBSALICYLATE 262 MG
1 TABLET,CHEWABLE ORAL DAILY
Status: DISCONTINUED | OUTPATIENT
Start: 2025-07-10 | End: 2025-07-10 | Stop reason: HOSPADM

## 2025-07-10 RX ORDER — ASCORBIC ACID 500 MG
500 TABLET ORAL DAILY
Start: 2025-07-11 | End: 2025-07-25

## 2025-07-10 RX ORDER — POTASSIUM CHLORIDE 20 MEQ/1
40 TABLET, EXTENDED RELEASE ORAL ONCE
Status: COMPLETED | OUTPATIENT
Start: 2025-07-10 | End: 2025-07-10

## 2025-07-10 RX ORDER — ZINC SULFATE 50(220)MG
50 CAPSULE ORAL DAILY
Status: DISCONTINUED | OUTPATIENT
Start: 2025-07-10 | End: 2025-07-10 | Stop reason: HOSPADM

## 2025-07-10 RX ORDER — BISMUTH SUBSALICYLATE 262 MG
1 TABLET,CHEWABLE ORAL DAILY
Start: 2025-07-11 | End: 2025-07-25

## 2025-07-10 RX ORDER — ASCORBIC ACID 500 MG
500 TABLET ORAL DAILY
Status: DISCONTINUED | OUTPATIENT
Start: 2025-07-10 | End: 2025-07-10 | Stop reason: HOSPADM

## 2025-07-10 RX ORDER — ZINC SULFATE 50(220)MG
50 CAPSULE ORAL DAILY
Start: 2025-07-11 | End: 2025-07-25

## 2025-07-10 RX ADMIN — ZINC OXIDE 1 APPLICATION: 200 OINTMENT TOPICAL at 08:21

## 2025-07-10 RX ADMIN — ONDANSETRON 4 MG: 2 INJECTION INTRAMUSCULAR; INTRAVENOUS at 13:10

## 2025-07-10 RX ADMIN — POTASSIUM CHLORIDE 40 MEQ: 1500 TABLET, EXTENDED RELEASE ORAL at 15:07

## 2025-07-10 RX ADMIN — ZINC SULFATE 220 MG (50 MG) CAPSULE 1 CAPSULE: CAPSULE at 09:21

## 2025-07-10 RX ADMIN — HYDRALAZINE HYDROCHLORIDE 50 MG: 50 TABLET ORAL at 08:05

## 2025-07-10 RX ADMIN — POLYETHYLENE GLYCOL 3350 17 G: 17 POWDER, FOR SOLUTION ORAL at 08:05

## 2025-07-10 RX ADMIN — ACETAMINOPHEN 975 MG: 325 TABLET, FILM COATED ORAL at 08:06

## 2025-07-10 RX ADMIN — ENOXAPARIN SODIUM 90 MG: 80 INJECTION SUBCUTANEOUS at 00:58

## 2025-07-10 RX ADMIN — THERA TABS 1 TABLET: TAB at 08:44

## 2025-07-10 RX ADMIN — ENOXAPARIN SODIUM 90 MG: 80 INJECTION SUBCUTANEOUS at 13:05

## 2025-07-10 RX ADMIN — ZINC OXIDE 1 APPLICATION: 200 OINTMENT TOPICAL at 02:33

## 2025-07-10 RX ADMIN — AMLODIPINE BESYLATE 2.5 MG: 2.5 TABLET ORAL at 08:05

## 2025-07-10 RX ADMIN — OXYCODONE HYDROCHLORIDE AND ACETAMINOPHEN 500 MG: 500 TABLET ORAL at 08:05

## 2025-07-10 RX ADMIN — HYDRALAZINE HYDROCHLORIDE 50 MG: 50 TABLET ORAL at 15:07

## 2025-07-10 ASSESSMENT — COGNITIVE AND FUNCTIONAL STATUS - GENERAL
MOBILITY SCORE: 19
HELP NEEDED FOR BATHING: A LITTLE
DRESSING REGULAR UPPER BODY CLOTHING: A LITTLE
DRESSING REGULAR LOWER BODY CLOTHING: A LOT
CLIMB 3 TO 5 STEPS WITH RAILING: A LOT
PERSONAL GROOMING: A LITTLE
DAILY ACTIVITIY SCORE: 17
TOILETING: A LITTLE
MOVING FROM LYING ON BACK TO SITTING ON SIDE OF FLAT BED WITH BEDRAILS: A LITTLE
EATING MEALS: A LITTLE
STANDING UP FROM CHAIR USING ARMS: A LITTLE
TURNING FROM BACK TO SIDE WHILE IN FLAT BAD: A LITTLE

## 2025-07-10 ASSESSMENT — ENCOUNTER SYMPTOMS
FEVER: 0
FEVER: 0
CHILLS: 0
CHILLS: 0

## 2025-07-10 ASSESSMENT — PAIN SCALES - GENERAL
PAINLEVEL_OUTOF10: 1
PAINLEVEL_OUTOF10: 0 - NO PAIN
PAINLEVEL_OUTOF10: 0 - NO PAIN
PAINLEVEL_OUTOF10: 3

## 2025-07-10 ASSESSMENT — PAIN DESCRIPTION - DESCRIPTORS: DESCRIPTORS: SORE;TENDER

## 2025-07-10 ASSESSMENT — PAIN - FUNCTIONAL ASSESSMENT
PAIN_FUNCTIONAL_ASSESSMENT: 0-10

## 2025-07-10 NOTE — PROGRESS NOTES
PLACE OF SERVICE:  [facility]    This is a subsequent visit.    Subjective   Patient ID: Elloit Partida is a 74 y.o. male who presents for Follow-up.    Mr. Elliot Partida is a 74-year-old diabetic male with recent UTI.  He has undergone debridement to his left foot secondary to osteomyelitis.  He is unable to care for himself and requires supportive care.    Review of Systems   Constitutional:  Negative for chills and fever.   Cardiovascular:  Negative for chest pain.   All other systems reviewed and are negative.    Objective   /82   Pulse 84   Temp 36.7 °C (98 °F)   Resp 16     Physical Exam  Vitals reviewed.   Constitutional:       Comments: This is a well-developed, well-nourished male, lying in bed, appearing weak.   HENT:      Right Ear: Tympanic membrane, ear canal and external ear normal.      Left Ear: Tympanic membrane, ear canal and external ear normal.   Eyes:      General: No scleral icterus.     Pupils: Pupils are equal, round, and reactive to light.   Neck:      Vascular: No carotid bruit.   Cardiovascular:      Heart sounds: Normal heart sounds, S1 normal and S2 normal. No murmur heard.     No friction rub.   Pulmonary:      Effort: Pulmonary effort is normal.      Breath sounds: Normal breath sounds and air entry.   Abdominal:      Palpations: There is no hepatomegaly, splenomegaly or mass.   Musculoskeletal:         General: No swelling or deformity. Normal range of motion.      Cervical back: Neck supple.      Right lower leg: No edema.      Left lower leg: No edema.      Comments: The patient's left foot is currently dressed.  There is no foul odor.  There is no color drainage.   Lymphadenopathy:      Cervical: No cervical adenopathy.      Upper Body:      Right upper body: No axillary adenopathy.      Left upper body: No axillary adenopathy.      Lower Body: No right inguinal adenopathy. No left inguinal adenopathy.   Neurological:      Mental Status: He is oriented to  person, place, and time. He is lethargic.      Cranial Nerves: Cranial nerves 2-12 are intact. No cranial nerve deficit.      Sensory: No sensory deficit.      Motor: Motor function is intact. No weakness.      Gait: Gait is intact.      Deep Tendon Reflexes: Reflexes normal.   Psychiatric:         Mood and Affect: Mood normal. Mood is not anxious or depressed. Affect is not angry.         Behavior: Behavior is not agitated.         Thought Content: Thought content normal.         Judgment: Judgment normal.     LAB WORK:  Laboratory studies were reviewed.    Assessment/Plan   Problem List Items Addressed This Visit           ICD-10-CM    Osteomyelitis of left foot, unspecified type (Multi) M86.9    Weakness R53.1    Osteoarthritis M19.90    Meningioma (Multi) D32.9    Urinary tract infection without hematuria, site unspecified - Primary N39.0    Diabetes (Multi) E11.9     Other Visit Diagnoses         Codes      Deep vein thrombosis (DVT) of lower extremity, unspecified chronicity, unspecified laterality, unspecified vein     I82.409      Hypertension, unspecified type     I10      Neuropathy     G62.9      History of pleural effusion     Z87.09      At risk for falling     Z91.81        1. Urinary tract infection, on antibiotic.  2. Osteomyelitis left foot.  Continue wound care.  Follow with Podiatry.  3. Diabetes, on insulin.  4. DVT, anticoagulated.  5. Hypertension, medically controlled.  6. Neuropathy, on gabapentin.  7. Osteoarthritis, on Tylenol.  8. Pleural effusion.  Follow with Pulmonology.  9. Meningioma: Follow with Neurosurgery.  10. Weakness, on PT/OT.  11. Fall risk, on fall precautions.    Scribe Attestation  By signing my name below, IHanna Scribe attest that this documentation has been prepared under the direction and in the presence of Marium Singh MD.     All medical record entries made by the scribe were personally dictated by me I have reviewed the chart and agree the record  accurately reflects my personal performance of his history physical examination and management

## 2025-07-10 NOTE — DISCHARGE SUMMARY
Discharge Diagnosis  Somnolence           Issues Requiring Follow-Up  Follow up with PCP    Discharge Meds     Medication List      START taking these medications     ascorbic acid 500 mg tablet; Commonly known as: Vitamin C; Take 1 tablet   (500 mg) by mouth once daily for 14 days.; Start taking on: July 11, 2025   multivitamin tablet; Take 1 tablet by mouth once daily for 14 days.;   Start taking on: July 11, 2025   zinc sulfate 220 mg (50 mg elemental) capsule; Commonly known as:   Zincate; Take 1 capsule by mouth once daily for 14 days.; Start taking on:   July 11, 2025     CONTINUE taking these medications     amLODIPine 2.5 mg tablet; Commonly known as: Norvasc; Take 1 tablet (2.5   mg) by mouth once daily.   Blood glucose monitoring meter; To check blood sugar every morning   before breakfast   enoxaparin 80 mg/0.8 mL syringe; Commonly known as: Lovenox; Inject 0.9   mL (90 mg) under the skin every 12 hours.   gabapentin 300 mg capsule; Commonly known as: Neurontin   hydrALAZINE 50 mg tablet; Commonly known as: Apresoline; Take 1 tablet   (50 mg) by mouth 3 times a day.   meclizine 25 mg tablet; Commonly known as: Antivert   oxygen gas therapy; Commonly known as: O2; Inhale 2 L/min at 120,000   mL/hr if needed (desaturation at hs).     STOP taking these medications     traZODone 50 mg tablet; Commonly known as: Desyrel       Test Results Pending At Discharge  Pending Labs       Order Current Status    Blood Culture Preliminary result    Blood Culture Preliminary result            Hospital Course   ***    Pertinent Physical Exam At Time of Discharge  Constitutional:       General: He is not in acute distress.     Appearance: He is ill-appearing.   HENT:      Head: Normocephalic and atraumatic.      Nose: No congestion.      Mouth/Throat:      Mouth: Mucous membranes are moist.   Eyes:      General: No scleral icterus.        Right eye: No discharge.         Left eye: No discharge.      Extraocular Movements:  Extraocular movements intact.      Conjunctiva/sclera: Conjunctivae normal.      Pupils: Pupils are equal, round, and reactive to light.   Cardiovascular:      Rate and Rhythm: Normal rate and regular rhythm.      Pulses: Normal pulses.      Heart sounds: Normal heart sounds.   Pulmonary:      Effort: Pulmonary effort is normal.   Abdominal:      General: Abdomen is flat. There is no distension.      Palpations: Abdomen is soft.      Tenderness: There is no abdominal tenderness.   Musculoskeletal:         General: No swelling.      Cervical back: No rigidity or tenderness.   Skin:     General: Skin is warm and dry.      Capillary Refill: Capillary refill takes 2 to 3 seconds.      Comments: Sacral wound, LLE wound see nursing image  Right midline    Neurological:      General: No focal deficit present.      Mental Status: He is alert and oriented to person, place, and time.      Cranial Nerves: No cranial nerve deficit.      Comments: LLE weakness, patient unable to raise leg, but is able to wiggle toes and feels light touch to foot and leg. Patient reports this is residual from his previous stroke with no movement changes or deficits.   Psychiatric:        Outpatient Follow-Up  Future Appointments   Date Time Provider Department Center   7/17/2025 12:00 PM Felix Aguero MD [id number] [facility]         SARAH Landaverde-CNP   Comments: LLE weakness, patient unable to raise leg, but is able to wiggle toes and feels light touch to foot and leg. Patient reports this is residual from his previous stroke with no movement changes or deficits.   Psychiatric:        Outpatient Follow-Up  Future Appointments   Date Time Provider Department Center   7/17/2025 12:00 PM Felix Aguero MD [id number] [facility]         SARAH Landaverde-CNP   above. Finding may represent chronic atherosclerotic disease with poor flow.3. Patency of the bilateral internal carotid arteries and right vertebral artery in the neck. Recommendation: If clinically concerned for small acute ischemic  infarct, further evaluation with MR of the brain without contrast is  recommended.4. Questionable hyperdense prominence of the extra-axial spaces in the bilateral posterior parietal regions, however can not be confirmed on other sequences and can be artifactual. Attention on short-term follow-up CT head without contrast is recommended.  -Neurology consulted, appreciate recommendations: No new acute findings   -Neuro checks q 4 hours  -Telemetry  -Monitor        Anemia  -H/H 8.2/ 25.8 > 8.3/26.2  -Iron 40  -TIBC 249  -%sat 16  -Monitor     Coronary artery disease  Hypertension  Hyperlipidemia  -Clonidine  0.1mg given in ED   -Monitor BP  -Continue amlodipine, hydralazine  -ECHO 1. The left ventricular systolic function is normal with a visually estimated ejection fraction of 60-65%         Elevated BNP  -XR Chest  Enlarged cardiac silhouette with mild interstitial edema. Low lung volumes which limits the study  -  -CT abdomen-chest -pelvis   CHEST:   1.  Small right pleural effusion. Small-moderate left pleural  effusion. Bibasilar atelectasis.  2. Coronary artery calcifications. Dilated main pulmonary artery,  please correlate for pulmonary arterial hypertension.  ABDOMEN-PELVIS:  1.  Circumferential wall thickening of the urinary bladder, may be  secondary to underdistention or chronic outlet obstruction. Please  correlate with urinalysis to exclude superimposed cystitis.  2. Mild bilateral hydronephrosis.  3. Prostatomegaly.  4. Colonic diverticulosis.  5. Mild pelvic adenopathy.  -Furosemide 40mg IV given in ED      Hypokalemia  -K 3.1 > 3.5 > 3.4   -40 mEq Potassium given to replace    -monitor  -Daily BMP     Ambulatory dysfunction  Generalized weakness  Left arm  pain  -Swelling to left forearm  -Left forearm: Moderate to severe soft tissue edema without acute osseous abnormality   -PT/OT evaluation, appreciate recommendations  -Nutrition consult. Appreciate recommendations  -RT treat and evaluate, appreciate recommendations.   -Duo nebs PRN   -Bronchial hygiene     History of DVT  -Lovenox 90mg q 12h     Diabetes  -Monitor blood glucose   -  -BMP daily  -HgbA1C 5.8% 1/2025     GI ppx: PPI  DVT ppx: Enoxaparin  Fluids: As needed  Electrolytes: replace as needed  Nutrition: Regular   Adjuncts: Midline discontinued, Zosyn completed.  Code Status: Full    Pertinent Physical Exam At Time of Discharge  Constitutional:       General: He is not in acute distress.     Appearance: He is ill-appearing.   HENT:      Head: Normocephalic and atraumatic.      Nose: No congestion.      Mouth/Throat:      Mouth: Mucous membranes are moist.   Eyes:      General: No scleral icterus.        Right eye: No discharge.         Left eye: No discharge.      Extraocular Movements: Extraocular movements intact.      Conjunctiva/sclera: Conjunctivae normal.      Pupils: Pupils are equal, round, and reactive to light.   Cardiovascular:      Rate and Rhythm: Normal rate and regular rhythm.      Pulses: Normal pulses.      Heart sounds: Normal heart sounds.   Pulmonary:      Effort: Pulmonary effort is normal, diminished.  Abdominal:      General: Abdomen is flat. There is no distension.      Palpations: Abdomen is soft.      Tenderness: There is no abdominal tenderness.   Musculoskeletal:         General: No swelling.      Cervical back: No rigidity or tenderness.   Skin:     General: Skin is warm and dry.      Capillary Refill: Capillary refill takes 2 to 3 seconds.      Comments: Sacral wound, LLE wound see nursing image  Right midline discontinued, Zosyn completed.    Neurological:      General: No focal deficit present.      Mental Status: He is alert and oriented to person, place, and time.       Cranial Nerves: No cranial nerve deficit.        Psychiatric:  Agitated      Outpatient Follow-Up  Future Appointments   Date Time Provider Department Center   7/17/2025 12:00 PM Felix Aguero MD [id number] [facility]       Stable for discharge to [facility].  Total cumulative time spent in preparation of this discharge including documentation review, coordination of care with the medical team including PT/SW/care coordinators and treating consultants, discussion with patient and pertinent family members and finalization of prescriptions, follow-up appointments, and this discharge summary was approximately 45 minutes.     Yue Rodriguez, APRN-CNP

## 2025-07-10 NOTE — NURSING NOTE
Patient c/o an upset stomach and needing to go poop. Patient assisted to [facility] with walker. Patient c/o dizziness. No c/o pain. Patient hade a small loose stool. Patient assisted back to bed and said he felt like the room was spinning when he sat down. Patient assisted back to laying in bed. Patient refused breakfast except milk. Patient denied upset stomach after returning to bed.

## 2025-07-10 NOTE — PROGRESS NOTES
PLACE OF SERVICE:  [facility]    This is a subsequent visit.    Subjective   Patient ID: Elliot Partida is a 74 y.o. male who presents for Follow-up.    Mr. Elliot Partida is a 74-year-old diabetic male with recent history of UTI, requiring inpatient antibiotics.  He suffers from weakness and deconditioning.  He is unable to care for himself.  He requires supportive care.    Review of Systems   Constitutional:  Negative for chills and fever.   Cardiovascular:  Negative for chest pain.   All other systems reviewed and are negative.    Objective   /82   Pulse 82   Temp 36.6 °C (97.9 °F)   Resp 16     Physical Exam  Vitals reviewed.   Constitutional:       Comments: This is a well-developed, well-nourished male, lying in bed, appearing weak.   HENT:      Right Ear: Tympanic membrane, ear canal and external ear normal.      Left Ear: Tympanic membrane, ear canal and external ear normal.   Eyes:      General: No scleral icterus.     Pupils: Pupils are equal, round, and reactive to light.   Neck:      Vascular: No carotid bruit.   Cardiovascular:      Heart sounds: Normal heart sounds, S1 normal and S2 normal. No murmur heard.     No friction rub.   Pulmonary:      Effort: Pulmonary effort is normal.      Breath sounds: Normal breath sounds and air entry.   Abdominal:      Palpations: There is no hepatomegaly, splenomegaly or mass.   Musculoskeletal:         General: No swelling or deformity. Normal range of motion.      Cervical back: Neck supple.      Right lower leg: No edema.      Left lower leg: No edema.      Comments: The patient's left foot is currently dressed.  There is no foul odor.  There is no color drainage.   Lymphadenopathy:      Cervical: No cervical adenopathy.      Upper Body:      Right upper body: No axillary adenopathy.      Left upper body: No axillary adenopathy.      Lower Body: No right inguinal adenopathy. No left inguinal adenopathy.   Neurological:      Mental Status: He is  oriented to person, place, and time. He is lethargic.      Cranial Nerves: Cranial nerves 2-12 are intact. No cranial nerve deficit.      Sensory: No sensory deficit.      Motor: Motor function is intact. No weakness.      Gait: Gait is intact.      Deep Tendon Reflexes: Reflexes normal.   Psychiatric:         Mood and Affect: Mood normal. Mood is not anxious or depressed. Affect is not angry.         Behavior: Behavior is not agitated.         Thought Content: Thought content normal.         Judgment: Judgment normal.     LAB WORK:  Laboratory studies were reviewed.    Assessment/Plan   Problem List Items Addressed This Visit           ICD-10-CM    Osteomyelitis of left foot, unspecified type (Multi) M86.9    Weakness R53.1    Osteoarthritis M19.90    Meningioma (Multi) D32.9    Urinary tract infection without hematuria, site unspecified - Primary N39.0    Diabetes (Multi) E11.9     Other Visit Diagnoses         Codes      Hypertension, unspecified type     I10      Deep vein thrombosis (DVT) of lower extremity, unspecified chronicity, unspecified laterality, unspecified vein     I82.409      ASHD (arteriosclerotic heart disease)     I25.10      History of pleural effusion     Z87.09      Neuropathy     G62.9      At risk for falling     Z91.81        1. Urinary tract infection.  Finish antibiotic.  2. Diabetes, on insulin.  3. Osteomyelitis, left foot.  Continue wound care.  Follow with Podiatry.  4. Hypertension, medically controlled.  5. DVT, anticoagulated.  6. Osteoarthritis, on Tylenol.  7. ASHD, on aspirin.  8. Meningioma.  Follow with Neurosurgery.  9. Pleural effusion.  Follow with Pulmonology.  10. Neuropathy, on gabapentin.  11. Weakness, on PT/OT.  12. Fall risk, on fall precautions.    Scribe Attestation  By signing my name below, Hanna OLIVER Scribe attest that this documentation has been prepared under the direction and in the presence of Marium Singh MD.     All medical record entries made by  the scribe were personally dictated by me I have reviewed the chart and agree the record accurately reflects my personal performance of his history physical examination and management

## 2025-07-10 NOTE — PROGRESS NOTES
"Physical Therapy                 Therapy Communication Note    Patient Name: Elliot Partida  MRN: [id number]  Department: [facility] ICU  Room: 04/04-A  Today's Date: 7/10/2025     Discipline: Physical Therapy    Missed Visit Reason: Missed Visit Reason: Patient adamantly refused therapy at this time yelling \"get out\".     Missed Time: Attempt 827     "

## 2025-07-10 NOTE — PROGRESS NOTES
"Occupational Therapy                 Therapy Communication Note    Patient Name: Elliot Partida  MRN: [id number]  Department: [facility] ICU  Room: 04/04-A  Today's Date: 7/10/2025     Discipline: Occupational Therapy    Missed Visit: OT Missed Visit: Yes     Missed Visit Reason: Missed Visit Reason: Patient refused (Attempt at 813 - Patient sleeping. Attempt at 827 - Pateint refused. Pt stating/yelling \"get out\" multiple times during attempt. Pt asked if he wanted therapy to return this afternoon, pt stated to \"try again tomorrow.\" Will follow up per pt status and as schedule allows.)    Missed Time: Attempt 813, 827        "

## 2025-07-10 NOTE — CARE PLAN
"The patient's goals for the shift include \"getting to sleep.\"    The clinical goals for the shift include patient cooperating with PT/OT, getting OOB to chair, increasing activity, and discharging back to [facility].    Patient did not eat most of the day. He refused respiratory therapy, PT, and OT. Patient's gallo removed this morning. Patient appeared to have ST elevation on his telemetry. Troponins negative x 2. EKG x 2 not much different from EKG in June of this year. Patient's midline removed this afternoon. Patient discharged back to [facility], per order.   "

## 2025-07-10 NOTE — PROGRESS NOTES
07/10/25 1053   Discharge Planning   Living Arrangements Other (Comment)  ([facility] return)   Home or Post Acute Services Post acute facilities (Rehab/SNF/etc)   Type of Post Acute Facility Services Skilled nursing   Expected Discharge Disposition SNF  ([facility]-no barriers to return.)   Does the patient need discharge transport arranged? Yes   [facility] coordination needed? Yes   What day is the transport expected? 07/10/25   Patient Choice   Provider Choice list and CMS website (https://medicare.gov/care-compare#search) for post-acute Quality and Resource Measure Data were provided and reviewed with: Patient   Patient / Family choosing to utilize agency / facility established prior to hospitalization Yes   Intensity of Service   Intensity of Service 0-30 min          07/10/25 1530   Discharge Planning   Home or Post Acute Services Post acute facilities (Rehab/SNF/etc)   Type of Post Acute Facility Services Skilled nursing   Expected Discharge Disposition SNF  (Cardiology cleared[place] [facility] [place]  [facility] updated)   Does the patient need discharge transport arranged? Yes   [facility] coordination needed? Yes   Has discharge transport been arranged? Yes   What day is the transport expected? 07/10/25      [facility] Event: ACCEPT Detail: Booking: [id number] Types: SINGLE / WC  Status: Accepted Patient: Elliot Partida Pickup Time: 2025-07-10 16:45 Pickup Address: [address]             [address]   [phone]

## 2025-07-10 NOTE — CARE PLAN
"The patient's goals for the shift include \"letting me sleep.\"    The clinical goals for the shift include patient cooperating with PT/OT, getting OOB to chair, increasing activity, and discharging back to [facility].    "

## 2025-07-12 LAB
ATRIAL RATE: 77 BPM
P AXIS: 53 DEGREES
P OFFSET: 130 MS
P ONSET: 57 MS
PR INTERVAL: 318 MS
Q ONSET: 216 MS
QRS COUNT: 13 BEATS
QRS DURATION: 140 MS
QT INTERVAL: 408 MS
QTC CALCULATION(BAZETT): 461 MS
QTC FREDERICIA: 443 MS
R AXIS: -50 DEGREES
T AXIS: 97 DEGREES
T OFFSET: 420 MS
VENTRICULAR RATE: 77 BPM

## 2025-07-13 ENCOUNTER — NURSING HOME VISIT (OUTPATIENT)
Dept: POST ACUTE CARE | Facility: EXTERNAL LOCATION | Age: 75
End: 2025-07-13
Payer: MEDICARE

## 2025-07-13 DIAGNOSIS — G62.9 NEUROPATHY: ICD-10-CM

## 2025-07-13 DIAGNOSIS — R53.1 WEAKNESS: ICD-10-CM

## 2025-07-13 DIAGNOSIS — I50.9 HEART FAILURE, UNSPECIFIED HF CHRONICITY, UNSPECIFIED HEART FAILURE TYPE: Primary | ICD-10-CM

## 2025-07-13 DIAGNOSIS — E11.9 TYPE 2 DIABETES MELLITUS WITHOUT COMPLICATION, UNSPECIFIED WHETHER LONG TERM INSULIN USE: ICD-10-CM

## 2025-07-13 DIAGNOSIS — M19.90 OSTEOARTHRITIS, UNSPECIFIED OSTEOARTHRITIS TYPE, UNSPECIFIED SITE: ICD-10-CM

## 2025-07-13 DIAGNOSIS — I25.10 ASHD (ARTERIOSCLEROTIC HEART DISEASE): ICD-10-CM

## 2025-07-13 DIAGNOSIS — Z91.81 AT RISK FOR FALLING: ICD-10-CM

## 2025-07-13 DIAGNOSIS — I10 HYPERTENSION, UNSPECIFIED TYPE: ICD-10-CM

## 2025-07-13 DIAGNOSIS — Z87.09 HISTORY OF PLEURAL EFFUSION: ICD-10-CM

## 2025-07-13 DIAGNOSIS — D32.9 MENINGIOMA (MULTI): ICD-10-CM

## 2025-07-13 DIAGNOSIS — I82.409 DEEP VEIN THROMBOSIS (DVT) OF LOWER EXTREMITY, UNSPECIFIED CHRONICITY, UNSPECIFIED LATERALITY, UNSPECIFIED VEIN: ICD-10-CM

## 2025-07-13 DIAGNOSIS — M86.9 OSTEOMYELITIS OF LEFT FOOT, UNSPECIFIED TYPE (MULTI): ICD-10-CM

## 2025-07-13 LAB
BACTERIA BLD CULT: NORMAL
BACTERIA BLD CULT: NORMAL

## 2025-07-13 PROCEDURE — 99309 SBSQ NF CARE MODERATE MDM 30: CPT | Performed by: INTERNAL MEDICINE

## 2025-07-13 NOTE — LETTER
Patient: Elliot Partida  : 1950    Encounter Date: 2025    PLACE OF SERVICE:  [facility]    This is a subsequent visit.    Subjective  Patient ID: Elliot Partida is a 74 y.o. male who presents for Follow-up.    Mr. Elliot Partida is a 74-year-old diabetic male with history of recent heart failure.  He is being treated for osteomyelitis to his left foot.  He does undergo wound care and is followed by Podiatry.    Review of Systems   Constitutional:  Negative for chills and fever.   Cardiovascular:  Negative for chest pain.   All other systems reviewed and are negative.    Objective  /82   Pulse 84   Temp 36.7 °C (98.1 °F)   Resp 18     Physical Exam  Vitals reviewed.   Constitutional:       Comments: This is a well-developed, well-nourished male, lying in bed, appearing weak.   HENT:      Right Ear: Tympanic membrane, ear canal and external ear normal.      Left Ear: Tympanic membrane, ear canal and external ear normal.     Eyes:      General: No scleral icterus.     Pupils: Pupils are equal, round, and reactive to light.     Neck:      Vascular: No carotid bruit.     Cardiovascular:      Heart sounds: Normal heart sounds, S1 normal and S2 normal. No murmur heard.     No friction rub.   Pulmonary:      Breath sounds: Decreased breath sounds (throughout) present.   Abdominal:      Palpations: There is no hepatomegaly, splenomegaly or mass.     Musculoskeletal:         General: No swelling or deformity. Normal range of motion.      Cervical back: Neck supple.      Right lower leg: No edema.      Left lower leg: No edema.      Comments: The patient's left foot is currently dressed.  There is no foul odor.  There is no color drainage.  There is bilateral pedal edema present.   Lymphadenopathy:      Cervical: No cervical adenopathy.      Upper Body:      Right upper body: No axillary adenopathy.      Left upper body: No axillary adenopathy.      Lower Body: No right inguinal adenopathy. No  left inguinal adenopathy.     Neurological:      Mental Status: He is oriented to person, place, and time. He is lethargic.      Cranial Nerves: Cranial nerves 2-12 are intact. No cranial nerve deficit.      Sensory: No sensory deficit.      Motor: Motor function is intact. No weakness.      Gait: Gait is intact.      Deep Tendon Reflexes: Reflexes normal.     Psychiatric:         Mood and Affect: Mood normal. Mood is not anxious or depressed. Affect is not angry.         Behavior: Behavior is not agitated.         Thought Content: Thought content normal.         Judgment: Judgment normal.     LAB WORK: Laboratory studies reviewed.    Assessment/Plan  Problem List Items Addressed This Visit           ICD-10-CM    Osteomyelitis of left foot, unspecified type (Multi) M86.9    Weakness R53.1    Osteoarthritis M19.90    Meningioma (Multi) D32.9    Diabetes (Multi) E11.9     Other Visit Diagnoses         Codes      Heart failure, unspecified HF chronicity, unspecified heart failure type    -  Primary I50.9      Deep vein thrombosis (DVT) of lower extremity, unspecified chronicity, unspecified laterality, unspecified vein     I82.409      Hypertension, unspecified type     I10      ASHD (arteriosclerotic heart disease)     I25.10      History of pleural effusion     Z87.09      Neuropathy     G62.9      At risk for falling     Z91.81        1. Heart failure, on diuretic.  2. Osteomyelitis, left foot.  Continue wound care, on antibiotic.  Follow with Podiatry.  3. Diabetes, on insulin.  4. DVT, on Lovenox.  5. Hypertension, med controlled.  6. Meningioma, follow with Neurosurgery.  7. ASHD, on aspirin.  8. Pleural effusion, follow with Pulmonology.  9. Neuropathy, on gabapentin.  10. Osteoarthritis, on Tylenol.  11. Weakness, on PT/OT.  12. Fall risk, on fall precautions.    Scribe Attestation  By signing my name below, Hanna OLIVER Scribe attest that this documentation has been prepared under the direction and in the  presence of Marium Singh MD    All medical record entries made by the scribe were personally dictated by me I have reviewed the chart and agree the record accurately reflects my personal performance of his history physical examination and management      Electronically Signed By: Marium Singh MD   7/17/25 11:35 PM

## 2025-07-14 ENCOUNTER — PATIENT OUTREACH (OUTPATIENT)
Dept: HEMATOLOGY/ONCOLOGY | Facility: HOSPITAL | Age: 75
End: 2025-07-14
Payer: MEDICARE

## 2025-07-14 NOTE — PROGRESS NOTES
Mr. Partida is scheduled for a NPV with Dr. Aguero on 7/17.  He was referred to us for elevated light chains and anemia. No M-protein noted.      Per Dr. Aguero, Patient moved to [facility] NP schedule for workup.     Patient was discharged to [facility] after recent admission.  Detailed messaged left explaining the changes made to patient schedule and where and when to check in.

## 2025-07-15 LAB
ATRIAL RATE: 74 BPM
P AXIS: 59 DEGREES
P OFFSET: 125 MS
P ONSET: 45 MS
PR INTERVAL: 338 MS
Q ONSET: 214 MS
QRS COUNT: 13 BEATS
QRS DURATION: 138 MS
QT INTERVAL: 428 MS
QTC CALCULATION(BAZETT): 475 MS
QTC FREDERICIA: 459 MS
R AXIS: -50 DEGREES
T AXIS: 107 DEGREES
T OFFSET: 428 MS
VENTRICULAR RATE: 74 BPM

## 2025-07-16 VITALS
DIASTOLIC BLOOD PRESSURE: 82 MMHG | SYSTOLIC BLOOD PRESSURE: 130 MMHG | HEART RATE: 84 BPM | TEMPERATURE: 98.1 F | RESPIRATION RATE: 18 BRPM

## 2025-07-16 ASSESSMENT — ENCOUNTER SYMPTOMS
CHILLS: 0
FEVER: 0

## 2025-07-16 NOTE — PROGRESS NOTES
PLACE OF SERVICE:  [facility]    This is a subsequent visit.    Subjective   Patient ID: Elliot Partida is a 74 y.o. male who presents for Follow-up.    Mr. Elliot Partida is a 74-year-old diabetic male with history of recent heart failure.  He is being treated for osteomyelitis to his left foot.  He does undergo wound care and is followed by Podiatry.    Review of Systems   Constitutional:  Negative for chills and fever.   Cardiovascular:  Negative for chest pain.   All other systems reviewed and are negative.    Objective   /82   Pulse 84   Temp 36.7 °C (98.1 °F)   Resp 18     Physical Exam  Vitals reviewed.   Constitutional:       Comments: This is a well-developed, well-nourished male, lying in bed, appearing weak.   HENT:      Right Ear: Tympanic membrane, ear canal and external ear normal.      Left Ear: Tympanic membrane, ear canal and external ear normal.     Eyes:      General: No scleral icterus.     Pupils: Pupils are equal, round, and reactive to light.     Neck:      Vascular: No carotid bruit.     Cardiovascular:      Heart sounds: Normal heart sounds, S1 normal and S2 normal. No murmur heard.     No friction rub.   Pulmonary:      Breath sounds: Decreased breath sounds (throughout) present.   Abdominal:      Palpations: There is no hepatomegaly, splenomegaly or mass.     Musculoskeletal:         General: No swelling or deformity. Normal range of motion.      Cervical back: Neck supple.      Right lower leg: No edema.      Left lower leg: No edema.      Comments: The patient's left foot is currently dressed.  There is no foul odor.  There is no color drainage.  There is bilateral pedal edema present.   Lymphadenopathy:      Cervical: No cervical adenopathy.      Upper Body:      Right upper body: No axillary adenopathy.      Left upper body: No axillary adenopathy.      Lower Body: No right inguinal adenopathy. No left inguinal adenopathy.     Neurological:      Mental Status: He is  oriented to person, place, and time. He is lethargic.      Cranial Nerves: Cranial nerves 2-12 are intact. No cranial nerve deficit.      Sensory: No sensory deficit.      Motor: Motor function is intact. No weakness.      Gait: Gait is intact.      Deep Tendon Reflexes: Reflexes normal.     Psychiatric:         Mood and Affect: Mood normal. Mood is not anxious or depressed. Affect is not angry.         Behavior: Behavior is not agitated.         Thought Content: Thought content normal.         Judgment: Judgment normal.     LAB WORK: Laboratory studies reviewed.    Assessment/Plan   Problem List Items Addressed This Visit           ICD-10-CM    Osteomyelitis of left foot, unspecified type (Multi) M86.9    Weakness R53.1    Osteoarthritis M19.90    Meningioma (Multi) D32.9    Diabetes (Multi) E11.9     Other Visit Diagnoses         Codes      Heart failure, unspecified HF chronicity, unspecified heart failure type    -  Primary I50.9      Deep vein thrombosis (DVT) of lower extremity, unspecified chronicity, unspecified laterality, unspecified vein     I82.409      Hypertension, unspecified type     I10      ASHD (arteriosclerotic heart disease)     I25.10      History of pleural effusion     Z87.09      Neuropathy     G62.9      At risk for falling     Z91.81        1. Heart failure, on diuretic.  2. Osteomyelitis, left foot.  Continue wound care, on antibiotic.  Follow with Podiatry.  3. Diabetes, on insulin.  4. DVT, on Lovenox.  5. Hypertension, med controlled.  6. Meningioma, follow with Neurosurgery.  7. ASHD, on aspirin.  8. Pleural effusion, follow with Pulmonology.  9. Neuropathy, on gabapentin.  10. Osteoarthritis, on Tylenol.  11. Weakness, on PT/OT.  12. Fall risk, on fall precautions.    Scribe Attestation  By signing my name below, IHanna Scribe attest that this documentation has been prepared under the direction and in the presence of Marium Singh MD    All medical record entries made by  the scribe were personally dictated by me I have reviewed the chart and agree the record accurately reflects my personal performance of his history physical examination and management

## 2025-07-17 ENCOUNTER — TELEPHONE (OUTPATIENT)
Dept: HEMATOLOGY/ONCOLOGY | Facility: HOSPITAL | Age: 75
End: 2025-07-17

## 2025-07-17 ENCOUNTER — OFFICE VISIT (OUTPATIENT)
Dept: HEMATOLOGY/ONCOLOGY | Facility: HOSPITAL | Age: 75
End: 2025-07-17
Payer: MEDICARE

## 2025-07-17 VITALS
OXYGEN SATURATION: 99 % | BODY MASS INDEX: 28.64 KG/M2 | HEART RATE: 78 BPM | HEIGHT: 67 IN | DIASTOLIC BLOOD PRESSURE: 59 MMHG | SYSTOLIC BLOOD PRESSURE: 155 MMHG | WEIGHT: 182.5 LBS | TEMPERATURE: 97.3 F

## 2025-07-17 DIAGNOSIS — D50.8 OTHER IRON DEFICIENCY ANEMIA: ICD-10-CM

## 2025-07-17 DIAGNOSIS — D47.2 MGUS (MONOCLONAL GAMMOPATHY OF UNKNOWN SIGNIFICANCE): Primary | ICD-10-CM

## 2025-07-17 PROCEDURE — 1159F MED LIST DOCD IN RCRD: CPT

## 2025-07-17 PROCEDURE — 3077F SYST BP >= 140 MM HG: CPT

## 2025-07-17 PROCEDURE — 1111F DSCHRG MED/CURRENT MED MERGE: CPT

## 2025-07-17 PROCEDURE — 1126F AMNT PAIN NOTED NONE PRSNT: CPT

## 2025-07-17 PROCEDURE — 3008F BODY MASS INDEX DOCD: CPT

## 2025-07-17 PROCEDURE — 3078F DIAST BP <80 MM HG: CPT

## 2025-07-17 PROCEDURE — 99215 OFFICE O/P EST HI 40 MIN: CPT

## 2025-07-17 RX ORDER — FERROUS SULFATE 137(45) MG
137 TABLET, EXTENDED RELEASE ORAL DAILY
Qty: 90 TABLET | Refills: 2 | Status: SHIPPED | OUTPATIENT
Start: 2025-07-17

## 2025-07-17 ASSESSMENT — ENCOUNTER SYMPTOMS
EYES NEGATIVE: 1
CARDIOVASCULAR NEGATIVE: 1
NEUROLOGICAL NEGATIVE: 1
DEPRESSION: 0
LOSS OF SENSATION IN FEET: 0
RESPIRATORY NEGATIVE: 1
ENDOCRINE NEGATIVE: 1
MUSCULOSKELETAL NEGATIVE: 1
HEMATOLOGIC/LYMPHATIC NEGATIVE: 1
GASTROINTESTINAL NEGATIVE: 1
CONSTITUTIONAL NEGATIVE: 1
OCCASIONAL FEELINGS OF UNSTEADINESS: 1
PSYCHIATRIC NEGATIVE: 1

## 2025-07-17 ASSESSMENT — PATIENT HEALTH QUESTIONNAIRE - PHQ9
SUM OF ALL RESPONSES TO PHQ9 QUESTIONS 1 AND 2: 0
1. LITTLE INTEREST OR PLEASURE IN DOING THINGS: NOT AT ALL
2. FEELING DOWN, DEPRESSED OR HOPELESS: NOT AT ALL

## 2025-07-17 ASSESSMENT — PAIN SCALES - GENERAL: PAINLEVEL_OUTOF10: 0-NO PAIN

## 2025-07-17 NOTE — PROGRESS NOTES
"Patient ID: Elliot Partida is a 74 y.o. male.    Initial MGUS Workup 6/10/25   - SPEP: No monoclonal protein detected  - SFLC: K 3.32, L 3.26, R 1.02  - Immunoglobulins: Pending  - UPEP: Pending    -S: N/A  -Li: K 3.32, L 3.26, R 1.02  -M: N/A  -C: 8.7  -R: SrCr 1.01  -A: Hgb 8.6  -B: N/A  SUBJECTIVE   Elliot present as a new patient for evaluation of elevated serum light chains. He is unaccompanied today. Over the past several months he has been hospitalized several times for DVT, dizziness, UTI and is currently in a SNF. Denies concerning b s/sx: neuropathy, night sweats, new bony pain, unintentional weight loss.       Review of Systems   Constitutional: Negative.    HENT:  Negative.     Eyes: Negative.    Respiratory: Negative.     Cardiovascular: Negative.    Gastrointestinal: Negative.    Endocrine: Negative.    Genitourinary: Negative.     Musculoskeletal: Negative.    Skin: Negative.    Neurological: Negative.    Hematological: Negative.    Psychiatric/Behavioral: Negative.             OBJECTIVE      BSA: 1.97 meters squared  /59 (BP Location: Right arm, Patient Position: Sitting, BP Cuff Size: Adult)   Pulse 78   Temp 36.3 °C (97.3 °F) (Temporal)   Ht (S) 1.694 m (5' 6.69\")   Wt 82.8 kg (182 lb 8 oz)   SpO2 99%   BMI 28.85 kg/m²     Physical Exam  Constitutional:       Appearance: He is ill-appearing.   HENT:      Mouth/Throat:      Mouth: Mucous membranes are moist.      Pharynx: Oropharynx is clear.     Eyes:      Conjunctiva/sclera: Conjunctivae normal.      Pupils: Pupils are equal, round, and reactive to light.       Cardiovascular:      Rate and Rhythm: Normal rate and regular rhythm.      Pulses: Normal pulses.      Heart sounds: Normal heart sounds.   Pulmonary:      Effort: Pulmonary effort is normal.      Breath sounds: Normal breath sounds.   Abdominal:      General: Abdomen is flat.     Skin:     General: Skin is warm and dry.     Neurological:      General: No focal deficit present. " "     Mental Status: He is alert and oriented to person, place, and time.     Psychiatric:         Mood and Affect: Mood normal.         Thought Content: Thought content normal.         Performance Status:  Karnofsky Score: 50 - Requires considerable assistance and frequent medical care    MONITORING: MYELOMA LAB MARKERS  MARKERS RECENT LAB VALUES   Free Kappa Light Chains Lab Results   Component Value Date    KAPPA 3.32 (H) 06/10/2025      Free Lambda Light Chains Lab Results   Component Value Date    LAMBDA 3.26 (H) 06/10/2025      Free Light Chain Ratio Lab Results   Component Value Date    KAPLS 1.02 06/10/2025       Immunofixation Lab Results   Component Value Date    IEPIN No monoclonal protein detected by immunofixation. 06/10/2025      IgG No results found for: \"IGG\"   IgA No results found for: \"IGA\"   IgM No results found for: \"IGM\"     Other Labs  Lab Results   Component Value Date    WBC 5.1 07/10/2025    NRBC 0.0 07/10/2025    RBC 2.93 (L) 07/10/2025    HGB 8.3 (L) 07/10/2025    HCT 26.2 (L) 07/10/2025    MCV 89 07/10/2025    MCH 28.3 07/10/2025    MCHC 31.7 (L) 07/10/2025    RDW 15.9 (H) 07/10/2025     07/10/2025    IGPCT 0.4 06/23/2025    LYMPHOPCT 27.4 06/23/2025    MONOPCT 11.8 06/23/2025    EOSPCT 2.5 06/23/2025    BASOPCT 0.4 06/23/2025    NEUTROABS 3.26 06/23/2025    MONOSABS 0.67 06/23/2025    EOSABS 0.14 06/23/2025       Lab Results   Component Value Date    GLUCOSE 100 (H) 07/10/2025     07/10/2025    K 3.4 (L) 07/10/2025     07/10/2025    CO2 31 07/10/2025    ANIONGAP 11 07/10/2025    BUN 12 07/10/2025    CREATININE 0.88 07/10/2025    EGFR >90 07/10/2025    CALCIUM 9.0 07/10/2025    ALBUMIN 3.4 07/10/2025    ALKPHOS 60 07/10/2025    PROT 6.1 (L) 07/10/2025    AST 11 07/10/2025    BILITOT 0.3 07/10/2025    ALT 5 (L) 07/10/2025       No results found for: \"LDH\"    ASSESSMENT & PLAN   Elevated Serum Light Chains  - minimally elevated K and L light chains w/ normal ratio  - no " monoclonal protein on SPEP  - no concerning B s/sx  - repeat labs in 6 mo    Iron Deficiency Anemia  - start Slow FE 1X daily  - on eliquis for recurrent DVT  - has not had recent colonoscopy, pt defers at this time  - repeat iron studies in 3 mo    Recurrent DVT  - on eliquis      Osteomylitis L foot  -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

## 2025-07-18 LAB
ATRIAL RATE: 72 BPM
P AXIS: 43 DEGREES
P OFFSET: 135 MS
P ONSET: 73 MS
PR INTERVAL: 294 MS
Q ONSET: 220 MS
QRS COUNT: 12 BEATS
QRS DURATION: 142 MS
QT INTERVAL: 418 MS
QTC CALCULATION(BAZETT): 457 MS
QTC FREDERICIA: 444 MS
R AXIS: 90 DEGREES
T AXIS: 57 DEGREES
T OFFSET: 429 MS
VENTRICULAR RATE: 72 BPM

## 2025-07-20 ENCOUNTER — NURSING HOME VISIT (OUTPATIENT)
Dept: POST ACUTE CARE | Facility: EXTERNAL LOCATION | Age: 75
End: 2025-07-20
Payer: MEDICARE

## 2025-07-20 DIAGNOSIS — J90 PLEURAL EFFUSION: ICD-10-CM

## 2025-07-20 DIAGNOSIS — E11.9 TYPE 2 DIABETES MELLITUS WITHOUT COMPLICATION, UNSPECIFIED WHETHER LONG TERM INSULIN USE: ICD-10-CM

## 2025-07-20 DIAGNOSIS — R53.1 WEAKNESS: ICD-10-CM

## 2025-07-20 DIAGNOSIS — I50.9 HEART FAILURE, UNSPECIFIED HF CHRONICITY, UNSPECIFIED HEART FAILURE TYPE: Primary | ICD-10-CM

## 2025-07-20 DIAGNOSIS — Z91.81 AT RISK FOR FALLING: ICD-10-CM

## 2025-07-20 DIAGNOSIS — M86.9 OSTEOMYELITIS OF LEFT FOOT, UNSPECIFIED TYPE (MULTI): ICD-10-CM

## 2025-07-20 DIAGNOSIS — I82.409 DEEP VEIN THROMBOSIS (DVT) OF LOWER EXTREMITY, UNSPECIFIED CHRONICITY, UNSPECIFIED LATERALITY, UNSPECIFIED VEIN: ICD-10-CM

## 2025-07-20 DIAGNOSIS — M19.90 OSTEOARTHRITIS, UNSPECIFIED OSTEOARTHRITIS TYPE, UNSPECIFIED SITE: ICD-10-CM

## 2025-07-20 DIAGNOSIS — I10 HYPERTENSION, UNSPECIFIED TYPE: ICD-10-CM

## 2025-07-20 DIAGNOSIS — G62.9 NEUROPATHY: ICD-10-CM

## 2025-07-20 DIAGNOSIS — D32.9 MENINGIOMA (MULTI): ICD-10-CM

## 2025-07-20 DIAGNOSIS — I25.10 ASHD (ARTERIOSCLEROTIC HEART DISEASE): ICD-10-CM

## 2025-07-20 DIAGNOSIS — R09.02 HYPOXIA: ICD-10-CM

## 2025-07-20 PROCEDURE — 99309 SBSQ NF CARE MODERATE MDM 30: CPT | Performed by: INTERNAL MEDICINE

## 2025-07-20 NOTE — LETTER
Patient: Elliot Partida  : 1950    Encounter Date: 2025    PLACE OF SERVICE:  [facility]    This is a subsequent visit.    Subjective  Patient ID: Elliot Partida is a 74 y.o. male who presents for Follow-up.    Mr. Elliot Partida is a 74-year-old male with history of diabetes with osteomyelitis to his left foot.  He continues to undergo wound care and is followed by Podiatry.  He suffers from heart failure with hypoxia and is currently on oxygen.    Review of Systems   Constitutional:  Negative for chills and fever.   Cardiovascular:  Negative for chest pain.   All other systems reviewed and are negative.    Objective  /82   Pulse 80   Temp 36.8 °C (98.2 °F)   Resp 18     Physical Exam  Vitals reviewed.   Constitutional:       General: He is not in acute distress.     Comments: This is a well-developed, well-nourished male, lying in bed, appearing weak and lethargic   HENT:      Right Ear: Tympanic membrane, ear canal and external ear normal.      Left Ear: Tympanic membrane, ear canal and external ear normal.     Eyes:      General: No scleral icterus.     Pupils: Pupils are equal, round, and reactive to light.     Neck:      Vascular: No carotid bruit.     Cardiovascular:      Heart sounds: Normal heart sounds, S1 normal and S2 normal. No murmur heard.     No friction rub.   Pulmonary:      Effort: Pulmonary effort is normal.      Breath sounds: Decreased breath sounds (throughout.) present.   Abdominal:      Palpations: There is no hepatomegaly, splenomegaly or mass.     Musculoskeletal:         General: No swelling or deformity. Normal range of motion.      Cervical back: Neck supple.      Right lower leg: Edema present.      Left lower leg: Edema present.      Comments: There is dressing applied to the patient's left foot.   Lymphadenopathy:      Cervical: No cervical adenopathy.      Upper Body:      Right upper body: No axillary adenopathy.      Left upper body: No axillary  adenopathy.      Lower Body: No right inguinal adenopathy. No left inguinal adenopathy.     Neurological:      Mental Status: He is oriented to person, place, and time.      Cranial Nerves: Cranial nerves 2-12 are intact. No cranial nerve deficit.      Sensory: No sensory deficit.      Motor: Motor function is intact. No weakness.      Gait: Gait is intact.      Deep Tendon Reflexes: Reflexes normal.     Psychiatric:         Mood and Affect: Mood normal. Mood is not anxious or depressed. Affect is not angry.         Behavior: Behavior is not agitated.         Thought Content: Thought content normal.         Judgment: Judgment normal.     LAB WORK:  Laboratory studies were reviewed.    Assessment/Plan  Problem List Items Addressed This Visit           ICD-10-CM    Osteomyelitis of left foot, unspecified type (Multi) M86.9    Weakness R53.1    Osteoarthritis M19.90    Meningioma (Multi) D32.9    Pleural effusion J90    Diabetes (Multi) E11.9     Other Visit Diagnoses         Codes      Heart failure, unspecified HF chronicity, unspecified heart failure type    -  Primary I50.9      Hypertension, unspecified type     I10      Hypoxia     R09.02      Deep vein thrombosis (DVT) of lower extremity, unspecified chronicity, unspecified laterality, unspecified vein     I82.409      ASHD (arteriosclerotic heart disease)     I25.10      Neuropathy     G62.9      At risk for falling     Z91.81        1. Diabetes, on insulin.  2. Heart failure, on diuretic.  3. Osteomyelitis to the left foot.  Continue wound care.  Follow with Podiatry.  4. Hypoxia, on oxygen.  5. Hypertension, medically controlled.  6. DVT, on Lovenox.  7. Pleural effusion.  Follow with Pulmonology.  8. ASHD, on aspirin.  9. Meningioma.  Follow with Neurosurgery.  10. Osteoarthritis, on Tylenol.  11. Neuropathy, on gabapentin.  12. Weakness, on PT/OT.  13. Fall risk, on fall precautions.    Scribe Attestation  By signing my name below, Hanna OLIVER Scribe  attest that this documentation has been prepared under the direction and in the presence of Marium Singh MD.     All medical record entries made by the scribe were personally dictated by me I have reviewed the chart and agree the record accurately reflects my personal performance of his history physical examination and management      Electronically Signed By: Marium Singh MD   7/25/25 12:05 AM

## 2025-07-22 ENCOUNTER — SOCIAL WORK (OUTPATIENT)
Dept: HEMATOLOGY/ONCOLOGY | Facility: HOSPITAL | Age: 75
End: 2025-07-22
Payer: MEDICARE

## 2025-07-24 VITALS
DIASTOLIC BLOOD PRESSURE: 82 MMHG | TEMPERATURE: 98.2 F | RESPIRATION RATE: 18 BRPM | HEART RATE: 80 BPM | SYSTOLIC BLOOD PRESSURE: 126 MMHG

## 2025-07-24 ASSESSMENT — ENCOUNTER SYMPTOMS
CHILLS: 0
FEVER: 0

## 2025-07-27 ENCOUNTER — NURSING HOME VISIT (OUTPATIENT)
Dept: POST ACUTE CARE | Facility: EXTERNAL LOCATION | Age: 75
End: 2025-07-27
Payer: MEDICARE

## 2025-07-27 DIAGNOSIS — J90 PLEURAL EFFUSION: ICD-10-CM

## 2025-07-27 DIAGNOSIS — Z91.81 AT RISK FOR FALLING: ICD-10-CM

## 2025-07-27 DIAGNOSIS — I82.409 DEEP VEIN THROMBOSIS (DVT) OF LOWER EXTREMITY, UNSPECIFIED CHRONICITY, UNSPECIFIED LATERALITY, UNSPECIFIED VEIN: ICD-10-CM

## 2025-07-27 DIAGNOSIS — R53.1 WEAKNESS: ICD-10-CM

## 2025-07-27 DIAGNOSIS — G62.9 NEUROPATHY: ICD-10-CM

## 2025-07-27 DIAGNOSIS — M86.9 OSTEOMYELITIS OF LEFT FOOT, UNSPECIFIED TYPE (MULTI): ICD-10-CM

## 2025-07-27 DIAGNOSIS — M19.90 OSTEOARTHRITIS, UNSPECIFIED OSTEOARTHRITIS TYPE, UNSPECIFIED SITE: ICD-10-CM

## 2025-07-27 DIAGNOSIS — E11.9 TYPE 2 DIABETES MELLITUS WITHOUT COMPLICATION, UNSPECIFIED WHETHER LONG TERM INSULIN USE: ICD-10-CM

## 2025-07-27 DIAGNOSIS — I25.10 ASHD (ARTERIOSCLEROTIC HEART DISEASE): ICD-10-CM

## 2025-07-27 DIAGNOSIS — D32.9 MENINGIOMA (MULTI): ICD-10-CM

## 2025-07-27 DIAGNOSIS — R09.02 HYPOXIA: ICD-10-CM

## 2025-07-27 DIAGNOSIS — I50.9 HEART FAILURE, UNSPECIFIED HF CHRONICITY, UNSPECIFIED HEART FAILURE TYPE: Primary | ICD-10-CM

## 2025-07-27 DIAGNOSIS — I10 HYPERTENSION, UNSPECIFIED TYPE: ICD-10-CM

## 2025-07-27 PROCEDURE — 99309 SBSQ NF CARE MODERATE MDM 30: CPT | Performed by: INTERNAL MEDICINE

## 2025-07-27 NOTE — LETTER
Patient: Elliot Partida  : 1950    Encounter Date: 2025    PLACE OF SERVICE:  [facility]    This is a subsequent visit.    Subjective  Patient ID: Elliot Partida is a 74 y.o. male who presents for Follow-up.    Mr. Elliot Partida is a 74-year-old male with history of heart failure.  He is being treated for osteomyelitis to his left foot.  He is hypoxic and currently on oxygen.  He requires supportive care.    Review of Systems   Constitutional:  Negative for chills and fever.   Cardiovascular:  Negative for chest pain.   All other systems reviewed and are negative.    Objective  /84   Pulse 82   Temp 36.6 °C (97.9 °F)   Resp 18     Physical Exam  Vitals reviewed.   Constitutional:       Comments: This is a well-developed, well-nourished male, lying in bed, appearing weak.   HENT:      Right Ear: Tympanic membrane, ear canal and external ear normal.      Left Ear: Tympanic membrane, ear canal and external ear normal.     Eyes:      General: No scleral icterus.     Pupils: Pupils are equal, round, and reactive to light.     Neck:      Vascular: No carotid bruit.     Cardiovascular:      Heart sounds: Normal heart sounds, S1 normal and S2 normal. No murmur heard.     No friction rub.   Pulmonary:      Breath sounds: Decreased breath sounds (throughout) present.   Abdominal:      Palpations: There is no hepatomegaly, splenomegaly or mass.     Musculoskeletal:         General: No swelling or deformity. Normal range of motion.      Cervical back: Neck supple.      Right lower leg: Edema present.      Left lower leg: Edema present.      Comments: The patient's left foot is currently dressed.  There is no foul odor.  There is no color drainage.    Lymphadenopathy:      Cervical: No cervical adenopathy.      Upper Body:      Right upper body: No axillary adenopathy.      Left upper body: No axillary adenopathy.      Lower Body: No right inguinal adenopathy. No left inguinal adenopathy.      Neurological:      Mental Status: He is oriented to person, place, and time. He is lethargic.      Cranial Nerves: Cranial nerves 2-12 are intact. No cranial nerve deficit.      Sensory: No sensory deficit.      Motor: Motor function is intact. No weakness.      Gait: Gait is intact.      Deep Tendon Reflexes: Reflexes normal.     Psychiatric:         Mood and Affect: Mood normal. Mood is not anxious or depressed. Affect is not angry.         Behavior: Behavior is not agitated.         Thought Content: Thought content normal.         Judgment: Judgment normal.     LAB WORK: Laboratory studies were reviewed.    Assessment/Plan  Problem List Items Addressed This Visit           ICD-10-CM       Endocrine/Metabolic    Diabetes (Multi) E11.9       Hematology and Neoplasia    Meningioma (Multi) D32.9       Infectious Diseases    Osteomyelitis of left foot, unspecified type (Multi) M86.9       Musculoskeletal and Injuries    Osteoarthritis M19.90       Pulmonary and Pneumonias    Pleural effusion J90       Symptoms and Signs    Weakness R53.1     Other Visit Diagnoses         Codes      Heart failure, unspecified HF chronicity, unspecified heart failure type    -  Primary I50.9      Hypoxia     R09.02      Deep vein thrombosis (DVT) of lower extremity, unspecified chronicity, unspecified laterality, unspecified vein     I82.409      Hypertension, unspecified type     I10      ASHD (arteriosclerotic heart disease)     I25.10      Neuropathy     G62.9      At risk for falling     Z91.81        1. Heart failure, on diuretic.  2. Hypoxia, on oxygen.  3. Diabetes, on insulin.  4. Osteomyelitis, left foot.  Continue wound care.  Follow with Podiatry.  5. DVT, on Lovenox.  6. Pleural effusion.  Follow with Pulmonology.  7. Hypertension, medically controlled.  8. Meningioma.  Follow with Neurosurgery.  9. ASHD, on aspirin.  10. Neuropathy, on gabapentin.  11. Osteoarthritis, on Tylenol.  12. Weakness, on PT/OT.  13. Fall risk,  on fall precautions.    Scribe Attestation  By signing my name below, I, Etienne West attest that this documentation has been prepared under the direction and in the presence of Marium Singh MD.     All medical record entries made by the scribe were personally dictated by me I have reviewed the chart and agree the record accurately reflects my personal performance of his history physical examination and management      Electronically Signed By: Marium Singh MD   8/2/25  2:01 AM

## 2025-07-29 VITALS
SYSTOLIC BLOOD PRESSURE: 130 MMHG | DIASTOLIC BLOOD PRESSURE: 84 MMHG | RESPIRATION RATE: 18 BRPM | HEART RATE: 82 BPM | TEMPERATURE: 97.9 F

## 2025-07-29 ASSESSMENT — ENCOUNTER SYMPTOMS
FEVER: 0
CHILLS: 0

## 2025-07-29 NOTE — PROGRESS NOTES
PLACE OF SERVICE:  [facility]    This is a subsequent visit.    Subjective   Patient ID: Elliot Partida is a 74 y.o. male who presents for Follow-up.    Mr. Elliot Partida is a 74-year-old male with history of heart failure.  He is being treated for osteomyelitis to his left foot.  He is hypoxic and currently on oxygen.  He requires supportive care.    Review of Systems   Constitutional:  Negative for chills and fever.   Cardiovascular:  Negative for chest pain.   All other systems reviewed and are negative.    Objective   /84   Pulse 82   Temp 36.6 °C (97.9 °F)   Resp 18     Physical Exam  Vitals reviewed.   Constitutional:       Comments: This is a well-developed, well-nourished male, lying in bed, appearing weak.   HENT:      Right Ear: Tympanic membrane, ear canal and external ear normal.      Left Ear: Tympanic membrane, ear canal and external ear normal.     Eyes:      General: No scleral icterus.     Pupils: Pupils are equal, round, and reactive to light.     Neck:      Vascular: No carotid bruit.     Cardiovascular:      Heart sounds: Normal heart sounds, S1 normal and S2 normal. No murmur heard.     No friction rub.   Pulmonary:      Breath sounds: Decreased breath sounds (throughout) present.   Abdominal:      Palpations: There is no hepatomegaly, splenomegaly or mass.     Musculoskeletal:         General: No swelling or deformity. Normal range of motion.      Cervical back: Neck supple.      Right lower leg: Edema present.      Left lower leg: Edema present.      Comments: The patient's left foot is currently dressed.  There is no foul odor.  There is no color drainage.    Lymphadenopathy:      Cervical: No cervical adenopathy.      Upper Body:      Right upper body: No axillary adenopathy.      Left upper body: No axillary adenopathy.      Lower Body: No right inguinal adenopathy. No left inguinal adenopathy.     Neurological:      Mental Status: He is oriented to person, place, and  time. He is lethargic.      Cranial Nerves: Cranial nerves 2-12 are intact. No cranial nerve deficit.      Sensory: No sensory deficit.      Motor: Motor function is intact. No weakness.      Gait: Gait is intact.      Deep Tendon Reflexes: Reflexes normal.     Psychiatric:         Mood and Affect: Mood normal. Mood is not anxious or depressed. Affect is not angry.         Behavior: Behavior is not agitated.         Thought Content: Thought content normal.         Judgment: Judgment normal.     LAB WORK: Laboratory studies were reviewed.    Assessment/Plan   Problem List Items Addressed This Visit           ICD-10-CM       Endocrine/Metabolic    Diabetes (Multi) E11.9       Hematology and Neoplasia    Meningioma (Multi) D32.9       Infectious Diseases    Osteomyelitis of left foot, unspecified type (Multi) M86.9       Musculoskeletal and Injuries    Osteoarthritis M19.90       Pulmonary and Pneumonias    Pleural effusion J90       Symptoms and Signs    Weakness R53.1     Other Visit Diagnoses         Codes      Heart failure, unspecified HF chronicity, unspecified heart failure type    -  Primary I50.9      Hypoxia     R09.02      Deep vein thrombosis (DVT) of lower extremity, unspecified chronicity, unspecified laterality, unspecified vein     I82.409      Hypertension, unspecified type     I10      ASHD (arteriosclerotic heart disease)     I25.10      Neuropathy     G62.9      At risk for falling     Z91.81        1. Heart failure, on diuretic.  2. Hypoxia, on oxygen.  3. Diabetes, on insulin.  4. Osteomyelitis, left foot.  Continue wound care.  Follow with Podiatry.  5. DVT, on Lovenox.  6. Pleural effusion.  Follow with Pulmonology.  7. Hypertension, medically controlled.  8. Meningioma.  Follow with Neurosurgery.  9. ASHD, on aspirin.  10. Neuropathy, on gabapentin.  11. Osteoarthritis, on Tylenol.  12. Weakness, on PT/OT.  13. Fall risk, on fall precautions.    Scribe Attestation  By signing my name below, I,  Etienne West attest that this documentation has been prepared under the direction and in the presence of Marium Singh MD.     All medical record entries made by the scribe were personally dictated by me I have reviewed the chart and agree the record accurately reflects my personal performance of his history physical examination and management

## 2025-07-31 ENCOUNTER — HOSPITAL ENCOUNTER (INPATIENT)
Facility: HOSPITAL | Age: 75
DRG: 264 | End: 2025-07-31
Attending: FAMILY MEDICINE | Admitting: HOSPITALIST
Payer: MEDICARE

## 2025-07-31 ENCOUNTER — APPOINTMENT (OUTPATIENT)
Dept: RADIOLOGY | Facility: HOSPITAL | Age: 75
DRG: 264 | End: 2025-07-31
Payer: MEDICARE

## 2025-07-31 DIAGNOSIS — I82.402 DEEP VEIN THROMBOSIS (DVT) OF LEFT LOWER EXTREMITY, UNSPECIFIED CHRONICITY, UNSPECIFIED VEIN: ICD-10-CM

## 2025-07-31 DIAGNOSIS — T14.8XXA NONHEALING NONSURGICAL WOUND LIMITED TO BREAKDOWN OF SKIN: Primary | ICD-10-CM

## 2025-07-31 LAB
ALBUMIN SERPL BCP-MCNC: 3.8 G/DL (ref 3.4–5)
ALP SERPL-CCNC: 113 U/L (ref 33–136)
ALT SERPL W P-5'-P-CCNC: 15 U/L (ref 10–52)
ANION GAP SERPL CALC-SCNC: 13 MMOL/L (ref 10–20)
APTT PPP: 21 SECONDS (ref 26–36)
AST SERPL W P-5'-P-CCNC: 23 U/L (ref 9–39)
BASOPHILS # BLD AUTO: 0.04 X10*3/UL (ref 0–0.1)
BASOPHILS NFR BLD AUTO: 0.4 %
BILIRUB SERPL-MCNC: 0.3 MG/DL (ref 0–1.2)
BUN SERPL-MCNC: 25 MG/DL (ref 6–23)
CALCIUM SERPL-MCNC: 9.6 MG/DL (ref 8.6–10.3)
CHLORIDE SERPL-SCNC: 102 MMOL/L (ref 98–107)
CO2 SERPL-SCNC: 30 MMOL/L (ref 21–32)
CREAT SERPL-MCNC: 0.84 MG/DL (ref 0.5–1.3)
D DIMER PPP FEU-MCNC: 813 NG/ML FEU
EGFRCR SERPLBLD CKD-EPI 2021: >90 ML/MIN/1.73M*2
EOSINOPHIL # BLD AUTO: 0.23 X10*3/UL (ref 0–0.4)
EOSINOPHIL NFR BLD AUTO: 2.4 %
ERYTHROCYTE [DISTWIDTH] IN BLOOD BY AUTOMATED COUNT: 14 % (ref 11.5–14.5)
GLUCOSE SERPL-MCNC: 106 MG/DL (ref 74–99)
HCT VFR BLD AUTO: 33.8 % (ref 41–52)
HGB BLD-MCNC: 10.7 G/DL (ref 13.5–17.5)
IMM GRANULOCYTES # BLD AUTO: 0.03 X10*3/UL (ref 0–0.5)
IMM GRANULOCYTES NFR BLD AUTO: 0.3 % (ref 0–0.9)
INR PPP: 1 (ref 0.9–1.1)
LACTATE SERPL-SCNC: 0.9 MMOL/L (ref 0.4–2)
LYMPHOCYTES # BLD AUTO: 1.95 X10*3/UL (ref 0.8–3)
LYMPHOCYTES NFR BLD AUTO: 20.4 %
MCH RBC QN AUTO: 28.9 PG (ref 26–34)
MCHC RBC AUTO-ENTMCNC: 31.7 G/DL (ref 32–36)
MCV RBC AUTO: 91 FL (ref 80–100)
MONOCYTES # BLD AUTO: 0.89 X10*3/UL (ref 0.05–0.8)
MONOCYTES NFR BLD AUTO: 9.3 %
NEUTROPHILS # BLD AUTO: 6.42 X10*3/UL (ref 1.6–5.5)
NEUTROPHILS NFR BLD AUTO: 67.2 %
NRBC BLD-RTO: 0 /100 WBCS (ref 0–0)
PLATELET # BLD AUTO: 288 X10*3/UL (ref 150–450)
POTASSIUM SERPL-SCNC: 4.7 MMOL/L (ref 3.5–5.3)
PROT SERPL-MCNC: 7 G/DL (ref 6.4–8.2)
PROTHROMBIN TIME: 11 SECONDS (ref 9.8–12.4)
RBC # BLD AUTO: 3.7 X10*6/UL (ref 4.5–5.9)
SODIUM SERPL-SCNC: 140 MMOL/L (ref 136–145)
WBC # BLD AUTO: 9.6 X10*3/UL (ref 4.4–11.3)

## 2025-07-31 PROCEDURE — 85379 FIBRIN DEGRADATION QUANT: CPT | Performed by: FAMILY MEDICINE

## 2025-07-31 PROCEDURE — 1100000001 HC PRIVATE ROOM DAILY: Mod: IPSPLIT

## 2025-07-31 PROCEDURE — 80053 COMPREHEN METABOLIC PANEL: CPT | Performed by: FAMILY MEDICINE

## 2025-07-31 PROCEDURE — 86140 C-REACTIVE PROTEIN: CPT | Performed by: NURSE PRACTITIONER

## 2025-07-31 PROCEDURE — 85025 COMPLETE CBC W/AUTO DIFF WBC: CPT | Performed by: FAMILY MEDICINE

## 2025-07-31 PROCEDURE — 93970 EXTREMITY STUDY: CPT

## 2025-07-31 PROCEDURE — 73630 X-RAY EXAM OF FOOT: CPT | Mod: LT

## 2025-07-31 PROCEDURE — 73630 X-RAY EXAM OF FOOT: CPT | Mod: LEFT SIDE | Performed by: RADIOLOGY

## 2025-07-31 PROCEDURE — 36415 COLL VENOUS BLD VENIPUNCTURE: CPT | Performed by: FAMILY MEDICINE

## 2025-07-31 PROCEDURE — 85652 RBC SED RATE AUTOMATED: CPT | Performed by: NURSE PRACTITIONER

## 2025-07-31 PROCEDURE — 2500000001 HC RX 250 WO HCPCS SELF ADMINISTERED DRUGS (ALT 637 FOR MEDICARE OP): Mod: IPSPLIT | Performed by: HOSPITALIST

## 2025-07-31 PROCEDURE — 99285 EMERGENCY DEPT VISIT HI MDM: CPT | Mod: 25 | Performed by: FAMILY MEDICINE

## 2025-07-31 PROCEDURE — 93970 EXTREMITY STUDY: CPT | Performed by: RADIOLOGY

## 2025-07-31 PROCEDURE — 85730 THROMBOPLASTIN TIME PARTIAL: CPT | Performed by: FAMILY MEDICINE

## 2025-07-31 PROCEDURE — 2500000004 HC RX 250 GENERAL PHARMACY W/ HCPCS (ALT 636 FOR OP/ED): Mod: IPSPLIT | Performed by: HOSPITALIST

## 2025-07-31 PROCEDURE — 85610 PROTHROMBIN TIME: CPT | Performed by: FAMILY MEDICINE

## 2025-07-31 PROCEDURE — 83605 ASSAY OF LACTIC ACID: CPT | Performed by: FAMILY MEDICINE

## 2025-07-31 RX ORDER — FERROUS SULFATE 325(65) MG
65 TABLET ORAL DAILY
Status: DISCONTINUED | OUTPATIENT
Start: 2025-08-01 | End: 2025-08-04 | Stop reason: HOSPADM

## 2025-07-31 RX ORDER — VANCOMYCIN HYDROCHLORIDE 1 G/20ML
INJECTION, POWDER, LYOPHILIZED, FOR SOLUTION INTRAVENOUS DAILY PRN
Status: DISCONTINUED | OUTPATIENT
Start: 2025-07-31 | End: 2025-08-04

## 2025-07-31 RX ORDER — ENOXAPARIN SODIUM 100 MG/ML
1 INJECTION SUBCUTANEOUS EVERY 12 HOURS SCHEDULED
Status: DISCONTINUED | OUTPATIENT
Start: 2025-07-31 | End: 2025-08-01

## 2025-07-31 RX ORDER — ZINC SULFATE 50(220)MG
50 CAPSULE ORAL DAILY
Status: DISCONTINUED | OUTPATIENT
Start: 2025-08-01 | End: 2025-08-01

## 2025-07-31 RX ORDER — POLYETHYLENE GLYCOL 3350 17 G/17G
17 POWDER, FOR SOLUTION ORAL 2 TIMES DAILY PRN
Status: DISCONTINUED | OUTPATIENT
Start: 2025-07-31 | End: 2025-08-04 | Stop reason: HOSPADM

## 2025-07-31 RX ORDER — MECLIZINE HCL 12.5 MG 12.5 MG/1
25 TABLET ORAL EVERY 6 HOURS PRN
Status: DISCONTINUED | OUTPATIENT
Start: 2025-07-31 | End: 2025-08-04 | Stop reason: HOSPADM

## 2025-07-31 RX ORDER — ACETAMINOPHEN 500 MG
10 TABLET ORAL NIGHTLY PRN
Status: DISCONTINUED | OUTPATIENT
Start: 2025-07-31 | End: 2025-08-04 | Stop reason: HOSPADM

## 2025-07-31 RX ORDER — GABAPENTIN 300 MG/1
300 CAPSULE ORAL 3 TIMES DAILY
Status: DISCONTINUED | OUTPATIENT
Start: 2025-07-31 | End: 2025-08-04 | Stop reason: HOSPADM

## 2025-07-31 RX ORDER — AMLODIPINE BESYLATE 2.5 MG/1
2.5 TABLET ORAL DAILY
Status: DISCONTINUED | OUTPATIENT
Start: 2025-08-01 | End: 2025-08-04 | Stop reason: HOSPADM

## 2025-07-31 RX ORDER — LOPERAMIDE HYDROCHLORIDE 2 MG/1
2 CAPSULE ORAL 4 TIMES DAILY PRN
Status: DISCONTINUED | OUTPATIENT
Start: 2025-07-31 | End: 2025-08-04 | Stop reason: HOSPADM

## 2025-07-31 RX ORDER — VANCOMYCIN HYDROCHLORIDE 1 G/200ML
1000 INJECTION, SOLUTION INTRAVENOUS ONCE
Status: COMPLETED | OUTPATIENT
Start: 2025-07-31 | End: 2025-08-01

## 2025-07-31 RX ORDER — HYDRALAZINE HYDROCHLORIDE 50 MG/1
50 TABLET, FILM COATED ORAL 3 TIMES DAILY
Status: DISCONTINUED | OUTPATIENT
Start: 2025-07-31 | End: 2025-08-04 | Stop reason: HOSPADM

## 2025-07-31 RX ORDER — ALUMINUM HYDROXIDE, MAGNESIUM HYDROXIDE, AND SIMETHICONE 1200; 120; 1200 MG/30ML; MG/30ML; MG/30ML
20 SUSPENSION ORAL EVERY 4 HOURS PRN
Status: DISCONTINUED | OUTPATIENT
Start: 2025-07-31 | End: 2025-08-04 | Stop reason: HOSPADM

## 2025-07-31 RX ORDER — ASCORBIC ACID 500 MG
500 TABLET ORAL DAILY
Status: DISCONTINUED | OUTPATIENT
Start: 2025-08-01 | End: 2025-08-01

## 2025-07-31 RX ORDER — ACETAMINOPHEN 325 MG/1
650 TABLET ORAL EVERY 6 HOURS PRN
Status: DISCONTINUED | OUTPATIENT
Start: 2025-07-31 | End: 2025-08-04 | Stop reason: HOSPADM

## 2025-07-31 RX ORDER — ONDANSETRON HYDROCHLORIDE 2 MG/ML
4 INJECTION, SOLUTION INTRAVENOUS EVERY 4 HOURS PRN
Status: DISCONTINUED | OUTPATIENT
Start: 2025-07-31 | End: 2025-08-04 | Stop reason: HOSPADM

## 2025-07-31 RX ADMIN — GABAPENTIN 300 MG: 300 CAPSULE ORAL at 23:38

## 2025-07-31 RX ADMIN — ENOXAPARIN SODIUM 80 MG: 80 INJECTION SUBCUTANEOUS at 23:33

## 2025-07-31 RX ADMIN — HYDRALAZINE HYDROCHLORIDE 50 MG: 50 TABLET ORAL at 23:37

## 2025-07-31 RX ADMIN — PIPERACILLIN SODIUM AND TAZOBACTAM SODIUM 4.5 G: 4; .5 INJECTION, SOLUTION INTRAVENOUS at 23:32

## 2025-07-31 ASSESSMENT — PAIN DESCRIPTION - ORIENTATION: ORIENTATION: LEFT

## 2025-07-31 ASSESSMENT — PAIN DESCRIPTION - LOCATION: LOCATION: FOOT

## 2025-07-31 ASSESSMENT — PAIN - FUNCTIONAL ASSESSMENT: PAIN_FUNCTIONAL_ASSESSMENT: 0-10

## 2025-07-31 ASSESSMENT — PAIN DESCRIPTION - PAIN TYPE: TYPE: ACUTE PAIN

## 2025-07-31 ASSESSMENT — PAIN DESCRIPTION - DESCRIPTORS: DESCRIPTORS: DISCOMFORT

## 2025-07-31 ASSESSMENT — PAIN DESCRIPTION - FREQUENCY: FREQUENCY: CONSTANT/CONTINUOUS

## 2025-07-31 ASSESSMENT — PAIN SCALES - GENERAL: PAINLEVEL_OUTOF10: 6

## 2025-07-31 NOTE — ED PROVIDER NOTES
Emergency Department Provider Note       History of Present Illness     History provided by: Patient  Limitations to History: None  External Records Reviewed with Brief Summary: None    HPI:  Elliot Partida is a 74 y.o. male patient with history of multiple comorbidities including but not limited to history of chronic bilateral ankle and foot wound right Heel wound with dressing and postop, boots osteomyelitis of the left foot, DVT in the past bilateral lower extremity edema chronically coronary artery disease, hypertension peripheral neuropathy stroke with left-sided weakness history of low back pain left lower lumbar radiculopathy diabetes mellitus and multiple other comorbidities as described in patient past medical history and problem list has been seen in the ER multiple times for the heel and leg wound including left leg wound was seen by his home visiting nurse and sent him to ER today for evaluation because she came complaining of nonhealing wound.  Patient has been under care of wound care specialist but has not seen wound care specialist has not been seen in the clinic for months.  Denies any chest pain short of breath fever chills nausea vomiting or diarrhea he shots up-to-date      Wounds triage vitals:  T 36.8 °C (98.2 °F)  HR 70  /73  RR 18  O2 100 % None (Room air)    General: Awake, alert, in no acute distress with erythema of the left leg mid calf to all the way to the foot with wound dressing bilateral feet right foot has been orthopedic boots.  Cap refill less than 2 seconds.  Deep ulcerative wound left leg edema left leg in the calf area no palpable venous cords warm to touch.  Diffuse erythema noted.  No seepage or drainage but dressing noted around the ankle and proximal foot.  Eyes: Gaze conjugate.  No scleral icterus or injection able to move his neck significantly with history of left-sided weakness.    Right side he has some weakness of the left leg left arm.  HENT:  Normo-cephalic, atraumatic. No stridor  CV: Regular rate, regular rhythm. Radial pulses 2+ bilaterally  Resp: Breathing non-labored, speaking in full sentences.  Clear to auscultation bilaterally  GI: Soft, non-distended, non-tender. No rebound or guarding.  : Denies  complaint exam deferred.  However he will look chronically sick chronic peripheral edema  MSK/Extremities: No gross bony deformities. Moving all extremities  Skin: Warm. Appropriate color  Neuro: Alert. Oriented. Face symmetric. Speech is fluent.  Gross strength and sensation intact in b/l UE and LEs bilateral heel and leg wound dressing on the ankle and foot left foot in the orthopedic boot.  Left leg deep ulcerated wound well dressing and erythema of the left leg and edema.  Psych: Appropriate mood and affect      Medical Decision Making & ED Course   Medical Decision Makin y.o. male with chronic bilateral ankle and foot but dressing right foot heel wound, patient has orthopedic boots in place on right side left leg with edematous erythematous ulcerated on the distal tip.  Ankle and proximal foot foot with edematous cap refill less than 2 seconds.  He appears to be chronically sick Looking male patient without acute respite distress did not look acutely toxic or in discomfort distress breathing comfortably neck was supple lungs are clear heart regular rate and rhythm vascular abdomen soft nontender.    Patient already has labs ordered for triage including left foot and ankle x-ray.  I will look at the lab reports and also order culture but he had multiple cultures done in the past which showed volume of microbial infection as well as MRSA infection one of the culture also showed----      Differential diagnoses considered include but are not limited to:   Neck cellulitis, osteomyelitis, sepsis, skin necrosis, DVT, vascular occlusion, arterial insufficiency, peripheral neuropathy, fracture, dislocation  Social Determinants of Health which  Significantly Impact Care: Social Determinants of Health which Significantly Impact Care: Difficulty obtaining outpatient follow-up The following actions were taken to address these social determinants : Patient will be admitted    EKG Independent Interpretation: EKG not obtained, no chest pain no EKG was obtained at time of initial evaluation    Independent Result Review and Interpretation: X-rays ultrasound and labs reports pending pending admission labs reviewed as documented    Chronic conditions affecting the patient's care: Chronic peripheral neuropathy chronic consult, diabetes mellitus multiple comorbidities history of DVT complicated history, complicating patient's workup and, management plan response to treatment lack of follow-up further complicates patient's treatment   The patient was discussed with the following consultants/services: Patient seen in the emergency    Care Considerations: As described in MDM.  Patient being signed out to incoming physician Dr. Rodrigues workup and treatment in progress    ED Course:       Disposition   Patient was signed out to Bobby Rodrigues at 7 PM pending completion of their work-up.  Please see the next provider's transition of care note for the remainder of the patient's care.     Procedures   Procedures        Nikia Blanton MD  Emergency Medicine                                                       Nikia Blanton MD  07/31/25 2007

## 2025-07-31 NOTE — PROGRESS NOTES
Emergency Medicine Transition of Care Note.    I received Elliot Paritda in signout from   Dr. Gallo please see the previous ED provider note for all HPI, PE and MDM up to the time of signout at 1900 hrs. This is in addition to the primary record.    In brief Elliot Partida is an 74 y.o. male presenting for   Chief Complaint   Patient presents with    Wound Check     At the time of signout we were awaiting: CT report and ultrasound.    Diagnoses as of 07/31/25 2016   Nonhealing nonsurgical wound limited to breakdown of skin   Deep vein thrombosis (DVT) of left lower extremity, unspecified chronicity, unspecified vein       Medical Decision Making  Patient was signed out to me by Dr. Gallo at shift change.  Patient had bilateral edema with an ulcerated wound on his foot which has grown MRSA and Pseudomonas.  He does have a history of DVT.  He is getting bilateral ultrasounds.  Concern is that of a DVT.  Plain films show an osteomyelitis in the left great toe.  The ultrasound shows a DVT in the popliteal and femoral veins.        Final diagnoses:   [T14.8XXA] Nonhealing nonsurgical wound limited to breakdown of skin   [I82.402] Deep vein thrombosis (DVT) of left lower extremity, unspecified chronicity, unspecified vein           Procedure  Procedures    Bobby Rodrigues MD

## 2025-07-31 NOTE — ED TRIAGE NOTES
Pt ambulatory to ED c/o wound on L foot and leg, hx of T2DM, pt states he has had this wound for months and been admitted with cellulitis before, denies any fevers or chills

## 2025-08-01 LAB
ANION GAP SERPL CALC-SCNC: 13 MMOL/L (ref 10–20)
BUN SERPL-MCNC: 23 MG/DL (ref 6–23)
CALCIUM SERPL-MCNC: 9.2 MG/DL (ref 8.6–10.3)
CHLORIDE SERPL-SCNC: 104 MMOL/L (ref 98–107)
CO2 SERPL-SCNC: 27 MMOL/L (ref 21–32)
CREAT SERPL-MCNC: 0.95 MG/DL (ref 0.5–1.3)
CRP SERPL-MCNC: 0.54 MG/DL
EGFRCR SERPLBLD CKD-EPI 2021: 85 ML/MIN/1.73M*2
ERYTHROCYTE [DISTWIDTH] IN BLOOD BY AUTOMATED COUNT: 14.2 % (ref 11.5–14.5)
ERYTHROCYTE [SEDIMENTATION RATE] IN BLOOD BY WESTERGREN METHOD: 33 MM/H (ref 0–20)
GLUCOSE BLD MANUAL STRIP-MCNC: 124 MG/DL (ref 74–99)
GLUCOSE BLD MANUAL STRIP-MCNC: 98 MG/DL (ref 74–99)
GLUCOSE SERPL-MCNC: 93 MG/DL (ref 74–99)
HCT VFR BLD AUTO: 33.2 % (ref 41–52)
HGB BLD-MCNC: 10.4 G/DL (ref 13.5–17.5)
MCH RBC QN AUTO: 28.6 PG (ref 26–34)
MCHC RBC AUTO-ENTMCNC: 31.3 G/DL (ref 32–36)
MCV RBC AUTO: 91 FL (ref 80–100)
NRBC BLD-RTO: 0 /100 WBCS (ref 0–0)
PLATELET # BLD AUTO: 297 X10*3/UL (ref 150–450)
POTASSIUM SERPL-SCNC: 4.1 MMOL/L (ref 3.5–5.3)
RBC # BLD AUTO: 3.64 X10*6/UL (ref 4.5–5.9)
SODIUM SERPL-SCNC: 140 MMOL/L (ref 136–145)
WBC # BLD AUTO: 7.9 X10*3/UL (ref 4.4–11.3)

## 2025-08-01 PROCEDURE — 2500000001 HC RX 250 WO HCPCS SELF ADMINISTERED DRUGS (ALT 637 FOR MEDICARE OP): Mod: IPSPLIT | Performed by: NURSE PRACTITIONER

## 2025-08-01 PROCEDURE — 87186 SC STD MICRODIL/AGAR DIL: CPT | Mod: GENLAB | Performed by: NURSE PRACTITIONER

## 2025-08-01 PROCEDURE — 97165 OT EVAL LOW COMPLEX 30 MIN: CPT | Mod: GO,IPSPLIT

## 2025-08-01 PROCEDURE — 85027 COMPLETE CBC AUTOMATED: CPT | Mod: IPSPLIT | Performed by: NURSE PRACTITIONER

## 2025-08-01 PROCEDURE — 36415 COLL VENOUS BLD VENIPUNCTURE: CPT | Mod: IPSPLIT | Performed by: NURSE PRACTITIONER

## 2025-08-01 PROCEDURE — 2500000004 HC RX 250 GENERAL PHARMACY W/ HCPCS (ALT 636 FOR OP/ED): Mod: IPSPLIT | Performed by: HOSPITALIST

## 2025-08-01 PROCEDURE — 82947 ASSAY GLUCOSE BLOOD QUANT: CPT | Mod: IPSPLIT

## 2025-08-01 PROCEDURE — 99223 1ST HOSP IP/OBS HIGH 75: CPT | Performed by: NURSE PRACTITIONER

## 2025-08-01 PROCEDURE — 94760 N-INVAS EAR/PLS OXIMETRY 1: CPT | Mod: IPSPLIT

## 2025-08-01 PROCEDURE — 87040 BLOOD CULTURE FOR BACTERIA: CPT | Mod: GENLAB | Performed by: NURSE PRACTITIONER

## 2025-08-01 PROCEDURE — 1100000001 HC PRIVATE ROOM DAILY: Mod: IPSPLIT

## 2025-08-01 PROCEDURE — 80048 BASIC METABOLIC PNL TOTAL CA: CPT | Mod: IPSPLIT | Performed by: NURSE PRACTITIONER

## 2025-08-01 PROCEDURE — 2500000001 HC RX 250 WO HCPCS SELF ADMINISTERED DRUGS (ALT 637 FOR MEDICARE OP): Mod: IPSPLIT | Performed by: HOSPITALIST

## 2025-08-01 PROCEDURE — 2500000004 HC RX 250 GENERAL PHARMACY W/ HCPCS (ALT 636 FOR OP/ED): Mod: IPSPLIT | Performed by: INTERNAL MEDICINE

## 2025-08-01 PROCEDURE — 97162 PT EVAL MOD COMPLEX 30 MIN: CPT | Mod: GP,IPSPLIT | Performed by: PHYSICAL THERAPIST

## 2025-08-01 RX ORDER — ZINC SULFATE 50(220)MG
50 CAPSULE ORAL DAILY
Status: DISCONTINUED | OUTPATIENT
Start: 2025-08-01 | End: 2025-08-04 | Stop reason: HOSPADM

## 2025-08-01 RX ORDER — OXYCODONE HYDROCHLORIDE 5 MG/1
5 TABLET ORAL EVERY 4 HOURS PRN
Refills: 0 | Status: DISCONTINUED | OUTPATIENT
Start: 2025-08-01 | End: 2025-08-04 | Stop reason: HOSPADM

## 2025-08-01 RX ORDER — ZINC OXIDE 20 G/100G
1 OINTMENT TOPICAL
Status: DISCONTINUED | OUTPATIENT
Start: 2025-08-01 | End: 2025-08-04 | Stop reason: HOSPADM

## 2025-08-01 RX ORDER — OXYCODONE HYDROCHLORIDE 5 MG/1
10 TABLET ORAL EVERY 4 HOURS PRN
Refills: 0 | Status: DISCONTINUED | OUTPATIENT
Start: 2025-08-01 | End: 2025-08-04 | Stop reason: HOSPADM

## 2025-08-01 RX ORDER — INSULIN LISPRO 100 [IU]/ML
0-10 INJECTION, SOLUTION INTRAVENOUS; SUBCUTANEOUS
Status: DISCONTINUED | OUTPATIENT
Start: 2025-08-01 | End: 2025-08-04 | Stop reason: HOSPADM

## 2025-08-01 RX ORDER — VANCOMYCIN HYDROCHLORIDE 1 G/200ML
1000 INJECTION, SOLUTION INTRAVENOUS EVERY 12 HOURS
Status: DISCONTINUED | OUTPATIENT
Start: 2025-08-01 | End: 2025-08-02

## 2025-08-01 RX ORDER — SODIUM CHLORIDE 9 MG/ML
10 INJECTION, SOLUTION INTRAVENOUS CONTINUOUS PRN
Status: DISCONTINUED | OUTPATIENT
Start: 2025-08-01 | End: 2025-08-04 | Stop reason: HOSPADM

## 2025-08-01 RX ORDER — B-COMPLEX WITH VITAMIN C
1 TABLET ORAL DAILY
Status: DISCONTINUED | OUTPATIENT
Start: 2025-08-01 | End: 2025-08-04 | Stop reason: HOSPADM

## 2025-08-01 RX ADMIN — OXYCODONE 5 MG: 5 TABLET ORAL at 15:27

## 2025-08-01 RX ADMIN — HYDRALAZINE HYDROCHLORIDE 50 MG: 50 TABLET ORAL at 21:45

## 2025-08-01 RX ADMIN — OXYCODONE 10 MG: 5 TABLET ORAL at 01:08

## 2025-08-01 RX ADMIN — AMLODIPINE BESYLATE 2.5 MG: 2.5 TABLET ORAL at 09:40

## 2025-08-01 RX ADMIN — HYDRALAZINE HYDROCHLORIDE 50 MG: 50 TABLET ORAL at 09:41

## 2025-08-01 RX ADMIN — APIXABAN 5 MG: 5 TABLET, FILM COATED ORAL at 21:45

## 2025-08-01 RX ADMIN — PIPERACILLIN SODIUM AND TAZOBACTAM SODIUM 4.5 G: 4; .5 INJECTION, SOLUTION INTRAVENOUS at 17:30

## 2025-08-01 RX ADMIN — Medication 1 TABLET: at 16:35

## 2025-08-01 RX ADMIN — OXYCODONE 10 MG: 5 TABLET ORAL at 21:46

## 2025-08-01 RX ADMIN — PIPERACILLIN SODIUM AND TAZOBACTAM SODIUM 4.5 G: 4; .5 INJECTION, SOLUTION INTRAVENOUS at 09:01

## 2025-08-01 RX ADMIN — Medication 10 MG: at 21:49

## 2025-08-01 RX ADMIN — GABAPENTIN 300 MG: 300 CAPSULE ORAL at 09:41

## 2025-08-01 RX ADMIN — FERROUS SULFATE TAB 325 MG (65 MG ELEMENTAL FE) 1 TABLET: 325 (65 FE) TAB at 09:41

## 2025-08-01 RX ADMIN — ZINC SULFATE 220 MG (50 MG) CAPSULE 1 CAPSULE: CAPSULE at 16:36

## 2025-08-01 RX ADMIN — SODIUM CHLORIDE 10 ML/HR: 0.9 INJECTION, SOLUTION INTRAVENOUS at 17:38

## 2025-08-01 RX ADMIN — VANCOMYCIN HYDROCHLORIDE 1000 MG: 1 INJECTION, SOLUTION INTRAVENOUS at 12:35

## 2025-08-01 RX ADMIN — VANCOMYCIN HYDROCHLORIDE 1000 MG: 1 INJECTION, SOLUTION INTRAVENOUS at 00:41

## 2025-08-01 RX ADMIN — GABAPENTIN 300 MG: 300 CAPSULE ORAL at 21:46

## 2025-08-01 RX ADMIN — GABAPENTIN 300 MG: 300 CAPSULE ORAL at 15:28

## 2025-08-01 RX ADMIN — APIXABAN 5 MG: 5 TABLET, FILM COATED ORAL at 09:40

## 2025-08-01 SDOH — HEALTH STABILITY: MENTAL HEALTH: HOW OFTEN DO YOU HAVE SIX OR MORE DRINKS ON ONE OCCASION?: NEVER

## 2025-08-01 SDOH — SOCIAL STABILITY: SOCIAL INSECURITY: ARE YOU OR HAVE YOU BEEN THREATENED OR ABUSED PHYSICALLY, EMOTIONALLY, OR SEXUALLY BY ANYONE?: NO

## 2025-08-01 SDOH — ECONOMIC STABILITY: FOOD INSECURITY: WITHIN THE PAST 12 MONTHS, YOU WORRIED THAT YOUR FOOD WOULD RUN OUT BEFORE YOU GOT THE MONEY TO BUY MORE.: NEVER TRUE

## 2025-08-01 SDOH — SOCIAL STABILITY: SOCIAL INSECURITY: HAVE YOU HAD THOUGHTS OF HARMING ANYONE ELSE?: NO

## 2025-08-01 SDOH — SOCIAL STABILITY: SOCIAL INSECURITY: ARE THERE ANY APPARENT SIGNS OF INJURIES/BEHAVIORS THAT COULD BE RELATED TO ABUSE/NEGLECT?: NO

## 2025-08-01 SDOH — SOCIAL STABILITY: SOCIAL INSECURITY: HAS ANYONE EVER THREATENED TO HURT YOUR FAMILY OR YOUR PETS?: NO

## 2025-08-01 SDOH — SOCIAL STABILITY: SOCIAL INSECURITY: WITHIN THE LAST YEAR, HAVE YOU BEEN AFRAID OF YOUR PARTNER OR EX-PARTNER?: NO

## 2025-08-01 SDOH — HEALTH STABILITY: MENTAL HEALTH: HOW OFTEN DO YOU HAVE A DRINK CONTAINING ALCOHOL?: NEVER

## 2025-08-01 SDOH — HEALTH STABILITY: MENTAL HEALTH: HOW MANY DRINKS CONTAINING ALCOHOL DO YOU HAVE ON A TYPICAL DAY WHEN YOU ARE DRINKING?: PATIENT DOES NOT DRINK

## 2025-08-01 SDOH — ECONOMIC STABILITY: INCOME INSECURITY: IN THE PAST 12 MONTHS HAS THE ELECTRIC, GAS, OIL, OR WATER COMPANY THREATENED TO SHUT OFF SERVICES IN YOUR HOME?: NO

## 2025-08-01 SDOH — SOCIAL STABILITY: SOCIAL INSECURITY: WITHIN THE LAST YEAR, HAVE YOU BEEN HUMILIATED OR EMOTIONALLY ABUSED IN OTHER WAYS BY YOUR PARTNER OR EX-PARTNER?: NO

## 2025-08-01 SDOH — SOCIAL STABILITY: SOCIAL INSECURITY: DOES ANYONE TRY TO KEEP YOU FROM HAVING/CONTACTING OTHER FRIENDS OR DOING THINGS OUTSIDE YOUR HOME?: NO

## 2025-08-01 SDOH — ECONOMIC STABILITY: FOOD INSECURITY: WITHIN THE PAST 12 MONTHS, THE FOOD YOU BOUGHT JUST DIDN'T LAST AND YOU DIDN'T HAVE MONEY TO GET MORE.: NEVER TRUE

## 2025-08-01 SDOH — SOCIAL STABILITY: SOCIAL INSECURITY: DO YOU FEEL ANYONE HAS EXPLOITED OR TAKEN ADVANTAGE OF YOU FINANCIALLY OR OF YOUR PERSONAL PROPERTY?: NO

## 2025-08-01 SDOH — SOCIAL STABILITY: SOCIAL INSECURITY: ABUSE: ADULT

## 2025-08-01 SDOH — SOCIAL STABILITY: SOCIAL INSECURITY: DO YOU FEEL UNSAFE GOING BACK TO THE PLACE WHERE YOU ARE LIVING?: NO

## 2025-08-01 SDOH — SOCIAL STABILITY: SOCIAL INSECURITY: WERE YOU ABLE TO COMPLETE ALL THE BEHAVIORAL HEALTH SCREENINGS?: YES

## 2025-08-01 ASSESSMENT — COGNITIVE AND FUNCTIONAL STATUS - GENERAL
CLIMB 3 TO 5 STEPS WITH RAILING: A LITTLE
MOBILITY SCORE: 18
WALKING IN HOSPITAL ROOM: A LITTLE
TOILETING: A LITTLE
PATIENT BASELINE BEDBOUND: NO
DAILY ACTIVITIY SCORE: 17
WALKING IN HOSPITAL ROOM: A LITTLE
PERSONAL GROOMING: A LITTLE
DAILY ACTIVITIY SCORE: 16
DRESSING REGULAR UPPER BODY CLOTHING: A LOT
MOVING FROM LYING ON BACK TO SITTING ON SIDE OF FLAT BED WITH BEDRAILS: A LOT
DRESSING REGULAR LOWER BODY CLOTHING: A LITTLE
MOVING FROM LYING ON BACK TO SITTING ON SIDE OF FLAT BED WITH BEDRAILS: A LITTLE
DRESSING REGULAR LOWER BODY CLOTHING: TOTAL
HELP NEEDED FOR BATHING: A LITTLE
HELP NEEDED FOR BATHING: A LOT
TURNING FROM BACK TO SIDE WHILE IN FLAT BAD: A LITTLE
DRESSING REGULAR UPPER BODY CLOTHING: A LITTLE
WALKING IN HOSPITAL ROOM: A LITTLE
MOBILITY SCORE: 17
TOILETING: A LITTLE
DRESSING REGULAR LOWER BODY CLOTHING: A LOT
STANDING UP FROM CHAIR USING ARMS: A LITTLE
TURNING FROM BACK TO SIDE WHILE IN FLAT BAD: A LITTLE
HELP NEEDED FOR BATHING: A LOT
MOVING TO AND FROM BED TO CHAIR: A LOT
PERSONAL GROOMING: A LITTLE
MOVING TO AND FROM BED TO CHAIR: A LITTLE
TURNING FROM BACK TO SIDE WHILE IN FLAT BAD: A LITTLE
CLIMB 3 TO 5 STEPS WITH RAILING: A LOT
MOVING TO AND FROM BED TO CHAIR: A LITTLE
CLIMB 3 TO 5 STEPS WITH RAILING: TOTAL
DRESSING REGULAR UPPER BODY CLOTHING: A LITTLE
STANDING UP FROM CHAIR USING ARMS: A LITTLE
TOILETING: A LITTLE
STANDING UP FROM CHAIR USING ARMS: A LITTLE
MOBILITY SCORE: 15
DAILY ACTIVITIY SCORE: 19

## 2025-08-01 ASSESSMENT — ACTIVITIES OF DAILY LIVING (ADL)
TOILETING: NEEDS ASSISTANCE
GROOMING: NEEDS ASSISTANCE
ADEQUATE_TO_COMPLETE_ADL: YES
BATHING: NEEDS ASSISTANCE
JUDGMENT_ADEQUATE_SAFELY_COMPLETE_DAILY_ACTIVITIES: YES
WALKS IN HOME: NEEDS ASSISTANCE
DRESSING YOURSELF: NEEDS ASSISTANCE
BATHING_ASSISTANCE: MODERATE
HEARING - RIGHT EAR: FUNCTIONAL
FEEDING YOURSELF: NEEDS ASSISTANCE
ADL_ASSISTANCE: INDEPENDENT
LACK_OF_TRANSPORTATION: NO
PATIENT'S MEMORY ADEQUATE TO SAFELY COMPLETE DAILY ACTIVITIES?: YES
ASSISTIVE_DEVICE: WALKER;EYEGLASSES
HEARING - LEFT EAR: FUNCTIONAL
LACK_OF_TRANSPORTATION: NO

## 2025-08-01 ASSESSMENT — ENCOUNTER SYMPTOMS
ALLERGIC/IMMUNOLOGIC NEGATIVE: 1
HEMATOLOGIC/LYMPHATIC NEGATIVE: 1
CONSTITUTIONAL NEGATIVE: 1
CARDIOVASCULAR NEGATIVE: 1
GASTROINTESTINAL NEGATIVE: 1
MUSCULOSKELETAL NEGATIVE: 1
PSYCHIATRIC NEGATIVE: 1
EYES NEGATIVE: 1
WOUND: 1
ENDOCRINE NEGATIVE: 1
NEUROLOGICAL NEGATIVE: 1
RESPIRATORY NEGATIVE: 1
COLOR CHANGE: 1

## 2025-08-01 ASSESSMENT — PATIENT HEALTH QUESTIONNAIRE - PHQ9
1. LITTLE INTEREST OR PLEASURE IN DOING THINGS: NOT AT ALL
SUM OF ALL RESPONSES TO PHQ9 QUESTIONS 1 & 2: 0
2. FEELING DOWN, DEPRESSED OR HOPELESS: NOT AT ALL

## 2025-08-01 ASSESSMENT — LIFESTYLE VARIABLES
HOW OFTEN DO YOU HAVE 6 OR MORE DRINKS ON ONE OCCASION: NEVER
HOW MANY STANDARD DRINKS CONTAINING ALCOHOL DO YOU HAVE ON A TYPICAL DAY: PATIENT DOES NOT DRINK
AUDIT-C TOTAL SCORE: 0
AUDIT-C TOTAL SCORE: 0
SKIP TO QUESTIONS 9-10: 1
SKIP TO QUESTIONS 9-10: 1
AUDIT-C TOTAL SCORE: 0
HOW OFTEN DO YOU HAVE A DRINK CONTAINING ALCOHOL: NEVER

## 2025-08-01 ASSESSMENT — PAIN SCALES - GENERAL
PAINLEVEL_OUTOF10: 5 - MODERATE PAIN
PAINLEVEL_OUTOF10: 7
PAINLEVEL_OUTOF10: 5 - MODERATE PAIN
PAINLEVEL_OUTOF10: 7
PAINLEVEL_OUTOF10: 0 - NO PAIN
PAINLEVEL_OUTOF10: 0 - NO PAIN
PAINLEVEL_OUTOF10: 8
PAINLEVEL_OUTOF10: 5 - MODERATE PAIN

## 2025-08-01 ASSESSMENT — PAIN DESCRIPTION - LOCATION
LOCATION: BUTTOCKS
LOCATION: FOOT
LOCATION: HIP

## 2025-08-01 ASSESSMENT — PAIN DESCRIPTION - DESCRIPTORS: DESCRIPTORS: ACHING;SHARP

## 2025-08-01 ASSESSMENT — PAIN SCALES - WONG BAKER: WONGBAKER_NUMERICALRESPONSE: NO HURT

## 2025-08-01 ASSESSMENT — PAIN - FUNCTIONAL ASSESSMENT
PAIN_FUNCTIONAL_ASSESSMENT: 0-10
PAIN_FUNCTIONAL_ASSESSMENT: 0-10
PAIN_FUNCTIONAL_ASSESSMENT: WONG-BAKER FACES
PAIN_FUNCTIONAL_ASSESSMENT: 0-10

## 2025-08-01 ASSESSMENT — PAIN DESCRIPTION - ORIENTATION
ORIENTATION: LEFT
ORIENTATION: RIGHT

## 2025-08-01 NOTE — PROGRESS NOTES
Occupational Therapy    Evaluation    Patient Name: Elliot Partida  MRN: [id number]  Department: [facility]  Room: [address] [place] [address]  Today's Date: 8/1/2025  Time Calculation  Start Time: 0850  Stop Time: 0910  Time Calculation (min): 20 min    Assessment  IP OT Assessment  OT Assessment: Pt is a 73 yo M referred to occupational therapy for impaired self-care and functional mobility 2/2 hospitalization for nonhealing nonsurgical wound limited to breakdown of skin. Pt demonstrates impaired ADL completion. functional mobility/transfers this date. Pt required min A for bed mobility, min A for STS from bed/chair and total A for LE dressing. Pt from home for approx. 2 days, [facility] prior. Pt would benefit from continued OT services at the MOD intensity level to increase functional independence and safety  Prognosis: Good  Barriers to Discharge Home: Caregiver assistance, Physical needs  Caregiver Assistance: Caregiver assistance needed per identified barriers - however, level of patient's required assistance exceeds assistance available at home  Physical Needs: Stair navigation into home limited by function/safety, Ambulating household distances limited by function/safety, Intermittent mobility assistance needed, Intermittent ADL assistance needed, High falls risk due to function or environment  Evaluation/Treatment Tolerance: Patient tolerated treatment well  Medical Staff Made Aware: Yes  End of Session Communication: Bedside nurse  End of Session Patient Position: Bed, 2 rail up, Alarm on  Plan:  Treatment Interventions: ADL retraining, Functional transfer training, Endurance training, Patient/family training, Equipment evaluation/education, Compensatory technique education  OT Frequency: 2 times per week (During this acute inpatient hospitalization.)  OT Discharge Recommendations: Moderate intensity level of continued care (Based on current functional status and rehab potential, patient is anticipated to tolerate and benefit  from 5 or more days per week of skilled rehabilitative therapy after discharge from this acute inpatient hospitalization.)  OT Recommended Transfer Status: Minimal assist, Assist of 1  OT - OK to Discharge: Yes (Based on completed evaluation and care plan recommendations, no barriers to discharge to next site of care)    Subjective   Current Problem:  1. Nonhealing nonsurgical wound limited to breakdown of skin  Referral to Home Care      2. Deep vein thrombosis (DVT) of left lower extremity, unspecified chronicity, unspecified vein          OT Visit Info:  OT Received On: 08/01/25  General Visit Info:  General  Reason for Referral: Pt is a 75 yo M referred to occupational therapy for impaired self-care and functional mobility 2/2 hospitalization for nonhealing nonsurgical wound limited to breakdown of skin  Referred By: Chuy Muniz DO  Past Medical History Relevant to Rehab: Acute osteomyelitis left foot, AMI, ASHD, CM, DVT, HTN, PVD, Neuropathy  Cellulitis  Family/Caregiver Present: No  Co-Treatment: PT  Co-Treatment Reason: Partial co-evaluation with PT to maximzie pt outcomes and participation  Prior to Session Communication: Bedside nurse  Patient Position Received: Bed, 2 rail up, Alarm on  Preferred Learning Style: visual  General Comment: Pt agreeable to therapy evalaution this date. Pt engaged in session. Pt w/ increased L foot snesitivity and pain. Pt cleared by RN prior to session  Precautions:  Medical Precautions: Fall precautions  Precautions Comment: teleluisa, dressing to both feet     Date/Time Vitals Session Patient Position Pulse Resp SpO2 BP MAP (mmHg)    08/01/25 1000 --  --  75  --  96 %  --  --      Pain:  Pain Assessment  Pain Assessment: 0-10  0-10 (Numeric) Pain Score: 5 - Moderate pain  Pain Type: Acute pain  Pain Location: Foot (Pain in feet when donning/doffing socks, no pain throughout session)  Pain Orientation: Right, Left  Pain Interventions: Repositioned, Ambulation/increased  activity  Response to Interventions: Content/relaxed    Objective   Cognition:  Overall Cognitive Status: Within Functional Limits  Arousal/Alertness: Appropriate responses to stimuli  Orientation Level: Oriented X4    Home Living:  Type of Home: House  Lives With: Siblings (Sister and niece)  Home Adaptive Equipment: Walker rolling or standard, Cane  Home Layout: Multi-level, Stairs to alternate level with rails  Alternate Level Stairs-Number of Steps: 12 (Pt does not navigate stairs)  Home Access: Stairs to enter without rails  Entrance Stairs-Rails: None  Entrance Stairs-Number of Steps: 3  Bathroom Shower/Tub: Walk-in tub  Bathroom Toilet: Standard  Bathroom Equipment: Grab bars in shower, Shower chair with back, Hand-held shower hose  Home Living Comments: Sleeps in recliner chair   Prior Function:  Level of [place]: Independent with ADLs and functional transfers, Needs assistance with homemaking  Receives Help From: Family  ADL Assistance: Independent  Homemaking Assistance: Needs assistance (Sister and niece complete all IADLs)  Ambulatory Assistance: Independent (w/ FWW)    ADL:  Eating Assistance: Independent  Grooming Assistance: Stand by  Bathing Assistance: Moderate  UE Dressing Assistance: Minimal  LE Dressing Assistance: Total  LE Dressing Deficit: Don/doff R sock, Don/doff L sock  Toileting Assistance with Device: Stand by  Toileting Deficit: Perineal hygiene, Steadying, Supervison/safety  Functional Assistance: Stand by  ADL Comments: Pt required min A for bed mobility and min A x2 for STS at EOB. Pt competde toileting w/ supervision and required total A for LE dressing. Other ADLs anticipated.  Activity Tolerance:  Endurance: Decreased tolerance for upright activites  Bed Mobility/Transfers:   Bed Mobility  Bed Mobility: Yes  Bed Mobility 1  Bed Mobility 1: Supine to sitting  Level of Assistance 1: Minimum assistance  Bed Mobility Comments 1: min A to fully erect trunk  Bed Mobility 2  Bed  Mobility  2: Sitting to supine  Level of Assistance 2: Moderate assistance  Bed Mobility Comments 2: mod A to bring BLEs into bed    Transfers  Transfer: Yes  Transfer 1  Transfer From 1: Bed to  Transfer to 1: Stand  Technique 1: Sit to stand, Stand to sit  Transfer Device 1: Walker  Transfer Level of Assistance 1: Minimum assistance, +2  Transfers 2  Transfer From 2: Toilet to  Transfer to 2: Stand  Technique 2: Sit to stand, Stand to sit  Transfer Device 2: Walker  Transfer Level of Assistance 2: Minimum assistance    Functional Mobility:  Functional Mobility  Functional Mobility Performed: Yes  Functional Mobility 1  Surface 1: Level tile  Device 1: Rolling walker  Assistance 1: Contact guard  Comments 1: Short functional mobility in room to and from bathroom w/ FWW  Sitting Balance:  Static Sitting Balance  Static Sitting-Balance Support: Feet supported  Static Sitting-Level of Assistance: Independent  Dynamic Sitting Balance  Dynamic Sitting-Balance Support: Feet supported  Dynamic Sitting-Level of Assistance: Independent  Dynamic Sitting-Balance: Forward lean  Standing Balance:  Static Standing Balance  Static Standing-Balance Support: Bilateral upper extremity supported  Static Standing-Level of Assistance: Close supervision  Dynamic Standing Balance  Dynamic Standing-Balance Support: Bilateral upper extremity supported  Dynamic Standing-Level of Assistance: Close supervision  Dynamic Standing-Balance: Turning    Vision:   Vision - Basic Assessment  Current Vision: Wears glasses only for reading  Strength:  Strength Comments: BUE grossly 4/5  Coordination:  Movements are Fluid and Coordinated: Yes   Hand Function:  Hand Function  Gross Grasp: Functional  Coordination: Functional  Extremities:   RUE: Within Functional Limits (shoulder flexion <90 degrees)  LUE: Within Functional Limits (shoulder flexion <90 degrees)    Outcome Measures:   [facility] Daily Activity  Putting on and taking off regular lower body  clothing: Total  Bathing (including washing, rinsing, drying): A lot  Putting on and taking off regular upper body clothing: A little  Toileting, which includes using toilet, bedpan or urinal: A little  Taking care of personal grooming such as brushing teeth: A little  Eating Meals: None  Daily Activity - Total Score: 16    Education Documentation  Body Mechanics, taught by Fay Harris OT at 8/1/2025 10:54 AM.  Learner: Patient  Readiness: Acceptance  Method: Explanation  Response: Verbalizes Understanding  Comment: Pt educated on POC, DC recs, safety and body mechanics when completing transfers and ADLs    ADL Training, taught by Fay Harris OT at 8/1/2025 10:54 AM.  Learner: Patient  Readiness: Acceptance  Method: Explanation  Response: Verbalizes Understanding  Comment: Pt educated on POC, DC recs, safety and body mechanics when completing transfers and ADLs    Goals:   Encounter Problems       Encounter Problems (Active)       ADLs       Patient will perform UB and LB bathing with minimal assist  level of assistance and PRN bathroom equipment.       Start:  08/01/25    Expected End:  08/15/25            Patient will complete daily grooming tasks with supervision level of assistance and PRN adaptive equipment while edge of bed or standing.       Start:  08/01/25    Expected End:  08/15/25            Patient will complete toileting including hygiene clothing management/hygiene with modified independent level of assistance and PRN bathroom equipment.       Start:  08/01/25    Expected End:  08/15/25               MOBILITY       Patient will perform Functional mobility mod  Household distances/Community Distances with supervision level of assistance and least restrictive device in order to improve safety and functional mobility.       Start:  08/01/25    Expected End:  08/15/25               TRANSFERS       Patient will perform bed mobility supervision level of assistance and bed rails in order to improve  safety and independence with mobility       Start:  08/01/25    Expected End:  08/15/25            Patient will complete functional transfer to chair/toilet with least restrictive device with supervision level of assistance.       Start:  08/01/25    Expected End:  08/15/25

## 2025-08-01 NOTE — NURSING NOTE
Patient complain of pain in left lower extremity.  He was medicated with prn pain medication.  Prafo boots applied to bilateral lower extremities.

## 2025-08-01 NOTE — CONSULTS
Inpatient consult to Infectious Diseases  Consult performed by: Sonam Zhu, APRN-CNP  Consult ordered by: Chuy Muniz DO  Reason for consult: leg cellulitis            Primary MD: Marium Singh MD        History Of Present Illness  Elliot Partida is a 74 y.o. male past medical history of diabetes mellitus, history of DVT, chronic wounds.  He presented to the emergency room with concern for worsening wounds.  He reports recent hospitalization he had right buttock wound grow Pseudomonas.  He had positive 2 out of 2 blood cultures grew Staphylococcus epidermidis and Staphylococcus pettenkoferi/argensis 1/2.  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  He was treated with 14 days of IV Zosyn which ended on 7/8/2025.  He reports he has wound care at home and they felt like wounds had worsened which prompted him to come to the emergency room.  Patient denies fever or chills.  He denies shortness of breath cough or chest pain.  He is on room air saturating 97%.  He denies abdominal pain nausea vomiting or diarrhea.  Labs with no leukocytosis.  Unremarkable urinalysis.  Left foot x-ray shows irregularity and cannot rule out for osteomyelitis.  He has history of left foot fifth toe osteomyelitis status post amputation in June 2025.  He was admitted for further evaluation and management.  He is on IV vancomycin, Zosyn.     Past Medical History  He has a past medical history of Acute osteomyelitis of ankle and foot, left (Multi), AMI (acute myocardial infarction) (Multi), ASHD (arteriosclerotic heart disease), At risk for falls, Cellulitis, COVID-19, Diabetes mellitus (Multi), DVT (deep vein thrombosis) in pregnancy ([facility]), Fall risk care plan declined, Heart failure, Hypertension, Hypoxia, Meningioma (Multi), Myocardial infarction (Multi), Neuritis, Neuropathy, Osteoarthritis, Osteomyelitis, Pleural effusion, PVD (peripheral  vascular disease), Sprain of right knee (06/25/2015), Stroke (Multi), 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[facility]), TIA (transient ischemic attack), Tinea pedis of both feet, Trigeminal neuralgia, Urinary tract infection, and Weakness.    Surgical History  He has a past surgical history that includes Carpal tunnel release (10/10/2014); Eye surgery; FL arthrocentesis ASP INJ joint.; Cardiac catheterization; Iridotomy / iridectomy (Bilateral); Invasive Vascular Procedure (Bilateral, 09/18/2024); Foot ray resection (Left, 09/23/2024); and Foot surgery (Left, 09/27/2024).     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     Immunization History   Administered Date(s) Administered    COVID-19 mRNA, bivalent, original/Omicron BA.1, Non-US Vaccine (Spikevax Bivalent), Moderna 03/01/2025    Flu vaccine, trivalent, preservative free, HIGH-DOSE, age 65y+ (Fluzone) 03/01/2025    Moderna SARS-CoV-2 Vaccination 04/14/2021, 05/13/2021    RSV, 60 Years And Older (AREXVY) 03/01/2025     Medications  Home medications:  Prescriptions Prior to Admission[2]  Current medications:  Scheduled medications  Scheduled Medications[3]  Continuous medications  Continuous Medications[4]  PRN medications  PRN Medications[5]    Review of Systems   Constitutional: Negative.    HENT: Negative.     Eyes: Negative.    Respiratory: Negative.     Cardiovascular: Negative.    Gastrointestinal: Negative.    Endocrine: Negative.    Genitourinary: Negative.   "  Musculoskeletal: Negative.    Skin:  Positive for wound.   Neurological: Negative.    Psychiatric/Behavioral: Negative.          Objective  Range of Vitals (last 24 hours)  Heart Rate:  [61-79]   Temp:  [36.5 °C (97.7 °F)-36.8 °C (98.2 °F)]   Resp:  []   BP: (106-181)/(49-92)   Height:  [175.3 cm (5' 9\")-175.3 cm (5' 9.02\")]   Weight:  [82.6 kg (182 lb)-83.1 kg (183 lb 1.6 oz)]   SpO2:  [95 %-100 %]   Daily Weight  07/31/25 : 83.1 kg (183 lb 1.6 oz)    Body mass index is 27.03 kg/m².     Physical Exam  Constitutional:       Appearance: Normal appearance. He is obese.   HENT:      Head: Normocephalic and atraumatic.      Nose: Nose normal.      Mouth/Throat:      Mouth: Mucous membranes are moist.      Pharynx: Oropharynx is clear.     Eyes:      General: No scleral icterus.      Cardiovascular:      Rate and Rhythm: Normal rate and regular rhythm.   Pulmonary:      Effort: Pulmonary effort is normal.      Breath sounds: Normal breath sounds.   Abdominal:      General: Bowel sounds are normal.      Palpations: Abdomen is soft.     Musculoskeletal:         General: Normal range of motion.      Cervical back: Normal range of motion and neck supple.      Comments: Left fifth toe amputation     Skin:     General: Skin is warm and dry.      Comments: Left anterior leg wound with left leg erythema , Right heel scabbed wound-stable appearing, no significant surrounding erythema ,no overt signs of infection     Neurological:      Mental Status: He is alert and oriented to person, place, and time.     Psychiatric:         Mood and Affect: Mood normal.         Behavior: Behavior normal.          Relevant Results  Outside Hospital Results  No  Labs  Results from last 72 hours   Lab Units 08/01/25  0940 07/31/25  1935   WBC AUTO x10*3/uL 7.9 9.6   HEMOGLOBIN g/dL 10.4* 10.7*   HEMATOCRIT % 33.2* 33.8*   PLATELETS AUTO x10*3/uL 297 288   NEUTROS PCT AUTO %  --  67.2   LYMPHS PCT AUTO %  --  20.4   MONOS PCT AUTO %  --  " "9.3   EOS PCT AUTO %  --  2.4     Results from last 72 hours   Lab Units 08/01/25  0940 07/31/25  1758   SODIUM mmol/L 140 140   POTASSIUM mmol/L 4.1 4.7   CHLORIDE mmol/L 104 102   CO2 mmol/L 27 30   BUN mg/dL 23 25*   CREATININE mg/dL 0.95 0.84   GLUCOSE mg/dL 93 106*   CALCIUM mg/dL 9.2 9.6   ANION GAP mmol/L 13 13   EGFR mL/min/1.73m*2 85 >90     Results from last 72 hours   Lab Units 07/31/25  1758   ALK PHOS U/L 113   BILIRUBIN TOTAL mg/dL 0.3   PROTEIN TOTAL g/dL 7.0   ALT U/L 15   AST U/L 23   ALBUMIN g/dL 3.8     Estimated Creatinine Clearance: 69.3 mL/min (by C-G formula based on SCr of 0.95 mg/dL).  C-Reactive Protein   Date Value Ref Range Status   07/31/2025 0.54 <1.00 mg/dL Final   07/08/2025 1.32 (H) <1.00 mg/dL Final   05/20/2025 3.85 (H) <1.00 mg/dL Final     Sedimentation Rate   Date Value Ref Range Status   07/31/2025 33 (H) 0 - 20 mm/h Final   07/08/2025 20 0 - 20 mm/h Final   05/11/2025 12 0 - 20 mm/h Final     No results found for: \"HIV1X2\", \"HIVCONF\", \"MMWCOU5YK\"  No results found for: \"HEPCABINIT\", \"HEPCAB\", \"HCVPCRQUANT\"  Microbiology  Susceptibility data from last 90 days.  Collected Specimen Info Organism Aztreonam Cefepime Ceftazidime Ciprofloxacin Clindamycin Erythromycin Levofloxacin Oxacillin Piperacillin/Tazobactam Tetracycline Tobramycin Trimethoprim/Sulfamethoxazole Vancomycin   06/25/25 Tissue/Biopsy from Wound/Tissue Pseudomonas aeruginosa  S  S  S  S    S   S   S       Mixed Aerobic and Anaerobic Bacteria                 06/23/25 Tissue/Biopsy from Wound/Tissue Mixed Skin Microorganisms                 06/23/25 Blood culture from Peripheral Venipuncture Staphylococcus epidermidis      R  I   R   S   S  S     Staphylococcus pettenkoferi/argensis                06/23/25 Blood culture from Peripheral Venipuncture Staphylococcus epidermidis                05/09/25 Swab from Anterior Nares Methicillin Susceptible Staphylococcus aureus (MSSA)                05/08/25 Tissue/Biopsy from " Wound/Tissue Methicillin Resistant Staphylococcus aureus (MRSA)      R  R   R   R   S  S     Mixed Aerobic and Anaerobic Bacteria                       Imaging  Vascular US lower extremity venous duplex bilateral  Result Date: 7/31/2025  Interpreted By:  Nnamdi Dodson, STUDY: VAS US LOWER EXTREMITY VENOUS DUPLEX BILATERAL;  7/31/2025 7:32 pm   INDICATION: Signs/Symptoms:Bilateral leg pain most severe on the left with redness history of DVT.   COMPARISON: None.   ACCESSION NUMBER(S): [id number]   ORDERING CLINICIAN: ADDI ALBERTO   TECHNIQUE: Grayscale, color and spectral Doppler sonographic images of the bilateral lower extremity deep venous system.   FINDINGS: RIGHT: There is normal compressibility of the common femoral vein, saphenous femoral junction, profunda femoral vein and popliteal vein. The posterior tibial and peroneal veins  are suboptimally visualized. There is normal spontaneous and phasic variation throughout the leg by spectral doppler.   LEFT: Echogenic nonocclusive thrombus in the left popliteal distal and mid femoral veins. There is normal compressibility of the common femoral vein, saphenous femoral junction, profunda femoral vein.  The posterior tibial and peroneal veins  are not well visualized.   OTHER FINDINGS: Bilateral lower extremity edema..       Echogenic nonocclusive thrombus in the left popliteal vein extending to the mid and distal femoral vein.   Poor visualization of bilateral calf veins. No sonographic evidence DVT in the visualized vessels of the right lower extremity.   MACRO: Nnamdi Dodson discussed the significance and urgency of this critical finding by telephone with  Dr Kaplan on 7/31/2025 at 8:04 pm. (**-RCF-**) Findings:  See findings.     Signed by: Nnamdi Dodson 7/31/2025 8:05 PM Dictation workstation:   [id number]    XR foot left 3+ views  Result Date: 7/31/2025  Interpreted By:  Everett Johnson, STUDY: XR FOOT LEFT 3+ VIEWS; ;  7/31/2025 5:39 pm   INDICATION:  Signs/Symptoms:wound on foot.     COMPARISON: None.   ACCESSION NUMBER(S): [id number]   ORDERING CLINICIAN: JUANITO HAIDER   FINDINGS: There is partial amputation of the 5th ray. There is no acute fracture or dislocation seen. There is severe osteopenia which limits the study   There is focal sclerosis and irregularity of the 1st distal phalangeal tuft. There is mild degenerative changes in the midfoot and forefoot.       Mild sclerosis and irregularity at the 1st distal phalangeal tuft. In the presence of an ulcer at this location underlying osteomyelitis is suspected.   Limited by severe osteopenia     MACRO: Critical Finding:  See findings. Notification was initiated on 7/31/2025 at 6:17 pm by  Everett Johnson.  (**-YCF-**) Instructions: See Findings.   Signed by: Everett Johnson 7/31/2025 6:17 PM Dictation workstation:   [id number]    ECG 12 lead  Result Date: 7/12/2025  Sinus rhythm with 1st degree AV block Left axis deviation Left ventricular hypertrophy with QRS widening ( [place] product , Romhilt-Kwok ) T wave abnormality, consider lateral ischemia Abnormal ECG When compared with ECG of 08-JUL-2025 20:59, (unconfirmed) No significant change was found Confirmed by Crason Freeman ([id number]) on 7/12/2025 7:30:51 AM    Transthoracic Echo Complete  Result Date: 7/9/2025   [facility], [address] [place] [address]              Tel [phone] and Fax [phone] TRANSTHORACIC ECHOCARDIOGRAM REPORT  Patient Name:       SUBHASH Cain Physician:    [id number] Carson Freeman MD Study Date:         7/9/2025            Ordering Provider:    [id number] DAMEON ALVAREZ MRN/PID:            [id number]            Fellow: Accession#:         [id number]        Nurse:                Narayan MORRIS student Date of Birth/Age:  1950 / 74 years Sonographer:           Meeta Pierre                                                               [facility] Gender assigned at                     Additional Staff:     Nandini MORRIS Birth: Height:             175.26 cm           Admit Date: Weight:             86.18 kg            Admission Status:     Inpatient -                                                               Routine BSA / BMI:          2.02 m2 / 28.06     Encounter#:           [id number]                     kg/m2 Blood Pressure:     127/53 mmHg         Department Location:  [facility] Study Type:    TRANSTHORACIC ECHO (TTE) COMPLETE Diagnosis/ICD: Cerebral Infarction, unspecified-I63.9 Indication:    stroke CPT Code:      Echo Complete w Full Doppler-[id number] Patient History: Diabetes:          Yes Pertinent History: CAD, CVA and Murmur. abnormal EKG, hx aortic stenosis, edema,                    change in mental status, PAD, hx DVT. Study Detail: The following Echo studies were performed: 2D, M-Mode, Doppler and               color flow. Technically challenging study due to body habitus.               Agitated saline used as a contrast agent for intraseptal flow               evaluation. The patient was awake.  PHYSICIAN INTERPRETATION: Left Ventricle: The left ventricular systolic function is normal with a visually estimated ejection fraction of 60-65%. There is mild concentric left ventricular hypertrophy. There are no regional left ventricular wall motion abnormalities. The left ventricular cavity size is normal. There is mildly increased septal and normal posterior left ventricular wall thickness. There is left ventricular concentric remodeling. Spectral Doppler shows a Grade II (pseudonormal pattern) of left ventricular diastolic filling with an elevated left atrial pressure. Left Atrium: The left atrium is mildly dilated. Right Ventricle: The right ventricle is normal in size. There is normal right ventricular global systolic function. Right Atrium: The right atrium is  normal in size. Aortic Valve: The aortic valve is trileaflet. The aortic valve area by VTI is 1.34 cmï¿½ with a peak velocity of 3.11 m/s. The peak and mean gradients are 39 mmHg and 22 mmHg, respectively with a dimensionless index of 0.34. There is moderate to severe aortic valve cusp calcification. There is evidence of moderate aortic valve stenosis. There is no evidence of aortic valve regurgitation. Mitral Valve: The mitral valve is mildly thickened. There is moderate mitral annular calcification. There is mild mitral valve regurgitation. The E Vmax is 1.13 m/s. Tricuspid Valve: The tricuspid valve is structurally normal. There is trace to mild tricuspid regurgitation. Pulmonic Valve: The pulmonic valve is structurally normal. There is no indication of pulmonic valve regurgitation. Pericardium: There is no pericardial effusion noted. Aorta: The aortic root is normal. Pulmonary Artery: The estimated pulmonary artery pressure is normal. Pulmonary Veins: The pulmonary veins appear normal and return normally to the left atrium. Systemic Veins: The inferior vena cava appears mildly dilated, with IVC inspiratory collapse less than 50%.  CONCLUSIONS:  1. The left ventricular systolic function is normal with a visually estimated ejection fraction of 60-65%.  2. Spectral Doppler shows a Grade II (pseudonormal pattern) of left ventricular diastolic filling with an elevated left atrial pressure.  3. There is normal right ventricular global systolic function.  4. The left atrium is mildly dilated.  5. There is moderate mitral annular calcification.  6. Moderate aortic valve stenosis. The peak and mean gradients are 39 mmHg and 22 mmHg respectively.  7. There is moderate to severe aortic valve cusp calcification.  8. The inferior vena cava appears mildly dilated, with IVC inspiratory collapse less than 50%. QUANTITATIVE DATA SUMMARY:  2D MEASUREMENTS:          Normal Ranges: Ao Root d:       3.70 cm  (2.0-3.7cm) LAs:              4.38 cm  (2.7-4.0cm) IVSd:            1.21 cm  (0.6-1.1cm) LVPWd:           1.04 cm  (0.6-1.1cm) LVIDd:           4.83 cm  (3.9-5.9cm) LVIDs:           3.43 cm LV Mass Index:   100 g/m2 LVEDV Index:     53 ml/m2 LV % FS          28.9 %  LEFT ATRIUM:                  Normal Ranges: LA Vol A4C:        107.3 ml   (22+/-6mL/m2) LA Vol A2C:        87.2 ml LA Vol BP:         98.8 ml LA Vol Index A4C:  53.1ml/m2 LA Vol Index A2C:  43.2 ml/m2 LA Vol Index BP:   48.9 ml/m2 LA Area A4C:       30.4 cm2 LA Area A2C:       26.8 cm2 LA Major Axis A4C: 7.3 cm LA Major Axis A2C: 7.0 cm LA Volume Index:   49.0 ml/m2 LA Vol A4C:        103.9 ml LA Vol A2C:        83.2 ml LA Vol Index BSA:  46.3 ml/m2  RIGHT ATRIUM:                 Normal Ranges: RA Vol A4C:        57.6 ml    (8.3-19.5ml) RA Vol Index A4C:  28.5 ml/m2 RA Area A4C:       20.5 cm2 RA Major Axis A4C: 6.2 cm  AORTA MEASUREMENTS:         Normal Ranges: Asc Ao, d:          4.00 cm (2.1-3.4cm)  LV SYSTOLIC FUNCTION:                      Normal Ranges: EF-A4C View:    62 % (>=55%) EF-A2C View:    61 % EF-Biplane:     61 % EF-Visual:      63 % LV EF Reported: 63 %  LV DIASTOLIC FUNCTION:             Normal Ranges: MV Peak E:             1.13 m/s    (0.7-1.2 m/s) MV Peak A:             1.01 m/s    (0.42-0.7 m/s) E/A Ratio:             1.11        (1.0-2.2) MV e'                  0.075 m/s   (>8.0) MV lateral e'          0.08 m/s MV medial e'           0.07 m/s MV A Dur:              188.39 msec E/e' Ratio:            15.05       (<8.0) PulmV Sys Jak:         61.44 cm/s PulmV Spaulding Jak:        53.97 cm/s PulmV S/D Jak:         1.14 PulmV A Revs Jak:      29.47 cm/s PulmV A Revs Dur:      222.65 msec  MITRAL VALVE:          Normal Ranges: MV DT:        236 msec (150-240msec)  AORTIC VALVE:                      Normal Ranges: AoV Vmax:                3.11 m/s  (<=1.7m/s) AoV Peak P.6 mmHg (<20mmHg) AoV Mean P.6 mmHg (1.7-11.5mmHg) LVOT Max Jak:             1.08 m/s  (<=1.1m/s) AoV VTI:                 86.10 cm  (18-25cm) LVOT VTI:                29.02 cm LVOT Diameter:           2.25 cm   (1.8-2.4cm) AoV Area, VTI:           1.34 cm2  (2.5-5.5cm2) AoV Area,Vmax:           1.38 cm2  (2.5-4.5cm2) AoV Area, planim:        1.99 cm2  (2.5-4.5cm2) AoV Dimensionless Index: 0.34  RIGHT VENTRICLE: RV Basal 4.79 cm RV Mid   2.50 cm RV Major 8.7 cm TAPSE:   22.2 mm RV s'    0.10 m/s  TRICUSPID VALVE/RVSP:         Normal Ranges: Est. RA Pressure:     15 IVC Diam:             2.21 cm  PULMONIC VALVE:          Normal Ranges: PV Max Jak:     0.9 m/s  (0.6-0.9m/s) PV Max P.9 mmHg  PULMONARY VEINS: PulmV A Revs Dur: 222.65 msec PulmV A Revs Jak: 29.47 cm/s PulmV Spaulding Jak:   53.97 cm/s PulmV S/D Jak:    1.14 PulmV Sys Jak:    61.44 cm/s  AORTA: Asc Ao Diam 3.96 cm  [id number] Carson Freeman MD Electronically signed on 2025 at 7:56:49 PM  ** Final **     XR forearm left 2 views  Result Date: 2025  Interpreted By:  Everett Johnson, STUDY: XR FOREARM LEFT 2 VIEWS; ;  2025 5:52 pm   INDICATION: Signs/Symptoms:Left arm swelling.     COMPARISON: None.   ACCESSION NUMBER(S): [id number]   ORDERING CLINICIAN: DAMEON ALVAREZ   FINDINGS: There is moderate to severe soft tissue edema about the forearm. There is no fracture. There is no dislocation. No osseous abnormality       Moderate to severe soft tissue edema without acute osseous abnormality     MACRO: None   Signed by: Everett Johnson 2025 6:07 PM Dictation workstation:   [id number]    CT angio head and neck w and wo IV contrast  Result Date: 2025  Interpreted By:  Naeini, Jaskaran, STUDY: CT ANGIO HEAD AND NECK W AND WO IV CONTRAST;  2025 2:21 pm   INDICATION: Signs/Symptoms:Altered mental status, stroke rule out,.     COMPARISON: None.   ACCESSION NUMBER(S): [id number]   ORDERING CLINICIAN: DAMEON ALVAREZ   TECHNIQUE: Unenhanced CT images of the head were obtained. Subsequently, 75 ML of  Omnipaque 350 was administered intravenously and axial images of the head and neck were acquired.  Coronal, sagittal, and 3-D reconstructions were provided for review.   FINDINGS: CTA HEAD FINDINGS:   Anterior circulation: The bilateral intracranial internal carotid arteries, bilateral carotid terminals, bilateral proximal anterior and middle cerebral arteries are normal.   Posterior circulation: Moderate to severe focal narrowing of the distal P1 segment of the left PCA (series 502, image 143). Bilateral intracranial vertebral arteries, vertebrobasilar junction, basilar artery and right proximal posterior cerebral arteries are normal.   CTA NECK FINDINGS:   Right carotid vessels: The common carotid artery is normal. The carotid bifurcation is normal. The internal carotid artery in the neck is normal. There is 0% stenosis.   Left carotid vessels: The common carotid artery is normal. The carotid bifurcation is normal. The internal carotid artery in the neck is normal. There is 0% stenosis.   Vertebral vessels: Dominant right vertebral artery. Diminutive left vertebral artery with lack of enhancement from its origin to the V4 segment, which may represent chronic atherosclerotic disease with poor flow. Right vertebral artery is patent.   There is questionable hyperdense prominence of the extra-axial spaces in the bilateral posterior parietal regions, however can not be confirmed on other sequences and can be artifactual.       1. Moderate to severe focal narrowing of the distal P1 segment of the left PCA.   2. Diminutive left vertebral artery with lack of enhancement to the V4 segment as above. Finding may represent chronic atherosclerotic disease with poor flow.   3. Patency of the bilateral internal carotid arteries and right vertebral artery in the neck.   Recommendation: If clinically concerned for small acute ischemic infarct, further evaluation with MR of the brain without contrast is recommended.   4.  Questionable hyperdense prominence of the extra-axial spaces in the bilateral posterior parietal regions, however can not be confirmed on other sequences and can be artifactual. Attention on short-term follow-up CT head without contrast is recommended.   MACRO: Critical Finding:  See findings. Notification was initiated on 7/9/2025 at 2:51 pm by  Jaskaran Llamas.  (**-OCF-**)   Signed by: Jaskaran Llamas 7/9/2025 2:51 PM Dictation workstation:   [id number]    ECG 12 lead  Result Date: 7/9/2025  Sinus rhythm with 1st degree AV block Left axis deviation Nonspecific intraventricular block Cannot rule out Anterior infarct (cited on or before 09-MAY-2025) Abnormal ECG When compared with ECG of 22-JUN-2025 21:08, (unconfirmed) QRS axis Shifted left Questionable change in initial forces of Lateral leads    CT chest abdomen pelvis w IV contrast  Result Date: 7/9/2025  Interpreted By:  Tere De La Paz, STUDY: CT CHEST ABDOMEN PELVIS W IV CONTRAST;  7/9/2025 12:10 am   INDICATION: Signs/Symptoms:elevated wbc; weak; r/o infiltrate.   COMPARISON: Chest x-ray 07/08/2025. CT chest, abdomen, pelvis 06/22/2025. Renal ultrasound 06/26/2025.   ACCESSION NUMBER(S): [id number]   ORDERING CLINICIAN: GARRY RICO   TECHNIQUE: Axial CT of the chest, abdomen, and pelvis was performed.  Coronal and sagittal reconstructions were performed. Intravenous contrast material was administered without immediate complication.   FINDINGS: There is motion artifact.   CHEST:   LUNG/PLEURA/LARGE AIRWAYS: The trachea and the central airways are patent.   The evaluation of the lungs is degraded by motion artifact. There is a right effusion. There is a small-moderate left pleural effusion. Atelectatic changes are noted at the bilateral lower lobes and at the lingula. No pneumothorax. Linear atelectasis/scarring at the right lung apex.   VESSELS: No thoracic aortic aneurysm. Atherosclerotic calcifications are present of the thoracic aorta. The main  pulmonary artery is dilated.   HEART: The heart is not enlarged. Coronary artery calcifications are present. No pericardial effusion.   MEDIASTINUM AND ROLO: No pathologically enlarged lymph nodes at the mediastinum, hilar or axillary regions.   CHEST WALL AND LOWER NECK: The visualized thyroid gland is unremarkable. The soft tissues of the chest wall demonstrate no gross abnormality.     ABDOMEN:   LIVER: No focal lesion is identified.   BILE DUCTS: No significant dilation.   GALLBLADDER: Present.   PANCREAS: No peripancreatic inflammatory changes.   SPLEEN: Unremarkable.   ADRENAL GLANDS: Unremarkable.   KIDNEYS AND URETERS: Symmetrical renal enhancement. There is mild bilateral hydroureteronephrosis. There is a 1.2 cm hyperdense cortical exophytic lesion at the left kidney.   PELVIS   BLADDER: The urinary bladder is decompressed with circumferential wall thickening. A Lama catheter is present at the urinary bladder. There is a small amount of gas at the urinary bladder, probably secondary to recent instrumentation.   REPRODUCTIVE ORGANS: The prostate measures 5.5 cm in transverse diameter.   BOWEL: There is a diverticulum at the gastric fundus. No bowel obstruction. There is colonic diverticulosis with no CT evidence for acute diverticulitis. The appendix is normal.   VESSELS: Atherosclerotic calcifications within the abdominal aorta and its branches. No abdominal aortic aneurysm.   PERITONEUM/RETROPERITONEUM/LYMPH NODES: No free fluid or free air.  No retroperitoneal hemorrhage. Redemonstration of mildly enlarged external iliac chain lymph node measuring 1.2 cm in short axis on the right and 1.0 cm in short axis on the left.   BONE AND SOFT TISSUE: Multilevel degenerative changes in the spine. There is a small fat containing umbilical hernia. Small amount of gas at the soft tissues of the anterior abdominal wall with scattered hyperdense foci, may be secondary to subcutaneous injection administration.        CHEST: 1.  Small right pleural effusion. Small-moderate left pleural effusion. Bibasilar atelectasis. 2. Coronary artery calcifications. Dilated main pulmonary artery, please correlate for pulmonary arterial hypertension.   ABDOMEN-PELVIS: 1.  Circumferential wall thickening of the urinary bladder, may be secondary to underdistention or chronic outlet obstruction. Please correlate with urinalysis to exclude superimposed cystitis. 2. Mild bilateral hydronephrosis. 3. Prostatomegaly. 4. Colonic diverticulosis. 5. Mild pelvic adenopathy.     MACRO: None.   Signed by: Tere De La Paz 7/9/2025 1:10 AM Dictation workstation:   [id number]    XR chest 1 view  Result Date: 7/8/2025  Interpreted By:  Everett Johnson, STUDY: XR CHEST 1 VIEW;  7/8/2025 10:06 pm   INDICATION: Signs/Symptoms:decreased loc.     COMPARISON: None.   ACCESSION NUMBER(S): [id number]   ORDERING CLINICIAN: GARRY RICO   FINDINGS:   Low lung volumes which limits the study. There is enlargement cardiac silhouette. There is mild pulmonary vascular congestion/mild interstitial edema. No effusion or consolidation seen       Enlarged cardiac silhouette with mild interstitial edema. Low lung volumes which limits the study     MACRO: None   Signed by: Everett Johnson 7/8/2025 10:17 PM Dictation workstation:   [id number]    CT brain attack head wo IV contrast  Result Date: 7/8/2025  Interpreted By:  Delphine Felton, STUDY: CT BRAIN ATTACK HEAD WO IV CONTRAST;  7/8/2025 8:57 pm   INDICATION: Signs/Symptoms:stroke.     COMPARISON: CT head dated 06/22/2025.   ACCESSION NUMBER(S): [id number]   ORDERING CLINICIAN: GARRY RICO   TECHNIQUE: Noncontrast axial CT scan of head was performed. Angled reformats in brain and bone windows were generated. The images were reviewed in bone, brain, blood and soft tissue windows.   FINDINGS: No hyperdense intracranial hemorrhage is present. There is no mass effect or midline shift identified.   Gray-white  differentiation is intact, without evidence of CT apparent transcortical infarct. There are multifocal areas of diminished attenuation present within the periventricular and subcortical white matter of bilateral cerebral hemispheres, similar in appearance to prior exam on 06/22/2025, likely representing sequela of advanced microvascular disease.   Additional areas of diminished attenuation are present in the right basal ganglia, predominantly involving the right putamen head, also similar in appearance to prior exam on 06/22/2025, likely representing sequela of prior lacunar infarcts.   Focus of diminished attenuation present in the left cerebellum (series 202, image 11) likely represent sequela of remote cerebellar infarct, similar to prior study.   No new ventricular dilatation is present. Basal cisterns are patent. No extra-axial fluid collections are identified.   Scalp soft tissues do not demonstrate any acute abnormality. No acute calvarial abnormality is present.   Mastoid air cells and middle ear cavities are clear. Visualized paranasal sinuses are essentially unremarkable in appearance, although there is some mucous/secretions present in the inferior left frontal sinus and scattered anterior ethmoidal air cells.       1. No evidence of hemorrhage, CT apparent transcortical infarct, or other acute intracranial abnormality in the interim since prior exam on 06/22/2025. 2. Stable chronic findings as detailed above, including multifocal areas of diminished attenuation in the subcortical and periventricular white matter of bilateral cerebral hemispheres, likely representing sequela of chronic microvascular disease, although acute small white matter infarct is difficult to entirely exclude. If there is high clinical suspicion for acute neurologic abnormality, MRI of the brain can be considered for additional characterization.   MACRO: Delphine Felton discussed the significance and urgency of this critical  finding by [employer] secure chat with  GARRY RICO on 2025 at 9:09 pm.  (**-RCF-**) Findings:  See findings.     Signed by: Delphine Felton 2025 9:09 PM Dictation workstation:   [id number]       Assessment/Plan   Left leg cellulitis  Chronic Left leg ulcer   Abnormal left foot xray-concern for osteomyelitis   History of left foot fifth toe osteomyelitis status post amputation  History of Pseudomonas, MRSA, Enterococcus faecalis Pseudomonas, MRSA, Enterococcus faecalis  History of positive blood cultures with Staphylococcus epidermidis      IV vancomycin  IV Zosyn  Monitor temperature and wbc  Supportive care  Leg elevation  Monitor response to therapy   Local care  Blood cultures x 2  Wound culture-left leg  CT left foot   Further recommendations based on culture results     SARAH Kothari-CNP       [1]   Family History  Problem Relation Name Age of Onset    Heart disease Father      Colon cancer Other      Heart attack Other      Diabetes Other      Hypertension Other     [2]   Medications Prior to Admission   Medication Sig Dispense Refill Last Dose/Taking    amLODIPine (Norvasc) 2.5 mg tablet Take 1 tablet (2.5 mg) by mouth once daily.   2025    gabapentin (Neurontin) 300 mg capsule Take 1 capsule (300 mg) by mouth 3 times a day.   2025 Morning    hydrALAZINE (Apresoline) 50 mg tablet Take 1 tablet (50 mg) by mouth 3 times a day.   2025    meclizine (Antivert) 25 mg tablet Take 1 tablet (25 mg) by mouth every 6 hours if needed for dizziness.   Past Month    apixaban (Eliquis) 5 mg tablet Take 1 tablet (5 mg) by mouth 2 times a day.       [] ascorbic acid (Vitamin C) 500 mg tablet Take 1 tablet (500 mg) by mouth once daily for 14 days. (Patient not taking: Reported on 2025)   Not Taking    Blood glucose monitoring meter To check blood sugar every morning before breakfast 1 each 0     enoxaparin (Lovenox) 80 mg/0.8 mL syringe Inject 0.9 mL (90 mg) under the skin every 12  hours. (Patient not taking: Reported on 2025)   Not Taking    ferrous sulfate (Slow Fe) 137 mg (45 mg iron) tablet extended release Take 1 tablet (137 mg) by mouth once daily. (Patient not taking: Reported on 2025) 90 tablet 2 Not Taking    [] multivitamin tablet Take 1 tablet by mouth once daily for 14 days. (Patient not taking: Reported on 2025)   Not Taking    oxygen (O2) gas therapy Inhale 2 L/min at 120,000 mL/hr if needed (desaturation at hs). (Patient not taking: Reported on 2025)   Not Taking    [] zinc sulfate (Zincate) 220 mg (50 mg elemental) capsule Take 1 capsule by mouth once daily for 14 days. (Patient not taking: Reported on 2025)   Not Taking   [3] amLODIPine, 2.5 mg, oral, Daily  apixaban, 5 mg, oral, BID  ferrous sulfate, 65 mg of elemental iron, oral, Daily  gabapentin, 300 mg, oral, TID  hydrALAZINE, 50 mg, oral, TID  piperacillin-tazobactam, 4.5 g, intravenous, q8h  vancomycin, 1,000 mg, intravenous, q12h    [4] sodium chloride 0.9%, 10 mL/hr    [5] PRN medications: acetaminophen, alum-mag hydroxide-simeth, benzocaine-menthol, loperamide, meclizine, melatonin, ondansetron, oxyCODONE, oxyCODONE, polyethylene glycol, sodium chloride 0.9%, vancomycin, zinc oxide

## 2025-08-01 NOTE — PROGRESS NOTES
Pharmacy Medication History Review    Elliot Partida is a 74 y.o. male admitted for Nonhealing nonsurgical wound limited to breakdown of skin. Pharmacy reviewed the patient's [id number] medications and allergies for accuracy.    The list below reflectives the updated PTA list. Please review each medication in order reconciliation for additional clarification and justification.  Medications Prior to Admission   Medication Sig Dispense Refill Last Dose/Taking    amLODIPine (Norvasc) 2.5 mg tablet Take 1 tablet (2.5 mg) by mouth once daily.   2025    gabapentin (Neurontin) 300 mg capsule Take 1 capsule (300 mg) by mouth 3 times a day.   2025 Morning    hydrALAZINE (Apresoline) 50 mg tablet Take 1 tablet (50 mg) by mouth 3 times a day.   2025    meclizine (Antivert) 25 mg tablet Take 1 tablet (25 mg) by mouth every 6 hours if needed for dizziness.   Past Month    apixaban (Eliquis) 5 mg tablet Take 1 tablet (5 mg) by mouth 2 times a day.       [] ascorbic acid (Vitamin C) 500 mg tablet Take 1 tablet (500 mg) by mouth once daily for 14 days. (Patient not taking: Reported on 2025)   Not Taking    Blood glucose monitoring meter To check blood sugar every morning before breakfast 1 each 0     enoxaparin (Lovenox) 80 mg/0.8 mL syringe Inject 0.9 mL (90 mg) under the skin every 12 hours. (Patient not taking: Reported on 2025)   Not Taking    ferrous sulfate (Slow Fe) 137 mg (45 mg iron) tablet extended release Take 1 tablet (137 mg) by mouth once daily. (Patient not taking: Reported on 2025) 90 tablet 2 Not Taking    [] multivitamin tablet Take 1 tablet by mouth once daily for 14 days. (Patient not taking: Reported on 2025)   Not Taking    oxygen (O2) gas therapy Inhale 2 L/min at 120,000 mL/hr if needed (desaturation at hs). (Patient not taking: Reported on 2025)   Not Taking    [] zinc sulfate (Zincate) 220 mg (50 mg elemental) capsule Take 1 capsule by mouth  once daily for 14 days. (Patient not taking: Reported on 8/1/2025)   Not Taking        The list below reflectives the updated allergy list. Please review each documented allergy for additional clarification and justification.  Allergies  Reviewed by Aniceto Graves RN on 7/31/2025   No Known Allergies         Below are additional concerns with the patient's PTA list.  Pharmacy virtually reviewed medications using Medication Dispense History (MD). Pharmacy virtual review is accurate with nursing medication history, therefore patient not interviewed. Unable to verify OTCs.    Marylou Garcia CPhT  Medication History Pharmacy Technician  [facility]IL[facility] 8-4:30  Available via [employer] Secure Chat  OR  [phone]

## 2025-08-01 NOTE — H&P
History Of Present Illness  Elliot Partida is a 74 y.o. male with past medical history of heart disease, diabetes, DVT, hypertension, neuropathy, TIA who presented to the emergency department yesterday evening with complaints of left foot and leg wounds for the past several months.  He was sent in by his home visiting nurse due to this nonhealing wound.  He states he has been seeing wound clinic but stopped going.  He had bilateral edema with an ulceration and wound on his foot that in the past has grown MRSA, Pseudomonas.  He does also have a history of DVT.  X-rays of the left foot show osteomyelitis in the left great toe.  Ultrasound shows DVT in the popliteal and femoral veins.  He was started on Lovenox for the DVT and IV Zosyn and Vanco for the wounds he was subsequently admitted to Medicine for further treatment and evaluation and close monitoring of hemodynamic status.     VS: T36.8, HR 70, respirations 18, /73, SpO2 100%  Significant labs: D-dimer 813, hemoglobin 10.7  Diagnostics: X-ray left foot: Mild sclerosis, irregularity of the first distal phalangeal tuft likely osteomyelitis, severe osteopenia.  Ultrasound bilateral lower extremities: Nonocclusive thrombus left popliteal vein extending to mid and distal femoral vein.  No DVT in the right leg.     Past Medical History  Past Medical History:   Diagnosis Date    Acute osteomyelitis of ankle and foot, left (Multi)     AMI (acute myocardial infarction) (Multi)     ASHD (arteriosclerotic heart disease)     At risk for falls     Cellulitis     L foot/ankle    COVID-19     Diabetes mellitus (Multi)     DVT (deep vein thrombosis) in pregnancy ([facility])     Fall risk care plan declined     Heart failure     Hypertension     Hypoxia     Meningioma (Multi)     Myocardial infarction (Multi)     Neuritis     Neuropathy     Osteoarthritis     Osteomyelitis     L foot    Pleural effusion     PVD (peripheral vascular disease)     Sprain of right knee  06/25/2015    Stroke (Multi)     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      FOOT RAY RESECTION Left 09/23/2024    L partial 5th    FOOT SURGERY Left 09/27/2024    Delayed closure w/ graft application    INVASIVE VASCULAR PROCEDURE Bilateral 09/18/2024    Lower Extremity Angiogram    IRIDOTOMY / IRIDECTOMY Bilateral       Social History  He reports that he has never smoked. He has never been exposed to tobacco smoke. He has never used smokeless tobacco. He reports that he does not drink alcohol and does not use drugs.    Family History  Family History[1]     Allergies  Patient has no known allergies.    Review of Systems   Constitutional: Negative.    HENT: Negative.     Eyes: Negative.    Respiratory: Negative.     Cardiovascular:  Positive for leg swelling.   Gastrointestinal: Negative.    Endocrine: Negative.    Genitourinary: Negative.    Musculoskeletal:  Positive for gait problem.   Skin:  Positive for color change and wound.   Allergic/Immunologic: Negative.    Hematological: Negative.    Psychiatric/Behavioral: Negative.          Physical Exam  Constitutional:       General: He is not in acute distress.     Appearance: Normal appearance. He is not toxic-appearing.   HENT:      Head: Normocephalic and atraumatic.      Mouth/Throat:      Mouth: Mucous membranes are moist.     Eyes:      Extraocular Movements: Extraocular movements intact.      Pupils: Pupils are equal, round, and reactive to light.       Cardiovascular:      Rate and Rhythm: Normal rate and regular rhythm.      Pulses: Normal pulses.      Heart sounds: Normal heart sounds. No murmur heard.     No gallop.   Pulmonary:      Effort:  "Pulmonary effort is normal. No respiratory distress.      Breath sounds: Normal breath sounds. No wheezing, rhonchi or rales.   Abdominal:      General: Bowel sounds are normal. There is no distension.      Palpations: Abdomen is soft.      Tenderness: There is no abdominal tenderness. There is no guarding or rebound.     Musculoskeletal:         General: Tenderness present. No swelling, deformity or signs of injury. Normal range of motion.      Cervical back: Normal range of motion and neck supple.      Right lower leg: Edema present.      Left lower leg: Edema present.     Skin:     General: Skin is warm and dry.      Capillary Refill: Capillary refill takes less than 2 seconds.      Coloration: Skin is pale. Skin is not jaundiced.      Findings: Erythema and rash present. No bruising.     Neurological:      General: No focal deficit present.      Mental Status: He is alert and oriented to person, place, and time. Mental status is at baseline.      Cranial Nerves: No cranial nerve deficit.      Sensory: No sensory deficit.      Motor: No weakness.      Gait: Gait normal.     Psychiatric:         Mood and Affect: Mood normal.         Behavior: Behavior normal.         Thought Content: Thought content normal.         Judgment: Judgment normal.        Last Recorded Vitals  Blood pressure (!) 106/46, pulse 73, temperature 36.5 °C (97.7 °F), temperature source Temporal, resp. rate 16, height 1.753 m (5' 9.02\"), weight 83.1 kg (183 lb 1.6 oz), SpO2 96%.    Scheduled medications  Scheduled Medications[2]  Continuous medications  Continuous Medications[3]  PRN medications  PRN Medications[4]    Relevant Results  Vascular US lower extremity venous duplex bilateral  Result Date: 7/31/2025  Echogenic nonocclusive thrombus in the left popliteal vein extending to the mid and distal femoral vein.   Poor visualization of bilateral calf veins. No sonographic evidence DVT in the visualized vessels of the right lower extremity.   " MACRO: Nnamdi Dodsno discussed the significance and urgency of this critical finding by telephone with  Dr Kaplan on 7/31/2025 at 8:04 pm. (**-RCF-**) Findings:  See findings.     Signed by: Nnamdi Dodson 7/31/2025 8:05 PM Dictation workstation:   [id number]    XR foot left 3+ views  Result Date: 7/31/2025  Mild sclerosis and irregularity at the 1st distal phalangeal tuft. In the presence of an ulcer at this location underlying osteomyelitis is suspected.   Limited by severe osteopenia     MACRO: Critical Finding:  See findings. Notification was initiated on 7/31/2025 at 6:17 pm by  Everett Johnson.  (**-YCF-**) Instructions: See Findings.   Signed by: Everett Johnson 7/31/2025 6:17 PM Dictation workstation:   [id number]      Latest Reference Range & Units 07/31/25 17:58 07/31/25 18:12 07/31/25 19:35 08/01/25 09:40   GLUCOSE 74 - 99 mg/dL 106 (H)   93   SODIUM 136 - 145 mmol/L 140   140   POTASSIUM 3.5 - 5.3 mmol/L 4.7   4.1   CHLORIDE 98 - 107 mmol/L 102   104   Bicarbonate 21 - 32 mmol/L 30   27   Anion Gap 10 - 20 mmol/L 13   13   Blood Urea Nitrogen 6 - 23 mg/dL 25 (H)   23   Creatinine 0.50 - 1.30 mg/dL 0.84   0.95   EGFR >60 mL/min/1.73m*2 >90   85   Calcium 8.6 - 10.3 mg/dL 9.6   9.2   Albumin 3.4 - 5.0 g/dL 3.8      Alkaline Phosphatase 33 - 136 U/L 113      ALT 10 - 52 U/L 15      AST 9 - 39 U/L 23      Bilirubin Total 0.0 - 1.2 mg/dL 0.3      Total Protein 6.4 - 8.2 g/dL 7.0      Lactate 0.4 - 2.0 mmol/L 0.9      C-Reactive Protein <1.00 mg/dL 0.54      Protime 9.8 - 12.4 seconds  11.0     INR 0.9 - 1.1   1.0     aPTT 26 - 36 seconds  21 (L)     D-Dimer, Quantitative VTE Exclusion <=500 ng/mL FEU  813 (H)     WBC 4.4 - 11.3 x10*3/uL   9.6 7.9   nRBC 0.0 - 0.0 /100 WBCs   0.0 0.0   RBC 4.50 - 5.90 x10*6/uL   3.70 (L) 3.64 (L)   HEMOGLOBIN 13.5 - 17.5 g/dL   10.7 (L) 10.4 (L)   HEMATOCRIT 41.0 - 52.0 %   33.8 (L) 33.2 (L)   MCV 80 - 100 fL   91 91   MCH 26.0 - 34.0 pg   28.9 28.6   MCHC 32.0 - 36.0  g/dL   31.7 (L) 31.3 (L)   RED CELL DISTRIBUTION WIDTH 11.5 - 14.5 %   14.0 14.2   Platelets 150 - 450 x10*3/uL   288 297     Assessment & Plan  Nonhealing nonsurgical wound limited to breakdown of skin    Acute deep vein thrombosis (DVT) of popliteal vein of left lower extremity    Ambulatory dysfunction    Anemia    Benign essential HTN    Bilateral lower extremity edema    CAD (coronary artery disease)    Cellulitis of foot, left    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   Poor visualization of bilateral calf veins. No sonographic evidence DVT in the visualized vessels of the right lower extremity.   - Left foot x-ray: Mild sclerosis and irregularity at the 1st distal phalangeal tuft. In the presence of an ulcer at this location underlying osteomyelitis is suspected.   Limited by severe osteopenia    - Blood culture pending  - Wound culture pending  - Podiatry consulted, appreciate recommendations  - Infectious disease consulted, appreciate recommendations  - Continue IV Zosyn, vancomycin  - Continue apixaban  - PT/OT evaluations    CAD (coronary artery disease)  Benign essential HTN  Hyperlipidemia  Bilateral lower extremity edema  -ECHO 1. The left ventricular systolic function is normal with a visually estimated ejection fraction of 60-65%   - Continue amlodipine, hydralazine  - Telemetry    Anemia  -H/H 10.7/33.8 > 10.4/33.2  -Platelets 288 > 297  - Continue ferrous sulfate  - Daily CBC    Diabetes (Multi)  Neuropathy  -Continue gabapentin  -Continue  sliding scale    GI ppx: PPI  DVT ppx: Apixaban  Fluids: As needed  Electrolytes: replace as needed  Nutrition: Regular  Adjuncts: PIV  Code Status: Full code  Pt requires inpatient stay at this time.    JUDITH Mishra  Attending Attestation:    Patient was seen and examined face to face, history and physical was taken personally at bedside the APRN-CNP, was present for the whole duration of the exam who participated in the documentation of this note. I performed the medical decision-making components (assessment and plan of care). I have reviewed the documentation and verified the findings in the note as written with additions or exceptions as stated in the body of this note.  Laying in bed, no distress, complaining of pain left foot and ankle, no fever or chills, on exam there is open wound on anterior part of the hindfoot with some granulation tissue, very tender with oozing some purulent material, no odor, moderate edema dorsal aspect of the foot difficult to check pedal pulses, heart regular, lungs decreased breath sound in lower bases, abdomen soft bowel sounds are present.  X-ray of the left foot showing possible osteo, patient is on Zosyn and Vanco, patient is already on blood thinner and he feels that the blood clot is not new, and he says he has had vascular study in [place].  We will define the report of vascular study, will get MRI of the left foot, continue with antibiotics patient most likely need to have surgical debridement, consult ID consult podiatry.    Dr. Dickson Thakkar MD  Internal Medicine        [1]   Family History  Problem Relation Name Age of Onset    Heart disease Father      Colon cancer Other      Heart attack Other      Diabetes Other      Hypertension Other     [2] amLODIPine, 2.5 mg, oral, Daily  apixaban, 5 mg, oral, BID  ferrous sulfate, 65 mg of elemental iron, oral, Daily  gabapentin, 300 mg, oral, TID  hydrALAZINE, 50 mg, oral, TID  piperacillin-tazobactam, 4.5  g, intravenous, q8h  vancomycin, 1,000 mg, intravenous, q12h     [3] sodium chloride 0.9%, 10 mL/hr     [4] PRN medications: acetaminophen, alum-mag hydroxide-simeth, benzocaine-menthol, loperamide, meclizine, melatonin, ondansetron, oxyCODONE, oxyCODONE, polyethylene glycol, sodium chloride 0.9%, vancomycin, zinc oxide

## 2025-08-01 NOTE — DISCHARGE INSTR - OTHER ORDERS
Thank you for choosing [facility] for your Health Care needs. As you transition from the hospital back to home, we hope we took your preferences into account on how you manage your health needs so you can manage your health at home.     You may receive a survey in the mail within the next couple weeks. Please take the time to complete it and return it. Your input is ALWAYS important to us. Thank you!  Your Care Transition Team - Lisseth Ha Angie  & Gregorio     For questions about your medications listed on your discharge instructions, please call the Nurses Station at [phone].

## 2025-08-01 NOTE — CONSULTS
Vancomycin Dosing by Pharmacy- INITIAL    Elliot Partida is a 74 y.o. year old male who Pharmacy has been consulted for vancomycin dosing for cellulitis, skin and soft tissue. Based on the patient's indication and renal status this patient will be dosed based on a goal AUC of 400-600.     Renal function is currently stable.    Visit Vitals  BP (!) 106/49 (BP Location: Right arm, Patient Position: Lying)   Pulse 75   Temp 36.6 °C (97.9 °F) (Temporal)   Resp 16        Lab Results   Component Value Date    CREATININE 0.84 2025    CREATININE 0.88 07/10/2025    CREATININE 0.84 07/10/2025    CREATININE 1.00 2025        Patient weight is as follows:   Vitals:    25   Weight: 83.1 kg (183 lb 1.6 oz)       Cultures:  No results found for the encounter in last 14 days.        No intake/output data recorded.  I/O during current shift:  I/O this shift:  In: 500 [P.O.:500]  Out: -     Temp (24hrs), Av.6 °C (97.9 °F), Min:36.5 °C (97.7 °F), Max:36.8 °C (98.2 °F)         Assessment/Plan     Patient will not be given a loading dose.  Will initiate vancomycin maintenance, 1000 mg every 12 hours.    This dosing regimen is predicted by [employer] to result in the following pharmacokinetic parameters:  Loading dose: N/A  Regimen: 1000 mg IV every 12 hours.  Start time: 12:41 on 2025  Exposure target: AUC24 (range) 400-600 mg/L.hr   [id number]: 457 mg/L.hr  AUC24,ss: 548 mg/L.hr  Probability of AUC24 > 400: 80 %  Ctrough,ss: 17.8 mg/L  Probability of Ctrough,ss > 20: 40 %    Follow-up level will be ordered on 25 at 05:00 unless clinically indicated sooner.  Will continue to monitor renal function daily while on vancomycin and order serum creatinine at least every 48 hours if not already ordered.  Follow for continued vancomycin needs, clinical response, and signs/symptoms of toxicity.       Lidia Salter, PharmD

## 2025-08-01 NOTE — CONSULTS
"Nutrition Initial Assessment:   Nutrition Assessment    Reason for Assessment: Admission nursing screening (multiple non healing wounds)    Patient is a 74 y.o. male presenting with Nonhealing nonsurgical wound limited to breakdown of skin; left foot and leg wounds with suspected osteomyelitis; acute DVT (left popliteal and femoral veins)    PMH: T2DM, CAD, HTN, HLD, PVD, neuropathy, osteomyelitis, DVT, CVA, anemia, cellulitis, heart failure, generalized weakness.    Nutrition History:  Pain affecting nutrition status: N/A  Food and Nutrient History: Assessment done remotely. Pt did not answer phone, assessment from chart review and messages via secure chat. Energy intake variable per nursing report, patient is currently eating \"fine,\" though dinner intake was limited to 2 cups of fruit and chocolate milk  via secure chat. Known to historically dislike oral nutrition supplements (ONS); will trial Nico and ProStat again during this admission  No GI distress reported. No known food allergies.       Anthropometrics:  Height: 175.3 cm (5' 9.02\")   Weight: 83.1 kg (183 lb 1.6 oz)   BMI (Calculated): 27.03  IBW/kg (Dietitian Calculated): 73 kg  Percent of IBW: 114 %       Weight History:   Wt Readings from Last 10 Encounters:   07/31/25 83.1 kg (183 lb 1.6 oz)   07/17/25 82.8 kg (182 lb 8 oz)   07/09/25 86.7 kg (191 lb 2.2 oz)   06/25/25 91.4 kg (201 lb 8 oz)   06/08/25 90.6 kg (199 lb 11.8 oz)   06/06/25 86.6 kg (191 lb)   06/05/25 86.6 kg (191 lb)   06/03/25 89.5 kg (197 lb 5 oz)   05/31/25 89.4 kg (197 lb)   05/30/25 89.4 kg (197 lb)     Weight Change %:  Weight History / % Weight Change: 07/31/25 (83.1 kg) to 07/17/25 (82.8 kg), +0.4% gain in ~2 weeks.  07/31/25 (83.1 kg) to 07/09/25 (86.7 kg), -4.2% loss in ~3 weeks. 07/31/25 (83.1 kg) to 05/30/25 (89.4 kg), -7.1% loss in ~2 months.  Significant Weight Loss: Yes    Nutrition Focused Physical Exam Findings:    Subcutaneous Fat Loss:   Defer Subcutaneous Fat Loss " Assessment: Defer all  Defer All Reason: Remote assessment  Muscle Wasting:  Defer Muscle Wasting Assessment: Defer all  Edema:  Edema Location: Bilateral lower extremity edema noted throughout chart and on exam  Physical Findings:  Skin: Positive (Known chronic wounds to bilateral feet and lower extremities; history of left fifth toe amputation; impaired wound healing)    Nutrition Significant Labs:  CBC Trend:   Results from last 7 days   Lab Units 08/01/25  0940 07/31/25  1935   WBC AUTO x10*3/uL 7.9 9.6   RBC AUTO x10*6/uL 3.64* 3.70*   HEMOGLOBIN g/dL 10.4* 10.7*   HEMATOCRIT % 33.2* 33.8*   MCV fL 91 91   PLATELETS AUTO x10*3/uL 297 288    , BMP Trend:   Results from last 7 days   Lab Units 08/01/25  0940 07/31/25  1758   GLUCOSE mg/dL 93 106*   CALCIUM mg/dL 9.2 9.6   SODIUM mmol/L 140 140   POTASSIUM mmol/L 4.1 4.7   CO2 mmol/L 27 30   CHLORIDE mmol/L 104 102   BUN mg/dL 23 25*   CREATININE mg/dL 0.95 0.84   BG POCT trend:   Results from last 7 days   Lab Units 08/01/25  1652   POCT GLUCOSE mg/dL 124*        Nutrition Specific Medications:  Scheduled medications  Scheduled Medications[1]  Continuous medications  Continuous Medications[2]    I/O:    ;      Dietary Orders (From admission, onward)       Start     Ordered    08/01/25 0005  May Participate in Room Service  ( ROOM SERVICE MAY PARTICIPATE)  Once        Question:  .  Answer:  Yes    08/01/25 0004    07/31/25 2113  Adult diet Regular  Diet effective now        Question:  Diet type  Answer:  Regular    07/31/25 2112                Estimated Needs:   Total Energy Estimated Needs in 24 hours (kCal):  (0382-6043 Kcal)  Method for Estimating Needs: MSJ (1567) x AF 1.2-1.3  Total Protein Estimated Needs in 24 Hours (g):  (95-110g)  Method for Estimating 24 Hour Protein Needs: IBW / 1.3-1.5 g/kg  Total Fluid Estimated Needs in 24 Hours (mL):  (1880-2037mL)  Method for Estimating 24 Hour Fluid Needs: 1 mL/kcal or per medical team.  Patient on Order Fluid  Restriction: No        Nutrition Diagnosis   Malnutrition Diagnosis  Patient has Malnutrition Diagnosis:  (At risk, however not enought infomration to assess without time of poor PO or NFPE.)    Nutrition Diagnosis  Patient has Nutrition Diagnosis: Yes  Diagnosis Status (1): New  Nutrition Diagnosis 1: Increased nutrient needs  Related to (1): Increased metabolic demand  As Evidenced by (1): wounds  Additional Assessment Information (1): Protein, Vitamin C, Zinc, B12, Arginine, Glutamine, Collagen Protein       Nutrition Interventions/Recommendations   Nutrition prescription for oral nutrition    Nutrition Recommendations:  Individualized Nutrition Prescription Provided for : Regular diet and Nico BID and ProStat BID. If BG levels trend > 180, Suggest CCD 75    Nutrition Interventions/Goals:   Meals and Snacks: General healthful diet  Medical Food Supplement: Commercial beverage medical food supplement therapy  Goal: Nico BID (80-90kcal and 2.5g protein + arginine and glutamine per 1 pkt) and ProStat BID(100kcal and 15g protein per 30mL) (Pt in the past has reported not likeing any ONS, would like trial gain, if he doesn't like, can discontiune)  Vitamin and Mineral Supplement Therapy: Vitamin C supplement therapy, Zinc supplement therapy, Other (Comment) (B-complex)  Coordination of Care with Providers: Nursing, Provider (Brynn Lin CNP RN via secure chat)      Education Documentation  Unable to talk with pt.       Nutrition Monitoring and Evaluation   Intake / Amount of food: Consumes at least 75% or more of meals/snacks/supplements    Body Weight: Body weight - Maintain stable weight         Skin Finding: Impaired wound healing - Improved wound healing    Goal Status: New goal(s) identified    Time Spent (min): 60 minutes             [1] amLODIPine, 2.5 mg, oral, Daily  apixaban, 5 mg, oral, BID  B complex-vitamin C, 1 tablet, oral, Daily  ferrous sulfate, 65 mg of elemental iron, oral, Daily  gabapentin,  300 mg, oral, TID  hydrALAZINE, 50 mg, oral, TID  insulin lispro, 0-10 Units, subcutaneous, TID AC  piperacillin-tazobactam, 4.5 g, intravenous, q8h  vancomycin, 1,000 mg, intravenous, q12h  zinc sulfate, 50 mg of elemental zinc, oral, Daily     [2] sodium chloride 0.9%, 10 mL/hr, Last Rate: 10 mL/hr (08/01/25 7340)

## 2025-08-01 NOTE — PROGRESS NOTES
Physical Therapy    Physical Therapy Evaluation    Patient Name: Elliot Partida  MRN: [id number]  Department: [facility]  Room: [address] [place] [address]  Today's Date: 8/1/2025   Time Calculation  Start Time: 0855  Stop Time: 0910  Time Calculation (min): 15 min    Assessment/Plan   PT Assessment  PT Assessment Results: Decreased strength, Decreased range of motion, Decreased endurance, Impaired balance, Decreased mobility, Pain  Rehab Prognosis: Good  Barriers to Discharge Home: Physical needs  Medical Staff Made Aware: Yes  Strengths: Ability to acquire knowledge, Attitude of self  Barriers to Participation: Comorbidities  End of Session Communication: Bedside nurse  End of Session Patient Position: Bed, 2 rail up, Alarm on  IP OR SWING BED PT PLAN  Inpatient or Swing Bed: Inpatient  PT Plan  Treatment/Interventions: Bed mobility, Transfer training, Gait training, Stair training, Balance training, Neuromuscular re-education, Strengthening, Therapeutic exercise, Therapeutic activity  PT Plan: Ongoing PT  PT Frequency: 3 times per week  PT Discharge Recommendations: Moderate intensity level of continued care  PT Recommended Transfer Status: Assist x1 (min/mod bed mobility, min a transfers and cga ambulation at this time)  PT - OK to Discharge: Yes  Based on completed evaluation and care plan recommendations, no barriers to discharge to next site of care.       Subjective     PT Visit Info:  PT Received On: 08/01/25  General Visit Information:  General  Reason for Referral: impaired mobility  Referred By: Cristy PEREZ  Past Medical History Relevant to Rehab: Acute osteomyelitis left foot, AMI, ASHD, CM, DVT, HTN, PVD, Neuropathy  Cellulitis  Family/Caregiver Present: No  Co-Treatment: OT (Partial)  Co-Treatment Reason:  (time and safety during mobilization)  Prior to Session Communication: Bedside nurse  Patient Position Received: Bed, 2 rail up, Alarm on  Preferred Learning Style: verbal  General Comment: Pt supine in bed working with OT  upon arrival. Pt agreeable to evaluations and engaged during session. Pt with increased L foot pain and sensitivity R foot. No adverse effects or symptoms reported post evaluation. All needs in place and questions answered.  Home Living:  Home Living  Type of Home: House (Has been [facility] [place], [facility] and [facility] recently .)  Lives With:  (sister and neice)  Home Adaptive Equipment: Walker rolling or standard, Cane (ortho shoe/boot)  Home Layout: Multi-level, Stairs to alternate level with rails (12 stairs basement/upstairs but patient doesnt negotiate, sleeps in recliner)  Home Access: Stairs to enter without rails  Entrance Stairs-Rails: None  Entrance Stairs-Number of Steps: 3  Home Living Comments: :Walker mainly, cane, crutches, surgical shoe, wheelchair, shower chair, grab bars No home oxygen/CPAP/BiPAP He slept in a recliner.      His niece does the shopping  Prior Level of Function:  Prior Function Per Pt/Caregiver Report  Level of [place]: Needs assistance with ADLs, Needs assistance with homemaking, Needs assistance with functional transfers  Receives Help From: Family  Precautions:  Precautions  Medical Precautions: Fall precautions      Date/Time Vitals Session Patient Position Pulse Resp SpO2 BP MAP (mmHg)    08/01/25 0855 During PT  --  72  --  97 %  --  --            Objective   Pain:  Pain Assessment  0-10 (Numeric) Pain Score: 5 - Moderate pain (pain with OT donning/doffing of socks, no significant pain at rest in bed)  Cognition:  Cognition  Overall Cognitive Status: Within Functional Limits    General Assessments:                  Activity Tolerance  Endurance: Decreased tolerance for upright activites    Strength  Strength Comments: L LE 3+/5, R LE 4/5 grossly  Coordination  Movements are Fluid and Coordinated: Yes    Static Sitting Balance  Static Sitting-Level of Assistance: Contact guard  Dynamic Sitting Balance  Dynamic Sitting-Level of Assistance: Contact guard, Minimum  assistance    Static Standing Balance  Static Standing-Level of Assistance: Contact guard  Dynamic Standing Balance  Dynamic Standing-Level of Assistance: Contact guard, Minimum assistance  Dynamic Standing-Balance: Lateral lean, Forward lean  Functional Assessments:  Bed Mobility  Bed Mobility: Yes  Bed Mobility 1  Bed Mobility 1: Supine to sitting, Sitting to supine  Level of Assistance 1: Minimum assistance, Moderate assistance  Bed Mobility Comments 1: min a supine to sit, mod a having to lift B LE from floor to up in bed. pt unable    Transfers  Transfer: Yes  Transfer 1  Technique 1: Sit to stand, Stand to sit  Transfer Level of Assistance 1: +2, Minimum assistance    Ambulation/Gait Training  Ambulation/Gait Training Performed: Yes  Ambulation/Gait Training 1  Device 1: Rolling walker  Assistance 1: Contact guard  Quality of Gait 1: Inconsistent stride length, Decreased step length, Antalgic  Comments/Distance (ft) 1: 25' x 2 slow but safe pace , no lob or bouts of unsteadiness.  Extremity/Trunk Assessments:  RLE   RLE : Within Functional Limits  LLE   LLE :  (decreased overall rom and strength with pt attributes to prior CVA)  Outcome Measures:  [facility] Basic Mobility  Turning from your back to your side while in a flat bed without using bedrails: A little  Moving from lying on your back to sitting on the side of a flat bed without using bedrails: A lot  Moving to and from bed to chair (including a wheelchair): A little  Standing up from a chair using your arms (e.g. wheelchair or bedside chair): A little  To walk in hospital room: A little  Climbing 3-5 steps with railing: Total  Basic Mobility - Total Score: 15    Encounter Problems       Encounter Problems (Active)       Mobility       STG - Patient will ambulate 120' x 2 with FWW mod I with improved pace and step/stride length        Start:  08/01/25    Expected End:  08/08/25            Pt will improve B LE strength by 1/2 grade for improved overall  functional mobility and independence with transfers        Start:  08/01/25    Expected End:  08/08/25            Pt will improve bed mobility and supine to sit to supine to cga        Start:  08/01/25    Expected End:  08/08/25            Pt will transfer from sit to stand to sit and from bed to chair to bed with mod I utilizing fww       Start:  08/01/25    Expected End:  08/08/25                   Education Documentation  Mobility Training, taught by Ajay Marcos PT at 8/1/2025 10:05 AM.  Learner: Patient  Readiness: Acceptance  Method: Explanation  Response: Demonstrated Understanding, Verbalizes Understanding  Comment: Educated on hand placement during transfers and overall gait mechanics for safety during ambulation to bathroom    Education Comments  No comments found.

## 2025-08-01 NOTE — CARE PLAN
The patient's goals for the shift include      The clinical goals for the shift include Patient to present with leg swelling decreased by end of shift.    Over the shift, the patient did not make progress toward the following goals. Barriers to progression include increase signs of infection. Recommendations to address these barriers include monitor treatment plan for improvement in care.

## 2025-08-01 NOTE — CARE PLAN
The patient's goals for the shift include  rest  tonight    The clinical goals for the shift include Patient to present with leg swelling decreased by end of shift.    Pt admitted to med-surg for osteo of left great toe and DVT. Needs met and no further complaints.   Over the shift, the patient did not make progress toward the following goals.     Problem: Discharge Planning  Goal: Discharge to home or other facility with appropriate resources  Outcome: Not Progressing    Pt did make progress towards or met the following the goals      Problem: Chronic Conditions and Co-morbidities  Goal: Patient's chronic conditions and co-morbidity symptoms are monitored and maintained or improved  Outcome: Progressing     Problem: Skin  Goal: Prevent/manage excess moisture  8/1/2025 0610 by Aniceto Graves RN  Outcome: Progressing  8/1/2025 0239 by Aniceto Graves RN  Flowsheets (Taken 8/1/2025 0239)  Prevent/manage excess moisture:   Cleanse incontinence/protect with barrier cream   Moisturize dry skin   Follow provider orders for dressing changes  Goal: Prevent/minimize sheer/friction injuries  Outcome: Progressing  Goal: Promote skin healing  Outcome: Progressing     Problem: Safety - Adult  Goal: Free from fall injury  Outcome: Met

## 2025-08-01 NOTE — PROGRESS NOTES
08/01/25 1010   Discharge Planning   Living Arrangements Family members  (Lives with sister)   Support Systems Family members  (sister, Eryn, and niece, Rubén)   Assistance Needed AAOX3. His sister lives with him in a 2 story [facility]. He resides on the main floor one he is in the home. The 5 steps to enter are challenging for him. His niece assists him with the stairs. She also assists with transportation to appointments. Patient does no drive. He has a tub shower. He is independent in ADL's. He requires assistance from his sister or niece with all iADL's. DME: walker, cane, crutches, wheelchair, shower chair, grab bars. He does not use any home oxygen. He recently skilled at [facility] and discharged home with [facility]. He plans to resume HHC with [facility] on discharge.   Type of Residence Private residence   Number of Stairs to Enter Residence 5  (no railing)   Number of Stairs Within Residence 12  (basement/upstairs - can't navigate the stairs so he doesn't do them)   Do you have animals or pets at home? Yes   Type of Animals or Pets 2 cats   Who is requesting discharge planning? Provider   Home or Post Acute Services In home services   Type of Home Care Services Home nursing visits   Expected Discharge Disposition Home H  (Referral sent to [facility]. They are able to accept patient on discharge. External home care referral needed prior to discharge.)   Does the patient need discharge transport arranged? No  (his neice will pick him up.)   Financial Resource Strain   How hard is it for you to pay for the very basics like food, housing, medical care, and heating? Not hard   Housing Stability   In the last 12 months, was there a time when you were not able to pay the mortgage or rent on time? N   In the past 12 months, how many times have you moved where you were living? 0   At any time in the past 12 months, were you homeless or living in a shelter (including now)? N   Transportation Needs    In the past 12 months, has lack of transportation kept you from medical appointments or from getting medications? no   In the past 12 months, has lack of transportation kept you from meetings, work, or from getting things needed for daily living? No   Patient Choice   Provider Choice list and CMS website (https://medicare.gov/care-compare#search) for post-acute Quality and Resource Measure Data were provided and reviewed with: Patient   Patient / Family choosing to utilize agency / facility established prior to hospitalization Yes   Stroke Family Assessment   Stroke Family Assessment Needed No   Intensity of Service   Intensity of Service >30 min        08/01/25 1333   Discharge Planning   Expected Discharge Disposition Home H  (Referral sent to [facility]. They are able to accept patient on discharge.)     DC Secure - Orders sent to [facility]

## 2025-08-02 ENCOUNTER — APPOINTMENT (OUTPATIENT)
Dept: RADIOLOGY | Facility: HOSPITAL | Age: 75
DRG: 264 | End: 2025-08-02
Payer: MEDICARE

## 2025-08-02 LAB
GLUCOSE BLD MANUAL STRIP-MCNC: 105 MG/DL (ref 74–99)
GLUCOSE BLD MANUAL STRIP-MCNC: 83 MG/DL (ref 74–99)
GLUCOSE BLD MANUAL STRIP-MCNC: 85 MG/DL (ref 74–99)
GLUCOSE BLD MANUAL STRIP-MCNC: 95 MG/DL (ref 74–99)

## 2025-08-02 PROCEDURE — 2500000001 HC RX 250 WO HCPCS SELF ADMINISTERED DRUGS (ALT 637 FOR MEDICARE OP): Mod: IPSPLIT | Performed by: HOSPITALIST

## 2025-08-02 PROCEDURE — 73701 CT LOWER EXTREMITY W/DYE: CPT | Mod: LEFT SIDE | Performed by: RADIOLOGY

## 2025-08-02 PROCEDURE — 99233 SBSQ HOSP IP/OBS HIGH 50: CPT | Performed by: NURSE PRACTITIONER

## 2025-08-02 PROCEDURE — 73701 CT LOWER EXTREMITY W/DYE: CPT | Mod: LT,IPSPLIT

## 2025-08-02 PROCEDURE — 2500000004 HC RX 250 GENERAL PHARMACY W/ HCPCS (ALT 636 FOR OP/ED): Mod: IPSPLIT | Performed by: INTERNAL MEDICINE

## 2025-08-02 PROCEDURE — 2500000001 HC RX 250 WO HCPCS SELF ADMINISTERED DRUGS (ALT 637 FOR MEDICARE OP): Mod: IPSPLIT | Performed by: NURSE PRACTITIONER

## 2025-08-02 PROCEDURE — 2500000004 HC RX 250 GENERAL PHARMACY W/ HCPCS (ALT 636 FOR OP/ED): Mod: IPSPLIT | Performed by: HOSPITALIST

## 2025-08-02 PROCEDURE — 2550000001 HC RX 255 CONTRASTS: Mod: IPSPLIT | Performed by: INTERNAL MEDICINE

## 2025-08-02 PROCEDURE — 82947 ASSAY GLUCOSE BLOOD QUANT: CPT | Mod: IPSPLIT

## 2025-08-02 PROCEDURE — 94760 N-INVAS EAR/PLS OXIMETRY 1: CPT | Mod: IPSPLIT

## 2025-08-02 PROCEDURE — 1100000001 HC PRIVATE ROOM DAILY: Mod: IPSPLIT

## 2025-08-02 RX ORDER — VANCOMYCIN 1.5 G/300ML
1500 INJECTION, SOLUTION INTRAVENOUS EVERY 24 HOURS
Status: DISCONTINUED | OUTPATIENT
Start: 2025-08-03 | End: 2025-08-04

## 2025-08-02 RX ADMIN — AMLODIPINE BESYLATE 2.5 MG: 2.5 TABLET ORAL at 09:11

## 2025-08-02 RX ADMIN — IOHEXOL 75 ML: 350 INJECTION, SOLUTION INTRAVENOUS at 07:15

## 2025-08-02 RX ADMIN — GABAPENTIN 300 MG: 300 CAPSULE ORAL at 09:10

## 2025-08-02 RX ADMIN — GABAPENTIN 300 MG: 300 CAPSULE ORAL at 15:13

## 2025-08-02 RX ADMIN — APIXABAN 5 MG: 5 TABLET, FILM COATED ORAL at 20:34

## 2025-08-02 RX ADMIN — GABAPENTIN 300 MG: 300 CAPSULE ORAL at 20:34

## 2025-08-02 RX ADMIN — VANCOMYCIN 1.5 G: 1.5 INJECTION, SOLUTION INTRAVENOUS at 23:57

## 2025-08-02 RX ADMIN — APIXABAN 5 MG: 5 TABLET, FILM COATED ORAL at 09:11

## 2025-08-02 RX ADMIN — PIPERACILLIN SODIUM AND TAZOBACTAM SODIUM 4.5 G: 4; .5 INJECTION, SOLUTION INTRAVENOUS at 12:51

## 2025-08-02 RX ADMIN — FERROUS SULFATE TAB 325 MG (65 MG ELEMENTAL FE) 1 TABLET: 325 (65 FE) TAB at 09:11

## 2025-08-02 RX ADMIN — SODIUM CHLORIDE 10 ML/HR: 0.9 INJECTION, SOLUTION INTRAVENOUS at 23:57

## 2025-08-02 RX ADMIN — Medication 1 TABLET: at 09:11

## 2025-08-02 RX ADMIN — HYDRALAZINE HYDROCHLORIDE 50 MG: 50 TABLET ORAL at 09:11

## 2025-08-02 RX ADMIN — OXYCODONE 10 MG: 5 TABLET ORAL at 09:12

## 2025-08-02 RX ADMIN — ZINC SULFATE 220 MG (50 MG) CAPSULE 1 CAPSULE: CAPSULE at 09:11

## 2025-08-02 RX ADMIN — HYDRALAZINE HYDROCHLORIDE 50 MG: 50 TABLET ORAL at 15:13

## 2025-08-02 RX ADMIN — PIPERACILLIN SODIUM AND TAZOBACTAM SODIUM 4.5 G: 4; .5 INJECTION, SOLUTION INTRAVENOUS at 20:29

## 2025-08-02 RX ADMIN — HYDRALAZINE HYDROCHLORIDE 50 MG: 50 TABLET ORAL at 20:35

## 2025-08-02 RX ADMIN — PIPERACILLIN SODIUM AND TAZOBACTAM SODIUM 4.5 G: 4; .5 INJECTION, SOLUTION INTRAVENOUS at 03:23

## 2025-08-02 ASSESSMENT — PAIN SCALES - GENERAL
PAINLEVEL_OUTOF10: 7
PAINLEVEL_OUTOF10: 0 - NO PAIN
PAINLEVEL_OUTOF10: 2

## 2025-08-02 ASSESSMENT — PAIN - FUNCTIONAL ASSESSMENT
PAIN_FUNCTIONAL_ASSESSMENT: 0-10

## 2025-08-02 NOTE — PROGRESS NOTES
"Elliot Partida is a 74 y.o. male on day 2 of admission presenting with Nonhealing nonsurgical wound limited to breakdown of skin.    Subjective   Patient is seen today at bedside. Patient is inquiring about podiatry and when they will be in to see him. Refused labs today.        Objective     Physical Exam  Vitals reviewed.   Constitutional:       Appearance: Normal appearance.   HENT:      Head: Normocephalic.      Nose: Nose normal.     Cardiovascular:      Pulses: Normal pulses.      Heart sounds: Normal heart sounds.   Pulmonary:      Effort: Pulmonary effort is normal.      Breath sounds: Normal breath sounds.   Abdominal:      Palpations: Abdomen is soft.     Skin:     General: Skin is warm and dry.      Comments: Multiple wounds including sacral wound, left foot is wrapped d/t wound.      Neurological:      General: No focal deficit present.      Mental Status: He is alert and oriented to person, place, and time.      Motor: Weakness present.      Gait: Gait abnormal.         Last Recorded Vitals  Blood pressure 118/56, pulse 71, temperature 36.7 °C (98.1 °F), temperature source Temporal, resp. rate 17, height 1.753 m (5' 9.02\"), weight 83.1 kg (183 lb 1.6 oz), SpO2 93%.  Intake/Output last 3 Shifts:  I/O last 3 completed shifts:  In: 1300 (15.7 mL/kg) [P.O.:720; I.V.:80 (1 mL/kg); IV Piggyback:500]  Out: 750 (9 mL/kg) [Urine:750 (0.3 mL/kg/hr)]  Weight: 83.1 kg     Relevant Results  Scheduled medications  Scheduled Medications[1]  Continuous medications  Continuous Medications[2]  PRN medications  PRN Medications[3]        Vascular US lower extremity venous duplex bilateral  Result Date: 7/31/2025  Interpreted By:  Nnamdi Dodson, STUDY: [facility] US LOWER EXTREMITY VENOUS DUPLEX BILATERAL;  7/31/2025 7:32 pm   INDICATION: Signs/Symptoms:Bilateral leg pain most severe on the left with redness history of DVT.   COMPARISON: None.   ACCESSION NUMBER(S): [id number]   ORDERING CLINICIAN: ADDI ALBERTO   TECHNIQUE: " Grayscale, color and spectral Doppler sonographic images of the bilateral lower extremity deep venous system.   FINDINGS: RIGHT: There is normal compressibility of the common femoral vein, saphenous femoral junction, profunda femoral vein and popliteal vein. The posterior tibial and peroneal veins  are suboptimally visualized. There is normal spontaneous and phasic variation throughout the leg by spectral doppler.   LEFT: Echogenic nonocclusive thrombus in the left popliteal distal and mid femoral veins. There is normal compressibility of the common femoral vein, saphenous femoral junction, profunda femoral vein.  The posterior tibial and peroneal veins  are not well visualized.   OTHER FINDINGS: Bilateral lower extremity edema..       Echogenic nonocclusive thrombus in the left popliteal vein extending to the mid and distal femoral vein.   Poor visualization of bilateral calf veins. No sonographic evidence DVT in the visualized vessels of the right lower extremity.   MACRO: Nnamdi Dodson discussed the significance and urgency of this critical finding by telephone with  Dr Kaplan on 7/31/2025 at 8:04 pm. (**-RCF-**) Findings:  See findings.     Signed by: Nnamdi Dodson 7/31/2025 8:05 PM Dictation workstation:   [id number]    XR foot left 3+ views  Result Date: 7/31/2025  Interpreted By:  Everett Johnson, STUDY: XR FOOT LEFT 3+ VIEWS; ;  7/31/2025 5:39 pm   INDICATION: Signs/Symptoms:wound on foot.     COMPARISON: None.   ACCESSION NUMBER(S): [id number]   ORDERING CLINICIAN: JUANITO HAIDER   FINDINGS: There is partial amputation of the 5th ray. There is no acute fracture or dislocation seen. There is severe osteopenia which limits the study   There is focal sclerosis and irregularity of the 1st distal phalangeal tuft. There is mild degenerative changes in the midfoot and forefoot.       Mild sclerosis and irregularity at the 1st distal phalangeal tuft. In the presence of an ulcer at this location  underlying osteomyelitis is suspected.   Limited by severe osteopenia     MACRO: Critical Finding:  See findings. Notification was initiated on 7/31/2025 at 6:17 pm by  Everett Johnson.  (**-YCF-**) Instructions: See Findings.   Signed by: Everett Johnson 7/31/2025 6:17 PM Dictation workstation:   [id number]     Results for orders placed or performed during the hospital encounter of 07/31/25 (from the past 24 hours)   Blood Culture    Specimen: Peripheral Venipuncture; Blood culture   Result Value Ref Range    Blood Culture Loaded on Instrument - Culture in progress    Blood Culture    Specimen: Peripheral Venipuncture; Blood culture   Result Value Ref Range    Blood Culture Loaded on Instrument - Culture in progress    Tissue/Wound Culture/Smear    Specimen: Wound/Tissue; Tissue/Biopsy   Result Value Ref Range    Gram Stain No polymorphonuclear leukocytes seen     Gram Stain No organisms seen    POCT GLUCOSE   Result Value Ref Range    POCT Glucose 124 (H) 74 - 99 mg/dL   POCT GLUCOSE   Result Value Ref Range    POCT Glucose 98 74 - 99 mg/dL   POCT GLUCOSE   Result Value Ref Range    POCT Glucose 85 74 - 99 mg/dL                        Assessment & Plan  Nonhealing nonsurgical wound limited to breakdown of skin    Acute deep vein thrombosis (DVT) of popliteal vein of left lower extremity    Ambulatory dysfunction    Anemia    Benign essential HTN    Bilateral lower extremity edema    CAD (coronary artery disease)    Cellulitis of foot, left    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   Poor visualization of bilateral calf veins. No sonographic evidence DVT in the visualized vessels of the right lower extremity.   - Left foot x-ray: Mild sclerosis and irregularity at the 1st distal phalangeal tuft. In the presence of an ulcer at this location underlying osteomyelitis is suspected.   Limited by severe osteopenia. CT or L foot ordered.   - Blood culture in progress  - Wound culture - No polymorphonuclear leukocytes seen, no organisms seen  - Podiatry consulted, appreciate recommendations  - Infectious disease:  IV vancomycin  IV Zosyn  Monitor temperature and wbc (patient refused labs today)  Supportive care  Leg elevation  Monitor response to therapy   Local care  Blood cultures x 2  Wound culture-left leg  CT left foot   Further recommendations based on culture results   - Continue apixaban  - PT/OT evaluations     CAD (coronary artery disease)  Benign essential HTN  Hyperlipidemia  Bilateral lower extremity edema  -ECHO 1. The left ventricular systolic function is normal with a visually estimated ejection fraction of 60-65%   - Continue amlodipine, hydralazine  - Telemetry     Anemia  -H/H 10.7/33.8 > 10.4/33.2 9 (labs refused today)  -Platelets 288 > 297  - Continue ferrous sulfate  - Daily CBC     Diabetes (Multi)  Neuropathy  -Continue gabapentin  -Continue sliding scale     GI ppx: PPI  DVT ppx: Apixaban  Fluids: As needed  Electrolytes: replace as needed  Nutrition: Regular  Adjuncts: PIV  Code Status: Full code  Pt requires inpatient stay at this time.         Kenny Jose, SARAH-CNP         [1] amLODIPine, 2.5 mg, oral, Daily  apixaban, 5 mg, oral, BID  B complex-vitamin C, 1 tablet, oral, Daily  ferrous sulfate, 65 mg of elemental iron, oral, Daily  gabapentin, 300 mg, oral, TID  hydrALAZINE, 50 mg, oral, TID  insulin lispro, 0-10 Units, subcutaneous, TID AC  piperacillin-tazobactam, 4.5 g, intravenous, q8h  [START ON 8/3/2025] vancomycin, 1,500 mg,  intravenous, q24h  zinc sulfate, 50 mg of elemental zinc, oral, Daily  [2] sodium chloride 0.9%, 10 mL/hr, Last Rate: 10 mL/hr (08/01/25 9026)  [3] PRN medications: acetaminophen, alum-mag hydroxide-simeth, benzocaine-menthol, loperamide, meclizine, melatonin, ondansetron, oxyCODONE, oxyCODONE, polyethylene glycol, sodium chloride 0.9%, vancomycin, zinc oxide

## 2025-08-02 NOTE — CARE PLAN
Problem: Pain - Adult  Goal: Verbalizes/displays adequate comfort level or baseline comfort level  Outcome: Progressing     Problem: Nutrition  Goal: Nutrient intake appropriate for maintaining nutritional needs  Outcome: Progressing     Problem: Fall/Injury  Goal: Verbalize understanding of personal risk factors for fall in the hospital  Outcome: Progressing     Problem: Fall/Injury  Goal: Not fall by end of shift  Outcome: Met  Goal: Be free from injury by end of the shift  Outcome: Met     Problem: Skin  Goal: Participates in plan/prevention/treatment measures  Flowsheets (Taken 8/2/2025 0436)  Participates in plan/prevention/treatment measures:   Discuss with provider PT/OT consult   Elevate heels  Goal: Prevent/manage excess moisture  Flowsheets (Taken 8/2/2025 0436)  Prevent/manage excess moisture: Monitor for/manage infection if present  Goal: Prevent/minimize sheer/friction injuries  Flowsheets (Taken 8/2/2025 0436)  Prevent/minimize sheer/friction injuries:   Use pull sheet   HOB 30 degrees or less  Goal: Promote/optimize nutrition  Flowsheets (Taken 8/2/2025 0436)  Promote/optimize nutrition: Monitor/record intake including meals   The patient's goals for the shift include      The clinical goals for the shift include Pt will have pain controlled this shift.    Over the shift, the patient did  make progress toward the following goals.

## 2025-08-02 NOTE — CARE PLAN
The patient's goals for the shift include  feel better than yesterday    The clinical goals for the shift include Pt will have pain controlled this shift.

## 2025-08-02 NOTE — PROGRESS NOTES
Physical Therapy                 Therapy Communication Note    Patient Name: Elliot Partida  MRN: [id number]  Department: [facility]  Room: 230/[facility] [place]  Today's Date: 8/2/2025     Discipline: Physical Therapy    Missed Visit: PT Missed Visit: Yes     Missed Visit Reason: Missed Visit Reason: Patient refused (Pt refused to get up and asked PTA to leave room multiple times.)    Missed Time: Attempt    Comment:

## 2025-08-03 LAB
ATRIAL RATE: 66 BPM
BACTERIA BLD CULT: NORMAL
BACTERIA BLD CULT: NORMAL
BACTERIA SPEC CULT: ABNORMAL
GLUCOSE BLD MANUAL STRIP-MCNC: 123 MG/DL (ref 74–99)
GLUCOSE BLD MANUAL STRIP-MCNC: 78 MG/DL (ref 74–99)
GLUCOSE BLD MANUAL STRIP-MCNC: 87 MG/DL (ref 74–99)
GLUCOSE BLD MANUAL STRIP-MCNC: 91 MG/DL (ref 74–99)
GRAM STN SPEC: ABNORMAL
GRAM STN SPEC: ABNORMAL
P AXIS: 19 DEGREES
P OFFSET: 128 MS
P ONSET: 80 MS
PR INTERVAL: 278 MS
Q ONSET: 219 MS
QRS COUNT: 11 BEATS
QRS DURATION: 136 MS
QT INTERVAL: 450 MS
QTC CALCULATION(BAZETT): 471 MS
QTC FREDERICIA: 465 MS
R AXIS: -57 DEGREES
T AXIS: 85 DEGREES
T OFFSET: 444 MS
VENTRICULAR RATE: 66 BPM

## 2025-08-03 PROCEDURE — 99233 SBSQ HOSP IP/OBS HIGH 50: CPT | Performed by: NURSE PRACTITIONER

## 2025-08-03 PROCEDURE — 94760 N-INVAS EAR/PLS OXIMETRY 1: CPT | Mod: IPSPLIT

## 2025-08-03 PROCEDURE — 2500000001 HC RX 250 WO HCPCS SELF ADMINISTERED DRUGS (ALT 637 FOR MEDICARE OP): Mod: IPSPLIT | Performed by: NURSE PRACTITIONER

## 2025-08-03 PROCEDURE — 82947 ASSAY GLUCOSE BLOOD QUANT: CPT | Mod: IPSPLIT

## 2025-08-03 PROCEDURE — 1100000001 HC PRIVATE ROOM DAILY: Mod: IPSPLIT

## 2025-08-03 PROCEDURE — 0JBP0ZZ EXCISION OF LEFT LOWER LEG SUBCUTANEOUS TISSUE AND FASCIA, OPEN APPROACH: ICD-10-PCS

## 2025-08-03 PROCEDURE — 2500000001 HC RX 250 WO HCPCS SELF ADMINISTERED DRUGS (ALT 637 FOR MEDICARE OP): Mod: IPSPLIT | Performed by: HOSPITALIST

## 2025-08-03 PROCEDURE — 2500000004 HC RX 250 GENERAL PHARMACY W/ HCPCS (ALT 636 FOR OP/ED): Mod: IPSPLIT | Performed by: HOSPITALIST

## 2025-08-03 RX ADMIN — GABAPENTIN 300 MG: 300 CAPSULE ORAL at 20:38

## 2025-08-03 RX ADMIN — Medication 1 TABLET: at 08:47

## 2025-08-03 RX ADMIN — APIXABAN 5 MG: 5 TABLET, FILM COATED ORAL at 08:48

## 2025-08-03 RX ADMIN — PIPERACILLIN SODIUM AND TAZOBACTAM SODIUM 4.5 G: 4; .5 INJECTION, SOLUTION INTRAVENOUS at 20:42

## 2025-08-03 RX ADMIN — VANCOMYCIN 1.5 G: 1.5 INJECTION, SOLUTION INTRAVENOUS at 23:56

## 2025-08-03 RX ADMIN — HYDRALAZINE HYDROCHLORIDE 50 MG: 50 TABLET ORAL at 20:38

## 2025-08-03 RX ADMIN — ZINC SULFATE 220 MG (50 MG) CAPSULE 1 CAPSULE: CAPSULE at 08:47

## 2025-08-03 RX ADMIN — GABAPENTIN 300 MG: 300 CAPSULE ORAL at 08:48

## 2025-08-03 RX ADMIN — GABAPENTIN 300 MG: 300 CAPSULE ORAL at 15:17

## 2025-08-03 RX ADMIN — APIXABAN 5 MG: 5 TABLET, FILM COATED ORAL at 20:38

## 2025-08-03 RX ADMIN — PIPERACILLIN SODIUM AND TAZOBACTAM SODIUM 4.5 G: 4; .5 INJECTION, SOLUTION INTRAVENOUS at 02:30

## 2025-08-03 RX ADMIN — FERROUS SULFATE TAB 325 MG (65 MG ELEMENTAL FE) 1 TABLET: 325 (65 FE) TAB at 08:48

## 2025-08-03 RX ADMIN — PIPERACILLIN SODIUM AND TAZOBACTAM SODIUM 4.5 G: 4; .5 INJECTION, SOLUTION INTRAVENOUS at 11:43

## 2025-08-03 RX ADMIN — HYDRALAZINE HYDROCHLORIDE 50 MG: 50 TABLET ORAL at 08:48

## 2025-08-03 RX ADMIN — OXYCODONE 10 MG: 5 TABLET ORAL at 10:43

## 2025-08-03 RX ADMIN — AMLODIPINE BESYLATE 2.5 MG: 2.5 TABLET ORAL at 08:47

## 2025-08-03 RX ADMIN — HYDRALAZINE HYDROCHLORIDE 50 MG: 50 TABLET ORAL at 15:17

## 2025-08-03 ASSESSMENT — PAIN - FUNCTIONAL ASSESSMENT
PAIN_FUNCTIONAL_ASSESSMENT: 0-10

## 2025-08-03 ASSESSMENT — PAIN SCALES - GENERAL
PAINLEVEL_OUTOF10: 2
PAINLEVEL_OUTOF10: 0 - NO PAIN
PAINLEVEL_OUTOF10: 10 - WORST POSSIBLE PAIN

## 2025-08-03 ASSESSMENT — PAIN DESCRIPTION - LOCATION: LOCATION: FINGER (COMMENT WHICH ONE)

## 2025-08-03 NOTE — PROGRESS NOTES
Elliot Partida is a 74 y.o. male on day 3 of admission presenting with Nonhealing nonsurgical wound limited to breakdown of skin.    Subjective   Interval History:   Afebrile, no chills  No left leg or heel pain  No chest pain or shortness of breath  No nausea vomiting    Review of Systems   All other systems reviewed and are negative.      Objective   Range of Vitals (last 24 hours)  Heart Rate:  [56-72]   Temp:  [36.5 °C (97.7 °F)-56 °C (132.8 °F)]   Resp:  [16-17]   BP: (123-132)/(49-53)   SpO2:  [92 %-96 %]   Daily Weight  07/31/25 : 83.1 kg (183 lb 1.6 oz)    Body mass index is 27.03 kg/m².    Physical Exam  Constitutional:       Appearance: Normal appearance. He is obese.   HENT:      Head: Normocephalic and atraumatic.      Nose: Nose normal.      Mouth/Throat:      Mouth: Mucous membranes are moist.      Pharynx: Oropharynx is clear.      Eyes:      General: No scleral icterus.        Cardiovascular:      Rate and Rhythm: Normal rate and regular rhythm.   Pulmonary:      Effort: Pulmonary effort is normal.      Breath sounds: Normal breath sounds.   Abdominal:      General: Bowel sounds are normal.      Palpations: Abdomen is soft.      Musculoskeletal:         General: Normal range of motion.      Cervical back: Normal range of motion and neck supple.      Comments: Left fifth toe amputation      Skin:     General: Skin is warm and dry.      Comments: Left anterior leg wound with left leg erythema , Right heel scabbed wound-stable appearing, no significant surrounding erythema ,no overt signs of infection      Neurological:      Mental Status: He is alert and oriented to person, place, and time.      Psychiatric:         Mood and Affect: Mood normal.         Behavior: Behavior normal.         Antibiotics  piperacillin-tazobactam - 4.5 gram/100 mL  vancomycin - 1.5 gram/300 mL    Relevant Results  Labs  Results from last 72 hours   Lab Units 08/01/25  0940 07/31/25  1935   WBC AUTO x10*3/uL 7.9 9.6    HEMOGLOBIN g/dL 10.4* 10.7*   HEMATOCRIT % 33.2* 33.8*   PLATELETS AUTO x10*3/uL 297 288   NEUTROS PCT AUTO %  --  67.2   LYMPHS PCT AUTO %  --  20.4   MONOS PCT AUTO %  --  9.3   EOS PCT AUTO %  --  2.4     Results from last 72 hours   Lab Units 08/01/25  0940 07/31/25  1758   SODIUM mmol/L 140 140   POTASSIUM mmol/L 4.1 4.7   CHLORIDE mmol/L 104 102   CO2 mmol/L 27 30   BUN mg/dL 23 25*   CREATININE mg/dL 0.95 0.84   GLUCOSE mg/dL 93 106*   CALCIUM mg/dL 9.2 9.6   ANION GAP mmol/L 13 13   EGFR mL/min/1.73m*2 85 >90     Results from last 72 hours   Lab Units 07/31/25  1758   ALK PHOS U/L 113   BILIRUBIN TOTAL mg/dL 0.3   PROTEIN TOTAL g/dL 7.0   ALT U/L 15   AST U/L 23   ALBUMIN g/dL 3.8     Estimated Creatinine Clearance: 69.3 mL/min (by C-G formula based on SCr of 0.95 mg/dL).  C-Reactive Protein   Date Value Ref Range Status   07/31/2025 0.54 <1.00 mg/dL Final   07/08/2025 1.32 (H) <1.00 mg/dL Final   05/20/2025 3.85 (H) <1.00 mg/dL Final     Microbiology  Susceptibility data from last 14 days.  Collected Specimen Info Organism   08/01/25 Tissue/Biopsy from Wound/Tissue Staphylococcus aureus       Imaging  ECG 12 lead  Result Date: 8/3/2025  Sinus rhythm with 1st degree AV block Left axis deviation Nonspecific intraventricular block Cannot rule out Anterior infarct (cited on or before 09-MAY-2025) Abnormal ECG When compared with ECG of 22-JUN-2025 21:08, (unconfirmed) QRS axis Shifted left Questionable change in initial forces of Lateral leads See ED provider note for full interpretation and clinical correlation Confirmed by Marylou Sandoval ([id number]) on 8/3/2025 10:18:50 AM    Vascular US lower extremity venous duplex bilateral  Result Date: 7/31/2025  Interpreted By:  Nnamdi Dodson, STUDY: [facility] US LOWER EXTREMITY VENOUS DUPLEX BILATERAL;  7/31/2025 7:32 pm   INDICATION: Signs/Symptoms:Bilateral leg pain most severe on the left with redness history of DVT.   COMPARISON: None.   ACCESSION NUMBER(S):  [id number]   ORDERING CLINICIAN: ADDI ALBERTO   TECHNIQUE: Grayscale, color and spectral Doppler sonographic images of the bilateral lower extremity deep venous system.   FINDINGS: RIGHT: There is normal compressibility of the common femoral vein, saphenous femoral junction, profunda femoral vein and popliteal vein. The posterior tibial and peroneal veins  are suboptimally visualized. There is normal spontaneous and phasic variation throughout the leg by spectral doppler.   LEFT: Echogenic nonocclusive thrombus in the left popliteal distal and mid femoral veins. There is normal compressibility of the common femoral vein, saphenous femoral junction, profunda femoral vein.  The posterior tibial and peroneal veins  are not well visualized.   OTHER FINDINGS: Bilateral lower extremity edema..       Echogenic nonocclusive thrombus in the left popliteal vein extending to the mid and distal femoral vein.   Poor visualization of bilateral calf veins. No sonographic evidence DVT in the visualized vessels of the right lower extremity.   MACRO: Nnamdi Dodson discussed the significance and urgency of this critical finding by telephone with  Dr Kaplan on 7/31/2025 at 8:04 pm. (**-RCF-**) Findings:  See findings.     Signed by: Nnamdi Dodson 7/31/2025 8:05 PM Dictation workstation:   [id number]    XR foot left 3+ views  Result Date: 7/31/2025  Interpreted By:  Everett Johnson, STUDY: XR FOOT LEFT 3+ VIEWS; ;  7/31/2025 5:39 pm   INDICATION: Signs/Symptoms:wound on foot.     COMPARISON: None.   ACCESSION NUMBER(S): [id number]   ORDERING CLINICIAN: JUANITO HAIDER   FINDINGS: There is partial amputation of the 5th ray. There is no acute fracture or dislocation seen. There is severe osteopenia which limits the study   There is focal sclerosis and irregularity of the 1st distal phalangeal tuft. There is mild degenerative changes in the midfoot and forefoot.       Mild sclerosis and irregularity at the 1st distal  phalangeal tuft. In the presence of an ulcer at this location underlying osteomyelitis is suspected.   Limited by severe osteopenia     MACRO: Critical Finding:  See findings. Notification was initiated on 7/31/2025 at 6:17 pm by  Everett Johnson.  (**-YCF-**) Instructions: See Findings.   Signed by: Everett Johnson 7/31/2025 6:17 PM Dictation workstation:   [id number]    ECG 12 lead  Result Date: 7/12/2025  Sinus rhythm with 1st degree AV block Left axis deviation Left ventricular hypertrophy with QRS widening ( [place] product , Romhilt-Kwok ) T wave abnormality, consider lateral ischemia Abnormal ECG When compared with ECG of 08-JUL-2025 20:59, (unconfirmed) No significant change was found Confirmed by Carson Freeman ([id number]) on 7/12/2025 7:30:51 AM    Transthoracic Echo Complete  Result Date: 7/9/2025   [facility], [address] [place] [address]              Tel [phone] and Fax [phone] TRANSTHORACIC ECHOCARDIOGRAM REPORT  Patient Name:       SUBHASH SCHMIDT LUZMA    Reading Physician:    [id number] Carson Freeman MD Study Date:         7/9/2025            Ordering Provider:    [id number] DAMEON ALVAREZ MRN/PID:            [id number]            Fellow: Accession#:         [id number]        Nurse:                Narayan MORRIS student Date of Birth/Age:  1950 / 74 years Sonographer:          Meeta Pierre                                                               [facility] Gender assigned at  M                   Additional Staff:     Nandini MORRIS Birth: Height:             175.26 cm           Admit Date: Weight:             86.18 kg            Admission Status:     Inpatient -                                                               Routine BSA / BMI:          2.02 m2 / 28.06     Encounter#:           [id number]                     kg/m2 Blood Pressure:      127/53 mmHg         Department Location:  [place] [facility] Study Type:    TRANSTHORACIC ECHO (TTE) COMPLETE Diagnosis/ICD: Cerebral Infarction, unspecified-I63.9 Indication:    stroke CPT Code:      Echo Complete w Full Doppler-[id number] Patient History: Diabetes:          Yes Pertinent History: CAD, CVA and Murmur. abnormal EKG, hx aortic stenosis, edema,                    change in mental status, PAD, hx DVT. Study Detail: The following Echo studies were performed: 2D, M-Mode, Doppler and               color flow. Technically challenging study due to body habitus.               Agitated saline used as a contrast agent for intraseptal flow               evaluation. The patient was awake.  PHYSICIAN INTERPRETATION: Left Ventricle: The left ventricular systolic function is normal with a visually estimated ejection fraction of 60-65%. There is mild concentric left ventricular hypertrophy. There are no regional left ventricular wall motion abnormalities. The left ventricular cavity size is normal. There is mildly increased septal and normal posterior left ventricular wall thickness. There is left ventricular concentric remodeling. Spectral Doppler shows a Grade II (pseudonormal pattern) of left ventricular diastolic filling with an elevated left atrial pressure. Left Atrium: The left atrium is mildly dilated. Right Ventricle: The right ventricle is normal in size. There is normal right ventricular global systolic function. Right Atrium: The right atrium is normal in size. Aortic Valve: The aortic valve is trileaflet. The aortic valve area by VTI is 1.34 cmï¿½ with a peak velocity of 3.11 m/s. The peak and mean gradients are 39 mmHg and 22 mmHg, respectively with a dimensionless index of 0.34. There is moderate to severe aortic valve cusp calcification. There is evidence of moderate aortic valve stenosis. There is no evidence of aortic valve regurgitation. Mitral Valve: The mitral valve is mildly thickened. There is moderate  mitral annular calcification. There is mild mitral valve regurgitation. The E Vmax is 1.13 m/s. Tricuspid Valve: The tricuspid valve is structurally normal. There is trace to mild tricuspid regurgitation. Pulmonic Valve: The pulmonic valve is structurally normal. There is no indication of pulmonic valve regurgitation. Pericardium: There is no pericardial effusion noted. Aorta: The aortic root is normal. Pulmonary Artery: The estimated pulmonary artery pressure is normal. Pulmonary Veins: The pulmonary veins appear normal and return normally to the left atrium. Systemic Veins: The inferior vena cava appears mildly dilated, with IVC inspiratory collapse less than 50%.  CONCLUSIONS:  1. The left ventricular systolic function is normal with a visually estimated ejection fraction of 60-65%.  2. Spectral Doppler shows a Grade II (pseudonormal pattern) of left ventricular diastolic filling with an elevated left atrial pressure.  3. There is normal right ventricular global systolic function.  4. The left atrium is mildly dilated.  5. There is moderate mitral annular calcification.  6. Moderate aortic valve stenosis. The peak and mean gradients are 39 mmHg and 22 mmHg respectively.  7. There is moderate to severe aortic valve cusp calcification.  8. The inferior vena cava appears mildly dilated, with IVC inspiratory collapse less than 50%. QUANTITATIVE DATA SUMMARY:  2D MEASUREMENTS:          Normal Ranges: Ao Root d:       3.70 cm  (2.0-3.7cm) LAs:             4.38 cm  (2.7-4.0cm) IVSd:            1.21 cm  (0.6-1.1cm) LVPWd:           1.04 cm  (0.6-1.1cm) LVIDd:           4.83 cm  (3.9-5.9cm) LVIDs:           3.43 cm LV Mass Index:   100 g/m2 LVEDV Index:     53 ml/m2 LV % FS          28.9 %  LEFT ATRIUM:                  Normal Ranges: LA Vol A4C:        107.3 ml   (22+/-6mL/m2) LA Vol A2C:        87.2 ml LA Vol BP:         98.8 ml LA Vol Index A4C:  53.1ml/m2 LA Vol Index A2C:  43.2 ml/m2 LA Vol Index BP:   48.9 ml/m2 LA  Area A4C:       30.4 cm2 LA Area A2C:       26.8 cm2 LA Major Axis A4C: 7.3 cm LA Major Axis A2C: 7.0 cm LA Volume Index:   49.0 ml/m2 LA Vol A4C:        103.9 ml LA Vol A2C:        83.2 ml LA Vol Index BSA:  46.3 ml/m2  RIGHT ATRIUM:                 Normal Ranges: RA Vol A4C:        57.6 ml    (8.3-19.5ml) RA Vol Index A4C:  28.5 ml/m2 RA Area A4C:       20.5 cm2 RA Major Axis A4C: 6.2 cm  AORTA MEASUREMENTS:         Normal Ranges: Asc Ao, d:          4.00 cm (2.1-3.4cm)  LV SYSTOLIC FUNCTION:                      Normal Ranges: EF-A4C View:    62 % (>=55%) EF-A2C View:    61 % EF-Biplane:     61 % EF-Visual:      63 % LV EF Reported: 63 %  LV DIASTOLIC FUNCTION:             Normal Ranges: MV Peak E:             1.13 m/s    (0.7-1.2 m/s) MV Peak A:             1.01 m/s    (0.42-0.7 m/s) E/A Ratio:             1.11        (1.0-2.2) MV e'                  0.075 m/s   (>8.0) MV lateral e'          0.08 m/s MV medial e'           0.07 m/s MV A Dur:              188.39 msec E/e' Ratio:            15.05       (<8.0) PulmV Sys Jak:         61.44 cm/s PulmV Spaulding Jak:        53.97 cm/s PulmV S/D Jak:         1.14 PulmV A Revs Jak:      29.47 cm/s PulmV A Revs Dur:      222.65 msec  MITRAL VALVE:          Normal Ranges: MV DT:        236 msec (150-240msec)  AORTIC VALVE:                      Normal Ranges: AoV Vmax:                3.11 m/s  (<=1.7m/s) AoV Peak P.6 mmHg (<20mmHg) AoV Mean P.6 mmHg (1.7-11.5mmHg) LVOT Max Jak:            1.08 m/s  (<=1.1m/s) AoV VTI:                 86.10 cm  (18-25cm) LVOT VTI:                29.02 cm LVOT Diameter:           2.25 cm   (1.8-2.4cm) AoV Area, VTI:           1.34 cm2  (2.5-5.5cm2) AoV Area,Vmax:           1.38 cm2  (2.5-4.5cm2) AoV Area, planim:        1.99 cm2  (2.5-4.5cm2) AoV Dimensionless Index: 0.34  RIGHT VENTRICLE: RV Basal 4.79 cm RV Mid   2.50 cm RV Major 8.7 cm TAPSE:   22.2 mm RV s'    0.10 m/s  TRICUSPID VALVE/RVSP:         Normal  Ranges: Est. RA Pressure:     15 IVC Diam:             2.21 cm  PULMONIC VALVE:          Normal Ranges: PV Max Jak:     0.9 m/s  (0.6-0.9m/s) PV Max P.9 mmHg  PULMONARY VEINS: PulmV A Revs Dur: 222.65 msec PulmV A Revs Jak: 29.47 cm/s PulmV Spaulding Jak:   53.97 cm/s PulmV S/D Jak:    1.14 PulmV Sys Jak:    61.44 cm/s  AORTA: Asc Ao Diam 3.96 cm  [id number] Carson Freeman MD Electronically signed on 2025 at 7:56:49 PM  ** Final **     XR forearm left 2 views  Result Date: 2025  Interpreted By:  Everett Johnson, STUDY: XR FOREARM LEFT 2 VIEWS; ;  2025 5:52 pm   INDICATION: Signs/Symptoms:Left arm swelling.     COMPARISON: None.   ACCESSION NUMBER(S): [id number]   ORDERING CLINICIAN: DAMEON ALVAREZ   FINDINGS: There is moderate to severe soft tissue edema about the forearm. There is no fracture. There is no dislocation. No osseous abnormality       Moderate to severe soft tissue edema without acute osseous abnormality     MACRO: None   Signed by: Everett Johnson 2025 6:07 PM Dictation workstation:   [id number]    CT angio head and neck w and wo IV contrast  Result Date: 2025  Interpreted By:  Jaskaran Llamas, STUDY: CT ANGIO HEAD AND NECK W AND WO IV CONTRAST;  2025 2:21 pm   INDICATION: Signs/Symptoms:Altered mental status, stroke rule out,.     COMPARISON: None.   ACCESSION NUMBER(S): [id number]   ORDERING CLINICIAN: DAMEON ALVAREZ   TECHNIQUE: Unenhanced CT images of the head were obtained. Subsequently, 75 ML of Omnipaque 350 was administered intravenously and axial images of the head and neck were acquired.  Coronal, sagittal, and 3-D reconstructions were provided for review.   FINDINGS: CTA HEAD FINDINGS:   Anterior circulation: The bilateral intracranial internal carotid arteries, bilateral carotid terminals, bilateral proximal anterior and middle cerebral arteries are normal.   Posterior circulation: Moderate to severe focal narrowing of the distal P1 segment of the left PCA  (series 502, image 143). Bilateral intracranial vertebral arteries, vertebrobasilar junction, basilar artery and right proximal posterior cerebral arteries are normal.   CTA NECK FINDINGS:   Right carotid vessels: The common carotid artery is normal. The carotid bifurcation is normal. The internal carotid artery in the neck is normal. There is 0% stenosis.   Left carotid vessels: The common carotid artery is normal. The carotid bifurcation is normal. The internal carotid artery in the neck is normal. There is 0% stenosis.   Vertebral vessels: Dominant right vertebral artery. Diminutive left vertebral artery with lack of enhancement from its origin to the V4 segment, which may represent chronic atherosclerotic disease with poor flow. Right vertebral artery is patent.   There is questionable hyperdense prominence of the extra-axial spaces in the bilateral posterior parietal regions, however can not be confirmed on other sequences and can be artifactual.       1. Moderate to severe focal narrowing of the distal P1 segment of the left PCA.   2. Diminutive left vertebral artery with lack of enhancement to the V4 segment as above. Finding may represent chronic atherosclerotic disease with poor flow.   3. Patency of the bilateral internal carotid arteries and right vertebral artery in the neck.   Recommendation: If clinically concerned for small acute ischemic infarct, further evaluation with MR of the brain without contrast is recommended.   4. Questionable hyperdense prominence of the extra-axial spaces in the bilateral posterior parietal regions, however can not be confirmed on other sequences and can be artifactual. Attention on short-term follow-up CT head without contrast is recommended.   MACRO: Critical Finding:  See findings. Notification was initiated on 7/9/2025 at 2:51 pm by  Jaskaran Llamas.  (**-OCF-**)   Signed by: Jaskaran Llamas 7/9/2025 2:51 PM Dictation workstation:   [id number]    CT chest abdomen  pelvis w IV contrast  Result Date: 7/9/2025  Interpreted By:  Tere De La Paz, STUDY: CT CHEST ABDOMEN PELVIS W IV CONTRAST;  7/9/2025 12:10 am   INDICATION: Signs/Symptoms:elevated wbc; weak; r/o infiltrate.   COMPARISON: Chest x-ray 07/08/2025. CT chest, abdomen, pelvis 06/22/2025. Renal ultrasound 06/26/2025.   ACCESSION NUMBER(S): [id number]   ORDERING CLINICIAN: GARRY RICO   TECHNIQUE: Axial CT of the chest, abdomen, and pelvis was performed.  Coronal and sagittal reconstructions were performed. Intravenous contrast material was administered without immediate complication.   FINDINGS: There is motion artifact.   CHEST:   LUNG/PLEURA/LARGE AIRWAYS: The trachea and the central airways are patent.   The evaluation of the lungs is degraded by motion artifact. There is a right effusion. There is a small-moderate left pleural effusion. Atelectatic changes are noted at the bilateral lower lobes and at the lingula. No pneumothorax. Linear atelectasis/scarring at the right lung apex.   VESSELS: No thoracic aortic aneurysm. Atherosclerotic calcifications are present of the thoracic aorta. The main pulmonary artery is dilated.   HEART: The heart is not enlarged. Coronary artery calcifications are present. No pericardial effusion.   MEDIASTINUM AND ROLO: No pathologically enlarged lymph nodes at the mediastinum, hilar or axillary regions.   CHEST WALL AND LOWER NECK: The visualized thyroid gland is unremarkable. The soft tissues of the chest wall demonstrate no gross abnormality.     ABDOMEN:   LIVER: No focal lesion is identified.   BILE DUCTS: No significant dilation.   GALLBLADDER: Present.   PANCREAS: No peripancreatic inflammatory changes.   SPLEEN: Unremarkable.   ADRENAL GLANDS: Unremarkable.   KIDNEYS AND URETERS: Symmetrical renal enhancement. There is mild bilateral hydroureteronephrosis. There is a 1.2 cm hyperdense cortical exophytic lesion at the left kidney.   PELVIS   BLADDER: The urinary bladder is  decompressed with circumferential wall thickening. A Lama catheter is present at the urinary bladder. There is a small amount of gas at the urinary bladder, probably secondary to recent instrumentation.   REPRODUCTIVE ORGANS: The prostate measures 5.5 cm in transverse diameter.   BOWEL: There is a diverticulum at the gastric fundus. No bowel obstruction. There is colonic diverticulosis with no CT evidence for acute diverticulitis. The appendix is normal.   VESSELS: Atherosclerotic calcifications within the abdominal aorta and its branches. No abdominal aortic aneurysm.   PERITONEUM/RETROPERITONEUM/LYMPH NODES: No free fluid or free air.  No retroperitoneal hemorrhage. Redemonstration of mildly enlarged external iliac chain lymph node measuring 1.2 cm in short axis on the right and 1.0 cm in short axis on the left.   BONE AND SOFT TISSUE: Multilevel degenerative changes in the spine. There is a small fat containing umbilical hernia. Small amount of gas at the soft tissues of the anterior abdominal wall with scattered hyperdense foci, may be secondary to subcutaneous injection administration.       CHEST: 1.  Small right pleural effusion. Small-moderate left pleural effusion. Bibasilar atelectasis. 2. Coronary artery calcifications. Dilated main pulmonary artery, please correlate for pulmonary arterial hypertension.   ABDOMEN-PELVIS: 1.  Circumferential wall thickening of the urinary bladder, may be secondary to underdistention or chronic outlet obstruction. Please correlate with urinalysis to exclude superimposed cystitis. 2. Mild bilateral hydronephrosis. 3. Prostatomegaly. 4. Colonic diverticulosis. 5. Mild pelvic adenopathy.     MACRO: None.   Signed by: Tere De La Paz 7/9/2025 1:10 AM Dictation workstation:   [id number]    XR chest 1 view  Result Date: 7/8/2025  Interpreted By:  Eveertt Johnson, STUDY: XR CHEST 1 VIEW;  7/8/2025 10:06 pm   INDICATION: Signs/Symptoms:decreased loc.     COMPARISON: None.    ACCESSION NUMBER(S): [id number]   ORDERING CLINICIAN: GARRY RICO   FINDINGS:   Low lung volumes which limits the study. There is enlargement cardiac silhouette. There is mild pulmonary vascular congestion/mild interstitial edema. No effusion or consolidation seen       Enlarged cardiac silhouette with mild interstitial edema. Low lung volumes which limits the study     MACRO: None   Signed by: Everett Johnson 7/8/2025 10:17 PM Dictation workstation:   [id number]    CT brain attack head wo IV contrast  Result Date: 7/8/2025  Interpreted By:  Delphine Felton, STUDY: CT BRAIN ATTACK HEAD WO IV CONTRAST;  7/8/2025 8:57 pm   INDICATION: Signs/Symptoms:stroke.     COMPARISON: CT head dated 06/22/2025.   ACCESSION NUMBER(S): [id number]   ORDERING CLINICIAN: GARRY RICO   TECHNIQUE: Noncontrast axial CT scan of head was performed. Angled reformats in brain and bone windows were generated. The images were reviewed in bone, brain, blood and soft tissue windows.   FINDINGS: No hyperdense intracranial hemorrhage is present. There is no mass effect or midline shift identified.   Gray-white differentiation is intact, without evidence of CT apparent transcortical infarct. There are multifocal areas of diminished attenuation present within the periventricular and subcortical white matter of bilateral cerebral hemispheres, similar in appearance to prior exam on 06/22/2025, likely representing sequela of advanced microvascular disease.   Additional areas of diminished attenuation are present in the right basal ganglia, predominantly involving the right putamen head, also similar in appearance to prior exam on 06/22/2025, likely representing sequela of prior lacunar infarcts.   Focus of diminished attenuation present in the left cerebellum (series 202, image 11) likely represent sequela of remote cerebellar infarct, similar to prior study.   No new ventricular dilatation is present. Basal cisterns are patent. No  extra-axial fluid collections are identified.   Scalp soft tissues do not demonstrate any acute abnormality. No acute calvarial abnormality is present.   Mastoid air cells and middle ear cavities are clear. Visualized paranasal sinuses are essentially unremarkable in appearance, although there is some mucous/secretions present in the inferior left frontal sinus and scattered anterior ethmoidal air cells.       1. No evidence of hemorrhage, CT apparent transcortical infarct, or other acute intracranial abnormality in the interim since prior exam on 06/22/2025. 2. Stable chronic findings as detailed above, including multifocal areas of diminished attenuation in the subcortical and periventricular white matter of bilateral cerebral hemispheres, likely representing sequela of chronic microvascular disease, although acute small white matter infarct is difficult to entirely exclude. If there is high clinical suspicion for acute neurologic abnormality, MRI of the brain can be considered for additional characterization.   MACRO: Delphine Felton discussed the significance and urgency of this critical finding by EPIC secure chat with  GARRY RICO on 7/8/2025 at 9:09 pm.  (**-RCF-**) Findings:  See findings.     Signed by: Delphine Felton 7/8/2025 9:09 PM Dictation workstation:   [id number]     Assessment/Plan   Left leg cellulitis  Chronic Left leg ulcer   Abnormal left foot xray-concern for osteomyelitis   History of left foot fifth toe osteomyelitis status post amputation  History of Pseudomonas, MRSA, Enterococcus faecalis Pseudomonas, MRSA, Enterococcus faecalis  History of positive blood cultures with Staphylococcus epidermidis        IV vancomycin  IV Zosyn  Monitor temperature and wbc  Supportive care  Leg elevation  Monitor response to therapy   Local care  Follow-up blood cultures  Follow-up wound culture-left leg  Follow-up CT left foot   Further recommendations based on culture results     This is a  complex infectious disease issue and the following was performed today (for more details please see the above note): Management decisions reflecting the added complexity (e.g., changes in antimicrobial therapy, infection control strategies).     Baljit Velazco MD

## 2025-08-03 NOTE — PROGRESS NOTES
"Subjective Data:  Mr. Ady Partida is a 74 y.o. male on day 3 of admission presenting with Nonhealing nonsurgical wound limited to breakdown of skin.    Subjective   Mr. Partida is resting comfortably in bed. Complains of general fatigue. Awaiting a CT of his foot.        Objective     Physical Exam  Vitals reviewed.   Constitutional:       Appearance: Normal appearance.   HENT:      Head: Normocephalic.      Nose: Nose normal.     Cardiovascular:      Pulses: Normal pulses.      Heart sounds: Normal heart sounds.   Pulmonary:      Effort: Pulmonary effort is normal.      Breath sounds: Normal breath sounds.   Abdominal:      Palpations: Abdomen is soft.     Skin:     General: Skin is warm and dry.      Comments: Multiple wounds including sacral wound, left foot is wrapped d/t wound.      Neurological:      General: No focal deficit present.      Mental Status: He is alert and oriented to person, place, and time.      Motor: Weakness present.      Gait: Gait abnormal.         Last Recorded Vitals  Blood pressure 128/51, pulse 56, temperature 36.5 °C (97.7 °F), temperature source Temporal, resp. rate 16, height 1.753 m (5' 9.02\"), weight 83.1 kg (183 lb 1.6 oz), SpO2 95%.  Intake/Output last 3 Shifts:  I/O last 3 completed shifts:  In: 1500 (18.1 mL/kg) [P.O.:700; I.V.:100 (1.2 mL/kg); IV Piggyback:700]  Out: 900 (10.8 mL/kg) [Urine:900 (0.3 mL/kg/hr)]  Weight: 83.1 kg     Relevant Results  Scheduled medications  Scheduled Medications[1]  Continuous medications  Continuous Medications[2]  PRN medications  PRN Medications[3]        No results found.     Results for orders placed or performed during the hospital encounter of 07/31/25 (from the past 24 hours)   POCT GLUCOSE   Result Value Ref Range    POCT Glucose 95 74 - 99 mg/dL   POCT GLUCOSE   Result Value Ref Range    POCT Glucose 83 74 - 99 mg/dL   POCT GLUCOSE   Result Value Ref Range    POCT Glucose 105 (H) 74 - 99 mg/dL   POCT GLUCOSE   Result Value " Ref Range    POCT Glucose 91 74 - 99 mg/dL                        Assessment & Plan  Nonhealing nonsurgical wound limited to breakdown of skin    Acute deep vein thrombosis (DVT) of popliteal vein of left lower extremity    Ambulatory dysfunction    Anemia    Benign essential HTN    Bilateral lower extremity edema    CAD (coronary artery disease)    Cellulitis of foot, left    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   Poor visualization of bilateral calf veins. No sonographic evidence DVT in the visualized vessels of the right lower extremity.   - Left foot x-ray: Mild sclerosis and irregularity at the 1st distal phalangeal tuft. In the presence of an ulcer at this location underlying osteomyelitis is suspected.   Limited by severe osteopenia. CT or L foot ordered.   - Blood culture in progress  - Wound culture - No polymorphonuclear leukocytes seen, no organisms seen  - Podiatry consulted, appreciate recommendations  - Infectious disease:  IV vancomycin  IV Zosyn  Monitor temperature and wbc (patient refused labs today)  Supportive care  Leg elevation  Monitor response to therapy   Local care  Blood cultures x 2  Wound culture-left leg  CT left foot   Further recommendations based on culture results   - Continue apixaban  - PT/OT evaluations     CAD (coronary artery disease)  Benign essential HTN  Hyperlipidemia  Bilateral lower extremity edema  -ECHO 1. The left ventricular systolic function is normal with a visually estimated ejection fraction of 60-65%    Moderate aortic stenosis   - Continue amlodipine, hydralazine  - Telemetry     Anemia  -H/H 10.7/33.8 > 10.4/33.2 9 (labs refused today)  -Platelets 288 > 297  - Continue ferrous sulfate  - Daily CBC     Diabetes (Multi)  Neuropathy  -Continue gabapentin  -Continue sliding scale     GI ppx: PPI  DVT ppx: Apixaban  Fluids: As needed  Electrolytes: replace as needed  Nutrition: Regular  Adjuncts: PIV  Code Status: Full code  Pt requires inpatient stay at this time.         Ani Corley, APRN-CNP         [1] amLODIPine, 2.5 mg, oral, Daily  apixaban, 5 mg, oral, BID  B complex-vitamin C, 1 tablet, oral, Daily  ferrous sulfate, 65 mg of elemental iron, oral, Daily  gabapentin, 300 mg, oral, TID  hydrALAZINE, 50 mg, oral, TID  insulin lispro, 0-10 Units, subcutaneous, TID AC  piperacillin-tazobactam, 4.5 g, intravenous, q8h  vancomycin, 1,500 mg, intravenous, q24h  zinc sulfate, 50 mg of elemental zinc, oral, Daily     [2] sodium chloride 0.9%, 10 mL/hr, Last Rate: 10 mL/hr (08/02/25 5340)     [3] PRN medications: acetaminophen, alum-mag hydroxide-simeth, benzocaine-menthol, loperamide, meclizine, melatonin, ondansetron, oxyCODONE, oxyCODONE, polyethylene glycol, sodium chloride 0.9%, vancomycin, zinc oxide

## 2025-08-03 NOTE — CARE PLAN
The patient's goals for the shift include  resting    The clinical goals for the shift include Patient will remain free from fall/injury this shift      Problem: Pain - Adult  Goal: Verbalizes/displays adequate comfort level or baseline comfort level  Outcome: Progressing     Problem: Discharge Planning  Goal: Discharge to home or other facility with appropriate resources  Outcome: Progressing     Problem: Chronic Conditions and Co-morbidities  Goal: Patient's chronic conditions and co-morbidity symptoms are monitored and maintained or improved  Outcome: Progressing     Problem: Nutrition  Goal: Nutrient intake appropriate for maintaining nutritional needs  Outcome: Progressing     Problem: Skin  Goal: Decreased wound size/increased tissue granulation at next dressing change  Outcome: Progressing  Flowsheets (Taken 8/3/2025 0436)  Decreased wound size/increased tissue granulation at next dressing change: Promote sleep for wound healing  Goal: Participates in plan/prevention/treatment measures  Outcome: Progressing  Flowsheets (Taken 8/3/2025 0436)  Participates in plan/prevention/treatment measures: Discuss with provider PT/OT consult  Goal: Prevent/manage excess moisture  Outcome: Progressing  Flowsheets (Taken 8/3/2025 0436)  Prevent/manage excess moisture: Cleanse incontinence/protect with barrier cream  Goal: Prevent/minimize sheer/friction injuries  Outcome: Progressing  Flowsheets (Taken 8/3/2025 0436)  Prevent/minimize sheer/friction injuries:   HOB 30 degrees or less   Use pull sheet  Goal: Promote/optimize nutrition  Outcome: Progressing  Flowsheets (Taken 8/3/2025 0436)  Promote/optimize nutrition: Monitor/record intake including meals  Goal: Promote skin healing  Outcome: Progressing  Flowsheets (Taken 8/3/2025 0436)  Promote skin healing: Protective dressings over bony prominences     Problem: Fall/Injury  Goal: Verbalize understanding of personal risk factors for fall in the hospital  Outcome:  Progressing  Goal: Verbalize understanding of risk factor reduction measures to prevent injury from fall in the home  Outcome: Progressing  Goal: Use assistive devices by end of the shift  Outcome: Progressing  Goal: Pace activities to prevent fatigue by end of the shift  Outcome: Progressing     Problem: Diabetes  Goal: Achieve decreasing blood glucose levels by end of shift  Outcome: Progressing  Goal: Increase stability of blood glucose readings by end of shift  Outcome: Progressing  Goal: Maintain electrolyte levels within acceptable range throughout shift  Outcome: Progressing  Goal: Maintain glucose levels >70mg/dl to <250mg/dl throughout shift  Outcome: Progressing  Goal: No changes in neurological exam by end of shift  Outcome: Progressing  Goal: Vital signs within normal range for age by end of shift  Outcome: Progressing     Problem: Pain  Goal: Takes deep breaths with improved pain control throughout the shift  Outcome: Progressing  Goal: Turns in bed with improved pain control throughout the shift  Outcome: Progressing  Goal: Walks with improved pain control throughout the shift  Outcome: Progressing  Goal: Performs ADL's with improved pain control throughout shift  Outcome: Progressing  Goal: Participates in PT with improved pain control throughout the shift  Outcome: Progressing  Goal: Free from opioid side effects throughout the shift  Outcome: Progressing  Goal: Free from acute confusion related to pain meds throughout the shift  Outcome: Progressing

## 2025-08-04 VITALS
HEART RATE: 63 BPM | SYSTOLIC BLOOD PRESSURE: 124 MMHG | TEMPERATURE: 97.7 F | DIASTOLIC BLOOD PRESSURE: 51 MMHG | WEIGHT: 183.1 LBS | OXYGEN SATURATION: 95 % | RESPIRATION RATE: 16 BRPM | BODY MASS INDEX: 27.12 KG/M2 | HEIGHT: 69 IN

## 2025-08-04 VITALS
OXYGEN SATURATION: 95 % | BODY MASS INDEX: 27.12 KG/M2 | TEMPERATURE: 98.1 F | WEIGHT: 183.1 LBS | HEART RATE: 59 BPM | DIASTOLIC BLOOD PRESSURE: 53 MMHG | SYSTOLIC BLOOD PRESSURE: 137 MMHG | RESPIRATION RATE: 16 BRPM | HEIGHT: 69 IN

## 2025-08-04 LAB
BACTERIA SPEC CULT: ABNORMAL
BACTERIA SPEC CULT: ABNORMAL
GLUCOSE BLD MANUAL STRIP-MCNC: 87 MG/DL (ref 74–99)
GLUCOSE BLD MANUAL STRIP-MCNC: 93 MG/DL (ref 74–99)
GRAM STN SPEC: ABNORMAL
GRAM STN SPEC: ABNORMAL

## 2025-08-04 PROCEDURE — 2500000001 HC RX 250 WO HCPCS SELF ADMINISTERED DRUGS (ALT 637 FOR MEDICARE OP): Mod: IPSPLIT | Performed by: NURSE PRACTITIONER

## 2025-08-04 PROCEDURE — 2500000004 HC RX 250 GENERAL PHARMACY W/ HCPCS (ALT 636 FOR OP/ED): Mod: IPSPLIT | Performed by: HOSPITALIST

## 2025-08-04 PROCEDURE — 2500000004 HC RX 250 GENERAL PHARMACY W/ HCPCS (ALT 636 FOR OP/ED): Mod: IPSPLIT | Performed by: INTERNAL MEDICINE

## 2025-08-04 PROCEDURE — 99239 HOSP IP/OBS DSCHRG MGMT >30: CPT | Performed by: NURSE PRACTITIONER

## 2025-08-04 PROCEDURE — 82947 ASSAY GLUCOSE BLOOD QUANT: CPT | Mod: IPSPLIT

## 2025-08-04 PROCEDURE — 2500000001 HC RX 250 WO HCPCS SELF ADMINISTERED DRUGS (ALT 637 FOR MEDICARE OP): Mod: IPSPLIT | Performed by: HOSPITALIST

## 2025-08-04 PROCEDURE — 94760 N-INVAS EAR/PLS OXIMETRY 1: CPT | Mod: IPSPLIT

## 2025-08-04 RX ORDER — AMOXICILLIN AND CLAVULANATE POTASSIUM 875; 125 MG/1; MG/1
1 TABLET, FILM COATED ORAL EVERY 12 HOURS SCHEDULED
Status: DISCONTINUED | OUTPATIENT
Start: 2025-08-04 | End: 2025-08-04 | Stop reason: HOSPADM

## 2025-08-04 RX ORDER — SULFAMETHOXAZOLE AND TRIMETHOPRIM 800; 160 MG/1; MG/1
1 TABLET ORAL EVERY 12 HOURS SCHEDULED
Status: DISCONTINUED | OUTPATIENT
Start: 2025-08-04 | End: 2025-08-04 | Stop reason: HOSPADM

## 2025-08-04 RX ORDER — AMOXICILLIN AND CLAVULANATE POTASSIUM 875; 125 MG/1; MG/1
1 TABLET, FILM COATED ORAL EVERY 12 HOURS SCHEDULED
Qty: 22 TABLET | Refills: 0 | Status: SHIPPED | OUTPATIENT
Start: 2025-08-04 | End: 2025-08-15

## 2025-08-04 RX ORDER — SULFAMETHOXAZOLE AND TRIMETHOPRIM 800; 160 MG/1; MG/1
1 TABLET ORAL EVERY 12 HOURS SCHEDULED
Qty: 22 TABLET | Refills: 0 | Status: SHIPPED | OUTPATIENT
Start: 2025-08-04 | End: 2025-08-15

## 2025-08-04 RX ORDER — ACETAMINOPHEN 325 MG/1
650 TABLET ORAL EVERY 6 HOURS PRN
Start: 2025-08-04

## 2025-08-04 RX ADMIN — AMLODIPINE BESYLATE 2.5 MG: 2.5 TABLET ORAL at 09:05

## 2025-08-04 RX ADMIN — Medication 1 TABLET: at 09:06

## 2025-08-04 RX ADMIN — APIXABAN 5 MG: 5 TABLET, FILM COATED ORAL at 09:06

## 2025-08-04 RX ADMIN — ONDANSETRON 4 MG: 2 INJECTION, SOLUTION INTRAMUSCULAR; INTRAVENOUS at 13:01

## 2025-08-04 RX ADMIN — GABAPENTIN 300 MG: 300 CAPSULE ORAL at 09:06

## 2025-08-04 RX ADMIN — FERROUS SULFATE TAB 325 MG (65 MG ELEMENTAL FE) 1 TABLET: 325 (65 FE) TAB at 09:05

## 2025-08-04 RX ADMIN — ZINC SULFATE 220 MG (50 MG) CAPSULE 1 CAPSULE: CAPSULE at 09:05

## 2025-08-04 RX ADMIN — PIPERACILLIN SODIUM AND TAZOBACTAM SODIUM 4.5 G: 4; .5 INJECTION, SOLUTION INTRAVENOUS at 03:47

## 2025-08-04 RX ADMIN — SODIUM CHLORIDE 10 ML/HR: 0.9 INJECTION, SOLUTION INTRAVENOUS at 00:01

## 2025-08-04 RX ADMIN — HYDRALAZINE HYDROCHLORIDE 50 MG: 50 TABLET ORAL at 09:06

## 2025-08-04 RX ADMIN — ACETAMINOPHEN 650 MG: 325 TABLET, FILM COATED ORAL at 10:06

## 2025-08-04 ASSESSMENT — COGNITIVE AND FUNCTIONAL STATUS - GENERAL
DRESSING REGULAR LOWER BODY CLOTHING: A LITTLE
TURNING FROM BACK TO SIDE WHILE IN FLAT BAD: A LITTLE
DAILY ACTIVITIY SCORE: 22
CLIMB 3 TO 5 STEPS WITH RAILING: A LITTLE
HELP NEEDED FOR BATHING: A LITTLE
STANDING UP FROM CHAIR USING ARMS: A LITTLE
WALKING IN HOSPITAL ROOM: A LITTLE
MOBILITY SCORE: 19
MOVING TO AND FROM BED TO CHAIR: A LITTLE

## 2025-08-04 ASSESSMENT — PAIN - FUNCTIONAL ASSESSMENT
PAIN_FUNCTIONAL_ASSESSMENT: 0-10

## 2025-08-04 ASSESSMENT — PAIN DESCRIPTION - LOCATION: LOCATION: HEAD

## 2025-08-04 ASSESSMENT — PAIN SCALES - GENERAL
PAINLEVEL_OUTOF10: 0 - NO PAIN
PAINLEVEL_OUTOF10: 3

## 2025-08-04 ASSESSMENT — PAIN DESCRIPTION - DESCRIPTORS: DESCRIPTORS: ACHING

## 2025-08-04 NOTE — NURSING NOTE
"This RN and PCNA in room with patient to encourage patient to void as no voids have been documented for prolonged period of time. Bladder scan showing 707mLs. Patient becoming irritable and angry with staff, stating things such as \"Whose fu**ing idea was this?\" And \"You all are a**holes\". This RN educated patient about the risks of urinary residual and not emptying the bladder along with what the next step in plan of care would look like if still unable to void. Patient stated \"you're not putting a fu**ing tube in me\" and began to mock care staff in a high pitched voice. Patient calling staff inappropriate names and stating \"get the f**k out of my room\". Patient then requested staff to assist with repositioning in bed and made comfortable. This RN and PCNA exited room with safety measures in place and call light within reach. MD notified about situation.    0540: This RN in to check on patient to see if voided. Patient did void 700mLs of yellow urine.  "

## 2025-08-04 NOTE — CARE PLAN
The patient's goals for the shift include  Pt will discharge home today.     The clinical goals for the shift include Pt will tolerate IV abx today    Pt discharged home today. Discharge instructions reviewed with patient. Pt verbally understood discharge instructions and had no questions or concerns. VS were stable and IV was discontinued and intact. No other complaints. Pt transferred off floor via wheelchair to his ride.

## 2025-08-04 NOTE — PROGRESS NOTES
Vancomycin Dosing by Pharmacy- Cessation of Therapy    Consult to pharmacy for vancomycin dosing has been discontinued by the prescriber, pharmacy will sign off at this time.    Please call pharmacy if there are further questions or re-enter a consult if vancomycin is resumed.     dAwoa Rowe, PharmD

## 2025-08-04 NOTE — PROGRESS NOTES
Internal Medicine Daily Progress Note    Subjective   Patient is a 74 y.o. male admitted on 7/31/2025  5:24 PM  with chief complaint of LLE wound.     Pt with limited answers to ROS  Endorses LLE wound/pain    Objective   Constitutional: pt in NAD, alert and cooperative  Eyes: PERRL, EOMI, no icterus   ENMT: mucous membranes moist, no apparent injury, no lesions seen  Head/Neck: Neck supple, no apparent injury  Respiratory/Thorax: Lungs CTA bilaterally, non-labored breathing, no cough, on RA  Cardiovascular: Regular, rate and rhythm, 3/6 systolic murmur, normal S1 and S2  Gastrointestinal: Nondistended, soft, non-tender, BS present x 4  Musculoskeletal: ROM intact, no joint swelling, normal strength  Extremities: normal extremities, LLE wrapped in kerlix  Neurological: alert and oriented x 3, speech clear, follows commands appropriately, cr. n. II-XII intact, sensation grossly intact, motor 5/5 throughout  Skin: Warm and dr        Vitals:  Most Recent:  Vitals:    08/04/25 0800   BP:    Pulse: 57   Resp:    Temp:    SpO2: 98%       24hr Min/Max:  Temp  Min: 36.5 °C (97.7 °F)  Max: 36.9 °C (98.4 °F)  Pulse  Min: 57  Max: 84  BP  Min: 105/50  Max: 137/53  Resp  Min: 16  Max: 16  SpO2  Min: 92 %  Max: 98 %    Scheduled medications  Scheduled Medications[1]  Continuous medications  Continuous Medications[2]  PRN medications  PRN Medications[3]    Lab Review   Results from last 7 days   Lab Units 08/01/25  0940 07/31/25  1935   WBC AUTO x10*3/uL 7.9 9.6   HEMOGLOBIN g/dL 10.4* 10.7*   HEMATOCRIT % 33.2* 33.8*   PLATELETS AUTO x10*3/uL 297 288     Results from last 7 days   Lab Units 08/01/25  0940 07/31/25  1758   SODIUM mmol/L 140 140   POTASSIUM mmol/L 4.1 4.7   CHLORIDE mmol/L 104 102   CO2 mmol/L 27 30   BUN mg/dL 23 25*   CREATININE mg/dL 0.95 0.84   CALCIUM mg/dL 9.2 9.6   PROTEIN TOTAL g/dL  --  7.0   BILIRUBIN TOTAL mg/dL  --  0.3   ALK PHOS U/L  --  113   ALT U/L  --  15   AST U/L  --  23   GLUCOSE mg/dL  93 106*            Assessment/Plan      Elliot Partida is a 74 y.o. year old male patient with history of history of moderate aortic stenosis, osteomyelitis, DM, neuropathy, DVT, trigeminal neuralgia, and HTN, recent Covid infection (5/13/25) admitted on 7/31 with LLE cellulitis     Plan:  NEUROLOGY/PSYCH:  Dx:  No acute issues.  Hx of Neuropathy and Trigeminal neuralgia  Management:  A&Ox3  Continue Gabapentin 300 mg TID  PRN Tylenol for pain  Melatonin for sleep     CARDIOVASCULAR:  Dx:  hx of Moderate Aortic Stenosis, HTN, CAD  7/2025 TTE: LVED 55-60%, no WMA, DD, Mod Aortic Stenosis  Management:  Home meds: Amlodipine 2.5 mg daily, Hydralazine 50 mg TID  HDS     PULMONARY:  Dx:  no acute issues  Management:  Stable on RA     RENAL/GENITOURINARY:  Dx: No acute issues  Management:  Voiding per external cath     GASTROENTEROLOGY:  Dx:  no acute issues.   Management:  Diet: regular + supplements      ENDOCRINOLOGY:  Dx:  DM Type II  Management:  A1C: 5.8 (6/2025)  Blood sugars:   SSI Lispro # scale     HEMATOLOGY:  Dx:  Normocytic anemia, Acute on chronic L pop/fem DVT (dx 6/2025), hx of L subclavian/axillary/brachial DVT  5/25: Evidence for acute partially occlusive DVT within the left subclavian, axillary and brachial veins.  Superficial thrombus within the basilic vein, surrounding the PICC line.  7/31: Ultrasound bilateral legs: Echogenic nonocclusive thrombus in the left popliteal vein extending to the mid and distal femoral vein.   Poor visualization of bilateral calf veins. No sonographic evidence DVT in the visualized vessels of the right lower extremity.   Management:  No signs of bleeding  Continue home Apixiban 5 mg BID     MUSCULOSKELETAL:  Dx:  LLE cellulitis, hx of L foot OM,  Sacral pressure injury (present on admission)  7/31: Left foot x-ray: Mild sclerosis and irregularity at the 1st distal phalangeal tuft. In the presence of an ulcer at this location underlying osteomyelitis is suspected.    Limited by severe osteopenia    8/2 L foot CT: Skin wound near the calcaneal tuberosity of the left foot without adjacent osseous abnormality.  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  Nonspecific generalized subcutaneous edema, potentially cellulitis.  Management:  Wound care orders in place  Podiatry consulted, awaiting recs  Continue IV atb as outlined below     INFECTIOUS DISEASE:  Dx: MRSA L foot wound/LLE cellulitis, Hx of L foot OM  Management:  Tmax: 36.9 C  MICRO: 8/1 tissue cx (foot wound) +MRSA  Antimicrobials: Zosyn (7/31- *), Vanc (7/31- *)  ID following    Prophylaxis:  DVT ppx: apixiban  GI ppx: N/A  Hardware:  Catheter: none  Drains: none  Lines: PIV  Social:  Code: Full Code    HPOA:  Natty Gee, sister, [phone], Mica Ruth, niece, [phone]    Disposition: inpatient stay for MRSA cellulitis  Discharge Planning: ?home with home  care    I spent 35 minutes in the professional and overall care of this patient.      Dianelys Montes, [facility]  Pulm/Critical Care/Internal Medicine         [1] amLODIPine, 2.5 mg, oral, Daily  apixaban, 5 mg, oral, BID  B complex-vitamin C, 1 tablet, oral, Daily  ferrous sulfate, 65 mg of elemental iron, oral, Daily  gabapentin, 300 mg, oral, TID  hydrALAZINE, 50 mg, oral, TID  insulin lispro, 0-10 Units, subcutaneous, TID AC  piperacillin-tazobactam, 4.5 g, intravenous, q8h  vancomycin, 1,500 mg, intravenous, q24h  zinc sulfate, 50 mg of elemental zinc, oral, Daily  [2] sodium chloride 0.9%, 10 mL/hr, Last Rate: 10 mL/hr (08/04/25 0001)  [3] PRN medications: acetaminophen, alum-mag hydroxide-simeth, benzocaine-menthol, loperamide, meclizine, melatonin, ondansetron, oxyCODONE, oxyCODONE, polyethylene glycol, sodium chloride 0.9%, vancomycin, zinc oxide

## 2025-08-04 NOTE — PROGRESS NOTES
Elliot Partida is a 74 y.o. male on day 4 of admission presenting with Nonhealing nonsurgical wound limited to breakdown of skin.    Subjective   Interval History:   Afebrile, no chills  No left leg or heel pain  No chest pain or shortness of breath      Objective   Range of Vitals (last 24 hours)  Heart Rate:  [57-84]   Temp:  [36.5 °C (97.7 °F)-36.9 °C (98.4 °F)]   Resp:  [16]   BP: (105-137)/(49-56)   SpO2:  [94 %-98 %]   Daily Weight  07/31/25 : 83.1 kg (183 lb 1.6 oz)    Body mass index is 27.03 kg/m².    Physical Exam  Constitutional:       Appearance: Normal appearance. He is obese.   HENT:      Head: Normocephalic and atraumatic.      Nose: Nose normal.      Mouth: Mucous membranes are moist.      Pharynx: Oropharynx is clear.    Eyes:      General: No scleral icterus.   Cardiovascular:      Rate and Rhythm: Normal rate and regular rhythm. +Murmur   Pulmonary:      Effort: Pulmonary effort is normal.      Breath sounds: Normal breath sounds.   Abdominal:      General: Bowel sounds are normal.      Palpations: Abdomen is soft.      Musculoskeletal:         General: Normal range of motion.      Cervical back: Normal range of motion and neck supple.      Comments: Left fifth toe amputation    Skin:     General: Skin is warm and dry.      Comments: Left anterior leg wound with left leg erythema , Right heel scabbed wound-stable appearing, no significant surrounding erythema ,no overt signs of infection   Neurological:      Mental Status: He is alert and oriented to person, place, and time.    Psychiatric:         Mood and Affect: Mood normal.         Behavior: Behavior normal.         Antibiotics  amoxicillin-clavulanate - 875-125 mg, 875-125 mg  sulfamethoxazole-trimethoprim - 800-160 mg, 800-160 mg    Relevant Results  Labs                    CrCl cannot be calculated (Patient's most recent lab result is older than the maximum 3 days allowed.).  C-Reactive Protein   Date Value Ref Range Status   07/31/2025  0.54 <1.00 mg/dL Final   07/08/2025 1.32 (H) <1.00 mg/dL Final   05/20/2025 3.85 (H) <1.00 mg/dL Final     Microbiology  Susceptibility data from last 14 days.  Collected Specimen Info Organism Clindamycin Erythromycin Oxacillin Tetracycline Trimethoprim/Sulfamethoxazole Vancomycin   08/01/25 Tissue/Biopsy from Wound/Tissue Methicillin Resistant Staphylococcus aureus (MRSA)  R  R  R  R  S  S     Mixed Skin Microorganisms              Imaging  ECG 12 lead  Result Date: 8/3/2025  Sinus rhythm with 1st degree AV block Left axis deviation Nonspecific intraventricular block Cannot rule out Anterior infarct (cited on or before 09-MAY-2025) Abnormal ECG When compared with ECG of 22-JUN-2025 21:08, (unconfirmed) QRS axis Shifted left Questionable change in initial forces of Lateral leads See ED provider note for full interpretation and clinical correlation Confirmed by Marylou Sandoval ([id number]) on 8/3/2025 10:18:50 AM    Vascular US lower extremity venous duplex bilateral  Result Date: 7/31/2025  Interpreted By:  Nnamdi Dodson, STUDY: [facility] US LOWER EXTREMITY VENOUS DUPLEX BILATERAL;  7/31/2025 7:32 pm   INDICATION: Signs/Symptoms:Bilateral leg pain most severe on the left with redness history of DVT.   COMPARISON: None.   ACCESSION NUMBER(S): [id number]   ORDERING CLINICIAN: ADDI ALBERTO   TECHNIQUE: Grayscale, color and spectral Doppler sonographic images of the bilateral lower extremity deep venous system.   FINDINGS: RIGHT: There is normal compressibility of the common femoral vein, saphenous femoral junction, profunda femoral vein and popliteal vein. The posterior tibial and peroneal veins  are suboptimally visualized. There is normal spontaneous and phasic variation throughout the leg by spectral doppler.   LEFT: Echogenic nonocclusive thrombus in the left popliteal distal and mid femoral veins. There is normal compressibility of the common femoral vein, saphenous femoral junction, profunda femoral vein.  The  posterior tibial and peroneal veins  are not well visualized.   OTHER FINDINGS: Bilateral lower extremity edema..       Echogenic nonocclusive thrombus in the left popliteal vein extending to the mid and distal femoral vein.   Poor visualization of bilateral calf veins. No sonographic evidence DVT in the visualized vessels of the right lower extremity.   MACRO: Nnamdi Dodson discussed the significance and urgency of this critical finding by telephone with  Dr Kaplan on 7/31/2025 at 8:04 pm. (**-RCF-**) Findings:  See findings.     Signed by: Nnamdi Dodson 7/31/2025 8:05 PM Dictation workstation:   [id number]    XR foot left 3+ views  Result Date: 7/31/2025  Interpreted By:  Everett Johnson, STUDY: XR FOOT LEFT 3+ VIEWS; ;  7/31/2025 5:39 pm   INDICATION: Signs/Symptoms:wound on foot.     COMPARISON: None.   ACCESSION NUMBER(S): [id number]   ORDERING CLINICIAN: JUANITO HAIDER   FINDINGS: There is partial amputation of the 5th ray. There is no acute fracture or dislocation seen. There is severe osteopenia which limits the study   There is focal sclerosis and irregularity of the 1st distal phalangeal tuft. There is mild degenerative changes in the midfoot and forefoot.       Mild sclerosis and irregularity at the 1st distal phalangeal tuft. In the presence of an ulcer at this location underlying osteomyelitis is suspected.   Limited by severe osteopenia     MACRO: Critical Finding:  See findings. Notification was initiated on 7/31/2025 at 6:17 pm by  Everett Johnson.  (**-YCF-**) Instructions: See Findings.   Signed by: Everett Johnson 7/31/2025 6:17 PM Dictation workstation:   [id number]    ECG 12 lead  Result Date: 7/12/2025  Sinus rhythm with 1st degree AV block Left axis deviation Left ventricular hypertrophy with QRS widening ( [place] product , Romhilt-Kwok ) T wave abnormality, consider lateral ischemia Abnormal ECG When compared with ECG of 08-JUL-2025 20:59, (unconfirmed) No significant change  was found Confirmed by Carson Freeman ([id number]) on 7/12/2025 7:30:51 AM    Transthoracic Echo Complete  Result Date: 7/9/2025   [facility], [address] [place] [address]              Tel [phone] and Fax [phone] TRANSTHORACIC ECHOCARDIOGRAM REPORT  Patient Name:       SUBHASH FLEMINGZULEYMAMICHELLE    Reading Physician:    [id number] Carson Freeman MD Study Date:         7/9/2025            Ordering Provider:    [id number] DAMEON ALVAREZ MRN/PID:            [id number]            Fellow: Accession#:         [id number]        Nurse:                Narayan MORRIS student Date of Birth/Age:  1950 / 74 years Sonographer:          Meeta Pierre RDCS Gender assigned at                     Additional Staff:     Nandini MORRIS Birth: Height:             175.26 cm           Admit Date: Weight:             86.18 kg            Admission Status:     Inpatient -                                                               Routine BSA / BMI:          2.02 m2 / 28.06     Encounter#:           [id number]                     kg/m2 Blood Pressure:     127/53 mmHg         Department Location:  [facility] [place] [facility] Study Type:    TRANSTHORACIC ECHO (TTE) COMPLETE Diagnosis/ICD: Cerebral Infarction, unspecified-I63.9 Indication:    stroke CPT Code:      Echo Complete w Full Doppler-[id number] Patient History: Diabetes:          Yes Pertinent History: CAD, CVA and Murmur. abnormal EKG, hx aortic stenosis, edema,                    change in mental status, PAD, hx DVT. Study Detail: The following Echo studies were performed: 2D, M-Mode, Doppler and               color flow. Technically challenging study due to body habitus.               Agitated saline used as a contrast agent for intraseptal flow               evaluation. The patient was awake.  PHYSICIAN  INTERPRETATION: Left Ventricle: The left ventricular systolic function is normal with a visually estimated ejection fraction of 60-65%. There is mild concentric left ventricular hypertrophy. There are no regional left ventricular wall motion abnormalities. The left ventricular cavity size is normal. There is mildly increased septal and normal posterior left ventricular wall thickness. There is left ventricular concentric remodeling. Spectral Doppler shows a Grade II (pseudonormal pattern) of left ventricular diastolic filling with an elevated left atrial pressure. Left Atrium: The left atrium is mildly dilated. Right Ventricle: The right ventricle is normal in size. There is normal right ventricular global systolic function. Right Atrium: The right atrium is normal in size. Aortic Valve: The aortic valve is trileaflet. The aortic valve area by VTI is 1.34 cmï¿½ with a peak velocity of 3.11 m/s. The peak and mean gradients are 39 mmHg and 22 mmHg, respectively with a dimensionless index of 0.34. There is moderate to severe aortic valve cusp calcification. There is evidence of moderate aortic valve stenosis. There is no evidence of aortic valve regurgitation. Mitral Valve: The mitral valve is mildly thickened. There is moderate mitral annular calcification. There is mild mitral valve regurgitation. The E Vmax is 1.13 m/s. Tricuspid Valve: The tricuspid valve is structurally normal. There is trace to mild tricuspid regurgitation. Pulmonic Valve: The pulmonic valve is structurally normal. There is no indication of pulmonic valve regurgitation. Pericardium: There is no pericardial effusion noted. Aorta: The aortic root is normal. Pulmonary Artery: The estimated pulmonary artery pressure is normal. Pulmonary Veins: The pulmonary veins appear normal and return normally to the left atrium. Systemic Veins: The inferior vena cava appears mildly dilated, with IVC inspiratory collapse less than 50%.  CONCLUSIONS:  1. The left  ventricular systolic function is normal with a visually estimated ejection fraction of 60-65%.  2. Spectral Doppler shows a Grade II (pseudonormal pattern) of left ventricular diastolic filling with an elevated left atrial pressure.  3. There is normal right ventricular global systolic function.  4. The left atrium is mildly dilated.  5. There is moderate mitral annular calcification.  6. Moderate aortic valve stenosis. The peak and mean gradients are 39 mmHg and 22 mmHg respectively.  7. There is moderate to severe aortic valve cusp calcification.  8. The inferior vena cava appears mildly dilated, with IVC inspiratory collapse less than 50%. QUANTITATIVE DATA SUMMARY:  2D MEASUREMENTS:          Normal Ranges: Ao Root d:       3.70 cm  (2.0-3.7cm) LAs:             4.38 cm  (2.7-4.0cm) IVSd:            1.21 cm  (0.6-1.1cm) LVPWd:           1.04 cm  (0.6-1.1cm) LVIDd:           4.83 cm  (3.9-5.9cm) LVIDs:           3.43 cm LV Mass Index:   100 g/m2 LVEDV Index:     53 ml/m2 LV % FS          28.9 %  LEFT ATRIUM:                  Normal Ranges: LA Vol A4C:        107.3 ml   (22+/-6mL/m2) LA Vol A2C:        87.2 ml LA Vol BP:         98.8 ml LA Vol Index A4C:  53.1ml/m2 LA Vol Index A2C:  43.2 ml/m2 LA Vol Index BP:   48.9 ml/m2 LA Area A4C:       30.4 cm2 LA Area A2C:       26.8 cm2 LA Major Axis A4C: 7.3 cm LA Major Axis A2C: 7.0 cm LA Volume Index:   49.0 ml/m2 LA Vol A4C:        103.9 ml LA Vol A2C:        83.2 ml LA Vol Index BSA:  46.3 ml/m2  RIGHT ATRIUM:                 Normal Ranges: RA Vol A4C:        57.6 ml    (8.3-19.5ml) RA Vol Index A4C:  28.5 ml/m2 RA Area A4C:       20.5 cm2 RA Major Axis A4C: 6.2 cm  AORTA MEASUREMENTS:         Normal Ranges: Asc Ao, d:          4.00 cm (2.1-3.4cm)  LV SYSTOLIC FUNCTION:                      Normal Ranges: EF-A4C View:    62 % (>=55%) EF-A2C View:    61 % EF-Biplane:     61 % EF-Visual:      63 % LV EF Reported: 63 %  LV DIASTOLIC FUNCTION:             Normal Ranges: MV  Peak E:             1.13 m/s    (0.7-1.2 m/s) MV Peak A:             1.01 m/s    (0.42-0.7 m/s) E/A Ratio:             1.11        (1.0-2.2) MV e'                  0.075 m/s   (>8.0) MV lateral e'          0.08 m/s MV medial e'           0.07 m/s MV A Dur:              188.39 msec E/e' Ratio:            15.05       (<8.0) PulmV Sys Jak:         61.44 cm/s PulmV Spaulding Jak:        53.97 cm/s PulmV S/D Jak:         1.14 PulmV A Revs Jak:      29.47 cm/s PulmV A Revs Dur:      222.65 msec  MITRAL VALVE:          Normal Ranges: MV DT:        236 msec (150-240msec)  AORTIC VALVE:                      Normal Ranges: AoV Vmax:                3.11 m/s  (<=1.7m/s) AoV Peak P.6 mmHg (<20mmHg) AoV Mean P.6 mmHg (1.7-11.5mmHg) LVOT Max Jak:            1.08 m/s  (<=1.1m/s) AoV VTI:                 86.10 cm  (18-25cm) LVOT VTI:                29.02 cm LVOT Diameter:           2.25 cm   (1.8-2.4cm) AoV Area, VTI:           1.34 cm2  (2.5-5.5cm2) AoV Area,Vmax:           1.38 cm2  (2.5-4.5cm2) AoV Area, planim:        1.99 cm2  (2.5-4.5cm2) AoV Dimensionless Index: 0.34  RIGHT VENTRICLE: RV Basal 4.79 cm RV Mid   2.50 cm RV Major 8.7 cm TAPSE:   22.2 mm RV s'    0.10 m/s  TRICUSPID VALVE/RVSP:         Normal Ranges: Est. RA Pressure:     15 IVC Diam:             2.21 cm  PULMONIC VALVE:          Normal Ranges: PV Max Jak:     0.9 m/s  (0.6-0.9m/s) PV Max P.9 mmHg  PULMONARY VEINS: PulmV A Revs Dur: 222.65 msec PulmV A Revs Jak: 29.47 cm/s PulmV Spaulding Jak:   53.97 cm/s PulmV S/D Jak:    1.14 PulmV Sys Jak:    61.44 cm/s  AORTA: Asc Ao Diam 3.96 cm  [id number] Carson Freeman MD Electronically signed on 2025 at 7:56:49 PM  ** Final **     XR forearm left 2 views  Result Date: 2025  Interpreted By:  Everett Johnson, STUDY: XR FOREARM LEFT 2 VIEWS; ;  2025 5:52 pm   INDICATION: Signs/Symptoms:Left arm swelling.     COMPARISON: None.   ACCESSION NUMBER(S): [id number]   ORDERING  CLINICIAN: DAMEON ALVAREZ   FINDINGS: There is moderate to severe soft tissue edema about the forearm. There is no fracture. There is no dislocation. No osseous abnormality       Moderate to severe soft tissue edema without acute osseous abnormality     MACRO: None   Signed by: Everett Johnson 7/9/2025 6:07 PM Dictation workstation:   [id number]    CT angio head and neck w and wo IV contrast  Result Date: 7/9/2025  Interpreted By:  Jaskaran Llamas, STUDY: CT ANGIO HEAD AND NECK W AND WO IV CONTRAST;  7/9/2025 2:21 pm   INDICATION: Signs/Symptoms:Altered mental status, stroke rule out,.     COMPARISON: None.   ACCESSION NUMBER(S): [id number]   ORDERING CLINICIAN: DAMEON ALVAREZ   TECHNIQUE: Unenhanced CT images of the head were obtained. Subsequently, 75 ML of Omnipaque 350 was administered intravenously and axial images of the head and neck were acquired.  Coronal, sagittal, and 3-D reconstructions were provided for review.   FINDINGS: CTA HEAD FINDINGS:   Anterior circulation: The bilateral intracranial internal carotid arteries, bilateral carotid terminals, bilateral proximal anterior and middle cerebral arteries are normal.   Posterior circulation: Moderate to severe focal narrowing of the distal P1 segment of the left PCA (series 502, image 143). Bilateral intracranial vertebral arteries, vertebrobasilar junction, basilar artery and right proximal posterior cerebral arteries are normal.   CTA NECK FINDINGS:   Right carotid vessels: The common carotid artery is normal. The carotid bifurcation is normal. The internal carotid artery in the neck is normal. There is 0% stenosis.   Left carotid vessels: The common carotid artery is normal. The carotid bifurcation is normal. The internal carotid artery in the neck is normal. There is 0% stenosis.   Vertebral vessels: Dominant right vertebral artery. Diminutive left vertebral artery with lack of enhancement from its origin to the V4 segment, which may represent  chronic atherosclerotic disease with poor flow. Right vertebral artery is patent.   There is questionable hyperdense prominence of the extra-axial spaces in the bilateral posterior parietal regions, however can not be confirmed on other sequences and can be artifactual.       1. Moderate to severe focal narrowing of the distal P1 segment of the left PCA.   2. Diminutive left vertebral artery with lack of enhancement to the V4 segment as above. Finding may represent chronic atherosclerotic disease with poor flow.   3. Patency of the bilateral internal carotid arteries and right vertebral artery in the neck.   Recommendation: If clinically concerned for small acute ischemic infarct, further evaluation with MR of the brain without contrast is recommended.   4. Questionable hyperdense prominence of the extra-axial spaces in the bilateral posterior parietal regions, however can not be confirmed on other sequences and can be artifactual. Attention on short-term follow-up CT head without contrast is recommended.   MACRO: Critical Finding:  See findings. Notification was initiated on 7/9/2025 at 2:51 pm by  Jaskaran Llamas.  (**-OCF-**)   Signed by: Jaskaran Llamas 7/9/2025 2:51 PM Dictation workstation:   [id number]    CT chest abdomen pelvis w IV contrast  Result Date: 7/9/2025  Interpreted By:  Tere De La Paz, STUDY: CT CHEST ABDOMEN PELVIS W IV CONTRAST;  7/9/2025 12:10 am   INDICATION: Signs/Symptoms:elevated wbc; weak; r/o infiltrate.   COMPARISON: Chest x-ray 07/08/2025. CT chest, abdomen, pelvis 06/22/2025. Renal ultrasound 06/26/2025.   ACCESSION NUMBER(S): [id number]   ORDERING CLINICIAN: GARRY RICO   TECHNIQUE: Axial CT of the chest, abdomen, and pelvis was performed.  Coronal and sagittal reconstructions were performed. Intravenous contrast material was administered without immediate complication.   FINDINGS: There is motion artifact.   CHEST:   LUNG/PLEURA/LARGE AIRWAYS: The trachea and the central  airways are patent.   The evaluation of the lungs is degraded by motion artifact. There is a right effusion. There is a small-moderate left pleural effusion. Atelectatic changes are noted at the bilateral lower lobes and at the lingula. No pneumothorax. Linear atelectasis/scarring at the right lung apex.   VESSELS: No thoracic aortic aneurysm. Atherosclerotic calcifications are present of the thoracic aorta. The main pulmonary artery is dilated.   HEART: The heart is not enlarged. Coronary artery calcifications are present. No pericardial effusion.   MEDIASTINUM AND ROLO: No pathologically enlarged lymph nodes at the mediastinum, hilar or axillary regions.   CHEST WALL AND LOWER NECK: The visualized thyroid gland is unremarkable. The soft tissues of the chest wall demonstrate no gross abnormality.     ABDOMEN:   LIVER: No focal lesion is identified.   BILE DUCTS: No significant dilation.   GALLBLADDER: Present.   PANCREAS: No peripancreatic inflammatory changes.   SPLEEN: Unremarkable.   ADRENAL GLANDS: Unremarkable.   KIDNEYS AND URETERS: Symmetrical renal enhancement. There is mild bilateral hydroureteronephrosis. There is a 1.2 cm hyperdense cortical exophytic lesion at the left kidney.   PELVIS   BLADDER: The urinary bladder is decompressed with circumferential wall thickening. A Lama catheter is present at the urinary bladder. There is a small amount of gas at the urinary bladder, probably secondary to recent instrumentation.   REPRODUCTIVE ORGANS: The prostate measures 5.5 cm in transverse diameter.   BOWEL: There is a diverticulum at the gastric fundus. No bowel obstruction. There is colonic diverticulosis with no CT evidence for acute diverticulitis. The appendix is normal.   VESSELS: Atherosclerotic calcifications within the abdominal aorta and its branches. No abdominal aortic aneurysm.   PERITONEUM/RETROPERITONEUM/LYMPH NODES: No free fluid or free air.  No retroperitoneal hemorrhage. Redemonstration of  mildly enlarged external iliac chain lymph node measuring 1.2 cm in short axis on the right and 1.0 cm in short axis on the left.   BONE AND SOFT TISSUE: Multilevel degenerative changes in the spine. There is a small fat containing umbilical hernia. Small amount of gas at the soft tissues of the anterior abdominal wall with scattered hyperdense foci, may be secondary to subcutaneous injection administration.       CHEST: 1.  Small right pleural effusion. Small-moderate left pleural effusion. Bibasilar atelectasis. 2. Coronary artery calcifications. Dilated main pulmonary artery, please correlate for pulmonary arterial hypertension.   ABDOMEN-PELVIS: 1.  Circumferential wall thickening of the urinary bladder, may be secondary to underdistention or chronic outlet obstruction. Please correlate with urinalysis to exclude superimposed cystitis. 2. Mild bilateral hydronephrosis. 3. Prostatomegaly. 4. Colonic diverticulosis. 5. Mild pelvic adenopathy.     MACRO: None.   Signed by: Tere De La Paz 7/9/2025 1:10 AM Dictation workstation:   [id number]    XR chest 1 view  Result Date: 7/8/2025  Interpreted By:  Everett Johnson, STUDY: XR CHEST 1 VIEW;  7/8/2025 10:06 pm   INDICATION: Signs/Symptoms:decreased loc.     COMPARISON: None.   ACCESSION NUMBER(S): [id number]   ORDERING CLINICIAN: GARRY RICO   FINDINGS:   Low lung volumes which limits the study. There is enlargement cardiac silhouette. There is mild pulmonary vascular congestion/mild interstitial edema. No effusion or consolidation seen       Enlarged cardiac silhouette with mild interstitial edema. Low lung volumes which limits the study     MACRO: None   Signed by: Everett Johnson 7/8/2025 10:17 PM Dictation workstation:   [id number]    CT brain attack head wo IV contrast  Result Date: 7/8/2025  Interpreted By:  Delphine Felton, STUDY: CT BRAIN ATTACK HEAD WO IV CONTRAST;  7/8/2025 8:57 pm   INDICATION: Signs/Symptoms:stroke.     COMPARISON: CT head  dated 06/22/2025.   ACCESSION NUMBER(S): [id number]   ORDERING CLINICIAN: GARRY RICO   TECHNIQUE: Noncontrast axial CT scan of head was performed. Angled reformats in brain and bone windows were generated. The images were reviewed in bone, brain, blood and soft tissue windows.   FINDINGS: No hyperdense intracranial hemorrhage is present. There is no mass effect or midline shift identified.   Gray-white differentiation is intact, without evidence of CT apparent transcortical infarct. There are multifocal areas of diminished attenuation present within the periventricular and subcortical white matter of bilateral cerebral hemispheres, similar in appearance to prior exam on 06/22/2025, likely representing sequela of advanced microvascular disease.   Additional areas of diminished attenuation are present in the right basal ganglia, predominantly involving the right putamen head, also similar in appearance to prior exam on 06/22/2025, likely representing sequela of prior lacunar infarcts.   Focus of diminished attenuation present in the left cerebellum (series 202, image 11) likely represent sequela of remote cerebellar infarct, similar to prior study.   No new ventricular dilatation is present. Basal cisterns are patent. No extra-axial fluid collections are identified.   Scalp soft tissues do not demonstrate any acute abnormality. No acute calvarial abnormality is present.   Mastoid air cells and middle ear cavities are clear. Visualized paranasal sinuses are essentially unremarkable in appearance, although there is some mucous/secretions present in the inferior left frontal sinus and scattered anterior ethmoidal air cells.       1. No evidence of hemorrhage, CT apparent transcortical infarct, or other acute intracranial abnormality in the interim since prior exam on 06/22/2025. 2. Stable chronic findings as detailed above, including multifocal areas of diminished attenuation in the subcortical and periventricular  white matter of bilateral cerebral hemispheres, likely representing sequela of chronic microvascular disease, although acute small white matter infarct is difficult to entirely exclude. If there is high clinical suspicion for acute neurologic abnormality, MRI of the brain can be considered for additional characterization.   MACRO: Delphine Felton discussed the significance and urgency of this critical finding by EPIC secure chat with  GARRY RICO on 7/8/2025 at 9:09 pm.  (**-RCF-**) Findings:  See findings.     Signed by: Delphine Felton 7/8/2025 9:09 PM Dictation workstation:   [id number]     Assessment/Plan   Left leg cellulitis-culture grew MRSA   Chronic Left leg ulcer   Abnormal left foot xray-concern for osteomyelitis -CT negative for osteomyelitis   History of left foot fifth toe osteomyelitis status post amputation  History of Pseudomonas, MRSA, Enterococcus faecalis Pseudomonas, MRSA, Enterococcus faecalis  History of positive blood cultures with Staphylococcus epidermidis        Continue Augmentin-till 8/15/2025  Continue Bactrim -till 8/15/2025  IV Zosyn  Monitor temperature and wbc  Supportive care  Leg elevation  Monitor response to therapy   Local care  Follow-up blood cultures-negative to date     This is a complex infectious disease issue and the following was performed today (for more details please see the above note): Management decisions reflecting the added complexity (e.g., changes in antimicrobial therapy, infection control strategies).     Sonam Zhu, APRN-CNP

## 2025-08-04 NOTE — DISCHARGE SUMMARY
Discharge Diagnosis  Nonhealing nonsurgical wound limited to breakdown of skin    Issues Requiring Follow-Up  Wound care    Test Results Pending At Discharge  Pending Labs       Order Current Status    Blood Culture Preliminary result    Blood Culture Preliminary result            Hospital Course   Elliot Partida is a 74 y.o. male with past medical history of heart disease, diabetes, DVT, hypertension, neuropathy, TIA who presented to the emergency department yesterday evening with complaints of left foot and leg wounds for the past several months.  He was sent in by his home visiting nurse due to this nonhealing wound.  He states he has been seeing wound clinic but stopped going.  He had bilateral edema with an ulceration and wound on his foot that in the past has grown MRSA, Pseudomonas.  He does also have a history of DVT.  X-rays of the left foot show osteomyelitis in the left great toe.  Ultrasound shows DVT in the popliteal and femoral veins.  He was started on Lovenox for the DVT and IV Zosyn and Vanco for the wounds he was subsequently admitted to Medicine for further treatment and evaluation and close monitoring of hemodynamic status.      VS: T36.8, HR 70, respirations 18, /73, SpO2 100%  Significant labs: D-dimer 813, hemoglobin 10.7  Diagnostics: X-ray left foot: Mild sclerosis, irregularity of the first distal phalangeal tuft likely osteomyelitis, severe osteopenia.  Ultrasound bilateral lower extremities: Nonocclusive thrombus left popliteal vein extending to mid and distal femoral vein.  No DVT in the right leg.     Admitted to Medicine.  Treated LLE wound with IV Vanc/Zosyn, wound cx + MRSA, transitioned to PO augmentin and Bactrim to be continued through 8/14.  Discharged home with home care in stable condition.    Visit Vitals  /56 (BP Location: Right arm, Patient Position: Lying)   Pulse 63   Temp 36.5 °C (97.7 °F) (Temporal)   Resp 16     Vitals:    07/31/25 2155   Weight: 83.1 kg  (183 lb 1.6 oz)       Immunization History   Administered Date(s) Administered    COVID-19 mRNA, bivalent, original/Omicron BA.1, Non-US Vaccine (Spikevax Bivalent), Moderna 03/01/2025    Flu vaccine, trivalent, preservative free, HIGH-DOSE, age 65y+ (Fluzone) 03/01/2025    Moderna SARS-CoV-2 Vaccination 04/14/2021, 05/13/2021    RSV, 60 Years And Older (AREXVY) 03/01/2025       Results      Pertinent Physical Exam At Time of Discharge  Constitutional: pt in NAD, alert and cooperative  Eyes: PERRL, EOMI, no icterus   ENMT: mucous membranes moist, no apparent injury, no lesions seen  Head/Neck: Neck supple, no apparent injury  Respiratory/Thorax: Lungs CTA bilaterally, non-labored breathing, no cough, on RA  Cardiovascular: Regular, rate and rhythm, 3/6 systolic murmur, normal S1 and S2  Gastrointestinal: Nondistended, soft, non-tender, BS present x 4  Musculoskeletal: ROM intact, no joint swelling, normal strength  Extremities: normal extremities, LLE wrapped in kerlix  Neurological: alert and oriented x 3, speech clear, follows commands appropriately, cr. n. II-XII intact, sensation grossly intact, motor 5/5 throughout  Skin: Warm and dry    Home Medications     Medication List      START taking these medications     acetaminophen 325 mg tablet; Commonly known as: Tylenol; Take 2 tablets   (650 mg) by mouth every 6 hours if needed for mild pain (1 - 3), moderate   pain (4 - 6), headaches or fever (temp greater than 38.0 C).   amoxicillin-clavulanate 875-125 mg tablet; Commonly known as: Augmentin;   Take 1 tablet by mouth every 12 hours for 22 doses.   sulfamethoxazole-trimethoprim 800-160 mg tablet; Commonly known as:   Bactrim DS; Take 1 tablet by mouth every 12 hours for 22 doses.     CONTINUE taking these medications     amLODIPine 2.5 mg tablet; Commonly known as: Norvasc; Take 1 tablet (2.5   mg) by mouth once daily.   Blood glucose monitoring meter; To check blood sugar every morning   before breakfast    Eliquis 5 mg tablet; Generic drug: apixaban   gabapentin 300 mg capsule; Commonly known as: Neurontin   hydrALAZINE 50 mg tablet; Commonly known as: Apresoline; Take 1 tablet   (50 mg) by mouth 3 times a day.   meclizine 25 mg tablet; Commonly known as: Antivert     STOP taking these medications     ascorbic acid 500 mg tablet; Commonly known as: Vitamin C   enoxaparin 80 mg/0.8 mL syringe; Commonly known as: Lovenox   multivitamin tablet   oxygen gas therapy; Commonly known as: O2   Slow Fe 137 mg (45 mg iron) tablet extended release; Generic drug:   ferrous sulfate   zinc sulfate 220 mg (50 mg elemental) capsule; Commonly known as:   Zincate       Outpatient Follow-Up  Future Appointments   Date Time Provider Department Center   10/17/2025 11:00 AM JUDITH London [id number] ASTRID PantojaCNP

## 2025-08-04 NOTE — CARE PLAN
The patient's goals for the shift include  resting    The clinical goals for the shift include Patient will tolerate IV antibiotics this shift    Assumed care of patient at 1930. Refer to prior nursing note. VSS. Patient refusing turning and repositioning as offered throughout night, but able to sit on side of bed with assistance. Patient did stand to use urinal this shift.    Problem: Skin  Goal: Decreased wound size/increased tissue granulation at next dressing change  Outcome: Not Progressing  Flowsheets (Taken 8/4/2025 0513)  Decreased wound size/increased tissue granulation at next dressing change: Promote sleep for wound healing  Goal: Participates in plan/prevention/treatment measures  Outcome: Not Progressing  Flowsheets (Taken 8/4/2025 0513)  Participates in plan/prevention/treatment measures: Elevate heels  Goal: Prevent/manage excess moisture  Outcome: Not Progressing  Flowsheets (Taken 8/4/2025 0513)  Prevent/manage excess moisture: Moisturize dry skin  Goal: Prevent/minimize sheer/friction injuries  Outcome: Not Progressing  Flowsheets (Taken 8/4/2025 0513)  Prevent/minimize sheer/friction injuries: Turn/reposition every 2 hours/use positioning/transfer devices  Goal: Promote/optimize nutrition  Outcome: Not Progressing  Flowsheets (Taken 8/4/2025 0513)  Promote/optimize nutrition: Monitor/record intake including meals  Goal: Promote skin healing  Outcome: Not Progressing  Flowsheets (Taken 8/4/2025 0513)  Promote skin healing: Turn/reposition every 2 hours/use positioning/transfer devices       Problem: Pain - Adult  Goal: Verbalizes/displays adequate comfort level or baseline comfort level  Outcome: Progressing     Problem: Discharge Planning  Goal: Discharge to home or other facility with appropriate resources  Outcome: Progressing     Problem: Chronic Conditions and Co-morbidities  Goal: Patient's chronic conditions and co-morbidity symptoms are monitored and maintained or improved  Outcome:  Progressing     Problem: Nutrition  Goal: Nutrient intake appropriate for maintaining nutritional needs  Outcome: Progressing     Problem: Fall/Injury  Goal: Verbalize understanding of personal risk factors for fall in the hospital  Outcome: Progressing  Goal: Verbalize understanding of risk factor reduction measures to prevent injury from fall in the home  Outcome: Progressing  Goal: Use assistive devices by end of the shift  Outcome: Progressing  Goal: Pace activities to prevent fatigue by end of the shift  Outcome: Progressing     Problem: Diabetes  Goal: Achieve decreasing blood glucose levels by end of shift  Outcome: Progressing  Goal: Increase stability of blood glucose readings by end of shift  Outcome: Progressing  Goal: Maintain electrolyte levels within acceptable range throughout shift  Outcome: Progressing  Goal: Maintain glucose levels >70mg/dl to <250mg/dl throughout shift  Outcome: Progressing  Goal: No changes in neurological exam by end of shift  Outcome: Progressing  Goal: Vital signs within normal range for age by end of shift  Outcome: Progressing     Problem: Pain  Goal: Takes deep breaths with improved pain control throughout the shift  Outcome: Progressing  Goal: Turns in bed with improved pain control throughout the shift  Outcome: Progressing  Goal: Walks with improved pain control throughout the shift  Outcome: Progressing  Goal: Performs ADL's with improved pain control throughout shift  Outcome: Progressing  Goal: Participates in PT with improved pain control throughout the shift  Outcome: Progressing  Goal: Free from opioid side effects throughout the shift  Outcome: Progressing  Goal: Free from acute confusion related to pain meds throughout the shift  Outcome: Progressing

## 2025-08-04 NOTE — PROGRESS NOTES
Physical Therapy                 Therapy Communication Note    Patient Name: Elliot Partida  MRN: [id number]  Department: [facility]  [facility]  Today's Date: 8/4/2025     Discipline: Physical Therapy    Missed Visit: PT Missed Visit: Yes     Missed Visit Reason: Missed Visit Reason: Patient refused    Missed Time: Attempt    Comment: Pt stated that he was not feeling well and did not want to participate in therapy.

## 2025-08-05 NOTE — CONSULTS
PODIATRY CONSULT NOTE    SERVICE DATE: 8/4/2025   SERVICE TIME:  1830    REASON FOR CONSULT: Bilateral lower extremity wounds  REQUESTING PHYSICIAN: Dickson Thakkar MD  PRIMARY CARE PHYSICIAN: Marium Singh MD    Subjective   HPI:  Elliot Partida is a 74 y.o. male with past medical history of heart disease, diabetes, DVT, hypertension, neuropathy, TIA who presented to the emergency department yesterday evening with complaints of left foot and leg wounds for the past several months.  He was sent in by his home visiting nurse due to this nonhealing wound.  He states he has been seeing wound clinic but stopped going.  He had bilateral edema with an ulceration and wound on his foot that in the past has grown MRSA, Pseudomonas.  He does also have a history of DVT.  X-rays of the left foot show osteomyelitis in the left great toe.  Ultrasound shows DVT in the popliteal and femoral veins.  He was started on Lovenox for the DVT and IV Zosyn and Vanco for the wounds he was subsequently admitted to Medicine for further treatment and evaluation and close monitoring of hemodynamic status.     Podiatry was consulted for bilateral lower extremity wound management.    ROS: 10-point review of systems was performed and is otherwise negative except as noted in HPI.  PMH: Reviewed/documented below.  PSH:  Noncontributory except per HPI   FH: Reviewed and noncontributory   SOCIAL:  Reviewed/documented below.  ALLERGIES: Reviewed/documented below.  MEDS: Reviewed/documented below.  VS: Reviewed/documented below.    Medical History[1]  Surgical History[2]  Family History[3]  Social History[4]   Prescriptions Prior to Admission[5]     Medications:  Scheduled Meds: Scheduled Medications[6]  Continuous Infusions: Continuous Medications[7]  PRN Meds: PRN Medications[8]    Allergies as of 07/31/2025    (No Known Allergies)            Objective   PHYSICAL EXAM:  Physical Exam Performed:  Vitals:    08/04/25 1452   BP: 124/51   Pulse:     Resp: 16   Temp: 36.5 °C (97.7 °F)   SpO2:      Body mass index is 27.03 kg/m².    Patient is AOx3 and in no acute distress. Patient is alert and cooperative. Sitting comfortably in bed with dressing clean, dry and intact. Heel off-loading boots in place B/L.    Vascular: Non-palpable DP/PT pulses B/L. Moderate pitting edema noted B/L. Hair growth absent B/L. CFT<5 to B/L hallux. Temperature is warm to cool from tibial tuberosity to distal digits B/L. No lymphatic streaking noted B/L.    Musculoskeletal: Gross active and passive ROM diminished to age and activity level. Moves all extremities spontaneously. No pain to palpation at feet B/L.    Neurological: Absent light touch sensation B/L. Pain stimuli absent B/L. Denies any numbness, burning or tingling.    Dermatologic: Nails 1-5 are thickened, elongated, discolored with subungual debris B/L. Skin appears diffusely xerotic B/L. Web spaces 1-4 B/L are clean, dry and intact. No rashes or nodules noted B/L. No hyperkeratotic tissue noted B/L.     Wound:  Measurements: 2.3 cm x 1.8 cm x 0.2 cm (left anterior leg); 1.0 cm x 1.0 cm x 0.0 cm (right posterior heel)  (Left anterior leg)  Mixed wound base of fibrogranular tissue.   Minimal serosanguinous drainage with fibrotic slough.   Minimal jennyfer-wound maceration.   Minimal jennyfer-wound erythema.   No evidence of ascending cellulitis or lymphangitis.  No palpable fluctuance. No malodor. No increased warmth.   No probe to bone or deep structures within the wound base.   No tunneling or tracking.   No undermining. Skin edges irregular but intact.    LABS:   Results for orders placed or performed during the hospital encounter of 07/31/25 (from the past 24 hours)   POCT GLUCOSE   Result Value Ref Range    POCT Glucose 87 74 - 99 mg/dL   POCT GLUCOSE   Result Value Ref Range    POCT Glucose 93 74 - 99 mg/dL      Lab Results   Component Value Date    HGBA1C 5.8 (H) 06/06/2025      Lab Results   Component Value Date    CRP 0.54  07/31/2025      Lab Results   Component Value Date    SEDRATE 33 (H) 07/31/2025        Results from last 7 days   Lab Units 08/01/25  0940   WBC AUTO x10*3/uL 7.9   RBC AUTO x10*6/uL 3.64*   HEMOGLOBIN g/dL 10.4*   HEMATOCRIT % 33.2*     Results from last 7 days   Lab Units 08/01/25  0940 07/31/25  1758   SODIUM mmol/L 140 140   POTASSIUM mmol/L 4.1 4.7   CHLORIDE mmol/L 104 102   CO2 mmol/L 27 30   BUN mg/dL 23 25*   CREATININE mg/dL 0.95 0.84   CALCIUM mg/dL 9.2 9.6   BILIRUBIN TOTAL mg/dL  --  0.3   ALT U/L  --  15   AST U/L  --  23           IMAGING REVIEW:  Imaging  CT foot left w IV contrast  Result Date: 8/4/2025  Skin wound near the calcaneal tuberosity of the left foot without adjacent osseous abnormality.   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   Nonspecific generalized subcutaneous edema, potentially cellulitis.   If there remains a high degree of clinical concern for osteomyelitis, MRI is the recommended study for most sensitive and specific evaluation.     MACRO: None   Signed by: Júnior Sena 8/4/2025 6:40 AM Dictation workstation:   [id number]    Vascular US lower extremity venous duplex bilateral  Result Date: 7/31/2025  Echogenic nonocclusive thrombus in the left popliteal vein extending to the mid and distal femoral vein.   Poor visualization of bilateral calf veins. No sonographic evidence DVT in the visualized vessels of the right lower extremity.   MACRO: Nnamdi Dodson discussed the significance and urgency of this critical finding by telephone with  Dr Kaplan on 7/31/2025 at 8:04 pm. (**-RCF-**) Findings:  See findings.     Signed by: Nnamdi Dodson 7/31/2025 8:05 PM Dictation workstation:   [id number]    XR foot left 3+ views  Result Date: 7/31/2025  Mild sclerosis and irregularity at the 1st distal phalangeal tuft. In the presence of an ulcer at this location underlying osteomyelitis is suspected.   Limited by severe osteopenia     MACRO: Critical Finding:  See  findings. Notification was initiated on 7/31/2025 at 6:17 pm by  Everett Johnson.  (**-YCF-**) Instructions: See Findings.   Signed by: Everett Johnson 7/31/2025 6:17 PM Dictation workstation:   [id number]      Cardiology, Vascular, and Other Imaging  No other imaging results found for the past 7 days            Assessment/Plan   ASSESSMENT & PLAN:  #Chronic left lower leg wound, down to subcutaneous tissue [L97.822]  #Decubitus ulcer, right heel, Stage 1 [L89.611]  #Lymphedema [L89.0]  - Patient was seen and evaluated; all findings were discussed and all questions were answered to patient's satisfaction.  - Charts, labs, vitals and imaging all reviewed.   - Imaging: XR - significant for severe osteopenia  - Labs: Glucose 93, WBC 7.9, ESR 33    Plan:  - Upon obtaining verbal consent, excisional debridement of left lower leg wound was performed down to and including the level of subcutaneous tissue utilizing a  #10 blade (post-debridement measurements above). Bleeding granular tissue appreciated to wound base upon completion of debridement.  - No plans for further surgical debridement during admission.  - Dressings: Betadine paint to wound borders, alginate to wound base, 4x4, Abd pad, Kerlix and tape.  - Nursing staff is able to change/reinforce dressing if & as necessary until next day’s dressing change. Thank you.m  - Recommend PRAFO boot to the right lower extremity to offload the heel. Covered heel with Mepilex border foam.  - Podiatry will continue to follow while in house.    Weightbearing: WBAT to lower extremity.  Discharge: Pt to follow up 1 week after discharge with Dr. Ronald Christianson DPM  Podiatric Medicine & Surgery  Please Toño message me with any questions or concerns.            SIGNATURE: Ronald Christianson DPM PATIENT NAME: Elliot Partida   DATE: August 4, 2025 MRN: [id number]   TIME: 10:05 PM CONTACT: Haiku message           [1]   Past Medical History:  Diagnosis Date    Acute  osteomyelitis of ankle and foot, left (Multi)     AMI (acute myocardial infarction) (Multi)     ASHD (arteriosclerotic heart disease)     At risk for falls     Cellulitis     L foot/ankle    COVID-19     Diabetes mellitus (Multi)     DVT (deep vein thrombosis) in pregnancy ([facility])     Fall risk care plan declined     Heart failure     Hypertension     Hypoxia     Meningioma (Multi)     Myocardial infarction (Multi)     Neuritis     Neuropathy     Osteoarthritis     Osteomyelitis     L foot    Pleural effusion     PVD (peripheral vascular disease)     Sprain of right knee 06/25/2015    Stroke (Multi)     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      FOOT RAY RESECTION Left 09/23/2024    L partial 5th    FOOT SURGERY Left 09/27/2024    Delayed closure w/ graft application    INVASIVE VASCULAR PROCEDURE Bilateral 09/18/2024    Lower Extremity Angiogram    IRIDOTOMY / IRIDECTOMY Bilateral    [3]   Family History  Problem Relation Name Age of Onset    Heart disease Father      Colon cancer Other      Heart attack Other      Diabetes Other      Hypertension Other     [4]   Social History  Tobacco Use    Smoking status: Never     Passive exposure: Never    Smokeless tobacco: Never   Vaping Use    Vaping status: Never Used   Substance Use Topics    Alcohol use: Never     Comment: former    Drug use: Never   [5]   No medications prior to admission.   [6] [7] [8]

## 2025-08-06 ENCOUNTER — APPOINTMENT (OUTPATIENT)
Dept: CARDIOLOGY | Facility: HOSPITAL | Age: 75
End: 2025-08-06
Payer: MEDICARE

## 2025-08-06 ENCOUNTER — HOSPITAL ENCOUNTER (INPATIENT)
Facility: HOSPITAL | Age: 75
LOS: 1 days | Discharge: HOME | End: 2025-08-08
Attending: EMERGENCY MEDICINE | Admitting: INTERNAL MEDICINE
Payer: MEDICARE

## 2025-08-06 ENCOUNTER — APPOINTMENT (OUTPATIENT)
Dept: RADIOLOGY | Facility: HOSPITAL | Age: 75
End: 2025-08-06
Payer: MEDICARE

## 2025-08-06 DIAGNOSIS — R29.898 WEAKNESS OF LEFT LOWER EXTREMITY: ICD-10-CM

## 2025-08-06 DIAGNOSIS — T14.8XXA WOUND INFECTION: ICD-10-CM

## 2025-08-06 DIAGNOSIS — L08.9 WOUND INFECTION: ICD-10-CM

## 2025-08-06 DIAGNOSIS — R10.9 NONSPECIFIC ABDOMINAL PAIN: ICD-10-CM

## 2025-08-06 DIAGNOSIS — R11.2 NAUSEA AND VOMITING, UNSPECIFIED VOMITING TYPE: Primary | ICD-10-CM

## 2025-08-06 DIAGNOSIS — N39.0 URINARY TRACT INFECTION WITHOUT HEMATURIA, SITE UNSPECIFIED: ICD-10-CM

## 2025-08-06 LAB
ALBUMIN SERPL BCP-MCNC: 4 G/DL (ref 3.4–5)
ALP SERPL-CCNC: 84 U/L (ref 33–136)
ALT SERPL W P-5'-P-CCNC: 12 U/L (ref 10–52)
AMPHETAMINES UR QL SCN: ABNORMAL
ANION GAP SERPL CALC-SCNC: 14 MMOL/L (ref 10–20)
APPEARANCE UR: CLEAR
AST SERPL W P-5'-P-CCNC: 16 U/L (ref 9–39)
BACTERIA BLD CULT: NORMAL
BACTERIA BLD CULT: NORMAL
BARBITURATES UR QL SCN: ABNORMAL
BASOPHILS # BLD AUTO: 0.03 X10*3/UL (ref 0–0.1)
BASOPHILS NFR BLD AUTO: 0.4 %
BENZODIAZ UR QL SCN: ABNORMAL
BILIRUB SERPL-MCNC: 0.4 MG/DL (ref 0–1.2)
BILIRUB UR STRIP.AUTO-MCNC: NEGATIVE MG/DL
BUN SERPL-MCNC: 23 MG/DL (ref 6–23)
BZE UR QL SCN: ABNORMAL
CALCIUM SERPL-MCNC: 9.8 MG/DL (ref 8.6–10.3)
CANNABINOIDS UR QL SCN: ABNORMAL
CARDIAC TROPONIN I PNL SERPL HS: 7 NG/L (ref 0–20)
CHLORIDE SERPL-SCNC: 102 MMOL/L (ref 98–107)
CO2 SERPL-SCNC: 28 MMOL/L (ref 21–32)
COLOR UR: COLORLESS
CREAT SERPL-MCNC: 1.04 MG/DL (ref 0.5–1.3)
EGFRCR SERPLBLD CKD-EPI 2021: 76 ML/MIN/1.73M*2
EOSINOPHIL # BLD AUTO: 0.11 X10*3/UL (ref 0–0.4)
EOSINOPHIL NFR BLD AUTO: 1.3 %
ERYTHROCYTE [DISTWIDTH] IN BLOOD BY AUTOMATED COUNT: 13.4 % (ref 11.5–14.5)
FENTANYL+NORFENTANYL UR QL SCN: ABNORMAL
GLUCOSE BLD MANUAL STRIP-MCNC: 111 MG/DL (ref 74–99)
GLUCOSE BLD MANUAL STRIP-MCNC: 139 MG/DL (ref 74–99)
GLUCOSE BLD MANUAL STRIP-MCNC: 82 MG/DL (ref 74–99)
GLUCOSE SERPL-MCNC: 165 MG/DL (ref 74–99)
GLUCOSE UR STRIP.AUTO-MCNC: NORMAL MG/DL
HCT VFR BLD AUTO: 32.1 % (ref 41–52)
HGB BLD-MCNC: 10.5 G/DL (ref 13.5–17.5)
IMM GRANULOCYTES # BLD AUTO: 0.04 X10*3/UL (ref 0–0.5)
IMM GRANULOCYTES NFR BLD AUTO: 0.5 % (ref 0–0.9)
KETONES UR STRIP.AUTO-MCNC: NEGATIVE MG/DL
LEUKOCYTE ESTERASE UR QL STRIP.AUTO: NEGATIVE
LIPASE SERPL-CCNC: 45 U/L (ref 9–82)
LYMPHOCYTES # BLD AUTO: 1.13 X10*3/UL (ref 0.8–3)
LYMPHOCYTES NFR BLD AUTO: 13.3 %
MAGNESIUM SERPL-MCNC: 2.04 MG/DL (ref 1.6–2.4)
MCH RBC QN AUTO: 28.9 PG (ref 26–34)
MCHC RBC AUTO-ENTMCNC: 32.7 G/DL (ref 32–36)
MCV RBC AUTO: 88 FL (ref 80–100)
METHADONE UR QL SCN: ABNORMAL
MONOCYTES # BLD AUTO: 0.44 X10*3/UL (ref 0.05–0.8)
MONOCYTES NFR BLD AUTO: 5.2 %
NEUTROPHILS # BLD AUTO: 6.74 X10*3/UL (ref 1.6–5.5)
NEUTROPHILS NFR BLD AUTO: 79.3 %
NITRITE UR QL STRIP.AUTO: NEGATIVE
NRBC BLD-RTO: 0 /100 WBCS (ref 0–0)
OPIATES UR QL SCN: ABNORMAL
OXYCODONE+OXYMORPHONE UR QL SCN: ABNORMAL
PCP UR QL SCN: ABNORMAL
PH UR STRIP.AUTO: 7.5 [PH]
PLATELET # BLD AUTO: 301 X10*3/UL (ref 150–450)
POTASSIUM SERPL-SCNC: 3.7 MMOL/L (ref 3.5–5.3)
PROT SERPL-MCNC: 7.1 G/DL (ref 6.4–8.2)
PROT UR STRIP.AUTO-MCNC: NEGATIVE MG/DL
RBC # BLD AUTO: 3.63 X10*6/UL (ref 4.5–5.9)
RBC # UR STRIP.AUTO: NEGATIVE MG/DL
SODIUM SERPL-SCNC: 140 MMOL/L (ref 136–145)
SP GR UR STRIP.AUTO: 1.02
UROBILINOGEN UR STRIP.AUTO-MCNC: NORMAL MG/DL
WBC # BLD AUTO: 8.5 X10*3/UL (ref 4.4–11.3)

## 2025-08-06 PROCEDURE — 74177 CT ABD & PELVIS W/CONTRAST: CPT

## 2025-08-06 PROCEDURE — 80053 COMPREHEN METABOLIC PANEL: CPT | Performed by: EMERGENCY MEDICINE

## 2025-08-06 PROCEDURE — 99285 EMERGENCY DEPT VISIT HI MDM: CPT | Mod: 25 | Performed by: EMERGENCY MEDICINE

## 2025-08-06 PROCEDURE — 94760 N-INVAS EAR/PLS OXIMETRY 1: CPT

## 2025-08-06 PROCEDURE — 83735 ASSAY OF MAGNESIUM: CPT | Performed by: EMERGENCY MEDICINE

## 2025-08-06 PROCEDURE — 2500000005 HC RX 250 GENERAL PHARMACY W/O HCPCS: Performed by: EMERGENCY MEDICINE

## 2025-08-06 PROCEDURE — 36415 COLL VENOUS BLD VENIPUNCTURE: CPT | Performed by: EMERGENCY MEDICINE

## 2025-08-06 PROCEDURE — 99223 1ST HOSP IP/OBS HIGH 75: CPT

## 2025-08-06 PROCEDURE — 96374 THER/PROPH/DIAG INJ IV PUSH: CPT | Mod: 59

## 2025-08-06 PROCEDURE — 2500000004 HC RX 250 GENERAL PHARMACY W/ HCPCS (ALT 636 FOR OP/ED)

## 2025-08-06 PROCEDURE — 84484 ASSAY OF TROPONIN QUANT: CPT | Performed by: EMERGENCY MEDICINE

## 2025-08-06 PROCEDURE — 2500000004 HC RX 250 GENERAL PHARMACY W/ HCPCS (ALT 636 FOR OP/ED): Performed by: EMERGENCY MEDICINE

## 2025-08-06 PROCEDURE — 96375 TX/PRO/DX INJ NEW DRUG ADDON: CPT

## 2025-08-06 PROCEDURE — 96372 THER/PROPH/DIAG INJ SC/IM: CPT | Performed by: EMERGENCY MEDICINE

## 2025-08-06 PROCEDURE — 82947 ASSAY GLUCOSE BLOOD QUANT: CPT

## 2025-08-06 PROCEDURE — 96361 HYDRATE IV INFUSION ADD-ON: CPT

## 2025-08-06 PROCEDURE — G0378 HOSPITAL OBSERVATION PER HR: HCPCS

## 2025-08-06 PROCEDURE — 96376 TX/PRO/DX INJ SAME DRUG ADON: CPT

## 2025-08-06 PROCEDURE — 80307 DRUG TEST PRSMV CHEM ANLYZR: CPT

## 2025-08-06 PROCEDURE — 2500000001 HC RX 250 WO HCPCS SELF ADMINISTERED DRUGS (ALT 637 FOR MEDICARE OP)

## 2025-08-06 PROCEDURE — 85025 COMPLETE CBC W/AUTO DIFF WBC: CPT | Performed by: EMERGENCY MEDICINE

## 2025-08-06 PROCEDURE — 97165 OT EVAL LOW COMPLEX 30 MIN: CPT | Mod: GO | Performed by: OCCUPATIONAL THERAPIST

## 2025-08-06 PROCEDURE — 81003 URINALYSIS AUTO W/O SCOPE: CPT | Performed by: EMERGENCY MEDICINE

## 2025-08-06 PROCEDURE — 84075 ASSAY ALKALINE PHOSPHATASE: CPT | Performed by: EMERGENCY MEDICINE

## 2025-08-06 PROCEDURE — 83690 ASSAY OF LIPASE: CPT | Performed by: EMERGENCY MEDICINE

## 2025-08-06 PROCEDURE — 74177 CT ABD & PELVIS W/CONTRAST: CPT | Mod: FOREIGN READ | Performed by: RADIOLOGY

## 2025-08-06 PROCEDURE — 2550000001 HC RX 255 CONTRASTS: Performed by: EMERGENCY MEDICINE

## 2025-08-06 PROCEDURE — 93005 ELECTROCARDIOGRAM TRACING: CPT

## 2025-08-06 RX ORDER — FAMOTIDINE 10 MG/ML
INJECTION, SOLUTION INTRAVENOUS
Status: COMPLETED
Start: 2025-08-06 | End: 2025-08-06

## 2025-08-06 RX ORDER — ACETAMINOPHEN 160 MG/5ML
650 SOLUTION ORAL EVERY 4 HOURS PRN
Status: DISCONTINUED | OUTPATIENT
Start: 2025-08-06 | End: 2025-08-08 | Stop reason: HOSPADM

## 2025-08-06 RX ORDER — GABAPENTIN 300 MG/1
300 CAPSULE ORAL 3 TIMES DAILY
Status: DISCONTINUED | OUTPATIENT
Start: 2025-08-06 | End: 2025-08-08 | Stop reason: HOSPADM

## 2025-08-06 RX ORDER — HYDRALAZINE HYDROCHLORIDE 50 MG/1
50 TABLET, FILM COATED ORAL 3 TIMES DAILY
Status: DISCONTINUED | OUTPATIENT
Start: 2025-08-06 | End: 2025-08-08 | Stop reason: HOSPADM

## 2025-08-06 RX ORDER — HYDRALAZINE HYDROCHLORIDE 20 MG/ML
INJECTION INTRAMUSCULAR; INTRAVENOUS
Status: COMPLETED
Start: 2025-08-06 | End: 2025-08-06

## 2025-08-06 RX ORDER — ACETAMINOPHEN 325 MG/1
650 TABLET ORAL EVERY 4 HOURS PRN
Status: DISCONTINUED | OUTPATIENT
Start: 2025-08-06 | End: 2025-08-08 | Stop reason: HOSPADM

## 2025-08-06 RX ORDER — POLYETHYLENE GLYCOL 3350 17 G/17G
17 POWDER, FOR SOLUTION ORAL DAILY PRN
Status: DISCONTINUED | OUTPATIENT
Start: 2025-08-06 | End: 2025-08-08 | Stop reason: HOSPADM

## 2025-08-06 RX ORDER — ACETAMINOPHEN 650 MG/1
650 SUPPOSITORY RECTAL EVERY 4 HOURS PRN
Status: DISCONTINUED | OUTPATIENT
Start: 2025-08-06 | End: 2025-08-08 | Stop reason: HOSPADM

## 2025-08-06 RX ORDER — ONDANSETRON HYDROCHLORIDE 2 MG/ML
INJECTION, SOLUTION INTRAVENOUS
Status: COMPLETED
Start: 2025-08-06 | End: 2025-08-06

## 2025-08-06 RX ORDER — ONDANSETRON 4 MG/1
4 TABLET, FILM COATED ORAL EVERY 8 HOURS PRN
Status: DISCONTINUED | OUTPATIENT
Start: 2025-08-06 | End: 2025-08-08 | Stop reason: HOSPADM

## 2025-08-06 RX ORDER — MORPHINE SULFATE 4 MG/ML
2 INJECTION, SOLUTION INTRAMUSCULAR; INTRAVENOUS ONCE
Status: COMPLETED | OUTPATIENT
Start: 2025-08-06 | End: 2025-08-06

## 2025-08-06 RX ORDER — DEXTROSE 50 % IN WATER (D50W) INTRAVENOUS SYRINGE
25
Status: DISCONTINUED | OUTPATIENT
Start: 2025-08-06 | End: 2025-08-08 | Stop reason: HOSPADM

## 2025-08-06 RX ORDER — DEXTROSE 50 % IN WATER (D50W) INTRAVENOUS SYRINGE
12.5
Status: DISCONTINUED | OUTPATIENT
Start: 2025-08-06 | End: 2025-08-08 | Stop reason: HOSPADM

## 2025-08-06 RX ORDER — FAMOTIDINE 10 MG/ML
40 INJECTION, SOLUTION INTRAVENOUS ONCE
Status: COMPLETED | OUTPATIENT
Start: 2025-08-06 | End: 2025-08-06

## 2025-08-06 RX ORDER — MORPHINE SULFATE 4 MG/ML
INJECTION, SOLUTION INTRAMUSCULAR; INTRAVENOUS
Status: COMPLETED
Start: 2025-08-06 | End: 2025-08-06

## 2025-08-06 RX ORDER — TALC
9 POWDER (GRAM) TOPICAL NIGHTLY PRN
Status: DISCONTINUED | OUTPATIENT
Start: 2025-08-06 | End: 2025-08-08 | Stop reason: HOSPADM

## 2025-08-06 RX ORDER — HYDRALAZINE HYDROCHLORIDE 20 MG/ML
10 INJECTION INTRAMUSCULAR; INTRAVENOUS ONCE
Status: COMPLETED | OUTPATIENT
Start: 2025-08-06 | End: 2025-08-06

## 2025-08-06 RX ORDER — GUAIFENESIN/DEXTROMETHORPHAN 100-10MG/5
5 SYRUP ORAL EVERY 4 HOURS PRN
Status: DISCONTINUED | OUTPATIENT
Start: 2025-08-06 | End: 2025-08-08 | Stop reason: HOSPADM

## 2025-08-06 RX ORDER — ONDANSETRON HYDROCHLORIDE 2 MG/ML
4 INJECTION, SOLUTION INTRAVENOUS ONCE
Status: COMPLETED | OUTPATIENT
Start: 2025-08-06 | End: 2025-08-06

## 2025-08-06 RX ORDER — GUAIFENESIN 600 MG/1
600 TABLET, EXTENDED RELEASE ORAL EVERY 12 HOURS PRN
Status: DISCONTINUED | OUTPATIENT
Start: 2025-08-06 | End: 2025-08-08 | Stop reason: HOSPADM

## 2025-08-06 RX ORDER — ONDANSETRON HYDROCHLORIDE 2 MG/ML
4 INJECTION, SOLUTION INTRAVENOUS EVERY 8 HOURS PRN
Status: DISCONTINUED | OUTPATIENT
Start: 2025-08-06 | End: 2025-08-08 | Stop reason: HOSPADM

## 2025-08-06 RX ORDER — AMLODIPINE BESYLATE 2.5 MG/1
2.5 TABLET ORAL DAILY
Status: DISCONTINUED | OUTPATIENT
Start: 2025-08-06 | End: 2025-08-08 | Stop reason: HOSPADM

## 2025-08-06 RX ORDER — PANTOPRAZOLE SODIUM 40 MG/10ML
40 INJECTION, POWDER, LYOPHILIZED, FOR SOLUTION INTRAVENOUS
Status: DISCONTINUED | OUTPATIENT
Start: 2025-08-07 | End: 2025-08-08 | Stop reason: HOSPADM

## 2025-08-06 RX ORDER — PANTOPRAZOLE SODIUM 40 MG/1
40 TABLET, DELAYED RELEASE ORAL
Status: DISCONTINUED | OUTPATIENT
Start: 2025-08-07 | End: 2025-08-08 | Stop reason: HOSPADM

## 2025-08-06 RX ORDER — INSULIN LISPRO 100 [IU]/ML
0-10 INJECTION, SOLUTION INTRAVENOUS; SUBCUTANEOUS
Status: DISCONTINUED | OUTPATIENT
Start: 2025-08-06 | End: 2025-08-08 | Stop reason: HOSPADM

## 2025-08-06 RX ORDER — DICYCLOMINE HYDROCHLORIDE 10 MG/ML
20 INJECTION INTRAMUSCULAR ONCE
Status: COMPLETED | OUTPATIENT
Start: 2025-08-06 | End: 2025-08-06

## 2025-08-06 RX ADMIN — HYDRALAZINE HYDROCHLORIDE 10 MG: 20 INJECTION INTRAMUSCULAR; INTRAVENOUS at 10:31

## 2025-08-06 RX ADMIN — FAMOTIDINE 40 MG: 10 INJECTION, SOLUTION INTRAVENOUS at 07:16

## 2025-08-06 RX ADMIN — DICYCLOMINE HYDROCHLORIDE 20 MG: 10 INJECTION, SOLUTION INTRAMUSCULAR at 08:03

## 2025-08-06 RX ADMIN — GABAPENTIN 300 MG: 300 CAPSULE ORAL at 21:39

## 2025-08-06 RX ADMIN — SODIUM CHLORIDE, SODIUM LACTATE, POTASSIUM CHLORIDE, AND CALCIUM CHLORIDE 1000 ML: .6; .31; .03; .02 INJECTION, SOLUTION INTRAVENOUS at 08:21

## 2025-08-06 RX ADMIN — ONDANSETRON 4 MG: 2 INJECTION INTRAMUSCULAR; INTRAVENOUS at 21:46

## 2025-08-06 RX ADMIN — IOHEXOL 75 ML: 350 INJECTION, SOLUTION INTRAVENOUS at 06:06

## 2025-08-06 RX ADMIN — MORPHINE SULFATE 2 MG: 4 INJECTION, SOLUTION INTRAMUSCULAR; INTRAVENOUS at 08:03

## 2025-08-06 RX ADMIN — PROMETHAZINE HYDROCHLORIDE 12.5 MG: 25 INJECTION INTRAMUSCULAR; INTRAVENOUS at 08:03

## 2025-08-06 RX ADMIN — ONDANSETRON HYDROCHLORIDE 4 MG: 2 INJECTION, SOLUTION INTRAVENOUS at 07:16

## 2025-08-06 RX ADMIN — ONDANSETRON 4 MG: 2 INJECTION, SOLUTION INTRAMUSCULAR; INTRAVENOUS at 05:30

## 2025-08-06 RX ADMIN — APIXABAN 5 MG: 5 TABLET, FILM COATED ORAL at 21:39

## 2025-08-06 RX ADMIN — HYDRALAZINE HYDROCHLORIDE 50 MG: 50 TABLET ORAL at 21:39

## 2025-08-06 RX ADMIN — BENZOCAINE AND MENTHOL 1 LOZENGE: 15; 3.6 LOZENGE ORAL at 14:54

## 2025-08-06 RX ADMIN — FAMOTIDINE 40 MG: 10 INJECTION INTRAVENOUS at 07:16

## 2025-08-06 RX ADMIN — Medication 1 DOSE: at 08:20

## 2025-08-06 RX ADMIN — ONDANSETRON 4 MG: 2 INJECTION INTRAMUSCULAR; INTRAVENOUS at 07:16

## 2025-08-06 SDOH — ECONOMIC STABILITY: HOUSING INSECURITY: IN THE PAST 12 MONTHS, HOW MANY TIMES HAVE YOU MOVED WHERE YOU WERE LIVING?: 0

## 2025-08-06 SDOH — SOCIAL STABILITY: SOCIAL INSECURITY: HAVE YOU HAD THOUGHTS OF HARMING ANYONE ELSE?: NO

## 2025-08-06 SDOH — ECONOMIC STABILITY: HOUSING INSECURITY: IN THE LAST 12 MONTHS, WAS THERE A TIME WHEN YOU WERE NOT ABLE TO PAY THE MORTGAGE OR RENT ON TIME?: NO

## 2025-08-06 SDOH — SOCIAL STABILITY: SOCIAL INSECURITY: WITHIN THE LAST YEAR, HAVE YOU BEEN AFRAID OF YOUR PARTNER OR EX-PARTNER?: NO

## 2025-08-06 SDOH — HEALTH STABILITY: MENTAL HEALTH
DO YOU FEEL STRESS - TENSE, RESTLESS, NERVOUS, OR ANXIOUS, OR UNABLE TO SLEEP AT NIGHT BECAUSE YOUR MIND IS TROUBLED ALL THE TIME - THESE DAYS?: ONLY A LITTLE

## 2025-08-06 SDOH — ECONOMIC STABILITY: INCOME INSECURITY: IN THE PAST 12 MONTHS HAS THE ELECTRIC, GAS, OIL, OR WATER COMPANY THREATENED TO SHUT OFF SERVICES IN YOUR HOME?: NO

## 2025-08-06 SDOH — ECONOMIC STABILITY: FOOD INSECURITY: WITHIN THE PAST 12 MONTHS, THE FOOD YOU BOUGHT JUST DIDN'T LAST AND YOU DIDN'T HAVE MONEY TO GET MORE.: NEVER TRUE

## 2025-08-06 SDOH — ECONOMIC STABILITY: FOOD INSECURITY: WITHIN THE PAST 12 MONTHS, YOU WORRIED THAT YOUR FOOD WOULD RUN OUT BEFORE YOU GOT THE MONEY TO BUY MORE.: NEVER TRUE

## 2025-08-06 SDOH — SOCIAL STABILITY: SOCIAL INSECURITY: DO YOU FEEL UNSAFE GOING BACK TO THE PLACE WHERE YOU ARE LIVING?: NO

## 2025-08-06 SDOH — SOCIAL STABILITY: SOCIAL INSECURITY: ARE YOU OR HAVE YOU BEEN THREATENED OR ABUSED PHYSICALLY, EMOTIONALLY, OR SEXUALLY BY ANYONE?: NO

## 2025-08-06 SDOH — SOCIAL STABILITY: SOCIAL INSECURITY: WITHIN THE LAST YEAR, HAVE YOU BEEN HUMILIATED OR EMOTIONALLY ABUSED IN OTHER WAYS BY YOUR PARTNER OR EX-PARTNER?: NO

## 2025-08-06 SDOH — ECONOMIC STABILITY: TRANSPORTATION INSECURITY: IN THE PAST 12 MONTHS, HAS LACK OF TRANSPORTATION KEPT YOU FROM MEDICAL APPOINTMENTS OR FROM GETTING MEDICATIONS?: NO

## 2025-08-06 SDOH — ECONOMIC STABILITY: FOOD INSECURITY: HOW HARD IS IT FOR YOU TO PAY FOR THE VERY BASICS LIKE FOOD, HOUSING, MEDICAL CARE, AND HEATING?: NOT HARD AT ALL

## 2025-08-06 SDOH — SOCIAL STABILITY: SOCIAL INSECURITY: DOES ANYONE TRY TO KEEP YOU FROM HAVING/CONTACTING OTHER FRIENDS OR DOING THINGS OUTSIDE YOUR HOME?: NO

## 2025-08-06 SDOH — SOCIAL STABILITY: SOCIAL INSECURITY: ABUSE: ADULT

## 2025-08-06 SDOH — SOCIAL STABILITY: SOCIAL INSECURITY: ARE THERE ANY APPARENT SIGNS OF INJURIES/BEHAVIORS THAT COULD BE RELATED TO ABUSE/NEGLECT?: NO

## 2025-08-06 SDOH — SOCIAL STABILITY: SOCIAL INSECURITY: WERE YOU ABLE TO COMPLETE ALL THE BEHAVIORAL HEALTH SCREENINGS?: YES

## 2025-08-06 SDOH — SOCIAL STABILITY: SOCIAL INSECURITY: DO YOU FEEL ANYONE HAS EXPLOITED OR TAKEN ADVANTAGE OF YOU FINANCIALLY OR OF YOUR PERSONAL PROPERTY?: NO

## 2025-08-06 SDOH — SOCIAL STABILITY: SOCIAL INSECURITY: HAVE YOU HAD ANY THOUGHTS OF HARMING ANYONE ELSE?: NO

## 2025-08-06 SDOH — SOCIAL STABILITY: SOCIAL INSECURITY: HAS ANYONE EVER THREATENED TO HURT YOUR FAMILY OR YOUR PETS?: NO

## 2025-08-06 ASSESSMENT — COGNITIVE AND FUNCTIONAL STATUS - GENERAL
TOILETING: A LITTLE
TURNING FROM BACK TO SIDE WHILE IN FLAT BAD: A LITTLE
DAILY ACTIVITIY SCORE: 18
PERSONAL GROOMING: A LITTLE
DRESSING REGULAR LOWER BODY CLOTHING: A LOT
MOBILITY SCORE: 14
PATIENT BASELINE BEDBOUND: NO
HELP NEEDED FOR BATHING: A LOT
PERSONAL GROOMING: A LITTLE
DRESSING REGULAR UPPER BODY CLOTHING: A LITTLE
HELP NEEDED FOR BATHING: A LOT
DRESSING REGULAR UPPER BODY CLOTHING: A LITTLE
CLIMB 3 TO 5 STEPS WITH RAILING: A LOT
DAILY ACTIVITIY SCORE: 17
MOVING TO AND FROM BED TO CHAIR: A LOT
STANDING UP FROM CHAIR USING ARMS: A LOT
DRESSING REGULAR LOWER BODY CLOTHING: A LITTLE
TOILETING: A LITTLE
MOVING FROM LYING ON BACK TO SITTING ON SIDE OF FLAT BED WITH BEDRAILS: A LITTLE
WALKING IN HOSPITAL ROOM: A LOT

## 2025-08-06 ASSESSMENT — ACTIVITIES OF DAILY LIVING (ADL)
LACK_OF_TRANSPORTATION: NO
ADEQUATE_TO_COMPLETE_ADL: YES
LACK_OF_TRANSPORTATION: NO
HEARING - LEFT EAR: FUNCTIONAL
PATIENT'S MEMORY ADEQUATE TO SAFELY COMPLETE DAILY ACTIVITIES?: YES
BATHING_ASSISTANCE: MODERATE
GROOMING: NEEDS ASSISTANCE
ASSISTIVE_DEVICE: WALKER;WHEELCHAIR
BATHING: NEEDS ASSISTANCE
ADL_ASSISTANCE: INDEPENDENT
TOILETING: NEEDS ASSISTANCE
HEARING - RIGHT EAR: FUNCTIONAL
JUDGMENT_ADEQUATE_SAFELY_COMPLETE_DAILY_ACTIVITIES: YES
DRESSING YOURSELF: NEEDS ASSISTANCE
FEEDING YOURSELF: NEEDS ASSISTANCE
WALKS IN HOME: NEEDS ASSISTANCE

## 2025-08-06 ASSESSMENT — ENCOUNTER SYMPTOMS
ENDOCRINE NEGATIVE: 1
ALLERGIC/IMMUNOLOGIC NEGATIVE: 1
NAUSEA: 1
EYES NEGATIVE: 1
CARDIOVASCULAR NEGATIVE: 1
APPETITE CHANGE: 1
PSYCHIATRIC NEGATIVE: 1
VOMITING: 1
WEAKNESS: 1
HEMATOLOGIC/LYMPHATIC NEGATIVE: 1
MUSCULOSKELETAL NEGATIVE: 1
RESPIRATORY NEGATIVE: 1

## 2025-08-06 ASSESSMENT — PAIN SCALES - GENERAL
PAINLEVEL_OUTOF10: 6
PAINLEVEL_OUTOF10: 8
PAINLEVEL_OUTOF10: 0 - NO PAIN
PAINLEVEL_OUTOF10: 7
PAINLEVEL_OUTOF10: 2
PAINLEVEL_OUTOF10: 0 - NO PAIN

## 2025-08-06 ASSESSMENT — PAIN DESCRIPTION - PROGRESSION: CLINICAL_PROGRESSION: RESOLVED

## 2025-08-06 ASSESSMENT — PATIENT HEALTH QUESTIONNAIRE - PHQ9
SUM OF ALL RESPONSES TO PHQ9 QUESTIONS 1 & 2: 2
2. FEELING DOWN, DEPRESSED OR HOPELESS: SEVERAL DAYS
1. LITTLE INTEREST OR PLEASURE IN DOING THINGS: SEVERAL DAYS

## 2025-08-06 ASSESSMENT — PAIN - FUNCTIONAL ASSESSMENT
PAIN_FUNCTIONAL_ASSESSMENT: 0-10

## 2025-08-06 ASSESSMENT — LIFESTYLE VARIABLES
SKIP TO QUESTIONS 9-10: 1
HOW MANY STANDARD DRINKS CONTAINING ALCOHOL DO YOU HAVE ON A TYPICAL DAY: PATIENT DOES NOT DRINK
HOW OFTEN DO YOU HAVE A DRINK CONTAINING ALCOHOL: NEVER
AUDIT-C TOTAL SCORE: 0
HOW OFTEN DO YOU HAVE 6 OR MORE DRINKS ON ONE OCCASION: NEVER
AUDIT-C TOTAL SCORE: 0

## 2025-08-06 ASSESSMENT — PAIN DESCRIPTION - LOCATION: LOCATION: ABDOMEN

## 2025-08-06 NOTE — ED TRIAGE NOTES
Patient presents to the ED via EMS with c/o vomiting and body aches.  He was discharged from this facility two days ago.

## 2025-08-06 NOTE — PROGRESS NOTES
Pharmacy Medication History Review    Elliot Partida is a 74 y.o. male admitted for Nausea & vomiting. Pharmacy reviewed the patient's [id number] medications and allergies for accuracy.    Sources used to confirm medication list: Informant interview and Medication Dispense History  Informant: Patient  Informant credibility: Fair (Able to recall medication, indication, strength, frequency, and/or prescriber for >50% of medications).    Total number of adjustments: 3     Medications Added Medications Removed Medications Adjusted  Adjustments Made    Meclizine Augmentin Sticky note added     Bactrim Sticky note added     The list below reflectives the updated PTA list. Please review each medication in order reconciliation for additional clarification and justification.  Prior to Admission medications   Medication Sig Start Date End Date Taking? Authorizing Provider   acetaminophen (Tylenol) 325 mg tablet Take 2 tablets (650 mg) by mouth every 6 hours if needed for mild pain (1 - 3), moderate pain (4 - 6), headaches or fever (temp greater than 38.0 C). 8/4/25  Yes JUDITH Jenkins   amLODIPine (Norvasc) 2.5 mg tablet Take 1 tablet (2.5 mg) by mouth once daily. 6/11/25 8/6/25 Yes Lázaro Navarro MD   apixaban (Eliquis) 5 mg tablet Take 1 tablet (5 mg) by mouth 2 times a day.   Yes Historical Provider, MD   gabapentin (Neurontin) 300 mg capsule Take 1 capsule (300 mg) by mouth 3 times a day.   Yes Historical Provider, MD   hydrALAZINE (Apresoline) 50 mg tablet Take 1 tablet (50 mg) by mouth 3 times a day. 6/10/25 8/6/25 Yes Lázaro Navarro MD   amoxicillin-clavulanate (Augmentin) 875-125 mg tablet Take 1 tablet by mouth every 12 hours for 22 doses.  Patient not taking: Reported on 8/6/2025 8/4/25 8/15/25  JUDITH Jenkins   Blood glucose monitoring meter To check blood sugar every morning before breakfast 2/19/25 2/19/26  JUDITH Malik    sulfamethoxazole-trimethoprim (Bactrim DS) 800-160 mg tablet Take 1 tablet by mouth every 12 hours for 22 doses.  Patient not taking: Reported on 8/6/2025 8/4/25 8/15/25  JUDITH Jenkins   ascorbic acid (Vitamin C) 500 mg tablet Take 1 tablet (500 mg) by mouth once daily for 14 days.  Patient not taking: Reported on 8/1/2025 7/11/25 8/4/25  JUDITH Landaverde   enoxaparin (Lovenox) 80 mg/0.8 mL syringe Inject 0.9 mL (90 mg) under the skin every 12 hours.  Patient not taking: Reported on 8/1/2025 6/10/25 8/4/25  Lázaro Navarro MD   ferrous sulfate (Slow Fe) 137 mg (45 mg iron) tablet extended release Take 1 tablet (137 mg) by mouth once daily.  Patient not taking: Reported on 8/1/2025 7/17/25 8/4/25  JUDITH London   meclizine (Antivert) 25 mg tablet Take 1 tablet (25 mg) by mouth every 6 hours if needed for dizziness.  8/6/25  Jean-Claude Green MD   multivitamin tablet Take 1 tablet by mouth once daily for 14 days.  Patient not taking: Reported on 8/1/2025 7/11/25 8/4/25  JUDITH Landaverde   oxygen (O2) gas therapy Inhale 2 L/min at 120,000 mL/hr if needed (desaturation at hs).  Patient not taking: Reported on 8/1/2025 5/20/25 8/4/25  JUDITH Landaverde   zinc sulfate (Zincate) 220 mg (50 mg elemental) capsule Take 1 capsule by mouth once daily for 14 days.  Patient not taking: Reported on 8/1/2025 7/11/25 8/4/25  JUDITH Landaverde        The list below reflectives the updated allergy list. Please review each documented allergy for additional clarification and justification.  Allergies  Reviewed by Radha LUNA MD on 8/6/2025   No Known Allergies         Below are additional concerns with the patient's PTA list.  Spoke w/pt at bedside. Reviewed PTA and allergy list. Please review changes made to the PTA list in the chart above.    Marylou Garcia CPhT  Medication History Pharmacy Technician  MILAGROS 8-4:30  Available via [employer] Secure Chat  OR  [phone]  [phone]

## 2025-08-06 NOTE — PROGRESS NOTES
08/06/25 1450   Discharge Planning   Living Arrangements Family members   Support Systems Family members   Assistance Needed Patient active with [facility], If patient goes to SNF, he will have co-pay.   Type of Residence Private residence   Home or Post Acute Services In home services   Type of Home Care Services Home PT;Home OT;Meals on Wheels   Expected Discharge Disposition Home Health     Patient not active with [facility], patient came to hospital before [facility] could do SOC.

## 2025-08-06 NOTE — H&P
History Of Present Illness  Elliot Partida is a 74 y.o. male presenting with vomiting. Pt states he started vomiting around 2am. He denies any constipation or diarrhea. He has not tried to eat or drink anything. He does have some abdominal pain in the umbilical area. Pt was recently admitted and treated for osteomyelitis of the left toe. He was discharged on oral abx, pt reports taking them but unsure of when his last dose was. Workup in the ED was unremarkable. Pt was admitted for intractable vomiting.     ED VS: T36.4, HR 79, RR 18, /77, Sp02 99%RA    Imaging: CT abd/pelvis- *Mildly thick-walled urinary bladder. Correlate with urinalysis  to exclude cystitis.  *Enlarged and heterogeneous prostate gland. Correlate with PSA.  *There is coronary artery calcification, a marker for coronary  atherosclerotic disease.    Labs: Glu 165, Na 140, K 3.7, Bun/creat 23/1.04, Lipase 45, Trop 7, WBC 8.5, H/H 10.5/32.1, Plt 301, UA neg      Past Medical History  Medical History[1]    Surgical History  Surgical History[2]     Social History  He reports that he has never smoked. He has never been exposed to tobacco smoke. He has never used smokeless tobacco. He reports that he does not drink alcohol and does not use drugs.    Family History  Family History[3]     Allergies  Patient has no known allergies.    Review of Systems   Constitutional:  Positive for appetite change.   HENT: Negative.     Eyes: Negative.    Respiratory: Negative.     Cardiovascular: Negative.    Gastrointestinal:  Positive for nausea and vomiting.   Endocrine: Negative.    Genitourinary: Negative.    Musculoskeletal: Negative.    Skin: Negative.    Allergic/Immunologic: Negative.    Neurological:  Positive for weakness.   Hematological: Negative.    Psychiatric/Behavioral: Negative.          Physical Exam  Vitals reviewed.   HENT:      Head: Normocephalic and atraumatic.      Right Ear: External ear normal.      Left Ear: External ear normal.       "Nose: Nose normal.      Mouth/Throat:      Pharynx: Oropharynx is clear.     Eyes:      Conjunctiva/sclera: Conjunctivae normal.       Cardiovascular:      Rate and Rhythm: Normal rate and regular rhythm.      Heart sounds: Murmur heard.   Pulmonary:      Effort: Pulmonary effort is normal.      Breath sounds: Normal breath sounds.   Abdominal:      Tenderness: There is abdominal tenderness.     Musculoskeletal:      Cervical back: Normal range of motion and neck supple.      Right lower leg: Edema present.      Left lower leg: Edema present.     Skin:     General: Skin is dry.      Coloration: Skin is pale.      Comments: Left foot wrapped      Neurological:      General: No focal deficit present.      Mental Status: He is alert and oriented to person, place, and time.     Psychiatric:         Mood and Affect: Mood normal.         Behavior: Behavior normal.       Last Recorded Vitals  Blood pressure 155/77, pulse 76, temperature 36.5 °C (97.7 °F), temperature source Temporal, resp. rate 16, height 1.753 m (5' 9.02\"), weight 83.5 kg (184 lb 1.4 oz), SpO2 97%.    Relevant Results  Scheduled medications  Scheduled Medications[4]  Continuous medications  Continuous Medications[5]  PRN medications  PRN Medications[6]    Results for orders placed or performed during the hospital encounter of 08/06/25 (from the past 24 hours)   CBC and Auto Differential   Result Value Ref Range    WBC 8.5 4.4 - 11.3 x10*3/uL    nRBC 0.0 0.0 - 0.0 /100 WBCs    RBC 3.63 (L) 4.50 - 5.90 x10*6/uL    Hemoglobin 10.5 (L) 13.5 - 17.5 g/dL    Hematocrit 32.1 (L) 41.0 - 52.0 %    MCV 88 80 - 100 fL    MCH 28.9 26.0 - 34.0 pg    MCHC 32.7 32.0 - 36.0 g/dL    RDW 13.4 11.5 - 14.5 %    Platelets 301 150 - 450 x10*3/uL    Neutrophils % 79.3 40.0 - 80.0 %    Immature Granulocytes %, Automated 0.5 0.0 - 0.9 %    Lymphocytes % 13.3 13.0 - 44.0 %    Monocytes % 5.2 2.0 - 10.0 %    Eosinophils % 1.3 0.0 - 6.0 %    Basophils % 0.4 0.0 - 2.0 %    Neutrophils " Absolute 6.74 (H) 1.60 - 5.50 x10*3/uL    Immature Granulocytes Absolute, Automated 0.04 0.00 - 0.50 x10*3/uL    Lymphocytes Absolute 1.13 0.80 - 3.00 x10*3/uL    Monocytes Absolute 0.44 0.05 - 0.80 x10*3/uL    Eosinophils Absolute 0.11 0.00 - 0.40 x10*3/uL    Basophils Absolute 0.03 0.00 - 0.10 x10*3/uL   Comprehensive Metabolic Panel   Result Value Ref Range    Glucose 165 (H) 74 - 99 mg/dL    Sodium 140 136 - 145 mmol/L    Potassium 3.7 3.5 - 5.3 mmol/L    Chloride 102 98 - 107 mmol/L    Bicarbonate 28 21 - 32 mmol/L    Anion Gap 14 10 - 20 mmol/L    Urea Nitrogen 23 6 - 23 mg/dL    Creatinine 1.04 0.50 - 1.30 mg/dL    eGFR 76 >60 mL/min/1.73m*2    Calcium 9.8 8.6 - 10.3 mg/dL    Albumin 4.0 3.4 - 5.0 g/dL    Alkaline Phosphatase 84 33 - 136 U/L    Total Protein 7.1 6.4 - 8.2 g/dL    AST 16 9 - 39 U/L    Bilirubin, Total 0.4 0.0 - 1.2 mg/dL    ALT 12 10 - 52 U/L   Magnesium   Result Value Ref Range    Magnesium 2.04 1.60 - 2.40 mg/dL   Troponin I, High Sensitivity   Result Value Ref Range    Troponin I, High Sensitivity 7 0 - 20 ng/L   Lipase   Result Value Ref Range    Lipase 45 9 - 82 U/L   ECG 12 lead   Result Value Ref Range    Ventricular Rate 79 BPM    Atrial Rate 79 BPM    SD Interval 274 ms    QRS Duration 148 ms    QT Interval 420 ms    QTC Calculation(Bazett) 481 ms    P Axis 45 degrees    R Axis -34 degrees    T Axis 94 degrees    QRS Count 13 beats    Q Onset 218 ms    P Onset 81 ms    P Offset 142 ms    T Offset 428 ms    QTC Fredericia 460 ms   Urinalysis with Reflex Culture and Microscopic   Result Value Ref Range    Color, Urine Colorless (N) Light-Yellow, Yellow, Dark-Yellow    Appearance, Urine Clear Clear    Specific Gravity, Urine 1.024 1.005 - 1.035    pH, Urine 7.5 5.0, 5.5, 6.0, 6.5, 7.0, 7.5, 8.0    Protein, Urine NEGATIVE NEGATIVE, 10 (TRACE), 20 (TRACE) mg/dL    Glucose, Urine Normal Normal mg/dL    Blood, Urine NEGATIVE NEGATIVE mg/dL    Ketones, Urine NEGATIVE NEGATIVE mg/dL     Bilirubin, Urine NEGATIVE NEGATIVE mg/dL    Urobilinogen, Urine Normal Normal mg/dL    Nitrite, Urine NEGATIVE NEGATIVE    Leukocyte Esterase, Urine NEGATIVE NEGATIVE   Drug Screen, Urine   Result Value Ref Range    Amphetamine Screen, Urine Presumptive Negative Presumptive Negative    Barbiturate Screen, Urine Presumptive Negative Presumptive Negative    Benzodiazepines Screen, Urine Presumptive Negative Presumptive Negative    Cannabinoid Screen, Urine Presumptive Negative Presumptive Negative    Cocaine Metabolite Screen, Urine Presumptive Negative Presumptive Negative    Fentanyl Screen, Urine Presumptive Negative Presumptive Negative    Opiate Screen, Urine Presumptive Negative Presumptive Negative    Oxycodone Screen, Urine Presumptive Positive (A) Presumptive Negative    PCP Screen, Urine Presumptive Negative Presumptive Negative    Methadone Screen, Urine Presumptive Negative Presumptive Negative   POCT GLUCOSE   Result Value Ref Range    POCT Glucose 139 (H) 74 - 99 mg/dL     Assessment & Plan  Nausea & vomiting    Diabetes (Multi)    Benign essential HTN    CAD (coronary artery disease)    Hyperlipidemia    Anemia      #Intractable nausea and vomiting  -On PO antibiotics for wound infection   -CT abd/pelvis: *Mildly thick-walled urinary bladder. Correlate with urinalysis  to exclude cystitis.  *Enlarged and heterogeneous prostate gland. Correlate with PSA.  *There is coronary artery calcification, a marker for coronary  atherosclerotic disease.  -UA neg   -Tox screen + oxy   -WBC 8.5   -Lipase 45   -ED MEDS: bentyl, famotidine, morphine, LR, ondansetron, promethazine  -Clear liquid diet   -IVFs as needed     #DM Type II with neuropathy   -Continue gabapentin   -SSI with hypoglycemia protocol  -Monitor BG     #Essential HTN  #CAD  #HLD   -Continue amlodipine, apixaban, hydralazine     #Anemia  -H/H 10.5/32.1  -Daily CBC     DVT ppx  -apixaban    PUD ppx  -pantoprazole    F: PRN  E: Replete per protocol  N:  Clear liquid   A: PIV    Disposition: Pt requires less than 2 inpatient days at this time   Code Status: Full Code     Sharon Guerra, APRN-CNP    Attending Attestation:    I was present with the APRN-CNP who participated in the documentation of this note. I have personally seen and re-examined the patient and performed the medical decision-making components (assessment and plan of care). I have reviewed the documentation and verified the findings in the note as written with additions or exceptions as stated in the body of this note.    74 year old male presented for multiple episodes of vomiting which started suddenly within 24 hours at 2 am. Patient also complaint of abdominal pain in the umbilical area. Patient was recently admitted and treated for osteomyelitis of the left toe. He was discharged on oral antibiotic and as per patient he was taking them but unsure when was his last dose.    Workup in the emergency department was unremarkable.  Patient was admitted for intractable vomiting.  Vitals on arrival were normal except blood pressure 178/77.  On physical exam patient had a systolic murmur, abdominal tenderness and bilateral pitting edema.    Admitting diagnoses are:    Intractable nausea and vomiting-IV fluid, as needed Zofran and promethazine.  Clear liquid diet and advance as tolerated.  Type 2 diabetes mellitus with neuropathy-insulin sliding scale with hypoglycemia protocol and monitor blood glucose.  Anemia of chronic disease-continue to monitor for now.    DVT prophylaxis- apixaban    Martínez Gautam MD  Internal Medicine.       [1]   Past Medical History:  Diagnosis Date    Acute osteomyelitis of ankle and foot, left (Multi)     AMI (acute myocardial infarction) (Multi)     ASHD (arteriosclerotic heart disease)     At risk for falls     Cellulitis     L foot/ankle    COVID-19     Diabetes mellitus (Multi)     DVT (deep vein thrombosis) in pregnancy ([facility])     Fall risk care plan declined     Heart  failure     Hypertension     Hypoxia     Meningioma (Multi)     Myocardial infarction (Multi)     Neuritis     Neuropathy     Osteoarthritis     Osteomyelitis     L foot    Pleural effusion     PVD (peripheral vascular disease)     Sprain of right knee 06/25/2015    Stroke (Multi)     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[facility])     TIA (transient ischemic attack)     Tinea pedis of both feet     Trigeminal neuralgia     Urinary tract infection     Weakness    [2]   Past Surgical History:  Procedure Laterality Date    CARDIAC CATHETERIZATION      CARPAL TUNNEL RELEASE  10/10/2014    EYE SURGERY      FL  ARTHROCENTESIS ASP INJ JOINT.      FOOT RAY RESECTION Left 09/23/2024    L partial 5th    FOOT SURGERY Left 09/27/2024    Delayed closure w/ graft application    INVASIVE VASCULAR PROCEDURE Bilateral 09/18/2024    Lower Extremity Angiogram    IRIDOTOMY / IRIDECTOMY Bilateral    [3]   Family History  Problem Relation Name Age of Onset    Heart disease Father      Colon cancer Other      Heart attack Other      Diabetes Other      Hypertension Other     [4] amLODIPine, 2.5 mg, oral, Daily  apixaban, 5 mg, oral, BID  gabapentin, 300 mg, oral, TID  hydrALAZINE, 50 mg, oral, TID  insulin lispro, 0-10 Units, subcutaneous, TID AC  [START ON 8/7/2025] pantoprazole, 40 mg, oral, Daily before breakfast   Or  [START ON 8/7/2025] pantoprazole, 40 mg, intravenous, Daily before breakfast  [5]    [6] PRN medications: acetaminophen **OR** acetaminophen **OR** acetaminophen, acetaminophen **OR** acetaminophen **OR** acetaminophen, benzocaine-menthol, dextromethorphan-guaifenesin, dextrose, dextrose, glucagon, glucagon, guaiFENesin, melatonin, ondansetron **OR** ondansetron, oxygen, polyethylene glycol

## 2025-08-06 NOTE — CARE PLAN
The patient's goals for the shift include  pt unable to state @ this time    The clinical goals for the shift include to be without nausea and vomiting    ED admit, no vomiting, reports +nausea; refused PO meds, refused dinner.  Worked withy therapy.    Problem: Nutrition  Goal: Nutrient intake appropriate for maintaining nutritional needs  Outcome: Not Progressing     Problem: Skin  Goal: Promote/optimize nutrition  Outcome: Not Progressing  Flowsheets (Taken 8/6/2025 2556)  Promote/optimize nutrition: Consume > 50% meals/supplements

## 2025-08-06 NOTE — PROGRESS NOTES
Occupational Therapy    Evaluation    Patient Name: Elliot Partida  MRN: [id number]  Department: [facility]  Room: [address] [place], [place] [address]  Today's Date: 8/6/2025  Time Calculation  Start Time: 1619  Stop Time: 1636  Time Calculation (min): 17 min    Assessment  IP OT Assessment  OT Assessment: Pt. is a 74 y.o. male referred to occupational therapy for impaired self-care 2/2 admission for nausea and vomiting. Pt. displays impaired self-care, transfers, endurance, and balance. Pt. has had multiple hosptial admissions and ER visits since 05/2025. D/t pt.'s frequent visits and decreased overall function, pt. would benefit from continued skilled therapy s/p this hospitalization. Pt. would benefit from skilled OT at MOD intensity to address above deficits and return to PLOF in LRE.  Prognosis: Good  Barriers to Discharge Home: Caregiver assistance, Physical needs  Caregiver Assistance: Caregiver assistance needed per identified barriers - however, no caregiver assistance available at home  Physical Needs: Stair navigation into home limited by function/safety, Intermittent mobility assistance needed, Intermittent ADL assistance needed, High falls risk due to function or environment  Evaluation/Treatment Tolerance: Patient tolerated treatment well  Medical Staff Made Aware: Yes  End of Session Communication: Bedside nurse  End of Session Patient Position: Bed, 2 rail up, Alarm on  Plan:  Treatment Interventions: ADL retraining, Functional transfer training, UE strengthening/ROM, Endurance training, Patient/family training, Neuromuscular reeducation, Equipment evaluation/education, Compensatory technique education  OT Frequency: 3 times per week (during this acute inpatient hospitalization)  OT Discharge Recommendations: Moderate intensity level of continued care (Based on current functional status and rehab potential, patient is anticipated to tolerate and benefit from 5 or more days per week of skilled rehabilitative therapy after discharge  from this acute inpatient hospitalization.)  OT Recommended Transfer Status: Minimal assist, Assist of 1  OT - OK to Discharge: Yes (Based on completed evaluation and care plan recommendations, no barriers to discharge to next site of care.)    Subjective   Current Problem:  1. Nausea and vomiting, unspecified vomiting type  Referral to Healthy at Home Program      2. Nonspecific abdominal pain          OT Visit Info:  OT Received On: 08/06/25  General Visit Info:  General  Reason for Referral: impaired self-care d/t admission for Nausea and vomiting  Referred By: JUDITH Bazan;  Past Medical History Relevant to Rehab: Acute osteomyelitis left foot, AMI, ASHD, CM, DVT, HTN, PVD, Neuropathy  Cellulitis  Family/Caregiver Present: No  Prior to Session Communication: Bedside nurse  Patient Position Received: Bed, 2 rail up, Alarm on  General Comment: Pt. was cooperative with OT evaluation.  Precautions:  LE Weight Bearing Status: Weight Bearing as Tolerated (per last podiatry note)  Medical Precautions: Fall precautions  Precautions Comment: luisa,     Date/Time Vitals Session Patient Position Pulse Resp SpO2 BP MAP (mmHg)    08/06/25 1600 --  --  70  --  97 %  --  --     08/06/25 1619 Pre OT  Lying  73  --  98 %  --  --           Pain:  Pain Assessment  Pain Assessment: 0-10  0-10 (Numeric) Pain Score: 7  Pain Type: Acute pain  Pain Location: Abdomen  Pain Interventions: Distraction (RN notified)    Objective   Cognition:  Overall Cognitive Status: Within Functional Limits  Arousal/Alertness: Appropriate responses to stimuli  Orientation Level: Oriented X4  Home Living:  Type of Home: House  Lives With: Siblings  Home Adaptive Equipment: Walker rolling or standard (BS)  Home Layout: Multi-level  Home Access: Stairs to enter with rails  Entrance Stairs-Number of Steps: 5  Bathroom Shower/Tub: Tub/shower unit  Bathroom Toilet: Standard  Bathroom Equipment: Grab bars in shower, Shower chair with back   Prior  Function:  Level of [place]: Independent with ADLs and functional transfers, Needs assistance with homemaking  Receives Help From: Family  ADL Assistance: Independent  Homemaking Assistance: Needs assistance (sister and niece complete all IADLs)  Ambulatory Assistance: Independent (with FWW)  Hand Dominance: Right  IADL History:  Homemaking Responsibilities: No  ADL:  Eating Assistance: Independent (anticipated)  Grooming Assistance: Stand by (anticipated)  Bathing Assistance: Moderate (anticipated)  UE Dressing Assistance: Minimal (atnicipated)  UE Dressing Deficit: Fasteners  LE Dressing Assistance: Minimal (anticipated)  Toileting Assistance with Device: Stand by  Activity Tolerance:  Endurance: Decreased tolerance for upright activites  Bed Mobility/Transfers: Bed Mobility  Bed Mobility: Yes  Bed Mobility 1  Bed Mobility 1: Supine to sitting  Level of Assistance 1: Modified independent  Bed Mobility 2  Bed Mobility  2: Sitting to supine  Level of Assistance 2: Moderate assistance  Bed Mobility Comments 2: assistance to position BLE  into bed and cue for technique    Transfers  Transfer: Yes  Transfer 1  Transfer From 1: Bed to  Transfer to 1: Toilet  Technique 1: Sit to stand, Stand to sit  Transfer Device 1: Walker, Gait belt  Transfer Level of Assistance 1: Contact guard  Trials/Comments 1: pt. utilized rocking technique and had multiple attempts to complete transfer  Transfers 2  Transfer From 2: Toilet to  Transfer to 2: Bed  Technique 2: Sit to stand, Stand to sit  Transfer Device 2: Walker, Gait belt  Transfer Level of Assistance 2: Minimum assistance  Trials/Comments 2: assistance to control decent and erect off of commode.    Functional Mobility:  Functional Mobility  Functional Mobility Performed: Yes  Functional Mobility 1  Surface 1: Level tile  Device 1: Rolling walker  Functional Mobility Support Devices: Gait belt  Assistance 1: Contact guard  Comments 1: amb to restroom x10 ft  Sitting  Balance:  Static Sitting Balance  Static Sitting-Balance Support: Feet supported  Static Sitting-Level of Assistance: Independent  Dynamic Sitting Balance  Dynamic Sitting-Balance Support: Feet supported  Dynamic Sitting-Level of Assistance: Independent  Dynamic Sitting-Balance: Forward lean  Standing Balance:  Static Standing Balance  Static Standing-Balance Support: Bilateral upper extremity supported  Static Standing-Level of Assistance: Contact guard  Dynamic Standing Balance  Dynamic Standing-Balance Support: Bilateral upper extremity supported  Dynamic Standing-Level of Assistance: Contact guard  Dynamic Standing-Balance: Turning  IADL's:   Homemaking Responsibilities: No  Vision: Vision - Basic Assessment  Current Vision: Wears glasses only for reading  Sensation:  Sensation Comment: reports residual numbness s/p CVA affecting L side  Strength:  Strength Comments: BUE: 4/5 grossly throughout  Coordination:  Movements are Fluid and Coordinated: Yes   Hand Function:  Hand Function  Gross Grasp: Functional  Coordination: Functional  Extremities: RUE   RUE : Exceptions to WFL (bilateral shoulders to roughly 90 deg) and LUE   LUE: Exceptions to WFL (bilateral shoulders to roughly 90 deg)    Outcome Measures: [facility] Daily Activity  Putting on and taking off regular lower body clothing: A little  Bathing (including washing, rinsing, drying): A lot  Putting on and taking off regular upper body clothing: A little  Toileting, which includes using toilet, bedpan or urinal: A little  Taking care of personal grooming such as brushing teeth: A little  Eating Meals: None  Daily Activity - Total Score: 18     and OT Adult Other Outcome Measures  Modified Barthel Index (Dueñas version): 64  Patient scored 64 on MBI (Modified Barthel Index-Dueñas Version) indicatin-90 considered moderate dependence. Per the scoring guidelines, this score indicates a prediction of: 60-80: If living alone will probably need a number of community  services to cope.    Deficits noted in the following areas bed mobility, transfers, self-care, ambulation.    Education Documentation  Body Mechanics, taught by Soo Chua OT at 8/6/2025  5:03 PM.  Learner: Patient  Readiness: Acceptance  Method: Explanation  Response: Verbalizes Understanding  Comment: edu on POC, edu on transfers, bed mobility and safety    Precautions, taught by Soo Chua OT at 8/6/2025  5:03 PM.  Learner: Patient  Readiness: Acceptance  Method: Explanation  Response: Verbalizes Understanding  Comment: edu on POC, edu on transfers, bed mobility and safety    ADL Training, taught by Soo Chua OT at 8/6/2025  5:03 PM.  Learner: Patient  Readiness: Acceptance  Method: Explanation  Response: Verbalizes Understanding  Comment: edu on POC, edu on transfers, bed mobility and safety    Education Comments  No comments found.      Goals:   Encounter Problems       Encounter Problems (Active)       ADLs       Patient will perform UB and LB bathing with set-up, supervision level of assistance.       Start:  08/06/25    Expected End:  08/20/25            Patient with complete lower body dressing with modified independent level of assistance donning and doffing all LE clothes  with PRN adaptive equipment while supported sitting       Start:  08/06/25    Expected End:  08/20/25            Patient will complete toileting including hygiene clothing management/hygiene with modified independent level of assistance.       Start:  08/06/25    Expected End:  08/20/25               MOBILITY       Patient will perform Functional mobility mod  Household distances/Community Distances with modified independent level of assistance and least restrictive device in order to improve safety and functional mobility.       Start:  08/06/25    Expected End:  08/20/25               TRANSFERS       Patient will perform bed mobility modified independent level of assistance and leg  in order to improve safety and  independence with mobility       Start:  08/06/25    Expected End:  08/20/25            Patient will complete functional transfer to chair, bed, commode with front wheeled walker with modified independent level of assistance.       Start:  08/06/25    Expected End:  08/20/25

## 2025-08-06 NOTE — PROGRESS NOTES
08/06/25 1358   Discharge Planning   Expected Discharge Disposition SNF  (Patient agreed to SNF. Referrals sent to [facility] and [facility]. [facility]. Awaiting accepting facility)

## 2025-08-06 NOTE — ED PROVIDER NOTES
HPI   Chief Complaint   Patient presents with    Vomiting       HPI  Patient is a 74-year-old male brought to the ED today via EMS for nausea and vomiting.  Patient states that he started developing nausea, vomiting, and dry heaving approximately 2 hours ago.  He states that he was just throwing up stomach bile.  He also complains of body aches and pain everywhere.  He reports that he also has generalized abdominal pain since the onset of his nausea and vomiting.  Per chart review, patient was recently discharged from the hospital 2 days ago.  He was started on p.o. Augmentin and Bactrim for a wound to his left lower extremity.  He is also anticoagulated on Eliquis.  He states that he has taken all of his medications as prescribed, and has not missed any medications.  He denies fever, chest pain, shortness of breath, or changes in bowel or bladder habits.      Patient History   Medical History[1]  Surgical History[2]  Family History[3]  Social History[4]    Physical Exam   ED Triage Vitals   Temperature Heart Rate Respirations BP   08/06/25 0441 08/06/25 0440 08/06/25 0440 08/06/25 0440   36.4 °C (97.6 °F) 79 18 178/77      Pulse Ox Temp src Heart Rate Source Patient Position   08/06/25 0440 -- -- --   99 %         BP Location FiO2 (%)     -- --             Physical Exam  Vitals and nursing note reviewed.   Constitutional:       General: He is not in acute distress.     Appearance: He is not toxic-appearing.   HENT:      Head: Normocephalic.      Mouth/Throat:      Mouth: Mucous membranes are moist.     Eyes:      Extraocular Movements: Extraocular movements intact.      Conjunctiva/sclera: Conjunctivae normal.       Cardiovascular:      Rate and Rhythm: Normal rate and regular rhythm.      Pulses: Normal pulses.   Pulmonary:      Effort: Pulmonary effort is normal. No respiratory distress.      Breath sounds: Normal breath sounds. No wheezing.   Abdominal:      General: There is no distension.      Palpations:  Abdomen is soft.      Tenderness: There is no guarding or rebound.      Comments: Generalized abdominal tenderness to palpation, worse in the periumbilical region     Musculoskeletal:         General: No swelling.      Cervical back: Neck supple.     Skin:     General: Skin is warm and dry.      Capillary Refill: Capillary refill takes less than 2 seconds.     Neurological:      General: No focal deficit present.      Mental Status: He is alert. Mental status is at baseline.           ED Course & MDM   ED Course as of 08/06/25 0548   Wed Aug 06, 2025   0547 EKG obtained at 443, interpreted by myself.  Normal sinus rhythm with a ventricular rate of 79, first-degree AV block with prolonged PA interval of 274, left bundle branch block with QRS of 148, otherwise no acute ischemic changes.  No significant changes when compared to prior EKG on 7 - 10 - 25. [VT]      ED Course User Index  [VT] Radha LUNA MD             No data recorded     [place] Coma Scale Score: 15 (08/06/25 0444 : Tori Smith, ALEXANDRA)                         Medical Decision Making  Patient was seen and evaluated for abdominal pain, nausea, and vomiting.  Differential diagnosis includes but is not limited to Gastritis, Bowel Obstruction, Viral Illness, Cholecystitis, Hernia, Colitis, Pancreatitis, Hepatitis, PUD, Diverticulitis.  Peripheral IV is established and patient is administered Zofran for his nausea.  Additional labs and imaging are ordered for further evaluation of the patient's symptoms.    CBC is unremarkable.  CMP is unremarkable.  Magnesium is normal at 2.  Lipase is normal at 45.  High-sensitivity troponin is normal at 7.    At this time, CT imaging remains pending.  Patient was signed out to the oncoming physician, Dr. Montalvo, pending results of his CT, reevaluation, and further disposition.      Procedure  Procedures         [1]   Past Medical History:  Diagnosis Date    Acute osteomyelitis of ankle and foot, left (Multi)     AMI (acute  myocardial infarction) (Multi)     ASHD (arteriosclerotic heart disease)     At risk for falls     Cellulitis     L foot/ankle    COVID-19     Diabetes mellitus (Multi)     DVT (deep vein thrombosis) in pregnancy ([facility])     Fall risk care plan declined     Heart failure     Hypertension     Hypoxia     Meningioma (Multi)     Myocardial infarction (Multi)     Neuritis     Neuropathy     Osteoarthritis     Osteomyelitis     L foot    Pleural effusion     PVD (peripheral vascular disease)     Sprain of right knee 06/25/2015    Stroke (Multi)     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      FOOT RAY RESECTION Left 09/23/2024    L partial 5th    FOOT SURGERY Left 09/27/2024    Delayed closure w/ graft application    INVASIVE VASCULAR PROCEDURE Bilateral 09/18/2024    Lower Extremity Angiogram    IRIDOTOMY / IRIDECTOMY Bilateral    [3]   Family History  Problem Relation Name Age of Onset    Heart disease Father      Colon cancer Other      Heart attack Other      Diabetes Other      Hypertension Other     [4]   Social History  Tobacco Use    Smoking status: Never     Passive exposure: Never    Smokeless tobacco: Never   Vaping Use    Vaping status: Never Used   Substance Use Topics    Alcohol use: Never     Comment: former    Drug use: Never        Radha LUNA MD  08/06/25 0628

## 2025-08-06 NOTE — PROGRESS NOTES
Emergency Medicine Transition of Care Note.    I received Elliot Partida in signout from Dr. CHERYL Smith.  Please see the previous ED provider note for all HPI, PE and MDM up to the time of signout at 0700. This is in addition to the primary record.    In brief Elliot Partida is an 74 y.o. male presenting for   Chief Complaint   Patient presents with    Vomiting     At the time of signout we were awaiting: Imaging/labs    Medical Decision Making  Patient was seen and evaluated for abdominal pain, nausea, and vomiting.  Differential diagnosis includes but is not limited to Gastritis, Bowel Obstruction, Viral Illness, Cholecystitis, Hernia, Colitis, Pancreatitis, Hepatitis, PUD, Diverticulitis.  Peripheral IV is established and patient is administered Zofran for his nausea.  Additional labs and imaging are ordered for further evaluation of the patient's symptoms.     CBC is unremarkable.  CMP is unremarkable.  Magnesium is normal at 2.  Lipase is normal at 45.  High-sensitivity troponin is normal at 7.     At this time, CT imaging remains pending.  Patient was signed out to the oncoming physician, Dr. Montalvo, pending results of his CT, reevaluation, and further disposition.    ED Course as of 08/06/25 1026   Wed Aug 06, 2025   0547 EKG obtained at 443, interpreted by myself.  Normal sinus rhythm with a ventricular rate of 79, first-degree AV block with prolonged MN interval of 274, left bundle branch block with QRS of 148, otherwise no acute ischemic changes.  No significant changes when compared to prior EKG on 7 - 10 - 25. [VT]   0758 CT Abdomen/Pelvis:  IMPRESSION:  *Mildly thick-walled urinary bladder. Correlate with urinalysis  to exclude cystitis.  *Enlarged and heterogeneous prostate gland. Correlate with PSA.  *There is coronary artery calcification, a marker for coronary  atherosclerotic disease.  Signed by Kendrick Chavarria MD   [EC]   0041 Patient sleeping at this time.  No further nausea vomiting or dry  heaves. [EC]   0939 Urinalysis is negative for infection. [EC]      ED Course User Index  [EC] Thomas Montalov DO  [VT] Radha LUNA MD         Diagnoses as of 08/06/25 1026   Nausea and vomiting, unspecified vomiting type   Nonspecific abdominal pain       Medical Decision Making  Patient slept for a bit, but upon awakening is c/o nausea and having some small emesis/dry heaves.    He was given a second dose of Zofran but did not really improve very much.  I am giving him 1 dose of IV Phenergan 12.5 mg daily, Bentyl 20 mg IM x 1, and morphine 2 mg IV x 1 for abdominal pain.  I am giving him 1 L of lactated Ringer's.  CT of the abdomen and pelvis with IV contrast is really grossly unremarkable.  Will check a urinalysis.  Urinalysis is negative for infection.    Despite iv fluids and iv anti-emetics he continues to have nausea and vomiting.  He denies chest pain shortness of breath or any significant other complaints.  Will discuss with hospitalist team about potential hospitalization/observation stay.    After discussion with the hospitalist team the patient was accepted for observation hospitalization.  Dr STEPHANI Gautam accepts.  Condition Stable/guarded.        Final diagnoses:   [R11.2] Nausea and vomiting, unspecified vomiting type   [R10.9] Nonspecific abdominal pain           Procedure  Procedures    Thomas Montalvo DO    Statement Selected

## 2025-08-07 PROBLEM — R11.2 NAUSEA AND VOMITING, UNSPECIFIED VOMITING TYPE: Status: ACTIVE | Noted: 2025-08-07

## 2025-08-07 LAB
ANION GAP SERPL CALC-SCNC: 13 MMOL/L (ref 10–20)
BUN SERPL-MCNC: 23 MG/DL (ref 6–23)
CALCIUM SERPL-MCNC: 9.1 MG/DL (ref 8.6–10.3)
CHLORIDE SERPL-SCNC: 104 MMOL/L (ref 98–107)
CO2 SERPL-SCNC: 28 MMOL/L (ref 21–32)
CREAT SERPL-MCNC: 0.91 MG/DL (ref 0.5–1.3)
EGFRCR SERPLBLD CKD-EPI 2021: 89 ML/MIN/1.73M*2
ERYTHROCYTE [DISTWIDTH] IN BLOOD BY AUTOMATED COUNT: 13.5 % (ref 11.5–14.5)
GLUCOSE BLD MANUAL STRIP-MCNC: 114 MG/DL (ref 74–99)
GLUCOSE BLD MANUAL STRIP-MCNC: 82 MG/DL (ref 74–99)
GLUCOSE BLD MANUAL STRIP-MCNC: 95 MG/DL (ref 74–99)
GLUCOSE BLD MANUAL STRIP-MCNC: 96 MG/DL (ref 74–99)
GLUCOSE SERPL-MCNC: 117 MG/DL (ref 74–99)
HCT VFR BLD AUTO: 30.5 % (ref 41–52)
HGB BLD-MCNC: 9.6 G/DL (ref 13.5–17.5)
HOLD SPECIMEN: NORMAL
MCH RBC QN AUTO: 28.6 PG (ref 26–34)
MCHC RBC AUTO-ENTMCNC: 31.5 G/DL (ref 32–36)
MCV RBC AUTO: 91 FL (ref 80–100)
NRBC BLD-RTO: 0 /100 WBCS (ref 0–0)
PLATELET # BLD AUTO: 288 X10*3/UL (ref 150–450)
POTASSIUM SERPL-SCNC: 3.7 MMOL/L (ref 3.5–5.3)
RBC # BLD AUTO: 3.36 X10*6/UL (ref 4.5–5.9)
SODIUM SERPL-SCNC: 141 MMOL/L (ref 136–145)
WBC # BLD AUTO: 7.7 X10*3/UL (ref 4.4–11.3)

## 2025-08-07 PROCEDURE — 94760 N-INVAS EAR/PLS OXIMETRY 1: CPT | Mod: IPSPLIT

## 2025-08-07 PROCEDURE — 82947 ASSAY GLUCOSE BLOOD QUANT: CPT

## 2025-08-07 PROCEDURE — 2500000001 HC RX 250 WO HCPCS SELF ADMINISTERED DRUGS (ALT 637 FOR MEDICARE OP): Mod: IPSPLIT | Performed by: NURSE PRACTITIONER

## 2025-08-07 PROCEDURE — 2500000001 HC RX 250 WO HCPCS SELF ADMINISTERED DRUGS (ALT 637 FOR MEDICARE OP): Mod: IPSPLIT

## 2025-08-07 PROCEDURE — 96376 TX/PRO/DX INJ SAME DRUG ADON: CPT

## 2025-08-07 PROCEDURE — 96375 TX/PRO/DX INJ NEW DRUG ADDON: CPT

## 2025-08-07 PROCEDURE — 80048 BASIC METABOLIC PNL TOTAL CA: CPT

## 2025-08-07 PROCEDURE — 36415 COLL VENOUS BLD VENIPUNCTURE: CPT

## 2025-08-07 PROCEDURE — 2500000005 HC RX 250 GENERAL PHARMACY W/O HCPCS: Mod: IPSPLIT

## 2025-08-07 PROCEDURE — 1100000001 HC PRIVATE ROOM DAILY: Mod: IPSPLIT

## 2025-08-07 PROCEDURE — 99233 SBSQ HOSP IP/OBS HIGH 50: CPT | Performed by: NURSE PRACTITIONER

## 2025-08-07 PROCEDURE — 2500000004 HC RX 250 GENERAL PHARMACY W/ HCPCS (ALT 636 FOR OP/ED): Mod: IPSPLIT | Performed by: NURSE PRACTITIONER

## 2025-08-07 PROCEDURE — 2500000004 HC RX 250 GENERAL PHARMACY W/ HCPCS (ALT 636 FOR OP/ED)

## 2025-08-07 PROCEDURE — 85027 COMPLETE CBC AUTOMATED: CPT

## 2025-08-07 PROCEDURE — 97535 SELF CARE MNGMENT TRAINING: CPT | Mod: GO,IPSPLIT

## 2025-08-07 RX ORDER — SODIUM CHLORIDE 9 MG/ML
100 INJECTION, SOLUTION INTRAVENOUS CONTINUOUS
Status: DISPENSED | OUTPATIENT
Start: 2025-08-07 | End: 2025-08-08

## 2025-08-07 RX ORDER — SODIUM CHLORIDE 9 MG/ML
10 INJECTION, SOLUTION INTRAVENOUS CONTINUOUS PRN
Status: DISCONTINUED | OUTPATIENT
Start: 2025-08-07 | End: 2025-08-08 | Stop reason: HOSPADM

## 2025-08-07 RX ORDER — PROCHLORPERAZINE EDISYLATE 5 MG/ML
5 INJECTION INTRAMUSCULAR; INTRAVENOUS EVERY 4 HOURS PRN
Status: DISCONTINUED | OUTPATIENT
Start: 2025-08-07 | End: 2025-08-08 | Stop reason: HOSPADM

## 2025-08-07 RX ORDER — ZINC SULFATE 50(220)MG
50 CAPSULE ORAL DAILY
Status: DISCONTINUED | OUTPATIENT
Start: 2025-08-07 | End: 2025-08-08 | Stop reason: HOSPADM

## 2025-08-07 RX ADMIN — HYDRALAZINE HYDROCHLORIDE 50 MG: 50 TABLET ORAL at 21:13

## 2025-08-07 RX ADMIN — GABAPENTIN 300 MG: 300 CAPSULE ORAL at 10:35

## 2025-08-07 RX ADMIN — GABAPENTIN 300 MG: 300 CAPSULE ORAL at 21:13

## 2025-08-07 RX ADMIN — ASCORBIC ACID, THIAMINE MONONITRATE,RIBOFLAVIN, NIACINAMIDE, PYRIDOXINE HYDROCHLORIDE, FOLIC ACID, CYANOCOBALAMIN, BIOTIN, CALCIUM PANTOTHENATE, 1 CAPSULE: 100; 1.5; 1.7; 20; 10; 1; 6000; 150000; 5 CAPSULE, LIQUID FILLED ORAL at 17:03

## 2025-08-07 RX ADMIN — HYDRALAZINE HYDROCHLORIDE 50 MG: 50 TABLET ORAL at 14:59

## 2025-08-07 RX ADMIN — PROCHLORPERAZINE EDISYLATE 5 MG: 5 INJECTION INTRAMUSCULAR; INTRAVENOUS at 10:32

## 2025-08-07 RX ADMIN — ONDANSETRON 4 MG: 2 INJECTION INTRAMUSCULAR; INTRAVENOUS at 06:09

## 2025-08-07 RX ADMIN — SODIUM CHLORIDE 100 ML/HR: 0.9 INJECTION, SOLUTION INTRAVENOUS at 14:55

## 2025-08-07 RX ADMIN — APIXABAN 5 MG: 5 TABLET, FILM COATED ORAL at 10:38

## 2025-08-07 RX ADMIN — AMLODIPINE BESYLATE 2.5 MG: 2.5 TABLET ORAL at 10:36

## 2025-08-07 RX ADMIN — APIXABAN 5 MG: 5 TABLET, FILM COATED ORAL at 21:13

## 2025-08-07 RX ADMIN — Medication 9 MG: at 21:13

## 2025-08-07 RX ADMIN — GABAPENTIN 300 MG: 300 CAPSULE ORAL at 14:59

## 2025-08-07 RX ADMIN — PANTOPRAZOLE SODIUM 40 MG: 40 INJECTION, POWDER, LYOPHILIZED, FOR SOLUTION INTRAVENOUS at 06:14

## 2025-08-07 RX ADMIN — HYDRALAZINE HYDROCHLORIDE 50 MG: 50 TABLET ORAL at 10:36

## 2025-08-07 RX ADMIN — ZINC SULFATE 220 MG (50 MG) CAPSULE 1 CAPSULE: CAPSULE at 17:03

## 2025-08-07 ASSESSMENT — PAIN - FUNCTIONAL ASSESSMENT
PAIN_FUNCTIONAL_ASSESSMENT: 0-10

## 2025-08-07 ASSESSMENT — PAIN SCALES - GENERAL
PAINLEVEL_OUTOF10: 0 - NO PAIN

## 2025-08-07 ASSESSMENT — COGNITIVE AND FUNCTIONAL STATUS - GENERAL
DRESSING REGULAR LOWER BODY CLOTHING: A LITTLE
DRESSING REGULAR UPPER BODY CLOTHING: A LITTLE
PERSONAL GROOMING: A LITTLE
DAILY ACTIVITIY SCORE: 20
TOILETING: A LITTLE

## 2025-08-07 ASSESSMENT — ACTIVITIES OF DAILY LIVING (ADL): HOME_MANAGEMENT_TIME_ENTRY: 15

## 2025-08-07 NOTE — PROGRESS NOTES
Occupational Therapy    Occupational Therapy Treatment    Name: Elliot Partida  MRN: [id number]  Department: [facility]  Room: [address] [place] [address]  Date: 08/07/25  Time Calculation  Start Time: 1154  Stop Time: 1209  Time Calculation (min): 15 min    Assessment:  OT Assessment: Pt is a 75 yo M referred to occupational therapy for impaired self-care and functional mobility 2/2 hospitalization for nausea and vomiting. Pt w/ limited participation this date. Pt required JOLENE for sup to sit and mod A for sit to sup. Pt completed functional mobility w/ CGA and FWW and required min A for STS from bed and toilet. Pt completed toileting w/ sup and washing hands w/ setup. Pt would continue to benefit from OT services at the MOD intensity level to increase functional independence and safety  Prognosis: Good  Barriers to Discharge Home: Caregiver assistance, Physical needs  Caregiver Assistance: Caregiver assistance needed per identified barriers - however, no caregiver assistance available at home  Physical Needs: Stair navigation into home limited by function/safety, Intermittent mobility assistance needed, Intermittent ADL assistance needed, High falls risk due to function or environment  Evaluation/Treatment Tolerance: Patient tolerated treatment well  Medical Staff Made Aware: Yes  End of Session Communication: Bedside nurse  End of Session Patient Position: Bed, 2 rail up, Alarm on  Plan:  Treatment Interventions: ADL retraining, Functional transfer training, Endurance training, Equipment evaluation/education, Compensatory technique education  OT Frequency: 3 times per week (during this acute inpatient hospitalization)  OT Discharge Recommendations: Moderate intensity level of continued care (Based on current functional status and rehab potential, patient is anticipated to tolerate and benefit from 5 or more days per week of skilled rehabilitative therapy after discharge from this acute inpatient hospitalization.)  OT Recommended  Transfer Status: Minimal assist, Assist of 1  OT - OK to Discharge: Yes (Based on completed evaluation and care plan recommendations, no barriers to discharge to next site of care)    Subjective     OT Visit Info:  OT Received On: 08/07/25  General:  General  Reason for Referral: Pt is a 73 yo M referred to occupational therapy for impaired self-care and functional mobility 2/2 hospitalization for nausea and vomiting  Referred By: JUDITH Bazan;  Past Medical History Relevant to Rehab: Acute osteomyelitis left foot, AMI, ASHD, CM, DVT, HTN, PVD, Neuropathy  Cellulitis  Family/Caregiver Present: No  Prior to Session Communication: Bedside nurse  Patient Position Received: Bed, 2 rail up, Alarm on  Preferred Learning Style: verbal, kinesthetic  General Comment: Pt agreeable to therapy session, limited participation this date.  Precautions:  LE Weight Bearing Status: Weight Bearing as Tolerated (per last podiatry note)  Medical Precautions: Fall precautions  Precautions Comment: ginger moran     Date/Time Vitals Session Patient Position Pulse Resp SpO2 BP MAP (mmHg)    08/07/25 1200 --  --  73  --  96 %  --  --      Vital Signs Comment: Vitals stable throughout session    Pain Assessment:  Pain Assessment  Pain Assessment: 0-10  0-10 (Numeric) Pain Score: 0 - No pain    Objective   Cognition:  Overall Cognitive Status: Within Functional Limits  Arousal/Alertness: Appropriate responses to stimuli  Orientation Level: Oriented X4  Activities of Daily Living:    Grooming  Grooming Level of Assistance: Setup  Grooming Where Assessed: Edge of bed  Grooming Comments: Setup assist for washing hands seated EOB. Pt deferred further ADL completion this date.    Toileting  Toileting Level of Assistance: Close supervision  Where Assessed: Toilet  Toileting Comments: Close supervision for toielting and pericare seated on toilet    Bed Mobility/Transfers:   Bed Mobility  Bed Mobility: Yes  Bed Mobility 1  Bed Mobility 1:  Supine to sitting  Level of Assistance 1: Modified independent  Bed Mobility Comments 1: use of bed rail  Bed Mobility 2  Bed Mobility  2: Sitting to supine  Level of Assistance 2: Moderate assistance  Bed Mobility Comments 2: Mod A for bringing BLEs into bed    Transfers  Transfer: Yes  Transfer 1  Transfer From 1: Bed to  Transfer to 1: Stand  Technique 1: Sit to stand, Stand to sit  Transfer Device 1: Walker  Transfer Level of Assistance 1: Minimum assistance  Transfers 2  Transfer From 2: Toilet to  Transfer to 2: Stand  Technique 2: Sit to stand, Stand to sit  Transfer Device 2: Walker  Transfer Level of Assistance 2: Minimum assistance    Functional Mobility:  Functional Mobility  Functional Mobility Performed: Yes  Functional Mobility 1  Surface 1: Level tile  Device 1: Rolling walker  Assistance 1: Contact guard  Comments 1: Functional mobility to and from bathroom approx. 10 feet w/ CGA and FWW  Sitting Balance:  Static Sitting Balance  Static Sitting-Balance Support: Feet supported  Static Sitting-Level of Assistance: Independent  Dynamic Sitting Balance  Dynamic Sitting-Balance Support: Feet supported  Dynamic Sitting-Level of Assistance: Independent  Dynamic Sitting-Balance: Forward lean  Standing Balance:  Static Standing Balance  Static Standing-Balance Support: Bilateral upper extremity supported  Static Standing-Level of Assistance: Contact guard  Dynamic Standing Balance  Dynamic Standing-Balance Support: Bilateral upper extremity supported  Dynamic Standing-Level of Assistance: Contact guard  Dynamic Standing-Balance: Turning    Strength:  Strength  Strength Comments: BUE grossly 4/5  RUE: Exceptions to WFL (bilateral shoulders to roughly 90 deg)   LUE: Exceptions to WFL (bilateral shoulders to roughly 90 deg)    Outcome Measures:  [facility] Daily Activity  Putting on and taking off regular lower body clothing: A little  Bathing (including washing, rinsing, drying): None  Putting on and taking off  regular upper body clothing: A little  Toileting, which includes using toilet, bedpan or urinal: A little  Taking care of personal grooming such as brushing teeth: A little  Eating Meals: None  Daily Activity - Total Score: 20    Education Documentation  Body Mechanics, taught by Fay Harris OT at 8/7/2025  1:46 PM.  Learner: Patient  Readiness: Acceptance  Method: Explanation  Response: Verbalizes Understanding  Comment: Pt educated on POC, DC recs, safety and body mechanics when completing transfers and ADLs, call bell use    Precautions, taught by Fay Harris OT at 8/7/2025  1:46 PM.  Learner: Patient  Readiness: Acceptance  Method: Explanation  Response: Verbalizes Understanding  Comment: Pt educated on POC, DC recs, safety and body mechanics when completing transfers and ADLs, call bell use    ADL Training, taught by Fay Harris OT at 8/7/2025  1:46 PM.  Learner: Patient  Readiness: Acceptance  Method: Explanation  Response: Verbalizes Understanding  Comment: Pt educated on POC, DC recs, safety and body mechanics when completing transfers and ADLs, call bell use    Goals:  Encounter Problems       Encounter Problems (Active)       ADLs       Patient will perform UB and LB bathing with set-up, supervision level of assistance.       Start:  08/06/25    Expected End:  08/20/25            Patient with complete lower body dressing with modified independent level of assistance donning and doffing all LE clothes  with PRN adaptive equipment while supported sitting       Start:  08/06/25    Expected End:  08/20/25            Patient will complete toileting including hygiene clothing management/hygiene with modified independent level of assistance. (Progressing)       Start:  08/06/25    Expected End:  08/20/25               MOBILITY       Patient will perform Functional mobility mod  Household distances/Community Distances with modified independent level of assistance and least restrictive device in  order to improve safety and functional mobility. (Progressing)       Start:  08/06/25    Expected End:  08/20/25               TRANSFERS       Patient will perform bed mobility modified independent level of assistance and leg  in order to improve safety and independence with mobility (Progressing)       Start:  08/06/25    Expected End:  08/20/25            Patient will complete functional transfer to chair, bed, commode with front wheeled walker with modified independent level of assistance. (Progressing)       Start:  08/06/25    Expected End:  08/20/25

## 2025-08-07 NOTE — CONSULTS
"Nutrition Initial Assessment:   Nutrition Assessment    Reason for Assessment: Admission nursing screening (poor intakes and wounds)    Patient is a 74 y.o. male presenting with Nausea & vomiting    PMH: diabetes, benign essential HTN, CAD, hyperlipidemia, anemia, osteomyelitis of left foot, hypokalemia    Nutrition History:  Energy Intake: Poor < 50 %  Food and Nutrient History: Spoke with pt this morning, pt was resting laying in bed. Pt states since he has not been feeling well he has not been eating well since admission. Pt stated he tried to have chicken broth, however, stated it was too hot when it was served and burnt his mouth. States he has been experiencing a little bit of both nausea/vomiting within admission. Described poor appetite. Denied chewing/swallowing difficulties. Denied constipation and diarrhea. Declined NFPE at time of visit. Pt described he once weighed 245lbs, however, stated UBW of 182lbs has recently been the new normal wt. This RD went over the importance of protein for healing and to prevent wt loss. This RD provided ONS options and explained the benefits for wound healing. Pt declined ONS options and does not want to try ONS options within hospital stay. Per chart review, pt is currently on a clear liquid diet. Per nursing, pt refused dinner one night and have had 0% of meals eaten. Per chart review, pt recently admitted for osteomyelitis of the left toe. Per chart review, pt admitted for intractable vomiting.  Food Allergy: Other (Comment) (NKFA)       Anthropometrics:  Height: 175.3 cm (5' 9.02\")   Weight: 83.5 kg (184 lb 1.4 oz)   BMI (Calculated): 27.17  IBW/kg (Dietitian Calculated): 73 kg  Percent of IBW: 115 %       Weight History:   Wt Readings from Last 20 Encounters:   08/06/25 83.5 kg (184 lb 1.4 oz)   07/31/25 83.1 kg (183 lb 1.6 oz)   07/17/25 82.8 kg (182 lb 8 oz)   07/09/25 86.7 kg (191 lb 2.2 oz)   06/25/25 91.4 kg (201 lb 8 oz)   06/08/25 90.6 kg (199 lb 11.8 oz) "   25 86.6 kg (191 lb)   25 86.6 kg (191 lb)   25 89.5 kg (197 lb 5 oz)   25 89.4 kg (197 lb)   25 89.4 kg (197 lb)   25 91.6 kg (201 lb 15.1 oz)   25 94.4 kg (208 lb 1.8 oz)   25 89.4 kg (197 lb)   25 89.4 kg (197 lb)   25 89.4 kg (197 lb)   25 89.1 kg (196 lb 6.9 oz)   25 89.8 kg (198 lb)   24 91.2 kg (201 lb 1 oz)   24 93.4 kg (206 lb)     Weight Change %:  Weight History / % Weight Change: From (25): 86.7kg to (25): 83.5kg: 3.7% wt loss in 1 month. From (25): 90.6kg to (25): 83.5k.8% wt loss in 2 months. From (25): 89.4kg to (25): 83.5k.6% wt loss in 3 months. From (25): 89.1kg to (25): 83.5kg 6.3% wt loss in 6 months. From (24): 91.2kg to (25): 83.5k.4% wt loss in about 8 months. Though pt does not meet significant wt loss criteria, pt has had gradual wt loss over the past several months.  Significant Weight Loss: No (Though patient does not meet the specific significant wt loss parameters, Within a two month time period, pt has experienced a large decline in wt)    Nutrition Focused Physical Exam Findings:    Subcutaneous Fat Loss:   Defer Subcutaneous Fat Loss Assessment: Defer all  Defer All Reason: Pt declined at time of visit  Muscle Wasting:  Defer Muscle Wasting Assessment: Defer all  Defer All Reason: Pt declined at time of visit  Edema:  Edema: +1 trace, +2 mild (per nursing flowsheet)  Edema Location: +1 (RLE), +2 (LLE)  Physical Findings:  Skin: Positive (Right Heel Wound, Pressure Injury to Coccyx, Groin wound, Left Ankle)  Digestive System Findings: Vomiting, Nausea  Teeth Findings: Other (comment) (missing teeth)    Nutrition Significant Labs:  CBC Trend:   Results from last 7 days   Lab Units 25  0511 25  0506 25  0940 25  1935   WBC AUTO x10*3/uL 7.7 8.5 7.9 9.6   RBC AUTO x10*6/uL 3.36* 3.63* 3.64* 3.70*   HEMOGLOBIN  g/dL 9.6* 10.5* 10.4* 10.7*   HEMATOCRIT % 30.5* 32.1* 33.2* 33.8*   MCV fL 91 88 91 91   PLATELETS AUTO x10*3/uL 288 301 297 288    BMP Trend:   Results from last 7 days   Lab Units 08/07/25  0511 08/06/25  0506 08/01/25  0940 07/31/25  1758   GLUCOSE mg/dL 117* 165* 93 106*   CALCIUM mg/dL 9.1 9.8 9.2 9.6   SODIUM mmol/L 141 140 140 140   POTASSIUM mmol/L 3.7 3.7 4.1 4.7   CO2 mmol/L 28 28 27 30   CHLORIDE mmol/L 104 102 104 102   BUN mg/dL 23 23 23 25*   CREATININE mg/dL 0.91 1.04 0.95 0.84    A1C:  Lab Results   Component Value Date    HGBA1C 5.8 (H) 06/06/2025   BG POCT trend:   Results from last 7 days   Lab Units 08/07/25  1120 08/07/25  0656 08/06/25  2118 08/06/25  1747 08/06/25  1036   POCT GLUCOSE mg/dL 95 96 82 111* 139*    Liver Function Trend:   Results from last 7 days   Lab Units 08/06/25  0506 07/31/25  1758   ALK PHOS U/L 84 113   AST U/L 16 23   ALT U/L 12 15   BILIRUBIN TOTAL mg/dL 0.4 0.3    Renal Lab Trend:   Results from last 7 days   Lab Units 08/07/25  0511 08/06/25  0506 08/01/25  0940 07/31/25  1758   POTASSIUM mmol/L 3.7 3.7 4.1 4.7   SODIUM mmol/L 141 140 140 140   MAGNESIUM mg/dL  --  2.04  --   --    EGFR mL/min/1.73m*2 89 76 85 >90   BUN mg/dL 23 23 23 25*   CREATININE mg/dL 0.91 1.04 0.95 0.84   CRP:   Lab Results   Component Value Date    CRP 0.54 07/31/2025     Nutrition Specific Medications:  Scheduled medications  Scheduled Medications[1]  Continuous medications  Continuous Medications[2]    I/O:    ;      Dietary Orders (From admission, onward)       Start     Ordered    08/06/25 1408  May Participate in Room Service With Assistance  ( ROOM SERVICE MAY PARTICIPATE WITH ASSISTANCE)  Once        Question:  .  Answer:  Yes    08/06/25 1407    08/06/25 1332  Adult diet Clear Liquid  Diet effective now        Question:  Diet type  Answer:  Clear Liquid    08/06/25 1331                     Estimated Needs:   Total Energy Estimated Needs in 24 hours (kCal):  (2640-7364  kcal/kg)  Method for Estimating Needs: 25-30 kcal/kg of actual BW  Total Protein Estimated Needs in 24 Hours (g):  (100-125 g/kg)  Method for Estimating 24 Hour Protein Needs: 1.2-1.5 g/kg of actual BW (increased needs due to wounds)  Total Fluid Estimated Needs in 24 Hours (mL):  (5875-1116 mL/kcal)  Method for Estimating 24 Hour Fluid Needs: 1 mL/kcal or per medical team.  Patient on Order Fluid Restriction: No        Nutrition Diagnosis   Malnutrition Diagnosis  Patient has Malnutrition Diagnosis: No (At risk, however not enough information to assess without time of poor PO and NFPE.)    Nutrition Diagnosis  Patient has Nutrition Diagnosis: Yes  Diagnosis Status (1): New  Nutrition Diagnosis 1: Increased nutrient needs  Related to (1): Increased metabolic demand  As Evidenced by (1): osteomyelitis and multiple wounds  Additional Assessment Information (1): zinc, Vitamin C, MVI, and encourage protein intake  Additional Nutrition Diagnosis: Diagnosis 2  Diagnosis Status (2): New  Nutrition Diagnosis 2: Predicted inadequate energy intake  Related to (2): acute illness (nausea/vomiting)  As Evidenced by (2): reports of poor intake prior to hospitalization and intakes<50% within hospitalization stay       Nutrition Interventions/Recommendations   Nutrition prescription for oral nutrition    Nutrition Recommendations:  Individualized Nutrition Prescription Provided for : Clear Liquid Diet    Nutrition Interventions/Goals:   Meals and Snacks: Other (Comment) (clear liquid diet)  Goal: Pt declined ONS at this time for additional protein to assist with wound care.  Vitamin and Mineral Supplement Therapy: Vitamin C supplement therapy, Zinc supplement therapy, Multivitamin multimineral supplement therapy  Goal: Due to wounds and declination of ONS, recommend the following vitamin and minerals to assist with wound healing.  Coordination of Care with Providers: Provider, Other (Comment) (IDT Ani cha CNP  (via secure chat))  Additional Interventions: See to advance diet within 3 days to full liquid diet.      Education Documentation  Nutrition Related Education, taught by Yasemin Mathis RDN, ABHI at 8/7/2025  3:08 PM.  Learner: Patient  Readiness: Acceptance  Method: Explanation  Response: Needs Reinforcement, Verbalizes Understanding  Comment: Importance of protein intake for strength and overall healing              Nutrition Monitoring and Evaluation   Intake / Amount of food: Consumes at least 50% or more of meals/snacks/supplements  Additional Plans: See to advance diet within 3 days to full liquid diet.    Body Weight: Body weight - Maintain stable weight    Electrolyte and Renal Panel: Electrolytes within normal limits    Digestive System Finding: Nausea, Vomiting  Skin Finding: Impaired wound healing - Improved wound healing, Promote intact skin - Promote skin integrity  Edema Finding: +1 Pitting edema, +2 Pitting edema    Goal Status: New goal(s) identified    Time Spent (min): 65 minutes              [1] amLODIPine, 2.5 mg, oral, Daily  apixaban, 5 mg, oral, BID  gabapentin, 300 mg, oral, TID  hydrALAZINE, 50 mg, oral, TID  insulin lispro, 0-10 Units, subcutaneous, TID AC  pantoprazole, 40 mg, oral, Daily before breakfast   Or  pantoprazole, 40 mg, intravenous, Daily before breakfast  vitamin B complex-vitamin C-folic acid, 1 capsule, oral, Daily  zinc sulfate, 50 mg of elemental zinc, oral, Daily     [2] sodium chloride 0.9%, 10 mL/hr  sodium chloride 0.9%, 100 mL/hr, Last Rate: 100 mL/hr (08/07/25 3174)

## 2025-08-07 NOTE — PROGRESS NOTES
08/07/25 1231   Discharge Planning   Expected Discharge Disposition Home Health  (Patient states he is not able to afford co-pay of $209/day and would rather return home with his sister with [facility]. [facility] is able to provide services on discharge.)   Intensity of Service   Intensity of Service >30 min     External home care referral needed.

## 2025-08-07 NOTE — PROGRESS NOTES
Subjective Data:  Mr. Partida is resting in bed continues to have nausea not feeling well. Stomach is upset. Complains of general fatigue and left foot pain.     Overnight Events:         Objective Data:  Last Recorded Vitals:  Vitals:    08/07/25 0025 08/07/25 0400 08/07/25 0655 08/07/25 0800   BP:   141/63    BP Location:   Left arm    Patient Position:   Lying    Pulse: 67 69 71 68   Resp:   16    Temp:   36.5 °C (97.7 °F)    TempSrc:   Temporal    SpO2: 95% 94% 95% 94%   Weight:       Height:           Last Labs:  CBC - 8/7/2025:  5:11 AM  7.7 9.6 288    30.5      CMP - 8/7/2025:  5:11 AM  9.1 7.1 16 --- 0.4   3.4 4.0 12 84      PTT - 7/31/2025:  6:12 PM  1.0   11.0 21     TROPHS   Date/Time Value Ref Range Status   08/06/2025 05:06 AM 7 0 - 20 ng/L Final   07/10/2025 01:17 PM 9 0 - 20 ng/L Final   07/10/2025 11:09 AM 9 0 - 20 ng/L Final     BNP   Date/Time Value Ref Range Status   07/08/2025 09:21  0 - 99 pg/mL Final   06/22/2025 07:49 PM 84 0 - 99 pg/mL Final     HGBA1C   Date/Time Value Ref Range Status   06/06/2025 03:39 PM 5.8 See comment % Final   01/06/2025 04:35 AM 5.8 4.3 - 5.6 % Final   10/03/2024 04:40 AM 7.0 4.3 - 5.6 % Final   09/13/2024 06:19 AM 9.8 see below % Final     LDLCALC   Date/Time Value Ref Range Status   07/09/2025 04:42  <=99 mg/dL Final     Comment:                                 Near   Borderline      AGE      Desirable  Optimal    High     High     Very High     0-19 Y     0 - 109     ---    110-129   >/= 130     ----    20-24 Y     0 - 119     ---    120-159   >/= 160     ----      >24 Y     0 -  99   100-129  130-159   160-189     >/=190    LDL Cholesterol is calculated using the Friedewald equation.     VLDL   Date/Time Value Ref Range Status   07/09/2025 04:42 AM 16 0 - 40 mg/dL Final   12/10/2019 06:05 AM 45 0 - 40 mg/dL Final      Last I/O:  I/O last 3 completed shifts:  In: - (0 mL/kg)   Out: 500 (6 mL/kg) [Urine:500 (0.2 mL/kg/hr)]  Weight: 83.5 kg     Past  Cardiology Tests (Last 3 Years):  EKG:  ECG 12 lead 08/06/2025 (Preliminary) Sinus rhythm with first degree AV block LBBB     Echo:  Transthoracic Echo Complete 07/09/2025  1. The left ventricular systolic function is normal with a visually estimated ejection fraction of 60-65%.   2. Spectral Doppler shows a Grade II (pseudonormal pattern) of left ventricular diastolic filling with an elevated left atrial pressure.   3. There is normal right ventricular global systolic function.   4. The left atrium is mildly dilated.   5. There is moderate mitral annular calcification.   6. Moderate aortic valve stenosis. The peak and mean gradients are 39 mmHg and 22 mmHg respectively.   7. There is moderate to severe aortic valve cusp calcification.   8. The inferior vena cava appears mildly dilated, with IVC inspiratory collapse less than 50%.    Transthoracic Echo Limited 02/13/2025  1. The left ventricular systolic function is normal, with a visually estimated ejection fraction of 55-60%.   2. Spectral Doppler shows a Grade I (impaired relaxation pattern) of left ventricular diastolic filling with normal left atrial filling pressure.   3. There is normal right ventricular global systolic function.   4. There is moderate to severe mitral annular calcification.   5. Moderate aortic valve stenosis. Dimensionless index 0.31 : moderate stenosis. Peak and mean gradients around 48 and 28 mm Hg respectively. Estimated aortic valve area around 1 cm2.   6. There is moderate aortic valve cusp calcification.    Transthoracic Echo (TTE) Complete 09/13/2024  1. The left ventricular systolic function is normal, with a visually estimated ejection fraction of 60-65%.   2. Spectral Doppler shows an impaired relaxation pattern of left ventricular diastolic filling.   3. There is normal right ventricular global systolic function.   4. Mild to moderate aortic valve stenosis.   5. There is moderate aortic valve cusp calcification.   6. The inferior  vena cava appears mildly dilated.    Ejection Fractions:  EF   Date/Time Value Ref Range Status   07/09/2025 10:20 AM 63 %    02/13/2025 12:15 PM 58 %    09/13/2024 02:10 PM 63 %      Cath:  Heart Cath 1/2020  1. Mild non-obstructive coronary artery disease.   2. Culprit vessel(s): left anterior descending.   3. Ventricular arrhythmia requiring therapy.    Stress Test:  No results found for this or any previous visit from the past 1095 days.    Cardiac Imaging:  No results found for this or any previous visit from the past 1095 days.      Inpatient Medications:  Scheduled Medications[1]  PRN Medications[2]  Continuous Medications[3]    Physical Exam:  Physical Exam  Vitals reviewed.   HENT:      Head: Normocephalic.      Nose: Nose normal.   Eyes:      Pupils: Pupils are equal, round, and reactive to light.   Cardiovascular:      Rate and Rhythm: Normal rate and regular rhythm. 3/6 BECKIE   Pulmonary:      Effort: Pulmonary effort is normal.      Breath sounds: Normal breath sounds.   Abdominal:      General: Abdomen is flat.      Palpations: Abdomen is soft tender to palpitation   Musculoskeletal:         General: Normal range of motion.      Cervical back: Normal range of motion.   Skin:     General: Skin is warm and dry.   Neurological:      General: No focal deficit present.      Mental Status: He is alert and oriented to person, place, and time.   Psychiatric:         Mood and Affect: Mood normal.       Extremities bilateral lower extremity edema   Left foot wrapped      Assessment/Plan   Nausea & vomiting     Diabetes (Multi)     Benign essential HTN     CAD (coronary artery disease)     Hyperlipidemia     Anemia        #Intractable nausea and vomiting  -On PO antibiotics for wound infection   -CT abd/pelvis: *Mildly thick-walled urinary bladder. Correlate with urinalysis  to exclude cystitis.  *Enlarged and heterogeneous prostate gland. Correlate with PSA.  *There is coronary artery calcification, a marker for  coronary  atherosclerotic disease.  -UA neg   -Tox screen + oxy   -WBC 8.5 >7.5  -Lipase 45   -ED MEDS: bentyl, famotidine, morphine, LR, ondansetron, promethazine  -Clear liquid diet   -IVFs as needed   -Zofran as needed   -Compazine 5 mg as needed      #DM Type II with neuropathy   -Continue gabapentin   -SSI with hypoglycemia protocol  -Monitor BG      #Essential HTN  #CAD  #HLD   -Continue amlodipine, apixaban, hydralazine      #Anemia  -H/H 10.5/32.1  -Daily CBC      DVT ppx  -apixaban     PUD ppx  -pantoprazole     F: PRN  E: Replete per protocol  N: Clear liquid   A: PIV     Disposition: Pt requires less than 2 inpatient days at this time   Code Status: Full Code      Peripheral IV 08/06/25 22 G Posterior;Right Hand (Active)   Site Assessment Clean;Dry;Intact 08/06/25 2100   Dressing Status Clean;Dry 08/06/25 2100   Number of days: 1       Code Status:  Full Code    I spent  minutes in the professional and overall care of this patient.        Ani Corley, APRN-CNP         [1]   Scheduled medications   Medication Dose Route Frequency    amLODIPine  2.5 mg oral Daily    apixaban  5 mg oral BID    gabapentin  300 mg oral TID    hydrALAZINE  50 mg oral TID    insulin lispro  0-10 Units subcutaneous TID AC    pantoprazole  40 mg oral Daily before breakfast    Or    pantoprazole  40 mg intravenous Daily before breakfast   [2]   PRN medications   Medication    acetaminophen    Or    acetaminophen    Or    acetaminophen    acetaminophen    Or    acetaminophen    Or    acetaminophen    benzocaine-menthol    dextromethorphan-guaifenesin    dextrose    dextrose    glucagon    glucagon    guaiFENesin    melatonin    ondansetron    Or    ondansetron    oxygen    polyethylene glycol    sodium chloride 0.9%   [3]   Continuous Medications   Medication Dose Last Rate    sodium chloride 0.9%  10 mL/hr

## 2025-08-07 NOTE — CARE PLAN
The patient's goals for the shift include      The clinical goals for the shift include client will be free from N/V this shift    Over the shift, the patient did not make progress toward remaining free from nausea.

## 2025-08-07 NOTE — PROGRESS NOTES
"Physical Therapy                 Therapy Communication Note    Patient Name: Elliot Partida  MRN: [id number]  Department: [facility]  Room: [address] [place], [place] [address]A  Today's Date: 8/7/2025     Discipline: Physical Therapy    Missed Visit: PT Missed Visit: Yes     Missed Visit Reason: Missed Visit Reason: Patient refused (Attempted to see pt for PT evaluation x 2 on this date. Pt difficulty to arouse and wake. Pt mumbling \"No\" when asked to participate in PT evaluation and get oob into chair. Attempted to motivae pt but pt still refusing.)    Missed Time: Attempt    Comment: Attempted 8:30 am and 10:06 am      "

## 2025-08-07 NOTE — CARE PLAN
The patient's goals for the shift include      The clinical goals for the shift include Pt will tolerate diet today    Pt refused breakfast and lunch and only had pudding and jello for dinner. IV fluids of NS started at 100cc/hr. IV was restarted with ultrasound. Walked to toilet once today to void. Refused to sit in chair or work with PT. He prefers to sleep. Left foot foam dressing intact--podiatry was consulted. Pt did agree to take his meds today.

## 2025-08-08 ENCOUNTER — PHARMACY VISIT (OUTPATIENT)
Dept: PHARMACY | Facility: CLINIC | Age: 75
End: 2025-08-08
Payer: COMMERCIAL

## 2025-08-08 VITALS
BODY MASS INDEX: 27.27 KG/M2 | RESPIRATION RATE: 18 BRPM | OXYGEN SATURATION: 99 % | HEIGHT: 69 IN | WEIGHT: 184.08 LBS | SYSTOLIC BLOOD PRESSURE: 172 MMHG | HEART RATE: 65 BPM | DIASTOLIC BLOOD PRESSURE: 78 MMHG | TEMPERATURE: 96.6 F

## 2025-08-08 PROCEDURE — 2500000001 HC RX 250 WO HCPCS SELF ADMINISTERED DRUGS (ALT 637 FOR MEDICARE OP): Mod: IPSPLIT

## 2025-08-08 PROCEDURE — 2500000001 HC RX 250 WO HCPCS SELF ADMINISTERED DRUGS (ALT 637 FOR MEDICARE OP): Mod: IPSPLIT | Performed by: NURSE PRACTITIONER

## 2025-08-08 PROCEDURE — 99239 HOSP IP/OBS DSCHRG MGMT >30: CPT

## 2025-08-08 PROCEDURE — RXMED WILLOW AMBULATORY MEDICATION CHARGE

## 2025-08-08 RX ORDER — ONDANSETRON 4 MG/1
4 TABLET, FILM COATED ORAL EVERY 8 HOURS PRN
Qty: 20 TABLET | Refills: 0 | Status: SHIPPED | OUTPATIENT
Start: 2025-08-08 | End: 2025-08-15

## 2025-08-08 RX ADMIN — AMLODIPINE BESYLATE 2.5 MG: 2.5 TABLET ORAL at 09:10

## 2025-08-08 RX ADMIN — APIXABAN 5 MG: 5 TABLET, FILM COATED ORAL at 09:11

## 2025-08-08 RX ADMIN — GABAPENTIN 300 MG: 300 CAPSULE ORAL at 09:11

## 2025-08-08 RX ADMIN — ASCORBIC ACID, THIAMINE MONONITRATE,RIBOFLAVIN, NIACINAMIDE, PYRIDOXINE HYDROCHLORIDE, FOLIC ACID, CYANOCOBALAMIN, BIOTIN, CALCIUM PANTOTHENATE, 1 CAPSULE: 100; 1.5; 1.7; 20; 10; 1; 6000; 150000; 5 CAPSULE, LIQUID FILLED ORAL at 09:11

## 2025-08-08 RX ADMIN — HYDRALAZINE HYDROCHLORIDE 50 MG: 50 TABLET ORAL at 09:10

## 2025-08-08 RX ADMIN — PANTOPRAZOLE SODIUM 40 MG: 40 TABLET, DELAYED RELEASE ORAL at 06:23

## 2025-08-08 RX ADMIN — ZINC SULFATE 220 MG (50 MG) CAPSULE 1 CAPSULE: CAPSULE at 09:11

## 2025-08-08 ASSESSMENT — PAIN - FUNCTIONAL ASSESSMENT: PAIN_FUNCTIONAL_ASSESSMENT: 0-10

## 2025-08-08 ASSESSMENT — PAIN SCALES - GENERAL: PAINLEVEL_OUTOF10: 0 - NO PAIN

## 2025-08-08 NOTE — CARE PLAN
The patient's goals for the shift include      The clinical goals for the shift include Patient will comply with care this shift    Over the shift, the patient did make progress toward the following goals.       Problem: Skin  Goal: Prevent/manage excess moisture  Outcome: Progressing

## 2025-08-08 NOTE — CARE PLAN
Problem: Pain  Goal: Takes deep breaths with improved pain control throughout the shift  Outcome: Met  Goal: Turns in bed with improved pain control throughout the shift  Outcome: Met  Goal: Walks with improved pain control throughout the shift  Outcome: Met  Goal: Performs ADL's with improved pain control throughout shift  Outcome: Met  Goal: Participates in PT with improved pain control throughout the shift  Outcome: Met  Goal: Free from opioid side effects throughout the shift  Outcome: Met  Goal: Free from acute confusion related to pain meds throughout the shift  Outcome: Met   The patient's goals for the shift include      The clinical goals for the shift include Discharge home    Pt ready for discharge home. All instructions reviewed and patient aware to  rx from pharmacy. Pt also was able to repeat discharge instructions to time.

## 2025-08-08 NOTE — PROGRESS NOTES
08/08/25 0921   Discharge Planning   Living Arrangements Family members  (Lives with sister)   Support Systems Family members  (sister, Eryn, and niece, Rubén)   Assistance Needed AAOX3. His sister lives with him in a 2 story [facility]. He resides on the main floor one he is in the home. The 5 steps to enter are challenging for him. His niece assists him with the stairs. She also assists with transportation to appointments. Patient does not drive. He has a tub shower. He is independent in ADL's. He requires assistance from his sister or niece with all iADL's. DME: walker, cane, crutches, wheelchair, shower chair, grab bars. He does not use any home oxygen. He recently skilled at [facility] and discharged home with [facility]. He plans to resume HHC with [facility] on discharge.   Type of Residence Private residence   Number of Stairs to Enter Residence 5  (no railing)   Number of Stairs Within Residence 12  (basement/upstairs - can't navigate the stairs so he doesn't do them)   Do you have animals or pets at home? Yes   Type of Animals or Pets 2 cats   Who is requesting discharge planning? Provider   Home or Post Acute Services In home services   Type of Home Care Services Home PT;Home OT;Meals on Wheels   Expected Discharge Disposition Home Health  (Final orders sent to [facility]. Patient does not have PCP. PCP list given for him to establish with new PCP. He is medically ready for discharge today.)   Intensity of Service   Intensity of Service 0-30 min

## 2025-08-08 NOTE — DISCHARGE SUMMARY
Discharge Diagnosis  Nausea & vomiting           Issues Requiring Follow-Up  Follow up with PCP     Discharge Meds     Medication List      START taking these medications     amoxicillin-clavulanate 875-125 mg tablet; Commonly known as: Augmentin;   Take 1 tablet by mouth every 12 hours for 22 doses.   ondansetron 4 mg tablet; Commonly known as: Zofran; Take 1 tablet (4 mg)   by mouth every 8 hours if needed for nausea for up to 7 days.   sulfamethoxazole-trimethoprim 800-160 mg tablet; Commonly known as:   Bactrim DS; Take 1 tablet by mouth every 12 hours for 22 doses.     CONTINUE taking these medications     acetaminophen 325 mg tablet; Commonly known as: Tylenol; Take 2 tablets   (650 mg) by mouth every 6 hours if needed for mild pain (1 - 3), moderate   pain (4 - 6), headaches or fever (temp greater than 38.0 C).   amLODIPine 2.5 mg tablet; Commonly known as: Norvasc; Take 1 tablet (2.5   mg) by mouth once daily.   Blood glucose monitoring meter; To check blood sugar every morning   before breakfast   Eliquis 5 mg tablet; Generic drug: apixaban   gabapentin 300 mg capsule; Commonly known as: Neurontin   hydrALAZINE 50 mg tablet; Commonly known as: Apresoline; Take 1 tablet   (50 mg) by mouth 3 times a day.       Test Results Pending At Discharge  Pending Labs       No current pending labs.            Hospital Course   Elliot Partida is a 74 y.o. male presenting with vomiting. Pt states he started vomiting around 2am. He denies any constipation or diarrhea. He has not tried to eat or drink anything. He does have some abdominal pain in the umbilical area. Pt was recently admitted and treated for osteomyelitis of the left toe. He was discharged on oral abx, pt reports taking them but unsure of when his last dose was. Workup in the ED was unremarkable. Pt was admitted for intractable vomiting.      ED VS: T36.4, HR 79, RR 18, /77, Sp02 99%RA     Imaging: CT abd/pelvis- *Mildly thick-walled urinary bladder.  Correlate with urinalysis  to exclude cystitis.  *Enlarged and heterogeneous prostate gland. Correlate with PSA.  *There is coronary artery calcification, a marker for coronary  atherosclerotic disease.     Labs: Glu 165, Na 140, K 3.7, Bun/creat 23/1.04, Lipase 45, Trop 7, WBC 8.5, H/H 10.5/32.1, Plt 301, UA neg     Hospital Course:  #Intractable nausea and vomiting  -On PO antibiotics for wound infection   -CT abd/pelvis: *Mildly thick-walled urinary bladder. Correlate with urinalysis  to exclude cystitis.  *Enlarged and heterogeneous prostate gland. Correlate with PSA.  *There is coronary artery calcification, a marker for coronary  atherosclerotic disease.  -UA neg   -Tox screen + oxy   -WBC 8.5 >7.5  -Lipase 45   -ED MEDS: bentyl, famotidine, morphine, LR, ondansetron, promethazine  -Clear liquid diet   -IVFs as needed   -Zofran as needed   -Compazine 5 mg as needed      #DM Type II with neuropathy   -Continue gabapentin   -SSI with hypoglycemia protocol  -Monitor BG      #Essential HTN  #CAD  #HLD   -Continue amlodipine, apixaban, hydralazine      #Anemia  -H/H 10.5/32.1  -Daily CBC      DVT ppx  -apixaban     PUD ppx  -pantoprazole     F: PRN  E: Replete per protocol  N: Clear liquid   A: PIV     Disposition: Pt stable for discharge home. Declined SNF due to copay, will go home with [facility]. Will continue on oral abx for wound infection. Follow up with PCP     Code Status: Full Code    Total cumulative time spent in preparation of this discharge including documentation review, coordination of care with the medical team including PT/SW/care coordinators and treating consultants, discussion with patient and pertinent family members and finalization of prescriptions, followup appointments and this discharge summary was approximately  45 minutes. Over 50% of the time was spent face-to-face counseling the patient on diagnosis, prescriptions, and follow up appointments.     Pertinent Physical Exam At Time of  Discharge  Physical Exam  Vitals reviewed.   HENT:      Head: Normocephalic and atraumatic.      Right Ear: External ear normal.      Left Ear: External ear normal.      Nose: Nose normal.      Mouth/Throat:      Pharynx: Oropharynx is clear.     Eyes:      Conjunctiva/sclera: Conjunctivae normal.       Cardiovascular:      Rate and Rhythm: Normal rate and regular rhythm.      Pulses: Normal pulses.      Heart sounds: Normal heart sounds.   Pulmonary:      Effort: Pulmonary effort is normal.      Breath sounds: Normal breath sounds.   Abdominal:      General: Abdomen is flat.      Palpations: Abdomen is soft.     Musculoskeletal:         General: Normal range of motion.      Cervical back: Normal range of motion and neck supple.     Skin:     General: Skin is dry.     Neurological:      General: No focal deficit present.      Mental Status: He is alert and oriented to person, place, and time.     Psychiatric:         Mood and Affect: Affect is flat.         Outpatient Follow-Up  Future Appointments   Date Time Provider Department Center   10/17/2025 11:00 AM SARAH Londno-CNP [id number]         SARAH Bazan-CNP

## 2025-08-09 LAB
ATRIAL RATE: 79 BPM
P AXIS: 45 DEGREES
P OFFSET: 142 MS
P ONSET: 81 MS
PR INTERVAL: 274 MS
Q ONSET: 218 MS
QRS COUNT: 13 BEATS
QRS DURATION: 148 MS
QT INTERVAL: 420 MS
QTC CALCULATION(BAZETT): 481 MS
QTC FREDERICIA: 460 MS
R AXIS: -34 DEGREES
T AXIS: 94 DEGREES
T OFFSET: 428 MS
VENTRICULAR RATE: 79 BPM

## 2025-08-10 ENCOUNTER — PATIENT OUTREACH (OUTPATIENT)
Dept: CARE COORDINATION | Age: 75
End: 2025-08-10
Payer: MEDICARE

## 2025-08-10 NOTE — PROGRESS NOTES
1st attempt at enrollmet call, spoke with patient, discussed [facility] program, he declined program stating he has a home care nurse visiting every other day. He declined to take contact number, no other questions at this time.

## 2025-08-12 DIAGNOSIS — E11.42 DM TYPE 2 WITH DIABETIC PERIPHERAL NEUROPATHY: Primary | ICD-10-CM

## 2025-09-02 ENCOUNTER — APPOINTMENT (OUTPATIENT)
Dept: CARDIOLOGY | Facility: HOSPITAL | Age: 75
End: 2025-09-02
Payer: MEDICARE

## 2025-09-02 ENCOUNTER — HOSPITAL ENCOUNTER (EMERGENCY)
Facility: HOSPITAL | Age: 75
Discharge: HOME | End: 2025-09-02
Attending: EMERGENCY MEDICINE
Payer: MEDICARE

## 2025-09-02 ENCOUNTER — APPOINTMENT (OUTPATIENT)
Dept: RADIOLOGY | Facility: HOSPITAL | Age: 75
End: 2025-09-02
Payer: MEDICARE

## 2025-09-02 VITALS
RESPIRATION RATE: 16 BRPM | HEART RATE: 76 BPM | HEIGHT: 69 IN | SYSTOLIC BLOOD PRESSURE: 163 MMHG | WEIGHT: 185 LBS | BODY MASS INDEX: 27.4 KG/M2 | DIASTOLIC BLOOD PRESSURE: 77 MMHG | OXYGEN SATURATION: 99 % | TEMPERATURE: 98.1 F

## 2025-09-02 DIAGNOSIS — G89.29 CHRONIC NONINTRACTABLE HEADACHE, UNSPECIFIED HEADACHE TYPE: ICD-10-CM

## 2025-09-02 DIAGNOSIS — R51.9 CHRONIC NONINTRACTABLE HEADACHE, UNSPECIFIED HEADACHE TYPE: ICD-10-CM

## 2025-09-02 DIAGNOSIS — J39.2 OROPHARYNGEAL MASS: ICD-10-CM

## 2025-09-02 DIAGNOSIS — R29.810 FACIAL DROOP: Primary | ICD-10-CM

## 2025-09-02 DIAGNOSIS — R59.0 LAD (LYMPHADENOPATHY) OF RIGHT CERVICAL REGION: ICD-10-CM

## 2025-09-02 DIAGNOSIS — G51.0 BELL'S PALSY: ICD-10-CM

## 2025-09-02 LAB
ANION GAP SERPL CALC-SCNC: 13 MMOL/L (ref 10–20)
BASOPHILS # BLD AUTO: 0.02 X10*3/UL (ref 0–0.1)
BASOPHILS NFR BLD AUTO: 0.3 %
BUN SERPL-MCNC: 22 MG/DL (ref 6–23)
CALCIUM SERPL-MCNC: 9.6 MG/DL (ref 8.6–10.3)
CARDIAC TROPONIN I PNL SERPL HS: 5 NG/L (ref 0–20)
CHLORIDE SERPL-SCNC: 103 MMOL/L (ref 98–107)
CO2 SERPL-SCNC: 28 MMOL/L (ref 21–32)
CREAT SERPL-MCNC: 0.92 MG/DL (ref 0.5–1.3)
EGFRCR SERPLBLD CKD-EPI 2021: 87 ML/MIN/1.73M*2
EOSINOPHIL # BLD AUTO: 0.2 X10*3/UL (ref 0–0.4)
EOSINOPHIL NFR BLD AUTO: 2.8 %
ERYTHROCYTE [DISTWIDTH] IN BLOOD BY AUTOMATED COUNT: 13.1 % (ref 11.5–14.5)
GLUCOSE BLD MANUAL STRIP-MCNC: 105 MG/DL (ref 74–99)
GLUCOSE SERPL-MCNC: 102 MG/DL (ref 74–99)
HCT VFR BLD AUTO: 35.4 % (ref 41–52)
HGB BLD-MCNC: 11.5 G/DL (ref 13.5–17.5)
IMM GRANULOCYTES # BLD AUTO: 0.03 X10*3/UL (ref 0–0.5)
IMM GRANULOCYTES NFR BLD AUTO: 0.4 % (ref 0–0.9)
LYMPHOCYTES # BLD AUTO: 1.62 X10*3/UL (ref 0.8–3)
LYMPHOCYTES NFR BLD AUTO: 22.4 %
MCH RBC QN AUTO: 28.8 PG (ref 26–34)
MCHC RBC AUTO-ENTMCNC: 32.5 G/DL (ref 32–36)
MCV RBC AUTO: 89 FL (ref 80–100)
MONOCYTES # BLD AUTO: 0.51 X10*3/UL (ref 0.05–0.8)
MONOCYTES NFR BLD AUTO: 7.1 %
NEUTROPHILS # BLD AUTO: 4.84 X10*3/UL (ref 1.6–5.5)
NEUTROPHILS NFR BLD AUTO: 67 %
NRBC BLD-RTO: 0 /100 WBCS (ref 0–0)
PLATELET # BLD AUTO: 224 X10*3/UL (ref 150–450)
POTASSIUM SERPL-SCNC: 4.2 MMOL/L (ref 3.5–5.3)
RBC # BLD AUTO: 4 X10*6/UL (ref 4.5–5.9)
SODIUM SERPL-SCNC: 140 MMOL/L (ref 136–145)
WBC # BLD AUTO: 7.2 X10*3/UL (ref 4.4–11.3)

## 2025-09-02 PROCEDURE — 2500000004 HC RX 250 GENERAL PHARMACY W/ HCPCS (ALT 636 FOR OP/ED): Performed by: EMERGENCY MEDICINE

## 2025-09-02 PROCEDURE — 2550000001 HC RX 255 CONTRASTS: Performed by: EMERGENCY MEDICINE

## 2025-09-02 PROCEDURE — 2500000001 HC RX 250 WO HCPCS SELF ADMINISTERED DRUGS (ALT 637 FOR MEDICARE OP): Performed by: EMERGENCY MEDICINE

## 2025-09-02 PROCEDURE — 82947 ASSAY GLUCOSE BLOOD QUANT: CPT

## 2025-09-02 PROCEDURE — 96360 HYDRATION IV INFUSION INIT: CPT | Mod: 59

## 2025-09-02 PROCEDURE — 36415 COLL VENOUS BLD VENIPUNCTURE: CPT | Performed by: EMERGENCY MEDICINE

## 2025-09-02 PROCEDURE — 99285 EMERGENCY DEPT VISIT HI MDM: CPT | Mod: 25 | Performed by: EMERGENCY MEDICINE

## 2025-09-02 PROCEDURE — 70496 CT ANGIOGRAPHY HEAD: CPT

## 2025-09-02 PROCEDURE — 85025 COMPLETE CBC W/AUTO DIFF WBC: CPT | Performed by: EMERGENCY MEDICINE

## 2025-09-02 PROCEDURE — 80048 BASIC METABOLIC PNL TOTAL CA: CPT | Performed by: EMERGENCY MEDICINE

## 2025-09-02 PROCEDURE — 93005 ELECTROCARDIOGRAM TRACING: CPT

## 2025-09-02 PROCEDURE — 84484 ASSAY OF TROPONIN QUANT: CPT | Performed by: EMERGENCY MEDICINE

## 2025-09-02 RX ORDER — PREDNISONE 20 MG/1
60 TABLET ORAL ONCE
Status: COMPLETED | OUTPATIENT
Start: 2025-09-02 | End: 2025-09-02

## 2025-09-02 RX ORDER — PREDNISONE 20 MG/1
60 TABLET ORAL DAILY
Qty: 21 TABLET | Refills: 0 | Status: SHIPPED | OUTPATIENT
Start: 2025-09-02 | End: 2025-09-09

## 2025-09-02 RX ORDER — HYDROCODONE BITARTRATE AND ACETAMINOPHEN 5; 325 MG/1; MG/1
1 TABLET ORAL ONCE
Refills: 0 | Status: COMPLETED | OUTPATIENT
Start: 2025-09-02 | End: 2025-09-02

## 2025-09-02 RX ADMIN — SODIUM CHLORIDE 500 ML: 9 INJECTION, SOLUTION INTRAVENOUS at 15:50

## 2025-09-02 RX ADMIN — PREDNISONE 60 MG: 20 TABLET ORAL at 18:39

## 2025-09-02 RX ADMIN — HYDROCODONE BITARTRATE AND ACETAMINOPHEN 1 TABLET: 5; 325 TABLET ORAL at 17:35

## 2025-09-02 RX ADMIN — IOHEXOL 75 ML: 350 INJECTION, SOLUTION INTRAVENOUS at 16:52

## 2025-09-02 ASSESSMENT — PAIN DESCRIPTION - PAIN TYPE: TYPE: ACUTE PAIN

## 2025-09-02 ASSESSMENT — PAIN DESCRIPTION - LOCATION: LOCATION: HEAD

## 2025-09-02 ASSESSMENT — PAIN SCALES - GENERAL
PAINLEVEL_OUTOF10: 0 - NO PAIN
PAINLEVEL_OUTOF10: 7
PAINLEVEL_OUTOF10: 0 - NO PAIN
PAINLEVEL_OUTOF10: 0 - NO PAIN

## 2025-09-02 ASSESSMENT — PAIN - FUNCTIONAL ASSESSMENT
PAIN_FUNCTIONAL_ASSESSMENT: 0-10
PAIN_FUNCTIONAL_ASSESSMENT: 0-10

## 2025-09-02 ASSESSMENT — PAIN DESCRIPTION - DESCRIPTORS: DESCRIPTORS: PRESSURE

## 2025-09-05 LAB
ATRIAL RATE: 68 BPM
P AXIS: 44 DEGREES
P OFFSET: 138 MS
P ONSET: 85 MS
PR INTERVAL: 272 MS
Q ONSET: 221 MS
QRS COUNT: 11 BEATS
QRS DURATION: 130 MS
QT INTERVAL: 408 MS
QTC CALCULATION(BAZETT): 433 MS
QTC FREDERICIA: 425 MS
R AXIS: -59 DEGREES
T AXIS: 54 DEGREES
T OFFSET: 425 MS
VENTRICULAR RATE: 68 BPM

## 2025-10-17 ENCOUNTER — APPOINTMENT (OUTPATIENT)
Dept: HEMATOLOGY/ONCOLOGY | Facility: HOSPITAL | Age: 75
End: 2025-10-17
Payer: MEDICARE

## (undated) DEVICE — GUIDEWIRE, GO2WIRE, STEERABLE, 0.035 X 260CM, FLOPPY STRAIGHT

## (undated) DEVICE — Device

## (undated) DEVICE — INTRODUCER, MICROPUNCTURE, 2.9/4.0 FR, W/ GUIDEWIRE, ECHO

## (undated) DEVICE — PREP TRAY, VAGINAL

## (undated) DEVICE — TOWEL, SURGICAL, NEURO, O/R, 16 X 26, BLUE, STERILE

## (undated) DEVICE — DRESSING, GAUZE, 16 PLY, 4 X 4 IN, STERILE

## (undated) DEVICE — ACCESS KIT, S-MAK MINI, 4FR 10CM 0.018IN 40CM, NT/PT, ECHO ENHANCE NEEDLE

## (undated) DEVICE — COVER, MAYO STAND, W/PAD, 23 IN, DISPOSABLE, PLASTIC, LF, STERILE

## (undated) DEVICE — GLOVE, SURGICAL, PROTEXIS PI BLUE W/NEUTHERA, 8.0, PF, LF

## (undated) DEVICE — INFLATION KIT, ADVANTAGE, ENCORE 26 (1/BOX)

## (undated) DEVICE — GUIDEWIRE, STIFF SHAFT, ANGLE TIP, .035 DIA, 260 CM,  3 CM TIP"

## (undated) DEVICE — SPONGE, LAP, XRAY DECT, 18IN X 18IN, W/MASTER DMT, STERILE

## (undated) DEVICE — NEEDLE, HYPODERMIC, 25 G X 1.5 IN, A BEVEL, STERILE

## (undated) DEVICE — KIT, MINOR, DOUBLE BASIN

## (undated) DEVICE — WOUND VAC KIT, W/CANISTER, 500ML

## (undated) DEVICE — SUTURE, PROLENE, 3-0, 30 IN, SH

## (undated) DEVICE — COVER, CAMERA, HANDLE, LIGHTING/VISUALIZATION

## (undated) DEVICE — CLOSURE SYSTEM, VASCULAR, VASCADE 6/7F VCS

## (undated) DEVICE — CATHETER, CXI SUPPORT, .018 2.6F X 65CM, STR TIP

## (undated) DEVICE — DRESSING KIT, V.A.C., W/DRAPE/TUBING, MEDIUM, FOAM 5PK

## (undated) DEVICE — DRAPE, INSTRUMENT, W/POUCH, STERI DRAPE, 7 X 11 IN, DISPOSABLE, STERILE

## (undated) DEVICE — GLOVE, SURGICAL, PROTEXIS PI MICRO, 7.5, PF, LF

## (undated) DEVICE — PAD, GROUNDING, ELECTROSURGICAL, W/15 FT CABLE, POLYHESIVE II, ADULT, LF

## (undated) DEVICE — DRAPE, IRRIGATION, W/POUCH, ADHESIVE STRIP, STERI DRAPE, 19 X 23 IN, DISPOSABLE, STERILE

## (undated) DEVICE — SOLUTION, INJECTION, USP, SODIUM CHLORIDE 0.9%, .9, NACL, 1000 ML, BAG

## (undated) DEVICE — SUTURE, PDS II, 2-0, 27 IN, SH, VIOLET

## (undated) DEVICE — SOLUTION, IRRIGATION, STERILE WATER, 1000 ML, POUR BOTTLE

## (undated) DEVICE — SOLUTION, IRRIGATION, SODIUM CHLORIDE 0.9%, 1000 ML, POUR BOTTLE

## (undated) DEVICE — PACKING, IODOFORM, CURITY, 0.5 IN X 5 YD, STERILE

## (undated) DEVICE — CATHETER, ARMADA 18PTA, 5.0MM X 40MM X 150CM

## (undated) DEVICE — SUTURE, PROLENE, 2-0, CT-1, BL MONO, LF

## (undated) DEVICE — 5FR X 100CM INFINITI TL DIAGNOSTIC CATHETER, INTERNAL MAMMORY, IM, FEMORAL SELECTIVE THRULUMEN, 0.038IN MAX GUIDEWIRE

## (undated) DEVICE — CATHETER, BALLOON, INPACT AV, DCB 018 6.0 MM X 40 MM X 130 CM

## (undated) DEVICE — DRAPE, SHEET, EXTREMITY, W/ARM BOARD COVERS, 87 X 106 X 128 IN, DISPOSABLE, LF, STERILE

## (undated) DEVICE — GUIDEWIRE, COMMAND ST, HI-TORQUE, 0.18 X 300CM

## (undated) DEVICE — SUTURE, PDS II, 3-0, 18 IN, PS2, CLEAR

## (undated) DEVICE — TIP, SUCTION, YANKAUER, BULB, ADULT

## (undated) DEVICE — CATHETER, ARMADA 18PTA, 3.0MM X 150MM X 150CM

## (undated) DEVICE — TRAY, DRY PREP, PREMIUM

## (undated) DEVICE — PADDING, WEBRIL, UNDERCAST, STERILE, 4 IN

## (undated) DEVICE — CATHETER, CXI SUPPORT, .018 X 150CM, ANG TIP

## (undated) DEVICE — SUTURE, PDS II, 2-0, 27 IN, FS-1, UNDYED

## (undated) DEVICE — CATHETER, PRELUDE ROADSTER 6FR, 65CM, STRAIGHT

## (undated) DEVICE — DRESSING, GAUZE, PETROLATUM, XEROFORM, 5 X 9 IN, STERILE

## (undated) DEVICE — SOLUTION, IRRIGATION, USP, SODIUM CHLORIDE 0.9%, .9 NACL, 1000 ML, BAG

## (undated) DEVICE — TUBESET, CYLINDRICAL PROBE, TUNNELVAC

## (undated) DEVICE — GLOVE, SURGICAL, PROTEXIS PI , 8.0, PF, LF

## (undated) DEVICE — SHEATH, PINNACLE, 10 CM,  5FR INTRODUCER, 5FR DIA, 2.5 CM DIALATOR

## (undated) DEVICE — SUTURE, PROLENE, 0, 30 IN, CT1, BLUE

## (undated) DEVICE — DRAPE PACK, LOWER EXTREMITY, CUSTOM, GENEVA

## (undated) DEVICE — SHEATH, PINNACLE, 10 CM,  6FR INTRODUCER, 6FR DIA, 2.5 CM DIALATOR

## (undated) DEVICE — PAD, GROUNDING, ELECTROSURGICAL, W/9 FT CABLE, POLYHESIVE II, ADULT, LF

## (undated) DEVICE — PREP, SKIN, BETADINE, SOLUTION, 16 OZ

## (undated) DEVICE — CONTAINER, SPECIMEN, 120 ML, STERILE

## (undated) DEVICE — DRESSING, ADAPTIC, NON-ADHERENT, 3 X 8 IN

## (undated) DEVICE — CATHETER, BALLOON, INPACT AV, DCB 018 5.0 MM X 120 MM X 130 CM

## (undated) DEVICE — PREP, SKIN, BETADINE, SCRUB, 16 OZ

## (undated) DEVICE — BANDAGE, GAUZE, CONFORMING, KERLIX, 6 PLY, 4.5 IN X 4.1 YD

## (undated) DEVICE — NEEDLE, HYPODERMIC, 23 GA X 1.5 IN

## (undated) DEVICE — GUIDEWIRE, HI-TORQUE, COMMAND ES, 0.014 X 300CM

## (undated) DEVICE — DRAPE, PAD, PREP, W/ 9 IN CUFF, 24 X 41, LF, NS

## (undated) DEVICE — BLADE, OSCILLATING/SAGITTAL, 25MM X 9MM

## (undated) DEVICE — CURETTE, DERMAL, DISPOSABLE, 4MM